# Patient Record
Sex: MALE | Race: WHITE | NOT HISPANIC OR LATINO
[De-identification: names, ages, dates, MRNs, and addresses within clinical notes are randomized per-mention and may not be internally consistent; named-entity substitution may affect disease eponyms.]

---

## 2016-04-03 RX ORDER — WARFARIN SODIUM 2.5 MG/1
3 TABLET ORAL
Qty: 30 | Refills: 0 | COMMUNITY
Start: 2016-04-03 | End: 2016-05-03

## 2016-04-03 RX ORDER — WARFARIN SODIUM 2.5 MG/1
2 TABLET ORAL
Qty: 30 | Refills: 0 | DISCHARGE
Start: 2016-04-03 | End: 2016-05-03

## 2017-04-05 ENCOUNTER — TRANSCRIPTION ENCOUNTER (OUTPATIENT)
Age: 67
End: 2017-04-05

## 2017-04-05 ENCOUNTER — INPATIENT (INPATIENT)
Facility: HOSPITAL | Age: 67
LOS: 0 days | Discharge: HOME | DRG: 315 | End: 2017-04-06
Attending: HOSPITALIST | Admitting: INTERNAL MEDICINE
Payer: COMMERCIAL

## 2017-04-05 VITALS
HEART RATE: 105 BPM | DIASTOLIC BLOOD PRESSURE: 78 MMHG | RESPIRATION RATE: 30 BRPM | HEIGHT: 68 IN | SYSTOLIC BLOOD PRESSURE: 122 MMHG | OXYGEN SATURATION: 92 % | WEIGHT: 315 LBS | TEMPERATURE: 98 F

## 2017-04-05 DIAGNOSIS — R55 SYNCOPE AND COLLAPSE: ICD-10-CM

## 2017-04-05 DIAGNOSIS — Z98.89 OTHER SPECIFIED POSTPROCEDURAL STATES: Chronic | ICD-10-CM

## 2017-04-05 LAB
ALBUMIN SERPL ELPH-MCNC: 3.4 G/DL — SIGNIFICANT CHANGE UP (ref 3.3–5.2)
ALBUMIN SERPL ELPH-MCNC: 3.7 G/DL — SIGNIFICANT CHANGE UP (ref 3.3–5.2)
ALP SERPL-CCNC: 37 U/L — LOW (ref 40–120)
ALP SERPL-CCNC: 42 U/L — SIGNIFICANT CHANGE UP (ref 40–120)
ALT FLD-CCNC: 12 U/L — SIGNIFICANT CHANGE UP
ALT FLD-CCNC: 13 U/L — SIGNIFICANT CHANGE UP
ANION GAP SERPL CALC-SCNC: 12 MMOL/L — SIGNIFICANT CHANGE UP (ref 5–17)
ANION GAP SERPL CALC-SCNC: 15 MMOL/L — SIGNIFICANT CHANGE UP (ref 5–17)
APTT BLD: 43.5 SEC — HIGH (ref 27.5–37.4)
AST SERPL-CCNC: 19 U/L — SIGNIFICANT CHANGE UP
AST SERPL-CCNC: 20 U/L — SIGNIFICANT CHANGE UP
BASOPHILS # BLD AUTO: 0 K/UL — SIGNIFICANT CHANGE UP (ref 0–0.2)
BASOPHILS NFR BLD AUTO: 0.2 % — SIGNIFICANT CHANGE UP (ref 0–2)
BILIRUB SERPL-MCNC: 0.4 MG/DL — SIGNIFICANT CHANGE UP (ref 0.4–2)
BILIRUB SERPL-MCNC: 0.5 MG/DL — SIGNIFICANT CHANGE UP (ref 0.4–2)
BUN SERPL-MCNC: 48 MG/DL — HIGH (ref 8–20)
BUN SERPL-MCNC: 53 MG/DL — HIGH (ref 8–20)
CALCIUM SERPL-MCNC: 9.2 MG/DL — SIGNIFICANT CHANGE UP (ref 8.6–10.2)
CALCIUM SERPL-MCNC: 9.3 MG/DL — SIGNIFICANT CHANGE UP (ref 8.6–10.2)
CHLORIDE SERPL-SCNC: 94 MMOL/L — LOW (ref 98–107)
CHLORIDE SERPL-SCNC: 99 MMOL/L — SIGNIFICANT CHANGE UP (ref 98–107)
CO2 SERPL-SCNC: 23 MMOL/L — SIGNIFICANT CHANGE UP (ref 22–29)
CO2 SERPL-SCNC: 28 MMOL/L — SIGNIFICANT CHANGE UP (ref 22–29)
CREAT SERPL-MCNC: 1.87 MG/DL — HIGH (ref 0.5–1.3)
CREAT SERPL-MCNC: 2.5 MG/DL — HIGH (ref 0.5–1.3)
D DIMER BLD IA.RAPID-MCNC: <150 NG/ML DDU — SIGNIFICANT CHANGE UP
EOSINOPHIL # BLD AUTO: 0.1 K/UL — SIGNIFICANT CHANGE UP (ref 0–0.5)
EOSINOPHIL NFR BLD AUTO: 2.2 % — SIGNIFICANT CHANGE UP (ref 0–5)
GLUCOSE SERPL-MCNC: 234 MG/DL — HIGH (ref 70–115)
GLUCOSE SERPL-MCNC: 262 MG/DL — HIGH (ref 70–115)
HCT VFR BLD CALC: 43.7 % — SIGNIFICANT CHANGE UP (ref 42–52)
HGB BLD-MCNC: 14.2 G/DL — SIGNIFICANT CHANGE UP (ref 14–18)
INR BLD: 3.07 RATIO — HIGH (ref 0.88–1.16)
LYMPHOCYTES # BLD AUTO: 1.2 K/UL — SIGNIFICANT CHANGE UP (ref 1–4.8)
LYMPHOCYTES # BLD AUTO: 19.4 % — LOW (ref 20–55)
MCHC RBC-ENTMCNC: 28.9 PG — SIGNIFICANT CHANGE UP (ref 27–31)
MCHC RBC-ENTMCNC: 32.5 G/DL — SIGNIFICANT CHANGE UP (ref 32–36)
MCV RBC AUTO: 88.8 FL — SIGNIFICANT CHANGE UP (ref 80–94)
MONOCYTES # BLD AUTO: 0.6 K/UL — SIGNIFICANT CHANGE UP (ref 0–0.8)
MONOCYTES NFR BLD AUTO: 9.9 % — SIGNIFICANT CHANGE UP (ref 3–10)
NEUTROPHILS # BLD AUTO: 4 K/UL — SIGNIFICANT CHANGE UP (ref 1.8–8)
NEUTROPHILS NFR BLD AUTO: 67.6 % — SIGNIFICANT CHANGE UP (ref 37–73)
NT-PROBNP SERPL-SCNC: 1519 PG/ML — HIGH (ref 0–300)
PLATELET # BLD AUTO: 151 K/UL — SIGNIFICANT CHANGE UP (ref 150–400)
POTASSIUM SERPL-MCNC: 4.5 MMOL/L — SIGNIFICANT CHANGE UP (ref 3.5–5.3)
POTASSIUM SERPL-MCNC: 4.5 MMOL/L — SIGNIFICANT CHANGE UP (ref 3.5–5.3)
POTASSIUM SERPL-SCNC: 4.5 MMOL/L — SIGNIFICANT CHANGE UP (ref 3.5–5.3)
POTASSIUM SERPL-SCNC: 4.5 MMOL/L — SIGNIFICANT CHANGE UP (ref 3.5–5.3)
PROT SERPL-MCNC: 6.8 G/DL — SIGNIFICANT CHANGE UP (ref 6.6–8.7)
PROT SERPL-MCNC: 7.3 G/DL — SIGNIFICANT CHANGE UP (ref 6.6–8.7)
PROTHROM AB SERPL-ACNC: 34.5 SEC — HIGH (ref 9.8–12.7)
RBC # BLD: 4.92 M/UL — SIGNIFICANT CHANGE UP (ref 4.6–6.2)
RBC # FLD: 17.1 % — HIGH (ref 11–15.6)
SODIUM SERPL-SCNC: 134 MMOL/L — LOW (ref 135–145)
SODIUM SERPL-SCNC: 137 MMOL/L — SIGNIFICANT CHANGE UP (ref 135–145)
TROPONIN T SERPL-MCNC: <0.01 NG/ML — SIGNIFICANT CHANGE UP (ref 0–0.06)
TROPONIN T SERPL-MCNC: <0.01 NG/ML — SIGNIFICANT CHANGE UP (ref 0–0.06)
WBC # BLD: 6 K/UL — SIGNIFICANT CHANGE UP (ref 4.8–10.8)
WBC # FLD AUTO: 6 K/UL — SIGNIFICANT CHANGE UP (ref 4.8–10.8)

## 2017-04-05 PROCEDURE — 93306 TTE W/DOPPLER COMPLETE: CPT | Mod: 26

## 2017-04-05 PROCEDURE — 93010 ELECTROCARDIOGRAM REPORT: CPT

## 2017-04-05 PROCEDURE — 99223 1ST HOSP IP/OBS HIGH 75: CPT

## 2017-04-05 PROCEDURE — 99285 EMERGENCY DEPT VISIT HI MDM: CPT

## 2017-04-05 RX ORDER — ASPIRIN/CALCIUM CARB/MAGNESIUM 324 MG
325 TABLET ORAL ONCE
Qty: 0 | Refills: 0 | Status: COMPLETED | OUTPATIENT
Start: 2017-04-05 | End: 2017-04-05

## 2017-04-05 RX ORDER — ALLOPURINOL 300 MG
300 TABLET ORAL DAILY
Qty: 0 | Refills: 0 | Status: DISCONTINUED | OUTPATIENT
Start: 2017-04-05 | End: 2017-04-06

## 2017-04-05 RX ORDER — INSULIN LISPRO 100/ML
VIAL (ML) SUBCUTANEOUS
Qty: 0 | Refills: 0 | Status: DISCONTINUED | OUTPATIENT
Start: 2017-04-05 | End: 2017-04-06

## 2017-04-05 RX ORDER — DEXTROSE 50 % IN WATER 50 %
25 SYRINGE (ML) INTRAVENOUS ONCE
Qty: 0 | Refills: 0 | Status: DISCONTINUED | OUTPATIENT
Start: 2017-04-05 | End: 2017-04-06

## 2017-04-05 RX ORDER — DEXTROSE 50 % IN WATER 50 %
1 SYRINGE (ML) INTRAVENOUS ONCE
Qty: 0 | Refills: 0 | Status: DISCONTINUED | OUTPATIENT
Start: 2017-04-05 | End: 2017-04-06

## 2017-04-05 RX ORDER — INSULIN GLARGINE 100 [IU]/ML
30 INJECTION, SOLUTION SUBCUTANEOUS AT BEDTIME
Qty: 0 | Refills: 0 | Status: DISCONTINUED | OUTPATIENT
Start: 2017-04-05 | End: 2017-04-06

## 2017-04-05 RX ORDER — ASPIRIN/CALCIUM CARB/MAGNESIUM 324 MG
81 TABLET ORAL DAILY
Qty: 0 | Refills: 0 | Status: DISCONTINUED | OUTPATIENT
Start: 2017-04-05 | End: 2017-04-06

## 2017-04-05 RX ORDER — PANTOPRAZOLE SODIUM 20 MG/1
40 TABLET, DELAYED RELEASE ORAL
Qty: 0 | Refills: 0 | Status: DISCONTINUED | OUTPATIENT
Start: 2017-04-05 | End: 2017-04-06

## 2017-04-05 RX ORDER — DEXTROSE 50 % IN WATER 50 %
12.5 SYRINGE (ML) INTRAVENOUS ONCE
Qty: 0 | Refills: 0 | Status: DISCONTINUED | OUTPATIENT
Start: 2017-04-05 | End: 2017-04-06

## 2017-04-05 RX ORDER — GLUCAGON INJECTION, SOLUTION 0.5 MG/.1ML
1 INJECTION, SOLUTION SUBCUTANEOUS ONCE
Qty: 0 | Refills: 0 | Status: DISCONTINUED | OUTPATIENT
Start: 2017-04-05 | End: 2017-04-06

## 2017-04-05 RX ORDER — NITROGLYCERIN 6.5 MG
1 CAPSULE, EXTENDED RELEASE ORAL ONCE
Qty: 0 | Refills: 0 | Status: COMPLETED | OUTPATIENT
Start: 2017-04-05 | End: 2017-04-05

## 2017-04-05 RX ORDER — LISINOPRIL 2.5 MG/1
20 TABLET ORAL DAILY
Qty: 0 | Refills: 0 | Status: DISCONTINUED | OUTPATIENT
Start: 2017-04-05 | End: 2017-04-06

## 2017-04-05 RX ORDER — SODIUM CHLORIDE 9 MG/ML
3 INJECTION INTRAMUSCULAR; INTRAVENOUS; SUBCUTANEOUS ONCE
Qty: 0 | Refills: 0 | Status: COMPLETED | OUTPATIENT
Start: 2017-04-05 | End: 2017-04-05

## 2017-04-05 RX ORDER — FENOFIBRATE,MICRONIZED 130 MG
145 CAPSULE ORAL DAILY
Qty: 0 | Refills: 0 | Status: DISCONTINUED | OUTPATIENT
Start: 2017-04-05 | End: 2017-04-06

## 2017-04-05 RX ORDER — BUDESONIDE AND FORMOTEROL FUMARATE DIHYDRATE 160; 4.5 UG/1; UG/1
1 AEROSOL RESPIRATORY (INHALATION)
Qty: 0 | Refills: 0 | Status: DISCONTINUED | OUTPATIENT
Start: 2017-04-05 | End: 2017-04-06

## 2017-04-05 RX ORDER — SODIUM CHLORIDE 9 MG/ML
1000 INJECTION, SOLUTION INTRAVENOUS
Qty: 0 | Refills: 0 | Status: DISCONTINUED | OUTPATIENT
Start: 2017-04-05 | End: 2017-04-06

## 2017-04-05 RX ORDER — FUROSEMIDE 40 MG
80 TABLET ORAL
Qty: 0 | Refills: 0 | Status: DISCONTINUED | OUTPATIENT
Start: 2017-04-05 | End: 2017-04-05

## 2017-04-05 RX ORDER — ACETAMINOPHEN 500 MG
1000 TABLET ORAL ONCE
Qty: 0 | Refills: 0 | Status: DISCONTINUED | OUTPATIENT
Start: 2017-04-05 | End: 2017-04-05

## 2017-04-05 RX ORDER — SPIRONOLACTONE 25 MG/1
25 TABLET, FILM COATED ORAL DAILY
Qty: 0 | Refills: 0 | Status: DISCONTINUED | OUTPATIENT
Start: 2017-04-05 | End: 2017-04-06

## 2017-04-05 RX ORDER — FUROSEMIDE 40 MG
80 TABLET ORAL DAILY
Qty: 0 | Refills: 0 | Status: DISCONTINUED | OUTPATIENT
Start: 2017-04-05 | End: 2017-04-06

## 2017-04-05 RX ORDER — CARVEDILOL PHOSPHATE 80 MG/1
12.5 CAPSULE, EXTENDED RELEASE ORAL EVERY 12 HOURS
Qty: 0 | Refills: 0 | Status: DISCONTINUED | OUTPATIENT
Start: 2017-04-05 | End: 2017-04-06

## 2017-04-05 RX ORDER — FUROSEMIDE 40 MG
40 TABLET ORAL DAILY
Qty: 0 | Refills: 0 | Status: DISCONTINUED | OUTPATIENT
Start: 2017-04-05 | End: 2017-04-06

## 2017-04-05 RX ORDER — AMIODARONE HYDROCHLORIDE 400 MG/1
200 TABLET ORAL DAILY
Qty: 0 | Refills: 0 | Status: DISCONTINUED | OUTPATIENT
Start: 2017-04-05 | End: 2017-04-06

## 2017-04-05 RX ORDER — LEVOTHYROXINE SODIUM 125 MCG
50 TABLET ORAL DAILY
Qty: 0 | Refills: 0 | Status: DISCONTINUED | OUTPATIENT
Start: 2017-04-05 | End: 2017-04-06

## 2017-04-05 RX ORDER — IPRATROPIUM/ALBUTEROL SULFATE 18-103MCG
3 AEROSOL WITH ADAPTER (GRAM) INHALATION EVERY 6 HOURS
Qty: 0 | Refills: 0 | Status: DISCONTINUED | OUTPATIENT
Start: 2017-04-05 | End: 2017-04-06

## 2017-04-05 RX ADMIN — BUDESONIDE AND FORMOTEROL FUMARATE DIHYDRATE 1 PUFF(S): 160; 4.5 AEROSOL RESPIRATORY (INHALATION) at 21:53

## 2017-04-05 RX ADMIN — Medication 3 MILLILITER(S): at 21:34

## 2017-04-05 RX ADMIN — CARVEDILOL PHOSPHATE 12.5 MILLIGRAM(S): 80 CAPSULE, EXTENDED RELEASE ORAL at 20:18

## 2017-04-05 RX ADMIN — INSULIN GLARGINE 30 UNIT(S): 100 INJECTION, SOLUTION SUBCUTANEOUS at 22:54

## 2017-04-05 RX ADMIN — Medication 40 MILLIGRAM(S): at 20:18

## 2017-04-05 RX ADMIN — SODIUM CHLORIDE 3 MILLILITER(S): 9 INJECTION INTRAMUSCULAR; INTRAVENOUS; SUBCUTANEOUS at 10:23

## 2017-04-05 NOTE — H&P ADULT - NSHPPHYSICALEXAM_GEN_ALL_CORE
Vital Signs Last 24 Hrs  T(C): 36.7, Max: 36.8 (04-05 @ 09:25)  T(F): 98, Max: 98.2 (04-05 @ 09:25)  HR: 82 (81 - 105)  BP: 129/84 (105/62 - 129/84)  BP(mean): --  RR: 20 (20 - 30)  SpO2: 96% (92% - 100%)  Vital Signs Last 24 Hrs  T(C): 36.7, Max: 36.8 (04-05 @ 09:25)  T(F): 98, Max: 98.2 (04-05 @ 09:25)  HR: 82 (81 - 105)  BP: 129/84 (105/62 - 129/84)  BP(mean): --  RR: 20 (20 - 30)  SpO2: 96% (92% - 100%)  PHYSICAL EXAM-  GENERAL: alert, NAD  HEAD:  Atraumatic, Normocephalic  EYES: EOMI,  conjunctiva and sclera clear  NECK: Supple   CHEST/LUNG: CTA bilaterally   HEART: irregular; No murmurs   ABDOMEN: Soft, obese, NT; Bowel sounds present  EXTREMITIES: wwp, unnaboots in place  NERVOUS SYSTEM:  Alert & Oriented X3, Motor Strength 5/5 B/L upper and lower extremities; CN grossly intact  SKIN: No rashes or lesions  PSYCH: normal affect

## 2017-04-05 NOTE — H&P ADULT - HISTORY OF PRESENT ILLNESS
66 y M hx obesity, CHRISS on CPAP, afib on coumadin, NICM w EF 20-25%, CKD, DM2, presented to ER c/o episode of dizziness this morning associated w diaphoresis and SOB. Denies CP. Chronic lymphedema, has unnaboots in place. 66 y M hx obesity, CHRISS on CPAP, afib on coumadin, NICM w EF 20-25%, s/p AICD, CKD, DM2, presented to ER c/o episode of dizziness this morning associated w diaphoresis and SOB. Denies CP. Chronic lymphedema, has unnaboots in place.  at home.

## 2017-04-05 NOTE — ED PROVIDER NOTE - CARE PLAN
Principal Discharge DX:	Near syncope  Secondary Diagnosis:	DM (diabetes mellitus)  Secondary Diagnosis:	High cholesterol

## 2017-04-05 NOTE — ED ADULT NURSE NOTE - OBJECTIVE STATEMENT
66 a&ox3 male came to ED. c/o of sob that started this morning. as per patient he was getting out of his chair to go to the bathroom and got dizzy, cold sweats, and felt like he was going to pass out, and was holding on to whatever pt. states he felt like his throat was closing and was gasping for hair. as per patient this has never happened like this before. pt. now denies chest pain, states his sob has not improved. sating well on 99% 2L. lung sounds clear throughout. family at bedside. 66 a&ox3 male came to ED. c/o of sob that started this morning. as per patient he was getting out of his chair to go to the bathroom and got dizzy, cold sweats, and felt like he was going to pass out, and was holding on to whatever pt. states he felt like his throat was closing and was gasping for hair. as per patient this has never happened like this before. pt. now denies chest pain, states his sob has not improved. pt. has bilateral ace bandages to lower extremities followed by wound care nurse.  sating well on 99% 2L. lung sounds clear throughout. family at bedside. 66 a&ox3 male came to ED. c/o of sob that started this morning. as per patient he was getting out of his chair to go to the bathroom and got dizzy, cold sweats, and felt like he was going to pass out, and was holding on to whatever pt. states he felt like his throat was closing and was gasping for air. as per patient this has never happened like this before. pt. now denies chest pain, states his sob has not improved. pt. has bilateral ace bandages to lower extremities followed by wound care nurse.  sating well on 99% 2L. lung sounds clear throughout. family at bedside.

## 2017-04-05 NOTE — CONSULT NOTE ADULT - ASSESSMENT
Assessment  Near syncope likely due to symptomatic hypotension from meds  NICM with severe LV dysfunction EF 25% presently well compensated  Transient dyspnea, no evidence of sig volume overload, clear cxr  COPD/ CHRISS  Chronic AF on AC with therapeutic INR  Morbid obesity  ICD    Rec:  Cont current meds  Will interrgate ICD today to exclude arrhythmia  If ICD interrogation unremarkable consider DC home and will follow up in office  Pt instructed to space meds to avoid hypotension Assessment  Near syncope likely due to symptomatic hypotension from meds  NICM with severe LV dysfunction EF 25% presently well compensated  Transient dyspnea, no evidence of sig volume overload, clear cxr  COPD/ CHRISS  Chronic AF on AC with therapeutic INR  Morbid obesity  ICD    Rec:  Cont current meds  Will interrgate ICD today to exclude arrhythmia  If ICD interrogation unremarkable consider DC home and will follow up in office  Pt instructed to space meds to avoid hypotension    ICD interrogation revealed normal function, no VT chronic AF with controlled rate  Therefore near syncope likely due to transient low BP from excess meds  Consider DC home on lower dose lasix 80/40 and we will see in office

## 2017-04-05 NOTE — ED ADULT NURSE REASSESSMENT NOTE - NS ED NURSE REASSESS COMMENT FT1
pt. a&ox3. pt. states his sob has improved. pt. denies chest pain, chest discomfort. awaiting bed. will continue to monitor.

## 2017-04-05 NOTE — H&P ADULT - FAMILY HISTORY
Sibling  Still living? Unknown  Family history of cancer, Age at diagnosis: Age Unknown  Family history of diabetes mellitus, Age at diagnosis: Age Unknown

## 2017-04-05 NOTE — ED ADULT NURSE REASSESSMENT NOTE - NS ED NURSE REASSESS COMMENT FT1
received patient alert skin warm and dry color good placed on cardiac monitor denies chest pains at this time family at bedside will continue monitor

## 2017-04-05 NOTE — ED PROVIDER NOTE - PMH
DM (diabetes mellitus)    High cholesterol    HTN (hypertension)    Prostate CA    Pulmonary hypertension    Renal insufficiency    Sleep apnea

## 2017-04-05 NOTE — ED PROVIDER NOTE - FAMILY HISTORY
Father  Still living? Unknown  Family history of cancer, Age at diagnosis: Age Unknown     Mother  Still living? Unknown  Family history of cancer, Age at diagnosis: Age Unknown     Sibling  Still living? Unknown  Family history of cancer, Age at diagnosis: Age Unknown  Family history of diabetes mellitus, Age at diagnosis: Age Unknown

## 2017-04-05 NOTE — ED ADULT TRIAGE NOTE - CHIEF COMPLAINT QUOTE
pt alert and awake x3, BIBA c/o SOB that started 30 minutes ago, dyspneic upon exertion, has cardiac hx. uto blood pressure.

## 2017-04-05 NOTE — ED PROVIDER NOTE - MEDICAL DECISION MAKING DETAILS
The patient came to ED with near syncopal episode with multiple risk factors for cardiac syncope.  Will admit for further evaluation.

## 2017-04-05 NOTE — ED ADULT NURSE REASSESSMENT NOTE - NS ED NURSE REASSESS COMMENT FT1
pt. a&ox3. pt. resting comfortably, eating lunch. pt. sating well on RA. in no apparent distress. will continue to monitor. awaiting bed.

## 2017-04-05 NOTE — H&P ADULT - ASSESSMENT
66 y M hx CHRISS on CPAP, HTN, DM, afib on coumadin, CKD, adm for NICM s/p AICD adm for near syncope    Near syncope: cardiology eval, ICD interrogation, orthostatics.   DM: monitor FS, lantus, sliding scale.   CKD: stable, renally dose meds  afib; rate controlled, continue home meds, hold INR for INR 3, f/u INR in am  CHRISS: continue nocturnal CPAP    Prophyl pt anticoagulated

## 2017-04-05 NOTE — CONSULT NOTE ADULT - SUBJECTIVE AND OBJECTIVE BOX
[facility]                                   [address] [place] [address]                                                      PHONE: [phone]                                                         FAX: [phone]  [email or web address]  ---------------------------------------------------------------------------------------------------------------------------------    Reason for Consult: near syncope dyspnea    HPI:  MARY ANN CORNELL is an 66y Male with morbid obesity, chronic Af on AC s/p failed DCCV 2016, known NICM with severe LV dysfunction EF 25%, mod MR, s/p single chamber ICD admitted after early am lightheadedness after taking all his meds at once.  Denied true LOC or ICD discharge.  Also describes significant dyspnea after sitteing on toilet.  No assoc cp, currently asx.  In late feb through early march pt was admitted to hospital in [place] for CHF, had sig diuresis and weight loss which has remained stable.  No sig PND orthopnea or edema at present.  Known CHRISS on CPAP    PAST MEDICAL & SURGICAL HISTORY:  Pulmonary hypertension  Prostate CA  Sleep apnea  Renal insufficiency  High cholesterol  HTN (hypertension)  DM (diabetes mellitus)  S/P ankle ligament repair  No significant past surgical history      No Known Allergies      MEDICATIONS  (STANDING):  spironolactone 25milliGRAM(s) Oral daily  aspirin enteric coated 81milliGRAM(s) Oral daily  lisinopril 20milliGRAM(s) Oral daily  allopurinol 300milliGRAM(s) Oral daily  fenofibrate Tablet 145milliGRAM(s) Oral daily  carvedilol 12.5milliGRAM(s) Oral every 12 hours  buDESOnide 160 MICROgram(s)/formoterol 4.5 MICROgram(s) Inhaler 1Puff(s) Inhalation two times a day  amiodarone    Tablet 200milliGRAM(s) Oral daily  pantoprazole    Tablet 40milliGRAM(s) Oral before breakfast  furosemide    Tablet 80milliGRAM(s) Oral two times a day  levothyroxine 50MICROGram(s) Oral daily  insulin glargine Injectable (LANTUS) 30Unit(s) SubCutaneous at bedtime  insulin lispro (HumaLOG) corrective regimen sliding scale  SubCutaneous three times a day before meals  dextrose 5%. 1000milliLiter(s) IV Continuous <Continuous>  dextrose 50% Injectable 12.5Gram(s) IV Push once  dextrose 50% Injectable 25Gram(s) IV Push once  dextrose 50% Injectable 25Gram(s) IV Push once    MEDICATIONS  (PRN):  dextrose Gel 1Dose(s) Oral once PRN Blood Glucose LESS THAN 70 milliGRAM(s)/deciliter  glucagon  Injectable 1milliGRAM(s) IntraMuscular once PRN Glucose LESS THAN 70 milligrams/deciliter      Social History:  Cigarettes:                    Alchohol:                 Illicit Drug Abuse:      ROS: Negative other than as mentioned in HPI.    Vital Signs Last 24 Hrs  T(C): 36.7, Max: 36.8 (04-05 @ 09:25)  T(F): 98, Max: 98.2 (04-05 @ 09:25)  HR: 82 (81 - 105)  BP: 129/84 (105/62 - 129/84)  BP(mean): --  RR: 20 (20 - 30)  SpO2: 96% (92% - 100%)  ICU Vital Signs Last 24 Hrs  MARY ANN CORNELL  I&O's Detail    I&O's Summary    Drug Dosing Weight  MARY ANN CORNELL      PHYSICAL EXAM:  General: Appears well developed, well nourished alert and cooperative.  HEENT: Head; normocephalic, atraumatic.  Eyes: Pupils reactive, cornea wnl.  Neck: Supple, no nodes adenopathy, no NVD or carotid bruit or thyromegaly.  CARDIOVASCULAR: Normal S1 and S2, No murmur, rub, gallop or lift.   LUNGS: No rales, rhonchi or wheeze. Normal breath sounds bilaterally.  ABDOMEN: Soft, nontender without mass or organomegaly. bowel sounds normoactive.  EXTREMITIES: No sig edema, LE wrapped  SKIN: warm and dry with normal turgor.  NEURO: Alert/oriented x 3/normal motor exam. No pathologic reflexes.    PSYCH: normal affect.        LABS:                        14.2   6.0   )-----------( 151      ( 05 Apr 2017 10:59 )             43.7     04-05    137  |  99  |  48.0<H>  ----------------------------<  262<H>  4.5   |  23.0  |  1.87<H>    Ca    9.2      05 Apr 2017 10:59    TPro  6.8  /  Alb  3.4  /  TBili  0.5  /  DBili  x   /  AST  19  /  ALT  12  /  AlkPhos  37<L>  04-05    MARY ANN CORNELL  CARDIAC MARKERS ( 05 Apr 2017 10:59 )  x     / <0.01 ng/mL / x     / x     / x          PT/INR - ( 05 Apr 2017 10:59 )   PT: 34.5 sec;   INR: 3.07 ratio         PTT - ( 05 Apr 2017 10:59 )  PTT:43.5 sec      RADIOLOGY & ADDITIONAL STUDIES: CM clear no chf    INTERPRETATION OF TELEMETRY (personally reviewed):    ECG: AFib with MVR no acute changes, low voltage    ECHO:/2106 MARIELA LVEF 25% mod MR RVE dilated with reduced EF, no thrombus, DCCV attempted w/o conversion    STRESS TEST:    CARDIAC CATHETERIZATION: 3/2016 nonobstructive CAD LVEF 25%

## 2017-04-05 NOTE — ED PROVIDER NOTE - OBJECTIVE STATEMENT
The patient is a 66 year old male with history of CHF and COPD, A-fib, presents to ED complaining of SOB for one month but today got acutely worse. MACHUCA, orthopnea and feeling near syncopal episode.  + Diaphoresis. The patient denies CP, No abd pain, no cough. no back pain

## 2017-04-06 VITALS
OXYGEN SATURATION: 96 % | TEMPERATURE: 98 F | DIASTOLIC BLOOD PRESSURE: 50 MMHG | HEART RATE: 76 BPM | RESPIRATION RATE: 18 BRPM | SYSTOLIC BLOOD PRESSURE: 90 MMHG

## 2017-04-06 LAB
ALBUMIN SERPL ELPH-MCNC: 3.5 G/DL — SIGNIFICANT CHANGE UP (ref 3.3–5.2)
ALP SERPL-CCNC: 36 U/L — LOW (ref 40–120)
ALT FLD-CCNC: 11 U/L — SIGNIFICANT CHANGE UP
ANION GAP SERPL CALC-SCNC: 13 MMOL/L — SIGNIFICANT CHANGE UP (ref 5–17)
ANION GAP SERPL CALC-SCNC: 13 MMOL/L — SIGNIFICANT CHANGE UP (ref 5–17)
AST SERPL-CCNC: 15 U/L — SIGNIFICANT CHANGE UP
BILIRUB SERPL-MCNC: 0.4 MG/DL — SIGNIFICANT CHANGE UP (ref 0.4–2)
BUN SERPL-MCNC: 54 MG/DL — HIGH (ref 8–20)
BUN SERPL-MCNC: 54 MG/DL — HIGH (ref 8–20)
CALCIUM SERPL-MCNC: 9.1 MG/DL — SIGNIFICANT CHANGE UP (ref 8.6–10.2)
CALCIUM SERPL-MCNC: 9.1 MG/DL — SIGNIFICANT CHANGE UP (ref 8.6–10.2)
CHLORIDE SERPL-SCNC: 95 MMOL/L — LOW (ref 98–107)
CHLORIDE SERPL-SCNC: 95 MMOL/L — LOW (ref 98–107)
CO2 SERPL-SCNC: 27 MMOL/L — SIGNIFICANT CHANGE UP (ref 22–29)
CO2 SERPL-SCNC: 27 MMOL/L — SIGNIFICANT CHANGE UP (ref 22–29)
CREAT SERPL-MCNC: 2.42 MG/DL — HIGH (ref 0.5–1.3)
CREAT SERPL-MCNC: 2.42 MG/DL — HIGH (ref 0.5–1.3)
GLUCOSE SERPL-MCNC: 222 MG/DL — HIGH (ref 70–115)
GLUCOSE SERPL-MCNC: 222 MG/DL — HIGH (ref 70–115)
HBA1C BLD-MCNC: 9.2 % — HIGH (ref 4–5.6)
HCT VFR BLD CALC: 42.7 % — SIGNIFICANT CHANGE UP (ref 42–52)
HGB BLD-MCNC: 13.9 G/DL — LOW (ref 14–18)
INR BLD: 3.12 RATIO — HIGH (ref 0.88–1.16)
MCHC RBC-ENTMCNC: 28.9 PG — SIGNIFICANT CHANGE UP (ref 27–31)
MCHC RBC-ENTMCNC: 32.6 G/DL — SIGNIFICANT CHANGE UP (ref 32–36)
MCV RBC AUTO: 88.8 FL — SIGNIFICANT CHANGE UP (ref 80–94)
PLATELET # BLD AUTO: 126 K/UL — LOW (ref 150–400)
POTASSIUM SERPL-MCNC: 4.5 MMOL/L — SIGNIFICANT CHANGE UP (ref 3.5–5.3)
POTASSIUM SERPL-MCNC: 4.5 MMOL/L — SIGNIFICANT CHANGE UP (ref 3.5–5.3)
POTASSIUM SERPL-SCNC: 4.5 MMOL/L — SIGNIFICANT CHANGE UP (ref 3.5–5.3)
POTASSIUM SERPL-SCNC: 4.5 MMOL/L — SIGNIFICANT CHANGE UP (ref 3.5–5.3)
PROT SERPL-MCNC: 6.9 G/DL — SIGNIFICANT CHANGE UP (ref 6.6–8.7)
PROTHROM AB SERPL-ACNC: 35.1 SEC — HIGH (ref 9.8–12.7)
RBC # BLD: 4.81 M/UL — SIGNIFICANT CHANGE UP (ref 4.6–6.2)
RBC # FLD: 17.2 % — HIGH (ref 11–15.6)
SODIUM SERPL-SCNC: 135 MMOL/L — SIGNIFICANT CHANGE UP (ref 135–145)
SODIUM SERPL-SCNC: 135 MMOL/L — SIGNIFICANT CHANGE UP (ref 135–145)
TROPONIN T SERPL-MCNC: <0.01 NG/ML — SIGNIFICANT CHANGE UP (ref 0–0.06)
TROPONIN T SERPL-MCNC: <0.01 NG/ML — SIGNIFICANT CHANGE UP (ref 0–0.06)
WBC # BLD: 6.5 K/UL — SIGNIFICANT CHANGE UP (ref 4.8–10.8)
WBC # FLD AUTO: 6.5 K/UL — SIGNIFICANT CHANGE UP (ref 4.8–10.8)

## 2017-04-06 PROCEDURE — 36415 COLL VENOUS BLD VENIPUNCTURE: CPT

## 2017-04-06 PROCEDURE — 84484 ASSAY OF TROPONIN QUANT: CPT

## 2017-04-06 PROCEDURE — 83880 ASSAY OF NATRIURETIC PEPTIDE: CPT

## 2017-04-06 PROCEDURE — 93306 TTE W/DOPPLER COMPLETE: CPT

## 2017-04-06 PROCEDURE — 99239 HOSP IP/OBS DSCHRG MGMT >30: CPT

## 2017-04-06 PROCEDURE — 80048 BASIC METABOLIC PNL TOTAL CA: CPT

## 2017-04-06 PROCEDURE — 85027 COMPLETE CBC AUTOMATED: CPT

## 2017-04-06 PROCEDURE — 99285 EMERGENCY DEPT VISIT HI MDM: CPT | Mod: 25

## 2017-04-06 PROCEDURE — 93005 ELECTROCARDIOGRAM TRACING: CPT

## 2017-04-06 PROCEDURE — 83036 HEMOGLOBIN GLYCOSYLATED A1C: CPT

## 2017-04-06 PROCEDURE — 94760 N-INVAS EAR/PLS OXIMETRY 1: CPT

## 2017-04-06 PROCEDURE — 85730 THROMBOPLASTIN TIME PARTIAL: CPT

## 2017-04-06 PROCEDURE — 85610 PROTHROMBIN TIME: CPT

## 2017-04-06 PROCEDURE — 71045 X-RAY EXAM CHEST 1 VIEW: CPT

## 2017-04-06 PROCEDURE — 85379 FIBRIN DEGRADATION QUANT: CPT

## 2017-04-06 PROCEDURE — 80053 COMPREHEN METABOLIC PANEL: CPT

## 2017-04-06 PROCEDURE — 94640 AIRWAY INHALATION TREATMENT: CPT

## 2017-04-06 RX ORDER — BUDESONIDE AND FORMOTEROL FUMARATE DIHYDRATE 160; 4.5 UG/1; UG/1
2 AEROSOL RESPIRATORY (INHALATION)
Qty: 0 | Refills: 0 | COMMUNITY

## 2017-04-06 RX ORDER — CARVEDILOL PHOSPHATE 80 MG/1
1 CAPSULE, EXTENDED RELEASE ORAL
Qty: 0 | Refills: 0 | COMMUNITY

## 2017-04-06 RX ORDER — SITAGLIPTIN 50 MG/1
1 TABLET, FILM COATED ORAL
Qty: 30 | Refills: 0 | OUTPATIENT
Start: 2017-04-06 | End: 2017-05-06

## 2017-04-06 RX ORDER — OXAPROZIN 600 MG/1
2 TABLET, FILM COATED ORAL
Qty: 0 | Refills: 0 | COMMUNITY

## 2017-04-06 RX ORDER — FUROSEMIDE 40 MG
0 TABLET ORAL
Qty: 0 | Refills: 0 | COMMUNITY

## 2017-04-06 RX ADMIN — CARVEDILOL PHOSPHATE 12.5 MILLIGRAM(S): 80 CAPSULE, EXTENDED RELEASE ORAL at 05:54

## 2017-04-06 RX ADMIN — Medication 3 MILLILITER(S): at 08:44

## 2017-04-06 RX ADMIN — Medication 80 MILLIGRAM(S): at 05:54

## 2017-04-06 RX ADMIN — Medication 145 MILLIGRAM(S): at 13:37

## 2017-04-06 RX ADMIN — Medication 1: at 09:24

## 2017-04-06 RX ADMIN — Medication 1: at 13:36

## 2017-04-06 RX ADMIN — BUDESONIDE AND FORMOTEROL FUMARATE DIHYDRATE 1 PUFF(S): 160; 4.5 AEROSOL RESPIRATORY (INHALATION) at 08:42

## 2017-04-06 RX ADMIN — AMIODARONE HYDROCHLORIDE 200 MILLIGRAM(S): 400 TABLET ORAL at 05:54

## 2017-04-06 RX ADMIN — LISINOPRIL 20 MILLIGRAM(S): 2.5 TABLET ORAL at 05:54

## 2017-04-06 RX ADMIN — Medication 81 MILLIGRAM(S): at 13:37

## 2017-04-06 RX ADMIN — Medication 50 MICROGRAM(S): at 05:54

## 2017-04-06 RX ADMIN — SPIRONOLACTONE 25 MILLIGRAM(S): 25 TABLET, FILM COATED ORAL at 05:54

## 2017-04-06 RX ADMIN — PANTOPRAZOLE SODIUM 40 MILLIGRAM(S): 20 TABLET, DELAYED RELEASE ORAL at 05:54

## 2017-04-06 RX ADMIN — Medication 300 MILLIGRAM(S): at 13:37

## 2017-04-06 NOTE — DISCHARGE NOTE ADULT - MEDICATION SUMMARY - MEDICATIONS TO CHANGE
I will SWITCH the dose or number of times a day I take the medications listed below when I get home from the hospital:    furosemide 80 mg oral tablet  --  by mouth 2 times a day I will SWITCH the dose or number of times a day I take the medications listed below when I get home from the hospital:  None

## 2017-04-06 NOTE — DISCHARGE NOTE ADULT - PATIENT PORTAL LINK FT
“You can access the [employer] Patient Portal, offered by [facility], by registering with the following website: [email or web address]”

## 2017-04-06 NOTE — DISCHARGE NOTE ADULT - PLAN OF CARE
. Test results were without significant findings. Follow up with Cardiology for further management. Continue with nocturnal CPAP. Follow up with your primary care physician for further management. Follow up with your primary care physician for further management and monitoring. Continue on your cardiac medications. Follow up with Cardiology for further management. Januvia was initiated. Invokana and Metformin were discontinued for now. Follow up with your primary care physician in regards to your kidney function and diabetic medications. Follow up with your primary care physician for further management. Continue on your home insulin regimen. Monitor your glucose levels closely.

## 2017-04-06 NOTE — DISCHARGE NOTE ADULT - HOSPITAL COURSE
65M with a prior admission for atrial fibrillation presented with lightheadedness and dyspnea. On presentation, INR(3.07), BUN/Cr(48/1.87), BNP(1519). The patient was seen by Cardiology in consultation for near-syncope. The dose of furosemide was decreased. Echocardiogram noted mild concentric left ventricular hypertrophy, moderately decreased global left ventricular systolic function, ejection fraction of 35 to 40%. The AICD was interrogated and without significant arrhythmias. The patient was seen by Cardiology and thought to be stable for further management on an outpatient basis. The patient was advised to space his medications throughout the day rather than taking all of the medications at once. The dose of furosemide was decreased and the patient was advised to follow up with his primary care physician for further management of his kidney function.

## 2017-04-06 NOTE — DISCHARGE NOTE ADULT - MEDICATION SUMMARY - MEDICATIONS TO TAKE
I will START or STAY ON the medications listed below when I get home from the hospital:    spironolactone 25 mg oral tablet  -- 1 tab(s) by mouth once a day  -- Indication: For CHF    aspirin 81 mg oral tablet  -- 1 tab(s) by mouth once a day  -- Indication: For CAD    oxaprozin 600 mg oral tablet  -- 2 tab(s) by mouth once a day  -- Indication: For Arthritis    Altace 5 mg oral tablet  -- 1 tab(s) by mouth once a day  -- Indication: For CHF    amiodarone 200 mg oral tablet  -- 1 tab(s) by mouth once a day  -- Indication: For Afib    Coumadin  -- 3 milligram(s) by mouth once a day  -- Indication: For Afib    Januvia 50 mg oral tablet  -- 1 tab(s) by mouth once a day  -- Do not drink alcoholic beverages when taking this medication.    -- Indication: For DM (diabetes mellitus)    allopurinol 300 mg oral tablet  -- 1 tab(s) by mouth once a day  -- Indication: For Gout    fenofibrate 145 mg oral tablet  -- 1 tab(s) by mouth once a day  -- Indication: For Hyperlipidemia    carvedilol 12.5 mg oral tablet  -- 1 tab(s) by mouth 2 times a day  -- Indication: For CHF    albuterol CFC free 90 mcg/inh inhalation aerosol  -- 1 puff(s) inhaled every 6 hours  -- Indication: For Wheezing    Symbicort 160 mcg-4.5 mcg/inh inhalation aerosol  -- 2 puff(s) inhaled 2 times a day  -- Indication: For Wheezing    furosemide 80 mg oral tablet  -- 1 tab in the morning and 0.5 tab in the evening  -- Indication: For CHF    potassium chloride 20 mEq oral tablet, extended release  -- 1 tab(s) by mouth 2 times a day  -- Indication: For Supplement    omeprazole 40 mg oral delayed release capsule  -- 1 cap(s) by mouth once a day  -- Indication: For Gastritis    levothyroxine 50 mcg (0.05 mg) oral capsule  -- 1 cap(s) by mouth once a day  -- Indication: For Hypothyroidism I will START or STAY ON the medications listed below when I get home from the hospital:    oxaprozin 600 mg oral tablet  -- 1 tab(s) by mouth once a day  -- Indication: For Arthritis    Altace 5 mg oral tablet  -- 1 tab(s) by mouth once a day  -- Indication: For CHF    Coumadin  -- 2 milligrams alternating with 2.5 milligrams by mouth once a day  -- Indication: For Afib    Levemir FlexTouch 100 units/mL subcutaneous solution  -- 50 unit(s) subcutaneous once a day  -- Indication: For DM    NovoLOG FlexPen 100 units/mL subcutaneous solution  -- 12 unit(s) subcutaneous 3 times a day  -- Indication: For DM    allopurinol 300 mg oral tablet  -- 1 tab(s) by mouth once a day  -- Indication: For Gout    fenofibric acid 135 mg oral delayed release capsule  -- 1 cap(s) by mouth once a day  -- Indication: For Hyperlipidemia    carvedilol 6.25 mg oral tablet  -- 2 tab(s) by mouth 2 times a day  -- Indication: For CHF    furosemide 80 mg oral tablet  -- 1 tab(s) by mouth once a day  -- Indication: For CHF    levothyroxine 50 mcg (0.05 mg) oral capsule  -- 1 cap(s) by mouth once a day  -- Indication: For Hypothyroidism

## 2017-04-06 NOTE — DISCHARGE NOTE ADULT - NS AS DC STROKE ED MATERIALS
Stroke Warning Signs and Symptoms/Risk Factors for Stroke/Stroke Education Booklet/Call 911 for Stroke/Need for Followup After Discharge/Prescribed Medications Stroke Education Booklet/Prescribed Medications/Risk Factors for Stroke/Call 911 for Stroke/Stroke Warning Signs and Symptoms/Need for Followup After Discharge

## 2017-04-06 NOTE — DISCHARGE NOTE ADULT - CARE PLAN
Principal Discharge DX:	Syncope  Goal:	.  Instructions for follow-up, activity and diet:	Test results were without significant findings. Follow up with Cardiology for further management.  Secondary Diagnosis:	Sleep apnea  Instructions for follow-up, activity and diet:	Continue with nocturnal CPAP.  Secondary Diagnosis:	Renal insufficiency  Instructions for follow-up, activity and diet:	Follow up with your primary care physician for further management. Follow up with your primary care physician for further management and monitoring.  Secondary Diagnosis:	CHF (congestive heart failure)  Instructions for follow-up, activity and diet:	Continue on your cardiac medications. Follow up with Cardiology for further management.  Secondary Diagnosis:	DM (diabetes mellitus)  Instructions for follow-up, activity and diet:	Januvia was initiated. Invokana and Metformin were discontinued for now. Follow up with your primary care physician in regards to your kidney function and diabetic medications. Principal Discharge DX:	Syncope  Goal:	.  Instructions for follow-up, activity and diet:	Test results were without significant findings. Follow up with Cardiology for further management.  Secondary Diagnosis:	Sleep apnea  Instructions for follow-up, activity and diet:	Continue with nocturnal CPAP.  Secondary Diagnosis:	Renal insufficiency  Instructions for follow-up, activity and diet:	Follow up with your primary care physician for further management.  Secondary Diagnosis:	CHF (congestive heart failure)  Instructions for follow-up, activity and diet:	Continue on your cardiac medications. Follow up with Cardiology for further management.  Secondary Diagnosis:	DM (diabetes mellitus)  Instructions for follow-up, activity and diet:	Continue on your home insulin regimen. Monitor your glucose levels closely.

## 2017-04-06 NOTE — DISCHARGE NOTE ADULT - MEDICATION SUMMARY - MEDICATIONS TO STOP TAKING
I will STOP taking the medications listed below when I get home from the hospital:    canagliflozin 100 mg oral tablet  -- 1 tab(s) by mouth once a day    metFORMIN 500 mg oral tablet  --  by mouth once a day I will STOP taking the medications listed below when I get home from the hospital:  None

## 2017-04-06 NOTE — PROGRESS NOTE ADULT - ASSESSMENT
Assessment  Near syncope likely due to symptomatic hypotension from meds  NICM with severe LV dysfunction EF 25% presently well compensated  Transient dyspnea, no evidence of sig volume overload, clear cxr  COPD/ CHRISS  Chronic AF on AC with therapeutic INR  Morbid obesity  ICD    Rec:    OK to DC home  Cont current meds  ICD interrogated no arrhythmia  Pt instructed to space meds to avoid hypotension

## 2017-10-25 ENCOUNTER — INPATIENT (INPATIENT)
Facility: HOSPITAL | Age: 67
LOS: 6 days | Discharge: HOME | DRG: 872 | End: 2017-11-01
Attending: INTERNAL MEDICINE | Admitting: HOSPITALIST
Payer: COMMERCIAL

## 2017-10-25 VITALS
TEMPERATURE: 103 F | DIASTOLIC BLOOD PRESSURE: 81 MMHG | HEIGHT: 69 IN | RESPIRATION RATE: 24 BRPM | HEART RATE: 130 BPM | OXYGEN SATURATION: 96 % | SYSTOLIC BLOOD PRESSURE: 154 MMHG | WEIGHT: 315 LBS

## 2017-10-25 DIAGNOSIS — L03.90 CELLULITIS, UNSPECIFIED: ICD-10-CM

## 2017-10-25 DIAGNOSIS — Z98.89 OTHER SPECIFIED POSTPROCEDURAL STATES: Chronic | ICD-10-CM

## 2017-10-25 LAB
ALBUMIN SERPL ELPH-MCNC: 3.6 G/DL — SIGNIFICANT CHANGE UP (ref 3.3–5.2)
ALP SERPL-CCNC: 58 U/L — SIGNIFICANT CHANGE UP (ref 40–120)
ALT FLD-CCNC: 19 U/L — SIGNIFICANT CHANGE UP
ANION GAP SERPL CALC-SCNC: 11 MMOL/L — SIGNIFICANT CHANGE UP (ref 5–17)
APTT BLD: 32.9 SEC — SIGNIFICANT CHANGE UP (ref 27.5–37.4)
AST SERPL-CCNC: 37 U/L — SIGNIFICANT CHANGE UP
BASOPHILS # BLD AUTO: 0 K/UL — SIGNIFICANT CHANGE UP (ref 0–0.2)
BILIRUB SERPL-MCNC: 0.8 MG/DL — SIGNIFICANT CHANGE UP (ref 0.4–2)
BUN SERPL-MCNC: 40 MG/DL — HIGH (ref 8–20)
CALCIUM SERPL-MCNC: 9.5 MG/DL — SIGNIFICANT CHANGE UP (ref 8.6–10.2)
CHLORIDE SERPL-SCNC: 94 MMOL/L — LOW (ref 98–107)
CO2 SERPL-SCNC: 23 MMOL/L — SIGNIFICANT CHANGE UP (ref 22–29)
CREAT SERPL-MCNC: 1.62 MG/DL — HIGH (ref 0.5–1.3)
EOSINOPHIL # BLD AUTO: 0 K/UL — SIGNIFICANT CHANGE UP (ref 0–0.5)
EOSINOPHIL NFR BLD AUTO: 1 % — SIGNIFICANT CHANGE UP (ref 0–6)
GLUCOSE BLDC GLUCOMTR-MCNC: 273 MG/DL — HIGH (ref 70–99)
GLUCOSE SERPL-MCNC: 194 MG/DL — HIGH (ref 70–115)
HCT VFR BLD CALC: 52.4 % — HIGH (ref 42–52)
HGB BLD-MCNC: 16.6 G/DL — SIGNIFICANT CHANGE UP (ref 14–18)
INR BLD: 1.84 RATIO — HIGH (ref 0.88–1.16)
LACTATE BLDV-MCNC: 2.5 MMOL/L — HIGH (ref 0.5–2)
LACTATE BLDV-MCNC: 2.7 MMOL/L — HIGH (ref 0.5–2)
LYMPHOCYTES # BLD AUTO: 0.7 K/UL — LOW (ref 1–4.8)
LYMPHOCYTES # BLD AUTO: 8 % — LOW (ref 20–55)
MCHC RBC-ENTMCNC: 27.6 PG — SIGNIFICANT CHANGE UP (ref 27–31)
MCHC RBC-ENTMCNC: 31.7 G/DL — LOW (ref 32–36)
MCV RBC AUTO: 87.2 FL — SIGNIFICANT CHANGE UP (ref 80–94)
MONOCYTES # BLD AUTO: 0.5 K/UL — SIGNIFICANT CHANGE UP (ref 0–0.8)
MONOCYTES NFR BLD AUTO: 6 % — SIGNIFICANT CHANGE UP (ref 3–10)
NEUTROPHILS # BLD AUTO: 7.9 K/UL — SIGNIFICANT CHANGE UP (ref 1.8–8)
NEUTROPHILS NFR BLD AUTO: 83 % — HIGH (ref 37–73)
NEUTS BAND # BLD: 2 % — SIGNIFICANT CHANGE UP (ref 0–8)
NT-PROBNP SERPL-SCNC: 609 PG/ML — HIGH (ref 0–300)
PLAT MORPH BLD: ABNORMAL
PLATELET # BLD AUTO: 130 K/UL — LOW (ref 150–400)
POTASSIUM SERPL-MCNC: 5.2 MMOL/L — SIGNIFICANT CHANGE UP (ref 3.5–5.3)
POTASSIUM SERPL-SCNC: 5.2 MMOL/L — SIGNIFICANT CHANGE UP (ref 3.5–5.3)
PROT SERPL-MCNC: 7.4 G/DL — SIGNIFICANT CHANGE UP (ref 6.6–8.7)
PROTHROM AB SERPL-ACNC: 20.5 SEC — HIGH (ref 9.8–12.7)
RBC # BLD: 6.01 M/UL — SIGNIFICANT CHANGE UP (ref 4.6–6.2)
RBC # FLD: 16.6 % — HIGH (ref 11–15.6)
RBC BLD AUTO: NORMAL — SIGNIFICANT CHANGE UP
SODIUM SERPL-SCNC: 128 MMOL/L — LOW (ref 135–145)
WBC # BLD: 9.3 K/UL — SIGNIFICANT CHANGE UP (ref 4.8–10.8)
WBC # FLD AUTO: 9.3 K/UL — SIGNIFICANT CHANGE UP (ref 4.8–10.8)

## 2017-10-25 PROCEDURE — 93971 EXTREMITY STUDY: CPT | Mod: 26,LT

## 2017-10-25 PROCEDURE — 99223 1ST HOSP IP/OBS HIGH 75: CPT

## 2017-10-25 PROCEDURE — 93010 ELECTROCARDIOGRAM REPORT: CPT

## 2017-10-25 PROCEDURE — 99285 EMERGENCY DEPT VISIT HI MDM: CPT

## 2017-10-25 RX ORDER — DEXTROSE 50 % IN WATER 50 %
25 SYRINGE (ML) INTRAVENOUS ONCE
Qty: 0 | Refills: 0 | Status: DISCONTINUED | OUTPATIENT
Start: 2017-10-25 | End: 2017-11-01

## 2017-10-25 RX ORDER — CARVEDILOL PHOSPHATE 80 MG/1
6.25 CAPSULE, EXTENDED RELEASE ORAL EVERY 12 HOURS
Qty: 0 | Refills: 0 | Status: DISCONTINUED | OUTPATIENT
Start: 2017-10-25 | End: 2017-10-25

## 2017-10-25 RX ORDER — WARFARIN SODIUM 2.5 MG/1
5 TABLET ORAL ONCE
Qty: 0 | Refills: 0 | Status: COMPLETED | OUTPATIENT
Start: 2017-10-25 | End: 2017-10-25

## 2017-10-25 RX ORDER — CARVEDILOL PHOSPHATE 80 MG/1
6.25 CAPSULE, EXTENDED RELEASE ORAL EVERY 12 HOURS
Qty: 0 | Refills: 0 | Status: DISCONTINUED | OUTPATIENT
Start: 2017-10-25 | End: 2017-11-01

## 2017-10-25 RX ORDER — METOPROLOL TARTRATE 50 MG
5 TABLET ORAL EVERY 6 HOURS
Qty: 0 | Refills: 0 | Status: DISCONTINUED | OUTPATIENT
Start: 2017-10-25 | End: 2017-11-01

## 2017-10-25 RX ORDER — DEXTROSE 50 % IN WATER 50 %
1 SYRINGE (ML) INTRAVENOUS ONCE
Qty: 0 | Refills: 0 | Status: DISCONTINUED | OUTPATIENT
Start: 2017-10-25 | End: 2017-11-01

## 2017-10-25 RX ORDER — GLUCAGON INJECTION, SOLUTION 0.5 MG/.1ML
1 INJECTION, SOLUTION SUBCUTANEOUS ONCE
Qty: 0 | Refills: 0 | Status: DISCONTINUED | OUTPATIENT
Start: 2017-10-25 | End: 2017-11-01

## 2017-10-25 RX ORDER — SODIUM CHLORIDE 9 MG/ML
1000 INJECTION, SOLUTION INTRAVENOUS
Qty: 0 | Refills: 0 | Status: DISCONTINUED | OUTPATIENT
Start: 2017-10-25 | End: 2017-11-01

## 2017-10-25 RX ORDER — INSULIN LISPRO 100/ML
12 VIAL (ML) SUBCUTANEOUS
Qty: 0 | Refills: 0 | Status: DISCONTINUED | OUTPATIENT
Start: 2017-10-25 | End: 2017-10-26

## 2017-10-25 RX ORDER — ACETAMINOPHEN 500 MG
975 TABLET ORAL ONCE
Qty: 0 | Refills: 0 | Status: COMPLETED | OUTPATIENT
Start: 2017-10-25 | End: 2017-10-25

## 2017-10-25 RX ORDER — OXAPROZIN 600 MG/1
1 TABLET, FILM COATED ORAL
Qty: 0 | Refills: 0 | COMMUNITY

## 2017-10-25 RX ORDER — VANCOMYCIN HCL 1 G
1000 VIAL (EA) INTRAVENOUS EVERY 12 HOURS
Qty: 0 | Refills: 0 | Status: DISCONTINUED | OUTPATIENT
Start: 2017-10-25 | End: 2017-10-26

## 2017-10-25 RX ORDER — MORPHINE SULFATE 50 MG/1
2 CAPSULE, EXTENDED RELEASE ORAL EVERY 4 HOURS
Qty: 0 | Refills: 0 | Status: DISCONTINUED | OUTPATIENT
Start: 2017-10-25 | End: 2017-10-26

## 2017-10-25 RX ORDER — DEXTROSE 50 % IN WATER 50 %
12.5 SYRINGE (ML) INTRAVENOUS ONCE
Qty: 0 | Refills: 0 | Status: DISCONTINUED | OUTPATIENT
Start: 2017-10-25 | End: 2017-11-01

## 2017-10-25 RX ORDER — CEFTRIAXONE 500 MG/1
2 INJECTION, POWDER, FOR SOLUTION INTRAMUSCULAR; INTRAVENOUS ONCE
Qty: 0 | Refills: 0 | Status: COMPLETED | OUTPATIENT
Start: 2017-10-25 | End: 2017-10-25

## 2017-10-25 RX ORDER — SODIUM CHLORIDE 9 MG/ML
2000 INJECTION INTRAMUSCULAR; INTRAVENOUS; SUBCUTANEOUS ONCE
Qty: 0 | Refills: 0 | Status: COMPLETED | OUTPATIENT
Start: 2017-10-25 | End: 2017-10-25

## 2017-10-25 RX ORDER — INSULIN LISPRO 100/ML
VIAL (ML) SUBCUTANEOUS
Qty: 0 | Refills: 0 | Status: DISCONTINUED | OUTPATIENT
Start: 2017-10-25 | End: 2017-11-01

## 2017-10-25 RX ORDER — INSULIN GLARGINE 100 [IU]/ML
50 INJECTION, SOLUTION SUBCUTANEOUS AT BEDTIME
Qty: 0 | Refills: 0 | Status: DISCONTINUED | OUTPATIENT
Start: 2017-10-25 | End: 2017-10-26

## 2017-10-25 RX ORDER — ALLOPURINOL 300 MG
300 TABLET ORAL DAILY
Qty: 0 | Refills: 0 | Status: DISCONTINUED | OUTPATIENT
Start: 2017-10-25 | End: 2017-11-01

## 2017-10-25 RX ORDER — PIPERACILLIN AND TAZOBACTAM 4; .5 G/20ML; G/20ML
3.38 INJECTION, POWDER, LYOPHILIZED, FOR SOLUTION INTRAVENOUS EVERY 8 HOURS
Qty: 0 | Refills: 0 | Status: DISCONTINUED | OUTPATIENT
Start: 2017-10-25 | End: 2017-10-26

## 2017-10-25 RX ORDER — VANCOMYCIN HCL 1 G
2000 VIAL (EA) INTRAVENOUS ONCE
Qty: 0 | Refills: 0 | Status: COMPLETED | OUTPATIENT
Start: 2017-10-25 | End: 2017-10-25

## 2017-10-25 RX ORDER — SODIUM CHLORIDE 9 MG/ML
500 INJECTION INTRAMUSCULAR; INTRAVENOUS; SUBCUTANEOUS
Qty: 0 | Refills: 0 | Status: DISCONTINUED | OUTPATIENT
Start: 2017-10-25 | End: 2017-10-29

## 2017-10-25 RX ADMIN — SODIUM CHLORIDE 50 MILLILITER(S): 9 INJECTION INTRAMUSCULAR; INTRAVENOUS; SUBCUTANEOUS at 19:58

## 2017-10-25 RX ADMIN — WARFARIN SODIUM 5 MILLIGRAM(S): 2.5 TABLET ORAL at 20:02

## 2017-10-25 RX ADMIN — MORPHINE SULFATE 2 MILLIGRAM(S): 50 CAPSULE, EXTENDED RELEASE ORAL at 19:52

## 2017-10-25 RX ADMIN — SODIUM CHLORIDE 4000 MILLILITER(S): 9 INJECTION INTRAMUSCULAR; INTRAVENOUS; SUBCUTANEOUS at 13:15

## 2017-10-25 RX ADMIN — CEFTRIAXONE 100 GRAM(S): 500 INJECTION, POWDER, FOR SOLUTION INTRAMUSCULAR; INTRAVENOUS at 12:37

## 2017-10-25 RX ADMIN — Medication 3: at 20:12

## 2017-10-25 RX ADMIN — Medication 975 MILLIGRAM(S): at 12:38

## 2017-10-25 RX ADMIN — CARVEDILOL PHOSPHATE 6.25 MILLIGRAM(S): 80 CAPSULE, EXTENDED RELEASE ORAL at 19:43

## 2017-10-25 RX ADMIN — PIPERACILLIN AND TAZOBACTAM 25 GRAM(S): 4; .5 INJECTION, POWDER, LYOPHILIZED, FOR SOLUTION INTRAVENOUS at 19:27

## 2017-10-25 RX ADMIN — INSULIN GLARGINE 50 UNIT(S): 100 INJECTION, SOLUTION SUBCUTANEOUS at 23:06

## 2017-10-25 RX ADMIN — Medication 5 MILLIGRAM(S): at 19:44

## 2017-10-25 RX ADMIN — Medication 250 MILLIGRAM(S): at 13:35

## 2017-10-25 NOTE — ED PROVIDER NOTE - SKIN, MLM
+ erythema left leg some swelling with increased skine temp cellulitus no signs nec fasc 2+ distal pulses

## 2017-10-25 NOTE — ED ADULT NURSE NOTE - OBJECTIVE STATEMENT
pt AOX4 c/o left leg pain that started last night, pt with redness in left leg, area marked, code sepsis called, pt placed on cardiac monitor.

## 2017-10-25 NOTE — ED PROVIDER NOTE - CARE PLAN
Principal Discharge DX:	Cellulitis  Secondary Diagnosis:	Hyponatremia  Secondary Diagnosis:	JAYLENE (acute kidney injury)

## 2017-10-25 NOTE — ED PROVIDER NOTE - OBJECTIVE STATEMENT
66 y M hx obesity, CHRISS on CPAP, afib on coumadin, CHF,  NICM w EF 20-25%, s/p AICD, CKD, DM2, Chronic lymphedema, morbid obesity, HTN,  sedentary lifestyle comes is after severe chills, left leg tenderness, erythema, swelling, warmth since yesterday after his right toe nail came off with some bleeding.      No chest pain, palpitations, sob, light headedness/dizziness, difficulty breathing/cough,  abdominal pain, n/v, diarrhea/constipation, dysuria or increased urinary frequency.

## 2017-10-25 NOTE — H&P ADULT - HISTORY OF PRESENT ILLNESS
66 y M hx obesity, CHRISS on CPAP, afib on coumadin, CHF,  NICM w EF 20-25%, s/p AICD, CKD, DM2, Chronic lymphedema, morbid obesity, HTN,  sedentary lifestyle comes is after severe chills, left leg tenderness, erythema, swelling, warmth since yesterday after his right toe nail came off with some bleeding.      No chest pain, palpitations, sob, light headedness/dizziness, difficulty breathing/cough,  abdominal pain, n/v, diarrhea/constipation, dysuria or increased urinary frequency. Pt also c/o facial flushing & itching after IV Vanco was given. Reports no other symptoms. In the ER had a temp of 102.

## 2017-10-25 NOTE — ED ADULT TRIAGE NOTE - CHIEF COMPLAINT QUOTE
Patient BIBA from home, patient states that last night he started to have pain in his left upper leg, patient has redness noted to his upper leg. Patient denies any fever or chills at home. Patient denies any pain in his right leg. Patient has a hx of afib

## 2017-10-25 NOTE — H&P ADULT - EXTREMITIES COMMENTS
large area of cellulitis over LLE, tenderness, erythema, swelling, warmth, broken toe nail with bleeding, chronic b/l LE lymphedema with chronic changes

## 2017-10-25 NOTE — H&P ADULT - ASSESSMENT
66 y M hx obesity, CHRISS on CPAP, afib on coumadin, CHF,  NICM w EF 20-25%, s/p AICD, CKD, DM2, Chronic lymphedema, morbid obesity, HTN,  sedentary lifestyle comes is after severe chills, left leg tenderness, erythema, swelling, warmth since yesterday after his right toe nail came off with some bleeding.      No chest pain, palpitations, sob, light headedness/dizziness, difficulty breathing/cough,  abdominal pain, n/v, diarrhea/constipation, dysuria or increased urinary frequency. Pt also c/o facial flushing & itching after IV Vanco was given. Reports no other symptoms. In the ER had a temp of 102.       >LLE cellulitis likely from toe nail bleed in the setting of chronic lymphedema- IV Vanco (infuse at slower rate) & Zosyn, f/u Blood cx, IVF, A1c, ID consult called, pain meds  >CHRISS- CPAP at night (will get one from home)  >CHF- appears euvolemic  >CKD- baseline unknown, will monitor  >DM- A1c, c/w lantus & humalog, ISS, f/u FS  >HTN-c/w home meds  >morbid obesity- diet & weight loss  DVT ppx 66 y M hx obesity, CHRISS on CPAP, afib on coumadin, CHF,  NICM w EF 20-25%, s/p AICD, CKD, DM2, Chronic lymphedema, morbid obesity, HTN,  sedentary lifestyle comes is after severe chills, left leg tenderness, erythema, swelling, warmth since yesterday after his right toe nail came off with some bleeding.      No chest pain, palpitations, sob, light headedness/dizziness, difficulty breathing/cough,  abdominal pain, n/v, diarrhea/constipation, dysuria or increased urinary frequency. Pt also c/o facial flushing & itching after IV Vanco was given. Reports no other symptoms. In the ER had a temp of 102.       >Sepsis 2/2 LLE cellulitis likely from toe nail bleed in the setting of chronic lymphedema- IV Vanco (infuse at slower rate) & Zosyn, f/u Blood cx, IVF, A1c, ID consult called, pain meds  >Afib- c/w coreg, daily INR, coumadin tonight, lopressor IVP prn  >CHRISS- CPAP at night (will get one from home)  >CHF- appears euvolemic  >CKD- baseline unknown, will monitor  >DM- A1c, c/w lantus & humalog, ISS, f/u FS  >HTN-c/w home meds  >morbid obesity- diet & weight loss  DVT ppx 66 y M hx obesity, CHRISS on CPAP, afib on coumadin, CHF,  NICM w EF 20-25%, s/p AICD, CKD, DM2, Chronic lymphedema, morbid obesity, HTN,  sedentary lifestyle comes is after severe chills, left leg tenderness, erythema, swelling, warmth since yesterday after his right toe nail came off with some bleeding.      No chest pain, palpitations, sob, light headedness/dizziness, difficulty breathing/cough,  abdominal pain, n/v, diarrhea/constipation, dysuria or increased urinary frequency. Pt also c/o facial flushing & itching after IV Vanco was given. Reports no other symptoms. In the ER had a temp of 102.       >Sepsis 2/2 LLE cellulitis likely from toe nail bleed in the setting of chronic lymphedema- IV Vanco (infuse at slower rate) & Zosyn, f/u Blood cx, IVF, A1c, ID consult called, pain meds, lactate trend  >Afib- c/w coreg, daily INR, coumadin tonight, lopressor IVP prn  >CHRISS- CPAP at night (will get one from home)  >Chronic systolic CHF- appears euvolemic  >CKD 3- baseline unknown, will monitor  >DM- A1c, c/w lantus & humalog, ISS, f/u FS  >HTN-c/w home meds  >morbid obesity- diet & weight loss  DVT ppx

## 2017-10-26 DIAGNOSIS — N17.9 ACUTE KIDNEY FAILURE, UNSPECIFIED: ICD-10-CM

## 2017-10-26 DIAGNOSIS — E11.9 TYPE 2 DIABETES MELLITUS WITHOUT COMPLICATIONS: ICD-10-CM

## 2017-10-26 DIAGNOSIS — L03.90 CELLULITIS, UNSPECIFIED: ICD-10-CM

## 2017-10-26 LAB
ANION GAP SERPL CALC-SCNC: 12 MMOL/L — SIGNIFICANT CHANGE UP (ref 5–17)
APTT BLD: 34.5 SEC — SIGNIFICANT CHANGE UP (ref 27.5–37.4)
BASOPHILS # BLD AUTO: 0 K/UL — SIGNIFICANT CHANGE UP (ref 0–0.2)
BASOPHILS NFR BLD AUTO: 0.1 % — SIGNIFICANT CHANGE UP (ref 0–2)
BUN SERPL-MCNC: 31 MG/DL — HIGH (ref 8–20)
CALCIUM SERPL-MCNC: 8.3 MG/DL — LOW (ref 8.6–10.2)
CHLORIDE SERPL-SCNC: 97 MMOL/L — LOW (ref 98–107)
CO2 SERPL-SCNC: 22 MMOL/L — SIGNIFICANT CHANGE UP (ref 22–29)
CREAT SERPL-MCNC: 1.66 MG/DL — HIGH (ref 0.5–1.3)
EOSINOPHIL # BLD AUTO: 0.1 K/UL — SIGNIFICANT CHANGE UP (ref 0–0.5)
EOSINOPHIL NFR BLD AUTO: 1.3 % — SIGNIFICANT CHANGE UP (ref 0–6)
GLUCOSE BLDC GLUCOMTR-MCNC: 220 MG/DL — HIGH (ref 70–99)
GLUCOSE BLDC GLUCOMTR-MCNC: 220 MG/DL — HIGH (ref 70–99)
GLUCOSE BLDC GLUCOMTR-MCNC: 248 MG/DL — HIGH (ref 70–99)
GLUCOSE BLDC GLUCOMTR-MCNC: 265 MG/DL — HIGH (ref 70–99)
GLUCOSE SERPL-MCNC: 255 MG/DL — HIGH (ref 70–115)
HBA1C BLD-MCNC: 10.5 % — HIGH (ref 4–5.6)
HCT VFR BLD CALC: 45.5 % — SIGNIFICANT CHANGE UP (ref 42–52)
HGB BLD-MCNC: 14.3 G/DL — SIGNIFICANT CHANGE UP (ref 14–18)
INR BLD: 2.17 RATIO — HIGH (ref 0.88–1.16)
LACTATE SERPL-SCNC: 1.4 MMOL/L — SIGNIFICANT CHANGE UP (ref 0.5–2)
LACTATE SERPL-SCNC: 1.6 MMOL/L — SIGNIFICANT CHANGE UP (ref 0.5–2)
LACTATE SERPL-SCNC: 2.1 MMOL/L — HIGH (ref 0.5–2)
LACTATE SERPL-SCNC: 3.5 MMOL/L — HIGH (ref 0.5–2)
LYMPHOCYTES # BLD AUTO: 0.7 K/UL — LOW (ref 1–4.8)
LYMPHOCYTES # BLD AUTO: 9.9 % — LOW (ref 20–55)
MAGNESIUM SERPL-MCNC: 2 MG/DL — SIGNIFICANT CHANGE UP (ref 1.6–2.6)
MCHC RBC-ENTMCNC: 27.9 PG — SIGNIFICANT CHANGE UP (ref 27–31)
MCHC RBC-ENTMCNC: 31.4 G/DL — LOW (ref 32–36)
MCV RBC AUTO: 88.9 FL — SIGNIFICANT CHANGE UP (ref 80–94)
MONOCYTES # BLD AUTO: 0.7 K/UL — SIGNIFICANT CHANGE UP (ref 0–0.8)
MONOCYTES NFR BLD AUTO: 9.6 % — SIGNIFICANT CHANGE UP (ref 3–10)
NEUTROPHILS # BLD AUTO: 5.5 K/UL — SIGNIFICANT CHANGE UP (ref 1.8–8)
NEUTROPHILS NFR BLD AUTO: 78.7 % — HIGH (ref 37–73)
NT-PROBNP SERPL-SCNC: 1428 PG/ML — HIGH (ref 0–300)
PHOSPHATE SERPL-MCNC: 2.4 MG/DL — SIGNIFICANT CHANGE UP (ref 2.4–4.7)
PLATELET # BLD AUTO: 107 K/UL — LOW (ref 150–400)
POTASSIUM SERPL-MCNC: 4.7 MMOL/L — SIGNIFICANT CHANGE UP (ref 3.5–5.3)
POTASSIUM SERPL-SCNC: 4.7 MMOL/L — SIGNIFICANT CHANGE UP (ref 3.5–5.3)
PROTHROM AB SERPL-ACNC: 24.2 SEC — HIGH (ref 9.8–12.7)
RBC # BLD: 5.12 M/UL — SIGNIFICANT CHANGE UP (ref 4.6–6.2)
RBC # FLD: 16.6 % — HIGH (ref 11–15.6)
SODIUM SERPL-SCNC: 131 MMOL/L — LOW (ref 135–145)
WBC # BLD: 7 K/UL — SIGNIFICANT CHANGE UP (ref 4.8–10.8)
WBC # FLD AUTO: 7 K/UL — SIGNIFICANT CHANGE UP (ref 4.8–10.8)

## 2017-10-26 PROCEDURE — 99233 SBSQ HOSP IP/OBS HIGH 50: CPT

## 2017-10-26 PROCEDURE — 99221 1ST HOSP IP/OBS SF/LOW 40: CPT

## 2017-10-26 PROCEDURE — 99223 1ST HOSP IP/OBS HIGH 75: CPT

## 2017-10-26 PROCEDURE — 71010: CPT | Mod: 26

## 2017-10-26 RX ORDER — TRAMADOL HYDROCHLORIDE 50 MG/1
50 TABLET ORAL ONCE
Qty: 0 | Refills: 0 | Status: DISCONTINUED | OUTPATIENT
Start: 2017-10-26 | End: 2017-10-26

## 2017-10-26 RX ORDER — INSULIN LISPRO 100/ML
18 VIAL (ML) SUBCUTANEOUS
Qty: 0 | Refills: 0 | Status: DISCONTINUED | OUTPATIENT
Start: 2017-10-26 | End: 2017-11-01

## 2017-10-26 RX ORDER — DIPHENHYDRAMINE HCL 50 MG
25 CAPSULE ORAL EVERY 4 HOURS
Qty: 0 | Refills: 0 | Status: DISCONTINUED | OUTPATIENT
Start: 2017-10-26 | End: 2017-11-01

## 2017-10-26 RX ORDER — INSULIN LISPRO 100/ML
15 VIAL (ML) SUBCUTANEOUS
Qty: 0 | Refills: 0 | Status: DISCONTINUED | OUTPATIENT
Start: 2017-10-26 | End: 2017-10-26

## 2017-10-26 RX ORDER — WARFARIN SODIUM 2.5 MG/1
2 TABLET ORAL ONCE
Qty: 0 | Refills: 0 | Status: COMPLETED | OUTPATIENT
Start: 2017-10-26 | End: 2017-10-26

## 2017-10-26 RX ORDER — CEFTRIAXONE 500 MG/1
1 INJECTION, POWDER, FOR SOLUTION INTRAMUSCULAR; INTRAVENOUS EVERY 24 HOURS
Qty: 0 | Refills: 0 | Status: DISCONTINUED | OUTPATIENT
Start: 2017-10-26 | End: 2017-10-30

## 2017-10-26 RX ORDER — INSULIN GLARGINE 100 [IU]/ML
55 INJECTION, SOLUTION SUBCUTANEOUS AT BEDTIME
Qty: 0 | Refills: 0 | Status: DISCONTINUED | OUTPATIENT
Start: 2017-10-26 | End: 2017-11-01

## 2017-10-26 RX ORDER — OXYCODONE AND ACETAMINOPHEN 5; 325 MG/1; MG/1
2 TABLET ORAL EVERY 4 HOURS
Qty: 0 | Refills: 0 | Status: DISCONTINUED | OUTPATIENT
Start: 2017-10-26 | End: 2017-11-01

## 2017-10-26 RX ADMIN — Medication 18 UNIT(S): at 16:51

## 2017-10-26 RX ADMIN — CARVEDILOL PHOSPHATE 6.25 MILLIGRAM(S): 80 CAPSULE, EXTENDED RELEASE ORAL at 17:53

## 2017-10-26 RX ADMIN — Medication 2: at 16:51

## 2017-10-26 RX ADMIN — CARVEDILOL PHOSPHATE 6.25 MILLIGRAM(S): 80 CAPSULE, EXTENDED RELEASE ORAL at 06:43

## 2017-10-26 RX ADMIN — TRAMADOL HYDROCHLORIDE 50 MILLIGRAM(S): 50 TABLET ORAL at 03:45

## 2017-10-26 RX ADMIN — Medication 3: at 11:28

## 2017-10-26 RX ADMIN — Medication 2: at 08:28

## 2017-10-26 RX ADMIN — SODIUM CHLORIDE 75 MILLILITER(S): 9 INJECTION INTRAMUSCULAR; INTRAVENOUS; SUBCUTANEOUS at 23:01

## 2017-10-26 RX ADMIN — INSULIN GLARGINE 55 UNIT(S): 100 INJECTION, SOLUTION SUBCUTANEOUS at 23:00

## 2017-10-26 RX ADMIN — MORPHINE SULFATE 2 MILLIGRAM(S): 50 CAPSULE, EXTENDED RELEASE ORAL at 11:28

## 2017-10-26 RX ADMIN — Medication 300 MILLIGRAM(S): at 11:29

## 2017-10-26 RX ADMIN — Medication 12 UNIT(S): at 08:28

## 2017-10-26 RX ADMIN — MORPHINE SULFATE 2 MILLIGRAM(S): 50 CAPSULE, EXTENDED RELEASE ORAL at 01:54

## 2017-10-26 RX ADMIN — Medication 15 UNIT(S): at 11:29

## 2017-10-26 RX ADMIN — Medication 250 MILLIGRAM(S): at 16:54

## 2017-10-26 RX ADMIN — WARFARIN SODIUM 2 MILLIGRAM(S): 2.5 TABLET ORAL at 23:01

## 2017-10-26 RX ADMIN — Medication 250 MILLIGRAM(S): at 06:43

## 2017-10-26 RX ADMIN — PIPERACILLIN AND TAZOBACTAM 25 GRAM(S): 4; .5 INJECTION, POWDER, LYOPHILIZED, FOR SOLUTION INTRAVENOUS at 08:16

## 2017-10-26 RX ADMIN — Medication 100 MILLIGRAM(S): at 23:01

## 2017-10-26 RX ADMIN — MORPHINE SULFATE 2 MILLIGRAM(S): 50 CAPSULE, EXTENDED RELEASE ORAL at 11:29

## 2017-10-26 RX ADMIN — SODIUM CHLORIDE 50 MILLILITER(S): 9 INJECTION INTRAMUSCULAR; INTRAVENOUS; SUBCUTANEOUS at 03:45

## 2017-10-26 RX ADMIN — PIPERACILLIN AND TAZOBACTAM 25 GRAM(S): 4; .5 INJECTION, POWDER, LYOPHILIZED, FOR SOLUTION INTRAVENOUS at 17:49

## 2017-10-26 NOTE — CONSULT NOTE ADULT - ATTENDING COMMENTS
Pt with lymphadema and lymphangitis. Significant edema noted.    Plan  Continue IV  Leg elevation and compression  Follow up in office for referral to lymphadema clinic and home pneumatic compression pump
Will follow

## 2017-10-26 NOTE — ED ADULT NURSE REASSESSMENT NOTE - NS ED NURSE REASSESS COMMENT FT1
2hrs s/p Iv abx pt c/o itchiness and redness to face. Dr. calderón made aware, no further orders at this time. no swelling noted, pt tolerating secretions. vss, resp even unlabored. will cont to monitor
as per Dr. brandy azul to send pt to [facility] w/o monitor
call placed to Dr. calderón regarding 2nd lactate of 2.5 and tachycardia as per MD. Calderón, hold fluids and states she will come reassess patient. in no distress. will cont to monitor
mark Sullivan called for pain meds not helping tramadol ordered iv to right forearm intact and flushses well, legs elvated on bed. patient requesting that left great toe be wrapped statse sth he lost the nail on tuesday ngith and states that what is started the infection.  patient have surgical marker noted to left leg and area to left thigh.  will monitor and chart changes.
patient rec'd at this time patient alert and cooperative cellulits note to right lower leg with positive pedal pulse sl to left upper arm intact and rac itnact  and bothe flushes well  lupe montior andchart changes
pt with tachy afib, bmp 118 Dr. Handley was made aware and administered iv Cardizem. pt now with Hr in the 90's VSS will cont to monitor
report given to night shift, christiano SOLER
resp even unlabored, neuro intact, in no distress, pending 2nd lactate result. will cont to monitor

## 2017-10-26 NOTE — PROGRESS NOTE ADULT - SUBJECTIVE AND OBJECTIVE BOX
CHIEF COMPLAINT/INTERVAL HISTORY:    Patient is a 66y old  Male who presents with a chief complaint of Shortness of breath. AFIB (26 Oct 2017 14:13)      HPI:  66 y M hx obesity, CHRISS on CPAP, afib on coumadin, CHF,  NICM w EF 20-25%, s/p AICD, CKD, DM2, Chronic lymphedema, morbid obesity, HTN,  sedentary lifestyle comes is after severe chills, left leg tenderness, erythema, swelling, warmth since yesterday after his right toe nail came off with some bleeding.      No chest pain, palpitations, sob, light headedness/dizziness, difficulty breathing/cough,  abdominal pain, n/v, diarrhea/constipation, dysuria or increased urinary frequency. Pt also c/o facial flushing & itching after IV Vanco was given. Reports no other symptoms. In the ER had a temp of 102. (25 Oct 2017 18:45)      SUBJECTIVE & OBJECTIVE/ ROS: Pt seen and examined at bedside. No fever or chills overnight. Pt c/o of itching after morphine IV. No chest pain, palpitations, sob, light headedness/dizziness, difficulty breathing/cough, fevers/chills, abdominal pain, n/v, diarrhea/constipation, dysuria or increased urinary frequency.     ICU Vital Signs Last 24 Hrs  T(C): 36.8 (26 Oct 2017 08:44), Max: 37.1 (26 Oct 2017 05:14)  T(F): 98.2 (26 Oct 2017 08:44), Max: 98.7 (26 Oct 2017 05:14)  HR: 67 (26 Oct 2017 08:44) (67 - 103)  BP: 112/56 (26 Oct 2017 08:44) (112/56 - 140/79)  BP(mean): --  ABP: --  ABP(mean): --  RR: 20 (26 Oct 2017 08:44) (20 - 24)  SpO2: 96% (26 Oct 2017 08:44) (96% - 100%)        MEDICATIONS  (STANDING):  allopurinol 300 milliGRAM(s) Oral daily  carvedilol 6.25 milliGRAM(s) Oral every 12 hours  dextrose 5%. 1000 milliLiter(s) (50 mL/Hr) IV Continuous <Continuous>  dextrose 50% Injectable 12.5 Gram(s) IV Push once  dextrose 50% Injectable 25 Gram(s) IV Push once  dextrose 50% Injectable 25 Gram(s) IV Push once  insulin glargine Injectable (LANTUS) 55 Unit(s) SubCutaneous at bedtime  insulin lispro (HumaLOG) corrective regimen sliding scale   SubCutaneous three times a day before meals  insulin lispro Injectable (HumaLOG) 18 Unit(s) SubCutaneous three times a day before meals  piperacillin/tazobactam IVPB. 3.375 Gram(s) IV Intermittent every 8 hours  sodium chloride 0.9%. 500 milliLiter(s) (50 mL/Hr) IV Continuous <Continuous>  vancomycin  IVPB 1000 milliGRAM(s) IV Intermittent every 12 hours  warfarin 2 milliGRAM(s) Oral once    MEDICATIONS  (PRN):  dextrose Gel 1 Dose(s) Oral once PRN Blood Glucose LESS THAN 70 milliGRAM(s)/deciliter  glucagon  Injectable 1 milliGRAM(s) IntraMuscular once PRN Glucose LESS THAN 70 milligrams/deciliter  metoprolol    tartrate Injectable 5 milliGRAM(s) IV Push every 6 hours PRN for HR > 110; hold for SBP < 100 or HR < 50  morphine  - Injectable 2 milliGRAM(s) IV Push every 4 hours PRN Moderate Pain (4 - 6)      LABS:                        14.3   7.0   )-----------( 107      ( 26 Oct 2017 06:57 )             45.5     10-26    131<L>  |  97<L>  |  31.0<H>  ----------------------------<  255<H>  4.7   |  22.0  |  1.66<H>    Ca    8.3<L>      26 Oct 2017 06:57  Phos  2.4     10-26  Mg     2.0     10-26    TPro  7.4  /  Alb  3.6  /  TBili  0.8  /  DBili  x   /  AST  37  /  ALT  19  /  AlkPhos  58  10-25    PT/INR - ( 26 Oct 2017 06:57 )   PT: 24.2 sec;   INR: 2.17 ratio         PTT - ( 26 Oct 2017 06:57 )  PTT:34.5 sec      CAPILLARY BLOOD GLUCOSE  273 (25 Oct 2017 20:02)      POCT Blood Glucose.: 265 mg/dL (26 Oct 2017 11:25)  POCT Blood Glucose.: 248 mg/dL (26 Oct 2017 08:17)  POCT Blood Glucose.: 273 mg/dL (25 Oct 2017 20:01)      RECENT CULTURES:      RADIOLOGY & ADDITIONAL TESTS:      PHYSICAL EXAM:    GENERAL: NAD, well-groomed, well-developed  HEAD:  Atraumatic, Normocephalic  EYES: EOMI, PERRLA, conjunctiva and sclera clear  NECK: Supple, No JVD, Normal thyroid  NERVOUS SYSTEM:  Alert & Oriented X3, Motor Strength 5/5 B/L upper and lower extremities; DTRs 2+ intact and symmetric  CHEST/LUNG: Clear to percussion bilaterally; No rales, rhonchi, wheezing, or rubs  HEART: Regular rate and rhythm; No murmurs, rubs, or gallops  ABDOMEN: Soft, Nontender, Nondistended; Bowel sounds present  EXTREMITIES:  large area of cellulitis over LLE, tenderness, erythema, swelling, warmth, broken toe nail with bleeding resolved, chronic b/l LE lymphedema with chronic skin changes

## 2017-10-26 NOTE — CONSULT NOTE ADULT - PROBLEM SELECTOR RECOMMENDATION 9
Will D/C Vancomycin and Zosyn  Start ceftriaxone and Clindamycin  follow up cultures  Afebrile   No leukocytosis  If not improving will need CT LLE

## 2017-10-26 NOTE — PROGRESS NOTE ADULT - ASSESSMENT
66 y M hx obesity, CHRISS on CPAP, afib on coumadin, CHF,  NICM w EF 20-25%, s/p AICD, CKD, DM2, Chronic lymphedema, morbid obesity, HTN,  sedentary lifestyle comes is after severe chills, left leg tenderness, erythema, swelling, warmth since yesterday after his right toe nail came off with some bleeding.      No chest pain, palpitations, sob, light headedness/dizziness, difficulty breathing/cough,  abdominal pain, n/v, diarrhea/constipation, dysuria or increased urinary frequency. Pt also c/o facial flushing & itching after IV Vanco was given. Reports no other symptoms. In the ER had a temp of 102.       >Sepsis 2/2 LLE cellulitis likely from toe nail bleed in the setting of chronic lymphedema- c/w IV Vanco (infuse at slower rate) & Zosyn, f/u Blood cx, IVF, A1c, ID consult called, lactate trend, d/c morphine, pt agreeable to percocet, benadryl for itching  >Afib- c/w coreg, daily INR, coumadin tonight, lopressor IVP prn  >CHRISS- CPAP at night  >Chronic systolic CHF- appears euvolemic, pt not on any diuretics, c/w coreg  >CKD 3- baseline unknown, will monitor  >DM- A1c 10.5, c/w lantus & humalog, ISS, f/u FS  >HTN-c/w home meds  >morbid obesity- diet & weight loss  DVT ppx 66 y M hx obesity, CHRISS on CPAP, afib on coumadin, CHF,  NICM w EF 20-25%, s/p AICD, CKD, DM2, Chronic lymphedema, morbid obesity, HTN,  sedentary lifestyle comes is after severe chills, left leg tenderness, erythema, swelling, warmth since yesterday after his right toe nail came off with some bleeding.      No chest pain, palpitations, sob, light headedness/dizziness, difficulty breathing/cough,  abdominal pain, n/v, diarrhea/constipation, dysuria or increased urinary frequency. Pt also c/o facial flushing & itching after IV Vanco was given. Reports no other symptoms. In the ER had a temp of 102.       >Sepsis 2/2 LLE cellulitis likely from toe nail bleed in the setting of chronic lymphedema-afebrile overnight, c/w IV Vanco (infuse at slower rate) & Zosyn, f/u Blood cx, IVF, A1c, ID consult called, lactate trend, d/c morphine, pt agreeable to percocet, benadryl for itching, vascular sx consult appreciated, ACE wrap if toerated; elevate legs, Eventual unna boots on dc  >Afib- c/w coreg, daily INR, coumadin tonight, lopressor IVP prn  >CHRISS- CPAP at night  >Chronic systolic CHF- appears euvolemic, pt not on any diuretics, c/w coreg  >CKD 3- baseline unknown, will monitor  >DM- A1c 10.5, c/w lantus & humalog, ISS, f/u FS  >HTN-c/w home meds  >morbid obesity- diet & weight loss  DVT ppx

## 2017-10-26 NOTE — CONSULT NOTE ADULT - SUBJECTIVE AND OBJECTIVE BOX
Vascular Attending:  Dr Rouse      HPI:  66 y M hx obesity, CHRISS on CPAP, afib on coumadin, CHF,  NICM w EF 20-25%, s/p AICD, CKD, DM2, Chronic lymphedema, morbid obesity, HTN,  sedentary lifestyle comes is after severe chills, left leg tenderness, erythema, swelling, warmth since yesterday after his right toe nail came off with some bleeding.      No chest pain, palpitations, sob, light headedness/dizziness, difficulty breathing/cough,  abdominal pain, n/v, diarrhea/constipation, dysuria or increased urinary frequency. Pt also c/o facial flushing & itching after IV Vanco was given. Reports no other symptoms. In the ER had a temp of 102. (25 Oct 2017 18:45)      PAST MEDICAL & SURGICAL HISTORY:  Pulmonary hypertension  Prostate CA  Sleep apnea  Renal insufficiency  High cholesterol  HTN (hypertension)  DM (diabetes mellitus)  S/P ankle ligament repair      REVIEW OF SYSTEMS  General: fatigue, chills	  Respiratory and Thorax: +SOB; denies cough	  Cardiovascular:	denies CP or palps; no ICD firings  Gastrointestinal:	denies N/V  Musculoskeletal:	 difficulty with ambulation more due to symptoms of SOB  Neurological: denies any lightheadedness, dizziness, visual or speech difficulties  Hematology/Lymphatics:	 chronic LE edema with occassional ulcerations; pain      MEDICATIONS  (STANDING):  allopurinol 300 milliGRAM(s) Oral daily  carvedilol 6.25 milliGRAM(s) Oral every 12 hours  dextrose 5%. 1000 milliLiter(s) (50 mL/Hr) IV Continuous <Continuous>  dextrose 50% Injectable 12.5 Gram(s) IV Push once  dextrose 50% Injectable 25 Gram(s) IV Push once  dextrose 50% Injectable 25 Gram(s) IV Push once  insulin glargine Injectable (LANTUS) 50 Unit(s) SubCutaneous at bedtime  insulin lispro (HumaLOG) corrective regimen sliding scale   SubCutaneous three times a day before meals  insulin lispro Injectable (HumaLOG) 15 Unit(s) SubCutaneous three times a day before meals  piperacillin/tazobactam IVPB. 3.375 Gram(s) IV Intermittent every 8 hours  sodium chloride 0.9%. 500 milliLiter(s) (50 mL/Hr) IV Continuous <Continuous>  vancomycin  IVPB 1000 milliGRAM(s) IV Intermittent every 12 hours    MEDICATIONS  (PRN):  dextrose Gel 1 Dose(s) Oral once PRN Blood Glucose LESS THAN 70 milliGRAM(s)/deciliter  glucagon  Injectable 1 milliGRAM(s) IntraMuscular once PRN Glucose LESS THAN 70 milligrams/deciliter  metoprolol    tartrate Injectable 5 milliGRAM(s) IV Push every 6 hours PRN for HR > 110; hold for SBP < 100 or HR < 50  morphine  - Injectable 2 milliGRAM(s) IV Push every 4 hours PRN Moderate Pain (4 - 6)      Allergies:  No Known Allergies    SOCIAL HISTORY: , nonsmoker; retired [place]       Vital Signs Last 24 Hrs  T(C): 36.8 (26 Oct 2017 08:44), Max: 39.3 (25 Oct 2017 12:09)  T(F): 98.2 (26 Oct 2017 08:44), Max: 102.7 (25 Oct 2017 12:09)  HR: 67 (26 Oct 2017 08:44) (67 - 134)  BP: 112/56 (26 Oct 2017 08:44) (112/56 - 154/81)  BP(mean): --  RR: 20 (26 Oct 2017 08:44) (20 - 24)  SpO2: 96% (26 Oct 2017 08:44) (96% - 100%)    PHYSICAL EXAM:    Constitutional: Obese M in NAD  Respiratory: essentially CTA, diminished at bases  Cardiovascular: normal S1, S2  Gastrointestinal: obese, soft, NT  Extremities: LLE with 3+ edema, hyperpigmented stasis, hyperkeratotic skin with erythema, no ulcerations. RLE with 2+ edema hyperpigmented stasis, hyperkeratotic skin no erythema or ulcerations. Great toe with exposed nail bed, clean, no drainage or odor  Pulses:   Right:                                                                          Left:  FEM [ ]2+ [X ]1+ [ ]doppler                                             FEM [ ]2+ [X ]1+ [ ]doppler    DP [ ]2+ [ ]1+ [X ]doppler                                                DP [ ]2+ [ ]1+ [X ]doppler  PT[ ]2+ [ ]1+ [ ]doppler   ABSENT                                     PT [ ]2+ [ ]1+ [ ]doppler  ABSENT      LABS:                        14.3   7.0   )-----------( 107      ( 26 Oct 2017 06:57 )             45.5     10-26    131<L>  |  97<L>  |  31.0<H>  ----------------------------<  255<H>  4.7   |  22.0  |  1.66<H>    Ca    8.3<L>      26 Oct 2017 06:57  Phos  2.4     10-26  Mg     2.0     10-26    TPro  7.4  /  Alb  3.6  /  TBili  0.8  /  DBili  x   /  AST  37  /  ALT  19  /  AlkPhos  58  10-25    PT/INR - ( 26 Oct 2017 06:57 )   PT: 24.2 sec;   INR: 2.17 ratio    PTT - ( 26 Oct 2017 06:57 )  PTT:34.5 sec      RADIOLOGY & ADDITIONAL STUDIES  < from: US Duplex Venous Lower Ext Ltd, Left (10.25.17 @ 15:07) >  EXAM:  US DPLX LWR EXT VEINS LTD LT                          PROCEDURE DATE:  10/25/2017          INTERPRETATION:  CLINICAL INFORMATION: Left leg edema    COMPARISON: None available.    TECHNIQUE: Duplex sonography of the LEFT LOWER extremity withcolor and   spectral Doppler, with and without compression.      FINDINGS:    There is normal compressibility of the left common femoral, femoral and   popliteal veins. No calf vein thrombosis is detected.    The contralateral common femoral vein is patent.    Doppler examination shows normal spontaneous and phasic flow.    IMPRESSION:     No evidence of left lower extremity deep venous thrombosis.    < end of copied text >    Impression and Plan: 66 y M hx obesity, CHRISS on CPAP, afib on coumadin, CHF,  NICM w EF 20-25%, s/p AICD, CKD, DM2, Chronic lymphedema, morbid obesity, HTN,  sedentary lifestyle comes is after severe chills, left leg tenderness, erythema, swelling  Abx for cellulitis  Pain management  ACE wrap if toerated; elevate  Eventual unna boots on dc  Will follow  Discussed with Dr Pettit

## 2017-10-27 LAB
ANION GAP SERPL CALC-SCNC: 13 MMOL/L — SIGNIFICANT CHANGE UP (ref 5–17)
APTT BLD: 34.8 SEC — SIGNIFICANT CHANGE UP (ref 27.5–37.4)
BASOPHILS # BLD AUTO: 0 K/UL — SIGNIFICANT CHANGE UP (ref 0–0.2)
BASOPHILS NFR BLD AUTO: 0.2 % — SIGNIFICANT CHANGE UP (ref 0–2)
BUN SERPL-MCNC: 28 MG/DL — HIGH (ref 8–20)
CALCIUM SERPL-MCNC: 8.4 MG/DL — LOW (ref 8.6–10.2)
CHLORIDE SERPL-SCNC: 98 MMOL/L — SIGNIFICANT CHANGE UP (ref 98–107)
CO2 SERPL-SCNC: 19 MMOL/L — LOW (ref 22–29)
CREAT SERPL-MCNC: 1.51 MG/DL — HIGH (ref 0.5–1.3)
EOSINOPHIL # BLD AUTO: 0.1 K/UL — SIGNIFICANT CHANGE UP (ref 0–0.5)
EOSINOPHIL NFR BLD AUTO: 2.1 % — SIGNIFICANT CHANGE UP (ref 0–6)
GLUCOSE BLDC GLUCOMTR-MCNC: 114 MG/DL — HIGH (ref 70–99)
GLUCOSE BLDC GLUCOMTR-MCNC: 114 MG/DL — HIGH (ref 70–99)
GLUCOSE BLDC GLUCOMTR-MCNC: 168 MG/DL — HIGH (ref 70–99)
GLUCOSE BLDC GLUCOMTR-MCNC: 219 MG/DL — HIGH (ref 70–99)
GLUCOSE SERPL-MCNC: 250 MG/DL — HIGH (ref 70–115)
HCT VFR BLD CALC: 44.8 % — SIGNIFICANT CHANGE UP (ref 42–52)
HGB BLD-MCNC: 14.1 G/DL — SIGNIFICANT CHANGE UP (ref 14–18)
INR BLD: 2.17 RATIO — HIGH (ref 0.88–1.16)
LYMPHOCYTES # BLD AUTO: 0.7 K/UL — LOW (ref 1–4.8)
LYMPHOCYTES # BLD AUTO: 12 % — LOW (ref 20–55)
MAGNESIUM SERPL-MCNC: 2.2 MG/DL — SIGNIFICANT CHANGE UP (ref 1.6–2.6)
MCHC RBC-ENTMCNC: 27.8 PG — SIGNIFICANT CHANGE UP (ref 27–31)
MCHC RBC-ENTMCNC: 31.5 G/DL — LOW (ref 32–36)
MCV RBC AUTO: 88.4 FL — SIGNIFICANT CHANGE UP (ref 80–94)
MONOCYTES # BLD AUTO: 0.6 K/UL — SIGNIFICANT CHANGE UP (ref 0–0.8)
MONOCYTES NFR BLD AUTO: 11.1 % — HIGH (ref 3–10)
NEUTROPHILS # BLD AUTO: 4.2 K/UL — SIGNIFICANT CHANGE UP (ref 1.8–8)
NEUTROPHILS NFR BLD AUTO: 73.9 % — HIGH (ref 37–73)
PHOSPHATE SERPL-MCNC: 2 MG/DL — LOW (ref 2.4–4.7)
PLATELET # BLD AUTO: 104 K/UL — LOW (ref 150–400)
POTASSIUM SERPL-MCNC: 4.3 MMOL/L — SIGNIFICANT CHANGE UP (ref 3.5–5.3)
POTASSIUM SERPL-SCNC: 4.3 MMOL/L — SIGNIFICANT CHANGE UP (ref 3.5–5.3)
PROTHROM AB SERPL-ACNC: 24.2 SEC — HIGH (ref 9.8–12.7)
RBC # BLD: 5.07 M/UL — SIGNIFICANT CHANGE UP (ref 4.6–6.2)
RBC # FLD: 16.3 % — HIGH (ref 11–15.6)
SODIUM SERPL-SCNC: 130 MMOL/L — LOW (ref 135–145)
VANCOMYCIN TROUGH SERPL-MCNC: 9 UG/ML — LOW (ref 10–20)
WBC # BLD: 5.7 K/UL — SIGNIFICANT CHANGE UP (ref 4.8–10.8)
WBC # FLD AUTO: 5.7 K/UL — SIGNIFICANT CHANGE UP (ref 4.8–10.8)

## 2017-10-27 PROCEDURE — 99233 SBSQ HOSP IP/OBS HIGH 50: CPT

## 2017-10-27 PROCEDURE — 99232 SBSQ HOSP IP/OBS MODERATE 35: CPT

## 2017-10-27 RX ORDER — WARFARIN SODIUM 2.5 MG/1
2 TABLET ORAL ONCE
Qty: 0 | Refills: 0 | Status: COMPLETED | OUTPATIENT
Start: 2017-10-27 | End: 2017-10-27

## 2017-10-27 RX ORDER — LACTOBACILLUS ACIDOPHILUS 100MM CELL
1 CAPSULE ORAL DAILY
Qty: 0 | Refills: 0 | Status: DISCONTINUED | OUTPATIENT
Start: 2017-10-27 | End: 2017-10-28

## 2017-10-27 RX ORDER — FUROSEMIDE 40 MG
80 TABLET ORAL DAILY
Qty: 0 | Refills: 0 | Status: DISCONTINUED | OUTPATIENT
Start: 2017-10-27 | End: 2017-11-01

## 2017-10-27 RX ORDER — SACCHAROMYCES BOULARDII 250 MG
250 POWDER IN PACKET (EA) ORAL
Qty: 0 | Refills: 0 | Status: DISCONTINUED | OUTPATIENT
Start: 2017-10-27 | End: 2017-11-01

## 2017-10-27 RX ORDER — LISINOPRIL 2.5 MG/1
20 TABLET ORAL DAILY
Qty: 0 | Refills: 0 | Status: DISCONTINUED | OUTPATIENT
Start: 2017-10-27 | End: 2017-11-01

## 2017-10-27 RX ADMIN — INSULIN GLARGINE 55 UNIT(S): 100 INJECTION, SOLUTION SUBCUTANEOUS at 22:20

## 2017-10-27 RX ADMIN — Medication 18 UNIT(S): at 12:46

## 2017-10-27 RX ADMIN — WARFARIN SODIUM 2 MILLIGRAM(S): 2.5 TABLET ORAL at 22:22

## 2017-10-27 RX ADMIN — Medication 100 MILLIGRAM(S): at 22:21

## 2017-10-27 RX ADMIN — CARVEDILOL PHOSPHATE 6.25 MILLIGRAM(S): 80 CAPSULE, EXTENDED RELEASE ORAL at 06:00

## 2017-10-27 RX ADMIN — Medication 18 UNIT(S): at 08:41

## 2017-10-27 RX ADMIN — Medication 1 TABLET(S): at 12:47

## 2017-10-27 RX ADMIN — CARVEDILOL PHOSPHATE 6.25 MILLIGRAM(S): 80 CAPSULE, EXTENDED RELEASE ORAL at 18:42

## 2017-10-27 RX ADMIN — Medication 18 UNIT(S): at 18:44

## 2017-10-27 RX ADMIN — Medication 300 MILLIGRAM(S): at 14:21

## 2017-10-27 RX ADMIN — Medication 100 MILLIGRAM(S): at 18:41

## 2017-10-27 RX ADMIN — Medication 100 MILLIGRAM(S): at 06:47

## 2017-10-27 RX ADMIN — SODIUM CHLORIDE 75 MILLILITER(S): 9 INJECTION INTRAMUSCULAR; INTRAVENOUS; SUBCUTANEOUS at 06:47

## 2017-10-27 RX ADMIN — Medication 2: at 08:41

## 2017-10-27 RX ADMIN — CEFTRIAXONE 100 GRAM(S): 500 INJECTION, POWDER, FOR SOLUTION INTRAMUSCULAR; INTRAVENOUS at 03:07

## 2017-10-27 RX ADMIN — Medication 250 MILLIGRAM(S): at 18:42

## 2017-10-27 NOTE — PROGRESS NOTE ADULT - ASSESSMENT
66 y M hx obesity, CHRISS on CPAP, afib on coumadin, CHF,  NICM w EF 20-25%, s/p AICD, CKD, DM2, Chronic lymphedema, morbid obesity, HTN,  sedentary lifestyle comes is after severe chills, left leg tenderness, erythema, swelling, warmth since yesterday after his right toe nail came off with some bleeding.      No chest pain, palpitations, sob, light headedness/dizziness, difficulty breathing/cough,  abdominal pain, n/v, diarrhea/constipation, dysuria or increased urinary frequency. Pt also c/o facial flushing & itching after IV Vanco was given. Reports no other symptoms. In the ER had a temp of 102.       >Sepsis 2/2 LLE cellulitis likely from toe nail bleed in the setting of chronic lymphedema-afebrile overnight, c/w Ceftriaxone and Clindamycin, f/u Blood cx negative to date, IVF, ID consulted, vascular sx consult appreciated, ACE wrap if tolerated elevate legs, Eventual unna boots on dc  >Afib- c/w coreg, daily INR, coumadin tonight, lopressor IVP prn  >CHRISS- CPAP at night  >Chronic systolic CHF- appears euvolemic, c/w coreg, ACE-I and Lasix; Cardiology consulted.  >CKD 3- baseline unknown, will monitor  >DM- A1c 10.5, c/w lantus & humalog, ISS, f/u FS  >HTN-c/w home meds  >morbid obesity- diet & weight loss  DVT ppx 66 y M hx obesity, CHRISS on CPAP, afib on coumadin, NICM w EF 20-25% s/p AICD, CKD, DM2, HTN, Chronic lymphedema, morbid obesity, sedentary lifestyle, presented with severe chills, left leg tenderness, erythema, swelling, warmth and has been admitted with LLE cellulitis. Started on IV abx, initially on vanco but developed flushing & itching with and later switched to rociphen and clindamycin. ID on board.       >Sepsis 2/2 LLE cellulitis likely from toe nail bleed in the setting of chronic lymphedema- Afebrile, no leukocytosis. C/w Ceftriaxone and Clindamycin, Blood cx negative to date. D/c  IVF today as patient has been started on lasix by cardiology and has very bed CMP. ID on board. Vascular sx consult appreciated, ACE wrap if tolerated elevate legs, Eventual unna boots on dc  >Afib- INR therapeutic, c/w coreg, coumadin, daily INR, lopressor IVP prn  >CHRISS- CPAP at night  >Chronic systolic CHF- appears euvolemic, c/w coreg, restarted on ACE-I and Lasix today; Cardiology note appreciated.  >CKD 3- baseline unknown, will monitor  >DM- A1c 10.5, c/w lantus 55 units, lispro 18 units pre-meal and LSS, f/u FS  >HTN-c/w home meds  >morbid obesity- diet & weight loss  DVT ppx on coumadin.

## 2017-10-27 NOTE — ADVANCED PRACTICE NURSE CONSULT - ASSESSMENT
pt is a+ox3 c/o 0 pain resting comfortably in bed family @ bed side providing comfort. pt barb subramanian for endocrine. he takes levemir and novolog. novolog ss. he skip lunch and lunch dose. pt was educated about the importance of taking insulin as prescribed and the importance of not skipping meals. also discussed portion control

## 2017-10-27 NOTE — PROGRESS NOTE ADULT - SUBJECTIVE AND OBJECTIVE BOX
CC: Shortness of breath. AFIB. LLE cellulitis.    HPI:  66 y M hx obesity, CHRISS on CPAP, afib on coumadin, CHF,  NICM w EF 20-25%, s/p AICD, CKD, DM2, Chronic lymphedema, morbid obesity, HTN,  sedentary lifestyle comes in after severe chills, left leg tenderness, erythema, swelling, warmth since yesterday after his right toe nail came off with some bleeding.  In the ER had a temp of 102.     INTERVAL HPI/OVERNIGHT EVENTS: Patient seen and examined sitting in the chair.  LLE tenderness improved.  Patient complains of intermittent MACHUCA.  Patient denies any headache, dizziness, CP, abdominal pain, nausea, vomiting, dysuria.  Patient reports frequent loose stool.  Other ROS reviewed and are negative.    Vital Signs Last 24 Hrs  T(C): 36.8 (27 Oct 2017 07:45), Max: 37.2 (26 Oct 2017 20:12)  T(F): 98.2 (27 Oct 2017 07:45), Max: 99 (26 Oct 2017 20:12)  HR: 91 (27 Oct 2017 07:45) (87 - 91)  BP: 113/66 (27 Oct 2017 07:45) (113/66 - 143/75)  BP(mean): --  RR: 18 (27 Oct 2017 07:45) (18 - 20)  SpO2: 98% (27 Oct 2017 07:45) (96% - 98%)  I&O's Detail    26 Oct 2017 07:01  -  27 Oct 2017 07:00  --------------------------------------------------------  IN:    sodium chloride 0.9%.: 675 mL  Total IN: 675 mL    OUT:  Total OUT: 0 mL    Total NET: 675 mL      27 Oct 2017 07:01  -  27 Oct 2017 14:20  --------------------------------------------------------  IN:  Total IN: 0 mL    OUT:    Voided: 700 mL  Total OUT: 700 mL    Total NET: -700 mL        PHYSICAL EXAM:  GENERAL: NAD, well-groomed, well-developed  NECK: Supple, No JVD, Normal thyroid  NERVOUS SYSTEM:  Alert & Oriented X3, Good concentration; Motor Strength 5/5 B/L upper and lower extremities  CHEST/LUNG: Clear to auscultation bilaterally; No rales, rhonchi, wheezing, or rubs  HEART: Regular rate and rhythm; No murmurs, rubs, or gallops  ABDOMEN: Soft, Nontender, obese; Bowel sounds present  EXTREMITIES:  2+ Peripheral Pulses, LLE with improved erythema; chronic lymphedema bilateral LE                              14.1   5.7   )-----------( 104      ( 27 Oct 2017 06:23 )             44.8     27 Oct 2017 06:23    130    |  98     |  28.0   ----------------------------<  250    4.3     |  19.0   |  1.51     Ca    8.4        27 Oct 2017 06:23  Phos  2.0       27 Oct 2017 06:23  Mg     2.2       27 Oct 2017 06:23      PT/INR - ( 27 Oct 2017 06:23 )   PT: 24.2 sec;   INR: 2.17 ratio         PTT - ( 27 Oct 2017 06:23 )  PTT:34.8 sec  CAPILLARY BLOOD GLUCOSE      POCT Blood Glucose.: 114 mg/dL (27 Oct 2017 12:43)  POCT Blood Glucose.: 219 mg/dL (27 Oct 2017 08:40)  POCT Blood Glucose.: 220 mg/dL (26 Oct 2017 22:57)  POCT Blood Glucose.: 220 mg/dL (26 Oct 2017 16:49)        Hemoglobin A1C, Whole Blood: 10.5 % (10-26-17 @ 06:57)    MEDICATIONS  (STANDING):  allopurinol 300 milliGRAM(s) Oral daily  carvedilol 6.25 milliGRAM(s) Oral every 12 hours  cefTRIAXone   IVPB 1 Gram(s) IV Intermittent every 24 hours  clindamycin IVPB 900 milliGRAM(s) IV Intermittent every 8 hours  dextrose 5%. 1000 milliLiter(s) (50 mL/Hr) IV Continuous <Continuous>  dextrose 50% Injectable 12.5 Gram(s) IV Push once  dextrose 50% Injectable 25 Gram(s) IV Push once  dextrose 50% Injectable 25 Gram(s) IV Push once  furosemide    Tablet 80 milliGRAM(s) Oral daily  insulin glargine Injectable (LANTUS) 55 Unit(s) SubCutaneous at bedtime  insulin lispro (HumaLOG) corrective regimen sliding scale   SubCutaneous three times a day before meals  insulin lispro Injectable (HumaLOG) 18 Unit(s) SubCutaneous three times a day before meals  lactobacillus acidophilus 1 Tablet(s) Oral daily  lisinopril 20 milliGRAM(s) Oral daily  sodium chloride 0.9%. 500 milliLiter(s) (75 mL/Hr) IV Continuous <Continuous>    MEDICATIONS  (PRN):  dextrose Gel 1 Dose(s) Oral once PRN Blood Glucose LESS THAN 70 milliGRAM(s)/deciliter  diphenhydrAMINE   Capsule 25 milliGRAM(s) Oral every 4 hours PRN Rash and/or Itching  glucagon  Injectable 1 milliGRAM(s) IntraMuscular once PRN Glucose LESS THAN 70 milligrams/deciliter  metoprolol    tartrate Injectable 5 milliGRAM(s) IV Push every 6 hours PRN for HR > 110; hold for SBP < 100 or HR < 50  oxyCODONE    5 mG/acetaminophen 325 mG 2 Tablet(s) Oral every 4 hours PRN Moderate Pain (4 - 6)

## 2017-10-27 NOTE — PROGRESS NOTE ADULT - PROBLEM SELECTOR PLAN 1
Continue ceftriaxone and Clindamycin  Will D/C clindamycin 10/28/17  Complete 7-10 days total of Ceftriaxone and can transition to oral on discharge.   Blood cultures no growth   Afebrile   No leukocytosis

## 2017-10-27 NOTE — CONSULT NOTE ADULT - SUBJECTIVE AND OBJECTIVE BOX
[facility]                                   [address] [place] [address]                                                      PHONE: [phone]                                                         FAX: [phone]  [email or web address]  ---------------------------------------------------------------------------------------------------------------------------------    Reason for Consult: A fib NICM     HPI:  MARY ANN CORNELL is an 66y Male with history of severe NICM biventricular failure functional MR AICD chronic A fib on AC CHRISS OHS on BIPAP CKD anemia who presents with chills fever left leg tenderness erythema after right toe nail come off.  Patient denies chest pain or sob stable chronic MACHUCA.  The patient found was to have A fib with RVR currently HR controlled     PAST MEDICAL & SURGICAL HISTORY:  Pulmonary hypertension  Prostate CA  Sleep apnea  Renal insufficiency  High cholesterol  HTN (hypertension)  DM (diabetes mellitus)  S/P ankle ligament repair      No Known Allergies      MEDICATIONS  (STANDING):  allopurinol 300 milliGRAM(s) Oral daily  carvedilol 6.25 milliGRAM(s) Oral every 12 hours  cefTRIAXone   IVPB 1 Gram(s) IV Intermittent every 24 hours  clindamycin IVPB 900 milliGRAM(s) IV Intermittent every 8 hours  dextrose 5%. 1000 milliLiter(s) (50 mL/Hr) IV Continuous <Continuous>  dextrose 50% Injectable 12.5 Gram(s) IV Push once  dextrose 50% Injectable 25 Gram(s) IV Push once  dextrose 50% Injectable 25 Gram(s) IV Push once  insulin glargine Injectable (LANTUS) 55 Unit(s) SubCutaneous at bedtime  insulin lispro (HumaLOG) corrective regimen sliding scale   SubCutaneous three times a day before meals  insulin lispro Injectable (HumaLOG) 18 Unit(s) SubCutaneous three times a day before meals  lactobacillus acidophilus 1 Tablet(s) Oral daily  sodium chloride 0.9%. 500 milliLiter(s) (75 mL/Hr) IV Continuous <Continuous>    MEDICATIONS  (PRN):  dextrose Gel 1 Dose(s) Oral once PRN Blood Glucose LESS THAN 70 milliGRAM(s)/deciliter  diphenhydrAMINE   Capsule 25 milliGRAM(s) Oral every 4 hours PRN Rash and/or Itching  glucagon  Injectable 1 milliGRAM(s) IntraMuscular once PRN Glucose LESS THAN 70 milligrams/deciliter  metoprolol    tartrate Injectable 5 milliGRAM(s) IV Push every 6 hours PRN for HR > 110; hold for SBP < 100 or HR < 50  oxyCODONE    5 mG/acetaminophen 325 mG 2 Tablet(s) Oral every 4 hours PRN Moderate Pain (4 - 6)      Social History:  Cigarettes:  none                  Alchohol:  none              Illicit Drug Abuse: none     ROS: Negative other than as mentioned in HPI.    Vital Signs Last 24 Hrs  T(C): 36.8 (27 Oct 2017 07:45), Max: 37.2 (26 Oct 2017 20:12)  T(F): 98.2 (27 Oct 2017 07:45), Max: 99 (26 Oct 2017 20:12)  HR: 91 (27 Oct 2017 07:45) (87 - 91)  BP: 113/66 (27 Oct 2017 07:45) (113/66 - 143/75)  BP(mean): --  RR: 18 (27 Oct 2017 07:45) (18 - 20)  SpO2: 98% (27 Oct 2017 07:45) (96% - 98%)  ICU Vital Signs Last 24 Hrs  MARY ANN CORNELL  I&O's Detail    26 Oct 2017 07:01  -  27 Oct 2017 07:00  --------------------------------------------------------  IN:    sodium chloride 0.9%.: 675 mL  Total IN: 675 mL    OUT:  Total OUT: 0 mL    Total NET: 675 mL        I&O's Summary    26 Oct 2017 07:01  -  27 Oct 2017 07:00  --------------------------------------------------------  IN: 675 mL / OUT: 0 mL / NET: 675 mL      Drug Dosing Weight  MARY ANN CORNELL      PHYSICAL EXAM:  General: Appears well developed, well nourished alert and cooperative.  HEENT: Head; normocephalic, atraumatic.  Eyes: Pupils reactive, cornea wnl.  Neck: Supple, no nodes adenopathy, no NVD or carotid bruit or thyromegaly.  CARDIOVASCULAR: Normal S1 and S2, 3/6 murmur, rub, gallop or lift.   LUNGS: Decrease BS B/L   ABDOMEN: Soft, nontender without mass or organomegaly. bowel sounds normoactive.  EXTREMITIES: No clubbing, cyanosis ++ edema with redness . Distal pulses wnl.   SKIN: warm and dry with normal turgor.  NEURO: Alert/oriented x 3/normal motor exam. No pathologic reflexes.    PSYCH: normal affect.        LABS:                        14.1   5.7   )-----------( 104      ( 27 Oct 2017 06:23 )             44.8     10-27    130<L>  |  98  |  28.0<H>  ----------------------------<  250<H>  4.3   |  19.0<L>  |  1.51<H>    Ca    8.4<L>      27 Oct 2017 06:23  Phos  2.0     10-27  Mg     2.2     10-27    TPro  7.4  /  Alb  3.6  /  TBili  0.8  /  DBili  x   /  AST  37  /  ALT  19  /  AlkPhos  58  10-25    MARY ANN CORNELL      PT/INR - ( 27 Oct 2017 06:23 )   PT: 24.2 sec;   INR: 2.17 ratio         PTT - ( 27 Oct 2017 06:23 )  PTT:34.8 sec      RADIOLOGY & ADDITIONAL STUDIES:    INTERPRETATION OF TELEMETRY (personally reviewed):    ECG: A fib     ECHO:  Summary:   1. Global LV systolic function was moderately decreased with regional   variability. Left ventricular ejection fraction, by visual estimation, is   35 to 40%.   2. RV systolic function is mildly reduced.   3. There is mild concentric left ventricular hypertrophy.   4. Biatrial enlargement   5. No significant valvular abnormalities.   6. No pericardial effusion.   7. ** Compared to TTE dated 3/24/16, LVEF systolic function has   improved. Device wire now seen in the right heart. B        CARDIAC CATHETERIZATION:  VENTRICLES: LVEF 25% with MR  CORONARY VESSELS: The coronary circulation is right dominant.  LM:   --  LM: Angiography showed minor luminal irregularities with no flow  limiting lesions.  LAD:   --  LAD: Angiography showed minor luminal irregularities with no  flow limiting lesions.  CX:   --  Circumflex: Angiography showed minor luminal irregularities with  no flow limiting lesions.  RCA:   --  RCA: Angiography showed minor luminal irregularities with no  flow limiting lesions.  COMPLICATIONS: There were no complications. No complications occurred  during the cath lab visit.  DIAGNOSTIC IMPRESSIONS: Mild CAD. Non ischemic cardiomyopathy.  DIAGNOSTIC RECOMMENDATIONS: A/C. MARIELA CV The patient should continue with  the present medications.  INTERVENTIONAL IMPRESSIONS: Mild CAD. Non ischemic cardiomyopathy.  INTERVENTIONAL RECOMMENDATIONS: A/C. MARIELA CV      Assessment and Plan:  In summary, MARY ANN CORNELL is an 66y Male with past medical history significant for severe NICM biventricular failure functional MR AICD chronic A fib on AC failed DCCV in the past CHRISS OHS on BIPAP CKD HFrEF who presents cellulites A fib with RVR; currently A fib controlled; volume state stable     Plan  1.  Agree with ID FU and IV Abx   2.  please restart ACE-I and Lasix therapy   3.  Continue Coreg   4.  INR goal 2-3

## 2017-10-27 NOTE — PROGRESS NOTE ADULT - SUBJECTIVE AND OBJECTIVE BOX
[facility]  INFECTIOUS DISEASES AND INTERNAL MEDICINE OF [place]  =======================================================  Miguelangel Jules MD  Diplomates American Board of Internal Medicine and Infectious Diseases  =======================================================      MARY ANN CORNELL [id number]      Follow up: Cellulitis    No complaints      Allergies:  No Known Allergies      Antibiotics:  cefTRIAXone   IVPB 1 Gram(s) IV Intermittent every 24 hours  clindamycin IVPB 900 milliGRAM(s) IV Intermittent every 8 hours      REVIEW OF SYSTEMS:  CONSTITUTIONAL:  No Fever or chills  HEENT:  No diplopia or blurred vision.  No earache, sore throat or runny nose.  CARDIOVASCULAR:  No pressure, squeezing, strangling, tightness, heaviness or aching about the chest, neck, axilla or epigastrium.  RESPIRATORY:  No cough, shortness of breath  GASTROINTESTINAL:  No nausea, vomiting or diarrhea.  GENITOURINARY:  No dysuria, frequency or urgency  MUSCULOSKELETAL:  no joint aches, no muscle pain  SKIN:  LLE with swelling and erythema  NEUROLOGIC:  No paresthesias, fasciculations,  PSYCHIATRIC:  No disorder of thought or mood.  ENDOCRINE:  No heat or cold intolerance  HEMATOLOGICAL:  No easy bruising or bleeding.        Physical Exam:  Vital Signs Last 24 Hrs  T(C): 36.8 (27 Oct 2017 07:45), Max: 37.2 (26 Oct 2017 20:12)  T(F): 98.2 (27 Oct 2017 07:45), Max: 99 (26 Oct 2017 20:12)  HR: 91 (27 Oct 2017 07:45) (87 - 91)  BP: 113/66 (27 Oct 2017 07:45) (113/66 - 143/75)  RR: 18 (27 Oct 2017 07:45) (18 - 20)  SpO2: 98% (27 Oct 2017 07:45) (96% - 98%)      GEN: NAD, pleasant  HEENT: normocephalic and atraumatic. EOMI. PERRL.    NECK: Supple.   LUNGS: Clear to auscultation.  HEART: Regular rate and rhythm   ABDOMEN: Soft, nontender, and nondistended.  Positive bowel sounds.    : No CVA tenderness  EXTREMITIES: LLE with erythema, warmth and swelling up to the thigh   MSK: No joint swelling  NEUROLOGIC: Cranial nerves II through XII are grossly intact.  PSYCHIATRIC: Appropriate affect .  SKIN: LLE with erythema, warmth and swelling up to the thigh        Labs:  10-27    130<L>  |  98  |  28.0<H>  ----------------------------<  250<H>  4.3   |  19.0<L>  |  1.51<H>    Ca    8.4<L>      27 Oct 2017 06:23  Phos  2.0     10-27  Mg     2.2     10-27                            14.1   5.7   )-----------( 104      ( 27 Oct 2017 06:23 )             44.8       PT/INR - ( 27 Oct 2017 06:23 )   PT: 24.2 sec;   INR: 2.17 ratio         PTT - ( 27 Oct 2017 06:23 )  PTT:34.8 sec      RECENT CULTURES:  10-25 @ 12:38 .Blood Blood-Peripheral     No growth at 48 hours      10-25 @ 12:37 .Blood Blood-Peripheral     No growth at 48 hours        EXAM:  US DPLX LWR EXT VEINS LTD LT                        PROCEDURE DATE:  10/25/2017    INTERPRETATION:  CLINICAL INFORMATION: Left leg edema  COMPARISON: None available.  TECHNIQUE: Duplex sonography of the LEFT LOWER extremity withcolor and   spectral Doppler, with and without compression.    FINDINGS:  There is normal compressibility of the left common femoral, femoral and   popliteal veins. No calf vein thrombosis is detected.  The contralateral common femoral vein is patent.  Doppler examination shows normal spontaneous and phasic flow.  IMPRESSION:   No evidence of left lower extremity deep venous thrombosis.

## 2017-10-28 LAB
ANION GAP SERPL CALC-SCNC: 14 MMOL/L — SIGNIFICANT CHANGE UP (ref 5–17)
APPEARANCE UR: CLEAR — SIGNIFICANT CHANGE UP
BILIRUB UR-MCNC: NEGATIVE — SIGNIFICANT CHANGE UP
BUN SERPL-MCNC: 23 MG/DL — HIGH (ref 8–20)
C DIFF BY PCR RESULT: SIGNIFICANT CHANGE UP
C DIFF TOX GENS STL QL NAA+PROBE: SIGNIFICANT CHANGE UP
CALCIUM SERPL-MCNC: 8.5 MG/DL — LOW (ref 8.6–10.2)
CHLORIDE SERPL-SCNC: 99 MMOL/L — SIGNIFICANT CHANGE UP (ref 98–107)
CO2 SERPL-SCNC: 24 MMOL/L — SIGNIFICANT CHANGE UP (ref 22–29)
COLOR SPEC: YELLOW — SIGNIFICANT CHANGE UP
CREAT SERPL-MCNC: 1.59 MG/DL — HIGH (ref 0.5–1.3)
DIFF PNL FLD: ABNORMAL
EPI CELLS # UR: SIGNIFICANT CHANGE UP
GLUCOSE BLDC GLUCOMTR-MCNC: 110 MG/DL — HIGH (ref 70–99)
GLUCOSE BLDC GLUCOMTR-MCNC: 187 MG/DL — HIGH (ref 70–99)
GLUCOSE BLDC GLUCOMTR-MCNC: 208 MG/DL — HIGH (ref 70–99)
GLUCOSE BLDC GLUCOMTR-MCNC: 239 MG/DL — HIGH (ref 70–99)
GLUCOSE SERPL-MCNC: 142 MG/DL — HIGH (ref 70–115)
GLUCOSE UR QL: 50 MG/DL
HCT VFR BLD CALC: 43.6 % — SIGNIFICANT CHANGE UP (ref 42–52)
HGB BLD-MCNC: 14.3 G/DL — SIGNIFICANT CHANGE UP (ref 14–18)
INR BLD: 2.66 RATIO — HIGH (ref 0.88–1.16)
KETONES UR-MCNC: NEGATIVE — SIGNIFICANT CHANGE UP
LEUKOCYTE ESTERASE UR-ACNC: NEGATIVE — SIGNIFICANT CHANGE UP
MCHC RBC-ENTMCNC: 29.1 PG — SIGNIFICANT CHANGE UP (ref 27–31)
MCHC RBC-ENTMCNC: 32.8 G/DL — SIGNIFICANT CHANGE UP (ref 32–36)
MCV RBC AUTO: 88.8 FL — SIGNIFICANT CHANGE UP (ref 80–94)
NITRITE UR-MCNC: NEGATIVE — SIGNIFICANT CHANGE UP
PH UR: 5 — SIGNIFICANT CHANGE UP (ref 5–8)
PLATELET # BLD AUTO: 105 K/UL — LOW (ref 150–400)
POTASSIUM SERPL-MCNC: 4.8 MMOL/L — SIGNIFICANT CHANGE UP (ref 3.5–5.3)
POTASSIUM SERPL-SCNC: 4.8 MMOL/L — SIGNIFICANT CHANGE UP (ref 3.5–5.3)
PROT UR-MCNC: NEGATIVE MG/DL — SIGNIFICANT CHANGE UP
PROTHROM AB SERPL-ACNC: 29.9 SEC — HIGH (ref 9.8–12.7)
RBC # BLD: 4.91 M/UL — SIGNIFICANT CHANGE UP (ref 4.6–6.2)
RBC # FLD: 16.1 % — HIGH (ref 11–15.6)
RBC CASTS # UR COMP ASSIST: SIGNIFICANT CHANGE UP /HPF (ref 0–4)
SODIUM SERPL-SCNC: 137 MMOL/L — SIGNIFICANT CHANGE UP (ref 135–145)
SP GR SPEC: 1.02 — SIGNIFICANT CHANGE UP (ref 1.01–1.02)
UROBILINOGEN FLD QL: NEGATIVE MG/DL — SIGNIFICANT CHANGE UP
WBC # BLD: 5.1 K/UL — SIGNIFICANT CHANGE UP (ref 4.8–10.8)
WBC # FLD AUTO: 5.1 K/UL — SIGNIFICANT CHANGE UP (ref 4.8–10.8)
WBC UR QL: NEGATIVE — SIGNIFICANT CHANGE UP

## 2017-10-28 PROCEDURE — 99233 SBSQ HOSP IP/OBS HIGH 50: CPT

## 2017-10-28 RX ORDER — WARFARIN SODIUM 2.5 MG/1
2 TABLET ORAL ONCE
Qty: 0 | Refills: 0 | Status: COMPLETED | OUTPATIENT
Start: 2017-10-28 | End: 2017-10-28

## 2017-10-28 RX ADMIN — Medication 2: at 12:30

## 2017-10-28 RX ADMIN — Medication 100 MILLIGRAM(S): at 06:10

## 2017-10-28 RX ADMIN — Medication 250 MILLIGRAM(S): at 06:09

## 2017-10-28 RX ADMIN — Medication 250 MILLIGRAM(S): at 17:37

## 2017-10-28 RX ADMIN — LISINOPRIL 20 MILLIGRAM(S): 2.5 TABLET ORAL at 06:09

## 2017-10-28 RX ADMIN — Medication 18 UNIT(S): at 12:30

## 2017-10-28 RX ADMIN — CARVEDILOL PHOSPHATE 6.25 MILLIGRAM(S): 80 CAPSULE, EXTENDED RELEASE ORAL at 06:10

## 2017-10-28 RX ADMIN — CARVEDILOL PHOSPHATE 6.25 MILLIGRAM(S): 80 CAPSULE, EXTENDED RELEASE ORAL at 17:37

## 2017-10-28 RX ADMIN — CEFTRIAXONE 100 GRAM(S): 500 INJECTION, POWDER, FOR SOLUTION INTRAMUSCULAR; INTRAVENOUS at 02:06

## 2017-10-28 RX ADMIN — INSULIN GLARGINE 55 UNIT(S): 100 INJECTION, SOLUTION SUBCUTANEOUS at 21:19

## 2017-10-28 RX ADMIN — Medication 300 MILLIGRAM(S): at 12:29

## 2017-10-28 RX ADMIN — WARFARIN SODIUM 2 MILLIGRAM(S): 2.5 TABLET ORAL at 21:19

## 2017-10-28 RX ADMIN — Medication 100 MILLIGRAM(S): at 21:19

## 2017-10-28 RX ADMIN — Medication 18 UNIT(S): at 08:42

## 2017-10-28 RX ADMIN — Medication 80 MILLIGRAM(S): at 06:13

## 2017-10-28 RX ADMIN — Medication 1 TABLET(S): at 12:29

## 2017-10-28 RX ADMIN — Medication 100 MILLIGRAM(S): at 12:31

## 2017-10-28 RX ADMIN — Medication 1: at 08:43

## 2017-10-28 NOTE — PROGRESS NOTE ADULT - ASSESSMENT
66 y M hx obesity, CHRISS on CPAP, afib on coumadin, NICM w EF 20-25% s/p AICD, CKD, DM2, HTN, Chronic lymphedema, morbid obesity, sedentary lifestyle, presented with severe chills, left leg tenderness, erythema, swelling, warmth and has been admitted with LLE cellulitis. Started on IV abx, initially on vanco but developed flushing & itching with and later switched to rociphen and clindamycin. ID on board.       >Sepsis 2/2 LLE cellulitis likely from toe nail bleed in the setting of chronic lymphedema- Afebrile, no leukocytosis. C/w Ceftriaxone, last day of clindamycin and Clindamycin, Blood cx negative to date.  Vascular sx consult appreciated, ACE wrap if tolerated elevate legs, Eventual unna boots on dc  >Diarrhea: likely secondary to antibiotics, probiotic ordered, c diff negative.   >Afib- INR therapeutic, c/w coreg, coumadin, daily INR, lopressor IVP prn  >CHRISS- CPAP at night  >Chronic systolic CHF- appears euvolemic, c/w coreg, restarted on ACE-I and Lasix ; Cardiology note appreciated.  >CKD 3- baseline unknown, will monitor  >DM- A1c 10.5, c/w lantus 55 units, lispro 18 units pre-meal and LSS, f/u FS  >HTN-c/w home meds  >morbid obesity- diet & weight loss  DVT ppx on coumadin.

## 2017-10-28 NOTE — PROGRESS NOTE ADULT - SUBJECTIVE AND OBJECTIVE BOX
CC: Left left pain    INTERVAL HPI/OVERNIGHT EVENTS: Patient seen and examined, complaints of burning pain in the back of his left leg. Afebrile. Difficulty ambulating due to leg pain.      Vital Signs Last 24 Hrs  T(C): 37.1 (28 Oct 2017 08:20), Max: 37.1 (28 Oct 2017 08:20)  T(F): 98.8 (28 Oct 2017 08:20), Max: 98.8 (28 Oct 2017 08:20)  HR: 85 (28 Oct 2017 08:20) (82 - 90)  BP: 113/67 (28 Oct 2017 08:20) (112/69 - 130/76)  BP(mean): --  RR: 18 (28 Oct 2017 08:20) (18 - 20)  SpO2: 97% (28 Oct 2017 08:20) (97% - 98%)    PHYSICAL EXAM:    GENERAL: NAD, well-groomed, well-developed  HEAD:  Atraumatic, Normocephalic  CHEST/LUNG: Clear to auscultation bilaterally; No rales, rhonchi, wheezing, or rubs  HEART: Regular rate and rhythm; No murmurs, rubs, or gallops  ABDOMEN: Soft, Nontender, Nondistended; Bowel sounds present  EXTREMITIES:  Bilateral lymphedema lower extremities, erythema of the left leg.         MEDICATIONS  (STANDING):  allopurinol 300 milliGRAM(s) Oral daily  carvedilol 6.25 milliGRAM(s) Oral every 12 hours  cefTRIAXone   IVPB 1 Gram(s) IV Intermittent every 24 hours  clindamycin IVPB 900 milliGRAM(s) IV Intermittent every 8 hours  dextrose 5%. 1000 milliLiter(s) (50 mL/Hr) IV Continuous <Continuous>  dextrose 50% Injectable 12.5 Gram(s) IV Push once  dextrose 50% Injectable 25 Gram(s) IV Push once  dextrose 50% Injectable 25 Gram(s) IV Push once  furosemide    Tablet 80 milliGRAM(s) Oral daily  insulin glargine Injectable (LANTUS) 55 Unit(s) SubCutaneous at bedtime  insulin lispro (HumaLOG) corrective regimen sliding scale   SubCutaneous three times a day before meals  insulin lispro Injectable (HumaLOG) 18 Unit(s) SubCutaneous three times a day before meals  lactobacillus acidophilus 1 Tablet(s) Oral daily  lisinopril 20 milliGRAM(s) Oral daily  saccharomyces boulardii 250 milliGRAM(s) Oral two times a day  sodium chloride 0.9%. 500 milliLiter(s) (75 mL/Hr) IV Continuous <Continuous>  warfarin 2 milliGRAM(s) Oral once    MEDICATIONS  (PRN):  dextrose Gel 1 Dose(s) Oral once PRN Blood Glucose LESS THAN 70 milliGRAM(s)/deciliter  diphenhydrAMINE   Capsule 25 milliGRAM(s) Oral every 4 hours PRN Rash and/or Itching  glucagon  Injectable 1 milliGRAM(s) IntraMuscular once PRN Glucose LESS THAN 70 milligrams/deciliter  metoprolol    tartrate Injectable 5 milliGRAM(s) IV Push every 6 hours PRN for HR > 110; hold for SBP < 100 or HR < 50  oxyCODONE    5 mG/acetaminophen 325 mG 2 Tablet(s) Oral every 4 hours PRN Moderate Pain (4 - 6)      Allergies    No Known Allergies    Intolerances          LABS:                          14.3   5.1   )-----------( 105      ( 28 Oct 2017 07:34 )             43.6     10-    137  |  99  |  23.0<H>  ----------------------------<  142<H>  4.8   |  24.0  |  1.59<H>    Ca    8.5<L>      28 Oct 2017 07:34  Phos  2.0     10-  Mg     2.2     10-      PT/INR - ( 28 Oct 2017 07:34 )   PT: 29.9 sec;   INR: 2.66 ratio         PTT - ( 27 Oct 2017 06:23 )  PTT:34.8 sec  Urinalysis Basic - ( 28 Oct 2017 03:09 )    Color: Yellow / Appearance: Clear / S.020 / pH: x  Gluc: x / Ketone: Negative  / Bili: Negative / Urobili: Negative mg/dL   Blood: x / Protein: Negative mg/dL / Nitrite: Negative   Leuk Esterase: Negative / RBC: 0-2 /HPF / WBC Negative   Sq Epi: x / Non Sq Epi: Occasional / Bacteria: x        RADIOLOGY & ADDITIONAL TESTS:

## 2017-10-29 LAB
ANION GAP SERPL CALC-SCNC: 13 MMOL/L — SIGNIFICANT CHANGE UP (ref 5–17)
BUN SERPL-MCNC: 28 MG/DL — HIGH (ref 8–20)
CALCIUM SERPL-MCNC: 8.7 MG/DL — SIGNIFICANT CHANGE UP (ref 8.6–10.2)
CHLORIDE SERPL-SCNC: 97 MMOL/L — LOW (ref 98–107)
CO2 SERPL-SCNC: 24 MMOL/L — SIGNIFICANT CHANGE UP (ref 22–29)
CREAT SERPL-MCNC: 1.58 MG/DL — HIGH (ref 0.5–1.3)
GLUCOSE BLDC GLUCOMTR-MCNC: 163 MG/DL — HIGH (ref 70–99)
GLUCOSE BLDC GLUCOMTR-MCNC: 179 MG/DL — HIGH (ref 70–99)
GLUCOSE BLDC GLUCOMTR-MCNC: 196 MG/DL — HIGH (ref 70–99)
GLUCOSE BLDC GLUCOMTR-MCNC: 227 MG/DL — HIGH (ref 70–99)
GLUCOSE SERPL-MCNC: 196 MG/DL — HIGH (ref 70–115)
INR BLD: 2.54 RATIO — HIGH (ref 0.88–1.16)
POTASSIUM SERPL-MCNC: 4.6 MMOL/L — SIGNIFICANT CHANGE UP (ref 3.5–5.3)
POTASSIUM SERPL-SCNC: 4.6 MMOL/L — SIGNIFICANT CHANGE UP (ref 3.5–5.3)
PROTHROM AB SERPL-ACNC: 28.5 SEC — HIGH (ref 9.8–12.7)
SODIUM SERPL-SCNC: 134 MMOL/L — LOW (ref 135–145)

## 2017-10-29 PROCEDURE — 99233 SBSQ HOSP IP/OBS HIGH 50: CPT

## 2017-10-29 RX ORDER — WARFARIN SODIUM 2.5 MG/1
2 TABLET ORAL ONCE
Qty: 0 | Refills: 0 | Status: COMPLETED | OUTPATIENT
Start: 2017-10-29 | End: 2017-10-29

## 2017-10-29 RX ORDER — ALBUTEROL 90 UG/1
2.5 AEROSOL, METERED ORAL ONCE
Qty: 0 | Refills: 0 | Status: COMPLETED | OUTPATIENT
Start: 2017-10-29 | End: 2017-10-29

## 2017-10-29 RX ADMIN — Medication 18 UNIT(S): at 12:57

## 2017-10-29 RX ADMIN — Medication 2: at 08:41

## 2017-10-29 RX ADMIN — Medication 80 MILLIGRAM(S): at 05:18

## 2017-10-29 RX ADMIN — CEFTRIAXONE 100 GRAM(S): 500 INJECTION, POWDER, FOR SOLUTION INTRAMUSCULAR; INTRAVENOUS at 04:08

## 2017-10-29 RX ADMIN — ALBUTEROL 2.5 MILLIGRAM(S): 90 AEROSOL, METERED ORAL at 21:42

## 2017-10-29 RX ADMIN — Medication 250 MILLIGRAM(S): at 05:18

## 2017-10-29 RX ADMIN — Medication 1: at 12:56

## 2017-10-29 RX ADMIN — Medication 18 UNIT(S): at 17:44

## 2017-10-29 RX ADMIN — Medication 100 MILLIGRAM(S): at 04:13

## 2017-10-29 RX ADMIN — INSULIN GLARGINE 55 UNIT(S): 100 INJECTION, SOLUTION SUBCUTANEOUS at 21:10

## 2017-10-29 RX ADMIN — Medication 100 MILLIGRAM(S): at 21:10

## 2017-10-29 RX ADMIN — Medication 1: at 17:44

## 2017-10-29 RX ADMIN — CARVEDILOL PHOSPHATE 6.25 MILLIGRAM(S): 80 CAPSULE, EXTENDED RELEASE ORAL at 17:45

## 2017-10-29 RX ADMIN — Medication 250 MILLIGRAM(S): at 17:45

## 2017-10-29 RX ADMIN — Medication 300 MILLIGRAM(S): at 12:56

## 2017-10-29 RX ADMIN — Medication 18 UNIT(S): at 08:41

## 2017-10-29 RX ADMIN — WARFARIN SODIUM 2 MILLIGRAM(S): 2.5 TABLET ORAL at 21:10

## 2017-10-29 NOTE — PROGRESS NOTE ADULT - SUBJECTIVE AND OBJECTIVE BOX
[id number]    HPI:  66 y M hx obesity, CHRISS on CPAP, afib on coumadin, CHF,  NICM w EF 20-25%, s/p AICD, CKD, DM2, Chronic lymphedema, morbid obesity, HTN,  sedentary lifestyle comes is after severe chills, left leg tenderness, erythema, swelling, warmth since yesterday after his right toe nail came off with some bleeding.      No chest pain, palpitations, sob, light headedness/dizziness, difficulty breathing/cough,  abdominal pain, n/v, diarrhea/constipation, dysuria or increased urinary frequency. Pt also c/o facial flushing & itching after IV Vanco was given. Reports no other symptoms. In the ER had a temp of 102. (25 Oct 2017 18:45)      PAST MEDICAL & SURGICAL HISTORY:  Pulmonary hypertension  Prostate CA  Sleep apnea  Renal insufficiency  High cholesterol  HTN (hypertension)  DM (diabetes mellitus)  S/P ankle ligament repair      No Known Allergies      Chief Complaint: Nurse noted expiratory wheeze. pt without complaints. Denied sob or difficulty breathing. Pt states he occasionally wheezes at home and takes symbicort. Pt states wheeze is slight. Respiratory treatment ordered    T(C): 37.2 (29 Oct 2017 21:43), Max: 37.2 (29 Oct 2017 21:43)  T(F): 98.9 (29 Oct 2017 21:43), Max: 98.9 (29 Oct 2017 21:43)  HR: 88 (29 Oct 2017 21:43) (84 - 105)  BP: 154/78 (29 Oct 2017 21:43) (100/63 - 155/72)  BP(mean): --  RR: 24 (29 Oct 2017 21:43) (18 - 24)  SpO2: 96% (29 Oct 2017 21:43) (96% - 98%)    General: A+OX3-Cooperative NAD Breathing easy and unlabored. Pt states albuterol treatment helped and wheeze is gone.  VSS NAD PO 99% R 20  Lungs clear, no wheeze , rhonci or rales      CXR ordered  Continue to monitor  pt to use CPAP tonight as ordered  call Pa if any changes in pts condition

## 2017-10-29 NOTE — PROGRESS NOTE ADULT - ASSESSMENT
66 y M hx obesity, CHRISS on CPAP, afib on coumadin, NICM w EF 20-25% s/p AICD, CKD, DM2, HTN, Chronic lymphedema, morbid obesity, sedentary lifestyle, presented with severe chills, left leg tenderness, erythema, swelling, warmth and has been admitted with LLE cellulitis. Started on IV abx, initially on vanco but developed flushing & itching with and later switched to rociphen and clindamycin. completed clindamycin.     Assessment/Plan:    1. Sepsis 2/2 LLE cellulitis likely from toe nail bleed in the setting of chronic lymphedema- resolved.  Completed clindamycin. day 4 of 10 of iv rocephin. change to PO in Am. Afebrile, no leukocytosis. Blood cx negative to date.  Vascular sx consult appreciated, ACE wrap if tolerated elevate legs, Eventual unna boots on dc    2. Diarrhea: likely secondary to antibiotics, probiotic ordered, c diff negative.     3. Afib- INR therapeutic, c/w coreg, coumadin, daily INR, lopressor IVP prn    4. CHRISS- CPAP at night    5. Chronic systolic CHF- appears euvolemic, c/w coreg, restarted on ACE-I and Lasix ; Cardiology note appreciated.    6. CKD 3- baseline creatinine about 1.5/ D/c iv fluids    7. Hyponatremia: d/c fluids. Monitor bmp     8. DM- A1c 10.5, c/w lantus 55 units, lispro 18 units pre-meal and LSS, f/u FS    9. HTN-c/w home meds    10. morbid obesity- diet & weight loss    PT evaluation ordered    DVT ppx on coumadin. 66 y M hx obesity, CHRISS on CPAP, afib on coumadin, NICM w EF 20-25% s/p AICD, CKD, DM2, HTN, Chronic lymphedema, morbid obesity, sedentary lifestyle, presented with severe chills, left leg tenderness, erythema, swelling, warmth and has been admitted with LLE cellulitis. Started on IV abx, initially on vanco but developed flushing & itching with and later switched to rociphen and clindamycin. completed clindamycin.     Assessment/Plan:    1. Sepsis 2/2 LLE cellulitis likely from toe nail bleed in the setting of chronic lymphedema- resolved.  Completed clindamycin. day 4 of 10 of iv rocephin. change to PO in Am. Afebrile, no leukocytosis. Blood cx negative to date.  Vascular sx consult appreciated, ACE wrap if tolerated elevate legs, Eventual unna boots on dc    2. Diarrhea: likely secondary to antibiotics, probiotic ordered, c diff negative.     3. Afib- INR therapeutic, c/w coreg, coumadin, daily INR, lopressor IVP prn    4. CHRISS- CPAP at night    5. Chronic systolic CHF- appears euvolemic, c/w coreg, restarted on ACE-I and Lasix ; Cardiology note appreciated.    6. CKD 3- baseline creatinine about 1.5/ D/c iv fluids    7. Hyponatremia: d/c fluids. Monitor bmp     8. DM- A1c 10.5, c/w lantus 55 units, lispro 18 units pre-meal and LSS, f/u FS    9. HTN-c/w home meds    10. morbid obesity- diet & weight loss    DVT ppx on coumadin.

## 2017-10-29 NOTE — PROGRESS NOTE ADULT - SUBJECTIVE AND OBJECTIVE BOX
CC: left leg pain    INTERVAL HPI/OVERNIGHT EVENTS: Patient seen and examined,       Vital Signs Last 24 Hrs  T(C): 37.1 (29 Oct 2017 00:49), Max: 37.1 (29 Oct 2017 00:49)  T(F): 98.8 (29 Oct 2017 00:49), Max: 98.8 (29 Oct 2017 00:49)  HR: 84 (29 Oct 2017 00:49) (56 - 84)  BP: 100/63 (29 Oct 2017 00:49) (100/63 - 102/64)  BP(mean): --  RR: 18 (29 Oct 2017 00:49) (18 - 18)  SpO2: 96% (29 Oct 2017 00:49) (96% - 98%)    PHYSICAL EXAM:    GENERAL: NAD, AOx3  HEAD:  Atraumatic, Normocephalic  EYES: EOMI, PERRLA, conjunctiva and sclera clear  ENMT: Moist mucous membranes  CHEST/LUNG: Clear to auscultation bilaterally; No rales, rhonchi, wheezing, or rubs  HEART: Regular rate and rhythm; No murmurs, rubs, or gallops  ABDOMEN: Soft, Nontender, Nondistended; Bowel sounds present  EXTREMITIES:  Bilateral lower extremity chronic lymphedema with improving left lower extremity erythema         MEDICATIONS  (STANDING):  allopurinol 300 milliGRAM(s) Oral daily  carvedilol 6.25 milliGRAM(s) Oral every 12 hours  cefTRIAXone   IVPB 1 Gram(s) IV Intermittent every 24 hours  dextrose 5%. 1000 milliLiter(s) (50 mL/Hr) IV Continuous <Continuous>  dextrose 50% Injectable 12.5 Gram(s) IV Push once  dextrose 50% Injectable 25 Gram(s) IV Push once  dextrose 50% Injectable 25 Gram(s) IV Push once  furosemide    Tablet 80 milliGRAM(s) Oral daily  insulin glargine Injectable (LANTUS) 55 Unit(s) SubCutaneous at bedtime  insulin lispro (HumaLOG) corrective regimen sliding scale   SubCutaneous three times a day before meals  insulin lispro Injectable (HumaLOG) 18 Unit(s) SubCutaneous three times a day before meals  lisinopril 20 milliGRAM(s) Oral daily  saccharomyces boulardii 250 milliGRAM(s) Oral two times a day  warfarin 2 milliGRAM(s) Oral once    MEDICATIONS  (PRN):  dextrose Gel 1 Dose(s) Oral once PRN Blood Glucose LESS THAN 70 milliGRAM(s)/deciliter  diphenhydrAMINE   Capsule 25 milliGRAM(s) Oral every 4 hours PRN Rash and/or Itching  glucagon  Injectable 1 milliGRAM(s) IntraMuscular once PRN Glucose LESS THAN 70 milligrams/deciliter  metoprolol    tartrate Injectable 5 milliGRAM(s) IV Push every 6 hours PRN for HR > 110; hold for SBP < 100 or HR < 50  oxyCODONE    5 mG/acetaminophen 325 mG 2 Tablet(s) Oral every 4 hours PRN Moderate Pain (4 - 6)      Allergies    No Known Allergies    Intolerances          LABS:                          14.3   5.1   )-----------( 105      ( 28 Oct 2017 07:34 )             43.6     10-    134<L>  |  97<L>  |  28.0<H>  ----------------------------<  196<H>  4.6   |  24.0  |  1.58<H>    Ca    8.7      29 Oct 2017 07:32      PT/INR - ( 29 Oct 2017 07:32 )   PT: 28.5 sec;   INR: 2.54 ratio           Urinalysis Basic - ( 28 Oct 2017 03:09 )    Color: Yellow / Appearance: Clear / S.020 / pH: x  Gluc: x / Ketone: Negative  / Bili: Negative / Urobili: Negative mg/dL   Blood: x / Protein: Negative mg/dL / Nitrite: Negative   Leuk Esterase: Negative / RBC: 0-2 /HPF / WBC Negative   Sq Epi: x / Non Sq Epi: Occasional / Bacteria: x        RADIOLOGY & ADDITIONAL TESTS: CC: left leg pain    INTERVAL HPI/OVERNIGHT EVENTS: Patient seen and examined, leg pain improved. Erythema improved. Still feels like his leg is swollen and heavy. Able to ambulate.       Vital Signs Last 24 Hrs  T(C): 37.1 (29 Oct 2017 00:49), Max: 37.1 (29 Oct 2017 00:49)  T(F): 98.8 (29 Oct 2017 00:49), Max: 98.8 (29 Oct 2017 00:49)  HR: 84 (29 Oct 2017 00:49) (56 - 84)  BP: 100/63 (29 Oct 2017 00:49) (100/63 - 102/64)  BP(mean): --  RR: 18 (29 Oct 2017 00:49) (18 - 18)  SpO2: 96% (29 Oct 2017 00:49) (96% - 98%)    PHYSICAL EXAM:    GENERAL: NAD, AOx3  HEAD:  Atraumatic, Normocephalic  EYES: EOMI, PERRLA, conjunctiva and sclera clear  ENMT: Moist mucous membranes  CHEST/LUNG: Clear to auscultation bilaterally; No rales, rhonchi, wheezing, or rubs  HEART: Regular rate and rhythm; No murmurs, rubs, or gallops  ABDOMEN: Soft, Nontender, Nondistended; Bowel sounds present  EXTREMITIES:  Bilateral lower extremity chronic lymphedema with improving left lower extremity erythema         MEDICATIONS  (STANDING):  allopurinol 300 milliGRAM(s) Oral daily  carvedilol 6.25 milliGRAM(s) Oral every 12 hours  cefTRIAXone   IVPB 1 Gram(s) IV Intermittent every 24 hours  dextrose 5%. 1000 milliLiter(s) (50 mL/Hr) IV Continuous <Continuous>  dextrose 50% Injectable 12.5 Gram(s) IV Push once  dextrose 50% Injectable 25 Gram(s) IV Push once  dextrose 50% Injectable 25 Gram(s) IV Push once  furosemide    Tablet 80 milliGRAM(s) Oral daily  insulin glargine Injectable (LANTUS) 55 Unit(s) SubCutaneous at bedtime  insulin lispro (HumaLOG) corrective regimen sliding scale   SubCutaneous three times a day before meals  insulin lispro Injectable (HumaLOG) 18 Unit(s) SubCutaneous three times a day before meals  lisinopril 20 milliGRAM(s) Oral daily  saccharomyces boulardii 250 milliGRAM(s) Oral two times a day  warfarin 2 milliGRAM(s) Oral once    MEDICATIONS  (PRN):  dextrose Gel 1 Dose(s) Oral once PRN Blood Glucose LESS THAN 70 milliGRAM(s)/deciliter  diphenhydrAMINE   Capsule 25 milliGRAM(s) Oral every 4 hours PRN Rash and/or Itching  glucagon  Injectable 1 milliGRAM(s) IntraMuscular once PRN Glucose LESS THAN 70 milligrams/deciliter  metoprolol    tartrate Injectable 5 milliGRAM(s) IV Push every 6 hours PRN for HR > 110; hold for SBP < 100 or HR < 50  oxyCODONE    5 mG/acetaminophen 325 mG 2 Tablet(s) Oral every 4 hours PRN Moderate Pain (4 - 6)      Allergies    No Known Allergies    Intolerances          LABS:                          14.3   5.1   )-----------( 105      ( 28 Oct 2017 07:34 )             43.6     10-    134<L>  |  97<L>  |  28.0<H>  ----------------------------<  196<H>  4.6   |  24.0  |  1.58<H>    Ca    8.7      29 Oct 2017 07:32      PT/INR - ( 29 Oct 2017 07:32 )   PT: 28.5 sec;   INR: 2.54 ratio           Urinalysis Basic - ( 28 Oct 2017 03:09 )    Color: Yellow / Appearance: Clear / S.020 / pH: x  Gluc: x / Ketone: Negative  / Bili: Negative / Urobili: Negative mg/dL   Blood: x / Protein: Negative mg/dL / Nitrite: Negative   Leuk Esterase: Negative / RBC: 0-2 /HPF / WBC Negative   Sq Epi: x / Non Sq Epi: Occasional / Bacteria: x        RADIOLOGY & ADDITIONAL TESTS:

## 2017-10-30 ENCOUNTER — TRANSCRIPTION ENCOUNTER (OUTPATIENT)
Age: 67
End: 2017-10-30

## 2017-10-30 LAB
ANION GAP SERPL CALC-SCNC: 16 MMOL/L — SIGNIFICANT CHANGE UP (ref 5–17)
BUN SERPL-MCNC: 26 MG/DL — HIGH (ref 8–20)
CALCIUM SERPL-MCNC: 8.8 MG/DL — SIGNIFICANT CHANGE UP (ref 8.6–10.2)
CHLORIDE SERPL-SCNC: 98 MMOL/L — SIGNIFICANT CHANGE UP (ref 98–107)
CO2 SERPL-SCNC: 21 MMOL/L — LOW (ref 22–29)
CREAT SERPL-MCNC: 1.49 MG/DL — HIGH (ref 0.5–1.3)
CULTURE RESULTS: SIGNIFICANT CHANGE UP
CULTURE RESULTS: SIGNIFICANT CHANGE UP
GLUCOSE BLDC GLUCOMTR-MCNC: 146 MG/DL — HIGH (ref 70–99)
GLUCOSE BLDC GLUCOMTR-MCNC: 170 MG/DL — HIGH (ref 70–99)
GLUCOSE BLDC GLUCOMTR-MCNC: 178 MG/DL — HIGH (ref 70–99)
GLUCOSE BLDC GLUCOMTR-MCNC: 225 MG/DL — HIGH (ref 70–99)
GLUCOSE SERPL-MCNC: 206 MG/DL — HIGH (ref 70–115)
INR BLD: 1.81 RATIO — HIGH (ref 0.88–1.16)
POTASSIUM SERPL-MCNC: 4.7 MMOL/L — SIGNIFICANT CHANGE UP (ref 3.5–5.3)
POTASSIUM SERPL-SCNC: 4.7 MMOL/L — SIGNIFICANT CHANGE UP (ref 3.5–5.3)
PROTHROM AB SERPL-ACNC: 20.2 SEC — HIGH (ref 9.8–12.7)
SODIUM SERPL-SCNC: 135 MMOL/L — SIGNIFICANT CHANGE UP (ref 135–145)
SPECIMEN SOURCE: SIGNIFICANT CHANGE UP
SPECIMEN SOURCE: SIGNIFICANT CHANGE UP

## 2017-10-30 PROCEDURE — 99233 SBSQ HOSP IP/OBS HIGH 50: CPT

## 2017-10-30 PROCEDURE — 71010: CPT | Mod: 26

## 2017-10-30 RX ORDER — ALBUTEROL 90 UG/1
2.5 AEROSOL, METERED ORAL EVERY 6 HOURS
Qty: 0 | Refills: 0 | Status: DISCONTINUED | OUTPATIENT
Start: 2017-10-30 | End: 2017-11-01

## 2017-10-30 RX ORDER — CEPHALEXIN 500 MG
1 CAPSULE ORAL
Qty: 10 | Refills: 0 | OUTPATIENT
Start: 2017-10-30 | End: 2017-11-04

## 2017-10-30 RX ORDER — CEPHALEXIN 500 MG
500 CAPSULE ORAL EVERY 12 HOURS
Qty: 0 | Refills: 0 | Status: DISCONTINUED | OUTPATIENT
Start: 2017-10-30 | End: 2017-11-01

## 2017-10-30 RX ORDER — ATORVASTATIN CALCIUM 80 MG/1
40 TABLET, FILM COATED ORAL AT BEDTIME
Qty: 0 | Refills: 0 | Status: DISCONTINUED | OUTPATIENT
Start: 2017-10-30 | End: 2017-11-01

## 2017-10-30 RX ORDER — WARFARIN SODIUM 2.5 MG/1
4 TABLET ORAL ONCE
Qty: 0 | Refills: 0 | Status: COMPLETED | OUTPATIENT
Start: 2017-10-30 | End: 2017-10-30

## 2017-10-30 RX ORDER — ALBUTEROL 90 UG/1
1 AEROSOL, METERED ORAL EVERY 4 HOURS
Qty: 0 | Refills: 0 | Status: DISCONTINUED | OUTPATIENT
Start: 2017-10-30 | End: 2017-11-01

## 2017-10-30 RX ORDER — SACCHAROMYCES BOULARDII 250 MG
1 POWDER IN PACKET (EA) ORAL
Qty: 10 | Refills: 0
Start: 2017-10-30 | End: 2017-11-04

## 2017-10-30 RX ADMIN — ALBUTEROL 2.5 MILLIGRAM(S): 90 AEROSOL, METERED ORAL at 15:28

## 2017-10-30 RX ADMIN — Medication 2: at 08:22

## 2017-10-30 RX ADMIN — CARVEDILOL PHOSPHATE 6.25 MILLIGRAM(S): 80 CAPSULE, EXTENDED RELEASE ORAL at 17:31

## 2017-10-30 RX ADMIN — Medication 500 MILLIGRAM(S): at 17:31

## 2017-10-30 RX ADMIN — Medication 18 UNIT(S): at 17:31

## 2017-10-30 RX ADMIN — WARFARIN SODIUM 4 MILLIGRAM(S): 2.5 TABLET ORAL at 21:47

## 2017-10-30 RX ADMIN — CEFTRIAXONE 100 GRAM(S): 500 INJECTION, POWDER, FOR SOLUTION INTRAMUSCULAR; INTRAVENOUS at 03:40

## 2017-10-30 RX ADMIN — Medication 250 MILLIGRAM(S): at 17:31

## 2017-10-30 RX ADMIN — ALBUTEROL 2.5 MILLIGRAM(S): 90 AEROSOL, METERED ORAL at 20:11

## 2017-10-30 RX ADMIN — Medication 1: at 12:00

## 2017-10-30 RX ADMIN — Medication 80 MILLIGRAM(S): at 05:39

## 2017-10-30 RX ADMIN — Medication 250 MILLIGRAM(S): at 05:39

## 2017-10-30 RX ADMIN — Medication 100 MILLIGRAM(S): at 05:39

## 2017-10-30 RX ADMIN — ATORVASTATIN CALCIUM 40 MILLIGRAM(S): 80 TABLET, FILM COATED ORAL at 21:47

## 2017-10-30 RX ADMIN — Medication 100 MILLIGRAM(S): at 17:30

## 2017-10-30 RX ADMIN — Medication 18 UNIT(S): at 08:23

## 2017-10-30 RX ADMIN — INSULIN GLARGINE 55 UNIT(S): 100 INJECTION, SOLUTION SUBCUTANEOUS at 21:47

## 2017-10-30 RX ADMIN — Medication 300 MILLIGRAM(S): at 12:00

## 2017-10-30 RX ADMIN — Medication 1: at 17:31

## 2017-10-30 RX ADMIN — Medication 18 UNIT(S): at 12:01

## 2017-10-30 RX ADMIN — CARVEDILOL PHOSPHATE 6.25 MILLIGRAM(S): 80 CAPSULE, EXTENDED RELEASE ORAL at 05:39

## 2017-10-30 RX ADMIN — LISINOPRIL 20 MILLIGRAM(S): 2.5 TABLET ORAL at 05:40

## 2017-10-30 NOTE — DISCHARGE NOTE ADULT - HOSPITAL COURSE
66 y M hx obesity, CHRISS on CPAP, afib on coumadin, NICM w EF 20-25% s/p AICD, CKD, DM2, HTN, Chronic lymphedema, morbid obesity, sedentary lifestyle, presented with severe chills, left leg tenderness, erythema, swelling, warmth and has been admitted with LLE cellulitis. Started on IV abx, initially on Vanco but developed flushing & itching with and later switched to Rocephin and clindamycin. completed clindamycin. Changed to PO keflex to complete total of 10 day course of antibiotics. Complaints of productive cough; chest xray was normal no leukocytosis. Albuterol nebulizer ordered. 66 y M hx obesity, CHRISS on CPAP, afib on coumadin, NICM w EF 20-25% s/p AICD, CKD, DM2, HTN, Chronic lymphedema, morbid obesity, sedentary lifestyle, presented with severe chills, left leg tenderness, erythema, swelling, warmth and has been admitted with LLE cellulitis. Started on IV abx, initially on Vanco but developed flushing & itching with and later switched to Rocephin and clindamycin. completed clindamycin. Changed to PO keflex to complete total of 10 day course of antibiotics. Complaints of productive cough; chest xray was normal no leukocytosis. Albuterol nebulizer ordered and patient improved.  Patient stable for discharge to home with outpatient follow up with primary doctor. 66 y M hx obesity, CHRISS on CPAP, afib on coumadin, NICM w EF 20-25% s/p AICD, CKD, DM2, HTN, Chronic lymphedema, morbid obesity, sedentary lifestyle, presented with severe chills, left leg tenderness, erythema, swelling, warmth and has been admitted with LLE cellulitis. Started on IV abx, initially on Vanco but developed flushing & itching with and later switched to Rocephin and clindamycin. completed clindamycin. Changed to PO keflex to complete total of 10 day course of antibiotics. Complaints of productive cough; chest xray was normal no leukocytosis. Albuterol nebulizer ordered and patient improved.  Patient stable for discharge to home with outpatient follow up with primary doctor.      40 mins spent coordinating care and discharge

## 2017-10-30 NOTE — DISCHARGE NOTE ADULT - CARE PROVIDER_API CALL
Dariel Walden ([facility]), Internal Medicine  [address] [place] [address]  Phone: [phone]  Fax: [phone]

## 2017-10-30 NOTE — PROGRESS NOTE ADULT - ASSESSMENT
66 y M hx obesity, CHRISS on CPAP, afib on coumadin, NICM w EF 20-25% s/p AICD, CKD, DM2, HTN, Chronic lymphedema, morbid obesity, sedentary lifestyle, presented with severe chills, left leg tenderness, erythema, swelling, warmth and has been admitted with LLE cellulitis. Started on IV abx, initially on vanco but developed flushing & itching with and later switched to rociphen and clindamycin. completed clindamycin. CHnaged to PO keflex to complete total of 10 day course of antibiotics. Complaints of productive cough; chest xray was normal no leukocytosis. ALbuterol nebulizer ordered.     Assessment/Plan:    1. Sepsis 2/2 LLE cellulitis likely from toe nail bleed in the setting of chronic lymphedema- resolved.  Completed clindamycin. Rocephin changed to po kelfex to complete 10 day course  Vascular surgery called for unna boots prior to discharge     2. Diarrhea: likely secondary to antibiotics, probiotic ordered, c diff negative.     3. Afib- INR sub therapeutic, 4mg of po coumadin tonight.  c/w coreg, coumadin, daily INR, lopressor IVP prn    4. CHRISS- CPAP at night    5. Chronic systolic CHF- appears euvolemic, c/w coreg, restarted on ACE-I and Lasix ; Cardiology note appreciated.    6. CKD 3- baseline creatinine about 1.5/ D/c iv fluids    7. Hyponatremia: resolved    8. DM- A1c 10.5, c/w lantus 55 units, lispro 18 units pre-meal and LSS, f/u FS    9. HTN-c/w home meds    10. morbid obesity- diet & weight loss    11. Cough: LIkely secondary to atelactatsis; encourage ambulation, ISS, Chest xray normal. Albuterol as needed    DVT ppx on coumadin.

## 2017-10-30 NOTE — PROGRESS NOTE ADULT - SUBJECTIVE AND OBJECTIVE BOX
[facility]                                   [address] [place] [address]                                                      PHONE: [phone]                                                         FAX: [phone]  [email or web address]  ---------------------------------------------------------------------------------------------------------------------------------    Overnight events/patient complaints:  no events     PAST MEDICAL & SURGICAL HISTORY:  Pulmonary hypertension  Prostate CA  Sleep apnea  Renal insufficiency  High cholesterol  HTN (hypertension)  DM (diabetes mellitus)  S/P ankle ligament repair      No Known Allergies    MEDICATIONS  (STANDING):  allopurinol 300 milliGRAM(s) Oral daily  carvedilol 6.25 milliGRAM(s) Oral every 12 hours  cefTRIAXone   IVPB 1 Gram(s) IV Intermittent every 24 hours  dextrose 5%. 1000 milliLiter(s) (50 mL/Hr) IV Continuous <Continuous>  dextrose 50% Injectable 12.5 Gram(s) IV Push once  dextrose 50% Injectable 25 Gram(s) IV Push once  dextrose 50% Injectable 25 Gram(s) IV Push once  furosemide    Tablet 80 milliGRAM(s) Oral daily  insulin glargine Injectable (LANTUS) 55 Unit(s) SubCutaneous at bedtime  insulin lispro (HumaLOG) corrective regimen sliding scale   SubCutaneous three times a day before meals  insulin lispro Injectable (HumaLOG) 18 Unit(s) SubCutaneous three times a day before meals  lisinopril 20 milliGRAM(s) Oral daily  saccharomyces boulardii 250 milliGRAM(s) Oral two times a day    MEDICATIONS  (PRN):  dextrose Gel 1 Dose(s) Oral once PRN Blood Glucose LESS THAN 70 milliGRAM(s)/deciliter  diphenhydrAMINE   Capsule 25 milliGRAM(s) Oral every 4 hours PRN Rash and/or Itching  glucagon  Injectable 1 milliGRAM(s) IntraMuscular once PRN Glucose LESS THAN 70 milligrams/deciliter  guaiFENesin    Syrup 100 milliGRAM(s) Oral every 6 hours PRN Cough  metoprolol    tartrate Injectable 5 milliGRAM(s) IV Push every 6 hours PRN for HR > 110; hold for SBP < 100 or HR < 50  oxyCODONE    5 mG/acetaminophen 325 mG 2 Tablet(s) Oral every 4 hours PRN Moderate Pain (4 - 6)      Vital Signs Last 24 Hrs  T(C): 36.8 (30 Oct 2017 09:02), Max: 37.2 (29 Oct 2017 21:43)  T(F): 98.2 (30 Oct 2017 09:02), Max: 98.9 (29 Oct 2017 21:43)  HR: 89 (30 Oct 2017 09:02) (88 - 105)  BP: 134/78 (30 Oct 2017 09:02) (123/73 - 154/78)  BP(mean): --  RR: 18 (30 Oct 2017 09:02) (18 - 24)  SpO2: 96% (30 Oct 2017 09:02) (96% - 98%)  ICU Vital Signs Last 24 Hrs  MARY ANN CORNELL  I&O's Detail    29 Oct 2017 07:01  -  30 Oct 2017 07:00  --------------------------------------------------------  IN:  Total IN: 0 mL    OUT:    Voided: 500 mL  Total OUT: 500 mL    Total NET: -500 mL        I&O's Summary    29 Oct 2017 07:01  -  30 Oct 2017 07:00  --------------------------------------------------------  IN: 0 mL / OUT: 500 mL / NET: -500 mL      Drug Dosing Weight  MARY ANN CORNELL      PHYSICAL EXAM:  General: Appears well developed, well nourished alert and cooperative.  HEENT: Head; normocephalic, atraumatic.  Eyes: Pupils reactive, cornea wnl.  Neck: Supple, no nodes adenopathy, no NVD or carotid bruit or thyromegaly.  CARDIOVASCULAR: Normal S1 and S2, No murmur, rub, gallop or lift.   LUNGS: No rales, rhonchi or wheeze. Normal breath sounds bilaterally.  ABDOMEN: Soft, nontender without mass or organomegaly. bowel sounds normoactive.  EXTREMITIES: No clubbing, cyanosis or edema. Distal pulses wnl.   SKIN: warm and dry with normal turgor.  NEURO: Alert/oriented x 3/normal motor exam. No pathologic reflexes.    PSYCH: normal affect.        LABS:    10-30    135  |  98  |  26.0<H>  ----------------------------<  206<H>  4.7   |  21.0<L>  |  1.49<H>    Ca    8.8      30 Oct 2017 05:51      MARY ANN CORNELL      PT/INR - ( 30 Oct 2017 05:51 )   PT: 20.2 sec;   INR: 1.81 ratio               RADIOLOGY & ADDITIONAL STUDIES:    INTERPRETATION OF TELEMETRY (personally reviewed):      ECHO:  Summary:   1. Global LV systolic function was moderately decreased with regional   variability. Left ventricular ejection fraction, by visual estimation, is   35 to 40%.   2. RV systolic function is mildly reduced.   3. There is mild concentric left ventricular hypertrophy.   4. Biatrial enlargement   5. No significant valvular abnormalities.   6. No pericardial effusion.   7. ** Compared to TTE dated 3/24/16, LVEF systolic function has   improved. Device wire now seen in the right heart. B        CARDIAC CATHETERIZATION:  VENTRICLES: LVEF 25% with MR  CORONARY VESSELS: The coronary circulation is right dominant.  LM:   --  LM: Angiography showed minor luminal irregularities with no flow  limiting lesions.  LAD:   --  LAD: Angiography showed minor luminal irregularities with no  flow limiting lesions.  CX:   --  Circumflex: Angiography showed minor luminal irregularities with  no flow limiting lesions.  RCA:   --  RCA: Angiography showed minor luminal irregularities with no  flow limiting lesions.  COMPLICATIONS: There were no complications. No complications occurred  during the cath lab visit.  DIAGNOSTIC IMPRESSIONS: Mild CAD. Non ischemic cardiomyopathy.  DIAGNOSTIC RECOMMENDATIONS: A/C. MARIELA CV The patient should continue with  the present medications.  INTERVENTIONAL IMPRESSIONS: Mild CAD. Non ischemic cardiomyopathy.  INTERVENTIONAL RECOMMENDATIONS: A/C. MARIELA CV      Assessment and Plan:  In summary, MARY ANN CORNELL is an 66y Male with past medical history significant for severe NICM biventricular failure functional MR AICD chronic A fib on AC failed DCCV in the past CHRISS OHS on BIPAP CKD HFrEF who presents cellulites A fib with RVR; currently A fib controlled; volume state stable      -continue current meds/dosages  -started lipitor 40 mg daily       Thank you for allowing [facility] [place] to participate in the care of this patient.  Please feel free to call with any questions or concerns.

## 2017-10-30 NOTE — DISCHARGE NOTE ADULT - PLAN OF CARE
Improved Complete antibiotic course.  Follow up with primary doctor. Continue current medications as prescribed.  Follow up with primary doctor.

## 2017-10-30 NOTE — DISCHARGE NOTE ADULT - MEDICATION SUMMARY - MEDICATIONS TO TAKE
I will START or STAY ON the medications listed below when I get home from the hospital:    oxaprozin 600 mg oral tablet  -- 1 tab(s) by mouth once a day  -- Indication: For Home med    ramipril 5 mg oral capsule  -- 1 cap(s) by mouth once a day  -- Indication: For HTN    Coumadin  -- 2 milligrams alternating with 2.5 milligrams by mouth once a day  -- Indication: For Afib    Levemir FlexTouch 100 units/mL subcutaneous solution  -- 50 unit(s) subcutaneous once a day  -- Indication: For DM (diabetes mellitus)    NovoLOG FlexPen 100 units/mL subcutaneous solution  -- 12 unit(s) subcutaneous 3 times a day  -- Indication: For DM (diabetes mellitus)    allopurinol 300 mg oral tablet  -- 1 tab(s) by mouth once a day  -- Indication: For Gout    fenofibric acid 135 mg oral delayed release capsule  -- 1 cap(s) by mouth once a day  -- Indication: For High cholesterol    atorvastatin 40 mg oral tablet  -- 1 tab(s) by mouth once a day (at bedtime)  -- Indication: For High cholesterol    carvedilol 6.25 mg oral tablet  -- 1 tab(s) by mouth every 12 hours  -- Indication: For Systolic HF    albuterol 90 mcg/inh inhalation aerosol  -- 1 puff(s) inhaled every 4 hours  -- Indication: For Cough    cephalexin 500 mg oral capsule  -- 1 cap(s) by mouth every 12 hours  -- Indication: For Cellulitis    furosemide 80 mg oral tablet  -- 1 tab(s) by mouth once a day  -- Indication: For Systolic HF    guaiFENesin 100 mg/5 mL oral liquid  -- 5 milliliter(s) by mouth every 6 hours, As needed, Cough  -- Indication: For Cough    levothyroxine 50 mcg (0.05 mg) oral capsule  -- 1 cap(s) by mouth once a day  -- Indication: For Hypothyroid I will START or STAY ON the medications listed below when I get home from the hospital:    oxaprozin 600 mg oral tablet  -- 1 tab(s) by mouth once a day  -- Indication: For pain    ramipril 5 mg oral capsule  -- 1 cap(s) by mouth once a day  -- Indication: For Hypertension    Coumadin  -- 2 milligrams alternating with 2.5 milligrams by mouth once a day  -- Indication: For Afib    Levemir FlexTouch 100 units/mL subcutaneous solution  -- 50 unit(s) subcutaneous once a day  -- Indication: For DM (diabetes mellitus)    NovoLOG FlexPen 100 units/mL subcutaneous solution  -- 12 unit(s) subcutaneous 3 times a day  -- Indication: For DM (diabetes mellitus)    allopurinol 300 mg oral tablet  -- 1 tab(s) by mouth once a day  -- Indication: For gout    fenofibric acid 135 mg oral delayed release capsule  -- 1 cap(s) by mouth once a day  -- Indication: For Hyperlipidemia    atorvastatin 40 mg oral tablet  -- 1 tab(s) by mouth once a day (at bedtime)  -- Indication: For Hyperlipidemia    carvedilol 6.25 mg oral tablet  -- 1 tab(s) by mouth every 12 hours  -- Indication: For Cad    albuterol 90 mcg/inh inhalation aerosol  -- 1 puff(s) inhaled every 4 hours  -- Indication: For uri    Symbicort 80 mcg-4.5 mcg/inh inhalation aerosol  -- 2 puff(s) inhaled 2 times a day   -- Check with your doctor before becoming pregnant.  For inhalation only.  Rinse mouth thoroughly after use.    -- Indication: For Asthma    cephalexin 500 mg oral capsule  -- 1 cap(s) by mouth every 12 hours  -- Indication: For Cellulitis    furosemide 80 mg oral tablet  -- 1 tab(s) by mouth once a day  -- Indication: For Chf    guaiFENesin 100 mg/5 mL oral liquid  -- 5 milliliter(s) by mouth every 6 hours, As needed, Cough  -- Indication: For Cough    saccharomyces boulardii lyo 250 mg oral capsule  -- 1 cap(s) by mouth 2 times a day  -- Indication: For pro biotic    levothyroxine 50 mcg (0.05 mg) oral capsule  -- 1 cap(s) by mouth once a day  -- Indication: For Hypothyroidism

## 2017-10-30 NOTE — DISCHARGE NOTE ADULT - CARE PLAN
Principal Discharge DX:	Cellulitis of left lower extremity  Goal:	Improved  Instructions for follow-up, activity and diet:	Complete antibiotic course.  Follow up with primary doctor.  Secondary Diagnosis:	DM (diabetes mellitus)  Instructions for follow-up, activity and diet:	Continue current medications as prescribed.  Follow up with primary doctor.  Secondary Diagnosis:	JAYLENE (acute kidney injury)  Instructions for follow-up, activity and diet:	Continue current medications as prescribed.  Follow up with primary doctor.  Secondary Diagnosis:	Essential hypertension  Instructions for follow-up, activity and diet:	Continue current medications as prescribed.  Follow up with primary doctor.  Secondary Diagnosis:	High cholesterol  Instructions for follow-up, activity and diet:	Continue current medications as prescribed.  Follow up with primary doctor.

## 2017-10-30 NOTE — PROGRESS NOTE ADULT - SUBJECTIVE AND OBJECTIVE BOX
CC: cough    INTERVAL HPI/OVERNIGHT EVENTS:  Patient seen and examined, complaints of a productive cough and wheezing since yesterday with yellow/green sputum. Was unable to tolerate cpap last night due to cough. Also has had multiple episodes of diarrhea since being on antibiotics     Vital Signs Last 24 Hrs  T(C): 36.8 (30 Oct 2017 09:02), Max: 37.2 (29 Oct 2017 21:43)  T(F): 98.2 (30 Oct 2017 09:02), Max: 98.9 (29 Oct 2017 21:43)  HR: 89 (30 Oct 2017 09:02) (88 - 105)  BP: 134/78 (30 Oct 2017 09:02) (123/73 - 154/78)  BP(mean): --  RR: 18 (30 Oct 2017 09:02) (18 - 24)  SpO2: 96% (30 Oct 2017 09:02) (96% - 98%)    PHYSICAL EXAM:    GENERAL: NAD, AOX3, obese  HEAD:  Atraumatic, Normocephalic  EYES: EOMI, PERRLA, conjunctiva and sclera clear  ENMT: Moist mucous membranes  NECK: Supple, No JVD  CHEST/LUNG: Bilateral expiratory wheezing  HEART: Regular rate and rhythm; No murmurs, rubs, or gallops  ABDOMEN: Soft, Nontender, Nondistended; Bowel sounds present  EXTREMITIES:   Bilateral lower extremity chronic lymphedema with improving left lower extremity erythema           MEDICATIONS  (STANDING):  ALBUTerol    0.083% 2.5 milliGRAM(s) Nebulizer every 6 hours  ALBUTerol    90 MICROgram(s) HFA Inhaler 1 Puff(s) Inhalation every 4 hours  allopurinol 300 milliGRAM(s) Oral daily  atorvastatin 40 milliGRAM(s) Oral at bedtime  carvedilol 6.25 milliGRAM(s) Oral every 12 hours  cephalexin 500 milliGRAM(s) Oral every 12 hours  dextrose 5%. 1000 milliLiter(s) (50 mL/Hr) IV Continuous <Continuous>  dextrose 50% Injectable 12.5 Gram(s) IV Push once  dextrose 50% Injectable 25 Gram(s) IV Push once  dextrose 50% Injectable 25 Gram(s) IV Push once  furosemide    Tablet 80 milliGRAM(s) Oral daily  insulin glargine Injectable (LANTUS) 55 Unit(s) SubCutaneous at bedtime  insulin lispro (HumaLOG) corrective regimen sliding scale   SubCutaneous three times a day before meals  insulin lispro Injectable (HumaLOG) 18 Unit(s) SubCutaneous three times a day before meals  lisinopril 20 milliGRAM(s) Oral daily  saccharomyces boulardii 250 milliGRAM(s) Oral two times a day  warfarin 4 milliGRAM(s) Oral once    MEDICATIONS  (PRN):  dextrose Gel 1 Dose(s) Oral once PRN Blood Glucose LESS THAN 70 milliGRAM(s)/deciliter  diphenhydrAMINE   Capsule 25 milliGRAM(s) Oral every 4 hours PRN Rash and/or Itching  glucagon  Injectable 1 milliGRAM(s) IntraMuscular once PRN Glucose LESS THAN 70 milligrams/deciliter  guaiFENesin    Syrup 100 milliGRAM(s) Oral every 6 hours PRN Cough  metoprolol    tartrate Injectable 5 milliGRAM(s) IV Push every 6 hours PRN for HR > 110; hold for SBP < 100 or HR < 50  oxyCODONE    5 mG/acetaminophen 325 mG 2 Tablet(s) Oral every 4 hours PRN Moderate Pain (4 - 6)      Allergies    No Known Allergies    Intolerances          LABS:      10-30    135  |  98  |  26.0<H>  ----------------------------<  206<H>  4.7   |  21.0<L>  |  1.49<H>    Ca    8.8      30 Oct 2017 05:51      PT/INR - ( 30 Oct 2017 05:51 )   PT: 20.2 sec;   INR: 1.81 ratio               RADIOLOGY & ADDITIONAL TESTS:

## 2017-10-30 NOTE — DISCHARGE NOTE ADULT - MEDICATION SUMMARY - MEDICATIONS TO CHANGE
I will SWITCH the dose or number of times a day I take the medications listed below when I get home from the hospital:    carvedilol 6.25 mg oral tablet  -- 2 tab(s) by mouth 2 times a day I will SWITCH the dose or number of times a day I take the medications listed below when I get home from the hospital:  None

## 2017-10-30 NOTE — DISCHARGE NOTE ADULT - PATIENT PORTAL LINK FT
“You can access the [facility] Patient Portal, offered by [facility], by registering with the following website: [email or web address]”

## 2017-10-31 LAB
ANION GAP SERPL CALC-SCNC: 13 MMOL/L — SIGNIFICANT CHANGE UP (ref 5–17)
BUN SERPL-MCNC: 28 MG/DL — HIGH (ref 8–20)
CALCIUM SERPL-MCNC: 8.8 MG/DL — SIGNIFICANT CHANGE UP (ref 8.6–10.2)
CHLORIDE SERPL-SCNC: 99 MMOL/L — SIGNIFICANT CHANGE UP (ref 98–107)
CO2 SERPL-SCNC: 23 MMOL/L — SIGNIFICANT CHANGE UP (ref 22–29)
CREAT SERPL-MCNC: 1.44 MG/DL — HIGH (ref 0.5–1.3)
GLUCOSE BLDC GLUCOMTR-MCNC: 153 MG/DL — HIGH (ref 70–99)
GLUCOSE BLDC GLUCOMTR-MCNC: 154 MG/DL — HIGH (ref 70–99)
GLUCOSE BLDC GLUCOMTR-MCNC: 210 MG/DL — HIGH (ref 70–99)
GLUCOSE BLDC GLUCOMTR-MCNC: 216 MG/DL — HIGH (ref 70–99)
GLUCOSE SERPL-MCNC: 192 MG/DL — HIGH (ref 70–115)
INR BLD: 1.62 RATIO — HIGH (ref 0.88–1.16)
POTASSIUM SERPL-MCNC: 4.4 MMOL/L — SIGNIFICANT CHANGE UP (ref 3.5–5.3)
POTASSIUM SERPL-SCNC: 4.4 MMOL/L — SIGNIFICANT CHANGE UP (ref 3.5–5.3)
PROTHROM AB SERPL-ACNC: 18 SEC — HIGH (ref 9.8–12.7)
SODIUM SERPL-SCNC: 135 MMOL/L — SIGNIFICANT CHANGE UP (ref 135–145)

## 2017-10-31 PROCEDURE — 99232 SBSQ HOSP IP/OBS MODERATE 35: CPT

## 2017-10-31 RX ORDER — FUROSEMIDE 40 MG
20 TABLET ORAL ONCE
Qty: 0 | Refills: 0 | Status: COMPLETED | OUTPATIENT
Start: 2017-10-31 | End: 2017-10-31

## 2017-10-31 RX ORDER — AZITHROMYCIN 500 MG/1
250 TABLET, FILM COATED ORAL DAILY
Qty: 0 | Refills: 0 | Status: DISCONTINUED | OUTPATIENT
Start: 2017-10-31 | End: 2017-10-31

## 2017-10-31 RX ORDER — WARFARIN SODIUM 2.5 MG/1
4 TABLET ORAL ONCE
Qty: 0 | Refills: 0 | Status: COMPLETED | OUTPATIENT
Start: 2017-10-31 | End: 2017-10-31

## 2017-10-31 RX ADMIN — LISINOPRIL 20 MILLIGRAM(S): 2.5 TABLET ORAL at 06:25

## 2017-10-31 RX ADMIN — Medication 18 UNIT(S): at 17:21

## 2017-10-31 RX ADMIN — ALBUTEROL 2.5 MILLIGRAM(S): 90 AEROSOL, METERED ORAL at 21:30

## 2017-10-31 RX ADMIN — ALBUTEROL 2.5 MILLIGRAM(S): 90 AEROSOL, METERED ORAL at 08:23

## 2017-10-31 RX ADMIN — Medication 20 MILLIGRAM(S): at 15:26

## 2017-10-31 RX ADMIN — CARVEDILOL PHOSPHATE 6.25 MILLIGRAM(S): 80 CAPSULE, EXTENDED RELEASE ORAL at 17:21

## 2017-10-31 RX ADMIN — ATORVASTATIN CALCIUM 40 MILLIGRAM(S): 80 TABLET, FILM COATED ORAL at 22:20

## 2017-10-31 RX ADMIN — ALBUTEROL 2.5 MILLIGRAM(S): 90 AEROSOL, METERED ORAL at 02:36

## 2017-10-31 RX ADMIN — Medication 300 MILLIGRAM(S): at 12:36

## 2017-10-31 RX ADMIN — Medication 250 MILLIGRAM(S): at 17:21

## 2017-10-31 RX ADMIN — ALBUTEROL 2.5 MILLIGRAM(S): 90 AEROSOL, METERED ORAL at 15:19

## 2017-10-31 RX ADMIN — INSULIN GLARGINE 55 UNIT(S): 100 INJECTION, SOLUTION SUBCUTANEOUS at 22:21

## 2017-10-31 RX ADMIN — CARVEDILOL PHOSPHATE 6.25 MILLIGRAM(S): 80 CAPSULE, EXTENDED RELEASE ORAL at 06:25

## 2017-10-31 RX ADMIN — Medication 500 MILLIGRAM(S): at 17:21

## 2017-10-31 RX ADMIN — Medication 18 UNIT(S): at 12:35

## 2017-10-31 RX ADMIN — WARFARIN SODIUM 4 MILLIGRAM(S): 2.5 TABLET ORAL at 22:20

## 2017-10-31 RX ADMIN — Medication 80 MILLIGRAM(S): at 06:25

## 2017-10-31 RX ADMIN — Medication 500 MILLIGRAM(S): at 06:24

## 2017-10-31 RX ADMIN — Medication 1: at 08:38

## 2017-10-31 RX ADMIN — Medication 1: at 12:36

## 2017-10-31 RX ADMIN — Medication 250 MILLIGRAM(S): at 06:25

## 2017-10-31 RX ADMIN — Medication 18 UNIT(S): at 08:38

## 2017-10-31 RX ADMIN — Medication 1: at 17:22

## 2017-10-31 NOTE — PROGRESS NOTE ADULT - ASSESSMENT
66 y M hx obesity, CHRISS on CPAP, afib on coumadin, NICM w EF 20-25% s/p AICD, CKD, DM2, HTN, Chronic lymphedema, morbid obesity, sedentary lifestyle, presented with severe chills, left leg tenderness, erythema, swelling, warmth and has been admitted with LLE cellulitis. Started on IV abx, initially on vanco but developed flushing & itching with and later switched to rociphen and clindamycin. completed clindamycin. CHnaged to PO keflex to complete total of 10 day course of antibiotics. Complaints of productive cough; chest xray was normal no leukocytosis. ALbuterol nebulizer ordered.     Assessment/Plan:    1. Sepsis 2/2 LLE cellulitis likely from toe nail bleed in the setting of chronic lymphedema- resolved.  Completed clindamycin. Rocephin changed to po kelfex to complete 10 day course  Vascular surgery called for unna boots prior to discharge     2. Diarrhea: likely secondary to antibiotics, probiotic ordered, c diff negative.     3. Afib- INR sub therapeutic, 4mg of po coumadin tonight.  c/w coreg, coumadin, daily INR, lopressor IVP prn    4. CHRISS- CPAP at night    5. Chronic systolic CHF-, c/w coreg, restarted on ACE-I and Lasix ; Cardiology note appreciated.    6. CKD 3- baseline creatinine about 1.5/ D/c iv fluids    7. Hyponatremia: resolved    8. DM- A1c 10.5, c/w lantus 55 units, lispro 18 units pre-meal and LSS, f/u FS    9. HTN-c/w home meds    10. morbid obesity- diet & weight loss    11. Cough: LIkely secondary to atelactatsis; encourage ambulation, ISS, Chest xray normal. Albuterol as needed; Respiratory viral panel ordered.     DVT ppx on coumadin.

## 2017-10-31 NOTE — PROGRESS NOTE ADULT - SUBJECTIVE AND OBJECTIVE BOX
CC: Cough    INTERVAL HPI/OVERNIGHT EVENTS:  Patient seen and examined, complaints of productive cough and wheezing all night. Afebrile.     Vital Signs Last 24 Hrs  T(C): 36.7 (30 Oct 2017 23:32), Max: 36.7 (30 Oct 2017 23:32)  T(F): 98.1 (30 Oct 2017 23:32), Max: 98.1 (30 Oct 2017 23:32)  HR: 93 (30 Oct 2017 23:32) (90 - 93)  BP: 119/69 (30 Oct 2017 23:32) (119/69 - 136/77)  BP(mean): --  RR: 19 (30 Oct 2017 23:32) (18 - 19)  SpO2: 96% (30 Oct 2017 23:32) (96% - 100%)    PHYSICAL EXAM:    GENERAL: NAD, well-groomed, well-developed  HEAD:  Atraumatic, Normocephalic  EYES: EOMI, PERRLA, conjunctiva and sclera clear  ENMT: Moist mucous membranes  NECK: Supple, No JVD  CHEST/LUNG: Bilateral expiratory wheezing   HEART: Regular rate and rhythm; No murmurs, rubs, or gallops  ABDOMEN: Soft, Nontender, Nondistended; Bowel sounds present  EXTREMITIES:  2+ Peripheral Pulses, No clubbing, cyanosis, or edema        MEDICATIONS  (STANDING):  ALBUTerol    0.083% 2.5 milliGRAM(s) Nebulizer every 6 hours  ALBUTerol    90 MICROgram(s) HFA Inhaler 1 Puff(s) Inhalation every 4 hours  allopurinol 300 milliGRAM(s) Oral daily  atorvastatin 40 milliGRAM(s) Oral at bedtime  carvedilol 6.25 milliGRAM(s) Oral every 12 hours  cephalexin 500 milliGRAM(s) Oral every 12 hours  dextrose 5%. 1000 milliLiter(s) (50 mL/Hr) IV Continuous <Continuous>  dextrose 50% Injectable 12.5 Gram(s) IV Push once  dextrose 50% Injectable 25 Gram(s) IV Push once  dextrose 50% Injectable 25 Gram(s) IV Push once  furosemide    Tablet 80 milliGRAM(s) Oral daily  furosemide   Injectable 20 milliGRAM(s) IV Push once  insulin glargine Injectable (LANTUS) 55 Unit(s) SubCutaneous at bedtime  insulin lispro (HumaLOG) corrective regimen sliding scale   SubCutaneous three times a day before meals  insulin lispro Injectable (HumaLOG) 18 Unit(s) SubCutaneous three times a day before meals  lisinopril 20 milliGRAM(s) Oral daily  saccharomyces boulardii 250 milliGRAM(s) Oral two times a day  warfarin 4 milliGRAM(s) Oral once    MEDICATIONS  (PRN):  dextrose Gel 1 Dose(s) Oral once PRN Blood Glucose LESS THAN 70 milliGRAM(s)/deciliter  diphenhydrAMINE   Capsule 25 milliGRAM(s) Oral every 4 hours PRN Rash and/or Itching  glucagon  Injectable 1 milliGRAM(s) IntraMuscular once PRN Glucose LESS THAN 70 milligrams/deciliter  guaiFENesin    Syrup 100 milliGRAM(s) Oral every 6 hours PRN Cough  metoprolol    tartrate Injectable 5 milliGRAM(s) IV Push every 6 hours PRN for HR > 110; hold for SBP < 100 or HR < 50  oxyCODONE    5 mG/acetaminophen 325 mG 2 Tablet(s) Oral every 4 hours PRN Moderate Pain (4 - 6)      Allergies    No Known Allergies    Intolerances          LABS:      10-31    135  |  99  |  28.0<H>  ----------------------------<  192<H>  4.4   |  23.0  |  1.44<H>    Ca    8.8      31 Oct 2017 06:47      PT/INR - ( 31 Oct 2017 07:22 )   PT: 18.0 sec;   INR: 1.62 ratio               RADIOLOGY & ADDITIONAL TESTS:

## 2017-11-01 VITALS
RESPIRATION RATE: 18 BRPM | DIASTOLIC BLOOD PRESSURE: 71 MMHG | HEART RATE: 96 BPM | WEIGHT: 315 LBS | SYSTOLIC BLOOD PRESSURE: 128 MMHG | TEMPERATURE: 98 F | OXYGEN SATURATION: 96 %

## 2017-11-01 LAB
ANION GAP SERPL CALC-SCNC: 15 MMOL/L — SIGNIFICANT CHANGE UP (ref 5–17)
BUN SERPL-MCNC: 31 MG/DL — HIGH (ref 8–20)
CALCIUM SERPL-MCNC: 9.2 MG/DL — SIGNIFICANT CHANGE UP (ref 8.6–10.2)
CHLORIDE SERPL-SCNC: 97 MMOL/L — LOW (ref 98–107)
CO2 SERPL-SCNC: 24 MMOL/L — SIGNIFICANT CHANGE UP (ref 22–29)
CREAT SERPL-MCNC: 1.46 MG/DL — HIGH (ref 0.5–1.3)
GLUCOSE BLDC GLUCOMTR-MCNC: 171 MG/DL — HIGH (ref 70–99)
GLUCOSE BLDC GLUCOMTR-MCNC: 230 MG/DL — HIGH (ref 70–99)
GLUCOSE SERPL-MCNC: 171 MG/DL — HIGH (ref 70–115)
HCT VFR BLD CALC: 44.9 % — SIGNIFICANT CHANGE UP (ref 42–52)
HGB BLD-MCNC: 14.8 G/DL — SIGNIFICANT CHANGE UP (ref 14–18)
INR BLD: 1.75 RATIO — HIGH (ref 0.88–1.16)
MCHC RBC-ENTMCNC: 29 PG — SIGNIFICANT CHANGE UP (ref 27–31)
MCHC RBC-ENTMCNC: 33 G/DL — SIGNIFICANT CHANGE UP (ref 32–36)
MCV RBC AUTO: 87.9 FL — SIGNIFICANT CHANGE UP (ref 80–94)
PLATELET # BLD AUTO: 140 K/UL — LOW (ref 150–400)
POTASSIUM SERPL-MCNC: 4.1 MMOL/L — SIGNIFICANT CHANGE UP (ref 3.5–5.3)
POTASSIUM SERPL-SCNC: 4.1 MMOL/L — SIGNIFICANT CHANGE UP (ref 3.5–5.3)
PROTHROM AB SERPL-ACNC: 19.5 SEC — HIGH (ref 9.8–12.7)
RBC # BLD: 5.11 M/UL — SIGNIFICANT CHANGE UP (ref 4.6–6.2)
RBC # FLD: 15.7 % — HIGH (ref 11–15.6)
SODIUM SERPL-SCNC: 136 MMOL/L — SIGNIFICANT CHANGE UP (ref 135–145)
WBC # BLD: 5.2 K/UL — SIGNIFICANT CHANGE UP (ref 4.8–10.8)
WBC # FLD AUTO: 5.2 K/UL — SIGNIFICANT CHANGE UP (ref 4.8–10.8)

## 2017-11-01 PROCEDURE — 82962 GLUCOSE BLOOD TEST: CPT

## 2017-11-01 PROCEDURE — 83880 ASSAY OF NATRIURETIC PEPTIDE: CPT

## 2017-11-01 PROCEDURE — 71045 X-RAY EXAM CHEST 1 VIEW: CPT

## 2017-11-01 PROCEDURE — 83036 HEMOGLOBIN GLYCOSYLATED A1C: CPT

## 2017-11-01 PROCEDURE — 93005 ELECTROCARDIOGRAM TRACING: CPT

## 2017-11-01 PROCEDURE — 96374 THER/PROPH/DIAG INJ IV PUSH: CPT

## 2017-11-01 PROCEDURE — 36415 COLL VENOUS BLD VENIPUNCTURE: CPT

## 2017-11-01 PROCEDURE — 94640 AIRWAY INHALATION TREATMENT: CPT

## 2017-11-01 PROCEDURE — 85610 PROTHROMBIN TIME: CPT

## 2017-11-01 PROCEDURE — 85027 COMPLETE CBC AUTOMATED: CPT

## 2017-11-01 PROCEDURE — 83735 ASSAY OF MAGNESIUM: CPT

## 2017-11-01 PROCEDURE — 96375 TX/PRO/DX INJ NEW DRUG ADDON: CPT

## 2017-11-01 PROCEDURE — 80048 BASIC METABOLIC PNL TOTAL CA: CPT

## 2017-11-01 PROCEDURE — 80053 COMPREHEN METABOLIC PANEL: CPT

## 2017-11-01 PROCEDURE — 80202 ASSAY OF VANCOMYCIN: CPT

## 2017-11-01 PROCEDURE — 85730 THROMBOPLASTIN TIME PARTIAL: CPT

## 2017-11-01 PROCEDURE — 87040 BLOOD CULTURE FOR BACTERIA: CPT

## 2017-11-01 PROCEDURE — 81001 URINALYSIS AUTO W/SCOPE: CPT

## 2017-11-01 PROCEDURE — 99285 EMERGENCY DEPT VISIT HI MDM: CPT | Mod: 25

## 2017-11-01 PROCEDURE — 99239 HOSP IP/OBS DSCHRG MGMT >30: CPT

## 2017-11-01 PROCEDURE — 93971 EXTREMITY STUDY: CPT

## 2017-11-01 PROCEDURE — 84100 ASSAY OF PHOSPHORUS: CPT

## 2017-11-01 PROCEDURE — 83605 ASSAY OF LACTIC ACID: CPT

## 2017-11-01 PROCEDURE — 87493 C DIFF AMPLIFIED PROBE: CPT

## 2017-11-01 RX ORDER — ATORVASTATIN CALCIUM 80 MG/1
1 TABLET, FILM COATED ORAL
Qty: 30 | Refills: 0
Start: 2017-11-01 | End: 2017-12-01

## 2017-11-01 RX ORDER — CEPHALEXIN 500 MG
1 CAPSULE ORAL
Qty: 6 | Refills: 0 | OUTPATIENT
Start: 2017-11-01 | End: 2017-11-04

## 2017-11-01 RX ORDER — CARVEDILOL PHOSPHATE 80 MG/1
2 CAPSULE, EXTENDED RELEASE ORAL
Qty: 0 | Refills: 0 | COMMUNITY

## 2017-11-01 RX ORDER — WARFARIN SODIUM 2.5 MG/1
5 TABLET ORAL ONCE
Qty: 0 | Refills: 0 | Status: COMPLETED | OUTPATIENT
Start: 2017-11-01 | End: 2017-11-01

## 2017-11-01 RX ORDER — ALBUTEROL 90 UG/1
1 AEROSOL, METERED ORAL
Qty: 1 | Refills: 0
Start: 2017-11-01

## 2017-11-01 RX ORDER — CARVEDILOL PHOSPHATE 80 MG/1
1 CAPSULE, EXTENDED RELEASE ORAL
Qty: 0 | Refills: 0 | COMMUNITY
Start: 2017-11-01

## 2017-11-01 RX ORDER — BUDESONIDE AND FORMOTEROL FUMARATE DIHYDRATE 160; 4.5 UG/1; UG/1
2 AEROSOL RESPIRATORY (INHALATION)
Qty: 1 | Refills: 0
Start: 2017-11-01 | End: 2017-12-01

## 2017-11-01 RX ADMIN — Medication 250 MILLIGRAM(S): at 06:56

## 2017-11-01 RX ADMIN — ALBUTEROL 2.5 MILLIGRAM(S): 90 AEROSOL, METERED ORAL at 08:30

## 2017-11-01 RX ADMIN — CARVEDILOL PHOSPHATE 6.25 MILLIGRAM(S): 80 CAPSULE, EXTENDED RELEASE ORAL at 06:56

## 2017-11-01 RX ADMIN — Medication 500 MILLIGRAM(S): at 06:56

## 2017-11-01 RX ADMIN — WARFARIN SODIUM 5 MILLIGRAM(S): 2.5 TABLET ORAL at 13:09

## 2017-11-01 RX ADMIN — Medication 300 MILLIGRAM(S): at 13:09

## 2017-11-01 RX ADMIN — LISINOPRIL 20 MILLIGRAM(S): 2.5 TABLET ORAL at 06:56

## 2017-11-01 RX ADMIN — Medication 80 MILLIGRAM(S): at 06:56

## 2017-11-01 RX ADMIN — ALBUTEROL 2.5 MILLIGRAM(S): 90 AEROSOL, METERED ORAL at 03:20

## 2017-11-01 RX ADMIN — Medication 18 UNIT(S): at 13:10

## 2017-11-01 RX ADMIN — Medication 2: at 13:09

## 2017-11-01 RX ADMIN — Medication 1: at 08:57

## 2017-11-01 RX ADMIN — Medication 18 UNIT(S): at 08:57

## 2017-11-01 NOTE — PROGRESS NOTE ADULT - ASSESSMENT
66 y M hx obesity, CHRISS on CPAP, afib on coumadin, NICM w EF 20-25% s/p AICD, CKD, DM2, HTN, Chronic lymphedema, morbid obesity, sedentary lifestyle, presented with severe chills, left leg tenderness, erythema, swelling, warmth and has been admitted with LLE cellulitis. Started on IV abx, initially on vanco but developed flushing & itching with and later switched to rociphen and clindamycin. completed clindamycin. CHnaged to PO keflex to complete total of 10 day course of antibiotics. Complaints of productive cough; chest xray was normal no leukocytosis. ALbuterol nebulizer ordered.     Assessment/Plan:    1. Sepsis 2/2 LLE cellulitis likely from toe nail bleed in the setting of chronic lymphedema- resolved.  Completed clindamycin. Rocephin changed to po kelfex to complete 10 day course  Vascular surgery placed unna boots yesterday     2. Diarrhea: likely secondary to antibiotics, probiotic ordered, c diff negative.     3. Afib- INR sub therapeutic, 5mg of po coumadin tonight.  c/w coreg, coumadin.    4. CHRISS- CPAP at night    5. Chronic systolic CHF-, c/w coreg, ACE-I and Lasix ; Cardiology note appreciated.    6. CKD 3- baseline creatinine about 1.5/ D/c iv fluids    7. Hyponatremia: resolved    8. DM- A1c 10.5, c/w lantus 55 units, lispro 18 units pre-meal and LSS, f/u FS    9. HTN-c/w home meds    10. morbid obesity- diet & weight loss    11. Cough: LIkely secondary to atelactatsis; encourage ambulation, ISS, Chest xray normal. Albuterol as needed.    DVT ppx on coumadin.

## 2017-11-01 NOTE — PROGRESS NOTE ADULT - SUBJECTIVE AND OBJECTIVE BOX
CC: LLE cellulitis, cough    HPI:  66 y M hx obesity, CHRISS on CPAP, afib on coumadin, CHF,  NICM w EF 20-25%, s/p AICD, CKD, DM2, Chronic lymphedema, morbid obesity, HTN,  sedentary lifestyle comes is after severe chills, left leg tenderness, erythema, swelling, warmth.    INTERVAL HPI/OVERNIGHT EVENTS: Patient seen and examined sitting up in the chair.  LLE erythema improved.  Cough is slowly improving.  Patient denies any headache, dizziness, fever, chills, SOB, CP, abdominal pain, nausea, vomiting, dysuria.  Other ROS reviewed and are negative.    Vital Signs Last 24 Hrs  T(C): 36.5 (01 Nov 2017 07:59), Max: 36.7 (01 Nov 2017 01:50)  T(F): 97.7 (01 Nov 2017 07:59), Max: 98.1 (01 Nov 2017 01:50)  HR: 84 (01 Nov 2017 07:59) (83 - 91)  BP: 106/66 (01 Nov 2017 07:59) (104/69 - 144/70)  BP(mean): --  RR: 18 (01 Nov 2017 07:59) (18 - 18)  SpO2: 97% (01 Nov 2017 07:59) (97% - 97%)  I&O's Detail    31 Oct 2017 07:01  -  01 Nov 2017 07:00  --------------------------------------------------------  IN:    Oral Fluid: 360 mL  Total IN: 360 mL    OUT:    Voided: 1400 mL  Total OUT: 1400 mL    Total NET: -1040 mL      PHYSICAL EXAM:  GENERAL: NAD, well-groomed, well-developed  NECK: Supple, No JVD, Normal thyroid  NERVOUS SYSTEM:  Alert & Oriented X3, Good concentration; Motor Strength 5/5 B/L upper and lower extremities  CHEST/LUNG: Expiratory wheezing bilaterally; No rales or rubs  HEART: Regular rate and rhythm; No murmurs, rubs, or gallops  ABDOMEN: Soft, Nontender, Obese; Bowel sounds present  EXTREMITIES:  2+ Peripheral Pulses, LLE erythema improved.                                  14.8   5.2   )-----------( 140      ( 01 Nov 2017 06:23 )             44.9     01 Nov 2017 06:23    136    |  97     |  31.0   ----------------------------<  171    4.1     |  24.0   |  1.46     Ca    9.2        01 Nov 2017 06:23      PT/INR - ( 01 Nov 2017 06:23 )   PT: 19.5 sec;   INR: 1.75 ratio           CAPILLARY BLOOD GLUCOSE      POCT Blood Glucose.: 171 mg/dL (01 Nov 2017 08:55)  POCT Blood Glucose.: 153 mg/dL (31 Oct 2017 22:20)  POCT Blood Glucose.: 154 mg/dL (31 Oct 2017 17:19)  POCT Blood Glucose.: 216 mg/dL (31 Oct 2017 12:34)          MEDICATIONS  (STANDING):  ALBUTerol    0.083% 2.5 milliGRAM(s) Nebulizer every 6 hours  ALBUTerol    90 MICROgram(s) HFA Inhaler 1 Puff(s) Inhalation every 4 hours  allopurinol 300 milliGRAM(s) Oral daily  atorvastatin 40 milliGRAM(s) Oral at bedtime  carvedilol 6.25 milliGRAM(s) Oral every 12 hours  cephalexin 500 milliGRAM(s) Oral every 12 hours  dextrose 5%. 1000 milliLiter(s) (50 mL/Hr) IV Continuous <Continuous>  dextrose 50% Injectable 12.5 Gram(s) IV Push once  dextrose 50% Injectable 25 Gram(s) IV Push once  dextrose 50% Injectable 25 Gram(s) IV Push once  furosemide    Tablet 80 milliGRAM(s) Oral daily  insulin glargine Injectable (LANTUS) 55 Unit(s) SubCutaneous at bedtime  insulin lispro (HumaLOG) corrective regimen sliding scale   SubCutaneous three times a day before meals  insulin lispro Injectable (HumaLOG) 18 Unit(s) SubCutaneous three times a day before meals  lisinopril 20 milliGRAM(s) Oral daily  saccharomyces boulardii 250 milliGRAM(s) Oral two times a day    MEDICATIONS  (PRN):  dextrose Gel 1 Dose(s) Oral once PRN Blood Glucose LESS THAN 70 milliGRAM(s)/deciliter  diphenhydrAMINE   Capsule 25 milliGRAM(s) Oral every 4 hours PRN Rash and/or Itching  glucagon  Injectable 1 milliGRAM(s) IntraMuscular once PRN Glucose LESS THAN 70 milligrams/deciliter  guaiFENesin    Syrup 100 milliGRAM(s) Oral every 6 hours PRN Cough  metoprolol    tartrate Injectable 5 milliGRAM(s) IV Push every 6 hours PRN for HR > 110; hold for SBP < 100 or HR < 50  oxyCODONE    5 mG/acetaminophen 325 mG 2 Tablet(s) Oral every 4 hours PRN Moderate Pain (4 - 6)

## 2017-11-01 NOTE — PROGRESS NOTE ADULT - ATTENDING COMMENTS
LIkely discharge home in AM if symptoms improved
discharge home to [facility] with pmd in 1 week
I have seen and examined the patient with PA and agree with the above assessment and plan.
Will sign off. Please call PRN.

## 2017-11-01 NOTE — PROGRESS NOTE ADULT - PROVIDER SPECIALTY LIST ADULT
Cardiology
Hospitalist
Internal Medicine
Infectious Disease
Hospitalist

## 2017-12-08 ENCOUNTER — INPATIENT (INPATIENT)
Facility: HOSPITAL | Age: 67
LOS: 5 days | Discharge: HOME | DRG: 871 | End: 2017-12-14
Attending: INTERNAL MEDICINE | Admitting: INTERNAL MEDICINE
Payer: COMMERCIAL

## 2017-12-08 VITALS
HEART RATE: 113 BPM | RESPIRATION RATE: 22 BRPM | OXYGEN SATURATION: 96 % | HEIGHT: 67 IN | DIASTOLIC BLOOD PRESSURE: 69 MMHG | WEIGHT: 315 LBS | SYSTOLIC BLOOD PRESSURE: 127 MMHG | TEMPERATURE: 98 F

## 2017-12-08 DIAGNOSIS — Z98.89 OTHER SPECIFIED POSTPROCEDURAL STATES: Chronic | ICD-10-CM

## 2017-12-08 DIAGNOSIS — Z95.810 PRESENCE OF AUTOMATIC (IMPLANTABLE) CARDIAC DEFIBRILLATOR: Chronic | ICD-10-CM

## 2017-12-08 DIAGNOSIS — L03.119 CELLULITIS OF UNSPECIFIED PART OF LIMB: ICD-10-CM

## 2017-12-08 LAB
ALBUMIN SERPL ELPH-MCNC: 3.6 G/DL — SIGNIFICANT CHANGE UP (ref 3.3–5.2)
ALP SERPL-CCNC: 58 U/L — SIGNIFICANT CHANGE UP (ref 40–120)
ALT FLD-CCNC: 11 U/L — SIGNIFICANT CHANGE UP
ANION GAP SERPL CALC-SCNC: 16 MMOL/L — SIGNIFICANT CHANGE UP (ref 5–17)
APTT BLD: 46.7 SEC — HIGH (ref 27.5–37.4)
AST SERPL-CCNC: 24 U/L — SIGNIFICANT CHANGE UP
BASOPHILS # BLD AUTO: 0 K/UL — SIGNIFICANT CHANGE UP (ref 0–0.2)
BASOPHILS NFR BLD AUTO: 0.1 % — SIGNIFICANT CHANGE UP (ref 0–2)
BILIRUB SERPL-MCNC: 1 MG/DL — SIGNIFICANT CHANGE UP (ref 0.4–2)
BLD GP AB SCN SERPL QL: SIGNIFICANT CHANGE UP
BUN SERPL-MCNC: 38 MG/DL — HIGH (ref 8–20)
CALCIUM SERPL-MCNC: 9.3 MG/DL — SIGNIFICANT CHANGE UP (ref 8.6–10.2)
CHLORIDE SERPL-SCNC: 93 MMOL/L — LOW (ref 98–107)
CO2 SERPL-SCNC: 23 MMOL/L — SIGNIFICANT CHANGE UP (ref 22–29)
CREAT SERPL-MCNC: 1.71 MG/DL — HIGH (ref 0.5–1.3)
EOSINOPHIL # BLD AUTO: 0 K/UL — SIGNIFICANT CHANGE UP (ref 0–0.5)
EOSINOPHIL NFR BLD AUTO: 0 % — SIGNIFICANT CHANGE UP (ref 0–5)
GLUCOSE BLDC GLUCOMTR-MCNC: 262 MG/DL — HIGH (ref 70–99)
GLUCOSE BLDC GLUCOMTR-MCNC: 279 MG/DL — HIGH (ref 70–99)
GLUCOSE BLDC GLUCOMTR-MCNC: 293 MG/DL — HIGH (ref 70–99)
GLUCOSE SERPL-MCNC: 265 MG/DL — HIGH (ref 70–115)
HCT VFR BLD CALC: 49.7 % — SIGNIFICANT CHANGE UP (ref 42–52)
HGB BLD-MCNC: 16.5 G/DL — SIGNIFICANT CHANGE UP (ref 14–18)
INR BLD: 2.38 RATIO — HIGH (ref 0.88–1.16)
LACTATE BLDV-MCNC: 2 MMOL/L — SIGNIFICANT CHANGE UP (ref 0.5–2)
LACTATE BLDV-MCNC: 3.3 MMOL/L — HIGH (ref 0.5–2)
LYMPHOCYTES # BLD AUTO: 0.8 K/UL — LOW (ref 1–4.8)
LYMPHOCYTES # BLD AUTO: 5 % — LOW (ref 20–55)
MCHC RBC-ENTMCNC: 29 PG — SIGNIFICANT CHANGE UP (ref 27–31)
MCHC RBC-ENTMCNC: 33.2 G/DL — SIGNIFICANT CHANGE UP (ref 32–36)
MCV RBC AUTO: 87.3 FL — SIGNIFICANT CHANGE UP (ref 80–94)
MONOCYTES # BLD AUTO: 1.2 K/UL — HIGH (ref 0–0.8)
MONOCYTES NFR BLD AUTO: 7.7 % — SIGNIFICANT CHANGE UP (ref 3–10)
NEUTROPHILS # BLD AUTO: 13.9 K/UL — HIGH (ref 1.8–8)
NEUTROPHILS NFR BLD AUTO: 86.8 % — HIGH (ref 37–73)
PLATELET # BLD AUTO: 123 K/UL — LOW (ref 150–400)
POTASSIUM SERPL-MCNC: 5.4 MMOL/L — HIGH (ref 3.5–5.3)
POTASSIUM SERPL-SCNC: 5.4 MMOL/L — HIGH (ref 3.5–5.3)
PROT SERPL-MCNC: 7.7 G/DL — SIGNIFICANT CHANGE UP (ref 6.6–8.7)
PROTHROM AB SERPL-ACNC: 26.7 SEC — HIGH (ref 9.8–12.7)
RBC # BLD: 5.69 M/UL — SIGNIFICANT CHANGE UP (ref 4.6–6.2)
RBC # FLD: 16.5 % — HIGH (ref 11–15.6)
SODIUM SERPL-SCNC: 132 MMOL/L — LOW (ref 135–145)
TYPE + AB SCN PNL BLD: SIGNIFICANT CHANGE UP
WBC # BLD: 16 K/UL — HIGH (ref 4.8–10.8)
WBC # FLD AUTO: 16 K/UL — HIGH (ref 4.8–10.8)

## 2017-12-08 PROCEDURE — 93010 ELECTROCARDIOGRAM REPORT: CPT

## 2017-12-08 PROCEDURE — 73620 X-RAY EXAM OF FOOT: CPT | Mod: 26,RT

## 2017-12-08 PROCEDURE — 99285 EMERGENCY DEPT VISIT HI MDM: CPT

## 2017-12-08 PROCEDURE — 73590 X-RAY EXAM OF LOWER LEG: CPT | Mod: 26,RT

## 2017-12-08 PROCEDURE — 71010: CPT | Mod: 26

## 2017-12-08 PROCEDURE — 99223 1ST HOSP IP/OBS HIGH 75: CPT

## 2017-12-08 RX ORDER — CARVEDILOL PHOSPHATE 80 MG/1
6.25 CAPSULE, EXTENDED RELEASE ORAL EVERY 12 HOURS
Qty: 0 | Refills: 0 | Status: DISCONTINUED | OUTPATIENT
Start: 2017-12-08 | End: 2017-12-14

## 2017-12-08 RX ORDER — SODIUM CHLORIDE 9 MG/ML
1000 INJECTION INTRAMUSCULAR; INTRAVENOUS; SUBCUTANEOUS ONCE
Qty: 0 | Refills: 0 | Status: COMPLETED | OUTPATIENT
Start: 2017-12-08 | End: 2017-12-08

## 2017-12-08 RX ORDER — DEXTROSE 50 % IN WATER 50 %
25 SYRINGE (ML) INTRAVENOUS ONCE
Qty: 0 | Refills: 0 | Status: DISCONTINUED | OUTPATIENT
Start: 2017-12-08 | End: 2017-12-14

## 2017-12-08 RX ORDER — LEVOTHYROXINE SODIUM 125 MCG
50 TABLET ORAL DAILY
Qty: 0 | Refills: 0 | Status: DISCONTINUED | OUTPATIENT
Start: 2017-12-08 | End: 2017-12-14

## 2017-12-08 RX ORDER — WARFARIN SODIUM 2.5 MG/1
2 TABLET ORAL ONCE
Qty: 0 | Refills: 0 | Status: COMPLETED | OUTPATIENT
Start: 2017-12-08 | End: 2017-12-08

## 2017-12-08 RX ORDER — DEXTROSE 50 % IN WATER 50 %
12.5 SYRINGE (ML) INTRAVENOUS ONCE
Qty: 0 | Refills: 0 | Status: DISCONTINUED | OUTPATIENT
Start: 2017-12-08 | End: 2017-12-14

## 2017-12-08 RX ORDER — INSULIN LISPRO 100/ML
3 VIAL (ML) SUBCUTANEOUS
Qty: 0 | Refills: 0 | Status: DISCONTINUED | OUTPATIENT
Start: 2017-12-08 | End: 2017-12-12

## 2017-12-08 RX ORDER — ALLOPURINOL 300 MG
300 TABLET ORAL DAILY
Qty: 0 | Refills: 0 | Status: DISCONTINUED | OUTPATIENT
Start: 2017-12-08 | End: 2017-12-14

## 2017-12-08 RX ORDER — ACETAMINOPHEN 500 MG
650 TABLET ORAL EVERY 6 HOURS
Qty: 0 | Refills: 0 | Status: DISCONTINUED | OUTPATIENT
Start: 2017-12-08 | End: 2017-12-14

## 2017-12-08 RX ORDER — SODIUM CHLORIDE 9 MG/ML
1000 INJECTION INTRAMUSCULAR; INTRAVENOUS; SUBCUTANEOUS
Qty: 0 | Refills: 0 | Status: DISCONTINUED | OUTPATIENT
Start: 2017-12-08 | End: 2017-12-09

## 2017-12-08 RX ORDER — BUDESONIDE AND FORMOTEROL FUMARATE DIHYDRATE 160; 4.5 UG/1; UG/1
2 AEROSOL RESPIRATORY (INHALATION)
Qty: 0 | Refills: 0 | Status: DISCONTINUED | OUTPATIENT
Start: 2017-12-08 | End: 2017-12-14

## 2017-12-08 RX ORDER — INSULIN GLARGINE 100 [IU]/ML
40 INJECTION, SOLUTION SUBCUTANEOUS EVERY MORNING
Qty: 0 | Refills: 0 | Status: DISCONTINUED | OUTPATIENT
Start: 2017-12-09 | End: 2017-12-14

## 2017-12-08 RX ORDER — LISINOPRIL 2.5 MG/1
5 TABLET ORAL DAILY
Qty: 0 | Refills: 0 | Status: DISCONTINUED | OUTPATIENT
Start: 2017-12-08 | End: 2017-12-08

## 2017-12-08 RX ORDER — INSULIN LISPRO 100/ML
VIAL (ML) SUBCUTANEOUS
Qty: 0 | Refills: 0 | Status: DISCONTINUED | OUTPATIENT
Start: 2017-12-08 | End: 2017-12-14

## 2017-12-08 RX ORDER — ENOXAPARIN SODIUM 100 MG/ML
150 INJECTION SUBCUTANEOUS DAILY
Qty: 0 | Refills: 0 | Status: DISCONTINUED | OUTPATIENT
Start: 2017-12-08 | End: 2017-12-08

## 2017-12-08 RX ORDER — SODIUM CHLORIDE 9 MG/ML
1000 INJECTION, SOLUTION INTRAVENOUS
Qty: 0 | Refills: 0 | Status: DISCONTINUED | OUTPATIENT
Start: 2017-12-08 | End: 2017-12-14

## 2017-12-08 RX ORDER — GLUCAGON INJECTION, SOLUTION 0.5 MG/.1ML
1 INJECTION, SOLUTION SUBCUTANEOUS ONCE
Qty: 0 | Refills: 0 | Status: DISCONTINUED | OUTPATIENT
Start: 2017-12-08 | End: 2017-12-14

## 2017-12-08 RX ORDER — VANCOMYCIN HCL 1 G
1000 VIAL (EA) INTRAVENOUS ONCE
Qty: 0 | Refills: 0 | Status: COMPLETED | OUTPATIENT
Start: 2017-12-08 | End: 2017-12-08

## 2017-12-08 RX ORDER — INSULIN LISPRO 100/ML
VIAL (ML) SUBCUTANEOUS AT BEDTIME
Qty: 0 | Refills: 0 | Status: DISCONTINUED | OUTPATIENT
Start: 2017-12-08 | End: 2017-12-14

## 2017-12-08 RX ORDER — OXYCODONE HYDROCHLORIDE 5 MG/1
10 TABLET ORAL EVERY 6 HOURS
Qty: 0 | Refills: 0 | Status: DISCONTINUED | OUTPATIENT
Start: 2017-12-08 | End: 2017-12-14

## 2017-12-08 RX ORDER — VANCOMYCIN HCL 1 G
1000 VIAL (EA) INTRAVENOUS EVERY 12 HOURS
Qty: 0 | Refills: 0 | Status: DISCONTINUED | OUTPATIENT
Start: 2017-12-08 | End: 2017-12-08

## 2017-12-08 RX ORDER — PIPERACILLIN AND TAZOBACTAM 4; .5 G/20ML; G/20ML
3.38 INJECTION, POWDER, LYOPHILIZED, FOR SOLUTION INTRAVENOUS ONCE
Qty: 0 | Refills: 0 | Status: COMPLETED | OUTPATIENT
Start: 2017-12-08 | End: 2017-12-09

## 2017-12-08 RX ORDER — ATORVASTATIN CALCIUM 80 MG/1
40 TABLET, FILM COATED ORAL AT BEDTIME
Qty: 0 | Refills: 0 | Status: DISCONTINUED | OUTPATIENT
Start: 2017-12-08 | End: 2017-12-14

## 2017-12-08 RX ORDER — ACETAMINOPHEN 500 MG
975 TABLET ORAL ONCE
Qty: 0 | Refills: 0 | Status: COMPLETED | OUTPATIENT
Start: 2017-12-08 | End: 2017-12-08

## 2017-12-08 RX ORDER — DEXTROSE 50 % IN WATER 50 %
1 SYRINGE (ML) INTRAVENOUS ONCE
Qty: 0 | Refills: 0 | Status: DISCONTINUED | OUTPATIENT
Start: 2017-12-08 | End: 2017-12-14

## 2017-12-08 RX ORDER — SACCHAROMYCES BOULARDII 250 MG
250 POWDER IN PACKET (EA) ORAL
Qty: 0 | Refills: 0 | Status: DISCONTINUED | OUTPATIENT
Start: 2017-12-08 | End: 2017-12-14

## 2017-12-08 RX ORDER — FUROSEMIDE 40 MG
60 TABLET ORAL ONCE
Qty: 0 | Refills: 0 | Status: DISCONTINUED | OUTPATIENT
Start: 2017-12-08 | End: 2017-12-08

## 2017-12-08 RX ADMIN — SODIUM CHLORIDE 100 MILLILITER(S): 9 INJECTION INTRAMUSCULAR; INTRAVENOUS; SUBCUTANEOUS at 17:08

## 2017-12-08 RX ADMIN — Medication 250 MILLIGRAM(S): at 13:25

## 2017-12-08 RX ADMIN — Medication: at 22:02

## 2017-12-08 RX ADMIN — Medication 975 MILLIGRAM(S): at 13:47

## 2017-12-08 RX ADMIN — BUDESONIDE AND FORMOTEROL FUMARATE DIHYDRATE 2 PUFF(S): 160; 4.5 AEROSOL RESPIRATORY (INHALATION) at 21:14

## 2017-12-08 RX ADMIN — Medication: at 19:43

## 2017-12-08 RX ADMIN — Medication 250 MILLIGRAM(S): at 19:31

## 2017-12-08 RX ADMIN — CARVEDILOL PHOSPHATE 6.25 MILLIGRAM(S): 80 CAPSULE, EXTENDED RELEASE ORAL at 19:30

## 2017-12-08 RX ADMIN — SODIUM CHLORIDE 500 MILLILITER(S): 9 INJECTION INTRAMUSCULAR; INTRAVENOUS; SUBCUTANEOUS at 13:30

## 2017-12-08 RX ADMIN — Medication 650 MILLIGRAM(S): at 17:08

## 2017-12-08 RX ADMIN — WARFARIN SODIUM 2 MILLIGRAM(S): 2.5 TABLET ORAL at 22:02

## 2017-12-08 RX ADMIN — ATORVASTATIN CALCIUM 40 MILLIGRAM(S): 80 TABLET, FILM COATED ORAL at 22:02

## 2017-12-08 NOTE — H&P ADULT - ASSESSMENT
> Sepsis due to RLE Cellulitis - pt with prior reaction to Vancomycin, unclear allergy.  Started on Zosyn.  Consult ID.  IVF.  Monitor Lactate.    > JAYLENE / Dehydration - appears to be overall volume depleted.  IVF, monitor Cr.  Baseline Cr ~ 1.4.  > Afib - rate control.  Awaiting INR to dose Coumadin.   > Chronic Systolic CHF - hold diuretics for now.  Monitor I&Os.    > Diabetes - monitor fingersticks.  Insulin coverage for hyperglycemia.  Continue Lantus at lower dose, add premeal insulin  > Hypothyroidism - continue Synthroid.  > Hyperlipidemia - diet modification.  Continue Statin.  > HCRISS - continue home CPAP.

## 2017-12-08 NOTE — H&P ADULT - NSHPLABSRESULTS_GEN_ALL_CORE
VITALS:  Vital Signs Last 24 Hrs  T(C): 38.3 (08 Dec 2017 13:09), Max: 38.3 (08 Dec 2017 13:09)  T(F): 101 (08 Dec 2017 13:09), Max: 101 (08 Dec 2017 13:09)  HR: 113 (08 Dec 2017 12:53) (113 - 113)  BP: 127/69 (08 Dec 2017 12:53) (127/69 - 127/69)  BP(mean): --  RR: 22 (08 Dec 2017 12:53) (22 - 22)  SpO2: 96% (08 Dec 2017 12:53) (96% - 96%) Daily Height in cm: 170.18 (08 Dec 2017 12:53)    Daily   CAPILLARY BLOOD GLUCOSE        I&O's Summary      LABS:                        16.5   16.0  )-----------( 123      ( 08 Dec 2017 13:44 )             49.7     12-08    132<L>  |  93<L>  |  38.0<H>  ----------------------------<  265<H>  5.4<H>   |  23.0  |  1.71<H>    Ca    9.3      08 Dec 2017 13:44    TPro  7.7  /  Alb  3.6  /  TBili  1.0  /  DBili  x   /  AST  24  /  ALT  11  /  AlkPhos  58  12-08      LIVER FUNCTIONS - ( 08 Dec 2017 13:44 )  Alb: 3.6 g/dL / Pro: 7.7 g/dL / ALK PHOS: 58 U/L / ALT: 11 U/L / AST: 24 U/L / GGT: x

## 2017-12-08 NOTE — H&P ADULT - HISTORY OF PRESENT ILLNESS
67y Male PMH obesity, CHRISS on CPAP, afib on coumadin, NICM w EF 20-25% s/p AICD, CKD, DM2, HTN, Chronic lymphedema, morbid obesity, sedentary lifestyle, prostate cancer in remission, recent hospitalization 1 month ago for LLE cellulitis, presents c/o recurrent shaking chills x 1 day, with RLE erythema.  Reports an almost identical presentation last month.  Denies subjective fevers.  Reports compliance with diet restriction and medication therapy.  States he feels "much better" after IVF in ED.  No additional complaints. Noted to have itching and flushing after vancomycin on prior hospitalization.      FAMILY HISTORY: reviewed and negative.  SOCIAL HISTORY: - alcohol, - IVDA, + smoking quit 1990s.   REVIEW OF SYSTEMS: General: - fatigue, - weight loss, - fevers, + chills.  HEENT: - headache, - hearing disturbances.  EYES: - visual disturbances, - diplopia.  CARDIOVASCULAR: - chest pain, - palpitations.  PULMONARY: - SOB, - cough, - hemoptysis.  GI: - abdominal pain, - nausea, - vomiting, - diarrhea, - constipation.  : - urinary urgency, - frequency, - dysuria.  MUSCULOSKELETAL: - arthralgias, - myalgias.  NEURO:  - weakness, - numbness.  PSYCH: - depression, - anxiety, - suicidal thoughts. SKIN: - rashes, - lesions.  Allergies    No Known Allergies    Intolerances

## 2017-12-08 NOTE — ED ADULT NURSE NOTE - PMH
CHF (congestive heart failure)    DM (diabetes mellitus)    High cholesterol    HTN (hypertension)    Prostate CA    Pulmonary hypertension    Renal insufficiency    Sleep apnea

## 2017-12-08 NOTE — ED PROVIDER NOTE - OBJECTIVE STATEMENT
68 yo male with PMH of CHF, EF less than 20%, s/p [employer] AICD and pacemaker, HTN, HLD, DM II on inulin, CKD, remote history of prastate cancer in remission s/p radioactive seeds, presents to the ED brought in by family for chills, RLE ulcers and ertythema, and malaise. Code sepsis activated. Patient sent in by his PMD's office today to the ED for AMS/confusion, chills, RLE ulcers, concern for sepsis/cellulitis. Patient is A&O x3 but confused compared to his baseline per his family. Short term memory impaired today. Patient and family note the symptoms began last night. Afebrile in PMD's office but febrile here in the ED.     Cardiologist: Dr. Panchito Jain  PMD: Dr. Ruma Walden

## 2017-12-08 NOTE — ED PROVIDER NOTE - ATTENDING CONTRIBUTION TO CARE
68 yo obese male pmh chf, cellulitis comes to ed with fever, swelling and redness of rt lower leg; pt seen by pmd  and sent to ed for evaluation of sepsis pe obese male in mild distress; hennt ncat ,neck supple catarino irreg tachy abd obese ext b/l edema; rt lower  proximal redness, tenderness;  tx for cellulitis  vs sepsis; admit iv antibiotics

## 2017-12-08 NOTE — H&P ADULT - NSHPPHYSICALEXAM_GEN_ALL_CORE
PHYSICAL EXAMINATION:  GENERAL: NAD, obese male, Alert and Oriented x 3   HEENT:  Normocephalic and atraumatic.  Extraocular muscles are intact.  NECK: Supple.  - JVD.  CARDIOVASCULAR: irregular S1, S2.  LUNGS: CTAB, - rales, - wheezing, - rhonchi.  BACK: - CVA tenderness.  ABDOMEN: Soft, - tenderness, - distension, + BS.  EXTREMITIES: - cyanosis, - clubbing, minimal edema b/l LEs, anterior R lower leg ~ 2cm ulcer with erythema spreading to proximal knee.  NEUROLOGIC: strength is symmetric, sensation intact, speech fluent.  PSYCHIATRIC: Calm.  - agitation.    SKIN: - rashes, - lesions.

## 2017-12-09 DIAGNOSIS — N18.3 CHRONIC KIDNEY DISEASE, STAGE 3 (MODERATE): ICD-10-CM

## 2017-12-09 DIAGNOSIS — E11.65 TYPE 2 DIABETES MELLITUS WITH HYPERGLYCEMIA: ICD-10-CM

## 2017-12-09 DIAGNOSIS — E87.1 HYPO-OSMOLALITY AND HYPONATREMIA: ICD-10-CM

## 2017-12-09 DIAGNOSIS — I50.23 ACUTE ON CHRONIC SYSTOLIC (CONGESTIVE) HEART FAILURE: ICD-10-CM

## 2017-12-09 LAB
ANION GAP SERPL CALC-SCNC: 15 MMOL/L — SIGNIFICANT CHANGE UP (ref 5–17)
APTT BLD: 35.5 SEC — SIGNIFICANT CHANGE UP (ref 27.5–37.4)
BUN SERPL-MCNC: 35 MG/DL — HIGH (ref 8–20)
CALCIUM SERPL-MCNC: 8.4 MG/DL — LOW (ref 8.6–10.2)
CHLORIDE SERPL-SCNC: 92 MMOL/L — LOW (ref 98–107)
CO2 SERPL-SCNC: 21 MMOL/L — LOW (ref 22–29)
CREAT ?TM UR-MCNC: 151 MG/DL — SIGNIFICANT CHANGE UP
CREAT SERPL-MCNC: 1.79 MG/DL — HIGH (ref 0.5–1.3)
GLUCOSE BLDC GLUCOMTR-MCNC: 237 MG/DL — HIGH (ref 70–99)
GLUCOSE BLDC GLUCOMTR-MCNC: 244 MG/DL — HIGH (ref 70–99)
GLUCOSE BLDC GLUCOMTR-MCNC: 246 MG/DL — HIGH (ref 70–99)
GLUCOSE BLDC GLUCOMTR-MCNC: 265 MG/DL — HIGH (ref 70–99)
GLUCOSE SERPL-MCNC: 266 MG/DL — HIGH (ref 70–115)
HCT VFR BLD CALC: 43.5 % — SIGNIFICANT CHANGE UP (ref 42–52)
HGB BLD-MCNC: 14.3 G/DL — SIGNIFICANT CHANGE UP (ref 14–18)
INR BLD: 2.15 RATIO — HIGH (ref 0.88–1.16)
MCHC RBC-ENTMCNC: 28.7 PG — SIGNIFICANT CHANGE UP (ref 27–31)
MCHC RBC-ENTMCNC: 32.9 G/DL — SIGNIFICANT CHANGE UP (ref 32–36)
MCV RBC AUTO: 87.3 FL — SIGNIFICANT CHANGE UP (ref 80–94)
NT-PROBNP SERPL-SCNC: 1800 PG/ML — HIGH (ref 0–300)
OSMOLALITY UR: 668 MOSM/KG — SIGNIFICANT CHANGE UP (ref 300–1000)
PLATELET # BLD AUTO: 101 K/UL — LOW (ref 150–400)
POTASSIUM SERPL-MCNC: 4 MMOL/L — SIGNIFICANT CHANGE UP (ref 3.5–5.3)
POTASSIUM SERPL-SCNC: 4 MMOL/L — SIGNIFICANT CHANGE UP (ref 3.5–5.3)
PROT ?TM UR-MCNC: 19 MG/DL — HIGH (ref 0–12)
PROT/CREAT UR-RTO: 0.1 RATIO — SIGNIFICANT CHANGE UP
PROTHROM AB SERPL-ACNC: 24 SEC — HIGH (ref 9.8–12.7)
RBC # BLD: 4.98 M/UL — SIGNIFICANT CHANGE UP (ref 4.6–6.2)
RBC # FLD: 16.9 % — HIGH (ref 11–15.6)
SODIUM SERPL-SCNC: 128 MMOL/L — LOW (ref 135–145)
SODIUM UR-SCNC: <30 MMOL/L — SIGNIFICANT CHANGE UP
WBC # BLD: 10.5 K/UL — SIGNIFICANT CHANGE UP (ref 4.8–10.8)
WBC # FLD AUTO: 10.5 K/UL — SIGNIFICANT CHANGE UP (ref 4.8–10.8)

## 2017-12-09 PROCEDURE — 99233 SBSQ HOSP IP/OBS HIGH 50: CPT

## 2017-12-09 PROCEDURE — 99223 1ST HOSP IP/OBS HIGH 75: CPT

## 2017-12-09 RX ORDER — WARFARIN SODIUM 2.5 MG/1
2.5 TABLET ORAL ONCE
Qty: 0 | Refills: 0 | Status: COMPLETED | OUTPATIENT
Start: 2017-12-09 | End: 2017-12-09

## 2017-12-09 RX ORDER — VANCOMYCIN HCL 1 G
1000 VIAL (EA) INTRAVENOUS EVERY 12 HOURS
Qty: 0 | Refills: 0 | Status: DISCONTINUED | OUTPATIENT
Start: 2017-12-09 | End: 2017-12-10

## 2017-12-09 RX ORDER — ALBUMIN HUMAN 25 %
50 VIAL (ML) INTRAVENOUS EVERY 8 HOURS
Qty: 0 | Refills: 0 | Status: COMPLETED | OUTPATIENT
Start: 2017-12-09 | End: 2017-12-10

## 2017-12-09 RX ORDER — FUROSEMIDE 40 MG
10 TABLET ORAL
Qty: 500 | Refills: 0 | Status: DISCONTINUED | OUTPATIENT
Start: 2017-12-09 | End: 2017-12-11

## 2017-12-09 RX ORDER — FUROSEMIDE 40 MG
20 TABLET ORAL DAILY
Qty: 0 | Refills: 0 | Status: DISCONTINUED | OUTPATIENT
Start: 2017-12-09 | End: 2017-12-09

## 2017-12-09 RX ADMIN — SODIUM CHLORIDE 100 MILLILITER(S): 9 INJECTION INTRAMUSCULAR; INTRAVENOUS; SUBCUTANEOUS at 05:15

## 2017-12-09 RX ADMIN — Medication 50 MILLILITER(S): at 21:39

## 2017-12-09 RX ADMIN — WARFARIN SODIUM 2.5 MILLIGRAM(S): 2.5 TABLET ORAL at 21:39

## 2017-12-09 RX ADMIN — Medication 4: at 17:41

## 2017-12-09 RX ADMIN — Medication 20 MILLIGRAM(S): at 12:37

## 2017-12-09 RX ADMIN — OXYCODONE HYDROCHLORIDE 10 MILLIGRAM(S): 5 TABLET ORAL at 15:37

## 2017-12-09 RX ADMIN — ATORVASTATIN CALCIUM 40 MILLIGRAM(S): 80 TABLET, FILM COATED ORAL at 21:47

## 2017-12-09 RX ADMIN — Medication 250 MILLIGRAM(S): at 17:41

## 2017-12-09 RX ADMIN — BUDESONIDE AND FORMOTEROL FUMARATE DIHYDRATE 2 PUFF(S): 160; 4.5 AEROSOL RESPIRATORY (INHALATION) at 20:22

## 2017-12-09 RX ADMIN — BUDESONIDE AND FORMOTEROL FUMARATE DIHYDRATE 2 PUFF(S): 160; 4.5 AEROSOL RESPIRATORY (INHALATION) at 08:56

## 2017-12-09 RX ADMIN — CARVEDILOL PHOSPHATE 6.25 MILLIGRAM(S): 80 CAPSULE, EXTENDED RELEASE ORAL at 06:06

## 2017-12-09 RX ADMIN — CARVEDILOL PHOSPHATE 6.25 MILLIGRAM(S): 80 CAPSULE, EXTENDED RELEASE ORAL at 17:41

## 2017-12-09 RX ADMIN — OXYCODONE HYDROCHLORIDE 10 MILLIGRAM(S): 5 TABLET ORAL at 16:00

## 2017-12-09 RX ADMIN — Medication 3 UNIT(S): at 17:41

## 2017-12-09 RX ADMIN — Medication 3 UNIT(S): at 11:47

## 2017-12-09 RX ADMIN — Medication 250 MILLIGRAM(S): at 06:06

## 2017-12-09 RX ADMIN — PIPERACILLIN AND TAZOBACTAM 200 GRAM(S): 4; .5 INJECTION, POWDER, LYOPHILIZED, FOR SOLUTION INTRAVENOUS at 08:55

## 2017-12-09 RX ADMIN — INSULIN GLARGINE 40 UNIT(S): 100 INJECTION, SOLUTION SUBCUTANEOUS at 08:55

## 2017-12-09 RX ADMIN — Medication 650 MILLIGRAM(S): at 03:05

## 2017-12-09 RX ADMIN — Medication 3 UNIT(S): at 08:55

## 2017-12-09 RX ADMIN — Medication 5 MG/HR: at 17:48

## 2017-12-09 RX ADMIN — Medication 300 MILLIGRAM(S): at 11:46

## 2017-12-09 RX ADMIN — SODIUM CHLORIDE 100 MILLILITER(S): 9 INJECTION INTRAMUSCULAR; INTRAVENOUS; SUBCUTANEOUS at 04:44

## 2017-12-09 RX ADMIN — Medication 50 MICROGRAM(S): at 06:07

## 2017-12-09 RX ADMIN — Medication 6: at 11:47

## 2017-12-09 RX ADMIN — Medication 4: at 08:56

## 2017-12-09 NOTE — ED ADULT NURSE REASSESSMENT NOTE - NS ED NURSE REASSESS COMMENT FT1
patient alert skin warm and dry color good iv infusing well on monitor awaiting bed placement will continue to monitor report given to days
primary RN stated that pt was downgraded to any bed and monitor bed was discontinue as per MD.
pt was placed on a portable monitor as per primary RN.
Pt and family concerned that patient did not take his medications today including lasix. As per md home we will be holding lasix for x1 day and resume tomorrow 12/9
md walters at bedside admitting patient and updating family on plan of care.
Pt presenting with rigors and states he thinks he has a fever.  Axillary temp 101.4, PRN tylenol given.
Pt resting comfortably with family at bedside. Pt tolerating IV fluids, denies any chest pain or increase in sob. Pt oral temp 98.8, hr 90's afib on tele monitor. Pt pending bed on tele floor. Family and patient updated on plan of care.

## 2017-12-09 NOTE — CONSULT NOTE ADULT - SUBJECTIVE AND OBJECTIVE BOX
[facility] DIVISION OF KIDNEY DISEASES AND HYPERTENSION -- INITIAL CONSULT NOTE ;  --------------------------------------------------------------------------------  HPI:  67y W Male PMH obesity, CHRISS on CPAP, afib on coumadin, NICM w.  EF 20-25% s/p AICD, CKD - 3 , DM2, HTN, Chronic lymphedema, morbid obesity, sedentary lifestyle, prostate cancer in remission, recent hospitalization 1 month ago for LLE cellulitis, presents c/o recurrent shaking chills x 1 day, with RLE erythema.  Reports an almost identical presentation last month.  Denies subjective fevers.  Reports compliance with diet restriction and medication therapy.  States he feels "much better" after IVF in ED.  No additional complaints. Noted to have itching and flushing after vancomycin on prior hospitalization.    Weighs 382 lb.,    PAST HISTORY  --------------------------------------------------------------------------------  PAST MEDICAL & SURGICAL HISTORY:  CHF (congestive heart failure)  Pulmonary hypertension  Prostate CA  Sleep apnea  Renal insufficiency  High cholesterol  HTN (hypertension)  DM (diabetes mellitus)    AICD (automatic cardioverter/defibrillator) present  S/P ankle ligament repair    FAMILY HISTORY:  Family history of diabetes mellitus (Sibling)  Family history of cancer (Sibling)    PAST SOCIAL HISTORY:    ALLERGIES & MEDICATIONS  --------------------------------------------------------------------------------  Allergies    No Known Allergies    Standing Inpatient Medications  albumin human 25% IVPB 50 milliLiter(s) IV Intermittent every 8 hours  allopurinol 300 milliGRAM(s) Oral daily  atorvastatin 40 milliGRAM(s) Oral at bedtime  buDESOnide  80 MICROgram(s)/formoterol 4.5 MICROgram(s) Inhaler 2 Puff(s) Inhalation two times a day  carvedilol 6.25 milliGRAM(s) Oral every 12 hours  dextrose 5%. 1000 milliLiter(s) IV Continuous <Continuous>  dextrose 50% Injectable 12.5 Gram(s) IV Push once  dextrose 50% Injectable 25 Gram(s) IV Push once  dextrose 50% Injectable 25 Gram(s) IV Push once  furosemide Infusion 10 mG/Hr IV Continuous <Continuous>  insulin glargine Injectable (LANTUS) 40 Unit(s) SubCutaneous every morning  insulin lispro (HumaLOG) corrective regimen sliding scale   SubCutaneous three times a day before meals  insulin lispro (HumaLOG) corrective regimen sliding scale   SubCutaneous at bedtime  insulin lispro Injectable (HumaLOG) 3 Unit(s) SubCutaneous before breakfast  insulin lispro Injectable (HumaLOG) 3 Unit(s) SubCutaneous before lunch  insulin lispro Injectable (HumaLOG) 3 Unit(s) SubCutaneous before dinner  levothyroxine 50 MICROGram(s) Oral daily  saccharomyces boulardii 250 milliGRAM(s) Oral two times a day  vancomycin  IVPB 1000 milliGRAM(s) IV Intermittent every 12 hours  warfarin 2.5 milliGRAM(s) Oral once    PRN Inpatient Medications  acetaminophen   Tablet 650 milliGRAM(s) Oral every 6 hours PRN  dextrose Gel 1 Dose(s) Oral once PRN  glucagon  Injectable 1 milliGRAM(s) IntraMuscular once PRN  oxyCODONE    IR 10 milliGRAM(s) Oral every 6 hours PRN    REVIEW OF SYSTEMS  --------------------------------------------------------------------------------  Gen: + weight changes, fatigue, fevers/chills, weakness  Skin: No rashes  Head/Eyes/Ears/Mouth: No headache; Normal hearing; Normal vision w/o blurriness;   Respiratory: + dyspnea,  No cough, wheezing, hemoptysis  CV: No chest pain, PND, orthopnea  GI: No abdominal pain, diarrhea, constipation, nausea, vomiting, melena, hematochezia  : No increased frequency, dysuria, hematuria, nocturia  MSK: No joint pain/swelling; no back pain;  + edema  Neuro: No dizziness/lightheadedness, weakness, seizures, numbness, tingling  Heme: No easy bruising or bleeding  Endo: No heat/cold intolerance  Psych: No significant nervousness, anxiety, stress, depression    All other systems were reviewed and are negative, except as noted.    VITALS/PHYSICAL EXAM  --------------------------------------------------------------------------------  T(C): 36.6 (12-09-17 @ 11:07), Max: 38.5 (12-08-17 @ 17:07)  HR: 54 (12-09-17 @ 11:07) (54 - 103)  BP: 124/78 (12-09-17 @ 11:07) (103/61 - 124/78)  RR: 24 (12-09-17 @ 03:02) (23 - 24)  SpO2: 98% (12-09-17 @ 11:07) (94% - 99%)  Wt(kg): --  Height (cm): 170.18 (12-08-17 @ 12:53)  Weight (kg): 172.4 (12-08-17 @ 12:53)  BMI (kg/m2): 59.5 (12-08-17 @ 12:53)  BSA (m2): 2.66 (12-08-17 @ 12:53)      12-08-17 @ 07:01  -  12-09-17 @ 07:00  --------------------------------------------------------  IN: 400 mL / OUT: 0 mL / NET: 400 mL      Physical Exam:  	Gen: NAD, well-appearing, Morbidly obese,  	HEENT: PERRL, supple neck,   	Pulm: Distant BS,  	CV: RRR, S1S2; no rub  	Back: No spinal or CVA tenderness; no sacral edema  	Abd: +BS, soft, nontender/nondistended  	: No suprapubic tenderness  	UE: Warm, FROM, no clubbing, intact strength; no edema; no asterixis  	LE: Warm, FROM, no clubbing, intact strength; no edema  	Neuro: No focal deficits,   	Psych: Normal affect and mood  	Skin: Warm, without rashes  	Vascular access: NA,    LABS/STUDIES  --------------------------------------------------------------------------------              14.3   10.5  >-----------<  101      [12-09-17 @ 07:19]              43.5     128  |  92  |  35.0  ----------------------------<  266      [12-09-17 @ 07:19]  4.0   |  21.0  |  1.79        Ca     8.4     [12-09-17 @ 07:19]    TPro  7.7  /  Alb  3.6  /  TBili  1.0  /  DBili  x   /  AST  24  /  ALT  11  /  AlkPhos  58  [12-08-17 @ 13:44]    PT/INR: PT 24.0 , INR 2.15       [12-09-17 @ 07:19]  PTT: 35.5       [12-09-17 @ 07:19]    Creatinine Trend:  SCr 1.79 [12-09 @ 07:19]  SCr 1.71 [12-08 @ 13:44]    Urinalysis - [10-28-17 @ 03:09]      Color Yellow / Appearance Clear / SG 1.020 / pH 5.0      Gluc 50 / Ketone Negative  / Bili Negative / Urobili Negative       Blood Small / Protein Negative / Leuk Est Negative / Nitrite Negative      RBC 0-2 / WBC Negative / Hyaline  / Gran  / Sq Epi  / Non Sq Epi Occasional / Bacteria     HbA1c 10.5      [10-26-17 @ 06:57]    Xray Chest 1 View AP/PA. (12.08.17 @ 14:38)     EXAM:  CHEST SINGLE VIEW FRONTAL                          PROCEDURE DATE:  12/08/2017      INTERPRETATION:  TECHNIQUE: Single portable view of the chest.    COMPARISON: 10/30/2017    CLINICAL HISTORY: COPD, code sepsis.     FINDINGS:    Single frontal view of the chest demonstrates the lungs to be clear. The   cardiomediastinal silhouette is enlarged. No acute osseous abnormalities.   Overlying EKG leads and wires are noted. Left-sided single lead pacemaker.    IMPRESSION: No acute cardiopulmonary disease process. Cardiomegaly.        AFRICA LAM M.D., ATTENDING RADIOLOGIST  This document has been electronically signed. Dec  8 2017  3:05PM

## 2017-12-09 NOTE — PROGRESS NOTE ADULT - SUBJECTIVE AND OBJECTIVE BOX
Patient: MARY ANN CORNELL [id number] 67y Male                 Internal Medicine Hospitalist Progress Note - Dr. Willem Lundberg    Chief Complaint: Patient is a 67y old  Male who presents with a chief complaint of Fever / chills (08 Dec 2017 16:03)    HPI:  67y Male PMH obesity, CHRISS on CPAP, afib on coumadin, NICM w EF 20-25% s/p AICD, CKD, DM2, HTN, Chronic lymphedema, morbid obesity, sedentary lifestyle, prostate cancer in remission, recent hospitalization 1 month ago for LLE cellulitis, presents c/o recurrent shaking chills x 1 day, with RLE erythema.  Reports an almost identical presentation last month.  Denies subjective fevers.  Reports compliance with diet restriction and medication therapy.  States he feels "much better" after IVF in ED.  No additional complaints.  Pt notes prior episodes of itching and flushing during past hospitalization was due to morphine, not vancomycin.      Today, reports feeling better.  Still with some leg pain and swelling.  No SOB.  No additional complaints.     ____________________PHYSICAL EXAM:  Vitals reviewed as indicated below  GENERAL:  NAD Alert and Oriented x 3   HEENT: NCAT  CARDIOVASCULAR:  S1, S2  LUNGS: CTAB  ABDOMEN:  soft, (-) tenderness, (-) distension, (+) bowel sounds, (-) guarding, (-) rebound (-) rigidity  EXTREMITIES:  no cyanosis / clubbing.  + edema, continued RLE erythema, unchanged.   ____________________    BACKGROUND:  HEALTH ISSUES - PROBLEM Dx:        Allergies    No Known Allergies    Intolerances      PAST MEDICAL & SURGICAL HISTORY:  CHF (congestive heart failure)  Pulmonary hypertension  Prostate CA  Sleep apnea  Renal insufficiency  High cholesterol  HTN (hypertension)  DM (diabetes mellitus)  AICD (automatic cardioverter/defibrillator) present  S/P ankle ligament repair      VITALS:  Vital Signs Last 24 Hrs  T(C): 36.8 (09 Dec 2017 08:53), Max: 38.5 (08 Dec 2017 17:07)  T(F): 98.3 (09 Dec 2017 08:53), Max: 101.3 (08 Dec 2017 17:07)  HR: 100 (09 Dec 2017 08:58) (71 - 113)  BP: 106/65 (09 Dec 2017 08:53) (103/61 - 127/69)  BP(mean): --  RR: 24 (09 Dec 2017 03:02) (22 - 24)  SpO2: 99% (09 Dec 2017 08:58) (94% - 99%) Daily Height in cm: 170.18 (08 Dec 2017 12:53)    Daily   CAPILLARY BLOOD GLUCOSE      POCT Blood Glucose.: 265 mg/dL (09 Dec 2017 11:45)  POCT Blood Glucose.: 244 mg/dL (09 Dec 2017 08:25)  POCT Blood Glucose.: 262 mg/dL (08 Dec 2017 21:26)  POCT Blood Glucose.: 279 mg/dL (08 Dec 2017 19:42)  POCT Blood Glucose.: 293 mg/dL (08 Dec 2017 16:53)    I&O's Summary    08 Dec 2017 07:01  -  09 Dec 2017 07:00  --------------------------------------------------------  IN: 400 mL / OUT: 0 mL / NET: 400 mL        LABS:                        14.3   10.5  )-----------( 101      ( 09 Dec 2017 07:19 )             43.5     12-09    128<L>  |  92<L>  |  35.0<H>  ----------------------------<  266<H>  4.0   |  21.0<L>  |  1.79<H>    Ca    8.4<L>      09 Dec 2017 07:19    TPro  7.7  /  Alb  3.6  /  TBili  1.0  /  DBili  x   /  AST  24  /  ALT  11  /  AlkPhos  58  12-08    PT/INR - ( 09 Dec 2017 07:19 )   PT: 24.0 sec;   INR: 2.15 ratio         PTT - ( 09 Dec 2017 07:19 )  PTT:35.5 sec  LIVER FUNCTIONS - ( 08 Dec 2017 13:44 )  Alb: 3.6 g/dL / Pro: 7.7 g/dL / ALK PHOS: 58 U/L / ALT: 11 U/L / AST: 24 U/L / GGT: x                   MEDICATIONS:  MEDICATIONS  (STANDING):  allopurinol 300 milliGRAM(s) Oral daily  atorvastatin 40 milliGRAM(s) Oral at bedtime  buDESOnide  80 MICROgram(s)/formoterol 4.5 MICROgram(s) Inhaler 2 Puff(s) Inhalation two times a day  carvedilol 6.25 milliGRAM(s) Oral every 12 hours  dextrose 5%. 1000 milliLiter(s) (50 mL/Hr) IV Continuous <Continuous>  dextrose 50% Injectable 12.5 Gram(s) IV Push once  dextrose 50% Injectable 25 Gram(s) IV Push once  dextrose 50% Injectable 25 Gram(s) IV Push once  insulin glargine Injectable (LANTUS) 40 Unit(s) SubCutaneous every morning  insulin lispro (HumaLOG) corrective regimen sliding scale   SubCutaneous three times a day before meals  insulin lispro (HumaLOG) corrective regimen sliding scale   SubCutaneous at bedtime  insulin lispro Injectable (HumaLOG) 3 Unit(s) SubCutaneous before breakfast  insulin lispro Injectable (HumaLOG) 3 Unit(s) SubCutaneous before lunch  insulin lispro Injectable (HumaLOG) 3 Unit(s) SubCutaneous before dinner  levothyroxine 50 MICROGram(s) Oral daily  saccharomyces boulardii 250 milliGRAM(s) Oral two times a day  sodium chloride 0.9%. 1000 milliLiter(s) (100 mL/Hr) IV Continuous <Continuous>  warfarin 2.5 milliGRAM(s) Oral once    MEDICATIONS  (PRN):  acetaminophen   Tablet 650 milliGRAM(s) Oral every 6 hours PRN Mild pain or temp > 100.4  dextrose Gel 1 Dose(s) Oral once PRN Blood Glucose LESS THAN 70 milliGRAM(s)/deciliter  glucagon  Injectable 1 milliGRAM(s) IntraMuscular once PRN Glucose LESS THAN 70 milligrams/deciliter  oxyCODONE    IR 10 milliGRAM(s) Oral every 6 hours PRN moderate to severe pain

## 2017-12-10 DIAGNOSIS — L03.119 CELLULITIS OF UNSPECIFIED PART OF LIMB: ICD-10-CM

## 2017-12-10 DIAGNOSIS — E11.8 TYPE 2 DIABETES MELLITUS WITH UNSPECIFIED COMPLICATIONS: ICD-10-CM

## 2017-12-10 DIAGNOSIS — E66.01 MORBID (SEVERE) OBESITY DUE TO EXCESS CALORIES: ICD-10-CM

## 2017-12-10 LAB
ANION GAP SERPL CALC-SCNC: 15 MMOL/L — SIGNIFICANT CHANGE UP (ref 5–17)
APTT BLD: 32.3 SEC — SIGNIFICANT CHANGE UP (ref 27.5–37.4)
BUN SERPL-MCNC: 37 MG/DL — HIGH (ref 8–20)
CALCIUM SERPL-MCNC: 7.9 MG/DL — LOW (ref 8.6–10.2)
CHLORIDE SERPL-SCNC: 89 MMOL/L — LOW (ref 98–107)
CO2 SERPL-SCNC: 24 MMOL/L — SIGNIFICANT CHANGE UP (ref 22–29)
CREAT SERPL-MCNC: 1.86 MG/DL — HIGH (ref 0.5–1.3)
CULTURE RESULTS: NO GROWTH — SIGNIFICANT CHANGE UP
GLUCOSE BLDC GLUCOMTR-MCNC: 258 MG/DL — HIGH (ref 70–99)
GLUCOSE BLDC GLUCOMTR-MCNC: 264 MG/DL — HIGH (ref 70–99)
GLUCOSE BLDC GLUCOMTR-MCNC: 303 MG/DL — HIGH (ref 70–99)
GLUCOSE BLDC GLUCOMTR-MCNC: 330 MG/DL — HIGH (ref 70–99)
GLUCOSE SERPL-MCNC: 362 MG/DL — HIGH (ref 70–115)
HCT VFR BLD CALC: 40.7 % — LOW (ref 42–52)
HGB BLD-MCNC: 13.3 G/DL — LOW (ref 14–18)
INR BLD: 1.99 RATIO — HIGH (ref 0.88–1.16)
MCHC RBC-ENTMCNC: 28.6 PG — SIGNIFICANT CHANGE UP (ref 27–31)
MCHC RBC-ENTMCNC: 32.7 G/DL — SIGNIFICANT CHANGE UP (ref 32–36)
MCV RBC AUTO: 87.5 FL — SIGNIFICANT CHANGE UP (ref 80–94)
PLATELET # BLD AUTO: 97 K/UL — LOW (ref 150–400)
POTASSIUM SERPL-MCNC: 3.7 MMOL/L — SIGNIFICANT CHANGE UP (ref 3.5–5.3)
POTASSIUM SERPL-SCNC: 3.7 MMOL/L — SIGNIFICANT CHANGE UP (ref 3.5–5.3)
PROTHROM AB SERPL-ACNC: 22.2 SEC — HIGH (ref 9.8–12.7)
RBC # BLD: 4.65 M/UL — SIGNIFICANT CHANGE UP (ref 4.6–6.2)
RBC # FLD: 16.7 % — HIGH (ref 11–15.6)
SODIUM SERPL-SCNC: 128 MMOL/L — LOW (ref 135–145)
SPECIMEN SOURCE: SIGNIFICANT CHANGE UP
WBC # BLD: 7.9 K/UL — SIGNIFICANT CHANGE UP (ref 4.8–10.8)
WBC # FLD AUTO: 7.9 K/UL — SIGNIFICANT CHANGE UP (ref 4.8–10.8)

## 2017-12-10 PROCEDURE — 99233 SBSQ HOSP IP/OBS HIGH 50: CPT

## 2017-12-10 PROCEDURE — 99223 1ST HOSP IP/OBS HIGH 75: CPT

## 2017-12-10 PROCEDURE — 76770 US EXAM ABDO BACK WALL COMP: CPT | Mod: 26

## 2017-12-10 RX ORDER — CEFAZOLIN SODIUM 1 G
500 VIAL (EA) INJECTION EVERY 8 HOURS
Qty: 0 | Refills: 0 | Status: DISCONTINUED | OUTPATIENT
Start: 2017-12-10 | End: 2017-12-11

## 2017-12-10 RX ORDER — CEFAZOLIN SODIUM 1 G
VIAL (EA) INJECTION
Qty: 0 | Refills: 0 | Status: DISCONTINUED | OUTPATIENT
Start: 2017-12-10 | End: 2017-12-11

## 2017-12-10 RX ORDER — WARFARIN SODIUM 2.5 MG/1
4 TABLET ORAL ONCE
Qty: 0 | Refills: 0 | Status: COMPLETED | OUTPATIENT
Start: 2017-12-10 | End: 2017-12-10

## 2017-12-10 RX ORDER — CEFAZOLIN SODIUM 1 G
500 VIAL (EA) INJECTION ONCE
Qty: 0 | Refills: 0 | Status: COMPLETED | OUTPATIENT
Start: 2017-12-10 | End: 2017-12-10

## 2017-12-10 RX ADMIN — Medication 50 MILLILITER(S): at 05:28

## 2017-12-10 RX ADMIN — Medication 3 UNIT(S): at 17:06

## 2017-12-10 RX ADMIN — Medication 3 UNIT(S): at 12:10

## 2017-12-10 RX ADMIN — OXYCODONE HYDROCHLORIDE 10 MILLIGRAM(S): 5 TABLET ORAL at 23:09

## 2017-12-10 RX ADMIN — OXYCODONE HYDROCHLORIDE 10 MILLIGRAM(S): 5 TABLET ORAL at 16:28

## 2017-12-10 RX ADMIN — Medication 300 MILLIGRAM(S): at 12:13

## 2017-12-10 RX ADMIN — Medication 250 MILLIGRAM(S): at 05:28

## 2017-12-10 RX ADMIN — BUDESONIDE AND FORMOTEROL FUMARATE DIHYDRATE 2 PUFF(S): 160; 4.5 AEROSOL RESPIRATORY (INHALATION) at 20:28

## 2017-12-10 RX ADMIN — WARFARIN SODIUM 4 MILLIGRAM(S): 2.5 TABLET ORAL at 21:47

## 2017-12-10 RX ADMIN — ATORVASTATIN CALCIUM 40 MILLIGRAM(S): 80 TABLET, FILM COATED ORAL at 21:47

## 2017-12-10 RX ADMIN — Medication 100 MILLIGRAM(S): at 16:40

## 2017-12-10 RX ADMIN — INSULIN GLARGINE 40 UNIT(S): 100 INJECTION, SOLUTION SUBCUTANEOUS at 08:40

## 2017-12-10 RX ADMIN — OXYCODONE HYDROCHLORIDE 10 MILLIGRAM(S): 5 TABLET ORAL at 16:58

## 2017-12-10 RX ADMIN — Medication 50 MICROGRAM(S): at 05:28

## 2017-12-10 RX ADMIN — Medication 250 MILLIGRAM(S): at 17:05

## 2017-12-10 RX ADMIN — Medication 6: at 12:10

## 2017-12-10 RX ADMIN — Medication 2: at 21:47

## 2017-12-10 RX ADMIN — Medication 8: at 08:03

## 2017-12-10 RX ADMIN — Medication 3 UNIT(S): at 08:03

## 2017-12-10 RX ADMIN — CARVEDILOL PHOSPHATE 6.25 MILLIGRAM(S): 80 CAPSULE, EXTENDED RELEASE ORAL at 05:28

## 2017-12-10 RX ADMIN — BUDESONIDE AND FORMOTEROL FUMARATE DIHYDRATE 2 PUFF(S): 160; 4.5 AEROSOL RESPIRATORY (INHALATION) at 09:04

## 2017-12-10 RX ADMIN — Medication 8: at 17:06

## 2017-12-10 RX ADMIN — Medication 100 MILLIGRAM(S): at 23:15

## 2017-12-10 RX ADMIN — Medication 50 MILLILITER(S): at 16:59

## 2017-12-10 RX ADMIN — CARVEDILOL PHOSPHATE 6.25 MILLIGRAM(S): 80 CAPSULE, EXTENDED RELEASE ORAL at 17:05

## 2017-12-10 NOTE — CONSULT NOTE ADULT - PROBLEM SELECTOR PROBLEM 1
Hyponatremia with excess extracellular fluid volume
Cellulitis of lower extremity, unspecified laterality

## 2017-12-10 NOTE — PROGRESS NOTE ADULT - SUBJECTIVE AND OBJECTIVE BOX
Patient: MARY ANN CORNELL [id number] 67y Male                 Internal Medicine Hospitalist Progress Note - Dr. Willem Lundberg    Chief Complaint: Patient is a 67y old  Male who presents with a chief complaint of Fever / chills (08 Dec 2017 16:03)    HPI:  67y Male PMH obesity, CHRISS on CPAP, afib on coumadin, NICM w EF 20-25% s/p AICD, CKD, DM2, HTN, Chronic lymphedema, morbid obesity, sedentary lifestyle, prostate cancer in remission, recent hospitalization 1 month ago for LLE cellulitis, presents c/o recurrent shaking chills x 1 day, with RLE erythema.  Reports an almost identical presentation last month.  Denies subjective fevers.  Reports compliance with diet restriction and medication therapy.  States he feels "much better" after IVF in ED.  No additional complaints.  Pt notes prior episodes of itching and flushing during past hospitalization was due to morphine, not vancomycin.      No fever /chills.  Still with some leg pain and swelling.  No SOB.  No additional complaints.     ____________________PHYSICAL EXAM:  Vitals reviewed as indicated below  GENERAL:  NAD Alert and Oriented x 3   HEENT: NCAT  CARDIOVASCULAR:  S1, S2  LUNGS: CTAB  ABDOMEN:  soft, (-) tenderness, (-) distension, (+) bowel sounds, (-) guarding, (-) rebound (-) rigidity  EXTREMITIES:  no cyanosis / clubbing.  + edema, continued RLE erythema, unchanged. erythema slightly improving.    ____________________    VITALS:  Vital Signs Last 24 Hrs  T(C): 36.7 (10 Dec 2017 07:46), Max: 36.7 (09 Dec 2017 17:38)  T(F): 98.1 (10 Dec 2017 07:46), Max: 98.1 (10 Dec 2017 07:46)  HR: 86 (10 Dec 2017 09:04) (53 - 109)  BP: 97/56 (10 Dec 2017 07:46) (97/56 - 122/76)  BP(mean): --  RR: 18 (10 Dec 2017 07:46) (18 - 22)  SpO2: 97% (10 Dec 2017 09:04) (95% - 97%) Daily     Daily   CAPILLARY BLOOD GLUCOSE      POCT Blood Glucose.: 264 mg/dL (10 Dec 2017 12:08)  POCT Blood Glucose.: 303 mg/dL (10 Dec 2017 08:01)  POCT Blood Glucose.: 237 mg/dL (09 Dec 2017 21:33)  POCT Blood Glucose.: 246 mg/dL (09 Dec 2017 17:27)    I&O's Summary    09 Dec 2017 07:01  -  10 Dec 2017 07:00  --------------------------------------------------------  IN: 505 mL / OUT: 600 mL / NET: -95 mL        LABS:                        13.3   7.9   )-----------( 97       ( 10 Dec 2017 07:23 )             40.7     12-10    128<L>  |  89<L>  |  37.0<H>  ----------------------------<  362<H>  3.7   |  24.0  |  1.86<H>    Ca    7.9<L>      10 Dec 2017 07:23    TPro  7.7  /  Alb  3.6  /  TBili  1.0  /  DBili  x   /  AST  24  /  ALT  11  /  AlkPhos  58  12-08    PT/INR - ( 10 Dec 2017 07:23 )   PT: 22.2 sec;   INR: 1.99 ratio         PTT - ( 10 Dec 2017 07:23 )  PTT:32.3 sec  LIVER FUNCTIONS - ( 08 Dec 2017 13:44 )  Alb: 3.6 g/dL / Pro: 7.7 g/dL / ALK PHOS: 58 U/L / ALT: 11 U/L / AST: 24 U/L / GGT: x                   MEDICATIONS:  acetaminophen   Tablet 650 milliGRAM(s) Oral every 6 hours PRN  albumin human 25% IVPB 50 milliLiter(s) IV Intermittent every 8 hours  allopurinol 300 milliGRAM(s) Oral daily  atorvastatin 40 milliGRAM(s) Oral at bedtime  buDESOnide  80 MICROgram(s)/formoterol 4.5 MICROgram(s) Inhaler 2 Puff(s) Inhalation two times a day  carvedilol 6.25 milliGRAM(s) Oral every 12 hours  dextrose 5%. 1000 milliLiter(s) IV Continuous <Continuous>  dextrose 50% Injectable 12.5 Gram(s) IV Push once  dextrose 50% Injectable 25 Gram(s) IV Push once  dextrose 50% Injectable 25 Gram(s) IV Push once  dextrose Gel 1 Dose(s) Oral once PRN  furosemide Infusion 10 mG/Hr IV Continuous <Continuous>  glucagon  Injectable 1 milliGRAM(s) IntraMuscular once PRN  insulin glargine Injectable (LANTUS) 40 Unit(s) SubCutaneous every morning  insulin lispro (HumaLOG) corrective regimen sliding scale   SubCutaneous three times a day before meals  insulin lispro (HumaLOG) corrective regimen sliding scale   SubCutaneous at bedtime  insulin lispro Injectable (HumaLOG) 3 Unit(s) SubCutaneous before breakfast  insulin lispro Injectable (HumaLOG) 3 Unit(s) SubCutaneous before lunch  insulin lispro Injectable (HumaLOG) 3 Unit(s) SubCutaneous before dinner  levothyroxine 50 MICROGram(s) Oral daily  oxyCODONE    IR 10 milliGRAM(s) Oral every 6 hours PRN  saccharomyces boulardii 250 milliGRAM(s) Oral two times a day  vancomycin  IVPB 1000 milliGRAM(s) IV Intermittent every 12 hours  warfarin 4 milliGRAM(s) Oral once

## 2017-12-10 NOTE — CONSULT NOTE ADULT - SUBJECTIVE AND OBJECTIVE BOX
NPP INFECTIOUS DISEASES AND INTERNAL MEDICINE at [facility]  =======================================================  Miguelangel Henderson MD [facility]   Maximiliano Jules MD  Diplomates American Board of Internal Medicine and Infectious Diseases  =======================================================    [id number]  MARY ANN CORNELL is a 67y  Male   This 67y Male with obesity, CHRISS on CPAP, afib on coumadin, NICM w EF 20-25% s/p AICD, CKD, DM2, HTN, Chronic lymphedema, prostate cancer in remission, recent  LLE cellulitis, presents HERE For rigors with RLE erythema, similar to prior presentation of Cellulitis.     patient was started on zosyn and vanco in the ER, then changed to Vanco alone on 12/9/17.   Blood cultures remain negative  =======================================================  Past Medical & Surgical Hx:  =====================  PAST MEDICAL & SURGICAL HISTORY:  CHF (congestive heart failure)  Pulmonary hypertension  Prostate CA  Sleep apnea  Renal insufficiency  High cholesterol  HTN (hypertension)  DM (diabetes mellitus)  AICD (automatic cardioverter/defibrillator) present  S/P ankle ligament repair      Problem List:  ==========  HEALTH ISSUES - PROBLEM Dx:  Type 2 diabetes mellitus with hyperglycemia, unspecified long term insulin use status: Type 2 diabetes mellitus with hyperglycemia, unspecified long term insulin use status  Stage 3 chronic kidney disease: Stage 3 chronic kidney disease  Acute on chronic systolic congestive heart failure: Acute on chronic systolic congestive heart failure  Hyponatremia with excess extracellular fluid volume: Hyponatremia with excess extracellular fluid volume    Social Hx:  =======  no toxic habits currently    FAMILY HISTORY:  Family history of diabetes mellitus (Sibling)  Family history of cancer (Sibling)    Allergies  No Known Allergies  Intolerances    MEDICATIONS  (STANDING):  albumin human 25% IVPB 50 milliLiter(s) IV Intermittent every 8 hours  allopurinol 300 milliGRAM(s) Oral daily  atorvastatin 40 milliGRAM(s) Oral at bedtime  buDESOnide  80 MICROgram(s)/formoterol 4.5 MICROgram(s) Inhaler 2 Puff(s) Inhalation two times a day  carvedilol 6.25 milliGRAM(s) Oral every 12 hours  dextrose 5%. 1000 milliLiter(s) (50 mL/Hr) IV Continuous <Continuous>  dextrose 50% Injectable 12.5 Gram(s) IV Push once  dextrose 50% Injectable 25 Gram(s) IV Push once  dextrose 50% Injectable 25 Gram(s) IV Push once  furosemide Infusion 10 mG/Hr (5 mL/Hr) IV Continuous <Continuous>  insulin glargine Injectable (LANTUS) 40 Unit(s) SubCutaneous every morning  insulin lispro (HumaLOG) corrective regimen sliding scale   SubCutaneous three times a day before meals  insulin lispro (HumaLOG) corrective regimen sliding scale   SubCutaneous at bedtime  insulin lispro Injectable (HumaLOG) 3 Unit(s) SubCutaneous before breakfast  insulin lispro Injectable (HumaLOG) 3 Unit(s) SubCutaneous before lunch  insulin lispro Injectable (HumaLOG) 3 Unit(s) SubCutaneous before dinner  levothyroxine 50 MICROGram(s) Oral daily  saccharomyces boulardii 250 milliGRAM(s) Oral two times a day  vancomycin  IVPB 1000 milliGRAM(s) IV Intermittent every 12 hours  warfarin 4 milliGRAM(s) Oral once    MEDICATIONS  (PRN):  acetaminophen   Tablet 650 milliGRAM(s) Oral every 6 hours PRN Mild pain or temp > 100.4  dextrose Gel 1 Dose(s) Oral once PRN Blood Glucose LESS THAN 70 milliGRAM(s)/deciliter  glucagon  Injectable 1 milliGRAM(s) IntraMuscular once PRN Glucose LESS THAN 70 milligrams/deciliter  oxyCODONE    IR 10 milliGRAM(s) Oral every 6 hours PRN moderate to severe pain        =======================================================  REVIEW OF SYSTEMS:  Constitutional: No fever, No chills, No sweats.  Eye: No icterus, No double vision.  Ear/Nose/Mouth/Throat: No nasal congestion, No sore throat.  Respiratory: No shortness of breath, No cough, No sputum production, No wheezing.  Cardiovascular: No chest pain, No palpitations, No syncope.  Gastrointestinal: No nausea, No vomiting, No diarrhea, No abdominal pain.  Genitourinary: No dysuria, No hematuria, No change in urine stream.  Hematology/Lymphatics: No bleeding tendency.  Endocrine: No excessive thirst, No polyuria.  Immunologic: No malaise.  Musculoskeletal: No back pain, No neck pain, No joint pain, No muscle pain.  Integumentary: No rash, No pruritus, No skin lesion.  Neurologic: No numbness, No tingling, No headache.  Psychiatric: No depression.    =======================================================  physical Exam:  ============  Vital Signs Last 24 Hrs  T(C): 36.7 (10 Dec 2017 07:46), Max: 36.7 (09 Dec 2017 17:38)  T(F): 98.1 (10 Dec 2017 07:46), Max: 98.1 (10 Dec 2017 07:46)  HR: 86 (10 Dec 2017 09:04) (53 - 109)  BP: 97/56 (10 Dec 2017 07:46) (97/56 - 122/76)  RR: 18 (10 Dec 2017 07:46) (18 - 22)  SpO2: 97% (10 Dec 2017 09:04) (95% - 97%)    General: Alert and oriented, No acute distress.  Eye: Pupils are equal, round and reactive to light, Extraocular movements are intact, Normal conjunctiva.  HENT: Normocephalic, Oral mucosa is moist, No pharyngeal erythema, No sinus tenderness.  Neck: Supple, No lymphadenopathy.  Respiratory: Lungs are clear to auscultation, Respirations are non-labored.  Cardiovascular: Normal rate, Regular rhythm, No murmur, Good pulses equal in all extremities, No edema.  Gastrointestinal: Soft, Non-tender, Non-distended, Normal bowel sounds.  Genitourinary: No costovertebral angle tenderness.  Lymphatics: No lymphadenopathy neck, axilla, groin.  Musculoskeletal: Normal range of motion, Normal strength.  Integumentary: No rash.  Neurologic: Alert, Oriented, No focal deficits, Cranial Nerves II-XII are grossly intact.  Psychiatric: Appropriate mood & affect.      =======================================================  Labs:  ====    Labs:  12-10    128<L>  |  89<L>  |  37.0<H>  ----------------------------<  362<H>  3.7   |  24.0  |  1.86<H>    Ca    7.9<L>      10 Dec 2017 07:23                            13.3   7.9   )-----------( 97       ( 10 Dec 2017 07:23 )             40.7       PT/INR - ( 10 Dec 2017 07:23 )   PT: 22.2 sec;   INR: 1.99 ratio         PTT - ( 10 Dec 2017 07:23 )  PTT:32.3 sec          CAPILLARY BLOOD GLUCOSE      POCT Blood Glucose.: 264 mg/dL (10 Dec 2017 12:08)  POCT Blood Glucose.: 303 mg/dL (10 Dec 2017 08:01)  POCT Blood Glucose.: 237 mg/dL (09 Dec 2017 21:33)  POCT Blood Glucose.: 246 mg/dL (09 Dec 2017 17:27)        RECENT CULTURES:  12-08 @ 13:56 .Blood Blood-Peripheral     No growth at 48 hours        12-08 @ 13:55 .Blood Blood-Venous     No growth at 48 hours NPP INFECTIOUS DISEASES AND INTERNAL MEDICINE at [facility]  =======================================================  Miguelangel Henderson MD [facility]   Maximiliano Jules MD  Diplomates American Board of Internal Medicine and Infectious Diseases  =======================================================    [facility]-[id number]  MARY ANN CORNELL is a 67y  Male   This 67y Male with obesity, CHRISS on CPAP, afib on coumadin, NICM w EF 20-25% s/p AICD, CKD, DM2, HTN, Chronic lymphedema, prostate cancer in remission, recent  LLE cellulitis, presents HERE For rigors with RLE erythema, similar to prior presentation of Cellulitis.   His right leg develope a tear for about 1 month, poorly healing.  and then 2 days before coming in, he had redness of the leg, leaving to this admission.     patient was started on zosyn and vanco in the ER, then changed to Vanco alone on 12/9/17.   Blood cultures remain negative    =======================================================  Past Medical & Surgical Hx:  =====================  PAST MEDICAL & SURGICAL HISTORY:  CHF (congestive heart failure)  Pulmonary hypertension  Prostate CA  Sleep apnea  Renal insufficiency  High cholesterol  HTN (hypertension)  DM (diabetes mellitus)  AICD (automatic cardioverter/defibrillator) present  S/P ankle ligament repair      Problem List:  ==========  HEALTH ISSUES - PROBLEM Dx:  Type 2 diabetes mellitus with hyperglycemia, unspecified long term insulin use status: Type 2 diabetes mellitus with hyperglycemia, unspecified long term insulin use status  Stage 3 chronic kidney disease: Stage 3 chronic kidney disease  Acute on chronic systolic congestive heart failure: Acute on chronic systolic congestive heart failure  Hyponatremia with excess extracellular fluid volume: Hyponatremia with excess extracellular fluid volume    Social Hx:  =======  no toxic habits currently    FAMILY HISTORY:  Family history of diabetes mellitus (Sibling)  Family history of cancer (Sibling)    Allergies  No Known Allergies  Intolerances    MEDICATIONS  (STANDING):  albumin human 25% IVPB 50 milliLiter(s) IV Intermittent every 8 hours  allopurinol 300 milliGRAM(s) Oral daily  atorvastatin 40 milliGRAM(s) Oral at bedtime  buDESOnide  80 MICROgram(s)/formoterol 4.5 MICROgram(s) Inhaler 2 Puff(s) Inhalation two times a day  carvedilol 6.25 milliGRAM(s) Oral every 12 hours  dextrose 5%. 1000 milliLiter(s) (50 mL/Hr) IV Continuous <Continuous>  dextrose 50% Injectable 12.5 Gram(s) IV Push once  dextrose 50% Injectable 25 Gram(s) IV Push once  dextrose 50% Injectable 25 Gram(s) IV Push once  furosemide Infusion 10 mG/Hr (5 mL/Hr) IV Continuous <Continuous>  insulin glargine Injectable (LANTUS) 40 Unit(s) SubCutaneous every morning  insulin lispro (HumaLOG) corrective regimen sliding scale   SubCutaneous three times a day before meals  insulin lispro (HumaLOG) corrective regimen sliding scale   SubCutaneous at bedtime  insulin lispro Injectable (HumaLOG) 3 Unit(s) SubCutaneous before breakfast  insulin lispro Injectable (HumaLOG) 3 Unit(s) SubCutaneous before lunch  insulin lispro Injectable (HumaLOG) 3 Unit(s) SubCutaneous before dinner  levothyroxine 50 MICROGram(s) Oral daily  saccharomyces boulardii 250 milliGRAM(s) Oral two times a day  vancomycin  IVPB 1000 milliGRAM(s) IV Intermittent every 12 hours  warfarin 4 milliGRAM(s) Oral once    MEDICATIONS  (PRN):  acetaminophen   Tablet 650 milliGRAM(s) Oral every 6 hours PRN Mild pain or temp > 100.4  dextrose Gel 1 Dose(s) Oral once PRN Blood Glucose LESS THAN 70 milliGRAM(s)/deciliter  glucagon  Injectable 1 milliGRAM(s) IntraMuscular once PRN Glucose LESS THAN 70 milligrams/deciliter  oxyCODONE    IR 10 milliGRAM(s) Oral every 6 hours PRN moderate to severe pain        =======================================================  REVIEW OF SYSTEMS:  AS above, all other ROS negative    =======================================================  physical Exam:  ============  Vital Signs Last 24 Hrs  T(C): 36.7 (10 Dec 2017 07:46), Max: 36.7 (09 Dec 2017 17:38)  T(F): 98.1 (10 Dec 2017 07:46), Max: 98.1 (10 Dec 2017 07:46)  HR: 86 (10 Dec 2017 09:04) (53 - 109)  BP: 97/56 (10 Dec 2017 07:46) (97/56 - 122/76)  RR: 18 (10 Dec 2017 07:46) (18 - 22)  SpO2: 97% (10 Dec 2017 09:04) (95% - 97%)    General: Alert and oriented, No acute distress. OBESE  Eye: Pupils are equal, round and reactive to light, Extraocular movements are intact, Normal conjunctiva.  HENT: Normocephalic, Oral mucosa is moist  Neck: Supple, No lymphadenopathy.  Respiratory: Lungs are clear to auscultation, Respirations are non-labored.  Cardiovascular: Normal rate, Regular rhythm, No murmur, Good pulses equal in all extremities, No edema.  Gastrointestinal: Soft, Non-tender, Non-distended, Normal bowel sounds. OBESE  Genitourinary: No costovertebral angle tenderness.  Musculoskeletal: Normal range of motion, Normal strength. BILATERAL LE STASIS CHANGES  RIGHT LEG WITH INCREASED SWELLING AND REDNESS.  SMALL AMOUNT OF DRAINAGE AT ESCHAR ON RIGHT LEG.   Integumentary: No rash.  Neurologic: Alert, Oriented, No focal deficits, Cranial Nerves II-XII are grossly intact.  Psychiatric: Appropriate mood & affect.      =======================================================  Labs:  ====    Labs:  12-10    128<L>  |  89<L>  |  37.0<H>  ----------------------------<  362<H>  3.7   |  24.0  |  1.86<H>    Ca    7.9<L>      10 Dec 2017 07:23                            13.3   7.9   )-----------( 97       ( 10 Dec 2017 07:23 )             40.7       PT/INR - ( 10 Dec 2017 07:23 )   PT: 22.2 sec;   INR: 1.99 ratio         PTT - ( 10 Dec 2017 07:23 )  PTT:32.3 sec          CAPILLARY BLOOD GLUCOSE      POCT Blood Glucose.: 264 mg/dL (10 Dec 2017 12:08)  POCT Blood Glucose.: 303 mg/dL (10 Dec 2017 08:01)  POCT Blood Glucose.: 237 mg/dL (09 Dec 2017 21:33)  POCT Blood Glucose.: 246 mg/dL (09 Dec 2017 17:27)        RECENT CULTURES:  12-08 @ 13:56 .Blood Blood-Peripheral     No growth at 48 hours        12-08 @ 13:55 .Blood Blood-Venous     No growth at 48 hours

## 2017-12-10 NOTE — CONSULT NOTE ADULT - PROBLEM SELECTOR PROBLEM 2
Acute on chronic systolic congestive heart failure
Type 2 diabetes mellitus with complication, unspecified long term insulin use status

## 2017-12-10 NOTE — PROGRESS NOTE ADULT - SUBJECTIVE AND OBJECTIVE BOX
Renal :    Feels well, Less Orthopnea,    Diuresing well,     BG Remains elevated > 300 mg.,  Corrected SNa+ 132 Meq.,      128<L>  |  89<L>  |  37.0<H>  ----------------------------<  362<H>  Ca:7.9<L> (10 Dec 2017 07:23)  3.7     |  24.0   |  1.86<H>      eGFR if Non : 37 <L>      TPro  7.7 g/dL  /  Alb  3.6 g/dL  /  TBili  1.0 mg/dL  /  DBili  x      /  AST  24 U/L  /  ALT  11 U/L  /  AlkPhos  58 U/L  08 Dec 2017 13:44                        13.3<L>  7.9   )-----------( 97<L>    ( 10 Dec 2017 07:23 )             40.7<L>      UProt:-- UCr:-- P/C Ratio:-- 24 hour Prot:-- UVol:-- CrCl:--  [place]:-- UOsm:668 mosm/kg UVol:-- UCl:-- [place]:-- (12-09 @ 18:03)    Serum Pro-Brain Natriuretic Peptide (12.09.17 @ 18:37)    Serum Pro-Brain Natriuretic Peptide: 1800 pg/mL    MEDICATIONS  (STANDING):    albumin human 25% IVPB 50 milliLiter(s) IV Intermittent every 8 hours  allopurinol 300 milliGRAM(s) Oral daily  atorvastatin 40 milliGRAM(s) Oral at bedtime  buDESOnide  80 MICROgram(s)/formoterol 4.5 MICROgram(s) Inhaler 2 Puff(s) Inhalation two times a day  carvedilol 6.25 milliGRAM(s) Oral every 12 hours  dextrose 5%. 1000 milliLiter(s) (50 mL/Hr) IV Continuous <Continuous>  dextrose 50% Injectable 12.5 Gram(s) IV Push once  dextrose 50% Injectable 25 Gram(s) IV Push once  dextrose 50% Injectable 25 Gram(s) IV Push once  furosemide Infusion 10 mG/Hr (5 mL/Hr) IV Continuous <Continuous>  insulin glargine Injectable (LANTUS) 40 Unit(s) SubCutaneous every morning  insulin lispro (HumaLOG) corrective regimen sliding scale   SubCutaneous three times a day before meals  insulin lispro (HumaLOG) corrective regimen sliding scale   SubCutaneous at bedtime  insulin lispro Injectable (HumaLOG) 3 Unit(s) SubCutaneous before breakfast  insulin lispro Injectable (HumaLOG) 3 Unit(s) SubCutaneous before lunch  insulin lispro Injectable (HumaLOG) 3 Unit(s) SubCutaneous before dinner  levothyroxine 50 MICROGram(s) Oral daily  saccharomyces boulardii 250 milliGRAM(s) Oral two times a day  vancomycin  IVPB 1000 milliGRAM(s) IV Intermittent every 12 hours  warfarin 4 milliGRAM(s) Oral once    MEDICATIONS  (PRN):  acetaminophen   Tablet 650 milliGRAM(s) Oral every 6 hours PRN Mild pain or temp > 100.4  dextrose Gel 1 Dose(s) Oral once PRN Blood Glucose LESS THAN 70 milliGRAM(s)/deciliter  glucagon  Injectable 1 milliGRAM(s) IntraMuscular once PRN Glucose LESS THAN 70 milligrams/deciliter  oxyCODONE    IR 10 milliGRAM(s) Oral every 6 hours PRN moderate to severe pain      Allergies    No Known Allergies    REVIEW OF SYSTEMS:    CONSTITUTIONAL: No fever, weight loss, or fatigue  EYES: No eye pain, visual disturbances, or discharge  NECK: No pain or stiffness  RESPIRATORY: No cough, wheezing, chills or hemoptysis; No shortness of breath  CARDIOVASCULAR: No chest pain, palpitations, dizziness, or leg swelling  GASTROINTESTINAL: No abdominal or epigastric pain. No nausea, vomiting, or hematemesis; No diarrhea or constipation. No melena or hematochezia.  GENITOURINARY: No dysuria, frequency, hematuria, or incontinence  NEUROLOGICAL: No headaches, memory loss, loss of strength, numbness, or tremors  SKIN: No itching, burning, rashes, or lesions   LYMPH NODES: No enlarged glands  ENDOCRINE: No heat or cold intolerance; No hair loss  MUSCULOSKELETAL: No joint pain or swelling; No muscle, back, or extremity pain  PSYCHIATRIC: No depression, anxiety, mood swings, or difficulty sleeping  HEME/LYMPH: No easy bruising, or bleeding gums    Vital Signs Last 24 Hrs  T(C): 36.7 (10 Dec 2017 07:46), Max: 36.7 (09 Dec 2017 17:38)  T(F): 98.1 (10 Dec 2017 07:46), Max: 98.1 (10 Dec 2017 07:46)  HR: 86 (10 Dec 2017 09:04) (53 - 109)  BP: 97/56 (10 Dec 2017 07:46) (97/56 - 124/78)  BP(mean): --  RR: 18 (10 Dec 2017 07:46) (18 - 22)  SpO2: 97% (10 Dec 2017 09:04) (95% - 98%)    PHYSICAL EXAM:    GENERAL: NAD, well-groomed, well-developed  HEAD:  Atraumatic, Normocephalic  EYES: EOMI, PERRLA, conjunctiva and sclera clear  NECK: Supple, No JVD, Normal thyroid  NERVOUS SYSTEM:  Alert & Oriented X3, Good concentration; Motor Strength 5/5 B/L upper and lower extremities; DTRs 2+ intact and symmetric  CHEST/LUNG: Clear to percussion bilaterally; No rales, rhonchi, wheezing, or rubs  HEART: Regular rate and rhythm; No murmurs, rubs, or gallops  ABDOMEN: Soft, Nontender, Nondistended; Bowel sounds present  EXTREMITIES:  2+ Peripheral Pulses, No clubbing, cyanosis, or edema  LYMPH: No lymphadenopathy noted  SKIN: No rashes or lesions    LABS:             PT/INR - ( 10 Dec 2017 07:23 )   PT: 22.2 sec;   INR: 1.99 ratio      PTT - ( 10 Dec 2017 07:23 )  PTT:32.3 sec    RADIOLOGY & ADDITIONAL TESTS:    Xray Chest 1 View AP/PA. (12.08.17 @ 14:38)     EXAM:  CHEST SINGLE VIEW FRONTAL                          PROCEDURE DATE:  12/08/2017      INTERPRETATION:  TECHNIQUE: Single portable view of the chest.    COMPARISON: 10/30/2017    CLINICAL HISTORY: COPD, code sepsis.     FINDINGS:    Singlefrontal view of the chest demonstrates the lungs to be clear. The   cardiomediastinal silhouette is enlarged. No acute osseous abnormalities.   Overlying EKG leads and wires are noted. Left-sided single lead pacemaker.    IMPRESSION: No acute cardiopulmonary disease process. Cardiomegaly.    AFRICA LAM M.D., ATTENDING RADIOLOGIST,    This document has been electronically signed. Dec  8 2017  3:05PM

## 2017-12-11 LAB
ANION GAP SERPL CALC-SCNC: 16 MMOL/L — SIGNIFICANT CHANGE UP (ref 5–17)
APTT BLD: 34 SEC — SIGNIFICANT CHANGE UP (ref 27.5–37.4)
BUN SERPL-MCNC: 36 MG/DL — HIGH (ref 8–20)
CALCIUM SERPL-MCNC: 8.5 MG/DL — LOW (ref 8.6–10.2)
CHLORIDE SERPL-SCNC: 95 MMOL/L — LOW (ref 98–107)
CO2 SERPL-SCNC: 26 MMOL/L — SIGNIFICANT CHANGE UP (ref 22–29)
CREAT SERPL-MCNC: 1.63 MG/DL — HIGH (ref 0.5–1.3)
GLUCOSE BLDC GLUCOMTR-MCNC: 247 MG/DL — HIGH (ref 70–99)
GLUCOSE BLDC GLUCOMTR-MCNC: 277 MG/DL — HIGH (ref 70–99)
GLUCOSE BLDC GLUCOMTR-MCNC: 285 MG/DL — HIGH (ref 70–99)
GLUCOSE BLDC GLUCOMTR-MCNC: 339 MG/DL — HIGH (ref 70–99)
GLUCOSE BLDC GLUCOMTR-MCNC: 348 MG/DL — HIGH (ref 70–99)
GLUCOSE SERPL-MCNC: 298 MG/DL — HIGH (ref 70–115)
HCT VFR BLD CALC: 43.7 % — SIGNIFICANT CHANGE UP (ref 42–52)
HGB BLD-MCNC: 14.6 G/DL — SIGNIFICANT CHANGE UP (ref 14–18)
INR BLD: 1.95 RATIO — HIGH (ref 0.88–1.16)
MCHC RBC-ENTMCNC: 29 PG — SIGNIFICANT CHANGE UP (ref 27–31)
MCHC RBC-ENTMCNC: 33.4 G/DL — SIGNIFICANT CHANGE UP (ref 32–36)
MCV RBC AUTO: 86.7 FL — SIGNIFICANT CHANGE UP (ref 80–94)
PLATELET # BLD AUTO: 105 K/UL — LOW (ref 150–400)
POTASSIUM SERPL-MCNC: 3.7 MMOL/L — SIGNIFICANT CHANGE UP (ref 3.5–5.3)
POTASSIUM SERPL-SCNC: 3.7 MMOL/L — SIGNIFICANT CHANGE UP (ref 3.5–5.3)
PROTHROM AB SERPL-ACNC: 21.8 SEC — HIGH (ref 9.8–12.7)
RBC # BLD: 5.04 M/UL — SIGNIFICANT CHANGE UP (ref 4.6–6.2)
RBC # FLD: 16.5 % — HIGH (ref 11–15.6)
SODIUM SERPL-SCNC: 137 MMOL/L — SIGNIFICANT CHANGE UP (ref 135–145)
VANCOMYCIN TROUGH SERPL-MCNC: 8 UG/ML — LOW (ref 10–20)
WBC # BLD: 5.9 K/UL — SIGNIFICANT CHANGE UP (ref 4.8–10.8)
WBC # FLD AUTO: 5.9 K/UL — SIGNIFICANT CHANGE UP (ref 4.8–10.8)

## 2017-12-11 PROCEDURE — 99233 SBSQ HOSP IP/OBS HIGH 50: CPT

## 2017-12-11 RX ORDER — CEFAZOLIN SODIUM 1 G
1000 VIAL (EA) INJECTION EVERY 8 HOURS
Qty: 0 | Refills: 0 | Status: DISCONTINUED | OUTPATIENT
Start: 2017-12-11 | End: 2017-12-14

## 2017-12-11 RX ORDER — WARFARIN SODIUM 2.5 MG/1
5 TABLET ORAL ONCE
Qty: 0 | Refills: 0 | Status: COMPLETED | OUTPATIENT
Start: 2017-12-11 | End: 2017-12-11

## 2017-12-11 RX ADMIN — Medication 250 MILLIGRAM(S): at 17:05

## 2017-12-11 RX ADMIN — Medication 8: at 11:59

## 2017-12-11 RX ADMIN — BUDESONIDE AND FORMOTEROL FUMARATE DIHYDRATE 2 PUFF(S): 160; 4.5 AEROSOL RESPIRATORY (INHALATION) at 09:10

## 2017-12-11 RX ADMIN — Medication 100 MILLIGRAM(S): at 13:49

## 2017-12-11 RX ADMIN — Medication 100 MILLIGRAM(S): at 05:52

## 2017-12-11 RX ADMIN — Medication 3 UNIT(S): at 11:59

## 2017-12-11 RX ADMIN — Medication 4: at 17:05

## 2017-12-11 RX ADMIN — Medication 300 MILLIGRAM(S): at 13:47

## 2017-12-11 RX ADMIN — CARVEDILOL PHOSPHATE 6.25 MILLIGRAM(S): 80 CAPSULE, EXTENDED RELEASE ORAL at 17:05

## 2017-12-11 RX ADMIN — Medication 250 MILLIGRAM(S): at 05:26

## 2017-12-11 RX ADMIN — WARFARIN SODIUM 5 MILLIGRAM(S): 2.5 TABLET ORAL at 21:49

## 2017-12-11 RX ADMIN — Medication 3 UNIT(S): at 08:20

## 2017-12-11 RX ADMIN — Medication: at 21:49

## 2017-12-11 RX ADMIN — Medication 100 MILLIGRAM(S): at 21:49

## 2017-12-11 RX ADMIN — ATORVASTATIN CALCIUM 40 MILLIGRAM(S): 80 TABLET, FILM COATED ORAL at 21:49

## 2017-12-11 RX ADMIN — Medication 50 MICROGRAM(S): at 05:26

## 2017-12-11 RX ADMIN — INSULIN GLARGINE 40 UNIT(S): 100 INJECTION, SOLUTION SUBCUTANEOUS at 08:20

## 2017-12-11 RX ADMIN — BUDESONIDE AND FORMOTEROL FUMARATE DIHYDRATE 2 PUFF(S): 160; 4.5 AEROSOL RESPIRATORY (INHALATION) at 20:11

## 2017-12-11 RX ADMIN — Medication 3 UNIT(S): at 17:05

## 2017-12-11 RX ADMIN — CARVEDILOL PHOSPHATE 6.25 MILLIGRAM(S): 80 CAPSULE, EXTENDED RELEASE ORAL at 05:26

## 2017-12-11 RX ADMIN — Medication 6: at 08:20

## 2017-12-11 RX ADMIN — OXYCODONE HYDROCHLORIDE 10 MILLIGRAM(S): 5 TABLET ORAL at 00:09

## 2017-12-11 RX ADMIN — Medication 5 MG/HR: at 13:49

## 2017-12-11 NOTE — PROGRESS NOTE ADULT - SUBJECTIVE AND OBJECTIVE BOX
Patient: MARY ANN CORNELL [id number] 67y Male                 Internal Medicine Hospitalist Progress Note - Dr. Willem Lundberg    Chief Complaint: Patient is a 67y old  Male who presents with a chief complaint of Fever / chills (08 Dec 2017 16:03)    HPI:  67y Male PMH obesity, CHRISS on CPAP, afib on coumadin, NICM w EF 20-25% s/p AICD, CKD, DM2, HTN, Chronic lymphedema, morbid obesity, sedentary lifestyle, prostate cancer in remission, recent hospitalization 1 month ago for LLE cellulitis, presents c/o recurrent shaking chills x 1 day, with RLE erythema.  Reports an almost identical presentation last month.  Denies subjective fevers.  Reports compliance with diet restriction and medication therapy.  States he feels "much better" after IVF in ED.  No additional complaints.  Pt notes prior episodes of itching and flushing during past hospitalization was due to morphine, not vancomycin.  Evaluated by nephrology due to hyponatremia and LE edema.  Started on Lasix gtt.      Seen with wife and daughter at bedside.  No fever /chills.  Still with some leg pain and swelling.  No SOB.  No additional complaints.     ____________________PHYSICAL EXAM:  Vitals reviewed as indicated below  GENERAL:  NAD Alert and Oriented x 3   HEENT: NCAT  CARDIOVASCULAR:  S1, S2  LUNGS: CTAB  ABDOMEN:  soft, (-) tenderness, (-) distension, (+) bowel sounds, (-) guarding, (-) rebound (-) rigidity  EXTREMITIES:  no cyanosis / clubbing.  + edema, continued RLE erythema, unchanged. erythema slightly improving.    ____________________    VITALS:  Vital Signs Last 24 Hrs  T(C): 36.4 (11 Dec 2017 09:28), Max: 37.1 (10 Dec 2017 15:50)  T(F): 97.6 (11 Dec 2017 09:28), Max: 98.7 (10 Dec 2017 15:50)  HR: 86 (11 Dec 2017 09:28) (63 - 96)  BP: 133/81 (11 Dec 2017 09:28) (110/63 - 133/81)  BP(mean): --  RR: 18 (11 Dec 2017 09:28) (18 - 18)  SpO2: 97% (11 Dec 2017 09:28) (95% - 100%) Daily     Daily   CAPILLARY BLOOD GLUCOSE      POCT Blood Glucose.: 339 mg/dL (11 Dec 2017 11:55)  POCT Blood Glucose.: 348 mg/dL (11 Dec 2017 11:54)  POCT Blood Glucose.: 285 mg/dL (11 Dec 2017 08:17)  POCT Blood Glucose.: 258 mg/dL (10 Dec 2017 21:46)  POCT Blood Glucose.: 330 mg/dL (10 Dec 2017 17:02)    I&O's Summary    10 Dec 2017 07:01  -  11 Dec 2017 07:00  --------------------------------------------------------  IN: 770 mL / OUT: 1750 mL / NET: -980 mL        LABS:                        14.6   5.9   )-----------( 105      ( 11 Dec 2017 07:47 )             43.7     12-11    137  |  95<L>  |  36.0<H>  ----------------------------<  298<H>  3.7   |  26.0  |  1.63<H>    Ca    8.5<L>      11 Dec 2017 07:47      PT/INR - ( 11 Dec 2017 07:47 )   PT: 21.8 sec;   INR: 1.95 ratio         PTT - ( 11 Dec 2017 07:47 )  PTT:34.0 sec            MEDICATIONS:  acetaminophen   Tablet 650 milliGRAM(s) Oral every 6 hours PRN  allopurinol 300 milliGRAM(s) Oral daily  atorvastatin 40 milliGRAM(s) Oral at bedtime  buDESOnide  80 MICROgram(s)/formoterol 4.5 MICROgram(s) Inhaler 2 Puff(s) Inhalation two times a day  carvedilol 6.25 milliGRAM(s) Oral every 12 hours  ceFAZolin   IVPB 1000 milliGRAM(s) IV Intermittent every 8 hours  dextrose 5%. 1000 milliLiter(s) IV Continuous <Continuous>  dextrose 50% Injectable 12.5 Gram(s) IV Push once  dextrose 50% Injectable 25 Gram(s) IV Push once  dextrose 50% Injectable 25 Gram(s) IV Push once  dextrose Gel 1 Dose(s) Oral once PRN  furosemide Infusion 10 mG/Hr IV Continuous <Continuous>  glucagon  Injectable 1 milliGRAM(s) IntraMuscular once PRN  insulin glargine Injectable (LANTUS) 40 Unit(s) SubCutaneous every morning  insulin lispro (HumaLOG) corrective regimen sliding scale   SubCutaneous three times a day before meals  insulin lispro (HumaLOG) corrective regimen sliding scale   SubCutaneous at bedtime  insulin lispro Injectable (HumaLOG) 3 Unit(s) SubCutaneous before breakfast  insulin lispro Injectable (HumaLOG) 3 Unit(s) SubCutaneous before lunch  insulin lispro Injectable (HumaLOG) 3 Unit(s) SubCutaneous before dinner  levothyroxine 50 MICROGram(s) Oral daily  oxyCODONE    IR 10 milliGRAM(s) Oral every 6 hours PRN  saccharomyces boulardii 250 milliGRAM(s) Oral two times a day  warfarin 5 milliGRAM(s) Oral once

## 2017-12-11 NOTE — CONSULT NOTE ADULT - SUBJECTIVE AND OBJECTIVE BOX
Chief Complaint: fever and chills/leg edema. HPI: 77 yo M admitted with 2 days of fever and chills and found to have cellulitis of right leg. (old records reviewed). He recently had left leg cellulitis. PMH+ for profound NICM with RV and LV failure (cathed 2016 with nl coronaries/MARIELA showed EF of 20+% with RV hypo/dilated atria and moderate MR and TR. PMH also + for chronic afib (on coumadin) AICD/hypothyroid/CRI/copd/iddm/morbid obesityosa/treated prostate ca. Allergy to MS. Non smoker since 1999. No etoh. ROS-sedentary/paris/chronic leg edema. Denies cp syncope/cva/seizre/heme/hepatic hx. OP meds of albuterol/zyloprim/lipitor/coreg 6.25 bid/coumadin/tricor/lasix 80 qd/synthroid/ramapril/symbicort.    PAST MEDICAL & SURGICAL HISTORY:  CHF (congestive heart failure)  Pulmonary hypertension  Prostate CA  Sleep apnea  Renal insufficiency  High cholesterol  HTN (hypertension)  DM (diabetes mellitus)  AICD (automatic cardioverter/defibrillator) present  S/P ankle ligament repair      PREVIOUS DIAGNOSTIC TESTING:      ECHO  FINDINGS: see above    STRESS  FINDINGS:    CATHETERIZATION  FINDINGS: see above    MEDICATIONS  (STANDING):  allopurinol 300 milliGRAM(s) Oral daily  atorvastatin 40 milliGRAM(s) Oral at bedtime  buDESOnide  80 MICROgram(s)/formoterol 4.5 MICROgram(s) Inhaler 2 Puff(s) Inhalation two times a day  carvedilol 6.25 milliGRAM(s) Oral every 12 hours  ceFAZolin   IVPB 1000 milliGRAM(s) IV Intermittent every 8 hours  dextrose 5%. 1000 milliLiter(s) (50 mL/Hr) IV Continuous <Continuous>  dextrose 50% Injectable 12.5 Gram(s) IV Push once  dextrose 50% Injectable 25 Gram(s) IV Push once  dextrose 50% Injectable 25 Gram(s) IV Push once  furosemide Infusion 10 mG/Hr (5 mL/Hr) IV Continuous <Continuous>  insulin glargine Injectable (LANTUS) 40 Unit(s) SubCutaneous every morning  insulin lispro (HumaLOG) corrective regimen sliding scale   SubCutaneous three times a day before meals  insulin lispro (HumaLOG) corrective regimen sliding scale   SubCutaneous at bedtime  insulin lispro Injectable (HumaLOG) 3 Unit(s) SubCutaneous before breakfast  insulin lispro Injectable (HumaLOG) 3 Unit(s) SubCutaneous before lunch  insulin lispro Injectable (HumaLOG) 3 Unit(s) SubCutaneous before dinner  levothyroxine 50 MICROGram(s) Oral daily  saccharomyces boulardii 250 milliGRAM(s) Oral two times a day  warfarin 5 milliGRAM(s) Oral once    MEDICATIONS  (PRN):  acetaminophen   Tablet 650 milliGRAM(s) Oral every 6 hours PRN Mild pain or temp > 100.4  dextrose Gel 1 Dose(s) Oral once PRN Blood Glucose LESS THAN 70 milliGRAM(s)/deciliter  glucagon  Injectable 1 milliGRAM(s) IntraMuscular once PRN Glucose LESS THAN 70 milligrams/deciliter  oxyCODONE    IR 10 milliGRAM(s) Oral every 6 hours PRN moderate to severe pain      FAMILY HISTORY:  Family history of diabetes mellitus (Sibling)  Family history of cancer (Sibling)      SOCIAL HISTORY: retired and     CIGARETTES: 0    ALCOHOL: 0    ROS: Negative other than as mentioned in HPI.    Vital Signs Last 24 Hrs  T(C): 36.4 (11 Dec 2017 09:28), Max: 37.1 (10 Dec 2017 15:50)  T(F): 97.6 (11 Dec 2017 09:28), Max: 98.7 (10 Dec 2017 15:50)  HR: 86 (11 Dec 2017 09:28) (63 - 96)  BP: 105 systolic ra manually (11 Dec 2017 09:28) (110/63 - 133/81)  BP(mean): --  RR: 16 (11 Dec 2017 09:28) (18 - 18) flat and non labored  SpO2: 97% (11 Dec 2017 09:28) (95% - 100%)    PHYSICAL EXAM:  General: Appears well developed, well nourished alert and cooperative. Morbidly obese afebrile male nad lying flat in bed.  HEENT: Head; normocephalic, atraumatic.  Eyes;   Pupils reactive, cornea wnl.  Neck; Supple, no nodes adenopathy, no NVD or carotid bruit or thyromegaly.  CARDIOVASCULAR; No murmur, rub, gallop or lift. Normal S1 and S2. Distant heart tones  LUNGS; No rales, rhonchi or wheeze. Normal breath sounds bilaterally.  ABDOMEN ; Soft, nontender without mass or organomegaly. bowel sounds normoactive. obese  EXTREMITIES; chronic looking edema and venous stasis w varicosities. right leg is erythematous..  SKIN; warm and dry with normal turgor.  NEURO; Alert/oriented x 3/normal motor exam. No pathologic reflexes.    PSYCH; normal affect.            INTERPRETATION OF TELEMETRY:    ECG: rapid afib and low voltage  cxr cardiomeg  I&O's Detail    10 Dec 2017 07:01  -  11 Dec 2017 07:00  --------------------------------------------------------  IN:    furosemide Infusion: 60 mL    IV PiggyBack: 50 mL    Oral Fluid: 660 mL  Total IN: 770 mL    OUT:    Voided: 1750 mL  Total OUT: 1750 mL    Total NET: -980 mL          LABS:                        14.6   5.9   )-----------( 105      ( 11 Dec 2017 07:47 )             43.7     12-11    137  |  95<L>  |  36.0<H>  ----------------------------<  298<H>  3.7   |  26.0  |  1.63<H>    Ca    8.5<L>      11 Dec 2017 07:47          PT/INR - ( 11 Dec 2017 07:47 )   PT: 21.8 sec;   INR: 1.95 ratio         PTT - ( 11 Dec 2017 07:47 )  PTT:34.0 sec    I&O's Summary    10 Dec 2017 07:01  -  11 Dec 2017 07:00  --------------------------------------------------------  IN: 770 mL / OUT: 1750 mL / NET: -980 mL        RADIOLOGY & ADDITIONAL STUDIES:

## 2017-12-11 NOTE — PROGRESS NOTE ADULT - SUBJECTIVE AND OBJECTIVE BOX
MARY ANN CORNELL is a 67y Male with HPI:  67y Male PMH obesity, CHRISS on CPAP, afib on coumadin, NICM w EF 20-25% s/p AICD, CKD, DM2, HTN, Chronic lymphedema, morbid obesity, sedentary lifestyle, prostate cancer in remission, recent hospitalization 1 month ago for LLE cellulitis, presents c/o recurrent shaking chills x 1 day, with RLE erythema.  Reports an almost identical presentation last month.  Denies subjective fevers.  Reports compliance with diet restriction and medication therapy.  States he feels "much better" after IVF in ED.  No additional complaints. Noted to have itching and flushing after vancomycin on prior hospitalization  PT ON IV KEFZOL FOR CELLULITIS WITH STASIS DERMATITIS.      FAMILY HISTORY: reviewed and negative.  SOCIAL HISTORY: - alcohol, - IVDA, + smoking quit 1990s.   REVIEW OF SYSTEMS: General: - fatigue, - weight loss, - fevers, + chills.  HEENT: - headache, - hearing disturbances.  EYES: - visual disturbances, - diplopia.  CARDIOVASCULAR: - chest pain, - palpitations.  PULMONARY: - SOB, - cough, - hemoptysis.  GI: - abdominal pain, - nausea, - vomiting, - diarrhea, - constipation.  : - urinary urgency, - frequency, - dysuria.  MUSCULOSKELETAL: - arthralgias, - myalgias.  NEURO:  - weakness, - numbness.  PSYCH: - depression, - anxiety, - suicidal thoughts. SKIN: - rashes, - lesions.  Allergies    No Known Allergies    Intolerances (08 Dec 2017 16:03)        Allergies:  No Known Allergies      Medications:  acetaminophen   Tablet 650 milliGRAM(s) Oral every 6 hours PRN  allopurinol 300 milliGRAM(s) Oral daily  atorvastatin 40 milliGRAM(s) Oral at bedtime  buDESOnide  80 MICROgram(s)/formoterol 4.5 MICROgram(s) Inhaler 2 Puff(s) Inhalation two times a day  carvedilol 6.25 milliGRAM(s) Oral every 12 hours  ceFAZolin   IVPB 1000 milliGRAM(s) IV Intermittent every 8 hours  dextrose 5%. 1000 milliLiter(s) IV Continuous <Continuous>  dextrose 50% Injectable 12.5 Gram(s) IV Push once  dextrose 50% Injectable 25 Gram(s) IV Push once  dextrose 50% Injectable 25 Gram(s) IV Push once  dextrose Gel 1 Dose(s) Oral once PRN  furosemide Infusion 10 mG/Hr IV Continuous <Continuous>  glucagon  Injectable 1 milliGRAM(s) IntraMuscular once PRN  insulin glargine Injectable (LANTUS) 40 Unit(s) SubCutaneous every morning  insulin lispro (HumaLOG) corrective regimen sliding scale   SubCutaneous three times a day before meals  insulin lispro (HumaLOG) corrective regimen sliding scale   SubCutaneous at bedtime  insulin lispro Injectable (HumaLOG) 3 Unit(s) SubCutaneous before breakfast  insulin lispro Injectable (HumaLOG) 3 Unit(s) SubCutaneous before lunch  insulin lispro Injectable (HumaLOG) 3 Unit(s) SubCutaneous before dinner  levothyroxine 50 MICROGram(s) Oral daily  oxyCODONE    IR 10 milliGRAM(s) Oral every 6 hours PRN  saccharomyces boulardii 250 milliGRAM(s) Oral two times a day  warfarin 5 milliGRAM(s) Oral once      ANTIBIOTICS: KEFZOL        Review of Systems: - Negative except as mentioned above     Physical Exam:  ICU Vital Signs Last 24 Hrs  T(C): 36.4 (11 Dec 2017 09:28), Max: 37.1 (10 Dec 2017 15:50)  T(F): 97.6 (11 Dec 2017 09:28), Max: 98.7 (10 Dec 2017 15:50)  HR: 86 (11 Dec 2017 09:28) (63 - 96)  BP: 133/81 (11 Dec 2017 09:28) (110/63 - 133/81)  BP(mean): --  ABP: --  ABP(mean): --  RR: 18 (11 Dec 2017 09:28) (18 - 18)  SpO2: 97% (11 Dec 2017 09:28) (95% - 100%)    GEN: NAD, pleasant  HEENT: normocephalic and atraumatic. EOMI. DANA...  NECK: Supple. No carotid bruits.  No lymphadenopathy or thyromegaly.  LUNGS: Clear to auscultation.  HEART: Regular rate and rhythm without murmur.  ABDOMEN: Soft, nontender, and nondistended.  Positive bowel sounds.  No hepatosplenomegaly was noted.  NO REBOUND NO GUARDING  EXTREMITIES: Without any cyanosis, clubbing, rash, lesions or edema.  NEUROLOGIC: Cranial nerves II through XII are grossly intact.    SKIN: No ulceration or induration present.      Labs: MARY ANN CORNELL is a 67y Male with HPI:  67y Male PMH obesity, CHRISS on CPAP, afib on coumadin, NICM w EF 20-25% s/p AICD, CKD, DM2, HTN, Chronic lymphedema, morbid obesity, sedentary lifestyle, prostate cancer in remission, recent hospitalization 1 month ago for LLE cellulitis, presents c/o recurrent shaking chills x 1 day, with RLE erythema.  Reports an almost identical presentation last month.  Denies subjective fevers.  Reports compliance with diet restriction and medication therapy.  States he feels "much better" after IVF in ED.  No additional complaints. Noted to have itching and flushing after vancomycin on prior hospitalization  PT ON IV KEFZOL FOR CELLULITIS WITH STASIS DERMATITIS.      FAMILY HISTORY: reviewed and negative.  SOCIAL HISTORY: - alcohol, - IVDA, + smoking quit 1990s.   REVIEW OF SYSTEMS: General: - fatigue, - weight loss, - fevers, + chills.  HEENT: - headache, - hearing disturbances.  EYES: - visual disturbances, - diplopia.  CARDIOVASCULAR: - chest pain, - palpitations.  PULMONARY: - SOB, - cough, - hemoptysis.  GI: - abdominal pain, - nausea, - vomiting, - diarrhea, - constipation.  : - urinary urgency, - frequency, - dysuria.  MUSCULOSKELETAL: - arthralgias, - myalgias.  NEURO:  - weakness, - numbness.  PSYCH: - depression, - anxiety, - suicidal thoughts. SKIN: - rashes, - lesions.  Allergies    No Known Allergies    Intolerances (08 Dec 2017 16:03)        Allergies:  No Known Allergies      Medications:  acetaminophen   Tablet 650 milliGRAM(s) Oral every 6 hours PRN  allopurinol 300 milliGRAM(s) Oral daily  atorvastatin 40 milliGRAM(s) Oral at bedtime  buDESOnide  80 MICROgram(s)/formoterol 4.5 MICROgram(s) Inhaler 2 Puff(s) Inhalation two times a day  carvedilol 6.25 milliGRAM(s) Oral every 12 hours  ceFAZolin   IVPB 1000 milliGRAM(s) IV Intermittent every 8 hours  dextrose 5%. 1000 milliLiter(s) IV Continuous <Continuous>  dextrose 50% Injectable 12.5 Gram(s) IV Push once  dextrose 50% Injectable 25 Gram(s) IV Push once  dextrose 50% Injectable 25 Gram(s) IV Push once  dextrose Gel 1 Dose(s) Oral once PRN  furosemide Infusion 10 mG/Hr IV Continuous <Continuous>  glucagon  Injectable 1 milliGRAM(s) IntraMuscular once PRN  insulin glargine Injectable (LANTUS) 40 Unit(s) SubCutaneous every morning  insulin lispro (HumaLOG) corrective regimen sliding scale   SubCutaneous three times a day before meals  insulin lispro (HumaLOG) corrective regimen sliding scale   SubCutaneous at bedtime  insulin lispro Injectable (HumaLOG) 3 Unit(s) SubCutaneous before breakfast  insulin lispro Injectable (HumaLOG) 3 Unit(s) SubCutaneous before lunch  insulin lispro Injectable (HumaLOG) 3 Unit(s) SubCutaneous before dinner  levothyroxine 50 MICROGram(s) Oral daily  oxyCODONE    IR 10 milliGRAM(s) Oral every 6 hours PRN  saccharomyces boulardii 250 milliGRAM(s) Oral two times a day  warfarin 5 milliGRAM(s) Oral once      ANTIBIOTICS: KEFZOL        Review of Systems: - Negative except as mentioned above     Physical Exam:  ICU Vital Signs Last 24 Hrs  T(C): 36.4 (11 Dec 2017 09:28), Max: 37.1 (10 Dec 2017 15:50)  T(F): 97.6 (11 Dec 2017 09:28), Max: 98.7 (10 Dec 2017 15:50)  HR: 86 (11 Dec 2017 09:28) (63 - 96)  BP: 133/81 (11 Dec 2017 09:28) (110/63 - 133/81)  BP(mean): --  ABP: --  ABP(mean): --  RR: 18 (11 Dec 2017 09:28) (18 - 18)  SpO2: 97% (11 Dec 2017 09:28) (95% - 100%)    GEN: NAD, pleasant  HEENT: normocephalic and atraumatic. EOMI. DANA...  NECK: Supple. No carotid bruits.  No lymphadenopathy or thyromegaly.  LUNGS: Clear to auscultation.  HEART: Regular rate and rhythm without murmur.  ABDOMEN: Soft, nontender, and nondistended.  Positive bowel sounds.  No hepatosplenomegaly was noted.  NO REBOUND NO GUARDING  EXTREMITIES: BILATERAL EDEMA AND ERYTHEMA AND STASIS CHANGES BILATERALLY SMALL AREA OF WEEPING RIGHT LEG  NEUROLOGIC: Cranial nerves II through XII are grossly intact.    SKIN: No ulceration or induration present.      Labs:

## 2017-12-11 NOTE — CONSULT NOTE ADULT - ASSESSMENT
> Sepsis due to RLE Cellulitis - pt with prior reaction to Vancomycin, unclear allergy.  Started on Zosyn.  Monitor Lactate.    > JAYLENE / CKD Baseline Cr ~ 1.4 mg.,   > Afib - rate controlled. On Coumadin.   > Chronic Systolic CHF - Resume diuretics.  Monitor I&Os.    > Diabetes - monitor fingersticks.  Insulin coverage for hyperglycemia.  Continue Lantus at lower dose, add pre meal insulin  > Hypothyroidism - continue Synthroid.  > CHRISS - continue home CPAP.
1. morbidly obese middle aged male admitted with cellulitis of right leg-on kefzol  2. NICM with EF 20+% and biventricular dysfunction. Moderate MR and TR. On lasix drip  3. chronic Afib-on coumadin  4. AICD  5. IDDM  6. CRI-likely secondary to diabetes    7. CHRISS on cpap.  8. Hypothyroidism.
THIS 67 M WITH DM, HERE FOR RIGHT LEG CELLULITIS, WITH BACKGROUND OF CHRONIC VENOUS STATIS.     CELLULITIS NO FURUNCLES

## 2017-12-11 NOTE — PROGRESS NOTE ADULT - SUBJECTIVE AND OBJECTIVE BOX
[facility] DIVISION OF KIDNEY DISEASES AND HYPERTENSION -- FOLLOW UP NOTE  --------------------------------------------------------------------------------  Chief Complaint:   Hypervolemic hyponatremia    24 hour events/subjective:  Pt seen and examined with Dr. Vidal today  on Lasix gtt  diuresing well  Comfortable - no complaints  NAD        PAST HISTORY  --------------------------------------------------------------------------------  No significant changes to PMH, PSH, FHx, SHx, unless otherwise noted    ALLERGIES & MEDICATIONS  --------------------------------------------------------------------------------  Allergies    No Known Allergies    Intolerances      Standing Inpatient Medications  allopurinol 300 milliGRAM(s) Oral daily  atorvastatin 40 milliGRAM(s) Oral at bedtime  buDESOnide  80 MICROgram(s)/formoterol 4.5 MICROgram(s) Inhaler 2 Puff(s) Inhalation two times a day  carvedilol 6.25 milliGRAM(s) Oral every 12 hours  ceFAZolin   IVPB      ceFAZolin   IVPB 500 milliGRAM(s) IV Intermittent every 8 hours  dextrose 5%. 1000 milliLiter(s) IV Continuous <Continuous>  dextrose 50% Injectable 12.5 Gram(s) IV Push once  dextrose 50% Injectable 25 Gram(s) IV Push once  dextrose 50% Injectable 25 Gram(s) IV Push once  furosemide Infusion 10 mG/Hr IV Continuous <Continuous>  insulin glargine Injectable (LANTUS) 40 Unit(s) SubCutaneous every morning  insulin lispro (HumaLOG) corrective regimen sliding scale   SubCutaneous three times a day before meals  insulin lispro (HumaLOG) corrective regimen sliding scale   SubCutaneous at bedtime  insulin lispro Injectable (HumaLOG) 3 Unit(s) SubCutaneous before breakfast  insulin lispro Injectable (HumaLOG) 3 Unit(s) SubCutaneous before lunch  insulin lispro Injectable (HumaLOG) 3 Unit(s) SubCutaneous before dinner  levothyroxine 50 MICROGram(s) Oral daily  saccharomyces boulardii 250 milliGRAM(s) Oral two times a day    PRN Inpatient Medications  acetaminophen   Tablet 650 milliGRAM(s) Oral every 6 hours PRN  dextrose Gel 1 Dose(s) Oral once PRN  glucagon  Injectable 1 milliGRAM(s) IntraMuscular once PRN  oxyCODONE    IR 10 milliGRAM(s) Oral every 6 hours PRN      REVIEW OF SYSTEMS  --------------------------------------------------------------------------------  Gen: No weight changes, fatigue, fevers/chills, weakness  Skin: No rashes  Head/Eyes/Ears/Mouth: No headache; Normal hearing; Normal vision w/o blurriness; No sinus pain/discomfort, sore throat  Respiratory: No dyspnea, cough, wheezing, hemoptysis  CV: No chest pain, PND, orthopnea  GI: No abdominal pain, diarrhea, constipation, nausea, vomiting, melena, hematochezia  : No increased frequency, dysuria, hematuria, nocturia  MSK: No joint pain/swelling; no back pain; no edema  Neuro: No dizziness/lightheadedness, weakness, seizures, numbness, tingling  Heme: No easy bruising or bleeding  Endo: No heat/cold intolerance  Psych: No significant nervousness, anxiety, stress, depression    All other systems were reviewed and are negative, except as noted.    VITALS/PHYSICAL EXAM  --------------------------------------------------------------------------------  T(C): 36.4 (12-11-17 @ 09:28), Max: 37.1 (12-10-17 @ 15:50)  HR: 86 (12-11-17 @ 09:28) (63 - 96)  BP: 133/81 (12-11-17 @ 09:28) (110/63 - 133/81)  RR: 18 (12-11-17 @ 09:28) (18 - 18)  SpO2: 97% (12-11-17 @ 09:28) (95% - 100%)  Wt(kg): --        12-10-17 @ 07:01  -  12-11-17 @ 07:00  --------------------------------------------------------  IN: 770 mL / OUT: 1750 mL / NET: -980 mL      Physical Exam:  GENERAL: NAD, well-groomed, well-developed  HEAD:  Atraumatic, Normocephalic  EYES: EOMI, PERRLA, conjunctiva and sclera clear  NECK: Supple, No JVD, Normal thyroid  NERVOUS SYSTEM:  Alert & Oriented X3, Good concentration; Motor Strength 5/5 B/L upper and lower extremities; DTRs 2+ intact and symmetric  CHEST/LUNG: Clear to percussion bilaterally; No rales, rhonchi, wheezing, or rubs  HEART: Regular rate and rhythm; No murmurs, rubs, or gallops  ABDOMEN: Soft, Nontender, Nondistended; Bowel sounds present  EXTREMITIES:  2+ Peripheral Pulses, No clubbing, cyanosis, or edema  LYMPH: No lymphadenopathy noted  SKIN: No rashes or lesions    LABS/STUDIES  --------------------------------------------------------------------------------              14.6   5.9   >-----------<  105      [12-11-17 @ 07:47]              43.7     137  |  95  |  36.0  ----------------------------<  298      [12-11-17 @ 07:47]  3.7   |  26.0  |  1.63        Ca     8.5     [12-11-17 @ 07:47]      PT/INR: PT 21.8 , INR 1.95       [12-11-17 @ 07:47]  PTT: 34.0       [12-11-17 @ 07:47]      Creatinine Trend:  SCr 1.63 [12-11 @ 07:47]  SCr 1.86 [12-10 @ 07:23]  SCr 1.79 [12-09 @ 07:19]  SCr 1.71 [12-08 @ 13:44]      Urine Creatinine 151      [12-09-17 @ 18:02]  Urine Protein 19.0      [12-09-17 @ 18:02]  Urine Sodium <30      [12-09-17 @ 18:02]  Urine Osmolality 668      [12-09-17 @ 18:03]    HbA1c 10.5      [10-26-17 @ 06:57]    RADIOLOGY:    EXAM:  US KIDNEYS AND BLADDER                          PROCEDURE DATE:  12/10/2017        INTERPRETATION:  CLINICAL INFORMATION: Chronic kidney disease, renal   failure.    TECHNIQUE: Sonographic evaluation of the kidneys and bladder was   performed. Grayscale and color Doppler images were acquired.    COMPARISON: None.    FINDINGS:  Technically difficult study due to patient's body habitus.  Right kidney: 10.9 x 6.5 x 5.4 No hydronephrosis.  Left kidney: 10.6 x 5.8 x 4.8 cm. Nonobstructing echogenic focus in the   renal parenchyma without shadowing in the interpolar region, measuring   0.6 x 0.2 x 0.7 cm. No hydronephrosis.   Urinary bladder: Underdistended.    IMPRESSION:    No hydronephrosis of either kidney.    HADLEY SIMPSON M.D., ATTENDING RADIOLOGIST  This document has been electronically signed. Dec 10 2017 10:47AM

## 2017-12-12 LAB
ANION GAP SERPL CALC-SCNC: 15 MMOL/L — SIGNIFICANT CHANGE UP (ref 5–17)
APTT BLD: 40.3 SEC — HIGH (ref 27.5–37.4)
BUN SERPL-MCNC: 32 MG/DL — HIGH (ref 8–20)
CALCIUM SERPL-MCNC: 9.1 MG/DL — SIGNIFICANT CHANGE UP (ref 8.6–10.2)
CHLORIDE SERPL-SCNC: 93 MMOL/L — LOW (ref 98–107)
CO2 SERPL-SCNC: 28 MMOL/L — SIGNIFICANT CHANGE UP (ref 22–29)
CREAT SERPL-MCNC: 1.56 MG/DL — HIGH (ref 0.5–1.3)
GLUCOSE BLDC GLUCOMTR-MCNC: 198 MG/DL — HIGH (ref 70–99)
GLUCOSE BLDC GLUCOMTR-MCNC: 269 MG/DL — HIGH (ref 70–99)
GLUCOSE BLDC GLUCOMTR-MCNC: 278 MG/DL — HIGH (ref 70–99)
GLUCOSE BLDC GLUCOMTR-MCNC: 311 MG/DL — HIGH (ref 70–99)
GLUCOSE SERPL-MCNC: 289 MG/DL — HIGH (ref 70–115)
HCT VFR BLD CALC: 45.7 % — SIGNIFICANT CHANGE UP (ref 42–52)
HGB BLD-MCNC: 14.9 G/DL — SIGNIFICANT CHANGE UP (ref 14–18)
INR BLD: 3.04 RATIO — HIGH (ref 0.88–1.16)
MCHC RBC-ENTMCNC: 28.4 PG — SIGNIFICANT CHANGE UP (ref 27–31)
MCHC RBC-ENTMCNC: 32.6 G/DL — SIGNIFICANT CHANGE UP (ref 32–36)
MCV RBC AUTO: 87.2 FL — SIGNIFICANT CHANGE UP (ref 80–94)
PLATELET # BLD AUTO: 135 K/UL — LOW (ref 150–400)
POTASSIUM SERPL-MCNC: 3.6 MMOL/L — SIGNIFICANT CHANGE UP (ref 3.5–5.3)
POTASSIUM SERPL-SCNC: 3.6 MMOL/L — SIGNIFICANT CHANGE UP (ref 3.5–5.3)
PROTHROM AB SERPL-ACNC: 34.2 SEC — HIGH (ref 9.8–12.7)
RBC # BLD: 5.24 M/UL — SIGNIFICANT CHANGE UP (ref 4.6–6.2)
RBC # FLD: 16.2 % — HIGH (ref 11–15.6)
SODIUM SERPL-SCNC: 136 MMOL/L — SIGNIFICANT CHANGE UP (ref 135–145)
WBC # BLD: 4.9 K/UL — SIGNIFICANT CHANGE UP (ref 4.8–10.8)
WBC # FLD AUTO: 4.9 K/UL — SIGNIFICANT CHANGE UP (ref 4.8–10.8)

## 2017-12-12 PROCEDURE — 76937 US GUIDE VASCULAR ACCESS: CPT | Mod: 26

## 2017-12-12 PROCEDURE — 99233 SBSQ HOSP IP/OBS HIGH 50: CPT

## 2017-12-12 PROCEDURE — 36000 PLACE NEEDLE IN VEIN: CPT

## 2017-12-12 RX ORDER — INSULIN GLARGINE 100 [IU]/ML
10 INJECTION, SOLUTION SUBCUTANEOUS AT BEDTIME
Qty: 0 | Refills: 0 | Status: DISCONTINUED | OUTPATIENT
Start: 2017-12-12 | End: 2017-12-14

## 2017-12-12 RX ORDER — FUROSEMIDE 40 MG
80 TABLET ORAL EVERY 12 HOURS
Qty: 0 | Refills: 0 | Status: DISCONTINUED | OUTPATIENT
Start: 2017-12-12 | End: 2017-12-14

## 2017-12-12 RX ORDER — NYSTATIN CREAM 100000 [USP'U]/G
1 CREAM TOPICAL
Qty: 0 | Refills: 0 | Status: DISCONTINUED | OUTPATIENT
Start: 2017-12-12 | End: 2017-12-14

## 2017-12-12 RX ORDER — WARFARIN SODIUM 2.5 MG/1
2 TABLET ORAL ONCE
Qty: 0 | Refills: 0 | Status: COMPLETED | OUTPATIENT
Start: 2017-12-12 | End: 2017-12-12

## 2017-12-12 RX ORDER — INSULIN LISPRO 100/ML
10 VIAL (ML) SUBCUTANEOUS
Qty: 0 | Refills: 0 | Status: DISCONTINUED | OUTPATIENT
Start: 2017-12-12 | End: 2017-12-14

## 2017-12-12 RX ADMIN — ATORVASTATIN CALCIUM 40 MILLIGRAM(S): 80 TABLET, FILM COATED ORAL at 22:12

## 2017-12-12 RX ADMIN — CARVEDILOL PHOSPHATE 6.25 MILLIGRAM(S): 80 CAPSULE, EXTENDED RELEASE ORAL at 05:42

## 2017-12-12 RX ADMIN — Medication 300 MILLIGRAM(S): at 11:57

## 2017-12-12 RX ADMIN — Medication 100 MILLIGRAM(S): at 05:41

## 2017-12-12 RX ADMIN — Medication 100 MILLIGRAM(S): at 22:13

## 2017-12-12 RX ADMIN — INSULIN GLARGINE 10 UNIT(S): 100 INJECTION, SOLUTION SUBCUTANEOUS at 22:16

## 2017-12-12 RX ADMIN — Medication 6: at 11:57

## 2017-12-12 RX ADMIN — Medication 10 UNIT(S): at 17:45

## 2017-12-12 RX ADMIN — Medication 80 MILLIGRAM(S): at 18:11

## 2017-12-12 RX ADMIN — Medication 100 MILLIGRAM(S): at 15:12

## 2017-12-12 RX ADMIN — NYSTATIN CREAM 1 APPLICATION(S): 100000 CREAM TOPICAL at 17:45

## 2017-12-12 RX ADMIN — OXYCODONE HYDROCHLORIDE 10 MILLIGRAM(S): 5 TABLET ORAL at 01:16

## 2017-12-12 RX ADMIN — Medication 50 MICROGRAM(S): at 05:42

## 2017-12-12 RX ADMIN — CARVEDILOL PHOSPHATE 6.25 MILLIGRAM(S): 80 CAPSULE, EXTENDED RELEASE ORAL at 17:44

## 2017-12-12 RX ADMIN — Medication 3 UNIT(S): at 09:19

## 2017-12-12 RX ADMIN — OXYCODONE HYDROCHLORIDE 10 MILLIGRAM(S): 5 TABLET ORAL at 01:52

## 2017-12-12 RX ADMIN — Medication 250 MILLIGRAM(S): at 17:45

## 2017-12-12 RX ADMIN — Medication 10 UNIT(S): at 11:57

## 2017-12-12 RX ADMIN — Medication 2: at 17:46

## 2017-12-12 RX ADMIN — INSULIN GLARGINE 40 UNIT(S): 100 INJECTION, SOLUTION SUBCUTANEOUS at 09:19

## 2017-12-12 RX ADMIN — Medication 2: at 22:17

## 2017-12-12 RX ADMIN — WARFARIN SODIUM 2 MILLIGRAM(S): 2.5 TABLET ORAL at 22:12

## 2017-12-12 RX ADMIN — BUDESONIDE AND FORMOTEROL FUMARATE DIHYDRATE 2 PUFF(S): 160; 4.5 AEROSOL RESPIRATORY (INHALATION) at 20:03

## 2017-12-12 RX ADMIN — BUDESONIDE AND FORMOTEROL FUMARATE DIHYDRATE 2 PUFF(S): 160; 4.5 AEROSOL RESPIRATORY (INHALATION) at 08:49

## 2017-12-12 RX ADMIN — Medication 6: at 09:20

## 2017-12-12 RX ADMIN — Medication 250 MILLIGRAM(S): at 05:42

## 2017-12-12 NOTE — PROCEDURE NOTE - PROCEDURE
<<-----Click on this checkbox to enter Procedure Peripheral intravenous catheter assessment  12/12/2017  18G  1.75"  IV  ns flush good heme back left cephalic vein  Active  JSTEELE  Vascular access with ultrasound guidance  12/12/2017  patent left cephalic vein  Active  JSTEELE

## 2017-12-12 NOTE — PROGRESS NOTE ADULT - SUBJECTIVE AND OBJECTIVE BOX
MARY ANN CORNELL is a 67y Male with HPI:  67y Male PMH obesity, CHRISS on CPAP, afib on coumadin, NICM w EF 20-25% s/p AICD, CKD, DM2, HTN, Chronic lymphedema, morbid obesity, sedentary lifestyle, prostate cancer in remission, recent hospitalization 1 month ago for LLE cellulitis, presents c/o recurrent shaking chills x 1 day, with RLE erythema.  Reports an almost identical presentation last month.  Denies subjective fevers.  Reports compliance with diet restriction and medication therapy.  States he feels "much better" after IVF in ED.  No additional complaints. Noted to have itching and flushing after vancomycin on prior hospitalization  PT ON IV KEFZOL FOR CELLULITIS WITH STASIS DERMATITIS.    CLINICALLY IMPROVED    FAMILY HISTORY: reviewed and negative.  SOCIAL HISTORY: - alcohol, - IVDA, + smoking quit 1990s.   REVIEW OF SYSTEMS: General: - fatigue, - weight loss, - fevers, + chills.  HEENT: - headache, - hearing disturbances.  EYES: - visual disturbances, - diplopia.  CARDIOVASCULAR: - chest pain, - palpitations.  PULMONARY: - SOB, - cough, - hemoptysis.  GI: - abdominal pain, - nausea, - vomiting, - diarrhea, - constipation.  : - urinary urgency, - frequency, - dysuria.  MUSCULOSKELETAL: - arthralgias, - myalgias.  NEURO:  - weakness, - numbness.  PSYCH: - depression, - anxiety, - suicidal thoughts. SKIN: - rashes, - lesions.  Allergies    No Known Allergies    Intolerances (08 Dec 2017 16:03)        Allergies:  No Known Allergies      Medications:  acetaminophen   Tablet 650 milliGRAM(s) Oral every 6 hours PRN  allopurinol 300 milliGRAM(s) Oral daily  atorvastatin 40 milliGRAM(s) Oral at bedtime  buDESOnide  80 MICROgram(s)/formoterol 4.5 MICROgram(s) Inhaler 2 Puff(s) Inhalation two times a day  carvedilol 6.25 milliGRAM(s) Oral every 12 hours  ceFAZolin   IVPB 1000 milliGRAM(s) IV Intermittent every 8 hours  dextrose 5%. 1000 milliLiter(s) IV Continuous <Continuous>  dextrose 50% Injectable 12.5 Gram(s) IV Push once  dextrose 50% Injectable 25 Gram(s) IV Push once  dextrose 50% Injectable 25 Gram(s) IV Push once  dextrose Gel 1 Dose(s) Oral once PRN  furosemide Infusion 10 mG/Hr IV Continuous <Continuous>  glucagon  Injectable 1 milliGRAM(s) IntraMuscular once PRN  insulin glargine Injectable (LANTUS) 40 Unit(s) SubCutaneous every morning  insulin lispro (HumaLOG) corrective regimen sliding scale   SubCutaneous three times a day before meals  insulin lispro (HumaLOG) corrective regimen sliding scale   SubCutaneous at bedtime  insulin lispro Injectable (HumaLOG) 3 Unit(s) SubCutaneous before breakfast  insulin lispro Injectable (HumaLOG) 3 Unit(s) SubCutaneous before lunch  insulin lispro Injectable (HumaLOG) 3 Unit(s) SubCutaneous before dinner  levothyroxine 50 MICROGram(s) Oral daily  oxyCODONE    IR 10 milliGRAM(s) Oral every 6 hours PRN  saccharomyces boulardii 250 milliGRAM(s) Oral two times a day  warfarin 5 milliGRAM(s) Oral once      ANTIBIOTICS: KEFZOL        Review of Systems: - Negative except as mentioned above     Physical Exam:   Vital Signs Last 24 Hrs  T(C): 36.4 (12 Dec 2017 09:44), Max: 36.8 (11 Dec 2017 16:23)  T(F): 97.5 (12 Dec 2017 09:44), Max: 98.3 (11 Dec 2017 16:23)  HR: 50 (12 Dec 2017 09:44) (50 - 90)  BP: 137/88 (12 Dec 2017 09:44) (102/67 - 137/88)  BP(mean): --  RR: 18 (12 Dec 2017 09:44) (18 - 20)  SpO2: 97% (12 Dec 2017 09:44) (93% - 99%)    GEN: NAD, pleasant  HEENT: normocephalic and atraumatic. EOMI. DANA...  NECK: Supple. No carotid bruits.  No lymphadenopathy or thyromegaly.  LUNGS: Clear to auscultation.  HEART: Regular rate and rhythm without murmur.  ABDOMEN: Soft, nontender, and nondistended.  Positive bowel sounds.  No hepatosplenomegaly was noted.  NO REBOUND NO GUARDING  EXTREMITIES: DECREASED EDEMA AND ERYTHEMA  NEUROLOGIC: Cranial nerves II through XII are grossly intact.    SKIN: No ulceration or induration present.      Labs:

## 2017-12-12 NOTE — PROGRESS NOTE ADULT - SUBJECTIVE AND OBJECTIVE BOX
Patient: MARY ANN CORNELL [id number] 67y Male                 Internal Medicine Hospitalist Progress Note - Dr. Willem Lundberg    Chief Complaint: Patient is a 67y old  Male who presents with a chief complaint of Fever / chills (08 Dec 2017 16:03)    HPI:  67y Male PMH obesity, CHRISS on CPAP, afib on coumadin, NICM w EF 20-25% s/p AICD, CKD, DM2, HTN, Chronic lymphedema, morbid obesity, sedentary lifestyle, prostate cancer in remission, recent hospitalization 1 month ago for LLE cellulitis, presents c/o recurrent shaking chills x 1 day, with RLE erythema.  Reports an almost identical presentation last month.  Denies subjective fevers.  Reports compliance with diet restriction and medication therapy.  States he feels "much better" after IVF in ED.  No additional complaints.  Pt notes prior episodes of itching and flushing during past hospitalization was due to morphine, not vancomycin.  Evaluated by nephrology due to hyponatremia and LE edema.  Started on Lasix gtt.      Reports feeling better.  No fever /chills.  Still with some leg pain and swelling.  C/o medial thigh "itching".  No SOB.  No additional complaints.     ____________________PHYSICAL EXAM:  Vitals reviewed as indicated below  GENERAL:  NAD Alert and Oriented x 3   HEENT: NCAT  CARDIOVASCULAR:  S1, S2  LUNGS: CTAB  ABDOMEN:  soft, (-) tenderness, (-) distension, (+) bowel sounds, (-) guarding, (-) rebound (-) rigidity  EXTREMITIES:  no cyanosis / clubbing.  + edema, continued RLE erythema, unchanged. erythema slightly improving.  R medial thigh erythema.    ____________________    VITALS:  Vital Signs Last 24 Hrs  T(C): 36.4 (12 Dec 2017 09:44), Max: 36.8 (11 Dec 2017 16:23)  T(F): 97.5 (12 Dec 2017 09:44), Max: 98.3 (11 Dec 2017 16:23)  HR: 50 (12 Dec 2017 09:44) (50 - 90)  BP: 137/88 (12 Dec 2017 09:44) (102/67 - 137/88)  BP(mean): --  RR: 18 (12 Dec 2017 09:44) (18 - 20)  SpO2: 97% (12 Dec 2017 09:44) (93% - 99%) Daily     Daily Weight in k.7 (11 Dec 2017 17:13)  CAPILLARY BLOOD GLUCOSE      POCT Blood Glucose.: 269 mg/dL (12 Dec 2017 08:05)  POCT Blood Glucose.: 277 mg/dL (11 Dec 2017 21:43)  POCT Blood Glucose.: 247 mg/dL (11 Dec 2017 17:03)  POCT Blood Glucose.: 339 mg/dL (11 Dec 2017 11:55)  POCT Blood Glucose.: 348 mg/dL (11 Dec 2017 11:54)    I&O's Summary    11 Dec 2017 07:01  -  12 Dec 2017 07:00  --------------------------------------------------------  IN: 410 mL / OUT: 300 mL / NET: 110 mL    12 Dec 2017 07:01  -  12 Dec 2017 10:16  --------------------------------------------------------  IN: 0 mL / OUT: 750 mL / NET: -750 mL        LABS:                        14.9   4.9   )-----------( 135      ( 12 Dec 2017 07:39 )             45.7     12-    136  |  93<L>  |  32.0<H>  ----------------------------<  289<H>  3.6   |  28.0  |  1.56<H>    Ca    9.1      12 Dec 2017 07:39      PT/INR - ( 12 Dec 2017 07:39 )   PT: 34.2 sec;   INR: 3.04 ratio         PTT - ( 12 Dec 2017 07:39 )  PTT:40.3 sec            MEDICATIONS:  acetaminophen   Tablet 650 milliGRAM(s) Oral every 6 hours PRN  allopurinol 300 milliGRAM(s) Oral daily  atorvastatin 40 milliGRAM(s) Oral at bedtime  buDESOnide  80 MICROgram(s)/formoterol 4.5 MICROgram(s) Inhaler 2 Puff(s) Inhalation two times a day  carvedilol 6.25 milliGRAM(s) Oral every 12 hours  ceFAZolin   IVPB 1000 milliGRAM(s) IV Intermittent every 8 hours  dextrose 5%. 1000 milliLiter(s) IV Continuous <Continuous>  dextrose 50% Injectable 12.5 Gram(s) IV Push once  dextrose 50% Injectable 25 Gram(s) IV Push once  dextrose 50% Injectable 25 Gram(s) IV Push once  dextrose Gel 1 Dose(s) Oral once PRN  glucagon  Injectable 1 milliGRAM(s) IntraMuscular once PRN  insulin glargine Injectable (LANTUS) 40 Unit(s) SubCutaneous every morning  insulin glargine Injectable (LANTUS) 10 Unit(s) SubCutaneous at bedtime  insulin lispro (HumaLOG) corrective regimen sliding scale   SubCutaneous three times a day before meals  insulin lispro (HumaLOG) corrective regimen sliding scale   SubCutaneous at bedtime  insulin lispro Injectable (HumaLOG) 10 Unit(s) SubCutaneous three times a day before meals  levothyroxine 50 MICROGram(s) Oral daily  nystatin Cream 1 Application(s) Topical two times a day  oxyCODONE    IR 10 milliGRAM(s) Oral every 6 hours PRN  saccharomyces boulardii 250 milliGRAM(s) Oral two times a day  warfarin 2 milliGRAM(s) Oral once

## 2017-12-12 NOTE — PROGRESS NOTE ADULT - SUBJECTIVE AND OBJECTIVE BOX
[facility] DIVISION OF KIDNEY DISEASES AND HYPERTENSION -- FOLLOW UP NOTE  --------------------------------------------------------------------------------  Chief Complaint:   Hypervolemic hyponatremia    24 hour events/subjective:  Pt seen and examined today  on Lasix IVP  diuresing well  Comfortable - no complaints  NAD  Ambulates  Reports improvement in activity tolerance      PAST HISTORY  --------------------------------------------------------------------------------  No significant changes to PMH, PSH, FHx, SHx, unless otherwise noted    ALLERGIES & MEDICATIONS  --------------------------------------------------------------------------------  Allergies    No Known Allergies    Intolerances      Standing Inpatient Medications  allopurinol 300 milliGRAM(s) Oral daily  atorvastatin 40 milliGRAM(s) Oral at bedtime  buDESOnide  80 MICROgram(s)/formoterol 4.5 MICROgram(s) Inhaler 2 Puff(s) Inhalation two times a day  carvedilol 6.25 milliGRAM(s) Oral every 12 hours  ceFAZolin   IVPB 1000 milliGRAM(s) IV Intermittent every 8 hours  dextrose 5%. 1000 milliLiter(s) IV Continuous <Continuous>  dextrose 50% Injectable 12.5 Gram(s) IV Push once  dextrose 50% Injectable 25 Gram(s) IV Push once  dextrose 50% Injectable 25 Gram(s) IV Push once  insulin glargine Injectable (LANTUS) 40 Unit(s) SubCutaneous every morning  insulin glargine Injectable (LANTUS) 10 Unit(s) SubCutaneous at bedtime  insulin lispro (HumaLOG) corrective regimen sliding scale   SubCutaneous three times a day before meals  insulin lispro (HumaLOG) corrective regimen sliding scale   SubCutaneous at bedtime  insulin lispro Injectable (HumaLOG) 10 Unit(s) SubCutaneous three times a day before meals  levothyroxine 50 MICROGram(s) Oral daily  nystatin Cream 1 Application(s) Topical two times a day  saccharomyces boulardii 250 milliGRAM(s) Oral two times a day  warfarin 2 milliGRAM(s) Oral once    PRN Inpatient Medications  acetaminophen   Tablet 650 milliGRAM(s) Oral every 6 hours PRN  dextrose Gel 1 Dose(s) Oral once PRN  glucagon  Injectable 1 milliGRAM(s) IntraMuscular once PRN  oxyCODONE    IR 10 milliGRAM(s) Oral every 6 hours PRN      REVIEW OF SYSTEMS  --------------------------------------------------------------------------------  Gen: No weight changes, fatigue, fevers/chills, weakness  Skin: No rashes  Head/Eyes/Ears/Mouth: No headache; Normal hearing; Normal vision w/o blurriness; No sinus pain/discomfort, sore throat  Respiratory: No dyspnea, cough, wheezing, hemoptysis  CV: No chest pain, PND, orthopnea  GI: No abdominal pain, diarrhea, constipation, nausea, vomiting, melena, hematochezia  : No increased frequency, dysuria, hematuria, nocturia  MSK: No joint pain/swelling; no back pain; no edema  Neuro: No dizziness/lightheadedness, weakness, seizures, numbness, tingling  Heme: No easy bruising or bleeding  Endo: No heat/cold intolerance  Psych: No significant nervousness, anxiety, stress, depression    All other systems were reviewed and are negative, except as noted.    VITALS/PHYSICAL EXAM  --------------------------------------------------------------------------------  T(C): 36.4 (12-12-17 @ 09:44), Max: 36.8 (12-11-17 @ 16:23)  HR: 50 (12-12-17 @ 09:44) (50 - 90)  BP: 137/88 (12-12-17 @ 09:44) (102/67 - 137/88)  RR: 18 (12-12-17 @ 09:44) (18 - 20)  SpO2: 97% (12-12-17 @ 09:44) (93% - 99%)  Wt(kg): --        12-11-17 @ 07:01  -  12-12-17 @ 07:00  --------------------------------------------------------  IN: 410 mL / OUT: 300 mL / NET: 110 mL    12-12-17 @ 07:01  -  12-12-17 @ 14:31  --------------------------------------------------------  IN: 0 mL / OUT: 1350 mL / NET: -1350 mL      Physical Exam:  GENERAL: NAD, well-groomed, well-developed  HEAD:  Atraumatic, Normocephalic  EYES: EOMI, PERRLA, conjunctiva and sclera clear  NECK: Supple, No JVD, Normal thyroid  NERVOUS SYSTEM:  Alert & Oriented X3, Good concentration; Motor Strength 5/5 B/L upper and lower extremities; DTRs 2+ intact and symmetric  CHEST/LUNG: Clear to percussion bilaterally; No rales, rhonchi, wheezing, or rubs  HEART: Regular rate and rhythm; No murmurs, rubs, or gallops  ABDOMEN: Soft, Nontender, Nondistended; Bowel sounds present  EXTREMITIES:  2+ Peripheral Pulses, No clubbing, cyanosis, or edema  LYMPH: No lymphadenopathy noted  SKIN: No rashes or lesions    LABS/STUDIES  --------------------------------------------------------------------------------              14.9   4.9   >-----------<  135      [12-12-17 @ 07:39]              45.7     136  |  93  |  32.0  ----------------------------<  289      [12-12-17 @ 07:39]  3.6   |  28.0  |  1.56        Ca     9.1     [12-12-17 @ 07:39]      PT/INR: PT 34.2 , INR 3.04       [12-12-17 @ 07:39]  PTT: 40.3       [12-12-17 @ 07:39]      Creatinine Trend:  SCr 1.56 [12-12 @ 07:39]  SCr 1.63 [12-11 @ 07:47]  SCr 1.86 [12-10 @ 07:23]  SCr 1.79 [12-09 @ 07:19]  SCr 1.71 [12-08 @ 13:44]      Urine Creatinine 151      [12-09-17 @ 18:02]  Urine Protein 19.0      [12-09-17 @ 18:02]  Urine Sodium <30      [12-09-17 @ 18:02]  Urine Osmolality 668      [12-09-17 @ 18:03]    HbA1c 10.5      [10-26-17 @ 06:57]

## 2017-12-12 NOTE — PROCEDURE NOTE - NSPROCDETAILS_GEN_ALL_CORE
blood seen on insertion/dressing applied/secured in place/sterile technique, catheter placed/ultrasound utilization/location identified, draped/prepped, sterile technique used/flushes easily

## 2017-12-12 NOTE — PROCEDURE NOTE - NSPOSTCAREGUIDE_GEN_A_CORE
Verbal/written post procedure instructions were given to patient/caregiver/Instructed patient/caregiver to follow-up with primary care physician/Keep the cast/splint/dressing clean and dry/Care for catheter as per unit/ICU protocols/Instructed patient/caregiver regarding signs and symptoms of infection

## 2017-12-12 NOTE — PROGRESS NOTE ADULT - SUBJECTIVE AND OBJECTIVE BOX
[place] [facility] - [facility], THE HEART CENTER                                   [address] [place] [address]                                                      PHONE: [phone]                                                         FAX: [phone]  [email or web address]  ---------------------------------------------------------------------------------------------------------------------------------    Overnight events/patient complaints: No acute events overnight      PAST MEDICAL & SURGICAL HISTORY:  CHF (congestive heart failure)  Pulmonary hypertension  Prostate CA  Sleep apnea  Renal insufficiency  High cholesterol  HTN (hypertension)  DM (diabetes mellitus)  AICD (automatic cardioverter/defibrillator) present  S/P ankle ligament repair    No Known Allergies    MEDICATIONS  (STANDING):  allopurinol 300 milliGRAM(s) Oral daily  atorvastatin 40 milliGRAM(s) Oral at bedtime  buDESOnide  80 MICROgram(s)/formoterol 4.5 MICROgram(s) Inhaler 2 Puff(s) Inhalation two times a day  carvedilol 6.25 milliGRAM(s) Oral every 12 hours  ceFAZolin   IVPB 1000 milliGRAM(s) IV Intermittent every 8 hours  dextrose 5%. 1000 milliLiter(s) (50 mL/Hr) IV Continuous <Continuous>  dextrose 50% Injectable 12.5 Gram(s) IV Push once  dextrose 50% Injectable 25 Gram(s) IV Push once  dextrose 50% Injectable 25 Gram(s) IV Push once  insulin glargine Injectable (LANTUS) 40 Unit(s) SubCutaneous every morning  insulin glargine Injectable (LANTUS) 10 Unit(s) SubCutaneous at bedtime  insulin lispro (HumaLOG) corrective regimen sliding scale   SubCutaneous three times a day before meals  insulin lispro (HumaLOG) corrective regimen sliding scale   SubCutaneous at bedtime  insulin lispro Injectable (HumaLOG) 10 Unit(s) SubCutaneous three times a day before meals  levothyroxine 50 MICROGram(s) Oral daily  nystatin Cream 1 Application(s) Topical two times a day  saccharomyces boulardii 250 milliGRAM(s) Oral two times a day  warfarin 2 milliGRAM(s) Oral once    MEDICATIONS  (PRN):  acetaminophen   Tablet 650 milliGRAM(s) Oral every 6 hours PRN Mild pain or temp > 100.4  dextrose Gel 1 Dose(s) Oral once PRN Blood Glucose LESS THAN 70 milliGRAM(s)/deciliter  glucagon  Injectable 1 milliGRAM(s) IntraMuscular once PRN Glucose LESS THAN 70 milligrams/deciliter  oxyCODONE    IR 10 milliGRAM(s) Oral every 6 hours PRN moderate to severe pain      Vital Signs Last 24 Hrs  T(C): 36.4 (12 Dec 2017 09:44), Max: 36.8 (11 Dec 2017 16:23)  T(F): 97.5 (12 Dec 2017 09:44), Max: 98.3 (11 Dec 2017 16:23)  HR: 50 (12 Dec 2017 09:44) (50 - 90)  BP: 137/88 (12 Dec 2017 09:44) (102/67 - 137/88)  BP(mean): --  RR: 18 (12 Dec 2017 09:44) (18 - 20)  SpO2: 97% (12 Dec 2017 09:44) (93% - 99%)  ICU Vital Signs Last 24 Hrs  MARY ANN CORNELL  I&O's Detail    11 Dec 2017 07:01  -  12 Dec 2017 07:00  --------------------------------------------------------  IN:    IV PiggyBack: 50 mL    Oral Fluid: 360 mL  Total IN: 410 mL    OUT:    Voided: 300 mL  Total OUT: 300 mL    Total NET: 110 mL      12 Dec 2017 07:01  -  12 Dec 2017 14:38  --------------------------------------------------------  IN:  Total IN: 0 mL    OUT:    Voided: 1350 mL  Total OUT: 1350 mL    Total NET: -1350 mL        I&O's Summary    11 Dec 2017 07:01  -  12 Dec 2017 07:00  --------------------------------------------------------  IN: 410 mL / OUT: 300 mL / NET: 110 mL    12 Dec 2017 07:01  -  12 Dec 2017 14:38  --------------------------------------------------------  IN: 0 mL / OUT: 1350 mL / NET: -1350 mL      Drug Dosing Weight  MARY ANN CORNELL      PHYSICAL EXAM:  General: Appears well developed, well nourished alert and cooperative.  HEENT: Head; normocephalic, atraumatic.  Neck: Supple, no nodes adenopathy, no NVD or carotid bruit or thyromegaly.  CARDIOVASCULAR: Normal S1 and S2, No murmur, rub, gallop or lift.   LUNGS: No rales, rhonchi or wheeze. Normal breath sounds bilaterally.  ABDOMEN: Soft, nontender without mass or organomegaly. bowel sounds normoactive.  EXTREMITIES: No clubbing, cyanosis, +Le edema.   SKIN: warm and dry with normal turgor.  NEURO: Alert/oriented x 3/normal motor exam.        LABS:                        14.9   4.9   )-----------( 135      ( 12 Dec 2017 07:39 )             45.7     12-12    136  |  93<L>  |  32.0<H>  ----------------------------<  289<H>  3.6   |  28.0  |  1.56<H>    Ca    9.1      12 Dec 2017 07:39    PT/INR - ( 12 Dec 2017 07:39 )   PT: 34.2 sec;   INR: 3.04 ratio    PTT - ( 12 Dec 2017 07:39 )  PTT:40.3 sec      RADIOLOGY & ADDITIONAL STUDIES:    INTERPRETATION OF TELEMETRY (personally reviewed):    ECG: < from: 12 Lead ECG (12.08.17 @ 13:25) >  Poor data quality, interpretation may be adversely affected  Atrial fibrillation with rapid ventricular response  Low voltage QRS  Nonspecific T wave abnormality  Abnormal ECG    ECHO:  < from: TTE Echo Complete w/Doppler (04.05.17 @ 18:06) >   1. Global LV systolic function was moderately decreased with regional variability. Left ventricular ejection fraction, by visual estimation, is 35 to 40%.   2. RV systolic function is mildly reduced.   3. There is mild concentric left ventricular hypertrophy.   4. Biatrial enlargement   5. No significant valvular abnormalities.   6. No pericardial effusion.   7. ** Compared to TTE dated 3/24/16, LVEF systolic function has improved. Device wire now seen in the right heart. B    ASSESSMENT AND PLAN:  In summary, MARY ANN CORNELL is an 67y morbidly obese Male with past medical history significant for CHRISS on CPAP, afib on coumadin, NICM w EF 20-25% s/p ICD now with improved EF (LHC with normal coronaries), CKD, DM2, HTN, Chronic lymphedema, with recent hospitalization 1 month ago for LLE cellulitis, who presents c/o recurrent shaking chills x 1 day, with RLE erythema.    HFrEF with acute decompensation  - resume lasix 40mg PO daily   - continue coreg   - start imdur/hydralazine given renal dysfunction to optimize heart failure regimen     Atrial fibrillation  - hold coumadin given INR 3     Thank you for allowing [facility] to participate in the care of this patient.  Please feel free to call with any questions or concerns. [place] [facility] - [facility], THE [facility]ART CENTER                                   [address] [place] [address]                                                      PHONE: [phone]                                                         FAX: [phone]  [email or web address]  ---------------------------------------------------------------------------------------------------------------------------------    Overnight events/patient complaints: No acute events overnight      PAST MEDICAL & SURGICAL HISTORY:  CHF (congestive heart failure)  Pulmonary hypertension  Prostate CA  Sleep apnea  Renal insufficiency  High cholesterol  HTN (hypertension)  DM (diabetes mellitus)  AICD (automatic cardioverter/defibrillator) present  S/P ankle ligament repair    No Known Allergies    MEDICATIONS  (STANDING):  allopurinol 300 milliGRAM(s) Oral daily  atorvastatin 40 milliGRAM(s) Oral at bedtime  buDESOnide  80 MICROgram(s)/formoterol 4.5 MICROgram(s) Inhaler 2 Puff(s) Inhalation two times a day  carvedilol 6.25 milliGRAM(s) Oral every 12 hours  ceFAZolin   IVPB 1000 milliGRAM(s) IV Intermittent every 8 hours  dextrose 5%. 1000 milliLiter(s) (50 mL/Hr) IV Continuous <Continuous>  dextrose 50% Injectable 12.5 Gram(s) IV Push once  dextrose 50% Injectable 25 Gram(s) IV Push once  dextrose 50% Injectable 25 Gram(s) IV Push once  insulin glargine Injectable (LANTUS) 40 Unit(s) SubCutaneous every morning  insulin glargine Injectable (LANTUS) 10 Unit(s) SubCutaneous at bedtime  insulin lispro (HumaLOG) corrective regimen sliding scale   SubCutaneous three times a day before meals  insulin lispro (HumaLOG) corrective regimen sliding scale   SubCutaneous at bedtime  insulin lispro Injectable (HumaLOG) 10 Unit(s) SubCutaneous three times a day before meals  levothyroxine 50 MICROGram(s) Oral daily  nystatin Cream 1 Application(s) Topical two times a day  saccharomyces boulardii 250 milliGRAM(s) Oral two times a day  warfarin 2 milliGRAM(s) Oral once    MEDICATIONS  (PRN):  acetaminophen   Tablet 650 milliGRAM(s) Oral every 6 hours PRN Mild pain or temp > 100.4  dextrose Gel 1 Dose(s) Oral once PRN Blood Glucose LESS THAN 70 milliGRAM(s)/deciliter  glucagon  Injectable 1 milliGRAM(s) IntraMuscular once PRN Glucose LESS THAN 70 milligrams/deciliter  oxyCODONE    IR 10 milliGRAM(s) Oral every 6 hours PRN moderate to severe pain      Vital Signs Last 24 Hrs  T(C): 36.4 (12 Dec 2017 09:44), Max: 36.8 (11 Dec 2017 16:23)  T(F): 97.5 (12 Dec 2017 09:44), Max: 98.3 (11 Dec 2017 16:23)  HR: 50 (12 Dec 2017 09:44) (50 - 90)  BP: 137/88 (12 Dec 2017 09:44) (102/67 - 137/88)  BP(mean): --  RR: 18 (12 Dec 2017 09:44) (18 - 20)  SpO2: 97% (12 Dec 2017 09:44) (93% - 99%)  ICU Vital Signs Last 24 Hrs  MARY ANN CORNELL  I&O's Detail    11 Dec 2017 07:01  -  12 Dec 2017 07:00  --------------------------------------------------------  IN:    IV PiggyBack: 50 mL    Oral Fluid: 360 mL  Total IN: 410 mL    OUT:    Voided: 300 mL  Total OUT: 300 mL    Total NET: 110 mL      12 Dec 2017 07:01  -  12 Dec 2017 14:38  --------------------------------------------------------  IN:  Total IN: 0 mL    OUT:    Voided: 1350 mL  Total OUT: 1350 mL    Total NET: -1350 mL        I&O's Summary    11 Dec 2017 07:01  -  12 Dec 2017 07:00  --------------------------------------------------------  IN: 410 mL / OUT: 300 mL / NET: 110 mL    12 Dec 2017 07:01  -  12 Dec 2017 14:38  --------------------------------------------------------  IN: 0 mL / OUT: 1350 mL / NET: -1350 mL      Drug Dosing Weight  MARY ANN CORNELL      PHYSICAL EXAM:  General: Appears well developed, well nourished alert and cooperative.  HEENT: Head; normocephalic, atraumatic.  Neck: Supple, no nodes adenopathy, no NVD or carotid bruit or thyromegaly.  CARDIOVASCULAR: Normal S1 and S2, No murmur, rub, gallop or lift.   LUNGS: No rales, rhonchi or wheeze. Normal breath sounds bilaterally.  ABDOMEN: Soft, nontender without mass or organomegaly. bowel sounds normoactive.  EXTREMITIES: No clubbing, cyanosis, +Le edema with erythema.   SKIN: warm and dry with normal turgor.  NEURO: Alert/oriented x 3/normal motor exam.        LABS:                        14.9   4.9   )-----------( 135      ( 12 Dec 2017 07:39 )             45.7     12-12    136  |  93<L>  |  32.0<H>  ----------------------------<  289<H>  3.6   |  28.0  |  1.56<H>    Ca    9.1      12 Dec 2017 07:39    PT/INR - ( 12 Dec 2017 07:39 )   PT: 34.2 sec;   INR: 3.04 ratio    PTT - ( 12 Dec 2017 07:39 )  PTT:40.3 sec      RADIOLOGY & ADDITIONAL STUDIES:    INTERPRETATION OF TELEMETRY (personally reviewed):    ECG: < from: 12 Lead ECG (12.08.17 @ 13:25) >  Poor data quality, interpretation may be adversely affected  Atrial fibrillation with rapid ventricular response  Low voltage QRS  Nonspecific T wave abnormality  Abnormal ECG    ECHO:  < from: TTE Echo Complete w/Doppler (04.05.17 @ 18:06) >   1. Global LV systolic function was moderately decreased with regional variability. Left ventricular ejection fraction, by visual estimation, is 35 to 40%.   2. RV systolic function is mildly reduced.   3. There is mild concentric left ventricular hypertrophy.   4. Biatrial enlargement   5. No significant valvular abnormalities.   6. No pericardial effusion.   7. ** Compared to TTE dated 3/24/16, LVEF systolic function has improved. Device wire now seen in the right heart. B    ASSESSMENT AND PLAN:  In summary, MARY ANN CORNELL is an 67y morbidly obese Male with past medical history significant for CHRISS on CPAP, afib on coumadin, NICM w EF 20-25% s/p ICD now with improved EF (LHC with normal coronaries), CKD, DM2, HTN, Chronic lymphedema, with recent hospitalization 1 month ago for LLE cellulitis, who presents c/o recurrent shaking chills x 1 day, with RLE erythema.    HFrEF without acute decompensation  - resume lasix 80mg PO daily   - continue coreg   - start imdur/hydralazine given renal dysfunction to optimize heart failure regimen   - strict I/O and daily weights  - monitor renal function     Atrial fibrillation  - hold coumadin given INR 3   - daiy INR check, goal 2-3     Thank you for allowing [facility] to participate in the care of this patient.  Please feel free to call with any questions or concerns. [place] [facility] - [facility], THE [facility] CENTER                                   [address] [place] [address]                                                      PHONE: [phone]                                                         FAX: [phone]  [email or web address]  ---------------------------------------------------------------------------------------------------------------------------------    Overnight events/patient complaints: No acute events overnight, however, notes discomfort in bilateral LE     PAST MEDICAL & SURGICAL HISTORY:  CHF (congestive heart failure)  Pulmonary hypertension  Prostate CA  Sleep apnea  Renal insufficiency  High cholesterol  HTN (hypertension)  DM (diabetes mellitus)  AICD (automatic cardioverter/defibrillator) present  S/P ankle ligament repair    No Known Allergies    MEDICATIONS  (STANDING):  allopurinol 300 milliGRAM(s) Oral daily  atorvastatin 40 milliGRAM(s) Oral at bedtime  buDESOnide  80 MICROgram(s)/formoterol 4.5 MICROgram(s) Inhaler 2 Puff(s) Inhalation two times a day  carvedilol 6.25 milliGRAM(s) Oral every 12 hours  ceFAZolin   IVPB 1000 milliGRAM(s) IV Intermittent every 8 hours  dextrose 5%. 1000 milliLiter(s) (50 mL/Hr) IV Continuous <Continuous>  dextrose 50% Injectable 12.5 Gram(s) IV Push once  dextrose 50% Injectable 25 Gram(s) IV Push once  dextrose 50% Injectable 25 Gram(s) IV Push once  insulin glargine Injectable (LANTUS) 40 Unit(s) SubCutaneous every morning  insulin glargine Injectable (LANTUS) 10 Unit(s) SubCutaneous at bedtime  insulin lispro (HumaLOG) corrective regimen sliding scale   SubCutaneous three times a day before meals  insulin lispro (HumaLOG) corrective regimen sliding scale   SubCutaneous at bedtime  insulin lispro Injectable (HumaLOG) 10 Unit(s) SubCutaneous three times a day before meals  levothyroxine 50 MICROGram(s) Oral daily  nystatin Cream 1 Application(s) Topical two times a day  saccharomyces boulardii 250 milliGRAM(s) Oral two times a day  warfarin 2 milliGRAM(s) Oral once    MEDICATIONS  (PRN):  acetaminophen   Tablet 650 milliGRAM(s) Oral every 6 hours PRN Mild pain or temp > 100.4  dextrose Gel 1 Dose(s) Oral once PRN Blood Glucose LESS THAN 70 milliGRAM(s)/deciliter  glucagon  Injectable 1 milliGRAM(s) IntraMuscular once PRN Glucose LESS THAN 70 milligrams/deciliter  oxyCODONE    IR 10 milliGRAM(s) Oral every 6 hours PRN moderate to severe pain      Vital Signs Last 24 Hrs  T(C): 36.4 (12 Dec 2017 09:44), Max: 36.8 (11 Dec 2017 16:23)  T(F): 97.5 (12 Dec 2017 09:44), Max: 98.3 (11 Dec 2017 16:23)  HR: 50 (12 Dec 2017 09:44) (50 - 90)  BP: 137/88 (12 Dec 2017 09:44) (102/67 - 137/88)  BP(mean): --  RR: 18 (12 Dec 2017 09:44) (18 - 20)  SpO2: 97% (12 Dec 2017 09:44) (93% - 99%)  ICU Vital Signs Last 24 Hrs  MARY ANN CORNELL  I&O's Detail    11 Dec 2017 07:01  -  12 Dec 2017 07:00  --------------------------------------------------------  IN:    IV PiggyBack: 50 mL    Oral Fluid: 360 mL  Total IN: 410 mL    OUT:    Voided: 300 mL  Total OUT: 300 mL    Total NET: 110 mL      12 Dec 2017 07:01  -  12 Dec 2017 14:38  --------------------------------------------------------  IN:  Total IN: 0 mL    OUT:    Voided: 1350 mL  Total OUT: 1350 mL    Total NET: -1350 mL        I&O's Summary    11 Dec 2017 07:01  -  12 Dec 2017 07:00  --------------------------------------------------------  IN: 410 mL / OUT: 300 mL / NET: 110 mL    12 Dec 2017 07:01  -  12 Dec 2017 14:38  --------------------------------------------------------  IN: 0 mL / OUT: 1350 mL / NET: -1350 mL      Drug Dosing Weight  MARY ANN CORNELL      PHYSICAL EXAM:  General: Appears well developed, well nourished alert and cooperative.  HEENT: Head; normocephalic, atraumatic.  Neck: Supple, no nodes adenopathy, no NVD or carotid bruit or thyromegaly.  CARDIOVASCULAR: Normal S1 and S2, No murmur, rub, gallop or lift.   LUNGS: No rales, rhonchi or wheeze. Normal breath sounds bilaterally.  ABDOMEN: Soft, nontender without mass or organomegaly. bowel sounds normoactive.  EXTREMITIES: No clubbing, cyanosis, +Le edema with erythema and serosanguineous drainage.   SKIN: warm and dry with normal turgor.  NEURO: Alert/oriented x 3/normal motor exam.        LABS:                        14.9   4.9   )-----------( 135      ( 12 Dec 2017 07:39 )             45.7     12-12    136  |  93<L>  |  32.0<H>  ----------------------------<  289<H>  3.6   |  28.0  |  1.56<H>    Ca    9.1      12 Dec 2017 07:39    PT/INR - ( 12 Dec 2017 07:39 )   PT: 34.2 sec;   INR: 3.04 ratio    PTT - ( 12 Dec 2017 07:39 )  PTT:40.3 sec      RADIOLOGY & ADDITIONAL STUDIES:    INTERPRETATION OF TELEMETRY (personally reviewed):    ECG: < from: 12 Lead ECG (12.08.17 @ 13:25) >  Poor data quality, interpretation may be adversely affected  Atrial fibrillation with rapid ventricular response  Low voltage QRS  Nonspecific T wave abnormality  Abnormal ECG    ECHO:  < from: TTE Echo Complete w/Doppler (04.05.17 @ 18:06) >   1. Global LV systolic function was moderately decreased with regional variability. Left ventricular ejection fraction, by visual estimation, is 35 to 40%.   2. RV systolic function is mildly reduced.   3. There is mild concentric left ventricular hypertrophy.   4. Biatrial enlargement   5. No significant valvular abnormalities.   6. No pericardial effusion.   7. ** Compared to TTE dated 3/24/16, LVEF systolic function has improved. Device wire now seen in the right heart. B    ASSESSMENT AND PLAN:  In summary, MARY ANN CORNELL is an 67y morbidly obese Male with past medical history significant for CHRISS on CPAP, afib on coumadin, NICM w EF 20-25% s/p ICD now with improved EF (LHC with normal coronaries), CKD, DM2, HTN, Chronic lymphedema, with recent hospitalization 1 month ago for LLE cellulitis, who presents c/o recurrent shaking chills x 1 day, with RLE erythema.    HFrEF with acute decompensation  - lasix 80mg IV Q12  - continue coreg   - start imdur/hydralazine given renal dysfunction to optimize heart failure regimen   - strict I/O and daily weights  - monitor renal function     Atrial fibrillation  - hold coumadin given INR 3   - daiy INR check, goal 2-3     Thank you for allowing [facility] to participate in the care of this patient.  Please feel free to call with any questions or concerns.

## 2017-12-13 LAB
ANION GAP SERPL CALC-SCNC: 16 MMOL/L — SIGNIFICANT CHANGE UP (ref 5–17)
APTT BLD: 42.1 SEC — HIGH (ref 27.5–37.4)
BUN SERPL-MCNC: 36 MG/DL — HIGH (ref 8–20)
CALCIUM SERPL-MCNC: 8.8 MG/DL — SIGNIFICANT CHANGE UP (ref 8.6–10.2)
CHLORIDE SERPL-SCNC: 92 MMOL/L — LOW (ref 98–107)
CO2 SERPL-SCNC: 27 MMOL/L — SIGNIFICANT CHANGE UP (ref 22–29)
CREAT SERPL-MCNC: 1.68 MG/DL — HIGH (ref 0.5–1.3)
CULTURE RESULTS: SIGNIFICANT CHANGE UP
CULTURE RESULTS: SIGNIFICANT CHANGE UP
GLUCOSE BLDC GLUCOMTR-MCNC: 235 MG/DL — HIGH (ref 70–99)
GLUCOSE BLDC GLUCOMTR-MCNC: 266 MG/DL — HIGH (ref 70–99)
GLUCOSE BLDC GLUCOMTR-MCNC: 274 MG/DL — HIGH (ref 70–99)
GLUCOSE BLDC GLUCOMTR-MCNC: 314 MG/DL — HIGH (ref 70–99)
GLUCOSE SERPL-MCNC: 363 MG/DL — HIGH (ref 70–115)
HCT VFR BLD CALC: 46.6 % — SIGNIFICANT CHANGE UP (ref 42–52)
HGB BLD-MCNC: 15.6 G/DL — SIGNIFICANT CHANGE UP (ref 14–18)
INR BLD: 3.39 RATIO — HIGH (ref 0.88–1.16)
MCHC RBC-ENTMCNC: 29.1 PG — SIGNIFICANT CHANGE UP (ref 27–31)
MCHC RBC-ENTMCNC: 33.5 G/DL — SIGNIFICANT CHANGE UP (ref 32–36)
MCV RBC AUTO: 86.9 FL — SIGNIFICANT CHANGE UP (ref 80–94)
PLATELET # BLD AUTO: 155 K/UL — SIGNIFICANT CHANGE UP (ref 150–400)
POTASSIUM SERPL-MCNC: 3.5 MMOL/L — SIGNIFICANT CHANGE UP (ref 3.5–5.3)
POTASSIUM SERPL-SCNC: 3.5 MMOL/L — SIGNIFICANT CHANGE UP (ref 3.5–5.3)
PROTHROM AB SERPL-ACNC: 38.2 SEC — HIGH (ref 9.8–12.7)
RBC # BLD: 5.36 M/UL — SIGNIFICANT CHANGE UP (ref 4.6–6.2)
RBC # FLD: 16 % — HIGH (ref 11–15.6)
SODIUM SERPL-SCNC: 135 MMOL/L — SIGNIFICANT CHANGE UP (ref 135–145)
SPECIMEN SOURCE: SIGNIFICANT CHANGE UP
SPECIMEN SOURCE: SIGNIFICANT CHANGE UP
WBC # BLD: 6.2 K/UL — SIGNIFICANT CHANGE UP (ref 4.8–10.8)
WBC # FLD AUTO: 6.2 K/UL — SIGNIFICANT CHANGE UP (ref 4.8–10.8)

## 2017-12-13 PROCEDURE — 99233 SBSQ HOSP IP/OBS HIGH 50: CPT

## 2017-12-13 PROCEDURE — 99232 SBSQ HOSP IP/OBS MODERATE 35: CPT

## 2017-12-13 RX ORDER — MORPHINE SULFATE 50 MG/1
1 CAPSULE, EXTENDED RELEASE ORAL ONCE
Qty: 0 | Refills: 0 | Status: DISCONTINUED | OUTPATIENT
Start: 2017-12-13 | End: 2017-12-13

## 2017-12-13 RX ADMIN — Medication 100 MILLIGRAM(S): at 21:59

## 2017-12-13 RX ADMIN — Medication 80 MILLIGRAM(S): at 05:39

## 2017-12-13 RX ADMIN — NYSTATIN CREAM 1 APPLICATION(S): 100000 CREAM TOPICAL at 18:14

## 2017-12-13 RX ADMIN — Medication 6: at 07:36

## 2017-12-13 RX ADMIN — Medication 300 MILLIGRAM(S): at 12:43

## 2017-12-13 RX ADMIN — Medication 2: at 22:00

## 2017-12-13 RX ADMIN — BUDESONIDE AND FORMOTEROL FUMARATE DIHYDRATE 2 PUFF(S): 160; 4.5 AEROSOL RESPIRATORY (INHALATION) at 09:09

## 2017-12-13 RX ADMIN — Medication 50 MICROGRAM(S): at 05:38

## 2017-12-13 RX ADMIN — Medication 80 MILLIGRAM(S): at 18:12

## 2017-12-13 RX ADMIN — CARVEDILOL PHOSPHATE 6.25 MILLIGRAM(S): 80 CAPSULE, EXTENDED RELEASE ORAL at 21:59

## 2017-12-13 RX ADMIN — Medication 4: at 16:38

## 2017-12-13 RX ADMIN — Medication 10 UNIT(S): at 07:36

## 2017-12-13 RX ADMIN — INSULIN GLARGINE 10 UNIT(S): 100 INJECTION, SOLUTION SUBCUTANEOUS at 22:05

## 2017-12-13 RX ADMIN — MORPHINE SULFATE 1 MILLIGRAM(S): 50 CAPSULE, EXTENDED RELEASE ORAL at 22:25

## 2017-12-13 RX ADMIN — BUDESONIDE AND FORMOTEROL FUMARATE DIHYDRATE 2 PUFF(S): 160; 4.5 AEROSOL RESPIRATORY (INHALATION) at 21:23

## 2017-12-13 RX ADMIN — CARVEDILOL PHOSPHATE 6.25 MILLIGRAM(S): 80 CAPSULE, EXTENDED RELEASE ORAL at 05:38

## 2017-12-13 RX ADMIN — MORPHINE SULFATE 1 MILLIGRAM(S): 50 CAPSULE, EXTENDED RELEASE ORAL at 22:01

## 2017-12-13 RX ADMIN — ATORVASTATIN CALCIUM 40 MILLIGRAM(S): 80 TABLET, FILM COATED ORAL at 21:59

## 2017-12-13 RX ADMIN — Medication 10 UNIT(S): at 12:43

## 2017-12-13 RX ADMIN — Medication 8: at 12:43

## 2017-12-13 RX ADMIN — Medication 10 UNIT(S): at 16:38

## 2017-12-13 RX ADMIN — OXYCODONE HYDROCHLORIDE 10 MILLIGRAM(S): 5 TABLET ORAL at 18:45

## 2017-12-13 RX ADMIN — Medication 250 MILLIGRAM(S): at 18:14

## 2017-12-13 RX ADMIN — INSULIN GLARGINE 40 UNIT(S): 100 INJECTION, SOLUTION SUBCUTANEOUS at 10:43

## 2017-12-13 RX ADMIN — Medication 100 MILLIGRAM(S): at 05:38

## 2017-12-13 RX ADMIN — Medication 100 MILLIGRAM(S): at 14:22

## 2017-12-13 RX ADMIN — Medication 250 MILLIGRAM(S): at 05:38

## 2017-12-13 RX ADMIN — NYSTATIN CREAM 1 APPLICATION(S): 100000 CREAM TOPICAL at 05:38

## 2017-12-13 NOTE — PROGRESS NOTE ADULT - SUBJECTIVE AND OBJECTIVE BOX
Patient: MARY ANN CORNELL [id number] 67y Male                 Internal Medicine Hospitalist Progress Note - Dr. Willem Lundberg    Chief Complaint: Patient is a 67y old  Male who presents with a chief complaint of Fever / chills (08 Dec 2017 16:03)    HPI:  67y Male PMH obesity, CHRISS on CPAP, afib on coumadin, NICM w EF 20-25% s/p AICD, CKD, DM2, HTN, Chronic lymphedema, morbid obesity, sedentary lifestyle, prostate cancer in remission, recent hospitalization 1 month ago for LLE cellulitis, presents c/o recurrent shaking chills x 1 day, with RLE erythema.  Reports an almost identical presentation last month.  Denies subjective fevers.  Reports compliance with diet restriction and medication therapy.  States he feels "much better" after IVF in ED.  No additional complaints.  Pt notes prior episodes of itching and flushing during past hospitalization was due to morphine, not vancomycin.  Evaluated by nephrology due to hyponatremia and LE edema.  Started on Lasix gtt.      Seen today, continues to feel better.  No fever /chills.  Still with some leg pain and swelling.  C/o medial thigh "itching", improving.  No SOB.  No additional complaints.     ____________________PHYSICAL EXAM:  Vitals reviewed as indicated below  GENERAL:  NAD Alert and Oriented x 3   HEENT: NCAT  CARDIOVASCULAR:  S1, S2  LUNGS: CTAB  ABDOMEN:  soft, (-) tenderness, (-) distension, (+) bowel sounds, (-) guarding, (-) rebound (-) rigidity  EXTREMITIES:  no cyanosis / clubbing.  + edema, continued RLE erythema, unchanged. erythema slightly improving.  R medial thigh erythema.    ____________________    VITALS:  Vital Signs Last 24 Hrs  T(C): 37.1 (13 Dec 2017 07:55), Max: 37.1 (13 Dec 2017 07:55)  T(F): 98.8 (13 Dec 2017 07:55), Max: 98.8 (13 Dec 2017 07:55)  HR: 80 (13 Dec 2017 07:55) (80 - 90)  BP: 123/78 (13 Dec 2017 07:55) (123/78 - 135/80)  BP(mean): --  RR: 18 (13 Dec 2017 07:55) (18 - 18)  SpO2: 97% (13 Dec 2017 07:55) (97% - 97%) Daily     Daily   CAPILLARY BLOOD GLUCOSE      POCT Blood Glucose.: 314 mg/dL (13 Dec 2017 12:36)  POCT Blood Glucose.: 274 mg/dL (13 Dec 2017 07:33)  POCT Blood Glucose.: 278 mg/dL (12 Dec 2017 22:16)  POCT Blood Glucose.: 198 mg/dL (12 Dec 2017 16:46)    I&O's Summary    12 Dec 2017 07:01  -  13 Dec 2017 07:00  --------------------------------------------------------  IN: 0 mL / OUT: 3200 mL / NET: -3200 mL        LABS:                        15.6   6.2   )-----------( 155      ( 13 Dec 2017 08:22 )             46.6     12-13    135  |  92<L>  |  36.0<H>  ----------------------------<  363<H>  3.5   |  27.0  |  1.68<H>    Ca    8.8      13 Dec 2017 10:45      PT/INR - ( 13 Dec 2017 08:22 )   PT: 38.2 sec;   INR: 3.39 ratio         PTT - ( 13 Dec 2017 08:22 )  PTT:42.1 sec            MEDICATIONS:  acetaminophen   Tablet 650 milliGRAM(s) Oral every 6 hours PRN  allopurinol 300 milliGRAM(s) Oral daily  atorvastatin 40 milliGRAM(s) Oral at bedtime  buDESOnide  80 MICROgram(s)/formoterol 4.5 MICROgram(s) Inhaler 2 Puff(s) Inhalation two times a day  carvedilol 6.25 milliGRAM(s) Oral every 12 hours  ceFAZolin   IVPB 1000 milliGRAM(s) IV Intermittent every 8 hours  dextrose 5%. 1000 milliLiter(s) IV Continuous <Continuous>  dextrose 50% Injectable 12.5 Gram(s) IV Push once  dextrose 50% Injectable 25 Gram(s) IV Push once  dextrose 50% Injectable 25 Gram(s) IV Push once  dextrose Gel 1 Dose(s) Oral once PRN  furosemide   Injectable 80 milliGRAM(s) IV Push every 12 hours  glucagon  Injectable 1 milliGRAM(s) IntraMuscular once PRN  insulin glargine Injectable (LANTUS) 40 Unit(s) SubCutaneous every morning  insulin glargine Injectable (LANTUS) 10 Unit(s) SubCutaneous at bedtime  insulin lispro (HumaLOG) corrective regimen sliding scale   SubCutaneous three times a day before meals  insulin lispro (HumaLOG) corrective regimen sliding scale   SubCutaneous at bedtime  insulin lispro Injectable (HumaLOG) 10 Unit(s) SubCutaneous three times a day before meals  levothyroxine 50 MICROGram(s) Oral daily  nystatin Cream 1 Application(s) Topical two times a day  oxyCODONE    IR 10 milliGRAM(s) Oral every 6 hours PRN  saccharomyces boulardii 250 milliGRAM(s) Oral two times a day

## 2017-12-13 NOTE — PROGRESS NOTE ADULT - SUBJECTIVE AND OBJECTIVE BOX
[facility], THE [facility]ART [facility]                                   [address] [place] [address]                                                      PHONE: [phone]                                                         FAX: [phone]  [email or web address]  ---------------------------------------------------------------------------------------------------------------------------------    Overnight events/patient complaints:  NAD feeling well     No Known Allergies    MEDICATIONS  (STANDING):  allopurinol 300 milliGRAM(s) Oral daily  atorvastatin 40 milliGRAM(s) Oral at bedtime  buDESOnide  80 MICROgram(s)/formoterol 4.5 MICROgram(s) Inhaler 2 Puff(s) Inhalation two times a day  carvedilol 6.25 milliGRAM(s) Oral every 12 hours  ceFAZolin   IVPB 1000 milliGRAM(s) IV Intermittent every 8 hours  dextrose 5%. 1000 milliLiter(s) (50 mL/Hr) IV Continuous <Continuous>  dextrose 50% Injectable 12.5 Gram(s) IV Push once  dextrose 50% Injectable 25 Gram(s) IV Push once  dextrose 50% Injectable 25 Gram(s) IV Push once  furosemide   Injectable 80 milliGRAM(s) IV Push every 12 hours  insulin glargine Injectable (LANTUS) 40 Unit(s) SubCutaneous every morning  insulin glargine Injectable (LANTUS) 10 Unit(s) SubCutaneous at bedtime  insulin lispro (HumaLOG) corrective regimen sliding scale   SubCutaneous three times a day before meals  insulin lispro (HumaLOG) corrective regimen sliding scale   SubCutaneous at bedtime  insulin lispro Injectable (HumaLOG) 10 Unit(s) SubCutaneous three times a day before meals  levothyroxine 50 MICROGram(s) Oral daily  nystatin Cream 1 Application(s) Topical two times a day  saccharomyces boulardii 250 milliGRAM(s) Oral two times a day    MEDICATIONS  (PRN):  acetaminophen   Tablet 650 milliGRAM(s) Oral every 6 hours PRN Mild pain or temp > 100.4  dextrose Gel 1 Dose(s) Oral once PRN Blood Glucose LESS THAN 70 milliGRAM(s)/deciliter  glucagon  Injectable 1 milliGRAM(s) IntraMuscular once PRN Glucose LESS THAN 70 milligrams/deciliter  oxyCODONE    IR 10 milliGRAM(s) Oral every 6 hours PRN moderate to severe pain      Vital Signs Last 24 Hrs  T(C): 37.1 (13 Dec 2017 07:55), Max: 37.1 (13 Dec 2017 07:55)  T(F): 98.8 (13 Dec 2017 07:55), Max: 98.8 (13 Dec 2017 07:55)  HR: 80 (13 Dec 2017 07:55) (50 - 90)  BP: 123/78 (13 Dec 2017 07:55) (123/78 - 137/88)  BP(mean): --  RR: 18 (13 Dec 2017 07:55) (18 - 18)  SpO2: 97% (13 Dec 2017 07:55) (97% - 97%)  ICU Vital Signs Last 24 Hrs  MARY ANN CORNELL  I&O's Detail    12 Dec 2017 07:01  -  13 Dec 2017 07:00  --------------------------------------------------------  IN:  Total IN: 0 mL    OUT:    Voided: 3200 mL  Total OUT: 3200 mL    Total NET: -3200 mL        I&O's Summary    12 Dec 2017 07:01  -  13 Dec 2017 07:00  --------------------------------------------------------  IN: 0 mL / OUT: 3200 mL / NET: -3200 mL      Drug Dosing Weight  MARY ANN CORNELL      PHYSICAL EXAM:  General: Appears well developed, well nourished alert and cooperative.  HEENT: Head; normocephalic, atraumatic.  Eyes: Pupils reactive, cornea wnl.  Neck: Supple, no nodes adenopathy, no NVD or carotid bruit or thyromegaly.  CARDIOVASCULAR: Normal S1 and S2, 2/6 murmur, rub, gallop or lift.   LUNGS: No rales, rhonchi or wheeze. Normal breath sounds bilaterally.  ABDOMEN: Soft, nontender without mass or organomegaly. bowel sounds normoactive.  EXTREMITIES: No clubbing, cyanosis or edema. Distal pulses wnl.   SKIN: warm and dry with normal turgor.  NEURO: Alert/oriented x 3/normal motor exam. No pathologic reflexes.    PSYCH: normal affect.        LABS:                        14.9   4.9   )-----------( 135      ( 12 Dec 2017 07:39 )             45.7     12-12    136  |  93<L>  |  32.0<H>  ----------------------------<  289<H>  3.6   |  28.0  |  1.56<H>    Ca    9.1      12 Dec 2017 07:39      MARY ANN CORNELL      PT/INR - ( 12 Dec 2017 07:39 )   PT: 34.2 sec;   INR: 3.04 ratio         PTT - ( 12 Dec 2017 07:39 )  PTT:40.3 sec      RADIOLOGY & ADDITIONAL STUDIES:    INTERPRETATION OF TELEMETRY (personally reviewed):    ECG:    ECHO:     Summary:   1. Global LV systolic function was moderately decreased with regional   variability. Left ventricular ejection fraction, by visual estimation, is   35 to 40%.   2. RV systolic function is mildly reduced.   3. There is mild concentric left ventricular hypertrophy.   4. Biatrial enlargement   5. No significant valvular abnormalities.   6. No pericardial effusion.   7. ** Compared to TTE dated 3/24/16, LVEF systolic function has   improved. Device wire now seen in the right heart. B        CARDIAC CATHETERIZATION:  DIAGNOSTIC RECOMMENDATIONS: A/C. MARIELA CV The patient should continue with  the present medications.  INTERVENTIONAL IMPRESSIONS: Mild CAD. Non ischemic cardiomyopathy.  INTERVENTIONAL RECOMMENDATIONS: A/C. MARIELA CV      ASSESSMENT AND PLAN:    In summary, MARY ANN CORNELL is an 66y Male with past medical history significant for severe NICM biventricular failure functional MR AICD chronic A fib on AC failed DCCV in the past CHRISS OHS on BIPAP CKD HFrEF who presents cellulites A fib rate controlled and volume state stable     Plan  1.  Agree with ID FU and IV Abx   2.  please restart home dose ACE-I   3.  Continue Coreg BID   4.  INR goal 2-3

## 2017-12-13 NOTE — PROGRESS NOTE ADULT - SUBJECTIVE AND OBJECTIVE BOX
MARY ANN CORNELL is a 67y Male with HPI:  67y Male PMH obesity, CHRISS on CPAP, afib on coumadin, NICM w EF 20-25% s/p AICD, CKD, DM2, HTN, Chronic lymphedema, morbid obesity, sedentary lifestyle, prostate cancer in remission, recent hospitalization 1 month ago for LLE cellulitis, presents c/o recurrent shaking chills x 1 day, with RLE erythema.  Reports an almost identical presentation last month.  Denies subjective fevers.  Reports compliance with diet restriction and medication therapy.  States he feels "much better" after IVF in ED.  No additional complaints. Noted to have itching and flushing after vancomycin on prior hospitalization  PT ON IV KEFZOL FOR CELLULITIS WITH STASIS DERMATITIS.    CLINICALLY IMPROVED SLOWLY    FAMILY HISTORY: reviewed and negative.  SOCIAL HISTORY: - alcohol, - IVDA, + smoking quit 1990s.   REVIEW OF SYSTEMS: General: - fatigue, - weight loss, - fevers, + chills.  HEENT: - headache, - hearing disturbances.  EYES: - visual disturbances, - diplopia.  CARDIOVASCULAR: - chest pain, - palpitations.  PULMONARY: - SOB, - cough, - hemoptysis.  GI: - abdominal pain, - nausea, - vomiting, - diarrhea, - constipation.  : - urinary urgency, - frequency, - dysuria.  MUSCULOSKELETAL: - arthralgias, - myalgias.  NEURO:  - weakness, - numbness.  PSYCH: - depression, - anxiety, - suicidal thoughts. SKIN: - rashes, - lesions.  Allergies    No Known Allergies    Intolerances (08 Dec 2017 16:03)        Allergies:  No Known Allergies      Medications:  acetaminophen   Tablet 650 milliGRAM(s) Oral every 6 hours PRN  allopurinol 300 milliGRAM(s) Oral daily  atorvastatin 40 milliGRAM(s) Oral at bedtime  buDESOnide  80 MICROgram(s)/formoterol 4.5 MICROgram(s) Inhaler 2 Puff(s) Inhalation two times a day  carvedilol 6.25 milliGRAM(s) Oral every 12 hours  ceFAZolin   IVPB 1000 milliGRAM(s) IV Intermittent every 8 hours  dextrose 5%. 1000 milliLiter(s) IV Continuous <Continuous>  dextrose 50% Injectable 12.5 Gram(s) IV Push once  dextrose 50% Injectable 25 Gram(s) IV Push once  dextrose 50% Injectable 25 Gram(s) IV Push once  dextrose Gel 1 Dose(s) Oral once PRN  furosemide Infusion 10 mG/Hr IV Continuous <Continuous>  glucagon  Injectable 1 milliGRAM(s) IntraMuscular once PRN  insulin glargine Injectable (LANTUS) 40 Unit(s) SubCutaneous every morning  insulin lispro (HumaLOG) corrective regimen sliding scale   SubCutaneous three times a day before meals  insulin lispro (HumaLOG) corrective regimen sliding scale   SubCutaneous at bedtime  insulin lispro Injectable (HumaLOG) 3 Unit(s) SubCutaneous before breakfast  insulin lispro Injectable (HumaLOG) 3 Unit(s) SubCutaneous before lunch  insulin lispro Injectable (HumaLOG) 3 Unit(s) SubCutaneous before dinner  levothyroxine 50 MICROGram(s) Oral daily  oxyCODONE    IR 10 milliGRAM(s) Oral every 6 hours PRN  saccharomyces boulardii 250 milliGRAM(s) Oral two times a day  warfarin 5 milliGRAM(s) Oral once      ANTIBIOTICS: KEFZOL        Review of Systems: - Negative except as mentioned above     Physical Exam:   Vital Signs Last 24 Hrs  T(C): 36.4 (12 Dec 2017 09:44), Max: 36.8 (11 Dec 2017 16:23)  T(F): 97.5 (12 Dec 2017 09:44), Max: 98.3 (11 Dec 2017 16:23)  HR: 50 (12 Dec 2017 09:44) (50 - 90)  BP: 137/88 (12 Dec 2017 09:44) (102/67 - 137/88)  BP(mean): --  RR: 18 (12 Dec 2017 09:44) (18 - 20)  SpO2: 97% (12 Dec 2017 09:44) (93% - 99%)    GEN: NAD, pleasant  HEENT: normocephalic and atraumatic. EOMI. DANA...  NECK: Supple. No carotid bruits.  No lymphadenopathy or thyromegaly.  LUNGS: Clear to auscultation.  HEART: Regular rate and rhythm without murmur.  ABDOMEN: Soft, nontender, and nondistended.  Positive bowel sounds.  No hepatosplenomegaly was noted.  NO REBOUND NO GUARDING  EXTREMITIES: DECREASED EDEMA AND ERYTHEMA  NEUROLOGIC: Cranial nerves II through XII are grossly intact.    SKIN: No ulceration or induration present.      Labs:

## 2017-12-14 ENCOUNTER — TRANSCRIPTION ENCOUNTER (OUTPATIENT)
Age: 67
End: 2017-12-14

## 2017-12-14 VITALS
DIASTOLIC BLOOD PRESSURE: 76 MMHG | OXYGEN SATURATION: 97 % | SYSTOLIC BLOOD PRESSURE: 134 MMHG | RESPIRATION RATE: 19 BRPM | HEART RATE: 88 BPM | TEMPERATURE: 98 F

## 2017-12-14 LAB
ANION GAP SERPL CALC-SCNC: 22 MMOL/L — HIGH (ref 5–17)
APTT BLD: 44.3 SEC — HIGH (ref 27.5–37.4)
BUN SERPL-MCNC: 36 MG/DL — HIGH (ref 8–20)
CALCIUM SERPL-MCNC: 9.4 MG/DL — SIGNIFICANT CHANGE UP (ref 8.6–10.2)
CHLORIDE SERPL-SCNC: 93 MMOL/L — LOW (ref 98–107)
CO2 SERPL-SCNC: 23 MMOL/L — SIGNIFICANT CHANGE UP (ref 22–29)
CREAT SERPL-MCNC: 1.68 MG/DL — HIGH (ref 0.5–1.3)
GLUCOSE BLDC GLUCOMTR-MCNC: 272 MG/DL — HIGH (ref 70–99)
GLUCOSE BLDC GLUCOMTR-MCNC: 369 MG/DL — HIGH (ref 70–99)
GLUCOSE SERPL-MCNC: 268 MG/DL — HIGH (ref 70–115)
HCT VFR BLD CALC: 49.1 % — SIGNIFICANT CHANGE UP (ref 42–52)
HGB BLD-MCNC: 16.5 G/DL — SIGNIFICANT CHANGE UP (ref 14–18)
INR BLD: 2.95 RATIO — HIGH (ref 0.88–1.16)
MCHC RBC-ENTMCNC: 29.2 PG — SIGNIFICANT CHANGE UP (ref 27–31)
MCHC RBC-ENTMCNC: 33.6 G/DL — SIGNIFICANT CHANGE UP (ref 32–36)
MCV RBC AUTO: 86.7 FL — SIGNIFICANT CHANGE UP (ref 80–94)
PLATELET # BLD AUTO: 183 K/UL — SIGNIFICANT CHANGE UP (ref 150–400)
POTASSIUM SERPL-MCNC: 3.7 MMOL/L — SIGNIFICANT CHANGE UP (ref 3.5–5.3)
POTASSIUM SERPL-SCNC: 3.7 MMOL/L — SIGNIFICANT CHANGE UP (ref 3.5–5.3)
PROTHROM AB SERPL-ACNC: 33.2 SEC — HIGH (ref 9.8–12.7)
RBC # BLD: 5.66 M/UL — SIGNIFICANT CHANGE UP (ref 4.6–6.2)
RBC # FLD: 16.2 % — HIGH (ref 11–15.6)
SODIUM SERPL-SCNC: 138 MMOL/L — SIGNIFICANT CHANGE UP (ref 135–145)
WBC # BLD: 6.5 K/UL — SIGNIFICANT CHANGE UP (ref 4.8–10.8)
WBC # FLD AUTO: 6.5 K/UL — SIGNIFICANT CHANGE UP (ref 4.8–10.8)

## 2017-12-14 PROCEDURE — 99232 SBSQ HOSP IP/OBS MODERATE 35: CPT

## 2017-12-14 PROCEDURE — 99239 HOSP IP/OBS DSCHRG MGMT >30: CPT

## 2017-12-14 RX ORDER — OXAPROZIN 600 MG/1
1 TABLET, FILM COATED ORAL
Qty: 0 | Refills: 0 | COMMUNITY

## 2017-12-14 RX ORDER — CEPHALEXIN 500 MG
1 CAPSULE ORAL
Qty: 28 | Refills: 0 | OUTPATIENT
Start: 2017-12-14 | End: 2017-12-20

## 2017-12-14 RX ADMIN — Medication 100 MILLIGRAM(S): at 05:50

## 2017-12-14 RX ADMIN — Medication 80 MILLIGRAM(S): at 05:50

## 2017-12-14 RX ADMIN — Medication 10 UNIT(S): at 12:05

## 2017-12-14 RX ADMIN — INSULIN GLARGINE 40 UNIT(S): 100 INJECTION, SOLUTION SUBCUTANEOUS at 08:28

## 2017-12-14 RX ADMIN — Medication 300 MILLIGRAM(S): at 12:04

## 2017-12-14 RX ADMIN — Medication 10: at 12:05

## 2017-12-14 RX ADMIN — BUDESONIDE AND FORMOTEROL FUMARATE DIHYDRATE 2 PUFF(S): 160; 4.5 AEROSOL RESPIRATORY (INHALATION) at 08:34

## 2017-12-14 RX ADMIN — Medication 10 UNIT(S): at 08:26

## 2017-12-14 RX ADMIN — Medication 6: at 08:26

## 2017-12-14 RX ADMIN — Medication 50 MICROGRAM(S): at 05:51

## 2017-12-14 RX ADMIN — NYSTATIN CREAM 1 APPLICATION(S): 100000 CREAM TOPICAL at 05:51

## 2017-12-14 RX ADMIN — Medication 250 MILLIGRAM(S): at 05:51

## 2017-12-14 RX ADMIN — CARVEDILOL PHOSPHATE 6.25 MILLIGRAM(S): 80 CAPSULE, EXTENDED RELEASE ORAL at 05:51

## 2017-12-14 NOTE — DISCHARGE NOTE ADULT - CARE PLAN
Principal Discharge DX:	Cellulitis of lower extremity, unspecified laterality  Goal:	stable for discharge  Instructions for follow-up, activity and diet:	It is important to see your primary physician as well as the physicians noted below within the next week to perform a comprehensive medical review.  Call their offices for an appointment as soon as you leave the hospital.  If you do not have a primary physician, contact the [facility] [phone] located on [address] [place] [address].  Your medical issues appear to be stable at this time, but if your symptoms recur or worsen, contact your physicians and/or return to the hospital if necessary.  If you encounter any issues or questions with your medication, call your physicians before stopping the medication.  Do not drive.  Limit your diet to 2 grams of sodium daily.  Secondary Diagnosis:	Hyponatremia with excess extracellular fluid volume  Secondary Diagnosis:	Morbid obesity  Secondary Diagnosis:	Chronic systolic congestive heart failure  Secondary Diagnosis:	Stage 3 chronic kidney disease  Secondary Diagnosis:	Sleep apnea, unspecified type  Secondary Diagnosis:	Type 2 diabetes mellitus with complication, unspecified long term insulin use status

## 2017-12-14 NOTE — DISCHARGE NOTE ADULT - HOSPITAL COURSE
Patient: MARY ANN CORNELL [id number]                 Internal Medicine Hospitalist Discharge Note - Dr. Willem Lundberg    Chief Complaint: Patient is a 67y old  Male who presents with a chief complaint of Fever / chills (08 Dec 2017 16:03)    HPI:  67y Male PMH obesity, CHRISS on CPAP, afib on coumadin, NICM w EF 20-25% s/p AICD, CKD, DM2, HTN, Chronic lymphedema, morbid obesity, sedentary lifestyle, prostate cancer in remission, recent hospitalization 1 month ago for LLE cellulitis, presents c/o recurrent shaking chills x 1 day, with RLE erythema.  Reports an almost identical presentation last month.  Denies subjective fevers.  Reports compliance with diet restriction and medication therapy.  States he feels "much better" after IVF in ED.  No additional complaints.  Pt notes prior episodes of itching and flushing during past hospitalization was due to morphine, not vancomycin.  Evaluated by nephrology due to hyponatremia and LE edema.  Responded to diuretics.  Vascular was consulted, recommended ACE wrap with outpatient vascular followup with his physician Dr. Wu.      ____________________PHYSICAL EXAM:  Vitals reviewed as indicated below  GENERAL:  NAD Alert and Oriented x 3   HEENT: NCAT  CARDIOVASCULAR:  S1, S2  LUNGS: CTAB  ABDOMEN:  soft, (-) tenderness, (-) distension, (+) bowel sounds, (-) guarding, (-) rebound (-) rigidity  EXTREMITIES:  no cyanosis / clubbing.  + edema, continued RLE erythema, unchanged. erythema slightly improving.  R medial thigh erythema.    ____________________        VITALS:  Vital Signs Last 24 Hrs  T(C): 36.8 (14 Dec 2017 08:55), Max: 36.8 (14 Dec 2017 08:55)  T(F): 98.2 (14 Dec 2017 08:55), Max: 98.2 (14 Dec 2017 08:55)  HR: 80 (14 Dec 2017 08:55) (80 - 86)  BP: 150/62 (14 Dec 2017 08:55) (130/79 - 150/84)  BP(mean): --  RR: 20 (14 Dec 2017 08:55) (18 - 20)  SpO2: 93% (14 Dec 2017 00:40) (93% - 97%) Daily     Daily   CAPILLARY BLOOD GLUCOSE      POCT Blood Glucose.: 369 mg/dL (14 Dec 2017 12:04)  POCT Blood Glucose.: 272 mg/dL (14 Dec 2017 08:03)  POCT Blood Glucose.: 266 mg/dL (13 Dec 2017 21:44)  POCT Blood Glucose.: 235 mg/dL (13 Dec 2017 16:37)  POCT Blood Glucose.: 314 mg/dL (13 Dec 2017 12:36)    I&O's Summary    13 Dec 2017 07:01  -  14 Dec 2017 07:00  --------------------------------------------------------  IN: 400 mL / OUT: 700 mL / NET: -300 mL        LABS:                        16.5   6.5   )-----------( 183      ( 14 Dec 2017 09:19 )             49.1     12-14    138  |  93<L>  |  36.0<H>  ----------------------------<  268<H>  3.7   |  23.0  |  1.68<H>    Ca    9.4      14 Dec 2017 09:19      PT/INR - ( 14 Dec 2017 09:19 )   PT: 33.2 sec;   INR: 2.95 ratio         PTT - ( 14 Dec 2017 09:19 )  PTT:44.3 sec      > Sepsis due to RLE Cellulitis - Appears to be responding to IV Abx.  Added Nystatin cream to medial thigh.  ID followup d/w Dr. Nagel.  > Chronic lymphedema  ACE wrap  > JAYLENE / Hyponatremia - Na stable.  On diuretics, discussed with renal.  Cr appears to be near baseline.    > Afib - rate controlled.  Dose Coumadin.  INR therapeutic.    > Chronic Systolic CHF - Cardio input noted.  Resume diuretics.  Monitor I&Os.    > Diabetes - monitor fingersticks.  Insulin coverage for hyperglycemia.  Adjusted Lantus dosing and premeal insulin due to hyperglycemia.    > Hypothyroidism - continue Synthroid.  > Hyperlipidemia - diet modification.  Continue Statin.  > CHRISS - continue home CPAP.          Disposition: stable for discharge.  Outpatient followup as above.  Patient was advised to return if they experience any recurrence or worsening of symptoms.  Total time spent on discharge was 35 minutes.  -Willem Lundberg D.O., Hospitalist, [facility]

## 2017-12-14 NOTE — PROGRESS NOTE ADULT - SUBJECTIVE AND OBJECTIVE BOX
[facility], THE HEART CENTER                                   [address] [address]                                                      PHONE: [phone]                                                         FAX: [phone]  ----------------------------------------------------------------------------------------------------    Pt seen and examined. FU for CMP    Overnight events/Complaints: Pt without complains.    Vital Signs Last 24 Hrs  T(C): 36.8 (14 Dec 2017 08:55), Max: 36.8 (14 Dec 2017 08:55)  T(F): 98.2 (14 Dec 2017 08:55), Max: 98.2 (14 Dec 2017 08:55)  HR: 80 (14 Dec 2017 08:55) (80 - 86)  BP: 150/62 (14 Dec 2017 08:55) (130/79 - 150/84)  BP(mean): --  RR: 20 (14 Dec 2017 08:55) (18 - 20)  SpO2: 93% (14 Dec 2017 00:40) (93% - 97%)  I&O's Summary    13 Dec 2017 07:01  -  14 Dec 2017 07:00  --------------------------------------------------------  IN: 400 mL / OUT: 700 mL / NET: -300 mL    PHYSICAL EXAM:  Constitutional: Appears well developed, well nourished; alert and co-operative  HEENT:     Head: Normocephalic and atraumatic  Eyes: Conjunctiva normal, No scleral icterus  Mouth/Throat: Mucous membranes are normal. Mucosa are not pale or dry.  Cardiovascular: Normal S1 S2, No murmurs  Respiratory: Lungs clear to auscultation; no crepitations, no wheeze  Gastrointestinal:  Soft, Non-tender, + BS	  Musculoskeletal: Normal range of motion. No edema  Skin: Bilateral chronic venous stasis. with edema  Neurologic: Alert oriented to time place and person. Normal strength and no tremor.  Psychiatric: Normal mood and affect, Speech is normal and behavior is normal.        LABS:                        16.5   6.5   )-----------( 183      ( 14 Dec 2017 09:19 )             49.1     12-14    138  |  93<L>  |  36.0<H>  ----------------------------<  268<H>  3.7   |  23.0  |  1.68<H>    Ca    9.4      14 Dec 2017 09:19          PT/INR - ( 14 Dec 2017 09:19 )   PT: 33.2 sec;   INR: 2.95 ratio         PTT - ( 14 Dec 2017 09:19 )  PTT:44.3 sec    RADIOLOGY & ADDITIONAL STUDIES:    CARDIOLOGY TESTING:     Echocardiogram:  Summary:   1. Global LV systolic function was moderately decreased with regional   variability. Left ventricular ejection fraction, by visual estimation, is   35 to 40%.   2. RV systolic function is mildly reduced.   3. There is mild concentric left ventricular hypertrophy.   4. Biatrial enlargement   5. No significant valvular abnormalities.   6. No pericardial effusion.   7. ** Compared to TTE dated 3/24/16, LVEF systolic function has   improved. Device wire now seen in the right heart. B    CARDIAC CATHETERIZATION:  DIAGNOSTIC RECOMMENDATIONS: A/C. MARIELA CV The patient should continue with  the present medications.  INTERVENTIONAL IMPRESSIONS: Mild CAD. Non ischemic cardiomyopathy.  INTERVENTIONAL RECOMMENDATIONS: A/C. MARIELA CV      MEDICATIONS:  MEDICATIONS  (STANDING):  allopurinol 300 milliGRAM(s) Oral daily  atorvastatin 40 milliGRAM(s) Oral at bedtime  buDESOnide  80 MICROgram(s)/formoterol 4.5 MICROgram(s) Inhaler 2 Puff(s) Inhalation two times a day  carvedilol 6.25 milliGRAM(s) Oral every 12 hours  ceFAZolin   IVPB 1000 milliGRAM(s) IV Intermittent every 8 hours  dextrose 5%. 1000 milliLiter(s) (50 mL/Hr) IV Continuous <Continuous>  dextrose 50% Injectable 12.5 Gram(s) IV Push once  dextrose 50% Injectable 25 Gram(s) IV Push once  dextrose 50% Injectable 25 Gram(s) IV Push once  furosemide   Injectable 80 milliGRAM(s) IV Push every 12 hours  insulin glargine Injectable (LANTUS) 40 Unit(s) SubCutaneous every morning  insulin glargine Injectable (LANTUS) 10 Unit(s) SubCutaneous at bedtime  insulin lispro (HumaLOG) corrective regimen sliding scale   SubCutaneous three times a day before meals  insulin lispro (HumaLOG) corrective regimen sliding scale   SubCutaneous at bedtime  insulin lispro Injectable (HumaLOG) 10 Unit(s) SubCutaneous three times a day before meals  levothyroxine 50 MICROGram(s) Oral daily  nystatin Cream 1 Application(s) Topical two times a day  saccharomyces boulardii 250 milliGRAM(s) Oral two times a day    MEDICATIONS  (PRN):  acetaminophen   Tablet 650 milliGRAM(s) Oral every 6 hours PRN Mild pain or temp > 100.4  dextrose Gel 1 Dose(s) Oral once PRN Blood Glucose LESS THAN 70 milliGRAM(s)/deciliter  glucagon  Injectable 1 milliGRAM(s) IntraMuscular once PRN Glucose LESS THAN 70 milligrams/deciliter  oxyCODONE    IR 10 milliGRAM(s) Oral every 6 hours PRN moderate to severe pain      ASSESSMENT AND PLAN:    67y Male with past medical history significant for severe NICM biventricular failure functional MR AICD chronic A fib on AC failed DCCV in the past CHRISS OHS on BIPAP CKD HFrEF who presents cellulites A fib rate controlled and volume state stable     - Continue current meds  - Restart ACE-I  - Overall stable from cardiac standpoint. Pls recall if any qns/concerns. Will arrange outpt FU post discharge.

## 2017-12-14 NOTE — DISCHARGE NOTE ADULT - MEDICATION SUMMARY - MEDICATIONS TO TAKE
I will START or STAY ON the medications listed below when I get home from the hospital:    Tylenol 325 mg oral tablet  -- 2 tab(s) by mouth every 6 hours, As Needed pain  -- Indication: For Pain    ramipril 5 mg oral capsule  -- 1 cap(s) by mouth once a day  -- Indication: For Hypertension    Coumadin  -- 2 milligrams alternating with 2.5 milligrams by mouth once a day  -- Indication: For Afib    Levemir FlexTouch 100 units/mL subcutaneous solution  -- 50 unit(s) subcutaneous once a day  -- Indication: For Diabetes    NovoLOG FlexPen 100 units/mL subcutaneous solution  -- 12 unit(s) subcutaneous 3 times a day  -- Indication: For Diabetes    allopurinol 300 mg oral tablet  -- 1 tab(s) by mouth once a day  -- Indication: For Gout    fenofibric acid 135 mg oral delayed release capsule  -- 1 cap(s) by mouth once a day  -- Indication: For Hyperlipidemia    atorvastatin 40 mg oral tablet  -- 1 tab(s) by mouth once a day (at bedtime)  -- Indication: For Hyperlipidemia    carvedilol 6.25 mg oral tablet  -- 1 tab(s) by mouth every 12 hours  -- Indication: For Hypertension    albuterol 90 mcg/inh inhalation aerosol  -- 1 puff(s) inhaled every 4 hours  -- Indication: For Asthma    Symbicort 80 mcg-4.5 mcg/inh inhalation aerosol  -- 2 puff(s) inhaled 2 times a day   -- Check with your doctor before becoming pregnant.  For inhalation only.  Rinse mouth thoroughly after use.    -- Indication: For Asthma    Keflex 500 mg oral capsule  -- 1 cap(s) by mouth every 6 hours   -- Finish all this medication unless otherwise directed by prescriber.    -- Indication: For Cellulitis of extremity    furosemide 80 mg oral tablet  -- 1 tab(s) by mouth once a day  -- Indication: For CHF    saccharomyces boulardii lyo 250 mg oral capsule  -- 1 cap(s) by mouth 2 times a day  -- Indication: For Supplement    levothyroxine 50 mcg (0.05 mg) oral capsule  -- 1 cap(s) by mouth once a day  -- Indication: For Hypothyroidism

## 2017-12-14 NOTE — DISCHARGE NOTE ADULT - PLAN OF CARE
stable for discharge It is important to see your primary physician as well as the physicians noted below within the next week to perform a comprehensive medical review.  Call their offices for an appointment as soon as you leave the hospital.  If you do not have a primary physician, contact the [facility] [phone] located on [address] [place] [address].  Your medical issues appear to be stable at this time, but if your symptoms recur or worsen, contact your physicians and/or return to the hospital if necessary.  If you encounter any issues or questions with your medication, call your physicians before stopping the medication.  Do not drive.  Limit your diet to 2 grams of sodium daily.

## 2017-12-14 NOTE — DISCHARGE NOTE ADULT - PATIENT PORTAL LINK FT
“You can access the [employer] Patient Portal, offered by [facility], by registering with the following website: [email or web address]”

## 2017-12-14 NOTE — DISCHARGE NOTE ADULT - CARE PROVIDER_API CALL
Maximiliano Nagel), Infectious Disease; Internal Medicine  [address] [place] [address]  Phone: [phone]  Fax: [phone]

## 2017-12-14 NOTE — PROGRESS NOTE ADULT - PROVIDER SPECIALTY LIST ADULT
Cardiology
Hospitalist
Infectious Disease
Nephrology
Hospitalist
Infectious Disease

## 2017-12-14 NOTE — PROGRESS NOTE ADULT - ASSESSMENT
67y Male PMH obesity, CHRISS on CPAP, afib on coumadin, NICM w EF 20-25% s/p AICD, CKD, DM2, HTN, Chronic lymphedema, morbid obesity, sedentary lifestyle, prostate cancer in remission, recent hospitalization 1 month ago for LLE cellulitis, presents c/o recurrent shaking chills x 1 day, with RLE erythema.  Reports an almost identical presentation last month.  Denies subjective fevers.  Reports compliance with diet restriction and medication therapy.  States he feels "much better" after IVF in ED.  No additional complaints. Noted to have itching and flushing after vancomycin on prior hospitalization  PT ON IV KEFZOL FOR CELLULITIS WITH STASIS DERMATITIS   SLOW IMPROVEMENT   OK  TO   CHANGE TO ORAL  ABX   KEFLEX  500 QID FOR ADDITIONAL   WILL FOLLOW UP AS NEEDED PLEASE CALL IF QUESTIONS   R
> Sepsis due to RLE Cellulitis - Appears to be responding to IV Abx.  Added Nystatin cream to medial thigh.  ID followup d/w Dr. Nagel.  > Chronic lymphedema - Vascular evaluation requested.    > JAYLENE / Hyponatremia - Na stable.  On diuretics, discussed with renal.  Cr appears to be near baseline.    > Afib - rate controlled.  Dose Coumadin.  INR therapeutic.    > Chronic Systolic CHF - Cardio input noted.  Resume diuretics.  Monitor I&Os.    > Diabetes - monitor fingersticks.  Insulin coverage for hyperglycemia.  Adjusted Lantus dosing and premeal insulin due to hyperglycemia.    > Hypothyroidism - continue Synthroid.  > Hyperlipidemia - diet modification.  Continue Statin.  > CHRISS - continue home CPAP.
1.CKD - 3 , CRS - 3    2.Morbid obesity , DM -2      3.Hypervolemic Hyponatremia ( CHF ) Cardiomegaly , + Single chamber PPM,    4.Thrombocytopenia      PLAN:    Hypervolemia resolved    Hyponatremia resolved    Pt stable from a renal standpoint - signing off. Please recall if the need arises.
1.CKD - 3 , CRS - 3    2.Morbid obesity , DM -2      3.Hypervolemic Hyponatremia ( CHF ) Cardiomegaly , + Single chamber PPM,    4.Thrombocytopenia      PLAN:    Oral fluid restriction    IV Diuresis - continue Lasix gtt     No Proteinuria    US Kidneys & Bladder ( Exclude Neurogenic Bladder ) reviewed
1.CKD - 3 , CRS - 3    2.Morbid obesity , DM -2 ,     3.Hypervolemic Hyponatremia ( CHF ) Cardiomegaly , + Single chamber PPM,    4.Thrombocytopenia      S : Oral FR, IV Diuresis,     No Proteinuria, US Kidneys & Bladder ( Exclude Neurogenic Bladder )
67y Male PMH obesity, CHRISS on CPAP, afib on coumadin, NICM w EF 20-25% s/p AICD, CKD, DM2, HTN, Chronic lymphedema, morbid obesity, sedentary lifestyle, prostate cancer in remission, recent hospitalization 1 month ago for LLE cellulitis, presents c/o recurrent shaking chills x 1 day, with RLE erythema.  Reports an almost identical presentation last month.  Denies subjective fevers.  Reports compliance with diet restriction and medication therapy.  States he feels "much better" after IVF in ED.  No additional complaints. Noted to have itching and flushing after vancomycin on prior hospitalization  PT ON IV KEFZOL FOR CELLULITIS WITH STASIS DERMATITIS  WILL CONTINUE  KEFZOL TO 1 Q 8  SLOW IMPROVEMENT  POSSIBLE CHANGE TO ORAL ABX IN AM  WILL FOLLOW UP   RECOMMEND EVENTUAL UNNA BOOT
67y Male PMH obesity, CHRISS on CPAP, afib on coumadin, NICM w EF 20-25% s/p AICD, CKD, DM2, HTN, Chronic lymphedema, morbid obesity, sedentary lifestyle, prostate cancer in remission, recent hospitalization 1 month ago for LLE cellulitis, presents c/o recurrent shaking chills x 1 day, with RLE erythema.  Reports an almost identical presentation last month.  Denies subjective fevers.  Reports compliance with diet restriction and medication therapy.  States he feels "much better" after IVF in ED.  No additional complaints. Noted to have itching and flushing after vancomycin on prior hospitalization  PT ON IV KEFZOL FOR CELLULITIS WITH STASIS DERMATITIS  WILL CONTINUE  KEFZOL TO 1 Q 8  SLOW IMPROVEMENT  WILL FOLLOW UP   RECOMMEND EVENTUAL UNNA BOOT
67y Male PMH obesity, CHRISS on CPAP, afib on coumadin, NICM w EF 20-25% s/p AICD, CKD, DM2, HTN, Chronic lymphedema, morbid obesity, sedentary lifestyle, prostate cancer in remission, recent hospitalization 1 month ago for LLE cellulitis, presents c/o recurrent shaking chills x 1 day, with RLE erythema.  Reports an almost identical presentation last month.  Denies subjective fevers.  Reports compliance with diet restriction and medication therapy.  States he feels "much better" after IVF in ED.  No additional complaints. Noted to have itching and flushing after vancomycin on prior hospitalization  PT ON IV KEFZOL FOR CELLULITIS WITH STASIS DERMATITIS  WILL INCREASE KEFZOL TO 1 Q 8  WILL FOLLOW UP   RECOMMEND EVENTUAL UNNA BOOT
> Sepsis due to RLE Cellulitis - Appears to be responding to IV Abx.  Added Nystatin cream to medial thigh.    > JAYLENE / Hyponatremia - Na stable.  On Lasix gtt, discussed with renal.  Cr appears to be near baseline.    > Afib - rate controlled.  Dose Coumadin.  INR therapeutic.    > Chronic Systolic CHF - Cardio input noted.  Resume diuretics.  Monitor I&Os.    > Diabetes - monitor fingersticks.  Insulin coverage for hyperglycemia.  Adjusted Lantus dosing and premeal insulin due to hyperglycemia.    > Hypothyroidism - continue Synthroid.  > Hyperlipidemia - diet modification.  Continue Statin.  > CHRISS - continue home CPAP.
> Sepsis due to RLE Cellulitis - On Zosyn, Vancomycin.  Consult ID.  D/c.    > JAYLENE / Dehydration / Hyponatremia - Encourage po.  Monitor Cr.  Baseline Cr ~ 1.4.  Renal consult appreciated.   > Afib - rate controlled.  Dose Coumadin.   > Chronic Systolic CHF - Resume diuretics.  Monitor I&Os.    > Diabetes - monitor fingersticks.  Insulin coverage for hyperglycemia.  Continue Lantus at lower dose, add premeal insulin  > Hypothyroidism - continue Synthroid.  > Hyperlipidemia - diet modification.  Continue Statin.  > CHRISS - continue home CPAP.
> Sepsis due to RLE Cellulitis - Pt seen with ID.  On IV Abx.   > JAYLENE / Hyponatremia - Pt taking po.  On Lasix gtt, discussed with renal.    > Afib - rate controlled.  Dose Coumadin.  INR approaching therapeutic range.   > Chronic Systolic CHF - Resume diuretics.  Monitor I&Os.    > Diabetes - monitor fingersticks.  Insulin coverage for hyperglycemia.  Continue Lantus at lower dose, add premeal insulin  > Hypothyroidism - continue Synthroid.  > Hyperlipidemia - diet modification.  Continue Statin.  > CHRISS - continue home CPAP.
> Sepsis due to RLE Cellulitis - pt with prior reaction to Vancomycin, unclear allergy.  Started on Zosyn.  Consult ID.  D/c.  Monitor Lactate.    > JAYLENE / Dehydration - Encourage po.  Monitor Cr.  Baseline Cr ~ 1.4.  > Afib - rate controlled.  Dose Coumadin.   > Chronic Systolic CHF - Resume diuretics.  Monitor I&Os.    > Diabetes - monitor fingersticks.  Insulin coverage for hyperglycemia.  Continue Lantus at lower dose, add premeal insulin  > Hypothyroidism - continue Synthroid.  > Hyperlipidemia - diet modification.  Continue Statin.  > CHRISS - continue home CPAP.

## 2017-12-14 NOTE — ADVANCED PRACTICE NURSE CONSULT - RECOMMEDATIONS
continue diabetes self management education  please increase humalog pre meal to 10 units+ humalog moderate ss  please start evening lantus 15 units qhs in addition to 40 units am
continue diabetes self management education  increase bed time lantus to 15 units. increase humalog pre meal to 15 units  cc- please task to out pt [facility]

## 2017-12-14 NOTE — DISCHARGE NOTE ADULT - MEDICATION SUMMARY - MEDICATIONS TO CHANGE
I will SWITCH the dose or number of times a day I take the medications listed below when I get home from the hospital:    cephalexin 500 mg oral capsule  -- 1 cap(s) by mouth every 12 hours

## 2017-12-14 NOTE — DISCHARGE NOTE ADULT - SECONDARY DIAGNOSIS.
Hyponatremia with excess extracellular fluid volume Morbid obesity Chronic systolic congestive heart failure Stage 3 chronic kidney disease Sleep apnea, unspecified type Type 2 diabetes mellitus with complication, unspecified long term insulin use status

## 2017-12-19 PROCEDURE — 99285 EMERGENCY DEPT VISIT HI MDM: CPT | Mod: 25

## 2017-12-19 PROCEDURE — P9047: CPT

## 2017-12-19 PROCEDURE — 94664 DEMO&/EVAL PT USE INHALER: CPT

## 2017-12-19 PROCEDURE — 82962 GLUCOSE BLOOD TEST: CPT

## 2017-12-19 PROCEDURE — 86900 BLOOD TYPING SEROLOGIC ABO: CPT

## 2017-12-19 PROCEDURE — 36415 COLL VENOUS BLD VENIPUNCTURE: CPT

## 2017-12-19 PROCEDURE — 84156 ASSAY OF PROTEIN URINE: CPT

## 2017-12-19 PROCEDURE — 94640 AIRWAY INHALATION TREATMENT: CPT

## 2017-12-19 PROCEDURE — 86850 RBC ANTIBODY SCREEN: CPT

## 2017-12-19 PROCEDURE — 71045 X-RAY EXAM CHEST 1 VIEW: CPT

## 2017-12-19 PROCEDURE — 83935 ASSAY OF URINE OSMOLALITY: CPT

## 2017-12-19 PROCEDURE — 80048 BASIC METABOLIC PNL TOTAL CA: CPT

## 2017-12-19 PROCEDURE — 84300 ASSAY OF URINE SODIUM: CPT

## 2017-12-19 PROCEDURE — 83605 ASSAY OF LACTIC ACID: CPT

## 2017-12-19 PROCEDURE — 96374 THER/PROPH/DIAG INJ IV PUSH: CPT

## 2017-12-19 PROCEDURE — 93005 ELECTROCARDIOGRAM TRACING: CPT

## 2017-12-19 PROCEDURE — 80053 COMPREHEN METABOLIC PANEL: CPT

## 2017-12-19 PROCEDURE — 87040 BLOOD CULTURE FOR BACTERIA: CPT

## 2017-12-19 PROCEDURE — 76770 US EXAM ABDO BACK WALL COMP: CPT

## 2017-12-19 PROCEDURE — 86901 BLOOD TYPING SEROLOGIC RH(D): CPT

## 2017-12-19 PROCEDURE — 85027 COMPLETE CBC AUTOMATED: CPT

## 2017-12-19 PROCEDURE — 87086 URINE CULTURE/COLONY COUNT: CPT

## 2017-12-19 PROCEDURE — 85730 THROMBOPLASTIN TIME PARTIAL: CPT

## 2017-12-19 PROCEDURE — 73590 X-RAY EXAM OF LOWER LEG: CPT

## 2017-12-19 PROCEDURE — 73620 X-RAY EXAM OF FOOT: CPT

## 2017-12-19 PROCEDURE — 80202 ASSAY OF VANCOMYCIN: CPT

## 2017-12-19 PROCEDURE — 83880 ASSAY OF NATRIURETIC PEPTIDE: CPT

## 2017-12-19 PROCEDURE — 85610 PROTHROMBIN TIME: CPT

## 2018-09-12 ENCOUNTER — INPATIENT (INPATIENT)
Facility: HOSPITAL | Age: 68
LOS: 1 days | Discharge: HOME | DRG: 638 | End: 2018-09-14
Attending: HOSPITALIST | Admitting: STUDENT IN AN ORGANIZED HEALTH CARE EDUCATION/TRAINING PROGRAM
Payer: COMMERCIAL

## 2018-09-12 VITALS — WEIGHT: 315 LBS | HEIGHT: 69 IN

## 2018-09-12 DIAGNOSIS — Z95.810 PRESENCE OF AUTOMATIC (IMPLANTABLE) CARDIAC DEFIBRILLATOR: Chronic | ICD-10-CM

## 2018-09-12 DIAGNOSIS — Z98.89 OTHER SPECIFIED POSTPROCEDURAL STATES: Chronic | ICD-10-CM

## 2018-09-12 DIAGNOSIS — E11.621 TYPE 2 DIABETES MELLITUS WITH FOOT ULCER: ICD-10-CM

## 2018-09-12 LAB
ALBUMIN SERPL ELPH-MCNC: 4.2 G/DL — SIGNIFICANT CHANGE UP (ref 3.3–5.2)
ALP SERPL-CCNC: 57 U/L — SIGNIFICANT CHANGE UP (ref 40–120)
ALT FLD-CCNC: 13 U/L — SIGNIFICANT CHANGE UP
ANION GAP SERPL CALC-SCNC: 14 MMOL/L — SIGNIFICANT CHANGE UP (ref 5–17)
APTT BLD: 31 SEC — SIGNIFICANT CHANGE UP (ref 27.5–37.4)
AST SERPL-CCNC: 19 U/L — SIGNIFICANT CHANGE UP
BASOPHILS # BLD AUTO: 0 K/UL — SIGNIFICANT CHANGE UP (ref 0–0.2)
BASOPHILS NFR BLD AUTO: 0.2 % — SIGNIFICANT CHANGE UP (ref 0–2)
BILIRUB SERPL-MCNC: 0.6 MG/DL — SIGNIFICANT CHANGE UP (ref 0.4–2)
BLD GP AB SCN SERPL QL: SIGNIFICANT CHANGE UP
BUN SERPL-MCNC: 30 MG/DL — HIGH (ref 8–20)
CALCIUM SERPL-MCNC: 9.5 MG/DL — SIGNIFICANT CHANGE UP (ref 8.6–10.2)
CHLORIDE SERPL-SCNC: 99 MMOL/L — SIGNIFICANT CHANGE UP (ref 98–107)
CO2 SERPL-SCNC: 27 MMOL/L — SIGNIFICANT CHANGE UP (ref 22–29)
CREAT SERPL-MCNC: 1.39 MG/DL — HIGH (ref 0.5–1.3)
EOSINOPHIL # BLD AUTO: 0.1 K/UL — SIGNIFICANT CHANGE UP (ref 0–0.5)
EOSINOPHIL NFR BLD AUTO: 1.7 % — SIGNIFICANT CHANGE UP (ref 0–5)
GLUCOSE SERPL-MCNC: 134 MG/DL — HIGH (ref 70–115)
HCT VFR BLD CALC: 49.9 % — SIGNIFICANT CHANGE UP (ref 42–52)
HGB BLD-MCNC: 15.6 G/DL — SIGNIFICANT CHANGE UP (ref 14–18)
INR BLD: 1.29 RATIO — HIGH (ref 0.88–1.16)
LYMPHOCYTES # BLD AUTO: 1.1 K/UL — SIGNIFICANT CHANGE UP (ref 1–4.8)
LYMPHOCYTES # BLD AUTO: 17.6 % — LOW (ref 20–55)
MAGNESIUM SERPL-MCNC: 2.1 MG/DL — SIGNIFICANT CHANGE UP (ref 1.6–2.6)
MCHC RBC-ENTMCNC: 28.3 PG — SIGNIFICANT CHANGE UP (ref 27–31)
MCHC RBC-ENTMCNC: 31.3 G/DL — LOW (ref 32–36)
MCV RBC AUTO: 90.4 FL — SIGNIFICANT CHANGE UP (ref 80–94)
MONOCYTES # BLD AUTO: 0.5 K/UL — SIGNIFICANT CHANGE UP (ref 0–0.8)
MONOCYTES NFR BLD AUTO: 8.2 % — SIGNIFICANT CHANGE UP (ref 3–10)
NEUTROPHILS # BLD AUTO: 4.7 K/UL — SIGNIFICANT CHANGE UP (ref 1.8–8)
NEUTROPHILS NFR BLD AUTO: 72.1 % — SIGNIFICANT CHANGE UP (ref 37–73)
PHOSPHATE SERPL-MCNC: 3 MG/DL — SIGNIFICANT CHANGE UP (ref 2.4–4.7)
PLATELET # BLD AUTO: 135 K/UL — LOW (ref 150–400)
POTASSIUM SERPL-MCNC: 4.5 MMOL/L — SIGNIFICANT CHANGE UP (ref 3.5–5.3)
POTASSIUM SERPL-SCNC: 4.5 MMOL/L — SIGNIFICANT CHANGE UP (ref 3.5–5.3)
PROT SERPL-MCNC: 7.7 G/DL — SIGNIFICANT CHANGE UP (ref 6.6–8.7)
PROTHROM AB SERPL-ACNC: 14.3 SEC — HIGH (ref 9.8–12.7)
RBC # BLD: 5.52 M/UL — SIGNIFICANT CHANGE UP (ref 4.6–6.2)
RBC # FLD: 17.9 % — HIGH (ref 11–15.6)
SODIUM SERPL-SCNC: 140 MMOL/L — SIGNIFICANT CHANGE UP (ref 135–145)
TYPE + AB SCN PNL BLD: SIGNIFICANT CHANGE UP
WBC # BLD: 6.5 K/UL — SIGNIFICANT CHANGE UP (ref 4.8–10.8)
WBC # FLD AUTO: 6.5 K/UL — SIGNIFICANT CHANGE UP (ref 4.8–10.8)

## 2018-09-12 PROCEDURE — 99284 EMERGENCY DEPT VISIT MOD MDM: CPT

## 2018-09-12 PROCEDURE — 73630 X-RAY EXAM OF FOOT: CPT | Mod: 26,RT

## 2018-09-12 RX ORDER — PIPERACILLIN AND TAZOBACTAM 4; .5 G/20ML; G/20ML
3.38 INJECTION, POWDER, LYOPHILIZED, FOR SOLUTION INTRAVENOUS ONCE
Qty: 0 | Refills: 0 | Status: COMPLETED | OUTPATIENT
Start: 2018-09-12 | End: 2018-09-12

## 2018-09-12 RX ORDER — VANCOMYCIN HCL 1 G
1000 VIAL (EA) INTRAVENOUS ONCE
Qty: 0 | Refills: 0 | Status: COMPLETED | OUTPATIENT
Start: 2018-09-12 | End: 2018-09-12

## 2018-09-12 RX ORDER — SODIUM CHLORIDE 9 MG/ML
3 INJECTION INTRAMUSCULAR; INTRAVENOUS; SUBCUTANEOUS ONCE
Qty: 0 | Refills: 0 | Status: COMPLETED | OUTPATIENT
Start: 2018-09-12 | End: 2018-09-12

## 2018-09-12 RX ADMIN — SODIUM CHLORIDE 3 MILLILITER(S): 9 INJECTION INTRAMUSCULAR; INTRAVENOUS; SUBCUTANEOUS at 23:59

## 2018-09-12 RX ADMIN — PIPERACILLIN AND TAZOBACTAM 200 GRAM(S): 4; .5 INJECTION, POWDER, LYOPHILIZED, FOR SOLUTION INTRAVENOUS at 22:23

## 2018-09-12 RX ADMIN — Medication 250 MILLIGRAM(S): at 23:59

## 2018-09-12 RX ADMIN — PIPERACILLIN AND TAZOBACTAM 3.38 GRAM(S): 4; .5 INJECTION, POWDER, LYOPHILIZED, FOR SOLUTION INTRAVENOUS at 23:59

## 2018-09-12 NOTE — H&P ADULT - NSHPSOCIALHISTORY_GEN_ALL_CORE
Former Smoker quit in 1999. has a 20 pack year smoke history  Denies Illicit drug or alcohol use.     Uses a perdomo for safe ambulation

## 2018-09-12 NOTE — H&P ADULT - NSHPPHYSICALEXAM_GEN_ALL_CORE
Vital Signs Last 24 Hrs  T(C): 36.4 (13 Sep 2018 07:27), Max: 36.6 (12 Sep 2018 21:15)  T(F): 97.6 (13 Sep 2018 07:27), Max: 97.9 (12 Sep 2018 21:15)  HR: 77 (13 Sep 2018 07:27) (73 - 80)  BP: 110/71 (13 Sep 2018 07:27) (110/71 - 140/77)  BP(mean): --  RR: 21 (13 Sep 2018 07:27) (18 - 22)  SpO2: 99% (13 Sep 2018 04:06) (95% - 99%) Vital Signs Last 24 Hrs  T(C): 36.4 (13 Sep 2018 07:27), Max: 36.6 (12 Sep 2018 21:15)  T(F): 97.6 (13 Sep 2018 07:27), Max: 97.9 (12 Sep 2018 21:15)  HR: 77 (13 Sep 2018 07:27) (73 - 80)  BP: 110/71 (13 Sep 2018 07:27) (110/71 - 140/77)  BP(mean): --  RR: 21 (13 Sep 2018 07:27) (18 - 22)  SpO2: 99% (13 Sep 2018 04:06) (95% - 99%)    Constitutional/General: Well developed, well nourished, vitals reviewed, physical examination is limited as patient is sitting in a wheelchair fully dressed, refusing to lay in the stretcher due to it being uncomfortable due to his size.   EYE: PERRL, conjunctiva clear  ENT: Good dentition, oropharynx clear  NECK: No masses, thyroid normal  RESP: Diminished bilateral breath sounds, no wheezes, no rhonchi, normal effort  CV: RRR, normal S1S2, no murmur, 2+ DP pulses, no JVD, 4+ edema with bilateral chronic venous stasis   ABDOMEN: Soft, nontender, no HSM  LYMPH: unable to complete  SKIN: unable to complete, Right Foot 1.5 cm plantar ulcer with yellow foul smelling drainage.   PSYCHIATRIC: Oriented x3, normal mood and affect

## 2018-09-12 NOTE — ED ADULT NURSE NOTE - NSIMPLEMENTINTERV_GEN_ALL_ED
Implemented All Fall with Harm Risk Interventions:  [place] to call system. Call bell, personal items and telephone within reach. Instruct patient to call for assistance. Room bathroom lighting operational. Non-slip footwear when patient is off stretcher. Physically safe environment: no spills, clutter or unnecessary equipment. Stretcher in lowest position, wheels locked, appropriate side rails in place. Provide visual cue, wrist band, yellow gown, etc. Monitor gait and stability. Monitor for mental status changes and reorient to person, place, and time. Review medications for side effects contributing to fall risk. Reinforce activity limits and safety measures with patient and family. Provide visual clues: red socks.

## 2018-09-12 NOTE — ED ADULT NURSE REASSESSMENT NOTE - NS ED NURSE REASSESS COMMENT FT1
Assumed care from off going rn.  patient awaiting bed in CDU, report already given to CDU RN from previous rn

## 2018-09-12 NOTE — ED ADULT TRIAGE NOTE - CHIEF COMPLAINT QUOTE
"I have a hole in my right foot that's been there several days ago" "It started as a blister and then it popped and I've got some pain" c/o right foot pain and wound xdays. Pt reports yellowish drainage to site, denies injury, denies fevers denies n/v. Report tetanus vacc is UTD. Reports hx of DM and pacemaker, denies cp. Ambulates with cane in no apparent distress @ this time

## 2018-09-12 NOTE — ED PROVIDER NOTE - PROGRESS NOTE DETAILS
PT discussed with hospitalist  team that wants to have pt admitted for further evaluation and possible surgical intervention, pt made aware of need for admission and possible further treatments, pt states that they agree with need for admission and they understand all results and possible treatments. PT will be admitted to Hospitalist team and care will be transferred to admitting team.

## 2018-09-12 NOTE — H&P ADULT - HISTORY OF PRESENT ILLNESS
ED HPI 67 year old male with a past medical history of DM, diabetic neuropathy, HTN, CHF, COPD, sleep apnea, pacemaker and defibrillator presents to ED complaining of R foot plantar surface diabetic ulcer x5 days. Pt states the wound started as a blister but is now open and ulcerated. Pt reports blood sugar was 182 earlier today.    · Wound Type: diabetic ulcer  · Negative Findings: no bleeding, no red streaks  · Location: foot  · Laterality: right  · Area: plantar  · Timing: gradual onset  · Duration: day(s)  5  · Context: diabetic    Above ED HPI reviewed and noted: patient confirmed above and adds that he has yellow foul smelling fluid draining from the ulcer. He presented today as the pain has intensified causing him inability to ambulate. Pt suffers from peripheral neuropathy with resulting unsteady gait and is a high risk of fall. He denies fever, chills, nausea, vomiting.

## 2018-09-12 NOTE — H&P ADULT - ASSESSMENT
c/o pain due to Diabetic foot ulcer, draining, warm, with foul smell in this 66 y/o male with poorly controlled insulin dependent diabetes with peripheral diabetic neuropathy and moderate to severe chronic venous stasis. Non septic   - Admit to medical unit  - Cont Vanco/ Zosyn  - Podiatry consult  -  consult - patient is unable to care for wound himself as outpt  - monitor vitals  - fall precautions, Amb with assistance    Insulin Dependent Diabetes, with hyperglycemia, last known A1C 10/2017 was 10.5  - ADA diet  - RISS  - Hypoglycemia protocol  - Levemir 50 units - home medication resumed  - Humalog c/o pain due to Diabetic foot ulcer, draining, warm, with foul smell in this 68 y/o male with poorly controlled insulin dependent diabetes with peripheral diabetic neuropathy and moderate to severe chronic venous stasis. Non septic   - Admit to medical unit  - Cont Vanco/ Zosyn  - Consider ID consult   - Podiatry consult  -  consult - patient is unable to care for wound himself as outpt  - monitor vitals  - fall precautions, Amb with assistance    Insulin Dependent Diabetes, with hyperglycemia, last known A1C 10/2017 was 10.5  - ADA diet  - RISS  - Hypoglycemia protocol  - Levemir 50 units - home medication resumed  - Humalog 10 units with meals  - f/u A1C    H/o A.fib, rate controlled, on AC - Warfarin, subtherapeutic  - cont warfarin 3mg  - monitor INR  - monitor for acute signs or symptoms of bleeding while on AC    CHRISS - on Cpap 10 nocturnal use, no supplemental oxygen  H/o HTN/HFrEF (stable), last known EF 35-40% completed on 4/2017) - resumed home medication, Ramapril, Lasix 80mg, Carvedilol, Spironolactone  H/O hypothyroidism - cont synthroid  H/O Asthma, stable - cont Symbicort  H/O Gout - cont allopurinol

## 2018-09-12 NOTE — ED PROVIDER NOTE - OBJECTIVE STATEMENT
67 year old male with a past medical history of DM, diabetic neuropathy, HTN, CHF, COPD, sleep apnea, pacemaker and defibrillator presents to ED complaining of R foot plantar surface diabetic ulcer x5 days. Pt states the wound started as a blister but is now open and ulcerated. Pt reports blood sugar was 182 earlier today. 67 year old male with a past medical history of DM, diabetic neuropathy, HTN, CHF, COPD, sleep apnea, pacemaker and defibrillator presents to ED complaining of R foot plantar surface diabetic ulcer x5 days. Pt states the wound started as a blister but is now open and ulcerated. Pt reports blood sugar was 182 earlier today.    PMD: Dr Ruano

## 2018-09-12 NOTE — ED PROVIDER NOTE - ATTENDING CONTRIBUTION TO CARE
The patient seen and examined  The patient has foot blister that became ulcerated with pain    Diabetic foot ulcer    I, Mohsen Muñiz, performed the initial face to face bedside interview with this patient regarding history of present illness, review of symptoms and relevant past medical, social and family history.  I completed an independent physical examination.  I was the initial provider who evaluated this patient. I have signed out the follow up of any pending tests (i.e. labs, radiological studies) to the ACP.  I have communicated the patient’s plan of care and disposition with the ACP.

## 2018-09-12 NOTE — ED PROVIDER NOTE - CARE PLAN
Principal Discharge DX:	Diabetic foot ulcer  Secondary Diagnosis:	DM (diabetes mellitus)  Secondary Diagnosis:	HTN (hypertension)

## 2018-09-12 NOTE — ED ADULT NURSE NOTE - OBJECTIVE STATEMENT
pt presents with c/o r foot diabetic foot ulcer 1.5 x 1,5  worsening over past 5 days. + yellow serosanguinous drainage reported  after blister popped, + pain and tenderness to area. + morbidly obese, denies any injury to area. denies fever and chills

## 2018-09-13 ENCOUNTER — TRANSCRIPTION ENCOUNTER (OUTPATIENT)
Age: 68
End: 2018-09-13

## 2018-09-13 DIAGNOSIS — I10 ESSENTIAL (PRIMARY) HYPERTENSION: ICD-10-CM

## 2018-09-13 DIAGNOSIS — L97.511 NON-PRESSURE CHRONIC ULCER OF OTHER PART OF RIGHT FOOT LIMITED TO BREAKDOWN OF SKIN: ICD-10-CM

## 2018-09-13 DIAGNOSIS — E66.01 MORBID (SEVERE) OBESITY DUE TO EXCESS CALORIES: ICD-10-CM

## 2018-09-13 DIAGNOSIS — E11.42 TYPE 2 DIABETES MELLITUS WITH DIABETIC POLYNEUROPATHY: ICD-10-CM

## 2018-09-13 LAB
ALBUMIN SERPL ELPH-MCNC: 3.3 G/DL — SIGNIFICANT CHANGE UP (ref 3.3–5.2)
ALP SERPL-CCNC: 55 U/L — SIGNIFICANT CHANGE UP (ref 40–120)
ALT FLD-CCNC: 10 U/L — SIGNIFICANT CHANGE UP
ANION GAP SERPL CALC-SCNC: 16 MMOL/L — SIGNIFICANT CHANGE UP (ref 5–17)
AST SERPL-CCNC: 17 U/L — SIGNIFICANT CHANGE UP
BILIRUB SERPL-MCNC: 0.9 MG/DL — SIGNIFICANT CHANGE UP (ref 0.4–2)
BUN SERPL-MCNC: 30 MG/DL — HIGH (ref 8–20)
CALCIUM SERPL-MCNC: 9.1 MG/DL — SIGNIFICANT CHANGE UP (ref 8.6–10.2)
CHLORIDE SERPL-SCNC: 99 MMOL/L — SIGNIFICANT CHANGE UP (ref 98–107)
CO2 SERPL-SCNC: 23 MMOL/L — SIGNIFICANT CHANGE UP (ref 22–29)
CREAT SERPL-MCNC: 1.23 MG/DL — SIGNIFICANT CHANGE UP (ref 0.5–1.3)
CRP SERPL-MCNC: 2 MG/DL — HIGH (ref 0–0.4)
GLUCOSE BLDC GLUCOMTR-MCNC: 103 MG/DL — HIGH (ref 70–99)
GLUCOSE BLDC GLUCOMTR-MCNC: 115 MG/DL — HIGH (ref 70–99)
GLUCOSE BLDC GLUCOMTR-MCNC: 158 MG/DL — HIGH (ref 70–99)
GLUCOSE BLDC GLUCOMTR-MCNC: 210 MG/DL — HIGH (ref 70–99)
GLUCOSE BLDC GLUCOMTR-MCNC: 245 MG/DL — HIGH (ref 70–99)
GLUCOSE SERPL-MCNC: 152 MG/DL — HIGH (ref 70–115)
INR BLD: 1.36 RATIO — HIGH (ref 0.88–1.16)
POTASSIUM SERPL-MCNC: 4.7 MMOL/L — SIGNIFICANT CHANGE UP (ref 3.5–5.3)
POTASSIUM SERPL-SCNC: 4.7 MMOL/L — SIGNIFICANT CHANGE UP (ref 3.5–5.3)
PROT SERPL-MCNC: 6.9 G/DL — SIGNIFICANT CHANGE UP (ref 6.6–8.7)
PROTHROM AB SERPL-ACNC: 15 SEC — HIGH (ref 9.8–12.7)
SODIUM SERPL-SCNC: 138 MMOL/L — SIGNIFICANT CHANGE UP (ref 135–145)

## 2018-09-13 PROCEDURE — 99239 HOSP IP/OBS DSCHRG MGMT >30: CPT

## 2018-09-13 PROCEDURE — 99222 1ST HOSP IP/OBS MODERATE 55: CPT

## 2018-09-13 PROCEDURE — 93923 UPR/LXTR ART STDY 3+ LVLS: CPT | Mod: 26

## 2018-09-13 RX ORDER — INSULIN LISPRO 100/ML
10 VIAL (ML) SUBCUTANEOUS
Qty: 0 | Refills: 0 | Status: DISCONTINUED | OUTPATIENT
Start: 2018-09-13 | End: 2018-09-14

## 2018-09-13 RX ORDER — DEXTROSE 50 % IN WATER 50 %
25 SYRINGE (ML) INTRAVENOUS ONCE
Qty: 0 | Refills: 0 | Status: DISCONTINUED | OUTPATIENT
Start: 2018-09-13 | End: 2018-09-14

## 2018-09-13 RX ORDER — LISINOPRIL 2.5 MG/1
20 TABLET ORAL DAILY
Qty: 0 | Refills: 0 | Status: DISCONTINUED | OUTPATIENT
Start: 2018-09-13 | End: 2018-09-14

## 2018-09-13 RX ORDER — DEXTROSE 50 % IN WATER 50 %
12.5 SYRINGE (ML) INTRAVENOUS ONCE
Qty: 0 | Refills: 0 | Status: DISCONTINUED | OUTPATIENT
Start: 2018-09-13 | End: 2018-09-14

## 2018-09-13 RX ORDER — FENOFIBRIC ACID 105 MG/1
1 TABLET ORAL
Qty: 0 | Refills: 0 | COMMUNITY

## 2018-09-13 RX ORDER — PIPERACILLIN AND TAZOBACTAM 4; .5 G/20ML; G/20ML
3.38 INJECTION, POWDER, LYOPHILIZED, FOR SOLUTION INTRAVENOUS EVERY 8 HOURS
Qty: 0 | Refills: 0 | Status: DISCONTINUED | OUTPATIENT
Start: 2018-09-13 | End: 2018-09-13

## 2018-09-13 RX ORDER — SODIUM CHLORIDE 9 MG/ML
1000 INJECTION, SOLUTION INTRAVENOUS
Qty: 0 | Refills: 0 | Status: DISCONTINUED | OUTPATIENT
Start: 2018-09-13 | End: 2018-09-14

## 2018-09-13 RX ORDER — ALLOPURINOL 300 MG
300 TABLET ORAL DAILY
Qty: 0 | Refills: 0 | Status: DISCONTINUED | OUTPATIENT
Start: 2018-09-13 | End: 2018-09-14

## 2018-09-13 RX ORDER — CARVEDILOL PHOSPHATE 80 MG/1
12.5 CAPSULE, EXTENDED RELEASE ORAL EVERY 12 HOURS
Qty: 0 | Refills: 0 | Status: DISCONTINUED | OUTPATIENT
Start: 2018-09-13 | End: 2018-09-14

## 2018-09-13 RX ORDER — SPIRONOLACTONE 25 MG/1
25 TABLET, FILM COATED ORAL DAILY
Qty: 0 | Refills: 0 | Status: DISCONTINUED | OUTPATIENT
Start: 2018-09-13 | End: 2018-09-14

## 2018-09-13 RX ORDER — INSULIN LISPRO 100/ML
VIAL (ML) SUBCUTANEOUS
Qty: 0 | Refills: 0 | Status: DISCONTINUED | OUTPATIENT
Start: 2018-09-13 | End: 2018-09-14

## 2018-09-13 RX ORDER — BUDESONIDE AND FORMOTEROL FUMARATE DIHYDRATE 160; 4.5 UG/1; UG/1
2 AEROSOL RESPIRATORY (INHALATION)
Qty: 0 | Refills: 0 | Status: DISCONTINUED | OUTPATIENT
Start: 2018-09-13 | End: 2018-09-14

## 2018-09-13 RX ORDER — FUROSEMIDE 40 MG
80 TABLET ORAL DAILY
Qty: 0 | Refills: 0 | Status: DISCONTINUED | OUTPATIENT
Start: 2018-09-13 | End: 2018-09-14

## 2018-09-13 RX ORDER — BACITRACIN ZINC 500 UNIT/G
1 OINTMENT IN PACKET (EA) TOPICAL
Qty: 0 | Refills: 0 | Status: DISCONTINUED | OUTPATIENT
Start: 2018-09-13 | End: 2018-09-14

## 2018-09-13 RX ORDER — SACCHAROMYCES BOULARDII 250 MG
250 POWDER IN PACKET (EA) ORAL
Qty: 0 | Refills: 0 | Status: DISCONTINUED | OUTPATIENT
Start: 2018-09-13 | End: 2018-09-14

## 2018-09-13 RX ORDER — BACITRACIN ZINC 500 UNIT/G
1 OINTMENT IN PACKET (EA) TOPICAL
Qty: 14 | Refills: 0
Start: 2018-09-13 | End: 2018-09-19

## 2018-09-13 RX ORDER — LEVOTHYROXINE SODIUM 125 MCG
50 TABLET ORAL DAILY
Qty: 0 | Refills: 0 | Status: DISCONTINUED | OUTPATIENT
Start: 2018-09-13 | End: 2018-09-14

## 2018-09-13 RX ORDER — ALBUTEROL 90 UG/1
1 AEROSOL, METERED ORAL EVERY 4 HOURS
Qty: 0 | Refills: 0 | Status: DISCONTINUED | OUTPATIENT
Start: 2018-09-13 | End: 2018-09-14

## 2018-09-13 RX ORDER — ACETAMINOPHEN 500 MG
650 TABLET ORAL EVERY 6 HOURS
Qty: 0 | Refills: 0 | Status: DISCONTINUED | OUTPATIENT
Start: 2018-09-13 | End: 2018-09-14

## 2018-09-13 RX ORDER — ATORVASTATIN CALCIUM 80 MG/1
40 TABLET, FILM COATED ORAL AT BEDTIME
Qty: 0 | Refills: 0 | Status: DISCONTINUED | OUTPATIENT
Start: 2018-09-13 | End: 2018-09-14

## 2018-09-13 RX ORDER — VANCOMYCIN HCL 1 G
1000 VIAL (EA) INTRAVENOUS EVERY 12 HOURS
Qty: 0 | Refills: 0 | Status: DISCONTINUED | OUTPATIENT
Start: 2018-09-13 | End: 2018-09-13

## 2018-09-13 RX ORDER — GLUCAGON INJECTION, SOLUTION 0.5 MG/.1ML
1 INJECTION, SOLUTION SUBCUTANEOUS ONCE
Qty: 0 | Refills: 0 | Status: DISCONTINUED | OUTPATIENT
Start: 2018-09-13 | End: 2018-09-14

## 2018-09-13 RX ORDER — INSULIN DETEMIR 100/ML (3)
50 INSULIN PEN (ML) SUBCUTANEOUS
Qty: 0 | Refills: 0 | Status: DISCONTINUED | OUTPATIENT
Start: 2018-09-13 | End: 2018-09-14

## 2018-09-13 RX ORDER — WARFARIN SODIUM 2.5 MG/1
3 TABLET ORAL ONCE
Qty: 0 | Refills: 0 | Status: COMPLETED | OUTPATIENT
Start: 2018-09-13 | End: 2018-09-13

## 2018-09-13 RX ORDER — DEXTROSE 50 % IN WATER 50 %
15 SYRINGE (ML) INTRAVENOUS ONCE
Qty: 0 | Refills: 0 | Status: DISCONTINUED | OUTPATIENT
Start: 2018-09-13 | End: 2018-09-14

## 2018-09-13 RX ADMIN — Medication 250 MILLIGRAM(S): at 17:33

## 2018-09-13 RX ADMIN — LISINOPRIL 20 MILLIGRAM(S): 2.5 TABLET ORAL at 05:49

## 2018-09-13 RX ADMIN — SPIRONOLACTONE 25 MILLIGRAM(S): 25 TABLET, FILM COATED ORAL at 05:49

## 2018-09-13 RX ADMIN — Medication 10 UNIT(S): at 12:42

## 2018-09-13 RX ADMIN — WARFARIN SODIUM 3 MILLIGRAM(S): 2.5 TABLET ORAL at 03:04

## 2018-09-13 RX ADMIN — Medication 50 MICROGRAM(S): at 05:12

## 2018-09-13 RX ADMIN — CARVEDILOL PHOSPHATE 12.5 MILLIGRAM(S): 80 CAPSULE, EXTENDED RELEASE ORAL at 05:12

## 2018-09-13 RX ADMIN — Medication 50 UNIT(S): at 12:39

## 2018-09-13 RX ADMIN — BUDESONIDE AND FORMOTEROL FUMARATE DIHYDRATE 2 PUFF(S): 160; 4.5 AEROSOL RESPIRATORY (INHALATION) at 09:28

## 2018-09-13 RX ADMIN — Medication 1 APPLICATION(S): at 17:33

## 2018-09-13 RX ADMIN — CARVEDILOL PHOSPHATE 12.5 MILLIGRAM(S): 80 CAPSULE, EXTENDED RELEASE ORAL at 17:34

## 2018-09-13 RX ADMIN — Medication 300 MILLIGRAM(S): at 12:39

## 2018-09-13 RX ADMIN — BUDESONIDE AND FORMOTEROL FUMARATE DIHYDRATE 2 PUFF(S): 160; 4.5 AEROSOL RESPIRATORY (INHALATION) at 21:20

## 2018-09-13 RX ADMIN — Medication 10 UNIT(S): at 17:47

## 2018-09-13 RX ADMIN — Medication 4: at 12:41

## 2018-09-13 RX ADMIN — Medication 250 MILLIGRAM(S): at 05:13

## 2018-09-13 RX ADMIN — ATORVASTATIN CALCIUM 40 MILLIGRAM(S): 80 TABLET, FILM COATED ORAL at 21:26

## 2018-09-13 RX ADMIN — Medication 4: at 09:23

## 2018-09-13 RX ADMIN — Medication 80 MILLIGRAM(S): at 05:12

## 2018-09-13 RX ADMIN — PIPERACILLIN AND TAZOBACTAM 25 GRAM(S): 4; .5 INJECTION, POWDER, LYOPHILIZED, FOR SOLUTION INTRAVENOUS at 05:12

## 2018-09-13 NOTE — CONSULT NOTE ADULT - ASSESSMENT
This 67 y.o. with poorly controlled DM, obesity, sleep apnea, developed blister of right lateral foot, ruptured blister, now with clean based ulcer.     NO WBC elevation   No fevers    No clear evidence of infection     stop all antibiotics    topical antibiotics with wound care - bacitracin daily.     outpatient podiatric follow up.    will sign off.

## 2018-09-13 NOTE — DISCHARGE NOTE ADULT - CARE PROVIDER_API CALL
CHRISTOPHER oden  Phone: (   )    -  Fax: (   )    -    Grace Samano  Podiatry  Phone: (   )    -  Fax: (   )    -

## 2018-09-13 NOTE — DISCHARGE NOTE ADULT - CARE PLAN
Principal Discharge DX:	Skin ulcer of right foot, limited to breakdown of skin  Goal:	ADLs  Assessment and plan of treatment:	Follow up with pmd in 5-7 days   Follow up with podiatry in 3-5 day  Secondary Diagnosis:	Type 2 diabetes mellitus with diabetic polyneuropathy, with long-term current use of insulin  Secondary Diagnosis:	Pulmonary hypertension  Secondary Diagnosis:	Essential hypertension  Secondary Diagnosis:	Obstructive sleep apnea syndrome  Secondary Diagnosis:	High cholesterol  Secondary Diagnosis:	Chronic diastolic congestive heart failure

## 2018-09-13 NOTE — DISCHARGE NOTE ADULT - PATIENT PORTAL LINK FT
You can access the [email or web address] Portal, offered by [facility], by registering with the following website: [email or web address]

## 2018-09-13 NOTE — DISCHARGE NOTE ADULT - MEDICATION SUMMARY - MEDICATIONS TO TAKE
I will START or STAY ON the medications listed below when I get home from the hospital:    spironolactone 25 mg oral tablet  -- 1 tab(s) by mouth once a day (at bedtime)  -- Indication: For CHF (congestive heart failure)    Tylenol 325 mg oral tablet  -- 2 tab(s) by mouth every 6 hours, As Needed pain  -- Indication: For pain     ramipril 5 mg oral capsule  -- 1 cap(s) by mouth once a day  -- Indication: For Essential hypertension    Coumadin  -- 2 milligrams alternating with 2.5 milligrams by mouth once a day  -- Indication: For AICD (automatic cardioverter/defibrillator) present    Levemir FlexTouch 100 units/mL subcutaneous solution  -- 50 unit(s) subcutaneous once a day  -- Indication: For DM (diabetes mellitus)    NovoLOG FlexPen 100 units/mL subcutaneous solution  -- 12 unit(s) subcutaneous 3 times a day  -- Indication: For DM (diabetes mellitus)    Januvia 50 mg oral tablet  -- 1 tab(s) by mouth once a day  -- Indication: For DM (diabetes mellitus)    allopurinol 300 mg oral tablet  -- 1 tab(s) by mouth once a day  -- Indication: For Hyperuricemia    atorvastatin 40 mg oral tablet  -- 1 tab(s) by mouth once a day (at bedtime)  -- Indication: For High cholesterol    carvedilol 25 mg oral tablet  -- 1 tab(s) by mouth 2 times a day  -- Indication: For Essential hypertension    albuterol 90 mcg/inh inhalation aerosol  -- 1 puff(s) inhaled every 4 hours  -- Indication: For Bronchitis, bronchospasm    Symbicort 80 mcg-4.5 mcg/inh inhalation aerosol  -- 2 puff(s) inhaled 2 times a day   -- Check with your doctor before becoming pregnant.  For inhalation only.  Rinse mouth thoroughly after use.    -- Indication: For Bronchitis     furosemide 80 mg oral tablet  -- 1 tab(s) by mouth once a day  -- Indication: For CHF (congestive heart failure)    saccharomyces boulardii lyo 250 mg oral capsule  -- 1 cap(s) by mouth 2 times a day  -- Indication: For GI protection     levothyroxine 50 mcg (0.05 mg) oral capsule  -- 1 cap(s) by mouth once a day  -- Indication: For Hypothyroidism

## 2018-09-13 NOTE — DISCHARGE NOTE ADULT - HOSPITAL COURSE
ED HPI 67 year old male with a past medical history of DM, diabetic neuropathy, HTN, CHF, COPD, sleep apnea, pacemaker and defibrillator presents to ED complaining of R foot plantar surface diabetic ulcer x5 days. Pt states the wound started as a blister but is now open and ulcerated. Pt reports blood sugar was 182 earlier today.    ID evaluated and determined -  Skin ulcer of right foot, limited to breakdown of skin.  NO WBC elevation .No fevers. No clear evidence of infection   stop all antibiotics  topical antibiotics with wound care - bacitracin daily.   outpatient podiatric follow up.

## 2018-09-13 NOTE — DISCHARGE NOTE ADULT - SECONDARY DIAGNOSIS.
Type 2 diabetes mellitus with diabetic polyneuropathy, with long-term current use of insulin Pulmonary hypertension Essential hypertension Obstructive sleep apnea syndrome High cholesterol Chronic diastolic congestive heart failure

## 2018-09-13 NOTE — CONSULT NOTE ADULT - SUBJECTIVE AND OBJECTIVE BOX
66 yo male with PMHx of CHF, Pulmonary HTN, Prostate CA, Renal Insufficiency, HLD, HTN, DM presents to ED for right foot ulcer. Pt states 6 days ago he had a blister on the right foot which popped and became an ulcer. Pt states he wasn't aware he had a blister in the right foot due to his neuropathy. Pt states he started to feel burning pain. Pt states he is diabetic for probably 7 years. Pt states he has neuropathy in both his hands and feet. Pt states he has a Podiatrist who he has not visited for a while. Pt states he has been applying neosporin with DSD to the right foot. Pt denies N/V/C/F/SOB. Pt states his A1C is 9                          15.6   6.5   )-----------( 135      ( 12 Sep 2018 21:20 )             49.9     Allergies: NKDA    PE: Right Foot   Vascular: DP/PT: non-palpable; CFT< 3 sec x 5; TG: WNL; severe pitting edema noted on dorsal foot and anterior leg  Derm: fibrogranular ulceration base noted on the right submet 5; no drainage noted; pain upon palpation; no clinical sign of infection; no probing to bone; no IDM;    Neuro: Protective sensation has decreased    Xray:  FINDINGS:    There is diffuse soft tissue swelling. No radiopaque foreign body or   subcutaneous air is seen. Osseous and joint structures are intact. No   fracture or gross evidence of osteomyelitis.   IMPRESSION:  Diffuse soft tissue swelling.  No acute radiographic osseous pathology.    If osteomyelitis is considered, despite conservative therapy, and soft   tissue / bone infection requires further assessment, follow-up MRI   recommended.    A: Right Foot Submet 5th Ulcer    P:  Patient evaluated and chart reviewed   Educated pt on proper diabetic foot care   xray ordered; results noted above   Cx obtained; results pending  ZOYA ordered; results pending   Betadine/DSD applied to the right foot.   Instructed pt to keep dressing clean dry and intact to the right foot   May need MRI to r/o OM, however pt states he has pacemaker and metal in his body.   Recommend ID consult   Podiatry will follow patient while inhouse

## 2018-09-13 NOTE — CONSULT NOTE ADULT - SUBJECTIVE AND OBJECTIVE BOX
[facility]  INFECTIOUS DISEASES AND INTERNAL MEDICINE at [place]  =======================================================  Miguelangel Orellana MD  Diplomates American Board of Internal Medicine and Infectious Diseases  =======================================================    [facility]-[id number]  MARY ANN CORNELL   This is a 67y  Male  with DM, diabetic neuropathy, last A1c of 9, HTN, CHF, COPD, sleep apnea CPAP, pacemaker and defibrillator presents to ED complaining of R foot plantar surface diabetic ulcer x 6 days.   patient was visiting [place], did not know he had a foot blister, then blister ruptured in [place]. Pain of right foot on return to [place]. Daughter looked at his foot. He called [facility], referred to ER.    No fevers at home.  NO chills. not on antibiotics currently.     =======================================================  Past Medical & Surgical Hx:  =====================  PAST MEDICAL & SURGICAL HISTORY:  CHF (congestive heart failure)  Pulmonary hypertension  Prostate CA  Sleep apnea  Renal insufficiency  High cholesterol  HTN (hypertension)  DM (diabetes mellitus)  AICD (automatic cardioverter/defibrillator) present  S/P ankle ligament repair    Problem List:  ==========  HEALTH ISSUES - PROBLEM Dx:    Social Hx:  =======  no toxic habits currently    FAMILY HISTORY:  Family history of diabetes mellitus (Sibling)  Family history of cancer (Sibling)  no significant family history of immunosuppressive disorders.    Allergies  No Known Allergies  Intolerances    MEDICATIONS  (STANDING):  allopurinol 300 milliGRAM(s) Oral daily  atorvastatin 40 milliGRAM(s) Oral at bedtime  buDESOnide  80 MICROgram(s)/formoterol 4.5 MICROgram(s) Inhaler 2 Puff(s) Inhalation two times a day  carvedilol 12.5 milliGRAM(s) Oral every 12 hours  dextrose 5%. 1000 milliLiter(s) (50 mL/Hr) IV Continuous <Continuous>  dextrose 50% Injectable 12.5 Gram(s) IV Push once  dextrose 50% Injectable 25 Gram(s) IV Push once  dextrose 50% Injectable 25 Gram(s) IV Push once  furosemide    Tablet 80 milliGRAM(s) Oral daily  insulin detemir injectable (LEVEMIR) 50 Unit(s) SubCutaneous before breakfast  insulin lispro (HumaLOG) corrective regimen sliding scale   SubCutaneous Before meals and at bedtime  insulin lispro Injectable (HumaLOG) 10 Unit(s) SubCutaneous three times a day before meals  levothyroxine 50 MICROGram(s) Oral daily  lisinopril 20 milliGRAM(s) Oral daily  saccharomyces boulardii 250 milliGRAM(s) Oral two times a day  spironolactone 25 milliGRAM(s) Oral daily    =======================================================  REVIEW OF SYSTEMS:  as above  all other ROS negative    ======================================================  Physical Exam:  ============  Vital Signs Last 24 Hrs  T(C): 36.5 (13 Sep 2018 11:07), Max: 36.6 (12 Sep 2018 21:15)  T(F): 97.7 (13 Sep 2018 11:07), Max: 97.9 (12 Sep 2018 21:15)  HR: 81 (13 Sep 2018 11:07) (73 - 81)  BP: 108/62 (13 Sep 2018 11:07) (108/62 - 140/77)  RR: 20 (13 Sep 2018 11:07) (18 - 22)  SpO2: 99% (13 Sep 2018 04:06) (95% - 99%)    General:  No acute distress. MORBIDLY OBESE  Eye: Pupils are equal, round and reactive to light, Extraocular movements are intact, Normal conjunctiva.  HENT: Normocephalic, Oral mucosa is moist, No pharyngeal erythema, No sinus tenderness.  Neck: Supple, No lymphadenopathy.  Respiratory: Lungs are clear to auscultation ANTERIORLY EXAM.  Cardiovascular: Normal rate, Regular rhythm, No murmur, Good pulses equal in all extremities,   BILATERAL edema.   Gastrointestinal: Soft, Non-tender, Non-distended, Normal bowel sounds. OBESE  Genitourinary: No costovertebral angle tenderness.  Lymphatics: No lymphadenopathy neck,   Musculoskeletal: Normal range of motion, Normal strength.  RIGHT FOOT - CLEAN BASED ULCER ABOUT DIME-SIZED, NO DRAINAGE. NO FLUCTUANCE. NO PROBE TO BONE.  integumentary: No rash.  CHRONIC STASIS CHANGES IN LEGS  Neurologic: Alert, Oriented, No focal deficits, Cranial Nerves II-XII are grossly intact.  Psychiatric: Appropriate mood & affect.      =======================================================  Labs:  ====  09-12    140  |  99  |  30.0<H>  ----------------------------<  134<H>  4.5   |  27.0  |  1.39<H>    Ca    9.5      12 Sep 2018 21:20  Phos  3.0     09-12  Mg     2.1     09-12    TPro  7.7  /  Alb  4.2  /  TBili  0.6  /  DBili  x   /  AST  19  /  ALT  13  /  AlkPhos  57  09-12                          15.6   6.5   )-----------( 135      ( 12 Sep 2018 21:20 )             49.9       PT/INR - ( 13 Sep 2018 07:13 )   PT: 15.0 sec;   INR: 1.36 ratio         PTT - ( 12 Sep 2018 21:20 )  PTT:31.0 sec    LIVER FUNCTIONS - ( 12 Sep 2018 21:20 )  Alb: 4.2 g/dL / Pro: 7.7 g/dL / ALK PHOS: 57 U/L / ALT: 13 U/L / AST: 19 U/L / GGT: x               CAPILLARY BLOOD GLUCOSE      POCT Blood Glucose.: 210 mg/dL (13 Sep 2018 09:21)  POCT Blood Glucose.: 158 mg/dL (13 Sep 2018 03:06)

## 2018-09-13 NOTE — DISCHARGE NOTE ADULT - PROVIDER TOKENS
FREE:[LAST:[mejid],FIRST:[PMD],PHONE:[(   )    -],FAX:[(   )    -]],FREE:[LAST:[Selwyn],FIRST:[Grace],PHONE:[(   )    -],FAX:[(   )    -],ADDRESS:[Podiatry]]

## 2018-09-14 VITALS
DIASTOLIC BLOOD PRESSURE: 70 MMHG | HEART RATE: 75 BPM | OXYGEN SATURATION: 94 % | RESPIRATION RATE: 20 BRPM | SYSTOLIC BLOOD PRESSURE: 102 MMHG | TEMPERATURE: 98 F

## 2018-09-14 LAB
-  AMPICILLIN/SULBACTAM: SIGNIFICANT CHANGE UP
-  CEFAZOLIN: SIGNIFICANT CHANGE UP
-  CLINDAMYCIN: SIGNIFICANT CHANGE UP
-  DAPTOMYCIN: SIGNIFICANT CHANGE UP
-  ERYTHROMYCIN: SIGNIFICANT CHANGE UP
-  GENTAMICIN: SIGNIFICANT CHANGE UP
-  LINEZOLID: SIGNIFICANT CHANGE UP
-  OXACILLIN: SIGNIFICANT CHANGE UP
-  PENICILLIN: SIGNIFICANT CHANGE UP
-  RIFAMPIN: SIGNIFICANT CHANGE UP
-  TETRACYCLINE: SIGNIFICANT CHANGE UP
-  TRIMETHOPRIM/SULFAMETHOXAZOLE: SIGNIFICANT CHANGE UP
-  VANCOMYCIN: SIGNIFICANT CHANGE UP
ANION GAP SERPL CALC-SCNC: 14 MMOL/L — SIGNIFICANT CHANGE UP (ref 5–17)
BUN SERPL-MCNC: 28 MG/DL — HIGH (ref 8–20)
CALCIUM SERPL-MCNC: 9.1 MG/DL — SIGNIFICANT CHANGE UP (ref 8.6–10.2)
CHLORIDE SERPL-SCNC: 98 MMOL/L — SIGNIFICANT CHANGE UP (ref 98–107)
CO2 SERPL-SCNC: 26 MMOL/L — SIGNIFICANT CHANGE UP (ref 22–29)
CREAT SERPL-MCNC: 1.2 MG/DL — SIGNIFICANT CHANGE UP (ref 0.5–1.3)
CULTURE RESULTS: SIGNIFICANT CHANGE UP
GLUCOSE BLDC GLUCOMTR-MCNC: 139 MG/DL — HIGH (ref 70–99)
GLUCOSE BLDC GLUCOMTR-MCNC: 214 MG/DL — HIGH (ref 70–99)
GLUCOSE SERPL-MCNC: 121 MG/DL — HIGH (ref 70–115)
HBA1C BLD-MCNC: 6.9 % — HIGH (ref 4–5.6)
HCT VFR BLD CALC: 49.9 % — SIGNIFICANT CHANGE UP (ref 42–52)
HGB BLD-MCNC: 15.6 G/DL — SIGNIFICANT CHANGE UP (ref 14–18)
MCHC RBC-ENTMCNC: 28.2 PG — SIGNIFICANT CHANGE UP (ref 27–31)
MCHC RBC-ENTMCNC: 31.3 G/DL — LOW (ref 32–36)
MCV RBC AUTO: 90.2 FL — SIGNIFICANT CHANGE UP (ref 80–94)
METHOD TYPE: SIGNIFICANT CHANGE UP
ORGANISM # SPEC MICROSCOPIC CNT: SIGNIFICANT CHANGE UP
ORGANISM # SPEC MICROSCOPIC CNT: SIGNIFICANT CHANGE UP
PLATELET # BLD AUTO: 97 K/UL — LOW (ref 150–400)
POTASSIUM SERPL-MCNC: 4.4 MMOL/L — SIGNIFICANT CHANGE UP (ref 3.5–5.3)
POTASSIUM SERPL-SCNC: 4.4 MMOL/L — SIGNIFICANT CHANGE UP (ref 3.5–5.3)
RBC # BLD: 5.53 M/UL — SIGNIFICANT CHANGE UP (ref 4.6–6.2)
RBC # FLD: 17.8 % — HIGH (ref 11–15.6)
SODIUM SERPL-SCNC: 138 MMOL/L — SIGNIFICANT CHANGE UP (ref 135–145)
SPECIMEN SOURCE: SIGNIFICANT CHANGE UP
VANCOMYCIN TROUGH SERPL-MCNC: <4 UG/ML — LOW (ref 10–20)
WBC # BLD: 5.3 K/UL — SIGNIFICANT CHANGE UP (ref 4.8–10.8)
WBC # FLD AUTO: 5.3 K/UL — SIGNIFICANT CHANGE UP (ref 4.8–10.8)

## 2018-09-14 PROCEDURE — 86850 RBC ANTIBODY SCREEN: CPT

## 2018-09-14 PROCEDURE — 85027 COMPLETE CBC AUTOMATED: CPT

## 2018-09-14 PROCEDURE — 85610 PROTHROMBIN TIME: CPT

## 2018-09-14 PROCEDURE — 87040 BLOOD CULTURE FOR BACTERIA: CPT

## 2018-09-14 PROCEDURE — 82962 GLUCOSE BLOOD TEST: CPT

## 2018-09-14 PROCEDURE — 80048 BASIC METABOLIC PNL TOTAL CA: CPT

## 2018-09-14 PROCEDURE — 84100 ASSAY OF PHOSPHORUS: CPT

## 2018-09-14 PROCEDURE — 86900 BLOOD TYPING SEROLOGIC ABO: CPT

## 2018-09-14 PROCEDURE — 83735 ASSAY OF MAGNESIUM: CPT

## 2018-09-14 PROCEDURE — 80202 ASSAY OF VANCOMYCIN: CPT

## 2018-09-14 PROCEDURE — 94760 N-INVAS EAR/PLS OXIMETRY 1: CPT

## 2018-09-14 PROCEDURE — 86901 BLOOD TYPING SEROLOGIC RH(D): CPT

## 2018-09-14 PROCEDURE — 99232 SBSQ HOSP IP/OBS MODERATE 35: CPT

## 2018-09-14 PROCEDURE — 99285 EMERGENCY DEPT VISIT HI MDM: CPT | Mod: 25

## 2018-09-14 PROCEDURE — 94640 AIRWAY INHALATION TREATMENT: CPT

## 2018-09-14 PROCEDURE — 96374 THER/PROPH/DIAG INJ IV PUSH: CPT

## 2018-09-14 PROCEDURE — 80053 COMPREHEN METABOLIC PANEL: CPT

## 2018-09-14 PROCEDURE — 83036 HEMOGLOBIN GLYCOSYLATED A1C: CPT

## 2018-09-14 PROCEDURE — 87070 CULTURE OTHR SPECIMN AEROBIC: CPT

## 2018-09-14 PROCEDURE — 85730 THROMBOPLASTIN TIME PARTIAL: CPT

## 2018-09-14 PROCEDURE — 94660 CPAP INITIATION&MGMT: CPT

## 2018-09-14 PROCEDURE — 73630 X-RAY EXAM OF FOOT: CPT

## 2018-09-14 PROCEDURE — 36415 COLL VENOUS BLD VENIPUNCTURE: CPT

## 2018-09-14 PROCEDURE — 87186 SC STD MICRODIL/AGAR DIL: CPT

## 2018-09-14 PROCEDURE — 86140 C-REACTIVE PROTEIN: CPT

## 2018-09-14 PROCEDURE — 93923 UPR/LXTR ART STDY 3+ LVLS: CPT

## 2018-09-14 RX ADMIN — Medication 4: at 11:56

## 2018-09-14 RX ADMIN — SPIRONOLACTONE 25 MILLIGRAM(S): 25 TABLET, FILM COATED ORAL at 05:24

## 2018-09-14 RX ADMIN — Medication 10 UNIT(S): at 08:38

## 2018-09-14 RX ADMIN — Medication 250 MILLIGRAM(S): at 05:23

## 2018-09-14 RX ADMIN — CARVEDILOL PHOSPHATE 12.5 MILLIGRAM(S): 80 CAPSULE, EXTENDED RELEASE ORAL at 05:23

## 2018-09-14 RX ADMIN — Medication 80 MILLIGRAM(S): at 05:24

## 2018-09-14 RX ADMIN — Medication 1 APPLICATION(S): at 05:24

## 2018-09-14 RX ADMIN — Medication 50 MICROGRAM(S): at 05:24

## 2018-09-14 RX ADMIN — Medication 50 UNIT(S): at 09:00

## 2018-09-14 RX ADMIN — BUDESONIDE AND FORMOTEROL FUMARATE DIHYDRATE 2 PUFF(S): 160; 4.5 AEROSOL RESPIRATORY (INHALATION) at 09:15

## 2018-09-14 RX ADMIN — LISINOPRIL 20 MILLIGRAM(S): 2.5 TABLET ORAL at 05:24

## 2018-09-14 RX ADMIN — Medication 10 UNIT(S): at 11:57

## 2018-09-14 NOTE — PROGRESS NOTE ADULT - SUBJECTIVE AND OBJECTIVE BOX
CC: New right foot plantar ulcer .    HPI:  ED HPI 67 year old male with a past medical history of DM, diabetic neuropathy, HTN, CHF, COPD, sleep apnea, pacemaker and defibrillator presents to ED complaining of R foot plantar surface diabetic ulcer x5 days. Pt states the wound started as a blister but is now open and ulcerated. Pt reports blood sugar was 182 earlier today.    · Wound Type: diabetic ulcer  · Negative Findings: no bleeding, no red streaks  · Location: foot  · Laterality: right  · Area: plantar  · Timing: gradual onset  · Duration: day(s)  5  · Context: diabetic    Above ED HPI reviewed and noted: patient confirmed above and adds that he has yellow foul smelling fluid draining from the ulcer. He presented today as the pain has intensified causing him inability to ambulate. Pt suffers from peripheral neuropathy with resulting unsteady gait and is a high risk of fall. He denies fever, chills, nausea, vomiting. (12 Sep 2018 22:50)    REVIEW OF SYSTEMS:    Patient denied fever, chills, abdominal pain, nausea, vomiting, cough, shortness of breath, chest pain or palpitations    Vital Signs Last 24 Hrs  T(C): 36.5 (14 Sep 2018 11:26), Max: 36.9 (13 Sep 2018 23:25)  T(F): 97.7 (14 Sep 2018 11:26), Max: 98.5 (13 Sep 2018 23:25)  HR: 75 (14 Sep 2018 11:26) (61 - 83)  BP: 102/70 (14 Sep 2018 11:26) (100/60 - 110/53)  BP(mean): --  RR: 20 (14 Sep 2018 11:26) (20 - 20)  SpO2: 94% (14 Sep 2018 11:26) (94% - 99%)I&O's Summary    PHYSICAL EXAM:  GENERAL: Extreme obese  HEENT: PERRL, +EOMI, anicteric, no [place]  NECK: Supple, No JVD   CHEST/LUNG: CTA bilaterally; Normal effort  HEART: S1S2 Normal intensity, no murmurs, gallops or rubs noted  ABDOMEN: Soft, BS Normoactive, NT, ND, no HSM noted  EXTREMITIES:  2+ radial and DP pulses noted, no clubbing, cyanosis, or edema noted, Limited mobility   SKIN: Emery stasis dermatitis. Right foot plantar ulcer   NEURO: A&Ox3, no focal deficits noted, CN II-XII intact  PSYCH: Depressed mood and affect; insight/judgement appropriate  LABS:                        15.6   5.3   )-----------( 97       ( 14 Sep 2018 05:31 )             49.9     09-14    138  |  98  |  28.0<H>  ----------------------------<  121<H>  4.4   |  26.0  |  1.20    Ca    9.1      14 Sep 2018 05:31  Phos  3.0     09-12  Mg     2.1     09-12    TPro  6.9  /  Alb  3.3  /  TBili  0.9  /  DBili  x   /  AST  17  /  ALT  10  /  AlkPhos  55  09-13    PT/INR - ( 13 Sep 2018 07:13 )   PT: 15.0 sec;   INR: 1.36 ratio         PTT - ( 12 Sep 2018 21:20 )  PTT:31.0 sec    RADIOLOGY & ADDITIONAL TESTS:    MEDICATIONS:  MEDICATIONS  (STANDING):  allopurinol 300 milliGRAM(s) Oral daily  atorvastatin 40 milliGRAM(s) Oral at bedtime  BACItracin   Ointment 1 Application(s) Topical two times a day  buDESOnide  80 MICROgram(s)/formoterol 4.5 MICROgram(s) Inhaler 2 Puff(s) Inhalation two times a day  carvedilol 12.5 milliGRAM(s) Oral every 12 hours  dextrose 5%. 1000 milliLiter(s) (50 mL/Hr) IV Continuous <Continuous>  dextrose 50% Injectable 12.5 Gram(s) IV Push once  dextrose 50% Injectable 25 Gram(s) IV Push once  dextrose 50% Injectable 25 Gram(s) IV Push once  furosemide    Tablet 80 milliGRAM(s) Oral daily  insulin detemir injectable (LEVEMIR) 50 Unit(s) SubCutaneous before breakfast  insulin lispro (HumaLOG) corrective regimen sliding scale   SubCutaneous Before meals and at bedtime  insulin lispro Injectable (HumaLOG) 10 Unit(s) SubCutaneous three times a day before meals  levothyroxine 50 MICROGram(s) Oral daily  lisinopril 20 milliGRAM(s) Oral daily  saccharomyces boulardii 250 milliGRAM(s) Oral two times a day  spironolactone 25 milliGRAM(s) Oral daily    MEDICATIONS  (PRN):  acetaminophen   Tablet .. 650 milliGRAM(s) Oral every 6 hours PRN Temp greater or equal to 38C (100.4F), Moderate Pain (4 - 6)  ALBUTerol    90 MICROgram(s) HFA Inhaler 1 Puff(s) Inhalation every 4 hours PRN Shortness of Breath and/or Wheezing  dextrose 40% Gel 15 Gram(s) Oral once PRN Blood Glucose LESS THAN 70 milliGRAM(s)/deciliter  glucagon  Injectable 1 milliGRAM(s) IntraMuscular once PRN Glucose LESS THAN 70 milligrams/deciliter

## 2018-09-14 NOTE — PROGRESS NOTE ADULT - ASSESSMENT
c/o pain due to Diabetic foot ulcer, draining, warm, with foul smell in this 68 y/o male with poorly controlled insulin dependent diabetes with peripheral diabetic neuropathy and moderate to severe chronic venous stasis. Non septic   - ID consult discontinue IV abx.  No evidence of infection.   Will follow up with podiatry in office and wound care.     Insulin Dependent Diabetes, with hyperglycemia, last known A1C 10/2017 was 10.5  - ADA diet  - RISS  - Levemir 50 units - home medication resumed    H/o A.fib, rate controlled, on AC - Warfarin, subtherapeutic  - cont warfarin    CHRISS - on Cpap 10 nocturnal use, no supplemental oxygen  H/o HTN/HFrEF (stable), last known EF 35-40% completed on 4/2017) - resumed home medication, Ramapril, Lasix 80mg, Carvedilol, Spironolactone  H/O hypothyroidism - cont synthroid  H/O Asthma, stable - cont Symbicort  H/O Gout - cont allopurinol

## 2018-09-15 LAB
-  AMPICILLIN/SULBACTAM: SIGNIFICANT CHANGE UP
-  CEFAZOLIN: SIGNIFICANT CHANGE UP
-  CLINDAMYCIN: SIGNIFICANT CHANGE UP
-  DAPTOMYCIN: SIGNIFICANT CHANGE UP
-  ERYTHROMYCIN: SIGNIFICANT CHANGE UP
-  GENTAMICIN: SIGNIFICANT CHANGE UP
-  LINEZOLID: SIGNIFICANT CHANGE UP
-  OXACILLIN: SIGNIFICANT CHANGE UP
-  PENICILLIN: SIGNIFICANT CHANGE UP
-  RIFAMPIN: SIGNIFICANT CHANGE UP
-  TETRACYCLINE: SIGNIFICANT CHANGE UP
-  TRIMETHOPRIM/SULFAMETHOXAZOLE: SIGNIFICANT CHANGE UP
-  VANCOMYCIN: SIGNIFICANT CHANGE UP
CULTURE RESULTS: SIGNIFICANT CHANGE UP
METHOD TYPE: SIGNIFICANT CHANGE UP
ORGANISM # SPEC MICROSCOPIC CNT: SIGNIFICANT CHANGE UP
ORGANISM # SPEC MICROSCOPIC CNT: SIGNIFICANT CHANGE UP
SPECIMEN SOURCE: SIGNIFICANT CHANGE UP

## 2018-09-17 LAB
CULTURE RESULTS: SIGNIFICANT CHANGE UP
CULTURE RESULTS: SIGNIFICANT CHANGE UP
SPECIMEN SOURCE: SIGNIFICANT CHANGE UP
SPECIMEN SOURCE: SIGNIFICANT CHANGE UP

## 2018-10-22 ENCOUNTER — APPOINTMENT (OUTPATIENT)
Dept: VASCULAR SURGERY | Facility: CLINIC | Age: 68
End: 2018-10-22
Payer: MEDICARE

## 2018-10-22 VITALS
HEIGHT: 69.5 IN | OXYGEN SATURATION: 96 % | TEMPERATURE: 97.7 F | BODY MASS INDEX: 41.84 KG/M2 | SYSTOLIC BLOOD PRESSURE: 98 MMHG | WEIGHT: 289 LBS | DIASTOLIC BLOOD PRESSURE: 65 MMHG | HEART RATE: 77 BPM

## 2018-10-22 DIAGNOSIS — Z80.1 FAMILY HISTORY OF MALIGNANT NEOPLASM OF TRACHEA, BRONCHUS AND LUNG: ICD-10-CM

## 2018-10-22 DIAGNOSIS — Z86.79 PERSONAL HISTORY OF OTHER DISEASES OF THE CIRCULATORY SYSTEM: ICD-10-CM

## 2018-10-22 DIAGNOSIS — Z80.42 FAMILY HISTORY OF MALIGNANT NEOPLASM OF PROSTATE: ICD-10-CM

## 2018-10-22 DIAGNOSIS — Z86.69 PERSONAL HISTORY OF OTHER DISEASES OF THE NERVOUS SYSTEM AND SENSE ORGANS: ICD-10-CM

## 2018-10-22 DIAGNOSIS — Z95.0 PRESENCE OF CARDIAC PACEMAKER: ICD-10-CM

## 2018-10-22 DIAGNOSIS — I87.8 OTHER SPECIFIED DISORDERS OF VEINS: ICD-10-CM

## 2018-10-22 DIAGNOSIS — Z86.39 PERSONAL HISTORY OF OTHER ENDOCRINE, NUTRITIONAL AND METABOLIC DISEASE: ICD-10-CM

## 2018-10-22 DIAGNOSIS — R60.0 LOCALIZED EDEMA: ICD-10-CM

## 2018-10-22 DIAGNOSIS — Z85.46 PERSONAL HISTORY OF MALIGNANT NEOPLASM OF PROSTATE: ICD-10-CM

## 2018-10-22 DIAGNOSIS — Z82.49 FAMILY HISTORY OF ISCHEMIC HEART DISEASE AND OTHER DISEASES OF THE CIRCULATORY SYSTEM: ICD-10-CM

## 2018-10-22 DIAGNOSIS — Z78.9 OTHER SPECIFIED HEALTH STATUS: ICD-10-CM

## 2018-10-22 DIAGNOSIS — Z87.39 PERSONAL HISTORY OF OTHER DISEASES OF THE MUSCULOSKELETAL SYSTEM AND CONNECTIVE TISSUE: ICD-10-CM

## 2018-10-22 PROCEDURE — 93970 EXTREMITY STUDY: CPT

## 2018-10-22 PROCEDURE — 29580 STRAPPING UNNA BOOT: CPT | Mod: 50

## 2018-10-22 PROCEDURE — 99213 OFFICE O/P EST LOW 20 MIN: CPT | Mod: 25

## 2018-10-24 RX ORDER — FUROSEMIDE 80 MG/1
80 TABLET ORAL
Refills: 0 | Status: ACTIVE | COMMUNITY

## 2018-10-24 RX ORDER — WARFARIN 3 MG/1
3 TABLET ORAL
Refills: 0 | Status: ACTIVE | COMMUNITY

## 2018-10-24 RX ORDER — RAMIPRIL 5 MG/1
5 CAPSULE ORAL
Refills: 0 | Status: ACTIVE | COMMUNITY

## 2018-10-24 RX ORDER — CARVEDILOL 25 MG/1
25 TABLET, FILM COATED ORAL
Refills: 0 | Status: ACTIVE | COMMUNITY

## 2018-10-24 RX ORDER — SITAGLIPTIN 50 MG/1
50 TABLET, FILM COATED ORAL
Refills: 0 | Status: ACTIVE | COMMUNITY

## 2018-10-24 RX ORDER — SPIRONOLACTONE 25 MG/1
25 TABLET ORAL
Refills: 0 | Status: ACTIVE | COMMUNITY

## 2018-10-24 RX ORDER — INSULIN ASPART 100 [IU]/ML
INJECTION, SOLUTION INTRAVENOUS; SUBCUTANEOUS
Refills: 0 | Status: ACTIVE | COMMUNITY

## 2018-10-24 RX ORDER — LEVOTHYROXINE SODIUM 0.05 MG/1
50 TABLET ORAL
Refills: 0 | Status: ACTIVE | COMMUNITY

## 2018-10-24 RX ORDER — ALLOPURINOL 300 MG/1
300 TABLET ORAL
Refills: 0 | Status: ACTIVE | COMMUNITY

## 2018-10-24 RX ORDER — INSULIN DETEMIR 100 [IU]/ML
INJECTION, SOLUTION SUBCUTANEOUS
Refills: 0 | Status: ACTIVE | COMMUNITY

## 2018-11-27 ENCOUNTER — APPOINTMENT (OUTPATIENT)
Dept: VASCULAR SURGERY | Facility: CLINIC | Age: 68
End: 2018-11-27

## 2018-12-01 ENCOUNTER — TRANSCRIPTION ENCOUNTER (OUTPATIENT)
Age: 68
End: 2018-12-01

## 2019-04-04 ENCOUNTER — EMERGENCY (EMERGENCY)
Facility: HOSPITAL | Age: 69
LOS: 1 days | Discharge: HOME | End: 2019-04-04
Attending: EMERGENCY MEDICINE
Payer: COMMERCIAL

## 2019-04-04 VITALS
RESPIRATION RATE: 18 BRPM | SYSTOLIC BLOOD PRESSURE: 149 MMHG | OXYGEN SATURATION: 96 % | DIASTOLIC BLOOD PRESSURE: 90 MMHG | WEIGHT: 315 LBS | HEIGHT: 69 IN | TEMPERATURE: 98 F | HEART RATE: 89 BPM

## 2019-04-04 DIAGNOSIS — Z98.89 OTHER SPECIFIED POSTPROCEDURAL STATES: Chronic | ICD-10-CM

## 2019-04-04 DIAGNOSIS — Z95.810 PRESENCE OF AUTOMATIC (IMPLANTABLE) CARDIAC DEFIBRILLATOR: Chronic | ICD-10-CM

## 2019-04-04 LAB
HCT VFR BLD CALC: 52.2 % — HIGH (ref 42–52)
HGB BLD-MCNC: 17.4 G/DL — SIGNIFICANT CHANGE UP (ref 14–18)
MCHC RBC-ENTMCNC: 29.7 PG — SIGNIFICANT CHANGE UP (ref 27–31)
MCHC RBC-ENTMCNC: 33.3 G/DL — SIGNIFICANT CHANGE UP (ref 32–36)
MCV RBC AUTO: 89.1 FL — SIGNIFICANT CHANGE UP (ref 80–94)
NT-PROBNP SERPL-SCNC: 659 PG/ML — HIGH (ref 0–300)
PLATELET # BLD AUTO: 107 K/UL — LOW (ref 150–400)
RBC # BLD: 5.86 M/UL — SIGNIFICANT CHANGE UP (ref 4.6–6.2)
RBC # FLD: 16.1 % — HIGH (ref 11–15.6)
WBC # BLD: 6.4 K/UL — SIGNIFICANT CHANGE UP (ref 4.8–10.8)
WBC # FLD AUTO: 6.4 K/UL — SIGNIFICANT CHANGE UP (ref 4.8–10.8)

## 2019-04-04 PROCEDURE — 71045 X-RAY EXAM CHEST 1 VIEW: CPT | Mod: 26

## 2019-04-04 PROCEDURE — 99284 EMERGENCY DEPT VISIT MOD MDM: CPT

## 2019-04-04 NOTE — ED PROVIDER NOTE - OBJECTIVE STATEMENT
69 yo wm pmh diabetes, cardiac, htn comes to ed feeling depressed ; pt noted stopped taking his medications 3 days ago; pt under stress with his wife who has dementia; presently denies suicidal or homicidal ideations

## 2019-04-04 NOTE — ED PROVIDER NOTE - PROGRESS NOTE DETAILS
recd sign out, plan to monitor patient in ed, follow up with clinic advised, advise detox, rtn to ed for any changes

## 2019-04-04 NOTE — ED ADULT NURSE NOTE - OBJECTIVE STATEMENT
assumed care of pt in room. pt sitting up on bed with cane by side. pt a&ox3. pt reports feeling depressed. pt states he was at his pmd today and "said something stupid" so pmd sent police to house. pt denies suicidal ideations or homicidal ideations at this time. pt states that he hasn't taken his medications since tuesday and has not checked his sugar in days. pt reports being the caretaker to his wife who has alzheimers and is depressed watching her "deteriorate". pt states he has support of daughter who comes to cook dinner for himself and his wife a few times a week.

## 2019-04-04 NOTE — ED ADULT NURSE NOTE - INTERVENTIONS DEFINITIONS
[place] to call system/Instruct patient to call for assistance/Non-slip footwear when patient is off stretcher/Physically safe environment: no spills, clutter or unnecessary equipment/Stretcher in lowest position, wheels locked, appropriate side rails in place/Call bell, personal items and telephone within reach

## 2019-04-04 NOTE — ED ADULT TRIAGE NOTE - CHIEF COMPLAINT QUOTE
Pt arrives from home with c/o feelings of depression and "I said something stupid to the police and they made me come here". When pt asked if he wants to harm himself and has thoughts of harming self he responded, "Sort-of" but failed to elaborate. Pt denies any medical ailments. MD called to bedside for psych eval.

## 2019-04-05 VITALS
HEART RATE: 71 BPM | SYSTOLIC BLOOD PRESSURE: 121 MMHG | TEMPERATURE: 99 F | DIASTOLIC BLOOD PRESSURE: 86 MMHG | OXYGEN SATURATION: 98 % | RESPIRATION RATE: 18 BRPM

## 2019-04-05 DIAGNOSIS — F32.1 MAJOR DEPRESSIVE DISORDER, SINGLE EPISODE, MODERATE: ICD-10-CM

## 2019-04-05 LAB
ALBUMIN SERPL ELPH-MCNC: 3.5 G/DL — SIGNIFICANT CHANGE UP (ref 3.3–5.2)
ALP SERPL-CCNC: 60 U/L — SIGNIFICANT CHANGE UP (ref 40–120)
ALT FLD-CCNC: 13 U/L — SIGNIFICANT CHANGE UP
ANION GAP SERPL CALC-SCNC: 13 MMOL/L — SIGNIFICANT CHANGE UP (ref 5–17)
APAP SERPL-MCNC: <7.5 UG/ML — LOW (ref 10–26)
AST SERPL-CCNC: 21 U/L — SIGNIFICANT CHANGE UP
BILIRUB SERPL-MCNC: 0.8 MG/DL — SIGNIFICANT CHANGE UP (ref 0.4–2)
BUN SERPL-MCNC: 25 MG/DL — HIGH (ref 8–20)
CALCIUM SERPL-MCNC: 8.9 MG/DL — SIGNIFICANT CHANGE UP (ref 8.6–10.2)
CHLORIDE SERPL-SCNC: 97 MMOL/L — LOW (ref 98–107)
CO2 SERPL-SCNC: 24 MMOL/L — SIGNIFICANT CHANGE UP (ref 22–29)
CREAT SERPL-MCNC: 1.2 MG/DL — SIGNIFICANT CHANGE UP (ref 0.5–1.3)
ETHANOL SERPL-MCNC: <10 MG/DL — SIGNIFICANT CHANGE UP
GLUCOSE SERPL-MCNC: 229 MG/DL — HIGH (ref 70–115)
POTASSIUM SERPL-MCNC: 4.4 MMOL/L — SIGNIFICANT CHANGE UP (ref 3.5–5.3)
POTASSIUM SERPL-SCNC: 4.4 MMOL/L — SIGNIFICANT CHANGE UP (ref 3.5–5.3)
PROT SERPL-MCNC: 6.8 G/DL — SIGNIFICANT CHANGE UP (ref 6.6–8.7)
SALICYLATES SERPL-MCNC: <0.6 MG/DL — LOW (ref 10–20)
SODIUM SERPL-SCNC: 134 MMOL/L — LOW (ref 135–145)
TROPONIN T SERPL-MCNC: <0.01 NG/ML — SIGNIFICANT CHANGE UP (ref 0–0.06)
TSH SERPL-MCNC: 4.61 UIU/ML — HIGH (ref 0.27–4.2)

## 2019-04-05 PROCEDURE — 80307 DRUG TEST PRSMV CHEM ANLYZR: CPT

## 2019-04-05 PROCEDURE — 99285 EMERGENCY DEPT VISIT HI MDM: CPT | Mod: 25

## 2019-04-05 PROCEDURE — 90792 PSYCH DIAG EVAL W/MED SRVCS: CPT | Mod: GT

## 2019-04-05 PROCEDURE — 71045 X-RAY EXAM CHEST 1 VIEW: CPT

## 2019-04-05 PROCEDURE — 83880 ASSAY OF NATRIURETIC PEPTIDE: CPT

## 2019-04-05 PROCEDURE — 82962 GLUCOSE BLOOD TEST: CPT

## 2019-04-05 PROCEDURE — 36415 COLL VENOUS BLD VENIPUNCTURE: CPT

## 2019-04-05 PROCEDURE — 93005 ELECTROCARDIOGRAM TRACING: CPT

## 2019-04-05 PROCEDURE — 84443 ASSAY THYROID STIM HORMONE: CPT

## 2019-04-05 PROCEDURE — 80053 COMPREHEN METABOLIC PANEL: CPT

## 2019-04-05 PROCEDURE — 85027 COMPLETE CBC AUTOMATED: CPT

## 2019-04-05 PROCEDURE — 84484 ASSAY OF TROPONIN QUANT: CPT

## 2019-04-05 PROCEDURE — 93010 ELECTROCARDIOGRAM REPORT: CPT

## 2019-04-05 RX ORDER — FUROSEMIDE 40 MG
80 TABLET ORAL ONCE
Qty: 0 | Refills: 0 | Status: COMPLETED | OUTPATIENT
Start: 2019-04-05 | End: 2019-04-05

## 2019-04-05 RX ORDER — CARVEDILOL PHOSPHATE 80 MG/1
25 CAPSULE, EXTENDED RELEASE ORAL ONCE
Qty: 0 | Refills: 0 | Status: COMPLETED | OUTPATIENT
Start: 2019-04-05 | End: 2019-04-05

## 2019-04-05 RX ADMIN — Medication 80 MILLIGRAM(S): at 02:06

## 2019-04-05 RX ADMIN — CARVEDILOL PHOSPHATE 25 MILLIGRAM(S): 80 CAPSULE, EXTENDED RELEASE ORAL at 03:24

## 2019-04-05 NOTE — ED ADULT NURSE REASSESSMENT NOTE - NS ED NURSE REASSESS COMMENT FT1
pt given papers with resources for depression/suicide prevention that was faxed over to ED by telepsych. pt appreciated.

## 2019-04-05 NOTE — ED ADULT NURSE REASSESSMENT NOTE - NS ED NURSE REASSESS COMMENT FT1
as per MD Diaz, pt allowed to eat. MD Diaz notified of blood glucose level. multiple attempts at receiving blood from pt by multiple RNs. unable to obtain mint top redraw. lab notified, awaiting lab to come redraw for mint tube. pt eating, tolerating well. will continue to monitor. pt awaiting psych eval.

## 2019-04-05 NOTE — ED BEHAVIORAL HEALTH ASSESSMENT NOTE - RISK ASSESSMENT
Acute Suicide Risk  (  ) High   (  ) Moderate   (x  ) Low   (  ) Unable to determine   Rationale motivated to care for wife, somewhat motivated for treatment, very supportive family, denies SI, regretful about his statements today, accepts that guns were removed from his home  Elevated Chronic Risk   (x  ) Yes ___________  Details: male, health problems, mood episode, expressed desire to own more guns   (  ) No   ___________    Safety Plan   Details: reported if he feels this way again he will 'talk to someone', he is aware he  can call 911 or return to ED   [x  ] Safety plan discussed with patient  [  x] Education provided regarding environmental safety / lethal means restriction  [ x ] Provision of National Suicide Prevention Lifeline 1-190-003-TALK (4272)

## 2019-04-05 NOTE — ED ADULT NURSE REASSESSMENT NOTE - NS ED NURSE REASSESS COMMENT FT1
Spoke to lab regarding mint top redraw, computer is frozen and they are waiting on the help desk to print orders. awaiting lab to redraw mint top.

## 2019-04-05 NOTE — ED BEHAVIORAL HEALTH ASSESSMENT NOTE - DESCRIPTION
Pt was in ED ~4 hours prior to telepsychiatry consult request at 0:38. Patient presents to ED after making suicidal statement at his primary care doctor’s office. RN reports patient has been cooperative with triage process and provided routine bloodwork willingly. RN reports no urine was ordered for patient and patient did not change into hospital gown. RN reports that during interview, patient is tearful and states that he takes care of his wife at home who has Alzheimer’s. He states that today at PCP, he said something “stupid” and said he has a gun at home “but doesn’t have the balls to use it.” Patient reported that when he got home, police arrived to his home. Patient endorses feelings of depression to RN but denies SI. Patient reports to RN that he has not taken his daily medications in 2 days. Patient denies HI, AH/VH. RN reports patient ate some food in the ED, and was calmly resting. Patient became mildly agitated after waiting for some time. RN reports that patient was given Lasix 80mg. Patient did not require security intervention or restraints at any time. Patient’s hygiene is good, he is well-kempt. Patient’s speech is clear, thought process is logical and linear, eye contact is good. RN reports that patient is unaccompanied by social or family supports. Patient was not on 1:1 observation during ED visit, but was placed on 1:1 at 2:30 for telepsychiatry interview. Patient moved into private room for telepsychiatry interview as well, logistically ready at 2:30. hypertension, CHF, COPD, sleep apnea retired magician/jeannette, lives with wife, supportive adult son and daughter

## 2019-04-05 NOTE — ED BEHAVIORAL HEALTH ASSESSMENT NOTE - DESCRIPTION (FIRST USE, LAST USE, QUANTITY, FREQUENCY, DURATION)
reports drinking a '' occaisonally, or a pint of scotch to fall asleep, denies daily drinking reports 1 puff daily, reports it helps with pain

## 2019-04-05 NOTE — ED ADULT NURSE REASSESSMENT NOTE - NS ED NURSE REASSESS COMMENT FT1
pt brought to private room via recliner chair. pt tolerated well. tele psych computer brought to patient in private room. tele psych called to notify of patient being ready, spoke to HARI Yi at bedside for 1:1 during tele psych. pt cooperating.

## 2019-04-05 NOTE — ED BEHAVIORAL HEALTH ASSESSMENT NOTE - SUICIDE PROTECTIVE FACTORS
Future oriented/Responsibility to family and others/Identifies reasons for living/Supportive social network or family/High frustration tolerance

## 2019-04-05 NOTE — ED BEHAVIORAL HEALTH ASSESSMENT NOTE - HPI (INCLUDE ILLNESS QUALITY, SEVERITY, DURATION, TIMING, CONTEXT, MODIFYING FACTORS, ASSOCIATED SIGNS AND SYMPTOMS)
Patient is a 68year old male domiciled, retired, with adult children, with no known psychiatric history, no prior psych hospitalizations, no hx of self harm behaviors or suicide attempts, no known hx manic or psychotic s/s, no known hx of violence or arrests, no known hx trauma, substance use history of 1 puff of cannabis /abuse, with a past medical history of obesity, CHF, hypertension, diabetes type II, neuropathy, brought in by EMS called by a nurse at his primary care provider on report that he threatened to shoot himself. He was seen via telepsychiatry in a private room with a 1:1 present.  Patient reports he is 'now understands' what people mean when they 'say they are depressed' and attribute this to watching his wife's decline due to Alzheimer's. He reports she is 'worse every day' and describes the various ways that caring for her is difficult--she wakes him up every time she wakes up from sleep, doesn't recognize their home, doesn't enjoy spending time at other people's homes anymore which they used to spend time doing together. He finds it difficult to sleep, since his wife needs constant supervision, and when his wife is asleep he wants to do things since he has privacy. He reports he cries every day, but he knows he has to keep going. 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 He reports his family is supportive and he thinks he can keep going if his wife stays the same. He is willing to accept referrals.    Per patient’s daughter, Nina Garcia ([phone]) the [facility] starts tonight. At baseline, patient lives at home with his wife, is her primary caretaker as she has Alzheimer’s, is retired, has difficulty sleeping, eats normally, and attends to ADLs independently. His baseline symptoms include some depressed mood in response to his wife’s diagnosis and deterioration. He is not currently linked to any outpatient psychiatric treatment. Daughter reports that tonight, patient endorsed to his PCP that he was no longer taking medications because he wants to die, and stated he had a gun at home. Daughter reports that patient’s PCP called the police who removed all of patient’s guns from the home, and called her to watch her mother while they brought patient to the ED for evaluation. Daughter reports that she noticed that patient has been depressed for the last year, which is when patient’s wife started to deteriorate more quickly. Daughter reports that patient has his own medical issues and cannot provide much care to his wife (home health aide comes to the home for her), but he spends his whole day with her and sees her deteriorate before his eyes. She denies that patient has ever endorsed SI to her, he has only stated that he is “tired of seeing her [wife] like this.” She denies changes in patient’s eating, sleeping, ADLs, or functioning in the home. She reports that she did not know patient stopped taking his medications 2 days ago. She denies HI, AH/VH, aggression, anxiety, sx of kathleen or psychosis. She reports social ETOH use, denies other drug use. Daughter reports that she does not have safety concerns, does not feel patient is danger to himself or others at this time. She feels that patient can be safely discharged home, and would like some recommendations for support groups or therapy so that patient can talk to someone about the stress of his wife’s condition.

## 2019-04-05 NOTE — ED BEHAVIORAL HEALTH ASSESSMENT NOTE - SUMMARY
Patient is a 68year old male domiciled, retired, with adult children, with no known psychiatric history, no prior psych hospitalizations, no hx of self harm behaviors or suicide attempts, no known hx manic or psychotic s/s, no known hx of violence or arrests, no known hx trauma, substance use history of 1 puff of cannabis /abuse, with a past medical history of obesity, CHF, hypertension, diabetes type II, neuropathy, brought in by EMS called by a nurse at his primary care provider on report that he threatened to shoot himself. He presents as depressed and tearful, but denies SI and reports that he regrets the statement he made today. He is willing to accept referral to outpatient talk therapy. He does not meet criteria for admission. He is able to care for himself, and is invested in participating in the care of his wife.

## 2019-09-04 ENCOUNTER — INPATIENT (INPATIENT)
Facility: HOSPITAL | Age: 69
LOS: 9 days | Discharge: NURSING, REHAB OR LONG-TERM CARE | DRG: 871 | End: 2019-09-14
Attending: HOSPITALIST | Admitting: INTERNAL MEDICINE
Payer: COMMERCIAL

## 2019-09-04 VITALS
TEMPERATURE: 98 F | RESPIRATION RATE: 20 BRPM | HEIGHT: 69 IN | WEIGHT: 315 LBS | SYSTOLIC BLOOD PRESSURE: 120 MMHG | HEART RATE: 109 BPM | OXYGEN SATURATION: 93 % | DIASTOLIC BLOOD PRESSURE: 50 MMHG

## 2019-09-04 DIAGNOSIS — L03.90 CELLULITIS, UNSPECIFIED: ICD-10-CM

## 2019-09-04 DIAGNOSIS — Z98.89 OTHER SPECIFIED POSTPROCEDURAL STATES: Chronic | ICD-10-CM

## 2019-09-04 DIAGNOSIS — Z95.810 PRESENCE OF AUTOMATIC (IMPLANTABLE) CARDIAC DEFIBRILLATOR: Chronic | ICD-10-CM

## 2019-09-04 LAB
ACETONE SERPL-MCNC: NEGATIVE — SIGNIFICANT CHANGE UP
ALBUMIN SERPL ELPH-MCNC: 3.7 G/DL — SIGNIFICANT CHANGE UP (ref 3.3–5.2)
ALP SERPL-CCNC: 79 U/L — SIGNIFICANT CHANGE UP (ref 40–120)
ALT FLD-CCNC: 47 U/L — HIGH
ANION GAP SERPL CALC-SCNC: 19 MMOL/L — HIGH (ref 5–17)
ANION GAP SERPL CALC-SCNC: 22 MMOL/L — HIGH (ref 5–17)
APPEARANCE UR: ABNORMAL
APTT BLD: 31.4 SEC — SIGNIFICANT CHANGE UP (ref 27.5–36.3)
AST SERPL-CCNC: 333 U/L — HIGH
BASOPHILS # BLD AUTO: 0 K/UL — SIGNIFICANT CHANGE UP (ref 0–0.2)
BASOPHILS NFR BLD AUTO: 0 % — SIGNIFICANT CHANGE UP (ref 0–2)
BILIRUB SERPL-MCNC: 1.2 MG/DL — SIGNIFICANT CHANGE UP (ref 0.4–2)
BILIRUB UR-MCNC: NEGATIVE — SIGNIFICANT CHANGE UP
BUN SERPL-MCNC: 39 MG/DL — HIGH (ref 8–20)
BUN SERPL-MCNC: 41 MG/DL — HIGH (ref 8–20)
CALCIUM SERPL-MCNC: 8.6 MG/DL — SIGNIFICANT CHANGE UP (ref 8.6–10.2)
CALCIUM SERPL-MCNC: 9.5 MG/DL — SIGNIFICANT CHANGE UP (ref 8.6–10.2)
CHLORIDE SERPL-SCNC: 93 MMOL/L — LOW (ref 98–107)
CHLORIDE SERPL-SCNC: 97 MMOL/L — LOW (ref 98–107)
CK MB CFR SERPL CALC: 120.5 NG/ML — HIGH (ref 0–6.7)
CK SERPL-CCNC: CRITICAL HIGH U/L (ref 30–200)
CO2 SERPL-SCNC: 14 MMOL/L — LOW (ref 22–29)
CO2 SERPL-SCNC: 16 MMOL/L — LOW (ref 22–29)
COLOR SPEC: YELLOW — SIGNIFICANT CHANGE UP
COMMENT - URINE: SIGNIFICANT CHANGE UP
CREAT SERPL-MCNC: 2.85 MG/DL — HIGH (ref 0.5–1.3)
CREAT SERPL-MCNC: 3.12 MG/DL — HIGH (ref 0.5–1.3)
DIFF PNL FLD: ABNORMAL
EOSINOPHIL # BLD AUTO: 0 K/UL — SIGNIFICANT CHANGE UP (ref 0–0.5)
EOSINOPHIL NFR BLD AUTO: 0 % — SIGNIFICANT CHANGE UP (ref 0–6)
EPI CELLS # UR: SIGNIFICANT CHANGE UP
GAS PNL BLDA: SIGNIFICANT CHANGE UP
GIANT PLATELETS BLD QL SMEAR: PRESENT — SIGNIFICANT CHANGE UP
GLUCOSE BLDC GLUCOMTR-MCNC: 350 MG/DL — HIGH (ref 70–99)
GLUCOSE BLDC GLUCOMTR-MCNC: 378 MG/DL — HIGH (ref 70–99)
GLUCOSE BLDC GLUCOMTR-MCNC: 386 MG/DL — HIGH (ref 70–99)
GLUCOSE BLDC GLUCOMTR-MCNC: 443 MG/DL — HIGH (ref 70–99)
GLUCOSE SERPL-MCNC: 449 MG/DL — HIGH (ref 70–115)
GLUCOSE SERPL-MCNC: 555 MG/DL — CRITICAL HIGH (ref 70–115)
GLUCOSE UR QL: 100 MG/DL
HCT VFR BLD CALC: 55.6 % — HIGH (ref 39–50)
HGB BLD-MCNC: 18.3 G/DL — HIGH (ref 13–17)
INR BLD: 1.31 RATIO — HIGH (ref 0.88–1.16)
KETONES UR-MCNC: NEGATIVE — SIGNIFICANT CHANGE UP
LACTATE BLDV-MCNC: 6.3 MMOL/L — CRITICAL HIGH (ref 0.5–2)
LACTATE BLDV-MCNC: 8 MMOL/L — CRITICAL HIGH (ref 0.5–2)
LEUKOCYTE ESTERASE UR-ACNC: ABNORMAL
LYMPHOCYTES # BLD AUTO: 0.43 K/UL — LOW (ref 1–3.3)
LYMPHOCYTES # BLD AUTO: 1.8 % — LOW (ref 13–44)
MAGNESIUM SERPL-MCNC: 1.9 MG/DL — SIGNIFICANT CHANGE UP (ref 1.8–2.6)
MANUAL SMEAR VERIFICATION: SIGNIFICANT CHANGE UP
MCHC RBC-ENTMCNC: 29.5 PG — SIGNIFICANT CHANGE UP (ref 27–34)
MCHC RBC-ENTMCNC: 32.9 GM/DL — SIGNIFICANT CHANGE UP (ref 32–36)
MCV RBC AUTO: 89.7 FL — SIGNIFICANT CHANGE UP (ref 80–100)
METAMYELOCYTES # FLD: 0.9 % — HIGH (ref 0–0)
MONOCYTES # BLD AUTO: 0.41 K/UL — SIGNIFICANT CHANGE UP (ref 0–0.9)
MONOCYTES NFR BLD AUTO: 1.7 % — LOW (ref 2–14)
MYELOCYTES NFR BLD: 1.7 % — HIGH (ref 0–0)
NEUTROPHILS # BLD AUTO: 22.45 K/UL — HIGH (ref 1.8–7.4)
NEUTROPHILS NFR BLD AUTO: 83.5 % — HIGH (ref 43–77)
NEUTS BAND # BLD: 10.4 % — HIGH (ref 0–8)
NITRITE UR-MCNC: NEGATIVE — SIGNIFICANT CHANGE UP
PH UR: 5 — SIGNIFICANT CHANGE UP (ref 5–8)
PHOSPHATE SERPL-MCNC: 4.2 MG/DL — SIGNIFICANT CHANGE UP (ref 2.4–4.7)
PLAT MORPH BLD: NORMAL — SIGNIFICANT CHANGE UP
PLATELET # BLD AUTO: 154 K/UL — SIGNIFICANT CHANGE UP (ref 150–400)
POTASSIUM SERPL-MCNC: 5.2 MMOL/L — SIGNIFICANT CHANGE UP (ref 3.5–5.3)
POTASSIUM SERPL-MCNC: 5.9 MMOL/L — HIGH (ref 3.5–5.3)
POTASSIUM SERPL-SCNC: 5.2 MMOL/L — SIGNIFICANT CHANGE UP (ref 3.5–5.3)
POTASSIUM SERPL-SCNC: 5.9 MMOL/L — HIGH (ref 3.5–5.3)
PROT SERPL-MCNC: 7.5 G/DL — SIGNIFICANT CHANGE UP (ref 6.6–8.7)
PROT UR-MCNC: 100 MG/DL
PROTHROM AB SERPL-ACNC: 15.2 SEC — HIGH (ref 10–12.9)
RBC # BLD: 6.2 M/UL — HIGH (ref 4.2–5.8)
RBC # FLD: 16.5 % — HIGH (ref 10.3–14.5)
RBC BLD AUTO: NORMAL — SIGNIFICANT CHANGE UP
RBC CASTS # UR COMP ASSIST: ABNORMAL /HPF (ref 0–4)
SMUDGE CELLS # BLD: PRESENT — SIGNIFICANT CHANGE UP
SODIUM SERPL-SCNC: 129 MMOL/L — LOW (ref 135–145)
SODIUM SERPL-SCNC: 132 MMOL/L — LOW (ref 135–145)
SP GR SPEC: 1.02 — SIGNIFICANT CHANGE UP (ref 1.01–1.02)
TROPONIN T SERPL-MCNC: 0.04 NG/ML — SIGNIFICANT CHANGE UP (ref 0–0.06)
UROBILINOGEN FLD QL: NEGATIVE MG/DL — SIGNIFICANT CHANGE UP
WBC # BLD: 23.91 K/UL — HIGH (ref 3.8–10.5)
WBC # FLD AUTO: 23.91 K/UL — HIGH (ref 3.8–10.5)
WBC UR QL: SIGNIFICANT CHANGE UP

## 2019-09-04 PROCEDURE — 71045 X-RAY EXAM CHEST 1 VIEW: CPT | Mod: 26,77

## 2019-09-04 PROCEDURE — 73700 CT LOWER EXTREMITY W/O DYE: CPT | Mod: 26,RT

## 2019-09-04 PROCEDURE — 93010 ELECTROCARDIOGRAM REPORT: CPT

## 2019-09-04 PROCEDURE — 72131 CT LUMBAR SPINE W/O DYE: CPT | Mod: 26

## 2019-09-04 PROCEDURE — 76377 3D RENDER W/INTRP POSTPROCES: CPT | Mod: 26

## 2019-09-04 PROCEDURE — 71045 X-RAY EXAM CHEST 1 VIEW: CPT | Mod: 26

## 2019-09-04 PROCEDURE — 74176 CT ABD & PELVIS W/O CONTRAST: CPT | Mod: 26

## 2019-09-04 PROCEDURE — 73590 X-RAY EXAM OF LOWER LEG: CPT | Mod: 26,RT

## 2019-09-04 PROCEDURE — 99221 1ST HOSP IP/OBS SF/LOW 40: CPT

## 2019-09-04 PROCEDURE — 99285 EMERGENCY DEPT VISIT HI MDM: CPT

## 2019-09-04 RX ORDER — NOREPINEPHRINE BITARTRATE/D5W 8 MG/250ML
0.05 PLASTIC BAG, INJECTION (ML) INTRAVENOUS
Qty: 8 | Refills: 0 | Status: DISCONTINUED | OUTPATIENT
Start: 2019-09-04 | End: 2019-09-05

## 2019-09-04 RX ORDER — SODIUM CHLORIDE 9 MG/ML
1000 INJECTION, SOLUTION INTRAVENOUS ONCE
Refills: 0 | Status: COMPLETED | OUTPATIENT
Start: 2019-09-04 | End: 2019-09-04

## 2019-09-04 RX ORDER — DEXTROSE 50 % IN WATER 50 %
25 SYRINGE (ML) INTRAVENOUS ONCE
Refills: 0 | Status: DISCONTINUED | OUTPATIENT
Start: 2019-09-04 | End: 2019-09-05

## 2019-09-04 RX ORDER — PIPERACILLIN AND TAZOBACTAM 4; .5 G/20ML; G/20ML
3.38 INJECTION, POWDER, LYOPHILIZED, FOR SOLUTION INTRAVENOUS ONCE
Refills: 0 | Status: COMPLETED | OUTPATIENT
Start: 2019-09-04 | End: 2019-09-04

## 2019-09-04 RX ORDER — SODIUM CHLORIDE 9 MG/ML
1000 INJECTION, SOLUTION INTRAVENOUS
Refills: 0 | Status: DISCONTINUED | OUTPATIENT
Start: 2019-09-04 | End: 2019-09-05

## 2019-09-04 RX ORDER — ACETAMINOPHEN 500 MG
975 TABLET ORAL ONCE
Refills: 0 | Status: COMPLETED | OUTPATIENT
Start: 2019-09-04 | End: 2019-09-04

## 2019-09-04 RX ORDER — CHLORHEXIDINE GLUCONATE 213 G/1000ML
1 SOLUTION TOPICAL
Refills: 0 | Status: DISCONTINUED | OUTPATIENT
Start: 2019-09-04 | End: 2019-09-04

## 2019-09-04 RX ORDER — SODIUM CHLORIDE 9 MG/ML
5600 INJECTION, SOLUTION INTRAVENOUS ONCE
Refills: 0 | Status: DISCONTINUED | OUTPATIENT
Start: 2019-09-04 | End: 2019-09-04

## 2019-09-04 RX ORDER — DEXTROSE 50 % IN WATER 50 %
15 SYRINGE (ML) INTRAVENOUS ONCE
Refills: 0 | Status: DISCONTINUED | OUTPATIENT
Start: 2019-09-04 | End: 2019-09-05

## 2019-09-04 RX ORDER — MORPHINE SULFATE 50 MG/1
4 CAPSULE, EXTENDED RELEASE ORAL ONCE
Refills: 0 | Status: DISCONTINUED | OUTPATIENT
Start: 2019-09-04 | End: 2019-09-04

## 2019-09-04 RX ORDER — SODIUM CHLORIDE 9 MG/ML
2200 INJECTION, SOLUTION INTRAVENOUS ONCE
Refills: 0 | Status: COMPLETED | OUTPATIENT
Start: 2019-09-04 | End: 2019-09-04

## 2019-09-04 RX ORDER — HYDROMORPHONE HYDROCHLORIDE 2 MG/ML
1 INJECTION INTRAMUSCULAR; INTRAVENOUS; SUBCUTANEOUS ONCE
Refills: 0 | Status: DISCONTINUED | OUTPATIENT
Start: 2019-09-04 | End: 2019-09-04

## 2019-09-04 RX ORDER — HEPARIN SODIUM 5000 [USP'U]/ML
5000 INJECTION INTRAVENOUS; SUBCUTANEOUS
Refills: 0 | Status: DISCONTINUED | OUTPATIENT
Start: 2019-09-04 | End: 2019-09-05

## 2019-09-04 RX ORDER — PIPERACILLIN AND TAZOBACTAM 4; .5 G/20ML; G/20ML
3.38 INJECTION, POWDER, LYOPHILIZED, FOR SOLUTION INTRAVENOUS EVERY 12 HOURS
Refills: 0 | Status: DISCONTINUED | OUTPATIENT
Start: 2019-09-04 | End: 2019-09-11

## 2019-09-04 RX ORDER — GLUCAGON INJECTION, SOLUTION 0.5 MG/.1ML
1 INJECTION, SOLUTION SUBCUTANEOUS ONCE
Refills: 0 | Status: DISCONTINUED | OUTPATIENT
Start: 2019-09-04 | End: 2019-09-05

## 2019-09-04 RX ORDER — VANCOMYCIN HCL 1 G
1000 VIAL (EA) INTRAVENOUS ONCE
Refills: 0 | Status: COMPLETED | OUTPATIENT
Start: 2019-09-04 | End: 2019-09-04

## 2019-09-04 RX ORDER — VANCOMYCIN HCL 1 G
1000 VIAL (EA) INTRAVENOUS EVERY 24 HOURS
Refills: 0 | Status: DISCONTINUED | OUTPATIENT
Start: 2019-09-05 | End: 2019-09-06

## 2019-09-04 RX ORDER — INSULIN HUMAN 100 [IU]/ML
10 INJECTION, SOLUTION SUBCUTANEOUS
Qty: 100 | Refills: 0 | Status: DISCONTINUED | OUTPATIENT
Start: 2019-09-04 | End: 2019-09-06

## 2019-09-04 RX ORDER — DEXTROSE 50 % IN WATER 50 %
12.5 SYRINGE (ML) INTRAVENOUS ONCE
Refills: 0 | Status: DISCONTINUED | OUTPATIENT
Start: 2019-09-04 | End: 2019-09-05

## 2019-09-04 RX ORDER — INSULIN HUMAN 100 [IU]/ML
10 INJECTION, SOLUTION SUBCUTANEOUS ONCE
Refills: 0 | Status: COMPLETED | OUTPATIENT
Start: 2019-09-04 | End: 2019-09-04

## 2019-09-04 RX ORDER — SODIUM CHLORIDE 9 MG/ML
1000 INJECTION INTRAMUSCULAR; INTRAVENOUS; SUBCUTANEOUS ONCE
Refills: 0 | Status: COMPLETED | OUTPATIENT
Start: 2019-09-04 | End: 2019-09-04

## 2019-09-04 RX ORDER — FUROSEMIDE 40 MG
40 TABLET ORAL ONCE
Refills: 0 | Status: COMPLETED | OUTPATIENT
Start: 2019-09-04 | End: 2019-09-04

## 2019-09-04 RX ORDER — HYDROMORPHONE HYDROCHLORIDE 2 MG/ML
0.5 INJECTION INTRAMUSCULAR; INTRAVENOUS; SUBCUTANEOUS ONCE
Refills: 0 | Status: DISCONTINUED | OUTPATIENT
Start: 2019-09-04 | End: 2019-09-04

## 2019-09-04 RX ADMIN — PIPERACILLIN AND TAZOBACTAM 200 GRAM(S): 4; .5 INJECTION, POWDER, LYOPHILIZED, FOR SOLUTION INTRAVENOUS at 23:15

## 2019-09-04 RX ADMIN — Medication 975 MILLIGRAM(S): at 12:56

## 2019-09-04 RX ADMIN — SODIUM CHLORIDE 2200 MILLILITER(S): 9 INJECTION, SOLUTION INTRAVENOUS at 13:40

## 2019-09-04 RX ADMIN — Medication 40 MILLIGRAM(S): at 14:11

## 2019-09-04 RX ADMIN — HEPARIN SODIUM 5000 UNIT(S): 5000 INJECTION INTRAVENOUS; SUBCUTANEOUS at 22:16

## 2019-09-04 RX ADMIN — HYDROMORPHONE HYDROCHLORIDE 0.5 MILLIGRAM(S): 2 INJECTION INTRAMUSCULAR; INTRAVENOUS; SUBCUTANEOUS at 22:15

## 2019-09-04 RX ADMIN — Medication 250 MILLIGRAM(S): at 17:09

## 2019-09-04 RX ADMIN — SODIUM CHLORIDE 100 MILLILITER(S): 9 INJECTION, SOLUTION INTRAVENOUS at 23:15

## 2019-09-04 RX ADMIN — MORPHINE SULFATE 4 MILLIGRAM(S): 50 CAPSULE, EXTENDED RELEASE ORAL at 14:30

## 2019-09-04 RX ADMIN — HYDROMORPHONE HYDROCHLORIDE 1 MILLIGRAM(S): 2 INJECTION INTRAMUSCULAR; INTRAVENOUS; SUBCUTANEOUS at 15:10

## 2019-09-04 RX ADMIN — INSULIN HUMAN 10 UNIT(S): 100 INJECTION, SOLUTION SUBCUTANEOUS at 15:10

## 2019-09-04 RX ADMIN — PIPERACILLIN AND TAZOBACTAM 3.38 GRAM(S): 4; .5 INJECTION, POWDER, LYOPHILIZED, FOR SOLUTION INTRAVENOUS at 13:25

## 2019-09-04 RX ADMIN — HYDROMORPHONE HYDROCHLORIDE 0.5 MILLIGRAM(S): 2 INJECTION INTRAMUSCULAR; INTRAVENOUS; SUBCUTANEOUS at 22:45

## 2019-09-04 RX ADMIN — INSULIN HUMAN 10 UNIT(S)/HR: 100 INJECTION, SOLUTION SUBCUTANEOUS at 19:33

## 2019-09-04 RX ADMIN — SODIUM CHLORIDE 1000 MILLILITER(S): 9 INJECTION INTRAMUSCULAR; INTRAVENOUS; SUBCUTANEOUS at 19:23

## 2019-09-04 RX ADMIN — SODIUM CHLORIDE 2200 MILLILITER(S): 9 INJECTION, SOLUTION INTRAVENOUS at 15:57

## 2019-09-04 RX ADMIN — SODIUM CHLORIDE 1000 MILLILITER(S): 9 INJECTION INTRAMUSCULAR; INTRAVENOUS; SUBCUTANEOUS at 17:09

## 2019-09-04 RX ADMIN — MORPHINE SULFATE 4 MILLIGRAM(S): 50 CAPSULE, EXTENDED RELEASE ORAL at 14:11

## 2019-09-04 RX ADMIN — INSULIN HUMAN 10 UNIT(S)/HR: 100 INJECTION, SOLUTION SUBCUTANEOUS at 22:16

## 2019-09-04 RX ADMIN — PIPERACILLIN AND TAZOBACTAM 200 GRAM(S): 4; .5 INJECTION, POWDER, LYOPHILIZED, FOR SOLUTION INTRAVENOUS at 12:55

## 2019-09-04 RX ADMIN — Medication 1000 MILLIGRAM(S): at 18:09

## 2019-09-04 NOTE — H&P ADULT - HISTORY OF PRESENT ILLNESS
67 yo male pmhx Pt is a 68 y/o male with PMHx DM (w/ neuropathy), HN, CHF (Last Echo ~2 years ago with EF 35%), COPD, CHRISS (on home CPAP machine), PPM/Defibrillator ("heart function was 3%"), prior diabetic foot ulcers/infections, depression presents with worsening weakness and SOB for past few days. As per daughter at bedside, wife found pt passed out sitting on toilet. Estimated time on toilet is around 10-12 hours. Pt states he "blacked out", but didnt fall off toilet. Pt also c/o inability to ambulate now secondary to worsening of weakness. In ED, pt remains short of breath, found to be hyperglycemic, AG 22, Serum CO2 14, lactate 8.0, CK 24k, transaminitis, hyperkalemic, Cr 2.85, with leukocytosis and febrile to 101.7. Surgery consulted for concern of RLE cellulitis and r/o nec fasc. MICU consult called. Denies n/v/d. CP, h/a, dizziness, or any other acute complaints at this time.

## 2019-09-04 NOTE — PROVIDER CONTACT NOTE (EICU) - ACTION/TREATMENT ORDERED:
-have entered AM labs for this patient including CBC, BMP, magnesium and phosphorous levels  -will CTM for results and replete as per [place] standard
Chest xray ordered to confirm central line placement as requested.

## 2019-09-04 NOTE — ED PROVIDER NOTE - CLINICAL SUMMARY MEDICAL DECISION MAKING FREE TEXT BOX
patient arrived with tachycardia, fever, found to have cellulitis. evaluated by surgery for possible nec fasc, which was ruled out.  found to have severe sepsis with elevated wbc, elevated lactate. found to have hyperglycemia with gap, given ivf and insulin. found to be in rapid afib with rvr, rate controlled now. admitted to MICU for further evaluation.

## 2019-09-04 NOTE — ED ADULT NURSE NOTE - OBJECTIVE STATEMENT
Pt c/o left hip pain and difficulty breathing since last night, BL LE edema noted w/ wounds to bilateral calves, rectal temp 101.7 and tachycardic on monitor, AOx4, lethargic upon initial assessment with RN, pt appears to be unkept and dirty, foul smelling scent noted

## 2019-09-04 NOTE — ED ADULT NURSE REASSESSMENT NOTE - NS ED NURSE REASSESS COMMENT FT1
ICU @ bedside for evaluation, pt found to be hypotensive @ this time, AOx4, showing sinus tachycardia on monitor.

## 2019-09-04 NOTE — H&P ADULT - NSICDXFAMILYHX_GEN_ALL_CORE_FT
FAMILY HISTORY:  Sibling  Still living? Unknown  Family history of cancer, Age at diagnosis: Age Unknown  Family history of diabetes mellitus, Age at diagnosis: Age Unknown

## 2019-09-04 NOTE — ED ADULT TRIAGE NOTE - CHIEF COMPLAINT QUOTE
Patient BIBA to ED today with c/o lower leg edema, unable to walk, chest pain, chest discomfort and left hip pain.

## 2019-09-04 NOTE — PROCEDURE NOTE - ADDITIONAL PROCEDURE DETAILS
Left arm; Patient with JAYLENE, rhabdomyolysis, metabolic acidosis, sepsis, cellulitis, with progressive hypertension requiring multiple infusions and poor peripheral venous access. Procedure performed independently of critical care time. Patient with JAYLENE, rhabdomyolysis, metabolic acidosis, sepsis, cellulitis, with progressive hypotension requiring multiple infusions and poor peripheral venous access. Procedure performed independently of critical care time.

## 2019-09-04 NOTE — ED PROVIDER NOTE - OBJECTIVE STATEMENT
68yoM: with pmh signif for Afib (on Carvedilol, Coumadin), CHF, DM, COPD; now p/w generalized malaise.  patient states he has been having worsening malaise, chills over past few days. unable to walk now 2/2 generalized weakness. today, c/o palpitations and sob.  denies chest pain. denies abd pain. denies n/v/d. denies dysuria, frequency.  c/o left lower back pain, radiating to legs.    SOCIAL: ex-smoker/socialEtOH

## 2019-09-04 NOTE — CONSULT NOTE ADULT - ATTENDING COMMENTS
I have seen and examined  the patient.  prolonged sitting, presenting with hyperglycemia and dehydration and CPK elevation.  On exam chronic venous ulcers and some blistering ulcers, no crepitus or fluctuance to right lower extremity, cellulitis changes.  CT found no collections or air to suggest necrotizing soft tissue infection.    A/P cellulitis, no acute surgical intervention  elevated LRINEC score is secondary to pseudohyponatremia secondary to hyperglycemia.  suggest abx for cellulitis and medical treatment of hyperglycemia

## 2019-09-04 NOTE — ED ADULT NURSE NOTE - NSIMPLEMENTINTERV_GEN_ALL_ED
Implemented All Fall with Harm Risk Interventions:  [place] to call system. Call bell, personal items and telephone within reach. Instruct patient to call for assistance. Room bathroom lighting operational. Non-slip footwear when patient is off stretcher. Physically safe environment: no spills, clutter or unnecessary equipment. Stretcher in lowest position, wheels locked, appropriate side rails in place. Provide visual cue, wrist band, yellow gown, etc. Monitor gait and stability. Monitor for mental status changes and reorient to person, place, and time. Review medications for side effects contributing to fall risk. Reinforce activity limits and safety measures with patient and family. Provide visual clues: red socks.

## 2019-09-04 NOTE — H&P ADULT - NSHPSOCIALHISTORY_GEN_ALL_CORE
prior smoker, quit "some time ago", smoked 1/2 pack a day for 35 years, marijuana use, social drinker, no other illicit drug use

## 2019-09-04 NOTE — ED PROVIDER NOTE - NS ED ROS FT
Constitutional: (-) fever  (+)chills  (+)sweats  Eyes/ENT: (-) blurry vision, (-) epistaxis  (-)rhinorrhea   (-) sore throat    Cardiovascular: (-) chest pain, (-) palpitations (+) edema   Respiratory: (-) cough, (-) shortness of breath   Gastrointestinal: (-)nausea  (-)vomiting, (-) diarrhea  (-) abdominal pain   :  (-)dysuria, (-)frequency, (-)urgency, (-)hematuria  Musculoskeletal: (-) neck pain, (-) back pain, (-) joint pain  Integumentary: (-) rash, (-) edema  Neurological: (-) headache, (-) altered mental status  (-)LOC

## 2019-09-04 NOTE — CONSULT NOTE ADULT - SUBJECTIVE AND OBJECTIVE BOX
ACUTE CARE SURGERY CONSULT    Consulting surgical team: [facility] - Acute Care Surgery  Consulting attending: Dr. Lopes  Patient seen and examined: 09-04-19 @ 17:29    HPI:  68yoM with morbid obesity, DM2, Afib, COPD, CHF presenting with 1 day history of SOB and found on the toilet seat for unknown number of hours 2/2 not being able to get up. Patient states that his wife's caretaker noticed that his wounds 2/2 chronic venous insufficiency were worrisome for a deeper infection and therefore recommended he go to the hospital. She has been placing unna boots once a week. At baseline, he is unable to have sensation in his feet, however noticed that his R medial leg has been getting warmer up to the mid thigh. Pt states that he takes insulin at home, but is for the most part, an uncontrolled DM2. Denies f/c/n/v/cp.    PAST MEDICAL HISTORY:  CHF (congestive heart failure)  Pulmonary hypertension  Prostate CA  Sleep apnea  Renal insufficiency  High cholesterol  HTN (hypertension)  DM (diabetes mellitus)      PAST SURGICAL HISTORY:  AICD (automatic cardioverter/defibrillator) present  S/P ankle ligament repair  No significant past surgical history      ALLERGIES:  No Known Allergies      MEDICATIONS  (STANDING):  chlorhexidine 4% Liquid 1 Application(s) Topical <User Schedule>  insulin regular Infusion 10 Unit(s)/Hr (10 mL/Hr) IV Continuous <Continuous>  lactated ringers Bolus 1000 milliLiter(s) IV Bolus once    MEDICATIONS  (PRN):      VITALS & I/Os:  Vital Signs Last 24 Hrs  T(C): 38.7 (04 Sep 2019 12:28), Max: 38.7 (04 Sep 2019 12:28)  T(F): 101.7 (04 Sep 2019 12:28), Max: 101.7 (04 Sep 2019 12:28)  HR: 106 (04 Sep 2019 17:16) (106 - 109)  BP: 84/52 (04 Sep 2019 17:16) (84/52 - 120/50)  BP(mean): --  RR: 20 (04 Sep 2019 17:16) (20 - 20)  SpO2: 95% (04 Sep 2019 17:16) (93% - 95%)  CAPILLARY BLOOD GLUCOSE      POCT Blood Glucose.: 387 mg/dL (04 Sep 2019 16:04)  POCT Blood Glucose.: 503 mg/dL (04 Sep 2019 14:12)      I&O's Summary        GEN: NAD, alert and oriented x 3  HEENT: WNL  CHEST: tachypneic audible wheezing  HEART: RRR, non-muffled heart sounds  ABD: Soft, non-tender, non-distended  EXT/VASC: Chronic venous stasis changes b/l, bullae and opened wounds L lateral wounds worse than R. LLE cool to touch, RLE warm with erythema that tracks to the medial mid-thigh, no signs of crepitus. Compartments are soft, non-tender. dopplerable monophasic DP b/l      LABS:                        18.3   23.91 )-----------( 154      ( 04 Sep 2019 13:10 )             55.6     09-04    129<L>  |  93<L>  |  39.0<H>  ----------------------------<  555<HH>  5.9<H>   |  14.0<L>  |  2.85<H>    Ca    9.5      04 Sep 2019 13:10    TPro  7.5  /  Alb  3.7  /  TBili  1.2  /  DBili  x   /  AST  333<H>  /  ALT  47<H>  /  AlkPhos  79  09-04      PT/INR - ( 04 Sep 2019 13:10 )   PT: 15.2 sec;   INR: 1.31 ratio         PTT - ( 04 Sep 2019 13:10 )  PTT:31.4 sec    CARDIAC MARKERS ( 04 Sep 2019 13:10 )  x     / 0.04 ng/mL / 19834 U/L / x     / 120.5 ng/mL      09-04 @ 13:07  8.0        IMAGING:  < from: CT Lower Extremity No Cont, Right (09.04.19 @ 16:46) >  EXAM:  CT LWR EXT RT                         EXAM:  CT 3D RECONSTRUCT W JULIAN                          PROCEDURE DATE:  09/04/2019          INTERPRETATION:    CT OF THE RIGHT LEG WITHOUT CONTRAST.    CLINICAL INFORMATION: Right leg swelling  TECHNIQUE: Axial CT images were obtained of the right leg with coronal   and sagittal reconstructions. No contrast was administered. Three   dimensional reconstructions were obtained.    FINDINGS:    At the level of the femur there is mild lateral subcutaneous soft tissue   stranding and edema. Small benign-appearing lymph nodes are seen at the   inner aspect of the upper left leg and groin with minimal surrounding   soft tissue stranding.    Below the level of the knee, there is moderate circumferential   subcutaneous soft tissue edema with skin thickening and the appearance of   skin blistering. This is most prominent at the level of the ankle and   extends to the dorsum of the midfoot.    There is no obvious encapsulated fluid collection to suggest the presence   of an abscess.    The above-described findings are likely compatible with cellulitis.    Prostate seeds are present.    The hip, knee and ankle joints are preserved.    There is no fracture. There is no CT evidence of osteomyelitis.    There is no soft tissue gas identified.    IMPRESSION:    Mild lateral subcutaneous soft tissue edema at the level of the proximal   femur with small lymph nodes within the groin and inner aspect of the   upper right leg with mild adjacent soft tissue edema/stranding.    Moderate circumferential soft tissue edema below the level of the knee   and most prominent at the ankle extending into the midfoot. These   findings are likely compatible with cellulitis.    No definitive soft tissue abscess. No CT evidence of mass to myelitis.                ANA ECHAVARRIA M.D., ATTENDING RADIOLOGIST  This document has been electronically signed. Sep  4 2019  5:03PM    < end of copied text >

## 2019-09-04 NOTE — H&P ADULT - NSICDXPASTSURGICALHX_GEN_ALL_CORE_FT
PAST SURGICAL HISTORY:  AICD (automatic cardioverter/defibrillator) present     S/P ankle ligament repair

## 2019-09-04 NOTE — PATIENT PROFILE ADULT - VISION (WITH CORRECTIVE LENSES IF THE PATIENT USUALLY WEARS THEM):
wears glasses at home/Partially impaired: cannot see medication labels or newsprint, but can see obstacles in path, and the surrounding layout; can count fingers at arm's length

## 2019-09-04 NOTE — ED ADULT NURSE REASSESSMENT NOTE - NS ED NURSE REASSESS COMMENT FT1
Report given to receiving RN Leonard,, pt placed on portable monitor, awaiting transport to floor, pt aware of plan of care.

## 2019-09-04 NOTE — ED PROVIDER NOTE - PHYSICAL EXAMINATION
Gen: fatigued  Head: NC, AT, PERRL, EOMI, normal lids/conjunctiva  Neck: +supple, no tenderness/meningismus/JVD, +Trachea midline  Pulm: Bilateral BS, normal resp effort, no wheeze/stridor/retractions  CV: irregularly irregular, no m/g/r +dist pulses  Abd: soft, NT/ND, +BS  Ext: 2+ edema bilaterally  Skin: bilateral LE chronic ulcers with surrounding erythema and edema, to knee  Neuro: grossly intact Gen: fatigued  Head: NC, AT, PERRL, EOMI, normal lids/conjunctiva  Neck: +supple, no tenderness/meningismus/JVD, +Trachea midline  Pulm: Bilateral BS, normal resp effort, no wheeze/stridor/retractions  CV: irregularly irregular, no m/g/r +dist pulses  Abd: soft, NT/ND, +BS  Ext: 2+ edema bilaterally  Skin: bilateral LE chronic ulcers with surrounding erythema and edema, to knee.  erythema over R LE worse than left  Neuro: grossly intact

## 2019-09-04 NOTE — H&P ADULT - NSICDXPASTMEDICALHX_GEN_ALL_CORE_FT
PAST MEDICAL HISTORY:  CHF (congestive heart failure)     DM (diabetes mellitus)     High cholesterol     HTN (hypertension)     Prostate CA     Pulmonary hypertension     Renal insufficiency     Sleep apnea

## 2019-09-04 NOTE — PATIENT PROFILE ADULT - NSPROSPIRITUALVALUESFT_GEN_A_NUR
SBAR IN Report: Mother Verbal report received from [address] [place] [address],[address] on this patient, who is now being transferred from L&D (unit) for routine progression of care. The patient is wearing a green \"Anesthesia-Duramorph\" band. Report consisted of patient's Situation, Background, Assessment and Recommendations (SBAR).  ID bands were compared with the identification form, and verified with the patient and transferring nurse. Information from the SBAR and the [place] Report was reviewed with the transferring nurse; opportunity for questions and clarification provided. none

## 2019-09-04 NOTE — ED PROVIDER NOTE - CARE PLAN
Principal Discharge DX:	Sepsis due to cellulitis  Secondary Diagnosis:	DKA (diabetic ketoacidosis)  Secondary Diagnosis:	Atrial fibrillation with RVR

## 2019-09-04 NOTE — CONSULT NOTE ADULT - ASSESSMENT
68yoM with DM2, A.fib on coumadin, COPD presenting with SOB and concern for infection from RLE. Patient has a h/o of chronic venous insufficiency with open wounds that were being treated with UNNA boots. Concern for nec fasc due to tracking on R medial thigh. WBC of 23.9, lactate 8, CPK 23,826, LRINEC 6. Elevated CPK could be 2/2 to unknown duration on toilet. CT scan shows no evidence of gas or abscess - likely cellulitis.  - Admit to medicine for medical management  - Broad spectrum abx  - No surgical intervention warranted at this time  - Monitor FS and insulin coverage  - Gentle hydration due to CHF  - Please reconsult surgery as needed

## 2019-09-04 NOTE — H&P ADULT - ATTENDING COMMENTS
67M w/ PMHx HTN, DM (w/ neuropathy), HFrEF (EF 35%), COPD/CHRSIS (on CPAP), s/p PPM/Defibrillator, diabetic foot ulcers, depression presented to the hospital w/ fatigue and SOB. He was found passed out, sitting on toilet overnight ~about 12hrs. As per pt he was too weak to get up from the toilet and is wife who has dementia could not help him.  In ED he was found to have AG 22, Serum CO2 14, lactate 8.0, CK 24K, SCr 2.85 w/ hyperglycemia, fever and leukocytosis. Surgery consulted for concern of RLE cellulitis.    JAYLENE sec to rhabdomyolysis  RLE cellulitis, r/o DVT  HAGMA sec to renal failure  Back pain  Uncontrolled DM ( no ketones) w/ foot ulcers  COPD/ CHRISS on CPAP  Morbid obesity  s/p PPM  HFrEF    Neuro: No active issues  Cardiovascular: Aim for MAP target 65. Check BNP and lactate  Resp/Chest: Continue CPAP at night. Maintain SpO2 > 92%. Duoneb prn  GI/Nutrition: Diabetic diet  ID: Started on Zosyn and Vanco, f/u Blood cultures. Wound cultures and wound care consult. Skin ronna the cellulitis  Renal: Continue IV hydration. Watch for fluid overload. No hydro seen on CT heat. Red in place. Monitor I/Os  Avoid nephrotoxic agents. Strict I&O  Endocrinology: Check A1c and lipid panel. Maintain Blood sugar < 180. ISS as per protocol  Haem/Oncology:  DVT ppx w/ HSQ. Check venous dopplers  Musculoskeletal: CT L spine w/ no gross abnormality    Critical Care time: 30 minutes assessing presenting problems of acute illness that poses high probability of life threatening deterioration or end organ damage/dysfunction.  Medical decision making including Initiating plan of care, reviewing data, reviewing radiology, discussing with multidisciplinary team, non inclusive of procedures, discussing goals of care with patient/family

## 2019-09-05 LAB
ALBUMIN SERPL ELPH-MCNC: 2.7 G/DL — LOW (ref 3.3–5.2)
ALBUMIN SERPL ELPH-MCNC: 2.8 G/DL — LOW (ref 3.3–5.2)
ALP SERPL-CCNC: 59 U/L — SIGNIFICANT CHANGE UP (ref 40–120)
ALP SERPL-CCNC: 61 U/L — SIGNIFICANT CHANGE UP (ref 40–120)
ALT FLD-CCNC: 54 U/L — HIGH
ALT FLD-CCNC: 54 U/L — HIGH
ANION GAP SERPL CALC-SCNC: 13 MMOL/L — SIGNIFICANT CHANGE UP (ref 5–17)
ANION GAP SERPL CALC-SCNC: 13 MMOL/L — SIGNIFICANT CHANGE UP (ref 5–17)
ANION GAP SERPL CALC-SCNC: 16 MMOL/L — SIGNIFICANT CHANGE UP (ref 5–17)
APTT BLD: 57.3 SEC — HIGH (ref 27.5–36.3)
APTT BLD: 70.7 SEC — HIGH (ref 27.5–36.3)
APTT BLD: 84.2 SEC — HIGH (ref 27.5–36.3)
AST SERPL-CCNC: 422 U/L — HIGH
AST SERPL-CCNC: 433 U/L — HIGH
BASE EXCESS BLDV CALC-SCNC: -3.7 MMOL/L — LOW (ref -2–2)
BASOPHILS # BLD AUTO: 0.04 K/UL — SIGNIFICANT CHANGE UP (ref 0–0.2)
BASOPHILS NFR BLD AUTO: 0.3 % — SIGNIFICANT CHANGE UP (ref 0–2)
BILIRUB SERPL-MCNC: 0.9 MG/DL — SIGNIFICANT CHANGE UP (ref 0.4–2)
BILIRUB SERPL-MCNC: 0.9 MG/DL — SIGNIFICANT CHANGE UP (ref 0.4–2)
BUN SERPL-MCNC: 43 MG/DL — HIGH (ref 8–20)
BUN SERPL-MCNC: 44 MG/DL — HIGH (ref 8–20)
BUN SERPL-MCNC: 45 MG/DL — HIGH (ref 8–20)
CALCIUM SERPL-MCNC: 8.5 MG/DL — LOW (ref 8.6–10.2)
CALCIUM SERPL-MCNC: 8.6 MG/DL — SIGNIFICANT CHANGE UP (ref 8.6–10.2)
CALCIUM SERPL-MCNC: 8.7 MG/DL — SIGNIFICANT CHANGE UP (ref 8.6–10.2)
CHLORIDE SERPL-SCNC: 100 MMOL/L — SIGNIFICANT CHANGE UP (ref 98–107)
CHLORIDE SERPL-SCNC: 96 MMOL/L — LOW (ref 98–107)
CHLORIDE SERPL-SCNC: 99 MMOL/L — SIGNIFICANT CHANGE UP (ref 98–107)
CHOLEST SERPL-MCNC: 92 MG/DL — LOW (ref 110–199)
CK MB CFR SERPL CALC: 47.4 NG/ML — HIGH (ref 0–6.7)
CK MB CFR SERPL CALC: 98.8 NG/ML — HIGH (ref 0–6.7)
CK SERPL-CCNC: CRITICAL HIGH U/L (ref 30–200)
CO2 SERPL-SCNC: 20 MMOL/L — LOW (ref 22–29)
CO2 SERPL-SCNC: 21 MMOL/L — LOW (ref 22–29)
CO2 SERPL-SCNC: 22 MMOL/L — SIGNIFICANT CHANGE UP (ref 22–29)
CREAT SERPL-MCNC: 2.68 MG/DL — HIGH (ref 0.5–1.3)
CREAT SERPL-MCNC: 2.94 MG/DL — HIGH (ref 0.5–1.3)
CREAT SERPL-MCNC: 3.13 MG/DL — HIGH (ref 0.5–1.3)
CULTURE RESULTS: NO GROWTH — SIGNIFICANT CHANGE UP
EOSINOPHIL # BLD AUTO: 0.01 K/UL — SIGNIFICANT CHANGE UP (ref 0–0.5)
EOSINOPHIL NFR BLD AUTO: 0.1 % — SIGNIFICANT CHANGE UP (ref 0–6)
GAS PNL BLDV: SIGNIFICANT CHANGE UP
GLUCOSE BLDC GLUCOMTR-MCNC: 100 MG/DL — HIGH (ref 70–99)
GLUCOSE BLDC GLUCOMTR-MCNC: 104 MG/DL — HIGH (ref 70–99)
GLUCOSE BLDC GLUCOMTR-MCNC: 127 MG/DL — HIGH (ref 70–99)
GLUCOSE BLDC GLUCOMTR-MCNC: 188 MG/DL — HIGH (ref 70–99)
GLUCOSE BLDC GLUCOMTR-MCNC: 192 MG/DL — HIGH (ref 70–99)
GLUCOSE BLDC GLUCOMTR-MCNC: 194 MG/DL — HIGH (ref 70–99)
GLUCOSE BLDC GLUCOMTR-MCNC: 201 MG/DL — HIGH (ref 70–99)
GLUCOSE BLDC GLUCOMTR-MCNC: 203 MG/DL — HIGH (ref 70–99)
GLUCOSE BLDC GLUCOMTR-MCNC: 208 MG/DL — HIGH (ref 70–99)
GLUCOSE BLDC GLUCOMTR-MCNC: 215 MG/DL — HIGH (ref 70–99)
GLUCOSE BLDC GLUCOMTR-MCNC: 217 MG/DL — HIGH (ref 70–99)
GLUCOSE BLDC GLUCOMTR-MCNC: 222 MG/DL — HIGH (ref 70–99)
GLUCOSE BLDC GLUCOMTR-MCNC: 223 MG/DL — HIGH (ref 70–99)
GLUCOSE BLDC GLUCOMTR-MCNC: 224 MG/DL — HIGH (ref 70–99)
GLUCOSE BLDC GLUCOMTR-MCNC: 224 MG/DL — HIGH (ref 70–99)
GLUCOSE BLDC GLUCOMTR-MCNC: 229 MG/DL — HIGH (ref 70–99)
GLUCOSE BLDC GLUCOMTR-MCNC: 230 MG/DL — HIGH (ref 70–99)
GLUCOSE BLDC GLUCOMTR-MCNC: 231 MG/DL — HIGH (ref 70–99)
GLUCOSE BLDC GLUCOMTR-MCNC: 235 MG/DL — HIGH (ref 70–99)
GLUCOSE BLDC GLUCOMTR-MCNC: 243 MG/DL — HIGH (ref 70–99)
GLUCOSE BLDC GLUCOMTR-MCNC: 247 MG/DL — HIGH (ref 70–99)
GLUCOSE BLDC GLUCOMTR-MCNC: 254 MG/DL — HIGH (ref 70–99)
GLUCOSE BLDC GLUCOMTR-MCNC: 267 MG/DL — HIGH (ref 70–99)
GLUCOSE SERPL-MCNC: 165 MG/DL — HIGH (ref 70–115)
GLUCOSE SERPL-MCNC: 227 MG/DL — HIGH (ref 70–115)
GLUCOSE SERPL-MCNC: 254 MG/DL — HIGH (ref 70–115)
HBA1C BLD-MCNC: 11.6 % — HIGH (ref 4–5.6)
HCO3 BLDV-SCNC: 21 MMOL/L — SIGNIFICANT CHANGE UP (ref 20–26)
HCT VFR BLD CALC: 49.2 % — SIGNIFICANT CHANGE UP (ref 39–50)
HCT VFR BLD CALC: 49.7 % — SIGNIFICANT CHANGE UP (ref 39–50)
HCV AB S/CO SERPL IA: 0.12 S/CO — SIGNIFICANT CHANGE UP (ref 0–0.99)
HCV AB SERPL-IMP: SIGNIFICANT CHANGE UP
HDLC SERPL-MCNC: 33 MG/DL — LOW
HGB BLD-MCNC: 16.3 G/DL — SIGNIFICANT CHANGE UP (ref 13–17)
HGB BLD-MCNC: 16.4 G/DL — SIGNIFICANT CHANGE UP (ref 13–17)
IMM GRANULOCYTES NFR BLD AUTO: 0.9 % — SIGNIFICANT CHANGE UP (ref 0–1.5)
LACTATE SERPL-SCNC: 2.2 MMOL/L — HIGH (ref 0.5–2)
LACTATE SERPL-SCNC: 2.9 MMOL/L — HIGH (ref 0.5–2)
LACTATE SERPL-SCNC: 3.8 MMOL/L — HIGH (ref 0.5–2)
LIPID PNL WITH DIRECT LDL SERPL: 26 MG/DL — SIGNIFICANT CHANGE UP
LYMPHOCYTES # BLD AUTO: 0.91 K/UL — LOW (ref 1–3.3)
LYMPHOCYTES # BLD AUTO: 6.1 % — LOW (ref 13–44)
MAGNESIUM SERPL-MCNC: 1.9 MG/DL — SIGNIFICANT CHANGE UP (ref 1.6–2.6)
MAGNESIUM SERPL-MCNC: 2 MG/DL — SIGNIFICANT CHANGE UP (ref 1.6–2.6)
MCHC RBC-ENTMCNC: 29.1 PG — SIGNIFICANT CHANGE UP (ref 27–34)
MCHC RBC-ENTMCNC: 29.4 PG — SIGNIFICANT CHANGE UP (ref 27–34)
MCHC RBC-ENTMCNC: 32.8 GM/DL — SIGNIFICANT CHANGE UP (ref 32–36)
MCHC RBC-ENTMCNC: 33.3 GM/DL — SIGNIFICANT CHANGE UP (ref 32–36)
MCV RBC AUTO: 87.4 FL — SIGNIFICANT CHANGE UP (ref 80–100)
MCV RBC AUTO: 89.5 FL — SIGNIFICANT CHANGE UP (ref 80–100)
MONOCYTES # BLD AUTO: 0.46 K/UL — SIGNIFICANT CHANGE UP (ref 0–0.9)
MONOCYTES NFR BLD AUTO: 3.1 % — SIGNIFICANT CHANGE UP (ref 2–14)
MRSA PCR RESULT.: DETECTED
NEUTROPHILS # BLD AUTO: 13.4 K/UL — HIGH (ref 1.8–7.4)
NEUTROPHILS NFR BLD AUTO: 89.5 % — HIGH (ref 43–77)
NT-PROBNP SERPL-SCNC: 5174 PG/ML — HIGH (ref 0–300)
PCO2 BLDV: 38 MMHG — SIGNIFICANT CHANGE UP (ref 35–50)
PH BLDV: 7.36 — SIGNIFICANT CHANGE UP (ref 7.32–7.43)
PHOSPHATE SERPL-MCNC: 4 MG/DL — SIGNIFICANT CHANGE UP (ref 2.4–4.7)
PHOSPHATE SERPL-MCNC: 4.7 MG/DL — SIGNIFICANT CHANGE UP (ref 2.4–4.7)
PLATELET # BLD AUTO: 105 K/UL — LOW (ref 150–400)
PLATELET # BLD AUTO: 145 K/UL — LOW (ref 150–400)
PO2 BLDV: 48 MMHG — HIGH (ref 25–45)
POTASSIUM SERPL-MCNC: 4.1 MMOL/L — SIGNIFICANT CHANGE UP (ref 3.5–5.3)
POTASSIUM SERPL-MCNC: 4.6 MMOL/L — SIGNIFICANT CHANGE UP (ref 3.5–5.3)
POTASSIUM SERPL-MCNC: 5 MMOL/L — SIGNIFICANT CHANGE UP (ref 3.5–5.3)
POTASSIUM SERPL-SCNC: 4.1 MMOL/L — SIGNIFICANT CHANGE UP (ref 3.5–5.3)
POTASSIUM SERPL-SCNC: 4.6 MMOL/L — SIGNIFICANT CHANGE UP (ref 3.5–5.3)
POTASSIUM SERPL-SCNC: 5 MMOL/L — SIGNIFICANT CHANGE UP (ref 3.5–5.3)
PROCALCITONIN SERPL-MCNC: 49.71 NG/ML — HIGH (ref 0.02–0.1)
PROT SERPL-MCNC: 6.2 G/DL — LOW (ref 6.6–8.7)
PROT SERPL-MCNC: 6.5 G/DL — LOW (ref 6.6–8.7)
RBC # BLD: 5.55 M/UL — SIGNIFICANT CHANGE UP (ref 4.2–5.8)
RBC # BLD: 5.63 M/UL — SIGNIFICANT CHANGE UP (ref 4.2–5.8)
RBC # FLD: 16 % — HIGH (ref 10.3–14.5)
RBC # FLD: 16 % — HIGH (ref 10.3–14.5)
S AUREUS DNA NOSE QL NAA+PROBE: DETECTED
SAO2 % BLDV: 85 % — SIGNIFICANT CHANGE UP
SODIUM SERPL-SCNC: 132 MMOL/L — LOW (ref 135–145)
SODIUM SERPL-SCNC: 133 MMOL/L — LOW (ref 135–145)
SODIUM SERPL-SCNC: 135 MMOL/L — SIGNIFICANT CHANGE UP (ref 135–145)
SPECIMEN SOURCE: SIGNIFICANT CHANGE UP
TOTAL CHOLESTEROL/HDL RATIO MEASUREMENT: 3 RATIO — LOW (ref 3.4–9.6)
TRIGL SERPL-MCNC: 164 MG/DL — SIGNIFICANT CHANGE UP (ref 10–200)
TROPONIN T SERPL-MCNC: 0.03 NG/ML — SIGNIFICANT CHANGE UP (ref 0–0.06)
TSH SERPL-MCNC: 3.65 UIU/ML — SIGNIFICANT CHANGE UP (ref 0.27–4.2)
VANCOMYCIN TROUGH SERPL-MCNC: 5.8 UG/ML — LOW (ref 10–20)
WBC # BLD: 14.96 K/UL — HIGH (ref 3.8–10.5)
WBC # BLD: 19.87 K/UL — HIGH (ref 3.8–10.5)
WBC # FLD AUTO: 14.96 K/UL — HIGH (ref 3.8–10.5)
WBC # FLD AUTO: 19.87 K/UL — HIGH (ref 3.8–10.5)

## 2019-09-05 PROCEDURE — 99291 CRITICAL CARE FIRST HOUR: CPT

## 2019-09-05 PROCEDURE — 93970 EXTREMITY STUDY: CPT | Mod: 26

## 2019-09-05 PROCEDURE — 99223 1ST HOSP IP/OBS HIGH 75: CPT

## 2019-09-05 RX ORDER — HEPARIN SODIUM 5000 [USP'U]/ML
10000 INJECTION INTRAVENOUS; SUBCUTANEOUS EVERY 6 HOURS
Refills: 0 | Status: DISCONTINUED | OUTPATIENT
Start: 2019-09-05 | End: 2019-09-06

## 2019-09-05 RX ORDER — NOREPINEPHRINE BITARTRATE/D5W 8 MG/250ML
0.05 PLASTIC BAG, INJECTION (ML) INTRAVENOUS
Qty: 8 | Refills: 0 | Status: DISCONTINUED | OUTPATIENT
Start: 2019-09-05 | End: 2019-09-06

## 2019-09-05 RX ORDER — SODIUM CHLORIDE 9 MG/ML
1000 INJECTION, SOLUTION INTRAVENOUS
Refills: 0 | Status: DISCONTINUED | OUTPATIENT
Start: 2019-09-05 | End: 2019-09-05

## 2019-09-05 RX ORDER — NYSTATIN CREAM 100000 [USP'U]/G
1 CREAM TOPICAL
Refills: 0 | Status: DISCONTINUED | OUTPATIENT
Start: 2019-09-05 | End: 2019-09-05

## 2019-09-05 RX ORDER — CARVEDILOL PHOSPHATE 80 MG/1
3.12 CAPSULE, EXTENDED RELEASE ORAL EVERY 12 HOURS
Refills: 0 | Status: DISCONTINUED | OUTPATIENT
Start: 2019-09-05 | End: 2019-09-14

## 2019-09-05 RX ORDER — NYSTATIN CREAM 100000 [USP'U]/G
1 CREAM TOPICAL EVERY 12 HOURS
Refills: 0 | Status: DISCONTINUED | OUTPATIENT
Start: 2019-09-05 | End: 2019-09-14

## 2019-09-05 RX ORDER — HYDROMORPHONE HYDROCHLORIDE 2 MG/ML
1 INJECTION INTRAMUSCULAR; INTRAVENOUS; SUBCUTANEOUS EVERY 4 HOURS
Refills: 0 | Status: DISCONTINUED | OUTPATIENT
Start: 2019-09-05 | End: 2019-09-10

## 2019-09-05 RX ORDER — MIDODRINE HYDROCHLORIDE 2.5 MG/1
10 TABLET ORAL EVERY 8 HOURS
Refills: 0 | Status: DISCONTINUED | OUTPATIENT
Start: 2019-09-05 | End: 2019-09-06

## 2019-09-05 RX ORDER — KETOCONAZOLE 20 MG/G
1 AEROSOL, FOAM TOPICAL DAILY
Refills: 0 | Status: DISCONTINUED | OUTPATIENT
Start: 2019-09-05 | End: 2019-09-06

## 2019-09-05 RX ORDER — CARVEDILOL PHOSPHATE 80 MG/1
25 CAPSULE, EXTENDED RELEASE ORAL EVERY 12 HOURS
Refills: 0 | Status: DISCONTINUED | OUTPATIENT
Start: 2019-09-05 | End: 2019-09-05

## 2019-09-05 RX ORDER — LEVOTHYROXINE SODIUM 125 MCG
50 TABLET ORAL DAILY
Refills: 0 | Status: DISCONTINUED | OUTPATIENT
Start: 2019-09-05 | End: 2019-09-14

## 2019-09-05 RX ORDER — HEPARIN SODIUM 5000 [USP'U]/ML
5000 INJECTION INTRAVENOUS; SUBCUTANEOUS EVERY 6 HOURS
Refills: 0 | Status: DISCONTINUED | OUTPATIENT
Start: 2019-09-05 | End: 2019-09-06

## 2019-09-05 RX ORDER — ACETAMINOPHEN 500 MG
650 TABLET ORAL EVERY 6 HOURS
Refills: 0 | Status: DISCONTINUED | OUTPATIENT
Start: 2019-09-05 | End: 2019-09-14

## 2019-09-05 RX ORDER — MUPIROCIN 20 MG/G
1 OINTMENT TOPICAL
Refills: 0 | Status: COMPLETED | OUTPATIENT
Start: 2019-09-05 | End: 2019-09-10

## 2019-09-05 RX ORDER — HEPARIN SODIUM 5000 [USP'U]/ML
INJECTION INTRAVENOUS; SUBCUTANEOUS
Qty: 25000 | Refills: 0 | Status: DISCONTINUED | OUTPATIENT
Start: 2019-09-05 | End: 2019-09-06

## 2019-09-05 RX ADMIN — NYSTATIN CREAM 1 APPLICATION(S): 100000 CREAM TOPICAL at 05:07

## 2019-09-05 RX ADMIN — HEPARIN SODIUM 2400 UNIT(S)/HR: 5000 INJECTION INTRAVENOUS; SUBCUTANEOUS at 02:52

## 2019-09-05 RX ADMIN — KETOCONAZOLE 1 APPLICATION(S): 20 AEROSOL, FOAM TOPICAL at 13:45

## 2019-09-05 RX ADMIN — SODIUM CHLORIDE 100 MILLILITER(S): 9 INJECTION, SOLUTION INTRAVENOUS at 05:07

## 2019-09-05 RX ADMIN — MUPIROCIN 1 APPLICATION(S): 20 OINTMENT TOPICAL at 13:27

## 2019-09-05 RX ADMIN — HYDROMORPHONE HYDROCHLORIDE 1 MILLIGRAM(S): 2 INJECTION INTRAMUSCULAR; INTRAVENOUS; SUBCUTANEOUS at 18:00

## 2019-09-05 RX ADMIN — Medication 250 MILLIGRAM(S): at 17:56

## 2019-09-05 RX ADMIN — Medication 650 MILLIGRAM(S): at 14:30

## 2019-09-05 RX ADMIN — PIPERACILLIN AND TAZOBACTAM 25 GRAM(S): 4; .5 INJECTION, POWDER, LYOPHILIZED, FOR SOLUTION INTRAVENOUS at 05:06

## 2019-09-05 RX ADMIN — PIPERACILLIN AND TAZOBACTAM 25 GRAM(S): 4; .5 INJECTION, POWDER, LYOPHILIZED, FOR SOLUTION INTRAVENOUS at 17:57

## 2019-09-05 RX ADMIN — NYSTATIN CREAM 1 APPLICATION(S): 100000 CREAM TOPICAL at 17:57

## 2019-09-05 RX ADMIN — Medication 17.01 MICROGRAM(S)/KG/MIN: at 13:27

## 2019-09-05 RX ADMIN — MIDODRINE HYDROCHLORIDE 10 MILLIGRAM(S): 2.5 TABLET ORAL at 22:44

## 2019-09-05 RX ADMIN — Medication 650 MILLIGRAM(S): at 13:27

## 2019-09-05 RX ADMIN — Medication 50 MICROGRAM(S): at 05:07

## 2019-09-05 RX ADMIN — HEPARIN SODIUM 2700 UNIT(S)/HR: 5000 INJECTION INTRAVENOUS; SUBCUTANEOUS at 16:37

## 2019-09-05 RX ADMIN — HEPARIN SODIUM 2700 UNIT(S)/HR: 5000 INJECTION INTRAVENOUS; SUBCUTANEOUS at 09:29

## 2019-09-05 RX ADMIN — MUPIROCIN 1 APPLICATION(S): 20 OINTMENT TOPICAL at 17:57

## 2019-09-05 RX ADMIN — HYDROMORPHONE HYDROCHLORIDE 1 MILLIGRAM(S): 2 INJECTION INTRAMUSCULAR; INTRAVENOUS; SUBCUTANEOUS at 18:30

## 2019-09-05 RX ADMIN — CARVEDILOL PHOSPHATE 25 MILLIGRAM(S): 80 CAPSULE, EXTENDED RELEASE ORAL at 05:07

## 2019-09-05 NOTE — CONSULT NOTE ADULT - SUBJECTIVE AND OBJECTIVE BOX
[facility]  INFECTIOUS DISEASES AND INTERNAL MEDICINE at [place]  =======================================================  Miguelangel Seo MD  Diplomates American Board of Internal Medicine and Infectious Diseases  Telephone [phone]  Fax            [phone]  =======================================================    [facility]-[id number]  MARY ANN CORNELL   This 68 y/o male with uncontrolled DM (w/ neuropathy), HN, CHF (Last Echo ~2 years ago with EF 35%), COPD, CHRISS (on home CPAP machine), PPM/Defibrillator ("heart function was 3%"), prior diabetic foot ulcers/infections, depression presents with worsening weakness and SOB for past few days. As per daughter at bedside, wife found pt passed out sitting on toilet. Estimated time on toilet is around 10-12 hours. Pt states he "blacked out", but didnt fall off toilet. Pt also c/o inability to ambulate now secondary to worsening of weakness. In ED, pt remains short of breath, found to be hyperglycemic, AG 22, Serum CO2 14, lactate 8.0, CK 24k, transaminitis, hyperkalemic, Cr 2.85, with leukocytosis and febrile to 101.7. Surgery consulted for concern of RLE cellulitis and r/o nec fasc. MICU consult called. Denies n/v/d. CP, h/a, dizziness, or any other acute complaints at this time. (04 Sep 2019 17:27)    patient seen in MICU, not able to contribute any information.  Labs reviewed.   Pt seen previously for similar infections.   Renal function much worse than prior.     =======================================================  Past Medical & Surgical Hx:  =====================  PAST MEDICAL & SURGICAL HISTORY:  CHF (congestive heart failure)  Pulmonary hypertension  Prostate CA  Sleep apnea  Renal insufficiency  High cholesterol  HTN (hypertension)  DM (diabetes mellitus)  AICD (automatic cardioverter/defibrillator) present  S/P ankle ligament repair    Problem List:  ==========  HEALTH ISSUES - PROBLEM Dx:    Social Hx:  =======  no toxic habits currently    FAMILY HISTORY:  Family history of diabetes mellitus (Sibling)  Family history of cancer (Sibling)  no significant family history of immunosuppressive disorders in mother or father   =======================================================  REVIEW OF SYSTEMS:  as above  all other ROS negative  =======================================================  Allergies  No Known Allergies    Antibiotics:  piperacillin/tazobactam IVPB.. 3.375 Gram(s) IV Intermittent every 12 hours  vancomycin  IVPB 1000 milliGRAM(s) IV Intermittent every 24 hours    Other medications:  carvedilol 25 milliGRAM(s) Oral every 12 hours  dextrose 5% + lactated ringers. 1000 milliLiter(s) IV Continuous <Continuous>  dextrose 5%. 1000 milliLiter(s) IV Continuous <Continuous>  dextrose 50% Injectable 12.5 Gram(s) IV Push once  dextrose 50% Injectable 25 Gram(s) IV Push once  dextrose 50% Injectable 25 Gram(s) IV Push once  heparin  Infusion.  Unit(s)/Hr IV Continuous <Continuous>  insulin regular Infusion 10 Unit(s)/Hr IV Continuous <Continuous>  levothyroxine 50 MICROGram(s) Oral daily  norepinephrine Infusion 0.05 MICROgram(s)/kG/Min IV Continuous <Continuous>  nystatin Powder 1 Application(s) Topical every 12 hours     piperacillin/tazobactam IVPB.   200 mL/Hr IV Intermittent (09-04-19 @ 12:55)    piperacillin/tazobactam IVPB.   200 mL/Hr IV Intermittent (09-04-19 @ 23:15)    piperacillin/tazobactam IVPB..   25 mL/Hr IV Intermittent (09-05-19 @ 05:06)    vancomycin  IVPB   250 mL/Hr IV Intermittent (09-04-19 @ 17:09)      ======================================================  Physical Exam:  ============  T(F): 101.1 (05 Sep 2019 08:41), Max: 101.7 (04 Sep 2019 12:28)  HR: 97 (05 Sep 2019 09:30)  BP: 160/96 (05 Sep 2019 06:45)  RR: 27 (05 Sep 2019 09:30)  SpO2: 94% (05 Sep 2019 09:30) (88% - 99%)  temp max in last 48H T(F): , Max: 101.7 (09-04-19 @ 12:28)Height (cm): 175.26 (09-04-19 @ 12:10)  Weight (kg): 181.4 (09-04-19 @ 12:10)  BMI (kg/m2): 59.1 (09-04-19 @ 12:10)  BSA (m2): 2.77 (09-04-19 @ 12:10)    General:  OBESE, DISHEVELED.   Eye: Pupils are equal, round and reactive to light, Extraocular movements are intact, Normal conjunctiva.  HENT: Normocephalic, Oral mucosa is moist, No pharyngeal erythema, No sinus tenderness.  Neck: Supple, No lymphadenopathy.  Respiratory: Lungs WITH FAIR AIR ENTRY  Cardiovascular: Normal rate, Regular rhythm,   Gastrointestinal: Soft, OLBESE Non-tender, Non-distended, Normal bowel sounds.  Genitourinary: No costovertebral angle tenderness.  Lymphatics: No lymphadenopathy neck,   Musculoskeletal: Normal range of motion, Normal strength.  Integumentary: CHRONIC STASIS DERMATITIS OF THE LOWER EXT; LEFT LATERAL MID LEG HEALING SKIN TEAR.  RIGHT LEG WITH ERYTHEMA AND EDEMA,. BULLAE FORMATION AT MID ANTEROLATERAL LEG; CLEAR SEROUS FLUID UNDERNEATH  FRIABLE SKIN BETWEEN TOES.  Neurologic: WAKE INTERACTIVE; Cranial Nerves II-XII are grossly intact.    =======================================================  Labs:                        16.4   19.87 )-----------( 145      ( 05 Sep 2019 08:58 )             49.2       WBC Count: 19.87 K/uL (09-05-19 @ 08:58)  WBC Count: 14.96 K/uL (09-05-19 @ 04:32)  WBC Count: 23.91 K/uL (09-04-19 @ 13:10)      09-05    133<L>  |  99  |  45.0<H>  ----------------------------<  227<H>  5.0   |  21.0<L>  |  2.94<H>    Ca    8.7      05 Sep 2019 04:32  Phos  4.7     09-05  Mg     2.0     09-05    TPro  6.5<L>  /  Alb  2.7<L>  /  TBili  0.9  /  DBili  x   /  AST  422<H>  /  ALT  54<H>  /  AlkPhos  61  09-05      Creatinine, Serum: 2.94 mg/dL (09-05-19 @ 04:32)  Creatinine, Serum: 3.13 mg/dL (09-05-19 @ 00:58)  Creatinine, Serum: 3.12 mg/dL (09-04-19 @ 18:49)  Creatinine, Serum: 2.85 mg/dL (09-04-19 @ 13:10)      Hemoglobin A1C, Whole Blood: 11.6 % (09-05-19 @ 04:32)      Creatine Kinase, Serum in AM (09.05.19 @ 04:32)    Creatine Kinase, Serum: [id number]: TYPE:(C=Critical, N=Notification, A=Abnormal) C  TESTS: CPK  DATE/TIME CALLED: 09/05/19 06:36  CALLED TO: SANTOSH KENNEDY  READ BACK (2 Patient Identifiers)(Y/N): Y  READ BACK VALUES (Y/N): Y  CALLED BY: [place]  SPECIMEN DILUTED TO OBTAIN RESULT U/L

## 2019-09-05 NOTE — PROCEDURE NOTE - ADDITIONAL PROCEDURE DETAILS
Patient with recurrent severe shock state requiring rapidly escalating vasopressor dose requirement. Procedure performed independently of critical care time. JAYLENE, DKA, metabolic acidosis.

## 2019-09-05 NOTE — CONSULT NOTE ADULT - ASSESSMENT
This 66 y/o male with uncontrolled DM (w/ neuropathy), HN, CHF (Last Echo ~2 years ago with EF 35%), COPD, CHRISS (on home CPAP machine), PPM/Defibrillator ("heart function was 3%"), prior diabetic foot ulcers/infections, depression presents with worsening weakness and SOB for past few days. As per daughter at bedside, wife found pt passed out sitting on toilet. Estimated time on toilet is around 10-12 hours. Pt states he "blacked out", but didnt fall off toilet. Pt also c/o inability to ambulate now secondary to worsening of weakness. In ED, pt remains short of breath, found to be hyperglycemic, AG 22, Serum CO2 14, lactate 8.0, CK 24k, transaminitis, hyperkalemic, Cr 2.85, with leukocytosis and febrile to 101.7. Surgery consulted for concern of RLE cellulitis and r/o nec fasc. MICU consult called. Denies n/v/d. CP, h/a, dizziness, or any other acute complaints at this time. (04 Sep 2019 17:27)    patient seen in MICU, not able to contribute any information.  Labs reviewed.   Pt seen previously for similar infections.   Renal function much worse than prior.     FOUND WITH:  RIGHT LOWER EXT cellulitis  Acute renal failure on CKD  Rhabdomyolysis  uncontrolled DM  likely tinea pedis of bilateral feet.     - regarding infection  	continue Zosyn; continue empiric Vancomycin for now.   	follow up on cultures.   	start antifungal cream for toes - Ketoconazole Miconazole, etc.     - watch renal fx closely; given rhabdo and ARF on CKD    - needs good sugar control    - if cultures without MRSA in next 24-48H, will consider stopping Vancomycin.

## 2019-09-05 NOTE — PROGRESS NOTE ADULT - SUBJECTIVE AND OBJECTIVE BOX
67 y/o M with a h/o DM (w/ neuropathy), HTN, sCHF (Last Echo ~2 years ago with EF 35%), COPD, CHRISS (on home CPAP machine), PPM/ICD, prior diabetic foot ulcers/infections, depression with prior suicidality, admitted on 9/4 with worsening weakness and SOB for past few days. Lost consciousness while sitting on toilet for around 1 hour yesterday and then was unable to stand up due to b/l LE weakness, so total time stuck on toilet was ~10-12 hours. In ED patient found to be hyperglycemic, AG 22, Serum CO2 14, lactate 8.0, CK 23k, transaminitis, JAYLENE, hyperkalemic (K: 5.9), with leukocytosis and febrile to 101.7. Has open wounds/skin tears of b/l LE and RLE cellulitis that tracks up onto the thigh.    Patient initially appeared to be progressing into a septic shock picture secondary to cellulitis, although BP ultimately stabilized after 4L IVF. IV vasopressor therapy was ordered but never started. Maintain a MAP > 65. Empiric antibiotics started with Zosyn and vancomycin. Lactate is downtrending. Blood and urine cultures are pending. Surgical service consulted and does not believe this to be necrotizing fasciitis. No collections of subcutaneous air seen on CT-imaging.    Severe JAYLENE and rhabdomyolysis. CK: 00056. Continue IVF hydration. Trend CK. Monitor lytes and UOP. Hyperkalemia has improved. No indication for urgent hemodialysis at this time, although remains at high risk for this. Avoid nephrotoxic agents and renally dose medications.    Metabolic acidosis is secondary to uremia, lactemia, and DKA. Continue insulin infusion and treating underlying issues as above. q 1 hour accu-checks. Serial blood gases.    Formal TTE performed- EF appeared significantly reduced on preliminary viewing, f/u official read.    IV hydromorphone given for severe lower back pain related to prolonged sitting on toilet.        CRITICAL CARE TIME SPENT: 44 mins  Time spent evaluating/treating patient with medical issues that pose a high risk for life threatening deterioration and/or end-organ damage, reviewing data/labs/imaging, discussing case with multidisciplinary team, discussing plan/goals of care with patient/family. Non-inclusive of procedure time. 67 y/o M with a h/o DM (w/ neuropathy), HTN, sCHF (Last Echo ~2 years ago with EF 35%), COPD, CHRISS (on home CPAP machine), PPM/ICD, prior diabetic foot ulcers/infections, depression with prior suicidality, admitted on 9/4 with worsening weakness and SOB for past few days. Lost consciousness while sitting on toilet for around 1 hour yesterday and then was unable to stand up due to b/l LE weakness, so total time stuck on toilet was ~10-12 hours. In ED patient found to be hyperglycemic, AG 22, Serum CO2 14, lactate 8.0, CK 23k, transaminitis, JAYLENE, hyperkalemic (K: 5.9), with leukocytosis and febrile to 101.7. Has open wounds/skin tears of b/l LE and RLE cellulitis that tracks up onto the thigh.    Patient initially appeared to be progressing into a septic shock picture secondary to cellulitis, although BP ultimately stabilized after 4L IVF. IV vasopressor therapy was ordered but never started. Maintain a MAP > 65. Empiric antibiotics started with Zosyn and vancomycin. Lactate is downtrending. Blood and urine cultures are pending. Surgical service consulted and does not believe this to be necrotizing fasciitis. No collections of subcutaneous air seen on CT-imaging.    Severe JAYLENE and rhabdomyolysis. CK: 98829. Continue IVF hydration. Trend CK. Monitor lytes and UOP. Hyperkalemia has improved. No indication for urgent hemodialysis at this time, although remains at high risk for this. Avoid nephrotoxic agents and renally dose medications.    Metabolic acidosis is secondary to uremia, lactemia, and DKA. Continue insulin infusion and treating underlying issues as above. q 1 hour accu-checks. Serial blood gases.    Formal TTE performed- EF appeared significantly reduced on preliminary viewing, f/u official read.    IV hydromorphone given for severe lower back pain related to prolonged sitting on toilet.    Placed on CPAP overnight.        CRITICAL CARE TIME SPENT: 44 mins  Time spent evaluating/treating patient with medical issues that pose a high risk for life threatening deterioration and/or end-organ damage, reviewing data/labs/imaging, discussing case with multidisciplinary team, discussing plan/goals of care with patient/family. Non-inclusive of procedure time. 69 y/o M with a h/o DM (w/ neuropathy), HTN, sCHF (Last Echo ~2 years ago with EF 35%), COPD, CHRISS (on home CPAP machine), a-fib (on Coumadin), CKD, PPM/ICD, prior diabetic foot ulcers/infections, depression with prior suicidality, admitted on 9/4 with worsening weakness and SOB for past few days. Lost consciousness while sitting on toilet for around 1 hour yesterday and then was unable to stand up due to b/l LE weakness, so total time stuck on toilet was ~10-12 hours. In ED patient found to be hyperglycemic, AG 22, Serum CO2 14, lactate 8.0, CK 23k, transaminitis, JAYLENE, hyperkalemic (K: 5.9), with leukocytosis and febrile to 101.7. Has open wounds/skin tears of b/l LE and RLE cellulitis that tracks up onto the thigh.    Patient initially appeared to be progressing into a septic shock picture secondary to cellulitis, although BP ultimately stabilized after 4L IVF. IV vasopressor therapy was ordered but never started. Maintain a MAP > 65. Empiric antibiotics started with Zosyn and vancomycin. Lactate is downtrending. Blood and urine cultures are pending. Surgical service consulted and does not believe this to be necrotizing fasciitis. No collections of subcutaneous air seen on CT-imaging.    Now developing a-fib with RVR secondary to sepsis and acidosis. INR subtherapeutic. Hold Coumadin secondary to severe renal failure and critical illness. Start heparin infusion. Favor treating underlying causes rather than rate control agents at this time.    Severe JAYLENE on CKD and rhabdomyolysis. CK: 51333. Continue IVF hydration. Trend CK. Monitor lytes and UOP. Hyperkalemia has improved. No indication for urgent hemodialysis at this time, although remains at high risk for this. Avoid nephrotoxic agents and renally dose medications.    Metabolic acidosis is secondary to uremia, lactemia, and DKA. Continue insulin infusion and treating underlying issues as above. q 1 hour accu-checks. Serial blood gases.    Formal TTE performed- EF appeared significantly reduced on preliminary viewing, f/u official read.    IV hydromorphone given for severe lower back pain related to prolonged sitting on toilet.    Placed on CPAP overnight.        CRITICAL CARE TIME SPENT: 44 mins  Time spent evaluating/treating patient with medical issues that pose a high risk for life threatening deterioration and/or end-organ damage, reviewing data/labs/imaging, discussing case with multidisciplinary team, discussing plan/goals of care with patient/family. Non-inclusive of procedure time. 69 y/o M with a h/o DM (w/ neuropathy), HTN, sCHF (Last Echo ~2 years ago with EF 35%), COPD, CHRISS (on home CPAP machine), a-fib (on Coumadin), CKD, PPM/ICD, hypothryoid, prior diabetic foot ulcers/infections, depression with prior suicidality, admitted on 9/4 with worsening weakness and SOB for past few days. Lost consciousness while sitting on toilet for around 1 hour yesterday and then was unable to stand up due to b/l LE weakness, so total time stuck on toilet was ~10-12 hours. In ED patient found to be hyperglycemic, AG 22, Serum CO2 14, lactate 8.0, CK 23k, transaminitis, JAYLENE, hyperkalemic (K: 5.9), with leukocytosis and febrile to 101.7. Has open wounds/skin tears of b/l LE and RLE cellulitis that tracks up onto the thigh.    Patient initially appeared to be progressing into a septic shock picture secondary to cellulitis, although BP ultimately stabilized after 4L IVF. IV vasopressor therapy was ordered but never started. Maintain a MAP > 65. Empiric antibiotics started with Zosyn and vancomycin. Lactate is downtrending. Blood and urine cultures are pending. Surgical service consulted and does not believe this to be necrotizing fasciitis. No collections of subcutaneous air seen on CT-imaging.    Now developing a-fib with RVR secondary to sepsis and acidosis. INR subtherapeutic. Hold Coumadin secondary to severe renal failure and critical illness. Start heparin infusion. Start BB.    Severe JAYLENE on CKD and rhabdomyolysis. CK: 12915. Continue IVF hydration. Trend CK. Monitor lytes and UOP. Hyperkalemia has improved. No indication for urgent hemodialysis at this time, although remains at high risk for this. Avoid nephrotoxic agents and renally dose medications.    Metabolic acidosis is secondary to uremia, lactemia, and DKA. Continue insulin infusion and treating underlying issues as above. q 1 hour accu-checks. Serial blood gases.    Formal TTE performed- EF appeared significantly reduced on preliminary viewing, f/u official read.    IV hydromorphone given for severe lower back pain related to prolonged sitting on toilet.    Start outpatient Synthroid.     Placed on CPAP overnight.        CRITICAL CARE TIME SPENT: 44 mins  Time spent evaluating/treating patient with medical issues that pose a high risk for life threatening deterioration and/or end-organ damage, reviewing data/labs/imaging, discussing case with multidisciplinary team, discussing plan/goals of care with patient/family. Non-inclusive of procedure time. 67 y/o M with a h/o DM (w/ neuropathy), HTN, sCHF (Last Echo ~2 years ago with EF 35%), COPD, CHRISS (on home CPAP machine), a-fib (on Coumadin), CKD, PPM/ICD, hypothryoid, prior diabetic foot ulcers/infections, depression with prior suicidality, admitted on  with worsening weakness and SOB for past few days. Lost consciousness while sitting on toilet for around 1 hour yesterday and then was unable to stand up due to b/l LE weakness, so total time stuck on toilet was ~10-12 hours. In ED patient found to be hyperglycemic (B), AG 22, Serum CO2 14, lactate 8.0, CK 23k, transaminitis, JAYLNEE, hyperkalemic (K: 5.9), with leukocytosis and febrile to 101.7. Has open wounds/skin tears of b/l LE and RLE cellulitis that tracks up onto the thigh.    Patient initially appeared to be progressing into a septic shock picture secondary to cellulitis, although BP ultimately stabilized after 4L IVF. IV vasopressor therapy was ordered but never started. Maintain a MAP > 65. Empiric antibiotics started with Zosyn and vancomycin. Lactate is downtrending. Blood and urine cultures are pending. Surgical service consulted and does not believe this to be necrotizing fasciitis. No collections of subcutaneous air seen on CT-imaging.    Now developing a-fib with RVR secondary to sepsis and acidosis. INR subtherapeutic. Hold Coumadin secondary to severe renal failure and critical illness. Start heparin infusion. Start BB as BP tolerates.    Severe JAYLENE on CKD and rhabdomyolysis. CK: 94158. Continue IVF hydration. Trend CK. Monitor lytes and UOP. Hyperkalemia has improved. No indication for urgent hemodialysis at this time, although remains at high risk for this. Avoid nephrotoxic agents and renally dose medications.    Metabolic acidosis is secondary to uremia, lactemia, and DKA. Continue insulin infusion and treating underlying issues as above. q 1 hour accu-checks and q 6 hour BMP to follow AG. Serial blood gases.    Formal TTE performed- EF appeared significantly reduced on preliminary viewing, f/u official read.    IV hydromorphone given for severe lower back pain related to prolonged sitting on toilet.    Start outpatient Synthroid.     Placed on CPAP overnight.        CRITICAL CARE TIME SPENT: 44 mins  Time spent evaluating/treating patient with medical issues that pose a high risk for life threatening deterioration and/or end-organ damage, reviewing data/labs/imaging, discussing case with multidisciplinary team, discussing plan/goals of care with patient/family. Non-inclusive of procedure time. 69 y/o M with a h/o DM (w/ neuropathy), HTN, sCHF (Last Echo ~2 years ago with EF 35%), COPD, CHRISS (on home CPAP machine), a-fib (on Coumadin), CKD, PPM/ICD, hypothryoid, prior diabetic foot ulcers/infections, depression with prior suicidality, admitted on  with worsening weakness and SOB for past few days. Lost consciousness while sitting on toilet for around 1 hour yesterday and then was unable to stand up due to b/l LE weakness, so total time stuck on toilet was ~10-12 hours. In ED patient found to be hyperglycemic (B), AG 22, Serum CO2 14, lactate 8.0, CK 23k, transaminitis, JAYLENE, hyperkalemic (K: 5.9), with leukocytosis and febrile to 101.7. Has open wounds/skin tears of b/l LE and RLE cellulitis that tracks up onto the thigh.    Patient initially appeared to be progressing into a septic shock picture secondary to cellulitis, although BP ultimately stabilized after 4L IVF. IV vasopressor therapy was ordered but never started. Maintain a MAP > 65. Empiric antibiotics started with Zosyn and vancomycin. Lactate is downtrending. Blood and urine cultures are pending. Surgical service consulted and does not believe this to be necrotizing fasciitis. No collections of subcutaneous air seen on CT-imaging.    Now developing a-fib with RVR secondary to sepsis and acidosis. INR subtherapeutic. Hold Coumadin secondary to severe renal failure and critical illness. Start heparin infusion. Start BB as BP tolerates.    Severe JAYLENE on CKD and rhabdomyolysis. CK: 18709. Continue IVF hydration. Trend CK. Monitor lytes and UOP. Hyperkalemia has improved. No indication for urgent hemodialysis at this time, although remains at high risk for this. Avoid nephrotoxic agents and renally dose medications.    Metabolic acidosis is secondary to uremia, lactemia, and DKA. Continue insulin infusion and treating underlying issues as above. q 1 hour accu-checks and q 6 hour BMP to follow AG. Serial blood gases.    Formal TTE performed- EF appeared significantly reduced on preliminary viewing, f/u official read.    IV hydromorphone given for severe lower back pain related to prolonged sitting on toilet.    Start outpatient Synthroid.     Placed on CPAP overnight.      ***UPDATE 0620- patient developed recurrent hypotension (SBP: 50s) and was emergently started on IV vasopressor therapy with Levophed. Titrating to maintain a MAP > 65.        CRITICAL CARE TIME SPENT: 44 mins  Time spent evaluating/treating patient with medical issues that pose a high risk for life threatening deterioration and/or end-organ damage, reviewing data/labs/imaging, discussing case with multidisciplinary team, discussing plan/goals of care with patient/family. Non-inclusive of procedure time.

## 2019-09-05 NOTE — PROCEDURE NOTE - NSPROCDETAILS_GEN_ALL_CORE
Seldinger technique/positive blood return obtained via catheter/sutured in place/all materials/supplies accounted for at end of procedure/location identified, draped/prepped, sterile technique used, needle inserted/introduced/connected to a pressurized flush line/ultrasound guidance/hemostasis with direct pressure, dressing applied
sterile technique, catheter placed/lumen(s) aspirated and flushed/guidewire recovered/sterile dressing applied/ultrasound guidance

## 2019-09-05 NOTE — PROGRESS NOTE ADULT - SUBJECTIVE AND OBJECTIVE BOX
INTERVAL HPI/OVERNIGHT EVENTS: patient was sitting on edge of bed (his daughter dropped the side rail), and he slowly slid down the side of the bed to the ground landing slowly on his buttocks.  due to his weight we were unable to prevent his slow descent to the ground. he was padilla lifted back to bed, denied any new complaints    PHYSICAL EXAM:  GENERAL: NAD morbidly obese, pungent smell from legs  HEAD:  Atraumatic, normocephalic  EYES: EOMI, PERRL, sclera and conjunctiva clear  ENMT:  MMM, No oropharyngeal erythema or exudates  NECK:  Supple, normal appearance, trachea midline,   NERVOUS SYSTEM:  Alert & Oriented X3  CHEST/LUNG: Bilateral air entry, no chest deformity,  HEART: Regular rate, regular rhythm;   ABDOMEN:  No rebound or guarding, bowel sounds present  EXTREMITIES:  bilat edema, RLE cellulitis   LYMPH:  No lymphadenopathy noted  PSYCH: appropriate affect and behavior    RADIOLOGY personally reviewed: cxr - no infiltrate   GOALS OF CARE DISCUSSION WITH PATIENT/FAMILY/PROXY: daughter updated at bedside  CRITICAL CARE TIME SPENT: 40 min spent titrating pressors, adjusting other therapies in this critically ill patient with septic shock and renal failure   --------------------------------------------------------------------------------------------------------------------------------------------------------------  On Admission  19 (1d)  HPI:  Pt is a 68 y/o male with PMHx DM (w/ neuropathy), HN, CHF (Last Echo ~2 years ago with EF 35%), COPD, CHRISS (on home CPAP machine), PPM/Defibrillator ("heart function was 3%"), prior diabetic foot ulcers/infections, depression presents with worsening weakness and SOB for past few days. As per daughter at bedside, wife found pt passed out sitting on toilet. Estimated time on toilet is around 10-12 hours. Pt states he "blacked out", but didnt fall off toilet. Pt also c/o inability to ambulate now secondary to worsening of weakness. In ED, pt remains short of breath, found to be hyperglycemic, AG 22, Serum CO2 14, lactate 8.0, CK 24k, transaminitis, hyperkalemic, Cr 2.85, with leukocytosis and febrile to 101.7. Surgery consulted for concern of RLE cellulitis and r/o nec fasc. MICU consult called. Denies n/v/d. CP, h/a, dizziness, or any other acute complaints at this time. (04 Sep 2019 17:27)    PAST MEDICAL & SURGICAL HISTORY:  CHF (congestive heart failure)  Pulmonary hypertension  Prostate CA  Sleep apnea  Renal insufficiency  High cholesterol  HTN (hypertension)  DM (diabetes mellitus)  AICD (automatic cardioverter/defibrillator) present  S/P ankle ligament repair      Antimicrobial:  piperacillin/tazobactam IVPB.. 3.375 Gram(s) IV Intermittent every 12 hours  vancomycin  IVPB 1000 milliGRAM(s) IV Intermittent every 24 hours    Cardiovascular:  carvedilol 3.125 milliGRAM(s) Oral every 12 hours  norepinephrine Infusion 0.05 MICROgram(s)/kG/Min IV Continuous <Continuous>    Pulmonary:    Hematalogic:  heparin  Infusion.  Unit(s)/Hr IV Continuous <Continuous>  heparin  Injectable 50825 Unit(s) IV Push every 6 hours PRN  heparin  Injectable 5000 Unit(s) IV Push every 6 hours PRN    Other:  acetaminophen   Tablet .. 650 milliGRAM(s) Oral every 6 hours PRN  dextrose 40% Gel 15 Gram(s) Oral once PRN  dextrose 5% + lactated ringers. 1000 milliLiter(s) IV Continuous <Continuous>  dextrose 5%. 1000 milliLiter(s) IV Continuous <Continuous>  dextrose 50% Injectable 12.5 Gram(s) IV Push once  dextrose 50% Injectable 25 Gram(s) IV Push once  dextrose 50% Injectable 25 Gram(s) IV Push once  glucagon  Injectable 1 milliGRAM(s) IntraMuscular once PRN  insulin regular Infusion 10 Unit(s)/Hr IV Continuous <Continuous>  ketoconazole 2% Shampoo 1 Application(s) Topical daily  levothyroxine 50 MICROGram(s) Oral daily  mupirocin 2% Ointment 1 Application(s) Topical two times a day  nystatin Powder 1 Application(s) Topical every 12 hours      Drug Dosing Weight  Height (cm): 175.26 (04 Sep 2019 12:10)  Weight (kg): 181.4 (04 Sep 2019 12:10)  BMI (kg/m2): 59.1 (04 Sep 2019 12:10)  BSA (m2): 2.77 (04 Sep 2019 12:10)    T(C): 37.9 (19 @ 16:10), Max: 38.4 (19 @ 08:41)  HR: 86 (19 @ 15:15)  BP: 160/96 (19 @ 06:45)  BP(mean): 120 (19 @ 06:45)  ABP: 83/51 (19 @ 15:15)  ABP(mean): 61 (19 @ 15:15)  RR: 28 (19 @ 15:15)  SpO2: 96% (19 @ 15:15)    ABG - ( 04 Sep 2019 19:31 )  pH, Arterial: 7.30  pH, Blood: x     /  pCO2: 32    /  pO2: 79    / HCO3: 17    / Base Excess: -9.2  /  SaO2: 95                     @ 07:01  -   @ 07:00  --------------------------------------------------------  IN: 1293 mL / OUT: 565 mL / NET: 728 mL              LABS:  CBC Full  -  ( 05 Sep 2019 08:58 )  WBC Count : 19.87 K/uL  RBC Count : 5.63 M/uL  Hemoglobin : 16.4 g/dL  Hematocrit : 49.2 %  Platelet Count - Automated : 145 K/uL  Mean Cell Volume : 87.4 fl  Mean Cell Hemoglobin : 29.1 pg  Mean Cell Hemoglobin Concentration : 33.3 gm/dL  Auto Neutrophil # : x  Auto Lymphocyte # : x  Auto Monocyte # : x  Auto Eosinophil # : x  Auto Basophil # : x  Auto Neutrophil % : x  Auto Lymphocyte % : x  Auto Monocyte % : x  Auto Eosinophil % : x  Auto Basophil % : x        135  |  100  |  43.0<H>  ----------------------------<  165<H>  4.1   |  22.0  |  2.68<H>    Ca    8.5<L>      05 Sep 2019 14:01  Phos  4.7       Mg     2.0         TPro  6.5<L>  /  Alb  2.7<L>  /  TBili  0.9  /  DBili  x   /  AST  422<H>  /  ALT  54<H>  /  AlkPhos  61      PT/INR - ( 04 Sep 2019 13:10 )   PT: 15.2 sec;   INR: 1.31 ratio         PTT - ( 05 Sep 2019 15:40 )  PTT:70.7 sec  Urinalysis Basic - ( 04 Sep 2019 22:13 )    Color: Yellow / Appearance: very cloudy / S.020 / pH: x  Gluc: x / Ketone: Negative  / Bili: Negative / Urobili: Negative mg/dL   Blood: x / Protein: 100 mg/dL / Nitrite: Negative   Leuk Esterase: Trace / RBC: 3-5 /HPF / WBC 0-2   Sq Epi: x / Non Sq Epi: Few / Bacteria: x      Culture Results:   No growth ( @ 22:14)

## 2019-09-06 LAB
ALBUMIN SERPL ELPH-MCNC: 2.5 G/DL — LOW (ref 3.3–5.2)
ALP SERPL-CCNC: 84 U/L — SIGNIFICANT CHANGE UP (ref 40–120)
ALT FLD-CCNC: 58 U/L — HIGH
ANION GAP SERPL CALC-SCNC: 13 MMOL/L — SIGNIFICANT CHANGE UP (ref 5–17)
APTT BLD: 67.9 SEC — HIGH (ref 27.5–36.3)
AST SERPL-CCNC: 264 U/L — HIGH
BILIRUB SERPL-MCNC: 1 MG/DL — SIGNIFICANT CHANGE UP (ref 0.4–2)
BUN SERPL-MCNC: 38 MG/DL — HIGH (ref 8–20)
CALCIUM SERPL-MCNC: 8.2 MG/DL — LOW (ref 8.6–10.2)
CHLORIDE SERPL-SCNC: 98 MMOL/L — SIGNIFICANT CHANGE UP (ref 98–107)
CO2 SERPL-SCNC: 24 MMOL/L — SIGNIFICANT CHANGE UP (ref 22–29)
CREAT SERPL-MCNC: 2.14 MG/DL — HIGH (ref 0.5–1.3)
GLUCOSE BLDC GLUCOMTR-MCNC: 151 MG/DL — HIGH (ref 70–99)
GLUCOSE BLDC GLUCOMTR-MCNC: 165 MG/DL — HIGH (ref 70–99)
GLUCOSE BLDC GLUCOMTR-MCNC: 166 MG/DL — HIGH (ref 70–99)
GLUCOSE BLDC GLUCOMTR-MCNC: 172 MG/DL — HIGH (ref 70–99)
GLUCOSE BLDC GLUCOMTR-MCNC: 173 MG/DL — HIGH (ref 70–99)
GLUCOSE BLDC GLUCOMTR-MCNC: 178 MG/DL — HIGH (ref 70–99)
GLUCOSE BLDC GLUCOMTR-MCNC: 180 MG/DL — HIGH (ref 70–99)
GLUCOSE BLDC GLUCOMTR-MCNC: 191 MG/DL — HIGH (ref 70–99)
GLUCOSE BLDC GLUCOMTR-MCNC: 196 MG/DL — HIGH (ref 70–99)
GLUCOSE BLDC GLUCOMTR-MCNC: 198 MG/DL — HIGH (ref 70–99)
GLUCOSE BLDC GLUCOMTR-MCNC: 200 MG/DL — HIGH (ref 70–99)
GLUCOSE BLDC GLUCOMTR-MCNC: 201 MG/DL — HIGH (ref 70–99)
GLUCOSE BLDC GLUCOMTR-MCNC: 206 MG/DL — HIGH (ref 70–99)
GLUCOSE BLDC GLUCOMTR-MCNC: 206 MG/DL — HIGH (ref 70–99)
GLUCOSE BLDC GLUCOMTR-MCNC: 210 MG/DL — HIGH (ref 70–99)
GLUCOSE BLDC GLUCOMTR-MCNC: 214 MG/DL — HIGH (ref 70–99)
GLUCOSE SERPL-MCNC: 227 MG/DL — HIGH (ref 70–115)
HCT VFR BLD CALC: 46.8 % — SIGNIFICANT CHANGE UP (ref 39–50)
HGB BLD-MCNC: 15.2 G/DL — SIGNIFICANT CHANGE UP (ref 13–17)
INR BLD: 1.41 RATIO — HIGH (ref 0.88–1.16)
LACTATE SERPL-SCNC: 2 MMOL/L — SIGNIFICANT CHANGE UP (ref 0.5–2)
MAGNESIUM SERPL-MCNC: 2.2 MG/DL — SIGNIFICANT CHANGE UP (ref 1.6–2.6)
MCHC RBC-ENTMCNC: 29.2 PG — SIGNIFICANT CHANGE UP (ref 27–34)
MCHC RBC-ENTMCNC: 32.5 GM/DL — SIGNIFICANT CHANGE UP (ref 32–36)
MCV RBC AUTO: 89.8 FL — SIGNIFICANT CHANGE UP (ref 80–100)
PHOSPHATE SERPL-MCNC: 4 MG/DL — SIGNIFICANT CHANGE UP (ref 2.4–4.7)
PLATELET # BLD AUTO: 103 K/UL — LOW (ref 150–400)
POTASSIUM SERPL-MCNC: 4.4 MMOL/L — SIGNIFICANT CHANGE UP (ref 3.5–5.3)
POTASSIUM SERPL-SCNC: 4.4 MMOL/L — SIGNIFICANT CHANGE UP (ref 3.5–5.3)
PROT SERPL-MCNC: 6.1 G/DL — LOW (ref 6.6–8.7)
PROTHROM AB SERPL-ACNC: 16.4 SEC — HIGH (ref 10–12.9)
RBC # BLD: 5.21 M/UL — SIGNIFICANT CHANGE UP (ref 4.2–5.8)
RBC # FLD: 16.1 % — HIGH (ref 10.3–14.5)
SODIUM SERPL-SCNC: 135 MMOL/L — SIGNIFICANT CHANGE UP (ref 135–145)
WBC # BLD: 12.15 K/UL — HIGH (ref 3.8–10.5)
WBC # FLD AUTO: 12.15 K/UL — HIGH (ref 3.8–10.5)

## 2019-09-06 PROCEDURE — 99232 SBSQ HOSP IP/OBS MODERATE 35: CPT

## 2019-09-06 RX ORDER — INSULIN GLARGINE 100 [IU]/ML
30 INJECTION, SOLUTION SUBCUTANEOUS EVERY MORNING
Refills: 0 | Status: DISCONTINUED | OUTPATIENT
Start: 2019-09-07 | End: 2019-09-14

## 2019-09-06 RX ORDER — HEPARIN SODIUM 5000 [USP'U]/ML
5000 INJECTION INTRAVENOUS; SUBCUTANEOUS EVERY 6 HOURS
Refills: 0 | Status: DISCONTINUED | OUTPATIENT
Start: 2019-09-06 | End: 2019-09-10

## 2019-09-06 RX ORDER — INSULIN LISPRO 100/ML
VIAL (ML) SUBCUTANEOUS
Refills: 0 | Status: DISCONTINUED | OUTPATIENT
Start: 2019-09-06 | End: 2019-09-08

## 2019-09-06 RX ORDER — VANCOMYCIN HCL 1 G
1000 VIAL (EA) INTRAVENOUS
Refills: 0 | Status: DISCONTINUED | OUTPATIENT
Start: 2019-09-06 | End: 2019-09-12

## 2019-09-06 RX ORDER — HEPARIN SODIUM 5000 [USP'U]/ML
10000 INJECTION INTRAVENOUS; SUBCUTANEOUS EVERY 6 HOURS
Refills: 0 | Status: DISCONTINUED | OUTPATIENT
Start: 2019-09-06 | End: 2019-09-10

## 2019-09-06 RX ORDER — MIDODRINE HYDROCHLORIDE 2.5 MG/1
10 TABLET ORAL EVERY 8 HOURS
Refills: 0 | Status: DISCONTINUED | OUTPATIENT
Start: 2019-09-06 | End: 2019-09-09

## 2019-09-06 RX ORDER — INSULIN GLARGINE 100 [IU]/ML
30 INJECTION, SOLUTION SUBCUTANEOUS ONCE
Refills: 0 | Status: COMPLETED | OUTPATIENT
Start: 2019-09-06 | End: 2019-09-06

## 2019-09-06 RX ORDER — SODIUM CHLORIDE 9 MG/ML
1000 INJECTION INTRAMUSCULAR; INTRAVENOUS; SUBCUTANEOUS
Refills: 0 | Status: DISCONTINUED | OUTPATIENT
Start: 2019-09-06 | End: 2019-09-07

## 2019-09-06 RX ORDER — CHLORHEXIDINE GLUCONATE 213 G/1000ML
1 SOLUTION TOPICAL DAILY
Refills: 0 | Status: DISCONTINUED | OUTPATIENT
Start: 2019-09-06 | End: 2019-09-06

## 2019-09-06 RX ORDER — HEPARIN SODIUM 5000 [USP'U]/ML
INJECTION INTRAVENOUS; SUBCUTANEOUS
Qty: 25000 | Refills: 0 | Status: DISCONTINUED | OUTPATIENT
Start: 2019-09-06 | End: 2019-09-10

## 2019-09-06 RX ORDER — WARFARIN SODIUM 2.5 MG/1
3 TABLET ORAL ONCE
Refills: 0 | Status: COMPLETED | OUTPATIENT
Start: 2019-09-06 | End: 2019-09-06

## 2019-09-06 RX ORDER — INSULIN LISPRO 100/ML
10 VIAL (ML) SUBCUTANEOUS
Refills: 0 | Status: DISCONTINUED | OUTPATIENT
Start: 2019-09-06 | End: 2019-09-08

## 2019-09-06 RX ADMIN — Medication 10 UNIT(S): at 12:13

## 2019-09-06 RX ADMIN — MIDODRINE HYDROCHLORIDE 10 MILLIGRAM(S): 2.5 TABLET ORAL at 13:21

## 2019-09-06 RX ADMIN — HYDROMORPHONE HYDROCHLORIDE 1 MILLIGRAM(S): 2 INJECTION INTRAMUSCULAR; INTRAVENOUS; SUBCUTANEOUS at 23:28

## 2019-09-06 RX ADMIN — HEPARIN SODIUM 2700 UNIT(S)/HR: 5000 INJECTION INTRAVENOUS; SUBCUTANEOUS at 08:30

## 2019-09-06 RX ADMIN — MUPIROCIN 1 APPLICATION(S): 20 OINTMENT TOPICAL at 17:23

## 2019-09-06 RX ADMIN — HYDROMORPHONE HYDROCHLORIDE 1 MILLIGRAM(S): 2 INJECTION INTRAMUSCULAR; INTRAVENOUS; SUBCUTANEOUS at 09:11

## 2019-09-06 RX ADMIN — HYDROMORPHONE HYDROCHLORIDE 1 MILLIGRAM(S): 2 INJECTION INTRAMUSCULAR; INTRAVENOUS; SUBCUTANEOUS at 16:42

## 2019-09-06 RX ADMIN — Medication 1 APPLICATION(S): at 17:23

## 2019-09-06 RX ADMIN — Medication 2: at 23:28

## 2019-09-06 RX ADMIN — WARFARIN SODIUM 3 MILLIGRAM(S): 2.5 TABLET ORAL at 12:13

## 2019-09-06 RX ADMIN — MIDODRINE HYDROCHLORIDE 10 MILLIGRAM(S): 2.5 TABLET ORAL at 04:40

## 2019-09-06 RX ADMIN — HYDROMORPHONE HYDROCHLORIDE 1 MILLIGRAM(S): 2 INJECTION INTRAMUSCULAR; INTRAVENOUS; SUBCUTANEOUS at 08:31

## 2019-09-06 RX ADMIN — Medication 50 MICROGRAM(S): at 04:40

## 2019-09-06 RX ADMIN — HEPARIN SODIUM 2700 UNIT(S)/HR: 5000 INJECTION INTRAVENOUS; SUBCUTANEOUS at 00:01

## 2019-09-06 RX ADMIN — NYSTATIN CREAM 1 APPLICATION(S): 100000 CREAM TOPICAL at 17:22

## 2019-09-06 RX ADMIN — NYSTATIN CREAM 1 APPLICATION(S): 100000 CREAM TOPICAL at 04:41

## 2019-09-06 RX ADMIN — Medication 250 MILLIGRAM(S): at 09:14

## 2019-09-06 RX ADMIN — MUPIROCIN 1 APPLICATION(S): 20 OINTMENT TOPICAL at 04:40

## 2019-09-06 RX ADMIN — Medication 4: at 17:20

## 2019-09-06 RX ADMIN — PIPERACILLIN AND TAZOBACTAM 25 GRAM(S): 4; .5 INJECTION, POWDER, LYOPHILIZED, FOR SOLUTION INTRAVENOUS at 17:23

## 2019-09-06 RX ADMIN — Medication 10 UNIT(S): at 17:21

## 2019-09-06 RX ADMIN — MIDODRINE HYDROCHLORIDE 10 MILLIGRAM(S): 2.5 TABLET ORAL at 22:59

## 2019-09-06 RX ADMIN — INSULIN GLARGINE 30 UNIT(S): 100 INJECTION, SOLUTION SUBCUTANEOUS at 12:15

## 2019-09-06 RX ADMIN — Medication 2: at 12:13

## 2019-09-06 RX ADMIN — HYDROMORPHONE HYDROCHLORIDE 1 MILLIGRAM(S): 2 INJECTION INTRAMUSCULAR; INTRAVENOUS; SUBCUTANEOUS at 16:17

## 2019-09-06 RX ADMIN — INSULIN HUMAN 10 UNIT(S)/HR: 100 INJECTION, SOLUTION SUBCUTANEOUS at 00:00

## 2019-09-06 RX ADMIN — HEPARIN SODIUM 2400 UNIT(S)/HR: 5000 INJECTION INTRAVENOUS; SUBCUTANEOUS at 18:24

## 2019-09-06 RX ADMIN — HYDROMORPHONE HYDROCHLORIDE 1 MILLIGRAM(S): 2 INJECTION INTRAMUSCULAR; INTRAVENOUS; SUBCUTANEOUS at 22:55

## 2019-09-06 RX ADMIN — PIPERACILLIN AND TAZOBACTAM 25 GRAM(S): 4; .5 INJECTION, POWDER, LYOPHILIZED, FOR SOLUTION INTRAVENOUS at 04:40

## 2019-09-06 NOTE — PROGRESS NOTE ADULT - SUBJECTIVE AND OBJECTIVE BOX
69 y/o M with a h/o DM (w/ neuropathy), HTN, sCHF (Last Echo ~2 years ago with EF 35%), COPD, CHRISS (on home CPAP machine), a-fib (on Coumadin), CKD, PPM/ICD, hypothryoid, prior diabetic foot ulcers/infections, depression with prior suicidality, admitted on  with worsening weakness and SOB for past few days. Lost consciousness while sitting on toilet for around 1 hour and then was unable to stand up due to b/l LE weakness, so total time stuck on toilet was ~10-12 hours. In ED patient found to be hyperglycemic (B), AG 22, Serum CO2 14, lactate 8.0, CK 23k, transaminitis, JAYLENE, hyperkalemic (K: 5.9), with leukocytosis and febrile to 101.7. Has open wounds/skin tears of b/l LE and RLE cellulitis that tracks up onto the thigh. Subsequently developed septic shock.    Remains requiring IV vasopressor therapy with Levophed, actively titrating to maintain a MAP > 65. Continue empiric antibiotic therapy with Zosyn and vancomycin. Lactate continues to downtrend. Urine culture is neg for growth and blood cultures are pending. Surgical service consulted and does not believe this to be necrotizing fasciitis. No collections of subcutaneous air seen on CT-imaging.    HR now controlled. Continue heparin infusion, adjusting in accordance with serial PTTs. Bridge back to Coumadin therapy when stabilized.    Severe JAYLENE on CKD and rhabdomyolysis. CK still severely elevated but has begun to downtrend. Monitor lytes and UOP- adequate. No indication for urgent hemodialysis at this time. Avoid nephrotoxic agents and renally dose medications.    Metabolic acidosis secondary to uremia, lactemia, and DKA. AG has closed but glucose remains uncontrolled. Continue insulin infusion. q 1 hour accu-checks.    TTE appreciated- EF down to 25-30%. Elevated LVEDP. Consider adding inotropic support for possible septic/stress cardiomyopathy component.        CRITICAL CARE TIME SPENT: 34 mins  Time spent evaluating/treating patient with medical issues that pose a high risk for life threatening deterioration and/or end-organ damage, reviewing data/labs/imaging, discussing case with multidisciplinary team, discussing plan/goals of care with patient/family. Non-inclusive of procedure time. 67 y/o M with a h/o DM (w/ neuropathy), HTN, sCHF (Last Echo ~2 years ago with EF 35%), COPD, CHRISS (on home CPAP machine), a-fib (on Coumadin), CKD, PPM/ICD, hypothryoid, prior diabetic foot ulcers/infections, depression with prior suicidality, admitted on  with worsening weakness and SOB for past few days. Lost consciousness while sitting on toilet for around 1 hour and then was unable to stand up due to b/l LE weakness, so total time stuck on toilet was ~10-12 hours. In ED patient found to be hyperglycemic (B), AG 22, Serum CO2 14, lactate 8.0, CK 23k, transaminitis, JAYLENE, hyperkalemic (K: 5.9), with leukocytosis and febrile to 101.7. Has open wounds/skin tears of b/l LE and RLE cellulitis that tracks up onto the thigh. Subsequently developed septic shock.    Remains requiring IV vasopressor therapy with Levophed, actively titrating to maintain a MAP > 65. Add midodrine. Continue empiric antibiotic therapy with Zosyn and vancomycin. Lactate continues to downtrend. Urine culture is neg for growth and blood cultures are pending. Surgical service consulted and does not believe this to be necrotizing fasciitis. No collections of subcutaneous air seen on CT-imaging.    HR now controlled. Continue heparin infusion, adjusting in accordance with serial PTTs. Bridge back to Coumadin therapy when stabilized.    Severe JAYLENE on CKD and rhabdomyolysis. CK still severely elevated but has begun to downtrend. Monitor lytes and UOP- adequate. No indication for urgent hemodialysis at this time. Avoid nephrotoxic agents and renally dose medications.    Metabolic acidosis secondary to uremia, lactemia, and DKA. AG has closed but glucose remains uncontrolled. Continue insulin infusion. q 1 hour accu-checks.    TTE appreciated- EF down to 25-30%. Elevated LVEDP. Consider adding inotropic support for possible septic/stress cardiomyopathy component.        CRITICAL CARE TIME SPENT: 34 mins  Time spent evaluating/treating patient with medical issues that pose a high risk for life threatening deterioration and/or end-organ damage, reviewing data/labs/imaging, discussing case with multidisciplinary team, discussing plan/goals of care with patient/family. Non-inclusive of procedure time. 67 y/o M with a h/o DM (w/ neuropathy), HTN, sCHF (Last Echo ~2 years ago with EF 35%), COPD, CHRISS (on home CPAP machine), a-fib (on Coumadin), CKD, PPM/ICD, hypothryoid, prior diabetic foot ulcers/infections, depression with prior suicidality, admitted on  with worsening weakness and SOB for past few days. Lost consciousness while sitting on toilet for around 1 hour and then was unable to stand up due to b/l LE weakness, so total time stuck on toilet was ~10-12 hours. In ED patient found to be hyperglycemic (B), AG 22, Serum CO2 14, lactate 8.0, CK 23k, transaminitis, JAYLENE, hyperkalemic (K: 5.9), with leukocytosis and febrile to 101.7. Has open wounds/skin tears of b/l LE and RLE cellulitis that tracks up onto the thigh. Subsequently developed septic shock.    Remains requiring IV vasopressor therapy with Levophed, actively titrating to maintain a MAP > 65. Add midodrine. Continue empiric antibiotic therapy with Zosyn and vancomycin. Lactate continues to downtrend. Urine culture is neg for growth and blood cultures are pending. Surgical service consulted and does not believe this to be necrotizing fasciitis. No collections of subcutaneous gas seen on CT-imaging.    HR now controlled. Continue heparin infusion, adjusting in accordance with serial PTTs. Bridge back to Coumadin therapy when stabilized.    Severe JAYLENE on CKD and rhabdomyolysis. CK still severely elevated but has begun to downtrend. Monitor lytes and UOP- adequate. No indication for urgent hemodialysis at this time. Avoid nephrotoxic agents and renally dose medications.    Metabolic acidosis secondary to uremia, lactemia, and DKA. AG has closed but glucose remains uncontrolled. Continue insulin infusion. q 1 hour accu-checks.    TTE appreciated- EF down to 25-30%. Elevated LVEDP. Consider adding inotropic support for possible septic/stress cardiomyopathy component.        CRITICAL CARE TIME SPENT: 34 mins  Time spent evaluating/treating patient with medical issues that pose a high risk for life threatening deterioration and/or end-organ damage, reviewing data/labs/imaging, discussing case with multidisciplinary team, discussing plan/goals of care with patient/family. Non-inclusive of procedure time.

## 2019-09-06 NOTE — PROGRESS NOTE ADULT - SUBJECTIVE AND OBJECTIVE BOX
69 y/o morbidly obese male with multiple medical problems was admitted to ICU on 9/4/19 for severe sepsis / cellulitis of b/L LE. pt. was brought in as while sitting on his toilet pt. passed out but as per family ( daughters at bedside ) pt. did not have any fall, did not hit his head to ground and was found sitting on the toilet. Pt. was on pressors initially and admitted to icu. Pt. was also noted to have rhabdo. pt. got some fluids but his EF is 25 to 30 % and as per icu pt. appeared to be volume over loaded and fluids were cut down. pt. is off the pressors now. pt. feels better. no cp. no sob . no HA reported. no abd. pain. no n/v/d. Pt. has which appears to be chronic leg edema and as per pt. his lower ext. wound come and go as due to fluid retention he gets blisters and they burst. 69 y/o morbidly obese male with multiple medical problems was admitted to ICU on 19 for severe sepsis / cellulitis of b/L LE. pt. was brought in as while sitting on his toilet pt. passed out but as per family ( daughters at bedside ) pt. did not have any fall, did not hit his head to ground and was found sitting on the toilet. Pt. was on pressors initially and admitted to icu. Pt. was also noted to have rhabdo. pt. got some fluids but his EF is 25 to 30 % and as per icu pt. appeared to be volume over loaded and fluids were cut down. pt. is off the pressors now. pt. feels better. no cp. no sob . no HA reported. no abd. pain. no n/v/d. Pt. has which appears to be chronic leg edema and as per pt. his lower ext. wound come and go as due to fluid retention he gets blisters and they burst.     P/E  Vital Signs Last 24 Hrs  T(C): 36.7 (06 Sep 2019 16:04), Max: 37.8 (06 Sep 2019 03:33)  T(F): 98.1 (06 Sep 2019 16:04), Max: 100 (06 Sep 2019 03:33)  HR: 99 (06 Sep 2019 18:00) (84 - 99)  BP: 120/93 (06 Sep 2019 18:00) (92/48 - 120/93)  BP(mean): 100 (06 Sep 2019 18:00) (61 - 100)  RR: 23 (06 Sep 2019 18:00) (17 - 35)  SpO2: 93% (06 Sep 2019 18:00) (82% - 100%)    General: morbidly obese male lying in bed not in distress.   HEENT: AT, NC. PERRL. intact EOM. no throat erythema or exudate.   Neck: supple. no JVD. no palpable lymph nodes  Chest: CTA bilaterally  Heart: normal S1,S2. RRR. no heart murmur.  Abdomen: soft. non-tender. non-distended. + BS. no hernia or palpable masses.  Genital: not examined  Ext: no C/C/E. no calf tenderness.  Neuro: AAO x3. no focal weakness. no speech disorder  Skin: no rash    MEDICATIONS  (STANDING):  carvedilol 3.125 milliGRAM(s) Oral every 12 hours  clotrimazole 1% Cream 1 Application(s) Topical two times a day  heparin  Infusion.  Unit(s)/Hr (24 mL/Hr) IV Continuous <Continuous>  insulin lispro (HumaLOG) corrective regimen sliding scale.   SubCutaneous four times a day before meals  insulin lispro Injectable (HumaLOG) 10 Unit(s) SubCutaneous three times a day before meals  levothyroxine 50 MICROGram(s) Oral daily  midodrine 10 milliGRAM(s) Oral every 8 hours  mupirocin 2% Ointment 1 Application(s) Topical two times a day  nystatin Powder 1 Application(s) Topical every 12 hours  piperacillin/tazobactam IVPB.. 3.375 Gram(s) IV Intermittent every 12 hours  vancomycin  IVPB 1000 milliGRAM(s) IV Intermittent <User Schedule>    MEDICATIONS  (PRN):  acetaminophen   Tablet .. 650 milliGRAM(s) Oral every 6 hours PRN Moderate Pain (4 - 6)  heparin  Injectable 84166 Unit(s) IV Push every 6 hours PRN For aPTT less than 40  heparin  Injectable 5000 Unit(s) IV Push every 6 hours PRN For aPTT between 40 - 57  HYDROmorphone  Injectable 1 milliGRAM(s) IV Push every 4 hours PRN Severe Pain (7 - 10)                            15.2   12.15 )-----------( 103      ( 06 Sep 2019 05:39 )             46.8         135  |  98  |  38.0<H>  ----------------------------<  227<H>  4.4   |  24.0  |  2.14<H>    Ca    8.2<L>      06 Sep 2019 05:39  Phos  4.0       Mg     2.2         TPro  6.1<L>  /  Alb  2.5<L>  /  TBili  1.0  /  DBili  x   /  AST  264<H>  /  ALT  58<H>  /  AlkPhos  84      CARDIAC MARKERS ( 05 Sep 2019 14:01 )  x     / x     / 92918 U/L / x     / 47.4 ng/mL  CARDIAC MARKERS ( 05 Sep 2019 04:32 )  x     / 0.03 ng/mL / 18876 U/L / x     / 98.8 ng/mL  CARDIAC MARKERS ( 05 Sep 2019 00:58 )  x     / x     / 62574 U/L / x     / x            Urinalysis Basic - ( 04 Sep 2019 22:13 )    Color: Yellow / Appearance: very cloudy / S.020 / pH: x  Gluc: x / Ketone: Negative  / Bili: Negative / Urobili: Negative mg/dL   Blood: x / Protein: 100 mg/dL / Nitrite: Negative   Leuk Esterase: Trace / RBC: 3-5 /HPF / WBC 0-2   Sq Epi: x / Non Sq Epi: Few / Bacteria: x      PT/INR - ( 06 Sep 2019 05:39 )   PT: 16.4 sec;   INR: 1.41 ratio         PTT - ( 06 Sep 2019 05:39 )  PTT:67.9 sec    A/P     Cellulitis of B/L LE. followed by ID , vanco , zosyn .  was admitted for septic shock, now off the pressors.     Chronic afib, subtherapeutic INR , will continue with heparin drip, got coumadin today, follow am coags and further AC per coags, close follow up of platelets 103 today.    Morbid obesity, will benefit from loosing wt.     DM type 2 long term insulin use, with hyperglycemia. lantus and humalog scale.     Rhabdomyolysis, close follow up on CK, numbers going down, nephrology consult, dr. Pereira, limited option for iv fluids as pt. is volume over load and has poor EF.    Renal failure likely JAYLENE, avoid nephrotoxic meds, nephrology consult.     AST / alt elevated likely passive congestion and sepsis related, follow the trend. 67 y/o morbidly obese male with multiple medical problems was admitted to ICU on 19 for severe sepsis / cellulitis of b/L LE. pt. was brought in as while sitting on his toilet pt. passed out but as per family ( daughters at bedside ) pt. did not have any fall, did not hit his head to ground and was found sitting on the toilet. Pt. was on pressors initially and admitted to icu. Pt. was also noted to have rhabdo. pt. got some fluids but his EF is 25 to 30 % and as per icu pt. appeared to be volume over loaded and fluids were cut down. pt. is off the pressors now. pt. feels better. no cp. no sob . no HA reported. no abd. pain. no n/v/d. Pt. has which appears to be chronic leg edema and as per pt. his lower ext. wound come and go as due to fluid retention he gets blisters and they burst.     P/E  Vital Signs Last 24 Hrs  T(C): 36.7 (06 Sep 2019 16:04), Max: 37.8 (06 Sep 2019 03:33)  T(F): 98.1 (06 Sep 2019 16:04), Max: 100 (06 Sep 2019 03:33)  HR: 99 (06 Sep 2019 18:00) (84 - 99)  BP: 120/93 (06 Sep 2019 18:00) (92/48 - 120/93)  BP(mean): 100 (06 Sep 2019 18:00) (61 - 100)  RR: 23 (06 Sep 2019 18:00) (17 - 35)  SpO2: 93% (06 Sep 2019 18:00) (82% - 100%)    General: morbidly obese male lying in bed not in distress.   HEENT: AT, NC. PERRL. intact EOM. no throat erythema or exudate.   Neck: supple. no JVD.   Chest: air entry fair bilaterally. no inter costal retractions.   Heart: S1,S2. IRRR. no heart murmur. 2 + edema of B/L LE.   Abdomen: soft. non-tender. morbidly obese, + BS.   Ext: no calf tenderness. B/L LE with scattered cellulitic areas , 2 on the LLE  below knee about the size of a quarter, few over RLE ranging from 1 inch x 1.5 ich, shallow, surrounding erythema, no foul smell.   Neuro: AAO x3. no focal weakness. no speech disorder. CNS intact.  Skin: no diaphoresis, o pallor.   Psych : normal affect.     MEDICATIONS  (STANDING):  carvedilol 3.125 milliGRAM(s) Oral every 12 hours  clotrimazole 1% Cream 1 Application(s) Topical two times a day  heparin  Infusion.  Unit(s)/Hr (24 mL/Hr) IV Continuous <Continuous>  insulin lispro (HumaLOG) corrective regimen sliding scale.   SubCutaneous four times a day before meals  insulin lispro Injectable (HumaLOG) 10 Unit(s) SubCutaneous three times a day before meals  levothyroxine 50 MICROGram(s) Oral daily  midodrine 10 milliGRAM(s) Oral every 8 hours  mupirocin 2% Ointment 1 Application(s) Topical two times a day  nystatin Powder 1 Application(s) Topical every 12 hours  piperacillin/tazobactam IVPB.. 3.375 Gram(s) IV Intermittent every 12 hours  vancomycin  IVPB 1000 milliGRAM(s) IV Intermittent <User Schedule>    MEDICATIONS  (PRN):  acetaminophen   Tablet .. 650 milliGRAM(s) Oral every 6 hours PRN Moderate Pain (4 - 6)  heparin  Injectable 23839 Unit(s) IV Push every 6 hours PRN For aPTT less than 40  heparin  Injectable 5000 Unit(s) IV Push every 6 hours PRN For aPTT between 40 - 57  HYDROmorphone  Injectable 1 milliGRAM(s) IV Push every 4 hours PRN Severe Pain (7 - 10)                            15.2   12.15 )-----------( 103      ( 06 Sep 2019 05:39 )             46.8         135  |  98  |  38.0<H>  ----------------------------<  227<H>  4.4   |  24.0  |  2.14<H>    Ca    8.2<L>      06 Sep 2019 05:39  Phos  4.0       Mg     2.2         TPro  6.1<L>  /  Alb  2.5<L>  /  TBili  1.0  /  DBili  x   /  AST  264<H>  /  ALT  58<H>  /  AlkPhos  84  09-06    CARDIAC MARKERS ( 05 Sep 2019 14:01 )  x     / x     / 57567 U/L / x     / 47.4 ng/mL  CARDIAC MARKERS ( 05 Sep 2019 04:32 )  x     / 0.03 ng/mL / 29455 U/L / x     / 98.8 ng/mL  CARDIAC MARKERS ( 05 Sep 2019 00:58 )  x     / x     / 65003 U/L / x     / x            Urinalysis Basic - ( 04 Sep 2019 22:13 )    Color: Yellow / Appearance: very cloudy / S.020 / pH: x  Gluc: x / Ketone: Negative  / Bili: Negative / Urobili: Negative mg/dL   Blood: x / Protein: 100 mg/dL / Nitrite: Negative   Leuk Esterase: Trace / RBC: 3-5 /HPF / WBC 0-2   Sq Epi: x / Non Sq Epi: Few / Bacteria: x      PT/INR - ( 06 Sep 2019 05:39 )   PT: 16.4 sec;   INR: 1.41 ratio         PTT - ( 06 Sep 2019 05:39 )  PTT:67.9 sec    A/P     Cellulitis of B/L LE. followed by ID , vanco , zosyn .  was admitted for septic shock, now off the pressors.     Chronic afib, subtherapeutic INR , will continue with heparin drip, got coumadin today, follow am coags and further AC per coags, close follow up of platelets 103 today.    Morbid obesity, will benefit from loosing wt.     DM type 2 long term insulin use, with hyperglycemia. lantus and humalog scale.     Rhabdomyolysis, close follow up on CK, numbers going down, nephrology consult, dr. Pereira, limited option for iv fluids as pt. is volume over load and has poor EF.    Renal failure likely JAYLENE, avoid nephrotoxic meds, nephrology consult.     AST / alt elevated likely passive congestion and sepsis related, follow the trend. 67 y/o morbidly obese male with multiple medical problems was admitted to ICU on 19 for severe sepsis / cellulitis of b/L LE. pt. was brought in as while sitting on his toilet pt. passed out but as per family ( daughters at bedside ) pt. did not have any fall, did not hit his head to ground and was found sitting on the toilet. Pt. was on pressors initially and admitted to icu. Pt. was also noted to have rhabdo. pt. got some fluids but his EF is 25 to 30 % and as per icu pt. appeared to be volume over loaded and fluids were cut down. pt. is off the pressors now. pt. feels better. no cp. no sob . no HA reported. no abd. pain. no n/v/d. Pt. has which appears to be chronic leg edema and as per pt. his lower ext. wound come and go as due to fluid retention he gets blisters and they burst.     P/E  Vital Signs Last 24 Hrs  T(C): 36.7 (06 Sep 2019 16:04), Max: 37.8 (06 Sep 2019 03:33)  T(F): 98.1 (06 Sep 2019 16:04), Max: 100 (06 Sep 2019 03:33)  HR: 99 (06 Sep 2019 18:00) (84 - 99)  BP: 120/93 (06 Sep 2019 18:00) (92/48 - 120/93)  BP(mean): 100 (06 Sep 2019 18:00) (61 - 100)  RR: 23 (06 Sep 2019 18:00) (17 - 35)  SpO2: 93% (06 Sep 2019 18:00) (82% - 100%)    General: morbidly obese male lying in bed not in distress.   HEENT: AT, NC. PERRL. intact EOM. no throat erythema or exudate.   Neck: supple. no JVD.   Chest: air entry fair bilaterally. no inter costal retractions.   Heart: S1,S2. IRRR. no heart murmur. 2 + edema of B/L LE.   Abdomen: soft. non-tender. morbidly obese, + BS.   Ext: no calf tenderness. B/L LE with scattered cellulitic areas , 2 on the LLE  below knee about the size of a quarter, few over RLE ranging from 1 inch x 1.5 ich, shallow, surrounding erythema, no foul smell.   Neuro: AAO x3. no focal weakness. no speech disorder. CNS intact.  Skin: no diaphoresis, o pallor.   Psych : normal affect.     MEDICATIONS  (STANDING):  carvedilol 3.125 milliGRAM(s) Oral every 12 hours  clotrimazole 1% Cream 1 Application(s) Topical two times a day  heparin  Infusion.  Unit(s)/Hr (24 mL/Hr) IV Continuous <Continuous>  insulin lispro (HumaLOG) corrective regimen sliding scale.   SubCutaneous four times a day before meals  insulin lispro Injectable (HumaLOG) 10 Unit(s) SubCutaneous three times a day before meals  levothyroxine 50 MICROGram(s) Oral daily  midodrine 10 milliGRAM(s) Oral every 8 hours  mupirocin 2% Ointment 1 Application(s) Topical two times a day  nystatin Powder 1 Application(s) Topical every 12 hours  piperacillin/tazobactam IVPB.. 3.375 Gram(s) IV Intermittent every 12 hours  vancomycin  IVPB 1000 milliGRAM(s) IV Intermittent <User Schedule>    MEDICATIONS  (PRN):  acetaminophen   Tablet .. 650 milliGRAM(s) Oral every 6 hours PRN Moderate Pain (4 - 6)  heparin  Injectable 26534 Unit(s) IV Push every 6 hours PRN For aPTT less than 40  heparin  Injectable 5000 Unit(s) IV Push every 6 hours PRN For aPTT between 40 - 57  HYDROmorphone  Injectable 1 milliGRAM(s) IV Push every 4 hours PRN Severe Pain (7 - 10)                            15.2   12.15 )-----------( 103      ( 06 Sep 2019 05:39 )             46.8         135  |  98  |  38.0<H>  ----------------------------<  227<H>  4.4   |  24.0  |  2.14<H>    Ca    8.2<L>      06 Sep 2019 05:39  Phos  4.0       Mg     2.2         TPro  6.1<L>  /  Alb  2.5<L>  /  TBili  1.0  /  DBili  x   /  AST  264<H>  /  ALT  58<H>  /  AlkPhos  84  09-06    CARDIAC MARKERS ( 05 Sep 2019 14:01 )  x     / x     / 14363 U/L / x     / 47.4 ng/mL  CARDIAC MARKERS ( 05 Sep 2019 04:32 )  x     / 0.03 ng/mL / 54006 U/L / x     / 98.8 ng/mL  CARDIAC MARKERS ( 05 Sep 2019 00:58 )  x     / x     / 06599 U/L / x     / x            Urinalysis Basic - ( 04 Sep 2019 22:13 )    Color: Yellow / Appearance: very cloudy / S.020 / pH: x  Gluc: x / Ketone: Negative  / Bili: Negative / Urobili: Negative mg/dL   Blood: x / Protein: 100 mg/dL / Nitrite: Negative   Leuk Esterase: Trace / RBC: 3-5 /HPF / WBC 0-2   Sq Epi: x / Non Sq Epi: Few / Bacteria: x      PT/INR - ( 06 Sep 2019 05:39 )   PT: 16.4 sec;   INR: 1.41 ratio         PTT - ( 06 Sep 2019 05:39 )  PTT:67.9 sec    A/P     Cellulitis of B/L LE. followed by ID , vanco , zosyn .  wound care consult has been requested by ICU team , pending. was admitted for septic shock, now off the pressors.     Chronic afib, subtherapeutic INR , will continue with heparin drip, got coumadin today, follow am coags and further AC per coags, close follow up of platelets 103 today.    Morbid obesity, will benefit from loosing wt.     DM type 2 long term insulin use, with hyperglycemia. lantus and humalog scale.     Syncopy, will get carotid doppler, cardio consult.     Rhabdomyolysis, close follow up on CK, numbers going down, nephrology consult, dr. Pereira, limited option for iv fluids as pt. is volume over load and has poor EF.    Renal failure likely JAYLENE, avoid nephrotoxic meds, nephrology consult.     Sleep apnea, CPAP.     AST / alt elevated likely passive congestion and sepsis related, follow the trend.     hospitalist chaz will follow. 67 y/o morbidly obese male with multiple medical problems was admitted to ICU on 19 for severe sepsis / cellulitis of b/L LE. pt. was brought in as while sitting on his toilet pt. passed out but as per family ( daughters at bedside ) pt. did not have any fall, did not hit his head to ground and was found sitting on the toilet. Pt. was on pressors initially and admitted to icu. Pt. was also noted to have rhabdo. pt. got some fluids but his EF is 25 to 30 % and as per icu pt. appeared to be volume over loaded and fluids were cut down. pt. is off the pressors now. pt. feels better. no cp. no sob . no HA reported. no abd. pain. no n/v/d. Pt. has which appears to be chronic leg edema and as per pt. his lower ext. wound come and go as due to fluid retention he gets blisters and they burst.     P/E  Vital Signs Last 24 Hrs  T(C): 36.7 (06 Sep 2019 16:04), Max: 37.8 (06 Sep 2019 03:33)  T(F): 98.1 (06 Sep 2019 16:04), Max: 100 (06 Sep 2019 03:33)  HR: 99 (06 Sep 2019 18:00) (84 - 99)  BP: 120/93 (06 Sep 2019 18:00) (92/48 - 120/93)  BP(mean): 100 (06 Sep 2019 18:00) (61 - 100)  RR: 23 (06 Sep 2019 18:00) (17 - 35)  SpO2: 93% (06 Sep 2019 18:00) (82% - 100%)    General: morbidly obese male lying in bed not in distress.   HEENT: AT, NC. PERRL. intact EOM. no throat erythema or exudate.   Neck: supple. no JVD.   Chest: air entry fair bilaterally. no inter costal retractions.   Heart: S1,S2. IRRR. no heart murmur. 3 + edema of B/L LE.   Abdomen: soft. non-tender. morbidly obese, + BS.   Ext: no calf tenderness. B/L LE with scattered cellulitic areas , 2 on the LLE  below knee about the size of a quarter, few over RLE ranging from 1 inch x 1.5 ich, shallow, surrounding erythema, no foul smell.   Neuro: AAO x3. follows commands, reports difficulty moving lower ext since admission, lower ext. pain / lymphedema related ? no speech disorder. CNS intact.  Skin: no diaphoresis, no pallor.   Psych : normal affect.     MEDICATIONS  (STANDING):  carvedilol 3.125 milliGRAM(s) Oral every 12 hours  clotrimazole 1% Cream 1 Application(s) Topical two times a day  heparin  Infusion.  Unit(s)/Hr (24 mL/Hr) IV Continuous <Continuous>  insulin lispro (HumaLOG) corrective regimen sliding scale.   SubCutaneous four times a day before meals  insulin lispro Injectable (HumaLOG) 10 Unit(s) SubCutaneous three times a day before meals  levothyroxine 50 MICROGram(s) Oral daily  midodrine 10 milliGRAM(s) Oral every 8 hours  mupirocin 2% Ointment 1 Application(s) Topical two times a day  nystatin Powder 1 Application(s) Topical every 12 hours  piperacillin/tazobactam IVPB.. 3.375 Gram(s) IV Intermittent every 12 hours  vancomycin  IVPB 1000 milliGRAM(s) IV Intermittent <User Schedule>    MEDICATIONS  (PRN):  acetaminophen   Tablet .. 650 milliGRAM(s) Oral every 6 hours PRN Moderate Pain (4 - 6)  heparin  Injectable 83570 Unit(s) IV Push every 6 hours PRN For aPTT less than 40  heparin  Injectable 5000 Unit(s) IV Push every 6 hours PRN For aPTT between 40 - 57  HYDROmorphone  Injectable 1 milliGRAM(s) IV Push every 4 hours PRN Severe Pain (7 - 10)                            15.2   12.15 )-----------( 103      ( 06 Sep 2019 05:39 )             46.8     09-06    135  |  98  |  38.0<H>  ----------------------------<  227<H>  4.4   |  24.0  |  2.14<H>    Ca    8.2<L>      06 Sep 2019 05:39  Phos  4.0     09-06  Mg     2.2     -    TPro  6.1<L>  /  Alb  2.5<L>  /  TBili  1.0  /  DBili  x   /  AST  264<H>  /  ALT  58<H>  /  AlkPhos  84  09-06    CARDIAC MARKERS ( 05 Sep 2019 14:01 )  x     / x     / 67258 U/L / x     / 47.4 ng/mL  CARDIAC MARKERS ( 05 Sep 2019 04:32 )  x     / 0.03 ng/mL / 89662 U/L / x     / 98.8 ng/mL  CARDIAC MARKERS ( 05 Sep 2019 00:58 )  x     / x     / 89953 U/L / x     / x            Urinalysis Basic - ( 04 Sep 2019 22:13 )    Color: Yellow / Appearance: very cloudy / S.020 / pH: x  Gluc: x / Ketone: Negative  / Bili: Negative / Urobili: Negative mg/dL   Blood: x / Protein: 100 mg/dL / Nitrite: Negative   Leuk Esterase: Trace / RBC: 3-5 /HPF / WBC 0-2   Sq Epi: x / Non Sq Epi: Few / Bacteria: x      PT/INR - ( 06 Sep 2019 05:39 )   PT: 16.4 sec;   INR: 1.41 ratio         PTT - ( 06 Sep 2019 05:39 )  PTT:67.9 sec    A/P     Cellulitis of B/L LE. followed by ID , vanco , zosyn .  wound care consult has been requested by ICU team , pending. was admitted for septic shock, now off the pressors.     Chronic afib, subtherapeutic INR , will continue with heparin drip, got coumadin today, follow am coags and further AC per coags, close follow up of platelets 103 today.    Morbid obesity, will benefit from loosing wt.     DM type 2 long term insulin use, with hyperglycemia. lantus and humalog scale.     Syncopy, will get carotid doppler,  ct head, cardio consult.     Rhabdomyolysis, close follow up on CK, numbers going down, nephrology consult, dr. Pereira, limited option for iv fluids as pt. is volume over load and has poor EF.    Renal failure likely JAYLENE, avoid nephrotoxic meds, nephrology consult.     Sleep apnea, CPAP.     AST / alt elevated likely passive congestion and sepsis related, follow the trend.     hospitalist chaz will follow.     addendum : pt. has same findings in his legs so will get CT cervical, thoracic and lumbar spine with ct head. dr. colunga. 9:30 pm. 69 y/o morbidly obese male with multiple medical problems was admitted to ICU on 19 for severe sepsis / cellulitis of b/L LE. pt. was brought in as while sitting on his toilet pt. passed out but as per family ( daughters at bedside ) pt. did not have any fall, did not hit his head to ground and was found sitting on the toilet. Pt. was on pressors initially and admitted to icu. Pt. was also noted to have rhabdo. pt. got some fluids but his EF is 25 to 30 % and as per icu pt. appeared to be volume over loaded and fluids were cut down. pt. is off the pressors now. pt. feels better. no cp. no sob . no HA reported. no abd. pain. no n/v/d. Pt. has which appears to be chronic leg edema and as per pt. his lower ext. wound come and go as due to fluid retention he gets blisters and they burst.     P/E  Vital Signs Last 24 Hrs  T(C): 36.7 (06 Sep 2019 16:04), Max: 37.8 (06 Sep 2019 03:33)  T(F): 98.1 (06 Sep 2019 16:04), Max: 100 (06 Sep 2019 03:33)  HR: 99 (06 Sep 2019 18:00) (84 - 99)  BP: 120/93 (06 Sep 2019 18:00) (92/48 - 120/93)  BP(mean): 100 (06 Sep 2019 18:00) (61 - 100)  RR: 23 (06 Sep 2019 18:00) (17 - 35)  SpO2: 93% (06 Sep 2019 18:00) (82% - 100%)    General: morbidly obese male lying in bed not in distress.   HEENT: AT, NC. PERRL. intact EOM. no throat erythema or exudate.   Neck: supple. no JVD.   Chest: air entry fair bilaterally. no inter costal retractions.   Heart: S1,S2. IRRR. no heart murmur. 3 + edema of B/L LE.   Abdomen: soft. non-tender. morbidly obese, + BS.   Ext: no calf tenderness. B/L LE with scattered cellulitic areas , 2 on the LLE  below knee about the size of a quarter, few over RLE ranging from 1 inch x 1.5 ich, shallow, surrounding erythema, no foul smell.   Neuro: AAO x3. follows commands, reports difficulty moving lower ext since admission, lower ext. pain / lymphedema related ? no speech disorder. CNS intact.  Skin: no diaphoresis, no pallor.   Psych : normal affect.     MEDICATIONS  (STANDING):  carvedilol 3.125 milliGRAM(s) Oral every 12 hours  clotrimazole 1% Cream 1 Application(s) Topical two times a day  heparin  Infusion.  Unit(s)/Hr (24 mL/Hr) IV Continuous <Continuous>  insulin lispro (HumaLOG) corrective regimen sliding scale.   SubCutaneous four times a day before meals  insulin lispro Injectable (HumaLOG) 10 Unit(s) SubCutaneous three times a day before meals  levothyroxine 50 MICROGram(s) Oral daily  midodrine 10 milliGRAM(s) Oral every 8 hours  mupirocin 2% Ointment 1 Application(s) Topical two times a day  nystatin Powder 1 Application(s) Topical every 12 hours  piperacillin/tazobactam IVPB.. 3.375 Gram(s) IV Intermittent every 12 hours  vancomycin  IVPB 1000 milliGRAM(s) IV Intermittent <User Schedule>    MEDICATIONS  (PRN):  acetaminophen   Tablet .. 650 milliGRAM(s) Oral every 6 hours PRN Moderate Pain (4 - 6)  heparin  Injectable 39237 Unit(s) IV Push every 6 hours PRN For aPTT less than 40  heparin  Injectable 5000 Unit(s) IV Push every 6 hours PRN For aPTT between 40 - 57  HYDROmorphone  Injectable 1 milliGRAM(s) IV Push every 4 hours PRN Severe Pain (7 - 10)                            15.2   12.15 )-----------( 103      ( 06 Sep 2019 05:39 )             46.8     09-06    135  |  98  |  38.0<H>  ----------------------------<  227<H>  4.4   |  24.0  |  2.14<H>    Ca    8.2<L>      06 Sep 2019 05:39  Phos  4.0     09-06  Mg     2.2     -    TPro  6.1<L>  /  Alb  2.5<L>  /  TBili  1.0  /  DBili  x   /  AST  264<H>  /  ALT  58<H>  /  AlkPhos  84  09-06    CARDIAC MARKERS ( 05 Sep 2019 14:01 )  x     / x     / 84953 U/L / x     / 47.4 ng/mL  CARDIAC MARKERS ( 05 Sep 2019 04:32 )  x     / 0.03 ng/mL / 14778 U/L / x     / 98.8 ng/mL  CARDIAC MARKERS ( 05 Sep 2019 00:58 )  x     / x     / 63940 U/L / x     / x            Urinalysis Basic - ( 04 Sep 2019 22:13 )    Color: Yellow / Appearance: very cloudy / S.020 / pH: x  Gluc: x / Ketone: Negative  / Bili: Negative / Urobili: Negative mg/dL   Blood: x / Protein: 100 mg/dL / Nitrite: Negative   Leuk Esterase: Trace / RBC: 3-5 /HPF / WBC 0-2   Sq Epi: x / Non Sq Epi: Few / Bacteria: x      PT/INR - ( 06 Sep 2019 05:39 )   PT: 16.4 sec;   INR: 1.41 ratio         PTT - ( 06 Sep 2019 05:39 )  PTT:67.9 sec    A/P     Cellulitis of B/L LE. followed by ID , vanco , zosyn .  wound care consult has been requested by ICU team , pending. was admitted for septic shock, now off the pressors.     Chronic afib, subtherapeutic INR , will continue with heparin drip, got coumadin today, follow am coags and further AC per coags, close follow up of platelets 103 today.    Morbid obesity, will benefit from loosing wt.     DM type 2 long term insulin use, with hyperglycemia. lantus and humalog scale.     Syncopy, will get carotid doppler,  ct head, cardio consult.     Rhabdomyolysis, close follow up on CK, numbers going down, nephrology consult, dr. Pereira, limited option for iv fluids as pt. is volume over load and has poor EF.    Renal failure likely JAYLENE, avoid nephrotoxic meds, nephrology consult.     Sleep apnea, CPAP.     AST / alt elevated likely passive congestion and sepsis related, follow the trend.     hospitalist chaz will follow.     addendum : pt. has same findings in his legs so will get head ct scan, CT cervical, thoracic,  ct lumbar spine was done on 19 . dr. colunga. 9:30 pm.   CT LS report.   IMPRESSION: No fracture demonstrated. Mild degenerative endplate spurring of   lower thoracic and L1 vertebra. No fracture or gross disc herniation or   foraminal stenosis.

## 2019-09-06 NOTE — PROGRESS NOTE ADULT - ASSESSMENT
This 68 y/o male with uncontrolled DM (w/ neuropathy), HN, CHF (Last Echo ~2 years ago with EF 35%), COPD, CHRISS (on home CPAP machine), PPM/Defibrillator ("heart function was 3%"), prior diabetic foot ulcers/infections, depression presents with worsening weakness and SOB for past few days. As per daughter at bedside, wife found pt passed out sitting on toilet. Estimated time on toilet is around 10-12 hours. Pt states he "blacked out", but didnt fall off toilet. Pt also c/o inability to ambulate now secondary to worsening of weakness. In ED, pt remains short of breath, found to be hyperglycemic, AG 22, Serum CO2 14, lactate 8.0, CK 24k, transaminitis, hyperkalemic, Cr 2.85, with leukocytosis and febrile to 101.7. Surgery consulted for concern of RLE cellulitis and r/o nec fasc. MICU consult called. Denies n/v/d. CP, h/a, dizziness, or any other acute complaints at this time. (04 Sep 2019 17:27)    patient seen in MICU, not able to contribute any information.  Labs reviewed.   Pt seen previously for similar infections.   Renal function much worse than prior.     FOUND WITH:  RIGHT LOWER EXT cellulitis  Acute renal failure on CKD  Rhabdomyolysis  uncontrolled DM  likely tinea pedis of bilateral feet    - regarding infection  	continue Zosyn; continue empiric Vancomycin for now.   	follow up on cultures - blood culture sent  	re-order cream for toes - Miconazole     - watch renal fx closely; given rhabdo and ARF on CKD  - needs good sugar control    - if cultures without MRSA in next 24-48H, will consider stopping Vancomycin.     - follow up all outstanding cultures  - trend temperature and WBC curve  - repeat cultures from blood and all sources if febrile.

## 2019-09-06 NOTE — DIETITIAN INITIAL EVALUATION ADULT. - PHYSICAL APPEARANCE
other (specify)/obese/BMI 59.0 Noted with 1+ b/l foot edema, 3+ b/l leg edema, and 4+ b/l foot edema  Left lower leg venous ulcer and suspected DTI to coccyx and right thigh per documentation

## 2019-09-06 NOTE — CONSULT NOTE ADULT - ASSESSMENT
Improved JAYLENE on CKD   Morbid obesity , DM , HTN , CM, CHRISS , chronic edema   + Rhabdo from immobilization on toilet x 12 hours ( CPK 30 K ----> 16 K ) , Net balance only + 500 ml   + Cellulitis with improved septic shock ( blood and urine cultures 9-4 are negative ).  lactate and transaminases better   ECHO noted   Vanco and Zosyn as per ID   Having difficulty moving legs ( team has ordered head , C spine and thoracic spine CT scans )   Ramipril , Lasix and aldactone held   Gentle IVF   CPK in am   Lipitor also held   Check renal sonogram and bladder scan   U/P C and TFT's   Will follow

## 2019-09-06 NOTE — DIETITIAN INITIAL EVALUATION ADULT. - PERTINENT LABORATORY DATA
09-06 Na135 mmol/L Glu 227 mg/dL<H> K+ 4.4 mmol/L Cr  2.14 mg/dL<H> BUN 38.0 mg/dL<H> Phos 4.0 mg/dL Alb 2.5 g/dL<L> PAB n/a HgA1c 11.6%

## 2019-09-06 NOTE — DIETITIAN INITIAL EVALUATION ADULT. - OTHER INFO
67 year old male with morbid obesity, DM, CHFrEF, COPD, CHRISS, AICD, CKD, diabetic foot ulcers, depressions, hypothyroidism, admitted for syncope and inability to ambulate, found to have JAYLENE on CKD, rhabdomyolysis, and septic shock due to cellulitis/diabetic foot infection. Spoke with pt and pts daughter at bedside. Pt reports good appetite/po intake PTA. States he typically consumes 2 meals per day (late breakfast and lunch) and will consume snacks throughout the day. Checks blood sugars once daily. Denies any recent weight changes. Pt aware of elevated HgA1c, states he has been consuming more sugar/processed foods due to stress. Daughter at bedside and confirms pt has not been eating the best. Verbal diet education provided. Encouraged high quality protein sources to aid in wound healing- pt verbalized understanding. Pt declined written education at this time- states he has information at home. Diet advanced today, pt with good intake. Diet now advanced, pt states he ate well today with no issues. 67 year old male with morbid obesity, DM, CHFrEF, COPD, CHRISS, AICD, CKD, diabetic foot ulcers, depressions, hypothyroidism, admitted for syncope and inability to ambulate, found to have JAYLENE on CKD, rhabdomyolysis, and septic shock due to cellulitis/diabetic foot infection. Spoke with pt and pts daughter at bedside. Pt reports good appetite/po intake PTA. States he typically consumes 2 meals per day (late breakfast and dinner) and will consume snacks throughout the day. Checks blood sugars once daily. Denies any recent weight changes. Pt aware of elevated HgA1c, states he has been consuming more sugar/processed foods due to stress. Daughter at bedside and confirms pt has not been eating the best. Verbal diet education provided. Encouraged high quality protein sources to aid in wound healing- pt verbalized understanding. Pt declined written education at this time- states he has information at home. Diet advanced today, pt with good intake. Diet now advanced, pt states he ate well today with no issues. 67 year old male with morbid obesity, DM, CHFrEF, COPD, CHRISS, AICD, CKD, diabetic foot ulcers, depressions, hypothyroidism, admitted for syncope and inability to ambulate, found to have JAYLENE on CKD, rhabdomyolysis, and septic shock due to cellulitis/diabetic foot infection. Spoke with pt and pts daughter at bedside. Pt reports good appetite/po intake PTA. States he typically consumes 2 meals per day (late breakfast and dinner) and will consume snacks throughout the day. Checks blood sugars once daily. Denies any recent weight changes. Pt aware of elevated HgA1c, states he has been consuming more sugar/processed foods due to stress. Daughter at bedside and confirms pt has not been eating the best. Verbal diet education provided. Encouraged high quality protein sources to aid in wound healing- pt verbalized understanding. Pt declined written education at this time- states he has information at home. Diet advanced today, pt with good intake.

## 2019-09-06 NOTE — PROGRESS NOTE ADULT - SUBJECTIVE AND OBJECTIVE BOX
[facility]  INFECTIOUS DISEASES AND INTERNAL MEDICINE at [place]  =======================================================  Miguelangel Seo MD  Diplomates American Board of Internal Medicine and Infectious Diseases  Telephone [phone]  Fax            [phone]  =======================================================    [id number]  MARY ANN CORNELL   follow up for: fevers, cellulitis  patient seen and examined.     no fevers.   more rupturing of bullae on the RIGHT leg    ===================================================  REVIEW OF SYSTEMS:  as above  all other ROS negative  =======================================================  Allergies  No Known Allergies    Antibiotics:  piperacillin/tazobactam IVPB.. 3.375 Gram(s) IV Intermittent every 12 hours  vancomycin  IVPB 1000 milliGRAM(s) IV Intermittent <User Schedule>    Other medications:  carvedilol 3.125 milliGRAM(s) Oral every 12 hours  clotrimazole 1% Cream 1 Application(s) Topical two times a day  insulin regular Infusion 10 Unit(s)/Hr IV Continuous <Continuous>  ketoconazole 2% Shampoo 1 Application(s) Topical daily  levothyroxine 50 MICROGram(s) Oral daily  midodrine 10 milliGRAM(s) Oral every 8 hours  mupirocin 2% Ointment 1 Application(s) Topical two times a day  norepinephrine Infusion 0.05 MICROgram(s)/kG/Min IV Continuous <Continuous>  nystatin Powder 1 Application(s) Topical every 12 hours  warfarin 3 milliGRAM(s) Oral once    ======================================================  Physical Exam:  ============  T(F): 99.3 (06 Sep 2019 07:36), Max: 100.6 (05 Sep 2019 15:00)  HR: 93 (06 Sep 2019 10:00)  BP: --  RR: 18 (06 Sep 2019 10:00)  SpO2: 95% (06 Sep 2019 10:00) (82% - 100%)  temp max in last 48H T(F): , Max: 101.7 (09-04-19 @ 12:28)    General:  OBESE, DISHEVELED.   Eye: Pupils are equal, round and reactive to light, Extraocular movements are intact, Normal conjunctiva.  HENT: Normocephalic, Oral mucosa is moist, No pharyngeal erythema, No sinus tenderness.  Neck: Supple, No lymphadenopathy.  Respiratory: Lungs WITH FAIR AIR ENTRY  Cardiovascular: Normal rate, Regular rhythm,   Gastrointestinal: Soft, OLBESE Non-tender, Non-distended, Normal bowel sounds.  Genitourinary: No costovertebral angle tenderness.  Lymphatics: No lymphadenopathy neck,   Musculoskeletal: Normal range of motion, Normal strength.  Integumentary: CHRONIC STASIS DERMATITIS OF THE LOWER EXT; LEFT LATERAL MID LEG HEALING SKIN TEAR.  RIGHT LEG WITH ERYTHEMA AND EDEMA,. RUPTURED BULLAE on RIGHT and left legs  FRIABLE SKIN BETWEEN TOES.  Neurologic: WAKE INTERACTIVE; Cranial Nerves II-XII are grossly intact.    =======================================================  Labs:                        15.2   12.15 )-----------( 103      ( 06 Sep 2019 05:39 )             46.8       WBC Count: 12.15 K/uL (09-06-19 @ 05:39)  WBC Count: 19.87 K/uL (09-05-19 @ 08:58)  WBC Count: 14.96 K/uL (09-05-19 @ 04:32)  WBC Count: 23.91 K/uL (09-04-19 @ 13:10)      09-06    135  |  98  |  38.0<H>  ----------------------------<  227<H>  4.4   |  24.0  |  2.14<H>    Ca    8.2<L>      06 Sep 2019 05:39  Phos  4.0     09-06  Mg     2.2     09-06    TPro  6.1<L>  /  Alb  2.5<L>  /  TBili  1.0  /  DBili  x   /  AST  264<H>  /  ALT  58<H>  /  AlkPhos  84  09-06      Culture - Urine (collected 09-04-19 @ 22:14)  Source: .Urine  Final Report (09-05-19 @ 16:30):    No growth      Creatinine, Serum: 2.14 mg/dL (09-06-19 @ 05:39)  Creatinine, Serum: 2.68 mg/dL (09-05-19 @ 14:01)  Creatinine, Serum: 2.94 mg/dL (09-05-19 @ 04:32)  Creatinine, Serum: 3.13 mg/dL (09-05-19 @ 00:58)  Creatinine, Serum: 3.12 mg/dL (09-04-19 @ 18:49)  Creatinine, Serum: 2.85 mg/dL (09-04-19 @ 13:10)    Hemoglobin A1C, Whole Blood: 11.6 % (09-05-19 @ 04:32)

## 2019-09-06 NOTE — CONSULT NOTE ADULT - SUBJECTIVE AND OBJECTIVE BOX
Patient is a 68y old  Male who presents with a chief complaint of Cellulitis/Severe Sepsis (06 Sep 2019 20:53)      HPI:  Pt is a 68 y/o male with PMHx DM (w/ neuropathy), HN, CHF (Last Echo ~2 years ago with EF 35%), COPD, CHRISS (on home CPAP machine), PPM/Defibrillator ("heart function was 3%"), prior diabetic foot ulcers/infections, depression presents with worsening weakness and SOB for past few days. As per daughter at bedside, wife found pt passed out sitting on toilet. Estimated time on toilet is around 10-12 hours. Pt states he "blacked out", but didnt fall off toilet. Pt also c/o inability to ambulate now secondary to worsening of weakness. In ED, pt remains short of breath, found to be hyperglycemic, AG 22, Serum CO2 14, lactate 8.0, CK 24k, transaminitis, hyperkalemic, Cr 2.85, with leukocytosis and febrile to 101.7. Surgery consulted for concern of RLE cellulitis and r/o nec fasc. MICU consult called. Denies n/v/d. CP, h/a, dizziness, or any other acute complaints at this time. (04 Sep 2019 17:27)    Above noted . Course in ICU noted . Abx for cellulitis = Vanco and Zosyn . Red now out . CKD 1.6 .  +Cardiomyopathy , h/o AICD , + DM , HTN . CHRISS .  Net balance since admit = + 500 ml . Off IVF . Was on Levophed , now off   UA shows large blood , but few RBC   Peak CPK 30 K ----> Down 16 K       PAST MEDICAL & SURGICAL HISTORY:  CHF (congestive heart failure)  Pulmonary hypertension  Prostate CA  Sleep apnea  Renal insufficiency  High cholesterol  HTN (hypertension)  DM (diabetes mellitus)  AICD (automatic cardioverter/defibrillator) present  S/P ankle ligament repair      FAMILY HISTORY:  Family history of diabetes mellitus (Sibling)  Family history of cancer (Sibling)      Social History:    MEDICATIONS  (STANDING):  carvedilol 3.125 milliGRAM(s) Oral every 12 hours  clotrimazole 1% Cream 1 Application(s) Topical two times a day  heparin  Infusion.  Unit(s)/Hr (24 mL/Hr) IV Continuous <Continuous>  insulin lispro (HumaLOG) corrective regimen sliding scale.   SubCutaneous four times a day before meals  insulin lispro Injectable (HumaLOG) 10 Unit(s) SubCutaneous three times a day before meals  levothyroxine 50 MICROGram(s) Oral daily  midodrine 10 milliGRAM(s) Oral every 8 hours  mupirocin 2% Ointment 1 Application(s) Topical two times a day  nystatin Powder 1 Application(s) Topical every 12 hours  piperacillin/tazobactam IVPB.. 3.375 Gram(s) IV Intermittent every 12 hours  vancomycin  IVPB 1000 milliGRAM(s) IV Intermittent <User Schedule>    MEDICATIONS  (PRN):  acetaminophen   Tablet .. 650 milliGRAM(s) Oral every 6 hours PRN Moderate Pain (4 - 6)  heparin  Injectable 18973 Unit(s) IV Push every 6 hours PRN For aPTT less than 40  heparin  Injectable 5000 Unit(s) IV Push every 6 hours PRN For aPTT between 40 - 57  HYDROmorphone  Injectable 1 milliGRAM(s) IV Push every 4 hours PRN Severe Pain (7 - 10)      Allergies    No Known Allergies    Intolerances    allow double protein at meals (Unknown)    Vital Signs Last 24 Hrs  T(C): 36.3 (06 Sep 2019 20:05), Max: 37.8 (06 Sep 2019 03:33)  T(F): 97.3 (06 Sep 2019 20:05), Max: 100 (06 Sep 2019 03:33)  HR: 99 (06 Sep 2019 18:00) (84 - 99)  BP: 120/93 (06 Sep 2019 18:00) (92/48 - 120/93)  BP(mean): 100 (06 Sep 2019 18:00) (61 - 100)  RR: 23 (06 Sep 2019 18:00) (18 - 32)  SpO2: 93% (06 Sep 2019 18:00) (82% - 100%)  Daily     Daily Weight in k.7 (06 Sep 2019 13:34)  I&O's Detail    05 Sep 2019 07:01  -  06 Sep 2019 07:00  --------------------------------------------------------  IN:    dextrose 5% + lactated ringers.: 750 mL    heparin  Infusion.: 642 mL    insulin regular Infusion: 193 mL    norepinephrine Infusion: 715 mL    Solution: 175 mL    Solution: 250 mL  Total IN: 2725 mL    OUT:    Indwelling Catheter - Urethral: 2970 mL  Total OUT: 2970 mL    Total NET: -245 mL      06 Sep 2019 07:  -  06 Sep 2019 21:51  --------------------------------------------------------  IN:    heparin  Infusion.: 81 mL    insulin regular Infusion: 28 mL    Oral Fluid: 420 mL    Solution: 75 mL    Solution: 250 mL  Total IN: 854 mL    OUT:    Indwelling Catheter - Urethral: 820 mL  Total OUT: 820 mL    Total NET: 34 mL        I&O's Summary    05 Sep 2019 07:  -  06 Sep 2019 07:00  --------------------------------------------------------  IN: 2725 mL / OUT: 2970 mL / NET: -245 mL    06 Sep 2019 07:  -  06 Sep 2019 21:51  --------------------------------------------------------  IN: 854 mL / OUT: 820 mL / NET: 34 mL        PHYSICAL EXAM:    GENERAL: NAD,   HEAD:  Atraumatic, No edema   NECK: Supple, No JVD,   CHEST/LUNG: EAE , No wheeze   HEART: Regular rate and rhythm; No gallop or rub   ABDOMEN: Morbid obesity . BNo rebound   EXTREMITIES:  pitting edema , + cellulitis     LABS:                        15.2   12.15 )-----------( 103      ( 06 Sep 2019 05:39 )             46.8         135  |  98  |  38.0<H>  ----------------------------<  227<H>  4.4   |  24.0  |  2.14<H>    Ca    8.2<L>      06 Sep 2019 05:39  Phos  4.0       Mg     2.2         TPro  6.1<L>  /  Alb  2.5<L>  /  TBili  1.0  /  DBili  x   /  AST  264<H>  /  ALT  58<H>  /  AlkPhos  84  -    PT/INR - ( 06 Sep 2019 05:39 )   PT: 16.4 sec;   INR: 1.41 ratio         PTT - ( 06 Sep 2019 05:39 )  PTT:67.9 sec  Urinalysis Basic - ( 04 Sep 2019 22:13 )    Color: Yellow / Appearance: very cloudy / S.020 / pH: x  Gluc: x / Ketone: Negative  / Bili: Negative / Urobili: Negative mg/dL   Blood: x / Protein: 100 mg/dL / Nitrite: Negative   Leuk Esterase: Trace / RBC: 3-5 /HPF / WBC 0-2   Sq Epi: x / Non Sq Epi: Few / Bacteria: x      Magnesium, Serum: 2.2 mg/dL ( @ 05:39)  Phosphorus Level, Serum: 4.0 mg/dL ( @ 05:39)             EXAM:  CT REFORM SPINE L                          PROCEDURE DATE:  2019          INTERPRETATION:  TECHNIQUE: Reformatted CT examination of the lumbar   spine was performed in continuous axial increments from L1 through the   superior portionof the sacrum. The examination was performed without   intravenous contrast material. The exam was filmed at bone windows and   soft tissue windows. Post processing sagittal and coronal reformatted   imaging was obtained. 3-D imaging was created and interpreted.    HISTORY: Lower back pain    FINDINGS: The vertebral bodies are normal in appearance. The marrow   substance appears normal. There is no evidence of a fracture. The   cortical margins are intact. The neural arches and pedicles appear normal.    There is no evidence of bulging disk or disk herniation at any level. The   visualized adjacent musculature and soft tissue structures are   unremarkable.  Marginal osteophytes are noted at multiple disc spaces in the lower   thoracic spine and between the T12-L1 vertebra..  IMPRESSION: No fracture demonstrated. Mild degenerative endplate spurring   of lower thoracic and L1 vertebra. No fracture or gross disc herniation   or foraminal stenosis.                BARB MOLINA M.D., ATTENDING RADIOLOGIST  This document has been electronically signed. Sep  4 2019  6:17PM             EXAM:  US DPLX LWR EXT VEINS COMPL BI                          PROCEDURE DATE:  2019          INTERPRETATION:  Ultrasound of the lower extremity deep venous system         CLINICAL INFORMATION:  Lower extremity cellulitis. Swelling., evaluate   for deep venous thrombosis.    TECHNIQUE:   Duplex ultrasonography with color and spectral doppler of   the bilateral lower extremity deep venous system was performed.    FINDINGS:    No previous examinations are  available for review.  Note, the LEFT common femoral vein and greater saphenous vein and not   visualized due to indwelling intravenous catheter.  The bilateral lower extremity deep venous system demonstrated no   abnormality.  The veins were patent with intact Doppler flow.  The flow   varied with respiration and augmented with distal calf compression.  The   veins were directly compressible by the ultrasound transducer.       The veins evaluated included the common femoral vein, the inflow of the   greater saphenous vein, the inflow of the deep femoral vein, the   superficial femoral vein, the popliteal vein and posterior tibial veins.        IMPRESSION:   Unremarkable ultrasound of the bilateral lower extremity   deep venous system.      Patency of LEFT common femoral veinand proximal greater saphenous vein   not visualized due to indwelling venous catheter.           EXAM:  XR CHEST PORTABLE URGENT 1V                          PROCEDURE DATE:  2019          INTERPRETATION:  Portable chest radiograph        CLINICAL INFORMATION:   Central line placement    TECHNIQUE:  Portable  AP view of the chest was obtained.    COMPARISON: No previous examinations are available for review.    FINDINGS: RIGHT IJ catheter tip in SVC.  The lungs  are clear.  No pleural abnormality is seen.  Cardiac device wire lead is within right ventricle.     The heart and mediastinum are within normal limits.  Visualized osseous structures are intact.        IMPRESSION:   No evidence of active chest disease.    Catheters in place as described                  BARB MOLINA M.D., ATTENDING RADIOLOGIST  This document has beenelectronically signed. Sep  5 2019  8:46AM         EXAM:  CT LWR EXT RT                         EXAM:  CT 3D RECONSTRUCT W REBELCORNELIO                          PROCEDURE DATE:  2019          INTERPRETATION:    CT OF THE RIGHT LEG WITHOUT CONTRAST.    CLINICAL INFORMATION: Right leg swelling  TECHNIQUE: Axial CT images were obtained of the right leg with coronal   and sagittal reconstructions. No contrast was administered. Three   dimensional reconstructions were obtained.    FINDINGS:    At the level of the femur there is mild lateral subcutaneous soft tissue   stranding and edema. Small benign-appearing lymph nodes are seen at the   inner aspect of the upper left leg and groin with minimal surrounding   soft tissue stranding.    Below the level of the knee, there is moderate circumferential   subcutaneous soft tissue edema with skin thickening and the appearance of   skin blistering. This is most prominent at the level of the ankle and   extends to the dorsum of the midfoot.    There is no obvious encapsulated fluid collection to suggest the presence   of an abscess.    The above-described findings are likely compatible with cellulitis.    Prostate seeds are present.    The hip, knee and ankle joints are preserved.    There is no fracture. There is no CT evidence of osteomyelitis.    There is no soft tissue gas identified.    IMPRESSION:    Mild lateral subcutaneous soft tissue edema at the level of the proximal   femur with small lymph nodes within the groin and inner aspect of the   upper right leg with mild adjacent soft tissue edema/stranding.    Moderate circumferential soft tissue edema below the level of the knee   and most prominent at the ankle extending into the midfoot. These   findings are likely compatible with cellulitis.    No definitive soft tissue abscess. No CT evidence of mass to myelitis.                ANA ECHAVARRIA M.D., ATTENDING RADIOLOGIST  This document has been electronically signed. Sep  4 2019  5:03PM                  EXAM:  ECHO TRANSTHORACIC COMP W DOPP      PROCEDURE DATE:  Sep  4 2019   .      INTERPRETATION:  REPORT:    TRANSTHORACIC ECHOCARDIOGRAM REPORT         Patient Name:   IMCH CORNELL Patient Location: Inpatient  Medical Rec #:  [id number]   Accession #:      [id number]  Account #:                   Height:           68.9 in 175.0 cm  YOB: 1950    Weight:           399.0 lb 181.00 kg  Patient Age:    68 years     BSA:              2.77 m²  Patient Gender: M            BP:   84/52 mmHg       Date of Exam:        2019 9:10:05 PM  Sonographer:         Mich Lou  Referring Physician: Maite King    Procedure:     2D Echo/Doppler/Color Doppler Complete.  Indications:   Chronic ischemic heart disease, unspecified- I25.9  Diagnosis:     Ischemic cardiomyopathy - I25.5  Study Details: Technically difficult study. Study quality was adversely   affected                 due to patient obesity, body habitus, lung interference and                 patient not able to turn on to left lateral decubitus.         2D AND M-MODE MEASUREMENTS (normal ranges within parentheses):  Left                 Normal   Aorta/Left            Normal  Ventricle:                    Atrium:  IVSd (2D):    1.29  (0.7-1.1) Aortic Root  3.69 cm (2.4-3.7)                 cm             (2D):  LVPWd (2D):   0.76  (0.7-1.1) Left Atrium  4.27 cm (1.9-4.0)                 cm             (2D):  LVIDd (2D):   5.39  (3.4-5.7) LA Volume     30.0                 cm             Index         ml/m²  LVIDs (2D):   4.36                 cm  LV FS (2D):   19.1   (>25%)                  %  Relative Wall 0.28   (<0.42)  Thickness    LV DIASTOLIC FUNCTION:  MV Peak E: 0.76 m/s Decel Time: 165 msec    SPECTRAL DOPPLER ANALYSIS (where applicable):  Mitral Valve:  MV P1/2 Time: 47.85 msec  MV Area, PHT: 4.60 cm²    Aortic Valve: AoV Max Tony: 1.58 m/s AoV Peak PG: 10.0 mmHg AoV Mean PG:    LVOT Vmax: 0.82 m/s LVOT VTI:  LVOT Diameter: 2.31 cm    AoV Area, Vmax: 2.17 cm² AoV Area, VTI:  AoV Area, Vmn:  Ao VTI:  Tricuspid Valve and PA/RV Systolic Pressure: TR Max Velocity: 1.75 m/s RA   Pressure: 5 mmHg RVSP/PASP: 17.3 mmHg       PHYSICIAN INTERPRETATION:  Left Ventricle: Endocardial visualization was enhanced with intravenous   echo contrast. The left ventricular internal cavity size is normal.  Global LV systolic function was severely decreased. Left ventricular   ejection fraction, by visual estimation, is 25 to 30%. The mitral in-flow   pattern reveals no discernable A-wave, therefore no comment on diastolic   function can be made.       LV Wall Scoring:  The basal and mid inferolateral wall and basal inferior segment are   akinetic.    Right Ventricle: The right ventricle was not well visualized.  Right Atrium: The right atrium was not well visualized.  Pericardium: There is no evidence of pericardial effusion.  Mitral Valve: The mitral valve leaflets are tethered which is due to   reduced systolic function and elevated LVDP. There is mild mitral annular   calcification. Trace mitral valve regurgitation is seen.  Tricuspid Valve: The tricuspid valve is not well seen. Mild tricuspid   regurgitation is visualized.  Aortic Valve: The aortic valve was not well visualized. Sclerotic aortic   valve with normal opening. No evidence of aortic valve regurgitation is   seen.  Pulmonic Valve: The pulmonic valve was not well visualized.  Aorta: There is dilatation of the aortic root. Dilated Ao root at 3.7cm.  Pulmonary Artery: The pulmonary artery is not well seen.  Venous: The pulmonary veins were not well visualized. The inferior vena   cava is not well visualized.       Summary:   1. Left ventricular ejection fraction, by visual estimation, is 25 to   30%.   2. Severely decreased global left ventricular systolic function.   3. Basal and mid inferolateral wall and basal inferior segment are   abnormal as described above.   4. The mitral in-flow pattern reveals no discernable A-wave, therefore   no comment on diastolic function can be made.   5. There is mild septal left ventricular hypertrophy.   6. Mild mitral annular calcification.   7. The mitral valve leaflets are tethered which is due to reduced   systolic function and elevated LVDP.   8. Sclerotic aortic valve with normal opening.   9. Dilatation of the aortic root.  10. Dilated Ao root at 3.7cm.  11. Endocardial visualization was enhanced with intravenous echo contrast.    MD Santos Electronically signed on 2019 at 10:26:28 AM              *** Final ***                  ORESTES FLOWERS M.D.  This document has been electronically signed. Sep  4 2019  9:10PM

## 2019-09-06 NOTE — CONSULT NOTE ADULT - SUBJECTIVE AND OBJECTIVE BOX
[facility], [facility]                                   [address] [place] [address]                                                      PHONE: [phone]                                                         FAX: [phone]  [email or web address]  ---------------------------------------------------------------------------------------------------------------------------------    Reason for Consult: afib    HPI:  MARY ANN CORNELL is an 68y Male with NICM (EF 25-30%) s/p ICD, chronic afib on Coumadin, morbid obesity, DM, HTN, chronic leg cellulitis, HLD admitted on 19 with septic shock and rhabdo after being found down on the toilet at home. He is now off pressors and on the medical floor. He has no cardiovascular complaints at this time.    PAST MEDICAL & SURGICAL HISTORY:  CHF (congestive heart failure)  Pulmonary hypertension  Prostate CA  Sleep apnea  Renal insufficiency  High cholesterol  HTN (hypertension)  DM (diabetes mellitus)  AICD (automatic cardioverter/defibrillator) present  S/P ankle ligament repair      allow double protein at meals (Unknown)  No Known Allergies      MEDICATIONS  (STANDING):  carvedilol 3.125 milliGRAM(s) Oral every 12 hours  clotrimazole 1% Cream 1 Application(s) Topical two times a day  heparin  Infusion.  Unit(s)/Hr (24 mL/Hr) IV Continuous <Continuous>  insulin lispro (HumaLOG) corrective regimen sliding scale.   SubCutaneous four times a day before meals  insulin lispro Injectable (HumaLOG) 10 Unit(s) SubCutaneous three times a day before meals  levothyroxine 50 MICROGram(s) Oral daily  midodrine 10 milliGRAM(s) Oral every 8 hours  mupirocin 2% Ointment 1 Application(s) Topical two times a day  nystatin Powder 1 Application(s) Topical every 12 hours  piperacillin/tazobactam IVPB.. 3.375 Gram(s) IV Intermittent every 12 hours  vancomycin  IVPB 1000 milliGRAM(s) IV Intermittent <User Schedule>    MEDICATIONS  (PRN):  acetaminophen   Tablet .. 650 milliGRAM(s) Oral every 6 hours PRN Moderate Pain (4 - 6)  heparin  Injectable 29414 Unit(s) IV Push every 6 hours PRN For aPTT less than 40  heparin  Injectable 5000 Unit(s) IV Push every 6 hours PRN For aPTT between 40 - 57  HYDROmorphone  Injectable 1 milliGRAM(s) IV Push every 4 hours PRN Severe Pain (7 - 10)      Social History:  Cigarettes:                    Alchohol:                 Illicit Drug Abuse:      ROS: Negative other than as mentioned in HPI.    Vital Signs Last 24 Hrs  T(C): 36.3 (06 Sep 2019 20:05), Max: 37.8 (06 Sep 2019 03:33)  T(F): 97.3 (06 Sep 2019 20:05), Max: 100 (06 Sep 2019 03:33)  HR: 99 (06 Sep 2019 18:00) (84 - 99)  BP: 120/93 (06 Sep 2019 18:00) (92/48 - 120/93)  BP(mean): 100 (06 Sep 2019 18:00) (61 - 100)  RR: 23 (06 Sep 2019 18:00) (18 - 32)  SpO2: 93% (06 Sep 2019 18:00) (82% - 100%)  ICU Vital Signs Last 24 Hrs  MARY ANN CORNELL  I&O's Detail    05 Sep 2019 07:01  -  06 Sep 2019 07:00  --------------------------------------------------------  IN:    dextrose 5% + lactated ringers.: 750 mL    heparin  Infusion.: 642 mL    insulin regular Infusion: 193 mL    norepinephrine Infusion: 715 mL    Solution: 175 mL    Solution: 250 mL  Total IN: 2725 mL    OUT:    Indwelling Catheter - Urethral: 2970 mL  Total OUT: 2970 mL    Total NET: -245 mL      06 Sep 2019 07:01  -  06 Sep 2019 20:53  --------------------------------------------------------  IN:    heparin  Infusion.: 81 mL    insulin regular Infusion: 28 mL    Oral Fluid: 420 mL    Solution: 75 mL    Solution: 250 mL  Total IN: 854 mL    OUT:    Indwelling Catheter - Urethral: 820 mL  Total OUT: 820 mL    Total NET: 34 mL        I&O's Summary    05 Sep 2019 07:01  -  06 Sep 2019 07:00  --------------------------------------------------------  IN: 2725 mL / OUT: 2970 mL / NET: -245 mL    06 Sep 2019 07:01  -  06 Sep 2019 20:53  --------------------------------------------------------  IN: 854 mL / OUT: 820 mL / NET: 34 mL      Drug Dosing Weight  MARY ANN CORNELL      PHYSICAL EXAM:  General: Appears well developed, well nourished alert and cooperative, morbidly obese  HEENT: Head; normocephalic, atraumatic.  Eyes: Pupils reactive, cornea wnl.  Neck: Supple, no nodes adenopathy, no NVD or carotid bruit or thyromegaly.  CARDIOVASCULAR: Normal S1 and S2, No murmur, rub, gallop or lift.   LUNGS: No rales, rhonchi or wheeze. Normal breath sounds bilaterally.  ABDOMEN: Soft, nontender without mass or organomegaly. bowel sounds normoactive.  EXTREMITIES: No clubbing, cyanosis or edema. Distal pulses wnl.   SKIN: warm and dry with normal turgor.  NEURO: Alert/oriented x 3/normal motor exam. No pathologic reflexes.    PSYCH: normal affect.        LABS:                        15.2   12.15 )-----------( 103      ( 06 Sep 2019 05:39 )             46.8         135  |  98  |  38.0<H>  ----------------------------<  227<H>  4.4   |  24.0  |  2.14<H>    Ca    8.2<L>      06 Sep 2019 05:39  Phos  4.0       Mg     2.2         TPro  6.1<L>  /  Alb  2.5<L>  /  TBili  1.0  /  DBili  x   /  AST  264<H>  /  ALT  58<H>  /  AlkPhos  84      MARY ANN RIBEIROGAN  CARDIAC MARKERS ( 05 Sep 2019 14:01 )  x     / x     / 28194 U/L / x     / 47.4 ng/mL  CARDIAC MARKERS ( 05 Sep 2019 04:32 )  x     / 0.03 ng/mL / 73307 U/L / x     / 98.8 ng/mL  CARDIAC MARKERS ( 05 Sep 2019 00:58 )  x     / x     / 73331 U/L / x     / x          PT/INR - ( 06 Sep 2019 05:39 )   PT: 16.4 sec;   INR: 1.41 ratio         PTT - ( 06 Sep 2019 05:39 )  PTT:67.9 sec  Urinalysis Basic - ( 04 Sep 2019 22:13 )    Color: Yellow / Appearance: very cloudy / S.020 / pH: x  Gluc: x / Ketone: Negative  / Bili: Negative / Urobili: Negative mg/dL   Blood: x / Protein: 100 mg/dL / Nitrite: Negative   Leuk Esterase: Trace / RBC: 3-5 /HPF / WBC 0-2   Sq Epi: x / Non Sq Epi: Few / Bacteria: x        RADIOLOGY & ADDITIONAL STUDIES:    INTERPRETATION OF TELEMETRY (personally reviewed): afib  bpm    ECG 19: atrial fibrillation 139 bpm, nonspecific ST abnormality     ECHO 19: 1. Left ventricular ejection fraction, by visual estimation, is 25 to   30%.   2. Severely decreased global left ventricular systolic function.   3. Basal and mid inferolateral wall and basal inferior segment are   abnormal as described above.   4. The mitral in-flow pattern reveals no discernable A-wave, therefore   no comment on diastolic function can be made.   5. There is mild septal left ventricular hypertrophy.   6. Mild mitral annular calcification.   7. The mitral valve leaflets are tethered which is due to reduced   systolic function and elevated LVDP.   8. Sclerotic aortic valve with normal opening.   9. Dilatation of the aortic root.  10. Dilated Ao root at 3.7cm.  11. Endocardial visualization was enhanced with intravenous echo contrast.      Assessment and Plan:  In summary, MARY ANN CORNELL is an 68y Male with past medical history significant for NICM (EF 25-30%) s/p ICD, chronic afib on Coumadin, morbid obesity, DM, HTN, chronic leg cellulitis, HLD admitted on 19 with septic shock and rhabdo after being found down on the toilet at home. He is now off pressors and on the medical floor. He has no cardiovascular complaints at this time.    NICM, Chronic Afib, Morbid Obesity, DM  - continue Heparin bridge to therapeutic INR 2-3  - continue low dose beta blocker  - can slowly re-introduce his home CHF meds as sepsis resolves and BP improves  - cautious IVF hydration as needed    Will follow with you.    Thank you for allowing [facility] to participate in the care of this patient. Please do not hesitate to call with any questions or concerns.

## 2019-09-07 LAB
ANION GAP SERPL CALC-SCNC: 11 MMOL/L — SIGNIFICANT CHANGE UP (ref 5–17)
APTT BLD: 27.2 SEC — LOW (ref 27.5–36.3)
APTT BLD: 29.2 SEC — SIGNIFICANT CHANGE UP (ref 27.5–36.3)
APTT BLD: 29.9 SEC — SIGNIFICANT CHANGE UP (ref 27.5–36.3)
APTT BLD: 36 SEC — SIGNIFICANT CHANGE UP (ref 27.5–36.3)
APTT BLD: 58.9 SEC — HIGH (ref 27.5–36.3)
BUN SERPL-MCNC: 37 MG/DL — HIGH (ref 8–20)
CALCIUM SERPL-MCNC: 8.4 MG/DL — LOW (ref 8.6–10.2)
CHLORIDE SERPL-SCNC: 98 MMOL/L — SIGNIFICANT CHANGE UP (ref 98–107)
CK MB CFR SERPL CALC: 9 NG/ML — HIGH (ref 0–6.7)
CK SERPL-CCNC: 6168 U/L — HIGH (ref 30–200)
CO2 SERPL-SCNC: 25 MMOL/L — SIGNIFICANT CHANGE UP (ref 22–29)
CREAT SERPL-MCNC: 1.65 MG/DL — HIGH (ref 0.5–1.3)
GLUCOSE BLDC GLUCOMTR-MCNC: 234 MG/DL — HIGH (ref 70–99)
GLUCOSE BLDC GLUCOMTR-MCNC: 251 MG/DL — HIGH (ref 70–99)
GLUCOSE BLDC GLUCOMTR-MCNC: 260 MG/DL — HIGH (ref 70–99)
GLUCOSE BLDC GLUCOMTR-MCNC: 294 MG/DL — HIGH (ref 70–99)
GLUCOSE SERPL-MCNC: 234 MG/DL — HIGH (ref 70–115)
HCT VFR BLD CALC: 47.9 % — SIGNIFICANT CHANGE UP (ref 39–50)
HCT VFR BLD CALC: 48 % — SIGNIFICANT CHANGE UP (ref 39–50)
HGB BLD-MCNC: 15.2 G/DL — SIGNIFICANT CHANGE UP (ref 13–17)
HGB BLD-MCNC: 15.5 G/DL — SIGNIFICANT CHANGE UP (ref 13–17)
INR BLD: 1.21 RATIO — HIGH (ref 0.88–1.16)
MAGNESIUM SERPL-MCNC: 2.4 MG/DL — SIGNIFICANT CHANGE UP (ref 1.6–2.6)
MCHC RBC-ENTMCNC: 29.1 PG — SIGNIFICANT CHANGE UP (ref 27–34)
MCHC RBC-ENTMCNC: 29.4 PG — SIGNIFICANT CHANGE UP (ref 27–34)
MCHC RBC-ENTMCNC: 31.7 GM/DL — LOW (ref 32–36)
MCHC RBC-ENTMCNC: 32.3 GM/DL — SIGNIFICANT CHANGE UP (ref 32–36)
MCV RBC AUTO: 90.9 FL — SIGNIFICANT CHANGE UP (ref 80–100)
MCV RBC AUTO: 91.6 FL — SIGNIFICANT CHANGE UP (ref 80–100)
PHOSPHATE SERPL-MCNC: 2.8 MG/DL — SIGNIFICANT CHANGE UP (ref 2.4–4.7)
PLATELET # BLD AUTO: 95 K/UL — LOW (ref 150–400)
PLATELET # BLD AUTO: 95 K/UL — LOW (ref 150–400)
POTASSIUM SERPL-MCNC: 4.2 MMOL/L — SIGNIFICANT CHANGE UP (ref 3.5–5.3)
POTASSIUM SERPL-SCNC: 4.2 MMOL/L — SIGNIFICANT CHANGE UP (ref 3.5–5.3)
PROTHROM AB SERPL-ACNC: 14 SEC — HIGH (ref 10–12.9)
RBC # BLD: 5.23 M/UL — SIGNIFICANT CHANGE UP (ref 4.2–5.8)
RBC # BLD: 5.28 M/UL — SIGNIFICANT CHANGE UP (ref 4.2–5.8)
RBC # FLD: 15.9 % — HIGH (ref 10.3–14.5)
RBC # FLD: 16 % — HIGH (ref 10.3–14.5)
SODIUM SERPL-SCNC: 134 MMOL/L — LOW (ref 135–145)
T3 SERPL-MCNC: 106 NG/DL — SIGNIFICANT CHANGE UP (ref 80–200)
T4 AB SER-ACNC: 8.3 UG/DL — SIGNIFICANT CHANGE UP (ref 4.5–12)
TSH SERPL-MCNC: 6.28 UIU/ML — HIGH (ref 0.27–4.2)
WBC # BLD: 5.58 K/UL — SIGNIFICANT CHANGE UP (ref 3.8–10.5)
WBC # BLD: 8.47 K/UL — SIGNIFICANT CHANGE UP (ref 3.8–10.5)
WBC # FLD AUTO: 5.58 K/UL — SIGNIFICANT CHANGE UP (ref 3.8–10.5)
WBC # FLD AUTO: 8.47 K/UL — SIGNIFICANT CHANGE UP (ref 3.8–10.5)

## 2019-09-07 PROCEDURE — 72125 CT NECK SPINE W/O DYE: CPT | Mod: 26

## 2019-09-07 PROCEDURE — 72128 CT CHEST SPINE W/O DYE: CPT | Mod: 26

## 2019-09-07 PROCEDURE — 99223 1ST HOSP IP/OBS HIGH 75: CPT

## 2019-09-07 PROCEDURE — 99233 SBSQ HOSP IP/OBS HIGH 50: CPT

## 2019-09-07 PROCEDURE — 70450 CT HEAD/BRAIN W/O DYE: CPT | Mod: 26

## 2019-09-07 RX ORDER — SACCHAROMYCES BOULARDII 250 MG
250 POWDER IN PACKET (EA) ORAL
Refills: 0 | Status: DISCONTINUED | OUTPATIENT
Start: 2019-09-07 | End: 2019-09-14

## 2019-09-07 RX ORDER — SENNA PLUS 8.6 MG/1
2 TABLET ORAL AT BEDTIME
Refills: 0 | Status: DISCONTINUED | OUTPATIENT
Start: 2019-09-07 | End: 2019-09-14

## 2019-09-07 RX ORDER — BUMETANIDE 0.25 MG/ML
1 INJECTION INTRAMUSCULAR; INTRAVENOUS EVERY 12 HOURS
Refills: 0 | Status: COMPLETED | OUTPATIENT
Start: 2019-09-07 | End: 2019-09-09

## 2019-09-07 RX ORDER — SODIUM CHLORIDE 9 MG/ML
1000 INJECTION INTRAMUSCULAR; INTRAVENOUS; SUBCUTANEOUS
Refills: 0 | Status: DISCONTINUED | OUTPATIENT
Start: 2019-09-07 | End: 2019-09-09

## 2019-09-07 RX ORDER — INSULIN GLARGINE 100 [IU]/ML
10 INJECTION, SOLUTION SUBCUTANEOUS AT BEDTIME
Refills: 0 | Status: DISCONTINUED | OUTPATIENT
Start: 2019-09-07 | End: 2019-09-08

## 2019-09-07 RX ORDER — DOCUSATE SODIUM 100 MG
100 CAPSULE ORAL THREE TIMES A DAY
Refills: 0 | Status: DISCONTINUED | OUTPATIENT
Start: 2019-09-07 | End: 2019-09-14

## 2019-09-07 RX ORDER — POLYETHYLENE GLYCOL 3350 17 G/17G
17 POWDER, FOR SOLUTION ORAL DAILY
Refills: 0 | Status: DISCONTINUED | OUTPATIENT
Start: 2019-09-07 | End: 2019-09-14

## 2019-09-07 RX ORDER — WARFARIN SODIUM 2.5 MG/1
5 TABLET ORAL ONCE
Refills: 0 | Status: COMPLETED | OUTPATIENT
Start: 2019-09-07 | End: 2019-09-07

## 2019-09-07 RX ADMIN — CARVEDILOL PHOSPHATE 3.12 MILLIGRAM(S): 80 CAPSULE, EXTENDED RELEASE ORAL at 18:05

## 2019-09-07 RX ADMIN — MUPIROCIN 1 APPLICATION(S): 20 OINTMENT TOPICAL at 18:21

## 2019-09-07 RX ADMIN — CARVEDILOL PHOSPHATE 3.12 MILLIGRAM(S): 80 CAPSULE, EXTENDED RELEASE ORAL at 05:37

## 2019-09-07 RX ADMIN — Medication 6: at 12:33

## 2019-09-07 RX ADMIN — HEPARIN SODIUM 10000 UNIT(S): 5000 INJECTION INTRAVENOUS; SUBCUTANEOUS at 02:23

## 2019-09-07 RX ADMIN — HYDROMORPHONE HYDROCHLORIDE 1 MILLIGRAM(S): 2 INJECTION INTRAMUSCULAR; INTRAVENOUS; SUBCUTANEOUS at 13:40

## 2019-09-07 RX ADMIN — Medication 1 APPLICATION(S): at 05:39

## 2019-09-07 RX ADMIN — HYDROMORPHONE HYDROCHLORIDE 1 MILLIGRAM(S): 2 INJECTION INTRAMUSCULAR; INTRAVENOUS; SUBCUTANEOUS at 12:41

## 2019-09-07 RX ADMIN — MIDODRINE HYDROCHLORIDE 10 MILLIGRAM(S): 2.5 TABLET ORAL at 13:40

## 2019-09-07 RX ADMIN — HEPARIN SODIUM 10000 UNIT(S): 5000 INJECTION INTRAVENOUS; SUBCUTANEOUS at 09:51

## 2019-09-07 RX ADMIN — Medication 250 MILLIGRAM(S): at 22:50

## 2019-09-07 RX ADMIN — Medication 50 MICROGRAM(S): at 05:37

## 2019-09-07 RX ADMIN — NYSTATIN CREAM 1 APPLICATION(S): 100000 CREAM TOPICAL at 18:21

## 2019-09-07 RX ADMIN — HEPARIN SODIUM 3900 UNIT(S)/HR: 5000 INJECTION INTRAVENOUS; SUBCUTANEOUS at 17:32

## 2019-09-07 RX ADMIN — NYSTATIN CREAM 1 APPLICATION(S): 100000 CREAM TOPICAL at 05:40

## 2019-09-07 RX ADMIN — BUMETANIDE 1 MILLIGRAM(S): 0.25 INJECTION INTRAMUSCULAR; INTRAVENOUS at 18:06

## 2019-09-07 RX ADMIN — SENNA PLUS 2 TABLET(S): 8.6 TABLET ORAL at 22:50

## 2019-09-07 RX ADMIN — INSULIN GLARGINE 10 UNIT(S): 100 INJECTION, SOLUTION SUBCUTANEOUS at 22:51

## 2019-09-07 RX ADMIN — HYDROMORPHONE HYDROCHLORIDE 1 MILLIGRAM(S): 2 INJECTION INTRAMUSCULAR; INTRAVENOUS; SUBCUTANEOUS at 06:23

## 2019-09-07 RX ADMIN — MIDODRINE HYDROCHLORIDE 10 MILLIGRAM(S): 2.5 TABLET ORAL at 22:50

## 2019-09-07 RX ADMIN — HEPARIN SODIUM 2900 UNIT(S)/HR: 5000 INJECTION INTRAVENOUS; SUBCUTANEOUS at 02:21

## 2019-09-07 RX ADMIN — Medication 4: at 17:22

## 2019-09-07 RX ADMIN — HEPARIN SODIUM 10000 UNIT(S): 5000 INJECTION INTRAVENOUS; SUBCUTANEOUS at 17:35

## 2019-09-07 RX ADMIN — MIDODRINE HYDROCHLORIDE 10 MILLIGRAM(S): 2.5 TABLET ORAL at 05:37

## 2019-09-07 RX ADMIN — PIPERACILLIN AND TAZOBACTAM 25 GRAM(S): 4; .5 INJECTION, POWDER, LYOPHILIZED, FOR SOLUTION INTRAVENOUS at 18:06

## 2019-09-07 RX ADMIN — Medication 6: at 08:42

## 2019-09-07 RX ADMIN — INSULIN GLARGINE 30 UNIT(S): 100 INJECTION, SOLUTION SUBCUTANEOUS at 08:42

## 2019-09-07 RX ADMIN — Medication 100 MILLIGRAM(S): at 22:50

## 2019-09-07 RX ADMIN — Medication 250 MILLIGRAM(S): at 23:00

## 2019-09-07 RX ADMIN — Medication 10 UNIT(S): at 12:34

## 2019-09-07 RX ADMIN — WARFARIN SODIUM 5 MILLIGRAM(S): 2.5 TABLET ORAL at 22:50

## 2019-09-07 RX ADMIN — Medication 10 UNIT(S): at 08:42

## 2019-09-07 RX ADMIN — HYDROMORPHONE HYDROCHLORIDE 1 MILLIGRAM(S): 2 INJECTION INTRAMUSCULAR; INTRAVENOUS; SUBCUTANEOUS at 05:53

## 2019-09-07 RX ADMIN — MUPIROCIN 1 APPLICATION(S): 20 OINTMENT TOPICAL at 05:40

## 2019-09-07 RX ADMIN — Medication 1 APPLICATION(S): at 18:21

## 2019-09-07 RX ADMIN — HEPARIN SODIUM 3400 UNIT(S)/HR: 5000 INJECTION INTRAVENOUS; SUBCUTANEOUS at 09:45

## 2019-09-07 RX ADMIN — SODIUM CHLORIDE 50 MILLILITER(S): 9 INJECTION INTRAMUSCULAR; INTRAVENOUS; SUBCUTANEOUS at 18:06

## 2019-09-07 RX ADMIN — HYDROMORPHONE HYDROCHLORIDE 1 MILLIGRAM(S): 2 INJECTION INTRAMUSCULAR; INTRAVENOUS; SUBCUTANEOUS at 23:01

## 2019-09-07 RX ADMIN — Medication 10 UNIT(S): at 17:22

## 2019-09-07 RX ADMIN — Medication 6: at 22:55

## 2019-09-07 NOTE — PROGRESS NOTE ADULT - SUBJECTIVE AND OBJECTIVE BOX
Cellulitis    HPI:  Pt is a 66 y/o male with PMHx DM (w/ neuropathy), HN, CHF (Last Echo ~2 years ago with EF 35%), COPD, CHRISS (on home CPAP machine), PPM/Defibrillator ("heart function was 3%"), prior diabetic foot ulcers/infections, depression presents with worsening weakness and SOB for past few days. As per daughter at bedside, wife found pt passed out sitting on toilet. Estimated time on toilet is around 10-12 hours. Pt states he "blacked out", but didnt fall off toilet. Pt also c/o inability to ambulate now secondary to worsening of weakness. In ED, pt remains short of breath, found to be hyperglycemic, AG 22, Serum CO2 14, lactate 8.0, CK 24k, transaminitis, hyperkalemic, Cr 2.85, with leukocytosis and febrile to 101.7. Surgery consulted for concern of RLE cellulitis and r/o nec fasc. MICU consult called. Denies n/v/d. CP, h/a, dizziness, or any other acute complaints at this time. (04 Sep 2019 17:27)    Interval History:  Patient was seen and examined at bedside around 10 am. Complaining of chronic lower back and leg pain.   Denies chest pain, palpitations, shortness of breath, headache, dizziness, visual symptoms, nausea, vomiting or abdominal pain.    ROS:  As per interval history otherwise unremarkable.    PHYSICAL EXAM:  Vital Signs   T(C): 36.9 (07 Sep 2019 06:00), Max: 37.6 (06 Sep 2019 16:00)  T(F): 98.4 (07 Sep 2019 06:00), Max: 99.7 (06 Sep 2019 16:00)  HR: 95 (07 Sep 2019 13:38) (71 - 99)  BP: 110/60 (07 Sep 2019 13:38) (92/48 - 120/93)  BP(mean): 100 (06 Sep 2019 18:00) (61 - 100)  RR: 18 (07 Sep 2019 06:00) (18 - 23)  SpO2: 95% (07 Sep 2019 06:00) (92% - 100%)  General: Morbidly obese male sitting in bed comfortably. No acute distress  HEENT: PERRLA. EOMI. Clear conjunctivae. Moist mucus membrane  Neck: Supple. No JVD. No Thyromegaly   Chest: Good air entry bilaterally - no wheezing, rales or rhonchi.   Heart: Normal S1 & S2. RRR.   Abdomen: Soft. Non-tender. Non-distended. + BS  Ext: 4+ pedal edema with chronic skin changes and blisters. Fungal rash in between toes. No calf tenderness.  Neuro: AAO x 3. No speech disorder  Skin: Warm and Dry  Psychiatry: Normal mood and affect    I&O's Summary    06 Sep 2019 07:01  -  07 Sep 2019 07:00  --------------------------------------------------------  IN: 1750 mL / OUT: 1570 mL / NET: 180 mL    MEDICATIONS  (STANDING):  buMETAnide Injectable 1 milliGRAM(s) IV Push every 12 hours  carvedilol 3.125 milliGRAM(s) Oral every 12 hours  clotrimazole 1% Cream 1 Application(s) Topical two times a day  heparin  Infusion.  Unit(s)/Hr (24 mL/Hr) IV Continuous <Continuous>  insulin glargine Injectable (LANTUS) 30 Unit(s) SubCutaneous every morning  insulin lispro (HumaLOG) corrective regimen sliding scale.   SubCutaneous four times a day before meals  insulin lispro Injectable (HumaLOG) 10 Unit(s) SubCutaneous three times a day before meals  levothyroxine 50 MICROGram(s) Oral daily  midodrine 10 milliGRAM(s) Oral every 8 hours  mupirocin 2% Ointment 1 Application(s) Topical two times a day  nystatin Powder 1 Application(s) Topical every 12 hours  piperacillin/tazobactam IVPB.. 3.375 Gram(s) IV Intermittent every 12 hours  sodium chloride 0.225% 1000 milliLiter(s) (50 mL/Hr) IV Continuous <Continuous>  vancomycin  IVPB 1000 milliGRAM(s) IV Intermittent <User Schedule>  warfarin 5 milliGRAM(s) Oral once    MEDICATIONS  (PRN):  acetaminophen   Tablet .. 650 milliGRAM(s) Oral every 6 hours PRN Moderate Pain (4 - 6)  heparin  Injectable 35613 Unit(s) IV Push every 6 hours PRN For aPTT less than 40  heparin  Injectable 5000 Unit(s) IV Push every 6 hours PRN For aPTT between 40 - 57  HYDROmorphone  Injectable 1 milliGRAM(s) IV Push every 4 hours PRN Severe Pain (7 - 10)    LABS:  CAPILLARY BLOOD GLUCOSE  POCT Blood Glucose.: 294 mg/dL (07 Sep 2019 12:24)  POCT Blood Glucose.: 260 mg/dL (07 Sep 2019 08:22)  POCT Blood Glucose.: 166 mg/dL (06 Sep 2019 23:22)  POCT Blood Glucose.: 180 mg/dL (06 Sep 2019 21:55)  POCT Blood Glucose.: 206 mg/dL (06 Sep 2019 16:02)                      15.5   5.58  )-----------( 95       ( 07 Sep 2019 06:10 )             48.0     09-07    134<L>  |  98  |  37.0<H>  ----------------------------<  234<H>  4.2   |  25.0  |  1.65<H>    Ca    8.4<L>      07 Sep 2019 06:10  Phos  2.8     09-07  Mg     2.4     09-07    TPro  6.1<L>  /  Alb  2.5<L>  /  TBili  1.0  /  DBili  x   /  AST  264<H>  /  ALT  58<H>  /  AlkPhos  84  09-06    PT/INR - ( 07 Sep 2019 06:10 )   PT: 14.0 sec;   INR: 1.21 ratio         PTT - ( 07 Sep 2019 08:53 )  PTT:29.2 sec      RADIOLOGY & ADDITIONAL STUDIES:  Reviewed

## 2019-09-07 NOTE — PROGRESS NOTE ADULT - ASSESSMENT
67 y/o morbidly obese male with multiple medical problems was admitted to ICU on 9/4/19 for severe sepsis / cellulitis of b/L LE. pt. was brought in as while sitting on his toilet pt. passed out but as per family ( daughters at bedside ) pt. did not have any fall, did not hit his head to ground and was found sitting on the toilet. Pt. was on pressors initially and admitted to icu. Pt. was also noted to have rhabdo. pt. got some fluids but his EF is 25 to 30 % and as per icu pt. appeared to be volume over loaded and fluids were cut down. pt. is off the pressors now. pt. feels better. no cp. no sob . no HA reported. no abd. pain. no n/v/d. Pt. has which appears to be chronic leg edema and as per pt. his lower ext. wound come and go as due to fluid retention he gets blisters and they burst.     1) Septic Shock (Resolved)  - Likely secondary to LE Cellulitis  - Cultures negative  - Continue Vanco and Zosyn  - ID follow up noted   - Continue Midodrine   - Wound care consult  2) A. Fib with RVR   - Rate controlled  - Continue Coreg  - Continue Heparin Infusion for bridging to Coumadin  - Coumadin 5 mg tonight  3) Severe Rhabdomyolysis  - Improving  - Continue gentle hydration  - Renal consult appreciated  4) Acute on chronic kidney injury  - Likely secondary to rhabdomyolysis  - Improving  - Gentle hydration  - Avoid nephrotoxic medications  5) Chronic Systolic Heart Failure   - Continue Coreg  - No ACEIs or ARBs due to worsening renal function  - Cardio Consult appreciated  6) DM 2  - HbA1c 11.6  - Accu checks and ISS   - Humalog 10 units premeals  - Lantus 30 units in am. Add 10 units at bedtime.   - Diabetes Education  - Endocrine Consult   7) LE Weakness  - Multifactorial  - PT Eval  - Neurology Consult appreciated   8) Morbidly Obese  - Lifestyle modification  9) CHRISS  - Continue Nocturnal and PRN CPAP  10) Hypothyroidism  - Continue Synthroid  - Repeat TSH in 2-3 weeks. If still elevated then increase the dose.   11) Elevated LFTs  - Likely secondary to septic shock and rhabdomyolysis  - Improving  - Monitor liver function  DVT Prophylaxis -- Patient is on Heparin and Coumadin    Dispo: Likely SCOTTY pending clinical improvement. PT Eval.

## 2019-09-07 NOTE — CONSULT NOTE ADULT - ASSESSMENT
The patient is a 68y Male who is followed by neurology because of leg weakness    LE weakness  multifactorial  -  morbidly obese, significant leg swelling  -  BLE cellulitis  -  diabetic neuropathy  -  rhabdo    no evidence for CVA  will need PT as tolerated for ambulation    Cellulitis  Abx per ID    d/w Dr. García    Thank you for allowing me to participate in the care of your patient    Jey Schulz MD, PhD   [id number]

## 2019-09-07 NOTE — PROGRESS NOTE ADULT - SUBJECTIVE AND OBJECTIVE BOX
NEPHROLOGY INTERVAL HPI/OVERNIGHT EVENTS: No new events.    MEDICATIONS  (STANDING):  carvedilol 3.125 milliGRAM(s) Oral every 12 hours  clotrimazole 1% Cream 1 Application(s) Topical two times a day  heparin  Infusion.  Unit(s)/Hr (24 mL/Hr) IV Continuous <Continuous>  insulin glargine Injectable (LANTUS) 30 Unit(s) SubCutaneous every morning  insulin lispro (HumaLOG) corrective regimen sliding scale.   SubCutaneous four times a day before meals  insulin lispro Injectable (HumaLOG) 10 Unit(s) SubCutaneous three times a day before meals  levothyroxine 50 MICROGram(s) Oral daily  midodrine 10 milliGRAM(s) Oral every 8 hours  mupirocin 2% Ointment 1 Application(s) Topical two times a day  nystatin Powder 1 Application(s) Topical every 12 hours  piperacillin/tazobactam IVPB.. 3.375 Gram(s) IV Intermittent every 12 hours  sodium chloride 0.9%. 1000 milliLiter(s) (50 mL/Hr) IV Continuous <Continuous>  vancomycin  IVPB 1000 milliGRAM(s) IV Intermittent <User Schedule>    MEDICATIONS  (PRN):  acetaminophen   Tablet .. 650 milliGRAM(s) Oral every 6 hours PRN Moderate Pain (4 - 6)  heparin  Injectable 63360 Unit(s) IV Push every 6 hours PRN For aPTT less than 40  heparin  Injectable 5000 Unit(s) IV Push every 6 hours PRN For aPTT between 40 - 57  HYDROmorphone  Injectable 1 milliGRAM(s) IV Push every 4 hours PRN Severe Pain (7 - 10)      Allergies    No Known Allergies    Intolerances    allow double protein at meals (Unknown)      Vital Signs Last 24 Hrs  T(C): 36.9 (07 Sep 2019 06:00), Max: 37.6 (06 Sep 2019 16:00)  T(F): 98.4 (07 Sep 2019 06:00), Max: 99.7 (06 Sep 2019 16:00)  HR: 71 (07 Sep 2019 06:00) (71 - 99)  BP: 112/72 (07 Sep 2019 06:00) (92/48 - 120/93)  BP(mean): 100 (06 Sep 2019 18:00) (61 - 100)  RR: 18 (07 Sep 2019 06:00) (18 - 26)  SpO2: 95% (07 Sep 2019 06:00) (92% - 100%)  Daily     Daily Weight in k.7 (07 Sep 2019 05:23)    PHYSICAL EXAM:    GENERAL: Comfortable  in bed; obese  HEAD:  wnl  EYES:   ENMT:   NECK: obese  NERVOUS SYSTEM:  No tremors  CHEST/LUNG: decreased bs bases  HEART: no gallop  ABDOMEN: obese, NT  EXTREMITIES:  lower leg wraps with erythema, pitting edema.  LYMPH:   SKIN: no rash   neg.    LABS:                        15.5   5.58  )-----------( 95       ( 07 Sep 2019 06:10 )             48.0         134<L>  |  98  |  37.0<H>  ----------------------------<  234<H>  4.2   |  25.0  |  1.65<H>    Ca    8.4<L>      07 Sep 2019 06:10  Phos  2.8       Mg     2.4         TPro  6.1<L>  /  Alb  2.5<L>  /  TBili  1.0  /  DBili  x   /  AST  264<H>  /  ALT  58<H>  /  AlkPhos  84  -    PT/INR - ( 07 Sep 2019 06:10 )   PT: 14.0 sec;   INR: 1.21 ratio         PTT - ( 07 Sep 2019 06:10 )  PTT:27.2 sec    Magnesium, Serum: 2.4 mg/dL ( @ 06:10)  Phosphorus Level, Serum: 2.8 mg/dL ( @ 06:10)          RADIOLOGY & ADDITIONAL TESTS:

## 2019-09-07 NOTE — PROGRESS NOTE ADULT - SUBJECTIVE AND OBJECTIVE BOX
[facility] - [facility], THE HEART CENTER                                   [address] [place] [address]                                                      PHONE: [phone]                                                         FAX: [phone]  [email or web address]  ---------------------------------------------------------------------------------------------------------------------------------    Overnight events/patient complaints: No CP.      allow double protein at meals (Unknown)  No Known Allergies    MEDICATIONS  (STANDING):  buMETAnide Injectable 1 milliGRAM(s) IV Push every 12 hours  carvedilol 3.125 milliGRAM(s) Oral every 12 hours  clotrimazole 1% Cream 1 Application(s) Topical two times a day  heparin  Infusion.  Unit(s)/Hr (24 mL/Hr) IV Continuous <Continuous>  insulin glargine Injectable (LANTUS) 30 Unit(s) SubCutaneous every morning  insulin lispro (HumaLOG) corrective regimen sliding scale.   SubCutaneous four times a day before meals  insulin lispro Injectable (HumaLOG) 10 Unit(s) SubCutaneous three times a day before meals  levothyroxine 50 MICROGram(s) Oral daily  midodrine 10 milliGRAM(s) Oral every 8 hours  mupirocin 2% Ointment 1 Application(s) Topical two times a day  nystatin Powder 1 Application(s) Topical every 12 hours  piperacillin/tazobactam IVPB.. 3.375 Gram(s) IV Intermittent every 12 hours  sodium chloride 0.225% 1000 milliLiter(s) (50 mL/Hr) IV Continuous <Continuous>  vancomycin  IVPB 1000 milliGRAM(s) IV Intermittent <User Schedule>  warfarin 5 milliGRAM(s) Oral once    MEDICATIONS  (PRN):  acetaminophen   Tablet .. 650 milliGRAM(s) Oral every 6 hours PRN Moderate Pain (4 - 6)  heparin  Injectable 26024 Unit(s) IV Push every 6 hours PRN For aPTT less than 40  heparin  Injectable 5000 Unit(s) IV Push every 6 hours PRN For aPTT between 40 - 57  HYDROmorphone  Injectable 1 milliGRAM(s) IV Push every 4 hours PRN Severe Pain (7 - 10)      Vital Signs Last 24 Hrs  T(C): 36.9 (07 Sep 2019 06:00), Max: 37.6 (06 Sep 2019 16:00)  T(F): 98.4 (07 Sep 2019 06:00), Max: 99.7 (06 Sep 2019 16:00)  HR: 71 (07 Sep 2019 06:00) (71 - 99)  BP: 112/72 (07 Sep 2019 06:00) (92/48 - 120/93)  BP(mean): 100 (06 Sep 2019 18:00) (61 - 100)  RR: 18 (07 Sep 2019 06:00) (18 - 23)  SpO2: 95% (07 Sep 2019 06:00) (92% - 100%)  ICU Vital Signs Last 24 Hrs  MARY ANN CORNELL  I&O's Detail    06 Sep 2019 07:01  -  07 Sep 2019 07:00  --------------------------------------------------------  IN:    heparin  Infusion.: 81 mL    heparin  Infusion.: 236 mL    insulin regular Infusion: 28 mL    Oral Fluid: 1080 mL    Solution: 250 mL    Solution: 75 mL  Total IN: 1750 mL    OUT:    Indwelling Catheter - Urethral: 820 mL    Voided: 750 mL  Total OUT: 1570 mL    Total NET: 180 mL        I&O's Summary    06 Sep 2019 07:01  -  07 Sep 2019 07:00  --------------------------------------------------------  IN: 1750 mL / OUT: 1570 mL / NET: 180 mL      Drug Dosing Weight  MARY ANN CORNELL      PHYSICAL EXAM:  General: NAD.  HEENT: Head; normocephalic.  Eyes: Pupils reactive.  Neck: Supple.  CARDIOVASCULAR: Irregular.   LUNGS: Decreased breath sounds.  ABDOMEN: Morbid obesity.  EXTREMITIES: +edema.   SKIN: warm. ICD site healed.  NEURO: Alert/oriented x 3.    PSYCH: normal affect.        LABS:                        15.5   5.58  )-----------( 95       ( 07 Sep 2019 06:10 )             48.0     09-07    134<L>  |  98  |  37.0<H>  ----------------------------<  234<H>  4.2   |  25.0  |  1.65<H>    Ca    8.4<L>      07 Sep 2019 06:10  Phos  2.8     09-07  Mg     2.4     09-07    TPro  6.1<L>  /  Alb  2.5<L>  /  TBili  1.0  /  DBili  x   /  AST  264<H>  /  ALT  58<H>  /  AlkPhos  84  09-06    MARY ANN CYRAKAN  CARDIAC MARKERS ( 07 Sep 2019 06:10 )  x     / x     / 6168 U/L / x     / 9.0 ng/mL  CARDIAC MARKERS ( 05 Sep 2019 14:01 )  x     / x     / 06303 U/L / x     / 47.4 ng/mL      PT/INR - ( 07 Sep 2019 06:10 )   PT: 14.0 sec;   INR: 1.21 ratio         PTT - ( 07 Sep 2019 08:53 )  PTT:29.2 sec      RADIOLOGY & ADDITIONAL STUDIES:    INTERPRETATION OF TELEMETRY (personally reviewed): AF / variable conduction    ECG: AF     ECHO 9/4/19: 1. Left ventricular ejection fraction, by visual estimation, is 25 to   30%.   2. Severely decreased global left ventricular systolic function.   3. Basal and mid inferolateral wall and basal inferior segment are   abnormal as described above.   4. The mitral in-flow pattern reveals no discernable A-wave, therefore   no comment on diastolic function can be made.   5. There is mild septal left ventricular hypertrophy.   6. Mild mitral annular calcification.   7. The mitral valve leaflets are tethered which is due to reduced   systolic function and elevated LVDP.   8. Sclerotic aortic valve with normal opening.   9. Dilatation of the aortic root.  10. Dilated Ao root at 3.7cm.  11. Endocardial visualization was enhanced with intravenous echo contrast.      ASSESSMENT AND PLAN:  In summary, MARY ANN CORNELL is an 68y Male with past medical history significant for NICM (EF 25-30%) s/p ICD, chronic afib on Coumadin, morbid obesity, DM, HTN, chronic leg cellulitis, HLD admitted on 9/4/19 with septic shock and rhabdo after being found down on the toilet at home. He is now off pressors and on the medical floor. He has no cardiovascular complaints at this time.    NICM, Chronic Afib, Morbid Obesity, DM  - continue Heparin bridge to therapeutic INR 2-3  - continue low dose beta blocker  - can slowly re-introduce his home CHF meds as sepsis resolves and BP improves  - cautious IVF hydration as needed  - please call with any changes or questions [place] [facility] - [facility], THE [facility]                                   [address] [place] [address]                                                      PHONE: [phone]                                                         FAX: [phone]  [email or web address]  ---------------------------------------------------------------------------------------------------------------------------------    Overnight events/patient complaints: No CP.      allow double protein at meals (Unknown)  No Known Allergies    MEDICATIONS  (STANDING):  buMETAnide Injectable 1 milliGRAM(s) IV Push every 12 hours  carvedilol 3.125 milliGRAM(s) Oral every 12 hours  clotrimazole 1% Cream 1 Application(s) Topical two times a day  heparin  Infusion.  Unit(s)/Hr (24 mL/Hr) IV Continuous <Continuous>  insulin glargine Injectable (LANTUS) 30 Unit(s) SubCutaneous every morning  insulin lispro (HumaLOG) corrective regimen sliding scale.   SubCutaneous four times a day before meals  insulin lispro Injectable (HumaLOG) 10 Unit(s) SubCutaneous three times a day before meals  levothyroxine 50 MICROGram(s) Oral daily  midodrine 10 milliGRAM(s) Oral every 8 hours  mupirocin 2% Ointment 1 Application(s) Topical two times a day  nystatin Powder 1 Application(s) Topical every 12 hours  piperacillin/tazobactam IVPB.. 3.375 Gram(s) IV Intermittent every 12 hours  sodium chloride 0.225% 1000 milliLiter(s) (50 mL/Hr) IV Continuous <Continuous>  vancomycin  IVPB 1000 milliGRAM(s) IV Intermittent <User Schedule>  warfarin 5 milliGRAM(s) Oral once    MEDICATIONS  (PRN):  acetaminophen   Tablet .. 650 milliGRAM(s) Oral every 6 hours PRN Moderate Pain (4 - 6)  heparin  Injectable 73320 Unit(s) IV Push every 6 hours PRN For aPTT less than 40  heparin  Injectable 5000 Unit(s) IV Push every 6 hours PRN For aPTT between 40 - 57  HYDROmorphone  Injectable 1 milliGRAM(s) IV Push every 4 hours PRN Severe Pain (7 - 10)      Vital Signs Last 24 Hrs  T(C): 36.9 (07 Sep 2019 06:00), Max: 37.6 (06 Sep 2019 16:00)  T(F): 98.4 (07 Sep 2019 06:00), Max: 99.7 (06 Sep 2019 16:00)  HR: 71 (07 Sep 2019 06:00) (71 - 99)  BP: 112/72 (07 Sep 2019 06:00) (92/48 - 120/93)  BP(mean): 100 (06 Sep 2019 18:00) (61 - 100)  RR: 18 (07 Sep 2019 06:00) (18 - 23)  SpO2: 95% (07 Sep 2019 06:00) (92% - 100%)  ICU Vital Signs Last 24 Hrs  MARY ANN CORNELL  I&O's Detail    06 Sep 2019 07:01  -  07 Sep 2019 07:00  --------------------------------------------------------  IN:    heparin  Infusion.: 81 mL    heparin  Infusion.: 236 mL    insulin regular Infusion: 28 mL    Oral Fluid: 1080 mL    Solution: 250 mL    Solution: 75 mL  Total IN: 1750 mL    OUT:    Indwelling Catheter - Urethral: 820 mL    Voided: 750 mL  Total OUT: 1570 mL    Total NET: 180 mL        I&O's Summary    06 Sep 2019 07:01  -  07 Sep 2019 07:00  --------------------------------------------------------  IN: 1750 mL / OUT: 1570 mL / NET: 180 mL      Drug Dosing Weight  MARY ANN CORNELL      PHYSICAL EXAM:  General: NAD.  HEENT: Head; normocephalic.  Eyes: Pupils reactive.  Neck: Supple.  CARDIOVASCULAR: Irregular.   LUNGS: Decreased breath sounds.  ABDOMEN: Morbid obesity.  EXTREMITIES: +edema.   SKIN: warm. ICD site healed.  NEURO: Alert/oriented x 3.    PSYCH: normal affect.        LABS:                        15.5   5.58  )-----------( 95       ( 07 Sep 2019 06:10 )             48.0     09-07    134<L>  |  98  |  37.0<H>  ----------------------------<  234<H>  4.2   |  25.0  |  1.65<H>    Ca    8.4<L>      07 Sep 2019 06:10  Phos  2.8     09-07  Mg     2.4     09-07    TPro  6.1<L>  /  Alb  2.5<L>  /  TBili  1.0  /  DBili  x   /  AST  264<H>  /  ALT  58<H>  /  AlkPhos  84  09-06    MARY ANN CYRAKAN  CARDIAC MARKERS ( 07 Sep 2019 06:10 )  x     / x     / 6168 U/L / x     / 9.0 ng/mL  CARDIAC MARKERS ( 05 Sep 2019 14:01 )  x     / x     / 83576 U/L / x     / 47.4 ng/mL      PT/INR - ( 07 Sep 2019 06:10 )   PT: 14.0 sec;   INR: 1.21 ratio         PTT - ( 07 Sep 2019 08:53 )  PTT:29.2 sec      RADIOLOGY & ADDITIONAL STUDIES:    INTERPRETATION OF TELEMETRY (personally reviewed): AF / variable conduction    ECG: AF     ECHO 9/4/19: 1. Left ventricular ejection fraction, by visual estimation, is 25 to   30%.   2. Severely decreased global left ventricular systolic function.   3. Basal and mid inferolateral wall and basal inferior segment are   abnormal as described above.   4. The mitral in-flow pattern reveals no discernable A-wave, therefore   no comment on diastolic function can be made.   5. There is mild septal left ventricular hypertrophy.   6. Mild mitral annular calcification.   7. The mitral valve leaflets are tethered which is due to reduced   systolic function and elevated LVDP.   8. Sclerotic aortic valve with normal opening.   9. Dilatation of the aortic root.  10. Dilated Ao root at 3.7cm.  11. Endocardial visualization was enhanced with intravenous echo contrast.      ASSESSMENT AND PLAN:  In summary, MARY ANN CORNELL is an 68y Male with past medical history significant for NICM (EF 25-30%) s/p single chamber SJM ICD, chronic afib on Coumadin, morbid obesity, DM, HTN, chronic leg cellulitis, HLD admitted on 9/4/19 with septic shock and rhabdo after being found down on the toilet at home. He is now off pressors and on the medical floor. He has no cardiovascular complaints at this time.    NICM, Chronic Afib, Morbid Obesity, DM  - continue Heparin bridge to therapeutic INR 2-3  - continue low dose beta blocker  - can slowly re-introduce his home CHF meds as sepsis resolves and BP improves  - cautious IVF hydration as needed  - will interrogate SJM ICD to assess for arrhythmia [place] - [facility], [facility]                                   [address] [place] [address]                                                      PHONE: [phone]                                                         FAX: [phone]  [email or web address]  ---------------------------------------------------------------------------------------------------------------------------------    Overnight events/patient complaints: No CP.      allow double protein at meals (Unknown)  No Known Allergies    MEDICATIONS  (STANDING):  buMETAnide Injectable 1 milliGRAM(s) IV Push every 12 hours  carvedilol 3.125 milliGRAM(s) Oral every 12 hours  clotrimazole 1% Cream 1 Application(s) Topical two times a day  heparin  Infusion.  Unit(s)/Hr (24 mL/Hr) IV Continuous <Continuous>  insulin glargine Injectable (LANTUS) 30 Unit(s) SubCutaneous every morning  insulin lispro (HumaLOG) corrective regimen sliding scale.   SubCutaneous four times a day before meals  insulin lispro Injectable (HumaLOG) 10 Unit(s) SubCutaneous three times a day before meals  levothyroxine 50 MICROGram(s) Oral daily  midodrine 10 milliGRAM(s) Oral every 8 hours  mupirocin 2% Ointment 1 Application(s) Topical two times a day  nystatin Powder 1 Application(s) Topical every 12 hours  piperacillin/tazobactam IVPB.. 3.375 Gram(s) IV Intermittent every 12 hours  sodium chloride 0.225% 1000 milliLiter(s) (50 mL/Hr) IV Continuous <Continuous>  vancomycin  IVPB 1000 milliGRAM(s) IV Intermittent <User Schedule>  warfarin 5 milliGRAM(s) Oral once    MEDICATIONS  (PRN):  acetaminophen   Tablet .. 650 milliGRAM(s) Oral every 6 hours PRN Moderate Pain (4 - 6)  heparin  Injectable 04330 Unit(s) IV Push every 6 hours PRN For aPTT less than 40  heparin  Injectable 5000 Unit(s) IV Push every 6 hours PRN For aPTT between 40 - 57  HYDROmorphone  Injectable 1 milliGRAM(s) IV Push every 4 hours PRN Severe Pain (7 - 10)      Vital Signs Last 24 Hrs  T(C): 36.9 (07 Sep 2019 06:00), Max: 37.6 (06 Sep 2019 16:00)  T(F): 98.4 (07 Sep 2019 06:00), Max: 99.7 (06 Sep 2019 16:00)  HR: 71 (07 Sep 2019 06:00) (71 - 99)  BP: 112/72 (07 Sep 2019 06:00) (92/48 - 120/93)  BP(mean): 100 (06 Sep 2019 18:00) (61 - 100)  RR: 18 (07 Sep 2019 06:00) (18 - 23)  SpO2: 95% (07 Sep 2019 06:00) (92% - 100%)  ICU Vital Signs Last 24 Hrs  MARY ANN CORNELL  I&O's Detail    06 Sep 2019 07:01  -  07 Sep 2019 07:00  --------------------------------------------------------  IN:    heparin  Infusion.: 81 mL    heparin  Infusion.: 236 mL    insulin regular Infusion: 28 mL    Oral Fluid: 1080 mL    Solution: 250 mL    Solution: 75 mL  Total IN: 1750 mL    OUT:    Indwelling Catheter - Urethral: 820 mL    Voided: 750 mL  Total OUT: 1570 mL    Total NET: 180 mL        I&O's Summary    06 Sep 2019 07:01  -  07 Sep 2019 07:00  --------------------------------------------------------  IN: 1750 mL / OUT: 1570 mL / NET: 180 mL      Drug Dosing Weight  MARY ANN CORNELL      PHYSICAL EXAM:  General: NAD.  HEENT: Head; normocephalic.  Eyes: Pupils reactive.  Neck: Supple.  CARDIOVASCULAR: Irregular.   LUNGS: Decreased breath sounds.  ABDOMEN: Morbid obesity.  EXTREMITIES: +edema.   SKIN: warm. ICD site healed.  NEURO: Alert/oriented x 3.    PSYCH: normal affect.        LABS:                        15.5   5.58  )-----------( 95       ( 07 Sep 2019 06:10 )             48.0     09-07    134<L>  |  98  |  37.0<H>  ----------------------------<  234<H>  4.2   |  25.0  |  1.65<H>    Ca    8.4<L>      07 Sep 2019 06:10  Phos  2.8     09-07  Mg     2.4     09-07    TPro  6.1<L>  /  Alb  2.5<L>  /  TBili  1.0  /  DBili  x   /  AST  264<H>  /  ALT  58<H>  /  AlkPhos  84  09-06    MARY ANN CYRAKAN  CARDIAC MARKERS ( 07 Sep 2019 06:10 )  x     / x     / 6168 U/L / x     / 9.0 ng/mL  CARDIAC MARKERS ( 05 Sep 2019 14:01 )  x     / x     / 26554 U/L / x     / 47.4 ng/mL      PT/INR - ( 07 Sep 2019 06:10 )   PT: 14.0 sec;   INR: 1.21 ratio         PTT - ( 07 Sep 2019 08:53 )  PTT:29.2 sec      RADIOLOGY & ADDITIONAL STUDIES:    INTERPRETATION OF TELEMETRY (personally reviewed): AF / variable conduction    ECG: AF     ECHO 9/4/19: 1. Left ventricular ejection fraction, by visual estimation, is 25 to   30%.   2. Severely decreased global left ventricular systolic function.   3. Basal and mid inferolateral wall and basal inferior segment are   abnormal as described above.   4. The mitral in-flow pattern reveals no discernable A-wave, therefore   no comment on diastolic function can be made.   5. There is mild septal left ventricular hypertrophy.   6. Mild mitral annular calcification.   7. The mitral valve leaflets are tethered which is due to reduced   systolic function and elevated LVDP.   8. Sclerotic aortic valve with normal opening.   9. Dilatation of the aortic root.  10. Dilated Ao root at 3.7cm.  11. Endocardial visualization was enhanced with intravenous echo contrast.      ASSESSMENT AND PLAN:  In summary, MARY ANN CORNELL is an 68y Male with past medical history significant for NICM (EF 25-30%) s/p single chamber SJM ICD, chronic afib on Coumadin, morbid obesity, DM, HTN, chronic leg cellulitis, HLD admitted on 9/4/19 with septic shock and rhabdo after being found down on the toilet at home. He is now off pressors and on the medical floor. He has no cardiovascular complaints at this time.    NICM, Chronic Afib, Morbid Obesity, DM  - continue Heparin bridge to therapeutic INR 2-3  - continue low dose beta blocker  - can slowly re-introduce his home CHF meds as sepsis resolves and BP improves  - cautious IVF hydration as needed  - will interrogate SJM ICD to assess for arrhythmia    ADDENDUM: Single chamber SJM ICD interrogated. Normal functioning device. Implant date 8/18/16. VVI 40.  <1%. Arrythmia log shows occasional brief irregular tachy episodes likley c/w AF with RVR. No ICD therapy.

## 2019-09-07 NOTE — CONSULT NOTE ADULT - SUBJECTIVE AND OBJECTIVE BOX
[facility]                                     [facility] [place]                                 Zeus Casillas, & Johnny                                  [address]                                        [place], [place], [address]                                             [phone]    CC:  HPI: The patient is a 68y Male who presented after being unable to arise from his toilet for about 12 hours on 9/4.  There is a question of syncope vs falling asleep.He is having trouble standing.  He has had difficulty walking in past, but now his legs are unable to support his weight.  He has what sounds like a fairly advanced diabetic neuropathy involving legs and hands.  He is morbidly obese with a BMI=59.  He has BLE cellulitis and reports significant leg swelling above his baseline.  Neurology is asked to evaluate leg weakness.    PAST MEDICAL & SURGICAL HISTORY:  CHF (congestive heart failure)  Pulmonary hypertension  Prostate CA  Sleep apnea  Renal insufficiency  High cholesterol  HTN (hypertension)  DM (diabetes mellitus)  AICD (automatic cardioverter/defibrillator) present  S/P ankle ligament repair      MEDICATIONS  (STANDING):  buMETAnide Injectable 1 milliGRAM(s) IV Push every 12 hours  carvedilol 3.125 milliGRAM(s) Oral every 12 hours  clotrimazole 1% Cream 1 Application(s) Topical two times a day  heparin  Infusion.  Unit(s)/Hr (24 mL/Hr) IV Continuous <Continuous>  insulin glargine Injectable (LANTUS) 30 Unit(s) SubCutaneous every morning  insulin lispro (HumaLOG) corrective regimen sliding scale.   SubCutaneous four times a day before meals  insulin lispro Injectable (HumaLOG) 10 Unit(s) SubCutaneous three times a day before meals  levothyroxine 50 MICROGram(s) Oral daily  midodrine 10 milliGRAM(s) Oral every 8 hours  mupirocin 2% Ointment 1 Application(s) Topical two times a day  nystatin Powder 1 Application(s) Topical every 12 hours  piperacillin/tazobactam IVPB.. 3.375 Gram(s) IV Intermittent every 12 hours  sodium chloride 0.225% 1000 milliLiter(s) (50 mL/Hr) IV Continuous <Continuous>  vancomycin  IVPB 1000 milliGRAM(s) IV Intermittent <User Schedule>  warfarin 5 milliGRAM(s) Oral once    MEDICATIONS  (PRN):  acetaminophen   Tablet .. 650 milliGRAM(s) Oral every 6 hours PRN Moderate Pain (4 - 6)  heparin  Injectable 81995 Unit(s) IV Push every 6 hours PRN For aPTT less than 40  heparin  Injectable 5000 Unit(s) IV Push every 6 hours PRN For aPTT between 40 - 57  HYDROmorphone  Injectable 1 milliGRAM(s) IV Push every 4 hours PRN Severe Pain (7 - 10)      Allergies    No Known Allergies    Intolerances    allow double protein at meals (Unknown)      SOCIAL HISTORY:  no tob,   no alcohol   no drugs    FAMILY HISTORY:  Family history of diabetes mellitus (Sibling)  Family history of cancer (Sibling)        ROS: 14 point ROS negative other than what is present in HPI or below  leg weakness and numbness    Vital Signs Last 24 Hrs  T(C): 36.9 (07 Sep 2019 06:00), Max: 37.6 (06 Sep 2019 16:00)  T(F): 98.4 (07 Sep 2019 06:00), Max: 99.7 (06 Sep 2019 16:00)  HR: 95 (07 Sep 2019 13:38) (71 - 99)  BP: 110/60 (07 Sep 2019 13:38) (92/48 - 120/93)  BP(mean): 100 (06 Sep 2019 18:00) (61 - 100)  RR: 18 (07 Sep 2019 06:00) (18 - 23)  SpO2: 95% (07 Sep 2019 06:00) (92% - 100%)      General: NAD    Detailed Neurologic Exam:    Mental status: The patient is awake and alert and has normal attention span.  The patient is fully oriented in 3 spheres. The patient is oriented to current events. The patient is able to name objects, follow commands, repeat sentences.    Cranial nerves: Pupils equal and react symmetrically to light. There is no visual field deficit to confrontation. Extraocular motion is full with no nystagmus. There is no ptosis. Facial sensation is intact. Facial musculature is symmetric. Palate elevates symmetrically. Shoulder shrug is normal. Tongue is midline.    Motor: There is normal bulk and tone.  There is no tremor.  Strength is 5/5 in the right arm and 3/5 IPS, quad and ham, 4-/5 DF/PF in leg.   Strength is 5/5 in the left arm and 3/5 IPS, Quad and ham,  4-/5 DF/PF in leg.    Sensation: Intact to light touch and pin in 4 extremities    Reflexes: muted throughout (difficult to obtain due to body habitus and plantar responses are equivocal.    Cerebellar: There is no dysmetria on finger to nose testing.    Gait : deferred    LABS:                         15.5   5.58  )-----------( 95       ( 07 Sep 2019 06:10 )             48.0       09-07    134<L>  |  98  |  37.0<H>  ----------------------------<  234<H>  4.2   |  25.0  |  1.65<H>    Ca    8.4<L>      07 Sep 2019 06:10  Phos  2.8     09-07  Mg     2.4     09-07    TPro  6.1<L>  /  Alb  2.5<L>  /  TBili  1.0  /  DBili  x   /  AST  264<H>  /  ALT  58<H>  /  AlkPhos  84  09-06      PT/INR - ( 07 Sep 2019 06:10 )   PT: 14.0 sec;   INR: 1.21 ratio         PTT - ( 07 Sep 2019 08:53 )  PTT:29.2 sec    RADIOLOGY & ADDITIONAL STUDIES (independently reviewed unless otherwise noted):  CT head no acute CVA, mass or blood

## 2019-09-08 LAB
ALBUMIN SERPL ELPH-MCNC: 2.6 G/DL — LOW (ref 3.3–5.2)
ALP SERPL-CCNC: 281 U/L — HIGH (ref 40–120)
ALT FLD-CCNC: 51 U/L — HIGH
ANION GAP SERPL CALC-SCNC: 13 MMOL/L — SIGNIFICANT CHANGE UP (ref 5–17)
APTT BLD: 63.6 SEC — HIGH (ref 27.5–36.3)
AST SERPL-CCNC: 165 U/L — HIGH
BILIRUB SERPL-MCNC: 0.8 MG/DL — SIGNIFICANT CHANGE UP (ref 0.4–2)
BUN SERPL-MCNC: 33 MG/DL — HIGH (ref 8–20)
CALCIUM SERPL-MCNC: 8.7 MG/DL — SIGNIFICANT CHANGE UP (ref 8.6–10.2)
CHLORIDE SERPL-SCNC: 98 MMOL/L — SIGNIFICANT CHANGE UP (ref 98–107)
CK MB CFR SERPL CALC: 6.1 NG/ML — SIGNIFICANT CHANGE UP (ref 0–6.7)
CK SERPL-CCNC: 4043 U/L — HIGH (ref 30–200)
CO2 SERPL-SCNC: 24 MMOL/L — SIGNIFICANT CHANGE UP (ref 22–29)
CREAT ?TM UR-MCNC: 33 MG/DL — SIGNIFICANT CHANGE UP
CREAT SERPL-MCNC: 1.36 MG/DL — HIGH (ref 0.5–1.3)
GLUCOSE BLDC GLUCOMTR-MCNC: 267 MG/DL — HIGH (ref 70–99)
GLUCOSE BLDC GLUCOMTR-MCNC: 305 MG/DL — HIGH (ref 70–99)
GLUCOSE BLDC GLUCOMTR-MCNC: 319 MG/DL — HIGH (ref 70–99)
GLUCOSE BLDC GLUCOMTR-MCNC: 331 MG/DL — HIGH (ref 70–99)
GLUCOSE SERPL-MCNC: 325 MG/DL — HIGH (ref 70–115)
HCT VFR BLD CALC: 47 % — SIGNIFICANT CHANGE UP (ref 39–50)
HGB BLD-MCNC: 15.2 G/DL — SIGNIFICANT CHANGE UP (ref 13–17)
INR BLD: 1.19 RATIO — HIGH (ref 0.88–1.16)
MAGNESIUM SERPL-MCNC: 2.4 MG/DL — SIGNIFICANT CHANGE UP (ref 1.6–2.6)
MCHC RBC-ENTMCNC: 29.2 PG — SIGNIFICANT CHANGE UP (ref 27–34)
MCHC RBC-ENTMCNC: 32.3 GM/DL — SIGNIFICANT CHANGE UP (ref 32–36)
MCV RBC AUTO: 90.4 FL — SIGNIFICANT CHANGE UP (ref 80–100)
PLATELET # BLD AUTO: 102 K/UL — LOW (ref 150–400)
POTASSIUM SERPL-MCNC: 4.6 MMOL/L — SIGNIFICANT CHANGE UP (ref 3.5–5.3)
POTASSIUM SERPL-SCNC: 4.6 MMOL/L — SIGNIFICANT CHANGE UP (ref 3.5–5.3)
PROT ?TM UR-MCNC: 5 MG/DL — SIGNIFICANT CHANGE UP (ref 0–12)
PROT SERPL-MCNC: 6.4 G/DL — LOW (ref 6.6–8.7)
PROT/CREAT UR-RTO: 0.2 RATIO — SIGNIFICANT CHANGE UP
PROTHROM AB SERPL-ACNC: 13.7 SEC — HIGH (ref 10–12.9)
RBC # BLD: 5.2 M/UL — SIGNIFICANT CHANGE UP (ref 4.2–5.8)
RBC # FLD: 15.6 % — HIGH (ref 10.3–14.5)
SODIUM SERPL-SCNC: 135 MMOL/L — SIGNIFICANT CHANGE UP (ref 135–145)
WBC # BLD: 5.6 K/UL — SIGNIFICANT CHANGE UP (ref 3.8–10.5)
WBC # FLD AUTO: 5.6 K/UL — SIGNIFICANT CHANGE UP (ref 3.8–10.5)

## 2019-09-08 PROCEDURE — 99232 SBSQ HOSP IP/OBS MODERATE 35: CPT

## 2019-09-08 PROCEDURE — 99233 SBSQ HOSP IP/OBS HIGH 50: CPT

## 2019-09-08 PROCEDURE — 76775 US EXAM ABDO BACK WALL LIM: CPT | Mod: 26

## 2019-09-08 PROCEDURE — 93880 EXTRACRANIAL BILAT STUDY: CPT | Mod: 26

## 2019-09-08 RX ORDER — INSULIN LISPRO 100/ML
13 VIAL (ML) SUBCUTANEOUS
Refills: 0 | Status: DISCONTINUED | OUTPATIENT
Start: 2019-09-08 | End: 2019-09-12

## 2019-09-08 RX ORDER — INSULIN GLARGINE 100 [IU]/ML
12 INJECTION, SOLUTION SUBCUTANEOUS AT BEDTIME
Refills: 0 | Status: DISCONTINUED | OUTPATIENT
Start: 2019-09-08 | End: 2019-09-12

## 2019-09-08 RX ORDER — LACTULOSE 10 G/15ML
20 SOLUTION ORAL ONCE
Refills: 0 | Status: COMPLETED | OUTPATIENT
Start: 2019-09-08 | End: 2019-09-08

## 2019-09-08 RX ORDER — INSULIN LISPRO 100/ML
VIAL (ML) SUBCUTANEOUS
Refills: 0 | Status: DISCONTINUED | OUTPATIENT
Start: 2019-09-08 | End: 2019-09-14

## 2019-09-08 RX ORDER — WARFARIN SODIUM 2.5 MG/1
5 TABLET ORAL ONCE
Refills: 0 | Status: COMPLETED | OUTPATIENT
Start: 2019-09-08 | End: 2019-09-08

## 2019-09-08 RX ADMIN — SENNA PLUS 2 TABLET(S): 8.6 TABLET ORAL at 21:12

## 2019-09-08 RX ADMIN — Medication 250 MILLIGRAM(S): at 21:07

## 2019-09-08 RX ADMIN — INSULIN GLARGINE 30 UNIT(S): 100 INJECTION, SOLUTION SUBCUTANEOUS at 09:18

## 2019-09-08 RX ADMIN — Medication 8: at 21:16

## 2019-09-08 RX ADMIN — INSULIN GLARGINE 12 UNIT(S): 100 INJECTION, SOLUTION SUBCUTANEOUS at 21:15

## 2019-09-08 RX ADMIN — LACTULOSE 20 GRAM(S): 10 SOLUTION ORAL at 18:05

## 2019-09-08 RX ADMIN — Medication 100 MILLIGRAM(S): at 06:16

## 2019-09-08 RX ADMIN — MIDODRINE HYDROCHLORIDE 10 MILLIGRAM(S): 2.5 TABLET ORAL at 06:16

## 2019-09-08 RX ADMIN — Medication 250 MILLIGRAM(S): at 18:07

## 2019-09-08 RX ADMIN — WARFARIN SODIUM 5 MILLIGRAM(S): 2.5 TABLET ORAL at 21:12

## 2019-09-08 RX ADMIN — Medication 250 MILLIGRAM(S): at 11:15

## 2019-09-08 RX ADMIN — Medication 8: at 13:29

## 2019-09-08 RX ADMIN — HYDROMORPHONE HYDROCHLORIDE 1 MILLIGRAM(S): 2 INJECTION INTRAMUSCULAR; INTRAVENOUS; SUBCUTANEOUS at 12:18

## 2019-09-08 RX ADMIN — SODIUM CHLORIDE 50 MILLILITER(S): 9 INJECTION INTRAMUSCULAR; INTRAVENOUS; SUBCUTANEOUS at 20:58

## 2019-09-08 RX ADMIN — PIPERACILLIN AND TAZOBACTAM 25 GRAM(S): 4; .5 INJECTION, POWDER, LYOPHILIZED, FOR SOLUTION INTRAVENOUS at 18:04

## 2019-09-08 RX ADMIN — CARVEDILOL PHOSPHATE 3.12 MILLIGRAM(S): 80 CAPSULE, EXTENDED RELEASE ORAL at 06:17

## 2019-09-08 RX ADMIN — Medication 10 UNIT(S): at 13:28

## 2019-09-08 RX ADMIN — Medication 1 APPLICATION(S): at 11:29

## 2019-09-08 RX ADMIN — HYDROMORPHONE HYDROCHLORIDE 1 MILLIGRAM(S): 2 INJECTION INTRAMUSCULAR; INTRAVENOUS; SUBCUTANEOUS at 13:33

## 2019-09-08 RX ADMIN — Medication 50 MICROGRAM(S): at 06:17

## 2019-09-08 RX ADMIN — Medication 10 UNIT(S): at 09:19

## 2019-09-08 RX ADMIN — BUMETANIDE 1 MILLIGRAM(S): 0.25 INJECTION INTRAMUSCULAR; INTRAVENOUS at 18:12

## 2019-09-08 RX ADMIN — HEPARIN SODIUM 3900 UNIT(S)/HR: 5000 INJECTION INTRAVENOUS; SUBCUTANEOUS at 00:55

## 2019-09-08 RX ADMIN — MUPIROCIN 1 APPLICATION(S): 20 OINTMENT TOPICAL at 09:32

## 2019-09-08 RX ADMIN — Medication 8: at 09:19

## 2019-09-08 RX ADMIN — HYDROMORPHONE HYDROCHLORIDE 1 MILLIGRAM(S): 2 INJECTION INTRAMUSCULAR; INTRAVENOUS; SUBCUTANEOUS at 06:38

## 2019-09-08 RX ADMIN — PIPERACILLIN AND TAZOBACTAM 25 GRAM(S): 4; .5 INJECTION, POWDER, LYOPHILIZED, FOR SOLUTION INTRAVENOUS at 07:24

## 2019-09-08 RX ADMIN — MUPIROCIN 1 APPLICATION(S): 20 OINTMENT TOPICAL at 18:07

## 2019-09-08 RX ADMIN — NYSTATIN CREAM 1 APPLICATION(S): 100000 CREAM TOPICAL at 18:08

## 2019-09-08 RX ADMIN — Medication 1 APPLICATION(S): at 18:07

## 2019-09-08 RX ADMIN — Medication 13 UNIT(S): at 17:43

## 2019-09-08 RX ADMIN — Medication 250 MILLIGRAM(S): at 06:17

## 2019-09-08 RX ADMIN — Medication 6: at 17:43

## 2019-09-08 RX ADMIN — CARVEDILOL PHOSPHATE 3.12 MILLIGRAM(S): 80 CAPSULE, EXTENDED RELEASE ORAL at 18:06

## 2019-09-08 RX ADMIN — BUMETANIDE 1 MILLIGRAM(S): 0.25 INJECTION INTRAMUSCULAR; INTRAVENOUS at 06:16

## 2019-09-08 RX ADMIN — HEPARIN SODIUM 3900 UNIT(S)/HR: 5000 INJECTION INTRAVENOUS; SUBCUTANEOUS at 07:56

## 2019-09-08 RX ADMIN — HYDROMORPHONE HYDROCHLORIDE 1 MILLIGRAM(S): 2 INJECTION INTRAMUSCULAR; INTRAVENOUS; SUBCUTANEOUS at 18:11

## 2019-09-08 RX ADMIN — NYSTATIN CREAM 1 APPLICATION(S): 100000 CREAM TOPICAL at 09:31

## 2019-09-08 NOTE — PROGRESS NOTE ADULT - ASSESSMENT
69 y/o morbidly obese male with multiple medical problems was admitted to ICU on 9/4/19 for severe sepsis / cellulitis of b/L LE. pt. was brought in as while sitting on his toilet pt. passed out but as per family ( daughters at bedside ) pt. did not have any fall, did not hit his head to ground and was found sitting on the toilet. Pt. was on pressors initially and admitted to icu. Pt. was also noted to have rhabdo. pt. got some fluids but his EF is 25 to 30 % and as per icu pt. appeared to be volume over loaded and fluids were cut down. pt. is off the pressors now. pt. feels better. no cp. no sob . no HA reported. no abd. pain. no n/v/d. Pt. has which appears to be chronic leg edema and as per pt. his lower ext. wound come and go as due to fluid retention he gets blisters and they burst.     1) Septic Shock (Resolved)  - Likely secondary to LE Cellulitis  - Cultures negative  - Continue Vanco and Zosyn  - ID follow up noted   - Continue Midodrine   - Wound care consult  2) A. Fib with RVR   - Rate controlled  - Continue Coreg  - Continue Heparin Infusion for bridging to Coumadin  - Coumadin 5 mg tonight  - Cardiology follow up noted: Single chamber SJM ICD interrogated. Normal functioning device. Implant date 8/18/16. VVI 40.  <1%. Arrythmia log shows occasional brief irregular tachy episodes likley c/w AF with RVR. No ICD therapy.  3) Severe Rhabdomyolysis  - Improving  - Continue gentle hydration  - Renal consult appreciated  4) Acute on chronic kidney injury  - Likely secondary to rhabdomyolysis  - Improving  - Gentle hydration  - Avoid nephrotoxic medications  5) Chronic Systolic Heart Failure   - Continue Coreg  - No ACEIs or ARBs due to worsening renal function  - Cardio input appreciated  6) DM 2  - HbA1c 11.6  - Accu checks and ISS   - Increase Humalog to 13 units premeals  - Lantus 30 units in am. Increase bedtime to 12 units.   - Diabetes Education  - Endocrine Consult   7) LE Weakness  - Multifactorial  - PT Eval  - Neurology Consult appreciated   8) Morbidly Obese  - Lifestyle modification (explained to him at length)  9) CHRISS  - Continue Nocturnal and PRN CPAP  10) Hypothyroidism  - Continue Synthroid  - Repeat TSH in 2-3 weeks. If still elevated then increase the dose.   11) Elevated LFTs  - Likely secondary to septic shock and rhabdomyolysis  - Improving  - Monitor liver function  DVT Prophylaxis -- Patient is on Heparin and Coumadin    Dispo: Likely SCOTTY pending clinical improvement. PT Eval.

## 2019-09-08 NOTE — PROGRESS NOTE ADULT - SUBJECTIVE AND OBJECTIVE BOX
[facility]  INFECTIOUS DISEASES AND INTERNAL MEDICINE at [place]  =======================================================  Miguelangel Seo MD  Diplomates American Board of Internal Medicine and Infectious Diseases  Tel: [phone]      Fax: [phone]  =======================================================    MARY ANN CORNELL [id number]    Follow up: Cellulitis    No fever or chills  No diarrhea       Allergies:  allow double protein at meals (Unknown)  No Known Allergies      Antibiotics:  piperacillin/tazobactam IVPB.. 3.375 Gram(s) IV Intermittent every 12 hours  vancomycin  IVPB 1000 milliGRAM(s) IV Intermittent <User Schedule>        REVIEW OF SYSTEMS:  CONSTITUTIONAL:  No Fever or chills  HEENT:   No diplopia or blurred vision.  No earache, sore throat or runny nose.  CARDIOVASCULAR:  No pressure, squeezing, strangling, tightness, heaviness or aching about the chest, neck, axilla or epigastrium.  RESPIRATORY:  No cough, shortness of breath  GASTROINTESTINAL:  No nausea, vomiting or diarrhea.  GENITOURINARY:  No dysuria, frequency or urgency.   MUSCULOSKELETAL:  no joint aches, no muscle pain  SKIN:  B/L LE swelling   NEUROLOGIC:  No Headaches, seizures  PSYCHIATRIC:  No disorder of thought or mood.  ENDOCRINE:  No heat or cold intolerance  HEMATOLOGICAL:  No easy bruising or bleeding.         Physical Exam:  Vital Signs Last 24 Hrs  T(C): 36.6 (08 Sep 2019 06:06), Max: 36.7 (07 Sep 2019 22:47)  T(F): 97.9 (08 Sep 2019 06:06), Max: 98.1 (07 Sep 2019 22:47)  HR: 85 (08 Sep 2019 06:06) (66 - 101)  BP: 110/66 (08 Sep 2019 06:06) (107/66 - 126/75)  RR: 16 (08 Sep 2019 06:06) (16 - 18)  SpO2: 98% (08 Sep 2019 06:06) (95% - 98%)      GEN: NAD, pleasant  HEENT: normocephalic and atraumatic. EOMI. PERRL.  Anicteric   NECK: Supple.   LUNGS: Clear to auscultation.  HEART: Regular rate and rhythm   ABDOMEN: Soft, nontender, and nondistended.  Positive bowel sounds.    : No CVA tenderness  EXTREMITIES: + edema.  MSK: no joint swelling  NEUROLOGIC:  No focal deficits  PSYCHIATRIC: Appropriate affect .  SKIN: chronic stasis dermatitis with overlying erythema, and bullae       Labs:  09-08    135  |  98  |  33.0<H>  ----------------------------<  325<H>  4.6   |  24.0  |  1.36<H>    Ca    8.7      08 Sep 2019 06:18  Phos  2.8     09-07  Mg     2.4     09-08    TPro  6.4<L>  /  Alb  2.6<L>  /  TBili  0.8  /  DBili  x   /  AST  165<H>  /  ALT  51<H>  /  AlkPhos  281<H>  09-08                          15.2   5.60  )-----------( 102      ( 08 Sep 2019 06:18 )             47.0       PT/INR - ( 08 Sep 2019 06:18 )   PT: 13.7 sec;   INR: 1.19 ratio         PTT - ( 08 Sep 2019 06:18 )  PTT:63.6 sec    LIVER FUNCTIONS - ( 08 Sep 2019 06:18 )  Alb: 2.6 g/dL / Pro: 6.4 g/dL / ALK PHOS: 281 U/L / ALT: 51 U/L / AST: 165 U/L / GGT: x           CARDIAC MARKERS ( 08 Sep 2019 06:18 )  x     / x     / 4043 U/L / x     / 6.1 ng/mL  CARDIAC MARKERS ( 07 Sep 2019 06:10 )  x     / x     / 6168 U/L / x     / 9.0 ng/mL      RECENT CULTURES:  09-04 @ 22:14 .Urine     No growth      09-04 @ 13:12 .Blood     No growth at 48 hours      09-04 @ 13:11 .Blood     No growth at 48 hours

## 2019-09-08 NOTE — PROGRESS NOTE ADULT - SUBJECTIVE AND OBJECTIVE BOX
[facility]                                   [address] [place] [address]                                                      PHONE: [phone]                                                         FAX: [phone]  [email or web address]  ---------------------------------------------------------------------------------------------------------------------------------    Overnight events/patient complaints: No CP.      allow double protein at meals (Unknown)  No Known Allergies    MEDICATIONS  (STANDING):  buMETAnide Injectable 1 milliGRAM(s) IV Push every 12 hours  carvedilol 3.125 milliGRAM(s) Oral every 12 hours  clotrimazole 1% Cream 1 Application(s) Topical two times a day  docusate sodium 100 milliGRAM(s) Oral three times a day  heparin  Infusion.  Unit(s)/Hr (24 mL/Hr) IV Continuous <Continuous>  insulin glargine Injectable (LANTUS) 30 Unit(s) SubCutaneous every morning  insulin glargine Injectable (LANTUS) 10 Unit(s) SubCutaneous at bedtime  insulin lispro (HumaLOG) corrective regimen sliding scale.   SubCutaneous four times a day before meals  insulin lispro Injectable (HumaLOG) 10 Unit(s) SubCutaneous three times a day before meals  levothyroxine 50 MICROGram(s) Oral daily  midodrine 10 milliGRAM(s) Oral every 8 hours  mupirocin 2% Ointment 1 Application(s) Topical two times a day  nystatin Powder 1 Application(s) Topical every 12 hours  piperacillin/tazobactam IVPB.. 3.375 Gram(s) IV Intermittent every 12 hours  saccharomyces boulardii 250 milliGRAM(s) Oral two times a day  senna 2 Tablet(s) Oral at bedtime  sodium chloride 0.225% 1000 milliLiter(s) (50 mL/Hr) IV Continuous <Continuous>  vancomycin  IVPB 1000 milliGRAM(s) IV Intermittent <User Schedule>    MEDICATIONS  (PRN):  acetaminophen   Tablet .. 650 milliGRAM(s) Oral every 6 hours PRN Moderate Pain (4 - 6)  heparin  Injectable 03866 Unit(s) IV Push every 6 hours PRN For aPTT less than 40  heparin  Injectable 5000 Unit(s) IV Push every 6 hours PRN For aPTT between 40 - 57  HYDROmorphone  Injectable 1 milliGRAM(s) IV Push every 4 hours PRN Severe Pain (7 - 10)  polyethylene glycol 3350 17 Gram(s) Oral daily PRN Constipation      Vital Signs Last 24 Hrs  T(C): 36.6 (08 Sep 2019 06:06), Max: 36.7 (07 Sep 2019 22:47)  T(F): 97.9 (08 Sep 2019 06:06), Max: 98.1 (07 Sep 2019 22:47)  HR: 85 (08 Sep 2019 06:06) (66 - 101)  BP: 110/66 (08 Sep 2019 06:06) (107/66 - 126/75)  BP(mean): --  RR: 16 (08 Sep 2019 06:06) (16 - 18)  SpO2: 98% (08 Sep 2019 06:06) (95% - 98%)  ICU Vital Signs Last 24 Hrs  MARY ANN CORNELL  I&O's Detail    07 Sep 2019 07:01  -  08 Sep 2019 07:00  --------------------------------------------------------  IN:  Total IN: 0 mL    OUT:    Voided: 900 mL  Total OUT: 900 mL    Total NET: -900 mL        I&O's Summary    07 Sep 2019 07:01  -  08 Sep 2019 07:00  --------------------------------------------------------  IN: 0 mL / OUT: 900 mL / NET: -900 mL      Drug Dosing Weight  MARY ANN CORNELL      PHYSICAL EXAM:  General: NAD.  HEENT: Head; normocephalic.  Eyes: Pupils reactive.  Neck: Supple.  CARDIOVASCULAR: Irregular.   LUNGS: Decreased breath sounds.  ABDOMEN: Morbidly obese.  EXTREMITIES: +edema.   SKIN: warm. ICD site well healed.  NEURO: Alert/oriented.    PSYCH: normal affect.        LABS:                        15.2   5.60  )-----------( 102      ( 08 Sep 2019 06:18 )             47.0     09-08    135  |  98  |  33.0<H>  ----------------------------<  325<H>  4.6   |  24.0  |  1.36<H>    Ca    8.7      08 Sep 2019 06:18  Phos  2.8     09-07  Mg     2.4     09-08    TPro  6.4<L>  /  Alb  2.6<L>  /  TBili  0.8  /  DBili  x   /  AST  165<H>  /  ALT  51<H>  /  AlkPhos  281<H>  09-08    MARY ANN CORNELL  CARDIAC MARKERS ( 08 Sep 2019 06:18 )  x     / x     / 4043 U/L / x     / 6.1 ng/mL  CARDIAC MARKERS ( 07 Sep 2019 06:10 )  x     / x     / 6168 U/L / x     / 9.0 ng/mL      PT/INR - ( 08 Sep 2019 06:18 )   PT: 13.7 sec;   INR: 1.19 ratio         PTT - ( 08 Sep 2019 06:18 )  PTT:63.6 sec      RADIOLOGY & ADDITIONAL STUDIES:    INTERPRETATION OF TELEMETRY (personally reviewed): AF 80's    ECG: AF    ECHO 9/4/19: 1. Left ventricular ejection fraction, by visual estimation, is 25 to   30%.   2. Severely decreased global left ventricular systolic function.   3. Basal and mid inferolateral wall and basal inferior segment are   abnormal as described above.   4. The mitral in-flow pattern reveals no discernable A-wave, therefore   no comment on diastolic function can be made.   5. There is mild septal left ventricular hypertrophy.   6. Mild mitral annular calcification.   7. The mitral valve leaflets are tethered which is due to reduced   systolic function and elevated LVDP.   8. Sclerotic aortic valve with normal opening.   9. Dilatation of the aortic root.  10. Dilated Ao root at 3.7cm.  11. Endocardial visualization was enhanced with intravenous echo contrast.      ASSESSMENT AND PLAN:  In summary, MARY ANN CORNELL is an 68y Male with past medical history significant for NICM (EF 25-30%) s/p single chamber SJM ICD, chronic afib on Coumadin, morbid obesity, DM, HTN, chronic leg cellulitis, HLD admitted on 9/4/19 with septic shock and rhabdo after being found down on the toilet at home. He is now off pressors and on the medical floor. He has no cardiovascular complaints at this time.    - continue Heparin bridge to therapeutic INR 2-3.  - continue beta blocker; AF rate controlled.  - can slowly re-introduce his home CHF meds as sepsis resolves and BP improves  - cautious IVF hydration as needed  - Single chamber SJM ICD interrogated. Normal functioning device. Implant date 8/18/16. VVI 40.  <1%. Arrythmia log shows occasional brief irregular tachy episodes likley c/w AF with RVR. No ICD therapy.

## 2019-09-08 NOTE — PROGRESS NOTE ADULT - ASSESSMENT
68 y/o male with uncontrolled DM (w/ neuropathy), HN, CHF (Last Echo ~2 years ago with EF 35%), COPD, CHRISS (on home CPAP machine), PPM/Defibrillator ("heart function was 3%"), prior diabetic foot ulcers/infections, depression presents with worsening weakness and SOB  In ED, pt remains short of breath, found to be hyperglycemic, AG 22, Serum CO2 14, lactate 8.0, CK 24k, transaminitis, hyperkalemic, Cr 2.85, with leukocytosis and febrile to 101.7.       FOUND WITH:    RIGHT LOWER EXT cellulitis  Acute renal failure on CKD  Rhabdomyolysis  uncontrolled DM  likely tinea pedis of bilateral feet      - continue Zosyn; continue empiric Vancomycin for now.   - Blood cultures so far negative  - Miconazole topical  - watch renal fx closely; given rhabdo and ARF on CKD  - needs good sugar control  - follow up all outstanding cultures  - trend temperature and WBC curve  - repeat cultures from blood and all sources if febrile.     Will follow

## 2019-09-08 NOTE — PROGRESS NOTE ADULT - SUBJECTIVE AND OBJECTIVE BOX
Cellulitis    HPI:  Pt is a 68 y/o male with PMHx DM (w/ neuropathy), HN, CHF (Last Echo ~2 years ago with EF 35%), COPD, CHRISS (on home CPAP machine), PPM/Defibrillator ("heart function was 3%"), prior diabetic foot ulcers/infections, depression presents with worsening weakness and SOB for past few days. As per daughter at bedside, wife found pt passed out sitting on toilet. Estimated time on toilet is around 10-12 hours. Pt states he "blacked out", but didnt fall off toilet. Pt also c/o inability to ambulate now secondary to worsening of weakness. In ED, pt remains short of breath, found to be hyperglycemic, AG 22, Serum CO2 14, lactate 8.0, CK 24k, transaminitis, hyperkalemic, Cr 2.85, with leukocytosis and febrile to 101.7. Surgery consulted for concern of RLE cellulitis and r/o nec fasc. MICU consult called. Denies n/v/d. CP, h/a, dizziness, or any other acute complaints at this time. (04 Sep 2019 17:27)    Interval History:  Patient was seen and examined at bedside around 9:30 am. Feeling slightly better.   Complaining of intermittent chronic lower back and leg pain.   Denies chest pain, palpitations, shortness of breath, headache, dizziness, visual symptoms, nausea, vomiting or abdominal pain.  No BM in few days. + Flatus.     ROS:  As per interval history otherwise unremarkable.    PHYSICAL EXAM:  Vital Signs   T(C): 36.6 (08 Sep 2019 06:06), Max: 36.7 (07 Sep 2019 22:47)  T(F): 97.9 (08 Sep 2019 06:06), Max: 98.1 (07 Sep 2019 22:47)  HR: 75 (08 Sep 2019 13:21) (66 - 85)  BP: 124/62 (08 Sep 2019 13:21) (110/66 - 126/75)  RR: 18 (08 Sep 2019 13:21) (16 - 18)  SpO2: 97% (08 Sep 2019 13:21) (97% - 98%)  General: Morbidly obese male sitting in bed comfortably. No acute distress  HEENT: PERRLA. EOMI. Clear conjunctivae. Moist mucus membrane  Neck: Supple. No JVD. No Thyromegaly   Chest: Good air entry bilaterally - no wheezing, rales or rhonchi.   Heart: Normal S1 & S2. RRR.   Abdomen: Soft. Non-tender. Non-distended. + BS  Ext: 4+ pedal edema with chronic skin changes and blisters. Fungal rash in between toes. No calf tenderness.  Neuro: AAO x 3. No speech disorder  Skin: Warm and Dry  Psychiatry: Normal mood and affect    I&O's Summary    07 Sep 2019 07:01  -  08 Sep 2019 07:00  --------------------------------------------------------  IN: 0 mL / OUT: 900 mL / NET: -900 mL    MEDICATIONS  (STANDING):  buMETAnide Injectable 1 milliGRAM(s) IV Push every 12 hours  carvedilol 3.125 milliGRAM(s) Oral every 12 hours  clotrimazole 1% Cream 1 Application(s) Topical two times a day  docusate sodium 100 milliGRAM(s) Oral three times a day  heparin  Infusion.  Unit(s)/Hr (24 mL/Hr) IV Continuous <Continuous>  insulin glargine Injectable (LANTUS) 30 Unit(s) SubCutaneous every morning  insulin glargine Injectable (LANTUS) 10 Unit(s) SubCutaneous at bedtime  insulin lispro (HumaLOG) corrective regimen sliding scale   SubCutaneous Before meals and at bedtime  insulin lispro Injectable (HumaLOG) 13 Unit(s) SubCutaneous three times a day before meals  lactulose Syrup 20 Gram(s) Oral once  levothyroxine 50 MICROGram(s) Oral daily  midodrine 10 milliGRAM(s) Oral every 8 hours  mupirocin 2% Ointment 1 Application(s) Topical two times a day  nystatin Powder 1 Application(s) Topical every 12 hours  piperacillin/tazobactam IVPB.. 3.375 Gram(s) IV Intermittent every 12 hours  saccharomyces boulardii 250 milliGRAM(s) Oral two times a day  senna 2 Tablet(s) Oral at bedtime  sodium chloride 0.225% 1000 milliLiter(s) (50 mL/Hr) IV Continuous <Continuous>  vancomycin  IVPB 1000 milliGRAM(s) IV Intermittent <User Schedule>  warfarin 5 milliGRAM(s) Oral once    MEDICATIONS  (PRN):  acetaminophen   Tablet .. 650 milliGRAM(s) Oral every 6 hours PRN Moderate Pain (4 - 6)  heparin  Injectable 55566 Unit(s) IV Push every 6 hours PRN For aPTT less than 40  heparin  Injectable 5000 Unit(s) IV Push every 6 hours PRN For aPTT between 40 - 57  HYDROmorphone  Injectable 1 milliGRAM(s) IV Push every 4 hours PRN Severe Pain (7 - 10)  polyethylene glycol 3350 17 Gram(s) Oral daily PRN Constipation    LABS:  CAPILLARY BLOOD GLUCOSE  POCT Blood Glucose.: 331 mg/dL (08 Sep 2019 13:26)  POCT Blood Glucose.: 305 mg/dL (08 Sep 2019 08:26)  POCT Blood Glucose.: 251 mg/dL (07 Sep 2019 22:13)  POCT Blood Glucose.: 234 mg/dL (07 Sep 2019 16:56)                        15.2   5.60  )-----------( 102      ( 08 Sep 2019 06:18 )             47.0     09-08    135  |  98  |  33.0<H>  ----------------------------<  325<H>  4.6   |  24.0  |  1.36<H>    Ca    8.7      08 Sep 2019 06:18  Phos  2.8     09-07  Mg     2.4     09-08    TPro  6.4<L>  /  Alb  2.6<L>  /  TBili  0.8  /  DBili  x   /  AST  165<H>  /  ALT  51<H>  /  AlkPhos  281<H>  09-08    PT/INR - ( 08 Sep 2019 06:18 )   PT: 13.7 sec;   INR: 1.19 ratio         PTT - ( 08 Sep 2019 06:18 )  PTT:63.6 sec  CARDIAC MARKERS ( 08 Sep 2019 06:18 )  x     / x     / 4043 U/L / x     / 6.1 ng/mL  CARDIAC MARKERS ( 07 Sep 2019 06:10 )  x     / x     / 6168 U/L / x     / 9.0 ng/mL    Blood Culture: 09-04 @ 22:14  Organism --  Gram Stain Blood -- Gram Stain --  Specimen Source .Urine  Culture-Blood --    09-04 @ 13:12  Organism --  Gram Stain Blood -- Gram Stain --  Specimen Source .Blood  Culture-Blood --    09-04 @ 13:11  Organism --  Gram Stain Blood -- Gram Stain --  Specimen Source .Blood  Culture-Blood --    RADIOLOGY & ADDITIONAL STUDIES:  Reviewed

## 2019-09-09 LAB
ALBUMIN SERPL ELPH-MCNC: 2.4 G/DL — LOW (ref 3.3–5.2)
ALP SERPL-CCNC: 270 U/L — HIGH (ref 40–120)
ALT FLD-CCNC: 47 U/L — HIGH
ANION GAP SERPL CALC-SCNC: 12 MMOL/L — SIGNIFICANT CHANGE UP (ref 5–17)
APTT BLD: >200 SEC — CRITICAL HIGH (ref 27.5–36.3)
AST SERPL-CCNC: 134 U/L — HIGH
BILIRUB SERPL-MCNC: 0.9 MG/DL — SIGNIFICANT CHANGE UP (ref 0.4–2)
BUN SERPL-MCNC: 36 MG/DL — HIGH (ref 8–20)
CALCIUM SERPL-MCNC: 9.1 MG/DL — SIGNIFICANT CHANGE UP (ref 8.6–10.2)
CHLORIDE SERPL-SCNC: 94 MMOL/L — LOW (ref 98–107)
CK MB CFR SERPL CALC: 5 NG/ML — SIGNIFICANT CHANGE UP (ref 0–6.7)
CK SERPL-CCNC: 2611 U/L — HIGH (ref 30–200)
CO2 SERPL-SCNC: 29 MMOL/L — SIGNIFICANT CHANGE UP (ref 22–29)
CREAT SERPL-MCNC: 1.33 MG/DL — HIGH (ref 0.5–1.3)
CULTURE RESULTS: SIGNIFICANT CHANGE UP
CULTURE RESULTS: SIGNIFICANT CHANGE UP
GLUCOSE BLDC GLUCOMTR-MCNC: 152 MG/DL — HIGH (ref 70–99)
GLUCOSE BLDC GLUCOMTR-MCNC: 162 MG/DL — HIGH (ref 70–99)
GLUCOSE BLDC GLUCOMTR-MCNC: 282 MG/DL — HIGH (ref 70–99)
GLUCOSE BLDC GLUCOMTR-MCNC: 290 MG/DL — HIGH (ref 70–99)
GLUCOSE BLDC GLUCOMTR-MCNC: 290 MG/DL — HIGH (ref 70–99)
GLUCOSE SERPL-MCNC: 323 MG/DL — HIGH (ref 70–115)
HCT VFR BLD CALC: 46.9 % — SIGNIFICANT CHANGE UP (ref 39–50)
HGB BLD-MCNC: 15 G/DL — SIGNIFICANT CHANGE UP (ref 13–17)
INR BLD: 3.45 RATIO — HIGH (ref 0.88–1.16)
MAGNESIUM SERPL-MCNC: 1.9 MG/DL — SIGNIFICANT CHANGE UP (ref 1.6–2.6)
MCHC RBC-ENTMCNC: 28.8 PG — SIGNIFICANT CHANGE UP (ref 27–34)
MCHC RBC-ENTMCNC: 32 GM/DL — SIGNIFICANT CHANGE UP (ref 32–36)
MCV RBC AUTO: 90 FL — SIGNIFICANT CHANGE UP (ref 80–100)
PLATELET # BLD AUTO: 117 K/UL — LOW (ref 150–400)
POTASSIUM SERPL-MCNC: 4.4 MMOL/L — SIGNIFICANT CHANGE UP (ref 3.5–5.3)
POTASSIUM SERPL-SCNC: 4.4 MMOL/L — SIGNIFICANT CHANGE UP (ref 3.5–5.3)
PROT SERPL-MCNC: 6.2 G/DL — LOW (ref 6.6–8.7)
PROTHROM AB SERPL-ACNC: 41.2 SEC — HIGH (ref 10–12.9)
RBC # BLD: 5.21 M/UL — SIGNIFICANT CHANGE UP (ref 4.2–5.8)
RBC # FLD: 15.4 % — HIGH (ref 10.3–14.5)
SODIUM SERPL-SCNC: 135 MMOL/L — SIGNIFICANT CHANGE UP (ref 135–145)
SPECIMEN SOURCE: SIGNIFICANT CHANGE UP
SPECIMEN SOURCE: SIGNIFICANT CHANGE UP
VANCOMYCIN TROUGH SERPL-MCNC: 17.8 UG/ML — SIGNIFICANT CHANGE UP (ref 10–20)
WBC # BLD: 6.63 K/UL — SIGNIFICANT CHANGE UP (ref 3.8–10.5)
WBC # FLD AUTO: 6.63 K/UL — SIGNIFICANT CHANGE UP (ref 3.8–10.5)

## 2019-09-09 PROCEDURE — 99223 1ST HOSP IP/OBS HIGH 75: CPT

## 2019-09-09 PROCEDURE — 99232 SBSQ HOSP IP/OBS MODERATE 35: CPT

## 2019-09-09 PROCEDURE — 99233 SBSQ HOSP IP/OBS HIGH 50: CPT

## 2019-09-09 RX ORDER — WARFARIN SODIUM 2.5 MG/1
7.5 TABLET ORAL ONCE
Refills: 0 | Status: COMPLETED | OUTPATIENT
Start: 2019-09-09 | End: 2019-09-09

## 2019-09-09 RX ADMIN — Medication 250 MILLIGRAM(S): at 12:12

## 2019-09-09 RX ADMIN — Medication 6: at 12:12

## 2019-09-09 RX ADMIN — Medication 250 MILLIGRAM(S): at 18:02

## 2019-09-09 RX ADMIN — HYDROMORPHONE HYDROCHLORIDE 1 MILLIGRAM(S): 2 INJECTION INTRAMUSCULAR; INTRAVENOUS; SUBCUTANEOUS at 00:49

## 2019-09-09 RX ADMIN — Medication 1 APPLICATION(S): at 05:52

## 2019-09-09 RX ADMIN — Medication 6: at 09:30

## 2019-09-09 RX ADMIN — Medication 1 APPLICATION(S): at 18:03

## 2019-09-09 RX ADMIN — HYDROMORPHONE HYDROCHLORIDE 1 MILLIGRAM(S): 2 INJECTION INTRAMUSCULAR; INTRAVENOUS; SUBCUTANEOUS at 22:15

## 2019-09-09 RX ADMIN — Medication 100 MILLIGRAM(S): at 12:13

## 2019-09-09 RX ADMIN — NYSTATIN CREAM 1 APPLICATION(S): 100000 CREAM TOPICAL at 05:53

## 2019-09-09 RX ADMIN — MIDODRINE HYDROCHLORIDE 10 MILLIGRAM(S): 2.5 TABLET ORAL at 05:51

## 2019-09-09 RX ADMIN — HYDROMORPHONE HYDROCHLORIDE 1 MILLIGRAM(S): 2 INJECTION INTRAMUSCULAR; INTRAVENOUS; SUBCUTANEOUS at 01:35

## 2019-09-09 RX ADMIN — BUMETANIDE 1 MILLIGRAM(S): 0.25 INJECTION INTRAMUSCULAR; INTRAVENOUS at 05:51

## 2019-09-09 RX ADMIN — Medication 100 MILLIGRAM(S): at 22:59

## 2019-09-09 RX ADMIN — HYDROMORPHONE HYDROCHLORIDE 1 MILLIGRAM(S): 2 INJECTION INTRAMUSCULAR; INTRAVENOUS; SUBCUTANEOUS at 18:00

## 2019-09-09 RX ADMIN — Medication 13 UNIT(S): at 09:28

## 2019-09-09 RX ADMIN — Medication 13 UNIT(S): at 18:01

## 2019-09-09 RX ADMIN — SENNA PLUS 2 TABLET(S): 8.6 TABLET ORAL at 22:59

## 2019-09-09 RX ADMIN — Medication 250 MILLIGRAM(S): at 22:58

## 2019-09-09 RX ADMIN — PIPERACILLIN AND TAZOBACTAM 25 GRAM(S): 4; .5 INJECTION, POWDER, LYOPHILIZED, FOR SOLUTION INTRAVENOUS at 05:48

## 2019-09-09 RX ADMIN — HYDROMORPHONE HYDROCHLORIDE 1 MILLIGRAM(S): 2 INJECTION INTRAMUSCULAR; INTRAVENOUS; SUBCUTANEOUS at 17:38

## 2019-09-09 RX ADMIN — INSULIN GLARGINE 30 UNIT(S): 100 INJECTION, SOLUTION SUBCUTANEOUS at 09:31

## 2019-09-09 RX ADMIN — CARVEDILOL PHOSPHATE 3.12 MILLIGRAM(S): 80 CAPSULE, EXTENDED RELEASE ORAL at 18:02

## 2019-09-09 RX ADMIN — CARVEDILOL PHOSPHATE 3.12 MILLIGRAM(S): 80 CAPSULE, EXTENDED RELEASE ORAL at 06:02

## 2019-09-09 RX ADMIN — WARFARIN SODIUM 7.5 MILLIGRAM(S): 2.5 TABLET ORAL at 22:59

## 2019-09-09 RX ADMIN — HYDROMORPHONE HYDROCHLORIDE 1 MILLIGRAM(S): 2 INJECTION INTRAMUSCULAR; INTRAVENOUS; SUBCUTANEOUS at 06:20

## 2019-09-09 RX ADMIN — MUPIROCIN 1 APPLICATION(S): 20 OINTMENT TOPICAL at 18:03

## 2019-09-09 RX ADMIN — HYDROMORPHONE HYDROCHLORIDE 1 MILLIGRAM(S): 2 INJECTION INTRAMUSCULAR; INTRAVENOUS; SUBCUTANEOUS at 14:00

## 2019-09-09 RX ADMIN — Medication 2: at 23:12

## 2019-09-09 RX ADMIN — HYDROMORPHONE HYDROCHLORIDE 1 MILLIGRAM(S): 2 INJECTION INTRAMUSCULAR; INTRAVENOUS; SUBCUTANEOUS at 05:58

## 2019-09-09 RX ADMIN — Medication 50 MICROGRAM(S): at 05:51

## 2019-09-09 RX ADMIN — HYDROMORPHONE HYDROCHLORIDE 1 MILLIGRAM(S): 2 INJECTION INTRAMUSCULAR; INTRAVENOUS; SUBCUTANEOUS at 12:20

## 2019-09-09 RX ADMIN — Medication 13 UNIT(S): at 12:11

## 2019-09-09 RX ADMIN — Medication 250 MILLIGRAM(S): at 05:50

## 2019-09-09 RX ADMIN — HEPARIN SODIUM 0 UNIT(S)/HR: 5000 INJECTION INTRAVENOUS; SUBCUTANEOUS at 12:10

## 2019-09-09 RX ADMIN — INSULIN GLARGINE 12 UNIT(S): 100 INJECTION, SOLUTION SUBCUTANEOUS at 23:00

## 2019-09-09 RX ADMIN — NYSTATIN CREAM 1 APPLICATION(S): 100000 CREAM TOPICAL at 18:02

## 2019-09-09 RX ADMIN — MUPIROCIN 1 APPLICATION(S): 20 OINTMENT TOPICAL at 05:53

## 2019-09-09 RX ADMIN — PIPERACILLIN AND TAZOBACTAM 25 GRAM(S): 4; .5 INJECTION, POWDER, LYOPHILIZED, FOR SOLUTION INTRAVENOUS at 18:00

## 2019-09-09 RX ADMIN — Medication 2: at 18:01

## 2019-09-09 NOTE — PROGRESS NOTE ADULT - SUBJECTIVE AND OBJECTIVE BOX
MARY ANN CORNELL    [id number]    68y      Male    INTERVAL HPI/OVERNIGHT EVENTS:    Pt is a 66 y/o male with PMHx DM (w/ neuropathy), HN, CHF (Last Echo ~2 years ago with EF 35%), COPD, CHRISS (on home CPAP machine), PPM/Defibrillator ("heart function was 3%"), prior diabetic foot ulcers/infections, depression presents with worsening weakness and SOB for past few days. As per daughter at bedside, wife found pt passed out sitting on toilet. Estimated time on toilet is around 10-12 hours. Pt states he "blacked out", but didnt fall off toilet. Pt also c/o inability to ambulate now secondary to worsening of weakness. In ED, pt remained short of breath, found to be hyperglycemic, AG 22, Serum CO2 14, lactate 8.0, CK 24k, transaminitis, hyperkalemic, Cr 2.85, with leukocytosis and febrile to 101.7. Surgery consulted for concern of RLE cellulitis and r/o nec fasc. MICU saw the patient.     patient seen at bedside and states feeling weak.     REVIEW OF SYSTEMS:    CONSTITUTIONAL: fatigue  RESPIRATORY: No cough, wheezing, hemoptysis; No shortness of breath  CARDIOVASCULAR: No chest pain, palpitations  GASTROINTESTINAL: No abdominal or epigastric pain. No nausea, vomiting  NEUROLOGICAL: No headaches, memory loss, loss of strength.  MISCELLANEOUS:      Vital Signs Last 24 Hrs  T(C): 36.5 (09 Sep 2019 05:41), Max: 36.6 (08 Sep 2019 21:10)  T(F): 97.7 (09 Sep 2019 05:41), Max: 97.8 (08 Sep 2019 21:10)  HR: 94 (09 Sep 2019 08:00) (86 - 99)  BP: 116/68 (09 Sep 2019 05:41) (114/80 - 124/72)  BP(mean): --  RR: 18 (09 Sep 2019 08:00) (18 - 18)  SpO2: 96% (09 Sep 2019 08:00) (92% - 99%)    PHYSICAL EXAM:    General: Morbidly obese male sitting in bed comfortably. No acute distress  HEENT: PERRLA. EOMI. Clear conjunctivae. Moist mucus membrane  Neck: Supple. No JVD. No Thyromegaly   Chest: Good air entry bilaterally - no wheezing, rales or rhonchi.   Heart: Normal S1 & S2. RRR.   Abdomen: Soft. Non-tender. Non-distended. + BS  Ext: 4+ pedal edema with chronic skin changes and blisters. Fungal rash in between toes. No calf tenderness.  Neuro: AAO x 3. No speech disorder  Skin: Warm and Dry  Psychiatry: Normal mood and affect        LABS:                        15.0   6.63  )-----------( 117      ( 09 Sep 2019 06:03 )             46.9     09-09    135  |  94<L>  |  36.0<H>  ----------------------------<  323<H>  4.4   |  29.0  |  1.33<H>    Ca    9.1      09 Sep 2019 06:03  Mg     1.9     09-09    TPro  6.2<L>  /  Alb  2.4<L>  /  TBili  0.9  /  DBili  x   /  AST  134<H>  /  ALT  47<H>  /  AlkPhos  270<H>  09-09    PT/INR - ( 09 Sep 2019 09:35 )   PT: 41.2 sec;   INR: 3.45 ratio         PTT - ( 09 Sep 2019 09:35 )  PTT:>200.0 sec        MEDICATIONS  (STANDING):  carvedilol 3.125 milliGRAM(s) Oral every 12 hours  clotrimazole 1% Cream 1 Application(s) Topical two times a day  docusate sodium 100 milliGRAM(s) Oral three times a day  heparin  Infusion.  Unit(s)/Hr (24 mL/Hr) IV Continuous <Continuous>  insulin glargine Injectable (LANTUS) 30 Unit(s) SubCutaneous every morning  insulin glargine Injectable (LANTUS) 12 Unit(s) SubCutaneous at bedtime  insulin lispro (HumaLOG) corrective regimen sliding scale   SubCutaneous Before meals and at bedtime  insulin lispro Injectable (HumaLOG) 13 Unit(s) SubCutaneous three times a day before meals  levothyroxine 50 MICROGram(s) Oral daily  mupirocin 2% Ointment 1 Application(s) Topical two times a day  nystatin Powder 1 Application(s) Topical every 12 hours  piperacillin/tazobactam IVPB.. 3.375 Gram(s) IV Intermittent every 12 hours  saccharomyces boulardii 250 milliGRAM(s) Oral two times a day  senna 2 Tablet(s) Oral at bedtime  vancomycin  IVPB 1000 milliGRAM(s) IV Intermittent <User Schedule>  warfarin 7.5 milliGRAM(s) Oral once    MEDICATIONS  (PRN):  acetaminophen   Tablet .. 650 milliGRAM(s) Oral every 6 hours PRN Moderate Pain (4 - 6)  heparin  Injectable 53145 Unit(s) IV Push every 6 hours PRN For aPTT less than 40  heparin  Injectable 5000 Unit(s) IV Push every 6 hours PRN For aPTT between 40 - 57  HYDROmorphone  Injectable 1 milliGRAM(s) IV Push every 4 hours PRN Severe Pain (7 - 10)  polyethylene glycol 3350 17 Gram(s) Oral daily PRN Constipation      RADIOLOGY & ADDITIONAL TESTS:

## 2019-09-09 NOTE — PROGRESS NOTE ADULT - ASSESSMENT
66 y/o male with uncontrolled DM (w/ neuropathy), HN, CHF (Last Echo ~2 years ago with EF 35%), COPD, CHRISS (on home CPAP machine), PPM/Defibrillator ("heart function was 3%"), prior diabetic foot ulcers/infections, depression presents with worsening weakness and SOB  In ED, pt remains short of breath, found to be hyperglycemic, AG 22, Serum CO2 14, lactate 8.0, CK 24k, transaminitis, hyperkalemic, Cr 2.85, with leukocytosis and febrile to 101.7.       FOUND WITH:    RIGHT LOWER EXT cellulitis  Acute renal failure on CKD  Rhabdomyolysis  uncontrolled DM  likely tinea pedis of bilateral feet      - continue Zosyn;  continue empiric Vancomycin for now.  MRSA on PCR;  RENAL function improving    - Blood cultures so far negative  - Miconazole topical    - watch renal fx closely; given rhabdo and ARF on CKD  - needs good sugar control  - follow up all outstanding cultures  - trend temperature and WBC curve  - repeat cultures from blood and all sources if febrile.     Will follow

## 2019-09-09 NOTE — PROGRESS NOTE ADULT - SUBJECTIVE AND OBJECTIVE BOX
[facility]  INFECTIOUS DISEASES AND INTERNAL MEDICINE at [place]  =======================================================  Miguelangel Seo MD  Diplomates American Board of Internal Medicine and Infectious Diseases  Tel: [phone]      Fax: [phone]  =======================================================  MARY ANN CORNELL [id number]  Follow up: Cellulitis    No fever or chills  No diarrhea     Allergies:  allow double protein at meals (Unknown)  No Known Allergies    Antibiotics:  piperacillin/tazobactam IVPB.. 3.375 Gram(s) IV Intermittent every 12 hours  vancomycin  IVPB 1000 milliGRAM(s) IV Intermittent <User Schedule>       REVIEW OF SYSTEMS:  CONSTITUTIONAL:  No Fever or chills  HEENT:   No diplopia or blurred vision.  No earache, sore throat or runny nose.  CARDIOVASCULAR:  No pressure, squeezing, strangling, tightness, heaviness or aching about the chest, neck, axilla or epigastrium.  RESPIRATORY:  No cough, shortness of breath  GASTROINTESTINAL:  No nausea, vomiting or diarrhea.  GENITOURINARY:  No dysuria, frequency or urgency.   MUSCULOSKELETAL:  no joint aches, no muscle pain  SKIN:  B/L LE swelling   NEUROLOGIC:  No Headaches, seizures  PSYCHIATRIC:  No disorder of thought or mood.  ENDOCRINE:  No heat or cold intolerance  HEMATOLOGICAL:  No easy bruising or bleeding.       Physical Exam:  T(F): 97.7 (09 Sep 2019 05:41), Max: 97.8 (08 Sep 2019 21:10)  HR: 94 (09 Sep 2019 08:00)  BP: 116/68 (09 Sep 2019 05:41)  RR: 18 (09 Sep 2019 08:00)  SpO2: 96% (09 Sep 2019 08:00) (92% - 99%)  temp max in last 48H T(F): , Max: 98.1 (09-07-19 @ 22:47)      GEN: NAD, pleasant  HEENT: normocephalic and atraumatic. EOMI. PERRL.  Anicteric   NECK: Supple.   LUNGS: Clear to auscultation.  HEART: Regular rate and rhythm   ABDOMEN: Soft, nontender, and nondistended.  Positive bowel sounds.    : No CVA tenderness  EXTREMITIES: + edema.  MSK: no joint swelling  NEUROLOGIC:  No focal deficits  PSYCHIATRIC: Appropriate affect .  SKIN: chronic stasis dermatitis with overlying erythema, and bullae       Labs:                        15.0   6.63  )-----------( 117      ( 09 Sep 2019 06:03 )             46.9       WBC Count: 6.63 K/uL (09-09-19 @ 06:03)  WBC Count: 5.60 K/uL (09-08-19 @ 06:18)  WBC Count: 5.58 K/uL (09-07-19 @ 06:10)  WBC Count: 8.47 K/uL (09-07-19 @ 01:58)  WBC Count: 12.15 K/uL (09-06-19 @ 05:39)  WBC Count: 19.87 K/uL (09-05-19 @ 08:58)  WBC Count: 14.96 K/uL (09-05-19 @ 04:32)      09-09    135  |  94<L>  |  36.0<H>  ----------------------------<  323<H>  4.4   |  29.0  |  1.33<H>    Ca    9.1      09 Sep 2019 06:03  Mg     1.9     09-09    TPro  6.2<L>  /  Alb  2.4<L>  /  TBili  0.9  /  DBili  x   /  AST  134<H>  /  ALT  47<H>  /  AlkPhos  270<H>  09-09      Culture - Urine (collected 09-04-19 @ 22:14)  Source: .Urine  Final Report (09-05-19 @ 16:30):    No growth    Culture - Blood (collected 09-04-19 @ 13:12)  Source: .Blood    Culture - Blood (collected 09-04-19 @ 13:11)  Source: .Blood        Creatinine, Serum: 1.33 mg/dL (09-09-19 @ 06:03)  Creatinine, Serum: 1.36 mg/dL (09-08-19 @ 06:18)  Creatinine, Serum: 1.65 mg/dL (09-07-19 @ 06:10)  Creatinine, Serum: 2.14 mg/dL (09-06-19 @ 05:39)  Creatinine, Serum: 2.68 mg/dL (09-05-19 @ 14:01)  Creatinine, Serum: 2.94 mg/dL (09-05-19 @ 04:32)  Creatinine, Serum: 3.13 mg/dL (09-05-19 @ 00:58)  Creatinine, Serum: 3.12 mg/dL (09-04-19 @ 18:49)

## 2019-09-09 NOTE — PHYSICAL THERAPY INITIAL EVALUATION ADULT - LEVEL OF INDEPENDENCE: GAIT, REHAB EVAL
unable to perform/pt. with dizziness in standing and reporting increased pain in buttocks and back - unable to tolerate/maintain standing

## 2019-09-09 NOTE — PHYSICAL THERAPY INITIAL EVALUATION ADULT - ADDITIONAL COMMENTS
Pt. reports he lives in a house with his wife with 2 GOLDY and no rail and 1 step inside. 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 Pt. was independent vs modified independent PTA with a SAC.

## 2019-09-09 NOTE — PROGRESS NOTE ADULT - SUBJECTIVE AND OBJECTIVE BOX
NEPHROLOGY INTERVAL HPI/OVERNIGHT EVENTS:    Examined has ok UOP  Renal function better  Morbid obesity  Clinically looks volume overoaded  denies HA CP N/V/D    MEDICATIONS  (STANDING):  carvedilol 3.125 milliGRAM(s) Oral every 12 hours  clotrimazole 1% Cream 1 Application(s) Topical two times a day  docusate sodium 100 milliGRAM(s) Oral three times a day  heparin  Infusion.  Unit(s)/Hr (24 mL/Hr) IV Continuous <Continuous>  insulin glargine Injectable (LANTUS) 30 Unit(s) SubCutaneous every morning  insulin glargine Injectable (LANTUS) 12 Unit(s) SubCutaneous at bedtime  insulin lispro (HumaLOG) corrective regimen sliding scale   SubCutaneous Before meals and at bedtime  insulin lispro Injectable (HumaLOG) 13 Unit(s) SubCutaneous three times a day before meals  levothyroxine 50 MICROGram(s) Oral daily  mupirocin 2% Ointment 1 Application(s) Topical two times a day  nystatin Powder 1 Application(s) Topical every 12 hours  piperacillin/tazobactam IVPB.. 3.375 Gram(s) IV Intermittent every 12 hours  saccharomyces boulardii 250 milliGRAM(s) Oral two times a day  senna 2 Tablet(s) Oral at bedtime  vancomycin  IVPB 1000 milliGRAM(s) IV Intermittent <User Schedule>  warfarin 7.5 milliGRAM(s) Oral once    MEDICATIONS  (PRN):  acetaminophen   Tablet .. 650 milliGRAM(s) Oral every 6 hours PRN Moderate Pain (4 - 6)  heparin  Injectable 05384 Unit(s) IV Push every 6 hours PRN For aPTT less than 40  heparin  Injectable 5000 Unit(s) IV Push every 6 hours PRN For aPTT between 40 - 57  HYDROmorphone  Injectable 1 milliGRAM(s) IV Push every 4 hours PRN Severe Pain (7 - 10)  polyethylene glycol 3350 17 Gram(s) Oral daily PRN Constipation      Allergies    No Known Allergies    Intolerances    allow double protein at meals (Unknown)      Vital Signs Last 24 Hrs  T(C): 36.5 (09 Sep 2019 05:41), Max: 36.6 (08 Sep 2019 21:10)  T(F): 97.7 (09 Sep 2019 05:41), Max: 97.8 (08 Sep 2019 21:10)  HR: 94 (09 Sep 2019 08:00) (86 - 99)  BP: 116/68 (09 Sep 2019 05:41) (114/80 - 124/72)  BP(mean): --  RR: 18 (09 Sep 2019 08:00) (18 - 18)  SpO2: 96% (09 Sep 2019 08:00) (92% - 99%)  Daily     Daily Weight in k.6 (09 Sep 2019 04:40)    PHYSICAL EXAM:  GENERAL: NAD,   HEAD:  Atraumatic, No edema   NECK: Supple, No JVD,   CHEST/LUNG: EAE , No wheeze   HEART: Regular rate and rhythm; No gallop or rub   ABDOMEN: Morbid obesity . BNo rebound   EXTREMITIES:  pitting edema , + cellulitis     LABS:                        15.0   6.63  )-----------( 117      ( 09 Sep 2019 06:03 )             46.9         135  |  94<L>  |  36.0<H>  ----------------------------<  323<H>  4.4   |  29.0  |  1.33<H>    Ca    9.1      09 Sep 2019 06:03  Mg     1.9         TPro  6.2<L>  /  Alb  2.4<L>  /  TBili  0.9  /  DBili  x   /  AST  134<H>  /  ALT  47<H>  /  AlkPhos  270<H>      PT/INR - ( 09 Sep 2019 09:35 )   PT: 41.2 sec;   INR: 3.45 ratio         PTT - ( 09 Sep 2019 09:35 )  PTT:>200.0 sec    Magnesium, Serum: 1.9 mg/dL ( @ 06:03)          RADIOLOGY & ADDITIONAL TESTS:

## 2019-09-09 NOTE — PHYSICAL THERAPY INITIAL EVALUATION ADULT - ACTIVE RANGE OF MOTION EXAMINATION, REHAB EVAL
with exception to b/l ankle DF and PF - at neutral. B/l hip flexion limited by body habitus/no Active ROM deficits were identified

## 2019-09-09 NOTE — PROGRESS NOTE ADULT - ASSESSMENT
69 y/o morbidly obese male with multiple medical problems was admitted to ICU on 9/4/19 for severe sepsis / cellulitis of b/L LE. pt. was brought in as while sitting on his toilet pt. passed out but as per family ( daughters at bedside ) pt. did not have any fall, did not hit his head to ground and was found sitting on the toilet. Pt. was on pressors initially and admitted to icu. Pt. was also noted to have rhabdo. pt. got some fluids but his EF is 25 to 30 % and as per icu pt. appeared to be volume over loaded and fluids were cut down. pt. is off the pressors now. pt. feels better. no cp. no sob . no HA reported. no abd. pain. no n/v/d. Pt. has which appears to be chronic leg edema and as per pt. his lower ext. wound come and go as due to fluid retention he gets blisters and they burst.     1) Septic Shock (Resolved)  - Likely secondary to LE Cellulitis  - Cultures negative  - Continue Vanco and Zosyn  - ID follow up noted   - Continue Midodrine   - Wound care consult    2) A. Fib with RVR   - Continue Coreg  - Continue Heparin Infusion for bridging to Coumadin  - Coumadin tongt   - Cardiology follow up noted: Single chamber SJM ICD interrogated. Normal functioning device. Implant date 8/18/16. VVI 40.  <1%. Arrythmia log shows occasional brief irregular tachy episodes likley c/w AF with RVR. No ICD therapy needed    3) Severe Rhabdomyolysis  - Improving  - Renal consult appreciated    4) Acute on chronic kidney injury  - Likely secondary to rhabdomyolysis  - Improving  - Avoid nephrotoxic medications    5) Chronic Systolic Heart Failure   - Continue Coreg  - No ACEIs or ARBs due to worsening renal function  - Cardio input appreciated    6) DM 2  - HbA1c 11.6  - Accu checks and ISS   - Humalog to 13 units premeals  - Lantus 30 units in am. Increase bedtime to 12 units.   - Diabetes Education  - Endocrine Consult     7) LE Weakness  - Multifactorial  - PT Eval  - Neurology Consult appreciated     8) Morbidly Obese  - Lifestyle modification (explained to him at length)    9) CHRISS  - Continue Nocturnal and PRN CPAP    10) Hypothyroidism  - Continue Synthroid  - Repeat TSH in 2-3 weeks. If still elevated then increase the dose.     11) Elevated LFTs  - Likely secondary to septic shock and rhabdomyolysis  - Improving  - Monitor liver function    DVT Prophylaxis -- Patient is on Heparin and Coumadin

## 2019-09-09 NOTE — PROGRESS NOTE ADULT - ASSESSMENT
Improved JAYLENE on CKD   Morbid obesity , DM , HTN , CM, CHRISS , chronic edema   + Rhabdo from immobilization on toilet x 12 hours - improved ( CPK 30 K ----> 2 K )  + Cellulitis with improved septic shock     TTE ECHO noted   Vanco and Zosyn as per ID   Renal sono noted no hydronephrosis  Clinically volume overloaded cr better will restart torsemide 40 mg Q day will likely need to escalate  Will need to tolerate some degree of azotemia     Will follow

## 2019-09-09 NOTE — CONSULT NOTE ADULT - REASON FOR ADMISSION
Cellulitis/Severe Sepsis

## 2019-09-09 NOTE — CONSULT NOTE ADULT - ASSESSMENT
68 y/o male with PMHx DM (w/ neuropathy), HN, CHF (Last Echo ~2 years ago with EF 35%), COPD, CHRISS (on home CPAP machine), PPM/Defibrillator ("heart function was 3%"), prior diabetic foot ulcers/infections, depression presents with worsening weakness and SOB for past few days. As per daughter at bedside, wife found pt passed out sitting on toilet. Estimated time on toilet is around 10-12 hours. Admit with DKA, and then patient found to have rhabdomyolysis with JAYLENE on CKD and cellulitis.  1. uncontrolled T2DM comp by neuropathy -s/p DKA.  poor outpt control due to nonadherence with diet, glucoses remain elevated  - cont Lantus   - increase Humalog to 18 units premeals  - cont Humalog scale    2. Hypothyroid - labs reviewed, cont Lt4 50 mcg daily, repeat TFTs 4-6 weeks    3. LE cellulitis - cont Abx per ID

## 2019-09-09 NOTE — CONSULT NOTE ADULT - SUBJECTIVE AND OBJECTIVE BOX
HPI:  Pt is a 68 y/o male with PMHx DM (w/ neuropathy), HN, CHF (Last Echo ~2 years ago with EF 35%), COPD, CHRISS (on home CPAP machine), PPM/Defibrillator ("heart function was 3%"), prior diabetic foot ulcers/infections, depression presents with worsening weakness and SOB for past few days. As per daughter at bedside, wife found pt passed out sitting on toilet. Estimated time on toilet is around 10-12 hours. Pt states he "blacked out", but didnt fall off toilet. Pt also c/o inability to ambulate now secondary to worsening of weakness. In ED, pt remains short of breath, found to be hyperglycemic, AG 22, Serum CO2 14, lactate 8.0, CK 24k, transaminitis, hyperkalemic, Cr 2.85, with leukocytosis and febrile to 101.7. Surgery consulted for concern of RLE cellulitis and r/o nec fasc. MICU consult called. Denies n/v/d. CP, h/a, dizziness, or any other acute complaints at this time. (04 Sep 2019 17:27) Patient found to have rhabdomyolysis with JAYLENE on CKD and cellulitis    T2DM x5-10 years that was initially diagnosed on routine blood test.  Outpt meds: Levemir 50 units daily, Novolog scale and Januvia. He reports FS lately have been 300-"hi".  He reports worsening glycemic control after his wife was diagnosed with Alzheimers dimentia and he stopped watching his diet.      Hypothyroidism for many years and has been adherent with Levothyroxine 50 mcg daily      PAST MEDICAL & SURGICAL HISTORY:  CHF (congestive heart failure)  Hypothyroid  Pulmonary hypertension  Prostate CA  Sleep apnea  Renal insufficiency  High cholesterol  HTN (hypertension)  DM (diabetes mellitus)  AICD (automatic cardioverter/defibrillator) present  S/P ankle ligament repair      FAMILY HISTORY:  Family history of diabetes mellitus (Sibling)  Family history of cancer (Sibling)      SOCIAL HISTORY: denies EtOH, illicit drugs, tobacco    MEDICATIONS  (STANDING):  carvedilol 3.125 milliGRAM(s) Oral every 12 hours  clotrimazole 1% Cream 1 Application(s) Topical two times a day  docusate sodium 100 milliGRAM(s) Oral three times a day  heparin  Infusion.  Unit(s)/Hr (24 mL/Hr) IV Continuous <Continuous>  insulin glargine Injectable (LANTUS) 30 Unit(s) SubCutaneous every morning  insulin glargine Injectable (LANTUS) 12 Unit(s) SubCutaneous at bedtime  insulin lispro (HumaLOG) corrective regimen sliding scale   SubCutaneous Before meals and at bedtime  insulin lispro Injectable (HumaLOG) 13 Unit(s) SubCutaneous three times a day before meals  levothyroxine 50 MICROGram(s) Oral daily  mupirocin 2% Ointment 1 Application(s) Topical two times a day  nystatin Powder 1 Application(s) Topical every 12 hours  piperacillin/tazobactam IVPB.. 3.375 Gram(s) IV Intermittent every 12 hours  saccharomyces boulardii 250 milliGRAM(s) Oral two times a day  senna 2 Tablet(s) Oral at bedtime  vancomycin  IVPB 1000 milliGRAM(s) IV Intermittent <User Schedule>  warfarin 7.5 milliGRAM(s) Oral once    MEDICATIONS  (PRN):  acetaminophen   Tablet .. 650 milliGRAM(s) Oral every 6 hours PRN Moderate Pain (4 - 6)  heparin  Injectable 03934 Unit(s) IV Push every 6 hours PRN For aPTT less than 40  heparin  Injectable 5000 Unit(s) IV Push every 6 hours PRN For aPTT between 40 - 57  HYDROmorphone  Injectable 1 milliGRAM(s) IV Push every 4 hours PRN Severe Pain (7 - 10)  polyethylene glycol 3350 17 Gram(s) Oral daily PRN Constipation      ALLERGIES: No Known Allergies      REVIEW OF SYSTEMS:  Constitutional: No fever, no chills, no change in weight.  Eyes: No blurry vision,  no loss of vision.  Lungs: No shortness of breath, no wheezing, no cough  CV: No chest pain, no palpitations  GI: No nausea, no vomiting, no constipation, no diarrhea, no abdominal pain  : No urinary frequency, no nocturia  Musculoskeletal: bilateral leg pain  Skin: No rash, no infections.  Neurologic: No headaches, no burning or pain in feet, no tremor.  Endocrine: No heat intolerance, no cold intolerance, no increased sweating  Psych: No depression, no anxiety      PHYSICAL EXAM:  Vital Signs Last 24 Hrs  T(C): 36.7 (09 Sep 2019 14:36), Max: 36.7 (09 Sep 2019 14:36)  T(F): 98 (09 Sep 2019 14:36), Max: 98 (09 Sep 2019 14:36)  HR: 82 (09 Sep 2019 14:36) (82 - 99)  BP: 114/71 (09 Sep 2019 14:36) (112/70 - 124/72)  BP(mean): --  RR: 18 (09 Sep 2019 14:36) (18 - 18)  SpO2: 97% (09 Sep 2019 14:36) (92% - 99%)    General appearance: Well developed, well nourished. Obese male  Eyes: Pupils equal. EOMI  Lungs: Normal respiratory excursion. Lungs clear no w/r/r  CV: Normal S1S2, regular. No m/r/g.  B/l edema  Abdomen: Soft, nontender, nondistended, obese  Skin: PVD skin changes bilateral LE  Neuro: Cranial nerves intact.   Psych: Normal affect, good judgement.      LABS:                        15.0   6.63  )-----------( 117      ( 09 Sep 2019 06:03 )             46.9     09-09    135  |  94<L>  |  36.0<H>  ----------------------------<  323<H>  4.4   |  29.0  |  1.33<H>    Ca    9.1      09 Sep 2019 06:03  Mg     1.9     09-09    TPro  6.2<L>  /  Alb  2.4<L>  /  TBili  0.9  /  DBili  x   /  AST  134<H>  /  ALT  47<H>  /  AlkPhos  270<H>  09-09    LIVER FUNCTIONS - ( 09 Sep 2019 06:03 )  Alb: 2.4 g/dL / Pro: 6.2 g/dL / ALK PHOS: 270 U/L / ALT: 47 U/L / AST: 134 U/L / GGT: x             Hemoglobin A1C, Whole Blood: 11.6 % <H> [4.0 - 5.6] (09-05-19 @ 04:32)      CAPILLARY BLOOD GLUCOSE  POCT Blood Glucose.: 282 mg/dL (09 Sep 2019 11:35)  POCT Blood Glucose.: 290 mg/dL (09 Sep 2019 09:28)  POCT Blood Glucose.: 290 mg/dL (09 Sep 2019 08:22)  POCT Blood Glucose.: 319 mg/dL (08 Sep 2019 21:14)  POCT Blood Glucose.: 267 mg/dL (08 Sep 2019 16:45)  POCT Blood Glucose.: 331 mg/dL (08 Sep 2019 13:26)  POCT Blood Glucose.: 305 mg/dL (08 Sep 2019 08:26)  POCT Blood Glucose.: 251 mg/dL (07 Sep 2019 22:13)  POCT Blood Glucose.: 234 mg/dL (07 Sep 2019 16:56)      Triiodothyronine, Total (T3 Total): 106 ng/dL [80 - 200] (09-07-19)  T4, Serum: 8.3 ug/dL [4.5 - 12.0] (09-07-19)  Thyroid Stimulating Hormone, Serum: 6.28 uIU/mL [0.27 - 4.20] (09-07-19)  Thyroid Stimulating Hormone, Serum: 3.65 uIU/mL [0.27 - 4.20] (09-05-19)

## 2019-09-09 NOTE — PROGRESS NOTE ADULT - SUBJECTIVE AND OBJECTIVE BOX
[facility]                                   [address] [place] [address]                                                      PHONE: [phone]                                                         FAX: [phone]  [email or web address]  ---------------------------------------------------------------------------------------------------------------------------------    Overnight events/patient complaints:  NAD feeling well today     allow double protein at meals (Unknown)  No Known Allergies    MEDICATIONS  (STANDING):  carvedilol 3.125 milliGRAM(s) Oral every 12 hours  clotrimazole 1% Cream 1 Application(s) Topical two times a day  docusate sodium 100 milliGRAM(s) Oral three times a day  heparin  Infusion.  Unit(s)/Hr (24 mL/Hr) IV Continuous <Continuous>  insulin glargine Injectable (LANTUS) 30 Unit(s) SubCutaneous every morning  insulin glargine Injectable (LANTUS) 12 Unit(s) SubCutaneous at bedtime  insulin lispro (HumaLOG) corrective regimen sliding scale   SubCutaneous Before meals and at bedtime  insulin lispro Injectable (HumaLOG) 13 Unit(s) SubCutaneous three times a day before meals  levothyroxine 50 MICROGram(s) Oral daily  mupirocin 2% Ointment 1 Application(s) Topical two times a day  nystatin Powder 1 Application(s) Topical every 12 hours  piperacillin/tazobactam IVPB.. 3.375 Gram(s) IV Intermittent every 12 hours  saccharomyces boulardii 250 milliGRAM(s) Oral two times a day  senna 2 Tablet(s) Oral at bedtime  vancomycin  IVPB 1000 milliGRAM(s) IV Intermittent <User Schedule>    MEDICATIONS  (PRN):  acetaminophen   Tablet .. 650 milliGRAM(s) Oral every 6 hours PRN Moderate Pain (4 - 6)  heparin  Injectable 67966 Unit(s) IV Push every 6 hours PRN For aPTT less than 40  heparin  Injectable 5000 Unit(s) IV Push every 6 hours PRN For aPTT between 40 - 57  HYDROmorphone  Injectable 1 milliGRAM(s) IV Push every 4 hours PRN Severe Pain (7 - 10)  polyethylene glycol 3350 17 Gram(s) Oral daily PRN Constipation      Vital Signs Last 24 Hrs  T(C): 36.5 (09 Sep 2019 05:41), Max: 36.6 (08 Sep 2019 21:10)  T(F): 97.7 (09 Sep 2019 05:41), Max: 97.8 (08 Sep 2019 21:10)  HR: 94 (09 Sep 2019 08:00) (75 - 99)  BP: 116/68 (09 Sep 2019 05:41) (114/80 - 124/72)  BP(mean): --  RR: 18 (09 Sep 2019 08:00) (18 - 18)  SpO2: 96% (09 Sep 2019 08:00) (92% - 99%)  ICU Vital Signs Last 24 Hrs  MARY ANN CORNELL  I&O's Detail    08 Sep 2019 07:01  -  09 Sep 2019 07:00  --------------------------------------------------------  IN:  Total IN: 0 mL    OUT:    Voided: 3800 mL  Total OUT: 3800 mL    Total NET: -3800 mL        I&O's Summary    08 Sep 2019 07:01  -  09 Sep 2019 07:00  --------------------------------------------------------  IN: 0 mL / OUT: 3800 mL / NET: -3800 mL      Drug Dosing Weight  MARY ANN CORNELL      PHYSICAL EXAM:  General: Appears well developed, well nourished alert and cooperative.  HEENT: Head; normocephalic, atraumatic.  Eyes: Pupils reactive, cornea wnl.  Neck: Supple, no nodes adenopathy, no NVD or carotid bruit or thyromegaly.  CARDIOVASCULAR: Normal S1 and S2, 1/6 murmur, rub, gallop or lift.   LUNGS: Decrease BS B/L   ABDOMEN: Soft, nontender without mass or organomegaly. bowel sounds normoactive.  EXTREMITIES: No clubbing, cyanosis ++ edema. Distal pulses wnl.   SKIN: warm and dry with normal turgor.  NEURO: Alert/oriented x 3/normal motor exam. No pathologic reflexes.    PSYCH: normal affect.        LABS:                        15.0   6.63  )-----------( 117      ( 09 Sep 2019 06:03 )             46.9     09-09    135  |  94<L>  |  36.0<H>  ----------------------------<  323<H>  4.4   |  29.0  |  1.33<H>    Ca    9.1      09 Sep 2019 06:03  Mg     1.9     09-09    TPro  6.2<L>  /  Alb  2.4<L>  /  TBili  0.9  /  DBili  x   /  AST  134<H>  /  ALT  47<H>  /  AlkPhos  270<H>  09-09    MARY ANN CORNELL  CARDIAC MARKERS ( 09 Sep 2019 06:03 )  x     / x     / 2611 U/L / x     / 5.0 ng/mL  CARDIAC MARKERS ( 08 Sep 2019 06:18 )  x     / x     / 4043 U/L / x     / 6.1 ng/mL      PT/INR - ( 08 Sep 2019 06:18 )   PT: 13.7 sec;   INR: 1.19 ratio         PTT - ( 08 Sep 2019 06:18 )  PTT:63.6 sec      RADIOLOGY & ADDITIONAL STUDIES:    INTERPRETATION OF TELEMETRY (personally reviewed):        ECHO:    ummary:   1. Left ventricular ejection fraction, by visual estimation, is 25 to   30%.   2. Severely decreased global left ventricular systolic function.   3. Basal and mid inferolateral wall and basal inferior segment are   abnormal as described above.   4. The mitral in-flow pattern reveals no discernable A-wave, therefore   no comment on diastolic function can be made.   5. There is mild septal left ventricular hypertrophy.   6. Mild mitral annular calcification.   7. The mitral valve leaflets are tethered which is due to reduced   systolic function and elevated LVDP.   8. Sclerotic aortic valve with normal opening.   9. Dilatation of the aortic root.  10. Dilated Ao root at 3.7cm.  11. Endocardial visualization was enhanced with intravenous echo contrast.    ASSESSMENT AND PLAN:  In summary, MARY ANN CORNELL is an 68y Male with past medical history significant for NICM (EF 25-30%) s/p single chamber SJM ICD, chronic afib on Coumadin, morbid obesity, DM, HTN, chronic leg cellulitis, HLD CKD admitted on 9/4/19 with septic shock and rhabdo after being found down on the toilet at home. He is now off pressors and on the medical floor. He has no cardiovascular complaints at this time.  Single chamber SJM ICD interrogated. Normal functioning device. Implant date 8/18/16. VVI 40.  <1%. Arrythmia log shows occasional brief irregular tachy episodes likley c/w AF with RVR. No ICD therapy.    Plan  1.  Heparin bridge to therapeutic INR 2-3.  2.  Continue beta blocker low dose coreg; AF rate controlled.  3.  DC IVF and monitor renal panel   4.  Torsemide 20 mg po BID when ok with renal   5.  IV Abx therapy as per primary team

## 2019-09-09 NOTE — PHYSICAL THERAPY INITIAL EVALUATION ADULT - PERTINENT HX OF CURRENT PROBLEM, REHAB EVAL
Pt is a 66 y/o male with PMHx DM (w/ neuropathy), HN, CHF (Last Echo ~2 years ago with EF 35%), COPD, CHRISS (on home CPAP machine), PPM/Defibrillator ("heart function was 3%"), prior diabetic foot ulcers/infections, depression presents with worsening weakness and SOB for past few days. As per daughter at bedside, wife found pt passed out sitting on toilet. Estimated time on toilet is around 10-12 hours.

## 2019-09-10 LAB
ALBUMIN SERPL ELPH-MCNC: 2.4 G/DL — LOW (ref 3.3–5.2)
ALP SERPL-CCNC: 231 U/L — HIGH (ref 40–120)
ALT FLD-CCNC: 41 U/L — HIGH
ANION GAP SERPL CALC-SCNC: 14 MMOL/L — SIGNIFICANT CHANGE UP (ref 5–17)
APTT BLD: >200 SEC — CRITICAL HIGH (ref 27.5–36.3)
AST SERPL-CCNC: 118 U/L — HIGH
BILIRUB SERPL-MCNC: 1 MG/DL — SIGNIFICANT CHANGE UP (ref 0.4–2)
BUN SERPL-MCNC: 32 MG/DL — HIGH (ref 8–20)
CALCIUM SERPL-MCNC: 8.7 MG/DL — SIGNIFICANT CHANGE UP (ref 8.6–10.2)
CHLORIDE SERPL-SCNC: 94 MMOL/L — LOW (ref 98–107)
CO2 SERPL-SCNC: 27 MMOL/L — SIGNIFICANT CHANGE UP (ref 22–29)
CREAT SERPL-MCNC: 1.23 MG/DL — SIGNIFICANT CHANGE UP (ref 0.5–1.3)
GLUCOSE BLDC GLUCOMTR-MCNC: 121 MG/DL — HIGH (ref 70–99)
GLUCOSE BLDC GLUCOMTR-MCNC: 145 MG/DL — HIGH (ref 70–99)
GLUCOSE BLDC GLUCOMTR-MCNC: 154 MG/DL — HIGH (ref 70–99)
GLUCOSE BLDC GLUCOMTR-MCNC: 203 MG/DL — HIGH (ref 70–99)
GLUCOSE SERPL-MCNC: 215 MG/DL — HIGH (ref 70–115)
HCT VFR BLD CALC: 47.1 % — SIGNIFICANT CHANGE UP (ref 39–50)
HGB BLD-MCNC: 14.9 G/DL — SIGNIFICANT CHANGE UP (ref 13–17)
INR BLD: 2.5 RATIO — HIGH (ref 0.88–1.16)
MAGNESIUM SERPL-MCNC: 1.8 MG/DL — SIGNIFICANT CHANGE UP (ref 1.6–2.6)
MCHC RBC-ENTMCNC: 28.9 PG — SIGNIFICANT CHANGE UP (ref 27–34)
MCHC RBC-ENTMCNC: 31.6 GM/DL — LOW (ref 32–36)
MCV RBC AUTO: 91.5 FL — SIGNIFICANT CHANGE UP (ref 80–100)
PLATELET # BLD AUTO: 117 K/UL — LOW (ref 150–400)
POTASSIUM SERPL-MCNC: 4.3 MMOL/L — SIGNIFICANT CHANGE UP (ref 3.5–5.3)
POTASSIUM SERPL-SCNC: 4.3 MMOL/L — SIGNIFICANT CHANGE UP (ref 3.5–5.3)
PROT SERPL-MCNC: 5.9 G/DL — LOW (ref 6.6–8.7)
PROTHROM AB SERPL-ACNC: 29.6 SEC — HIGH (ref 10–12.9)
RBC # BLD: 5.15 M/UL — SIGNIFICANT CHANGE UP (ref 4.2–5.8)
RBC # FLD: 15.8 % — HIGH (ref 10.3–14.5)
SODIUM SERPL-SCNC: 135 MMOL/L — SIGNIFICANT CHANGE UP (ref 135–145)
WBC # BLD: 6.34 K/UL — SIGNIFICANT CHANGE UP (ref 3.8–10.5)
WBC # FLD AUTO: 6.34 K/UL — SIGNIFICANT CHANGE UP (ref 3.8–10.5)

## 2019-09-10 PROCEDURE — 99232 SBSQ HOSP IP/OBS MODERATE 35: CPT

## 2019-09-10 PROCEDURE — 99233 SBSQ HOSP IP/OBS HIGH 50: CPT

## 2019-09-10 RX ORDER — CYCLOBENZAPRINE HYDROCHLORIDE 10 MG/1
5 TABLET, FILM COATED ORAL EVERY 8 HOURS
Refills: 0 | Status: DISCONTINUED | OUTPATIENT
Start: 2019-09-10 | End: 2019-09-14

## 2019-09-10 RX ORDER — HEPARIN SODIUM 5000 [USP'U]/ML
2800 INJECTION INTRAVENOUS; SUBCUTANEOUS
Qty: 25000 | Refills: 0 | Status: DISCONTINUED | OUTPATIENT
Start: 2019-09-10 | End: 2019-09-10

## 2019-09-10 RX ORDER — HYDROMORPHONE HYDROCHLORIDE 2 MG/ML
1 INJECTION INTRAMUSCULAR; INTRAVENOUS; SUBCUTANEOUS
Refills: 0 | Status: DISCONTINUED | OUTPATIENT
Start: 2019-09-10 | End: 2019-09-10

## 2019-09-10 RX ORDER — HYDROMORPHONE HYDROCHLORIDE 2 MG/ML
0.5 INJECTION INTRAMUSCULAR; INTRAVENOUS; SUBCUTANEOUS
Refills: 0 | Status: DISCONTINUED | OUTPATIENT
Start: 2019-09-10 | End: 2019-09-14

## 2019-09-10 RX ORDER — WARFARIN SODIUM 2.5 MG/1
5 TABLET ORAL ONCE
Refills: 0 | Status: COMPLETED | OUTPATIENT
Start: 2019-09-10 | End: 2019-09-10

## 2019-09-10 RX ORDER — HYDROMORPHONE HYDROCHLORIDE 2 MG/ML
2 INJECTION INTRAMUSCULAR; INTRAVENOUS; SUBCUTANEOUS EVERY 4 HOURS
Refills: 0 | Status: DISCONTINUED | OUTPATIENT
Start: 2019-09-10 | End: 2019-09-14

## 2019-09-10 RX ORDER — HYDROMORPHONE HYDROCHLORIDE 2 MG/ML
1 INJECTION INTRAMUSCULAR; INTRAVENOUS; SUBCUTANEOUS ONCE
Refills: 0 | Status: DISCONTINUED | OUTPATIENT
Start: 2019-09-10 | End: 2019-09-10

## 2019-09-10 RX ADMIN — HYDROMORPHONE HYDROCHLORIDE 1 MILLIGRAM(S): 2 INJECTION INTRAMUSCULAR; INTRAVENOUS; SUBCUTANEOUS at 06:00

## 2019-09-10 RX ADMIN — MUPIROCIN 1 APPLICATION(S): 20 OINTMENT TOPICAL at 05:05

## 2019-09-10 RX ADMIN — INSULIN GLARGINE 12 UNIT(S): 100 INJECTION, SOLUTION SUBCUTANEOUS at 21:37

## 2019-09-10 RX ADMIN — Medication 250 MILLIGRAM(S): at 17:22

## 2019-09-10 RX ADMIN — Medication 2: at 21:37

## 2019-09-10 RX ADMIN — HYDROMORPHONE HYDROCHLORIDE 1 MILLIGRAM(S): 2 INJECTION INTRAMUSCULAR; INTRAVENOUS; SUBCUTANEOUS at 02:27

## 2019-09-10 RX ADMIN — CARVEDILOL PHOSPHATE 3.12 MILLIGRAM(S): 80 CAPSULE, EXTENDED RELEASE ORAL at 04:59

## 2019-09-10 RX ADMIN — INSULIN GLARGINE 30 UNIT(S): 100 INJECTION, SOLUTION SUBCUTANEOUS at 08:55

## 2019-09-10 RX ADMIN — Medication 100 MILLIGRAM(S): at 04:59

## 2019-09-10 RX ADMIN — HYDROMORPHONE HYDROCHLORIDE 1 MILLIGRAM(S): 2 INJECTION INTRAMUSCULAR; INTRAVENOUS; SUBCUTANEOUS at 09:25

## 2019-09-10 RX ADMIN — Medication 13 UNIT(S): at 17:35

## 2019-09-10 RX ADMIN — HYDROMORPHONE HYDROCHLORIDE 1 MILLIGRAM(S): 2 INJECTION INTRAMUSCULAR; INTRAVENOUS; SUBCUTANEOUS at 01:00

## 2019-09-10 RX ADMIN — NYSTATIN CREAM 1 APPLICATION(S): 100000 CREAM TOPICAL at 04:59

## 2019-09-10 RX ADMIN — HEPARIN SODIUM 3500 UNIT(S)/HR: 5000 INJECTION INTRAVENOUS; SUBCUTANEOUS at 04:25

## 2019-09-10 RX ADMIN — HYDROMORPHONE HYDROCHLORIDE 1 MILLIGRAM(S): 2 INJECTION INTRAMUSCULAR; INTRAVENOUS; SUBCUTANEOUS at 17:25

## 2019-09-10 RX ADMIN — HYDROMORPHONE HYDROCHLORIDE 1 MILLIGRAM(S): 2 INJECTION INTRAMUSCULAR; INTRAVENOUS; SUBCUTANEOUS at 01:54

## 2019-09-10 RX ADMIN — Medication 100 MILLIGRAM(S): at 21:37

## 2019-09-10 RX ADMIN — WARFARIN SODIUM 5 MILLIGRAM(S): 2.5 TABLET ORAL at 21:37

## 2019-09-10 RX ADMIN — Medication 40 MILLIGRAM(S): at 12:28

## 2019-09-10 RX ADMIN — SENNA PLUS 2 TABLET(S): 8.6 TABLET ORAL at 21:38

## 2019-09-10 RX ADMIN — HYDROMORPHONE HYDROCHLORIDE 1 MILLIGRAM(S): 2 INJECTION INTRAMUSCULAR; INTRAVENOUS; SUBCUTANEOUS at 04:58

## 2019-09-10 RX ADMIN — Medication 250 MILLIGRAM(S): at 09:06

## 2019-09-10 RX ADMIN — Medication 1 APPLICATION(S): at 05:05

## 2019-09-10 RX ADMIN — Medication 250 MILLIGRAM(S): at 21:36

## 2019-09-10 RX ADMIN — Medication 50 MICROGRAM(S): at 04:59

## 2019-09-10 RX ADMIN — Medication 4: at 08:55

## 2019-09-10 RX ADMIN — HYDROMORPHONE HYDROCHLORIDE 1 MILLIGRAM(S): 2 INJECTION INTRAMUSCULAR; INTRAVENOUS; SUBCUTANEOUS at 14:25

## 2019-09-10 RX ADMIN — Medication 13 UNIT(S): at 12:27

## 2019-09-10 RX ADMIN — CARVEDILOL PHOSPHATE 3.12 MILLIGRAM(S): 80 CAPSULE, EXTENDED RELEASE ORAL at 17:30

## 2019-09-10 RX ADMIN — HYDROMORPHONE HYDROCHLORIDE 1 MILLIGRAM(S): 2 INJECTION INTRAMUSCULAR; INTRAVENOUS; SUBCUTANEOUS at 09:06

## 2019-09-10 RX ADMIN — Medication 250 MILLIGRAM(S): at 04:59

## 2019-09-10 RX ADMIN — Medication 1 APPLICATION(S): at 17:24

## 2019-09-10 RX ADMIN — Medication 13 UNIT(S): at 08:55

## 2019-09-10 RX ADMIN — HYDROMORPHONE HYDROCHLORIDE 2 MILLIGRAM(S): 2 INJECTION INTRAMUSCULAR; INTRAVENOUS; SUBCUTANEOUS at 21:36

## 2019-09-10 RX ADMIN — NYSTATIN CREAM 1 APPLICATION(S): 100000 CREAM TOPICAL at 17:25

## 2019-09-10 RX ADMIN — HYDROMORPHONE HYDROCHLORIDE 1 MILLIGRAM(S): 2 INJECTION INTRAMUSCULAR; INTRAVENOUS; SUBCUTANEOUS at 17:40

## 2019-09-10 RX ADMIN — CYCLOBENZAPRINE HYDROCHLORIDE 5 MILLIGRAM(S): 10 TABLET, FILM COATED ORAL at 18:25

## 2019-09-10 RX ADMIN — HYDROMORPHONE HYDROCHLORIDE 1 MILLIGRAM(S): 2 INJECTION INTRAMUSCULAR; INTRAVENOUS; SUBCUTANEOUS at 14:40

## 2019-09-10 RX ADMIN — Medication 100 MILLIGRAM(S): at 14:25

## 2019-09-10 NOTE — PROGRESS NOTE ADULT - SUBJECTIVE AND OBJECTIVE BOX
[facility] - [facility], [facility]                                   [address] [place] [address]                                                      PHONE: [phone]                                                         FAX: [phone]  [email or web address]  ---------------------------------------------------------------------------------------------------------------------------------    Overnight events/patient complaints:  NAD denies any CP or orthopnea     allow double protein at meals (Unknown)  No Known Allergies    MEDICATIONS  (STANDING):  carvedilol 3.125 milliGRAM(s) Oral every 12 hours  clotrimazole 1% Cream 1 Application(s) Topical two times a day  docusate sodium 100 milliGRAM(s) Oral three times a day  insulin glargine Injectable (LANTUS) 12 Unit(s) SubCutaneous at bedtime  insulin glargine Injectable (LANTUS) 30 Unit(s) SubCutaneous every morning  insulin lispro (HumaLOG) corrective regimen sliding scale   SubCutaneous Before meals and at bedtime  insulin lispro Injectable (HumaLOG) 13 Unit(s) SubCutaneous three times a day before meals  levothyroxine 50 MICROGram(s) Oral daily  nystatin Powder 1 Application(s) Topical every 12 hours  piperacillin/tazobactam IVPB.. 3.375 Gram(s) IV Intermittent every 12 hours  saccharomyces boulardii 250 milliGRAM(s) Oral two times a day  senna 2 Tablet(s) Oral at bedtime  torsemide 40 milliGRAM(s) Oral daily  vancomycin  IVPB 1000 milliGRAM(s) IV Intermittent <User Schedule>    MEDICATIONS  (PRN):  acetaminophen   Tablet .. 650 milliGRAM(s) Oral every 6 hours PRN Moderate Pain (4 - 6)  HYDROmorphone  Injectable 1 milliGRAM(s) IV Push every 4 hours PRN Severe Pain (7 - 10)  polyethylene glycol 3350 17 Gram(s) Oral daily PRN Constipation      Vital Signs Last 24 Hrs  T(C): 36.5 (10 Sep 2019 04:53), Max: 36.7 (09 Sep 2019 14:36)  T(F): 97.7 (10 Sep 2019 04:53), Max: 98 (09 Sep 2019 14:36)  HR: 89 (10 Sep 2019 04:53) (71 - 95)  BP: 115/71 (10 Sep 2019 04:53) (100/60 - 132/58)  BP(mean): --  RR: 18 (10 Sep 2019 04:53) (18 - 19)  SpO2: 98% (10 Sep 2019 04:53) (96% - 100%)  ICU Vital Signs Last 24 Hrs  MARY ANN RIBEIRORAKAN  I&O's Detail    09 Sep 2019 07:01  -  10 Sep 2019 07:00  --------------------------------------------------------  IN:  Total IN: 0 mL    OUT:    Voided: 500 mL  Total OUT: 500 mL    Total NET: -500 mL        I&O's Summary    09 Sep 2019 07:01  -  10 Sep 2019 07:00  --------------------------------------------------------  IN: 0 mL / OUT: 500 mL / NET: -500 mL      Drug Dosing Weight  MARY ANN CORNELL      PHYSICAL EXAM:  General: Appears well developed, well nourished alert and cooperative.  HEENT: Head; normocephalic, atraumatic.  Eyes: Pupils reactive, cornea wnl.  Neck: Supple, no nodes adenopathy, no NVD or carotid bruit or thyromegaly.  CARDIOVASCULAR: Normal S1 and S2, 1/6 murmur, rub, gallop or lift.   LUNGS: Decrease BS B/L   ABDOMEN: Soft, nontender without mass or organomegaly. bowel sounds normoactive.  EXTREMITIES: No clubbing, cyanosis ++  edema. Distal pulses wnl.   SKIN: warm and dry with normal turgor.  NEURO: Alert/oriented x 3/normal motor exam. No pathologic reflexes.    PSYCH: normal affect.        LABS:                        14.9   6.34  )-----------( 117      ( 10 Sep 2019 05:48 )             47.1     09-10    135  |  94<L>  |  32.0<H>  ----------------------------<  215<H>  4.3   |  27.0  |  1.23    Ca    8.7      10 Sep 2019 05:48  Mg     1.8     09-10    TPro  5.9<L>  /  Alb  2.4<L>  /  TBili  1.0  /  DBili  x   /  AST  118<H>  /  ALT  41<H>  /  AlkPhos  231<H>  09-10    MARY ANN CORNELL  CARDIAC MARKERS ( 09 Sep 2019 06:03 )  x     / x     / 2611 U/L / x     / 5.0 ng/mL      PT/INR - ( 10 Sep 2019 03:37 )   PT: 29.6 sec;   INR: 2.50 ratio         PTT - ( 10 Sep 2019 03:37 )  PTT:>200.0 sec      RADIOLOGY & ADDITIONAL STUDIES:    INTERPRETATION OF TELEMETRY (personally reviewed):      ECHO:    jayne:   1. Left ventricular ejection fraction, by visual estimation, is 25 to   30%.   2. Severely decreased global left ventricular systolic function.   3. Basal and mid inferolateral wall and basal inferior segment are   abnormal as described above.   4. The mitral in-flow pattern reveals no discernable A-wave, therefore   no comment on diastolic function can be made.   5. There is mild septal left ventricular hypertrophy.   6. Mild mitral annular calcification.   7. The mitral valve leaflets are tethered which is due to reduced   systolic function and elevated LVDP.   8. Sclerotic aortic valve with normal opening.   9. Dilatation of the aortic root.  10. Dilated Ao root at 3.7cm.  11. Endocardial visualization was enhanced with intravenous echo contrast.    ASSESSMENT AND PLAN:  In summary, MARY ANN CORNELL is an 68y Male with past medical history significant for NICM (EF 25-30%) s/p single chamber SJM ICD, chronic afib on Coumadin, morbid obesity, DM, HTN, chronic leg cellulitis, HLD CKD admitted on 9/4/19 with septic shock and rhabdo after being found down on the toilet at home. He is now off pressors and on the medical floor. He has no cardiovascular complaints at this time.  Single chamber SJM ICD interrogated. Normal functioning device. Implant date 8/18/16. VVI 40.  <1%. Arrythmia log shows occasional brief irregular tachy episodes likley c/w AF with RVR. No ICD therapy.    Plan  1.  DC Heparin gtt with INR goal 2-3.  2.  Continue beta blocker low dose coreg; AF rate controlled.  3.  Start Torsemide 40 mg po daily   4.  IV Abx therapy as per primary team

## 2019-09-10 NOTE — ADVANCED PRACTICE NURSE CONSULT - ASSESSMENT
went to see  pt in am pt is a+ox3 c/o 0 pain. pt states he fu w dr dela cruz. he was on levemir 50 units qhs and novolog ss. he test bg 1-2 daily and not consistent w novolog. pt states he is very depressed about his wife dx of alzheimer disease which stopped him from taking care of his diabetes. pt was educated about the importance of 3-4xdaily bg monitoring and agreed for psych consult

## 2019-09-10 NOTE — PROGRESS NOTE ADULT - ASSESSMENT
68 y/o male with uncontrolled DM (w/ neuropathy), HN, CHF (Last Echo ~2 years ago with EF 35%), COPD, CHRISS (on home CPAP machine), PPM/Defibrillator ("heart function was 3%"), prior diabetic foot ulcers/infections, depression presents with worsening weakness and SOB  In ED, pt remains short of breath, found to be hyperglycemic, AG 22, Serum CO2 14, lactate 8.0, CK 24k, transaminitis, hyperkalemic, Cr 2.85, with leukocytosis and febrile to 101.7.       FOUND WITH:    RIGHT LOWER EXT cellulitis  Acute renal failure on CKD  Rhabdomyolysis  uncontrolled DM  likely tinea pedis of bilateral feet      - continue Zosyn;  continue empiric Vancomycin for now.  MRSA on PCR of nares  RENAL function improving    - Blood cultures so far negative  - Miconazole topical    - watch renal fx closely; given rhabdo and ARF on CKD  - needs good sugar control    pain control per medical team.       - trend temperature and WBC curve  - repeat cultures from blood and all sources if febrile.     Will follow

## 2019-09-10 NOTE — PROGRESS NOTE ADULT - SUBJECTIVE AND OBJECTIVE BOX
[facility]  INFECTIOUS DISEASES AND INTERNAL MEDICINE at [place]  =======================================================  Miguelangel Seo MD  Diplomates American Board of Internal Medicine and Infectious Diseases  Tel: [phone]      Fax: [phone]  =======================================================  MARY ANN CORNELL [id number]  Follow up: Cellulitis    No fever or chills  No diarrhea     Allergies:     No Known Allergies      Antibiotics:  piperacillin/tazobactam IVPB.. 3.375 Gram(s) IV Intermittent every 12 hours  vancomycin  IVPB 1000 milliGRAM(s) IV Intermittent <User Schedule>         REVIEW OF SYSTEMS:  CONSTITUTIONAL:  No Fever or chills  HEENT:   No diplopia or blurred vision.  No earache, sore throat or runny nose.  CARDIOVASCULAR:  No pressure, squeezing, strangling, tightness, heaviness or aching about the chest, neck, axilla or epigastrium.  RESPIRATORY:  No cough, shortness of breath  GASTROINTESTINAL:  No nausea, vomiting or diarrhea.  GENITOURINARY:  No dysuria, frequency or urgency.   MUSCULOSKELETAL:  no joint aches, no muscle pain  SKIN:  B/L LE swelling   NEUROLOGIC:  No Headaches, seizures  PSYCHIATRIC:  No disorder of thought or mood.  ENDOCRINE:  No heat or cold intolerance  HEMATOLOGICAL:  No easy bruising or bleeding.       T(F): 97.7 (10 Sep 2019 04:53), Max: 98 (09 Sep 2019 22:50)  HR: 89 (10 Sep 2019 04:53)  BP: 115/71 (10 Sep 2019 04:53)  RR: 18 (10 Sep 2019 04:53)  SpO2: 98% (10 Sep 2019 04:53) (96% - 100%)  temp max in last 48H T(F): , Max: 98 (09-09-19 @ 14:36)    GEN: NAD, pleasant  HEENT: normocephalic and atraumatic. EOMI. PERRL.  Anicteric   NECK: Supple.   LUNGS: Clear to auscultation.  HEART: Regular rate and rhythm   ABDOMEN: Soft, nontender, and nondistended.  Positive bowel sounds.    : No CVA tenderness  EXTREMITIES: + edema.  MSK: no joint swelling  NEUROLOGIC:  No focal deficits  PSYCHIATRIC: Appropriate affect .  SKIN: chronic stasis dermatitis with overlying erythema, and bullae         Labs:                        14.9   6.34  )-----------( 117      ( 10 Sep 2019 05:48 )             47.1       WBC Count: 6.34 K/uL (09-10-19 @ 05:48)  WBC Count: 6.63 K/uL (09-09-19 @ 06:03)  WBC Count: 5.60 K/uL (09-08-19 @ 06:18)  WBC Count: 5.58 K/uL (09-07-19 @ 06:10)  WBC Count: 8.47 K/uL (09-07-19 @ 01:58)  WBC Count: 12.15 K/uL (09-06-19 @ 05:39)      09-10    135  |  94<L>  |  32.0<H>  ----------------------------<  215<H>  4.3   |  27.0  |  1.23    Ca    8.7      10 Sep 2019 05:48  Mg     1.8     09-10    TPro  5.9<L>  /  Alb  2.4<L>  /  TBili  1.0  /  DBili  x   /  AST  118<H>  /  ALT  41<H>  /  AlkPhos  231<H>  09-10      Culture - Urine (collected 09-04-19 @ 22:14)  Source: .Urine  Final Report (09-05-19 @ 16:30):    No growth    Culture - Blood (collected 09-04-19 @ 13:12)  Source: .Blood  Final Report (09-09-19 @ 15:00):    No growth at 5 days.    Culture - Blood (collected 09-04-19 @ 13:11)  Source: .Blood  Final Report (09-09-19 @ 15:00):    No growth at 5 days.

## 2019-09-10 NOTE — PROGRESS NOTE ADULT - SUBJECTIVE AND OBJECTIVE BOX
MARY ANN CORNELL    [id number]    68y      Male    INTERVAL HPI/OVERNIGHT EVENTS:    patient being seen for afib and cellulitis. patient seen at bedside complains of pain     REVIEW OF SYSTEMS:    CONSTITUTIONAL: pain   RESPIRATORY: No cough, wheezing, hemoptysis; No shortness of breath  CARDIOVASCULAR: No chest pain, palpitations  GASTROINTESTINAL: No abdominal or epigastric pain. No nausea, vomiting  NEUROLOGICAL: No headaches, memory loss, loss of strength.  MISCELLANEOUS:      Vital Signs Last 24 Hrs  T(C): 36.5 (10 Sep 2019 04:53), Max: 36.7 (09 Sep 2019 14:36)  T(F): 97.7 (10 Sep 2019 04:53), Max: 98 (09 Sep 2019 14:36)  HR: 89 (10 Sep 2019 04:53) (71 - 95)  BP: 115/71 (10 Sep 2019 04:53) (100/60 - 132/58)  BP(mean): --  RR: 18 (10 Sep 2019 04:53) (18 - 19)  SpO2: 98% (10 Sep 2019 04:53) (96% - 100%)    PHYSICAL EXAM:    General: Morbidly obese male sitting in bed comfortably. No acute distress  HEENT: PERRLA. EOMI. Clear conjunctivae. Moist mucus membrane  Neck: Supple. No JVD. No Thyromegaly   Chest: Good air entry bilaterally - no wheezing,  Heart: Normal S1 & S2. RRR.   Abdomen: Soft. Non-tender. Non-distended. + BS  Ext: 4+ pedal edema with chronic skin changes and blisters. Fungal rash in between toes.   Neuro: AAO x 3. No speech disorder  Skin: Warm and Dry  Psychiatry: Normal mood and affect      LABS:                        14.9   6.34  )-----------( 117      ( 10 Sep 2019 05:48 )             47.1     09-10    135  |  94<L>  |  32.0<H>  ----------------------------<  215<H>  4.3   |  27.0  |  1.23    Ca    8.7      10 Sep 2019 05:48  Mg     1.8     09-10    TPro  5.9<L>  /  Alb  2.4<L>  /  TBili  1.0  /  DBili  x   /  AST  118<H>  /  ALT  41<H>  /  AlkPhos  231<H>  09-10    PT/INR - ( 10 Sep 2019 03:37 )   PT: 29.6 sec;   INR: 2.50 ratio         PTT - ( 10 Sep 2019 03:37 )  PTT:>200.0 sec        MEDICATIONS  (STANDING):  carvedilol 3.125 milliGRAM(s) Oral every 12 hours  clotrimazole 1% Cream 1 Application(s) Topical two times a day  docusate sodium 100 milliGRAM(s) Oral three times a day  insulin glargine Injectable (LANTUS) 12 Unit(s) SubCutaneous at bedtime  insulin glargine Injectable (LANTUS) 30 Unit(s) SubCutaneous every morning  insulin lispro (HumaLOG) corrective regimen sliding scale   SubCutaneous Before meals and at bedtime  insulin lispro Injectable (HumaLOG) 13 Unit(s) SubCutaneous three times a day before meals  levothyroxine 50 MICROGram(s) Oral daily  nystatin Powder 1 Application(s) Topical every 12 hours  piperacillin/tazobactam IVPB.. 3.375 Gram(s) IV Intermittent every 12 hours  saccharomyces boulardii 250 milliGRAM(s) Oral two times a day  senna 2 Tablet(s) Oral at bedtime  torsemide 40 milliGRAM(s) Oral daily  vancomycin  IVPB 1000 milliGRAM(s) IV Intermittent <User Schedule>  warfarin 5 milliGRAM(s) Oral once    MEDICATIONS  (PRN):  acetaminophen   Tablet .. 650 milliGRAM(s) Oral every 6 hours PRN Moderate Pain (4 - 6)  HYDROmorphone  Injectable 1 milliGRAM(s) IV Push every 4 hours PRN Severe Pain (7 - 10)  polyethylene glycol 3350 17 Gram(s) Oral daily PRN Constipation      RADIOLOGY & ADDITIONAL TESTS:

## 2019-09-10 NOTE — PROGRESS NOTE ADULT - ASSESSMENT
Improved JAYLENE on CKD   Morbid obesity , DM , HTN , CM, CHRISS , chronic edema   + Rhabdo from immobilization on toilet x 12 hours - improved ( CPK 30 K ----> 2 K )  + Cellulitis with resolved septic shock     TTE ECHO noted   Vanco and Zosyn as per ID   Renal sono noted no hydronephrosis  Clinically volume overloaded cr better agree w torsemide 40mg Q day will likely need to escalate  Will need to tolerate some degree of azotemia   Watch K+ on diuretics    Will follow

## 2019-09-10 NOTE — PROGRESS NOTE ADULT - SUBJECTIVE AND OBJECTIVE BOX
NEPHROLOGY INTERVAL HPI/OVERNIGHT EVENTS:    Examined has ok UOP  Renal function better- diuretics restarted  Morbid obesity  Clinically looks volume overoaded  denies HA CP N/V/D        MEDICATIONS  (STANDING):  carvedilol 3.125 milliGRAM(s) Oral every 12 hours  clotrimazole 1% Cream 1 Application(s) Topical two times a day  docusate sodium 100 milliGRAM(s) Oral three times a day  insulin glargine Injectable (LANTUS) 12 Unit(s) SubCutaneous at bedtime  insulin glargine Injectable (LANTUS) 30 Unit(s) SubCutaneous every morning  insulin lispro (HumaLOG) corrective regimen sliding scale   SubCutaneous Before meals and at bedtime  insulin lispro Injectable (HumaLOG) 13 Unit(s) SubCutaneous three times a day before meals  levothyroxine 50 MICROGram(s) Oral daily  nystatin Powder 1 Application(s) Topical every 12 hours  piperacillin/tazobactam IVPB.. 3.375 Gram(s) IV Intermittent every 12 hours  saccharomyces boulardii 250 milliGRAM(s) Oral two times a day  senna 2 Tablet(s) Oral at bedtime  torsemide 40 milliGRAM(s) Oral daily  vancomycin  IVPB 1000 milliGRAM(s) IV Intermittent <User Schedule>  warfarin 5 milliGRAM(s) Oral once    MEDICATIONS  (PRN):  acetaminophen   Tablet .. 650 milliGRAM(s) Oral every 6 hours PRN Moderate Pain (4 - 6)  HYDROmorphone  Injectable 1 milliGRAM(s) IV Push every 3 hours PRN Severe Pain (7 - 10)  HYDROmorphone  Injectable 0.5 milliGRAM(s) IV Push every 3 hours PRN Moderate Pain (4 - 6)  polyethylene glycol 3350 17 Gram(s) Oral daily PRN Constipation      Allergies    No Known Allergies    Intolerances    allow double protein at meals (Unknown)      Vital Signs Last 24 Hrs  T(C): 36.5 (10 Sep 2019 04:53), Max: 36.7 (09 Sep 2019 14:36)  T(F): 97.7 (10 Sep 2019 04:53), Max: 98 (09 Sep 2019 14:36)  HR: 89 (10 Sep 2019 04:53) (71 - 95)  BP: 115/71 (10 Sep 2019 04:53) (100/60 - 132/58)  BP(mean): --  RR: 18 (10 Sep 2019 04:53) (18 - 19)  SpO2: 98% (10 Sep 2019 04:53) (96% - 100%)  Daily     Daily     PHYSICAL EXAM:  GENERAL: NAD,   HEAD:  Atraumatic, No edema   NECK: Supple, No JVD,   CHEST/LUNG: EAE , No wheeze   HEART: Regular rate and rhythm; No gallop or rub   ABDOMEN: Morbid obesity . BNo rebound   EXTREMITIES:  pitting edema , + cellulitis       LABS:                        14.9   6.34  )-----------( 117      ( 10 Sep 2019 05:48 )             47.1     09-10    135  |  94<L>  |  32.0<H>  ----------------------------<  215<H>  4.3   |  27.0  |  1.23    Ca    8.7      10 Sep 2019 05:48  Mg     1.8     09-10    TPro  5.9<L>  /  Alb  2.4<L>  /  TBili  1.0  /  DBili  x   /  AST  118<H>  /  ALT  41<H>  /  AlkPhos  231<H>  09-10    PT/INR - ( 10 Sep 2019 03:37 )   PT: 29.6 sec;   INR: 2.50 ratio         PTT - ( 10 Sep 2019 03:37 )  PTT:>200.0 sec    Magnesium, Serum: 1.8 mg/dL (09-10 @ 05:48)          RADIOLOGY & ADDITIONAL TESTS:

## 2019-09-10 NOTE — ADVANCED PRACTICE NURSE CONSULT - RECOMMEDATIONS
continue diabetes self management education  pt to inject all insulin doses while in the hospital using pre filled insulin syringe and rn supervision  cc- [facility]

## 2019-09-10 NOTE — PROGRESS NOTE ADULT - ASSESSMENT
67 y/o morbidly obese male with multiple medical problems was admitted to ICU on 9/4/19 for severe sepsis / cellulitis of b/L LE. pt. was brought in as while sitting on his toilet pt. passed out but as per family ( daughters at bedside ) pt. did not have any fall, did not hit his head to ground and was found sitting on the toilet. Pt. was on pressors initially and admitted to icu. Pt. was also noted to have rhabdo. pt. got some fluids but his EF is 25 to 30 % and as per icu pt. appeared to be volume over loaded and fluids were cut down. pt. is off the pressors now. pt. feels better. no cp. no sob . no HA reported. no abd. pain. no n/v/d. Pt. has which appears to be chronic leg edema and as per pt. his lower ext. wound come and go as due to fluid retention he gets blisters and they burst.     1) Septic Shock (Resolved)  - Likely secondary to LE Cellulitis  - Cultures negative  - Continue Vanco and Zosyn  - ID follow up noted   - Continue Midodrine   - Wound care consult    2) A. Fib with RVR   - Continue Coreg  - Coumadin tongiht   - Cardiology follow up noted: Single chamber SJM ICD interrogated. Normal functioning device. Implant date 8/18/16. VVI 40.  <1%. Arrythmia log shows occasional brief irregular tachy episodes likley c/w AF with RVR. No ICD therapy needed    3) Severe Rhabdomyolysis  - Improving  - Renal consult appreciated    4) Acute on chronic kidney injury  - Likely secondary to rhabdomyolysis  - Improving  - Avoid nephrotoxic medications    5) Chronic Systolic Heart Failure   - Continue Coreg  - No ACEIs or ARBs due to renal function  - Cardio input appreciated    6) DM 2  - HbA1c 11.6  - Accu checks and ISS   - Humalog to 13 units premeals  - Lantus 30 units in am. Increase bedtime to 12 units.   - Diabetes Education  - Endocrine Consult     7) LE Weakness  - Multifactorial  - PT Eval  - Neurology Consult appreciated     8) Morbidly Obese  - Lifestyle modification (explained to him at length)    9) CHRISS  - Continue Nocturnal and PRN CPAP    10) Hypothyroidism  - Continue Synthroid  - Repeat TSH in 2-3 weeks. If still elevated then increase the dose.     11) Elevated LFTs  - Likely secondary to septic shock and rhabdomyolysis  - Improving  - Monitor liver function    12_ pain - will increase some pain meds    dispo - eventual kalie

## 2019-09-11 LAB
ANION GAP SERPL CALC-SCNC: 14 MMOL/L — SIGNIFICANT CHANGE UP (ref 5–17)
BUN SERPL-MCNC: 33 MG/DL — HIGH (ref 8–20)
CALCIUM SERPL-MCNC: 9.1 MG/DL — SIGNIFICANT CHANGE UP (ref 8.6–10.2)
CHLORIDE SERPL-SCNC: 93 MMOL/L — LOW (ref 98–107)
CO2 SERPL-SCNC: 30 MMOL/L — HIGH (ref 22–29)
CREAT SERPL-MCNC: 1.21 MG/DL — SIGNIFICANT CHANGE UP (ref 0.5–1.3)
GLUCOSE BLDC GLUCOMTR-MCNC: 194 MG/DL — HIGH (ref 70–99)
GLUCOSE BLDC GLUCOMTR-MCNC: 218 MG/DL — HIGH (ref 70–99)
GLUCOSE BLDC GLUCOMTR-MCNC: 238 MG/DL — HIGH (ref 70–99)
GLUCOSE BLDC GLUCOMTR-MCNC: 257 MG/DL — HIGH (ref 70–99)
GLUCOSE SERPL-MCNC: 209 MG/DL — HIGH (ref 70–115)
HCT VFR BLD CALC: 45.6 % — SIGNIFICANT CHANGE UP (ref 39–50)
HGB BLD-MCNC: 14.6 G/DL — SIGNIFICANT CHANGE UP (ref 13–17)
INR BLD: 3.53 RATIO — HIGH (ref 0.88–1.16)
MAGNESIUM SERPL-MCNC: 1.9 MG/DL — SIGNIFICANT CHANGE UP (ref 1.6–2.6)
MCHC RBC-ENTMCNC: 28.9 PG — SIGNIFICANT CHANGE UP (ref 27–34)
MCHC RBC-ENTMCNC: 32 GM/DL — SIGNIFICANT CHANGE UP (ref 32–36)
MCV RBC AUTO: 90.1 FL — SIGNIFICANT CHANGE UP (ref 80–100)
PLATELET # BLD AUTO: 147 K/UL — LOW (ref 150–400)
POTASSIUM SERPL-MCNC: 3.9 MMOL/L — SIGNIFICANT CHANGE UP (ref 3.5–5.3)
POTASSIUM SERPL-SCNC: 3.9 MMOL/L — SIGNIFICANT CHANGE UP (ref 3.5–5.3)
PROTHROM AB SERPL-ACNC: 42.2 SEC — HIGH (ref 10–12.9)
RBC # BLD: 5.06 M/UL — SIGNIFICANT CHANGE UP (ref 4.2–5.8)
RBC # FLD: 15.8 % — HIGH (ref 10.3–14.5)
SODIUM SERPL-SCNC: 137 MMOL/L — SIGNIFICANT CHANGE UP (ref 135–145)
VANCOMYCIN TROUGH SERPL-MCNC: 22 UG/ML — HIGH (ref 10–20)
WBC # BLD: 5.18 K/UL — SIGNIFICANT CHANGE UP (ref 3.8–10.5)
WBC # FLD AUTO: 5.18 K/UL — SIGNIFICANT CHANGE UP (ref 3.8–10.5)

## 2019-09-11 PROCEDURE — 99232 SBSQ HOSP IP/OBS MODERATE 35: CPT

## 2019-09-11 PROCEDURE — 99233 SBSQ HOSP IP/OBS HIGH 50: CPT

## 2019-09-11 RX ORDER — PIPERACILLIN AND TAZOBACTAM 4; .5 G/20ML; G/20ML
3.38 INJECTION, POWDER, LYOPHILIZED, FOR SOLUTION INTRAVENOUS EVERY 8 HOURS
Refills: 0 | Status: DISCONTINUED | OUTPATIENT
Start: 2019-09-11 | End: 2019-09-13

## 2019-09-11 RX ORDER — PIPERACILLIN AND TAZOBACTAM 4; .5 G/20ML; G/20ML
3.38 INJECTION, POWDER, LYOPHILIZED, FOR SOLUTION INTRAVENOUS ONCE
Refills: 0 | Status: COMPLETED | OUTPATIENT
Start: 2019-09-11 | End: 2019-09-11

## 2019-09-11 RX ADMIN — Medication 100 MILLIGRAM(S): at 13:58

## 2019-09-11 RX ADMIN — HYDROMORPHONE HYDROCHLORIDE 2 MILLIGRAM(S): 2 INJECTION INTRAMUSCULAR; INTRAVENOUS; SUBCUTANEOUS at 13:00

## 2019-09-11 RX ADMIN — HYDROMORPHONE HYDROCHLORIDE 0.5 MILLIGRAM(S): 2 INJECTION INTRAMUSCULAR; INTRAVENOUS; SUBCUTANEOUS at 10:13

## 2019-09-11 RX ADMIN — CYCLOBENZAPRINE HYDROCHLORIDE 5 MILLIGRAM(S): 10 TABLET, FILM COATED ORAL at 08:06

## 2019-09-11 RX ADMIN — INSULIN GLARGINE 12 UNIT(S): 100 INJECTION, SOLUTION SUBCUTANEOUS at 21:12

## 2019-09-11 RX ADMIN — Medication 250 MILLIGRAM(S): at 05:40

## 2019-09-11 RX ADMIN — HYDROMORPHONE HYDROCHLORIDE 2 MILLIGRAM(S): 2 INJECTION INTRAMUSCULAR; INTRAVENOUS; SUBCUTANEOUS at 18:42

## 2019-09-11 RX ADMIN — Medication 2: at 08:04

## 2019-09-11 RX ADMIN — Medication 250 MILLIGRAM(S): at 17:37

## 2019-09-11 RX ADMIN — Medication 250 MILLIGRAM(S): at 09:14

## 2019-09-11 RX ADMIN — CYCLOBENZAPRINE HYDROCHLORIDE 5 MILLIGRAM(S): 10 TABLET, FILM COATED ORAL at 18:39

## 2019-09-11 RX ADMIN — HYDROMORPHONE HYDROCHLORIDE 2 MILLIGRAM(S): 2 INJECTION INTRAMUSCULAR; INTRAVENOUS; SUBCUTANEOUS at 12:20

## 2019-09-11 RX ADMIN — HYDROMORPHONE HYDROCHLORIDE 0.5 MILLIGRAM(S): 2 INJECTION INTRAMUSCULAR; INTRAVENOUS; SUBCUTANEOUS at 06:00

## 2019-09-11 RX ADMIN — Medication 100 MILLIGRAM(S): at 21:02

## 2019-09-11 RX ADMIN — SENNA PLUS 2 TABLET(S): 8.6 TABLET ORAL at 21:02

## 2019-09-11 RX ADMIN — INSULIN GLARGINE 30 UNIT(S): 100 INJECTION, SOLUTION SUBCUTANEOUS at 08:05

## 2019-09-11 RX ADMIN — Medication 6: at 17:31

## 2019-09-11 RX ADMIN — Medication 100 MILLIGRAM(S): at 05:40

## 2019-09-11 RX ADMIN — Medication 13 UNIT(S): at 08:03

## 2019-09-11 RX ADMIN — Medication 1 APPLICATION(S): at 05:41

## 2019-09-11 RX ADMIN — HYDROMORPHONE HYDROCHLORIDE 0.5 MILLIGRAM(S): 2 INJECTION INTRAMUSCULAR; INTRAVENOUS; SUBCUTANEOUS at 09:58

## 2019-09-11 RX ADMIN — NYSTATIN CREAM 1 APPLICATION(S): 100000 CREAM TOPICAL at 05:41

## 2019-09-11 RX ADMIN — Medication 13 UNIT(S): at 12:10

## 2019-09-11 RX ADMIN — HYDROMORPHONE HYDROCHLORIDE 0.5 MILLIGRAM(S): 2 INJECTION INTRAMUSCULAR; INTRAVENOUS; SUBCUTANEOUS at 03:04

## 2019-09-11 RX ADMIN — Medication 4: at 21:12

## 2019-09-11 RX ADMIN — CARVEDILOL PHOSPHATE 3.12 MILLIGRAM(S): 80 CAPSULE, EXTENDED RELEASE ORAL at 17:36

## 2019-09-11 RX ADMIN — HYDROMORPHONE HYDROCHLORIDE 0.5 MILLIGRAM(S): 2 INJECTION INTRAMUSCULAR; INTRAVENOUS; SUBCUTANEOUS at 21:00

## 2019-09-11 RX ADMIN — Medication 50 MICROGRAM(S): at 05:41

## 2019-09-11 RX ADMIN — CARVEDILOL PHOSPHATE 3.12 MILLIGRAM(S): 80 CAPSULE, EXTENDED RELEASE ORAL at 05:40

## 2019-09-11 RX ADMIN — Medication 40 MILLIGRAM(S): at 05:41

## 2019-09-11 RX ADMIN — PIPERACILLIN AND TAZOBACTAM 200 GRAM(S): 4; .5 INJECTION, POWDER, LYOPHILIZED, FOR SOLUTION INTRAVENOUS at 12:12

## 2019-09-11 RX ADMIN — NYSTATIN CREAM 1 APPLICATION(S): 100000 CREAM TOPICAL at 17:36

## 2019-09-11 RX ADMIN — PIPERACILLIN AND TAZOBACTAM 25 GRAM(S): 4; .5 INJECTION, POWDER, LYOPHILIZED, FOR SOLUTION INTRAVENOUS at 17:35

## 2019-09-11 RX ADMIN — HYDROMORPHONE HYDROCHLORIDE 2 MILLIGRAM(S): 2 INJECTION INTRAMUSCULAR; INTRAVENOUS; SUBCUTANEOUS at 17:37

## 2019-09-11 RX ADMIN — Medication 13 UNIT(S): at 17:31

## 2019-09-11 RX ADMIN — HYDROMORPHONE HYDROCHLORIDE 0.5 MILLIGRAM(S): 2 INJECTION INTRAMUSCULAR; INTRAVENOUS; SUBCUTANEOUS at 21:40

## 2019-09-11 RX ADMIN — Medication 4: at 12:16

## 2019-09-11 RX ADMIN — Medication 1 APPLICATION(S): at 17:36

## 2019-09-11 NOTE — PROGRESS NOTE ADULT - SUBJECTIVE AND OBJECTIVE BOX
[facility] - [facility], [facility]                                   [address] [place] [address]                                                      PHONE: [phone]                                                         FAX: [phone]  [email or web address]  ---------------------------------------------------------------------------------------------------------------------------------    Overnight events/patient complaints:  NAD Denies any chest pain or orthopnea     allow double protein at meals (Unknown)  No Known Allergies    MEDICATIONS  (STANDING):  carvedilol 3.125 milliGRAM(s) Oral every 12 hours  clotrimazole 1% Cream 1 Application(s) Topical two times a day  docusate sodium 100 milliGRAM(s) Oral three times a day  insulin glargine Injectable (LANTUS) 12 Unit(s) SubCutaneous at bedtime  insulin glargine Injectable (LANTUS) 30 Unit(s) SubCutaneous every morning  insulin lispro (HumaLOG) corrective regimen sliding scale   SubCutaneous Before meals and at bedtime  insulin lispro Injectable (HumaLOG) 13 Unit(s) SubCutaneous three times a day before meals  levothyroxine 50 MICROGram(s) Oral daily  nystatin Powder 1 Application(s) Topical every 12 hours  piperacillin/tazobactam IVPB.. 3.375 Gram(s) IV Intermittent every 12 hours  saccharomyces boulardii 250 milliGRAM(s) Oral two times a day  senna 2 Tablet(s) Oral at bedtime  torsemide 40 milliGRAM(s) Oral daily  vancomycin  IVPB 1000 milliGRAM(s) IV Intermittent <User Schedule>    MEDICATIONS  (PRN):  acetaminophen   Tablet .. 650 milliGRAM(s) Oral every 6 hours PRN Moderate Pain (4 - 6)  cyclobenzaprine 5 milliGRAM(s) Oral every 8 hours PRN Muscle Spasm  HYDROmorphone  Injectable 0.5 milliGRAM(s) IV Push every 3 hours PRN Moderate Pain (4 - 6)  HYDROmorphone  Injectable 2 milliGRAM(s) IV Push every 4 hours PRN Severe Pain (7 - 10)  polyethylene glycol 3350 17 Gram(s) Oral daily PRN Constipation      Vital Signs Last 24 Hrs  T(C): 36.7 (11 Sep 2019 07:45), Max: 36.8 (10 Sep 2019 21:00)  T(F): 98 (11 Sep 2019 07:45), Max: 98.3 (10 Sep 2019 21:00)  HR: 95 (11 Sep 2019 07:45) (65 - 95)  BP: 118/78 (11 Sep 2019 07:45) (117/74 - 124/68)  BP(mean): --  RR: 18 (11 Sep 2019 07:45) (18 - 18)  SpO2: 98% (11 Sep 2019 07:45) (93% - 98%)  ICU Vital Signs Last 24 Hrs  MARY ANN CORNELL  I&O's Detail    10 Sep 2019 07:01  -  11 Sep 2019 07:00  --------------------------------------------------------  IN:  Total IN: 0 mL    OUT:    Voided: 2000 mL  Total OUT: 2000 mL    Total NET: -2000 mL        I&O's Summary    10 Sep 2019 07:01  -  11 Sep 2019 07:00  --------------------------------------------------------  IN: 0 mL / OUT: 2000 mL / NET: -2000 mL      Drug Dosing Weight  MARY ANN KRAIG      PHYSICAL EXAM:  General: Appears well developed, well nourished alert and cooperative.  HEENT: Head; normocephalic, atraumatic.  Eyes: Pupils reactive, cornea wnl.  Neck: Supple, no nodes adenopathy, no NVD or carotid bruit or thyromegaly.  CARDIOVASCULAR: Normal S1 and S2, 2/6 murmur, rub, gallop or lift.   LUNGS: No rales, rhonchi or wheeze. Normal breath sounds bilaterally.  ABDOMEN: Soft, nontender without mass or organomegaly. bowel sounds normoactive.  EXTREMITIES: No clubbing, cyanosis ++ edema. Distal pulses wnl.   SKIN: warm and dry with normal turgor.  NEURO: Alert/oriented x 3/normal motor exam. No pathologic reflexes.    PSYCH: normal affect.        LABS:                        14.6   5.18  )-----------( 147      ( 11 Sep 2019 07:49 )             45.6     09-11    137  |  93<L>  |  33.0<H>  ----------------------------<  209<H>  3.9   |  30.0<H>  |  1.21    Ca    9.1      11 Sep 2019 07:49  Mg     1.9     09-11    TPro  5.9<L>  /  Alb  2.4<L>  /  TBili  1.0  /  DBili  x   /  AST  118<H>  /  ALT  41<H>  /  AlkPhos  231<H>  09-10    MARY ANN CORNELL      PT/INR - ( 11 Sep 2019 07:49 )   PT: 42.2 sec;   INR: 3.53 ratio         PTT - ( 10 Sep 2019 03:37 )  PTT:>200.0 sec      RADIOLOGY & ADDITIONAL STUDIES:    INTERPRETATION OF TELEMETRY (personally reviewed):      ECHO:    ummary:   1. Left ventricular ejection fraction, by visual estimation, is 25 to   30%.   2. Severely decreased global left ventricular systolic function.   3. Basal and mid inferolateral wall and basal inferior segment are   abnormal as described above.   4. The mitral in-flow pattern reveals no discernable A-wave, therefore   no comment on diastolic function can be made.   5. There is mild septal left ventricular hypertrophy.   6. Mild mitral annular calcification.   7. The mitral valve leaflets are tethered which is due to reduced   systolic function and elevated LVDP.   8. Sclerotic aortic valve with normal opening.   9. Dilatation of the aortic root.  10. Dilated Ao root at 3.7cm.  11. Endocardial visualization was enhanced with intravenous echo contrast.    ASSESSMENT AND PLAN:  In summary, MARY ANN CORNELL is an 68y Male with past medical history significant for NICM (EF 25-30%) s/p single chamber SJM ICD, chronic afib on Coumadin, morbid obesity, DM, HTN, chronic leg cellulitis, HLD CKD admitted on 9/4/19 with septic shock and rhabdo after being found down on the toilet at home. He is now off pressors and on the medical floor. He has no cardiovascular complaints at this time.  Single chamber SJM ICD interrogated. Normal functioning device. Implant date 8/18/16. VVI 40.  <1%. Arrythmia log shows occasional brief irregular tachy episodes likley c/w AF with RVR. No ICD therapy. Doing with po Torsemide     Plan  1.  INR goal 2-3  2.  Continue beta blocker low dose coreg; AF rate controlled.  3.  Continue Torsemide 40 mg po daily   4.  IV Abx therapy as per primary team

## 2019-09-11 NOTE — PROGRESS NOTE ADULT - ASSESSMENT
66 y/o male with PMHx DM (w/ neuropathy), HN, CHF (Last Echo ~2 years ago with EF 35%), COPD, CHRISS (on home CPAP machine), PPM/Defibrillator ("heart function was 3%"), prior diabetic foot ulcers/infections, depression presents with worsening weakness and SOB for past few days. As per daughter at bedside, wife found pt passed out sitting on toilet. Estimated time on toilet is around 10-12 hours. Admit with DKA, and then patient found to have rhabdomyolysis with JAYLENE on CKD and cellulitis.  1. uncontrolled T2DM comp by neuropathy -s/p DKA.  poor outpt control due to nonadherence with diet, glucoses improving  - cont current Lantus and Humalog insulin doses    2. Hypothyroid - labs reviewed, cont Lt4 50 mcg daily, repeat TFTs 4-6 weeks    3. LE cellulitis - on Abx per ID

## 2019-09-11 NOTE — PROGRESS NOTE ADULT - ASSESSMENT
Improved JAYLENE on CKD  - creat 1.21  Morbid obesity , DM , HTN , CM, CHRISS , chronic edema   + Rhabdo from immobilization on toilet x 12 hours - improved ( CPK 30 K ----> 2 K )  + Cellulitis with resolved septic shock     TTE ECHO noted   Vanco and Zosyn as per ID   Renal sono noted no hydronephrosis  Clinically volume overloaded cr better agree w torsemide 40mg Q day will likely need to escalate  Will need to tolerate some degree of azotemia   Watch K+ on diuretics    Will follow

## 2019-09-11 NOTE — PROGRESS NOTE ADULT - SUBJECTIVE AND OBJECTIVE BOX
[facility]  INFECTIOUS DISEASES AND INTERNAL MEDICINE at [place]  =======================================================  Miguelangel Seo MD  Diplomates American Board of Internal Medicine and Infectious Diseases  Tel: [phone]      Fax: [phone]  =======================================================  MARY ANN CORNELL [id number]  Follow up: Cellulitis  No fever or chills  legs feel better    Allergies:   No Known Allergies    Antibiotics:  piperacillin/tazobactam IVPB. 3.375 Gram(s) IV Intermittent once  piperacillin/tazobactam IVPB.. 3.375 Gram(s) IV Intermittent every 8 hours  vancomycin  IVPB 1000 milliGRAM(s) IV Intermittent <User Schedule>       REVIEW OF SYSTEMS:  CONSTITUTIONAL:  No Fever or chills  HEENT:   No diplopia or blurred vision.  No earache, sore throat or runny nose.  CARDIOVASCULAR:  No pressure, squeezing, strangling, tightness, heaviness or aching about the chest, neck, axilla or epigastrium.  RESPIRATORY:  No cough, shortness of breath  GASTROINTESTINAL:  No nausea, vomiting or diarrhea.  GENITOURINARY:  No dysuria, frequency or urgency.   MUSCULOSKELETAL:  no joint aches, no muscle pain  SKIN:  B/L LE swelling   NEUROLOGIC:  No Headaches, seizures  PSYCHIATRIC:  No disorder of thought or mood.  ENDOCRINE:  No heat or cold intolerance  HEMATOLOGICAL:  No easy bruising or bleeding.       T(F): 98 (11 Sep 2019 07:45), Max: 98.3 (10 Sep 2019 21:00)  HR: 95 (11 Sep 2019 07:45)  BP: 118/78 (11 Sep 2019 07:45)  RR: 18 (11 Sep 2019 07:45)  SpO2: 98% (11 Sep 2019 07:45) (93% - 98%)  temp max in last 48H T(F): , Max: 98.3 (09-10-19 @ 21:00)    GEN: NAD, pleasant  HEENT: normocephalic and atraumatic. EOMI. PERRL.  Anicteric   NECK: Supple.   LUNGS: Clear to auscultation.  HEART: Regular rate and rhythm   ABDOMEN: Soft, nontender, and nondistended.  Positive bowel sounds.    : No CVA tenderness  EXTREMITIES: + edema.  MSK: no joint swelling  NEUROLOGIC:  No focal deficits  PSYCHIATRIC: Appropriate affect .  SKIN: chronic stasis dermatitis with overlying erythema, and bullae DYING UP      Labs:                        14.6   5.18  )-----------( 147      ( 11 Sep 2019 07:49 )             45.6       WBC Count: 5.18 K/uL (09-11-19 @ 07:49)  WBC Count: 6.34 K/uL (09-10-19 @ 05:48)  WBC Count: 6.63 K/uL (09-09-19 @ 06:03)  WBC Count: 5.60 K/uL (09-08-19 @ 06:18)  WBC Count: 5.58 K/uL (09-07-19 @ 06:10)  WBC Count: 8.47 K/uL (09-07-19 @ 01:58)      09-11    137  |  93<L>  |  33.0<H>  ----------------------------<  209<H>  3.9   |  30.0<H>  |  1.21    Ca    9.1      11 Sep 2019 07:49  Mg     1.9     09-11    TPro  5.9<L>  /  Alb  2.4<L>  /  TBili  1.0  /  DBili  x   /  AST  118<H>  /  ALT  41<H>  /  AlkPhos  231<H>  09-10      Culture - Urine (collected 09-04-19 @ 22:14)  Source: .Urine  Final Report (09-05-19 @ 16:30):    No growth    Culture - Blood (collected 09-04-19 @ 13:12)  Source: .Blood  Final Report (09-09-19 @ 15:00):    No growth at 5 days.    Culture - Blood (collected 09-04-19 @ 13:11)  Source: .Blood  Final Report (09-09-19 @ 15:00):    No growth at 5 days.

## 2019-09-11 NOTE — PROGRESS NOTE ADULT - SUBJECTIVE AND OBJECTIVE BOX
MARY ANN CORNELL    [id number]    68y      Male    INTERVAL HPI/OVERNIGHT EVENTS:    patient being seen for afib and cellulitis. patient seen at bedside and states pain is improved.     patient also complains of minor left hip discomfort     REVIEW OF SYSTEMS:    CONSTITUTIONAL: No fever, weight loss, or fatigue  RESPIRATORY: No cough, wheezing, hemoptysis; No shortness of breath  CARDIOVASCULAR: No chest pain, palpitations  GASTROINTESTINAL: No abdominal or epigastric pain. No nausea, vomiting  NEUROLOGICAL: No headaches, memory loss, loss of strength.  MISCELLANEOUS:      Vital Signs Last 24 Hrs  T(C): 36.7 (11 Sep 2019 07:45), Max: 36.8 (10 Sep 2019 21:00)  T(F): 98 (11 Sep 2019 07:45), Max: 98.3 (10 Sep 2019 21:00)  HR: 95 (11 Sep 2019 07:45) (65 - 95)  BP: 118/78 (11 Sep 2019 07:45) (117/74 - 124/68)  BP(mean): --  RR: 18 (11 Sep 2019 07:45) (18 - 18)  SpO2: 98% (11 Sep 2019 07:45) (93% - 98%)    PHYSICAL EXAM:    General: Morbidly obese male sitting in bed comfortably. No acute distress  HEENT: PERRLA. EOMI. Clear conjunctivae. Moist mucus membrane  Neck: Supple. No JVD. No Thyromegaly   Chest: Good air entry bilaterally - no wheezing,  Heart: Normal S1 & S2. RRR.   Abdomen: Soft. Non-tender. Non-distended. + BS  Ext: 4+ pedal edema with chronic skin changes and blisters. Fungal rash in between toes.   Neuro: AAO x 3. No speech disorder  Skin: Warm and Dry  Psychiatry: Normal mood and affect      LABS:                        14.6   5.18  )-----------( 147      ( 11 Sep 2019 07:49 )             45.6     09-11    137  |  93<L>  |  33.0<H>  ----------------------------<  209<H>  3.9   |  30.0<H>  |  1.21    Ca    9.1      11 Sep 2019 07:49  Mg     1.9     09-11    TPro  5.9<L>  /  Alb  2.4<L>  /  TBili  1.0  /  DBili  x   /  AST  118<H>  /  ALT  41<H>  /  AlkPhos  231<H>  09-10    PT/INR - ( 11 Sep 2019 07:49 )   PT: 42.2 sec;   INR: 3.53 ratio         PTT - ( 10 Sep 2019 03:37 )  PTT:>200.0 sec        MEDICATIONS  (STANDING):  carvedilol 3.125 milliGRAM(s) Oral every 12 hours  clotrimazole 1% Cream 1 Application(s) Topical two times a day  docusate sodium 100 milliGRAM(s) Oral three times a day  insulin glargine Injectable (LANTUS) 12 Unit(s) SubCutaneous at bedtime  insulin glargine Injectable (LANTUS) 30 Unit(s) SubCutaneous every morning  insulin lispro (HumaLOG) corrective regimen sliding scale   SubCutaneous Before meals and at bedtime  insulin lispro Injectable (HumaLOG) 13 Unit(s) SubCutaneous three times a day before meals  levothyroxine 50 MICROGram(s) Oral daily  nystatin Powder 1 Application(s) Topical every 12 hours  piperacillin/tazobactam IVPB. 3.375 Gram(s) IV Intermittent once  piperacillin/tazobactam IVPB.. 3.375 Gram(s) IV Intermittent every 8 hours  saccharomyces boulardii 250 milliGRAM(s) Oral two times a day  senna 2 Tablet(s) Oral at bedtime  torsemide 40 milliGRAM(s) Oral daily  vancomycin  IVPB 1000 milliGRAM(s) IV Intermittent <User Schedule>    MEDICATIONS  (PRN):  acetaminophen   Tablet .. 650 milliGRAM(s) Oral every 6 hours PRN Moderate Pain (4 - 6)  cyclobenzaprine 5 milliGRAM(s) Oral every 8 hours PRN Muscle Spasm  HYDROmorphone  Injectable 0.5 milliGRAM(s) IV Push every 3 hours PRN Moderate Pain (4 - 6)  HYDROmorphone  Injectable 2 milliGRAM(s) IV Push every 4 hours PRN Severe Pain (7 - 10)  polyethylene glycol 3350 17 Gram(s) Oral daily PRN Constipation      RADIOLOGY & ADDITIONAL TESTS:

## 2019-09-11 NOTE — PROGRESS NOTE ADULT - SUBJECTIVE AND OBJECTIVE BOX
Patient seen and examined    I&O's Summary    10 Sep 2019 07:01  -  11 Sep 2019 07:00  --------------------------------------------------------  IN: 0 mL / OUT: 2000 mL / NET: -2000 mL    11 Sep 2019 07:01  -  11 Sep 2019 20:09  --------------------------------------------------------  IN: 480 mL / OUT: 800 mL / NET: -320 mL    Lying quietly    REVIEW OF SYSTEMS:    CONSTITUTIONAL: No F/C  RESPIRATORY: No cough,  No SOB  CARDIOVASCULAR: No CP/palpitations,    GASTROINTESTINAL: No abdominal pain  or NVD   GENITOURINARY: No UTI sx  NEUROLOGICAL: No headaches, NO wk/numbness  MUSCULOSKELETAL: hip and buttock pain sl less  EXTREMITIES : no swelling,    Vital Signs Last 24 Hrs  T(C): 36.7 (11 Sep 2019 18:11), Max: 36.8 (10 Sep 2019 21:00)  T(F): 98.1 (11 Sep 2019 18:11), Max: 98.3 (10 Sep 2019 21:00)  HR: 93 (11 Sep 2019 18:11) (75 - 95)  BP: 108/59 (11 Sep 2019 18:11) (108/59 - 124/68)  BP(mean): --  RR: 18 (11 Sep 2019 18:11) (18 - 18)  SpO2: 91% (11 Sep 2019 18:11) (91% - 98%)    PHYSICAL EXAM:    GENERAL: NAD, Obese  EYES:  conjunctiva and sclera clear  NECK: Supple, No JVD/Bruit  NERVOUS SYSTEM:  A/O x3,   CHEST:  No rales or rhonchi  HEART:  RRR, No murmur  ABDOMEN: Soft, NT/ND BS+  EXTREMITIES:  + Edema;  SKIN: No rashes; stasis discoloration    LABS:                          14.6   5.18  )-----------( 147      ( 11 Sep 2019 07:49 )             45.6     09-11    137  |  93<L>  |  33.0<H>  ----------------------------<  209<H>  3.9   |  30.0<H>  |  1.21    Ca    9.1      11 Sep 2019 07:49  Mg     1.9     09-11    TPro  5.9<L>  /  Alb  2.4<L>  /  TBili  1.0  /  DBili  x   /  AST  118<H>  /  ALT  41<H>  /  AlkPhos  231<H>  09-10      MEDICATIONS  (STANDING):  acetaminophen   Tablet .. PRN  carvedilol  clotrimazole 1% Cream  cyclobenzaprine PRN  docusate sodium  HYDROmorphone  Injectable PRN  HYDROmorphone  Injectable PRN  insulin glargine Injectable (LANTUS)  insulin glargine Injectable (LANTUS)  insulin lispro (HumaLOG) corrective regimen sliding scale  insulin lispro Injectable (HumaLOG)  levothyroxine  nystatin Powder  piperacillin/tazobactam IVPB..  polyethylene glycol 3350 PRN  saccharomyces boulardii  senna  torsemide  vancomycin  IVPB

## 2019-09-11 NOTE — PROGRESS NOTE ADULT - ASSESSMENT
66 y/o male with uncontrolled DM (w/ neuropathy), HN, CHF (Last Echo ~2 years ago with EF 35%), COPD, CHRISS (on home CPAP machine), PPM/Defibrillator ("heart function was 3%"), prior diabetic foot ulcers/infections, depression presents with worsening weakness and SOB  In ED, pt remains short of breath, found to be hyperglycemic, AG 22, Serum CO2 14, lactate 8.0, CK 24k, transaminitis, hyperkalemic, Cr 2.85, with leukocytosis and febrile to 101.7.       FOUND WITH:    RIGHT LOWER EXT cellulitis  Acute renal failure on CKD  Rhabdomyolysis  uncontrolled DM  likely tinea pedis of bilateral feet      - continue Zosyn;  continue empiric Vancomycin for now.  MRSA on PCR of nares  RENAL function improving    - Blood cultures so far negative  - Miconazole topical    - watch renal fx closely; given rhabdo and ARF on CKD  - needs good sugar control    pain control per medical team.       - trend temperature and WBC curve  - repeat cultures from blood and all sources if febrile.     Will follow

## 2019-09-11 NOTE — PROGRESS NOTE ADULT - SUBJECTIVE AND OBJECTIVE BOX
f/u diabetes  INTERVAL HPI/OVERNIGHT EVENTS: c/o bilateral leg pains    MEDICATIONS  (STANDING):  carvedilol 3.125 milliGRAM(s) Oral every 12 hours  clotrimazole 1% Cream 1 Application(s) Topical two times a day  docusate sodium 100 milliGRAM(s) Oral three times a day  insulin glargine Injectable (LANTUS) 12 Unit(s) SubCutaneous at bedtime  insulin glargine Injectable (LANTUS) 30 Unit(s) SubCutaneous every morning  insulin lispro (HumaLOG) corrective regimen sliding scale   SubCutaneous Before meals and at bedtime  insulin lispro Injectable (HumaLOG) 13 Unit(s) SubCutaneous three times a day before meals  levothyroxine 50 MICROGram(s) Oral daily  nystatin Powder 1 Application(s) Topical every 12 hours  piperacillin/tazobactam IVPB.. 3.375 Gram(s) IV Intermittent every 8 hours  saccharomyces boulardii 250 milliGRAM(s) Oral two times a day  senna 2 Tablet(s) Oral at bedtime  torsemide 40 milliGRAM(s) Oral daily  vancomycin  IVPB 1000 milliGRAM(s) IV Intermittent <User Schedule>    MEDICATIONS  (PRN):  acetaminophen   Tablet .. 650 milliGRAM(s) Oral every 6 hours PRN Moderate Pain (4 - 6)  cyclobenzaprine 5 milliGRAM(s) Oral every 8 hours PRN Muscle Spasm  HYDROmorphone  Injectable 0.5 milliGRAM(s) IV Push every 3 hours PRN Moderate Pain (4 - 6)  HYDROmorphone  Injectable 2 milliGRAM(s) IV Push every 4 hours PRN Severe Pain (7 - 10)  polyethylene glycol 3350 17 Gram(s) Oral daily PRN Constipation      Allergies  No Known Allergies    Intolerances  allow double protein at meals (Unknown)    Review of systems: denies fever/chills. denies CP/palp/SOB. denies abd pain/N/V/D/C    Vital Signs Last 24 Hrs  T(C): 36.7 (11 Sep 2019 07:45), Max: 36.8 (10 Sep 2019 21:00)  T(F): 98 (11 Sep 2019 07:45), Max: 98.3 (10 Sep 2019 21:00)  HR: 95 (11 Sep 2019 07:45) (65 - 95)  BP: 118/78 (11 Sep 2019 07:45) (117/74 - 124/68)  BP(mean): --  RR: 18 (11 Sep 2019 07:45) (18 - 18)  SpO2: 98% (11 Sep 2019 07:45) (93% - 98%)    PHYSICAL EXAM:  General appearance: Well developed, well nourished. Obese male  Eyes: Pupils equal. EOMI  Lungs: Normal respiratory excursion. Lungs clear no w/r/r  CV: Normal S1S2, regular. No m/r/g.  B/l edema  Abdomen: Soft, nontender, nondistended, obese  Skin: PVD skin changes bilateral LE -stasis dermatitis  Neuro: Cranial nerves intact.   Psych: Normal affect, good judgement.        LABS:                        14.6   5.18  )-----------( 147      ( 11 Sep 2019 07:49 )             45.6     09-11    137  |  93<L>  |  33.0<H>  ----------------------------<  209<H>  3.9   |  30.0<H>  |  1.21    Ca    9.1      11 Sep 2019 07:49  Mg     1.9     09-11    TPro  5.9<L>  /  Alb  2.4<L>  /  TBili  1.0  /  DBili  x   /  AST  118<H>  /  ALT  41<H>  /  AlkPhos  231<H>  09-10    CAPILLARY BLOOD GLUCOSE  POCT Blood Glucose.: 218 mg/dL (11 Sep 2019 12:09)  POCT Blood Glucose.: 194 mg/dL (11 Sep 2019 08:02)  POCT Blood Glucose.: 154 mg/dL (10 Sep 2019 21:33)  POCT Blood Glucose.: 121 mg/dL (10 Sep 2019 17:23)  POCT Blood Glucose.: 145 mg/dL (10 Sep 2019 12:14)  POCT Blood Glucose.: 203 mg/dL (10 Sep 2019 08:30)  POCT Blood Glucose.: 162 mg/dL (09 Sep 2019 22:58)  POCT Blood Glucose.: 152 mg/dL (09 Sep 2019 17:58)

## 2019-09-12 LAB
GLUCOSE BLDC GLUCOMTR-MCNC: 195 MG/DL — HIGH (ref 70–99)
GLUCOSE BLDC GLUCOMTR-MCNC: 259 MG/DL — HIGH (ref 70–99)
GLUCOSE BLDC GLUCOMTR-MCNC: 264 MG/DL — HIGH (ref 70–99)
GLUCOSE BLDC GLUCOMTR-MCNC: 283 MG/DL — HIGH (ref 70–99)
HCT VFR BLD CALC: 43.9 % — SIGNIFICANT CHANGE UP (ref 39–50)
HGB BLD-MCNC: 14.2 G/DL — SIGNIFICANT CHANGE UP (ref 13–17)
INR BLD: 2.66 RATIO — HIGH (ref 0.88–1.16)
MCHC RBC-ENTMCNC: 29.2 PG — SIGNIFICANT CHANGE UP (ref 27–34)
MCHC RBC-ENTMCNC: 32.3 GM/DL — SIGNIFICANT CHANGE UP (ref 32–36)
MCV RBC AUTO: 90.3 FL — SIGNIFICANT CHANGE UP (ref 80–100)
PLATELET # BLD AUTO: 161 K/UL — SIGNIFICANT CHANGE UP (ref 150–400)
PROTHROM AB SERPL-ACNC: 31.5 SEC — HIGH (ref 10–12.9)
RBC # BLD: 4.86 M/UL — SIGNIFICANT CHANGE UP (ref 4.2–5.8)
RBC # FLD: 15.7 % — HIGH (ref 10.3–14.5)
VANCOMYCIN TROUGH SERPL-MCNC: 14.4 UG/ML — SIGNIFICANT CHANGE UP (ref 10–20)
WBC # BLD: 3.48 K/UL — LOW (ref 3.8–10.5)
WBC # FLD AUTO: 3.48 K/UL — LOW (ref 3.8–10.5)

## 2019-09-12 PROCEDURE — 99232 SBSQ HOSP IP/OBS MODERATE 35: CPT

## 2019-09-12 PROCEDURE — 99233 SBSQ HOSP IP/OBS HIGH 50: CPT

## 2019-09-12 RX ORDER — VANCOMYCIN HCL 1 G
750 VIAL (EA) INTRAVENOUS EVERY 12 HOURS
Refills: 0 | Status: DISCONTINUED | OUTPATIENT
Start: 2019-09-12 | End: 2019-09-13

## 2019-09-12 RX ORDER — INSULIN GLARGINE 100 [IU]/ML
20 INJECTION, SOLUTION SUBCUTANEOUS AT BEDTIME
Refills: 0 | Status: DISCONTINUED | OUTPATIENT
Start: 2019-09-12 | End: 2019-09-14

## 2019-09-12 RX ORDER — INSULIN LISPRO 100/ML
18 VIAL (ML) SUBCUTANEOUS
Refills: 0 | Status: DISCONTINUED | OUTPATIENT
Start: 2019-09-12 | End: 2019-09-14

## 2019-09-12 RX ORDER — WARFARIN SODIUM 2.5 MG/1
5 TABLET ORAL ONCE
Refills: 0 | Status: COMPLETED | OUTPATIENT
Start: 2019-09-12 | End: 2019-09-12

## 2019-09-12 RX ADMIN — HYDROMORPHONE HYDROCHLORIDE 2 MILLIGRAM(S): 2 INJECTION INTRAMUSCULAR; INTRAVENOUS; SUBCUTANEOUS at 14:08

## 2019-09-12 RX ADMIN — Medication 1 APPLICATION(S): at 17:55

## 2019-09-12 RX ADMIN — CYCLOBENZAPRINE HYDROCHLORIDE 5 MILLIGRAM(S): 10 TABLET, FILM COATED ORAL at 06:47

## 2019-09-12 RX ADMIN — Medication 250 MILLIGRAM(S): at 20:20

## 2019-09-12 RX ADMIN — INSULIN GLARGINE 20 UNIT(S): 100 INJECTION, SOLUTION SUBCUTANEOUS at 21:23

## 2019-09-12 RX ADMIN — Medication 40 MILLIGRAM(S): at 06:31

## 2019-09-12 RX ADMIN — PIPERACILLIN AND TAZOBACTAM 25 GRAM(S): 4; .5 INJECTION, POWDER, LYOPHILIZED, FOR SOLUTION INTRAVENOUS at 01:57

## 2019-09-12 RX ADMIN — Medication 250 MILLIGRAM(S): at 08:59

## 2019-09-12 RX ADMIN — HYDROMORPHONE HYDROCHLORIDE 2 MILLIGRAM(S): 2 INJECTION INTRAMUSCULAR; INTRAVENOUS; SUBCUTANEOUS at 14:23

## 2019-09-12 RX ADMIN — NYSTATIN CREAM 1 APPLICATION(S): 100000 CREAM TOPICAL at 06:32

## 2019-09-12 RX ADMIN — Medication 2: at 17:45

## 2019-09-12 RX ADMIN — HYDROMORPHONE HYDROCHLORIDE 2 MILLIGRAM(S): 2 INJECTION INTRAMUSCULAR; INTRAVENOUS; SUBCUTANEOUS at 18:12

## 2019-09-12 RX ADMIN — HYDROMORPHONE HYDROCHLORIDE 2 MILLIGRAM(S): 2 INJECTION INTRAMUSCULAR; INTRAVENOUS; SUBCUTANEOUS at 01:58

## 2019-09-12 RX ADMIN — HYDROMORPHONE HYDROCHLORIDE 2 MILLIGRAM(S): 2 INJECTION INTRAMUSCULAR; INTRAVENOUS; SUBCUTANEOUS at 09:15

## 2019-09-12 RX ADMIN — NYSTATIN CREAM 1 APPLICATION(S): 100000 CREAM TOPICAL at 17:55

## 2019-09-12 RX ADMIN — Medication 50 MICROGRAM(S): at 06:32

## 2019-09-12 RX ADMIN — Medication 100 MILLIGRAM(S): at 06:32

## 2019-09-12 RX ADMIN — HYDROMORPHONE HYDROCHLORIDE 2 MILLIGRAM(S): 2 INJECTION INTRAMUSCULAR; INTRAVENOUS; SUBCUTANEOUS at 23:08

## 2019-09-12 RX ADMIN — Medication 6: at 08:58

## 2019-09-12 RX ADMIN — WARFARIN SODIUM 5 MILLIGRAM(S): 2.5 TABLET ORAL at 21:22

## 2019-09-12 RX ADMIN — HYDROMORPHONE HYDROCHLORIDE 2 MILLIGRAM(S): 2 INJECTION INTRAMUSCULAR; INTRAVENOUS; SUBCUTANEOUS at 09:00

## 2019-09-12 RX ADMIN — CARVEDILOL PHOSPHATE 3.12 MILLIGRAM(S): 80 CAPSULE, EXTENDED RELEASE ORAL at 17:55

## 2019-09-12 RX ADMIN — Medication 18 UNIT(S): at 17:45

## 2019-09-12 RX ADMIN — Medication 100 MILLIGRAM(S): at 21:17

## 2019-09-12 RX ADMIN — Medication 6: at 13:09

## 2019-09-12 RX ADMIN — HYDROMORPHONE HYDROCHLORIDE 2 MILLIGRAM(S): 2 INJECTION INTRAMUSCULAR; INTRAVENOUS; SUBCUTANEOUS at 18:27

## 2019-09-12 RX ADMIN — Medication 13 UNIT(S): at 08:58

## 2019-09-12 RX ADMIN — Medication 6: at 21:24

## 2019-09-12 RX ADMIN — PIPERACILLIN AND TAZOBACTAM 25 GRAM(S): 4; .5 INJECTION, POWDER, LYOPHILIZED, FOR SOLUTION INTRAVENOUS at 09:58

## 2019-09-12 RX ADMIN — HYDROMORPHONE HYDROCHLORIDE 2 MILLIGRAM(S): 2 INJECTION INTRAMUSCULAR; INTRAVENOUS; SUBCUTANEOUS at 02:20

## 2019-09-12 RX ADMIN — PIPERACILLIN AND TAZOBACTAM 25 GRAM(S): 4; .5 INJECTION, POWDER, LYOPHILIZED, FOR SOLUTION INTRAVENOUS at 17:55

## 2019-09-12 RX ADMIN — Medication 250 MILLIGRAM(S): at 06:31

## 2019-09-12 RX ADMIN — CARVEDILOL PHOSPHATE 3.12 MILLIGRAM(S): 80 CAPSULE, EXTENDED RELEASE ORAL at 06:32

## 2019-09-12 RX ADMIN — INSULIN GLARGINE 30 UNIT(S): 100 INJECTION, SOLUTION SUBCUTANEOUS at 08:58

## 2019-09-12 RX ADMIN — SENNA PLUS 2 TABLET(S): 8.6 TABLET ORAL at 21:17

## 2019-09-12 RX ADMIN — HYDROMORPHONE HYDROCHLORIDE 2 MILLIGRAM(S): 2 INJECTION INTRAMUSCULAR; INTRAVENOUS; SUBCUTANEOUS at 23:30

## 2019-09-12 RX ADMIN — Medication 250 MILLIGRAM(S): at 17:55

## 2019-09-12 RX ADMIN — Medication 1 APPLICATION(S): at 06:32

## 2019-09-12 RX ADMIN — CYCLOBENZAPRINE HYDROCHLORIDE 5 MILLIGRAM(S): 10 TABLET, FILM COATED ORAL at 21:22

## 2019-09-12 RX ADMIN — Medication 18 UNIT(S): at 13:08

## 2019-09-12 NOTE — PROGRESS NOTE ADULT - SUBJECTIVE AND OBJECTIVE BOX
[facility]  INFECTIOUS DISEASES AND INTERNAL MEDICINE at [place]  =======================================================  Miguelangel Seo MD  Diplomates American Board of Internal Medicine and Infectious Diseases  Tel: [phone]      Fax: [phone]  =======================================================  MARY ANN CORNELL [id number]  Follow up: Cellulitis  No fever or chills  legs feel better    Allergies:   No Known Allergies    Antibiotics:  piperacillin/tazobactam IVPB.. 3.375 Gram(s) IV Intermittent every 8 hours  vancomycin  IVPB 1000 milliGRAM(s) IV Intermittent <User Schedule>       REVIEW OF SYSTEMS:  CONSTITUTIONAL:  No Fever or chills  HEENT:   No diplopia or blurred vision.  No earache, sore throat or runny nose.  CARDIOVASCULAR:  No pressure, squeezing, strangling, tightness, heaviness or aching about the chest, neck, axilla or epigastrium.  RESPIRATORY:  No cough, shortness of breath  GASTROINTESTINAL:  No nausea, vomiting or diarrhea.  GENITOURINARY:  No dysuria, frequency or urgency.   MUSCULOSKELETAL:  no joint aches, no muscle pain  SKIN:  B/L LE swelling   NEUROLOGIC:  No Headaches, seizures  PSYCHIATRIC:  No disorder of thought or mood.  ENDOCRINE:  No heat or cold intolerance  HEMATOLOGICAL:  No easy bruising or bleeding.       Physical Exam:  T(F): 98.3 (12 Sep 2019 09:09), Max: 98.3 (12 Sep 2019 09:09)  HR: 94 (12 Sep 2019 09:09)  BP: 97/49 (12 Sep 2019 09:09)  RR: 19 (12 Sep 2019 09:09)  SpO2: 95% (12 Sep 2019 09:09) (91% - 100%)  temp max in last 48H T(F): , Max: 98.3 (09-10-19 @ 21:00)    GEN: NAD, pleasant  HEENT: normocephalic and atraumatic. EOMI. PERRL.  Anicteric   NECK: Supple.   LUNGS: Clear to auscultation.  HEART: Regular rate and rhythm   ABDOMEN: Soft, nontender, and nondistended.  Positive bowel sounds.    : No CVA tenderness  EXTREMITIES: + edema.  MSK: no joint swelling  NEUROLOGIC:  No focal deficits  PSYCHIATRIC: Appropriate affect .  SKIN: chronic stasis dermatitis with overlying erythema, and bullae DYING UP      Labs:                        14.2   3.48  )-----------( 161      ( 12 Sep 2019 07:41 )             43.9       WBC Count: 3.48 K/uL (09-12-19 @ 07:41)  WBC Count: 5.18 K/uL (09-11-19 @ 07:49)  WBC Count: 6.34 K/uL (09-10-19 @ 05:48)  WBC Count: 6.63 K/uL (09-09-19 @ 06:03)  WBC Count: 5.60 K/uL (09-08-19 @ 06:18)      09-11    137  |  93<L>  |  33.0<H>  ----------------------------<  209<H>  3.9   |  30.0<H>  |  1.21    Ca    9.1      11 Sep 2019 07:49  Mg     1.9     09-11        Culture - Urine (collected 09-04-19 @ 22:14)  Source: .Urine  Final Report (09-05-19 @ 16:30):    No growth    Culture - Blood (collected 09-04-19 @ 13:12)  Source: .Blood  Final Report (09-09-19 @ 15:00):    No growth at 5 days.    Culture - Blood (collected 09-04-19 @ 13:11)  Source: .Blood  Final Report (09-09-19 @ 15:00):    No growth at 5 days.

## 2019-09-12 NOTE — PROGRESS NOTE ADULT - SUBJECTIVE AND OBJECTIVE BOX
f/u diabetes and hypothyroid  INTERVAL HPI/OVERNIGHT EVENTS: c/o leg pains    MEDICATIONS  (STANDING):  carvedilol 3.125 milliGRAM(s) Oral every 12 hours  clotrimazole 1% Cream 1 Application(s) Topical two times a day  docusate sodium 100 milliGRAM(s) Oral three times a day  insulin glargine Injectable (LANTUS) 12 Unit(s) SubCutaneous at bedtime  insulin glargine Injectable (LANTUS) 30 Unit(s) SubCutaneous every morning  insulin lispro (HumaLOG) corrective regimen sliding scale   SubCutaneous Before meals and at bedtime  insulin lispro Injectable (HumaLOG) 13 Unit(s) SubCutaneous three times a day before meals  levothyroxine 50 MICROGram(s) Oral daily  nystatin Powder 1 Application(s) Topical every 12 hours  piperacillin/tazobactam IVPB.. 3.375 Gram(s) IV Intermittent every 8 hours  saccharomyces boulardii 250 milliGRAM(s) Oral two times a day  senna 2 Tablet(s) Oral at bedtime  torsemide 40 milliGRAM(s) Oral daily  vancomycin  IVPB 1000 milliGRAM(s) IV Intermittent <User Schedule>  warfarin 5 milliGRAM(s) Oral once    MEDICATIONS  (PRN):  acetaminophen   Tablet .. 650 milliGRAM(s) Oral every 6 hours PRN Moderate Pain (4 - 6)  cyclobenzaprine 5 milliGRAM(s) Oral every 8 hours PRN Muscle Spasm  HYDROmorphone  Injectable 0.5 milliGRAM(s) IV Push every 3 hours PRN Moderate Pain (4 - 6)  HYDROmorphone  Injectable 2 milliGRAM(s) IV Push every 4 hours PRN Severe Pain (7 - 10)  polyethylene glycol 3350 17 Gram(s) Oral daily PRN Constipation      Allergies  No Known Allergies    allow double protein at meals (Unknown)      Review of systems:denies fever/chills. denies CP/palp/SOB. denies abd pain/N/V/D/C      Vital Signs Last 24 Hrs  T(C): 36.8 (12 Sep 2019 09:09), Max: 36.8 (12 Sep 2019 09:09)  T(F): 98.3 (12 Sep 2019 09:09), Max: 98.3 (12 Sep 2019 09:09)  HR: 94 (12 Sep 2019 09:09) (91 - 94)  BP: 97/49 (12 Sep 2019 09:09) (97/49 - 127/82)  BP(mean): --  RR: 19 (12 Sep 2019 09:09) (18 - 19)  SpO2: 95% (12 Sep 2019 09:09) (91% - 100%)    PHYSICAL EXAM:  General appearance: Well developed, well nourished. Obese male  Eyes: Pupils equal. EOMI  Lungs: Normal respiratory excursion. Lungs clear no w/r/r  CV: Normal S1S2, regular. No m/r/g.  B/l edema  Abdomen: Soft, nontender, nondistended, obese  Skin: PVD skin changes bilateral LE -stasis dermatitis  Neuro: Cranial nerves intact.         LABS:                        14.2   3.48  )-----------( 161      ( 12 Sep 2019 07:41 )             43.9     09-11    137  |  93<L>  |  33.0<H>  ----------------------------<  209<H>  3.9   |  30.0<H>  |  1.21    Ca    9.1      11 Sep 2019 07:49  Mg     1.9     09-11    Hemoglobin A1C, Whole Blood: 11.6 % <H> [4.0 - 5.6] (09-05-19 @ 04:32)    Triiodothyronine, Total (T3 Total): 106 ng/dL [80 - 200] (09-07-19)  T4, Serum: 8.3 ug/dL [4.5 - 12.0] (09-07-19)  Thyroid Stimulating Hormone, Serum: 6.28 uIU/mL [0.27 - 4.20] (09-07-19)    Thyroid Stimulating Hormone, Serum: 3.65 uIU/mL [0.27 - 4.20] (09-05-19)    CAPILLARY BLOOD GLUCOSE  POCT Blood Glucose.: 259 mg/dL (09-12-19 @ 08:55)  POCT Blood Glucose.: 238 mg/dL (09-11-19 @ 21:10)  POCT Blood Glucose.: 257 mg/dL (09-11-19 @ 16:54)  POCT Blood Glucose.: 218 mg/dL (09-11-19 @ 12:09)

## 2019-09-12 NOTE — PROGRESS NOTE ADULT - ASSESSMENT
67 y/o morbidly obese male with multiple medical problems was admitted to ICU on 9/4/19 for severe sepsis / cellulitis of b/L LE. pt. was brought in as while sitting on his toilet pt. passed out but as per family ( daughters at bedside ) pt. did not have any fall, did not hit his head to ground and was found sitting on the toilet. Pt. was on pressors initially and admitted to icu. Pt. was also noted to have rhabdo.    1) Septic Shock (Resolved)  - Likely secondary to LE Cellulitis  - Cultures negative  - Continue Vanco and Zosyn  - ID follow up noted   --> no longer needs midodrine     2) A. Fib with RVR   - Continue Coreg  coumaidn tonight   - Cardiology follow up noted: Single chamber SJM ICD interrogated. Normal functioning device. Implant date 8/18/16. VVI 40.  <1%. Arrythmia log shows occasional brief irregular tachy episodes likley c/w AF with RVR. No ICD therapy needed    3) Severe Rhabdomyolysis  - Improving  - Renal consult appreciated    4) Acute on chronic kidney injury  - Likely secondary to rhabdomyolysis  - Improving  - Avoid nephrotoxic medications    5) Chronic Systolic Heart Failure   - Continue Coreg  - No ACEIs or ARBs due to renal function  - Cardio input appreciated  --> torsemide    6) DM 2  - HbA1c 11.6  -> c,w insulin     7) LE Weakness  - Multifactorial  - Neurology Consult appreciated     8) Morbidly Obese  - advise ddiet and exercise    9) CHRISS  - Continue Nocturnal and PRN CPAP    10) Hypothyroidism  - Continue Synthroid  outpatient follow up     11) Elevated LFTs  - Likely secondary to septic shock and rhabdomyolysis  - Improving  - Monitor liver function    12_ pain - improved    dispo - eventual kalie  pt consult placed

## 2019-09-12 NOTE — PROGRESS NOTE ADULT - SUBJECTIVE AND OBJECTIVE BOX
[facility]                              [address] [address]                                                 PHONE: [phone]                                                 FAX: [phone]  -----------------------------------------------------------------------------------------------  Pt seen and examined. FU for edema    Overnight events/Complaints: Pt reports back pain. Edema persists.    Vital Signs Last 24 Hrs  T(C): 36.8 (12 Sep 2019 09:09), Max: 36.8 (12 Sep 2019 09:09)  T(F): 98.3 (12 Sep 2019 09:09), Max: 98.3 (12 Sep 2019 09:09)  HR: 94 (12 Sep 2019 09:09) (91 - 94)  BP: 97/49 (12 Sep 2019 09:09) (97/49 - 127/82)  BP(mean): --  RR: 19 (12 Sep 2019 09:09) (18 - 19)  SpO2: 95% (12 Sep 2019 09:09) (91% - 100%)  I&O's Summary    11 Sep 2019 07:01  -  12 Sep 2019 07:00  --------------------------------------------------------  IN: 980 mL / OUT: 2575 mL / NET: -1595 mL    PHYSICAL EXAM:  Constitutional: Appears well developed, well nourished; alert and co-operative  HEENT:     Head: Normocephalic and atraumatic  Neck: supple. No JVD  CARDIOVASCULAR: Normal S1 and S2, 2/6 murmur, rub, gallop or lift.   LUNGS: No rales, rhonchi or wheeze. Normal breath sounds bilaterally.  ABDOMEN: Soft, nontender without mass or organomegaly. bowel sounds normoactive.  EXTREMITIES: No clubbing, cyanosis ++ edema. Distal pulses wnl.   SKIN: warm and dry with normal turgor.  NEURO: Alert/oriented x 3/normal motor exam. No pathologic reflexes.    PSYCH: normal affect.        LABS:                        14.2   3.48  )-----------( 161      ( 12 Sep 2019 07:41 )             43.9     09-11    137  |  93<L>  |  33.0<H>  ----------------------------<  209<H>  3.9   |  30.0<H>  |  1.21    Ca    9.1      11 Sep 2019 07:49  Mg     1.9     09-11          PT/INR - ( 12 Sep 2019 07:41 )   PT: 31.5 sec;   INR: 2.66 ratio             RADIOLOGY & ADDITIONAL STUDIES: (reviewed)    CARDIOLOGY TESTING:(reviewed)     TELEMETRY (Independent visualization) : No arrhythmias    ECHOCARDIOGRAM :   Summary:   1. Left ventricular ejection fraction, by visual estimation, is 25 to   30%.   2. Severely decreased global left ventricular systolic function.   3. Basal and mid inferolateral wall and basal inferior segment are   abnormal as described above.   4. The mitral in-flow pattern reveals no discernable A-wave, therefore   no comment on diastolic function can be made.   5. There is mild septal left ventricular hypertrophy.   6. Mild mitral annular calcification.   7. The mitral valve leaflets are tethered which is due to reduced   systolic function and elevated LVDP.   8. Sclerotic aortic valve with normal opening.   9. Dilatation of the aortic root.  10. Dilated Ao root at 3.7cm.  11. Endocardial visualization was enhanced with intravenous echo contrast.    MEDICATIONS:(reviewed)  MEDICATIONS  (STANDING):  carvedilol 3.125 milliGRAM(s) Oral every 12 hours  clotrimazole 1% Cream 1 Application(s) Topical two times a day  docusate sodium 100 milliGRAM(s) Oral three times a day  insulin glargine Injectable (LANTUS) 12 Unit(s) SubCutaneous at bedtime  insulin glargine Injectable (LANTUS) 30 Unit(s) SubCutaneous every morning  insulin lispro (HumaLOG) corrective regimen sliding scale   SubCutaneous Before meals and at bedtime  insulin lispro Injectable (HumaLOG) 13 Unit(s) SubCutaneous three times a day before meals  levothyroxine 50 MICROGram(s) Oral daily  nystatin Powder 1 Application(s) Topical every 12 hours  piperacillin/tazobactam IVPB.. 3.375 Gram(s) IV Intermittent every 8 hours  saccharomyces boulardii 250 milliGRAM(s) Oral two times a day  senna 2 Tablet(s) Oral at bedtime  torsemide 40 milliGRAM(s) Oral daily  vancomycin  IVPB 1000 milliGRAM(s) IV Intermittent <User Schedule>  warfarin 5 milliGRAM(s) Oral once      ASSESSMENT AND PLAN:     68y Male with past medical history significant for NICM (EF 25-30%) s/p single chamber SJM ICD, chronic afib on Coumadin, morbid obesity, DM, HTN, chronic leg cellulitis, HLD CKD admitted on 9/4/19 with septic shock and rhabdo after being found down on the toilet at home.  Single chamber SJM ICD interrogated. Normal functioning device. Implant date 8/18/16. VVI 40.  <1%. Arrythmia log shows occasional brief irregular tachy episodes likley c/w AF with RVR. No ICD therapy.     1.  INR goal 2-3  2.  Continue beta blocker low dose coreg; AF rate controlled.  3.  Continue Torsemide 40 mg po daily

## 2019-09-12 NOTE — PROGRESS NOTE ADULT - ASSESSMENT
66 y/o male with uncontrolled DM (w/ neuropathy), HN, CHF (Last Echo ~2 years ago with EF 35%), COPD, CHRISS (on home CPAP machine), PPM/Defibrillator ("heart function was 3%"), prior diabetic foot ulcers/infections, depression presents with worsening weakness and SOB  In ED, pt remains short of breath, found to be hyperglycemic, AG 22, Serum CO2 14, lactate 8.0, CK 24k, transaminitis, hyperkalemic, Cr 2.85, with leukocytosis and febrile to 101.7.       FOUND WITH:    RIGHT LOWER EXT cellulitis  Acute renal failure on CKD  Rhabdomyolysis  uncontrolled DM  likely tinea pedis of bilateral feet      - continue Zosyn;  continue empiric Vancomycin for now.  MRSA on PCR of nares  RENAL function improving    - Blood cultures so far negative    Clinically improving    - watch renal fx closely; given rhabdo and ARF on CKD  - needs good sugar control    pain control per medical team.       - trend temperature and WBC curve  - repeat cultures from blood and all sources if febrile.     Will follow

## 2019-09-12 NOTE — PROGRESS NOTE ADULT - SUBJECTIVE AND OBJECTIVE BOX
Patient seen and examined    I&O's Summary    11 Sep 2019 07:01  -  12 Sep 2019 07:00  --------------------------------------------------------  IN: 980 mL / OUT: 2575 mL / NET: -1595 mL    Standing up with PT, with lot of efforts    REVIEW OF SYSTEMS:    CONSTITUTIONAL: No F/C  RESPIRATORY: No cough,  No SOB  CARDIOVASCULAR: No CP/palpitations,    GASTROINTESTINAL: No abdominal pain  or NVD   GENITOURINARY: No UTI sx  NEUROLOGICAL: No headaches, NO wk/numbness  MUSCULOSKELETAL: hip and buttock pain sl less  EXTREMITIES : no swelling,    Vital Signs Last 24 Hrs  T(C): 36.7 (09-12-19 @ 17:50), Max: 36.8 (09-12-19 @ 09:09)  T(F): 98 (09-12-19 @ 17:50), Max: 98.3 (09-12-19 @ 09:09)  HR: 93 (09-12-19 @ 17:50) (91 - 94)  BP: 114/83 (09-12-19 @ 17:50) (97/49 - 127/82)  BP(mean): --  RR: 18 (09-12-19 @ 17:50) (18 - 19)  SpO2: 92% (09-12-19 @ 17:50) (91% - 100%)    PHYSICAL EXAM:    GENERAL: NAD, Morbidly Obese  EYES:  conjunctiva and sclera clear  NECK: Supple, No JVD/Bruit  NERVOUS SYSTEM:  A/O x3,   CHEST:  No rales or rhonchi  HEART:  RRR, No murmur  ABDOMEN: Soft, NT/ND BS+  EXTREMITIES:  + Edema; genralized  SKIN: No rashes; stasis discoloration    LABS:                          14.6   5.18  )-----------( 147      ( 11 Sep 2019 07:49 )             45.6                           14.2   3.48  )-----------( 161      ( 12 Sep 2019 07:41 )             43.9   09-11    137  |  93<L>  |  33.0<H>  ----------------------------<  209<H>  3.9   |  30.0<H>  |  1.21    Ca    9.1      11 Sep 2019 07:49  Mg     1.9     09-11          MEDICATIONS  (STANDING):  acetaminophen   Tablet .. PRN  carvedilol  clotrimazole 1% Cream  cyclobenzaprine PRN  docusate sodium  HYDROmorphone  Injectable PRN  HYDROmorphone  Injectable PRN  insulin glargine Injectable (LANTUS)  insulin glargine Injectable (LANTUS)  insulin lispro (HumaLOG) corrective regimen sliding scale  insulin lispro Injectable (HumaLOG)  levothyroxine  nystatin Powder  piperacillin/tazobactam IVPB..  polyethylene glycol 3350 PRN  saccharomyces boulardii  senna  torsemide  vancomycin  IVPB

## 2019-09-12 NOTE — PROGRESS NOTE ADULT - ASSESSMENT
Improved JAYLENE on CKD  - creat 1.21; no new chem today  Morbid obesity , DM , HTN , CM, CHRISS , chronic edema   + Rhabdo from immobilization on toilet x 12 hours - improved ( CPK 30 K ----> 2 K )  + Cellulitis with resolved septic shock     TTE ECHO noted   Vanco and Zosyn as per ID   Renal sono noted no hydronephrosis  Clinically volume overloaded cr better agree w torsemide 40mg Q day will likely need to escalate  Will need to tolerate some degree of azotemia   Watch K+ on diuretics    Will follow

## 2019-09-12 NOTE — PROGRESS NOTE ADULT - ASSESSMENT
68 y/o male with PMHx DM (w/ neuropathy), HN, CHF (Last Echo ~2 years ago with EF 35%), COPD, CHRISS (on home CPAP machine), PPM/Defibrillator ("heart function was 3%"), prior diabetic foot ulcers/infections, depression presents with worsening weakness, wife found pt passed out sitting on toilet.  Admit with DKA, and then patient found to have rhabdomyolysis with JAYLENE on CKD and cellulitis.  1. uncontrolled T2DM comp by neuropathy -s/p DKA.  poor outpt control due to nonadherence with diet, glucoses still suboptimal  - cont current Lantus 30 units in am, increase Lantus to 20 units at bedtime  -  increase Humalog with meals to 18 units  - cont Humalog scale    2. Hypothyroid - labs reviewed, cont Lt4 50 mcg daily, repeat TFTs 4-6 weeks    3. LE cellulitis - on Abx per ID 68 y/o male with PMHx DM (w/ neuropathy), HN, CHF (Last Echo ~2 years ago with EF 35%), COPD, CHRISS (on home CPAP machine), PPM/Defibrillator ("heart function was 3%"), prior diabetic foot ulcers/infections, depression presents with worsening weakness, wife found pt passed out sitting on toilet.  Admit with DKA, and then patient found to have rhabdomyolysis with JAYLENE on CKD and cellulitis.  1. uncontrolled T2DM comp by neuropathy -s/p DKA.  poor outpt control due to nonadherence with diet, glucoses still suboptimal and needs better control to help clear LE cellulitis  - cont current Lantus 30 units in am, increase Lantus to 20 units at bedtime  -  increase Humalog with meals to 18 units  - cont Humalog scale    2. Hypothyroid - labs reviewed, cont Lt4 50 mcg daily, repeat TFTs 4-6 weeks    3. LE cellulitis - on Abx per ID

## 2019-09-12 NOTE — PHARMACOTHERAPY INTERVENTION NOTE - COMMENTS
Patient with PMH CHF, BMI of 59. Recommended dosing 30 mL/kG fluids for sepsis on IBW (2,200 mL) to avoid fluid overload.
Sepsis
Vancomycin trough 22, Vancomycin dose adjusted to 750 mg IV q12h.
Vancomycin trough ordered
Zosyn therapy continued.  Discussed with ID.

## 2019-09-12 NOTE — PROGRESS NOTE ADULT - SUBJECTIVE AND OBJECTIVE BOX
MARY ANN CORNELL    [id number]    68y      Male    INTERVAL HPI/OVERNIGHT EVENTS:    patient being seen for afib and cellulitis. patient seen at bedside and states pain is much better controlled. patient eager to get pt    REVIEW OF SYSTEMS:    CONSTITUTIONAL: No fever, weight loss, or fatigue  RESPIRATORY: No cough, wheezing, hemoptysis; No shortness of breath  CARDIOVASCULAR: No chest pain, palpitations  GASTROINTESTINAL: No abdominal or epigastric pain. No nausea, vomiting  NEUROLOGICAL: No headaches, memory loss, loss of strength.  MISCELLANEOUS:      Vital Signs Last 24 Hrs  T(C): 36.8 (12 Sep 2019 09:09), Max: 36.8 (12 Sep 2019 09:09)  T(F): 98.3 (12 Sep 2019 09:09), Max: 98.3 (12 Sep 2019 09:09)  HR: 94 (12 Sep 2019 09:09) (91 - 94)  BP: 97/49 (12 Sep 2019 09:09) (97/49 - 127/82)  BP(mean): --  RR: 19 (12 Sep 2019 09:09) (18 - 19)  SpO2: 95% (12 Sep 2019 09:09) (91% - 100%)    PHYSICAL EXAM:  General: Morbidly obese male sitting in bed comfortably. No acute distress  HEENT: PERRLA. EOMI. Clear conjunctivae. Moist mucus membrane  Neck: Supple. No JVD. No Thyromegaly   Chest: Good air entry bilaterally - no wheezing,  Heart: Normal S1 & S2. RRR.   Abdomen: Soft. Non-tender. Non-distended. + BS  Ext: edema improved  Neuro: AAO x 3. No speech disorder  Skin: Warm and Dry  Psychiatry: Normal mood and affect      LABS:                        14.2   3.48  )-----------( 161      ( 12 Sep 2019 07:41 )             43.9     09-11    137  |  93<L>  |  33.0<H>  ----------------------------<  209<H>  3.9   |  30.0<H>  |  1.21    Ca    9.1      11 Sep 2019 07:49  Mg     1.9     09-11      PT/INR - ( 12 Sep 2019 07:41 )   PT: 31.5 sec;   INR: 2.66 ratio           MEDICATIONS  (STANDING):  carvedilol 3.125 milliGRAM(s) Oral every 12 hours  clotrimazole 1% Cream 1 Application(s) Topical two times a day  docusate sodium 100 milliGRAM(s) Oral three times a day  insulin glargine Injectable (LANTUS) 20 Unit(s) SubCutaneous at bedtime  insulin glargine Injectable (LANTUS) 30 Unit(s) SubCutaneous every morning  insulin lispro (HumaLOG) corrective regimen sliding scale   SubCutaneous Before meals and at bedtime  insulin lispro Injectable (HumaLOG) 18 Unit(s) SubCutaneous three times a day before meals  levothyroxine 50 MICROGram(s) Oral daily  nystatin Powder 1 Application(s) Topical every 12 hours  piperacillin/tazobactam IVPB.. 3.375 Gram(s) IV Intermittent every 8 hours  saccharomyces boulardii 250 milliGRAM(s) Oral two times a day  senna 2 Tablet(s) Oral at bedtime  torsemide 40 milliGRAM(s) Oral daily  vancomycin  IVPB 1000 milliGRAM(s) IV Intermittent <User Schedule>  warfarin 5 milliGRAM(s) Oral once    MEDICATIONS  (PRN):  acetaminophen   Tablet .. 650 milliGRAM(s) Oral every 6 hours PRN Moderate Pain (4 - 6)  cyclobenzaprine 5 milliGRAM(s) Oral every 8 hours PRN Muscle Spasm  HYDROmorphone  Injectable 0.5 milliGRAM(s) IV Push every 3 hours PRN Moderate Pain (4 - 6)  HYDROmorphone  Injectable 2 milliGRAM(s) IV Push every 4 hours PRN Severe Pain (7 - 10)  polyethylene glycol 3350 17 Gram(s) Oral daily PRN Constipation      RADIOLOGY & ADDITIONAL TESTS:

## 2019-09-13 LAB
ANION GAP SERPL CALC-SCNC: 15 MMOL/L — SIGNIFICANT CHANGE UP (ref 5–17)
BUN SERPL-MCNC: 41 MG/DL — HIGH (ref 8–20)
CALCIUM SERPL-MCNC: 9.2 MG/DL — SIGNIFICANT CHANGE UP (ref 8.6–10.2)
CHLORIDE SERPL-SCNC: 93 MMOL/L — LOW (ref 98–107)
CO2 SERPL-SCNC: 28 MMOL/L — SIGNIFICANT CHANGE UP (ref 22–29)
CREAT SERPL-MCNC: 1.35 MG/DL — HIGH (ref 0.5–1.3)
GLUCOSE BLDC GLUCOMTR-MCNC: 155 MG/DL — HIGH (ref 70–99)
GLUCOSE BLDC GLUCOMTR-MCNC: 157 MG/DL — HIGH (ref 70–99)
GLUCOSE BLDC GLUCOMTR-MCNC: 294 MG/DL — HIGH (ref 70–99)
GLUCOSE BLDC GLUCOMTR-MCNC: 298 MG/DL — HIGH (ref 70–99)
GLUCOSE SERPL-MCNC: 319 MG/DL — HIGH (ref 70–115)
INR BLD: 2.1 RATIO — HIGH (ref 0.88–1.16)
POTASSIUM SERPL-MCNC: 4 MMOL/L — SIGNIFICANT CHANGE UP (ref 3.5–5.3)
POTASSIUM SERPL-SCNC: 4 MMOL/L — SIGNIFICANT CHANGE UP (ref 3.5–5.3)
PROTHROM AB SERPL-ACNC: 24.7 SEC — HIGH (ref 10–12.9)
SODIUM SERPL-SCNC: 136 MMOL/L — SIGNIFICANT CHANGE UP (ref 135–145)

## 2019-09-13 PROCEDURE — 99233 SBSQ HOSP IP/OBS HIGH 50: CPT

## 2019-09-13 PROCEDURE — 99232 SBSQ HOSP IP/OBS MODERATE 35: CPT

## 2019-09-13 RX ORDER — WARFARIN SODIUM 2.5 MG/1
7.5 TABLET ORAL ONCE
Refills: 0 | Status: COMPLETED | OUTPATIENT
Start: 2019-09-13 | End: 2019-09-13

## 2019-09-13 RX ADMIN — HYDROMORPHONE HYDROCHLORIDE 2 MILLIGRAM(S): 2 INJECTION INTRAMUSCULAR; INTRAVENOUS; SUBCUTANEOUS at 09:04

## 2019-09-13 RX ADMIN — Medication 6: at 21:51

## 2019-09-13 RX ADMIN — PIPERACILLIN AND TAZOBACTAM 25 GRAM(S): 4; .5 INJECTION, POWDER, LYOPHILIZED, FOR SOLUTION INTRAVENOUS at 02:11

## 2019-09-13 RX ADMIN — HYDROMORPHONE HYDROCHLORIDE 2 MILLIGRAM(S): 2 INJECTION INTRAMUSCULAR; INTRAVENOUS; SUBCUTANEOUS at 13:47

## 2019-09-13 RX ADMIN — Medication 18 UNIT(S): at 13:04

## 2019-09-13 RX ADMIN — INSULIN GLARGINE 30 UNIT(S): 100 INJECTION, SOLUTION SUBCUTANEOUS at 08:25

## 2019-09-13 RX ADMIN — HYDROMORPHONE HYDROCHLORIDE 2 MILLIGRAM(S): 2 INJECTION INTRAMUSCULAR; INTRAVENOUS; SUBCUTANEOUS at 19:30

## 2019-09-13 RX ADMIN — NYSTATIN CREAM 1 APPLICATION(S): 100000 CREAM TOPICAL at 05:23

## 2019-09-13 RX ADMIN — Medication 1 APPLICATION(S): at 16:51

## 2019-09-13 RX ADMIN — CARVEDILOL PHOSPHATE 3.12 MILLIGRAM(S): 80 CAPSULE, EXTENDED RELEASE ORAL at 05:22

## 2019-09-13 RX ADMIN — WARFARIN SODIUM 7.5 MILLIGRAM(S): 2.5 TABLET ORAL at 21:50

## 2019-09-13 RX ADMIN — HYDROMORPHONE HYDROCHLORIDE 2 MILLIGRAM(S): 2 INJECTION INTRAMUSCULAR; INTRAVENOUS; SUBCUTANEOUS at 04:11

## 2019-09-13 RX ADMIN — HYDROMORPHONE HYDROCHLORIDE 2 MILLIGRAM(S): 2 INJECTION INTRAMUSCULAR; INTRAVENOUS; SUBCUTANEOUS at 13:08

## 2019-09-13 RX ADMIN — Medication 2: at 13:05

## 2019-09-13 RX ADMIN — Medication 100 MILLIGRAM(S): at 05:22

## 2019-09-13 RX ADMIN — HYDROMORPHONE HYDROCHLORIDE 2 MILLIGRAM(S): 2 INJECTION INTRAMUSCULAR; INTRAVENOUS; SUBCUTANEOUS at 23:43

## 2019-09-13 RX ADMIN — HYDROMORPHONE HYDROCHLORIDE 2 MILLIGRAM(S): 2 INJECTION INTRAMUSCULAR; INTRAVENOUS; SUBCUTANEOUS at 19:13

## 2019-09-13 RX ADMIN — CARVEDILOL PHOSPHATE 3.12 MILLIGRAM(S): 80 CAPSULE, EXTENDED RELEASE ORAL at 16:51

## 2019-09-13 RX ADMIN — Medication 100 MILLIGRAM(S): at 13:06

## 2019-09-13 RX ADMIN — HYDROMORPHONE HYDROCHLORIDE 2 MILLIGRAM(S): 2 INJECTION INTRAMUSCULAR; INTRAVENOUS; SUBCUTANEOUS at 04:35

## 2019-09-13 RX ADMIN — HYDROMORPHONE HYDROCHLORIDE 2 MILLIGRAM(S): 2 INJECTION INTRAMUSCULAR; INTRAVENOUS; SUBCUTANEOUS at 08:22

## 2019-09-13 RX ADMIN — Medication 50 MICROGRAM(S): at 05:21

## 2019-09-13 RX ADMIN — Medication 40 MILLIGRAM(S): at 05:23

## 2019-09-13 RX ADMIN — Medication 6: at 08:26

## 2019-09-13 RX ADMIN — Medication 250 MILLIGRAM(S): at 05:21

## 2019-09-13 RX ADMIN — Medication 1 APPLICATION(S): at 05:21

## 2019-09-13 RX ADMIN — INSULIN GLARGINE 20 UNIT(S): 100 INJECTION, SOLUTION SUBCUTANEOUS at 21:52

## 2019-09-13 RX ADMIN — Medication 250 MILLIGRAM(S): at 16:50

## 2019-09-13 RX ADMIN — Medication 1 TABLET(S): at 16:51

## 2019-09-13 RX ADMIN — Medication 18 UNIT(S): at 08:25

## 2019-09-13 RX ADMIN — Medication 18 UNIT(S): at 16:37

## 2019-09-13 RX ADMIN — NYSTATIN CREAM 1 APPLICATION(S): 100000 CREAM TOPICAL at 16:51

## 2019-09-13 RX ADMIN — Medication 2: at 16:36

## 2019-09-13 NOTE — PROGRESS NOTE ADULT - SUBJECTIVE AND OBJECTIVE BOX
[facility]  INFECTIOUS DISEASES AND INTERNAL MEDICINE at [place]  =======================================================  Miguelangel Seo MD  Diplomates American Board of Internal Medicine and Infectious Diseases  Tel: [phone]      Fax: [phone]  =======================================================  MARY ANN CORNELL [id number]  Follow up: Cellulitis  No fever or chills  legs feel better      Allergies:   No Known Allergies    Antibiotics:  amoxicillin  875 milliGRAM(s)/clavulanate 1 Tablet(s) Oral two times a day      REVIEW OF SYSTEMS:  CONSTITUTIONAL:  No Fever or chills  HEENT:   No diplopia or blurred vision.  No earache, sore throat or runny nose.  CARDIOVASCULAR:  No pressure, squeezing, strangling, tightness, heaviness or aching about the chest, neck, axilla or epigastrium.  RESPIRATORY:  No cough, shortness of breath  GASTROINTESTINAL:  No nausea, vomiting or diarrhea.  GENITOURINARY:  No dysuria, frequency or urgency.   MUSCULOSKELETAL:  no joint aches, no muscle pain  SKIN:  B/L LE swelling   NEUROLOGIC:  No Headaches, seizures  PSYCHIATRIC:  No disorder of thought or mood.  ENDOCRINE:  No heat or cold intolerance  HEMATOLOGICAL:  No easy bruising or bleeding.     Physical Exam:  T(F): 97.5 (13 Sep 2019 07:30), Max: 98.7 (13 Sep 2019 00:55)  HR: 85 (13 Sep 2019 07:30)  BP: 114/66 (13 Sep 2019 07:30)  RR: 20 (13 Sep 2019 07:30)  SpO2: 96% (13 Sep 2019 05:20) (92% - 98%)  temp max in last 48H T(F): , Max: 98.7 (09-13-19 @ 00:55)    GEN: NAD, pleasant  HEENT: normocephalic and atraumatic. EOMI. PERRL.  Anicteric   NECK: Supple.   LUNGS: Clear to auscultation.  HEART: Regular rate and rhythm   ABDOMEN: Soft, nontender, and nondistended.  Positive bowel sounds.    : No CVA tenderness  EXTREMITIES: + edema.  MSK: no joint swelling  NEUROLOGIC:  No focal deficits  PSYCHIATRIC: Appropriate affect .  SKIN: chronic stasis dermatitis with overlying erythema, and bullae DYING UP    Labs:                        14.2   3.48  )-----------( 161      ( 12 Sep 2019 07:41 )             43.9       WBC Count: 3.48 K/uL (09-12-19 @ 07:41)  WBC Count: 5.18 K/uL (09-11-19 @ 07:49)  WBC Count: 6.34 K/uL (09-10-19 @ 05:48)  WBC Count: 6.63 K/uL (09-09-19 @ 06:03)      09-13    136  |  93<L>  |  41.0<H>  ----------------------------<  319<H>  4.0   |  28.0  |  1.35<H>    Ca    9.2      13 Sep 2019 07:41        Culture - Urine (collected 09-04-19 @ 22:14)  Source: .Urine  Final Report (09-05-19 @ 16:30):    No growth    Culture - Blood (collected 09-04-19 @ 13:12)  Source: .Blood  Final Report (09-09-19 @ 15:00):    No growth at 5 days.    Culture - Blood (collected 09-04-19 @ 13:11)  Source: .Blood  Final Report (09-09-19 @ 15:00):    No growth at 5 days.

## 2019-09-13 NOTE — PROGRESS NOTE ADULT - SUBJECTIVE AND OBJECTIVE BOX
Chief Complaint: chart and hx reviewed.    HPI: 67 yo male found unresponsive in BR at home. Found to have sepsis/acidosis and cellulitis with renal insufficiency. Hx of afib and NICM/AICD-on coumadin. Hx of copd/jennie/MO/IDDM. Prior prostate Ca. Nonsmoker for 20yrs. No allergy.    PAST MEDICAL & SURGICAL HISTORY:  CHF (congestive heart failure)  Pulmonary hypertension  Prostate CA  Sleep apnea  Renal insufficiency  High cholesterol  HTN (hypertension)  DM (diabetes mellitus)  AICD (automatic cardioverter/defibrillator) present  S/P ankle ligament repair      PREVIOUS DIAGNOSTIC TESTING:      ECHO  FINDINGS: LVEF 25%    STRESS  FINDINGS:    CATHETERIZATION  FINDINGS:    MEDICATIONS  (STANDING):  amoxicillin  875 milliGRAM(s)/clavulanate 1 Tablet(s) Oral two times a day  carvedilol 3.125 milliGRAM(s) Oral every 12 hours  clotrimazole 1% Cream 1 Application(s) Topical two times a day  docusate sodium 100 milliGRAM(s) Oral three times a day  insulin glargine Injectable (LANTUS) 20 Unit(s) SubCutaneous at bedtime  insulin glargine Injectable (LANTUS) 30 Unit(s) SubCutaneous every morning  insulin lispro (HumaLOG) corrective regimen sliding scale   SubCutaneous Before meals and at bedtime  insulin lispro Injectable (HumaLOG) 18 Unit(s) SubCutaneous three times a day before meals  levothyroxine 50 MICROGram(s) Oral daily  nystatin Powder 1 Application(s) Topical every 12 hours  saccharomyces boulardii 250 milliGRAM(s) Oral two times a day  senna 2 Tablet(s) Oral at bedtime  torsemide 40 milliGRAM(s) Oral daily  warfarin 7.5 milliGRAM(s) Oral once    MEDICATIONS  (PRN):  acetaminophen   Tablet .. 650 milliGRAM(s) Oral every 6 hours PRN Moderate Pain (4 - 6)  cyclobenzaprine 5 milliGRAM(s) Oral every 8 hours PRN Muscle Spasm  HYDROmorphone  Injectable 0.5 milliGRAM(s) IV Push every 3 hours PRN Moderate Pain (4 - 6)  HYDROmorphone  Injectable 2 milliGRAM(s) IV Push every 4 hours PRN Severe Pain (7 - 10)  polyethylene glycol 3350 17 Gram(s) Oral daily PRN Constipation      FAMILY HISTORY:  Family history of diabetes mellitus (Sibling)  Family history of cancer (Sibling)      ROS: Negative other than as mentioned in HPI.    Vital Signs Last 24 Hrs  T(C): 36.4 (13 Sep 2019 07:30), Max: 37.1 (13 Sep 2019 00:55)  T(F): 97.5 (13 Sep 2019 07:30), Max: 98.7 (13 Sep 2019 00:55)  HR: 850 irreg(13 Sep 2019 07:30) (72 - 93)  BP: 114/66 auto cuff13 Sep 2019 07:30) (100/70 - 115/59)  BP(mean): --  RR: 16 ora. (13 Sep 2019 07:30) (18 - 20)  SpO2: 96% (13 Sep 2019 05:20) (92% - 98%)    PHYSICAL EXAM:  General: Appears well developed, well nourished alert and cooperative. Morbidly obese MA male afebrile nad.  HEENT: Head; normocephalic, atraumatic.  Eyes;   Pupils reactive, cornea wnl.  Neck; Supple, no nodes adenopathy, no NVD or carotid bruit or thyromegaly.  CARDIOVASCULAR; No murmur, rub, gallop or lift. Normal S1 and S2. Distant heart tones and irreg rhtythm  LUNGS; difficult to examine but good BS bilat-w/o wheeze  ABDOMEN ; Soft, nontender without mass or organomegaly. bowel sounds normoactive. Obese  EXTREMITIES; No clubbing, cyanosis or edema. Distal pulses wnl. ROM normal.  SKIN; warm and dry with normal turgor.  EXT; legs wrapped. chronic venous stasis changes.   PSYCH; normal affect.            INTERPRETATION OF TELEMETRY:    ECG: monitor shows afib    I&O's Detail    12 Sep 2019 07:01  -  13 Sep 2019 07:00  --------------------------------------------------------  IN:    Oral Fluid: 300 mL    Solution: 100 mL    Solution: 250 mL  Total IN: 650 mL    OUT:    Voided: 2550 mL  Total OUT: 2550 mL    Total NET: -1900 mL          LABS:                        14.2   3.48  )-----------( 161      ( 12 Sep 2019 07:41 )             43.9     09-13    136  |  93<L>  |  41.0<H>  ----------------------------<  319<H>  4.0   |  28.0  |  1.35<H>    Ca    9.2      13 Sep 2019 07:41          PT/INR - ( 13 Sep 2019 07:41 )   PT: 24.7 sec;   INR: 2.10 ratio             I&O's Summary    12 Sep 2019 07:01  -  13 Sep 2019 07:00  --------------------------------------------------------  IN: 650 mL / OUT: 2550 mL / NET: -1900 mL        RADIOLOGY & ADDITIONAL STUDIES:

## 2019-09-13 NOTE — PROGRESS NOTE ADULT - ASSESSMENT
1. 69 yo male presented with sepsis syndrome/uncontrolled dm/acidosis.reponded to iv antibiotics  2. NICM with EF 25%  3. AICD  4. chronic afib on coumadin.  5. jennie  6. copd.

## 2019-09-13 NOTE — PROGRESS NOTE ADULT - ASSESSMENT
Improved JAYLENE on CKD   Morbid obesity , DM , HTN , CM, CHRISS , chronic edema   + Rhabdo from immobilization on toilet x 12 hours - improved ( CPK 30 K ----> 2 K )  + Cellulitis with resolved septic shock     TTE ECHO noted   Abx  as per ID   Renal sono noted no hydronephrosis  Clinically volume overloaded cr better agree w torsemide   Will need to tolerate some degree of azotemia     Renal function and UO stable on the current diuretics   Cleared from a renal perspective for discharge

## 2019-09-13 NOTE — PROGRESS NOTE ADULT - SUBJECTIVE AND OBJECTIVE BOX
MARY ANN CORNELL    [id number]    68y      Male    INTERVAL HPI/OVERNIGHT EVENTS:    patient being seen for afib and cellulitis. patient seen at bedside and states feeling better    REVIEW OF SYSTEMS:    CONSTITUTIONAL: No fever, weight loss, or fatigue  RESPIRATORY: No cough, wheezing, hemoptysis; No shortness of breath  CARDIOVASCULAR: No chest pain, palpitations  GASTROINTESTINAL: No abdominal or epigastric pain. No nausea, vomiting  NEUROLOGICAL: No headaches, memory loss, loss of strength.  MISCELLANEOUS:      Vital Signs Last 24 Hrs  T(C): 36.4 (13 Sep 2019 07:30), Max: 37.1 (13 Sep 2019 00:55)  T(F): 97.5 (13 Sep 2019 07:30), Max: 98.7 (13 Sep 2019 00:55)  HR: 85 (13 Sep 2019 07:30) (72 - 93)  BP: 114/66 (13 Sep 2019 07:30) (100/70 - 115/59)  BP(mean): --  RR: 20 (13 Sep 2019 07:30) (18 - 20)  SpO2: 96% (13 Sep 2019 05:20) (92% - 98%)    PHYSICAL EXAM:    General: Morbidly obese male sitting in bed comfortably. No acute distress  HEENT: PERRLA. EOMI. Clear conjunctivae. Moist mucus membrane  Neck: Supple. No JVD. No Thyromegaly   Chest: Good air entry bilaterally - no wheezing,  Heart: Normal S1 & S2. RRR.   Abdomen: Soft. Non-tender. Non-distended. + BS  Ext: edema improved  Neuro: AAO x 3. No speech disorder  Skin: Warm and Dry  Psychiatry: Normal mood and affect        LABS:                        14.2   3.48  )-----------( 161      ( 12 Sep 2019 07:41 )             43.9     09-13    136  |  93<L>  |  41.0<H>  ----------------------------<  319<H>  4.0   |  28.0  |  1.35<H>    Ca    9.2      13 Sep 2019 07:41      PT/INR - ( 13 Sep 2019 07:41 )   PT: 24.7 sec;   INR: 2.10 ratio          MEDICATIONS  (STANDING):  amoxicillin  875 milliGRAM(s)/clavulanate 1 Tablet(s) Oral two times a day  carvedilol 3.125 milliGRAM(s) Oral every 12 hours  clotrimazole 1% Cream 1 Application(s) Topical two times a day  docusate sodium 100 milliGRAM(s) Oral three times a day  insulin glargine Injectable (LANTUS) 20 Unit(s) SubCutaneous at bedtime  insulin glargine Injectable (LANTUS) 30 Unit(s) SubCutaneous every morning  insulin lispro (HumaLOG) corrective regimen sliding scale   SubCutaneous Before meals and at bedtime  insulin lispro Injectable (HumaLOG) 18 Unit(s) SubCutaneous three times a day before meals  levothyroxine 50 MICROGram(s) Oral daily  nystatin Powder 1 Application(s) Topical every 12 hours  saccharomyces boulardii 250 milliGRAM(s) Oral two times a day  senna 2 Tablet(s) Oral at bedtime  torsemide 40 milliGRAM(s) Oral daily  warfarin 7.5 milliGRAM(s) Oral once    MEDICATIONS  (PRN):  acetaminophen   Tablet .. 650 milliGRAM(s) Oral every 6 hours PRN Moderate Pain (4 - 6)  cyclobenzaprine 5 milliGRAM(s) Oral every 8 hours PRN Muscle Spasm  HYDROmorphone  Injectable 0.5 milliGRAM(s) IV Push every 3 hours PRN Moderate Pain (4 - 6)  HYDROmorphone  Injectable 2 milliGRAM(s) IV Push every 4 hours PRN Severe Pain (7 - 10)  polyethylene glycol 3350 17 Gram(s) Oral daily PRN Constipation      RADIOLOGY & ADDITIONAL TESTS:

## 2019-09-13 NOTE — PROGRESS NOTE ADULT - ASSESSMENT
66 y/o male with uncontrolled DM (w/ neuropathy), HN, CHF (Last Echo ~2 years ago with EF 35%), COPD, CHRISS (on home CPAP machine), PPM/Defibrillator ("heart function was 3%"), prior diabetic foot ulcers/infections, depression presents with worsening weakness and SOB  In ED, pt remains short of breath, found to be hyperglycemic, AG 22, Serum CO2 14, lactate 8.0, CK 24k, transaminitis, hyperkalemic, Cr 2.85, with leukocytosis and febrile to 101.7.       FOUND WITH:    RIGHT LOWER EXT cellulitis  Acute renal failure on CKD  Rhabdomyolysis  uncontrolled DM  likely tinea pedis of bilateral feet      on Zosyn and empiric Vancomyicn; CULTURES negative  - change to Augmentin 875mg PO BID x 7 days  Clinically improving  CONTINUE wound care    watch renal function closely    - needs good sugar control    pain control per medical team.     no contraindications to discharge to community from ID standpoint

## 2019-09-13 NOTE — PROGRESS NOTE ADULT - SUBJECTIVE AND OBJECTIVE BOX
NEPHROLOGY INTERVAL HPI/OVERNIGHT EVENTS:    Feels better   He says he may be released to Rehab   No new complaints     MEDICATIONS  (STANDING):  amoxicillin  875 milliGRAM(s)/clavulanate 1 Tablet(s) Oral two times a day  carvedilol 3.125 milliGRAM(s) Oral every 12 hours  clotrimazole 1% Cream 1 Application(s) Topical two times a day  docusate sodium 100 milliGRAM(s) Oral three times a day  insulin glargine Injectable (LANTUS) 20 Unit(s) SubCutaneous at bedtime  insulin glargine Injectable (LANTUS) 30 Unit(s) SubCutaneous every morning  insulin lispro (HumaLOG) corrective regimen sliding scale   SubCutaneous Before meals and at bedtime  insulin lispro Injectable (HumaLOG) 18 Unit(s) SubCutaneous three times a day before meals  levothyroxine 50 MICROGram(s) Oral daily  nystatin Powder 1 Application(s) Topical every 12 hours  saccharomyces boulardii 250 milliGRAM(s) Oral two times a day  senna 2 Tablet(s) Oral at bedtime  torsemide 40 milliGRAM(s) Oral daily  warfarin 7.5 milliGRAM(s) Oral once    MEDICATIONS  (PRN):  acetaminophen   Tablet .. 650 milliGRAM(s) Oral every 6 hours PRN Moderate Pain (4 - 6)  cyclobenzaprine 5 milliGRAM(s) Oral every 8 hours PRN Muscle Spasm  HYDROmorphone  Injectable 0.5 milliGRAM(s) IV Push every 3 hours PRN Moderate Pain (4 - 6)  HYDROmorphone  Injectable 2 milliGRAM(s) IV Push every 4 hours PRN Severe Pain (7 - 10)  polyethylene glycol 3350 17 Gram(s) Oral daily PRN Constipation      Allergies    No Known Allergies    Intolerances    allow double protein at meals (Unknown)      Vital Signs Last 24 Hrs  T(C): 36.4 (13 Sep 2019 07:30), Max: 37.1 (13 Sep 2019 00:55)  T(F): 97.5 (13 Sep 2019 07:30), Max: 98.7 (13 Sep 2019 00:55)  HR: 85 (13 Sep 2019 07:30) (72 - 93)  BP: 114/66 (13 Sep 2019 07:30) (100/70 - 115/59)  BP(mean): --  RR: 20 (13 Sep 2019 07:30) (18 - 20)  SpO2: 96% (13 Sep 2019 05:20) (92% - 98%)  Daily     Daily   I&O's Detail    12 Sep 2019 07:01  -  13 Sep 2019 07:00  --------------------------------------------------------  IN:    Oral Fluid: 300 mL    Solution: 100 mL    Solution: 250 mL  Total IN: 650 mL    OUT:    Voided: 2550 mL  Total OUT: 2550 mL    Total NET: -1900 mL        I&O's Summary    12 Sep 2019 07:01  -  13 Sep 2019 07:00  --------------------------------------------------------  IN: 650 mL / OUT: 2550 mL / NET: -1900 mL      PHYSICAL EXAM:  GENERAL: NAD,   HEAD:  Atraumatic, No edema   NECK: Supple, No JVD,   CHEST/LUNG: EAE , No wheeze   HEART: Regular rate and rhythm; No gallop or rub   ABDOMEN: Morbid obesity . BNo rebound   EXTREMITIES:  pitting edema , + cellulitis     LABS:                        14.2   3.48  )-----------( 161      ( 12 Sep 2019 07:41 )             43.9     09-13    136  |  93<L>  |  41.0<H>  ----------------------------<  319<H>  4.0   |  28.0  |  1.35<H>    Ca    9.2      13 Sep 2019 07:41      PT/INR - ( 13 Sep 2019 07:41 )   PT: 24.7 sec;   INR: 2.10 ratio                     RADIOLOGY & ADDITIONAL TESTS:

## 2019-09-13 NOTE — PROGRESS NOTE ADULT - ASSESSMENT
69 y/o morbidly obese male with multiple medical problems was admitted to ICU on 9/4/19 for severe sepsis / cellulitis of b/L LE. pt. was brought in as while sitting on his toilet pt. passed out but as per family ( daughters at bedside ) pt. did not have any fall, did not hit his head to ground and was found sitting on the toilet. Pt. was on pressors initially and admitted to icu. Pt. was also noted to have rhabdo.    1) Septic Shock (Resolved)  - Likely secondary to LE Cellulitis  - Cultures negative  - s/p Vanco and Zosyn  - ID follow up noted   -->now on po augmentin     2) A. Fib with RVR   - Continue Coreg  coumaidn tonight   - Cardiology follow up noted: Single chamber SJM ICD interrogated. Normal functioning device. Implant date 8/18/16. VVI 40.  <1%. Arrythmia log shows occasional brief irregular tachy episodes likley c/w AF with RVR. No ICD therapy needed    3) Severe Rhabdomyolysis - resolved    4) Acute on chronic kidney injury  - Likely secondary to rhabdomyolysis  - cr stable     5) Chronic Systolic Heart Failure   - Continue Coreg  - No ACEIs or ARBs due to renal function  - Cardio input appreciated  --> torsemide    6) DM 2  - HbA1c 11.6  -> c,w insulin     7) LE Weakness  - Multifactorial  - Neurology Consult appreciated     8) Morbidly Obese  - advise ddiet and exercise    9) CHRISS  - Continue Nocturnal and PRN CPAP    10) Hypothyroidism  - Continue Synthroid  outpatient follow up     11) Elevated LFTs  - Likely secondary to septic shock and rhabdomyolysis  - Improving  - Monitor liver function    12_ pain - improved    dispo - eventual kalie  social work aware

## 2019-09-13 NOTE — CHART NOTE - NSCHARTNOTEFT_GEN_A_CORE
Source: Patient [x]  Family [ ]   other [x] EMR    Current Diet: Diet, Consistent Carbohydrate/No Snacks:   DASH/TLC {Sodium & Cholesteral Restricted} (09-07-19 @ 09:05)      Patient reports [ ] nausea  [ ] vomiting [ ] diarrhea [ ] constipation  [ ]chewing problems [ ] swallowing issues  [ ] other:     PO intake:  < 50% [ ]   50-75%  [ ]   %  [x]  other :    Source for PO intake [x] Patient [ ] family [x] chart [ ] staff [ ] other    Current Weight:   (9/9) 409.1 lbs  (9/7) 427 lbs  (9/6) 412.4 lbs  (9/5) 408 lbs  ? accuracy of weights, noted with 1+ generalized edema and 2+ b/l foot edema, continue to trend and maintain strict Is&Os     Pertinent Medications: MEDICATIONS  (STANDING):  amoxicillin  875 milliGRAM(s)/clavulanate 1 Tablet(s) Oral two times a day  carvedilol 3.125 milliGRAM(s) Oral every 12 hours  clotrimazole 1% Cream 1 Application(s) Topical two times a day  docusate sodium 100 milliGRAM(s) Oral three times a day  insulin glargine Injectable (LANTUS) 20 Unit(s) SubCutaneous at bedtime  insulin glargine Injectable (LANTUS) 30 Unit(s) SubCutaneous every morning  insulin lispro (HumaLOG) corrective regimen sliding scale   SubCutaneous Before meals and at bedtime  insulin lispro Injectable (HumaLOG) 18 Unit(s) SubCutaneous three times a day before meals  levothyroxine 50 MICROGram(s) Oral daily  nystatin Powder 1 Application(s) Topical every 12 hours  saccharomyces boulardii 250 milliGRAM(s) Oral two times a day  senna 2 Tablet(s) Oral at bedtime  torsemide 40 milliGRAM(s) Oral daily  warfarin 7.5 milliGRAM(s) Oral once    MEDICATIONS  (PRN):  acetaminophen   Tablet .. 650 milliGRAM(s) Oral every 6 hours PRN Moderate Pain (4 - 6)  cyclobenzaprine 5 milliGRAM(s) Oral every 8 hours PRN Muscle Spasm  HYDROmorphone  Injectable 0.5 milliGRAM(s) IV Push every 3 hours PRN Moderate Pain (4 - 6)  HYDROmorphone  Injectable 2 milliGRAM(s) IV Push every 4 hours PRN Severe Pain (7 - 10)  polyethylene glycol 3350 17 Gram(s) Oral daily PRN Constipation    Pertinent Labs: CBC Full  -  ( 12 Sep 2019 07:41 )  WBC Count : 3.48 K/uL  RBC Count : 4.86 M/uL  Hemoglobin : 14.2 g/dL  Hematocrit : 43.9 %  Platelet Count - Automated : 161 K/uL  Mean Cell Volume : 90.3 fl  Mean Cell Hemoglobin : 29.2 pg  Mean Cell Hemoglobin Concentration : 32.3 gm/dL  Auto Neutrophil # : x  Auto Lymphocyte # : x  Auto Monocyte # : x  Auto Eosinophil # : x  Auto Basophil # : x  Auto Neutrophil % : x  Auto Lymphocyte % : x  Auto Monocyte % : x  Auto Eosinophil % : x  Auto Basophil % : x    09-13 Na136 mmol/L Glu 319 mg/dL<H> K+ 4.0 mmol/L Cr  1.35 mg/dL<H> BUN 41.0 mg/dL<H> Phos n/a   Alb n/a   PAB n/a       Skin: Moisture associated dermatitis, left leg venous ulcers, DTI to right thigh and coccyx per documentation     Nutrition focused physical exam conducted - found signs of malnutrition [x]absent [ ]present    Subcutaneous fat loss: [ ] Orbital fat pads region, [ ]Buccal fat region, [ ]Triceps region,  [ ]Ribs region    Muscle wasting: [ ]Temples region, [ ]Clavicle region, [ ]Shoulder region, [ ]Scapula region, [ ]Interosseous region,  [ ]thigh region, [ ]Calf region    Estimated Needs:   [x] no change since previous assessment  [ ] recalculated:     Current Nutrition Diagnosis: Pt remains with overweight/obesity related to energy intake exceeds expenditure as evidenced by BMI 59.0. Pt with good appetite/po intake, consuming >75% of meals. Continuing to provide double protein at meals to aid in healing.     Recommendations:   1) Continue diet as tolerated.  2) Rx: MVI and vitamin C 500mg daily.  3) Encourage continued good po intake at meals.    Monitoring and Evaluation:   [x] PO intake [x] Tolerance to diet prescription [X] Weights  [X] Follow up per protocol [X] Labs

## 2019-09-14 ENCOUNTER — TRANSCRIPTION ENCOUNTER (OUTPATIENT)
Age: 69
End: 2019-09-14

## 2019-09-14 VITALS
RESPIRATION RATE: 18 BRPM | HEART RATE: 71 BPM | SYSTOLIC BLOOD PRESSURE: 118 MMHG | OXYGEN SATURATION: 97 % | DIASTOLIC BLOOD PRESSURE: 83 MMHG | TEMPERATURE: 98 F

## 2019-09-14 LAB
GLUCOSE BLDC GLUCOMTR-MCNC: 280 MG/DL — HIGH (ref 70–99)
GLUCOSE BLDC GLUCOMTR-MCNC: 326 MG/DL — HIGH (ref 70–99)

## 2019-09-14 PROCEDURE — 72128 CT CHEST SPINE W/O DYE: CPT

## 2019-09-14 PROCEDURE — 76775 US EXAM ABDO BACK WALL LIM: CPT

## 2019-09-14 PROCEDURE — 73590 X-RAY EXAM OF LOWER LEG: CPT

## 2019-09-14 PROCEDURE — 93880 EXTRACRANIAL BILAT STUDY: CPT

## 2019-09-14 PROCEDURE — 82803 BLOOD GASES ANY COMBINATION: CPT

## 2019-09-14 PROCEDURE — 82330 ASSAY OF CALCIUM: CPT

## 2019-09-14 PROCEDURE — 82435 ASSAY OF BLOOD CHLORIDE: CPT

## 2019-09-14 PROCEDURE — 83880 ASSAY OF NATRIURETIC PEPTIDE: CPT

## 2019-09-14 PROCEDURE — 83735 ASSAY OF MAGNESIUM: CPT

## 2019-09-14 PROCEDURE — 80053 COMPREHEN METABOLIC PANEL: CPT

## 2019-09-14 PROCEDURE — 82009 KETONE BODYS QUAL: CPT

## 2019-09-14 PROCEDURE — 84100 ASSAY OF PHOSPHORUS: CPT

## 2019-09-14 PROCEDURE — 82947 ASSAY GLUCOSE BLOOD QUANT: CPT

## 2019-09-14 PROCEDURE — 87086 URINE CULTURE/COLONY COUNT: CPT

## 2019-09-14 PROCEDURE — 96365 THER/PROPH/DIAG IV INF INIT: CPT

## 2019-09-14 PROCEDURE — 36415 COLL VENOUS BLD VENIPUNCTURE: CPT

## 2019-09-14 PROCEDURE — 85610 PROTHROMBIN TIME: CPT

## 2019-09-14 PROCEDURE — 83036 HEMOGLOBIN GLYCOSYLATED A1C: CPT

## 2019-09-14 PROCEDURE — 85730 THROMBOPLASTIN TIME PARTIAL: CPT

## 2019-09-14 PROCEDURE — 96375 TX/PRO/DX INJ NEW DRUG ADDON: CPT

## 2019-09-14 PROCEDURE — 94760 N-INVAS EAR/PLS OXIMETRY 1: CPT

## 2019-09-14 PROCEDURE — 99285 EMERGENCY DEPT VISIT HI MDM: CPT | Mod: 25

## 2019-09-14 PROCEDURE — 84145 PROCALCITONIN (PCT): CPT

## 2019-09-14 PROCEDURE — 84484 ASSAY OF TROPONIN QUANT: CPT

## 2019-09-14 PROCEDURE — 71045 X-RAY EXAM CHEST 1 VIEW: CPT

## 2019-09-14 PROCEDURE — 72125 CT NECK SPINE W/O DYE: CPT

## 2019-09-14 PROCEDURE — 87040 BLOOD CULTURE FOR BACTERIA: CPT

## 2019-09-14 PROCEDURE — 82962 GLUCOSE BLOOD TEST: CPT

## 2019-09-14 PROCEDURE — 74176 CT ABD & PELVIS W/O CONTRAST: CPT

## 2019-09-14 PROCEDURE — 93306 TTE W/DOPPLER COMPLETE: CPT

## 2019-09-14 PROCEDURE — 87641 MR-STAPH DNA AMP PROBE: CPT

## 2019-09-14 PROCEDURE — 82550 ASSAY OF CK (CPK): CPT

## 2019-09-14 PROCEDURE — 80061 LIPID PANEL: CPT

## 2019-09-14 PROCEDURE — 85014 HEMATOCRIT: CPT

## 2019-09-14 PROCEDURE — 96361 HYDRATE IV INFUSION ADD-ON: CPT

## 2019-09-14 PROCEDURE — 86803 HEPATITIS C AB TEST: CPT

## 2019-09-14 PROCEDURE — 97110 THERAPEUTIC EXERCISES: CPT

## 2019-09-14 PROCEDURE — 81001 URINALYSIS AUTO W/SCOPE: CPT

## 2019-09-14 PROCEDURE — 84480 ASSAY TRIIODOTHYRONINE (T3): CPT

## 2019-09-14 PROCEDURE — 83605 ASSAY OF LACTIC ACID: CPT

## 2019-09-14 PROCEDURE — 82553 CREATINE MB FRACTION: CPT

## 2019-09-14 PROCEDURE — 84295 ASSAY OF SERUM SODIUM: CPT

## 2019-09-14 PROCEDURE — 94660 CPAP INITIATION&MGMT: CPT

## 2019-09-14 PROCEDURE — 93970 EXTREMITY STUDY: CPT

## 2019-09-14 PROCEDURE — 85027 COMPLETE CBC AUTOMATED: CPT

## 2019-09-14 PROCEDURE — 97163 PT EVAL HIGH COMPLEX 45 MIN: CPT

## 2019-09-14 PROCEDURE — 87640 STAPH A DNA AMP PROBE: CPT

## 2019-09-14 PROCEDURE — 84443 ASSAY THYROID STIM HORMONE: CPT

## 2019-09-14 PROCEDURE — 84156 ASSAY OF PROTEIN URINE: CPT

## 2019-09-14 PROCEDURE — 84436 ASSAY OF TOTAL THYROXINE: CPT

## 2019-09-14 PROCEDURE — 99239 HOSP IP/OBS DSCHRG MGMT >30: CPT

## 2019-09-14 PROCEDURE — 97116 GAIT TRAINING THERAPY: CPT

## 2019-09-14 PROCEDURE — 76377 3D RENDER W/INTRP POSTPROCES: CPT

## 2019-09-14 PROCEDURE — 80202 ASSAY OF VANCOMYCIN: CPT

## 2019-09-14 PROCEDURE — 93005 ELECTROCARDIOGRAM TRACING: CPT

## 2019-09-14 PROCEDURE — 82570 ASSAY OF URINE CREATININE: CPT

## 2019-09-14 PROCEDURE — 73700 CT LOWER EXTREMITY W/O DYE: CPT

## 2019-09-14 PROCEDURE — 84132 ASSAY OF SERUM POTASSIUM: CPT

## 2019-09-14 PROCEDURE — 80048 BASIC METABOLIC PNL TOTAL CA: CPT

## 2019-09-14 PROCEDURE — 70450 CT HEAD/BRAIN W/O DYE: CPT

## 2019-09-14 RX ORDER — CARVEDILOL PHOSPHATE 80 MG/1
1 CAPSULE, EXTENDED RELEASE ORAL
Qty: 0 | Refills: 0 | DISCHARGE

## 2019-09-14 RX ORDER — CARVEDILOL PHOSPHATE 80 MG/1
1 CAPSULE, EXTENDED RELEASE ORAL
Qty: 0 | Refills: 0 | DISCHARGE
Start: 2019-09-14

## 2019-09-14 RX ORDER — CYCLOBENZAPRINE HYDROCHLORIDE 10 MG/1
1 TABLET, FILM COATED ORAL
Qty: 0 | Refills: 0 | DISCHARGE
Start: 2019-09-14

## 2019-09-14 RX ORDER — RAMIPRIL 5 MG
1 CAPSULE ORAL
Qty: 0 | Refills: 0 | DISCHARGE

## 2019-09-14 RX ORDER — SPIRONOLACTONE 25 MG/1
1 TABLET, FILM COATED ORAL
Qty: 0 | Refills: 0 | DISCHARGE

## 2019-09-14 RX ORDER — INSULIN GLARGINE 100 [IU]/ML
30 INJECTION, SOLUTION SUBCUTANEOUS
Qty: 30 | Refills: 0
Start: 2019-09-14 | End: 2019-10-13

## 2019-09-14 RX ORDER — INSULIN DETEMIR 100/ML (3)
50 INSULIN PEN (ML) SUBCUTANEOUS
Qty: 0 | Refills: 0 | DISCHARGE

## 2019-09-14 RX ORDER — NYSTATIN CREAM 100000 [USP'U]/G
1 CREAM TOPICAL
Qty: 0 | Refills: 0 | DISCHARGE
Start: 2019-09-14

## 2019-09-14 RX ORDER — INSULIN ASPART 100 [IU]/ML
15 INJECTION, SOLUTION SUBCUTANEOUS
Qty: 30 | Refills: 0
Start: 2019-09-14 | End: 2019-10-13

## 2019-09-14 RX ORDER — FUROSEMIDE 40 MG
1 TABLET ORAL
Qty: 0 | Refills: 0 | DISCHARGE

## 2019-09-14 RX ORDER — INSULIN GLARGINE 100 [IU]/ML
20 INJECTION, SOLUTION SUBCUTANEOUS
Qty: 30 | Refills: 0
Start: 2019-09-14 | End: 2019-10-13

## 2019-09-14 RX ORDER — INSULIN ASPART 100 [IU]/ML
12 INJECTION, SOLUTION SUBCUTANEOUS
Qty: 0 | Refills: 0 | DISCHARGE

## 2019-09-14 RX ORDER — LEVOTHYROXINE SODIUM 125 MCG
1 TABLET ORAL
Qty: 0 | Refills: 0 | DISCHARGE
Start: 2019-09-14

## 2019-09-14 RX ADMIN — HYDROMORPHONE HYDROCHLORIDE 2 MILLIGRAM(S): 2 INJECTION INTRAMUSCULAR; INTRAVENOUS; SUBCUTANEOUS at 08:46

## 2019-09-14 RX ADMIN — HYDROMORPHONE HYDROCHLORIDE 2 MILLIGRAM(S): 2 INJECTION INTRAMUSCULAR; INTRAVENOUS; SUBCUTANEOUS at 05:01

## 2019-09-14 RX ADMIN — CARVEDILOL PHOSPHATE 3.12 MILLIGRAM(S): 80 CAPSULE, EXTENDED RELEASE ORAL at 05:10

## 2019-09-14 RX ADMIN — Medication 1 APPLICATION(S): at 05:10

## 2019-09-14 RX ADMIN — HYDROMORPHONE HYDROCHLORIDE 2 MILLIGRAM(S): 2 INJECTION INTRAMUSCULAR; INTRAVENOUS; SUBCUTANEOUS at 00:03

## 2019-09-14 RX ADMIN — Medication 250 MILLIGRAM(S): at 05:10

## 2019-09-14 RX ADMIN — HYDROMORPHONE HYDROCHLORIDE 2 MILLIGRAM(S): 2 INJECTION INTRAMUSCULAR; INTRAVENOUS; SUBCUTANEOUS at 04:41

## 2019-09-14 RX ADMIN — Medication 8: at 08:44

## 2019-09-14 RX ADMIN — Medication 18 UNIT(S): at 08:44

## 2019-09-14 RX ADMIN — Medication 50 MICROGRAM(S): at 05:10

## 2019-09-14 RX ADMIN — CYCLOBENZAPRINE HYDROCHLORIDE 5 MILLIGRAM(S): 10 TABLET, FILM COATED ORAL at 08:06

## 2019-09-14 RX ADMIN — Medication 1 TABLET(S): at 05:10

## 2019-09-14 RX ADMIN — INSULIN GLARGINE 30 UNIT(S): 100 INJECTION, SOLUTION SUBCUTANEOUS at 08:06

## 2019-09-14 RX ADMIN — Medication 40 MILLIGRAM(S): at 05:10

## 2019-09-14 RX ADMIN — HYDROMORPHONE HYDROCHLORIDE 2 MILLIGRAM(S): 2 INJECTION INTRAMUSCULAR; INTRAVENOUS; SUBCUTANEOUS at 09:05

## 2019-09-14 RX ADMIN — NYSTATIN CREAM 1 APPLICATION(S): 100000 CREAM TOPICAL at 05:10

## 2019-09-14 NOTE — PROGRESS NOTE ADULT - SUBJECTIVE AND OBJECTIVE BOX
NEPHROLOGY INTERVAL HPI/OVERNIGHT EVENTS:    Feels well   No new complaints   Meds unchanged     MEDICATIONS  (STANDING):  amoxicillin  875 milliGRAM(s)/clavulanate 1 Tablet(s) Oral two times a day  carvedilol 3.125 milliGRAM(s) Oral every 12 hours  clotrimazole 1% Cream 1 Application(s) Topical two times a day  docusate sodium 100 milliGRAM(s) Oral three times a day  insulin glargine Injectable (LANTUS) 20 Unit(s) SubCutaneous at bedtime  insulin glargine Injectable (LANTUS) 30 Unit(s) SubCutaneous every morning  insulin lispro (HumaLOG) corrective regimen sliding scale   SubCutaneous Before meals and at bedtime  insulin lispro Injectable (HumaLOG) 18 Unit(s) SubCutaneous three times a day before meals  levothyroxine 50 MICROGram(s) Oral daily  nystatin Powder 1 Application(s) Topical every 12 hours  saccharomyces boulardii 250 milliGRAM(s) Oral two times a day  senna 2 Tablet(s) Oral at bedtime  torsemide 40 milliGRAM(s) Oral daily    MEDICATIONS  (PRN):  acetaminophen   Tablet .. 650 milliGRAM(s) Oral every 6 hours PRN Moderate Pain (4 - 6)  cyclobenzaprine 5 milliGRAM(s) Oral every 8 hours PRN Muscle Spasm  HYDROmorphone  Injectable 0.5 milliGRAM(s) IV Push every 3 hours PRN Moderate Pain (4 - 6)  HYDROmorphone  Injectable 2 milliGRAM(s) IV Push every 4 hours PRN Severe Pain (7 - 10)  polyethylene glycol 3350 17 Gram(s) Oral daily PRN Constipation      Allergies    No Known Allergies    Intolerances    allow double protein at meals (Unknown)    Vital Signs Last 24 Hrs  T(C): 36.5 (14 Sep 2019 00:00), Max: 36.5 (14 Sep 2019 00:00)  T(F): 97.7 (14 Sep 2019 00:00), Max: 97.7 (14 Sep 2019 00:00)  HR: 76 (14 Sep 2019 04:43) (76 - 93)  BP: 119/73 (14 Sep 2019 04:43) (117/73 - 121/84)  BP(mean): --  RR: 18 (14 Sep 2019 04:43) (18 - 20)  SpO2: 92% (14 Sep 2019 04:43) (92% - 98%)  Daily     Daily   I&O's Detail    I&O's Summary      PHYSICAL EXAM:    GENERAL: NAD,   HEAD:  Atraumatic, No edema   NECK: Supple, No JVD,   CHEST/LUNG: EAE , No wheeze   HEART: Regular rate and rhythm; No gallop or rub   ABDOMEN: Morbid obesity . BNo rebound   EXTREMITIES:  edema and cellulitis better   ons    LABS:    09-13    136  |  93<L>  |  41.0<H>  ----------------------------<  319<H>  4.0   |  28.0  |  1.35<H>    Ca    9.2      13 Sep 2019 07:41      PT/INR - ( 13 Sep 2019 07:41 )   PT: 24.7 sec;   INR: 2.10 ratio                     RADIOLOGY & ADDITIONAL TESTS:

## 2019-09-14 NOTE — PROGRESS NOTE ADULT - PROVIDER SPECIALTY LIST ADULT
Cardiology
Critical Care
Critical Care
Electrophysiology
Endocrinology
Hospitalist
Infectious Disease
Internal Medicine
Nephrology
Electrophysiology
Infectious Disease
Internal Medicine
Endocrinology
Hospitalist
Critical Care
Nephrology
Hospitalist

## 2019-09-14 NOTE — DISCHARGE NOTE PROVIDER - NSDCCPCAREPLAN_GEN_ALL_CORE_FT
PRINCIPAL DISCHARGE DIAGNOSIS  Diagnosis: Sepsis due to cellulitis  Assessment and Plan of Treatment:       SECONDARY DISCHARGE DIAGNOSES  Diagnosis: Atrial fibrillation with RVR  Assessment and Plan of Treatment:     Diagnosis: DKA (diabetic ketoacidosis)  Assessment and Plan of Treatment:

## 2019-09-14 NOTE — DISCHARGE NOTE NURSING/CASE MANAGEMENT/SOCIAL WORK - PATIENT PORTAL LINK FT
You can access the FollowMyHealth Patient Portal offered by [facility] by registering at the following website: [email or web address]. By joining [employer]’s FollowMyHealth portal, you will also be able to view your health information using other applications (apps) compatible with our system.

## 2019-09-14 NOTE — PROGRESS NOTE ADULT - SUBJECTIVE AND OBJECTIVE BOX
[place] [facility] - [facility], THE HEART CENTER                                   [address] [place] [address]                                                      PHONE: [phone]                                                         FAX: [phone]  [email or web address]  ---------------------------------------------------------------------------------------------------------------------------------    Overnight events/patient complaints: Feels ok.  No F/C.  No CP, SOB, palps or dizziness.  For discharge to [facility] today      allow double protein at meals (Unknown)  No Known Allergies    MEDICATIONS  (STANDING):  amoxicillin  875 milliGRAM(s)/clavulanate 1 Tablet(s) Oral two times a day  carvedilol 3.125 milliGRAM(s) Oral every 12 hours  clotrimazole 1% Cream 1 Application(s) Topical two times a day  docusate sodium 100 milliGRAM(s) Oral three times a day  insulin glargine Injectable (LANTUS) 20 Unit(s) SubCutaneous at bedtime  insulin glargine Injectable (LANTUS) 30 Unit(s) SubCutaneous every morning  insulin lispro (HumaLOG) corrective regimen sliding scale   SubCutaneous Before meals and at bedtime  insulin lispro Injectable (HumaLOG) 18 Unit(s) SubCutaneous three times a day before meals  levothyroxine 50 MICROGram(s) Oral daily  nystatin Powder 1 Application(s) Topical every 12 hours  saccharomyces boulardii 250 milliGRAM(s) Oral two times a day  senna 2 Tablet(s) Oral at bedtime  torsemide 40 milliGRAM(s) Oral daily    MEDICATIONS  (PRN):  acetaminophen   Tablet .. 650 milliGRAM(s) Oral every 6 hours PRN Moderate Pain (4 - 6)  cyclobenzaprine 5 milliGRAM(s) Oral every 8 hours PRN Muscle Spasm  HYDROmorphone  Injectable 0.5 milliGRAM(s) IV Push every 3 hours PRN Moderate Pain (4 - 6)  HYDROmorphone  Injectable 2 milliGRAM(s) IV Push every 4 hours PRN Severe Pain (7 - 10)  polyethylene glycol 3350 17 Gram(s) Oral daily PRN Constipation      Vital Signs Last 24 Hrs  T(C): 36.5 (14 Sep 2019 00:00), Max: 36.5 (14 Sep 2019 00:00)  T(F): 97.7 (14 Sep 2019 00:00), Max: 97.7 (14 Sep 2019 00:00)  HR: 76 (14 Sep 2019 04:43) (76 - 93)  BP: 119/73 (14 Sep 2019 04:43) (117/73 - 121/84)  BP(mean): --  RR: 18 (14 Sep 2019 04:43) (18 - 20)  SpO2: 92% (14 Sep 2019 04:43) (92% - 98%)  ICU Vital Signs Last 24 Hrs  MARY ANN CORNELL  I&O's Detail    I&O's Summary    Drug Dosing Weight  MARY ANN CORNELL      PHYSICAL EXAM:  General: Appears well developed, well nourished alert and cooperative.  HEENT: Head; normocephalic, atraumatic.  Eyes: Pupils reactive, cornea wnl.  Neck: Supple, no nodes adenopathy, no NVD or carotid bruit or thyromegaly.  CARDIOVASCULAR: Normal S1 and S2, No murmur, rub, gallop or lift.   LUNGS: No rales, rhonchi or wheeze. Normal breath sounds bilaterally.  ABDOMEN: Soft, nontender without mass or organomegaly. bowel sounds normoactive.  EXTREMITIES: lower LEs wrapped  SKIN: warm and dry with normal turgor.  NEURO: Alert/oriented x 3/normal motor exam. No pathologic reflexes.    PSYCH: normal affect.        LABS:    09-13    136  |  93<L>  |  41.0<H>  ----------------------------<  319<H>  4.0   |  28.0  |  1.35<H>    Ca    9.2      13 Sep 2019 07:41      MARY ANN CORNELL      PT/INR - ( 13 Sep 2019 07:41 )   PT: 24.7 sec;   INR: 2.10 ratio               RADIOLOGY & ADDITIONAL STUDIES:    INTERPRETATION OF TELEMETRY (personally reviewed):AF, rate controlled        ASSESSMENT AND PLAN:  cellulitis- responded to IV abx, no F/C  chronic systolic CHF/NICM- stable, continue current cardiac meds  s/p ICD- normal function, no VT/VF  chronic AF, rate controlled, on Coumadin  COPD  CKD  For discharge today.  Will arrange outpt cardiac f/u  Please call if further questions/issues  Thanks

## 2019-09-14 NOTE — DISCHARGE NOTE PROVIDER - HOSPITAL COURSE
Pt is a 69 y/o male with PMHx DM (w/ neuropathy), HN, CHF (Last Echo ~2 years ago with EF 35%), COPD, CHRISS (on home CPAP machine), PPM/Defibrillator ("heart function was 3%"), prior diabetic foot ulcers/infections, depression presents with worsening weakness and SOB for past few days. As per daughter at bedside, wife found pt passed out sitting on toilet. Estimated time on toilet is around 10-12 hours. Pt states he "blacked out", but didnt fall off toilet.         In ED, pt remains short of breath, found to be hyperglycemic, AG 22, Serum CO2 14, lactate 8.0, CK 24k, transaminitis, hyperkalemic, Cr 2.85, with leukocytosis and febrile to 101.7. Surgery consulted for concern of RLE cellulitis and r/o nec fasc. MICU consult called.  Patient went to micu and found to have septic shock secondary to le cellulitis. patient seen by id and cardio in consult. patient continued on iv abx and improved licnially. patient is now stable for dc home to [facility]        time spent on d c32 minutes

## 2019-09-14 NOTE — DISCHARGE NOTE NURSING/CASE MANAGEMENT/SOCIAL WORK - NSDCPEPTCOWAR_GEN_ALL_CORE
Coumadin/Warfarin - Dietary Advice/Coumadin/Warfarin - Potential for adverse drug reactions and interactions/Coumadin/Warfarin - Follow up monitoring/Coumadin/Warfarin - Compliance
Yes

## 2019-09-14 NOTE — PROGRESS NOTE ADULT - REASON FOR ADMISSION
Cellulitis/Severe Sepsis

## 2019-10-23 NOTE — DISCHARGE NOTE ADULT - CARE PROVIDER_API CALL
Panchito Jain), Cardiology; Internal Medicine  [address] [place] [address]  Phone: [phone]  Fax: [phone] Statement Selected

## 2019-11-03 ENCOUNTER — EMERGENCY (EMERGENCY)
Facility: HOSPITAL | Age: 69
LOS: 1 days | Discharge: HOME | End: 2019-11-03
Attending: EMERGENCY MEDICINE
Payer: COMMERCIAL

## 2019-11-03 VITALS
DIASTOLIC BLOOD PRESSURE: 80 MMHG | WEIGHT: 315 LBS | HEART RATE: 92 BPM | RESPIRATION RATE: 20 BRPM | HEIGHT: 68 IN | TEMPERATURE: 98 F | SYSTOLIC BLOOD PRESSURE: 133 MMHG | OXYGEN SATURATION: 97 %

## 2019-11-03 DIAGNOSIS — Z98.89 OTHER SPECIFIED POSTPROCEDURAL STATES: Chronic | ICD-10-CM

## 2019-11-03 DIAGNOSIS — Z95.810 PRESENCE OF AUTOMATIC (IMPLANTABLE) CARDIAC DEFIBRILLATOR: Chronic | ICD-10-CM

## 2019-11-03 LAB
ALBUMIN SERPL ELPH-MCNC: 3.6 G/DL — SIGNIFICANT CHANGE UP (ref 3.3–5.2)
ALP SERPL-CCNC: 69 U/L — SIGNIFICANT CHANGE UP (ref 40–120)
ALT FLD-CCNC: 12 U/L — SIGNIFICANT CHANGE UP
ANION GAP SERPL CALC-SCNC: 13 MMOL/L — SIGNIFICANT CHANGE UP (ref 5–17)
APTT BLD: 38.7 SEC — HIGH (ref 27.5–36.3)
AST SERPL-CCNC: 28 U/L — SIGNIFICANT CHANGE UP
BASOPHILS # BLD AUTO: 0.02 K/UL — SIGNIFICANT CHANGE UP (ref 0–0.2)
BASOPHILS NFR BLD AUTO: 0.3 % — SIGNIFICANT CHANGE UP (ref 0–2)
BILIRUB SERPL-MCNC: 0.7 MG/DL — SIGNIFICANT CHANGE UP (ref 0.4–2)
BUN SERPL-MCNC: 28 MG/DL — HIGH (ref 8–20)
CALCIUM SERPL-MCNC: 9.3 MG/DL — SIGNIFICANT CHANGE UP (ref 8.6–10.2)
CHLORIDE SERPL-SCNC: 94 MMOL/L — LOW (ref 98–107)
CO2 SERPL-SCNC: 31 MMOL/L — HIGH (ref 22–29)
CREAT SERPL-MCNC: 1.24 MG/DL — SIGNIFICANT CHANGE UP (ref 0.5–1.3)
EOSINOPHIL # BLD AUTO: 0.15 K/UL — SIGNIFICANT CHANGE UP (ref 0–0.5)
EOSINOPHIL NFR BLD AUTO: 2.3 % — SIGNIFICANT CHANGE UP (ref 0–6)
GLUCOSE SERPL-MCNC: 174 MG/DL — HIGH (ref 70–115)
HCT VFR BLD CALC: 45.7 % — SIGNIFICANT CHANGE UP (ref 39–50)
HGB BLD-MCNC: 14.7 G/DL — SIGNIFICANT CHANGE UP (ref 13–17)
IMM GRANULOCYTES NFR BLD AUTO: 0.8 % — SIGNIFICANT CHANGE UP (ref 0–1.5)
INR BLD: 3.5 RATIO — HIGH (ref 0.88–1.16)
LYMPHOCYTES # BLD AUTO: 1.07 K/UL — SIGNIFICANT CHANGE UP (ref 1–3.3)
LYMPHOCYTES # BLD AUTO: 16.1 % — SIGNIFICANT CHANGE UP (ref 13–44)
MCHC RBC-ENTMCNC: 29.3 PG — SIGNIFICANT CHANGE UP (ref 27–34)
MCHC RBC-ENTMCNC: 32.2 GM/DL — SIGNIFICANT CHANGE UP (ref 32–36)
MCV RBC AUTO: 91 FL — SIGNIFICANT CHANGE UP (ref 80–100)
MONOCYTES # BLD AUTO: 0.66 K/UL — SIGNIFICANT CHANGE UP (ref 0–0.9)
MONOCYTES NFR BLD AUTO: 9.9 % — SIGNIFICANT CHANGE UP (ref 2–14)
NEUTROPHILS # BLD AUTO: 4.7 K/UL — SIGNIFICANT CHANGE UP (ref 1.8–7.4)
NEUTROPHILS NFR BLD AUTO: 70.6 % — SIGNIFICANT CHANGE UP (ref 43–77)
PLATELET # BLD AUTO: 153 K/UL — SIGNIFICANT CHANGE UP (ref 150–400)
POTASSIUM SERPL-MCNC: 3.9 MMOL/L — SIGNIFICANT CHANGE UP (ref 3.5–5.3)
POTASSIUM SERPL-SCNC: 3.9 MMOL/L — SIGNIFICANT CHANGE UP (ref 3.5–5.3)
PROT SERPL-MCNC: 7.2 G/DL — SIGNIFICANT CHANGE UP (ref 6.6–8.7)
PROTHROM AB SERPL-ACNC: 41.9 SEC — HIGH (ref 10–12.9)
RBC # BLD: 5.02 M/UL — SIGNIFICANT CHANGE UP (ref 4.2–5.8)
RBC # FLD: 15.9 % — HIGH (ref 10.3–14.5)
SODIUM SERPL-SCNC: 138 MMOL/L — SIGNIFICANT CHANGE UP (ref 135–145)
WBC # BLD: 6.65 K/UL — SIGNIFICANT CHANGE UP (ref 3.8–10.5)
WBC # FLD AUTO: 6.65 K/UL — SIGNIFICANT CHANGE UP (ref 3.8–10.5)

## 2019-11-03 PROCEDURE — 93925 LOWER EXTREMITY STUDY: CPT | Mod: 26

## 2019-11-03 PROCEDURE — 70450 CT HEAD/BRAIN W/O DYE: CPT | Mod: 26

## 2019-11-03 PROCEDURE — 99284 EMERGENCY DEPT VISIT MOD MDM: CPT

## 2019-11-03 PROCEDURE — 93970 EXTREMITY STUDY: CPT | Mod: 26

## 2019-11-03 RX ORDER — OXYCODONE AND ACETAMINOPHEN 5; 325 MG/1; MG/1
2 TABLET ORAL ONCE
Refills: 0 | Status: DISCONTINUED | OUTPATIENT
Start: 2019-11-03 | End: 2019-11-03

## 2019-11-03 RX ORDER — SODIUM CHLORIDE 9 MG/ML
3 INJECTION INTRAMUSCULAR; INTRAVENOUS; SUBCUTANEOUS ONCE
Refills: 0 | Status: COMPLETED | OUTPATIENT
Start: 2019-11-03 | End: 2019-11-03

## 2019-11-03 RX ADMIN — OXYCODONE AND ACETAMINOPHEN 2 TABLET(S): 5; 325 TABLET ORAL at 19:17

## 2019-11-03 RX ADMIN — SODIUM CHLORIDE 3 MILLILITER(S): 9 INJECTION INTRAMUSCULAR; INTRAVENOUS; SUBCUTANEOUS at 17:24

## 2019-11-03 RX ADMIN — Medication 600 MILLIGRAM(S): at 17:49

## 2019-11-03 RX ADMIN — Medication 100 MILLIGRAM(S): at 17:15

## 2019-11-03 RX ADMIN — OXYCODONE AND ACETAMINOPHEN 2 TABLET(S): 5; 325 TABLET ORAL at 19:50

## 2019-11-03 NOTE — ED ADULT TRIAGE NOTE - CHIEF COMPLAINT QUOTE
"I have cellulitis in both my legs and the right leg hurts worse now" c/o RLE pain and tenderness, +3 pitting edema +PMS to clarice lower extremity with decreased ROM on right leg d/t pain. BLE redness noted. RR even and unlabored. Skin warm and dry.

## 2019-11-03 NOTE — ED PROVIDER NOTE - OBJECTIVE STATEMENT
67 yo M presents to ED c/o recurrent cellulitis of both LE and worsening pain in RLE and being unable to move his leg.  Pt states he was hospitalized at  [facility] last month after being unable to get off of toilet for 12 hrs secondary to weakness and developed weakness of his LLE and was subsequently sent to rehab but never regained strength in his LLE and walks with walker.  Pt denies any fever or chills.  Pt was unable to walk today and called EMS.

## 2019-11-03 NOTE — ED PROVIDER NOTE - CONSTITUTIONAL, MLM
normal... Morbidly obese M, awake, alert, oriented to person, place, time/situation and appears uncomfortable

## 2019-11-03 NOTE — ED PROVIDER NOTE - CARE PLAN
Principal Discharge DX:	Venous stasis dermatitis of both lower extremities  Secondary Diagnosis:	Chronic pain disorder

## 2019-11-03 NOTE — CONSULT NOTE ADULT - ASSESSMENT
68 year old male with evidence of chronic venosus stasis on physical exam currently pending final report of b/l venous and arterial duplex imaging studies. Doppler signals present to LE b/l with no signs of ischemic limb therefore no acute surgical intervention from vascular surgery at this time. Vascular attending to see in AM and team will f/u final imaging report prior to final disposition.

## 2019-11-03 NOTE — ED PROVIDER NOTE - CLINICAL SUMMARY MEDICAL DECISION MAKING FREE TEXT BOX
Will check labs, head CT to R/O CVA with c/o decreased ROM RLE, start empiric Abx, check venous and arterial dopplers and have pt eval by Podiatry for wound care and possible application of Ca boots

## 2019-11-03 NOTE — CONSULT NOTE ADULT - SUBJECTIVE AND OBJECTIVE BOX
68 year old male presenting with chief complaint of RLE pain. Reports that he woke up this morning with sharp, constant RLE pain that prevented him from walking prompting him to come in. Denies any other acute changes to health status at this time. ROS negative for fever, chills, nausea, vomiting, chest pain, SOB, dyspnea, abdominal pain, dysuria or changes in bowel habits.     PAST MEDICAL HISTORY:  CHF (congestive heart failure)  Pulmonary hypertension  Prostate CA  Sleep apnea  Renal insufficiency  High cholesterol  HTN (hypertension)  DM (diabetes mellitus)    PAST SURGICAL HISTORY:  AICD (automatic cardioverter/defibrillator) present  S/P ankle ligament repair  No significant past surgical history    ALLERGIES:  allow double protein at meals (Unknown)  No Known Allergies    MEDICATIONS  (STANDING):    MEDICATIONS  (PRN):    VITALS & I/Os:  Vital Signs Last 24 Hrs  T(C): 36.6 (03 Nov 2019 15:01), Max: 36.6 (03 Nov 2019 15:01)  T(F): 97.9 (03 Nov 2019 15:01), Max: 97.9 (03 Nov 2019 15:01)  HR: 92 (03 Nov 2019 15:01) (92 - 92)  BP: 133/80 (03 Nov 2019 15:01) (133/80 - 133/80)  BP(mean): --  RR: 20 (03 Nov 2019 15:01) (20 - 20)  SpO2: 97% (03 Nov 2019 15:01) (97% - 97%)  CAPILLARY BLOOD GLUCOSE    I&O's Summary    Constitutional: Obese patient resting comfortably in bed in no acute distress   HEENT: EOMI/PERRL b/l  Neck: No JVD, full ROM w/o pain  Respiratory: CTAB with unlabored respirations, no accessory muscle use or conversational dyspnea   Cardiovascular: Regular rate and rhythm with no arrythmias or murmurs  Gastrointestinal: Abdomen soft, non-tender, non-distended with no rebound tenderness or guarding   Rectal: Not indicated   Neurological: GCS 15 (E4V5M6) with no gross sensory, motor or coordination deficits   Psychiatric: Normal mood and affect   Musculoskeletal: Skin changes associated with chronic venous stasis present, small mariya size skin lesions present b/l (medial RLE lateral LLE) with no exudate  Pulse: DP and PT pulses non palpable secondary to b/l foot edema however DP and PT Doppler signals present     LABS:                        14.7   6.65  )-----------( 153      ( 03 Nov 2019 16:37 )             45.7     11-03    138  |  94<L>  |  28.0<H>  ----------------------------<  174<H>  3.9   |  31.0<H>  |  1.24    Ca    9.3      03 Nov 2019 16:37    TPro  7.2  /  Alb  3.6  /  TBili  0.7  /  DBili  x   /  AST  28  /  ALT  12  /  AlkPhos  69  11-03    Lactate:    PT/INR - ( 03 Nov 2019 16:37 )   PT: 41.9 sec;   INR: 3.50 ratio         PTT - ( 03 Nov 2019 16:37 )  PTT:38.7 sec                Discussed with

## 2019-11-03 NOTE — ED ADULT NURSE NOTE - OBJECTIVE STATEMENT
Pt arrives to ED c/o cellulitis in both legs and states  the right leg hurts worse.  PT has  RLE pain and tenderness with  +3 pitting edema +PMS to clarice lower extremity with decreased ROM on right leg d/t pain, unable to ambulate without walker.  Bilateral LE redness noted.  Pt denies SOB or chest pain. Pt A & Ox4. Breathing even & unlabored.

## 2019-11-03 NOTE — ED ADULT NURSE NOTE - PMH
Patient is doing well following YAG capsulotomy. Signs and symptoms of retinal detachment including flashing and floaters were discussed. Patient was asked to schedule yearly preventative follow-up eye exams with us. CHF (congestive heart failure)    DM (diabetes mellitus)    High cholesterol    HTN (hypertension)    Prostate CA    Pulmonary hypertension    Renal insufficiency    Sleep apnea

## 2019-11-03 NOTE — ED PROVIDER NOTE - PROGRESS NOTE DETAILS
received s/o from dr delong, after personally seeing pt I feel obs apporpiate plan to f/u vasc attending am reassessment no signs cellulitus for admission according to their evaluation treat pain and PT consult to determine if pt safe dispo home or requiring inpt SCOTTY pt agrees to plan of care

## 2019-11-04 VITALS
HEART RATE: 88 BPM | RESPIRATION RATE: 18 BRPM | DIASTOLIC BLOOD PRESSURE: 64 MMHG | TEMPERATURE: 98 F | SYSTOLIC BLOOD PRESSURE: 122 MMHG | OXYGEN SATURATION: 96 %

## 2019-11-04 LAB
GLUCOSE BLDC GLUCOMTR-MCNC: 207 MG/DL — HIGH (ref 70–99)
GLUCOSE BLDC GLUCOMTR-MCNC: 218 MG/DL — HIGH (ref 70–99)
GLUCOSE BLDC GLUCOMTR-MCNC: 231 MG/DL — HIGH (ref 70–99)

## 2019-11-04 PROCEDURE — 93923 UPR/LXTR ART STDY 3+ LVLS: CPT | Mod: 26

## 2019-11-04 PROCEDURE — 93923 UPR/LXTR ART STDY 3+ LVLS: CPT

## 2019-11-04 PROCEDURE — 87040 BLOOD CULTURE FOR BACTERIA: CPT

## 2019-11-04 PROCEDURE — 97163 PT EVAL HIGH COMPLEX 45 MIN: CPT

## 2019-11-04 PROCEDURE — 80053 COMPREHEN METABOLIC PANEL: CPT

## 2019-11-04 PROCEDURE — 82962 GLUCOSE BLOOD TEST: CPT

## 2019-11-04 PROCEDURE — G0378: CPT

## 2019-11-04 PROCEDURE — 85610 PROTHROMBIN TIME: CPT

## 2019-11-04 PROCEDURE — 96365 THER/PROPH/DIAG IV INF INIT: CPT

## 2019-11-04 PROCEDURE — 99284 EMERGENCY DEPT VISIT MOD MDM: CPT | Mod: 25

## 2019-11-04 PROCEDURE — 85027 COMPLETE CBC AUTOMATED: CPT

## 2019-11-04 PROCEDURE — 85730 THROMBOPLASTIN TIME PARTIAL: CPT

## 2019-11-04 PROCEDURE — 70450 CT HEAD/BRAIN W/O DYE: CPT

## 2019-11-04 PROCEDURE — 36415 COLL VENOUS BLD VENIPUNCTURE: CPT

## 2019-11-04 PROCEDURE — 99234 HOSP IP/OBS SM DT SF/LOW 45: CPT

## 2019-11-04 PROCEDURE — 93925 LOWER EXTREMITY STUDY: CPT

## 2019-11-04 PROCEDURE — 93970 EXTREMITY STUDY: CPT

## 2019-11-04 RX ORDER — CYCLOBENZAPRINE HYDROCHLORIDE 10 MG/1
5 TABLET, FILM COATED ORAL THREE TIMES A DAY
Refills: 0 | Status: DISCONTINUED | OUTPATIENT
Start: 2019-11-04 | End: 2019-11-09

## 2019-11-04 RX ORDER — DEXTROSE 50 % IN WATER 50 %
25 SYRINGE (ML) INTRAVENOUS ONCE
Refills: 0 | Status: DISCONTINUED | OUTPATIENT
Start: 2019-11-04 | End: 2019-11-09

## 2019-11-04 RX ORDER — INSULIN LISPRO 100/ML
VIAL (ML) SUBCUTANEOUS
Refills: 0 | Status: DISCONTINUED | OUTPATIENT
Start: 2019-11-04 | End: 2019-11-09

## 2019-11-04 RX ORDER — LEVOTHYROXINE SODIUM 125 MCG
50 TABLET ORAL DAILY
Refills: 0 | Status: DISCONTINUED | OUTPATIENT
Start: 2019-11-04 | End: 2019-11-09

## 2019-11-04 RX ORDER — ALLOPURINOL 300 MG
300 TABLET ORAL DAILY
Refills: 0 | Status: DISCONTINUED | OUTPATIENT
Start: 2019-11-04 | End: 2019-11-09

## 2019-11-04 RX ORDER — SODIUM CHLORIDE 9 MG/ML
1000 INJECTION, SOLUTION INTRAVENOUS
Refills: 0 | Status: DISCONTINUED | OUTPATIENT
Start: 2019-11-04 | End: 2019-11-09

## 2019-11-04 RX ORDER — OXYCODONE AND ACETAMINOPHEN 5; 325 MG/1; MG/1
1 TABLET ORAL ONCE
Refills: 0 | Status: DISCONTINUED | OUTPATIENT
Start: 2019-11-04 | End: 2019-11-04

## 2019-11-04 RX ORDER — DEXTROSE 50 % IN WATER 50 %
12.5 SYRINGE (ML) INTRAVENOUS ONCE
Refills: 0 | Status: DISCONTINUED | OUTPATIENT
Start: 2019-11-04 | End: 2019-11-09

## 2019-11-04 RX ORDER — WARFARIN SODIUM 2.5 MG/1
3 TABLET ORAL ONCE
Refills: 0 | Status: DISCONTINUED | OUTPATIENT
Start: 2019-11-04 | End: 2019-11-09

## 2019-11-04 RX ORDER — WARFARIN SODIUM 2.5 MG/1
3 TABLET ORAL DAILY
Refills: 0 | Status: DISCONTINUED | OUTPATIENT
Start: 2019-11-04 | End: 2019-11-04

## 2019-11-04 RX ORDER — DEXTROSE 50 % IN WATER 50 %
15 SYRINGE (ML) INTRAVENOUS ONCE
Refills: 0 | Status: DISCONTINUED | OUTPATIENT
Start: 2019-11-04 | End: 2019-11-09

## 2019-11-04 RX ORDER — GLUCAGON INJECTION, SOLUTION 0.5 MG/.1ML
1 INJECTION, SOLUTION SUBCUTANEOUS ONCE
Refills: 0 | Status: DISCONTINUED | OUTPATIENT
Start: 2019-11-04 | End: 2019-11-09

## 2019-11-04 RX ORDER — CARVEDILOL PHOSPHATE 80 MG/1
3.12 CAPSULE, EXTENDED RELEASE ORAL EVERY 12 HOURS
Refills: 0 | Status: DISCONTINUED | OUTPATIENT
Start: 2019-11-04 | End: 2019-11-09

## 2019-11-04 RX ORDER — OXYCODONE AND ACETAMINOPHEN 5; 325 MG/1; MG/1
1 TABLET ORAL EVERY 6 HOURS
Refills: 0 | Status: DISCONTINUED | OUTPATIENT
Start: 2019-11-04 | End: 2019-11-04

## 2019-11-04 RX ADMIN — CYCLOBENZAPRINE HYDROCHLORIDE 5 MILLIGRAM(S): 10 TABLET, FILM COATED ORAL at 07:45

## 2019-11-04 RX ADMIN — OXYCODONE AND ACETAMINOPHEN 1 TABLET(S): 5; 325 TABLET ORAL at 07:44

## 2019-11-04 RX ADMIN — OXYCODONE AND ACETAMINOPHEN 1 TABLET(S): 5; 325 TABLET ORAL at 01:07

## 2019-11-04 RX ADMIN — Medication 50 MICROGRAM(S): at 05:55

## 2019-11-04 RX ADMIN — Medication 4: at 08:27

## 2019-11-04 RX ADMIN — CARVEDILOL PHOSPHATE 3.12 MILLIGRAM(S): 80 CAPSULE, EXTENDED RELEASE ORAL at 05:55

## 2019-11-04 RX ADMIN — Medication 4: at 13:03

## 2019-11-04 RX ADMIN — Medication 40 MILLIGRAM(S): at 05:56

## 2019-11-04 RX ADMIN — OXYCODONE AND ACETAMINOPHEN 1 TABLET(S): 5; 325 TABLET ORAL at 00:48

## 2019-11-04 RX ADMIN — OXYCODONE AND ACETAMINOPHEN 1 TABLET(S): 5; 325 TABLET ORAL at 08:52

## 2019-11-04 RX ADMIN — CYCLOBENZAPRINE HYDROCHLORIDE 5 MILLIGRAM(S): 10 TABLET, FILM COATED ORAL at 17:44

## 2019-11-04 RX ADMIN — CARVEDILOL PHOSPHATE 3.12 MILLIGRAM(S): 80 CAPSULE, EXTENDED RELEASE ORAL at 17:43

## 2019-11-04 RX ADMIN — Medication 4: at 17:16

## 2019-11-04 RX ADMIN — OXYCODONE AND ACETAMINOPHEN 1 TABLET(S): 5; 325 TABLET ORAL at 17:43

## 2019-11-04 RX ADMIN — Medication 300 MILLIGRAM(S): at 07:44

## 2019-11-04 NOTE — ED CDU PROVIDER INITIAL DAY NOTE - PROGRESS NOTE DETAILS
Spoke with vascular. Made aware of new exam findings. Unable to detect left DP pulses, additionally foot colder to touch than right. Vascular to see.

## 2019-11-04 NOTE — PROGRESS NOTE ADULT - SUBJECTIVE AND OBJECTIVE BOX
MARY ANN CORNELL    [id number]    History:    Vascular surgery follow up.  Consult follow up for bilateral lower extremity edema and chronic stasis.  Patient reporting lower back pain and right lower extremity numbness.    Reports to have chronic left foot drop.  no rest pain reported in the extremities.  ambulates with walker short distances, mainly uses wheelchair.  currently, Denies nausea, vomiting, chest pain, shortness of breath, abdominal pain or fever. No reported incontinence.      Vital Signs Last 24 Hrs  T(C): 36.6 (04 Nov 2019 05:35), Max: 36.6 (03 Nov 2019 15:01)  T(F): 97.8 (04 Nov 2019 05:35), Max: 97.9 (03 Nov 2019 15:01)  HR: 102 (04 Nov 2019 05:35) (92 - 102)  BP: 134/62 (04 Nov 2019 05:35) (133/80 - 134/62)  BP(mean): --  RR: 21 (04 Nov 2019 05:35) (20 - 21)  SpO2: 95% (04 Nov 2019 05:35) (95% - 97%)  I&O's Summary                            14.7   6.65  )-----------( 153      ( 03 Nov 2019 16:37 )             45.7     11-03    138  |  94<L>  |  28.0<H>  ----------------------------<  174<H>  3.9   |  31.0<H>  |  1.24    Ca    9.3      03 Nov 2019 16:37    TPro  7.2  /  Alb  3.6  /  TBili  0.7  /  DBili  x   /  AST  28  /  ALT  12  /  AlkPhos  69  11-03      MEDICATIONS  (STANDING):  allopurinol 300 milliGRAM(s) Oral daily  carvedilol 3.125 milliGRAM(s) Oral every 12 hours  dextrose 5%. 1000 milliLiter(s) (50 mL/Hr) IV Continuous <Continuous>  dextrose 50% Injectable 12.5 Gram(s) IV Push once  dextrose 50% Injectable 25 Gram(s) IV Push once  dextrose 50% Injectable 25 Gram(s) IV Push once  insulin lispro (HumaLOG) corrective regimen sliding scale   SubCutaneous three times a day before meals  levothyroxine 50 MICROGram(s) Oral daily  torsemide 40 milliGRAM(s) Oral daily  warfarin 3 milliGRAM(s) Oral once    MEDICATIONS  (PRN):  cyclobenzaprine 5 milliGRAM(s) Oral three times a day PRN Muscle Spasm  dextrose 40% Gel 15 Gram(s) Oral once PRN Blood Glucose LESS THAN 70 milliGRAM(s)/deciliter  glucagon  Injectable 1 milliGRAM(s) IntraMuscular once PRN Glucose LESS THAN 70 milligrams/deciliter  oxycodone    5 mG/acetaminophen 325 mG 1 Tablet(s) Oral every 6 hours PRN Moderate Pain (4 - 6)      Physical exam:     grossly morbid obese  appears uncomfortable, c/o back pain  lower extremity with ace wraps.  wraps removed  chronic stasis changes to the lower extremities with 3+ pitting edema.  Unable to palpate pedals or femorals due to edema and large body habitus.  Left foot unable to actively dorsi or plantar flex.  right le with no noted gross deficits    . Capillary refill is less than 2 seconds. No cyanosis.     Assessment:  • history of chronic stasis disease  - presenting with back pain and reported rle numbness  - patient with probable underlying PAD       Plan:   • Suggest investigation for possible lower lumbar pathology  - we recommend base line ree/pvr to r/p PAD. IF appropriate, compression therapy will be restarted   - elevate the lower extremities for now

## 2019-11-04 NOTE — ED CDU PROVIDER DISPOSITION NOTE - ATTENDING CONTRIBUTION TO CARE
I, Mohsen Muñiz, performed the initial face to face bedside interview with this patient regarding history of present illness, review of symptoms and relevant past medical, social and family history.  I completed an independent physical examination.  I was the initial provider who evaluated this patient. I have signed out the follow up of any pending tests (i.e. labs, radiological studies) to the ACP.  I have communicated the patient’s plan of care and disposition with the ACP. I, Mohsen Muñiz, performed the initial face to face bedside interview with this patient regarding history of present illness, review of symptoms and relevant past medical, social and family history.  I completed an independent physical examination.  I was the initial provider who evaluated this patient. I have signed out the follow up of any pending tests (i.e. labs, radiological studies) to the ACP.  I have communicated the patient’s plan of care and disposition with the ACP..

## 2019-11-04 NOTE — ED ADULT NURSE REASSESSMENT NOTE - COMFORT CARE
po fluids offered/wait time explained/plan of care explained/meal provided
plan of care explained/assisted to bedpan/po fluids offered/meal provided/wait time explained
plan of care explained/meal provided/po fluids offered/wait time explained

## 2019-11-04 NOTE — PROVIDER CONTACT NOTE (OTHER) - RECOMMENDATIONS
PT program for strengthening with progression to functional ambulation with RW.  goal; 1 week; 25-50 feet with RW.

## 2019-11-04 NOTE — ED CDU PROVIDER DISPOSITION NOTE - PATIENT PORTAL LINK FT
You can access the FollowMyHealth Patient Portal offered by [facility] by registering at the following website: [email or web address]. By joining [employer]’s FollowMyHealth portal, you will also be able to view your health information using other applications (apps) compatible with our system.

## 2019-11-04 NOTE — PHYSICAL THERAPY INITIAL EVALUATION ADULT - ADDITIONAL COMMENTS
Pt. states he lives at [facility]; assisted living.  pt. was discharged from rehab. to assisted living. Upon discharge from rehab. pt. was able to ambulate 100 feet with rolling walker but declined over the past few days to just ambulation in his room and wheelchair to and from dining room.  he states he does not fit well in his wheelchair now.

## 2019-11-04 NOTE — ED ADULT NURSE REASSESSMENT NOTE - NS ED NURSE REASSESS COMMENT FT1
Pt alert and oriented, no apparent distress noted at this time. Pt handed off to Len SOLER in stable condition.

## 2019-11-04 NOTE — CHART NOTE - NSCHARTNOTEFT_GEN_A_CORE
SOCIAL WORK NOTE:  THIS WORKER DISCUSSED CASE WITH OBSERVATION TEAM.  DISCUSSED CASE WITH PHYSICAL THERAPY AS WELL.  THIS WORKER PLACED CALL TO THE \[facility]\"" # [phone] AND SPOKE WITH WELLNESS. THEY ARE ABLE ABLE WILLING TO ACCEPT THE PATIENT BACK TODAY. THEY ARE REQUESTING CONSULTS AND DC PAPERS AND ANY MEDS TO BE SENT TO [place] .  THEY ARE ALSO REQUESTING PT RX SO THAT THEY MAY GET PT FOR THE PATIENT IN HOUSE FOR FURTHER PHYSICAL THERAPY EXECERCISES.  MADE OBSERVATION PA AWARE OF RX BUT SHE REPORTED THAT PATIENT NEEDS ONE MORE TEST PRIOR TO BEING DISCHARGED. NATASHA COMPLETED TO ORDER AMBULANCE FOR PATIENT' RETURN LATER.

## 2019-11-04 NOTE — PHYSICAL THERAPY INITIAL EVALUATION ADULT - ASSISTIVE DEVICE:SUPINE/SIT, REHAB EVAL
fracture or malalignment. Soft tissues appear normal.     No acute findings. Us Dup Lower Extremity Left Ana    Result Date: 8/7/2019  Location:200 Exam: US DUP LOWER EXTREMITY LEFT ANA Comparison: None. History: Pain Findings: Grayscale, Duplex doppler, color-flow, and spectral analysis sonography of the lower extremity deep venous system obtained from the level of the common femoral vein to the calf. Unremarkable compressibility and flow noted throughout. No evidence of echogenic intraluminal filling defect. No sonographic evidence of left lower extremity deep venous thrombosis. Past Medical History:  Past Medical History:   Diagnosis Date    Colon cancer ([address] 2009    Hyperlipidemia     Personal history of colon cancer 2009    Thyroid disease     Vitamin D deficiency      Past Surgical History:   Procedure Laterality Date    CATARACT REMOVAL WITH IMPLANT Right 9/26/13    pe cataract extraction with pc iol right with l r1    CATARACT REMOVAL WITH IMPLANT Left 10/17/13    with limbal relaxing incision    COLECTOMY  2009    COLONOSCOPY      COLONOSCOPY  03/15/2017       Medications:   Current Outpatient Medications   Medication Sig Dispense Refill    naproxen (NAPROSYN) 375 MG tablet Take 1 tablet by mouth 2 times daily (with meals) for 10 days 20 tablet 0    tiZANidine (ZANAFLEX) 4 MG tablet Take 1 tablet by mouth every 8 hours as needed (muscle pain or spasm) 20 tablet 0    Multiple Vitamins-Minerals (THERAPEUTIC MULTIVITAMIN-MINERALS) tablet Take 1 tablet by mouth daily      Probiotic Product (PROBIOTIC ADVANCED PO) Take 1 capsule by mouth      pyridoxine (B-6) 100 MG tablet Take 100 mg by mouth daily      TURMERIC PO Take by mouth      Fexofenadine HCl (ALLEGRA PO) Take by mouth      aspirin 81 MG EC tablet Take 81 mg by mouth every 48 hours Patient checking if to hold      simvastatin (ZOCOR) 10 MG tablet Take 10 mg by mouth nightly.         fish oil-omega-3 fatty acids 1000 MG work with your patient. If you have questions or comments, please contact me at [phone]; fax: [phone]. Electronically signed by: Krista Gallardo PT         Please sign Physician's Certification and return to: [facility]  [address] [facility] [address]  Dept: [phone]  Dept Fax: [phone] Certification / Comments     Frequency/Duration 2-3 days per week for 4-6 weeks. Certification period from 8/16/2019  to 11/10/2019. I have reviewed the Plan of Care established for skilled therapy services and certify that the services are required and that they will be provided while the patient is under my care.     Physician's Comments/Revisions:               Physician's Printed Name:                                           [de-identified] Signature:                                                               Date: bed rails

## 2019-11-04 NOTE — ED CDU PROVIDER INITIAL DAY NOTE - ATTENDING CONTRIBUTION TO CARE
I personally saw the patient with the PA, and completed the key components of the history and physical exam. I then discussed the management plan with the PA.   gen obese resp clear cardiac no murmur abd soft neuro intact

## 2019-11-04 NOTE — ED ADULT NURSE REASSESSMENT NOTE - NSIMPLEMENTINTERV_GEN_ALL_ED
Implemented All Fall Risk Interventions:  [place] to call system. Call bell, personal items and telephone within reach. Instruct patient to call for assistance. Room bathroom lighting operational. Non-slip footwear when patient is off stretcher. Physically safe environment: no spills, clutter or unnecessary equipment. Stretcher in lowest position, wheels locked, appropriate side rails in place. Provide visual cue, wrist band, yellow gown, etc. Monitor gait and stability. Monitor for mental status changes and reorient to person, place, and time. Review medications for side effects contributing to fall risk. Reinforce activity limits and safety measures with patient and family.

## 2019-11-04 NOTE — ED CDU PROVIDER INITIAL DAY NOTE - MEDICAL DECISION MAKING DETAILS
69 yo M PMHx DM, A-fib, chronic venous insufficiency, presents to ED c/o recurrent cellulitis of both LE and worsening pain in RLE and being unable to move his leg. Patient had US of legs and evaluated by vascular. Patient states still unable to ambulate 2/2 pain. Patient placed in CDU for PRN pain control, PT eval, and will be seen by vascular in AM.

## 2019-11-04 NOTE — PROVIDER CONTACT NOTE (OTHER) - ASSESSMENT
Pt. with initial pain level of 10/10 prior to pain medication this AM.   Mod. assist for bed mobility, min. assist to stand with rolling walker and to take a few steps.  Right lower extremity weakness 4-/5.

## 2019-11-04 NOTE — ED ADULT NURSE REASSESSMENT NOTE - GENERAL PATIENT STATE
smiling/interactive/comfortable appearance/resting/sleeping/cooperative
comfortable appearance/resting/sleeping/cooperative/no change observed/smiling/interactive
resting/sleeping/smiling/interactive/comfortable appearance/cooperative

## 2019-11-04 NOTE — ED ADULT NURSE REASSESSMENT NOTE - NS ED NURSE REASSESS COMMENT FT1
assumed care of pt @ 0530, report received from Panchito SOLER, charting as noted. pt AOx4 denies pain in legs/feet at this time, but feels uncomfortable in stretcher. Pt reposition for comfort. Vital Signs Stable.     HR ~102, AM coreg given. pt denies chest discomfort. Lung sounds are clear b/l, abd is soft, rounded, and nontender with positive bowel sounds in all four quadrants. +3 pitting edema noted to bilateral legs. Bilateral lower extremities wrapped by vascular team. dressing clean dry and intact. Per KIRA Dumont, maintain vascular team's ace bandages. bilateral feet slightly cool to touch. swelling equal in bilateral feet. left foot slightly more red/purple in color. pedal pulses assessed at bedside with doppler. right pedal pulse audible with doppler. two RNs at bedside unable to find left pedal pulse with doppler. Pt demonstrating to this RN and MINERVA Dupont RN that he is unable to wiggle toes. KIRA Dumont aware of assessment findings/pt complaint, PA at bedside to assess pt. Awaiting vascular reeval. assumed care of pt @ 0530, report received from Panchito SOLER, charting as noted. pt AOx4 denies pain in legs/feet at this time, but feels uncomfortable in stretcher. Pt reposition for comfort. Vital Signs Stable.     HR ~102, AM coreg given. pt denies chest discomfort. Lung sounds are clear b/l, abd is soft, rounded, and nontender with positive bowel sounds in all four quadrants. +3 pitting edema noted to bilateral legs. Bilateral lower extremities wrapped by vascular team. dressing clean dry and intact. Per KIRA Dumont, maintain vascular team's ace bandages. bilateral feet slightly cool to touch. swelling equal in bilateral feet. left foot slightly more red/purple in color. pedal pulses assessed at bedside with doppler. right pedal pulse audible with doppler. two RNs at bedside unable to find left pedal pulse with doppler. Pt demonstrating to this RN and MINERVA Dupont RN that he is unable to wiggle toes on left foot. KIRA Dumont aware of assessment findings/pt complaint, PA at bedside to assess pt. Awaiting vascular reeval.

## 2019-11-04 NOTE — ED CDU PROVIDER INITIAL DAY NOTE - OBJECTIVE STATEMENT
69 yo M PMHx DM, A-fib, chronic venous insufficiency, presents to ED c/o recurrent cellulitis of both LE and worsening pain in RLE and being unable to move his leg.  Pt states he was hospitalized at  [facility] last month after being unable to get off of toilet for 12 hrs secondary to weakness and developed weakness of his LLE and was subsequently sent to rehab but never regained strength in his LLE and walks with walker.  Pt denies any fever or chills.  Pt was unable to walk today and called EMS.

## 2019-11-04 NOTE — ED CDU PROVIDER DISPOSITION NOTE - CLINICAL COURSE
pt c/o leg pain/weakness - found to have vascular disease - cleared by vascular surgery pt c/o leg pain/weakness - found to have moderate arterial occlusion in lower extremities, + pedal pulses intact - cleared by vascular surgery - will d/c to assisted living facility with instructions to f/u with vascular as outpatient

## 2019-11-04 NOTE — PROGRESS NOTE ADULT - SUBJECTIVE AND OBJECTIVE BOX
Vascular Surgery follow up    Patient seen, feeling better    No active lower extremity pain     PVR/ZOYA reviewed, moderate PAD    No hinderance for compression therapy    - Bilateral unna boots reapplied  - Patient to follow up with outpatient wound care center or can follow up in office with Dr. Kothari

## 2019-11-04 NOTE — CHART NOTE - NSCHARTNOTEFT_GEN_A_CORE
DEBRANote: pt ready for d/c to return to the [facility]. Discharge and clinical docs faxed to facility via [facility] ([phone]). [facility] called ([facility]), [facility] ETA 19:30. Worker informed pt and pt's nurse(Len). No other concerns reported at this moment.

## 2019-12-25 NOTE — ED CDU PROVIDER INITIAL DAY NOTE - NS ED MD PROGRESS NOTE ADD
Mammogram was read as normal but showed very dense breasts. New guidelines this year have recommended increased screening for women with dense breasts because it could hide a lesion. I suggest a screening  whole breast u/s or can go to breast health for a consult or can also just do mammogram in 1 year    Add Progress Note...

## 2020-01-20 ENCOUNTER — EMERGENCY (EMERGENCY)
Facility: HOSPITAL | Age: 70
LOS: 1 days | Discharge: HOME | End: 2020-01-20
Attending: EMERGENCY MEDICINE
Payer: COMMERCIAL

## 2020-01-20 VITALS
OXYGEN SATURATION: 99 % | DIASTOLIC BLOOD PRESSURE: 67 MMHG | TEMPERATURE: 98 F | RESPIRATION RATE: 16 BRPM | SYSTOLIC BLOOD PRESSURE: 127 MMHG | HEART RATE: 98 BPM

## 2020-01-20 DIAGNOSIS — Z95.810 PRESENCE OF AUTOMATIC (IMPLANTABLE) CARDIAC DEFIBRILLATOR: Chronic | ICD-10-CM

## 2020-01-20 DIAGNOSIS — Z98.89 OTHER SPECIFIED POSTPROCEDURAL STATES: Chronic | ICD-10-CM

## 2020-01-20 LAB
ALBUMIN SERPL ELPH-MCNC: 3.4 G/DL — SIGNIFICANT CHANGE UP (ref 3.3–5.2)
ALP SERPL-CCNC: 85 U/L — SIGNIFICANT CHANGE UP (ref 40–120)
ALT FLD-CCNC: 12 U/L — SIGNIFICANT CHANGE UP
ANION GAP SERPL CALC-SCNC: 17 MMOL/L — SIGNIFICANT CHANGE UP (ref 5–17)
APTT BLD: 31.7 SEC — SIGNIFICANT CHANGE UP (ref 27.5–36.3)
AST SERPL-CCNC: 25 U/L — SIGNIFICANT CHANGE UP
BASOPHILS # BLD AUTO: 0.03 K/UL — SIGNIFICANT CHANGE UP (ref 0–0.2)
BASOPHILS NFR BLD AUTO: 0.5 % — SIGNIFICANT CHANGE UP (ref 0–2)
BILIRUB SERPL-MCNC: 0.7 MG/DL — SIGNIFICANT CHANGE UP (ref 0.4–2)
BUN SERPL-MCNC: 18 MG/DL — SIGNIFICANT CHANGE UP (ref 8–20)
CALCIUM SERPL-MCNC: 9.5 MG/DL — SIGNIFICANT CHANGE UP (ref 8.6–10.2)
CHLORIDE SERPL-SCNC: 94 MMOL/L — LOW (ref 98–107)
CO2 SERPL-SCNC: 26 MMOL/L — SIGNIFICANT CHANGE UP (ref 22–29)
CREAT SERPL-MCNC: 1.01 MG/DL — SIGNIFICANT CHANGE UP (ref 0.5–1.3)
EOSINOPHIL # BLD AUTO: 0.15 K/UL — SIGNIFICANT CHANGE UP (ref 0–0.5)
EOSINOPHIL NFR BLD AUTO: 2.3 % — SIGNIFICANT CHANGE UP (ref 0–6)
GLUCOSE SERPL-MCNC: 235 MG/DL — HIGH (ref 70–115)
HCT VFR BLD CALC: 49.3 % — SIGNIFICANT CHANGE UP (ref 39–50)
HGB BLD-MCNC: 15.7 G/DL — SIGNIFICANT CHANGE UP (ref 13–17)
IMM GRANULOCYTES NFR BLD AUTO: 0.9 % — SIGNIFICANT CHANGE UP (ref 0–1.5)
INR BLD: 3.78 RATIO — HIGH (ref 0.88–1.16)
LYMPHOCYTES # BLD AUTO: 1.23 K/UL — SIGNIFICANT CHANGE UP (ref 1–3.3)
LYMPHOCYTES # BLD AUTO: 19.1 % — SIGNIFICANT CHANGE UP (ref 13–44)
MCHC RBC-ENTMCNC: 27.8 PG — SIGNIFICANT CHANGE UP (ref 27–34)
MCHC RBC-ENTMCNC: 31.8 GM/DL — LOW (ref 32–36)
MCV RBC AUTO: 87.3 FL — SIGNIFICANT CHANGE UP (ref 80–100)
MONOCYTES # BLD AUTO: 0.5 K/UL — SIGNIFICANT CHANGE UP (ref 0–0.9)
MONOCYTES NFR BLD AUTO: 7.8 % — SIGNIFICANT CHANGE UP (ref 2–14)
NEUTROPHILS # BLD AUTO: 4.47 K/UL — SIGNIFICANT CHANGE UP (ref 1.8–7.4)
NEUTROPHILS NFR BLD AUTO: 69.4 % — SIGNIFICANT CHANGE UP (ref 43–77)
PLATELET # BLD AUTO: 157 K/UL — SIGNIFICANT CHANGE UP (ref 150–400)
POTASSIUM SERPL-MCNC: 3.8 MMOL/L — SIGNIFICANT CHANGE UP (ref 3.5–5.3)
POTASSIUM SERPL-SCNC: 3.8 MMOL/L — SIGNIFICANT CHANGE UP (ref 3.5–5.3)
PROT SERPL-MCNC: 6.6 G/DL — SIGNIFICANT CHANGE UP (ref 6.6–8.7)
PROTHROM AB SERPL-ACNC: 45.3 SEC — HIGH (ref 10–12.9)
RBC # BLD: 5.65 M/UL — SIGNIFICANT CHANGE UP (ref 4.2–5.8)
RBC # FLD: 17.2 % — HIGH (ref 10.3–14.5)
SODIUM SERPL-SCNC: 137 MMOL/L — SIGNIFICANT CHANGE UP (ref 135–145)
WBC # BLD: 6.44 K/UL — SIGNIFICANT CHANGE UP (ref 3.8–10.5)
WBC # FLD AUTO: 6.44 K/UL — SIGNIFICANT CHANGE UP (ref 3.8–10.5)

## 2020-01-20 PROCEDURE — 73060 X-RAY EXAM OF HUMERUS: CPT

## 2020-01-20 PROCEDURE — 36415 COLL VENOUS BLD VENIPUNCTURE: CPT

## 2020-01-20 PROCEDURE — 73060 X-RAY EXAM OF HUMERUS: CPT | Mod: 26,LT

## 2020-01-20 PROCEDURE — 85027 COMPLETE CBC AUTOMATED: CPT

## 2020-01-20 PROCEDURE — 73030 X-RAY EXAM OF SHOULDER: CPT | Mod: 26,LT

## 2020-01-20 PROCEDURE — 93010 ELECTROCARDIOGRAM REPORT: CPT

## 2020-01-20 PROCEDURE — 85730 THROMBOPLASTIN TIME PARTIAL: CPT

## 2020-01-20 PROCEDURE — 85610 PROTHROMBIN TIME: CPT

## 2020-01-20 PROCEDURE — 72125 CT NECK SPINE W/O DYE: CPT | Mod: 26

## 2020-01-20 PROCEDURE — 99284 EMERGENCY DEPT VISIT MOD MDM: CPT | Mod: 25

## 2020-01-20 PROCEDURE — 71045 X-RAY EXAM CHEST 1 VIEW: CPT | Mod: 26

## 2020-01-20 PROCEDURE — 73030 X-RAY EXAM OF SHOULDER: CPT

## 2020-01-20 PROCEDURE — 71045 X-RAY EXAM CHEST 1 VIEW: CPT

## 2020-01-20 PROCEDURE — 99284 EMERGENCY DEPT VISIT MOD MDM: CPT

## 2020-01-20 PROCEDURE — 93005 ELECTROCARDIOGRAM TRACING: CPT

## 2020-01-20 PROCEDURE — 73080 X-RAY EXAM OF ELBOW: CPT

## 2020-01-20 PROCEDURE — 70450 CT HEAD/BRAIN W/O DYE: CPT

## 2020-01-20 PROCEDURE — 73080 X-RAY EXAM OF ELBOW: CPT | Mod: 26,LT

## 2020-01-20 PROCEDURE — 80053 COMPREHEN METABOLIC PANEL: CPT

## 2020-01-20 PROCEDURE — 70450 CT HEAD/BRAIN W/O DYE: CPT | Mod: 26

## 2020-01-20 PROCEDURE — 72125 CT NECK SPINE W/O DYE: CPT

## 2020-01-20 NOTE — ED PROVIDER NOTE - PATIENT PORTAL LINK FT
You can access the FollowMyHealth Patient Portal offered by [facility] by registering at the following website: [email or web address]. By joining [employer]’s FollowMyHealth portal, you will also be able to view your health information using other applications (apps) compatible with our system.

## 2020-01-20 NOTE — ED PROVIDER NOTE - NSFOLLOWUPINSTRUCTIONS_ED_ALL_ED_FT
PLEASE HOLD YOUR COUMADIN FOR 1 DAY, THEN RESTART AND PLEASE GO TO YOUR PMD WITHIN 24HRS FOR AN INR RECHECK AND RECOMMENDATIONS ABOUT POSSIBLE DOSAGE CHANGES.     You are advised to please follow up with your primary care doctor within the next 24 hours and return to the Emergency Department for worsening symptoms or any other concerns.  Your doctor may call [phone] to follow up on the specific results of the tests performed today in the emergency department.

## 2020-01-20 NOTE — ED PROVIDER NOTE - CARE PLAN
Principal Discharge DX:	Closed head injury, initial encounter  Secondary Diagnosis:	Supratherapeutic INR

## 2020-01-20 NOTE — ED PROVIDER NOTE - NS ED ROS FT
Constitutional: (-) fever  (-)chills  (-)sweats  Eyes/ENT: (-) blurry vision, (-) epistaxis  (-)rhinorrhea   (-) sore throat    Cardiovascular: (-) chest pain, (-) palpitations (-) edema   Respiratory: (-) cough, (-) shortness of breath   Gastrointestinal: (-)nausea  (-)vomiting, (-) diarrhea  (-) abdominal pain   :  (-)dysuria, (-)frequency, (-)urgency, (-)hematuria  Musculoskeletal: (-) neck pain, (-) back pain, (+) joint pain  Integumentary: (-) rash, (-) edema  Neurological: (-) headache, (-) altered mental status  (-)LOC  +head trauma

## 2020-01-20 NOTE — ED ADULT TRIAGE NOTE - CHIEF COMPLAINT QUOTE
"I fell down and hit myself on the dresser" "I did hit my head" c/o unwitnessed fall today, +hit head +bt takes coumadin for hx CHF. bump noted to back of head, Abrasion noted to posterior back, bleeding controlled @ this time. code trauma B activated

## 2020-01-20 NOTE — ED PROVIDER NOTE - OBJECTIVE STATEMENT
69yoM; with pmh signif for Afib(on Coumadin), HTN, HLD, DM, walks with walker; now p/w head trauma s/p trip and fall this morning, fell backward onto head with scalp hematoma. denies loc. denies headache. denies n/v. denies any new numbness/tingling. denies focal weakness. denies cp/sob/palp.  PMH:  Afib(on Coumadin), HTN, HLD, DM  SOCIAL: ex-smoker

## 2020-01-20 NOTE — ED PROVIDER NOTE - PHYSICAL EXAMINATION
Gen: Alert, NAD  Head: NC, AT, PERRL, EOMI, normal lids/conjunctiva  Neck: +supple, no tenderness/meningismus/JVD, +Trachea midline  Pulm: Bilateral BS, normal resp effort, no wheeze/stridor/retractions  CV: RRR, no M/R/G, +dist pulses  Abd: soft, NT/ND, +BS, no hepatosplenomegaly  Mskel:    L UE: from/nt @wrist/hand. ttp @ anterior shoulder, from @ shoulder.  ttp @ lateral epicondyle, from.    R UE: from/nt @shoulder/elbow/wrist/hand   L LE: from/nt @ hip/knee/ankle   R LE: from/nt @hip/knee/ankle   distal pulses intact  BACK: nt midline c/t/l/s spine.   Skin: no rash  Neuro: AAOx3, baseline left LE weaknes

## 2020-01-20 NOTE — ED PROVIDER NOTE - CLINICAL SUMMARY MEDICAL DECISION MAKING FREE TEXT BOX
trauma b called, ct with no acute injury, supratherapeutic inr. recommended patient hold coumadin for 1 day, then restart.

## 2020-01-20 NOTE — ED ADULT NURSE REASSESSMENT NOTE - NS ED NURSE REASSESS COMMENT FT1
Pt admits to feeling better, AOx4, able to ambulate safely and steadily w/out assistance, denies dizziness/weakness upon standing, no IV access in place, pt d/c home, d/c paperwork provided w/ referral information, brought to ED exit via wheelchair.

## 2020-05-07 ENCOUNTER — EMERGENCY (EMERGENCY)
Facility: HOSPITAL | Age: 70
LOS: 1 days | Discharge: HOME | End: 2020-05-07
Attending: EMERGENCY MEDICINE
Payer: COMMERCIAL

## 2020-05-07 VITALS
WEIGHT: 315 LBS | RESPIRATION RATE: 16 BRPM | HEART RATE: 90 BPM | SYSTOLIC BLOOD PRESSURE: 116 MMHG | DIASTOLIC BLOOD PRESSURE: 84 MMHG | TEMPERATURE: 98 F | OXYGEN SATURATION: 100 %

## 2020-05-07 VITALS
SYSTOLIC BLOOD PRESSURE: 116 MMHG | DIASTOLIC BLOOD PRESSURE: 59 MMHG | OXYGEN SATURATION: 96 % | RESPIRATION RATE: 18 BRPM | HEART RATE: 96 BPM

## 2020-05-07 DIAGNOSIS — Z95.810 PRESENCE OF AUTOMATIC (IMPLANTABLE) CARDIAC DEFIBRILLATOR: Chronic | ICD-10-CM

## 2020-05-07 DIAGNOSIS — Z98.89 OTHER SPECIFIED POSTPROCEDURAL STATES: Chronic | ICD-10-CM

## 2020-05-07 LAB
ALBUMIN SERPL ELPH-MCNC: 3.5 G/DL — SIGNIFICANT CHANGE UP (ref 3.3–5.2)
ALP SERPL-CCNC: 100 U/L — SIGNIFICANT CHANGE UP (ref 40–120)
ALT FLD-CCNC: 20 U/L — SIGNIFICANT CHANGE UP
ANION GAP SERPL CALC-SCNC: 11 MMOL/L — SIGNIFICANT CHANGE UP (ref 5–17)
AST SERPL-CCNC: 36 U/L — SIGNIFICANT CHANGE UP
BASOPHILS # BLD AUTO: 0.03 K/UL — SIGNIFICANT CHANGE UP (ref 0–0.2)
BASOPHILS NFR BLD AUTO: 0.3 % — SIGNIFICANT CHANGE UP (ref 0–2)
BILIRUB SERPL-MCNC: 0.6 MG/DL — SIGNIFICANT CHANGE UP (ref 0.4–2)
BUN SERPL-MCNC: 22 MG/DL — HIGH (ref 8–20)
CALCIUM SERPL-MCNC: 10 MG/DL — SIGNIFICANT CHANGE UP (ref 8.6–10.2)
CHLORIDE SERPL-SCNC: 97 MMOL/L — LOW (ref 98–107)
CK SERPL-CCNC: 90 U/L — SIGNIFICANT CHANGE UP (ref 30–200)
CO2 SERPL-SCNC: 31 MMOL/L — HIGH (ref 22–29)
CREAT SERPL-MCNC: 1.03 MG/DL — SIGNIFICANT CHANGE UP (ref 0.5–1.3)
EOSINOPHIL # BLD AUTO: 0.1 K/UL — SIGNIFICANT CHANGE UP (ref 0–0.5)
EOSINOPHIL NFR BLD AUTO: 1.1 % — SIGNIFICANT CHANGE UP (ref 0–6)
GAS PNL BLDA: SIGNIFICANT CHANGE UP
GLUCOSE SERPL-MCNC: 191 MG/DL — HIGH (ref 70–99)
HCT VFR BLD CALC: 50.8 % — HIGH (ref 39–50)
HGB BLD-MCNC: 16.1 G/DL — SIGNIFICANT CHANGE UP (ref 13–17)
IMM GRANULOCYTES NFR BLD AUTO: 1 % — SIGNIFICANT CHANGE UP (ref 0–1.5)
LYMPHOCYTES # BLD AUTO: 1.13 K/UL — SIGNIFICANT CHANGE UP (ref 1–3.3)
LYMPHOCYTES # BLD AUTO: 13 % — SIGNIFICANT CHANGE UP (ref 13–44)
MCHC RBC-ENTMCNC: 29.4 PG — SIGNIFICANT CHANGE UP (ref 27–34)
MCHC RBC-ENTMCNC: 31.7 GM/DL — LOW (ref 32–36)
MCV RBC AUTO: 92.7 FL — SIGNIFICANT CHANGE UP (ref 80–100)
MONOCYTES # BLD AUTO: 0.66 K/UL — SIGNIFICANT CHANGE UP (ref 0–0.9)
MONOCYTES NFR BLD AUTO: 7.6 % — SIGNIFICANT CHANGE UP (ref 2–14)
NEUTROPHILS # BLD AUTO: 6.7 K/UL — SIGNIFICANT CHANGE UP (ref 1.8–7.4)
NEUTROPHILS NFR BLD AUTO: 77 % — SIGNIFICANT CHANGE UP (ref 43–77)
NT-PROBNP SERPL-SCNC: 648 PG/ML — HIGH (ref 0–300)
PLATELET # BLD AUTO: 149 K/UL — LOW (ref 150–400)
POTASSIUM SERPL-MCNC: 5.7 MMOL/L — HIGH (ref 3.5–5.3)
POTASSIUM SERPL-SCNC: 5.7 MMOL/L — HIGH (ref 3.5–5.3)
PROT SERPL-MCNC: 7.1 G/DL — SIGNIFICANT CHANGE UP (ref 6.6–8.7)
RBC # BLD: 5.48 M/UL — SIGNIFICANT CHANGE UP (ref 4.2–5.8)
RBC # FLD: 16.1 % — HIGH (ref 10.3–14.5)
SODIUM SERPL-SCNC: 139 MMOL/L — SIGNIFICANT CHANGE UP (ref 135–145)
TROPONIN T SERPL-MCNC: 0.03 NG/ML — SIGNIFICANT CHANGE UP (ref 0–0.06)
WBC # BLD: 8.71 K/UL — SIGNIFICANT CHANGE UP (ref 3.8–10.5)
WBC # FLD AUTO: 8.71 K/UL — SIGNIFICANT CHANGE UP (ref 3.8–10.5)

## 2020-05-07 PROCEDURE — 82947 ASSAY GLUCOSE BLOOD QUANT: CPT

## 2020-05-07 PROCEDURE — 72170 X-RAY EXAM OF PELVIS: CPT | Mod: 26

## 2020-05-07 PROCEDURE — 83880 ASSAY OF NATRIURETIC PEPTIDE: CPT

## 2020-05-07 PROCEDURE — 71260 CT THORAX DX C+: CPT | Mod: 26

## 2020-05-07 PROCEDURE — 96376 TX/PRO/DX INJ SAME DRUG ADON: CPT | Mod: XU

## 2020-05-07 PROCEDURE — 80053 COMPREHEN METABOLIC PANEL: CPT

## 2020-05-07 PROCEDURE — 74177 CT ABD & PELVIS W/CONTRAST: CPT

## 2020-05-07 PROCEDURE — 71045 X-RAY EXAM CHEST 1 VIEW: CPT | Mod: 26

## 2020-05-07 PROCEDURE — 96374 THER/PROPH/DIAG INJ IV PUSH: CPT | Mod: XU

## 2020-05-07 PROCEDURE — 70450 CT HEAD/BRAIN W/O DYE: CPT

## 2020-05-07 PROCEDURE — 82550 ASSAY OF CK (CPK): CPT

## 2020-05-07 PROCEDURE — 72128 CT CHEST SPINE W/O DYE: CPT | Mod: 26

## 2020-05-07 PROCEDURE — 74177 CT ABD & PELVIS W/CONTRAST: CPT | Mod: 26

## 2020-05-07 PROCEDURE — 36415 COLL VENOUS BLD VENIPUNCTURE: CPT

## 2020-05-07 PROCEDURE — 93005 ELECTROCARDIOGRAM TRACING: CPT

## 2020-05-07 PROCEDURE — 85027 COMPLETE CBC AUTOMATED: CPT

## 2020-05-07 PROCEDURE — 70450 CT HEAD/BRAIN W/O DYE: CPT | Mod: 26

## 2020-05-07 PROCEDURE — 71045 X-RAY EXAM CHEST 1 VIEW: CPT

## 2020-05-07 PROCEDURE — 83605 ASSAY OF LACTIC ACID: CPT

## 2020-05-07 PROCEDURE — 72125 CT NECK SPINE W/O DYE: CPT

## 2020-05-07 PROCEDURE — 93010 ELECTROCARDIOGRAM REPORT: CPT

## 2020-05-07 PROCEDURE — 72131 CT LUMBAR SPINE W/O DYE: CPT | Mod: 26

## 2020-05-07 PROCEDURE — 72125 CT NECK SPINE W/O DYE: CPT | Mod: 26

## 2020-05-07 PROCEDURE — 72170 X-RAY EXAM OF PELVIS: CPT

## 2020-05-07 PROCEDURE — 84295 ASSAY OF SERUM SODIUM: CPT

## 2020-05-07 PROCEDURE — 85014 HEMATOCRIT: CPT

## 2020-05-07 PROCEDURE — 84132 ASSAY OF SERUM POTASSIUM: CPT

## 2020-05-07 PROCEDURE — 82803 BLOOD GASES ANY COMBINATION: CPT

## 2020-05-07 PROCEDURE — 87635 SARS-COV-2 COVID-19 AMP PRB: CPT

## 2020-05-07 PROCEDURE — 99285 EMERGENCY DEPT VISIT HI MDM: CPT

## 2020-05-07 PROCEDURE — 84484 ASSAY OF TROPONIN QUANT: CPT

## 2020-05-07 PROCEDURE — 82435 ASSAY OF BLOOD CHLORIDE: CPT

## 2020-05-07 PROCEDURE — 36600 WITHDRAWAL OF ARTERIAL BLOOD: CPT

## 2020-05-07 PROCEDURE — 99284 EMERGENCY DEPT VISIT MOD MDM: CPT | Mod: 25

## 2020-05-07 PROCEDURE — 71260 CT THORAX DX C+: CPT

## 2020-05-07 PROCEDURE — 82330 ASSAY OF CALCIUM: CPT

## 2020-05-07 PROCEDURE — 96375 TX/PRO/DX INJ NEW DRUG ADDON: CPT | Mod: XU

## 2020-05-07 RX ORDER — OXYCODONE HYDROCHLORIDE 5 MG/1
10 TABLET ORAL ONCE
Refills: 0 | Status: DISCONTINUED | OUTPATIENT
Start: 2020-05-07 | End: 2020-05-07

## 2020-05-07 RX ORDER — MORPHINE SULFATE 50 MG/1
2 CAPSULE, EXTENDED RELEASE ORAL ONCE
Refills: 0 | Status: DISCONTINUED | OUTPATIENT
Start: 2020-05-07 | End: 2020-05-07

## 2020-05-07 RX ORDER — ONDANSETRON 8 MG/1
4 TABLET, FILM COATED ORAL ONCE
Refills: 0 | Status: COMPLETED | OUTPATIENT
Start: 2020-05-07 | End: 2020-05-07

## 2020-05-07 RX ADMIN — MORPHINE SULFATE 2 MILLIGRAM(S): 50 CAPSULE, EXTENDED RELEASE ORAL at 22:06

## 2020-05-07 RX ADMIN — OXYCODONE HYDROCHLORIDE 10 MILLIGRAM(S): 5 TABLET ORAL at 23:42

## 2020-05-07 RX ADMIN — ONDANSETRON 4 MILLIGRAM(S): 8 TABLET, FILM COATED ORAL at 19:49

## 2020-05-07 RX ADMIN — MORPHINE SULFATE 2 MILLIGRAM(S): 50 CAPSULE, EXTENDED RELEASE ORAL at 19:49

## 2020-05-07 NOTE — ED ADULT NURSE NOTE - OBJECTIVE STATEMENT
pt c/o back pain pt states that he fell when his leg gave out and he fell backwards pt on blood thinners, Pt denies loc, head trauma, resp even and unlabored no distress noted,

## 2020-05-07 NOTE — ED ADULT NURSE REASSESSMENT NOTE - NS ED NURSE REASSESS COMMENT FT1
Assumed pt care @ 2130. Pt laying supine in stretcher in no apparent signs of distress. PIV site WNL. Pt status unchanged, c/o pain to mid-back, MD Bowers to be notified. refer to flowsheet and chart, pt ID band in place, pt safety maintained, pt hemodynamically stable, updated on plan of care. Awaiting CT results. Call light provided, fall safety reinforced. Will continue to monitor

## 2020-05-07 NOTE — ED PROVIDER NOTE - OBJECTIVE STATEMENT
68 y/o M with h/o dm, htn, obesity, chf, sleep pnea, pulm htn but not on home o2, chronic lymphedema of legs p/w fall when his leg gave out and he fell backwards and has pain in whole spine, neck. Pt states that he had no loc bt they found that his oxygen was low and ems placed him on o2. No fever, chills,  nausea, vomiting, no weakness, numbness

## 2020-05-07 NOTE — ED PROVIDER NOTE - CLINICAL SUMMARY MEDICAL DECISION MAKING FREE TEXT BOX
pt has unexplained fall, initially attributed to mechanical cause but has hypoxia which is new and unclear mechanism, will galicia scan for fall and diffuse back pain r/o covid pna as pt is at risk for severe disease

## 2020-05-07 NOTE — ED PROVIDER NOTE - NSFOLLOWUPINSTRUCTIONS_ED_ALL_ED_FT
Follow up with home physical therapy as scheduled  Follow up with your doctor tomorrow  Return sooner for any problems      Back Pain    Back pain is very common in adults. The cause of back pain is rarely dangerous and the pain often gets better over time. The cause of your back pain may not be known and may include strain of muscles or ligaments, degeneration of the spinal disks, or arthritis. Occasionally the pain may radiate down your leg(s). Over-the-counter medicines to reduce pain and inflammation are often the most helpful. Stretching and remaining active frequently helps the healing process.     SEEK IMMEDIATE MEDICAL CARE IF YOU HAVE ANY OF THE FOLLOWING SYMPTOMS: bowel or bladder control problems, unusual weakness or numbness in your arms or legs, nausea or vomiting, abdominal pain, fever, dizziness/lightheadedness.

## 2020-05-07 NOTE — ED PROVIDER NOTE - PATIENT PORTAL LINK FT
You can access the FollowMyHealth Patient Portal offered by [facility] by registering at the following website: [email or web address]. By joining [employer]’s FollowMyHealth portal, you will also be able to view your health information using other applications (apps) compatible with our system.

## 2020-05-07 NOTE — ED PROVIDER NOTE - PROGRESS NOTE DETAILS
CT results as noted.  Will attempt trial of ambulation Pt able to ambulate in ED and has home PT scheduled.  Pt requesting d/c.

## 2020-05-07 NOTE — ED ADULT NURSE NOTE - CHIEF COMPLAINT QUOTE
Mechanical trip and fall, denies hitting head, denies LOC.  C/o back pain.  Chronic neuropathy to b/l legs.

## 2020-05-08 LAB — SARS-COV-2 RNA SPEC QL NAA+PROBE: SIGNIFICANT CHANGE UP

## 2020-06-01 NOTE — ED BEHAVIORAL HEALTH ASSESSMENT NOTE - NS ED BHA DEMOGRAPHICS MEDICAL RECORD REVIEWED YES RECORDS
Immediate Post OP Note    PreOp Diagnosis: Cholecystitis    PostOp Diagnosis: Same    Procedure(s):  CHOLECYSTECTOMY, LAPAROSCOPIC - Wound Class: Clean Contaminated    Surgeon(s):  John H Ganser, M.D.    Anesthesiologist/Type of Anesthesia:  Anesthesiologist: Holly Anderson M.D./General    Surgical Staff:  Circulator: Isabell Abdi; Chema Louis R.N.  Scrub Person: Noris Espinoza Assist: ИРИНА Paniagua    Specimens removed if any:  ID Type Source Tests Collected by Time Destination   A : Gallbladder  Tissue Gallbladder PATHOLOGY SPECIMEN John H Ganser, M.D. 6/1/2020  2:18 PM        Estimated Blood Loss: 0    Findings: Small stones    Complications: 0        6/1/2020 2:25 PM John H Ganser, M.D.    
Hospital chart

## 2020-06-02 ENCOUNTER — EMERGENCY (EMERGENCY)
Facility: HOSPITAL | Age: 70
LOS: 1 days | Discharge: HOME | End: 2020-06-02
Attending: EMERGENCY MEDICINE
Payer: COMMERCIAL

## 2020-06-02 VITALS
OXYGEN SATURATION: 95 % | HEIGHT: 69 IN | DIASTOLIC BLOOD PRESSURE: 50 MMHG | TEMPERATURE: 97 F | RESPIRATION RATE: 18 BRPM | WEIGHT: 315 LBS | HEART RATE: 95 BPM | SYSTOLIC BLOOD PRESSURE: 99 MMHG

## 2020-06-02 VITALS
OXYGEN SATURATION: 95 % | RESPIRATION RATE: 18 BRPM | DIASTOLIC BLOOD PRESSURE: 63 MMHG | TEMPERATURE: 97 F | SYSTOLIC BLOOD PRESSURE: 98 MMHG | HEART RATE: 92 BPM

## 2020-06-02 DIAGNOSIS — Z98.89 OTHER SPECIFIED POSTPROCEDURAL STATES: Chronic | ICD-10-CM

## 2020-06-02 DIAGNOSIS — Z95.810 PRESENCE OF AUTOMATIC (IMPLANTABLE) CARDIAC DEFIBRILLATOR: Chronic | ICD-10-CM

## 2020-06-02 PROCEDURE — 99283 EMERGENCY DEPT VISIT LOW MDM: CPT

## 2020-06-02 NOTE — ED ADULT NURSE NOTE - OBJECTIVE STATEMENT
Patient received @ 1930, pt is a&ox3 w/ periods of confusion., pt snt in from [facility] for a mechanical fall. MD Handley assessed patient, VSS, no complaints @ this time per patient, respirations even and unlabored. pt has BLE' wrappedpatint awaiting ambulance back to [facility]. will continue to monitor unitl pt is trasported

## 2020-06-02 NOTE — ED PROVIDER NOTE - CLINICAL SUMMARY MEDICAL DECISION MAKING FREE TEXT BOX
pt with mechanical fall at [facility] has no complaints neuro itnact no sings trauma dced back to [facility]

## 2020-06-02 NOTE — ED PROVIDER NOTE - OBJECTIVE STATEMENT
70y/o M with PMHx of CHF, HTN, DM presents to the ED s/p 3 falls within this past week, referred by assisted living for evaul. Pt reports that 2 falls were from wearing uni boots and today he fell when he bent over.  Pt was referred to ED by assisted living. Pt has chronic c/o LE pain. No LOC or head trauma. denies fever. denies HA or neck pain. no chest pain or sob. no abd pain. no n/v/d. no urinary f/u/d. no back pain. no motor or sensory deficits. denies illicit drug use. no recent travel. no rash. no other acute issues symptoms or concerns

## 2020-06-02 NOTE — ED PROVIDER NOTE - NEUROLOGICAL, MLM
Alert and oriented, no focal deficits, no motor or sensory deficits. Normal gait. Complete normal baseline.

## 2020-06-02 NOTE — ED ADULT TRIAGE NOTE - CHIEF COMPLAINT QUOTE
3 falls this week, pt states he fell twice because he was wearing uni boots and today he fell when he bent over.  Pt states he was holding his walker as he fell, and he did NOT hit his head.  Pt is on coumadin.  Denies any new injuries.  No signs of head trauma noted.

## 2020-06-02 NOTE — ED PEDIATRIC NURSE NOTE - CAS ELECT INFOMATION PROVIDED
pt @ baseline mental status, no intervention needed in ER, pt ready for discharge, able to tolerate PO. VSS

## 2020-06-02 NOTE — ED PROVIDER NOTE - PATIENT PORTAL LINK FT
You can access the FollowMyHealth Patient Portal offered by [facility] by registering at the following website: [email or web address]. By joining [employer]’s FollowMyHealth portal, you will also be able to view your health information using other applications (apps) compatible with our system.

## 2020-06-03 ENCOUNTER — EMERGENCY (EMERGENCY)
Facility: HOSPITAL | Age: 70
LOS: 1 days | Discharge: HOME | End: 2020-06-03
Attending: EMERGENCY MEDICINE
Payer: COMMERCIAL

## 2020-06-03 VITALS
WEIGHT: 315 LBS | DIASTOLIC BLOOD PRESSURE: 73 MMHG | SYSTOLIC BLOOD PRESSURE: 108 MMHG | HEIGHT: 69 IN | RESPIRATION RATE: 18 BRPM | OXYGEN SATURATION: 94 % | TEMPERATURE: 98 F | HEART RATE: 101 BPM

## 2020-06-03 DIAGNOSIS — Z95.810 PRESENCE OF AUTOMATIC (IMPLANTABLE) CARDIAC DEFIBRILLATOR: Chronic | ICD-10-CM

## 2020-06-03 DIAGNOSIS — Z98.89 OTHER SPECIFIED POSTPROCEDURAL STATES: Chronic | ICD-10-CM

## 2020-06-03 LAB
ALBUMIN SERPL ELPH-MCNC: 3.4 G/DL — SIGNIFICANT CHANGE UP (ref 3.3–5.2)
ALP SERPL-CCNC: 145 U/L — HIGH (ref 40–120)
ALT FLD-CCNC: 34 U/L — SIGNIFICANT CHANGE UP
ANION GAP SERPL CALC-SCNC: 11 MMOL/L — SIGNIFICANT CHANGE UP (ref 5–17)
APTT BLD: 67.9 SEC — HIGH (ref 27.5–36.3)
AST SERPL-CCNC: 83 U/L — HIGH
BASOPHILS # BLD AUTO: 0.03 K/UL — SIGNIFICANT CHANGE UP (ref 0–0.2)
BASOPHILS NFR BLD AUTO: 0.5 % — SIGNIFICANT CHANGE UP (ref 0–2)
BILIRUB SERPL-MCNC: 1.2 MG/DL — SIGNIFICANT CHANGE UP (ref 0.4–2)
BUN SERPL-MCNC: 16 MG/DL — SIGNIFICANT CHANGE UP (ref 8–20)
CALCIUM SERPL-MCNC: 8.6 MG/DL — SIGNIFICANT CHANGE UP (ref 8.6–10.2)
CHLORIDE SERPL-SCNC: 99 MMOL/L — SIGNIFICANT CHANGE UP (ref 98–107)
CO2 SERPL-SCNC: 26 MMOL/L — SIGNIFICANT CHANGE UP (ref 22–29)
CREAT SERPL-MCNC: 1.02 MG/DL — SIGNIFICANT CHANGE UP (ref 0.5–1.3)
EOSINOPHIL # BLD AUTO: 0.16 K/UL — SIGNIFICANT CHANGE UP (ref 0–0.5)
EOSINOPHIL NFR BLD AUTO: 2.4 % — SIGNIFICANT CHANGE UP (ref 0–6)
GLUCOSE BLDC GLUCOMTR-MCNC: 217 MG/DL — HIGH (ref 70–99)
GLUCOSE SERPL-MCNC: 178 MG/DL — HIGH (ref 70–99)
HCT VFR BLD CALC: 44.4 % — SIGNIFICANT CHANGE UP (ref 39–50)
HGB BLD-MCNC: 13.9 G/DL — SIGNIFICANT CHANGE UP (ref 13–17)
IMM GRANULOCYTES NFR BLD AUTO: 0.9 % — SIGNIFICANT CHANGE UP (ref 0–1.5)
INR BLD: 5.44 RATIO — CRITICAL HIGH (ref 0.88–1.16)
LYMPHOCYTES # BLD AUTO: 1.23 K/UL — SIGNIFICANT CHANGE UP (ref 1–3.3)
LYMPHOCYTES # BLD AUTO: 18.6 % — SIGNIFICANT CHANGE UP (ref 13–44)
MAGNESIUM SERPL-MCNC: 1.9 MG/DL — SIGNIFICANT CHANGE UP (ref 1.6–2.6)
MCHC RBC-ENTMCNC: 29 PG — SIGNIFICANT CHANGE UP (ref 27–34)
MCHC RBC-ENTMCNC: 31.3 GM/DL — LOW (ref 32–36)
MCV RBC AUTO: 92.7 FL — SIGNIFICANT CHANGE UP (ref 80–100)
MONOCYTES # BLD AUTO: 0.65 K/UL — SIGNIFICANT CHANGE UP (ref 0–0.9)
MONOCYTES NFR BLD AUTO: 9.8 % — SIGNIFICANT CHANGE UP (ref 2–14)
NEUTROPHILS # BLD AUTO: 4.49 K/UL — SIGNIFICANT CHANGE UP (ref 1.8–7.4)
NEUTROPHILS NFR BLD AUTO: 67.8 % — SIGNIFICANT CHANGE UP (ref 43–77)
NT-PROBNP SERPL-SCNC: 621 PG/ML — HIGH (ref 0–300)
PHOSPHATE SERPL-MCNC: 3.3 MG/DL — SIGNIFICANT CHANGE UP (ref 2.4–4.7)
PLATELET # BLD AUTO: 140 K/UL — LOW (ref 150–400)
POTASSIUM SERPL-MCNC: 4.4 MMOL/L — SIGNIFICANT CHANGE UP (ref 3.5–5.3)
POTASSIUM SERPL-SCNC: 4.4 MMOL/L — SIGNIFICANT CHANGE UP (ref 3.5–5.3)
PROT SERPL-MCNC: 6.4 G/DL — LOW (ref 6.6–8.7)
PROTHROM AB SERPL-ACNC: 64.8 SEC — HIGH (ref 10–12.9)
RBC # BLD: 4.79 M/UL — SIGNIFICANT CHANGE UP (ref 4.2–5.8)
RBC # FLD: 16.7 % — HIGH (ref 10.3–14.5)
SODIUM SERPL-SCNC: 136 MMOL/L — SIGNIFICANT CHANGE UP (ref 135–145)
TROPONIN T SERPL-MCNC: 0.03 NG/ML — SIGNIFICANT CHANGE UP (ref 0–0.06)
WBC # BLD: 6.62 K/UL — SIGNIFICANT CHANGE UP (ref 3.8–10.5)
WBC # FLD AUTO: 6.62 K/UL — SIGNIFICANT CHANGE UP (ref 3.8–10.5)

## 2020-06-03 PROCEDURE — 71045 X-RAY EXAM CHEST 1 VIEW: CPT | Mod: 26

## 2020-06-03 PROCEDURE — 72125 CT NECK SPINE W/O DYE: CPT | Mod: 26

## 2020-06-03 PROCEDURE — 72192 CT PELVIS W/O DYE: CPT | Mod: 26

## 2020-06-03 PROCEDURE — 70450 CT HEAD/BRAIN W/O DYE: CPT | Mod: 26

## 2020-06-03 PROCEDURE — 93970 EXTREMITY STUDY: CPT | Mod: 26

## 2020-06-03 PROCEDURE — 93010 ELECTROCARDIOGRAM REPORT: CPT

## 2020-06-03 PROCEDURE — 72131 CT LUMBAR SPINE W/O DYE: CPT | Mod: 26

## 2020-06-03 PROCEDURE — 76377 3D RENDER W/INTRP POSTPROCES: CPT | Mod: 26

## 2020-06-03 PROCEDURE — 99218: CPT

## 2020-06-03 PROCEDURE — 72110 X-RAY EXAM L-2 SPINE 4/>VWS: CPT | Mod: 26

## 2020-06-03 RX ORDER — ALBUTEROL 90 UG/1
2 AEROSOL, METERED ORAL EVERY 6 HOURS
Refills: 0 | Status: DISCONTINUED | OUTPATIENT
Start: 2020-06-03 | End: 2020-06-08

## 2020-06-03 RX ORDER — CARVEDILOL PHOSPHATE 80 MG/1
6.25 CAPSULE, EXTENDED RELEASE ORAL EVERY 12 HOURS
Refills: 0 | Status: DISCONTINUED | OUTPATIENT
Start: 2020-06-03 | End: 2020-06-08

## 2020-06-03 RX ORDER — DEXTROSE 50 % IN WATER 50 %
25 SYRINGE (ML) INTRAVENOUS ONCE
Refills: 0 | Status: DISCONTINUED | OUTPATIENT
Start: 2020-06-03 | End: 2020-06-08

## 2020-06-03 RX ORDER — LORATADINE 10 MG/1
10 TABLET ORAL DAILY
Refills: 0 | Status: DISCONTINUED | OUTPATIENT
Start: 2020-06-03 | End: 2020-06-08

## 2020-06-03 RX ORDER — INSULIN LISPRO 100/ML
VIAL (ML) SUBCUTANEOUS
Refills: 0 | Status: DISCONTINUED | OUTPATIENT
Start: 2020-06-03 | End: 2020-06-08

## 2020-06-03 RX ORDER — LEVOTHYROXINE SODIUM 125 MCG
50 TABLET ORAL DAILY
Refills: 0 | Status: DISCONTINUED | OUTPATIENT
Start: 2020-06-03 | End: 2020-06-08

## 2020-06-03 RX ORDER — DEXTROSE 50 % IN WATER 50 %
12.5 SYRINGE (ML) INTRAVENOUS ONCE
Refills: 0 | Status: DISCONTINUED | OUTPATIENT
Start: 2020-06-03 | End: 2020-06-08

## 2020-06-03 RX ORDER — GLUCAGON INJECTION, SOLUTION 0.5 MG/.1ML
1 INJECTION, SOLUTION SUBCUTANEOUS ONCE
Refills: 0 | Status: DISCONTINUED | OUTPATIENT
Start: 2020-06-03 | End: 2020-06-08

## 2020-06-03 RX ORDER — SODIUM CHLORIDE 9 MG/ML
1000 INJECTION, SOLUTION INTRAVENOUS
Refills: 0 | Status: DISCONTINUED | OUTPATIENT
Start: 2020-06-03 | End: 2020-06-08

## 2020-06-03 RX ORDER — IPRATROPIUM/ALBUTEROL SULFATE 18-103MCG
3 AEROSOL WITH ADAPTER (GRAM) INHALATION ONCE
Refills: 0 | Status: COMPLETED | OUTPATIENT
Start: 2020-06-03 | End: 2020-06-03

## 2020-06-03 RX ORDER — CYCLOBENZAPRINE HYDROCHLORIDE 10 MG/1
10 TABLET, FILM COATED ORAL THREE TIMES A DAY
Refills: 0 | Status: DISCONTINUED | OUTPATIENT
Start: 2020-06-03 | End: 2020-06-08

## 2020-06-03 RX ORDER — OXYCODONE HYDROCHLORIDE 5 MG/1
10 TABLET ORAL
Refills: 0 | Status: DISCONTINUED | OUTPATIENT
Start: 2020-06-03 | End: 2020-06-05

## 2020-06-03 RX ORDER — DEXTROSE 50 % IN WATER 50 %
15 SYRINGE (ML) INTRAVENOUS ONCE
Refills: 0 | Status: DISCONTINUED | OUTPATIENT
Start: 2020-06-03 | End: 2020-06-08

## 2020-06-03 RX ORDER — ACETAMINOPHEN 500 MG
975 TABLET ORAL ONCE
Refills: 0 | Status: COMPLETED | OUTPATIENT
Start: 2020-06-03 | End: 2020-06-03

## 2020-06-03 RX ORDER — DULOXETINE HYDROCHLORIDE 30 MG/1
30 CAPSULE, DELAYED RELEASE ORAL DAILY
Refills: 0 | Status: DISCONTINUED | OUTPATIENT
Start: 2020-06-03 | End: 2020-06-08

## 2020-06-03 RX ORDER — LOSARTAN POTASSIUM 100 MG/1
50 TABLET, FILM COATED ORAL DAILY
Refills: 0 | Status: DISCONTINUED | OUTPATIENT
Start: 2020-06-03 | End: 2020-06-08

## 2020-06-03 RX ADMIN — Medication 3 MILLILITER(S): at 10:41

## 2020-06-03 RX ADMIN — Medication 975 MILLIGRAM(S): at 10:41

## 2020-06-03 NOTE — ED ADULT TRIAGE NOTE - CHIEF COMPLAINT QUOTE
Pt recently seen for falls in ED and went back to facility yesterday and this morning "I went to sit in my shower chair and I fell on my butt", denies pain, offers no complaints, denies hitting head/LOC, as per EMS "[employer] made us take him here because he's on coumadin, he has no complaints"

## 2020-06-03 NOTE — ED ADULT NURSE REASSESSMENT NOTE - INTERVENTIONS DEFINITIONS
Monitor for mental status changes and reorient to person, place, and time/Monitor gait and stability/Call bell, personal items and telephone within reach/Instruct patient to call for assistance/[place] to call system

## 2020-06-03 NOTE — ED CDU PROVIDER INITIAL DAY NOTE - MUSCULOSKELETAL [+], MLM
He needs to  Get in to  See Dr. Ej Lee his GI physician .  If he is having any lightheadedness or fatigue sob or chest pain he needs to  Go to the ED this could be from blood in his stool BACK PAIN/thoracic from previous fall

## 2020-06-03 NOTE — ED ADULT NURSE NOTE - INTERVENTIONS DEFINITIONS
Instruct patient to call for assistance/Reinforce activity limits and safety measures with patient and family

## 2020-06-03 NOTE — ED PROVIDER NOTE - CPE EDP EYES NORM
"Requested Prescriptions   Pending Prescriptions Disp Refills     fenofibrate 160 MG tablet [Pharmacy Med Name: FENOFIBRATE 160MG TABLETS] 90 tablet 0     Sig: TAKE 1 TABLET BY MOUTH EVERY DAY    Fibrates Passed    5/11/2018  1:00 PM       Passed - Lipid panel on file in past 12 months    Recent Labs   Lab Test  06/28/17   1244   CHOL  159   TRIG  301*   HDL  36*   LDL  63   NHDL  123              Passed - No abnormal creatine kinase in past 12 months    No lab results found.            Passed - Recent (12 mo) or future (30 days) visit within the authorizing provider's specialty    Patient had office visit in the last 12 months or has a visit in the next 30 days with authorizing provider or within the authorizing provider's specialty.  See \"Patient Info\" tab in inbasket, or \"Choose Columns\" in Meds & Orders section of the refill encounter.           Passed - Patient is age 18 or older       Passed - No active pregnancy on record       Passed - No positive pregnancy test in past 12 months        Last OV 10/23/17  Prescription approved per [facility] Refill Protocol.    Maylin HOWARD RN    " normal...

## 2020-06-03 NOTE — PHYSICAL THERAPY INITIAL EVALUATION ADULT - DIAGNOSIS, PT EVAL
Impaired functional mobility secondary to weakness, decreased balance, impaired sensation, decreased aerobic capacity

## 2020-06-03 NOTE — ED CDU PROVIDER INITIAL DAY NOTE - OBJECTIVE STATEMENT
Pt is a 68y/o M with PMH of CHF, HTN, DM (neuropathy) COPD  prostate CA  has a pacemaker on coumadin presents to the ED s/p fall this morning in shower around 8am. Pt states he is at an assisted living facility and this morning he accidently urinated on himself and was embarrassed so he when to take a shower by himself even though he was supposed to have assistance. He states he was trying to clean between his legs so he spread his legs and slipped and fell on his buttock. He was referred by assisted living for evaluation. He does state he has wheezing and does not have his inhaler with him. He states he has been falling a lot lately and was worked up and that's why he is at assisted living now because he has a hard time caring for himself. Pt reports some pain and soreness from previous falls. Pt has chronic c/o LE pain and lymphedema and LE are wrapped. No LOC or head trauma. denies fever. denies HA or neck pain. no chest pain. no abd pain. no n/v/d. no motor or sensory deficits. denies illicit drug use. no recent travel. Pt is a 68y/o M with PMH of CHF, HTN, DM (neuropathy) COPD  prostate CA  has a pacemaker on coumadin presents to the ED s/p fall this morning in shower around 8am. Pt states he is at an assisted living facility and this morning he accidently urinated on himself and was embarrassed so he when to take a shower by himself even though he was supposed to have assistance. He states he was trying to clean between his legs so he spread his legs and slipped and fell on his buttock. He was referred by assisted living for evaluation. He does state he has wheezing and does not have his inhaler with him. He states he has been falling a lot lately and was worked up and that's why he is at assisted living now because he has a hard time caring for himself. Pt reports some pain and soreness from previous falls. Pt has chronic c/o LE pain and lymphedema and LE are wrapped. No LOC or head trauma. denies fever. denies HA or neck pain. no chest pain. no abd pain. no n/v/d. no motor or sensory deficits. denies illicit drug use. no recent travel.    Pts INR 5, repeat INR ordered for tomorrow. Todays dose held.

## 2020-06-03 NOTE — ED PROVIDER NOTE - PHYSICAL EXAMINATION
No midline tenderness of the cervical spine.  TTp over thoracic spine (from previous fall pt states)  No TTP or bruising over lumbar spine   bandages placed on b/l LE No midline tenderness of the cervical spine.  TTp over thoracic spine (from previous fall pt states)  No TTP or bruising over lumbar spine   bandages placed on b/l LE with swelling

## 2020-06-03 NOTE — ED CDU PROVIDER INITIAL DAY NOTE - ATTENDING CONTRIBUTION TO CARE
I, Mohsen Muñiz, performed the initial face to face bedside interview with this patient regarding history of present illness, review of symptoms and relevant past medical, social and family history.  I completed an independent physical examination.  I was the initial provider who evaluated this patient. I have signed out the follow up of any pending tests (i.e. labs, radiological studies) to the ACP.  I have communicated the patient’s plan of care and disposition with the ACP.

## 2020-06-03 NOTE — CHART NOTE - NSCHARTNOTEFT_GEN_A_CORE
DEBRA Note: Pt presents from [facility]. DEBRA placed call to Wellness at [facility] to discuss pt, spoke to Nallely. Per Nallely pt walks 250 ft at baseline, however has fallen 5 times this week. Nallely reports pt is also getting wound care for wounds on his feet. Pt walked 20 ft with PT. Nallely reports pt will need to be more ambulatory and will need SCOTTY prior to return. Pt has HIP VIP, SW will circulate MARY and follow for auth

## 2020-06-03 NOTE — ED ADULT NURSE NOTE - OBJECTIVE STATEMENT
Pt c/o slip and fall while getting into shower, self assisted to floor, denies LOC, denies head injury, no acute s/s of respiratory distress noted or reported at this time, will continue to monitor

## 2020-06-03 NOTE — ED PROVIDER NOTE - PATIENT PORTAL LINK FT
You can access the FollowMyHealth Patient Portal offered by [facility] by registering at the following website: [email or web address]. By joining [employer]’s FollowMyHealth portal, you will also be able to view your health information using other applications (apps) compatible with our system.

## 2020-06-03 NOTE — PHYSICAL THERAPY INITIAL EVALUATION ADULT - GENERAL OBSERVATIONS, REHAB EVAL
Patient received lying in stretcher in ED, NAD, breathing RA. Pt agreeable to Physical Therapy evaluation.

## 2020-06-03 NOTE — PHYSICAL THERAPY INITIAL EVALUATION ADULT - ADDITIONAL COMMENTS
Patient resides at the [facility]. He has no stairs to navigate, uses a RW for ambulation and states that he only gets assistance for bathing.

## 2020-06-03 NOTE — ED ADULT NURSE REASSESSMENT NOTE - NS ED NURSE REASSESS COMMENT FT1
Report from Francisca SOLER. Assumed care of patient at this time. Pt recently seen for falls in ED and went back to facility yesterday and this morning "I went to sit in my shower chair and I fell on my butt", denies pain, offers no complaints, denies hitting head/LOC, as per EMS "[employer] made us take him here because he's on coumadin, he has no complaints". Awaiting urine sample. Will continue to monitor.

## 2020-06-03 NOTE — ED ADULT NURSE REASSESSMENT NOTE - NS ED NURSE REASSESS COMMENT FT1
Pt received from ED RN Julianna. A&OX4. Perrla intact. No acute distress noted. Pt transferred over from stretcher to hospital bed . Pt denies chest pain,sob,dizziness and headache. Lung sounds clear throughout at this time. POC reviewed with patient. Pt noted to be weak on T&P. No pressure ulcers noted at this time. LE wrapped as per patient due to lymphedema. B/L devin and blotchy dark brown legs. As per PA ; coumadin on hold. Clarification made on INR level and reversal. No further orders at this time. Will continue to monitor. Bed alarm on. Within nursing station view. Call bell use education provided. Call bell within reach. Pt received from ED RN Julianna. A&OX4. Perrla intact. No acute distress noted. Pt transferred over from stretcher to hospital bed . Pt denies chest pain,sob,dizziness and headache. Lung sounds clear throughout at this time. POC reviewed with patient. Pt noted to be weak on T&P. No pressure ulcers noted at this time. LE wrapped as per patient due to lymphedema. B/L devin and blotchy dark brown legs with anterior wounds. As per PA ; coumadin on hold. Clarification made on INR level and reversal. No further orders at this time. Will continue to monitor. Bed alarm on. Within nursing station view. Call bell use education provided. Call bell within reach.

## 2020-06-03 NOTE — ED PROVIDER NOTE - CLINICAL SUMMARY MEDICAL DECISION MAKING FREE TEXT BOX
Pt is a 69y M with CHF/HTN/DM/COPD/pacemaker on coumadin presenting from assisted living s/p slip and fall on buttock in shower. Pt denies any new pain but states he has chronic lower leg pain and back pain from previous fall. Will check xray give tylenol and give neb due to mild wheezing on exam.

## 2020-06-03 NOTE — ED CDU PROVIDER INITIAL DAY NOTE - PHYSICAL EXAMINATION
No midline tenderness of the cervical spine.  TTp over thoracic spine (from previous fall pt states)  No TTP or bruising over lumbar spine   bandages placed on b/l LE with swelling

## 2020-06-03 NOTE — ED ADULT NURSE NOTE - CHIEF COMPLAINT QUOTE
Pt recently seen for falls in ED and went back to facility yesterday and this morning "I went to sit in my shower chair and I fell on my butt", denies pain, offers no complaints, denies hitting head/LOC, as per EMS "[facility] made us take him here because he's on coumadin, he has no complaints"

## 2020-06-03 NOTE — ED CDU PROVIDER INITIAL DAY NOTE - MEDICAL DECISION MAKING DETAILS
69 year old male presents from assisted living with multiple falls. Pt placed in obs for CM/ SW for SCOTTY placement.

## 2020-06-03 NOTE — ED PROVIDER NOTE - PROGRESS NOTE DETAILS
Spoke with CM at [facility] and she was concerned about pt falling. She states he is refusing PT there. I spoke with patient regarding why he is refusing and pt states he is agreeable to PT while there. He was told of benefits of strengthening his legs to prevent falls in the future. Maru also requesting covid test because she thinks pt had an exposure. Will send off test. Pt wheezing has improved. Tried to call back at [facility] to let them know results but no answer. Will dc. Pt wheezing has improved. called aime Mahoney and Will obtain Pt eval to see if pt can get subacute rehab Pt reassessed and complaining of increased weakness and will add labs/urine/CT and reassess. PT saw pt and agrees to subacute rehab. CM contacted. Pt wheezing has improved. called [facility] and  Pt mecca ordered see if pt can get subacute rehab. Spoke with Ondina KAPLAN and even though PT recommends home PT she will work on getting pt SCOTTY placement. Pt reassessed and complaining of increased weakness and will add labs/urine/CT and reassess. PT saw pt and agrees to subacute rehab. Pt noted to have a fib as on previous ekg. Pt INR elevated so will hold blood thinner. Pt lumbar CT reviewed and pt needs spine f/u. Will keep in obs for SCOTTY placement. pt xray negative and pt was ready for dc but Spoke with CM at [facility] and she was concerned about pt falling and the care they can provide. She states he is refusing PT there. I spoke with patient regarding why he is refusing and pt states he is agreeable to PT while there. He was told of benefits of strengthening his legs to prevent falls in the future. Maru also requesting covid test for facility. Will send off test. Pt wheezing has improved. called [facility] and  PT mecca ordered to see if pt can get subacute rehab. Spoke with Ondina KAPLAN and even though PT recommends home PT she will work on getting pt SCOTTY placement.

## 2020-06-03 NOTE — ED PROVIDER NOTE - OBJECTIVE STATEMENT
Pt is a 70y/o M with PMH of CHF, HTN, DM COPD has a pacemaker on coumadin presents to the ED s/p fall this morning in shower around 8am. Pt states he is at an assisted living facility and this morning he accidently urinated on himself and was embarrassed so he when to take a shower by himself even though he was supposed to have assistance. He states he was trying to clean between his legs so he spread his legs and slipped and fell on his buttock. He was referred by assisted living for evaluation. He does state he has wheezing and does not have his inhaler with him. He states he has been falling a lot lately and was worked up and that's why he is at assisted living now. Pt reports some pain and soreness from previous falls. Pt has chronic c/o LE pain and LE are wrapped. No LOC or head trauma. denies fever. denies HA or neck pain. no chest pain. no abd pain. no n/v/d. no motor or sensory deficits. denies illicit drug use. no recent travel. no rash. Pt is a 70y/o M with PMH of CHF, HTN, DM COPD has a pacemaker on coumadin presents to the ED s/p fall this morning in shower around 8am. Pt states he is at an assisted living facility and this morning he accidently urinated on himself and was embarrassed so he when to take a shower by himself even though he was supposed to have assistance. He states he was trying to clean between his legs so he spread his legs and slipped and fell on his buttock. He was referred by assisted living for evaluation. He does state he has wheezing and does not have his inhaler with him. He states he has been falling a lot lately and was worked up and that's why he is at assisted living now because he has a hard time carling for himself. Pt reports some pain and soreness from previous falls. Pt has chronic c/o LE pain and LE are wrapped. No LOC or head trauma. denies fever. denies HA or neck pain. no chest pain. no abd pain. no n/v/d. no motor or sensory deficits/ paresthesias. denies illicit drug use. no recent travel. Pt is a 68y/o M with PMH of CHF, HTN, DM (neuropathy) COPD has a pacemaker on coumadin presents to the ED s/p fall this morning in shower around 8am. Pt states he is at an assisted living facility and this morning he accidently urinated on himself and was embarrassed so he when to take a shower by himself even though he was supposed to have assistance. He states he was trying to clean between his legs so he spread his legs and slipped and fell on his buttock. He was referred by assisted living for evaluation. He does state he has wheezing and does not have his inhaler with him. He states he has been falling a lot lately and was worked up and that's why he is at assisted living now because he has a hard time carling for himself. Pt reports some pain and soreness from previous falls. Pt has chronic c/o LE pain and LE are wrapped. No LOC or head trauma. denies fever. denies HA or neck pain. no chest pain. no abd pain. no n/v/d. no motor or sensory deficits. denies illicit drug use. no recent travel. Pt is a 68y/o M with PMH of CHF, HTN, DM (neuropathy) COPD  prostate CA  has a pacemaker on coumadin presents to the ED s/p fall this morning in shower around 8am. Pt states he is at an assisted living facility and this morning he accidently urinated on himself and was embarrassed so he when to take a shower by himself even though he was supposed to have assistance. He states he was trying to clean between his legs so he spread his legs and slipped and fell on his buttock. He was referred by assisted living for evaluation. He does state he has wheezing and does not have his inhaler with him. He states he has been falling a lot lately and was worked up and that's why he is at assisted living now because he has a hard time caring for himself. Pt reports some pain and soreness from previous falls. Pt has chronic c/o LE pain and lymphedema and LE are wrapped. No LOC or head trauma. denies fever. denies HA or neck pain. no chest pain. no abd pain. no n/v/d. no motor or sensory deficits. denies illicit drug use. no recent travel.

## 2020-06-03 NOTE — PROVIDER CONTACT NOTE (OTHER) - ACTION/TREATMENT ORDERED:
PT goals, to be obtained in 1 week:  bed mobility: independent  transfers: independent with RW  gait: 50 feet with RW and supervision assist

## 2020-06-04 LAB
APPEARANCE UR: CLEAR — SIGNIFICANT CHANGE UP
APTT BLD: 61.8 SEC — HIGH (ref 27.5–36.3)
BILIRUB UR-MCNC: NEGATIVE — SIGNIFICANT CHANGE UP
COLOR SPEC: YELLOW — SIGNIFICANT CHANGE UP
DIFF PNL FLD: ABNORMAL
EPI CELLS # UR: SIGNIFICANT CHANGE UP
GLUCOSE BLDC GLUCOMTR-MCNC: 176 MG/DL — HIGH (ref 70–99)
GLUCOSE BLDC GLUCOMTR-MCNC: 221 MG/DL — HIGH (ref 70–99)
GLUCOSE BLDC GLUCOMTR-MCNC: 234 MG/DL — HIGH (ref 70–99)
GLUCOSE UR QL: NEGATIVE MG/DL — SIGNIFICANT CHANGE UP
INR BLD: 4.44 RATIO — HIGH (ref 0.88–1.16)
KETONES UR-MCNC: ABNORMAL
LEUKOCYTE ESTERASE UR-ACNC: ABNORMAL
NITRITE UR-MCNC: NEGATIVE — SIGNIFICANT CHANGE UP
PH UR: 5 — SIGNIFICANT CHANGE UP (ref 5–8)
PROT UR-MCNC: 15 MG/DL
PROTHROM AB SERPL-ACNC: 52.6 SEC — HIGH (ref 10–12.9)
RBC CASTS # UR COMP ASSIST: SIGNIFICANT CHANGE UP /HPF (ref 0–4)
SARS-COV-2 RNA SPEC QL NAA+PROBE: SIGNIFICANT CHANGE UP
SP GR SPEC: 1.02 — SIGNIFICANT CHANGE UP (ref 1.01–1.02)
URATE CRY FLD QL MICRO: ABNORMAL
UROBILINOGEN FLD QL: NEGATIVE MG/DL — SIGNIFICANT CHANGE UP
WBC UR QL: SIGNIFICANT CHANGE UP

## 2020-06-04 PROCEDURE — 99226: CPT

## 2020-06-04 RX ORDER — BACITRACIN ZINC 500 UNIT/G
1 OINTMENT IN PACKET (EA) TOPICAL DAILY
Refills: 0 | Status: DISCONTINUED | OUTPATIENT
Start: 2020-06-04 | End: 2020-06-08

## 2020-06-04 RX ADMIN — DULOXETINE HYDROCHLORIDE 30 MILLIGRAM(S): 30 CAPSULE, DELAYED RELEASE ORAL at 12:24

## 2020-06-04 RX ADMIN — Medication 50 MICROGRAM(S): at 05:21

## 2020-06-04 RX ADMIN — LOSARTAN POTASSIUM 50 MILLIGRAM(S): 100 TABLET, FILM COATED ORAL at 05:22

## 2020-06-04 RX ADMIN — Medication 1 APPLICATION(S): at 12:24

## 2020-06-04 RX ADMIN — LORATADINE 10 MILLIGRAM(S): 10 TABLET ORAL at 12:24

## 2020-06-04 RX ADMIN — OXYCODONE HYDROCHLORIDE 10 MILLIGRAM(S): 5 TABLET ORAL at 19:34

## 2020-06-04 RX ADMIN — Medication 4: at 11:56

## 2020-06-04 RX ADMIN — CARVEDILOL PHOSPHATE 6.25 MILLIGRAM(S): 80 CAPSULE, EXTENDED RELEASE ORAL at 05:22

## 2020-06-04 RX ADMIN — Medication 4: at 16:41

## 2020-06-04 RX ADMIN — OXYCODONE HYDROCHLORIDE 10 MILLIGRAM(S): 5 TABLET ORAL at 01:58

## 2020-06-04 RX ADMIN — Medication 2: at 07:42

## 2020-06-04 RX ADMIN — CARVEDILOL PHOSPHATE 6.25 MILLIGRAM(S): 80 CAPSULE, EXTENDED RELEASE ORAL at 18:25

## 2020-06-04 NOTE — ED ADULT NURSE REASSESSMENT NOTE - NS ED NURSE REASSESS COMMENT FT1
Care endorsed to Andrea SOLER. Patient in NAD. Resting in comfort. VSS. RN with no further questions.

## 2020-06-04 NOTE — ED ADULT NURSE REASSESSMENT NOTE - NS ED NURSE REASSESS COMMENT FT1
Dressings to b/l le applied. Bacitracin ointment applied to all ulcer areas, [id number] with gauze and both extremities wrapped with ace wrap. Peripheral pulses strong and +. Toes mobile. Patient with no further questions for the RN.

## 2020-06-04 NOTE — ED ADULT NURSE REASSESSMENT NOTE - PAIN: RESPONSE TO INTERVENTIONS
relief
Pt reports feeling very comfortable at this time. POC reviewed and educated patient on pain medication as well and muscle spasm medication available if needed.

## 2020-06-04 NOTE — ED ADULT NURSE REASSESSMENT NOTE - NS ED NURSE REASSESS COMMENT FT1
Assumed care of the patient at 0730. Patient A&Ox4. No s/s of distress or pain. Patient pending Pt eval and SW for placement. Noted with b/l le discoloration and oozing ulcers. Left leg wrapped in bandage, per patient changed by wound care center yesterday, changed on weekly basis. Peripheral pulses+, extremities warm to touch, b/l edema to le. Patient seen by Pt, pending SW for placement. Patient incontinent of urine. Frequent T&P. Hortencia care provided. Small ecchymotic area noted to sacral area, patient states due to recent fall. Protective barrier cream applied to buttocks area. Patient in understanding of plan of care. Patient with no further questions for the RN. Resting in comfort. Call bell within reach and encouraged to use when assistance needed. Will continue to monitor.

## 2020-06-04 NOTE — CHART NOTE - NSCHARTNOTEFT_GEN_A_CORE
DEBRA Note: Per [facility] admissions, pt primary insurance is HIP and secondary is medicare. DEBRA advised by Herbie to place call to # [phone] to inquire who manages pt's case as he is not in their system. DEBRA placed call, spoke to Tam. Per Tam, pt is serviced by [facility], pt's coordinator is Vilma Paiz (phone: [phone], fax [phone]). DEBRA spoke to Vilma to alert that pt is in need of SCOTTY ,is in ED and not admitted, has accepting bed at [facility] pending auth. Vlima advised this worker to fax clinicals directly to her, including ED Provider note, labs, vitals, and PT notes. DEBRA faxed above, awaiting call back. DEBRA continues to follow

## 2020-06-04 NOTE — ED ADULT NURSE REASSESSMENT NOTE - NS ED NURSE REASSESS COMMENT FT1
Patient resting comfortably in bed. No acute distress noted. Pt denies pain at this time. No further complaints at this time. PO hydration provided.  Awaiting placement. Will continue to monitor. Full report to be given to AM RN.

## 2020-06-04 NOTE — CHART NOTE - NSCHARTNOTEFT_GEN_A_CORE
SOCIAL WORK NOTE:  MET WITH PATIENT TO DISCUSS HIS 3 BED OFFERS OF- [facility], [facility] AND [facility].  PATIENT PLACED CALL TO HIS DAUGHTER CHRISTOPHER ON THE PHONE.  DESPITE PATIENT LIKING [facility] WHERE [place] WAS [place], HE WAS UNABLE TO USE THEIR BATRHOOMS AND WOULD LIKE TO TRY ANOTHER PLACE AT THIS TIME. HIS DAUGHTER AND HIM AGREED TO ACCEPT THE SCOTTY BED OFFER AT [facility].  [facility] NOTIFIED AND REPORTED HE WOULD GET A PRIVATE ROOM. [facility] CANNOT FIND THE PATIENT IN THEIR SYSTEM.  [facility] PRESENTLY RUNNING THE INSURANCE TO INQUIRE WHAT PATIENT HAS.

## 2020-06-04 NOTE — PROVIDER CONTACT NOTE (OTHER) - ASSESSMENT
Chart reviewed, MD orders received. Pt received lying in stretcher breathing RA, NAD, Pt A&O x4 and agreeable to PT evaluation. Please see full evaluation note for detail. Pt c/o 0/10 pain during session. Pt left lying in stretcher, NAD, all lines/devices intact and CBWR. Spoke to MD Martinez post session. PT will follow.
Pt reports fatigue following treatment.  Pt returned and left supine in bed in no apparent distress and call bell within reach.  Will continue to follow. Nurse aware.

## 2020-06-04 NOTE — CHART NOTE - NSCHARTNOTEFT_GEN_A_CORE
SOCIAL WORK NOTE:  THIS WORKER RECEIVED PATIENT FROM EVENING HANDOFF. REVIEWED CHART. PLACED CALL TO WELLNESS AT THE [facility] AND SPOKE WITH MICHAEL RIGGS # [phone].  SHE MADE THIS WORKER AWARE THAT THEY PREFER A PLAN FOR SCOTTY AS THIS PATIENT HAS FALLEN 5 TIMES THIS WEEK. PATIENT IS HIGHLY ALERT AND ORIENTED AND HAS REFUSED TO GO TO THE HOSPITAL EACH TIME THE AMBULANCE ARRIVED. THIS PAST FALL HE AGREED.  WELLNESS MADE THIS WORKER AWARE THAT PATIENT WITH BAD NEUROPATHY AND DIABETIC WOUNDS AND OBESITY. PATIENT HAVING DIFFICULTY WITH SENSATION ON BOTTOM OF FEET AND WORSENING DIABETIC WOUNDS.  PATIENT STARTED TREATMENTS AT [facility] [place] LAST WEDNESDAY WITH DR AGUILAR AND HAD FIRST VISIT WITH [facility] IN [place] ON JUNE 1ST AND THEY WRAPPED PATIENT'S LEGS IN BILATERAL UNNA BOOTS.  THE MD, TRI, FAMILY AND ED MD FEEL PATIENT WOULD BENEFIT FROM SCOTTY.  MADE [facility] AWARE THAT DUAL PLANNING WOULD BE IN PLACE FOR THIS PATIENT DUE TO POSSBILITY THAT INSURANCE MAY DECLINE TO AUTHORIZE SCOTTY LEVEL OF CARE.   EXPRESSED UNDERSTANDING.  DEBRA WILL CIRCUALTE MARY UPON COMPLETION AND FAX TO [facility] FOR AUTH.

## 2020-06-04 NOTE — ED ADULT NURSE REASSESSMENT NOTE - NS ED NURSE REASSESS COMMENT FT1
Perianal care done. UA & CNS Sent. MASD noted to buttock and groin. T&P performed. Medicated for pain. Will continue to monitor. Call bell within reach.

## 2020-06-04 NOTE — CHART NOTE - NSCHARTNOTEFT_GEN_A_CORE
SOCIAL WORK NOTE:  MARY CIRCULATING THROUGHOUT VARIOUS LOCAL FACILITIES THAT ARE WITHIN THE INSURANCE NETWORK.  CLINICALS FAXED TO [facility] FAX # [phone] IN ORDER TO PURSUE AUTHORIZATION FOR  TRANSFER TO SUBACUTE REHAB LEVEL OF CARE.

## 2020-06-04 NOTE — ED ADULT NURSE REASSESSMENT NOTE - NS ED NURSE REASSESS COMMENT FT1
Assumed pt care @ 1930. Pt sitting upright in bed in no apparent signs of distress. PIV site WNL. Pt c/o LE pain, PRN medication administered. refer to flowsheet and chart, pt ID band in place, pt safety maintained, pt hemodynamically stable, updated on plan of care. Awaiting SCOTTY placement. Call light provided, fall safety reinforced. Will continue to monitor

## 2020-06-04 NOTE — PROVIDER CONTACT NOTE (OTHER) - BACKGROUND
Supine<>Sit with min. assist.  Sit<>Stand with min. assist. Amb with RW and c.g. assist for 75 feet.

## 2020-06-05 VITALS
SYSTOLIC BLOOD PRESSURE: 121 MMHG | OXYGEN SATURATION: 93 % | HEART RATE: 92 BPM | DIASTOLIC BLOOD PRESSURE: 63 MMHG | TEMPERATURE: 98 F | RESPIRATION RATE: 16 BRPM

## 2020-06-05 LAB
APTT BLD: 58.6 SEC — HIGH (ref 27.5–36.3)
CULTURE RESULTS: SIGNIFICANT CHANGE UP
GLUCOSE BLDC GLUCOMTR-MCNC: 224 MG/DL — HIGH (ref 70–99)
GLUCOSE BLDC GLUCOMTR-MCNC: 237 MG/DL — HIGH (ref 70–99)
GLUCOSE BLDC GLUCOMTR-MCNC: 243 MG/DL — HIGH (ref 70–99)
INR BLD: 2.6 RATIO — HIGH (ref 0.88–1.16)
PROTHROM AB SERPL-ACNC: 30.3 SEC — HIGH (ref 10–12.9)
SPECIMEN SOURCE: SIGNIFICANT CHANGE UP

## 2020-06-05 PROCEDURE — U0003: CPT

## 2020-06-05 PROCEDURE — 36415 COLL VENOUS BLD VENIPUNCTURE: CPT

## 2020-06-05 PROCEDURE — 72125 CT NECK SPINE W/O DYE: CPT

## 2020-06-05 PROCEDURE — G0378: CPT

## 2020-06-05 PROCEDURE — 99217: CPT

## 2020-06-05 PROCEDURE — 94640 AIRWAY INHALATION TREATMENT: CPT

## 2020-06-05 PROCEDURE — 85730 THROMBOPLASTIN TIME PARTIAL: CPT

## 2020-06-05 PROCEDURE — 93970 EXTREMITY STUDY: CPT

## 2020-06-05 PROCEDURE — 72110 X-RAY EXAM L-2 SPINE 4/>VWS: CPT

## 2020-06-05 PROCEDURE — 85610 PROTHROMBIN TIME: CPT

## 2020-06-05 PROCEDURE — 80053 COMPREHEN METABOLIC PANEL: CPT

## 2020-06-05 PROCEDURE — 93005 ELECTROCARDIOGRAM TRACING: CPT

## 2020-06-05 PROCEDURE — 87086 URINE CULTURE/COLONY COUNT: CPT

## 2020-06-05 PROCEDURE — 81001 URINALYSIS AUTO W/SCOPE: CPT

## 2020-06-05 PROCEDURE — 83880 ASSAY OF NATRIURETIC PEPTIDE: CPT

## 2020-06-05 PROCEDURE — 93971 EXTREMITY STUDY: CPT

## 2020-06-05 PROCEDURE — 70450 CT HEAD/BRAIN W/O DYE: CPT

## 2020-06-05 PROCEDURE — 84484 ASSAY OF TROPONIN QUANT: CPT

## 2020-06-05 PROCEDURE — 71045 X-RAY EXAM CHEST 1 VIEW: CPT

## 2020-06-05 PROCEDURE — 93971 EXTREMITY STUDY: CPT | Mod: 26,RT

## 2020-06-05 PROCEDURE — 99284 EMERGENCY DEPT VISIT MOD MDM: CPT | Mod: 25

## 2020-06-05 PROCEDURE — 85027 COMPLETE CBC AUTOMATED: CPT

## 2020-06-05 PROCEDURE — 82962 GLUCOSE BLOOD TEST: CPT

## 2020-06-05 PROCEDURE — 72131 CT LUMBAR SPINE W/O DYE: CPT

## 2020-06-05 PROCEDURE — 76377 3D RENDER W/INTRP POSTPROCES: CPT

## 2020-06-05 PROCEDURE — 83735 ASSAY OF MAGNESIUM: CPT

## 2020-06-05 PROCEDURE — 72192 CT PELVIS W/O DYE: CPT

## 2020-06-05 PROCEDURE — 84100 ASSAY OF PHOSPHORUS: CPT

## 2020-06-05 RX ADMIN — CARVEDILOL PHOSPHATE 6.25 MILLIGRAM(S): 80 CAPSULE, EXTENDED RELEASE ORAL at 17:23

## 2020-06-05 RX ADMIN — Medication 1 APPLICATION(S): at 11:44

## 2020-06-05 RX ADMIN — OXYCODONE HYDROCHLORIDE 10 MILLIGRAM(S): 5 TABLET ORAL at 08:02

## 2020-06-05 RX ADMIN — Medication 50 MICROGRAM(S): at 06:14

## 2020-06-05 RX ADMIN — LOSARTAN POTASSIUM 50 MILLIGRAM(S): 100 TABLET, FILM COATED ORAL at 06:14

## 2020-06-05 RX ADMIN — Medication 4: at 17:22

## 2020-06-05 RX ADMIN — LORATADINE 10 MILLIGRAM(S): 10 TABLET ORAL at 11:44

## 2020-06-05 RX ADMIN — Medication 4: at 11:43

## 2020-06-05 RX ADMIN — CARVEDILOL PHOSPHATE 6.25 MILLIGRAM(S): 80 CAPSULE, EXTENDED RELEASE ORAL at 06:14

## 2020-06-05 RX ADMIN — Medication 4: at 07:59

## 2020-06-05 RX ADMIN — DULOXETINE HYDROCHLORIDE 30 MILLIGRAM(S): 30 CAPSULE, DELAYED RELEASE ORAL at 11:44

## 2020-06-05 NOTE — ED CDU PROVIDER DISPOSITION NOTE - ATTENDING CONTRIBUTION TO CARE
Elissa HAIR- 68 y/o M with morbid obesity, htn, chf,cad, sent form [place] for recurrent falls, undergoing wound care placed in cdu for PT .PT recommended subacute rehab. Pt has no complaints    pt is alert, obese tired appearing male, no acute distress, s1s2 normal reg, b/l clear breath sounds, abd soft, nt, neuro exam aox3, cn 2-12 intact, no focal deficits, full rom at all ext

## 2020-06-05 NOTE — CHART NOTE - NSCHARTNOTEFT_GEN_A_CORE
SOCIAL WORK NOTE:  AS PER MELINDA AT [facility] # [phone], WE SHOULD RECEIVE AUTH ON THIS MEMBER BY 11:00 AM TODAY.  THEY CONFIRMED BEING IN RECEIPT OF CLINICALS AND CONFIRMED THE FACILTIY TO BE MOMENTUM.

## 2020-06-05 NOTE — ED ADULT NURSE REASSESSMENT NOTE - SKIN INTEGRITY
abrasion noted to b/l elbows and knees. Lower leg wrapped as per patient for cystic wounds and swelling.
wound(s)
bruising/b/l le ulcers

## 2020-06-05 NOTE — ED ADULT NURSE REASSESSMENT NOTE - GENERAL PATIENT STATE
comfortable appearance/cooperative
cooperative/comfortable appearance
cooperative/comfortable appearance

## 2020-06-05 NOTE — ED ADULT NURSE REASSESSMENT NOTE - COMFORT CARE
continent care provided B/M/assisted to bedside commode
meal provided/wait time explained/po fluids offered/side rails up/plan of care explained/repositioned/darkened lights
T&P and patient placed in hospital bed from stretcher. Safety maintained./plan of care explained/po fluids offered
assisted to bathroom

## 2020-06-05 NOTE — ED ADULT NURSE REASSESSMENT NOTE - NS ED NURSE REASSESS COMMENT FT1
Pt sitting upright in bed in no apparent signs of distress. PIV site WNL. Pt with no complaints of pain or discomfort at this time. Refer to flowsheet and chart, pt ID band in place, pt safety maintained, pt hemodynamically stable, updated on plan of care. Awaiting SCOTTY placement. Call light provided, fall safety reinforced. Will continue to monitor

## 2020-06-05 NOTE — ED CDU PROVIDER DISPOSITION NOTE - CLINICAL COURSE
Pt is a 68y/o M with PMH of CHF, morbid obesity, HTN, DM (neuropathy) COPD  prostate CA  has a pacemaker on coumadin presents to the ED s/p fall this morning in shower around 8am. Pt states he is at an assisted living facility and this morning he accidently urinated on himself and was embarrassed so he when to take a shower by himself even though he was supposed to have assistance. He states he was trying to clean between his legs so he spread his legs and slipped and fell on his buttock. He was referred by assisted living for evaluation. He does state he has wheezing and does not have his inhaler with him. He states he has been falling a lot lately and was worked up and that's why he is at assisted living now because he has a hard time caring for himself. Pt reports some pain and soreness from previous falls. Pt has chronic c/o LE pain and lymphedema and LE are wrapped. No LOC or head trauma. denies fever. denies HA or neck pain. no chest pain. no abd pain. no n/v/d. no motor or sensory deficits. denies illicit drug use. no recent travel.  Pts INR elevated x 2 days, coumadin held, now therapeutic.  US studies of upper and lower extremities negative for DVT.  He has swelling of RUE, keep elevated above level of the heart.

## 2020-06-05 NOTE — ED CDU PROVIDER DISPOSITION NOTE - NSFOLLOWUPINSTRUCTIONS_ED_ALL_ED_FT
1) CONTINUE ALL PREVIOUS MEDICATIONS INCLUDING COUMADIN  2) SWELLING OF RIGHT UPPER EXTREMITY, US WAS NEGATIVE FOR DVT, KEEP ARM ELEVATED ABOVE THE LEVEL OF THE HEART  3) BILATERAL LE DRESSINGS, CLEAN, PAT DRY, APPLY THIN LAYER OF BACITRACIN AND WRAP WITH ACE BANDAGES.

## 2020-06-05 NOTE — ED CDU PROVIDER SUBSEQUENT DAY NOTE - ATTENDING CONTRIBUTION TO CARE
Elissa HAIR- 68 y/o M with morbid obesity,, htn, chfm cad, sent form [place] for recurrent falls, undergoing wound care placed in cdu for PT .PT recommended subacute rehab. Pt has no complaints    pt is alert, obese tired appearing male, no acute distress, s1s2 normal reg, b/l clear breath sounds, abd soft, nt, neuro exam aox3, cn 2-12 intact, no focal deficits, full rom at all ext    pt agree to go to subacute rehab
Patient seen with PA.  will contnue to monitor.  VSS.  Non toxic.  Well appearing. Uneventful ED observation period.

## 2020-06-05 NOTE — ED ADULT NURSE REASSESSMENT NOTE - NS ED NURSE REASSESS COMMENT FT1
Assumed care of the patient @)730. Pt A&Ox4. Patient in understanding of plan of care. Right arm noted to be more swollen than left arm. Pt c/o pain of b/l lower extremities and right arm. KIRA Marcelino called and notified. Patient with no further questions for the RN. Resting in comfort. Call bell within reach and encouraged to use when assistance needed. Will continue to monitor.

## 2020-06-05 NOTE — ED CDU PROVIDER SUBSEQUENT DAY NOTE - PROGRESS NOTE DETAILS
Pt seen resting comfortable in no acute distress in bed, no acute complaints will follow CDU initial intake orders observe for change in pt condition, results and or consults.
Charting and results reviewed.  Patient with swelling to RUE, as per RN worsening since yesterday.  Arm swollen, compartments soft, 2+ distal pulses, US UE negative for DVT.  Elevate, warm compresses, await SCOTTY placement.

## 2020-06-05 NOTE — ED PEDIATRIC NURSE REASSESSMENT NOTE - NS ED NURSE REASSESS COMMENT FT2
PA came to assess pt, right arm elevated and heat pack placed. Awaiting sonogram of arm. Will continue to monitor.

## 2020-06-05 NOTE — CHART NOTE - NSCHARTNOTEFT_GEN_A_CORE
SOCIAL WORK NOTE:   THIS WORKER RECEIVED AUTHORIZATION FOR TRANSFER  SCOTTY LEVEL OF CARE FORM MELINDAJEREMY BRITTON AT [facility] # [phone].  PATIENT HAS BEEN AUTHORIZED FOR 7 DAYS AT A LEVEL 2 WITH AUTH # [id number].  MELINDA ALSO AUTHORIZED AN AMBULANCE TO TRANSPORT THE PATIENT WITH AUTH # [id number].  AMBULANCE ARRANGED FOR 2-2:30 PM PICKUP. PATIENT WAS PLEASED WITH TRANSFER TODAY. PATIENT WITH HIS OWN CELL PHONE AND WILL CALL HIS DAUGHTER TO LET HER KNOW. ED STAFF AWARE OF TIME OF TRANSFER.

## 2020-06-05 NOTE — ED CDU PROVIDER DISPOSITION NOTE - PATIENT PORTAL LINK FT
You can access the FollowMyHealth Patient Portal offered by [facility] by registering at the following website: [email or web address]. By joining [facility]’s FollowMyHealth portal, you will also be able to view your health information using other applications (apps) compatible with our system.

## 2020-07-19 NOTE — ED ADULT NURSE NOTE - AS SC BRADEN [employer]
[facility] Hospital Medicine  Progress Note    Patient Name: Violeta Boudreaux  MRN: [id number]  Patient Class: IP- Inpatient   Admission Date: 7/11/2020  Length of Stay: 2 days  Attending Physician: Kahlil Banegas MD  Primary Care Provider: Otf Brownlee MD    [facility] Team: [facility] [place] [facility] [place] Kahlil Banegas MD    HPI:    Ms. Violeta Boudreaux is a 53 y.o. female with Bipolar Disorder, delusional disorder, GIST on Sunitinib, and recent LLE DVT s/p IVC filter (June 2020) who presents to the ER by [facility] for delusions.  Her estranged  reports that she showed up at his house, claiming that she was  to Geovany Ring from [employer] and he was being hid inside the estranged 's house. He reports that she is homeless, and hasn't been taking her psych medications.  She denies any complaints at this time.  Labs on arrival showed a Hemoglobin 6.2 down from 9.7 in June 2020.  She endorses midepigastric abdominal pain.  She denies any blood in her stools, dark stools, or vaginal bleeding.  She mentions that a few weeks ago, she was having bad headaches and took a lot of Advil, then causing her to have hematemesis.  She denies further bleeding or use of Aspirin or NSAIDS.  Of note, at the end of May, she was hospitalized at [facility] for delusional disorder.  At that time, she was found to have a Hemoglobin 6.4.  She was given blood and her hemolgobin improved to 9.7.  It was thought that her bleeding with 2/2 her GIST tumor. She was not evaluated by GI.  Also during that admission, she was found to have a LLE DVT.  HemeOnc suggested IVC filter, given her lack of consistency with medications.  She was discharged to the [facility].  She was discharged on Abilify 10mg and Mirtazapine 15mg qHS.       In the ER, she was PECed for grave disability.  SUMIT was positive.  She was transfused 1 unit PRBCs.  She mentions that [employer] will pay for every drop of blood we take from her, and that she wants to  return to her house in [place] when discharged.  She was admitted to Hospital Medicine for GI and Psych evaluations.    Subjective:     Principal Problem:Psychological factors affecting medical condition    Interval History: Patient lying in bed, in moderate distress due to agitation when informed that she cannot have her phone since she is under [facility]. Nursing staff reported that she was hostile and throwing food in response to her not receiving her phone. She also stated that she would not adhere to the plan of care until she received her phone. No other complaints.     Review of Systems   Constitutional: Negative for chills and fever.   Eyes: Negative for visual disturbance.   Respiratory: Negative for cough and shortness of breath.    Gastrointestinal: Negative for abdominal distention, abdominal pain, nausea and vomiting.   Genitourinary: Negative for dysuria.   Neurological: Negative for weakness.   Psychiatric/Behavioral: Negative for suicidal ideas.        Objective:     Vital Signs (Most Recent):  Temp: 98 °F (36.7 °C) (07/19/20 0314)  Pulse: 75 (07/19/20 0754)  Resp: 16 (07/19/20 0855)  BP: (!) 104/50 (07/19/20 0754)  SpO2: 98 % (07/19/20 0754) Vital Signs (24h Range):  Temp:  [97.4 °F (36.3 °C)-99.3 °F (37.4 °C)] 98 °F (36.7 °C)  Pulse:  [75-86] 75  Resp:  [16-18] 16  SpO2:  [95 %-98 %] 98 %  BP: (104-114)/(50-59) 104/50     Weight: 68 kg (150 lb)  Body mass index is 23.49 kg/m².    Intake/Output Summary (Last 24 hours) at 7/19/2020 0909  Last data filed at 7/19/2020 0500  Gross per 24 hour   Intake 600 ml   Output --   Net 600 ml        Physical Exam  HENT:      Head: Normocephalic.   Eyes:      Pupils: Pupils are equal, round, and reactive to light.   Neck:      Musculoskeletal: Normal range of motion.   Cardiovascular:      Rate and Rhythm: Normal rate and regular rhythm.      Pulses: Normal pulses.      Heart sounds: Normal heart sounds.   Pulmonary:      Effort: Pulmonary effort is normal.       Breath sounds: Normal breath sounds.   Abdominal:      General: Bowel sounds are normal. There is no distension.      Palpations: Abdomen is soft.      Tenderness: There is no abdominal tenderness.   Musculoskeletal: Normal range of motion.   Skin:     General: Skin is warm.   Neurological:      General: No focal deficit present.      Mental Status: She is alert.   Psychiatric:         Mood and Affect: Affect is labile.         Thought Content: Thought content is delusional.         Judgment: Judgment is inappropriate.           Overview/Hospital Course:     7/12   presenting to the ED via [facility] with [facility] stating patient was at her estranged 's house  threatening she would kill the estranged .  PEC placed for delusional behavior. Work-up significant for H/H 6.2/20 (baseline 9/30 through care everywhere). Rectal exam performed without evidence of blood or melena. FOBT positive.   alert, delusional. Refusing to give  personal belongings, and agitation with staff.  4 point restraints were applied. repeat CBC at 6.6 . transfusion with 1 unit of PRBC. GI recommends EGD and colonoscopy for further evaluation of iron deficiency anemia patient adamantly refuses exam and EGD/ colonoscopy  7/13 agitated overnight requesting cell phone.  Patient was placed 4 point  restraints hemoglobin at 8 3 status post 2 units of pack RBC transfusion . agrees for EGD/ colonoscopy.Patient off restraints.Continue home Abilify 10 mg and Remeron 15 mg nightly. Increased Zyprexa from 5 mg to 10 mg q8h IM PRN for agitation.daily EKG to monitor QTc- 447ms   7/14 Hb 8.1 --> 7.6. Requesting  her purse and  belongings she needs to get to her (Geovany Ring).  Clear liquids today and NPO from midnight EGD/colonoscopy in a.m. complains of abdominal, takes oxycodone 30 mg Q 6 hourly as per chart review (reviewed  last filled on 06/23).  Norco discontinued and started oxycodone 20 mg Q 6 hourly p.r.n.  7/15 refused to drink GoLYTELY  overnight.  Hemoglobin stable at 7.8 leukocytosis of 15 but afebrile.  Transaminitis AST//112 with normal bilirubin and mildly elevated alk-phos at 337.  Significant left upper quadrant abdominal pain prior to endoscopy today.  Endoscopy canceled.  CT abdomen with IV and oral contrast ordered.  General surgery consulted.  Started on Zosyn and vancomycin empirically.  Blood cultures x2 pending    Significant Labs:   A1C:   Recent Labs   Lab 07/11/20  1932   HGBA1C 6.3*     CBC:   Recent Labs   Lab 07/18/20  0402 07/19/20  0553   WBC 17.64* 14.53*   HGB 7.4* 6.7*   HCT 24.2* 21.8*   * 396*     CMP:   Recent Labs   Lab 07/18/20  0402 07/19/20  0553    132*   K 3.8 3.8    100   CO2 26 27   * 201*   BUN 10 10   CREATININE 0.7 0.6   CALCIUM 8.4* 8.3*   PROT 6.2 5.6*   ALBUMIN 2.0* 1.8*   BILITOT 0.6 0.4   ALKPHOS 185* 163*   AST 16 16   ALT 28 18   ANIONGAP 8 5*   EGFRNONAA >60.0 >60.0     Magnesium:   Recent Labs   Lab 07/18/20  0402 07/19/20  0553   MG 1.9 1.7     POCT Glucose:   Recent Labs   Lab 07/18/20  1606 07/18/20  2048 07/19/20  0729   POCTGLUCOSE 195* 246* 209*     TSH:   Recent Labs   Lab 07/11/20 1932   TSH 3.160       Significant Imaging: I have reviewed and interpreted all pertinent imaging results/findings within the past 24 hours.    Assessment/Plan:      Active Diagnoses:    Diagnosis Date Noted POA    PRINCIPAL PROBLEM:  Psychological factors affecting medical condition [F54]  Yes    Anemia [D64.9] 07/11/2020 Yes    Hypokalemia [E87.6] 07/11/2020 Yes    Acute deep vein thrombosis (DVT) of popliteal vein of left lower extremity [I82.432] 05/29/2020 Yes    Delusional disorder [F22] 05/28/2020 Yes    Bipolar affective disorder, current episode hypomanic [F31.0] 09/09/2019 Yes    Malignant gastrointestinal stromal tumor (GIST) of stomach [C49.A2] 09/09/2019 Yes    Type 2 diabetes mellitus without complication, without long-term current use of insulin [E11.9]  09/09/2019 Yes      Problems Resolved During this Admission:    Diagnosis Date Noted Date Resolved POA    Bipolar 1 disorder, mixed, moderate [F31.62]  07/16/2020 Yes     Scheduled Meds:   ciprofloxacin HCl  500 mg Oral Q12H    cyanocobalamin  1,000 mcg Subcutaneous Q7 Days    Followed by    [START ON 8/16/2020] cyanocobalamin  1,000 mcg Subcutaneous Q30 Days    HYDROmorphone  0.5 mg Intravenous Once    lidocaine  1 patch Transdermal Q24H    metroNIDAZOLE  500 mg Oral Q8H    mirtazapine  15 mg Oral QHS    [START ON 7/20/2020] NON FORMULARY MEDICATION 441 mg  441 mg Intramuscular Q30 Days    pantoprazole  40 mg Oral BID    potassium chloride  30 mEq Oral Once    potassium chloride  40 mEq Oral Once    potassium chloride  40 mEq Oral Once     Continuous Infusions:  PRN Meds:.sodium chloride, sodium chloride, acetaminophen, dextrose 50%, dextrose 50%, glucagon (human recombinant), glucose, glucose, haloperidol lactate, HYDROmorphone, HYDROmorphone, insulin aspart U-100, iohexol, melatonin, naloxone, OLANZapine, ondansetron, promethazine, senna-docusate 8.6-50 mg, sodium chloride 0.9%, sodium chloride 0.9%    PLAN:    Delusional disorder   Bipolar disorder  - presenting to the ED via [facility] with [facility] stating patient was at her estranged 's house  threatening she would kill the estranged .  PEC placed for delusional behavior. Work-up significant for H/H 6.2/20 (baseline 9/30 through care everywhere). Rectal exam performed without evidence of blood or melena. FOBT positive.   alert, delusional.  - restraints in place due to severe agitation   - psychiatry consulted. apprec recs  -- Plan to up titrate Abilify   -- Saturday Abilify PO 15mg po qam   -- Sunday Abilify PO 30 mg po qam   -- Monday plan to give Aristada Long acting ; Aristada(R) Initial dose, 441 mg IM (deltoid or gluteal)  -- Continue home Remeron 15 mg nightly  -- First try: Haldol 5 mg PO, Benadryl 50 mg PO, Ativan 2 mg PO PRN for  non-redirectable agitation  -- If refuses: Haldol 2 mg IV, Benadryl 50 mg IV, Ativan 2 mg IV PRN for non-redirectable agitation  -- Please obtain daily EKG to monitor QTc  -- Continue CEC (expires 7/26 @ 8:32 pm) as patient is in imminent danger of hurting self or others and is gravely disabled due to a psychiatric illness.    Symptomatic anemia   GIB   - GIB Pathway initiated  Likely bleeding from GIST  Hgb 6.2 on admit, down from 9.7 beginning of June  - H/H stable on 7/16  Check iron studies and hemolysis labs  GI consulted. apprec recs  -- Discussed with primary team that EGD in this patient would be high risk given worsening/progression of her cancer and that given the likely lower GI pathology that is improving the risks outweigh the benefits  -- continue PO PPI  -- trend Hgb, transfuse Hgb <7, Plt<50  -- would recommend heme/onc consult for evaluation/treatment of progressing tumor  -- if it is determined that tissue is needed, AES consult would be appropriate at that time.  - s/p 1 unit pRBC on 7/17  - Hb 6.7. ordered one unit pRBC.   - 7/12: haptoglobin normal and reticulocyte count elevated. Gastroenterology consulted  repeat CBC at 6.6 . transfusion with 1 unit of PRBC. GI recommends EGD and colonoscopy for further evaluation of iron deficiency anemia patient adamantly refuses exam and EGD/ colonoscopy  7/13  hemoglobin at 8 3 status post 2 units of pack RBC transfusion.agrees for EGD/ colonoscopy.  7/14 Hb 8.1 --> 7.6.Clear liquids today and NPO from midnight EGD/colonoscopy in a.m.  7/15 refused to drink GoLYTELY overnight.     Malignant gastrointestinal stromal tumor (GIST) of stomach   - Follows with HemeOnc at [facility]  - On Sunitinib outpatient  - CT A/P showed increased size of mass  - Heme/onc consulted. apprec recs   -- poor prognosis d/t mental health issue  -- will need to f/u with primary oncologist at discharge to obtain sunitinib   -- /social work consults for possible placement  --  hospice reasonable if patient has to be d/c to previous living situation    Leukocytosis  - WBC: 11 --> 15 --> 18 --> 17  - afebrile since 7/15   - denies cough or SOB  - Initially on empiric IV abx but switched to po cipro/flagyl due to patient refusing IV access   - plan to discontinue abx is remains afebrile for 24 hours  - bcx no growth to date  - U/A negative for UTI  - CBC daily     Transaminitis   - 7/15:  AST//112 with normal bilirubin and mildly elevated alk-phos at 337. Consider right upper quadrant sonogram if not trending down        Acute deep vein thrombosis (DVT) of popliteal vein of left lower extremity   - S/p IVC filter on 6/2020    Hypokalemia- resolved   - K 2.9-->3  - replete prn     B12 deficiency  - levels of 192 started on vitamin B12 subcutaneous dissection     Type 2 diabetes mellitus without complication, without long-term current use of insulin  - Patient's FSGs are controlled on current hypoglycemics.  - Home DM regimen:  Metformin  - SSI with POCT accuchecks and Diabetic diet      VTE Risk Mitigation (From admission, onward)         Ordered     IP VTE HIGH RISK PATIENT  Once      07/11/20 2130     Place sequential compression device  Until discontinued      07/11/20 2130              Time spent in care of patient: > 35 minutes     Kahlil Banegas MD  Department of Hospital Medicine   [facility]   (3) slightly limited

## 2020-08-21 NOTE — PROGRESS NOTE ADULT - ASSESSMENT
67 y/o morbidly obese male with multiple medical problems was admitted to ICU on 9/4/19 for severe sepsis / cellulitis of b/L LE. pt. was brought in as while sitting on his toilet pt. passed out but as per family ( daughters at bedside ) pt. did not have any fall, did not hit his head to ground and was found sitting on the toilet. Pt. was on pressors initially and admitted to icu. Pt. was also noted to have rhabdo. pt. got some fluids but his EF is 25 to 30 % and as per icu pt. appeared to be volume over loaded and fluids were cut down. pt. is off the pressors now. pt. feels better. no cp. no sob . no HA reported. no abd. pain. no n/v/d. Pt. has which appears to be chronic leg edema and as per pt. his lower ext. wound come and go as due to fluid retention he gets blisters and they burst.     1) Septic Shock (Resolved)  - Likely secondary to LE Cellulitis  - Cultures negative  - Continue Vanco and Zosyn  - ID follow up noted   --> no longer needs midodrine     2) A. Fib with RVR   - Continue Coreg  HOLD COUMADIN TONIGHT   - Cardiology follow up noted: Single chamber SJM ICD interrogated. Normal functioning device. Implant date 8/18/16. VVI 40.  <1%. Arrythmia log shows occasional brief irregular tachy episodes likley c/w AF with RVR. No ICD therapy needed    3) Severe Rhabdomyolysis  - Improving  - Renal consult appreciated    4) Acute on chronic kidney injury  - Likely secondary to rhabdomyolysis  - Improving  - Avoid nephrotoxic medications    5) Chronic Systolic Heart Failure   - Continue Coreg  - No ACEIs or ARBs due to renal function  - Cardio input appreciated  --> torsemide    6) DM 2  - HbA1c 11.6  -> c,w insulin     7) LE Weakness  - Multifactorial  - Neurology Consult appreciated     8) Morbidly Obese  - advise ddiet and exercise    9) CHRISS  - Continue Nocturnal and PRN CPAP    10) Hypothyroidism  - Continue Synthroid  - Repeat TSH in 2-3 weeks. If still elevated then increase the dose.     11) Elevated LFTs  - Likely secondary to septic shock and rhabdomyolysis  - Improving  - Monitor liver function    12_ pain - improved    dispo - eventual kalie Plan: History of melanoma in situ, diagnosed 12/14 and excised 3/15\\nRecheck again annually\\nDue back 8/2021 Detail Level: Zone Plan: Overall doing well on nifedipine\\nHas triamcinolone and compression stockings available if needed

## 2020-09-05 ENCOUNTER — INPATIENT (INPATIENT)
Facility: HOSPITAL | Age: 70
LOS: 6 days | Discharge: NURSING, REHAB OR LONG-TERM CARE | DRG: 638 | End: 2020-09-12
Attending: FAMILY MEDICINE | Admitting: INTERNAL MEDICINE
Payer: MEDICARE

## 2020-09-05 VITALS
OXYGEN SATURATION: 94 % | HEART RATE: 100 BPM | SYSTOLIC BLOOD PRESSURE: 116 MMHG | RESPIRATION RATE: 20 BRPM | HEIGHT: 69.49 IN | DIASTOLIC BLOOD PRESSURE: 64 MMHG | TEMPERATURE: 98 F | WEIGHT: 315 LBS

## 2020-09-05 DIAGNOSIS — Z98.89 OTHER SPECIFIED POSTPROCEDURAL STATES: Chronic | ICD-10-CM

## 2020-09-05 DIAGNOSIS — Z95.810 PRESENCE OF AUTOMATIC (IMPLANTABLE) CARDIAC DEFIBRILLATOR: Chronic | ICD-10-CM

## 2020-09-05 LAB
ALBUMIN SERPL ELPH-MCNC: 3.5 G/DL — SIGNIFICANT CHANGE UP (ref 3.3–5.2)
ALBUMIN SERPL ELPH-MCNC: 3.9 G/DL — SIGNIFICANT CHANGE UP (ref 3.3–5.2)
ALP SERPL-CCNC: 98 U/L — SIGNIFICANT CHANGE UP (ref 40–120)
ALP SERPL-CCNC: 98 U/L — SIGNIFICANT CHANGE UP (ref 40–120)
ALT FLD-CCNC: 21 U/L — SIGNIFICANT CHANGE UP
ALT FLD-CCNC: 22 U/L — SIGNIFICANT CHANGE UP
ANION GAP SERPL CALC-SCNC: 15 MMOL/L — SIGNIFICANT CHANGE UP (ref 5–17)
ANION GAP SERPL CALC-SCNC: 16 MMOL/L — SIGNIFICANT CHANGE UP (ref 5–17)
APPEARANCE UR: CLEAR — SIGNIFICANT CHANGE UP
APTT BLD: 43.2 SEC — HIGH (ref 27.5–35.5)
AST SERPL-CCNC: 35 U/L — SIGNIFICANT CHANGE UP
AST SERPL-CCNC: 37 U/L — SIGNIFICANT CHANGE UP
BILIRUB SERPL-MCNC: 0.6 MG/DL — SIGNIFICANT CHANGE UP (ref 0.4–2)
BILIRUB SERPL-MCNC: 0.6 MG/DL — SIGNIFICANT CHANGE UP (ref 0.4–2)
BILIRUB UR-MCNC: NEGATIVE — SIGNIFICANT CHANGE UP
BUN SERPL-MCNC: 41 MG/DL — HIGH (ref 8–20)
BUN SERPL-MCNC: 41 MG/DL — HIGH (ref 8–20)
CALCIUM SERPL-MCNC: 10.1 MG/DL — SIGNIFICANT CHANGE UP (ref 8.6–10.2)
CALCIUM SERPL-MCNC: 10.1 MG/DL — SIGNIFICANT CHANGE UP (ref 8.6–10.2)
CHLORIDE SERPL-SCNC: 86 MMOL/L — LOW (ref 98–107)
CHLORIDE SERPL-SCNC: 86 MMOL/L — LOW (ref 98–107)
CK SERPL-CCNC: 87 U/L — SIGNIFICANT CHANGE UP (ref 30–200)
CO2 SERPL-SCNC: 31 MMOL/L — HIGH (ref 22–29)
CO2 SERPL-SCNC: 32 MMOL/L — HIGH (ref 22–29)
COLOR SPEC: YELLOW — SIGNIFICANT CHANGE UP
CREAT SERPL-MCNC: 1.29 MG/DL — SIGNIFICANT CHANGE UP (ref 0.5–1.3)
CREAT SERPL-MCNC: 1.43 MG/DL — HIGH (ref 0.5–1.3)
DIFF PNL FLD: NEGATIVE — SIGNIFICANT CHANGE UP
GLUCOSE SERPL-MCNC: 464 MG/DL — HIGH (ref 70–99)
GLUCOSE SERPL-MCNC: 551 MG/DL — CRITICAL HIGH (ref 70–99)
GLUCOSE UR QL: 1000 MG/DL
HCT VFR BLD CALC: 49.7 % — SIGNIFICANT CHANGE UP (ref 39–50)
HGB BLD-MCNC: 15.9 G/DL — SIGNIFICANT CHANGE UP (ref 13–17)
INR BLD: 2.05 RATIO — HIGH (ref 0.88–1.16)
KETONES UR-MCNC: NEGATIVE — SIGNIFICANT CHANGE UP
LEUKOCYTE ESTERASE UR-ACNC: NEGATIVE — SIGNIFICANT CHANGE UP
LIDOCAIN IGE QN: 36 U/L — SIGNIFICANT CHANGE UP (ref 22–51)
MCHC RBC-ENTMCNC: 28.2 PG — SIGNIFICANT CHANGE UP (ref 27–34)
MCHC RBC-ENTMCNC: 32 GM/DL — SIGNIFICANT CHANGE UP (ref 32–36)
MCV RBC AUTO: 88.1 FL — SIGNIFICANT CHANGE UP (ref 80–100)
NITRITE UR-MCNC: NEGATIVE — SIGNIFICANT CHANGE UP
OSMOLALITY SERPL: 332 MOSMOL/KG — HIGH (ref 280–301)
PH UR: 6.5 — SIGNIFICANT CHANGE UP (ref 5–8)
PLATELET # BLD AUTO: 155 K/UL — SIGNIFICANT CHANGE UP (ref 150–400)
POTASSIUM SERPL-MCNC: 4 MMOL/L — SIGNIFICANT CHANGE UP (ref 3.5–5.3)
POTASSIUM SERPL-MCNC: 4.6 MMOL/L — SIGNIFICANT CHANGE UP (ref 3.5–5.3)
POTASSIUM SERPL-SCNC: 4 MMOL/L — SIGNIFICANT CHANGE UP (ref 3.5–5.3)
POTASSIUM SERPL-SCNC: 4.6 MMOL/L — SIGNIFICANT CHANGE UP (ref 3.5–5.3)
PROT SERPL-MCNC: 7.1 G/DL — SIGNIFICANT CHANGE UP (ref 6.6–8.7)
PROT SERPL-MCNC: 7.5 G/DL — SIGNIFICANT CHANGE UP (ref 6.6–8.7)
PROT UR-MCNC: NEGATIVE MG/DL — SIGNIFICANT CHANGE UP
PROTHROM AB SERPL-ACNC: 23 SEC — HIGH (ref 10.6–13.6)
RBC # BLD: 5.64 M/UL — SIGNIFICANT CHANGE UP (ref 4.2–5.8)
RBC # FLD: 16.2 % — HIGH (ref 10.3–14.5)
SODIUM SERPL-SCNC: 133 MMOL/L — LOW (ref 135–145)
SODIUM SERPL-SCNC: 133 MMOL/L — LOW (ref 135–145)
SP GR SPEC: 1.01 — SIGNIFICANT CHANGE UP (ref 1.01–1.02)
TROPONIN T SERPL-MCNC: 0.03 NG/ML — SIGNIFICANT CHANGE UP (ref 0–0.06)
UROBILINOGEN FLD QL: NEGATIVE MG/DL — SIGNIFICANT CHANGE UP
WBC # BLD: 7.84 K/UL — SIGNIFICANT CHANGE UP (ref 3.8–10.5)
WBC # FLD AUTO: 7.84 K/UL — SIGNIFICANT CHANGE UP (ref 3.8–10.5)

## 2020-09-05 PROCEDURE — 73030 X-RAY EXAM OF SHOULDER: CPT | Mod: 26,RT

## 2020-09-05 PROCEDURE — 71250 CT THORAX DX C-: CPT | Mod: 26

## 2020-09-05 PROCEDURE — 99285 EMERGENCY DEPT VISIT HI MDM: CPT

## 2020-09-05 PROCEDURE — 72128 CT CHEST SPINE W/O DYE: CPT | Mod: 26

## 2020-09-05 PROCEDURE — 72125 CT NECK SPINE W/O DYE: CPT | Mod: 26

## 2020-09-05 PROCEDURE — 72131 CT LUMBAR SPINE W/O DYE: CPT | Mod: 26

## 2020-09-05 RX ORDER — TETANUS AND DIPHTHERIA TOXOIDS ADSORBED 2; 2 [LF]/.5ML; [LF]/.5ML
0.5 INJECTION INTRAMUSCULAR ONCE
Refills: 0 | Status: COMPLETED | OUTPATIENT
Start: 2020-09-05 | End: 2020-09-05

## 2020-09-05 RX ORDER — MORPHINE SULFATE 50 MG/1
4 CAPSULE, EXTENDED RELEASE ORAL ONCE
Refills: 0 | Status: DISCONTINUED | OUTPATIENT
Start: 2020-09-05 | End: 2020-09-05

## 2020-09-05 RX ORDER — INSULIN HUMAN 100 [IU]/ML
10 INJECTION, SOLUTION SUBCUTANEOUS ONCE
Refills: 0 | Status: COMPLETED | OUTPATIENT
Start: 2020-09-05 | End: 2020-09-05

## 2020-09-05 RX ORDER — ONDANSETRON 8 MG/1
4 TABLET, FILM COATED ORAL ONCE
Refills: 0 | Status: COMPLETED | OUTPATIENT
Start: 2020-09-05 | End: 2020-09-05

## 2020-09-05 RX ORDER — DIAZEPAM 5 MG
5 TABLET ORAL ONCE
Refills: 0 | Status: DISCONTINUED | OUTPATIENT
Start: 2020-09-05 | End: 2020-09-05

## 2020-09-05 RX ORDER — ONDANSETRON 8 MG/1
4 TABLET, FILM COATED ORAL ONCE
Refills: 0 | Status: DISCONTINUED | OUTPATIENT
Start: 2020-09-05 | End: 2020-09-05

## 2020-09-05 RX ORDER — SODIUM CHLORIDE 9 MG/ML
200 INJECTION INTRAMUSCULAR; INTRAVENOUS; SUBCUTANEOUS ONCE
Refills: 0 | Status: COMPLETED | OUTPATIENT
Start: 2020-09-05 | End: 2020-09-05

## 2020-09-05 RX ORDER — BACITRACIN ZINC 500 UNIT/G
1 OINTMENT IN PACKET (EA) TOPICAL ONCE
Refills: 0 | Status: COMPLETED | OUTPATIENT
Start: 2020-09-05 | End: 2020-09-05

## 2020-09-05 RX ORDER — ACETAMINOPHEN 500 MG
650 TABLET ORAL ONCE
Refills: 0 | Status: DISCONTINUED | OUTPATIENT
Start: 2020-09-05 | End: 2020-09-05

## 2020-09-05 RX ORDER — INSULIN LISPRO 100/ML
10 VIAL (ML) SUBCUTANEOUS ONCE
Refills: 0 | Status: COMPLETED | OUTPATIENT
Start: 2020-09-05 | End: 2020-09-05

## 2020-09-05 RX ADMIN — TETANUS AND DIPHTHERIA TOXOIDS ADSORBED 0.5 MILLILITER(S): 2; 2 INJECTION INTRAMUSCULAR at 17:47

## 2020-09-05 RX ADMIN — MORPHINE SULFATE 4 MILLIGRAM(S): 50 CAPSULE, EXTENDED RELEASE ORAL at 17:47

## 2020-09-05 RX ADMIN — INSULIN HUMAN 10 UNIT(S): 100 INJECTION, SOLUTION SUBCUTANEOUS at 18:29

## 2020-09-05 RX ADMIN — Medication 10 UNIT(S): at 23:02

## 2020-09-05 RX ADMIN — INSULIN HUMAN 10 UNIT(S): 100 INJECTION, SOLUTION SUBCUTANEOUS at 20:53

## 2020-09-05 RX ADMIN — ONDANSETRON 4 MILLIGRAM(S): 8 TABLET, FILM COATED ORAL at 17:47

## 2020-09-05 RX ADMIN — MORPHINE SULFATE 4 MILLIGRAM(S): 50 CAPSULE, EXTENDED RELEASE ORAL at 18:25

## 2020-09-05 RX ADMIN — SODIUM CHLORIDE 400 MILLILITER(S): 9 INJECTION INTRAMUSCULAR; INTRAVENOUS; SUBCUTANEOUS at 18:30

## 2020-09-05 RX ADMIN — Medication 1 APPLICATION(S): at 17:49

## 2020-09-05 RX ADMIN — MORPHINE SULFATE 4 MILLIGRAM(S): 50 CAPSULE, EXTENDED RELEASE ORAL at 22:58

## 2020-09-05 NOTE — ED PROVIDER NOTE - OBJECTIVE STATEMENT
69 yr old male, pmhx of CHF, afib on coumadin, DM, chronic pain, presents s/p fall just prior to arrival. Patient getting out of pool on ladder, slid down last two steps. Did not hit head, no loc. Complaining of spine pain and right shoulder pain. Patient states chronically has decrease sensation and motor function to left foot. Unknown last tetanus. Did not take insulin today. Denies any alcohol use.

## 2020-09-05 NOTE — ED ADULT NURSE NOTE - OBJECTIVE STATEMENT
received pt alert and orientedx3, came in for fall. clarice leg redness and open skin scraped by ladder as per pt, meds apply open to air, pain is at comfort at the moment, fall risk taken. education given. blood sent, urine sent, pt is stable, continue to monitor

## 2020-09-05 NOTE — ED PROVIDER NOTE - CARE PLAN
Principal Discharge DX:	Fall from standing, initial encounter  Secondary Diagnosis:	Musculoskeletal pain

## 2020-09-05 NOTE — ED PROVIDER NOTE - ATTENDING CONTRIBUTION TO CARE
Elissa HAIR- 68 Y/O M with h/o htn, hld chf on coumadin, cad p/w fall when he slipped while coming out of swimming pool and his left foot slipped and he fell forward and no loc or hit ot head but has upper back pain and lower back pain and was brought on backboard by ems and is uncomfortable and crying in pain.Pt has also back pain with deep breath    pt is alert,  obese, well appearing male, s1s2 normal reg, b/l clear breath sounds , abd soft, nt, nd, focal tenderness at t6-8 and L1-2.  Pt has full rom at both shoulders and legs, pelvis stable, pt has chronic lymphedema changes ot legs and some abrasion to left leg from fall, neuro exam aox3, cn 2-12 intact    will control pain, liekly msk strain, will do ct t spien, l spine, ct chest to r/o fx and reassess

## 2020-09-05 NOTE — ED ADULT TRIAGE NOTE - CHIEF COMPLAINT QUOTE
Patient A&Ox4 complaining of lower back pain secondary to slip & fall down 2 pool ladder steps. Denies hitting head or LOC. Presents with skin tears/abrasions to BLLE, bleeding controlled. BLLE also discolored, normal as per EMS.

## 2020-09-05 NOTE — ED PROVIDER NOTE - PROGRESS NOTE DETAILS
signout to Dr. Estrada Bhavesh Lozano, Resident: Pt requiring better glycemic control, multiple FS checked with insulin given. Will place in observation for physical therapy consult in the am and continue to monitor FS.

## 2020-09-05 NOTE — ED ADULT NURSE NOTE - ED STAT RN HANDOFF DETAILS
Pt given to Demarcus Bush. Pt A&Ox4, no respiratory distress, VSS afebrile. Pt resting comfortably at this time.

## 2020-09-06 LAB
A1C WITH ESTIMATED AVERAGE GLUCOSE RESULT: 10.4 % — HIGH (ref 4–5.6)
ESTIMATED AVERAGE GLUCOSE: 252 MG/DL — HIGH (ref 68–114)
GLUCOSE BLDC GLUCOMTR-MCNC: 315 MG/DL — HIGH (ref 70–99)
GLUCOSE BLDC GLUCOMTR-MCNC: 346 MG/DL — HIGH (ref 70–99)
GLUCOSE BLDC GLUCOMTR-MCNC: 364 MG/DL — HIGH (ref 70–99)
GLUCOSE BLDC GLUCOMTR-MCNC: 425 MG/DL — HIGH (ref 70–99)
GLUCOSE BLDC GLUCOMTR-MCNC: 460 MG/DL — CRITICAL HIGH (ref 70–99)
GLUCOSE BLDC GLUCOMTR-MCNC: 470 MG/DL — CRITICAL HIGH (ref 70–99)
GLUCOSE BLDC GLUCOMTR-MCNC: 479 MG/DL — CRITICAL HIGH (ref 70–99)

## 2020-09-06 PROCEDURE — 99220: CPT

## 2020-09-06 RX ORDER — DEXTROSE 50 % IN WATER 50 %
25 SYRINGE (ML) INTRAVENOUS ONCE
Refills: 0 | Status: DISCONTINUED | OUTPATIENT
Start: 2020-09-06 | End: 2020-09-12

## 2020-09-06 RX ORDER — LORATADINE 10 MG/1
10 TABLET ORAL DAILY
Refills: 0 | Status: DISCONTINUED | OUTPATIENT
Start: 2020-09-06 | End: 2020-09-12

## 2020-09-06 RX ORDER — LOSARTAN POTASSIUM 100 MG/1
50 TABLET, FILM COATED ORAL DAILY
Refills: 0 | Status: DISCONTINUED | OUTPATIENT
Start: 2020-09-06 | End: 2020-09-08

## 2020-09-06 RX ORDER — OXYCODONE HYDROCHLORIDE 5 MG/1
10 TABLET ORAL EVERY 6 HOURS
Refills: 0 | Status: DISCONTINUED | OUTPATIENT
Start: 2020-09-06 | End: 2020-09-12

## 2020-09-06 RX ORDER — ALBUTEROL 90 UG/1
2 AEROSOL, METERED ORAL EVERY 6 HOURS
Refills: 0 | Status: DISCONTINUED | OUTPATIENT
Start: 2020-09-06 | End: 2020-09-12

## 2020-09-06 RX ORDER — POTASSIUM CHLORIDE 20 MEQ
20 PACKET (EA) ORAL THREE TIMES A DAY
Refills: 0 | Status: DISCONTINUED | OUTPATIENT
Start: 2020-09-06 | End: 2020-09-08

## 2020-09-06 RX ORDER — LEVOTHYROXINE SODIUM 125 MCG
50 TABLET ORAL DAILY
Refills: 0 | Status: DISCONTINUED | OUTPATIENT
Start: 2020-09-06 | End: 2020-09-12

## 2020-09-06 RX ORDER — INSULIN LISPRO 100/ML
10 VIAL (ML) SUBCUTANEOUS ONCE
Refills: 0 | Status: COMPLETED | OUTPATIENT
Start: 2020-09-06 | End: 2020-09-06

## 2020-09-06 RX ORDER — MORPHINE SULFATE 50 MG/1
4 CAPSULE, EXTENDED RELEASE ORAL ONCE
Refills: 0 | Status: DISCONTINUED | OUTPATIENT
Start: 2020-09-06 | End: 2020-09-06

## 2020-09-06 RX ORDER — LIDOCAINE 4 G/100G
1 CREAM TOPICAL ONCE
Refills: 0 | Status: COMPLETED | OUTPATIENT
Start: 2020-09-06 | End: 2020-09-06

## 2020-09-06 RX ORDER — DEXTROSE 50 % IN WATER 50 %
12.5 SYRINGE (ML) INTRAVENOUS ONCE
Refills: 0 | Status: DISCONTINUED | OUTPATIENT
Start: 2020-09-06 | End: 2020-09-12

## 2020-09-06 RX ORDER — FOLIC ACID 0.8 MG
1 TABLET ORAL DAILY
Refills: 0 | Status: DISCONTINUED | OUTPATIENT
Start: 2020-09-06 | End: 2020-09-12

## 2020-09-06 RX ORDER — GABAPENTIN 400 MG/1
300 CAPSULE ORAL
Refills: 0 | Status: DISCONTINUED | OUTPATIENT
Start: 2020-09-06 | End: 2020-09-12

## 2020-09-06 RX ORDER — DULOXETINE HYDROCHLORIDE 30 MG/1
60 CAPSULE, DELAYED RELEASE ORAL DAILY
Refills: 0 | Status: DISCONTINUED | OUTPATIENT
Start: 2020-09-06 | End: 2020-09-12

## 2020-09-06 RX ORDER — CEPHALEXIN 500 MG
500 CAPSULE ORAL
Refills: 0 | Status: DISCONTINUED | OUTPATIENT
Start: 2020-09-06 | End: 2020-09-08

## 2020-09-06 RX ORDER — SODIUM CHLORIDE 9 MG/ML
1000 INJECTION, SOLUTION INTRAVENOUS
Refills: 0 | Status: DISCONTINUED | OUTPATIENT
Start: 2020-09-06 | End: 2020-09-12

## 2020-09-06 RX ORDER — DEXTROSE 50 % IN WATER 50 %
15 SYRINGE (ML) INTRAVENOUS ONCE
Refills: 0 | Status: DISCONTINUED | OUTPATIENT
Start: 2020-09-06 | End: 2020-09-12

## 2020-09-06 RX ORDER — INSULIN LISPRO 100/ML
VIAL (ML) SUBCUTANEOUS
Refills: 0 | Status: DISCONTINUED | OUTPATIENT
Start: 2020-09-06 | End: 2020-09-12

## 2020-09-06 RX ORDER — FUROSEMIDE 40 MG
40 TABLET ORAL DAILY
Refills: 0 | Status: DISCONTINUED | OUTPATIENT
Start: 2020-09-06 | End: 2020-09-08

## 2020-09-06 RX ORDER — CARVEDILOL PHOSPHATE 80 MG/1
6.25 CAPSULE, EXTENDED RELEASE ORAL EVERY 12 HOURS
Refills: 0 | Status: DISCONTINUED | OUTPATIENT
Start: 2020-09-06 | End: 2020-09-12

## 2020-09-06 RX ORDER — INSULIN LISPRO 100/ML
5 VIAL (ML) SUBCUTANEOUS
Refills: 0 | Status: DISCONTINUED | OUTPATIENT
Start: 2020-09-06 | End: 2020-09-08

## 2020-09-06 RX ORDER — INSULIN GLARGINE 100 [IU]/ML
50 INJECTION, SOLUTION SUBCUTANEOUS AT BEDTIME
Refills: 0 | Status: DISCONTINUED | OUTPATIENT
Start: 2020-09-06 | End: 2020-09-09

## 2020-09-06 RX ORDER — GLUCAGON INJECTION, SOLUTION 0.5 MG/.1ML
1 INJECTION, SOLUTION SUBCUTANEOUS ONCE
Refills: 0 | Status: DISCONTINUED | OUTPATIENT
Start: 2020-09-06 | End: 2020-09-12

## 2020-09-06 RX ORDER — PANTOPRAZOLE SODIUM 20 MG/1
40 TABLET, DELAYED RELEASE ORAL
Refills: 0 | Status: DISCONTINUED | OUTPATIENT
Start: 2020-09-06 | End: 2020-09-08

## 2020-09-06 RX ORDER — WARFARIN SODIUM 2.5 MG/1
3 TABLET ORAL ONCE
Refills: 0 | Status: COMPLETED | OUTPATIENT
Start: 2020-09-06 | End: 2020-09-06

## 2020-09-06 RX ORDER — CARVEDILOL PHOSPHATE 80 MG/1
6.25 CAPSULE, EXTENDED RELEASE ORAL DAILY
Refills: 0 | Status: DISCONTINUED | OUTPATIENT
Start: 2020-09-06 | End: 2020-09-06

## 2020-09-06 RX ADMIN — Medication 10: at 11:40

## 2020-09-06 RX ADMIN — Medication 40 MILLIGRAM(S): at 05:49

## 2020-09-06 RX ADMIN — OXYCODONE HYDROCHLORIDE 10 MILLIGRAM(S): 5 TABLET ORAL at 16:44

## 2020-09-06 RX ADMIN — LIDOCAINE 1 PATCH: 4 CREAM TOPICAL at 03:10

## 2020-09-06 RX ADMIN — LOSARTAN POTASSIUM 50 MILLIGRAM(S): 100 TABLET, FILM COATED ORAL at 05:51

## 2020-09-06 RX ADMIN — Medication 10 UNIT(S): at 19:16

## 2020-09-06 RX ADMIN — OXYCODONE HYDROCHLORIDE 10 MILLIGRAM(S): 5 TABLET ORAL at 09:44

## 2020-09-06 RX ADMIN — Medication 20 MILLIEQUIVALENT(S): at 14:43

## 2020-09-06 RX ADMIN — MORPHINE SULFATE 4 MILLIGRAM(S): 50 CAPSULE, EXTENDED RELEASE ORAL at 03:12

## 2020-09-06 RX ADMIN — MORPHINE SULFATE 4 MILLIGRAM(S): 50 CAPSULE, EXTENDED RELEASE ORAL at 04:00

## 2020-09-06 RX ADMIN — PANTOPRAZOLE SODIUM 40 MILLIGRAM(S): 20 TABLET, DELAYED RELEASE ORAL at 08:10

## 2020-09-06 RX ADMIN — Medication 8: at 07:09

## 2020-09-06 RX ADMIN — Medication 50 MICROGRAM(S): at 05:48

## 2020-09-06 RX ADMIN — Medication 1 MILLIGRAM(S): at 11:41

## 2020-09-06 RX ADMIN — Medication 5 UNIT(S): at 19:08

## 2020-09-06 RX ADMIN — GABAPENTIN 300 MILLIGRAM(S): 400 CAPSULE ORAL at 05:49

## 2020-09-06 RX ADMIN — Medication 500 MILLIGRAM(S): at 11:41

## 2020-09-06 RX ADMIN — Medication 20 MILLIEQUIVALENT(S): at 05:48

## 2020-09-06 RX ADMIN — Medication 500 MILLIGRAM(S): at 16:44

## 2020-09-06 RX ADMIN — LIDOCAINE 1 PATCH: 4 CREAM TOPICAL at 08:04

## 2020-09-06 RX ADMIN — INSULIN GLARGINE 50 UNIT(S): 100 INJECTION, SOLUTION SUBCUTANEOUS at 21:21

## 2020-09-06 RX ADMIN — LORATADINE 10 MILLIGRAM(S): 10 TABLET ORAL at 11:41

## 2020-09-06 RX ADMIN — GABAPENTIN 300 MILLIGRAM(S): 400 CAPSULE ORAL at 16:44

## 2020-09-06 RX ADMIN — Medication 20 MILLIEQUIVALENT(S): at 21:24

## 2020-09-06 RX ADMIN — OXYCODONE HYDROCHLORIDE 10 MILLIGRAM(S): 5 TABLET ORAL at 17:55

## 2020-09-06 RX ADMIN — WARFARIN SODIUM 3 MILLIGRAM(S): 2.5 TABLET ORAL at 21:23

## 2020-09-06 RX ADMIN — CARVEDILOL PHOSPHATE 6.25 MILLIGRAM(S): 80 CAPSULE, EXTENDED RELEASE ORAL at 05:51

## 2020-09-06 RX ADMIN — OXYCODONE HYDROCHLORIDE 10 MILLIGRAM(S): 5 TABLET ORAL at 08:54

## 2020-09-06 RX ADMIN — DULOXETINE HYDROCHLORIDE 60 MILLIGRAM(S): 30 CAPSULE, DELAYED RELEASE ORAL at 11:41

## 2020-09-06 RX ADMIN — CARVEDILOL PHOSPHATE 6.25 MILLIGRAM(S): 80 CAPSULE, EXTENDED RELEASE ORAL at 16:44

## 2020-09-06 RX ADMIN — Medication 12: at 16:43

## 2020-09-06 NOTE — PHYSICAL THERAPY INITIAL EVALUATION ADULT - ADDITIONAL COMMENTS
Pt. lives in a house with his daughter and son in law. They work during the day. 3 GOLDY with b/l rails in reach and no stairs inside. Pt. was modified independent with a RW for ambulation and was independent on the stairs PTA. Pt. also owns a w/c and rollator.

## 2020-09-06 NOTE — ED CDU PROVIDER INITIAL DAY NOTE - PROGRESS NOTE DETAILS
Charting and results reviewed.  Multiple open wounds on bilateral lower extremities, as patient is obese, uncontrolled diabetes, will begin keflex QID to prevent infection.  HbA1c 10.4, patient is on insulin will need to have PCP adjust dose.  Has been throwing increased ectopy including 3-4 beats of VT.  He is a patient of Dr Jain, cardiology consult called.  Await PT eval. Patient sleeping comfortably without complaints. Patient with uncontrolled DM today, did not receive Lantus 50U last night. Hospitalist consulted for uncontrolled DM. Patient with 5 beats of V-tach. Hospitalist consulting at bedside made aware. Hospitalist recs noted. Hospitalist recs noted .

## 2020-09-06 NOTE — ED CDU PROVIDER INITIAL DAY NOTE - PHYSICAL EXAMINATION
pitting edema 3+ b/l lower extremities  B/L LE skin discoloration to lower legs with multiple abrasions  FROM of R shoulder  TTP thoracic and lumbar spine and musculature

## 2020-09-06 NOTE — PHYSICAL THERAPY INITIAL EVALUATION ADULT - ACTIVE RANGE OF MOTION EXAMINATION, REHAB EVAL
no Active ROM deficits were identified/with exception to right shoulder flexion 0-90 degrees limited by pain and left ankle D/F limited by weakness - pt. reports this is chronic

## 2020-09-06 NOTE — ED CDU PROVIDER INITIAL DAY NOTE - OBJECTIVE STATEMENT
Pt is a 69y M with PMH of CHF, afib on coumadin, DM, chronic back pain, COPD, sleep apnea, defibrillator presents s/p fall in pool. Patient getting out of pool on ladder, slid down last two steps scrapping his b/l lower extremities. He did not hit head, no loc. He is complaining of spine pain and right shoulder pain. Patient states chronically has decrease sensation and motor function to left foot due to nerve damage and neuropathy. He admits he currently lives with his daughter and he uses a walker at baseline. He had CTs of his entire spine/chest as well as xray of his shoulder with no acute findings. Pt also found to have a BG in the 500s. He was placed in observation for glucose control and PT for possible placement. Pt has abrasions to B/L LEs. He reports continued pain to back and R shoulder. No other complaints at this time. Pt is a 69y M with PMH of CHF, afib on coumadin, DM, chronic back pain, COPD, sleep apnea, defibrillator presents s/p fall in pool. Patient getting out of pool on ladder, slid down last two steps scrapping his b/l lower extremities. He did not hit head, no loc. He is complaining of spine pain and right shoulder pain. Patient states chronically has decrease sensation and motor function to left foot due to nerve damage/ neuropathy. He admits he currently lives with his daughter and he uses a walker at baseline. He had CTs of his entire spine/chest as well as xray of his shoulder with no acute findings. Pt also found to have a BG in the 500s. He was placed in observation for glucose control and PT consult for possible placement. Pt has abrasions to B/L LEs in which bacitracin was applied. He reports continued pain to back and R shoulder. Pt has not been able top ambulate due to pain. Awaiting PT consult.

## 2020-09-06 NOTE — PHYSICAL THERAPY INITIAL EVALUATION ADULT - MANUAL MUSCLE TESTING RESULTS, REHAB EVAL
no strength deficits were identified/with exception to right shoulder flexion 2/5 due to pain and left ankle Df and PF 2-/5 pt reports this left LE weakness is chronic x 1 year

## 2020-09-06 NOTE — ED CDU PROVIDER INITIAL DAY NOTE - PMH
CHF (congestive heart failure)    DM (diabetes mellitus)    High cholesterol    HTN (hypertension)    Prostate CA    Pulmonary hypertension    Renal insufficiency    Sleep apnea Atrial fibrillation    CHF (congestive heart failure)    Chronic back pain    DM (diabetes mellitus)    High cholesterol    HTN (hypertension)    Lung nodules    Prostate CA    Pulmonary hypertension    Renal insufficiency    Sleep apnea

## 2020-09-06 NOTE — ED CDU PROVIDER INITIAL DAY NOTE - MEDICAL DECISION MAKING DETAILS
Pt is a 69y M presenting s/p fall on pool ladder. Pt with back and R shoulder paijn. CTs negative for any acute spinal abnormality. Will give pain control and get PT eval. Pt also found to be hyperglycemic. Will control BG and have f/u with PMD.

## 2020-09-06 NOTE — ED CDU PROVIDER INITIAL DAY NOTE - ATTENDING CONTRIBUTION TO CARE
Frequent falls, uncontrolled hyperglycemia. Imaging with no traumatic injuries. Placed in observation for glycemic control, pain control, PT eval.

## 2020-09-06 NOTE — CONSULT NOTE ADULT - SUBJECTIVE AND OBJECTIVE BOX
HOSPITALIST CONSULT    Chief complaint: mechanical fall    HPI: 69M hx Afib on coumadin, IDDM, VT s/p ICD, HFrEF (25%), CHRISS, morbidly obese presents s/p mechanical fall out of pool. Patient states he slipped of pool ladder while getting out and landed on back/rib. He was complaining of back pain, but now improved. Presented to ED and CT negative for acute fracture. Patient placed into observation for SCOTTY placement. EKG showed afib with frequent PVC's. Patient was evaluated by cardiology who increased Coreg dose. While in observation patient persistently hyperglycemic. Patient usually takes Levemir 25 units BID and sliding scale novolog usually between 5-15 units premeals. He missed his last nights dose of long acting insulin. Today he's received humalog 8 units am, 10 units noon and 12 units evening. Patient has been persistently hyperglycemic throughout the day which is why medicine was consulted.     Pt received Lantus 50 units as well as humalog 15 units. He currently has no active complaints.     PAST MEDICAL/SURGICAL HISTORY  PAST MEDICAL & SURGICAL HISTORY:  Lung nodules  Chronic back pain  Atrial fibrillation  CHF (congestive heart failure)  Pulmonary hypertension  Prostate CA  Sleep apnea  Renal insufficiency  High cholesterol  HTN (hypertension)  DM (diabetes mellitus)  AICD (automatic cardioverter/defibrillator) present  S/P ankle ligament repair      REVIEW OF SYSTEMS:  CONSTITUTIONAL: No fever, weight loss, or fatigue  EYES: No eye pain, visual disturbances, or discharge  ENMT:  No difficulty hearing, tinnitus, vertigo; No sinus or throat pain  NECK: No pain or stiffness  RESPIRATORY: No cough, wheezing, chills or hemoptysis; No shortness of breath  CARDIOVASCULAR: No chest pain, palpitations, dizziness, or leg swelling  GASTROINTESTINAL: No abdominal or epigastric pain. No nausea, vomiting, or hematemesis; No diarrhea or constipation. No melena or hematochezia.  GENITOURINARY: No dysuria, frequency, hematuria, or incontinence  NEUROLOGICAL: No headaches, memory loss, loss of strength, numbness, or tremors  SKIN: No itching, burning, rashes, or lesions   MUSCULOSKELETAL: No joint pain or swelling; No muscle, back, or extremity pain      T(C): 36.5 (20 @ 22:07), Max: 37.2 (20 @ 08:51)  HR: 98 (20 @ 22:07) (98 - 103)  BP: 113/75 (20 @ 22:07) (103/71 - 129/72)  RR: 18 (20 @ 22:07) (18 - 19)  SpO2: 96% (20 @ 22:07) (94% - 96%)  Wt(kg): --Vital Signs Last 24 Hrs  T(C): 36.5 (06 Sep 2020 22:07), Max: 37.2 (06 Sep 2020 08:51)  T(F): 97.7 (06 Sep 2020 22:07), Max: 98.9 (06 Sep 2020 08:51)  HR: 98 (06 Sep 2020 22:07) (98 - 103)  BP: 113/75 (06 Sep 2020 22:07) (103/71 - 129/72)  BP(mean): --  RR: 18 (06 Sep 2020 22:07) (18 - 19)  SpO2: 96% (06 Sep 2020 22:07) (94% - 96%)    PHYSICAL EXAM:  GENERAL: NAD,morbidly obese  HEAD:  Atraumatic, Normocephalic  EYES: EOMI, PERRLA, conjunctiva and sclera clear  ENMT: No tonsillar erythema, exudates, or enlargement; Moist mucous membranes, Good dentition, No lesions  NERVOUS SYSTEM:  Alert & Oriented X3, Good concentration; Motor Strength 5/5 B/L upper and lower extremities; DTRs 2+ intact and symmetric  CHEST/LUNG: Clear to percussion bilaterally; No rales, rhonchi, wheezing, or rubs  HEART: Regular rate and rhythm; No murmurs, rubs, or gallops  ABDOMEN: Soft, Nontender, Nondistended; Bowel sounds present  EXTREMITIES:  2+ Peripheral Pulses, No clubbing, cyanosis, or edema  LYMPH: No lymphadenopathy noted  SKIN: Abrasions on bilateral shins, wrapped with ace    Consultant(s) Notes Reviewed:  [x ] YES  [ ] NO  Care Discussed with Consultants/Other Providers [ x] YES  [ ] NO                            15.9   7.84  )-----------( 155      ( 05 Sep 2020 17:38 )             49.7       09-    133<L>  |  86<L>  |  41.0<H>  ----------------------------<  464<H>  4.0   |  31.0<H>  |  1.29    Ca    10.1      05 Sep 2020 21:24    TPro  7.5  /  Alb  3.9  /  TBili  0.6  /  DBili  x   /  AST  37  /  ALT  22  /  AlkPhos  98  09-              Urinalysis Basic - ( 05 Sep 2020 19:50 )    Color: Yellow / Appearance: Clear / S.015 / pH: x  Gluc: x / Ketone: Negative  / Bili: Negative / Urobili: Negative mg/dL   Blood: x / Protein: Negative mg/dL / Nitrite: Negative   Leuk Esterase: Negative / RBC: x / WBC x   Sq Epi: x / Non Sq Epi: x / Bacteria: x        PT/INR - ( 05 Sep 2020 17:38 )   PT: 23.0 sec;   INR: 2.05 ratio         PTT - ( 05 Sep 2020 17:38 )  PTT:43.2 sec    Lactate Trend      CARDIAC MARKERS ( 05 Sep 2020 19:51 )  x     / 0.03 ng/mL / 87 U/L / x     / x        CAPILLARY BLOOD GLUCOSE    POCT Blood Glucose.: 425 mg/dL (06 Sep 2020 21:18)    RADIOLOGY, EKG & ADDITIONAL TESTS: Reviewed.     < from: CT Cervical Spine No Cont (20 @ 21:30) >    IMPRESSION:    Atraumatic CT cervical, thoracic, and lumbar spine.  Multilevel spondylosis.    < end of copied text >  < from: CT Chest No Cont (20 @ 21:26) >  IMPRESSION:    1. No acute CT abnormality in the chest.  2. Enlarged main pulmonary artery, correlate for pulmonary artery hypertension.  3. Extensive coronary artery calcifications.  4. Scattered pulmonary micronodules measuringless than 4 mm. If the patient has clinical risk factors for malignancy, and occipital one-year CT chest follow-up can be considered per the revised Fleischer Society guidelines published in 2017.    < end of copied text >

## 2020-09-06 NOTE — PHYSICAL THERAPY INITIAL EVALUATION ADULT - PERTINENT HX OF CURRENT PROBLEM, REHAB EVAL
complaining of lower back pain secondary to slip & fall down 2 pool ladder steps. Denies hitting head or LOC. Presents with skin tears/abrasions to BLLE, bleeding controlled.

## 2020-09-06 NOTE — PROVIDER CONTACT NOTE (OTHER) - ASSESSMENT
PT short term goals (2-3 visits): bed mobility: mod assist x1-2, trnasfers: sit to stand with kelsey RW with min assist x 1, gait: amb 25 ft with RW with GC assist

## 2020-09-06 NOTE — CONSULT NOTE ADULT - SUBJECTIVE AND OBJECTIVE BOX
[facility], [facility]                                   [address] [place] [address]                                                      PHONE: [phone]                                                         FAX: [phone]  [email or web address]  ---------------------------------------------------------------------------------------------------------------------------------    Reason for Consult: PVCs    HPI:  MARY ANN CORNELL is an 69y Male with NICM (with improvement in LVEF), chronic afib on Coumadin, VT s/p ICD, HTN, morbid obesity, CHRISS, DM presented yesterday after falling while on the ladder in the pool. He suffered abrasions to both of his shins and fell on his back and says he has a lot of back pain. He denies chest pain, SOB, or any other cardiovascular symptoms. He denies palpitations, diaphoresis, or syncope.     PAST MEDICAL & SURGICAL HISTORY:  Lung nodules  Chronic back pain  Atrial fibrillation  CHF (congestive heart failure)  Pulmonary hypertension  Prostate CA  Sleep apnea  Renal insufficiency  High cholesterol  HTN (hypertension)  DM (diabetes mellitus)  AICD (automatic cardioverter/defibrillator) present  S/P ankle ligament repair      allow double protein at meals (Unknown)  No Known Allergies      MEDICATIONS  (STANDING):  carvedilol 6.25 milliGRAM(s) Oral daily  cephalexin 500 milliGRAM(s) Oral four times a day  dextrose 5%. 1000 milliLiter(s) (50 mL/Hr) IV Continuous <Continuous>  dextrose 50% Injectable 12.5 Gram(s) IV Push once  dextrose 50% Injectable 25 Gram(s) IV Push once  dextrose 50% Injectable 25 Gram(s) IV Push once  DULoxetine 60 milliGRAM(s) Oral daily  folic acid 1 milliGRAM(s) Oral daily  furosemide    Tablet 40 milliGRAM(s) Oral daily  gabapentin 300 milliGRAM(s) Oral two times a day  insulin glargine Injectable (LANTUS) 50 Unit(s) SubCutaneous at bedtime  insulin lispro (HumaLOG) corrective regimen sliding scale   SubCutaneous three times a day before meals  levothyroxine 50 MICROGram(s) Oral daily  loratadine 10 milliGRAM(s) Oral daily  losartan 50 milliGRAM(s) Oral daily  metolazone 2.5 milliGRAM(s) Oral daily  pantoprazole    Tablet 40 milliGRAM(s) Oral before breakfast  potassium chloride   Powder 20 milliEquivalent(s) Oral three times a day  warfarin 3 milliGRAM(s) Oral once    MEDICATIONS  (PRN):  ALBUTerol    90 MICROgram(s) HFA Inhaler 2 Puff(s) Inhalation every 6 hours PRN Shortness of Breath and/or Wheezing  dextrose 40% Gel 15 Gram(s) Oral once PRN Blood Glucose LESS THAN 70 milliGRAM(s)/deciliter  glucagon  Injectable 1 milliGRAM(s) IntraMuscular once PRN Glucose LESS THAN 70 milligrams/deciliter  oxyCODONE    IR 10 milliGRAM(s) Oral every 6 hours PRN Severe Pain (7 - 10)      Social History:  Cigarettes: former      Alcohol:    social             Illicit Drug Abuse:  none    Family History:  denies any significant family history of CAD, MI, CVA, or sudden death.     ROS: Negative other than as mentioned in HPI.    Vital Signs Last 24 Hrs  T(C): 37.2 (06 Sep 2020 08:51), Max: 37.2 (06 Sep 2020 08:51)  T(F): 98.9 (06 Sep 2020 08:51), Max: 98.9 (06 Sep 2020 08:51)  HR: 101 (06 Sep 2020 05:58) (100 - 101)  BP: 129/72 (06 Sep 2020 05:58) (103/71 - 129/72)  BP(mean): --  RR: 18 (06 Sep 2020 05:58) (18 - 20)  SpO2: 94% (06 Sep 2020 05:58) (94% - 95%)  ICU Vital Signs Last 24 Hrs  MARY ANN CORNELL  I&O's Detail    I&O's Summary    Drug Dosing Weight  MARY ANN KRAIG      PHYSICAL EXAM:  General: Appears well developed, well nourished alert and cooperative.  HEENT: Head; normocephalic, atraumatic.  Eyes: Pupils reactive, cornea wnl.  Neck: Supple, no nodes adenopathy, no NVD or carotid bruit or thyromegaly.  CARDIOVASCULAR: irregularly irregular rhythm, Normal S1 and S2, No murmur, rub, gallop or lift.   LUNGS: reduced breath sounds b/l  ABDOMEN: Soft, nontender without mass or organomegaly. bowel sounds normoactive.  EXTREMITIES: No clubbing, cyanosis or edema. Distal pulses wnl.   SKIN: warm and dry with normal turgor.  NEURO: Alert/oriented x 3/normal motor exam. No pathologic reflexes.    PSYCH: normal affect.        LABS:                        15.9   7.84  )-----------( 155      ( 05 Sep 2020 17:38 )             49.7         133<L>  |  86<L>  |  41.0<H>  ----------------------------<  464<H>  4.0   |  31.0<H>  |  1.29    Ca    10.1      05 Sep 2020 21:24    TPro  7.5  /  Alb  3.9  /  TBili  0.6  /  DBili  x   /  AST  37  /  ALT  22  /  AlkPhos  98      MARY ANN CORNELL  CARDIAC MARKERS ( 05 Sep 2020 19:51 )  x     / 0.03 ng/mL / 87 U/L / x     / x          PT/INR - ( 05 Sep 2020 17:38 )   PT: 23.0 sec;   INR: 2.05 ratio         PTT - ( 05 Sep 2020 17:38 )  PTT:43.2 sec  Urinalysis Basic - ( 05 Sep 2020 19:50 )    Color: Yellow / Appearance: Clear / S.015 / pH: x  Gluc: x / Ketone: Negative  / Bili: Negative / Urobili: Negative mg/dL   Blood: x / Protein: Negative mg/dL / Nitrite: Negative   Leuk Esterase: Negative / RBC: x / WBC x   Sq Epi: x / Non Sq Epi: x / Bacteria: x        RADIOLOGY & ADDITIONAL STUDIES:    INTERPRETATION OF TELEMETRY (personally reviewed): atrial fibrillation 100 bpm with PVCs and infrequent ventricular couplets and triplets     ECG: atrial fibrillation 100 bpm, multifocal PVCS, nonspecific ST abnormality     CARDIAC CATHETERIZATION 2016  Normal coronary arteries.    Assessment and Plan:  In summary, MARY ANN CORNELL is an 69y Male with past medical history significant for NICM (with improvement in LVEF), chronic afib on Coumadin, VT s/p ICD, HTN, morbid obesity, CHRISS, DM presented yesterday after falling while on the ladder in the pool. He suffered abrasions to both of his shins and fell on his back and says he has a lot of back pain. He denies chest pain, SOB, or any other cardiovascular symptoms.    PVCs, Chronic Atrial Fibrillation, NICM, ICD -- telemetry reviews shows afib 100-110 bpm with PVCs and rare ventricular couplets and triplets. The patient has no cardiovascular symptoms.  - can increase Coreg to 6.25 mg BID  - continue Coumadin for afib (goal INR 2-3)    Stable for discharge home from a cardiac perspective. I will arrange for close outpatient follow up.

## 2020-09-06 NOTE — PROVIDER CONTACT NOTE (OTHER) - SITUATION
Chart reviewed, contents noted. MD orders received. PT eval completed. Patient left on stretcher in NAD, CB within reach.

## 2020-09-06 NOTE — CHART NOTE - NSCHARTNOTEFT_GEN_A_CORE
SW NOTE: SWER PLACED CALL PAL NURSING SUPERVISOR [phone] AT [facility] WHO RELATED INTEREST IN ACCEPTING PT PENDING AUTH APPROVAL. PER PT'S CHART REVIEW, PT IS SERVICED BY [facility], AND PT'S COORDINATOR IS MELINDA BRITTON  AND  [phone]. SWER UNABLE TO FOLLOW UP WITH AUTH DUE TO THE WEEKEND AND UPCOMING HOLIDAY. PER NP [place], PT WILL BE PLACED ON OBS PENDING AUTH APPROVAL.  MARY AND CLINICALS UPLOADED IN ACM. NP, RN, PT AND DTR AWARE SW NOTE: SWER PLACED CALL PAL NURSING SUPERVISOR [phone] AT [facility] WHO RELATED INTEREST IN ACCEPTING PT PENDING AUTH APPROVAL. PER PT'S PRIOR CHART REVIEW, PT IS SERVICED BY [facility], AND PT'S COORDINATOR IS MELINDA BRITTON  AND  [phone]. SWER SENT CLINICALS AND MARY  REFERRAL TO INS [employer] THROUGH [facility] FOR AUTH APPROVAL. MARY AND CLINICALS SENT TO [facility] FOR REVIEW. EVERYTHING ON STAND STILL DUE TO LONG WEEKEND. PER JHOAN WILLIS, PT WILL BE PLACED ON OBS PENDING AUTH APPROVAL.  MARY AND CLINICALS UPLOADED IN ACM. NP, RN, PT AND DTR AWARE

## 2020-09-06 NOTE — ED CDU PROVIDER INITIAL DAY NOTE - MUSCULOSKELETAL MINIMAL EXAM
neck supple/RANGE OF MOTION LIMITED/TENDERNESS/right shoulder pain to palpation, diffuse pain to palpation over c spine, upper and lower back

## 2020-09-06 NOTE — CONSULT NOTE ADULT - ASSESSMENT
69M hx Afib on coumadin, IDDM, VT s/p ICD, HFrEF (25%), CHRISS, morbidly obese presents s/p mechanical fall, found to be hyperglycemic.        #Hyperglycemia in Type 2 diabetes on insulin    -persistent hyperglyecmia likely 2/2 missed long acting insulin dose  -now s/p 50 lantus  -continue to trend finger sticks  -check bmp in am   -restart patient on home regimen of Lantus 25 bid starting tomorrow night  -c/w lantus 5 premeal with ISS  -if pt persistently hyperglycemic in am may admit to medicine otherwise can discharge to SCOTTY    #Afib on coumadin  -coumadin dose today  -monitor INR  -c/w coreg    #VT s/p ICD  -pt with known frequent pvc, occasional 4 beat NSVT  -coreg increased by cardiology to 6.25 BID, continue to monitor    #HFrEF  -pt does not appear overloaded  -c/w lasix    #CHRISS  -nocturnal cpap    #mechanical fall  -plan for SCOTTY in am

## 2020-09-06 NOTE — CHART NOTE - NSCHARTNOTEFT_GEN_A_CORE
SW NOTE: THIS WRITER RECEIVED CALL FROM KIRA WILLIS STATING PT WILL BENEFIT FROM [facility] DUE TO MULTIPLE FALLS.  NANIR MET PT AT BEDSIDE WHO PRESENTED TO BE ALERT ORIENTED X4.  SWER EDUCATED PT ON SW ROLE, SNF AND D/C PLANNING ROLE.  PT STATED HE FELL AT [facility]'S POOL LADDER WHILE TRYING TO GET OUT OF THE POOL.  PER PT, HE WAS PREVIOUSLY IN [facility] 3 MONTHS AGO FOR REHAB AND WAS RECENTLY D/C'D 10 DAYS AGO TO [facility] BEFORE GOING TO [facility]'S HOUSE WHERE THE FALL HAPPENED. PER PT, HIS SPOUSE IS PRESENTLY AT [facility] NURSING AND REHAB. PT EXPRESSED INTEREST IN RETURNING BACK TO [facility]. SWER CALLED R CHRISTOPHER  AND INFORMED OF D/C PLAN. SWER TO FOLLOW ON PT RETURNING TO [facility].

## 2020-09-06 NOTE — PROVIDER CONTACT NOTE (OTHER) - BACKGROUND
Pt. educated on use of CB for all needs and transfers with assist from staff; pt. verbalized understanding. PT will follow. Pre and post session pain: 0/10 but 7/10 back pain with movement.

## 2020-09-07 LAB
ANION GAP SERPL CALC-SCNC: 11 MMOL/L — SIGNIFICANT CHANGE UP (ref 5–17)
APTT BLD: 44.2 SEC — HIGH (ref 27.5–35.5)
BUN SERPL-MCNC: 47 MG/DL — HIGH (ref 8–20)
CALCIUM SERPL-MCNC: 9.1 MG/DL — SIGNIFICANT CHANGE UP (ref 8.6–10.2)
CHLORIDE SERPL-SCNC: 89 MMOL/L — LOW (ref 98–107)
CO2 SERPL-SCNC: 35 MMOL/L — HIGH (ref 22–29)
CREAT SERPL-MCNC: 1.53 MG/DL — HIGH (ref 0.5–1.3)
GLUCOSE BLDC GLUCOMTR-MCNC: 267 MG/DL — HIGH (ref 70–99)
GLUCOSE BLDC GLUCOMTR-MCNC: 272 MG/DL — HIGH (ref 70–99)
GLUCOSE BLDC GLUCOMTR-MCNC: 292 MG/DL — HIGH (ref 70–99)
GLUCOSE BLDC GLUCOMTR-MCNC: 294 MG/DL — HIGH (ref 70–99)
GLUCOSE BLDC GLUCOMTR-MCNC: 308 MG/DL — HIGH (ref 70–99)
GLUCOSE BLDC GLUCOMTR-MCNC: 358 MG/DL — HIGH (ref 70–99)
GLUCOSE SERPL-MCNC: 316 MG/DL — HIGH (ref 70–99)
INR BLD: 1.86 RATIO — HIGH (ref 0.88–1.16)
MAGNESIUM SERPL-MCNC: 1.7 MG/DL — SIGNIFICANT CHANGE UP (ref 1.6–2.6)
PHOSPHATE SERPL-MCNC: 4.1 MG/DL — SIGNIFICANT CHANGE UP (ref 2.4–4.7)
POTASSIUM SERPL-MCNC: 4.1 MMOL/L — SIGNIFICANT CHANGE UP (ref 3.5–5.3)
POTASSIUM SERPL-SCNC: 4.1 MMOL/L — SIGNIFICANT CHANGE UP (ref 3.5–5.3)
PROTHROM AB SERPL-ACNC: 21 SEC — HIGH (ref 10.6–13.6)
SARS-COV-2 RNA SPEC QL NAA+PROBE: SIGNIFICANT CHANGE UP
SODIUM SERPL-SCNC: 135 MMOL/L — SIGNIFICANT CHANGE UP (ref 135–145)
T4 AB SER-ACNC: 9.2 UG/DL — SIGNIFICANT CHANGE UP (ref 4.5–12)
TSH SERPL-MCNC: 2.71 UIU/ML — SIGNIFICANT CHANGE UP (ref 0.27–4.2)

## 2020-09-07 PROCEDURE — 99226: CPT

## 2020-09-07 RX ORDER — SODIUM CHLORIDE 9 MG/ML
1000 INJECTION INTRAMUSCULAR; INTRAVENOUS; SUBCUTANEOUS ONCE
Refills: 0 | Status: COMPLETED | OUTPATIENT
Start: 2020-09-07 | End: 2020-09-07

## 2020-09-07 RX ORDER — WARFARIN SODIUM 2.5 MG/1
3 TABLET ORAL ONCE
Refills: 0 | Status: COMPLETED | OUTPATIENT
Start: 2020-09-07 | End: 2020-09-07

## 2020-09-07 RX ADMIN — CARVEDILOL PHOSPHATE 6.25 MILLIGRAM(S): 80 CAPSULE, EXTENDED RELEASE ORAL at 05:54

## 2020-09-07 RX ADMIN — GABAPENTIN 300 MILLIGRAM(S): 400 CAPSULE ORAL at 05:55

## 2020-09-07 RX ADMIN — OXYCODONE HYDROCHLORIDE 10 MILLIGRAM(S): 5 TABLET ORAL at 07:43

## 2020-09-07 RX ADMIN — LORATADINE 10 MILLIGRAM(S): 10 TABLET ORAL at 11:40

## 2020-09-07 RX ADMIN — Medication 5 UNIT(S): at 16:26

## 2020-09-07 RX ADMIN — Medication 6: at 11:39

## 2020-09-07 RX ADMIN — Medication 40 MILLIGRAM(S): at 05:54

## 2020-09-07 RX ADMIN — OXYCODONE HYDROCHLORIDE 10 MILLIGRAM(S): 5 TABLET ORAL at 23:45

## 2020-09-07 RX ADMIN — Medication 20 MILLIEQUIVALENT(S): at 16:26

## 2020-09-07 RX ADMIN — OXYCODONE HYDROCHLORIDE 10 MILLIGRAM(S): 5 TABLET ORAL at 17:20

## 2020-09-07 RX ADMIN — Medication 6: at 16:26

## 2020-09-07 RX ADMIN — Medication 5 UNIT(S): at 11:40

## 2020-09-07 RX ADMIN — WARFARIN SODIUM 3 MILLIGRAM(S): 2.5 TABLET ORAL at 22:46

## 2020-09-07 RX ADMIN — DULOXETINE HYDROCHLORIDE 60 MILLIGRAM(S): 30 CAPSULE, DELAYED RELEASE ORAL at 11:40

## 2020-09-07 RX ADMIN — SODIUM CHLORIDE 1000 MILLILITER(S): 9 INJECTION INTRAMUSCULAR; INTRAVENOUS; SUBCUTANEOUS at 07:40

## 2020-09-07 RX ADMIN — Medication 50 MICROGRAM(S): at 05:55

## 2020-09-07 RX ADMIN — Medication 500 MILLIGRAM(S): at 00:31

## 2020-09-07 RX ADMIN — Medication 500 MILLIGRAM(S): at 16:28

## 2020-09-07 RX ADMIN — Medication 20 MILLIEQUIVALENT(S): at 22:46

## 2020-09-07 RX ADMIN — PANTOPRAZOLE SODIUM 40 MILLIGRAM(S): 20 TABLET, DELAYED RELEASE ORAL at 07:40

## 2020-09-07 RX ADMIN — OXYCODONE HYDROCHLORIDE 10 MILLIGRAM(S): 5 TABLET ORAL at 16:27

## 2020-09-07 RX ADMIN — SODIUM CHLORIDE 1000 MILLILITER(S): 9 INJECTION INTRAMUSCULAR; INTRAVENOUS; SUBCUTANEOUS at 08:40

## 2020-09-07 RX ADMIN — OXYCODONE HYDROCHLORIDE 10 MILLIGRAM(S): 5 TABLET ORAL at 22:45

## 2020-09-07 RX ADMIN — Medication 5 UNIT(S): at 07:42

## 2020-09-07 RX ADMIN — CARVEDILOL PHOSPHATE 6.25 MILLIGRAM(S): 80 CAPSULE, EXTENDED RELEASE ORAL at 22:47

## 2020-09-07 RX ADMIN — Medication 500 MILLIGRAM(S): at 05:54

## 2020-09-07 RX ADMIN — LOSARTAN POTASSIUM 50 MILLIGRAM(S): 100 TABLET, FILM COATED ORAL at 05:55

## 2020-09-07 RX ADMIN — OXYCODONE HYDROCHLORIDE 10 MILLIGRAM(S): 5 TABLET ORAL at 08:30

## 2020-09-07 RX ADMIN — Medication 1 MILLIGRAM(S): at 11:40

## 2020-09-07 RX ADMIN — Medication 6: at 07:42

## 2020-09-07 RX ADMIN — GABAPENTIN 300 MILLIGRAM(S): 400 CAPSULE ORAL at 16:28

## 2020-09-07 RX ADMIN — Medication 500 MILLIGRAM(S): at 23:52

## 2020-09-07 RX ADMIN — Medication 500 MILLIGRAM(S): at 11:40

## 2020-09-07 RX ADMIN — INSULIN GLARGINE 50 UNIT(S): 100 INJECTION, SOLUTION SUBCUTANEOUS at 22:47

## 2020-09-07 RX ADMIN — Medication 20 MILLIEQUIVALENT(S): at 05:55

## 2020-09-07 NOTE — ED ADULT NURSE REASSESSMENT NOTE - NURSING MUSC JOINTS
no pain, swelling or deformity of joints
bilateral joint limitation...
no pain, swelling or deformity of joints
no pain, swelling or deformity of joints

## 2020-09-07 NOTE — ED ADULT NURSE REASSESSMENT NOTE - COMFORT CARE
meal provided
meal provided
po fluids offered/plan of care explained/side rails up
meal provided/plan of care explained
meal provided/po fluids offered/wait time explained/plan of care explained/repositioned
plan of care explained/po fluids offered/meal provided/wait time explained
ambulated to bathroom

## 2020-09-07 NOTE — ED ADULT NURSE REASSESSMENT NOTE - NURSING MUSC STRENGTH
hand grasp, leg strength strong and equal bilaterally
limitations in strength
hand grasp, leg strength strong and equal bilaterally
hand grasp, leg strength strong and equal bilaterally

## 2020-09-07 NOTE — PROGRESS NOTE ADULT - SUBJECTIVE AND OBJECTIVE BOX
Patient seen and examined . Comfortable , state not comfortable being in the bed all the time , chronic sob + , no n/v ,   had BM yesterday .     CC : S/P fall at Home , tripped , no LOC , found to be hyperglycemic       MEDICATIONS  (STANDING):  carvedilol 6.25 milliGRAM(s) Oral every 12 hours  cephalexin 500 milliGRAM(s) Oral four times a day  dextrose 5%. 1000 milliLiter(s) (50 mL/Hr) IV Continuous <Continuous>  dextrose 50% Injectable 12.5 Gram(s) IV Push once  dextrose 50% Injectable 25 Gram(s) IV Push once  dextrose 50% Injectable 25 Gram(s) IV Push once  DULoxetine 60 milliGRAM(s) Oral daily  folic acid 1 milliGRAM(s) Oral daily  furosemide    Tablet 40 milliGRAM(s) Oral daily  gabapentin 300 milliGRAM(s) Oral two times a day  insulin glargine Injectable (LANTUS) 50 Unit(s) SubCutaneous at bedtime  insulin lispro (HumaLOG) corrective regimen sliding scale   SubCutaneous three times a day before meals  insulin lispro Injectable (HumaLOG) 5 Unit(s) SubCutaneous three times a day before meals  levothyroxine 50 MICROGram(s) Oral daily  loratadine 10 milliGRAM(s) Oral daily  losartan 50 milliGRAM(s) Oral daily  metolazone 2.5 milliGRAM(s) Oral daily  pantoprazole    Tablet 40 milliGRAM(s) Oral before breakfast  potassium chloride   Powder 20 milliEquivalent(s) Oral three times a day    MEDICATIONS  (PRN):  ALBUTerol    90 MICROgram(s) HFA Inhaler 2 Puff(s) Inhalation every 6 hours PRN Shortness of Breath and/or Wheezing  dextrose 40% Gel 15 Gram(s) Oral once PRN Blood Glucose LESS THAN 70 milliGRAM(s)/deciliter  glucagon  Injectable 1 milliGRAM(s) IntraMuscular once PRN Glucose LESS THAN 70 milligrams/deciliter  oxyCODONE    IR 10 milliGRAM(s) Oral every 6 hours PRN Severe Pain (7 - 10)      LABS:                          15.9   7.84  )-----------( 155      ( 05 Sep 2020 17:38 )             49.7     09-07    135  |  89<L>  |  47.0<H>  ----------------------------<  316<H>  4.1   |  35.0<H>  |  1.53<H>    Ca    9.1      07 Sep 2020 06:21  Phos  4.1       Mg     1.7         TPro  7.5  /  Alb  3.9  /  TBili  0.6  /  DBili  x   /  AST  37  /  ALT  22  /  AlkPhos  98      PT/INR - ( 05 Sep 2020 17:38 )   PT: 23.0 sec;   INR: 2.05 ratio         PTT - ( 05 Sep 2020 17:38 )  PTT:43.2 sec  Urinalysis Basic - ( 05 Sep 2020 19:50 )    Color: Yellow / Appearance: Clear / S.015 / pH: x  Gluc: x / Ketone: Negative  / Bili: Negative / Urobili: Negative mg/dL   Blood: x / Protein: Negative mg/dL / Nitrite: Negative   Leuk Esterase: Negative / RBC: x / WBC x   Sq Epi: x / Non Sq Epi: x / Bacteria: x        RADIOLOGY & ADDITIONAL TESTS:  < from: CT Cervical Spine No Cont (20 @ 21:30) >     EXAM:  CT LUMBAR SPINE                         EXAM:  CT REFORM SPINE T                         EXAM:  CT CERVICAL SPINE                          PROCEDURE DATE:  2020          INTERPRETATION:  HISTORY: Trauma. Fall.    COMPARISON: None    TECHNIQUE: Axial noncontrast CT images of the cervical, thoracic, and lumbar spine were obtained and submitted for interpretation. Sagittal and coronal reformatted images of the cervical spine were provided. Bone and soft tissue windows were evaluated.    FINDINGS:    CT CERVICAL SPINE    Straightening of the cervical lordosis due to muscle spasm and/or positioning. There is no prevertebral soft tissue swelling. Vertebral body heights and alignment are maintained without compression deformity or subluxation.    Upper cervical disc degeneration with anterior osteophyte formation noted. The atlantodental and atlanto-occipital joints are maintained. Articular facets and posterior elements alignment is maintained.    Left sided pacer leads are partially imaged.    CT THORACIC AND LUMBAR SPINE    Thoracic kyphosis and lumbar lordosis are maintained. No prevertebral soft tissue swelling. Vertebral body heights and alignment are maintained without compression fracture or subluxation.    Multileveldisc degeneration with prominent anterior osteophyte formation throughout the thoracic and lumbar spine. Lower lumbar disc bulging and hypertrophic facet degeneration noted.    Nonobstructing left intrarenal calculus noted.    Scattered atheromatous calcification along the aorta.    IMPRESSION:    Atraumatic CT cervical, thoracic, and lumbar spine.  Multilevel spondylosis.    ROYCE HERNDON M.D., ATTENDING RADIOLOGIST  This document has been electronically signed. Sep  5 2020 10:36PM        < end of copied text >    < from: CT Chest No Cont (20 @ 21:26) >   EXAM:  CT CHEST                          PROCEDURE DATE:  2020          INTERPRETATION:  Exam: CT Chest without contrast    History: Chest pain    Technique: CT examination of the chest was performed without contrast. Multiplanar reformattedimages were provided.  CT dose lowering techniques were used, to include: automated exposure control, adjustment for patient size, and or use of iterative reconstruction.    Comparison: None available      FINDINGS:    Pulmonary Parenchyma and Airways: No infiltrate or suspicious nodule. A few tiny micronodules measuring 3 mm or less.    Pleural and Pericardial spaces: No pleural or pericardial effusion. No pneumothorax.. A few tiny pleural calcifications.    Axilla, Thyroid: No axillary or supraclavicular adenopathy. Visualized thyroid is normal.    Mediastinum and Jennyfer: No enlarged mediastinal or hilar lymph node measuring greater than 10 mm in short axis seen, within the limitations of noncontrast CT.    Cardiovascular: Coronary artery calcifications are present.  Thoracic aorta is normal in caliber. The heart size is normal. Main pulmonary artery is enlarged.. Left-sided permanent pacemaker, single lead extends into the right ventricle.    Upper Abdomen: Hepatic steatosis.    Bones: No acute finding.      IMPRESSION:    1. No acute CT abnormality in the chest.  2. Enlarged main pulmonary artery, correlate for pulmonary artery hypertension.  3. Extensive coronary artery calcifications.  4. Scattered pulmonary micronodules measuringless than 4 mm. If the patient has clinical risk factors for malignancy, and occipital one-year CT chest follow-up can be considered per the revised Fleischer Society guidelines published in 2017.    JAMES SEXTON M.D., ATTENDING RADIOLOGIST  This document has been electronically signed. Sep  5 2020 10:20PM        < end of copied text >    < from: Xray Shoulder 2 Views, Right (20 @ 17:59) >     EXAM:  SHOULDER COMP  MIN 2 VIEWS-RT                          PROCEDURE DATE:  2020          INTERPRETATION:  CLINICAL INFORMATION: fall. . .    EXAM:  3 views of the right shoulder.    COMPARISON: Left shoulder radiographs 2020    FINDINGS:  No acute fracture or dislocation.    No periarticular soft tissue calcifications.    IMPRESSION:  No acute fracture or dislocation.    RADHA WILSON M.D., ATTENDING RADIOLOGIST  This document has been electronically signed. Sep  6 2020  9:06AM        < end of copied text >              REVIEW OF SYSTEMS:    AAOX3 , s/p fall , chronic sob , all other systems are reviewed and are negative     Vital Signs Last 24 Hrs  T(C): 36.1 (07 Sep 2020 11:30), Max: 36.6 (07 Sep 2020 07:36)  T(F): 97 (07 Sep 2020 11:30), Max: 97.9 (07 Sep 2020 07:36)  HR: 77 (07 Sep 2020 11:30) (77 - 103)  BP: 125/84 (07 Sep 2020 11:30) (86/56 - 125/84)  BP(mean): --  RR: 18 (07 Sep 2020 09:02) (18 - 20)  SpO2: 93% (07 Sep 2020 11:30) (93% - 97%)    PHYSICAL EXAM:    GENERAL: NAD, well-groomed, morbidly obese  HEAD:  Atraumatic, Normocephalic  EYES: EOMI, PERRLA, conjunctiva and sclera clear  NECK: Supple, No JVD, Normal thyroid  NERVOUS SYSTEM:  Alert & Oriented X3, no focal deficit  CHEST/LUNG: CTA b/l ,  no  rales, rhonchi, wheezing, or rubs  HEART: Irregularly irregular No murmurs, rubs, or gallops  ABDOMEN: Soft, Nontender, Nondistended; Bowel sounds present  EXTREMITIES:  2+ Peripheral Pulses, No clubbing, cyanosis, or edema , B/L LE wrapped with ACE wrap   LYMPH: No lymphadenopathy noted  SKIN: No rashes or lesions

## 2020-09-07 NOTE — ED ADULT NURSE REASSESSMENT NOTE - GENERAL PATIENT STATE
smiling/interactive/no change observed/comfortable appearance/cooperative
comfortable appearance/cooperative
cooperative
cooperative/comfortable appearance
comfortable appearance/cooperative

## 2020-09-07 NOTE — ED ADULT NURSE REASSESSMENT NOTE - STATUS
awaiting consult
awaiting consult/PT eval
awaiting discharge, no change
awaiting discharge, no change

## 2020-09-07 NOTE — ED CDU PROVIDER SUBSEQUENT DAY NOTE - PROGRESS NOTE DETAILS
PA NOTE: Patient evaluated with Dr. De La Cruz on AM rounds. Wound dressings dry and intact. No signs of acute infection. Wound dressing replaced. Pt pending SCOTTY placement at this time.

## 2020-09-07 NOTE — ED ADULT NURSE REASSESSMENT NOTE - NURSING MUSC ROM
full range of motion in all extremities
- - -
full range of motion in all extremities
full range of motion in all extremities

## 2020-09-07 NOTE — PROGRESS NOTE ADULT - ASSESSMENT
69M hx Afib on coumadin, IDDM, VT s/p ICD, HFrEF (25%), CHRISS, morbidly obese presents s/p mechanical fall, found to be hyperglycemic.        #Hyperglycemia in Type 2 diabetes on insulin    -persistent hyperglyecmia likely 2/2 missed long acting insulin dose- now better   -now s/p 50 lantus  -continue to trend finger sticks  -restart patient on home regimen of Lantus 25 bid starting tomorrow night  -c/w Humalog 5 premeal with ISS      #Afib on coumadin  -coumadin dose today  -monitor INR, keep between 2-3   -c/w coreg     #VT s/p ICD, NICM   -pt with known frequent pvc, occasional 4 beat NSVT  -coreg increased by cardiology to 6.25 BID, continue to monitor    #HFrEF  -pt does not appear overloaded  -hold lasix x 24 hrs due to BP being on lower side and pre renal azotemia     #CHRISS  -nocturnal cpap    #mechanical fall  -plan for SCOTTY in am    # Morbid obesity - BMI - 52 - will benefit from life style changes and Nutritionist mecca .

## 2020-09-07 NOTE — CHART NOTE - NSCHARTNOTEFT_GEN_A_CORE
SOCIAL WORK NOTE:  DISCUSSED CASE WITH OBSERVATION TEAM.  PATIENT'S PLAN REMAINS SCOTTY.  PATIENT [facility], WIFE THERE AS WELL.  RESIDES AT [facility] AND WILL NEED AUTH WHICH WAS SUBMITTED FOR. UNLIKELY TO GET AUTH SECONDARY TO HOLIDAY.  AS PER TEAM PATIENT LIKELY TO BE ADMITTED FOR ELEVATED SUGARS, LOW BP AND OOZING AND BLEEDING WOUNDS.  SW WILL FOLLOW FOR SCOTTY VS RETURN TO ASSISTED LIVING.

## 2020-09-08 DIAGNOSIS — R73.9 HYPERGLYCEMIA, UNSPECIFIED: ICD-10-CM

## 2020-09-08 LAB
ANION GAP SERPL CALC-SCNC: 12 MMOL/L — SIGNIFICANT CHANGE UP (ref 5–17)
APTT BLD: 38.9 SEC — HIGH (ref 27.5–35.5)
BUN SERPL-MCNC: 49 MG/DL — HIGH (ref 8–20)
CALCIUM SERPL-MCNC: 8.5 MG/DL — LOW (ref 8.6–10.2)
CHLORIDE SERPL-SCNC: 90 MMOL/L — LOW (ref 98–107)
CO2 SERPL-SCNC: 27 MMOL/L — SIGNIFICANT CHANGE UP (ref 22–29)
CREAT SERPL-MCNC: 1.48 MG/DL — HIGH (ref 0.5–1.3)
GLUCOSE BLDC GLUCOMTR-MCNC: 230 MG/DL — HIGH (ref 70–99)
GLUCOSE BLDC GLUCOMTR-MCNC: 244 MG/DL — HIGH (ref 70–99)
GLUCOSE BLDC GLUCOMTR-MCNC: 287 MG/DL — HIGH (ref 70–99)
GLUCOSE BLDC GLUCOMTR-MCNC: 333 MG/DL — HIGH (ref 70–99)
GLUCOSE SERPL-MCNC: 291 MG/DL — HIGH (ref 70–99)
INR BLD: 1.95 RATIO — HIGH (ref 0.88–1.16)
POTASSIUM SERPL-MCNC: 4.6 MMOL/L — SIGNIFICANT CHANGE UP (ref 3.5–5.3)
POTASSIUM SERPL-SCNC: 4.6 MMOL/L — SIGNIFICANT CHANGE UP (ref 3.5–5.3)
PROTHROM AB SERPL-ACNC: 21.9 SEC — HIGH (ref 10.6–13.6)
SODIUM SERPL-SCNC: 129 MMOL/L — LOW (ref 135–145)

## 2020-09-08 PROCEDURE — 99223 1ST HOSP IP/OBS HIGH 75: CPT | Mod: AI

## 2020-09-08 PROCEDURE — 76775 US EXAM ABDO BACK WALL LIM: CPT | Mod: 26

## 2020-09-08 PROCEDURE — 99217: CPT

## 2020-09-08 PROCEDURE — 99222 1ST HOSP IP/OBS MODERATE 55: CPT

## 2020-09-08 RX ORDER — WARFARIN SODIUM 2.5 MG/1
4 TABLET ORAL ONCE
Refills: 0 | Status: COMPLETED | OUTPATIENT
Start: 2020-09-08 | End: 2020-09-08

## 2020-09-08 RX ORDER — CEFAZOLIN SODIUM 1 G
VIAL (EA) INJECTION
Refills: 0 | Status: DISCONTINUED | OUTPATIENT
Start: 2020-09-08 | End: 2020-09-08

## 2020-09-08 RX ORDER — LIDOCAINE 4 G/100G
1 CREAM TOPICAL DAILY
Refills: 0 | Status: DISCONTINUED | OUTPATIENT
Start: 2020-09-08 | End: 2020-09-12

## 2020-09-08 RX ORDER — INSULIN LISPRO 100/ML
7 VIAL (ML) SUBCUTANEOUS
Refills: 0 | Status: DISCONTINUED | OUTPATIENT
Start: 2020-09-08 | End: 2020-09-09

## 2020-09-08 RX ORDER — ENOXAPARIN SODIUM 100 MG/ML
60 INJECTION SUBCUTANEOUS EVERY 12 HOURS
Refills: 0 | Status: DISCONTINUED | OUTPATIENT
Start: 2020-09-08 | End: 2020-09-08

## 2020-09-08 RX ORDER — PIPERACILLIN AND TAZOBACTAM 4; .5 G/20ML; G/20ML
3.38 INJECTION, POWDER, LYOPHILIZED, FOR SOLUTION INTRAVENOUS EVERY 8 HOURS
Refills: 0 | Status: DISCONTINUED | OUTPATIENT
Start: 2020-09-08 | End: 2020-09-12

## 2020-09-08 RX ORDER — ENOXAPARIN SODIUM 100 MG/ML
30 INJECTION SUBCUTANEOUS DAILY
Refills: 0 | Status: DISCONTINUED | OUTPATIENT
Start: 2020-09-08 | End: 2020-09-08

## 2020-09-08 RX ORDER — ALLOPURINOL 300 MG
300 TABLET ORAL DAILY
Refills: 0 | Status: DISCONTINUED | OUTPATIENT
Start: 2020-09-08 | End: 2020-09-12

## 2020-09-08 RX ORDER — METHOCARBAMOL 500 MG/1
750 TABLET, FILM COATED ORAL EVERY 6 HOURS
Refills: 0 | Status: DISCONTINUED | OUTPATIENT
Start: 2020-09-08 | End: 2020-09-12

## 2020-09-08 RX ORDER — CEFAZOLIN SODIUM 1 G
1000 VIAL (EA) INJECTION EVERY 8 HOURS
Refills: 0 | Status: DISCONTINUED | OUTPATIENT
Start: 2020-09-08 | End: 2020-09-08

## 2020-09-08 RX ORDER — PIPERACILLIN AND TAZOBACTAM 4; .5 G/20ML; G/20ML
3.38 INJECTION, POWDER, LYOPHILIZED, FOR SOLUTION INTRAVENOUS ONCE
Refills: 0 | Status: COMPLETED | OUTPATIENT
Start: 2020-09-08 | End: 2020-09-08

## 2020-09-08 RX ORDER — CEFAZOLIN SODIUM 1 G
1000 VIAL (EA) INJECTION ONCE
Refills: 0 | Status: COMPLETED | OUTPATIENT
Start: 2020-09-08 | End: 2020-09-08

## 2020-09-08 RX ADMIN — DULOXETINE HYDROCHLORIDE 60 MILLIGRAM(S): 30 CAPSULE, DELAYED RELEASE ORAL at 11:48

## 2020-09-08 RX ADMIN — METHOCARBAMOL 750 MILLIGRAM(S): 500 TABLET, FILM COATED ORAL at 23:13

## 2020-09-08 RX ADMIN — Medication 5 UNIT(S): at 11:52

## 2020-09-08 RX ADMIN — Medication 20 MILLIEQUIVALENT(S): at 05:34

## 2020-09-08 RX ADMIN — INSULIN GLARGINE 50 UNIT(S): 100 INJECTION, SOLUTION SUBCUTANEOUS at 23:12

## 2020-09-08 RX ADMIN — Medication 6: at 17:46

## 2020-09-08 RX ADMIN — PIPERACILLIN AND TAZOBACTAM 25 GRAM(S): 4; .5 INJECTION, POWDER, LYOPHILIZED, FOR SOLUTION INTRAVENOUS at 23:12

## 2020-09-08 RX ADMIN — OXYCODONE HYDROCHLORIDE 10 MILLIGRAM(S): 5 TABLET ORAL at 05:33

## 2020-09-08 RX ADMIN — GABAPENTIN 300 MILLIGRAM(S): 400 CAPSULE ORAL at 05:33

## 2020-09-08 RX ADMIN — METHOCARBAMOL 750 MILLIGRAM(S): 500 TABLET, FILM COATED ORAL at 17:40

## 2020-09-08 RX ADMIN — LOSARTAN POTASSIUM 50 MILLIGRAM(S): 100 TABLET, FILM COATED ORAL at 05:33

## 2020-09-08 RX ADMIN — PIPERACILLIN AND TAZOBACTAM 200 GRAM(S): 4; .5 INJECTION, POWDER, LYOPHILIZED, FOR SOLUTION INTRAVENOUS at 17:40

## 2020-09-08 RX ADMIN — CARVEDILOL PHOSPHATE 6.25 MILLIGRAM(S): 80 CAPSULE, EXTENDED RELEASE ORAL at 05:33

## 2020-09-08 RX ADMIN — Medication 500 MILLIGRAM(S): at 05:34

## 2020-09-08 RX ADMIN — Medication 4: at 11:52

## 2020-09-08 RX ADMIN — LIDOCAINE 1 PATCH: 4 CREAM TOPICAL at 18:21

## 2020-09-08 RX ADMIN — WARFARIN SODIUM 4 MILLIGRAM(S): 2.5 TABLET ORAL at 23:13

## 2020-09-08 RX ADMIN — METHOCARBAMOL 750 MILLIGRAM(S): 500 TABLET, FILM COATED ORAL at 11:43

## 2020-09-08 RX ADMIN — Medication 100 MILLIGRAM(S): at 11:42

## 2020-09-08 RX ADMIN — Medication 300 MILLIGRAM(S): at 11:48

## 2020-09-08 RX ADMIN — CARVEDILOL PHOSPHATE 6.25 MILLIGRAM(S): 80 CAPSULE, EXTENDED RELEASE ORAL at 17:40

## 2020-09-08 RX ADMIN — PANTOPRAZOLE SODIUM 40 MILLIGRAM(S): 20 TABLET, DELAYED RELEASE ORAL at 05:35

## 2020-09-08 RX ADMIN — METHOCARBAMOL 750 MILLIGRAM(S): 500 TABLET, FILM COATED ORAL at 05:34

## 2020-09-08 RX ADMIN — Medication 5 UNIT(S): at 07:34

## 2020-09-08 RX ADMIN — LORATADINE 10 MILLIGRAM(S): 10 TABLET ORAL at 11:48

## 2020-09-08 RX ADMIN — OXYCODONE HYDROCHLORIDE 10 MILLIGRAM(S): 5 TABLET ORAL at 18:24

## 2020-09-08 RX ADMIN — Medication 8: at 07:33

## 2020-09-08 RX ADMIN — Medication 1 MILLIGRAM(S): at 11:43

## 2020-09-08 RX ADMIN — Medication 7 UNIT(S): at 17:46

## 2020-09-08 RX ADMIN — Medication 50 MICROGRAM(S): at 05:33

## 2020-09-08 RX ADMIN — Medication 40 MILLIGRAM(S): at 05:34

## 2020-09-08 RX ADMIN — LIDOCAINE 1 PATCH: 4 CREAM TOPICAL at 11:49

## 2020-09-08 RX ADMIN — GABAPENTIN 300 MILLIGRAM(S): 400 CAPSULE ORAL at 17:40

## 2020-09-08 NOTE — ED CDU PROVIDER SUBSEQUENT DAY NOTE - ATTENDING CONTRIBUTION TO CARE
69 YOM PMHx morbid obesity, uncontrolled DM, A-fib, CHRISS, chronic back pain here for fall off pool ladder. in ED, CT without acute injury. Patient with multiple scrapes on bilateral lower legs, still oozing. hyperglycemia without anion gap, appreciate medicine recs. noted transient hypotension, without symptoms. appreciate  assistance for SCOTTY.
I agree with the PA's note and was available for any issues/concerns. I was directly involved in patient care. My brief overall assessment is as follows:    No events overnight; patient awaiting SCOTTY placement

## 2020-09-08 NOTE — ED CDU PROVIDER SUBSEQUENT DAY NOTE - PHYSICAL EXAMINATION
pitting edema 3+ b/l lower extremities  B/L LE skin discoloration to lower legs with multiple abrasions  FROM of R shoulder  TTP thoracic and lumbar spine and musculature
pitting edema 3+ b/l lower extremities  B/L LE skin discoloration to lower legs with multiple abrasions  FROM of R shoulder  TTP thoracic and lumbar spine and musculature

## 2020-09-08 NOTE — ED CDU PROVIDER SUBSEQUENT DAY NOTE - FAMILY HISTORY
Sibling  Still living? Unknown  Family history of cancer, Age at diagnosis: Age Unknown  Family history of diabetes mellitus, Age at diagnosis: Age Unknown
Sibling  Still living? Unknown  Family history of cancer, Age at diagnosis: Age Unknown  Family history of diabetes mellitus, Age at diagnosis: Age Unknown

## 2020-09-08 NOTE — ED CDU PROVIDER SUBSEQUENT DAY NOTE - HISTORY
No pertinent interval history. Vital signs remained stable overnight. Received no calls by RN overnight.
Pt resting comfortably. pending CM/ SW for SCOTTY placement.

## 2020-09-08 NOTE — ED CDU PROVIDER DISPOSITION NOTE - CLINICAL COURSE
69M hx Afib on coumadin, IDDM, VT s/p ICD, HFrEF (25%), CHRISS, morbidly obese presents s/p mechanical fall out of pool. Patient states he slipped of pool ladder while getting out and landed on back/rib. He was complaining of back pain, but now improved. Presented to ED and CT negative for acute fracture. Patient placed into observation for SCOTTY placement. EKG showed afib with frequent PVC's. Patient was evaluated by cardiology who increased Coreg dose. While in observation patient persistently hyperglycemic. Patient evaluated by PT recommended SCOTTY.  Unable to place patient into facility, has exhausted three days in observation, with uncontrolled diabetes.  Will discuss case with tele hospitalist.  To be admitted to [facility].

## 2020-09-08 NOTE — H&P ADULT - NSICDXPASTMEDICALHX_GEN_ALL_CORE_FT
PAST MEDICAL HISTORY:  Atrial fibrillation     CHF (congestive heart failure)     Chronic back pain     DM (diabetes mellitus)     High cholesterol     HTN (hypertension)     Lung nodules     Prostate CA     Pulmonary hypertension     Renal insufficiency     Sleep apnea

## 2020-09-08 NOTE — CONSULT NOTE ADULT - ASSESSMENT
This 69 yr old male with known hx of Afib on coumadin, IDDM, hypothyroidism, Gout , VT s/p ICD, HFrEF (25%), CHRISS, morbidly obese presents s/p mechanical fall  the pool. Patient states he slipped off pool ladder while getting out and landed on back/rib. He was complaining of back pain, but now improved. Presented to ED and CT negative for acute fracture. Patient placed into observation for SCOTTY placement. EKG showed Afib with frequent PVC's. Patient was evaluated by cardiology who increased Coreg dose. While in observation patient persistently hyperglycemic. Patient usually takes Levemir 25 units BID and sliding scale Novolog usually between 5-15 units pre meals He missed his last nights dose of long acting insulin. Today he's received Humalog 8 units am, 10 units noon and 12 units evening. Patient has been persistently hyperglycemic throughout the day which is why medicine was consulted.   Pt received Lantus 50 units late last night , now admission or ARF and hyponatremia and uncontrolled blood sugars and leg wound   As per patient his legs have always been swollen and the skin came off the shins when he slipped coming down against the swimming pool ladder on Saturday   his legs are chronically dark and  the open wounds are not with any smelling discharge but are painful As per patient he had "dye test" for his leg circulation done couple weeks ago at the rehab but has not followed up with any physician for results   States he does use the CPAP at home for CHRISS (at 10) (08 Sep 2020 09:54)    patient denies fevers.  reports that he was using a salt water pool, chlorinated.   leg dressing have been changed several times, and pt defers dressing changes at this time.     impression:  leg ulcers  leg pain  diabetes      Plan:  - injury occurred in pool  - given that patient is diabetic, with poor control, most recent A1C with Estimated Average Glucose Result: 10.4 % (09-06-20 @ 07:47),  should broaden antibiotics    - stop Cefazolin  - start Zosyn empirically to cover for pseudomonas    - will examine legs tomorrow at dressing change.     - - follow up all outstanding cultures  - trend temperature and WBC curve  - repeat cultures from blood and all sources if febrile.

## 2020-09-08 NOTE — ED CDU PROVIDER SUBSEQUENT DAY NOTE - PSH
AICD (automatic cardioverter/defibrillator) present    S/P ankle ligament repair
AICD (automatic cardioverter/defibrillator) present    S/P ankle ligament repair

## 2020-09-08 NOTE — ED CDU PROVIDER SUBSEQUENT DAY NOTE - MUSCULOSKELETAL MINIMAL EXAM
TENDERNESS/neck supple/RANGE OF MOTION LIMITED/right shoulder pain to palpation, diffuse pain to palpation over c spine, upper and lower back
right shoulder pain to palpation, diffuse pain to palpation over c spine, upper and lower back/neck supple/TENDERNESS/RANGE OF MOTION LIMITED

## 2020-09-08 NOTE — H&P ADULT - ASSESSMENT
9M hx Afib on coumadin, IDDM, VT s/p ICD, HFrEF (25%), CHRISS, morbidly obese presents s/p mechanical fall out of pool. Patient states he slipped of pool ladder while getting out and landed on back/rib. He was complaining of back pain, - cont Robaxin and Lidoderm patch     Leg wounds- Get vascular consult- follow up out patient work up done so far - possible infection- started Ancef  plan for SCOTTY placement.    ARF- HOld off the ARB and Lasix for a day and monitor after the blood glucose is better controlled wit higher insulin     Afib with frequent PVC's. Patient was evaluated by cardiology who increased Coreg dose to 6.25 BID    full AC  Known history of CHF- with ARF- NO fluids- cautious holding of Diuretics - check ECHO     IDDM- persistently hyperglycemic- LAntus 50 units - FS with Coverage AC and HS 9M hx Afib on coumadin, IDDM, VT s/p ICD, HFrEF (25%), CHRISS, morbidly obese presents s/p mechanical fall out of pool. Patient states he slipped of pool ladder while getting out and landed on back/rib. He was complaining of back pain, - cont Robaxin and Lidoderm patch     Leg wounds- Get vascular consult- follow up out patient work up done so far - possible infection- started Ancef  plan for SCOTTY placement.    ARF- HOld off the ARB and Lasix for one day and monitor after the blood glucose is better controlled wit higher insulin     Afib with frequent PVC's. Patient was evaluated by cardiology who increased Coreg dose to 6.25 BID    full AC  Known history of CHF- with ARF- NO fluids- cautious holding of Diuretics - check ECHO     IDDM- persistently hyperglycemic- LAntus 50 units - FS with Coverage AC and HS 69M hx Afib on coumadin, IDDM, VT s/p ICD, HFrEF (25%), CHRISS, morbidly obese presents s/p mechanical fall out of pool. Patient states he slipped of pool ladder while getting out and landed on back/rib. He was complaining of back pain, - cont Robaxin and Lidoderm patch     Leg wounds- Get vascular consult- follow up out patient work up done so far - possible infection- started Ancef  plan for SCOTTY placement.    ARF- HOld off the ARB and Lasix for one day and monitor after the blood glucose is better controlled wit higher insulin     Afib with frequent PVC's. Patient was evaluated by cardiology who increased Coreg dose to 6.25 BID    full AC  Known history of CHF- with ARF- NO fluids- cautious holding of Diuretics - check ECHO     IDDM- persistently hyperglycemic- LAntus 50 units - FS with Coverage AC and HS

## 2020-09-08 NOTE — ED ADULT NURSE REASSESSMENT NOTE - NS ED NURSE REASSESS COMMENT FT1
KIRA Rowland made aware of high blood glucose. will continue to monitor.
Medications administered as ordered. Well tolerated.
Patient in bed resting comfortably. Oxyi IR 10mg given for back pain with positive effect. Vitals signs WNL, NS, Afib with PVC noted on monitor. Call bell within reach, safety maintained. Will continue to monitor.
Patient on CPAP machine tolerating well. Vital signs this AM WNL. Patient has no s/s of distress. Oxy IR 10 mg and Lidocaine patch administered to mid back for pain. Call bell within reach, safety maintained will continue to monitor.
Pt resting in bed. VSS. meds given. Pt with no further questions for RN will continue to monitor.
pt Is stable,  5 beat of VT on monitor, no distress, PA Tim Templeton made aware, continue to monitor
received pt alert and orientedx3 in stretcher in no apparent signs of distress. Pt remains stable on monitor , sating well on room air, clarice leg wounds , barrier cream applied and wrapped, PIV site WNL. Pt status unchanged, refer to flowsheet and chart, pt ID band in place, pt safety maintained,  FALL PRECAUTIONS TAKEN updated on plan of care. Awaiting on SCOTTY/SW . Call light provided, fall safety reinforced. Will continue to monitor
Patient received AX4 @7PM. Patient is stable at this time. Afib on monitor with PVC HR 92 noted. Resp even and unlabored and afebrile. B/L legs wrapped in Ace bandages. Patient in bed resting comfortably, safety maintained. Will continue to monitor further.
Assumed care of the patient @2796. Pt A&Ox4. Patient in understanding of plan of care. Patient with no further questions for the RN. Resting in comfort. Call bell within reach and encouraged to use when assistance needed. Will continue to monitor.
Patient presents awake in bed, sitting up eating dinner.   not in any acute distress at this time.   medicated as per orders.   awaiting further eval
Pt awake and alert, no respiratory distress, resting comfortably at this time. Safety maintained.             -
Assumed care of the patient @3043. Pt A&Ox4. Patient in understanding of plan of care. Patient with no further questions for the RN. Resting in comfort. Call bell within reach and encouraged to use when assistance needed. Will continue to monitor.

## 2020-09-08 NOTE — CONSULT NOTE ADULT - SUBJECTIVE AND OBJECTIVE BOX
Vascular Attending:  Dr Kothari      HPI:  69 yr old male with known hx of Afib on coumadin, IDDM, hypothyroidism, Gout , VT s/p ICD, HFrEF (25%), CHRISS, morbidly obese presents s/p mechanical fall  the pool. Patient states he slipped off pool ladder while getting out and landed on back/rib. He was complaining of back pain, but now improved. Presented to ED and CT negative for acute fracture. Patient placed into observation for SCOTTY placement. EKG showed Afib with frequent PVC's. Patient was evaluated by cardiology who increased Coreg dose. While in observation patient persistently hyperglycemic. Patient usually takes Levemir 25 units BID and sliding scale Novolog usually between 5-15 units pre meals He missed his last nights dose of long acting insulin. Today he's received Humalog 8 units am, 10 units noon and 12 units evening. Patient has been persistently hyperglycemic throughout the day which is why medicine was consulted.   Pt received Lantus 50 units late last night , now admission or ARF and hyponatremia and uncontrolled blood sugars and leg wound   As per patient his legs have always been swollen and the skin came off the shins when he slipped coming down against the swimming pool ladder on Saturday   his legs are chronically dark and  the open wounds are not with any smelling discharge but are painful As per patient he had "dye test" for his leg circulation done couple weeks ago at the rehab but has not followed up with any physician for results   States he does use the CPAP at home for CHRISS (at 10) (08 Sep 2020 09:54)    Vascular Hx: Pt with h/o chronic venous stasis. Had been followed by vascular surgery with unna boots and more recently followed at wound care clinic in [facility]. Pt with recent fall and reposrts "scraping" his legs on edge of pool. Also reports increased swelling past few weeks.  PAST MEDICAL & SURGICAL HISTORY:  Lung nodules  Chronic back pain  Atrial fibrillation  CHF (congestive heart failure)  Pulmonary hypertension  Prostate CA  Sleep apnea  Renal insufficiency  High cholesterol  HTN (hypertension)  DM (diabetes mellitus)  AICD (automatic cardioverter/defibrillator) present  S/P ankle ligament repair      REVIEW OF SYSTEMS-as above. All other ROS neg    MEDICATIONS  (STANDING):  allopurinol 300 milliGRAM(s) Oral daily  carvedilol 6.25 milliGRAM(s) Oral every 12 hours  ceFAZolin   IVPB 1000 milliGRAM(s) IV Intermittent every 8 hours  ceFAZolin   IVPB      dextrose 5%. 1000 milliLiter(s) (50 mL/Hr) IV Continuous <Continuous>  dextrose 50% Injectable 12.5 Gram(s) IV Push once  dextrose 50% Injectable 25 Gram(s) IV Push once  dextrose 50% Injectable 25 Gram(s) IV Push once  DULoxetine 60 milliGRAM(s) Oral daily  enoxaparin Injectable 60 milliGRAM(s) SubCutaneous every 12 hours  folic acid 1 milliGRAM(s) Oral daily  gabapentin 300 milliGRAM(s) Oral two times a day  insulin glargine Injectable (LANTUS) 50 Unit(s) SubCutaneous at bedtime  insulin lispro (HumaLOG) corrective regimen sliding scale   SubCutaneous three times a day before meals  insulin lispro Injectable (HumaLOG) 5 Unit(s) SubCutaneous three times a day before meals  levothyroxine 50 MICROGram(s) Oral daily  lidocaine   Patch 1 Patch Transdermal daily  loratadine 10 milliGRAM(s) Oral daily  methocarbamol 750 milliGRAM(s) Oral every 6 hours  warfarin 4 milliGRAM(s) Oral once    MEDICATIONS  (PRN):  ALBUTerol    90 MICROgram(s) HFA Inhaler 2 Puff(s) Inhalation every 6 hours PRN Shortness of Breath and/or Wheezing  dextrose 40% Gel 15 Gram(s) Oral once PRN Blood Glucose LESS THAN 70 milliGRAM(s)/deciliter  glucagon  Injectable 1 milliGRAM(s) IntraMuscular once PRN Glucose LESS THAN 70 milligrams/deciliter  oxyCODONE    IR 10 milliGRAM(s) Oral every 6 hours PRN Severe Pain (7 - 10)      Allergies  No Known Allergies      Vital Signs Last 24 Hrs  T(C): 36.6 (08 Sep 2020 04:14), Max: 36.6 (08 Sep 2020 04:14)  T(F): 97.8 (08 Sep 2020 04:14), Max: 97.8 (08 Sep 2020 04:14)  HR: 92 (08 Sep 2020 05:37) (76 - 93)  BP: 120/73 (08 Sep 2020 05:37) (96/59 - 120/73)  BP(mean): --  RR: 18 (08 Sep 2020 04:14) (18 - 20)  SpO2: 97% (08 Sep 2020 04:14) (92% - 97%)    PHYSICAL EXAM:  Constitutional: Obese W M in NAD  Respiratory: CTA  Cardiovascular: Irreg S1, S2  Extremities: BLE with chronic venosis stasis changes. Bilat ant shin with superficial abrasions and noted with erupted bullae draining serous fluid  Neurological: A&O x 3  Pulses:   Right:                                                                          Left:  FEM [x ]2+ [ ]1+ [ ]doppler                                             FEM [x ]2+ [ ]1+ [ ]doppler    POP [ ]2+ [ ]1+ [ ]doppler                                               POP [ ]2+ [ ]1+ [ ]doppler    DP [ ]2+ [x ]1+ [ ]doppler                                                DP [ ]2+ [x ]1+ [ ]doppler  PT[ ]2+ [ ]1+ [ ]doppler                                                   PT [ ]2+ [ ]1+ [ ]doppler      LABS:    09-08    129<L>  |  90<L>  |  49.0<H>  ----------------------------<  291<H>  4.6   |  27.0  |  1.48<H>    Ca    8.5<L>      08 Sep 2020 07:04  Phos  4.1     09-07  Mg     1.7     09-07      PT/INR - ( 07 Sep 2020 17:32 )   PT: 21.0 sec;   INR: 1.86 ratio         PTT - ( 07 Sep 2020 17:32 )  PTT:44.2 sec      RADIOLOGY & ADDITIONAL STUDIES    Impression and Plan: 69 yr old male with known hx of Afib on coumadin, IDDM, hypothyroidism, Gout , VT s/p ICD, HFrEF (25%), CHRISS, morbidly obese presents s/p mechanical fall  the pool. Pt with chronic venous stasis and follows at wound care clinic as outpt  Adaptic to abrasions covered with DSD abd ACE wraps for gentle compression. Change daily  Elevation when able  No vascular surgical testing or procedures indicated  Follow up at outpt wound care after discharge  Discussed with Dr Kothari

## 2020-09-08 NOTE — H&P ADULT - ATTENDING COMMENTS
Pt seen and examined     PHYSICAL EXAM:  Vital Signs Last 24 Hrs  T(C): 36.6 (08 Sep 2020 04:14), Max: 36.6 (08 Sep 2020 04:14)  T(F): 97.8 (08 Sep 2020 04:14), Max: 97.8 (08 Sep 2020 04:14)  HR: 92 (08 Sep 2020 05:37) (76 - 93)  BP: 120/73 (08 Sep 2020 05:37) (96/59 - 120/73)  BP(mean): --  RR: 18 (08 Sep 2020 04:14) (18 - 20)  SpO2: 97% (08 Sep 2020 04:14) (92% - 97%)    GENERAL: NAD, well-groomed, well-developed  HEAD:  Atraumatic, Normocephalic  EYES: EOMI, PERRLA, conjunctiva and sclera clear  ENMT: No tonsillar erythema, exudates, or enlargement; Moist mucous membranes  NERVOUS SYSTEM:  Alert & Oriented X3, Good concentration; Motor Strength 5/5 B/L upper and lower extremities  CHEST/LUNG: Clear to auscultation bilaterally; No rales, rhonchi, wheezing  HEART: Regular rate and rhythm; No murmurs  ABDOMEN: Soft, Nontender, Nondistended; Bowel sounds present  EXTREMITIES:  + LE edema, B/L LE wounds    1) B/L LE wound due to trauma   - continue Ancef  - ID consulted  - vascular consulted  - wound care RN     2) JAYLENE  - agree to hold lasix and ARB today and can likely restart 9/9/20  - repeat levels in am    3) Afib with PVC  - cardio increased coreg dose  - stat INR now  - coumadin 4mg since subtherapeutic yesterday (home dose 3mg)  - Lovenox SQ until INR>2    4) Hypothyroidism  - on synthroid    5) Chronic Systolic CHF  - clinically euvolemic  - hold diuretics until 9/9/20 for JAYLENE    6) DM type 2  - on Lantus QHS, insulin lispro with meals, CSI and fingerstick monitoring  - Diabetic Diet    7) Prophylactic measure  - DVT ppx: on coumadin already

## 2020-09-08 NOTE — ED CDU PROVIDER SUBSEQUENT DAY NOTE - PMH
Atrial fibrillation    CHF (congestive heart failure)    Chronic back pain    DM (diabetes mellitus)    High cholesterol    HTN (hypertension)    Lung nodules    Prostate CA    Pulmonary hypertension    Renal insufficiency    Sleep apnea
Atrial fibrillation    CHF (congestive heart failure)    Chronic back pain    DM (diabetes mellitus)    High cholesterol    HTN (hypertension)    Lung nodules    Prostate CA    Pulmonary hypertension    Renal insufficiency    Sleep apnea

## 2020-09-08 NOTE — CONSULT NOTE ADULT - SUBJECTIVE AND OBJECTIVE BOX
[facility]  INFECTIOUS DISEASES AND INTERNAL MEDICINE at [place]  =======================================================  Miguelangel Seo MD  Diplomates American Board of Internal Medicine and Infectious Diseases  Tel  [phone]  Fax [phone]  =======================================================    [id number]  MARY ANN CORNELL   HPI: This 69 yr old male with known hx of Afib on coumadin, IDDM, hypothyroidism, Gout , VT s/p ICD, HFrEF (25%), CHRISS, morbidly obese presents s/p mechanical fall  the pool. Patient states he slipped off pool ladder while getting out and landed on back/rib. He was complaining of back pain, but now improved. Presented to ED and CT negative for acute fracture. Patient placed into observation for SCOTTY placement. EKG showed Afib with frequent PVC's. Patient was evaluated by cardiology who increased Coreg dose. While in observation patient persistently hyperglycemic. Patient usually takes Levemir 25 units BID and sliding scale Novolog usually between 5-15 units pre meals He missed his last nights dose of long acting insulin. Today he's received Humalog 8 units am, 10 units noon and 12 units evening. Patient has been persistently hyperglycemic throughout the day which is why medicine was consulted.   Pt received Lantus 50 units late last night , now admission or ARF and hyponatremia and uncontrolled blood sugars and leg wound   As per patient his legs have always been swollen and the skin came off the shins when he slipped coming down against the swimming pool ladder on Saturday   his legs are chronically dark and  the open wounds are not with any smelling discharge but are painful As per patient he had "dye test" for his leg circulation done couple weeks ago at the rehab but has not followed up with any physician for results   States he does use the CPAP at home for CHRISS (at 10) (08 Sep 2020 09:54)    patient denies fevers.  reports that he was using a salt water pool, chlorinated.   leg dressing have been changed several times, and pt defers dressing changes at this time.     He is currently being worked up for back pain as well.     I have personally reviewed the labs and data; pertinent labs and data are listed in this note; please see below.   =======================================================  Past Medical & Surgical Hx:  =====================  PAST MEDICAL & SURGICAL HISTORY:  Lung nodules  Chronic back pain  Atrial fibrillation  CHF (congestive heart failure)  Pulmonary hypertension  Prostate CA  Sleep apnea  Renal insufficiency  High cholesterol  HTN (hypertension)  DM (diabetes mellitus)  AICD (automatic cardioverter/defibrillator) present  S/P ankle ligament repair    Problem List:  ==========  HEALTH ISSUES - PROBLEM Dx:        Social Hx:  =======  no toxic habits currently    FAMILY HISTORY:  Family history of diabetes mellitus (Sibling)  Family history of cancer (Sibling)  no significant family history of immunosuppressive disorders in mother or father   =======================================================  REVIEW OF SYSTEMS:  CONSTITUTIONAL:  No Fever or chills  HEENT:  No diplopia or blurred vision.  No earache, sore throat or runny nose.  CARDIOVASCULAR:  No pressure, squeezing, strangling, tightness, heaviness or aching about the chest, neck, axilla or epigastrium.  RESPIRATORY:  No cough, shortness of breath  GASTROINTESTINAL:  No nausea, vomiting or diarrhea.  GENITOURINARY:  No dysuria, frequency or urgency. No Blood in urine  MUSCULOSKELETAL:  no joint aches, no muscle pain  SKIN:  as per HPI  NEUROLOGIC:  No Headaches, seizures or weakness.  PSYCHIATRIC:  No disorder of thought or mood.  ENDOCRINE:  No heat or cold intolerance  HEMATOLOGICAL:  No easy bruising or bleeding.   =======================================================  Allergies  No Known Allergies    allow double protein at meals (Unknown)  Antibiotics:  ceFAZolin   IVPB 1000 milliGRAM(s) IV Intermittent every 8 hours  ceFAZolin   IVPB        Other medications:  allopurinol 300 milliGRAM(s) Oral daily  carvedilol 6.25 milliGRAM(s) Oral every 12 hours  dextrose 5%. 1000 milliLiter(s) IV Continuous <Continuous>  dextrose 50% Injectable 12.5 Gram(s) IV Push once  dextrose 50% Injectable 25 Gram(s) IV Push once  dextrose 50% Injectable 25 Gram(s) IV Push once  DULoxetine 60 milliGRAM(s) Oral daily  folic acid 1 milliGRAM(s) Oral daily  gabapentin 300 milliGRAM(s) Oral two times a day  insulin glargine Injectable (LANTUS) 50 Unit(s) SubCutaneous at bedtime  insulin lispro (HumaLOG) corrective regimen sliding scale   SubCutaneous three times a day before meals  insulin lispro Injectable (HumaLOG) 5 Unit(s) SubCutaneous three times a day before meals  levothyroxine 50 MICROGram(s) Oral daily  lidocaine   Patch 1 Patch Transdermal daily  loratadine 10 milliGRAM(s) Oral daily  methocarbamol 750 milliGRAM(s) Oral every 6 hours  warfarin 4 milliGRAM(s) Oral once     ceFAZolin   IVPB   100 mL/Hr IV Intermittent (09-08-20 @ 11:42)    cephalexin   500 milliGRAM(s) Oral (09-06-20 @ 11:41)   500 milliGRAM(s) Oral (09-06-20 @ 16:44)   500 milliGRAM(s) Oral (09-07-20 @ 00:31)   500 milliGRAM(s) Oral (09-07-20 @ 05:54)   500 milliGRAM(s) Oral (09-07-20 @ 11:40)   500 milliGRAM(s) Oral (09-07-20 @ 16:28)   500 milliGRAM(s) Oral (09-07-20 @ 23:52)   500 milliGRAM(s) Oral (09-08-20 @ 05:34)      ======================================================  Physical Exam:  ============  T(F): 97.8 (08 Sep 2020 04:14), Max: 97.8 (08 Sep 2020 04:14)  HR: 92 (08 Sep 2020 05:37)  BP: 120/73 (08 Sep 2020 05:37)  RR: 18 (08 Sep 2020 04:14)  SpO2: 97% (08 Sep 2020 04:14) (92% - 97%)  temp max in last 48H T(F): , Max: 97.9 (09-07-20 @ 07:36)    General:  No acute distress.  OBESE  Eye: Pupils are equal, round and reactive to light, Extraocular movements are intact, Normal conjunctiva.  HENT: Normocephalic, Oral mucosa is moist, No pharyngeal erythema, No sinus tenderness.  Neck: Supple, No lymphadenopathy.  Respiratory: Lungs are clear to auscultation, Respirations are non-labored.  Cardiovascular: Normal rate, Regular rhythm,   Gastrointestinal: Soft, Non-tender, Non-distended, Normal bowel sounds.  Genitourinary: No costovertebral angle tenderness.  Lymphatics: No lymphadenopathy neck,   Musculoskeletal: Normal range of motion, Normal strength.  Integumentary:  LEGS WITH BILATERAL DRESSING IN PLACE  Neurologic: Alert, Oriented, No focal deficits, Cranial Nerves II-XII are grossly intact.  Psychiatric: Appropriate mood & affect.    =======================================================  Labs:     09-08    129<L>  |  90<L>  |  49.0<H>  ----------------------------<  291<H>  4.6   |  27.0  |  1.48<H>    Ca    8.5<L>      08 Sep 2020 07:04  Phos  4.1     09-07  Mg     1.7     09-07    WBC Count: 7.84 K/uL (09-05-20 @ 17:38)      Creatinine, Serum: 1.48 mg/dL (09-08-20 @ 07:04)  Creatinine, Serum: 1.53 mg/dL (09-07-20 @ 06:21)  Creatinine, Serum: 1.29 mg/dL (09-05-20 @ 21:24)  Creatinine, Serum: 1.43 mg/dL (09-05-20 @ 17:38)    COVID-19 PCR: NotDetec (09-06-20 @ 06:22)

## 2020-09-08 NOTE — ED CDU PROVIDER SUBSEQUENT DAY NOTE - MEDICAL DECISION MAKING DETAILS
Pending CM/ SW for SCOTTY placement.
70 yo male PMHx morbid obesity, uncontrolled DM, A-fib, CHRISS, chronic back pain. Patient pending SCOTTY placement. Patient on cephalexin for scrapes to leg. Poorly controlled DM, medicine consulted; pending AM labs and FS.

## 2020-09-08 NOTE — ED CDU PROVIDER DISPOSITION NOTE - ATTENDING CONTRIBUTION TO CARE
I agree with the PA's note and was available for any issues/concerns. I was directly involved in patient care. My brief overall assessment is as follows:     Glucose levels not well controlled through out observation stay, PT consulted pending, will admit for control

## 2020-09-08 NOTE — H&P ADULT - HISTORY OF PRESENT ILLNESS
69 yr old male with known hx of Afib on coumadin, IDDM, hypothyroidism, Gout , VT s/p ICD, HFrEF (25%), CHRISS, morbidly obese presents s/p mechanical fall  the pool. Patient states he slipped off pool ladder while getting out and landed on back/rib. He was complaining of back pain, but now improved. Presented to ED and CT negative for acute fracture. Patient placed into observation for SCOTTY placement. EKG showed Afib with frequent PVC's. Patient was evaluated by cardiology who increased Coreg dose. While in observation patient persistently hyperglycemic. Patient usually takes Levemir 25 units BID and sliding scale Novolog usually between 5-15 units pre meals He missed his last nights dose of long acting insulin. Today he's received Humalog 8 units am, 10 units noon and 12 units evening. Patient has been persistently hyperglycemic throughout the day which is why medicine was consulted.   Pt received Lantus 50 units late last night , now admission or ARF and hyponatremia and uncontrolled blood sugars and leg wound   As per patient his legs have always been swollen and the skin came off the shins when he slipped coming down against the swimming pool ladder on Saturday   his legs are chronically dark and  the open wounds are not with any smelling discharge but are painful As per patient he had "dye test" for his leg circulation done couple weeks ago at the rehab but has not followed up with any physician for results   States he does use the CPAP at home for CHRISS (at 10)

## 2020-09-08 NOTE — PATIENT PROFILE ADULT - VISION (WITH CORRECTIVE LENSES IF THE PATIENT USUALLY WEARS THEM):
Partially impaired: cannot see medication labels or newsprint, but can see obstacles in path, and the surrounding layout; can count fingers at arm's length/wears glasses at home

## 2020-09-09 LAB
ANION GAP SERPL CALC-SCNC: 12 MMOL/L — SIGNIFICANT CHANGE UP (ref 5–17)
BUN SERPL-MCNC: 49 MG/DL — HIGH (ref 8–20)
CALCIUM SERPL-MCNC: 8.4 MG/DL — LOW (ref 8.6–10.2)
CHLORIDE SERPL-SCNC: 91 MMOL/L — LOW (ref 98–107)
CO2 SERPL-SCNC: 30 MMOL/L — HIGH (ref 22–29)
CREAT SERPL-MCNC: 1.46 MG/DL — HIGH (ref 0.5–1.3)
GLUCOSE BLDC GLUCOMTR-MCNC: 204 MG/DL — HIGH (ref 70–99)
GLUCOSE BLDC GLUCOMTR-MCNC: 218 MG/DL — HIGH (ref 70–99)
GLUCOSE BLDC GLUCOMTR-MCNC: 244 MG/DL — HIGH (ref 70–99)
GLUCOSE BLDC GLUCOMTR-MCNC: 334 MG/DL — HIGH (ref 70–99)
GLUCOSE SERPL-MCNC: 291 MG/DL — HIGH (ref 70–99)
HCT VFR BLD CALC: 45.1 % — SIGNIFICANT CHANGE UP (ref 39–50)
HGB BLD-MCNC: 14.5 G/DL — SIGNIFICANT CHANGE UP (ref 13–17)
INR BLD: 2.32 RATIO — HIGH (ref 0.88–1.16)
MAGNESIUM SERPL-MCNC: 1.9 MG/DL — SIGNIFICANT CHANGE UP (ref 1.6–2.6)
MCHC RBC-ENTMCNC: 28.3 PG — SIGNIFICANT CHANGE UP (ref 27–34)
MCHC RBC-ENTMCNC: 32.2 GM/DL — SIGNIFICANT CHANGE UP (ref 32–36)
MCV RBC AUTO: 87.9 FL — SIGNIFICANT CHANGE UP (ref 80–100)
PLATELET # BLD AUTO: 144 K/UL — LOW (ref 150–400)
POTASSIUM SERPL-MCNC: 3.6 MMOL/L — SIGNIFICANT CHANGE UP (ref 3.5–5.3)
POTASSIUM SERPL-SCNC: 3.6 MMOL/L — SIGNIFICANT CHANGE UP (ref 3.5–5.3)
PROTHROM AB SERPL-ACNC: 25.9 SEC — HIGH (ref 10.6–13.6)
RBC # BLD: 5.13 M/UL — SIGNIFICANT CHANGE UP (ref 4.2–5.8)
RBC # FLD: 16.2 % — HIGH (ref 10.3–14.5)
SODIUM SERPL-SCNC: 133 MMOL/L — LOW (ref 135–145)
WBC # BLD: 6.57 K/UL — SIGNIFICANT CHANGE UP (ref 3.8–10.5)
WBC # FLD AUTO: 6.57 K/UL — SIGNIFICANT CHANGE UP (ref 3.8–10.5)

## 2020-09-09 PROCEDURE — 99232 SBSQ HOSP IP/OBS MODERATE 35: CPT

## 2020-09-09 PROCEDURE — 99233 SBSQ HOSP IP/OBS HIGH 50: CPT

## 2020-09-09 RX ORDER — INSULIN LISPRO 100/ML
10 VIAL (ML) SUBCUTANEOUS
Refills: 0 | Status: DISCONTINUED | OUTPATIENT
Start: 2020-09-09 | End: 2020-09-12

## 2020-09-09 RX ORDER — FUROSEMIDE 40 MG
40 TABLET ORAL DAILY
Refills: 0 | Status: DISCONTINUED | OUTPATIENT
Start: 2020-09-09 | End: 2020-09-12

## 2020-09-09 RX ORDER — INSULIN GLARGINE 100 [IU]/ML
60 INJECTION, SOLUTION SUBCUTANEOUS AT BEDTIME
Refills: 0 | Status: DISCONTINUED | OUTPATIENT
Start: 2020-09-09 | End: 2020-09-12

## 2020-09-09 RX ORDER — SACCHAROMYCES BOULARDII 250 MG
250 POWDER IN PACKET (EA) ORAL
Refills: 0 | Status: DISCONTINUED | OUTPATIENT
Start: 2020-09-09 | End: 2020-09-12

## 2020-09-09 RX ORDER — WARFARIN SODIUM 2.5 MG/1
4 TABLET ORAL ONCE
Refills: 0 | Status: COMPLETED | OUTPATIENT
Start: 2020-09-09 | End: 2020-09-09

## 2020-09-09 RX ADMIN — Medication 8: at 08:03

## 2020-09-09 RX ADMIN — Medication 250 MILLIGRAM(S): at 17:01

## 2020-09-09 RX ADMIN — LORATADINE 10 MILLIGRAM(S): 10 TABLET ORAL at 12:04

## 2020-09-09 RX ADMIN — Medication 7 UNIT(S): at 08:04

## 2020-09-09 RX ADMIN — Medication 4: at 16:48

## 2020-09-09 RX ADMIN — CARVEDILOL PHOSPHATE 6.25 MILLIGRAM(S): 80 CAPSULE, EXTENDED RELEASE ORAL at 17:02

## 2020-09-09 RX ADMIN — OXYCODONE HYDROCHLORIDE 10 MILLIGRAM(S): 5 TABLET ORAL at 14:40

## 2020-09-09 RX ADMIN — GABAPENTIN 300 MILLIGRAM(S): 400 CAPSULE ORAL at 06:50

## 2020-09-09 RX ADMIN — PIPERACILLIN AND TAZOBACTAM 25 GRAM(S): 4; .5 INJECTION, POWDER, LYOPHILIZED, FOR SOLUTION INTRAVENOUS at 06:51

## 2020-09-09 RX ADMIN — METHOCARBAMOL 750 MILLIGRAM(S): 500 TABLET, FILM COATED ORAL at 17:01

## 2020-09-09 RX ADMIN — PIPERACILLIN AND TAZOBACTAM 25 GRAM(S): 4; .5 INJECTION, POWDER, LYOPHILIZED, FOR SOLUTION INTRAVENOUS at 22:47

## 2020-09-09 RX ADMIN — INSULIN GLARGINE 60 UNIT(S): 100 INJECTION, SOLUTION SUBCUTANEOUS at 22:47

## 2020-09-09 RX ADMIN — METHOCARBAMOL 750 MILLIGRAM(S): 500 TABLET, FILM COATED ORAL at 12:04

## 2020-09-09 RX ADMIN — Medication 1 MILLIGRAM(S): at 12:05

## 2020-09-09 RX ADMIN — PIPERACILLIN AND TAZOBACTAM 25 GRAM(S): 4; .5 INJECTION, POWDER, LYOPHILIZED, FOR SOLUTION INTRAVENOUS at 13:10

## 2020-09-09 RX ADMIN — WARFARIN SODIUM 4 MILLIGRAM(S): 2.5 TABLET ORAL at 22:47

## 2020-09-09 RX ADMIN — Medication 10 UNIT(S): at 16:48

## 2020-09-09 RX ADMIN — Medication 4: at 12:03

## 2020-09-09 RX ADMIN — METHOCARBAMOL 750 MILLIGRAM(S): 500 TABLET, FILM COATED ORAL at 06:51

## 2020-09-09 RX ADMIN — OXYCODONE HYDROCHLORIDE 10 MILLIGRAM(S): 5 TABLET ORAL at 16:08

## 2020-09-09 RX ADMIN — Medication 300 MILLIGRAM(S): at 12:05

## 2020-09-09 RX ADMIN — GABAPENTIN 300 MILLIGRAM(S): 400 CAPSULE ORAL at 17:01

## 2020-09-09 RX ADMIN — METHOCARBAMOL 750 MILLIGRAM(S): 500 TABLET, FILM COATED ORAL at 22:48

## 2020-09-09 RX ADMIN — DULOXETINE HYDROCHLORIDE 60 MILLIGRAM(S): 30 CAPSULE, DELAYED RELEASE ORAL at 12:04

## 2020-09-09 RX ADMIN — Medication 50 MICROGRAM(S): at 06:50

## 2020-09-09 RX ADMIN — CARVEDILOL PHOSPHATE 6.25 MILLIGRAM(S): 80 CAPSULE, EXTENDED RELEASE ORAL at 06:51

## 2020-09-09 NOTE — PROGRESS NOTE ADULT - SUBJECTIVE AND OBJECTIVE BOX
[facility]  INFECTIOUS DISEASES AND INTERNAL MEDICINE at [place]  =======================================================  Miguelangel Seo MD  Diplomates American Board of Internal Medicine and Infectious Diseases  Tel  [phone]  Fax [phone]  =======================================================    [facility]-[id number]  MARY ANN CORNELL  follow up:  leg cellulitis    still with some leg pain  no fevers  =======================================================  REVIEW OF SYSTEMS:  CONSTITUTIONAL:  No Fever or chills  HEENT:  No diplopia or blurred vision.  No earache, sore throat or runny nose.  CARDIOVASCULAR:  No pressure, squeezing, strangling, tightness, heaviness or aching about the chest, neck, axilla or epigastrium.  RESPIRATORY:  No cough, shortness of breath  GASTROINTESTINAL:  No nausea, vomiting or diarrhea.  GENITOURINARY:  No dysuria, frequency or urgency. No Blood in urine  MUSCULOSKELETAL:  no joint aches, no muscle pain  SKIN:  as per HPI  NEUROLOGIC:  No Headaches, seizures or weakness.  PSYCHIATRIC:  No disorder of thought or mood.  ENDOCRINE:  No heat or cold intolerance  HEMATOLOGICAL:  No easy bruising or bleeding.   =======================================================  Allergies  No Known Allergies     ======================================================  Physical Exam:  ============     General:  No acute distress.  OBESE  Eye: Pupils are equal, round and reactive to light, Extraocular movements are intact, Normal conjunctiva.  HENT: Normocephalic, Oral mucosa is moist, No pharyngeal erythema, No sinus tenderness.  Neck: Supple, No lymphadenopathy.  Respiratory: Lungs are clear to auscultation, Respirations are non-labored.  Cardiovascular: Normal rate, Regular rhythm,   Gastrointestinal: Soft, Non-tender, Non-distended, Normal bowel sounds.  Genitourinary: No costovertebral angle tenderness.  Lymphatics: No lymphadenopathy neck,   Musculoskeletal: Normal range of motion, Normal strength.  Integumentary:  LEGS WITH BILATERAL DRESSING IN PLACE  Neurologic: Alert, Oriented, No focal deficits, Cranial Nerves II-XII are grossly intact.  Psychiatric: Appropriate mood & affect.    =======================================================  Vitals:  ============  T(F): 97.8 (09 Sep 2020 08:44), Max: 98.4 (08 Sep 2020 18:13)  HR: 60 (09 Sep 2020 08:44)  BP: 97/53 (09 Sep 2020 08:44)  RR: 20 (09 Sep 2020 08:44)  SpO2: 90% (09 Sep 2020 08:44) (90% - 98%)  temp max in last 48H T(F): , Max: 98.4 (09-08-20 @ 18:13)    =======================================================  Current Antibiotics:  piperacillin/tazobactam IVPB.. 3.375 Gram(s) IV Intermittent every 8 hours    Other medications:  allopurinol 300 milliGRAM(s) Oral daily  carvedilol 6.25 milliGRAM(s) Oral every 12 hours  dextrose 5%. 1000 milliLiter(s) IV Continuous <Continuous>  dextrose 50% Injectable 12.5 Gram(s) IV Push once  dextrose 50% Injectable 25 Gram(s) IV Push once  dextrose 50% Injectable 25 Gram(s) IV Push once  DULoxetine 60 milliGRAM(s) Oral daily  folic acid 1 milliGRAM(s) Oral daily  gabapentin 300 milliGRAM(s) Oral two times a day  insulin glargine Injectable (LANTUS) 60 Unit(s) SubCutaneous at bedtime  insulin lispro (HumaLOG) corrective regimen sliding scale   SubCutaneous three times a day before meals  insulin lispro Injectable (HumaLOG) 10 Unit(s) SubCutaneous three times a day before meals  levothyroxine 50 MICROGram(s) Oral daily  lidocaine   Patch 1 Patch Transdermal daily  loratadine 10 milliGRAM(s) Oral daily  methocarbamol 750 milliGRAM(s) Oral every 6 hours  saccharomyces boulardii 250 milliGRAM(s) Oral two times a day  warfarin 4 milliGRAM(s) Oral once      =======================================================  Labs:                        14.5   6.57  )-----------( 144      ( 09 Sep 2020 07:43 )             45.1      09-09    133<L>  |  91<L>  |  49.0<H>  ----------------------------<  291<H>  3.6   |  30.0<H>  |  1.46<H>    Ca    8.4<L>      09 Sep 2020 07:43  Mg     1.9     09-09        Creatinine, Serum: 1.46 mg/dL (09-09-20 @ 07:43)  Creatinine, Serum: 1.48 mg/dL (09-08-20 @ 07:04)  Creatinine, Serum: 1.53 mg/dL (09-07-20 @ 06:21)  Creatinine, Serum: 1.29 mg/dL (09-05-20 @ 21:24)  Creatinine, Serum: 1.43 mg/dL (09-05-20 @ 17:38)    WBC Count: 6.57 K/uL (09-09-20 @ 07:43)  WBC Count: 7.84 K/uL (09-05-20 @ 17:38)      COVID-19 PCR: NotDetec (09-06-20 @ 06:22)      Alkaline Phosphatase, Serum: 98 U/L (09-05-20 @ 21:24)  Alkaline Phosphatase, Serum: 98 U/L (09-05-20 @ 17:38)  Alanine Aminotransferase (ALT/SGPT): 22 U/L (09-05-20 @ 21:24)  Alanine Aminotransferase (ALT/SGPT): 21 U/L (09-05-20 @ 17:38)  Aspartate Aminotransferase (AST/SGOT): 37 U/L (09-05-20 @ 21:24)  Aspartate Aminotransferase (AST/SGOT): 35 U/L (09-05-20 @ 17:38)  Bilirubin Total, Serum: 0.6 mg/dL (09-05-20 @ 21:24)  Bilirubin Total, Serum: 0.6 mg/dL (09-05-20 @ 17:38)

## 2020-09-09 NOTE — PROGRESS NOTE ADULT - ASSESSMENT
69y/oM PMH afib on coumadin, IDDM, hypothyroidism, gout, VT s/p ICD, HFrEF (25%), CHRISS, morbidly obese presents s/p mechanical fall getting out of the pool. In ER, CT spine neg, pt was placed in to observation for SCOTTY placement. EKG with afib and frequent PVCs. Seen by cardiology, who increased Coreg dose. While in obs, pt persistently hyperglycemic, admitted to medicine.    mechanical fall  BLE leg abrasions  -vascular consult appreciated: daily dressing changes with adaptic to abrasions covered with DSD and ACE wraps for gentle compression  -no vascular surgical testing or procedures indicated at this time, f/u outpt wound care  -cont robaxin, lidoderm patch for back pain  -ID recs appreciated  -cont zosyn  -f/u cultures   -PT rec SCOTTY    Type 2 diabetes with hyperglycemia   -HgbA1c 10.4  -increased lantus to 60u  -increased premeal to 10u  -ISS    chronic Afib on coumadin  -f/u daily INR  -cont coumadin  -cont coreg    VT s/p ICD, NICM  -known frequent PVCs  -coreg increased to 6.25mg BID by cardio this admission    HFrEF chronic systolic heart failure  -lasix held 69y/oM PMH afib on coumadin, IDDM, hypothyroidism, gout, VT s/p ICD, HFrEF (25%), CHRISS, morbidly obese presents s/p mechanical fall getting out of the pool. In ER, CT spine neg, pt was placed in to observation for SCOTTY placement. EKG with afib and frequent PVCs. Seen by cardiology, who increased Coreg dose. While in obs, pt persistently hyperglycemic, admitted to medicine.    mechanical fall  BLE leg abrasions  -vascular consult appreciated: daily dressing changes with adaptic to abrasions covered with DSD and ACE wraps for gentle compression  -no vascular surgical testing or procedures indicated at this time, f/u outpt wound care  -cont robaxin, lidoderm patch for back pain  -ID recs appreciated  -cont zosyn  -f/u cultures   -PT rec SCOTTY    Type 2 diabetes with hyperglycemia   -HgbA1c 10.4  -increased lantus to 60u  -increased premeal to 10u  -ISS    chronic Afib on coumadin  -f/u daily INR  -cont coumadin  -cont coreg    VT s/p ICD, NICM  -known frequent PVCs  -coreg increased to 6.25mg BID by cardio this admission    HFrEF chronic systolic heart failure  -restart lasix 40mg po    CHRISS  -cont nocturnal cpap    Morbid obesity, BMI 52  -nutrition eval ordered

## 2020-09-09 NOTE — ADVANCED PRACTICE NURSE CONSULT - RECOMMEDATIONS
continue diabetes self management education  after chart review pls consider increase lantus to 60 units qhs

## 2020-09-09 NOTE — PROGRESS NOTE ADULT - SUBJECTIVE AND OBJECTIVE BOX
MARY ANN CORNELL    [id number]    69y      Male    CC: fall    INTERVAL HPI/OVERNIGHT EVENTS: pt seen and examined. no acute events o/n. +low back pain    REVIEW OF SYSTEMS:    CONSTITUTIONAL: No fever, weight loss  RESPIRATORY: No cough, wheezing, hemoptysis; No shortness of breath  CARDIOVASCULAR: No chest pain, palpitations  GASTROINTESTINAL: No abdominal or epigastric pain. No nausea, vomiting  NEUROLOGICAL: No headaches, memory loss    Vital Signs Last 24 Hrs  T(C): 36.5 (09 Sep 2020 15:39), Max: 36.9 (08 Sep 2020 18:13)  T(F): 97.7 (09 Sep 2020 15:39), Max: 98.4 (08 Sep 2020 18:13)  HR: 83 (09 Sep 2020 15:39) (60 - 96)  BP: 136/73 (09 Sep 2020 15:39) (97/53 - 136/73)  BP(mean): --  RR: 19 (09 Sep 2020 15:39) (18 - 20)  SpO2: 90% (09 Sep 2020 08:44) (90% - 98%)    PHYSICAL EXAM:    GENERAL: NAD  HEENT: PERRL, +EOMI  NECK: soft, supple  CHEST/LUNG: Clear to auscultation bilaterally; No wheezing  HEART: S1S2+, Regular rate and rhythm; No murmurs, rubs, or gallops  ABDOMEN: Soft, Nontender, Nondistended; Bowel sounds present  SKIN: warm, dry; BLE dressings in place  NEURO: AAOX3, no focal deficits  PSYCH: normal affect    LABS:                        14.5   6.57  )-----------( 144      ( 09 Sep 2020 07:43 )             45.1     09-09    133<L>  |  91<L>  |  49.0<H>  ----------------------------<  291<H>  3.6   |  30.0<H>  |  1.46<H>    Ca    8.4<L>      09 Sep 2020 07:43  Mg     1.9     09-09      PT/INR - ( 09 Sep 2020 07:43 )   PT: 25.9 sec;   INR: 2.32 ratio         PTT - ( 08 Sep 2020 12:33 )  PTT:38.9 sec        MEDICATIONS  (STANDING):  allopurinol 300 milliGRAM(s) Oral daily  carvedilol 6.25 milliGRAM(s) Oral every 12 hours  dextrose 5%. 1000 milliLiter(s) (50 mL/Hr) IV Continuous <Continuous>  dextrose 50% Injectable 12.5 Gram(s) IV Push once  dextrose 50% Injectable 25 Gram(s) IV Push once  dextrose 50% Injectable 25 Gram(s) IV Push once  DULoxetine 60 milliGRAM(s) Oral daily  folic acid 1 milliGRAM(s) Oral daily  gabapentin 300 milliGRAM(s) Oral two times a day  insulin glargine Injectable (LANTUS) 60 Unit(s) SubCutaneous at bedtime  insulin lispro (HumaLOG) corrective regimen sliding scale   SubCutaneous three times a day before meals  insulin lispro Injectable (HumaLOG) 10 Unit(s) SubCutaneous three times a day before meals  levothyroxine 50 MICROGram(s) Oral daily  lidocaine   Patch 1 Patch Transdermal daily  loratadine 10 milliGRAM(s) Oral daily  methocarbamol 750 milliGRAM(s) Oral every 6 hours  piperacillin/tazobactam IVPB.. 3.375 Gram(s) IV Intermittent every 8 hours  saccharomyces boulardii 250 milliGRAM(s) Oral two times a day  warfarin 4 milliGRAM(s) Oral once    MEDICATIONS  (PRN):  ALBUTerol    90 MICROgram(s) HFA Inhaler 2 Puff(s) Inhalation every 6 hours PRN Shortness of Breath and/or Wheezing  dextrose 40% Gel 15 Gram(s) Oral once PRN Blood Glucose LESS THAN 70 milliGRAM(s)/deciliter  glucagon  Injectable 1 milliGRAM(s) IntraMuscular once PRN Glucose LESS THAN 70 milligrams/deciliter  oxyCODONE    IR 10 milliGRAM(s) Oral every 6 hours PRN Severe Pain (7 - 10)      RADIOLOGY & ADDITIONAL TESTS:

## 2020-09-09 NOTE — PROGRESS NOTE ADULT - ASSESSMENT
This 69 yr old male with known hx of Afib on coumadin, IDDM, hypothyroidism, Gout , VT s/p ICD, HFrEF (25%), CHRISS, morbidly obese presents s/p mechanical fall  the pool. Patient states he slipped off pool ladder while getting out and landed on back/rib. He was complaining of back pain, but now improved. Presented to ED and CT negative for acute fracture. Patient placed into observation for SCOTTY placement. EKG showed Afib with frequent PVC's. Patient was evaluated by cardiology who increased Coreg dose. While in observation patient persistently hyperglycemic. Patient usually takes Levemir 25 units BID and sliding scale Novolog usually between 5-15 units pre meals He missed his last nights dose of long acting insulin. Today he's received Humalog 8 units am, 10 units noon and 12 units evening. Patient has been persistently hyperglycemic throughout the day which is why medicine was consulted.   Pt received Lantus 50 units late last night , now admission or ARF and hyponatremia and uncontrolled blood sugars and leg wound   As per patient his legs have always been swollen and the skin came off the shins when he slipped coming down against the swimming pool ladder on Saturday   his legs are chronically dark and  the open wounds are not with any smelling discharge but are painful As per patient he had "dye test" for his leg circulation done couple weeks ago at the rehab but has not followed up with any physician for results   States he does use the CPAP at home for CHRISS (at 10) (08 Sep 2020 09:54)    patient denies fevers.  reports that he was using a salt water pool, chlorinated.   leg dressing have been changed several times, and pt defers dressing changes at this time.     impression:  leg ulcers  leg pain  diabetes      Plan:  - injury occurred in pool  - given that patient is diabetic, with poor control, most recent A1C with Estimated Average Glucose Result: 10.4 % (09-06-20 @ 07:47),  - continue mgmt of DM2      - continue Zosyn empirically to cover for pseudomonas    - - follow up all outstanding cultures  - trend temperature and WBC curve  - repeat cultures from blood and all sources if febrile.

## 2020-09-10 LAB
ANION GAP SERPL CALC-SCNC: 15 MMOL/L — SIGNIFICANT CHANGE UP (ref 5–17)
BUN SERPL-MCNC: 36 MG/DL — HIGH (ref 8–20)
CALCIUM SERPL-MCNC: 9.1 MG/DL — SIGNIFICANT CHANGE UP (ref 8.6–10.2)
CHLORIDE SERPL-SCNC: 90 MMOL/L — LOW (ref 98–107)
CO2 SERPL-SCNC: 26 MMOL/L — SIGNIFICANT CHANGE UP (ref 22–29)
CREAT SERPL-MCNC: 1.21 MG/DL — SIGNIFICANT CHANGE UP (ref 0.5–1.3)
GLUCOSE BLDC GLUCOMTR-MCNC: 154 MG/DL — HIGH (ref 70–99)
GLUCOSE BLDC GLUCOMTR-MCNC: 159 MG/DL — HIGH (ref 70–99)
GLUCOSE BLDC GLUCOMTR-MCNC: 228 MG/DL — HIGH (ref 70–99)
GLUCOSE BLDC GLUCOMTR-MCNC: 261 MG/DL — HIGH (ref 70–99)
GLUCOSE SERPL-MCNC: 241 MG/DL — HIGH (ref 70–99)
HCT VFR BLD CALC: 48.1 % — SIGNIFICANT CHANGE UP (ref 39–50)
HGB BLD-MCNC: 15.6 G/DL — SIGNIFICANT CHANGE UP (ref 13–17)
MAGNESIUM SERPL-MCNC: 2.2 MG/DL — SIGNIFICANT CHANGE UP (ref 1.6–2.6)
MCHC RBC-ENTMCNC: 28.4 PG — SIGNIFICANT CHANGE UP (ref 27–34)
MCHC RBC-ENTMCNC: 32.4 GM/DL — SIGNIFICANT CHANGE UP (ref 32–36)
MCV RBC AUTO: 87.5 FL — SIGNIFICANT CHANGE UP (ref 80–100)
PLATELET # BLD AUTO: 153 K/UL — SIGNIFICANT CHANGE UP (ref 150–400)
POTASSIUM SERPL-MCNC: 3.9 MMOL/L — SIGNIFICANT CHANGE UP (ref 3.5–5.3)
POTASSIUM SERPL-SCNC: 3.9 MMOL/L — SIGNIFICANT CHANGE UP (ref 3.5–5.3)
RBC # BLD: 5.5 M/UL — SIGNIFICANT CHANGE UP (ref 4.2–5.8)
RBC # FLD: 16.3 % — HIGH (ref 10.3–14.5)
SARS-COV-2 IGG SERPL QL IA: NEGATIVE — SIGNIFICANT CHANGE UP
SARS-COV-2 IGM SERPL IA-ACNC: <0.1 INDEX — SIGNIFICANT CHANGE UP
SODIUM SERPL-SCNC: 130 MMOL/L — LOW (ref 135–145)
WBC # BLD: 5.68 K/UL — SIGNIFICANT CHANGE UP (ref 3.8–10.5)
WBC # FLD AUTO: 5.68 K/UL — SIGNIFICANT CHANGE UP (ref 3.8–10.5)

## 2020-09-10 PROCEDURE — 93306 TTE W/DOPPLER COMPLETE: CPT | Mod: 26

## 2020-09-10 PROCEDURE — 99232 SBSQ HOSP IP/OBS MODERATE 35: CPT

## 2020-09-10 RX ORDER — WARFARIN SODIUM 2.5 MG/1
3 TABLET ORAL ONCE
Refills: 0 | Status: COMPLETED | OUTPATIENT
Start: 2020-09-10 | End: 2020-09-10

## 2020-09-10 RX ADMIN — CARVEDILOL PHOSPHATE 6.25 MILLIGRAM(S): 80 CAPSULE, EXTENDED RELEASE ORAL at 17:07

## 2020-09-10 RX ADMIN — Medication 2: at 12:12

## 2020-09-10 RX ADMIN — Medication 10 UNIT(S): at 12:11

## 2020-09-10 RX ADMIN — METHOCARBAMOL 750 MILLIGRAM(S): 500 TABLET, FILM COATED ORAL at 23:07

## 2020-09-10 RX ADMIN — LIDOCAINE 1 PATCH: 4 CREAM TOPICAL at 12:13

## 2020-09-10 RX ADMIN — Medication 6: at 07:45

## 2020-09-10 RX ADMIN — WARFARIN SODIUM 3 MILLIGRAM(S): 2.5 TABLET ORAL at 23:06

## 2020-09-10 RX ADMIN — Medication 10 UNIT(S): at 17:07

## 2020-09-10 RX ADMIN — Medication 300 MILLIGRAM(S): at 12:00

## 2020-09-10 RX ADMIN — INSULIN GLARGINE 60 UNIT(S): 100 INJECTION, SOLUTION SUBCUTANEOUS at 23:07

## 2020-09-10 RX ADMIN — METHOCARBAMOL 750 MILLIGRAM(S): 500 TABLET, FILM COATED ORAL at 12:00

## 2020-09-10 RX ADMIN — Medication 10 UNIT(S): at 07:46

## 2020-09-10 RX ADMIN — Medication 50 MICROGRAM(S): at 06:34

## 2020-09-10 RX ADMIN — CARVEDILOL PHOSPHATE 6.25 MILLIGRAM(S): 80 CAPSULE, EXTENDED RELEASE ORAL at 06:34

## 2020-09-10 RX ADMIN — OXYCODONE HYDROCHLORIDE 10 MILLIGRAM(S): 5 TABLET ORAL at 23:04

## 2020-09-10 RX ADMIN — GABAPENTIN 300 MILLIGRAM(S): 400 CAPSULE ORAL at 06:34

## 2020-09-10 RX ADMIN — GABAPENTIN 300 MILLIGRAM(S): 400 CAPSULE ORAL at 17:09

## 2020-09-10 RX ADMIN — Medication 40 MILLIGRAM(S): at 06:34

## 2020-09-10 RX ADMIN — Medication 1 MILLIGRAM(S): at 12:00

## 2020-09-10 RX ADMIN — PIPERACILLIN AND TAZOBACTAM 25 GRAM(S): 4; .5 INJECTION, POWDER, LYOPHILIZED, FOR SOLUTION INTRAVENOUS at 16:02

## 2020-09-10 RX ADMIN — PIPERACILLIN AND TAZOBACTAM 25 GRAM(S): 4; .5 INJECTION, POWDER, LYOPHILIZED, FOR SOLUTION INTRAVENOUS at 23:06

## 2020-09-10 RX ADMIN — METHOCARBAMOL 750 MILLIGRAM(S): 500 TABLET, FILM COATED ORAL at 17:06

## 2020-09-10 RX ADMIN — Medication 2: at 17:07

## 2020-09-10 RX ADMIN — PIPERACILLIN AND TAZOBACTAM 25 GRAM(S): 4; .5 INJECTION, POWDER, LYOPHILIZED, FOR SOLUTION INTRAVENOUS at 06:33

## 2020-09-10 RX ADMIN — METHOCARBAMOL 750 MILLIGRAM(S): 500 TABLET, FILM COATED ORAL at 06:34

## 2020-09-10 RX ADMIN — Medication 250 MILLIGRAM(S): at 06:34

## 2020-09-10 RX ADMIN — LORATADINE 10 MILLIGRAM(S): 10 TABLET ORAL at 12:00

## 2020-09-10 RX ADMIN — DULOXETINE HYDROCHLORIDE 60 MILLIGRAM(S): 30 CAPSULE, DELAYED RELEASE ORAL at 12:00

## 2020-09-10 RX ADMIN — OXYCODONE HYDROCHLORIDE 10 MILLIGRAM(S): 5 TABLET ORAL at 12:28

## 2020-09-10 RX ADMIN — Medication 250 MILLIGRAM(S): at 17:06

## 2020-09-10 RX ADMIN — LIDOCAINE 1 PATCH: 4 CREAM TOPICAL at 21:49

## 2020-09-10 NOTE — PROCEDURE NOTE - NSPOSTCAREGUIDE_GEN_A_CORE
Instructed patient/caregiver regarding signs and symptoms of infection/Instructed patient/caregiver to follow-up with primary care physician/Keep the cast/splint/dressing clean and dry
Keep the cast/splint/dressing clean and dry/Instructed patient/caregiver to follow-up with primary care physician/Instructed patient/caregiver regarding signs and symptoms of infection/Care for catheter as per unit/ICU protocols/Verbal/written post procedure instructions were given to patient/caregiver

## 2020-09-10 NOTE — PROGRESS NOTE ADULT - SUBJECTIVE AND OBJECTIVE BOX
[facility]  INFECTIOUS DISEASES AND INTERNAL MEDICINE at [place]  =======================================================  Miguelangel Seo MD  Diplomates American Board of Internal Medicine and Infectious Diseases  Tel  [phone]  Fax [phone]  =======================================================    [facility]-[id number]  MARY ANN CORNELL  follow up:  leg cellulitis    still with some leg pain  no fevers    =======================================================  REVIEW OF SYSTEMS:  CONSTITUTIONAL:  No Fever or chills  HEENT:  No diplopia or blurred vision.  No earache, sore throat or runny nose.  CARDIOVASCULAR:  No pressure, squeezing, strangling, tightness, heaviness or aching about the chest, neck, axilla or epigastrium.  RESPIRATORY:  No cough, shortness of breath  GASTROINTESTINAL:  No nausea, vomiting or diarrhea.  GENITOURINARY:  No dysuria, frequency or urgency. No Blood in urine  MUSCULOSKELETAL:  no joint aches, no muscle pain  SKIN:  as per HPI  NEUROLOGIC:  No Headaches, seizures or weakness.  PSYCHIATRIC:  No disorder of thought or mood.  ENDOCRINE:  No heat or cold intolerance  HEMATOLOGICAL:  No easy bruising or bleeding.      =======================================================  Allergies  No Known Allergies     ======================================================  Physical Exam:  ============     General:  No acute distress.  OBESE  Eye: Pupils are equal, round and reactive to light, Extraocular movements are intact, Normal conjunctiva.  HENT: Normocephalic, Oral mucosa is moist, No pharyngeal erythema, No sinus tenderness.  Neck: Supple, No lymphadenopathy.  Respiratory: Lungs are clear to auscultation, Respirations are non-labored.  Cardiovascular: Normal rate, Regular rhythm,   Gastrointestinal: Soft, Non-tender, Non-distended, Normal bowel sounds.  Genitourinary: No costovertebral angle tenderness.  Lymphatics: No lymphadenopathy neck,   Musculoskeletal: Normal range of motion, Normal strength.  Integumentary:  LEGS WITH BILATERAL DRESSING IN PLACE  Neurologic: Alert, Oriented, No focal deficits, Cranial Nerves II-XII are grossly intact.  Psychiatric: Appropriate mood & affect.    =======================================================  Vitals:  ============  T(F): 98.2 (10 Sep 2020 08:04), Max: 98.2 (10 Sep 2020 08:04)  HR: 85 (10 Sep 2020 08:04)  BP: 120/74 (10 Sep 2020 08:04)  RR: 19 (10 Sep 2020 08:04)  SpO2: 99% (10 Sep 2020 04:30) (94% - 99%)  temp max in last 48H T(F): , Max: 98.4 (09-08-20 @ 18:13)    =======================================================  Current Antibiotics:  piperacillin/tazobactam IVPB.. 3.375 Gram(s) IV Intermittent every 8 hours    Other medications:  allopurinol 300 milliGRAM(s) Oral daily  carvedilol 6.25 milliGRAM(s) Oral every 12 hours  dextrose 5%. 1000 milliLiter(s) IV Continuous <Continuous>  dextrose 50% Injectable 12.5 Gram(s) IV Push once  dextrose 50% Injectable 25 Gram(s) IV Push once  dextrose 50% Injectable 25 Gram(s) IV Push once  DULoxetine 60 milliGRAM(s) Oral daily  folic acid 1 milliGRAM(s) Oral daily  furosemide    Tablet 40 milliGRAM(s) Oral daily  gabapentin 300 milliGRAM(s) Oral two times a day  insulin glargine Injectable (LANTUS) 60 Unit(s) SubCutaneous at bedtime  insulin lispro (HumaLOG) corrective regimen sliding scale   SubCutaneous three times a day before meals  insulin lispro Injectable (HumaLOG) 10 Unit(s) SubCutaneous three times a day before meals  levothyroxine 50 MICROGram(s) Oral daily  lidocaine   Patch 1 Patch Transdermal daily  loratadine 10 milliGRAM(s) Oral daily  methocarbamol 750 milliGRAM(s) Oral every 6 hours  saccharomyces boulardii 250 milliGRAM(s) Oral two times a day  warfarin 3 milliGRAM(s) Oral once      =======================================================  Labs:                        15.6   5.68  )-----------( 153      ( 10 Sep 2020 07:52 )             48.1      09-10    130<L>  |  90<L>  |  36.0<H>  ----------------------------<  241<H>  3.9   |  26.0  |  1.21    Ca    9.1      10 Sep 2020 07:48  Mg     2.2     09-10    Creatinine, Serum: 1.21 mg/dL (09-10-20 @ 07:48)  Creatinine, Serum: 1.46 mg/dL (09-09-20 @ 07:43)  Creatinine, Serum: 1.48 mg/dL (09-08-20 @ 07:04)  Creatinine, Serum: 1.53 mg/dL (09-07-20 @ 06:21)  Creatinine, Serum: 1.29 mg/dL (09-05-20 @ 21:24)  Creatinine, Serum: 1.43 mg/dL (09-05-20 @ 17:38)      WBC Count: 5.68 K/uL (09-10-20 @ 07:52)  WBC Count: 6.57 K/uL (09-09-20 @ 07:43)  WBC Count: 7.84 K/uL (09-05-20 @ 17:38)    COVID-19 PCR: NotDetec (09-06-20 @ 06:22)

## 2020-09-10 NOTE — PROCEDURE NOTE - ADDITIONAL PROCEDURE DETAILS
Patient resting comfortably in NAD. Tourniquet removed and all sharps disposed of at completion of procedure. Patient left with bed rails up and bed lowered to original position.
18 gauge, 10cm, 39cm circumference, Bard power glide midline. Good flash, normal saline flush, right cephalic vein.

## 2020-09-10 NOTE — PROCEDURE NOTE - NSPROCDETAILS_GEN_ALL_CORE
location identified, draped/prepped, sterile technique used/supine position/sterile dressing applied/sterile technique, catheter placed/ultrasound assessment/ultrasound guidance
ultrasound utilization/dressing applied/flushes easily/secured in place/sterile technique, catheter placed/blood seen on insertion

## 2020-09-10 NOTE — PROGRESS NOTE ADULT - SUBJECTIVE AND OBJECTIVE BOX
MARY ANN CORNELL    [id number]    69y      Male    CC: Fall    INTERVAL HPI/OVERNIGHT EVENTS: Pt seen and examined. No acute events o/n. reports being tired. denies cp, sob, abd pain, n/v    REVIEW OF SYSTEMS:    CONSTITUTIONAL: No fever, weight loss  RESPIRATORY: No cough, wheezing, hemoptysis; No shortness of breath  CARDIOVASCULAR: No chest pain, palpitations  GASTROINTESTINAL: No abdominal or epigastric pain. No nausea, vomiting  NEUROLOGICAL: No headaches, memory loss    Vital Signs Last 24 Hrs  T(C): 36.7 (10 Sep 2020 15:49), Max: 36.8 (10 Sep 2020 08:04)  T(F): 98.1 (10 Sep 2020 15:49), Max: 98.2 (10 Sep 2020 08:04)  HR: 93 (10 Sep 2020 15:49) (53 - 93)  BP: 100/69 (10 Sep 2020 15:49) (100/69 - 133/86)  BP(mean): --  RR: 19 (10 Sep 2020 15:49) (18 - 19)  SpO2: 99% (10 Sep 2020 04:30) (94% - 99%)    PHYSICAL EXAM:    GENERAL: NAD  HEENT: PERRL, +EOMI  CHEST/LUNG: Clear to auscultation bilaterally; No wheezing  HEART: S1S2+, Regular rate and rhythm; No murmurs, rubs, or gallops  ABDOMEN: Soft, Nontender, Nondistended; Bowel sounds present  SKIN: warm, dry; BLE dressings in place  NEURO: awake, alert    LABS:                        15.6   5.68  )-----------( 153      ( 10 Sep 2020 07:52 )             48.1     09-10    130<L>  |  90<L>  |  36.0<H>  ----------------------------<  241<H>  3.9   |  26.0  |  1.21    Ca    9.1      10 Sep 2020 07:48  Mg     2.2     09-10      PT/INR - ( 09 Sep 2020 07:43 )   PT: 25.9 sec;   INR: 2.32 ratio           MEDICATIONS  (STANDING):  allopurinol 300 milliGRAM(s) Oral daily  carvedilol 6.25 milliGRAM(s) Oral every 12 hours  dextrose 5%. 1000 milliLiter(s) (50 mL/Hr) IV Continuous <Continuous>  dextrose 50% Injectable 12.5 Gram(s) IV Push once  dextrose 50% Injectable 25 Gram(s) IV Push once  dextrose 50% Injectable 25 Gram(s) IV Push once  DULoxetine 60 milliGRAM(s) Oral daily  folic acid 1 milliGRAM(s) Oral daily  furosemide    Tablet 40 milliGRAM(s) Oral daily  gabapentin 300 milliGRAM(s) Oral two times a day  insulin glargine Injectable (LANTUS) 60 Unit(s) SubCutaneous at bedtime  insulin lispro (HumaLOG) corrective regimen sliding scale   SubCutaneous three times a day before meals  insulin lispro Injectable (HumaLOG) 10 Unit(s) SubCutaneous three times a day before meals  levothyroxine 50 MICROGram(s) Oral daily  lidocaine   Patch 1 Patch Transdermal daily  loratadine 10 milliGRAM(s) Oral daily  methocarbamol 750 milliGRAM(s) Oral every 6 hours  piperacillin/tazobactam IVPB.. 3.375 Gram(s) IV Intermittent every 8 hours  saccharomyces boulardii 250 milliGRAM(s) Oral two times a day  warfarin 3 milliGRAM(s) Oral once    MEDICATIONS  (PRN):  ALBUTerol    90 MICROgram(s) HFA Inhaler 2 Puff(s) Inhalation every 6 hours PRN Shortness of Breath and/or Wheezing  dextrose 40% Gel 15 Gram(s) Oral once PRN Blood Glucose LESS THAN 70 milliGRAM(s)/deciliter  glucagon  Injectable 1 milliGRAM(s) IntraMuscular once PRN Glucose LESS THAN 70 milligrams/deciliter  oxyCODONE    IR 10 milliGRAM(s) Oral every 6 hours PRN Severe Pain (7 - 10)      RADIOLOGY & ADDITIONAL TESTS:

## 2020-09-10 NOTE — PROGRESS NOTE ADULT - ASSESSMENT
69y/oM PMH afib on coumadin, IDDM, hypothyroidism, gout, VT s/p ICD, HFrEF (25%), CHRISS, morbidly obese presents s/p mechanical fall getting out of the pool. In ER, CT spine neg, pt was placed in to observation for SCOTTY placement. EKG with afib and frequent PVCs. Seen by cardiology, who increased Coreg dose. While in obs, pt persistently hyperglycemic, admitted to medicine.    mechanical fall  BLE leg abrasions  -vascular consult appreciated: daily dressing changes with adaptic to abrasions covered with DSD and ACE wraps for gentle compression  -no vascular surgical testing or procedures indicated at this time, f/u outpt wound care  -cont robaxin, lidoderm patch for back pain  -ID recs appreciated  -cont zosyn for total 7-10 days as per ID  -blood cx neg  -PT rec SCOTTY  -midline placed     Type 2 diabetes with hyperglycemia   -HgbA1c 10.4  -increased lantus to 60u  -increased premeal to 10u  -ISS    chronic Afib on coumadin  -f/u daily INR  -cont coumadin  -cont coreg    VT s/p ICD, NICM  -known frequent PVCs  -coreg increased to 6.25mg BID by cardio this admission    HFrEF chronic systolic heart failure  -restart lasix 40mg po    CHRISS  -cont nocturnal cpap    Morbid obesity, BMI 52

## 2020-09-11 ENCOUNTER — TRANSCRIPTION ENCOUNTER (OUTPATIENT)
Age: 70
End: 2020-09-11

## 2020-09-11 LAB
ANION GAP SERPL CALC-SCNC: 12 MMOL/L — SIGNIFICANT CHANGE UP (ref 5–17)
BUN SERPL-MCNC: 30 MG/DL — HIGH (ref 8–20)
CALCIUM SERPL-MCNC: 8.8 MG/DL — SIGNIFICANT CHANGE UP (ref 8.6–10.2)
CHLORIDE SERPL-SCNC: 92 MMOL/L — LOW (ref 98–107)
CO2 SERPL-SCNC: 31 MMOL/L — HIGH (ref 22–29)
CREAT SERPL-MCNC: 1.34 MG/DL — HIGH (ref 0.5–1.3)
GLUCOSE BLDC GLUCOMTR-MCNC: 141 MG/DL — HIGH (ref 70–99)
GLUCOSE BLDC GLUCOMTR-MCNC: 196 MG/DL — HIGH (ref 70–99)
GLUCOSE BLDC GLUCOMTR-MCNC: 197 MG/DL — HIGH (ref 70–99)
GLUCOSE BLDC GLUCOMTR-MCNC: 228 MG/DL — HIGH (ref 70–99)
GLUCOSE SERPL-MCNC: 222 MG/DL — HIGH (ref 70–99)
HCT VFR BLD CALC: 46.4 % — SIGNIFICANT CHANGE UP (ref 39–50)
HGB BLD-MCNC: 15.1 G/DL — SIGNIFICANT CHANGE UP (ref 13–17)
INR BLD: 3.32 RATIO — HIGH (ref 0.88–1.16)
MAGNESIUM SERPL-MCNC: 1.9 MG/DL — SIGNIFICANT CHANGE UP (ref 1.6–2.6)
MCHC RBC-ENTMCNC: 28.5 PG — SIGNIFICANT CHANGE UP (ref 27–34)
MCHC RBC-ENTMCNC: 32.5 GM/DL — SIGNIFICANT CHANGE UP (ref 32–36)
MCV RBC AUTO: 87.5 FL — SIGNIFICANT CHANGE UP (ref 80–100)
PLATELET # BLD AUTO: 135 K/UL — LOW (ref 150–400)
POTASSIUM SERPL-MCNC: 2.9 MMOL/L — CRITICAL LOW (ref 3.5–5.3)
POTASSIUM SERPL-SCNC: 2.9 MMOL/L — CRITICAL LOW (ref 3.5–5.3)
PROTHROM AB SERPL-ACNC: 36.4 SEC — HIGH (ref 10.6–13.6)
RBC # BLD: 5.3 M/UL — SIGNIFICANT CHANGE UP (ref 4.2–5.8)
RBC # FLD: 16 % — HIGH (ref 10.3–14.5)
SODIUM SERPL-SCNC: 135 MMOL/L — SIGNIFICANT CHANGE UP (ref 135–145)
WBC # BLD: 4.87 K/UL — SIGNIFICANT CHANGE UP (ref 3.8–10.5)
WBC # FLD AUTO: 4.87 K/UL — SIGNIFICANT CHANGE UP (ref 3.8–10.5)

## 2020-09-11 PROCEDURE — 99232 SBSQ HOSP IP/OBS MODERATE 35: CPT

## 2020-09-11 PROCEDURE — 71101 X-RAY EXAM UNILAT RIBS/CHEST: CPT | Mod: 26

## 2020-09-11 PROCEDURE — 73502 X-RAY EXAM HIP UNI 2-3 VIEWS: CPT | Mod: 26,LT

## 2020-09-11 RX ORDER — METHOCARBAMOL 500 MG/1
1 TABLET, FILM COATED ORAL
Qty: 0 | Refills: 0 | DISCHARGE
Start: 2020-09-11

## 2020-09-11 RX ORDER — LORATADINE 10 MG/1
1 TABLET ORAL
Qty: 0 | Refills: 0 | DISCHARGE
Start: 2020-09-11

## 2020-09-11 RX ORDER — DULOXETINE HYDROCHLORIDE 30 MG/1
1 CAPSULE, DELAYED RELEASE ORAL
Qty: 0 | Refills: 0 | DISCHARGE
Start: 2020-09-11

## 2020-09-11 RX ORDER — ALBUTEROL 90 UG/1
2 AEROSOL, METERED ORAL
Qty: 0 | Refills: 0 | DISCHARGE
Start: 2020-09-11

## 2020-09-11 RX ORDER — LEVOTHYROXINE SODIUM 125 MCG
1 TABLET ORAL
Qty: 0 | Refills: 0 | DISCHARGE
Start: 2020-09-11

## 2020-09-11 RX ORDER — LIDOCAINE 4 G/100G
1 CREAM TOPICAL
Qty: 0 | Refills: 0 | DISCHARGE
Start: 2020-09-11

## 2020-09-11 RX ORDER — FUROSEMIDE 40 MG
1 TABLET ORAL
Qty: 0 | Refills: 0 | DISCHARGE
Start: 2020-09-11

## 2020-09-11 RX ORDER — POTASSIUM CHLORIDE 20 MEQ
10 PACKET (EA) ORAL ONCE
Refills: 0 | Status: COMPLETED | OUTPATIENT
Start: 2020-09-11 | End: 2020-09-11

## 2020-09-11 RX ORDER — FOLIC ACID 0.8 MG
1 TABLET ORAL
Qty: 0 | Refills: 0 | DISCHARGE
Start: 2020-09-11

## 2020-09-11 RX ORDER — WARFARIN SODIUM 2.5 MG/1
1 TABLET ORAL ONCE
Refills: 0 | Status: COMPLETED | OUTPATIENT
Start: 2020-09-11 | End: 2020-09-11

## 2020-09-11 RX ORDER — SACCHAROMYCES BOULARDII 250 MG
1 POWDER IN PACKET (EA) ORAL
Qty: 0 | Refills: 0 | DISCHARGE
Start: 2020-09-11

## 2020-09-11 RX ORDER — ALLOPURINOL 300 MG
1 TABLET ORAL
Qty: 0 | Refills: 0 | DISCHARGE
Start: 2020-09-11

## 2020-09-11 RX ORDER — POTASSIUM CHLORIDE 20 MEQ
40 PACKET (EA) ORAL EVERY 4 HOURS
Refills: 0 | Status: COMPLETED | OUTPATIENT
Start: 2020-09-11 | End: 2020-09-11

## 2020-09-11 RX ORDER — GABAPENTIN 400 MG/1
1 CAPSULE ORAL
Qty: 0 | Refills: 0 | DISCHARGE
Start: 2020-09-11

## 2020-09-11 RX ADMIN — CARVEDILOL PHOSPHATE 6.25 MILLIGRAM(S): 80 CAPSULE, EXTENDED RELEASE ORAL at 16:59

## 2020-09-11 RX ADMIN — METHOCARBAMOL 750 MILLIGRAM(S): 500 TABLET, FILM COATED ORAL at 16:59

## 2020-09-11 RX ADMIN — LIDOCAINE 1 PATCH: 4 CREAM TOPICAL at 19:00

## 2020-09-11 RX ADMIN — METHOCARBAMOL 750 MILLIGRAM(S): 500 TABLET, FILM COATED ORAL at 21:09

## 2020-09-11 RX ADMIN — Medication 250 MILLIGRAM(S): at 06:36

## 2020-09-11 RX ADMIN — DULOXETINE HYDROCHLORIDE 60 MILLIGRAM(S): 30 CAPSULE, DELAYED RELEASE ORAL at 11:03

## 2020-09-11 RX ADMIN — METHOCARBAMOL 750 MILLIGRAM(S): 500 TABLET, FILM COATED ORAL at 06:36

## 2020-09-11 RX ADMIN — OXYCODONE HYDROCHLORIDE 10 MILLIGRAM(S): 5 TABLET ORAL at 07:33

## 2020-09-11 RX ADMIN — PIPERACILLIN AND TAZOBACTAM 25 GRAM(S): 4; .5 INJECTION, POWDER, LYOPHILIZED, FOR SOLUTION INTRAVENOUS at 21:01

## 2020-09-11 RX ADMIN — Medication 250 MILLIGRAM(S): at 16:59

## 2020-09-11 RX ADMIN — Medication 10 UNIT(S): at 11:03

## 2020-09-11 RX ADMIN — Medication 1 MILLIGRAM(S): at 11:03

## 2020-09-11 RX ADMIN — OXYCODONE HYDROCHLORIDE 10 MILLIGRAM(S): 5 TABLET ORAL at 14:10

## 2020-09-11 RX ADMIN — Medication 2: at 07:56

## 2020-09-11 RX ADMIN — INSULIN GLARGINE 60 UNIT(S): 100 INJECTION, SOLUTION SUBCUTANEOUS at 21:06

## 2020-09-11 RX ADMIN — Medication 300 MILLIGRAM(S): at 11:03

## 2020-09-11 RX ADMIN — Medication 10 UNIT(S): at 07:56

## 2020-09-11 RX ADMIN — WARFARIN SODIUM 1 MILLIGRAM(S): 2.5 TABLET ORAL at 21:01

## 2020-09-11 RX ADMIN — Medication 100 MILLIEQUIVALENT(S): at 10:04

## 2020-09-11 RX ADMIN — OXYCODONE HYDROCHLORIDE 10 MILLIGRAM(S): 5 TABLET ORAL at 00:00

## 2020-09-11 RX ADMIN — METHOCARBAMOL 750 MILLIGRAM(S): 500 TABLET, FILM COATED ORAL at 11:03

## 2020-09-11 RX ADMIN — OXYCODONE HYDROCHLORIDE 10 MILLIGRAM(S): 5 TABLET ORAL at 06:42

## 2020-09-11 RX ADMIN — LIDOCAINE 1 PATCH: 4 CREAM TOPICAL at 00:00

## 2020-09-11 RX ADMIN — Medication 40 MILLIGRAM(S): at 06:36

## 2020-09-11 RX ADMIN — GABAPENTIN 300 MILLIGRAM(S): 400 CAPSULE ORAL at 06:36

## 2020-09-11 RX ADMIN — PIPERACILLIN AND TAZOBACTAM 25 GRAM(S): 4; .5 INJECTION, POWDER, LYOPHILIZED, FOR SOLUTION INTRAVENOUS at 14:10

## 2020-09-11 RX ADMIN — CARVEDILOL PHOSPHATE 6.25 MILLIGRAM(S): 80 CAPSULE, EXTENDED RELEASE ORAL at 06:36

## 2020-09-11 RX ADMIN — LIDOCAINE 1 PATCH: 4 CREAM TOPICAL at 11:03

## 2020-09-11 RX ADMIN — LORATADINE 10 MILLIGRAM(S): 10 TABLET ORAL at 11:03

## 2020-09-11 RX ADMIN — GABAPENTIN 300 MILLIGRAM(S): 400 CAPSULE ORAL at 16:59

## 2020-09-11 RX ADMIN — Medication 10 UNIT(S): at 16:58

## 2020-09-11 RX ADMIN — Medication 50 MICROGRAM(S): at 06:36

## 2020-09-11 RX ADMIN — Medication 40 MILLIEQUIVALENT(S): at 14:10

## 2020-09-11 RX ADMIN — Medication 40 MILLIEQUIVALENT(S): at 10:05

## 2020-09-11 RX ADMIN — Medication 4: at 11:03

## 2020-09-11 RX ADMIN — LIDOCAINE 1 PATCH: 4 CREAM TOPICAL at 22:06

## 2020-09-11 RX ADMIN — PIPERACILLIN AND TAZOBACTAM 25 GRAM(S): 4; .5 INJECTION, POWDER, LYOPHILIZED, FOR SOLUTION INTRAVENOUS at 06:36

## 2020-09-11 RX ADMIN — OXYCODONE HYDROCHLORIDE 10 MILLIGRAM(S): 5 TABLET ORAL at 14:41

## 2020-09-11 NOTE — DISCHARGE NOTE PROVIDER - NSDCMRMEDTOKEN_GEN_ALL_CORE_FT
albuterol 90 mcg/inh inhalation aerosol: 2 puff(s) inhaled every 6 hours, As needed, Shortness of Breath and/or Wheezing  allopurinol 300 mg oral tablet: 1 tab(s) orally once a day  carvedilol 3.125 mg oral tablet: 1 tab(s) orally every 12 hours  clotrimazole 1% topical cream: 1 application topically 2 times a day  cyclobenzaprine 5 mg oral tablet: 1 tab(s) orally every 8 hours, As needed, Muscle Spasm  DULoxetine 60 mg oral delayed release capsule: 1 cap(s) orally once a day  folic acid 1 mg oral tablet: 1 tab(s) orally once a day  furosemide 40 mg oral tablet: 1 tab(s) orally once a day  gabapentin 300 mg oral capsule: 1 cap(s) orally 2 times a day  insulin aspart 100 units/mL injectable solution: 15 unit(s) injectable 3 times a day (before meals)   Januvia 50 mg oral tablet: 1 tab(s) orally once a day  Lantus 100 units/mL subcutaneous solution: 30 unit(s) subcutaneous once a day   Lantus 100 units/mL subcutaneous solution: 20 unit(s) subcutaneous once a day (at bedtime)   levothyroxine 50 mcg (0.05 mg) oral tablet: 1 tab(s) orally once a day  lidocaine 5% topical film: Apply topically to affected area once a day  loratadine 10 mg oral tablet: 1 tab(s) orally once a day  methocarbamol 750 mg oral tablet: 1 tab(s) orally every 6 hours  nystatin 100,000 units/g topical powder: 1 application topically every 12 hours  saccharomyces boulardii lyo 250 mg oral capsule: 1 cap(s) orally 2 times a day  torsemide 20 mg oral tablet: 2 tab(s) orally once a day  Tylenol 325 mg oral tablet: 2 tab(s) orally every 6 hours, As Needed pain  warfarin 3 mg oral tablet: 1 tab(s) orally once a day albuterol 90 mcg/inh inhalation aerosol: 2 puff(s) inhaled every 6 hours, As needed, Shortness of Breath and/or Wheezing  allopurinol 300 mg oral tablet: 1 tab(s) orally once a day  carvedilol 3.125 mg oral tablet: 1 tab(s) orally every 12 hours  clotrimazole 1% topical cream: 1 application topically 2 times a day  cyclobenzaprine 5 mg oral tablet: 1 tab(s) orally every 8 hours, As needed, Muscle Spasm  DULoxetine 60 mg oral delayed release capsule: 1 cap(s) orally once a day  folic acid 1 mg oral tablet: 1 tab(s) orally once a day  furosemide 40 mg oral tablet: 1 tab(s) orally once a day  gabapentin 300 mg oral capsule: 1 cap(s) orally 2 times a day  insulin aspart 100 units/mL injectable solution: 15 unit(s) injectable 3 times a day (before meals)   Januvia 50 mg oral tablet: 1 tab(s) orally once a day  Lantus 100 units/mL subcutaneous solution: 30 unit(s) subcutaneous once a day   Lantus 100 units/mL subcutaneous solution: 20 unit(s) subcutaneous once a day (at bedtime)   levothyroxine 50 mcg (0.05 mg) oral tablet: 1 tab(s) orally once a day  lidocaine 5% topical film: Apply topically to affected area once a day  loratadine 10 mg oral tablet: 1 tab(s) orally once a day  methocarbamol 750 mg oral tablet: 1 tab(s) orally every 6 hours  nystatin 100,000 units/g topical powder: 1 application topically every 12 hours  saccharomyces boulardii lyo 250 mg oral capsule: 1 cap(s) orally 2 times a day  torsemide 20 mg oral tablet: 2 tab(s) orally once a day  Tylenol 325 mg oral tablet: 2 tab(s) orally every 6 hours, As Needed pain  warfarin 3 mg oral tablet: 1 tab(s) orally once a day   albuterol 90 mcg/inh inhalation aerosol: 2 puff(s) inhaled every 6 hours, As needed, Shortness of Breath and/or Wheezing  allopurinol 300 mg oral tablet: 1 tab(s) orally once a day  carvedilol 6.25 mg oral tablet: 1 tab(s) orally every 12 hours  DULoxetine 60 mg oral delayed release capsule: 1 cap(s) orally once a day  folic acid 1 mg oral tablet: 1 tab(s) orally once a day  gabapentin 300 mg oral capsule: 1 cap(s) orally 2 times a day  insulin aspart 100 units/mL injectable solution: 10 unit(s) injectable 3 times a day (before meals)   Januvia 50 mg oral tablet: 1 tab(s) orally once a day  Klor-Con M20 oral tablet, extended release: 1 tab(s) orally once a day. Starting today.   Lantus 100 units/mL subcutaneous solution: 60 unit(s) subcutaneous once a day (at bedtime)   levothyroxine 50 mcg (0.05 mg) oral tablet: 1 tab(s) orally once a day  lidocaine 5% topical film: Apply topically to affected area once a day  loratadine 10 mg oral tablet: 1 tab(s) orally once a day  methocarbamol 750 mg oral tablet: 1 tab(s) orally every 6 hours  nystatin 100,000 units/g topical powder: 1 application topically every 12 hours  piperacillin-tazobactam 3 g-0.375 g/50 mL intravenous solution: 3.375 gram(s) intravenous every 8 hours till 9/18  saccharomyces boulardii lyo 250 mg oral capsule: 1 cap(s) orally 2 times a day  torsemide 20 mg oral tablet: 2 tab(s) orally once a day  Tylenol 325 mg oral tablet: 2 tab(s) orally every 6 hours, As Needed pain  warfarin 1 mg oral tablet: 1 tab(s) orally once a day   albuterol 90 mcg/inh inhalation aerosol: 2 puff(s) inhaled every 6 hours, As needed, Shortness of Breath and/or Wheezing  allopurinol 300 mg oral tablet: 1 tab(s) orally once a day  carvedilol 6.25 mg oral tablet: 1 tab(s) orally every 12 hours  DULoxetine 60 mg oral delayed release capsule: 1 cap(s) orally once a day  folic acid 1 mg oral tablet: 1 tab(s) orally once a day  gabapentin 300 mg oral capsule: 1 cap(s) orally 2 times a day  insulin aspart 100 units/mL injectable solution: 10 unit(s) injectable 3 times a day (before meals)   Januvia 50 mg oral tablet: 1 tab(s) orally once a day  Klor-Con M20 oral tablet, extended release: 1 tab(s) orally once a day. Starting today.   Lantus 100 units/mL subcutaneous solution: 60 unit(s) subcutaneous once a day (at bedtime)   levothyroxine 50 mcg (0.05 mg) oral tablet: 1 tab(s) orally once a day  lidocaine 5% topical film: Apply topically to affected area once a day  loratadine 10 mg oral tablet: 1 tab(s) orally once a day  magnesium oxide 400 mg (241.3 mg elemental magnesium) oral tablet: 1 tab(s) orally once a day   methocarbamol 750 mg oral tablet: 1 tab(s) orally every 6 hours  nystatin 100,000 units/g topical powder: 1 application topically every 12 hours  piperacillin-tazobactam 3 g-0.375 g/50 mL intravenous solution: 3.375 gram(s) intravenous every 8 hours till 9/18  saccharomyces boulardii lyo 250 mg oral capsule: 1 cap(s) orally 2 times a day  torsemide 20 mg oral tablet: 2 tab(s) orally once a day  Tylenol 325 mg oral tablet: 2 tab(s) orally every 6 hours, As Needed pain  warfarin 1 mg oral tablet: 1 tab(s) orally once a day

## 2020-09-11 NOTE — DISCHARGE NOTE PROVIDER - NSDCCPCAREPLAN_GEN_ALL_CORE_FT
PRINCIPAL DISCHARGE DIAGNOSIS  Diagnosis: Ulcers of both lower legs  Assessment and Plan of Treatment: Continue antibiotics till 9/18.  Wound care.  Follow up with ID and Vascular Surgery in 10 days.      SECONDARY DISCHARGE DIAGNOSES  Diagnosis: Fall  Assessment and Plan of Treatment: No obvious injury.  Continue PT at [facility].

## 2020-09-11 NOTE — DISCHARGE NOTE PROVIDER - NSDCFUADDAPPT_GEN_ALL_CORE_FT
Follow up with PMD in 1 week.  Follow up with ID and Vascular Surgery in 10 days. Follow up with PMD in 1 week.  Follow up with ID and Vascular Surgery in 10 days.

## 2020-09-11 NOTE — PROGRESS NOTE ADULT - ASSESSMENT
This 69 yr old male with known hx of Afib on coumadin, IDDM, hypothyroidism, Gout , VT s/p ICD, HFrEF (25%), CHRISS, morbidly obese presents s/p mechanical fall  the pool. Patient states he slipped off pool ladder while getting out and landed on back/rib. He was complaining of back pain, but now improved. Presented to ED and CT negative for acute fracture. Patient placed into observation for SCOTTY placement. EKG showed Afib with frequent PVC's. Patient was evaluated by cardiology who increased Coreg dose. While in observation patient persistently hyperglycemic. Patient usually takes Levemir 25 units BID and sliding scale Novolog usually between 5-15 units pre meals He missed his last nights dose of long acting insulin. Today he's received Humalog 8 units am, 10 units noon and 12 units evening. Patient has been persistently hyperglycemic throughout the day which is why medicine was consulted.   Pt received Lantus 50 units late last night , now admission or ARF and hyponatremia and uncontrolled blood sugars and leg wound   As per patient his legs have always been swollen and the skin came off the shins when he slipped coming down against the swimming pool ladder on Saturday   his legs are chronically dark and  the open wounds are not with any smelling discharge but are painful As per patient he had "dye test" for his leg circulation done couple weeks ago at the rehab but has not followed up with any physician for results   States he does use the CPAP at home for CHRISS (at 10) (08 Sep 2020 09:54)    patient denies fevers.  reports that he was using a salt water pool, chlorinated.   leg dressing have been changed several times, and pt defers dressing changes at this time.     impression:  leg ulcers  leg pain  diabetes      Plan:  - injury occurred in pool  - given that patient is diabetic, with poor control, most recent A1C with Estimated Average Glucose Result: 10.4 % (09-06-20 @ 07:47),  - continue mgmt of DM2    - continue Zosyn empirically to cover for pseudomonas  clinically better  - expect to complete course of antibiotics on 9/18/2020    - no contraindications to discharge to community from ID standpoint

## 2020-09-11 NOTE — PROGRESS NOTE ADULT - SUBJECTIVE AND OBJECTIVE BOX
MARY ANN CORNELL    [id number]    69y      Male    CC:     INTERVAL HPI/OVERNIGHT EVENTS:    REVIEW OF SYSTEMS:    CONSTITUTIONAL: No fever, weight loss, or fatigue  RESPIRATORY: No cough, wheezing, hemoptysis; No shortness of breath  CARDIOVASCULAR: No chest pain, palpitations  GASTROINTESTINAL: No abdominal or epigastric pain. No nausea, vomiting  NEUROLOGICAL: No headaches, memory loss, loss of strength.    Vital Signs Last 24 Hrs  T(C): 36.6 (11 Sep 2020 15:14), Max: 36.9 (11 Sep 2020 14:44)  T(F): 97.8 (11 Sep 2020 15:14), Max: 98.5 (11 Sep 2020 14:44)  HR: 67 (11 Sep 2020 15:14) (67 - 99)  BP: 133/73 (11 Sep 2020 15:14) (95/59 - 133/73)  BP(mean): --  RR: 20 (11 Sep 2020 15:14) (19 - 20)  SpO2: 99% (11 Sep 2020 14:44) (94% - 99%)    PHYSICAL EXAM:    GENERAL: NAD  HEENT: PERRL, +EOMI  NECK: soft, supple  CHEST/LUNG: Clear to auscultation bilaterally; No wheezing  HEART: S1S2+, Regular rate and rhythm; No murmurs, rubs, or gallops  ABDOMEN: Soft, Nontender, Nondistended; Bowel sounds present  SKIN: No rashes or lesions  NEURO: AAOX3, no focal deficits, no motor or sensory loss  PSYCH: normal mood    LABS:                        15.1   4.87  )-----------( 135      ( 11 Sep 2020 08:45 )             46.4     09-11    135  |  92<L>  |  30.0<H>  ----------------------------<  222<H>  2.9<LL>   |  31.0<H>  |  1.34<H>    Ca    8.8      11 Sep 2020 08:44  Mg     1.9     09-11      PT/INR - ( 11 Sep 2020 08:43 )   PT: 36.4 sec;   INR: 3.32 ratio                 MEDICATIONS  (STANDING):  allopurinol 300 milliGRAM(s) Oral daily  carvedilol 6.25 milliGRAM(s) Oral every 12 hours  dextrose 5%. 1000 milliLiter(s) (50 mL/Hr) IV Continuous <Continuous>  dextrose 50% Injectable 12.5 Gram(s) IV Push once  dextrose 50% Injectable 25 Gram(s) IV Push once  dextrose 50% Injectable 25 Gram(s) IV Push once  DULoxetine 60 milliGRAM(s) Oral daily  folic acid 1 milliGRAM(s) Oral daily  furosemide    Tablet 40 milliGRAM(s) Oral daily  gabapentin 300 milliGRAM(s) Oral two times a day  insulin glargine Injectable (LANTUS) 60 Unit(s) SubCutaneous at bedtime  insulin lispro (HumaLOG) corrective regimen sliding scale   SubCutaneous three times a day before meals  insulin lispro Injectable (HumaLOG) 10 Unit(s) SubCutaneous three times a day before meals  levothyroxine 50 MICROGram(s) Oral daily  lidocaine   Patch 1 Patch Transdermal daily  loratadine 10 milliGRAM(s) Oral daily  methocarbamol 750 milliGRAM(s) Oral every 6 hours  piperacillin/tazobactam IVPB.. 3.375 Gram(s) IV Intermittent every 8 hours  saccharomyces boulardii 250 milliGRAM(s) Oral two times a day  warfarin 1 milliGRAM(s) Oral once    MEDICATIONS  (PRN):  ALBUTerol    90 MICROgram(s) HFA Inhaler 2 Puff(s) Inhalation every 6 hours PRN Shortness of Breath and/or Wheezing  dextrose 40% Gel 15 Gram(s) Oral once PRN Blood Glucose LESS THAN 70 milliGRAM(s)/deciliter  glucagon  Injectable 1 milliGRAM(s) IntraMuscular once PRN Glucose LESS THAN 70 milligrams/deciliter  oxyCODONE    IR 10 milliGRAM(s) Oral every 6 hours PRN Severe Pain (7 - 10)      RADIOLOGY & ADDITIONAL TESTS: MARY ANN CORNELL    [id number]    69y      Male    CC: fall    INTERVAL HPI/OVERNIGHT EVENTS: Pt seen and examined. still with back pain, denies cp, sob, abd pain, n/v     REVIEW OF SYSTEMS:    CONSTITUTIONAL: No fever, weight loss, or fatigue  RESPIRATORY: No cough, wheezing, hemoptysis; No shortness of breath  CARDIOVASCULAR: No chest pain, palpitations  GASTROINTESTINAL: No abdominal or epigastric pain. No nausea, vomiting  NEUROLOGICAL: No headaches, memory loss    Vital Signs Last 24 Hrs  T(C): 36.6 (11 Sep 2020 15:14), Max: 36.9 (11 Sep 2020 14:44)  T(F): 97.8 (11 Sep 2020 15:14), Max: 98.5 (11 Sep 2020 14:44)  HR: 67 (11 Sep 2020 15:14) (67 - 99)  BP: 133/73 (11 Sep 2020 15:14) (95/59 - 133/73)  BP(mean): --  RR: 20 (11 Sep 2020 15:14) (19 - 20)  SpO2: 99% (11 Sep 2020 14:44) (94% - 99%)    PHYSICAL EXAM:    GENERAL: NAD  HEENT: PERRL, +EOMI  CHEST/LUNG: Clear to auscultation bilaterally; No wheezing  HEART: S1S2+, Regular rate and rhythm; No murmurs, rubs, or gallops  ABDOMEN: Soft, Nontender, Nondistended; Bowel sounds present  SKIN: warm, dry; BLE dressings in place  NEURO: awake, alert    LABS:                        15.1   4.87  )-----------( 135      ( 11 Sep 2020 08:45 )             46.4     09-11    135  |  92<L>  |  30.0<H>  ----------------------------<  222<H>  2.9<LL>   |  31.0<H>  |  1.34<H>    Ca    8.8      11 Sep 2020 08:44  Mg     1.9     09-11      PT/INR - ( 11 Sep 2020 08:43 )   PT: 36.4 sec;   INR: 3.32 ratio                 MEDICATIONS  (STANDING):  allopurinol 300 milliGRAM(s) Oral daily  carvedilol 6.25 milliGRAM(s) Oral every 12 hours  dextrose 5%. 1000 milliLiter(s) (50 mL/Hr) IV Continuous <Continuous>  dextrose 50% Injectable 12.5 Gram(s) IV Push once  dextrose 50% Injectable 25 Gram(s) IV Push once  dextrose 50% Injectable 25 Gram(s) IV Push once  DULoxetine 60 milliGRAM(s) Oral daily  folic acid 1 milliGRAM(s) Oral daily  furosemide    Tablet 40 milliGRAM(s) Oral daily  gabapentin 300 milliGRAM(s) Oral two times a day  insulin glargine Injectable (LANTUS) 60 Unit(s) SubCutaneous at bedtime  insulin lispro (HumaLOG) corrective regimen sliding scale   SubCutaneous three times a day before meals  insulin lispro Injectable (HumaLOG) 10 Unit(s) SubCutaneous three times a day before meals  levothyroxine 50 MICROGram(s) Oral daily  lidocaine   Patch 1 Patch Transdermal daily  loratadine 10 milliGRAM(s) Oral daily  methocarbamol 750 milliGRAM(s) Oral every 6 hours  piperacillin/tazobactam IVPB.. 3.375 Gram(s) IV Intermittent every 8 hours  saccharomyces boulardii 250 milliGRAM(s) Oral two times a day  warfarin 1 milliGRAM(s) Oral once    MEDICATIONS  (PRN):  ALBUTerol    90 MICROgram(s) HFA Inhaler 2 Puff(s) Inhalation every 6 hours PRN Shortness of Breath and/or Wheezing  dextrose 40% Gel 15 Gram(s) Oral once PRN Blood Glucose LESS THAN 70 milliGRAM(s)/deciliter  glucagon  Injectable 1 milliGRAM(s) IntraMuscular once PRN Glucose LESS THAN 70 milligrams/deciliter  oxyCODONE    IR 10 milliGRAM(s) Oral every 6 hours PRN Severe Pain (7 - 10)      RADIOLOGY & ADDITIONAL TESTS:

## 2020-09-11 NOTE — DIETITIAN INITIAL EVALUATION ADULT. - OTHER INFO
69y/oM PMH afib on coumadin, IDDM, hypothyroidism, gout, VT s/p ICD, HFrEF (25%), CHRISS, morbidly obese presents s/p mechanical fall getting out of the pool. In ER, CT spine neg, pt was placed in to observation for SCOTTY placement. EKG with afib and frequent PVCs. Seen by cardiology, who increased Coreg dose. While in obs, pt persistently hyperglycemic, admitted to medicine. HgbA1c 10.4% noted. Pt reported good po intake, consuming 100% of meals per documentation. Pt encouraged to consume HBV protein and provided with written diabetes nutrition education. Pt disinterested in nutrition education- will continue to follow up. 1+ b/l foot edema noted. Last documented BM 9/10.

## 2020-09-11 NOTE — DISCHARGE NOTE PROVIDER - CARE PROVIDERS DIRECT ADDRESSES
[email or web address]

## 2020-09-11 NOTE — DISCHARGE NOTE PROVIDER - CARE PROVIDER_API CALL
Edgardo Kothari  SURGERY  [address], [address] [place] [address]  Phone: [phone]  Fax: [phone]  Follow Up Time:     Dariel Walden  INTERNAL MEDICINE  [address] [place] [address]  Phone: [phone]  Fax: [phone]  Follow Up Time:     Vincent Chong  INFECTIOUS DISEASE  [address], [address]  [place] [address]  Phone: [phone]  Fax: [phone]  Follow Up Time:

## 2020-09-11 NOTE — CHART NOTE - NSCHARTNOTEFT_GEN_A_CORE
CC: Fall    INTERVAL HPI: Called by RN, patient found on floor. states he was looking for phone in bed and fell over on his left side. Denies hitting head. Fell on left hip. C/O left hip pain and rib pain. denies dizziness, LOC. states he lost balance while reaching for phone. Midline remains in place but cap ripped off line    Vital Signs: 97, 20, 118/80, pulse ox 99% on room air    PHYSICAL EXAM:    General: NAD  Extremities: Normal range of motion B/L upper and lower extremities. No loss of sensation  Neurological: Alert and oriented x4, no focal deficits  Musculoskeletal:  No deformities    A/P  > Fall  Mechanical fall with no LOC  Xray left hip and ribs  Medicate for pain as needed  Call IR for Midline change over wire, use peripheral IV until able to arrange  Fall risk precautions  discussed with MD

## 2020-09-11 NOTE — PROGRESS NOTE ADULT - SUBJECTIVE AND OBJECTIVE BOX
[facility]  INFECTIOUS DISEASES AND INTERNAL MEDICINE at [place]  =======================================================  Miguelangel Seo MD  Diplomates American Board of Internal Medicine and Infectious Diseases  Tel  [phone]  Fax [phone]  =======================================================    [id number]  MARY ANN CORNELL  follow up:  leg cellulitis    less pain in leg  per medical team, discharge planning in process.     =======================================================  REVIEW OF SYSTEMS:  CONSTITUTIONAL:  No Fever or chills  HEENT:  No diplopia or blurred vision.  No earache, sore throat or runny nose.  CARDIOVASCULAR:  No pressure, squeezing, strangling, tightness, heaviness or aching about the chest, neck, axilla or epigastrium.  RESPIRATORY:  No cough, shortness of breath  GASTROINTESTINAL:  No nausea, vomiting or diarrhea.  GENITOURINARY:  No dysuria, frequency or urgency. No Blood in urine  MUSCULOSKELETAL:  no joint aches, no muscle pain  SKIN:  as per HPI  NEUROLOGIC:  No Headaches, seizures or weakness.  PSYCHIATRIC:  No disorder of thought or mood.  ENDOCRINE:  No heat or cold intolerance  HEMATOLOGICAL:  No easy bruising or bleeding.    =======================================================  Allergies  No Known Allergies     ======================================================  Physical Exam:  ============     General:  No acute distress.  OBESE  Eye: Pupils are equal, round and reactive to light, Extraocular movements are intact, Normal conjunctiva.  HENT: Normocephalic, Oral mucosa is moist, No pharyngeal erythema, No sinus tenderness.  Neck: Supple, No lymphadenopathy.  Respiratory: Lungs are clear to auscultation, Respirations are non-labored.  Cardiovascular: Normal rate, Regular rhythm,   Gastrointestinal: Soft, Non-tender, Non-distended, Normal bowel sounds.  Genitourinary: No costovertebral angle tenderness.  Lymphatics: No lymphadenopathy neck,   Musculoskeletal: Normal range of motion, Normal strength.  Integumentary:  LEGS WITH BILATERAL DRESSING IN PLACE  Neurologic: Alert, Oriented, No focal deficits, Cranial Nerves II-XII are grossly intact.  Psychiatric: Appropriate mood & affect.    =======================================================  Vitals:  ============  T(F): 98.1 (10 Sep 2020 23:45), Max: 98.1 (10 Sep 2020 15:49)  HR: 99 (11 Sep 2020 06:34)  BP: 116/73 (11 Sep 2020 06:34)  RR: 19 (10 Sep 2020 23:45)  SpO2: 99% (11 Sep 2020 05:01) (94% - 99%)  temp max in last 48H T(F): , Max: 98.2 (09-10-20 @ 08:04)    =======================================================  Current Antibiotics:  piperacillin/tazobactam IVPB.. 3.375 Gram(s) IV Intermittent every 8 hours    Other medications:  allopurinol 300 milliGRAM(s) Oral daily  carvedilol 6.25 milliGRAM(s) Oral every 12 hours  dextrose 5%. 1000 milliLiter(s) IV Continuous <Continuous>  dextrose 50% Injectable 12.5 Gram(s) IV Push once  dextrose 50% Injectable 25 Gram(s) IV Push once  dextrose 50% Injectable 25 Gram(s) IV Push once  DULoxetine 60 milliGRAM(s) Oral daily  folic acid 1 milliGRAM(s) Oral daily  furosemide    Tablet 40 milliGRAM(s) Oral daily  gabapentin 300 milliGRAM(s) Oral two times a day  insulin glargine Injectable (LANTUS) 60 Unit(s) SubCutaneous at bedtime  insulin lispro (HumaLOG) corrective regimen sliding scale   SubCutaneous three times a day before meals  insulin lispro Injectable (HumaLOG) 10 Unit(s) SubCutaneous three times a day before meals  levothyroxine 50 MICROGram(s) Oral daily  lidocaine   Patch 1 Patch Transdermal daily  loratadine 10 milliGRAM(s) Oral daily  methocarbamol 750 milliGRAM(s) Oral every 6 hours  potassium chloride    Tablet ER 40 milliEquivalent(s) Oral every 4 hours  saccharomyces boulardii 250 milliGRAM(s) Oral two times a day  warfarin 1 milliGRAM(s) Oral once      =======================================================  Labs:                        15.1   4.87  )-----------( 135      ( 11 Sep 2020 08:45 )             46.4      09-11    135  |  92<L>  |  30.0<H>  ----------------------------<  222<H>  2.9<LL>   |  31.0<H>  |  1.34<H>    Ca    8.8      11 Sep 2020 08:44  Mg     1.9     09-11    Culture - Blood (collected 09-08-20 @ 12:34)  Source: .Blood Blood    Creatinine, Serum: 1.34 mg/dL (09-11-20 @ 08:44)  Creatinine, Serum: 1.21 mg/dL (09-10-20 @ 07:48)  Creatinine, Serum: 1.46 mg/dL (09-09-20 @ 07:43)  Creatinine, Serum: 1.48 mg/dL (09-08-20 @ 07:04)  Creatinine, Serum: 1.53 mg/dL (09-07-20 @ 06:21)    WBC Count: 4.87 K/uL (09-11-20 @ 08:45)  WBC Count: 5.68 K/uL (09-10-20 @ 07:52)  WBC Count: 6.57 K/uL (09-09-20 @ 07:43)      COVID-19 PCR: NotDetec (09-06-20 @ 06:22)

## 2020-09-11 NOTE — PROGRESS NOTE ADULT - NSHPATTENDINGPLANDISCUSS_GEN_ALL_CORE
Flonase nasal spray 2 squirts each nostril at night  Sudafed 12 h - decongestant  Antibiotic  
medical team
patient and medical team
patient and medical team

## 2020-09-11 NOTE — PROGRESS NOTE ADULT - ASSESSMENT
69y/oM PMH afib on coumadin, IDDM, hypothyroidism, gout, VT s/p ICD, HFrEF (25%), CHRISS, morbidly obese presents s/p mechanical fall getting out of the pool. In ER, CT spine neg, pt was placed in to observation for SCOTTY placement. EKG with afib and frequent PVCs. Seen by cardiology, who increased Coreg dose. While in obs, pt persistently hyperglycemic, admitted to medicine.    mechanical fall  BLE leg abrasions  -vascular consult appreciated: daily dressing changes with adaptic to abrasions covered with DSD and ACE wraps for gentle compression  -no vascular surgical testing or procedures indicated at this time, f/u outpt wound care  -cont robaxin, lidoderm patch for back pain  -ID recs appreciated  -cont zosyn until 9/18  -blood cx neg  -PT rec SCOTTY  -midline placed   -s/p mechanical fall to left side 9/11; no LOC  -f/u xrays L hip, ribs  -midline hub damaged, will need to replace prior to d/c. until then, use peripheral IV    Type 2 diabetes with hyperglycemia   -HgbA1c 10.4  -increased lantus to 60u  -increased premeal to 10u  -ISS    chronic Afib on coumadin  -f/u daily INR  -cont coumadin  -cont coreg    VT s/p ICD, NICM  -known frequent PVCs  -coreg increased to 6.25mg BID by cardio this admission    HFrEF chronic systolic heart failure  -restart lasix 40mg po    CHRISS  -cont nocturnal cpap    Morbid obesity, BMI 52    dispo: SCOTTY pending auth

## 2020-09-11 NOTE — PROGRESS NOTE ADULT - REASON FOR ADMISSION
uncontrolled DM, leg ulcers , ARF

## 2020-09-11 NOTE — DISCHARGE NOTE PROVIDER - PROVIDER TOKENS
PROVIDER:[TOKEN:[[id number]]],PROVIDER:[TOKEN:[[id number]]],PROVIDER:[TOKEN:[[id number]]]

## 2020-09-11 NOTE — DIETITIAN INITIAL EVALUATION ADULT. - PERTINENT LABORATORY DATA
09-11 Na135 mmol/L Glu 222 mg/dL<H> K+ 2.9 mmol/L<LL> Cr  1.34 mg/dL<H> BUN 30.0 mg/dL<H> Phos n/a   Alb n/a   PAB n/a

## 2020-09-11 NOTE — DISCHARGE NOTE PROVIDER - HOSPITAL COURSE
69y/oM PMH afib on coumadin, IDDM, hypothyroidism, gout, VT s/p ICD, HFrEF (25%), CHRISS, morbidly obese presents s/p mechanical fall getting out of the pool. In ER, CT spine neg, pt was placed in to observation for SCOTTY placement. EKG with afib and frequent PVCs. Seen by cardiology, who increased Coreg dose. While in obs, pt persistently hyperglycemic, admitted to medicine.        mechanical fall    BLE leg abrasions    -vascular consult appreciated: daily dressing changes with adaptic to abrasions covered with DSD and ACE wraps for gentle compression    -no vascular surgical testing or procedures indicated at this time, f/u outpt wound care    -cont robaxin, lidoderm patch for back pain    -ID recs appreciated    -cont zosyn for total 7-10 days as per ID    -blood cx neg    -PT rec SCOTTY    -midline placed         Type 2 diabetes with hyperglycemia     -HgbA1c 10.4    -increased lantus to 60u    -increased premeal to 10u    -ISS        chronic Afib on coumadin    -f/u daily INR    -cont coumadin    -cont coreg        VT s/p ICD, NICM    -known frequent PVCs    -coreg increased to 6.25mg BID by cardio this admission        HFrEF chronic systolic heart failure    -restart lasix 40mg po        CHRISS    -cont nocturnal cpap        Morbid obesity, BMI 52 70 y/o M PMH afib on coumadin, IDDM, hypothyroidism, gout, VT s/p ICD, HFrEF (25%), CHRISS, morbidly obese presents s/p mechanical fall getting out of the pool. In ER, CT spine neg, pt was placed in to observation for SCOTTY placement. EKG with afib and frequent PVCs. Seen by cardiology, who increased Coreg dose. While in obs, pt persistently hyperglycemic, admitted to medicine.    mechanical fall  BLE leg abrasions  -vascular consult appreciated: daily dressing changes with adaptic to abrasions covered with DSD and ACE wraps for gentle compression  -no vascular surgical testing or procedures indicated at this time, f/u outpt wound care  -cont robaxin, lidoderm patch for back pain  -ID recs appreciated  -cont zosyn till 9/18 as per ID  -blood cx neg  -PT rec SCOTTY  -midline placed     Type 2 diabetes with hyperglycemia   -HgbA1c 10.4  -increased lantus to 60u  -increased premeal to 10u  -ISS    chronic Afib on coumadin  -f/u daily INR  -cont coumadin  -cont coreg    VT s/p ICD, NICM  -known frequent PVCs  -coreg increased to 6.25mg BID by cardio this admission    HFrEF chronic systolic heart failure  -restart lasix 40mg po    CHRISS  -cont nocturnal cpap    Morbid obesity, BMI 52  -lifestyle modification    PHYSICAL EXAM:  Vital Signs   T(C): 36.6 (11 Sep 2020 15:14), Max: 36.9 (11 Sep 2020 14:44)  T(F): 97.8 (11 Sep 2020 15:14), Max: 98.5 (11 Sep 2020 14:44)  HR: 67 (11 Sep 2020 15:14) (67 - 97)  BP: 133/73 (11 Sep 2020 15:14) (118/80 - 133/73)  RR: 20 (11 Sep 2020 15:14) (20 - 20)  SpO2: 99% (11 Sep 2020 14:44) (99% - 99%)  General: Elderly male lying in bed comfortably. No acute distress  HEENT: EOMI. Clear conjunctivae. Moist mucus membrane  Neck: Supple.   Chest: CTA bilaterally - no wheezing, rales or rhonchi. No chest wall tenderness.  Heart: S1 & S2 with irregular rhythm  Abdomen: Obese. Soft. Non-tender. + BS  Ext: ACE Bandage on lower extremities. No calf tenderness   Neuro: Active and alert. No focal deficit. No speech disorder.  Skin: Warm and Dry  Psychiatry: Normal mood and affect    Time spent: 40 minutes

## 2020-09-12 ENCOUNTER — TRANSCRIPTION ENCOUNTER (OUTPATIENT)
Age: 70
End: 2020-09-12

## 2020-09-12 VITALS
DIASTOLIC BLOOD PRESSURE: 76 MMHG | SYSTOLIC BLOOD PRESSURE: 112 MMHG | HEART RATE: 96 BPM | OXYGEN SATURATION: 98 % | RESPIRATION RATE: 18 BRPM | TEMPERATURE: 99 F

## 2020-09-12 LAB
ANION GAP SERPL CALC-SCNC: 10 MMOL/L — SIGNIFICANT CHANGE UP (ref 5–17)
BUN SERPL-MCNC: 23 MG/DL — HIGH (ref 8–20)
CALCIUM SERPL-MCNC: 8.8 MG/DL — SIGNIFICANT CHANGE UP (ref 8.6–10.2)
CHLORIDE SERPL-SCNC: 91 MMOL/L — LOW (ref 98–107)
CO2 SERPL-SCNC: 32 MMOL/L — HIGH (ref 22–29)
CREAT SERPL-MCNC: 1.19 MG/DL — SIGNIFICANT CHANGE UP (ref 0.5–1.3)
GLUCOSE BLDC GLUCOMTR-MCNC: 155 MG/DL — HIGH (ref 70–99)
GLUCOSE BLDC GLUCOMTR-MCNC: 175 MG/DL — HIGH (ref 70–99)
GLUCOSE SERPL-MCNC: 211 MG/DL — HIGH (ref 70–99)
HCT VFR BLD CALC: 45.8 % — SIGNIFICANT CHANGE UP (ref 39–50)
HGB BLD-MCNC: 14.7 G/DL — SIGNIFICANT CHANGE UP (ref 13–17)
INR BLD: 3.21 RATIO — HIGH (ref 0.88–1.16)
MAGNESIUM SERPL-MCNC: 1.7 MG/DL — SIGNIFICANT CHANGE UP (ref 1.6–2.6)
MCHC RBC-ENTMCNC: 28.3 PG — SIGNIFICANT CHANGE UP (ref 27–34)
MCHC RBC-ENTMCNC: 32.1 GM/DL — SIGNIFICANT CHANGE UP (ref 32–36)
MCV RBC AUTO: 88.1 FL — SIGNIFICANT CHANGE UP (ref 80–100)
PLATELET # BLD AUTO: 130 K/UL — LOW (ref 150–400)
POTASSIUM SERPL-MCNC: 3 MMOL/L — LOW (ref 3.5–5.3)
POTASSIUM SERPL-SCNC: 3 MMOL/L — LOW (ref 3.5–5.3)
PROTHROM AB SERPL-ACNC: 35.2 SEC — HIGH (ref 10.6–13.6)
RBC # BLD: 5.2 M/UL — SIGNIFICANT CHANGE UP (ref 4.2–5.8)
RBC # FLD: 16 % — HIGH (ref 10.3–14.5)
SODIUM SERPL-SCNC: 133 MMOL/L — LOW (ref 135–145)
WBC # BLD: 5.52 K/UL — SIGNIFICANT CHANGE UP (ref 3.8–10.5)
WBC # FLD AUTO: 5.52 K/UL — SIGNIFICANT CHANGE UP (ref 3.8–10.5)

## 2020-09-12 PROCEDURE — 96376 TX/PRO/DX INJ SAME DRUG ADON: CPT | Mod: XU

## 2020-09-12 PROCEDURE — 96361 HYDRATE IV INFUSION ADD-ON: CPT

## 2020-09-12 PROCEDURE — 96375 TX/PRO/DX INJ NEW DRUG ADDON: CPT

## 2020-09-12 PROCEDURE — 84443 ASSAY THYROID STIM HORMONE: CPT

## 2020-09-12 PROCEDURE — 85610 PROTHROMBIN TIME: CPT

## 2020-09-12 PROCEDURE — 72131 CT LUMBAR SPINE W/O DYE: CPT

## 2020-09-12 PROCEDURE — 83930 ASSAY OF BLOOD OSMOLALITY: CPT

## 2020-09-12 PROCEDURE — 83036 HEMOGLOBIN GLYCOSYLATED A1C: CPT

## 2020-09-12 PROCEDURE — 93306 TTE W/DOPPLER COMPLETE: CPT

## 2020-09-12 PROCEDURE — 84484 ASSAY OF TROPONIN QUANT: CPT

## 2020-09-12 PROCEDURE — 72125 CT NECK SPINE W/O DYE: CPT

## 2020-09-12 PROCEDURE — 71101 X-RAY EXAM UNILAT RIBS/CHEST: CPT

## 2020-09-12 PROCEDURE — 96374 THER/PROPH/DIAG INJ IV PUSH: CPT

## 2020-09-12 PROCEDURE — 81003 URINALYSIS AUTO W/O SCOPE: CPT

## 2020-09-12 PROCEDURE — 85730 THROMBOPLASTIN TIME PARTIAL: CPT

## 2020-09-12 PROCEDURE — 90471 IMMUNIZATION ADMIN: CPT

## 2020-09-12 PROCEDURE — 83690 ASSAY OF LIPASE: CPT

## 2020-09-12 PROCEDURE — U0003: CPT

## 2020-09-12 PROCEDURE — 82550 ASSAY OF CK (CPK): CPT

## 2020-09-12 PROCEDURE — 73030 X-RAY EXAM OF SHOULDER: CPT

## 2020-09-12 PROCEDURE — 99285 EMERGENCY DEPT VISIT HI MDM: CPT | Mod: 25

## 2020-09-12 PROCEDURE — 73502 X-RAY EXAM HIP UNI 2-3 VIEWS: CPT

## 2020-09-12 PROCEDURE — 90714 TD VACC NO PRESV 7 YRS+ IM: CPT

## 2020-09-12 PROCEDURE — 99239 HOSP IP/OBS DSCHRG MGMT >30: CPT

## 2020-09-12 PROCEDURE — 82962 GLUCOSE BLOOD TEST: CPT

## 2020-09-12 PROCEDURE — 83735 ASSAY OF MAGNESIUM: CPT

## 2020-09-12 PROCEDURE — 84100 ASSAY OF PHOSPHORUS: CPT

## 2020-09-12 PROCEDURE — 84436 ASSAY OF TOTAL THYROXINE: CPT

## 2020-09-12 PROCEDURE — 86769 SARS-COV-2 COVID-19 ANTIBODY: CPT

## 2020-09-12 PROCEDURE — 76775 US EXAM ABDO BACK WALL LIM: CPT

## 2020-09-12 PROCEDURE — G0378: CPT

## 2020-09-12 PROCEDURE — 85027 COMPLETE CBC AUTOMATED: CPT

## 2020-09-12 PROCEDURE — 80053 COMPREHEN METABOLIC PANEL: CPT

## 2020-09-12 PROCEDURE — 94760 N-INVAS EAR/PLS OXIMETRY 1: CPT

## 2020-09-12 PROCEDURE — 36415 COLL VENOUS BLD VENIPUNCTURE: CPT

## 2020-09-12 PROCEDURE — 94660 CPAP INITIATION&MGMT: CPT

## 2020-09-12 PROCEDURE — 97116 GAIT TRAINING THERAPY: CPT

## 2020-09-12 PROCEDURE — 71250 CT THORAX DX C-: CPT

## 2020-09-12 PROCEDURE — 87040 BLOOD CULTURE FOR BACTERIA: CPT

## 2020-09-12 PROCEDURE — 97110 THERAPEUTIC EXERCISES: CPT

## 2020-09-12 PROCEDURE — 93005 ELECTROCARDIOGRAM TRACING: CPT

## 2020-09-12 PROCEDURE — 80048 BASIC METABOLIC PNL TOTAL CA: CPT

## 2020-09-12 RX ORDER — POTASSIUM CHLORIDE 20 MEQ
40 PACKET (EA) ORAL ONCE
Refills: 0 | Status: COMPLETED | OUTPATIENT
Start: 2020-09-12 | End: 2020-09-12

## 2020-09-12 RX ORDER — INSULIN ASPART 100 [IU]/ML
10 INJECTION, SOLUTION SUBCUTANEOUS
Qty: 10 | Refills: 0
Start: 2020-09-12 | End: 2020-10-11

## 2020-09-12 RX ORDER — POTASSIUM CHLORIDE 20 MEQ
1 PACKET (EA) ORAL
Qty: 0 | Refills: 0 | DISCHARGE
Start: 2020-09-12

## 2020-09-12 RX ORDER — MAGNESIUM OXIDE 400 MG ORAL TABLET 241.3 MG
1 TABLET ORAL
Qty: 5 | Refills: 0
Start: 2020-09-12 | End: 2020-09-16

## 2020-09-12 RX ORDER — INSULIN GLARGINE 100 [IU]/ML
60 INJECTION, SOLUTION SUBCUTANEOUS
Qty: 10 | Refills: 0
Start: 2020-09-12 | End: 2020-10-11

## 2020-09-12 RX ORDER — WARFARIN SODIUM 2.5 MG/1
1 TABLET ORAL
Qty: 0 | Refills: 0 | DISCHARGE

## 2020-09-12 RX ORDER — CARVEDILOL PHOSPHATE 80 MG/1
1 CAPSULE, EXTENDED RELEASE ORAL
Qty: 0 | Refills: 0 | DISCHARGE
Start: 2020-09-12

## 2020-09-12 RX ADMIN — Medication 300 MILLIGRAM(S): at 12:01

## 2020-09-12 RX ADMIN — Medication 2: at 07:45

## 2020-09-12 RX ADMIN — Medication 10 UNIT(S): at 00:05

## 2020-09-12 RX ADMIN — LORATADINE 10 MILLIGRAM(S): 10 TABLET ORAL at 12:02

## 2020-09-12 RX ADMIN — Medication 1 MILLIGRAM(S): at 12:01

## 2020-09-12 RX ADMIN — DULOXETINE HYDROCHLORIDE 60 MILLIGRAM(S): 30 CAPSULE, DELAYED RELEASE ORAL at 12:01

## 2020-09-12 RX ADMIN — Medication 2: at 12:01

## 2020-09-12 RX ADMIN — METHOCARBAMOL 750 MILLIGRAM(S): 500 TABLET, FILM COATED ORAL at 12:01

## 2020-09-12 RX ADMIN — Medication 40 MILLIEQUIVALENT(S): at 12:54

## 2020-09-12 RX ADMIN — GABAPENTIN 300 MILLIGRAM(S): 400 CAPSULE ORAL at 06:00

## 2020-09-12 RX ADMIN — PIPERACILLIN AND TAZOBACTAM 25 GRAM(S): 4; .5 INJECTION, POWDER, LYOPHILIZED, FOR SOLUTION INTRAVENOUS at 13:02

## 2020-09-12 RX ADMIN — Medication 50 MICROGRAM(S): at 06:02

## 2020-09-12 RX ADMIN — CARVEDILOL PHOSPHATE 6.25 MILLIGRAM(S): 80 CAPSULE, EXTENDED RELEASE ORAL at 06:00

## 2020-09-12 RX ADMIN — Medication 40 MILLIGRAM(S): at 06:00

## 2020-09-12 RX ADMIN — METHOCARBAMOL 750 MILLIGRAM(S): 500 TABLET, FILM COATED ORAL at 13:02

## 2020-09-12 RX ADMIN — Medication 10 UNIT(S): at 12:02

## 2020-09-12 NOTE — DISCHARGE NOTE NURSING/CASE MANAGEMENT/SOCIAL WORK - PATIENT PORTAL LINK FT
You can access the FollowMyHealth Patient Portal offered by [facility] by registering at the following website: [email or web address]. By joining [employer]’s FollowMyHealth portal, you will also be able to view your health information using other applications (apps) compatible with our system.

## 2020-09-13 LAB
CULTURE RESULTS: SIGNIFICANT CHANGE UP
SPECIMEN SOURCE: SIGNIFICANT CHANGE UP

## 2020-09-24 NOTE — PATIENT PROFILE ADULT - FAMILY NEEDS
Update History & Physical 
History and physical has been reviewed. The patient has been examined. There have been no significant clinical changes since the completion of the originally dated History and Physical. 
 
Risk and benefit of cardiac catheterization/PCI was described in detail to patient.  (risk of vascular access complication with potential surgical intervention for management, stroke, myocardial infarction, emergent open heart surgery and death). Patient wishes to proceed with coronary angiography with possible intervention. Labs Lab Results Component Value Date/Time Sodium 142 08/24/2020 10:21 PM  
 Potassium 4.3 08/24/2020 10:21 PM  
 Chloride 109 (H) 08/24/2020 10:21 PM  
 CO2 27 08/24/2020 10:21 PM  
 Anion gap 6 08/24/2020 10:21 PM  
 Glucose 139 (H) 08/24/2020 10:21 PM  
 BUN 18 08/24/2020 10:21 PM  
 Creatinine 1.19 08/24/2020 10:21 PM  
 BUN/Creatinine ratio 15 08/24/2020 10:21 PM  
 GFR est AA >60 08/24/2020 10:21 PM  
 GFR est non-AA 57 (L) 08/24/2020 10:21 PM  
 Calcium 9.6 08/24/2020 10:21 PM  
 
Lab Results Component Value Date/Time WBC 6.4 08/24/2020 10:21 PM  
 HGB 12.4 08/24/2020 10:21 PM  
 HCT 38.1 08/24/2020 10:21 PM  
 PLATELET 467 22/27/2734 10:21 PM  
 MCV 96.0 08/24/2020 10:21 PM  
 
 
 
ASA 3 Wisam Sides Electronically signed by Vega Acosta DO on 9/24/2020 at 2:49 PM 

no

## 2020-10-28 NOTE — ED ADULT NURSE NOTE - NS ED PATIENT SAFETY CONCERN
Please schedule telephone for pt today , we can discuss, pt is already on multiple pain medications. No

## 2021-03-06 ENCOUNTER — INPATIENT (INPATIENT)
Facility: HOSPITAL | Age: 71
LOS: 2 days | Discharge: HOME HEALTH | DRG: 683 | End: 2021-03-09
Attending: HOSPITALIST | Admitting: INTERNAL MEDICINE
Payer: COMMERCIAL

## 2021-03-06 VITALS
OXYGEN SATURATION: 97 % | HEIGHT: 69 IN | WEIGHT: 289.91 LBS | RESPIRATION RATE: 18 BRPM | HEART RATE: 63 BPM | TEMPERATURE: 99 F | SYSTOLIC BLOOD PRESSURE: 128 MMHG | DIASTOLIC BLOOD PRESSURE: 83 MMHG

## 2021-03-06 DIAGNOSIS — I50.9 HEART FAILURE, UNSPECIFIED: ICD-10-CM

## 2021-03-06 DIAGNOSIS — Z98.89 OTHER SPECIFIED POSTPROCEDURAL STATES: Chronic | ICD-10-CM

## 2021-03-06 DIAGNOSIS — Z95.810 PRESENCE OF AUTOMATIC (IMPLANTABLE) CARDIAC DEFIBRILLATOR: Chronic | ICD-10-CM

## 2021-03-06 PROBLEM — R91.8 OTHER NONSPECIFIC ABNORMAL FINDING OF LUNG FIELD: Chronic | Status: ACTIVE | Noted: 2020-09-06

## 2021-03-06 PROBLEM — I48.91 UNSPECIFIED ATRIAL FIBRILLATION: Chronic | Status: ACTIVE | Noted: 2020-09-06

## 2021-03-06 PROBLEM — M54.9 DORSALGIA, UNSPECIFIED: Chronic | Status: ACTIVE | Noted: 2020-09-06

## 2021-03-06 LAB
ALBUMIN SERPL ELPH-MCNC: 4 G/DL — SIGNIFICANT CHANGE UP (ref 3.3–5.2)
ALP SERPL-CCNC: 132 U/L — HIGH (ref 40–120)
ALT FLD-CCNC: 26 U/L — SIGNIFICANT CHANGE UP
ANION GAP SERPL CALC-SCNC: 22 MMOL/L — HIGH (ref 5–17)
APPEARANCE UR: CLEAR — SIGNIFICANT CHANGE UP
AST SERPL-CCNC: 56 U/L — HIGH
BASOPHILS # BLD AUTO: 0 K/UL — SIGNIFICANT CHANGE UP (ref 0–0.2)
BASOPHILS NFR BLD AUTO: 0 % — SIGNIFICANT CHANGE UP (ref 0–2)
BILIRUB SERPL-MCNC: 0.9 MG/DL — SIGNIFICANT CHANGE UP (ref 0.4–2)
BILIRUB UR-MCNC: NEGATIVE — SIGNIFICANT CHANGE UP
BUN SERPL-MCNC: 46 MG/DL — HIGH (ref 8–20)
CALCIUM SERPL-MCNC: 9.8 MG/DL — SIGNIFICANT CHANGE UP (ref 8.6–10.2)
CHLORIDE SERPL-SCNC: 78 MMOL/L — LOW (ref 98–107)
CO2 SERPL-SCNC: 27 MMOL/L — SIGNIFICANT CHANGE UP (ref 22–29)
COLOR SPEC: YELLOW — SIGNIFICANT CHANGE UP
CREAT SERPL-MCNC: 1.58 MG/DL — HIGH (ref 0.5–1.3)
DIFF PNL FLD: NEGATIVE — SIGNIFICANT CHANGE UP
EOSINOPHIL # BLD AUTO: 0 K/UL — SIGNIFICANT CHANGE UP (ref 0–0.5)
EOSINOPHIL NFR BLD AUTO: 0 % — SIGNIFICANT CHANGE UP (ref 0–6)
GLUCOSE BLDC GLUCOMTR-MCNC: 194 MG/DL — HIGH (ref 70–99)
GLUCOSE SERPL-MCNC: 344 MG/DL — HIGH (ref 70–99)
GLUCOSE UR QL: 1000 MG/DL
HCT VFR BLD CALC: 60.4 % — CRITICAL HIGH (ref 39–50)
HGB BLD-MCNC: 20.5 G/DL — CRITICAL HIGH (ref 13–17)
KETONES UR-MCNC: NEGATIVE — SIGNIFICANT CHANGE UP
LEUKOCYTE ESTERASE UR-ACNC: NEGATIVE — SIGNIFICANT CHANGE UP
LYMPHOCYTES # BLD AUTO: 0.52 K/UL — LOW (ref 1–3.3)
LYMPHOCYTES # BLD AUTO: 7.1 % — LOW (ref 13–44)
MAGNESIUM SERPL-MCNC: 2.5 MG/DL — SIGNIFICANT CHANGE UP (ref 1.6–2.6)
MANUAL SMEAR VERIFICATION: SIGNIFICANT CHANGE UP
MCHC RBC-ENTMCNC: 28.1 PG — SIGNIFICANT CHANGE UP (ref 27–34)
MCHC RBC-ENTMCNC: 33.9 GM/DL — SIGNIFICANT CHANGE UP (ref 32–36)
MCV RBC AUTO: 82.9 FL — SIGNIFICANT CHANGE UP (ref 80–100)
MONOCYTES # BLD AUTO: 0.85 K/UL — SIGNIFICANT CHANGE UP (ref 0–0.9)
MONOCYTES NFR BLD AUTO: 11.5 % — SIGNIFICANT CHANGE UP (ref 2–14)
NEUTROPHILS # BLD AUTO: 5.8 K/UL — SIGNIFICANT CHANGE UP (ref 1.8–7.4)
NEUTROPHILS NFR BLD AUTO: 78.8 % — HIGH (ref 43–77)
NITRITE UR-MCNC: NEGATIVE — SIGNIFICANT CHANGE UP
NT-PROBNP SERPL-SCNC: 337 PG/ML — HIGH (ref 0–300)
PH UR: 6.5 — SIGNIFICANT CHANGE UP (ref 5–8)
PLAT MORPH BLD: NORMAL — SIGNIFICANT CHANGE UP
PLATELET # BLD AUTO: 167 K/UL — SIGNIFICANT CHANGE UP (ref 150–400)
POTASSIUM SERPL-MCNC: 3.7 MMOL/L — SIGNIFICANT CHANGE UP (ref 3.5–5.3)
POTASSIUM SERPL-SCNC: 3.7 MMOL/L — SIGNIFICANT CHANGE UP (ref 3.5–5.3)
PROT SERPL-MCNC: 7.9 G/DL — SIGNIFICANT CHANGE UP (ref 6.6–8.7)
PROT UR-MCNC: NEGATIVE MG/DL — SIGNIFICANT CHANGE UP
RBC # BLD: 7.29 M/UL — HIGH (ref 4.2–5.8)
RBC # FLD: 17.7 % — HIGH (ref 10.3–14.5)
RBC BLD AUTO: NORMAL — SIGNIFICANT CHANGE UP
SARS-COV-2 RNA SPEC QL NAA+PROBE: SIGNIFICANT CHANGE UP
SMUDGE CELLS # BLD: PRESENT — SIGNIFICANT CHANGE UP
SODIUM SERPL-SCNC: 127 MMOL/L — LOW (ref 135–145)
SP GR SPEC: 1 — LOW (ref 1.01–1.02)
TROPONIN T SERPL-MCNC: 0.03 NG/ML — SIGNIFICANT CHANGE UP (ref 0–0.06)
UROBILINOGEN FLD QL: NEGATIVE MG/DL — SIGNIFICANT CHANGE UP
VARIANT LYMPHS # BLD: 2.6 % — SIGNIFICANT CHANGE UP (ref 0–6)
WBC # BLD: 7.36 K/UL — SIGNIFICANT CHANGE UP (ref 3.8–10.5)
WBC # FLD AUTO: 7.36 K/UL — SIGNIFICANT CHANGE UP (ref 3.8–10.5)

## 2021-03-06 PROCEDURE — 93010 ELECTROCARDIOGRAM REPORT: CPT

## 2021-03-06 PROCEDURE — 99285 EMERGENCY DEPT VISIT HI MDM: CPT

## 2021-03-06 PROCEDURE — 71045 X-RAY EXAM CHEST 1 VIEW: CPT | Mod: 26

## 2021-03-06 PROCEDURE — 99223 1ST HOSP IP/OBS HIGH 75: CPT

## 2021-03-06 RX ORDER — INSULIN LISPRO 100/ML
5 VIAL (ML) SUBCUTANEOUS
Refills: 0 | Status: DISCONTINUED | OUTPATIENT
Start: 2021-03-06 | End: 2021-03-08

## 2021-03-06 RX ORDER — ASPIRIN/CALCIUM CARB/MAGNESIUM 324 MG
325 TABLET ORAL ONCE
Refills: 0 | Status: COMPLETED | OUTPATIENT
Start: 2021-03-06 | End: 2021-03-06

## 2021-03-06 RX ORDER — PIPERACILLIN AND TAZOBACTAM 4; .5 G/20ML; G/20ML
3.38 INJECTION, POWDER, LYOPHILIZED, FOR SOLUTION INTRAVENOUS
Qty: 0 | Refills: 0 | DISCHARGE

## 2021-03-06 RX ORDER — WARFARIN SODIUM 2.5 MG/1
1 TABLET ORAL
Qty: 0 | Refills: 0 | DISCHARGE

## 2021-03-06 RX ORDER — ONDANSETRON 8 MG/1
4 TABLET, FILM COATED ORAL EVERY 6 HOURS
Refills: 0 | Status: DISCONTINUED | OUTPATIENT
Start: 2021-03-06 | End: 2021-03-09

## 2021-03-06 RX ORDER — INSULIN GLARGINE 100 [IU]/ML
50 INJECTION, SOLUTION SUBCUTANEOUS ONCE
Refills: 0 | Status: COMPLETED | OUTPATIENT
Start: 2021-03-06 | End: 2021-03-06

## 2021-03-06 RX ORDER — DULOXETINE HYDROCHLORIDE 30 MG/1
60 CAPSULE, DELAYED RELEASE ORAL DAILY
Refills: 0 | Status: DISCONTINUED | OUTPATIENT
Start: 2021-03-06 | End: 2021-03-09

## 2021-03-06 RX ORDER — SODIUM CHLORIDE 9 MG/ML
1000 INJECTION, SOLUTION INTRAVENOUS
Refills: 0 | Status: DISCONTINUED | OUTPATIENT
Start: 2021-03-06 | End: 2021-03-09

## 2021-03-06 RX ORDER — INSULIN LISPRO 100/ML
10 VIAL (ML) SUBCUTANEOUS ONCE
Refills: 0 | Status: COMPLETED | OUTPATIENT
Start: 2021-03-06 | End: 2021-03-06

## 2021-03-06 RX ORDER — CARVEDILOL PHOSPHATE 80 MG/1
25 CAPSULE, EXTENDED RELEASE ORAL EVERY 12 HOURS
Refills: 0 | Status: DISCONTINUED | OUTPATIENT
Start: 2021-03-06 | End: 2021-03-09

## 2021-03-06 RX ORDER — LEVOTHYROXINE SODIUM 125 MCG
50 TABLET ORAL DAILY
Refills: 0 | Status: DISCONTINUED | OUTPATIENT
Start: 2021-03-06 | End: 2021-03-09

## 2021-03-06 RX ORDER — GLUCAGON INJECTION, SOLUTION 0.5 MG/.1ML
1 INJECTION, SOLUTION SUBCUTANEOUS ONCE
Refills: 0 | Status: DISCONTINUED | OUTPATIENT
Start: 2021-03-06 | End: 2021-03-09

## 2021-03-06 RX ORDER — SODIUM CHLORIDE 9 MG/ML
1000 INJECTION INTRAMUSCULAR; INTRAVENOUS; SUBCUTANEOUS ONCE
Refills: 0 | Status: COMPLETED | OUTPATIENT
Start: 2021-03-06 | End: 2021-03-06

## 2021-03-06 RX ORDER — DEXTROSE 50 % IN WATER 50 %
15 SYRINGE (ML) INTRAVENOUS ONCE
Refills: 0 | Status: DISCONTINUED | OUTPATIENT
Start: 2021-03-06 | End: 2021-03-09

## 2021-03-06 RX ORDER — SODIUM CHLORIDE 9 MG/ML
3 INJECTION INTRAMUSCULAR; INTRAVENOUS; SUBCUTANEOUS EVERY 8 HOURS
Refills: 0 | Status: DISCONTINUED | OUTPATIENT
Start: 2021-03-06 | End: 2021-03-09

## 2021-03-06 RX ORDER — DEXTROSE 50 % IN WATER 50 %
25 SYRINGE (ML) INTRAVENOUS ONCE
Refills: 0 | Status: DISCONTINUED | OUTPATIENT
Start: 2021-03-06 | End: 2021-03-09

## 2021-03-06 RX ORDER — PANTOPRAZOLE SODIUM 20 MG/1
40 TABLET, DELAYED RELEASE ORAL
Refills: 0 | Status: DISCONTINUED | OUTPATIENT
Start: 2021-03-06 | End: 2021-03-09

## 2021-03-06 RX ORDER — INSULIN LISPRO 100/ML
VIAL (ML) SUBCUTANEOUS
Refills: 0 | Status: DISCONTINUED | OUTPATIENT
Start: 2021-03-06 | End: 2021-03-09

## 2021-03-06 RX ORDER — NITROGLYCERIN 6.5 MG
1 CAPSULE, EXTENDED RELEASE ORAL ONCE
Refills: 0 | Status: COMPLETED | OUTPATIENT
Start: 2021-03-06 | End: 2021-03-06

## 2021-03-06 RX ORDER — INSULIN GLARGINE 100 [IU]/ML
50 INJECTION, SOLUTION SUBCUTANEOUS AT BEDTIME
Refills: 0 | Status: DISCONTINUED | OUTPATIENT
Start: 2021-03-06 | End: 2021-03-08

## 2021-03-06 RX ADMIN — Medication 1 INCH(S): at 18:35

## 2021-03-06 RX ADMIN — SODIUM CHLORIDE 1000 MILLILITER(S): 9 INJECTION INTRAMUSCULAR; INTRAVENOUS; SUBCUTANEOUS at 22:11

## 2021-03-06 RX ADMIN — INSULIN GLARGINE 50 UNIT(S): 100 INJECTION, SOLUTION SUBCUTANEOUS at 22:11

## 2021-03-06 RX ADMIN — Medication 325 MILLIGRAM(S): at 18:35

## 2021-03-06 NOTE — ED PROVIDER NOTE - ATTENDING CONTRIBUTION TO CARE
I, Mohsen Muñiz, performed the initial face to face bedside interview with this patient regarding history of present illness, review of symptoms and relevant past medical, social and family history.  I completed an independent physical examination.  I was the initial provider who evaluated this patient. I have signed out the follow up of any pending tests (i.e. labs, radiological studies) to the resident.  I have communicated the patient’s plan of care and disposition with the resident  \

## 2021-03-06 NOTE — H&P ADULT - HISTORY OF PRESENT ILLNESS
71 y/o male from home with c/o 3 weeks of progressive generalized weakness, MACHUCA, and poor appetite. His family has been trying to get him to come to the hospital for the past 2 weeks but he h as declined to see his PMD or come to the hospital. No one else is sick at home, no travel. Patient declines fever, chills, CP, HA, focal weakness. He has 24/7 aides at home and his Daughter help to take care of him. He uses a walker at baseline, denies any falls. He finally came to the ED after his son threatened him to go he states. Patient denies any changes in medications but admits hes not the most compliant with them. He admits to being exhausted from urinating all the time day and night. He states his blood sugars are anywhere from 250-400 despite being on oral meds and insulin. In the ED patient with stable vitals, not hypoxic. CBC noted with hbg of 20, also noted to be in JAYLENE with hyponatremia, and hyperglycemia. No signs of infection or ACS.

## 2021-03-06 NOTE — H&P ADULT - NSICDXPASTMEDICALHX_GEN_ALL_CORE_FT
PAST MEDICAL HISTORY:  Atrial fibrillation     CHF (congestive heart failure) EF 30%    Chronic back pain     DM (diabetes mellitus)     High cholesterol     HTN (hypertension)     Lung nodules     Prostate CA     Pulmonary hypertension     Renal insufficiency     Sleep apnea

## 2021-03-06 NOTE — ED ADULT NURSE NOTE - NSIMPLEMENTINTERV_GEN_ALL_ED
Returned call to patient.   at [phone] for patient to return call to clinic.     Implemented All Fall Risk Interventions:  Fork to call system. Call bell, personal items and telephone within reach. Instruct patient to call for assistance. Room bathroom lighting operational. Non-slip footwear when patient is off stretcher. Physically safe environment: no spills, clutter or unnecessary equipment. Stretcher in lowest position, wheels locked, appropriate side rails in place. Provide visual cue, wrist band, yellow gown, etc. Monitor gait and stability. Monitor for mental status changes and reorient to person, place, and time. Review medications for side effects contributing to fall risk. Reinforce activity limits and safety measures with patient and family.

## 2021-03-06 NOTE — ED PROVIDER NOTE - CROS ED ENDOCRINE ALL NEG
Subjective   Gabo Boo is a 77 y.o. male. Presents with a chief complaint of HTN, PAF, Hyperlipidemia, Vitamin D, seizure disorder, here for follow up and evaluation. We have been working with adjusting his BP control. Will try adding procardia 30 mg prn.  The patient is very good about checking his blood pressure twice a day and we discussed the acceptable parameters.  I believe that adding 10 mg of Procardia 3 times daily as needed will give him an excellent tool to control his overall blood pressure.    History of Present Illness highly variable HTN    The following portions of the patient's history were reviewed and updated as appropriate: allergies, current medications, past family history, past medical history, past social history, past surgical history and problem list.    Review of Systems   Constitutional: Positive for fatigue.   HENT: Negative.    Eyes: Negative.    Respiratory: Negative.    Cardiovascular: Negative.    Gastrointestinal: Negative.    Endocrine: Negative.    Genitourinary: Negative.    Musculoskeletal: Positive for arthralgias.   Skin: Negative.    Allergic/Immunologic: Negative.    Neurological: Negative.    Hematological: Negative.    Psychiatric/Behavioral: Negative.        Objective   Physical Exam   Constitutional: He is oriented to person, place, and time. He appears well-developed and well-nourished.   HENT:   Head: Normocephalic and atraumatic.   Eyes: EOM are normal. Pupils are equal, round, and reactive to light.   Neck: Normal range of motion. Neck supple.   Cardiovascular: Normal rate, regular rhythm and normal heart sounds.   Pulmonary/Chest: Effort normal and breath sounds normal.   Abdominal: Soft. Bowel sounds are normal.   Musculoskeletal: Normal range of motion.   Neurological: He is alert and oriented to person, place, and time.   Skin: Skin is warm and dry.   Psychiatric: He has a normal mood and affect. His behavior is normal. Judgment and thought content  normal.   Nursing note and vitals reviewed.        Assessment/Plan   Gabo was seen today for hypertension.    Diagnoses and all orders for this visit:    Paroxysmal atrial fibrillation (CMS/HCC)  Comments:  well controlled    Essential hypertension  Comments:  moderately well controlled    Familial hypercholesterolemia  Comments:  well controlled    Vitamin D deficiency  Comments:  continue vitamin D    Seizure (CMS/HCC)  Comments:  no recent seizures                negative...

## 2021-03-06 NOTE — ED ADULT NURSE NOTE - OBJECTIVE STATEMENT
Pt c/o worsening sob, dyspnea on exertion x a few weeks.  Pt states that he has a hard time walking short distances at this time.  Denies chest pain.  Reports diabetic neuropathy in bilat feet and hands.  No acute distress noted.  Will continue to monitor. Pt has pacemaker/AICD.

## 2021-03-06 NOTE — ED PROVIDER NOTE - CLINICAL SUMMARY MEDICAL DECISION MAKING FREE TEXT BOX
The patient presents with SOB, MACHUCA and has acute CHF with ARF and will admit for further evaluation

## 2021-03-06 NOTE — H&P ADULT - PROBLEM SELECTOR PLAN 3
pseudohyponatremia from hyperglycemia combined with hypovolemic hyponatremia. Hold diuretics, control hyperglycemia, and repeat BMP after NS IVF challenge

## 2021-03-06 NOTE — ED ADULT NURSE NOTE - BREATHING, MLM
Spontaneous, unlabored and symmetrical Paramedian Forehead Flap Text: A decision was made to reconstruct the defect utilizing an interpolation axial flap and a staged reconstruction.  A telfa template was made of the defect.  This telfa template was then used to outline the paramedian forehead pedicle flap.  The donor area for the pedicle flap was then injected with anesthesia.  The flap was excised through the skin and subcutaneous tissue down to the layer of the underlying musculature.  The pedicle flap was carefully excised within this deep plane to maintain its blood supply.  The edges of the donor site were undermined.   The donor site was closed in a primary fashion.  The pedicle was then rotated into position and sutured.  Once the tube was sutured into place, adequate blood supply was confirmed with blanching and refill.  The pedicle was then wrapped with xeroform gauze and dressed appropriately with a telfa and gauze bandage to ensure continued blood supply and protect the attached pedicle.

## 2021-03-06 NOTE — H&P ADULT - PROBLEM SELECTOR PLAN 1
Patient with possible polycythemia vera vs hemoconcentration from hyperglycemia induced diuresis and lasix use with poor PO intake vs secondary polycythemia from CHRISS and nocturnal hypoxemia? Patient with evidence of erythromelalgia on exam and MACHUCA likely from hyperviscosity. Will hold diuretics, give IVF challenge and repeat CBC. May need Hematology eval for phlebotomy and further PV w/u. Cont. aspirin. OOB, PT, Fall risk. No evidence of CHF as patient with clear CXR, normal RA sat, normal BNP, no JVD. Check Echo to asses hx of pulm htn as likely cause for LE edema.

## 2021-03-06 NOTE — ED PROVIDER NOTE - PSYCHIATRIC, MLM
Subjective:      Patient ID: Ora Henderson is a 59 y.o. female.    Chief Complaint: Fall (Patient states she fell on last Friday. She hurt both of her knees, hands, and chest. )    Fall   The accident occurred 3 to 5 days ago. The fall occurred while walking. She landed on concrete. The point of impact was the right knee, left knee, left wrist and right wrist. The pain is present in the right knee and left knee. The pain is at a severity of 6/10. The pain is moderate. The symptoms are aggravated by pressure on injury and use of injured limb. Pertinent negatives include no abdominal pain, bowel incontinence, fever, headaches, hearing loss, hematuria, loss of consciousness, nausea, numbness, tingling, visual change or vomiting. She has tried ice, elevation, NSAID, rest and acetaminophen for the symptoms. The treatment provided moderate relief.     Patient Active Problem List   Diagnosis    DJD (degenerative joint disease), cervical-mild    Hyperlipidemia    Hepatomegaly    Family history of cardiovascular disease    GERD (gastroesophageal reflux disease)    Suspected sleep apnea    History of benign pituitary tumor    Arthritis    Hx of colonic polyp    Essential hypertension    Osteoarthritis of spine with radiculopathy, lumbar region    Severe obesity (BMI 35.0-35.9 with comorbidity)    Abnormal ECG    Arthritis of right acromioclavicular joint         Review of Systems   Constitutional: Positive for activity change. Negative for appetite change, chills, diaphoresis, fatigue, fever and unexpected weight change.   HENT: Negative.  Negative for congestion, hearing loss, postnasal drip, rhinorrhea, sore throat, trouble swallowing and voice change.    Eyes: Negative.  Negative for discharge and visual disturbance.   Respiratory: Negative.  Negative for cough, choking, chest tightness, shortness of breath and wheezing.    Cardiovascular: Negative for chest pain, palpitations and leg  "swelling.   Gastrointestinal: Negative for abdominal distention, abdominal pain, blood in stool, bowel incontinence, constipation, diarrhea, nausea and vomiting.   Endocrine: Negative for cold intolerance, heat intolerance, polydipsia and polyuria.   Genitourinary: Negative.  Negative for difficulty urinating, dysuria, frequency, hematuria and menstrual problem.   Musculoskeletal: Positive for arthralgias and joint swelling. Negative for back pain, gait problem, myalgias and neck pain.   Skin: Negative for color change, pallor, rash and wound.   Neurological: Negative for dizziness, tingling, tremors, loss of consciousness, weakness, light-headedness, numbness and headaches.   Hematological: Negative for adenopathy.   Psychiatric/Behavioral: Negative for behavioral problems, confusion, dysphoric mood, self-injury, sleep disturbance and suicidal ideas. The patient is not nervous/anxious.      Objective:   BP (!) 140/90 (BP Location: Right arm, Patient Position: Sitting, BP Method: Medium (Manual))   Pulse 95   Temp 98.1 °F (36.7 °C) (Tympanic)   Ht 5' 3" (1.6 m)   Wt 91.2 kg (201 lb 1 oz)   SpO2 99%   BMI 35.62 kg/m²     Physical Exam   Constitutional: She is oriented to person, place, and time. She appears well-developed and well-nourished. No distress.   HENT:   Head: Normocephalic and atraumatic.   Right Ear: External ear normal.   Left Ear: External ear normal.   Nose: Nose normal.   Mouth/Throat: Oropharynx is clear and moist.   Eyes: Pupils are equal, round, and reactive to light. Conjunctivae and EOM are normal.   Neck: Normal range of motion. Neck supple.   Cardiovascular: Normal rate, regular rhythm and normal heart sounds. Exam reveals no gallop and no friction rub.   No murmur heard.  Pulmonary/Chest: Effort normal and breath sounds normal. No respiratory distress. She has no wheezes. She has no rales. She exhibits no tenderness.   Abdominal: Soft. She exhibits no distension. There is no tenderness. "   Musculoskeletal:        Right shoulder: She exhibits decreased range of motion. She exhibits no tenderness, no bony tenderness, no swelling, no effusion, no crepitus, no deformity, no laceration, no pain, no spasm, normal pulse and normal strength.        Right knee: She exhibits effusion and bony tenderness. She exhibits normal range of motion, no swelling, no ecchymosis, no deformity, no laceration, no erythema, normal alignment, no LCL laxity, normal patellar mobility, normal meniscus and no MCL laxity. No tenderness found. No medial joint line, no lateral joint line, no MCL, no LCL and no patellar tendon tenderness noted.        Left knee: She exhibits swelling, effusion and bony tenderness. She exhibits normal range of motion, no ecchymosis, no deformity, no laceration, no erythema, normal alignment, no LCL laxity, normal patellar mobility, normal meniscus and no MCL laxity. Tenderness found. Patellar tendon tenderness noted. No medial joint line, no lateral joint line, no MCL and no LCL tenderness noted.   Lymphadenopathy:     She has no cervical adenopathy.   Neurological: She is alert and oriented to person, place, and time.   Skin: Skin is warm and dry. She is not diaphoretic.   Psychiatric: She has a normal mood and affect. Her behavior is normal. Judgment and thought content normal.   Vitals reviewed.      Assessment:     1. Fall, initial encounter    2. Acute pain of both knees      Plan:   Fall, initial encounter  -     X-ray Knee Ortho Bilateral; Future; Expected date: 03/17/2020    Acute pain of both knees  -     X-ray Knee Ortho Bilateral; Future; Expected date: 03/17/2020    -RICE  Educational handout on over-the-counter medications and at-home conservative care, pertinent to the patients diagnosis today, was handed to the patient and discussed in detail.    Follow up if symptoms worsen or fail to improve.       Alert and oriented to person, place, time/situation. normal mood and affect. no apparent risk to self or others.

## 2021-03-06 NOTE — ED PROVIDER NOTE - PMH
Atrial fibrillation    CHF (congestive heart failure)    Chronic back pain    DM (diabetes mellitus)    High cholesterol    HTN (hypertension)    Lung nodules    Prostate CA    Pulmonary hypertension    Renal insufficiency    Sleep apnea

## 2021-03-07 DIAGNOSIS — E87.1 HYPO-OSMOLALITY AND HYPONATREMIA: ICD-10-CM

## 2021-03-07 DIAGNOSIS — I48.0 PAROXYSMAL ATRIAL FIBRILLATION: ICD-10-CM

## 2021-03-07 DIAGNOSIS — E78.00 PURE HYPERCHOLESTEROLEMIA, UNSPECIFIED: ICD-10-CM

## 2021-03-07 DIAGNOSIS — N17.9 ACUTE KIDNEY FAILURE, UNSPECIFIED: ICD-10-CM

## 2021-03-07 DIAGNOSIS — I27.20 PULMONARY HYPERTENSION, UNSPECIFIED: ICD-10-CM

## 2021-03-07 DIAGNOSIS — D75.1 SECONDARY POLYCYTHEMIA: ICD-10-CM

## 2021-03-07 DIAGNOSIS — E11.22 TYPE 2 DIABETES MELLITUS WITH DIABETIC CHRONIC KIDNEY DISEASE: ICD-10-CM

## 2021-03-07 DIAGNOSIS — G47.30 SLEEP APNEA, UNSPECIFIED: ICD-10-CM

## 2021-03-07 DIAGNOSIS — E86.0 DEHYDRATION: ICD-10-CM

## 2021-03-07 LAB
A1C WITH ESTIMATED AVERAGE GLUCOSE RESULT: 11.5 % — HIGH (ref 4–5.6)
ANION GAP SERPL CALC-SCNC: 19 MMOL/L — HIGH (ref 5–17)
ANION GAP SERPL CALC-SCNC: 19 MMOL/L — HIGH (ref 5–17)
ANION GAP SERPL CALC-SCNC: 28 MMOL/L — HIGH (ref 5–17)
APTT BLD: 34.5 SEC — SIGNIFICANT CHANGE UP (ref 27.5–35.5)
BASOPHILS # BLD AUTO: 0.03 K/UL — SIGNIFICANT CHANGE UP (ref 0–0.2)
BASOPHILS NFR BLD AUTO: 0.3 % — SIGNIFICANT CHANGE UP (ref 0–2)
BUN SERPL-MCNC: 45 MG/DL — HIGH (ref 8–20)
BUN SERPL-MCNC: 46 MG/DL — HIGH (ref 8–20)
BUN SERPL-MCNC: 47 MG/DL — HIGH (ref 8–20)
CALCIUM SERPL-MCNC: 9.2 MG/DL — SIGNIFICANT CHANGE UP (ref 8.6–10.2)
CALCIUM SERPL-MCNC: 9.2 MG/DL — SIGNIFICANT CHANGE UP (ref 8.6–10.2)
CALCIUM SERPL-MCNC: 9.6 MG/DL — SIGNIFICANT CHANGE UP (ref 8.6–10.2)
CHLORIDE SERPL-SCNC: 83 MMOL/L — LOW (ref 98–107)
CHLORIDE SERPL-SCNC: 84 MMOL/L — LOW (ref 98–107)
CHLORIDE SERPL-SCNC: 85 MMOL/L — LOW (ref 98–107)
CO2 SERPL-SCNC: 20 MMOL/L — LOW (ref 22–29)
CO2 SERPL-SCNC: 27 MMOL/L — SIGNIFICANT CHANGE UP (ref 22–29)
CO2 SERPL-SCNC: 27 MMOL/L — SIGNIFICANT CHANGE UP (ref 22–29)
CREAT SERPL-MCNC: 1.3 MG/DL — SIGNIFICANT CHANGE UP (ref 0.5–1.3)
CREAT SERPL-MCNC: 1.43 MG/DL — HIGH (ref 0.5–1.3)
CREAT SERPL-MCNC: 1.51 MG/DL — HIGH (ref 0.5–1.3)
EOSINOPHIL # BLD AUTO: 0.14 K/UL — SIGNIFICANT CHANGE UP (ref 0–0.5)
EOSINOPHIL NFR BLD AUTO: 1.6 % — SIGNIFICANT CHANGE UP (ref 0–6)
ESTIMATED AVERAGE GLUCOSE: 283 MG/DL — HIGH (ref 68–114)
GLUCOSE BLDC GLUCOMTR-MCNC: 218 MG/DL — HIGH (ref 70–99)
GLUCOSE BLDC GLUCOMTR-MCNC: 237 MG/DL — HIGH (ref 70–99)
GLUCOSE BLDC GLUCOMTR-MCNC: 305 MG/DL — HIGH (ref 70–99)
GLUCOSE BLDC GLUCOMTR-MCNC: 311 MG/DL — HIGH (ref 70–99)
GLUCOSE SERPL-MCNC: 151 MG/DL — HIGH (ref 70–99)
GLUCOSE SERPL-MCNC: 159 MG/DL — HIGH (ref 70–99)
GLUCOSE SERPL-MCNC: 162 MG/DL — HIGH (ref 70–99)
HCT VFR BLD CALC: 58.7 % — CRITICAL HIGH (ref 39–50)
HGB BLD-MCNC: 19.7 G/DL — CRITICAL HIGH (ref 13–17)
IMM GRANULOCYTES NFR BLD AUTO: 0.7 % — SIGNIFICANT CHANGE UP (ref 0–1.5)
INR BLD: 1.18 RATIO — HIGH (ref 0.88–1.16)
LYMPHOCYTES # BLD AUTO: 1.8 K/UL — SIGNIFICANT CHANGE UP (ref 1–3.3)
LYMPHOCYTES # BLD AUTO: 20.1 % — SIGNIFICANT CHANGE UP (ref 13–44)
MAGNESIUM SERPL-MCNC: 2.3 MG/DL — SIGNIFICANT CHANGE UP (ref 1.6–2.6)
MAGNESIUM SERPL-MCNC: 2.4 MG/DL — SIGNIFICANT CHANGE UP (ref 1.8–2.6)
MCHC RBC-ENTMCNC: 27.7 PG — SIGNIFICANT CHANGE UP (ref 27–34)
MCHC RBC-ENTMCNC: 33.6 GM/DL — SIGNIFICANT CHANGE UP (ref 32–36)
MCV RBC AUTO: 82.4 FL — SIGNIFICANT CHANGE UP (ref 80–100)
MONOCYTES # BLD AUTO: 0.9 K/UL — SIGNIFICANT CHANGE UP (ref 0–0.9)
MONOCYTES NFR BLD AUTO: 10 % — SIGNIFICANT CHANGE UP (ref 2–14)
NEUTROPHILS # BLD AUTO: 6.04 K/UL — SIGNIFICANT CHANGE UP (ref 1.8–7.4)
NEUTROPHILS NFR BLD AUTO: 67.3 % — SIGNIFICANT CHANGE UP (ref 43–77)
PHOSPHATE SERPL-MCNC: 3.8 MG/DL — SIGNIFICANT CHANGE UP (ref 2.4–4.7)
PLATELET # BLD AUTO: 130 K/UL — LOW (ref 150–400)
POTASSIUM SERPL-MCNC: 3 MMOL/L — LOW (ref 3.5–5.3)
POTASSIUM SERPL-MCNC: 3.6 MMOL/L — SIGNIFICANT CHANGE UP (ref 3.5–5.3)
POTASSIUM SERPL-MCNC: 3.7 MMOL/L — SIGNIFICANT CHANGE UP (ref 3.5–5.3)
POTASSIUM SERPL-SCNC: 3 MMOL/L — LOW (ref 3.5–5.3)
POTASSIUM SERPL-SCNC: 3.6 MMOL/L — SIGNIFICANT CHANGE UP (ref 3.5–5.3)
POTASSIUM SERPL-SCNC: 3.7 MMOL/L — SIGNIFICANT CHANGE UP (ref 3.5–5.3)
PROTHROM AB SERPL-ACNC: 13.6 SEC — SIGNIFICANT CHANGE UP (ref 10.6–13.6)
RBC # BLD: 7.12 M/UL — HIGH (ref 4.2–5.8)
RBC # FLD: 17.2 % — HIGH (ref 10.3–14.5)
SARS-COV-2 IGG SERPL QL IA: NEGATIVE — SIGNIFICANT CHANGE UP
SARS-COV-2 IGM SERPL IA-ACNC: 0.09 INDEX — SIGNIFICANT CHANGE UP
SODIUM SERPL-SCNC: 129 MMOL/L — LOW (ref 135–145)
SODIUM SERPL-SCNC: 129 MMOL/L — LOW (ref 135–145)
SODIUM SERPL-SCNC: 132 MMOL/L — LOW (ref 135–145)
TSH SERPL-MCNC: 3.62 UIU/ML — SIGNIFICANT CHANGE UP (ref 0.27–4.2)
WBC # BLD: 8.97 K/UL — SIGNIFICANT CHANGE UP (ref 3.8–10.5)
WBC # FLD AUTO: 8.97 K/UL — SIGNIFICANT CHANGE UP (ref 3.8–10.5)

## 2021-03-07 PROCEDURE — 99233 SBSQ HOSP IP/OBS HIGH 50: CPT

## 2021-03-07 PROCEDURE — 74176 CT ABD & PELVIS W/O CONTRAST: CPT | Mod: 26

## 2021-03-07 PROCEDURE — 93923 UPR/LXTR ART STDY 3+ LVLS: CPT | Mod: 26

## 2021-03-07 RX ORDER — OXYCODONE AND ACETAMINOPHEN 5; 325 MG/1; MG/1
2 TABLET ORAL EVERY 6 HOURS
Refills: 0 | Status: DISCONTINUED | OUTPATIENT
Start: 2021-03-07 | End: 2021-03-09

## 2021-03-07 RX ORDER — ENOXAPARIN SODIUM 100 MG/ML
125 INJECTION SUBCUTANEOUS
Refills: 0 | Status: DISCONTINUED | OUTPATIENT
Start: 2021-03-07 | End: 2021-03-07

## 2021-03-07 RX ORDER — HYDRALAZINE HCL 50 MG
5 TABLET ORAL EVERY 6 HOURS
Refills: 0 | Status: DISCONTINUED | OUTPATIENT
Start: 2021-03-07 | End: 2021-03-09

## 2021-03-07 RX ORDER — ASPIRIN/CALCIUM CARB/MAGNESIUM 324 MG
81 TABLET ORAL DAILY
Refills: 0 | Status: DISCONTINUED | OUTPATIENT
Start: 2021-03-07 | End: 2021-03-09

## 2021-03-07 RX ORDER — WARFARIN SODIUM 2.5 MG/1
4 TABLET ORAL ONCE
Refills: 0 | Status: COMPLETED | OUTPATIENT
Start: 2021-03-07 | End: 2021-03-07

## 2021-03-07 RX ORDER — ASPIRIN/CALCIUM CARB/MAGNESIUM 324 MG
325 TABLET ORAL DAILY
Refills: 0 | Status: DISCONTINUED | OUTPATIENT
Start: 2021-03-07 | End: 2021-03-07

## 2021-03-07 RX ORDER — ENOXAPARIN SODIUM 100 MG/ML
130 INJECTION SUBCUTANEOUS EVERY 12 HOURS
Refills: 0 | Status: DISCONTINUED | OUTPATIENT
Start: 2021-03-07 | End: 2021-03-09

## 2021-03-07 RX ORDER — SODIUM CHLORIDE 9 MG/ML
1000 INJECTION INTRAMUSCULAR; INTRAVENOUS; SUBCUTANEOUS
Refills: 0 | Status: DISCONTINUED | OUTPATIENT
Start: 2021-03-07 | End: 2021-03-08

## 2021-03-07 RX ORDER — ATORVASTATIN CALCIUM 80 MG/1
40 TABLET, FILM COATED ORAL AT BEDTIME
Refills: 0 | Status: DISCONTINUED | OUTPATIENT
Start: 2021-03-07 | End: 2021-03-09

## 2021-03-07 RX ORDER — WARFARIN SODIUM 2.5 MG/1
7.5 TABLET ORAL ONCE
Refills: 0 | Status: DISCONTINUED | OUTPATIENT
Start: 2021-03-07 | End: 2021-03-07

## 2021-03-07 RX ORDER — WARFARIN SODIUM 2.5 MG/1
3 TABLET ORAL AT BEDTIME
Refills: 0 | Status: COMPLETED | OUTPATIENT
Start: 2021-03-07 | End: 2021-03-07

## 2021-03-07 RX ORDER — ACETAMINOPHEN 500 MG
650 TABLET ORAL EVERY 6 HOURS
Refills: 0 | Status: DISCONTINUED | OUTPATIENT
Start: 2021-03-07 | End: 2021-03-09

## 2021-03-07 RX ORDER — WARFARIN SODIUM 2.5 MG/1
3 TABLET ORAL AT BEDTIME
Refills: 0 | Status: DISCONTINUED | OUTPATIENT
Start: 2021-03-07 | End: 2021-03-07

## 2021-03-07 RX ORDER — POTASSIUM CHLORIDE 20 MEQ
40 PACKET (EA) ORAL EVERY 4 HOURS
Refills: 0 | Status: COMPLETED | OUTPATIENT
Start: 2021-03-07 | End: 2021-03-07

## 2021-03-07 RX ORDER — TAMSULOSIN HYDROCHLORIDE 0.4 MG/1
0.4 CAPSULE ORAL AT BEDTIME
Refills: 0 | Status: DISCONTINUED | OUTPATIENT
Start: 2021-03-07 | End: 2021-03-09

## 2021-03-07 RX ADMIN — Medication 4: at 17:13

## 2021-03-07 RX ADMIN — ENOXAPARIN SODIUM 130 MILLIGRAM(S): 100 INJECTION SUBCUTANEOUS at 12:05

## 2021-03-07 RX ADMIN — SODIUM CHLORIDE 125 MILLILITER(S): 9 INJECTION INTRAMUSCULAR; INTRAVENOUS; SUBCUTANEOUS at 04:27

## 2021-03-07 RX ADMIN — CARVEDILOL PHOSPHATE 25 MILLIGRAM(S): 80 CAPSULE, EXTENDED RELEASE ORAL at 17:13

## 2021-03-07 RX ADMIN — Medication 5 UNIT(S): at 10:19

## 2021-03-07 RX ADMIN — Medication 1 INCH(S): at 05:06

## 2021-03-07 RX ADMIN — SODIUM CHLORIDE 3 MILLILITER(S): 9 INJECTION INTRAMUSCULAR; INTRAVENOUS; SUBCUTANEOUS at 04:38

## 2021-03-07 RX ADMIN — ENOXAPARIN SODIUM 130 MILLIGRAM(S): 100 INJECTION SUBCUTANEOUS at 23:04

## 2021-03-07 RX ADMIN — OXYCODONE AND ACETAMINOPHEN 2 TABLET(S): 5; 325 TABLET ORAL at 21:49

## 2021-03-07 RX ADMIN — SODIUM CHLORIDE 3 MILLILITER(S): 9 INJECTION INTRAMUSCULAR; INTRAVENOUS; SUBCUTANEOUS at 21:53

## 2021-03-07 RX ADMIN — Medication 5 UNIT(S): at 17:12

## 2021-03-07 RX ADMIN — Medication 4: at 07:57

## 2021-03-07 RX ADMIN — OXYCODONE AND ACETAMINOPHEN 2 TABLET(S): 5; 325 TABLET ORAL at 22:45

## 2021-03-07 RX ADMIN — Medication 50 MICROGRAM(S): at 05:10

## 2021-03-07 RX ADMIN — INSULIN GLARGINE 50 UNIT(S): 100 INJECTION, SOLUTION SUBCUTANEOUS at 21:44

## 2021-03-07 RX ADMIN — Medication 40 MILLIEQUIVALENT(S): at 04:27

## 2021-03-07 RX ADMIN — ATORVASTATIN CALCIUM 40 MILLIGRAM(S): 80 TABLET, FILM COATED ORAL at 21:49

## 2021-03-07 RX ADMIN — WARFARIN SODIUM 3 MILLIGRAM(S): 2.5 TABLET ORAL at 21:49

## 2021-03-07 RX ADMIN — Medication 40 MILLIEQUIVALENT(S): at 07:58

## 2021-03-07 RX ADMIN — DULOXETINE HYDROCHLORIDE 60 MILLIGRAM(S): 30 CAPSULE, DELAYED RELEASE ORAL at 10:18

## 2021-03-07 RX ADMIN — Medication 5 UNIT(S): at 07:56

## 2021-03-07 RX ADMIN — Medication 325 MILLIGRAM(S): at 10:18

## 2021-03-07 RX ADMIN — OXYCODONE AND ACETAMINOPHEN 2 TABLET(S): 5; 325 TABLET ORAL at 05:22

## 2021-03-07 RX ADMIN — Medication 8: at 21:43

## 2021-03-07 RX ADMIN — OXYCODONE AND ACETAMINOPHEN 2 TABLET(S): 5; 325 TABLET ORAL at 04:38

## 2021-03-07 RX ADMIN — WARFARIN SODIUM 4 MILLIGRAM(S): 2.5 TABLET ORAL at 03:17

## 2021-03-07 RX ADMIN — PANTOPRAZOLE SODIUM 40 MILLIGRAM(S): 20 TABLET, DELAYED RELEASE ORAL at 05:10

## 2021-03-07 RX ADMIN — CARVEDILOL PHOSPHATE 25 MILLIGRAM(S): 80 CAPSULE, EXTENDED RELEASE ORAL at 05:10

## 2021-03-07 RX ADMIN — TAMSULOSIN HYDROCHLORIDE 0.4 MILLIGRAM(S): 0.4 CAPSULE ORAL at 21:49

## 2021-03-07 RX ADMIN — Medication 8: at 10:19

## 2021-03-07 RX ADMIN — SODIUM CHLORIDE 3 MILLILITER(S): 9 INJECTION INTRAMUSCULAR; INTRAVENOUS; SUBCUTANEOUS at 13:08

## 2021-03-07 NOTE — CONSULT NOTE ADULT - SUBJECTIVE AND OBJECTIVE BOX
[facility], THE HEART CENTER                                   [address] [place] [address]                                                      PHONE: [phone]                                                         FAX: [phone]  [email or web address]  ---------------------------------------------------------------------------------------------------------------------------------    HPI:  MARY ANN CORNELL is an 70y Male w DM AF presents w/ fatigue/dizziness/sob x few weeks.    PAST MEDICAL & SURGICAL HISTORY:  Lung nodules    Chronic back pain    Atrial fibrillation    CHF (congestive heart failure)    Pulmonary hypertension    Prostate CA    Sleep apnea    Renal insufficiency    High cholesterol    HTN (hypertension)    DM (diabetes mellitus)    AICD (automatic cardioverter/defibrillator) present    S/P ankle ligament repair        allow double protein at meals (Unknown)  No Known Allergies      MEDICATIONS  (STANDING):    MEDICATIONS  (PRN):      Family History: Pt denies hx of early cad, SCD, or congenital heart disease.      Social History:  Cigarettes:                0    Alchohol:    0             Illicit Drug Abuse:  0    ROS:  Constitutional: No fever, weight loss or fatigue  Eyes: No eye pain, visual disturbances, or discharge  ENMT:  No difficulty hearing, tinnitus, vertigo; No sinus or throat pain  Neck: No pain or stiffness  Respiratory: No cough, wheezing, chills or hemoptysis  Cardiovascular: No chest pain, occ palpitations, shortness of breath,   Chr leg swelling and dizziness  Gastrointestinal: No abdominal or epigastric pain. No nausea, vomiting or hematemesis; No diarrhea or constipation. No melena or hematochezia.  Genitourinary: No dysuria, frequency, hematuria or incontinence  Rectal: No pain, hemorrhoids or incontinence  Neurological: No headaches, memory loss, loss of strength, numbness or tremors  Skin: No itching, burning, rashes or lesions Signif venous stasis disease.  Lymph Nodes: No enlarged glands  Endocrine: No heat or cold intolerance; No hair loss  Musculoskeletal: No joint pain or swelling; No muscle, back or extremity pain  Psychiatric: No depression, anxiety, mood swings or difficulty sleeping  Heme/Lymph: No easy bruising or bleeding gums  Allergy and Immunologic: No hives or eczema    Vital Signs Last 24 Hrs  T(C): 36.5 (06 Mar 2021 19:46), Max: 37.1 (06 Mar 2021 15:40)  T(F): 97.7 (06 Mar 2021 19:46), Max: 98.7 (06 Mar 2021 15:40)  HR: 78 (06 Mar 2021 19:46) (63 - 78)  BP: 158/70 (06 Mar 2021 19:46) (128/83 - 158/70)  BP(mean): --  RR: 20 (06 Mar 2021 19:46) (18 - 20)  SpO2: 96% (06 Mar 2021 19:46) (96% - 97%)  ICU Vital Signs Last 24 Hrs  MARY ANN KRAIG  I&O's Detail    I&O's Summary    Drug Dosing Weight  MARY ANN CORNELL      PHYSICAL EXAM:  General: Appears well developed, well nourished alert and cooperative.  HEENT: Head; normocephalic, atraumatic.  Eyes: Pupils reactive, cornea wnl.  Neck: Supple, no nodes adenopathy, no NVD or carotid bruit or thyromegaly.  CARDIOVASCULAR: Normal S1 and S2, No murmur, rub, gallop or lift.   LUNGS: No rales, rhonchi or wheeze. Normal breath sounds bilaterally.  ABDOMEN: Soft, nontender without mass or organomegaly. bowel sounds normoactive.  EXTREMITIES: Mod edema w nenous stasis changes  SKIN: warm and dry with normal turgor.  NEURO: Alert/oriented x 3/normal motor exam. No pathologic reflexes.    PSYCH: normal affect.        LABS:                        20.5   7.36  )-----------( 167      ( 06 Mar 2021 18:38 )             60.4     03-06    127<L>  |  78<L>  |  46.0<H>  ----------------------------<  344<H>  3.7   |  27.0  |  1.58<H>    Ca    9.8      06 Mar 2021 18:38  Mg     2.5     03-    TPro  7.9  /  Alb  4.0  /  TBili  0.9  /  DBili  x   /  AST  56<H>  /  ALT  26  /  AlkPhos  132<H>  03-    MARY ANN CORNELL  CARDIAC MARKERS ( 06 Mar 2021 18:38 )  x     / 0.03 ng/mL / x     / x     / x            Urinalysis Basic - ( 06 Mar 2021 19:12 )    Color: Yellow / Appearance: Clear / S.005 / pH: x  Gluc: x / Ketone: Negative  / Bili: Negative / Urobili: Negative mg/dL   Blood: x / Protein: Negative mg/dL / Nitrite: Negative   Leuk Esterase: Negative / RBC: x / WBC x   Sq Epi: x / Non Sq Epi: x / Bacteria: x        RADIOLOGY & ADDITIONAL STUDIES:    INTERPRETATION OF TELEMETRY (personally reviewed):    ECG:    ECHO:    STRESS TEST:    CARDIAC CATHETERIZATION:    Assessment and Plan:  In summary, MARY ANN CORNELL is an 70y Male with past medical history significant for AF/CHF/DM presents w/ fluid overload w/ edema/hyponatremia.  ICD.  Admit/diurese/K repletion.      Thank you for allowing [facility] to participate in the care of this patient.  Please feel free to call with any questions or concerns. 
Hematology Oncology Consult Note    The patient was seen and examined    MARY ANN CORNELL is a 70y Male admitted with SOB, noted to have a very high H/H. Consult for Dr. Muñiz.      Patient with DM on oral med and insulin, non compliant, glucose not controlled, admitted with SOB, weakness, dehydration    Interval History: iv fluids, H/H has decreased slightly    ROS weak, SOB, "urinating all the time for weeks", no fevers or night sweats, no pruritis with showering. no other c/o        PAST MEDICAL & SURGICAL HISTORY:  Lung nodules    Chronic back pain    Atrial fibrillation    CHF (congestive heart failure)  EF 30%    Pulmonary hypertension    Prostate CA    Sleep apnea    Renal insufficiency    High cholesterol    HTN (hypertension)    DM (diabetes mellitus)    AICD (automatic cardioverter/defibrillator) present    S/P ankle ligament repair        Allergies:  allow double protein at meals (Unknown)  No Known Allergies      Medications:  acetaminophen   Tablet .. 650 milliGRAM(s) Oral every 6 hours PRN  aspirin enteric coated 81 milliGRAM(s) Oral daily  atorvastatin 40 milliGRAM(s) Oral at bedtime  carvedilol 25 milliGRAM(s) Oral every 12 hours  dextrose 40% Gel 15 Gram(s) Oral once  dextrose 5%. 1000 milliLiter(s) IV Continuous <Continuous>  dextrose 50% Injectable 25 Gram(s) IV Push once  DULoxetine 60 milliGRAM(s) Oral daily  enoxaparin Injectable 125 milliGRAM(s) SubCutaneous two times a day  glucagon  Injectable 1 milliGRAM(s) IntraMuscular once  hydrALAZINE Injectable 5 milliGRAM(s) IV Push every 6 hours PRN  insulin glargine Injectable (LANTUS) 50 Unit(s) SubCutaneous at bedtime  insulin lispro (ADMELOG) corrective regimen sliding scale   SubCutaneous Before meals and at bedtime  insulin lispro Injectable (ADMELOG) 5 Unit(s) SubCutaneous before breakfast  insulin lispro Injectable (ADMELOG) 5 Unit(s) SubCutaneous before lunch  insulin lispro Injectable (ADMELOG) 5 Unit(s) SubCutaneous before dinner  levothyroxine 50 MICROGram(s) Oral daily  ondansetron Injectable 4 milliGRAM(s) IV Push every 6 hours PRN  oxycodone    5 mG/acetaminophen 325 mG 2 Tablet(s) Oral every 6 hours PRN  pantoprazole    Tablet 40 milliGRAM(s) Oral before breakfast  sodium chloride 0.9% lock flush 3 milliLiter(s) IV Push every 8 hours  sodium chloride 0.9%. 1000 milliLiter(s) IV Continuous <Continuous>  tamsulosin 0.4 milliGRAM(s) Oral at bedtime  warfarin 7.5 milliGRAM(s) Oral once      Social History:    FAMILY HISTORY:  Family history of diabetes mellitus (Sibling)    Family history of cancer (Sibling)        PHYSICAL EXAM:    T(F): 97.4 (21 @ 08:08), Max: 98.7 (21 @ 15:40)  HR: 51 (21 @ 08:08) (51 - 93)  BP: 106/64 (21 @ 08:08) (106/64 - 158/70)  RR: 18 (21 @ 08:08) (18 - 20)  SpO2: 95% (21 @ 08:08) (95% - 99%)  Wt(kg): --    Daily Height in cm: 175.26 (06 Mar 2021 15:40)    Daily     Gen: well developed, obese, comfortable  HEENT: normocephalic/atraumatic, no conjunctival pallor, no scleral icterus,   Neck: supple, no masses, no JVD  Cardiovascular: RR, nl S1S2, no murmurs/rubs/gallops, but distant HS  Respiratory: clear air entry b/l  Gastrointestinal: BS+, soft, NT/ND, no masses, no splenomegaly, no hepatomegaly, no evidence for ascites  Extremities:  no edema, no calf tenderness  Neurological: no focal deficits  Skin: venous stasis changes  Lymph Nodes:  no significant peripheral adenopathy   Psychiatric:  mood stable            Labs:                          19.7   8.97  )-----------( 130      ( 07 Mar 2021 07:55 )             58.7     CBC Full  -  ( 07 Mar 2021 07:55 )  WBC Count : 8.97 K/uL  RBC Count : 7.12 M/uL  Hemoglobin : 19.7 g/dL  Hematocrit : 58.7 %  Platelet Count - Automated : 130 K/uL  Mean Cell Volume : 82.4 fl  Mean Cell Hemoglobin : 27.7 pg  Mean Cell Hemoglobin Concentration : 33.6 gm/dL  Auto Neutrophil # : 6.04 K/uL  Auto Lymphocyte # : 1.80 K/uL  Auto Monocyte # : 0.90 K/uL  Auto Eosinophil # : 0.14 K/uL  Auto Basophil # : 0.03 K/uL  Auto Neutrophil % : 67.3 %  Auto Lymphocyte % : 20.1 %  Auto Monocyte % : 10.0 %  Auto Eosinophil % : 1.6 %  Auto Basophil % : 0.3 %    PT/INR - ( 06 Mar 2021 22:59 )   PT: 13.6 sec;   INR: 1.18 ratio         PTT - ( 06 Mar 2021 22:59 )  PTT:34.5 sec        129<L>  |  83<L>  |  45.0<H>  ----------------------------<  151<H>  3.6   |  27.0  |  1.30    Ca    9.2      07 Mar 2021 07:55  Phos  3.8     -  Mg     2.3     -    TPro  7.9  /  Alb  4.0  /  TBili  0.9  /  DBili  x   /  AST  56<H>  /  ALT  26  /  AlkPhos  132<H>        Urinalysis Basic - ( 06 Mar 2021 19:12 )    Color: Yellow / Appearance: Clear / S.005 / pH: x  Gluc: x / Ketone: Negative  / Bili: Negative / Urobili: Negative mg/dL   Blood: x / Protein: Negative mg/dL / Nitrite: Negative   Leuk Esterase: Negative / RBC: x / WBC x   Sq Epi: x / Non Sq Epi: x / Bacteria: x        Other Labs:    Cultures:    Pathology:    Imaging Studies:      Images:

## 2021-03-07 NOTE — PROGRESS NOTE ADULT - SUBJECTIVE AND OBJECTIVE BOX
[place] [facility] - [facility], THE HEART CENTER                                   [address] [place] [address]                                                      PHONE: [phone]                                                         FAX: [phone]  [email or web address]  ---------------------------------------------------------------------------------------------------------------------------------    Overnight events/patient complaints:  NAD     allow double protein at meals (Unknown)  No Known Allergies    MEDICATIONS  (STANDING):  aspirin enteric coated 81 milliGRAM(s) Oral daily  atorvastatin 40 milliGRAM(s) Oral at bedtime  carvedilol 25 milliGRAM(s) Oral every 12 hours  dextrose 40% Gel 15 Gram(s) Oral once  dextrose 5%. 1000 milliLiter(s) (50 mL/Hr) IV Continuous <Continuous>  dextrose 50% Injectable 25 Gram(s) IV Push once  DULoxetine 60 milliGRAM(s) Oral daily  enoxaparin Injectable 130 milliGRAM(s) SubCutaneous every 12 hours  glucagon  Injectable 1 milliGRAM(s) IntraMuscular once  insulin glargine Injectable (LANTUS) 50 Unit(s) SubCutaneous at bedtime  insulin lispro (ADMELOG) corrective regimen sliding scale   SubCutaneous Before meals and at bedtime  insulin lispro Injectable (ADMELOG) 5 Unit(s) SubCutaneous before breakfast  insulin lispro Injectable (ADMELOG) 5 Unit(s) SubCutaneous before lunch  insulin lispro Injectable (ADMELOG) 5 Unit(s) SubCutaneous before dinner  levothyroxine 50 MICROGram(s) Oral daily  pantoprazole    Tablet 40 milliGRAM(s) Oral before breakfast  sodium chloride 0.9% lock flush 3 milliLiter(s) IV Push every 8 hours  sodium chloride 0.9%. 1000 milliLiter(s) (125 mL/Hr) IV Continuous <Continuous>  tamsulosin 0.4 milliGRAM(s) Oral at bedtime  warfarin 3 milliGRAM(s) Oral at bedtime    MEDICATIONS  (PRN):  acetaminophen   Tablet .. 650 milliGRAM(s) Oral every 6 hours PRN Temp greater or equal to 38C (100.4F), Mild Pain (1 - 3)  hydrALAZINE Injectable 5 milliGRAM(s) IV Push every 6 hours PRN for sbp above 160 mmhg  ondansetron Injectable 4 milliGRAM(s) IV Push every 6 hours PRN Nausea  oxycodone    5 mG/acetaminophen 325 mG 2 Tablet(s) Oral every 6 hours PRN Moderate Pain (4 - 6)      Vital Signs Last 24 Hrs  T(C): 36.3 (07 Mar 2021 08:08), Max: 37.1 (06 Mar 2021 15:40)  T(F): 97.4 (07 Mar 2021 08:08), Max: 98.7 (06 Mar 2021 15:40)  HR: 51 (07 Mar 2021 08:08) (51 - 93)  BP: 106/64 (07 Mar 2021 08:08) (106/64 - 158/70)  BP(mean): 94 (06 Mar 2021 22:13) (94 - 94)  RR: 18 (07 Mar 2021 08:08) (18 - 20)  SpO2: 95% (07 Mar 2021 08:08) (95% - 99%)  ICU Vital Signs Last 24 Hrs  MARY ANN CORNELL  I&O's Detail    I&O's Summary    Drug Dosing Weight  MARY ANN CORNELL      PHYSICAL EXAM:  General: Appears well developed, well nourished alert and cooperative.  HEENT: Head; normocephalic, atraumatic.  Eyes: Pupils reactive, cornea wnl.  Neck: Supple, no nodes adenopathy, no NVD or carotid bruit or thyromegaly.  CARDIOVASCULAR: Normal S1 and S2, 2/6 murmur, rub, gallop or lift.   LUNGS: No rales, rhonchi or wheeze. Normal breath sounds bilaterally.  ABDOMEN: Soft, nontender without mass or organomegaly. bowel sounds normoactive.  EXTREMITIES: No clubbing, cyanosis or edema. Distal pulses wnl.   SKIN: warm and dry with normal turgor.  NEURO: Alert/oriented x 3/normal motor exam. No pathologic reflexes.    PSYCH: normal affect.        LABS:                        19.7   8.97  )-----------( 130      ( 07 Mar 2021 07:55 )             58.7     03-07    129<L>  |  83<L>  |  45.0<H>  ----------------------------<  151<H>  3.6   |  27.0  |  1.30    Ca    9.2      07 Mar 2021 07:55  Phos  3.8     03-07  Mg     2.3     03-07    TPro  7.9  /  Alb  4.0  /  TBili  0.9  /  DBili  x   /  AST  56<H>  /  ALT  26  /  AlkPhos  132<H>  03-06    MARY ANN KRAIG  CARDIAC MARKERS ( 06 Mar 2021 18:38 )  x     / 0.03 ng/mL / x     / x     / x          PT/INR - ( 06 Mar 2021 22:59 )   PT: 13.6 sec;   INR: 1.18 ratio         PTT - ( 06 Mar 2021 22:59 )  PTT:34.5 sec  Urinalysis Basic - ( 06 Mar 2021 19:12 )    Color: Yellow / Appearance: Clear / S.005 / pH: x  Gluc: x / Ketone: Negative  / Bili: Negative / Urobili: Negative mg/dL   Blood: x / Protein: Negative mg/dL / Nitrite: Negative   Leuk Esterase: Negative / RBC: x / WBC x   Sq Epi: x / Non Sq Epi: x / Bacteria: x        RADIOLOGY & ADDITIONAL STUDIES:    INTERPRETATION OF TELEMETRY (personally reviewed):    Summary:   1. Technically difficult study.   2. The left atrium is normal in size.   3. Left ventricular ejection fraction, by visual estimation, is 30 to 35%.   4. Mildly enlarged right atrium.   5. Moderately enlarged right ventricle. Moderately reduced RV systolic function.   6. No significant valvular abnormality.   7. There is no evidence of pericardial effusion.    MD Salo, RPVI Electronically signed on 9/10/2020 at 2:02:05 PM      69y Male with past medical history significant for NICM (with improvement in LVEF), chronic afib on Coumadin, VT s/p ICD, HTN, morbid obesity, CHRISS, DM presented with JAYLENE hyponatremia hyperglycemia     Plan  1.  Gently IVF 50 cc per hour for one liter   2.  Hold Torsemide therapy for now   3.  Awaiting TTE to re-evaluate LV

## 2021-03-07 NOTE — PROGRESS NOTE ADULT - SUBJECTIVE AND OBJECTIVE BOX
MARY ANN CORNELL    [id number]    70y      Male    INTERVAL HPI/OVERNIGHT EVENTS: patient being seen for multiple medical issues. patient seen at bedside and states feeling ok.     REVIEW OF SYSTEMS:    CONSTITUTIONAL: No fever, weight loss, or fatigue  RESPIRATORY: No cough, wheezing, hemoptysis; No shortness of breath  CARDIOVASCULAR: No chest pain, palpitations  GASTROINTESTINAL: No abdominal or epigastric pain. No nausea, vomiting  NEUROLOGICAL: No headaches, memory loss, loss of strength.  MISCELLANEOUS:      Vital Signs Last 24 Hrs  T(C): 36.3 (07 Mar 2021 08:08), Max: 37.1 (06 Mar 2021 15:40)  T(F): 97.4 (07 Mar 2021 08:08), Max: 98.7 (06 Mar 2021 15:40)  HR: 51 (07 Mar 2021 08:08) (51 - 93)  BP: 106/64 (07 Mar 2021 08:08) (106/64 - 158/70)  BP(mean): 94 (06 Mar 2021 22:13) (94 - 94)  RR: 18 (07 Mar 2021 08:08) (18 - 20)  SpO2: 95% (07 Mar 2021 08:08) (95% - 99%)    PHYSICAL EXAM:    GENERAL: NAD, obese.   HEENT: PERRL, +EOMI  NECK: soft, Supple, No JVD,   CHEST/LUNG: Clear to auscultation bilaterally; No wheezing  HEART: S1S2+, Regular rate and rhythm; No murmurs, rubs, or gallops  ABDOMEN: Soft, Nontender, Nondistended; Bowel sounds present  EXTREMITIES:  2+ Peripheral Pulses, No clubbing, cyanosis, or edema  SKIN: No rashes or lesions  NEURO: AAOX3, no focal deficits, no motor r sensory loss  PSYCH: normal mood      LABS:                        19.7   8.97  )-----------( 130      ( 07 Mar 2021 07:55 )             58.7     03-07    129<L>  |  83<L>  |  45.0<H>  ----------------------------<  151<H>  3.6   |  27.0  |  1.30    Ca    9.2      07 Mar 2021 07:55  Phos  3.8     03-07  Mg     2.3     03-07    TPro  7.9  /  Alb  4.0  /  TBili  0.9  /  DBili  x   /  AST  56<H>  /  ALT  26  /  AlkPhos  132<H>  -    PT/INR - ( 06 Mar 2021 22:59 )   PT: 13.6 sec;   INR: 1.18 ratio         PTT - ( 06 Mar 2021 22:59 )  PTT:34.5 sec  Urinalysis Basic - ( 06 Mar 2021 19:12 )    Color: Yellow / Appearance: Clear / S.005 / pH: x  Gluc: x / Ketone: Negative  / Bili: Negative / Urobili: Negative mg/dL   Blood: x / Protein: Negative mg/dL / Nitrite: Negative   Leuk Esterase: Negative / RBC: x / WBC x   Sq Epi: x / Non Sq Epi: x / Bacteria: x        MEDICATIONS  (STANDING):  aspirin enteric coated 325 milliGRAM(s) Oral daily  atorvastatin 40 milliGRAM(s) Oral at bedtime  carvedilol 25 milliGRAM(s) Oral every 12 hours  dextrose 40% Gel 15 Gram(s) Oral once  dextrose 5%. 1000 milliLiter(s) (50 mL/Hr) IV Continuous <Continuous>  dextrose 50% Injectable 25 Gram(s) IV Push once  DULoxetine 60 milliGRAM(s) Oral daily  glucagon  Injectable 1 milliGRAM(s) IntraMuscular once  insulin glargine Injectable (LANTUS) 50 Unit(s) SubCutaneous at bedtime  insulin lispro (ADMELOG) corrective regimen sliding scale   SubCutaneous Before meals and at bedtime  insulin lispro Injectable (ADMELOG) 5 Unit(s) SubCutaneous before breakfast  insulin lispro Injectable (ADMELOG) 5 Unit(s) SubCutaneous before lunch  insulin lispro Injectable (ADMELOG) 5 Unit(s) SubCutaneous before dinner  levothyroxine 50 MICROGram(s) Oral daily  pantoprazole    Tablet 40 milliGRAM(s) Oral before breakfast  sodium chloride 0.9% lock flush 3 milliLiter(s) IV Push every 8 hours  sodium chloride 0.9%. 1000 milliLiter(s) (125 mL/Hr) IV Continuous <Continuous>  tamsulosin 0.4 milliGRAM(s) Oral at bedtime    MEDICATIONS  (PRN):  acetaminophen   Tablet .. 650 milliGRAM(s) Oral every 6 hours PRN Temp greater or equal to 38C (100.4F), Mild Pain (1 - 3)  hydrALAZINE Injectable 5 milliGRAM(s) IV Push every 6 hours PRN for sbp above 160 mmhg  ondansetron Injectable 4 milliGRAM(s) IV Push every 6 hours PRN Nausea  oxycodone    5 mG/acetaminophen 325 mG 2 Tablet(s) Oral every 6 hours PRN Moderate Pain (4 - 6)      RADIOLOGY & ADDITIONAL TESTS:   MARY ANN CORNELL    [id number]    70y      Male    INTERVAL HPI/OVERNIGHT EVENTS: patient being seen for multiple medical issues. patient seen at bedside and states feeling ok.     REVIEW OF SYSTEMS:    CONSTITUTIONAL: No fever, weight loss, or fatigue  RESPIRATORY: No cough, wheezing, hemoptysis; No shortness of breath  CARDIOVASCULAR: No chest pain, palpitations  GASTROINTESTINAL: No abdominal or epigastric pain. No nausea, vomiting  NEUROLOGICAL: No headaches, memory loss, loss of strength.  MISCELLANEOUS:      Vital Signs Last 24 Hrs  T(C): 36.3 (07 Mar 2021 08:08), Max: 37.1 (06 Mar 2021 15:40)  T(F): 97.4 (07 Mar 2021 08:08), Max: 98.7 (06 Mar 2021 15:40)  HR: 51 (07 Mar 2021 08:08) (51 - 93)  BP: 106/64 (07 Mar 2021 08:08) (106/64 - 158/70)  BP(mean): 94 (06 Mar 2021 22:13) (94 - 94)  RR: 18 (07 Mar 2021 08:08) (18 - 20)  SpO2: 95% (07 Mar 2021 08:08) (95% - 99%)    PHYSICAL EXAM:    GENERAL: NAD, obese.   HEENT: PERRL, +EOMI  NECK: soft, Supple, No JVD,   CHEST/LUNG: Clear to auscultation bilaterally; No wheezing  HEART: S1S2+, Regular rate and rhythm;   ABDOMEN: Soft, obese  EXTREMITIES: edema   SKIN: chronic skin changes  NEURO: AAOX3, no focal deficits,     LABS:                        19.7   8.97  )-----------( 130      ( 07 Mar 2021 07:55 )             58.7     03-07    129<L>  |  83<L>  |  45.0<H>  ----------------------------<  151<H>  3.6   |  27.0  |  1.30    Ca    9.2      07 Mar 2021 07:55  Phos  3.8     03-07  Mg     2.3     03-07    TPro  7.9  /  Alb  4.0  /  TBili  0.9  /  DBili  x   /  AST  56<H>  /  ALT  26  /  AlkPhos  132<H>  03-06    PT/INR - ( 06 Mar 2021 22:59 )   PT: 13.6 sec;   INR: 1.18 ratio         PTT - ( 06 Mar 2021 22:59 )  PTT:34.5 sec  Urinalysis Basic - ( 06 Mar 2021 19:12 )    Color: Yellow / Appearance: Clear / S.005 / pH: x  Gluc: x / Ketone: Negative  / Bili: Negative / Urobili: Negative mg/dL   Blood: x / Protein: Negative mg/dL / Nitrite: Negative   Leuk Esterase: Negative / RBC: x / WBC x   Sq Epi: x / Non Sq Epi: x / Bacteria: x        MEDICATIONS  (STANDING):  aspirin enteric coated 325 milliGRAM(s) Oral daily  atorvastatin 40 milliGRAM(s) Oral at bedtime  carvedilol 25 milliGRAM(s) Oral every 12 hours  dextrose 40% Gel 15 Gram(s) Oral once  dextrose 5%. 1000 milliLiter(s) (50 mL/Hr) IV Continuous <Continuous>  dextrose 50% Injectable 25 Gram(s) IV Push once  DULoxetine 60 milliGRAM(s) Oral daily  glucagon  Injectable 1 milliGRAM(s) IntraMuscular once  insulin glargine Injectable (LANTUS) 50 Unit(s) SubCutaneous at bedtime  insulin lispro (ADMELOG) corrective regimen sliding scale   SubCutaneous Before meals and at bedtime  insulin lispro Injectable (ADMELOG) 5 Unit(s) SubCutaneous before breakfast  insulin lispro Injectable (ADMELOG) 5 Unit(s) SubCutaneous before lunch  insulin lispro Injectable (ADMELOG) 5 Unit(s) SubCutaneous before dinner  levothyroxine 50 MICROGram(s) Oral daily  pantoprazole    Tablet 40 milliGRAM(s) Oral before breakfast  sodium chloride 0.9% lock flush 3 milliLiter(s) IV Push every 8 hours  sodium chloride 0.9%. 1000 milliLiter(s) (125 mL/Hr) IV Continuous <Continuous>  tamsulosin 0.4 milliGRAM(s) Oral at bedtime    MEDICATIONS  (PRN):  acetaminophen   Tablet .. 650 milliGRAM(s) Oral every 6 hours PRN Temp greater or equal to 38C (100.4F), Mild Pain (1 - 3)  hydrALAZINE Injectable 5 milliGRAM(s) IV Push every 6 hours PRN for sbp above 160 mmhg  ondansetron Injectable 4 milliGRAM(s) IV Push every 6 hours PRN Nausea  oxycodone    5 mG/acetaminophen 325 mG 2 Tablet(s) Oral every 6 hours PRN Moderate Pain (4 - 6)      RADIOLOGY & ADDITIONAL TESTS:    ree - ordered    ct abd - performed

## 2021-03-07 NOTE — CONSULT NOTE ADULT - ASSESSMENT
70 y.o. M with significant erythrocytosis. Normal WBC, platelets and lack of splenomegaly on exam all speak against his having Polycythemia Vera. He has not controlled his DM and comes in with severe dehydration d/t hyperglycemia. He is obese and I agree with suspicion that CHRISS may also be playing a role. Hct is trending down with hydration. Of note, he is on warfarin for A-fib which will greatly reduce his risk of a thrombotic event from the erythrocytosis so will hold on phlebotomy for now.       Plan:  1. Continue hydration and monitoring of H/H.  2. Follow up as an out patient for Arron-2 testing etc.

## 2021-03-07 NOTE — CHART NOTE - NSCHARTNOTEFT_GEN_A_CORE
Attempted to place pt on nocturnal CPAP at 1:00AM and pt refused because he stated "he isnt comfortable and wants a regular bed".  KARLENE bartlett made aware and encouraged pt to let me know when he is ready to go on nocturnal CPAP +12.  At this time pt is awake and alert and oriented.  No distress noted.  Will continue to monitor patient.

## 2021-03-07 NOTE — PATIENT PROFILE ADULT - NSPROIMPLANTSMEDDEV_GEN_A_NUR
patient doesn't know if he has radioactive seed implants in prostate/Automatic Implantable Cardioverter Defibrillator

## 2021-03-08 LAB
ALBUMIN SERPL ELPH-MCNC: 3.3 G/DL — SIGNIFICANT CHANGE UP (ref 3.3–5.2)
ALP SERPL-CCNC: 117 U/L — SIGNIFICANT CHANGE UP (ref 40–120)
ALT FLD-CCNC: 17 U/L — SIGNIFICANT CHANGE UP
ANION GAP SERPL CALC-SCNC: 19 MMOL/L — HIGH (ref 5–17)
AST SERPL-CCNC: 36 U/L — SIGNIFICANT CHANGE UP
BILIRUB SERPL-MCNC: 0.8 MG/DL — SIGNIFICANT CHANGE UP (ref 0.4–2)
BUN SERPL-MCNC: 43 MG/DL — HIGH (ref 8–20)
CALCIUM SERPL-MCNC: 8.8 MG/DL — SIGNIFICANT CHANGE UP (ref 8.6–10.2)
CHLORIDE SERPL-SCNC: 86 MMOL/L — LOW (ref 98–107)
CHOLEST SERPL-MCNC: 195 MG/DL — SIGNIFICANT CHANGE UP
CO2 SERPL-SCNC: 24 MMOL/L — SIGNIFICANT CHANGE UP (ref 22–29)
CREAT SERPL-MCNC: 1.31 MG/DL — HIGH (ref 0.5–1.3)
GLUCOSE BLDC GLUCOMTR-MCNC: 275 MG/DL — HIGH (ref 70–99)
GLUCOSE BLDC GLUCOMTR-MCNC: 293 MG/DL — HIGH (ref 70–99)
GLUCOSE BLDC GLUCOMTR-MCNC: 332 MG/DL — HIGH (ref 70–99)
GLUCOSE BLDC GLUCOMTR-MCNC: 362 MG/DL — HIGH (ref 70–99)
GLUCOSE SERPL-MCNC: 274 MG/DL — HIGH (ref 70–99)
HDLC SERPL-MCNC: 24 MG/DL — LOW
INR BLD: 1.43 RATIO — HIGH (ref 0.88–1.16)
LIPID PNL WITH DIRECT LDL SERPL: SIGNIFICANT CHANGE UP MG/DL
MAGNESIUM SERPL-MCNC: 2.7 MG/DL — HIGH (ref 1.6–2.6)
NON HDL CHOLESTEROL: 171 MG/DL — HIGH
POTASSIUM SERPL-MCNC: 3.3 MMOL/L — LOW (ref 3.5–5.3)
POTASSIUM SERPL-SCNC: 3.3 MMOL/L — LOW (ref 3.5–5.3)
PROT SERPL-MCNC: 6.9 G/DL — SIGNIFICANT CHANGE UP (ref 6.6–8.7)
PROTHROM AB SERPL-ACNC: 16.3 SEC — HIGH (ref 10.6–13.6)
SODIUM SERPL-SCNC: 129 MMOL/L — LOW (ref 135–145)
TRIGL SERPL-MCNC: 419 MG/DL — HIGH

## 2021-03-08 PROCEDURE — 99233 SBSQ HOSP IP/OBS HIGH 50: CPT

## 2021-03-08 RX ORDER — POTASSIUM CHLORIDE 20 MEQ
40 PACKET (EA) ORAL ONCE
Refills: 0 | Status: COMPLETED | OUTPATIENT
Start: 2021-03-08 | End: 2021-03-08

## 2021-03-08 RX ORDER — INSULIN LISPRO 100/ML
10 VIAL (ML) SUBCUTANEOUS
Refills: 0 | Status: DISCONTINUED | OUTPATIENT
Start: 2021-03-08 | End: 2021-03-09

## 2021-03-08 RX ORDER — WARFARIN SODIUM 2.5 MG/1
5 TABLET ORAL ONCE
Refills: 0 | Status: COMPLETED | OUTPATIENT
Start: 2021-03-08 | End: 2021-03-08

## 2021-03-08 RX ORDER — SODIUM CHLORIDE 9 MG/ML
1000 INJECTION INTRAMUSCULAR; INTRAVENOUS; SUBCUTANEOUS
Refills: 0 | Status: DISCONTINUED | OUTPATIENT
Start: 2021-03-08 | End: 2021-03-09

## 2021-03-08 RX ORDER — INSULIN GLARGINE 100 [IU]/ML
60 INJECTION, SOLUTION SUBCUTANEOUS AT BEDTIME
Refills: 0 | Status: DISCONTINUED | OUTPATIENT
Start: 2021-03-08 | End: 2021-03-09

## 2021-03-08 RX ADMIN — ATORVASTATIN CALCIUM 40 MILLIGRAM(S): 80 TABLET, FILM COATED ORAL at 22:09

## 2021-03-08 RX ADMIN — TAMSULOSIN HYDROCHLORIDE 0.4 MILLIGRAM(S): 0.4 CAPSULE ORAL at 22:10

## 2021-03-08 RX ADMIN — CARVEDILOL PHOSPHATE 25 MILLIGRAM(S): 80 CAPSULE, EXTENDED RELEASE ORAL at 17:05

## 2021-03-08 RX ADMIN — ENOXAPARIN SODIUM 130 MILLIGRAM(S): 100 INJECTION SUBCUTANEOUS at 17:05

## 2021-03-08 RX ADMIN — Medication 10: at 08:02

## 2021-03-08 RX ADMIN — SODIUM CHLORIDE 50 MILLILITER(S): 9 INJECTION INTRAMUSCULAR; INTRAVENOUS; SUBCUTANEOUS at 08:01

## 2021-03-08 RX ADMIN — Medication 6: at 22:10

## 2021-03-08 RX ADMIN — INSULIN GLARGINE 60 UNIT(S): 100 INJECTION, SOLUTION SUBCUTANEOUS at 22:10

## 2021-03-08 RX ADMIN — SODIUM CHLORIDE 3 MILLILITER(S): 9 INJECTION INTRAMUSCULAR; INTRAVENOUS; SUBCUTANEOUS at 22:11

## 2021-03-08 RX ADMIN — OXYCODONE AND ACETAMINOPHEN 2 TABLET(S): 5; 325 TABLET ORAL at 08:07

## 2021-03-08 RX ADMIN — Medication 40 MILLIEQUIVALENT(S): at 12:42

## 2021-03-08 RX ADMIN — ENOXAPARIN SODIUM 130 MILLIGRAM(S): 100 INJECTION SUBCUTANEOUS at 05:46

## 2021-03-08 RX ADMIN — Medication 8: at 17:04

## 2021-03-08 RX ADMIN — Medication 81 MILLIGRAM(S): at 12:41

## 2021-03-08 RX ADMIN — Medication 6: at 12:30

## 2021-03-08 RX ADMIN — WARFARIN SODIUM 5 MILLIGRAM(S): 2.5 TABLET ORAL at 22:09

## 2021-03-08 RX ADMIN — Medication 10 UNIT(S): at 17:04

## 2021-03-08 RX ADMIN — Medication 5 UNIT(S): at 08:02

## 2021-03-08 RX ADMIN — Medication 5 UNIT(S): at 12:30

## 2021-03-08 RX ADMIN — PANTOPRAZOLE SODIUM 40 MILLIGRAM(S): 20 TABLET, DELAYED RELEASE ORAL at 05:47

## 2021-03-08 RX ADMIN — CARVEDILOL PHOSPHATE 25 MILLIGRAM(S): 80 CAPSULE, EXTENDED RELEASE ORAL at 05:46

## 2021-03-08 RX ADMIN — DULOXETINE HYDROCHLORIDE 60 MILLIGRAM(S): 30 CAPSULE, DELAYED RELEASE ORAL at 12:42

## 2021-03-08 RX ADMIN — SODIUM CHLORIDE 3 MILLILITER(S): 9 INJECTION INTRAMUSCULAR; INTRAVENOUS; SUBCUTANEOUS at 05:25

## 2021-03-08 RX ADMIN — Medication 50 MICROGRAM(S): at 05:45

## 2021-03-08 NOTE — PROGRESS NOTE ADULT - SUBJECTIVE AND OBJECTIVE BOX
MARY ANN CORNELL    [id number]    70y      Male    INTERVAL HPI/OVERNIGHT EVENTS: patient being seen for polycythemia and morbid obesity.     REVIEW OF SYSTEMS:    CONSTITUTIONAL: No fever, weight loss, or fatigue  RESPIRATORY: No cough, wheezing, hemoptysis; No shortness of breath  CARDIOVASCULAR: No chest pain, palpitations  GASTROINTESTINAL: No abdominal or epigastric pain. No nausea, vomiting  NEUROLOGICAL: No headaches, memory loss, loss of strength.  MISCELLANEOUS:      Vital Signs Last 24 Hrs  T(C): 36.4 (08 Mar 2021 08:31), Max: 36.4 (08 Mar 2021 04:50)  T(F): 97.5 (08 Mar 2021 08:31), Max: 97.6 (08 Mar 2021 04:50)  HR: 96 (08 Mar 2021 08:31) (72 - 97)  BP: 105/56 (08 Mar 2021 08:31) (105/56 - 131/71)  BP(mean): --  RR: 16 (08 Mar 2021 08:31) (12 - 16)  SpO2: 92% (08 Mar 2021 08:31) (92% - 96%)    PHYSICAL EXAM:    GENERAL: NAD, well-groomed  HEENT: PERRL, +EOMI  NECK: soft, Supple, No JVD,   CHEST/LUNG: Clear to auscultation bilaterally; No wheezing  HEART: S1S2+, Regular rate and rhythm; No murmurs, rubs, or gallops  ABDOMEN: Soft, Nontender, Nondistended; Bowel sounds present  EXTREMITIES:  2+ Peripheral Pulses, No clubbing, cyanosis, or edema  SKIN: No rashes or lesions  NEURO: AAOX3, no focal deficits, no motor r sensory loss  PSYCH: normal mood      LABS:                        19.7   8.97  )-----------( 130      ( 07 Mar 2021 07:55 )             58.7     03-08    129<L>  |  86<L>  |  43.0<H>  ----------------------------<  274<H>  3.3<L>   |  24.0  |  1.31<H>    Ca    8.8      08 Mar 2021 07:35  Phos  3.8     03-07  Mg     2.7     03-08    TPro  6.9  /  Alb  3.3  /  TBili  0.8  /  DBili  x   /  AST  36  /  ALT  17  /  AlkPhos  117  03-08    PT/INR - ( 08 Mar 2021 07:35 )   PT: 16.3 sec;   INR: 1.43 ratio         PTT - ( 06 Mar 2021 22:59 )  PTT:34.5 sec  Urinalysis Basic - ( 06 Mar 2021 19:12 )    Color: Yellow / Appearance: Clear / S.005 / pH: x  Gluc: x / Ketone: Negative  / Bili: Negative / Urobili: Negative mg/dL   Blood: x / Protein: Negative mg/dL / Nitrite: Negative   Leuk Esterase: Negative / RBC: x / WBC x   Sq Epi: x / Non Sq Epi: x / Bacteria: x          MEDICATIONS  (STANDING):  aspirin enteric coated 81 milliGRAM(s) Oral daily  atorvastatin 40 milliGRAM(s) Oral at bedtime  carvedilol 25 milliGRAM(s) Oral every 12 hours  dextrose 40% Gel 15 Gram(s) Oral once  dextrose 5%. 1000 milliLiter(s) (50 mL/Hr) IV Continuous <Continuous>  dextrose 50% Injectable 25 Gram(s) IV Push once  DULoxetine 60 milliGRAM(s) Oral daily  enoxaparin Injectable 130 milliGRAM(s) SubCutaneous every 12 hours  glucagon  Injectable 1 milliGRAM(s) IntraMuscular once  insulin glargine Injectable (LANTUS) 50 Unit(s) SubCutaneous at bedtime  insulin lispro (ADMELOG) corrective regimen sliding scale   SubCutaneous Before meals and at bedtime  insulin lispro Injectable (ADMELOG) 5 Unit(s) SubCutaneous before breakfast  insulin lispro Injectable (ADMELOG) 5 Unit(s) SubCutaneous before lunch  insulin lispro Injectable (ADMELOG) 5 Unit(s) SubCutaneous before dinner  levothyroxine 50 MICROGram(s) Oral daily  pantoprazole    Tablet 40 milliGRAM(s) Oral before breakfast  potassium chloride    Tablet ER 40 milliEquivalent(s) Oral once  sodium chloride 0.9% lock flush 3 milliLiter(s) IV Push every 8 hours  sodium chloride 0.9%. 1000 milliLiter(s) (50 mL/Hr) IV Continuous <Continuous>  tamsulosin 0.4 milliGRAM(s) Oral at bedtime    MEDICATIONS  (PRN):  acetaminophen   Tablet .. 650 milliGRAM(s) Oral every 6 hours PRN Temp greater or equal to 38C (100.4F), Mild Pain (1 - 3)  hydrALAZINE Injectable 5 milliGRAM(s) IV Push every 6 hours PRN for sbp above 160 mmhg  ondansetron Injectable 4 milliGRAM(s) IV Push every 6 hours PRN Nausea  oxycodone    5 mG/acetaminophen 325 mG 2 Tablet(s) Oral every 6 hours PRN Moderate Pain (4 - 6)      RADIOLOGY & ADDITIONAL TESTS:   MARY ANN CORNELL    [id number]    70y      Male    INTERVAL HPI/OVERNIGHT EVENTS: patient being seen for polycythemia and morbid obesity.     patient states feeling ok    REVIEW OF SYSTEMS:    CONSTITUTIONAL: No fever, weight loss, or fatigue  RESPIRATORY: No cough, wheezing, hemoptysis; No shortness of breath  CARDIOVASCULAR: No chest pain, palpitations  GASTROINTESTINAL: No abdominal or epigastric pain. No nausea, vomiting  NEUROLOGICAL: No headaches, memory loss, loss of strength.  MISCELLANEOUS:      Vital Signs Last 24 Hrs  T(C): 36.4 (08 Mar 2021 08:31), Max: 36.4 (08 Mar 2021 04:50)  T(F): 97.5 (08 Mar 2021 08:31), Max: 97.6 (08 Mar 2021 04:50)  HR: 96 (08 Mar 2021 08:31) (72 - 97)  BP: 105/56 (08 Mar 2021 08:31) (105/56 - 131/71)  BP(mean): --  RR: 16 (08 Mar 2021 08:31) (12 - 16)  SpO2: 92% (08 Mar 2021 08:31) (92% - 96%)    PHYSICAL EXAM:    GENERAL: NAD, obese.   HEENT: PERRL, +EOMI  NECK: soft, Supple, No JVD,   CHEST/LUNG: Clear to auscultation bilaterally; No wheezing  HEART: S1S2+, Regular rate and rhythm;   ABDOMEN: Soft, obese  EXTREMITIES: edema   SKIN: chronic skin changes  NEURO: AAOX3, no focal deficits      LABS:                        19.7   8.97  )-----------( 130      ( 07 Mar 2021 07:55 )             58.7     03-08    129<L>  |  86<L>  |  43.0<H>  ----------------------------<  274<H>  3.3<L>   |  24.0  |  1.31<H>    Ca    8.8      08 Mar 2021 07:35  Phos  3.8     03-07  Mg     2.7     03-08    TPro  6.9  /  Alb  3.3  /  TBili  0.8  /  DBili  x   /  AST  36  /  ALT  17  /  AlkPhos  117  03-08    PT/INR - ( 08 Mar 2021 07:35 )   PT: 16.3 sec;   INR: 1.43 ratio         PTT - ( 06 Mar 2021 22:59 )  PTT:34.5 sec  Urinalysis Basic - ( 06 Mar 2021 19:12 )    Color: Yellow / Appearance: Clear / S.005 / pH: x  Gluc: x / Ketone: Negative  / Bili: Negative / Urobili: Negative mg/dL   Blood: x / Protein: Negative mg/dL / Nitrite: Negative   Leuk Esterase: Negative / RBC: x / WBC x   Sq Epi: x / Non Sq Epi: x / Bacteria: x          MEDICATIONS  (STANDING):  aspirin enteric coated 81 milliGRAM(s) Oral daily  atorvastatin 40 milliGRAM(s) Oral at bedtime  carvedilol 25 milliGRAM(s) Oral every 12 hours  dextrose 40% Gel 15 Gram(s) Oral once  dextrose 5%. 1000 milliLiter(s) (50 mL/Hr) IV Continuous <Continuous>  dextrose 50% Injectable 25 Gram(s) IV Push once  DULoxetine 60 milliGRAM(s) Oral daily  enoxaparin Injectable 130 milliGRAM(s) SubCutaneous every 12 hours  glucagon  Injectable 1 milliGRAM(s) IntraMuscular once  insulin glargine Injectable (LANTUS) 50 Unit(s) SubCutaneous at bedtime  insulin lispro (ADMELOG) corrective regimen sliding scale   SubCutaneous Before meals and at bedtime  insulin lispro Injectable (ADMELOG) 5 Unit(s) SubCutaneous before breakfast  insulin lispro Injectable (ADMELOG) 5 Unit(s) SubCutaneous before lunch  insulin lispro Injectable (ADMELOG) 5 Unit(s) SubCutaneous before dinner  levothyroxine 50 MICROGram(s) Oral daily  pantoprazole    Tablet 40 milliGRAM(s) Oral before breakfast  potassium chloride    Tablet ER 40 milliEquivalent(s) Oral once  sodium chloride 0.9% lock flush 3 milliLiter(s) IV Push every 8 hours  sodium chloride 0.9%. 1000 milliLiter(s) (50 mL/Hr) IV Continuous <Continuous>  tamsulosin 0.4 milliGRAM(s) Oral at bedtime    MEDICATIONS  (PRN):  acetaminophen   Tablet .. 650 milliGRAM(s) Oral every 6 hours PRN Temp greater or equal to 38C (100.4F), Mild Pain (1 - 3)  hydrALAZINE Injectable 5 milliGRAM(s) IV Push every 6 hours PRN for sbp above 160 mmhg  ondansetron Injectable 4 milliGRAM(s) IV Push every 6 hours PRN Nausea  oxycodone    5 mG/acetaminophen 325 mG 2 Tablet(s) Oral every 6 hours PRN Moderate Pain (4 - 6)      RADIOLOGY & ADDITIONAL TESTS:

## 2021-03-08 NOTE — PROGRESS NOTE ADULT - ASSESSMENT
69y Male with past medical history significant for NICM (with improvement in LVEF), chronic afib on Coumadin, VT s/p ICD, HTN, morbid obesity, CHRISS, DM presented with JAYLENE hyponatremia hyperglycemia     1.  Renal function improving  2.  Hold Torsemide therapy for now   3.  Awaiting TTE to re-evaluate LV   4.  Continue Coreg 25 mg BID  5.  Continue statin
#Polycythemia. consulted Dr Michael   --> slightly improved  --> trend     # Acute kidney injury.  Plan: Likely on the basis of pre-renal state  - improved    #dm2 - poorly controlled  --> lantus, ssi     # Hyponatremia.  Plan: pseudohyponatremia from hyperglycemia combined with hypovolemic hyponatremia. Hold diuretics,    #Paroxysmal atrial fibrillation.  Plan: INR subtherapeutic,   lovenox full dose to bridge  --> coumadin tonight     #High cholesterol start statin   --> check lipid in am       #CHRISS/ Pulmonary hypertension.  Plan: Check echo.   - bipap qhs enoucraged    #neuropathy/ possible PAD?  - check ree     #debility - pt consult    sopoke to daughter over phone and discussed plan and is in agreement  -likely needs rehab on dc
#Polycythemia. heme consult appreciated  --> outpatent follow up     # Acute kidney injury.  Plan: Likely on the basis of pre-renal state  - improved    #dm2 - poorly controlled  --> lantus, ssi   advised better diet and exercise    #Paroxysmal atrial fibrillation.  Plan: INR subtherapeutic,   lovenox full dose to bridge  --> coumadin tonight     #High cholesterol start statin   -->c,w statin     #CHRISS/ Pulmonary hypertension.  Plan: Check echo.   - bipap qhs enoucraged    / possible PAD?  ree neg for pad    #debility - pt consult    hypokalemia - replete    morbid obesity - advsied to lose weight     sopoke to daughter over phone and discussed plan and is in agreement  -likely needs rehab on dc  but likely will chose home

## 2021-03-08 NOTE — PROGRESS NOTE ADULT - SUBJECTIVE AND OBJECTIVE BOX
[facility], THE [facility]                              [address] [address]                                                 PHONE: [phone]                                                 FAX: [phone]  -----------------------------------------------------------------------------------------------  Pt seen and examined. FU for  shortness of breath     Overnight events/Complaints: Pt in bed. Reports breathing better. Very limited functional status at baseline.    Vital Signs Last 24 Hrs  T(C): 36.4 (08 Mar 2021 08:31), Max: 36.4 (08 Mar 2021 04:50)  T(F): 97.5 (08 Mar 2021 08:31), Max: 97.6 (08 Mar 2021 04:50)  HR: 96 (08 Mar 2021 08:31) (72 - 97)  BP: 105/56 (08 Mar 2021 08:31) (105/56 - 131/71)  BP(mean): --  RR: 16 (08 Mar 2021 08:31) (12 - 16)  SpO2: 92% (08 Mar 2021 08:31) (92% - 96%)  I&O's Summary    07 Mar 2021 07:01  -  08 Mar 2021 07:00  --------------------------------------------------------  IN: 360 mL / OUT: 650 mL / NET: -290 mL        PHYSICAL EXAM:  Constitutional: Obese male in bed; alert and co-operative  HEENT:     Head: Normocephalic and atraumatic  Neck: supple. No JVD  Cardiovascular: regular S1 S2  Respiratory: Lungs clear to auscultation; no crepitations, no wheeze  Gastrointestinal:  Soft, Non-tender, + BS	  Musculoskeletal: Normal range of motion. + chronic venous stasis and edema  Skin: Warm and dry. No cyanosis . No diaphoresis.  Neurologic: Alert oriented to time place and person. Normal strength and no tremor.        LABS:                        19.7   8.97  )-----------( 130      ( 07 Mar 2021 07:55 )             58.7     03-08    129<L>  |  86<L>  |  43.0<H>  ----------------------------<  274<H>  3.3<L>   |  24.0  |  1.31<H>    Ca    8.8      08 Mar 2021 07:35  Phos  3.8     03-07  Mg     2.7     03-08    TPro  6.9  /  Alb  3.3  /  TBili  0.8  /  DBili  x   /  AST  36  /  ALT  17  /  AlkPhos  117  03-08    CARDIAC MARKERS ( 06 Mar 2021 18:38 )  x     / 0.03 ng/mL / x     / x     / x          PT/INR - ( 08 Mar 2021 07:35 )   PT: 16.3 sec;   INR: 1.43 ratio         PTT - ( 06 Mar 2021 22:59 )  PTT:34.5 sec    RADIOLOGY & ADDITIONAL STUDIES: (reviewed)  CXR was independently visualized/reviewed  and demonstrated: clear lungs    CARDIOLOGY TESTING:(reviewed)     12 lead EKG independently visualized/reviewed  and demonstrated AF PVC    ECHOCARDIOGRAM independently visualized/reviewed and demonstrated :    1. Technically difficult study.   2. The left atrium is normal in size.   3. Left ventricular ejection fraction, by visual estimation, is 30 to 35%.   4. Mildly enlarged right atrium.   5. Moderately enlarged right ventricle. Moderately reduced RV systolic function.   6. No significant valvular abnormality.   7. There is no evidence of pericardial effusion.    MD Salo, RPVI Electronically signed on 9/10/2020 at 2:02:05 PM    TELEMETRY independently visualized/reviewed and demonstrated : AF [  ]     MEDICATIONS:(reviewed)  MEDICATIONS  (STANDING):  aspirin enteric coated 81 milliGRAM(s) Oral daily  atorvastatin 40 milliGRAM(s) Oral at bedtime  carvedilol 25 milliGRAM(s) Oral every 12 hours  dextrose 40% Gel 15 Gram(s) Oral once  dextrose 5%. 1000 milliLiter(s) (50 mL/Hr) IV Continuous <Continuous>  dextrose 50% Injectable 25 Gram(s) IV Push once  DULoxetine 60 milliGRAM(s) Oral daily  enoxaparin Injectable 130 milliGRAM(s) SubCutaneous every 12 hours  glucagon  Injectable 1 milliGRAM(s) IntraMuscular once  insulin glargine Injectable (LANTUS) 50 Unit(s) SubCutaneous at bedtime  insulin lispro (ADMELOG) corrective regimen sliding scale   SubCutaneous Before meals and at bedtime  insulin lispro Injectable (ADMELOG) 5 Unit(s) SubCutaneous before breakfast  insulin lispro Injectable (ADMELOG) 5 Unit(s) SubCutaneous before lunch  insulin lispro Injectable (ADMELOG) 5 Unit(s) SubCutaneous before dinner  levothyroxine 50 MICROGram(s) Oral daily  pantoprazole    Tablet 40 milliGRAM(s) Oral before breakfast  sodium chloride 0.9% lock flush 3 milliLiter(s) IV Push every 8 hours  sodium chloride 0.9%. 1000 milliLiter(s) (50 mL/Hr) IV Continuous <Continuous>  tamsulosin 0.4 milliGRAM(s) Oral at bedtime

## 2021-03-09 ENCOUNTER — TRANSCRIPTION ENCOUNTER (OUTPATIENT)
Age: 71
End: 2021-03-09

## 2021-03-09 VITALS — SYSTOLIC BLOOD PRESSURE: 116 MMHG | DIASTOLIC BLOOD PRESSURE: 71 MMHG | HEART RATE: 80 BPM

## 2021-03-09 LAB
ALBUMIN SERPL ELPH-MCNC: 3.4 G/DL — SIGNIFICANT CHANGE UP (ref 3.3–5.2)
ALP SERPL-CCNC: 105 U/L — SIGNIFICANT CHANGE UP (ref 40–120)
ALT FLD-CCNC: 16 U/L — SIGNIFICANT CHANGE UP
ANION GAP SERPL CALC-SCNC: 15 MMOL/L — SIGNIFICANT CHANGE UP (ref 5–17)
AST SERPL-CCNC: 25 U/L — SIGNIFICANT CHANGE UP
BASOPHILS # BLD AUTO: 0.03 K/UL — SIGNIFICANT CHANGE UP (ref 0–0.2)
BASOPHILS NFR BLD AUTO: 0.7 % — SIGNIFICANT CHANGE UP (ref 0–2)
BILIRUB SERPL-MCNC: 0.7 MG/DL — SIGNIFICANT CHANGE UP (ref 0.4–2)
BUN SERPL-MCNC: 39 MG/DL — HIGH (ref 8–20)
CALCIUM SERPL-MCNC: 8.9 MG/DL — SIGNIFICANT CHANGE UP (ref 8.6–10.2)
CHLORIDE SERPL-SCNC: 92 MMOL/L — LOW (ref 98–107)
CO2 SERPL-SCNC: 28 MMOL/L — SIGNIFICANT CHANGE UP (ref 22–29)
CREAT SERPL-MCNC: 1.43 MG/DL — HIGH (ref 0.5–1.3)
EOSINOPHIL # BLD AUTO: 0.12 K/UL — SIGNIFICANT CHANGE UP (ref 0–0.5)
EOSINOPHIL NFR BLD AUTO: 2.7 % — SIGNIFICANT CHANGE UP (ref 0–6)
GLUCOSE BLDC GLUCOMTR-MCNC: 333 MG/DL — HIGH (ref 70–99)
GLUCOSE BLDC GLUCOMTR-MCNC: 399 MG/DL — HIGH (ref 70–99)
GLUCOSE SERPL-MCNC: 263 MG/DL — HIGH (ref 70–99)
HCT VFR BLD CALC: 54.8 % — HIGH (ref 39–50)
HGB BLD-MCNC: 17.9 G/DL — HIGH (ref 13–17)
IMM GRANULOCYTES NFR BLD AUTO: 0.9 % — SIGNIFICANT CHANGE UP (ref 0–1.5)
INR BLD: 1.62 RATIO — HIGH (ref 0.88–1.16)
LYMPHOCYTES # BLD AUTO: 1.23 K/UL — SIGNIFICANT CHANGE UP (ref 1–3.3)
LYMPHOCYTES # BLD AUTO: 28 % — SIGNIFICANT CHANGE UP (ref 13–44)
MAGNESIUM SERPL-MCNC: 2.4 MG/DL — SIGNIFICANT CHANGE UP (ref 1.8–2.6)
MCHC RBC-ENTMCNC: 27.8 PG — SIGNIFICANT CHANGE UP (ref 27–34)
MCHC RBC-ENTMCNC: 32.7 GM/DL — SIGNIFICANT CHANGE UP (ref 32–36)
MCV RBC AUTO: 85 FL — SIGNIFICANT CHANGE UP (ref 80–100)
MONOCYTES # BLD AUTO: 0.46 K/UL — SIGNIFICANT CHANGE UP (ref 0–0.9)
MONOCYTES NFR BLD AUTO: 10.5 % — SIGNIFICANT CHANGE UP (ref 2–14)
NEUTROPHILS # BLD AUTO: 2.52 K/UL — SIGNIFICANT CHANGE UP (ref 1.8–7.4)
NEUTROPHILS NFR BLD AUTO: 57.2 % — SIGNIFICANT CHANGE UP (ref 43–77)
PLATELET # BLD AUTO: 114 K/UL — LOW (ref 150–400)
POTASSIUM SERPL-MCNC: 3.2 MMOL/L — LOW (ref 3.5–5.3)
POTASSIUM SERPL-SCNC: 3.2 MMOL/L — LOW (ref 3.5–5.3)
PROT SERPL-MCNC: 6.6 G/DL — SIGNIFICANT CHANGE UP (ref 6.6–8.7)
PROTHROM AB SERPL-ACNC: 18.4 SEC — HIGH (ref 10.6–13.6)
RBC # BLD: 6.45 M/UL — HIGH (ref 4.2–5.8)
RBC # FLD: 16.9 % — HIGH (ref 10.3–14.5)
SODIUM SERPL-SCNC: 135 MMOL/L — SIGNIFICANT CHANGE UP (ref 135–145)
WBC # BLD: 4.4 K/UL — SIGNIFICANT CHANGE UP (ref 3.8–10.5)
WBC # FLD AUTO: 4.4 K/UL — SIGNIFICANT CHANGE UP (ref 3.8–10.5)

## 2021-03-09 PROCEDURE — 99239 HOSP IP/OBS DSCHRG MGMT >30: CPT

## 2021-03-09 PROCEDURE — 93306 TTE W/DOPPLER COMPLETE: CPT | Mod: 26

## 2021-03-09 RX ORDER — ASPIRIN/CALCIUM CARB/MAGNESIUM 324 MG
1 TABLET ORAL
Qty: 30 | Refills: 0
Start: 2021-03-09 | End: 2021-04-07

## 2021-03-09 RX ORDER — JNJ-78436735 50000000000 [PFU]/.5ML
0.5 SUSPENSION INTRAMUSCULAR ONCE
Refills: 0 | Status: COMPLETED | OUTPATIENT
Start: 2021-03-10 | End: 2021-03-09

## 2021-03-09 RX ORDER — INSULIN DETEMIR 100/ML (3)
65 INSULIN PEN (ML) SUBCUTANEOUS
Qty: 0 | Refills: 0 | DISCHARGE

## 2021-03-09 RX ORDER — ATORVASTATIN CALCIUM 80 MG/1
1 TABLET, FILM COATED ORAL
Qty: 30 | Refills: 0
Start: 2021-03-09 | End: 2021-04-07

## 2021-03-09 RX ORDER — TAMSULOSIN HYDROCHLORIDE 0.4 MG/1
1 CAPSULE ORAL
Qty: 30 | Refills: 0
Start: 2021-03-09 | End: 2021-04-07

## 2021-03-09 RX ORDER — POTASSIUM CHLORIDE 20 MEQ
40 PACKET (EA) ORAL ONCE
Refills: 0 | Status: COMPLETED | OUTPATIENT
Start: 2021-03-09 | End: 2021-03-09

## 2021-03-09 RX ORDER — INSULIN DETEMIR 100/ML (3)
50 INSULIN PEN (ML) SUBCUTANEOUS
Qty: 0 | Refills: 0 | DISCHARGE

## 2021-03-09 RX ORDER — INSULIN DETEMIR 100/ML (3)
65 INSULIN PEN (ML) SUBCUTANEOUS
Qty: 30 | Refills: 0
Start: 2021-03-09 | End: 2021-04-07

## 2021-03-09 RX ORDER — FUROSEMIDE 40 MG
1 TABLET ORAL
Qty: 0 | Refills: 0 | DISCHARGE

## 2021-03-09 RX ADMIN — OXYCODONE AND ACETAMINOPHEN 2 TABLET(S): 5; 325 TABLET ORAL at 11:26

## 2021-03-09 RX ADMIN — DULOXETINE HYDROCHLORIDE 60 MILLIGRAM(S): 30 CAPSULE, DELAYED RELEASE ORAL at 11:23

## 2021-03-09 RX ADMIN — JNJ-78436735 0.5 MILLILITER(S): 50000000000 SUSPENSION INTRAMUSCULAR at 14:40

## 2021-03-09 RX ADMIN — Medication 50 MICROGRAM(S): at 05:41

## 2021-03-09 RX ADMIN — Medication 10 UNIT(S): at 11:23

## 2021-03-09 RX ADMIN — Medication 10: at 11:23

## 2021-03-09 RX ADMIN — ENOXAPARIN SODIUM 130 MILLIGRAM(S): 100 INJECTION SUBCUTANEOUS at 05:41

## 2021-03-09 RX ADMIN — Medication 10 UNIT(S): at 09:18

## 2021-03-09 RX ADMIN — SODIUM CHLORIDE 3 MILLILITER(S): 9 INJECTION INTRAMUSCULAR; INTRAVENOUS; SUBCUTANEOUS at 05:40

## 2021-03-09 RX ADMIN — Medication 8: at 09:18

## 2021-03-09 RX ADMIN — Medication 40 MILLIEQUIVALENT(S): at 11:23

## 2021-03-09 RX ADMIN — PANTOPRAZOLE SODIUM 40 MILLIGRAM(S): 20 TABLET, DELAYED RELEASE ORAL at 05:41

## 2021-03-09 RX ADMIN — Medication 81 MILLIGRAM(S): at 11:22

## 2021-03-09 RX ADMIN — CARVEDILOL PHOSPHATE 25 MILLIGRAM(S): 80 CAPSULE, EXTENDED RELEASE ORAL at 05:41

## 2021-03-09 NOTE — DISCHARGE NOTE PROVIDER - HOSPITAL COURSE
69 y/o male from home with c/o 3 weeks of progressive generalized weakness, MACHUCA, and poor appetite. His family has been trying to get him to come to the hospital for the past 2 weeks but he h as declined to see his PMD or come to the hospital. No one else is sick at home, no travel. Patient declines fever, chills, CP, HA, focal weakness. He has 24/7 aides at home and his Daughter help to take care of him. He uses a walker at baseline, denies any falls. He finally came to the ED after his son threatened him to go he states. Patient denies any changes in medications but admits hes not the most compliant with them. He admits to being exhausted from urinating all the time day and night. He states his blood sugars are anywhere from 250-400 despite being on oral meds and insulin. In the ED patient with stable vitals, not hypoxic. CBC noted with hbg of 20, also noted to be in JAYLENE with hyponatremia, and hyperglycemia.     patient seen by cardio, hematology in consult. torsemide held and given gentle hydration.   patients a1c greater than 11 and lipid panel was elevated, started on lipitor    ree done to rule out PAD and was negative with no significant stenosis  . patient to get tte on 3/9    time spent on dc 34 minutes    pe  GENERAL: NAD, obese.   HEENT: PERRL, +EOMI  NECK: soft, Supple, No JVD,   CHEST/LUNG: Clear to auscultation bilaterally; No wheezing  HEART: S1S2+, Regular rate and rhythm;   ABDOMEN: Soft, obese  EXTREMITIES: edema   SKIN: chronic skin changes  NEURO: AAOX3, no focal deficits    patient adbvised to follow up with Dr granados for bariatric eval. Patient advsied to watch his diet and gia ontinue home insulin

## 2021-03-09 NOTE — PROGRESS NOTE ADULT - SUBJECTIVE AND OBJECTIVE BOX
[place] [facility] - [facility], THE [facility]                                   [address] [place] [address]                                                      PHONE: [phone]                                                         FAX: [phone]  [email or web address]  ---------------------------------------------------------------------------------------------------------------------------------    Overnight events/patient complaints:  NAD feeling feeling well today     allow double protein at meals (Unknown)  No Known Allergies    MEDICATIONS  (STANDING):  aspirin enteric coated 81 milliGRAM(s) Oral daily  atorvastatin 40 milliGRAM(s) Oral at bedtime  carvedilol 25 milliGRAM(s) Oral every 12 hours  dextrose 40% Gel 15 Gram(s) Oral once  dextrose 5%. 1000 milliLiter(s) (50 mL/Hr) IV Continuous <Continuous>  dextrose 50% Injectable 25 Gram(s) IV Push once  DULoxetine 60 milliGRAM(s) Oral daily  enoxaparin Injectable 130 milliGRAM(s) SubCutaneous every 12 hours  glucagon  Injectable 1 milliGRAM(s) IntraMuscular once  insulin glargine Injectable (LANTUS) 60 Unit(s) SubCutaneous at bedtime  insulin lispro (ADMELOG) corrective regimen sliding scale   SubCutaneous Before meals and at bedtime  insulin lispro Injectable (ADMELOG) 10 Unit(s) SubCutaneous three times a day before meals  levothyroxine 50 MICROGram(s) Oral daily  pantoprazole    Tablet 40 milliGRAM(s) Oral before breakfast  potassium chloride    Tablet ER 40 milliEquivalent(s) Oral once  sodium chloride 0.9% lock flush 3 milliLiter(s) IV Push every 8 hours  sodium chloride 0.9%. 1000 milliLiter(s) (50 mL/Hr) IV Continuous <Continuous>  tamsulosin 0.4 milliGRAM(s) Oral at bedtime    MEDICATIONS  (PRN):  acetaminophen   Tablet .. 650 milliGRAM(s) Oral every 6 hours PRN Temp greater or equal to 38C (100.4F), Mild Pain (1 - 3)  hydrALAZINE Injectable 5 milliGRAM(s) IV Push every 6 hours PRN for sbp above 160 mmhg  ondansetron Injectable 4 milliGRAM(s) IV Push every 6 hours PRN Nausea  oxycodone    5 mG/acetaminophen 325 mG 2 Tablet(s) Oral every 6 hours PRN Moderate Pain (4 - 6)      Vital Signs Last 24 Hrs  T(C): 36.6 (09 Mar 2021 08:42), Max: 36.7 (09 Mar 2021 04:27)  T(F): 97.8 (09 Mar 2021 08:42), Max: 98 (09 Mar 2021 04:27)  HR: 89 (09 Mar 2021 08:42) (71 - 100)  BP: 103/65 (09 Mar 2021 08:42) (103/65 - 125/83)  BP(mean): --  RR: 16 (09 Mar 2021 08:42) (16 - 18)  SpO2: 95% (09 Mar 2021 08:42) (94% - 100%)  ICU Vital Signs Last 24 Hrs  MARY ANN CORNELL  I&O's Detail    I&O's Summary    Drug Dosing Weight  MARY ANN CORNELL      PHYSICAL EXAM:  General: Appears well developed, well nourished alert and cooperative.  HEENT: Head; normocephalic, atraumatic.  Eyes: Pupils reactive, cornea wnl.  Neck: Supple, no nodes adenopathy, no NVD or carotid bruit or thyromegaly.  CARDIOVASCULAR: Normal S1 and S2, 1/6 murmur, rub, gallop or lift.   LUNGS: No rales, rhonchi or wheeze. Normal breath sounds bilaterally.  ABDOMEN: Soft, nontender without mass or organomegaly. bowel sounds normoactive.  EXTREMITIES: No clubbing, cyanosis + edema. Distal pulses wnl.   SKIN: warm and dry with normal turgor.  NEURO: Alert/oriented x 3/normal motor exam. No pathologic reflexes.    PSYCH: normal affect.        LABS:                        17.9   4.40  )-----------( 114      ( 09 Mar 2021 04:16 )             54.8     03-09    135  |  92<L>  |  39.0<H>  ----------------------------<  263<H>  3.2<L>   |  28.0  |  1.43<H>    Ca    8.9      09 Mar 2021 04:16  Mg     2.4     03-09    TPro  6.6  /  Alb  3.4  /  TBili  0.7  /  DBili  x   /  AST  25  /  ALT  16  /  AlkPhos  105  03-09    MAYR ANN OCRNELL      PT/INR - ( 09 Mar 2021 04:16 )   PT: 18.4 sec;   INR: 1.62 ratio        RADIOLOGY & ADDITIONAL STUDIES:    INTERPRETATION OF TELEMETRY (personally reviewed):    Summary:   1. Technically difficult study.   2. The left atrium is normal in size.   3. Left ventricular ejection fraction, by visual estimation, is 30 to 35%.   4. Mildly enlarged right atrium.   5. Moderately enlarged right ventricle. Moderately reduced RV systolic function.   6. No significant valvular abnormality.   7. There is no evidence of pericardial effusion.    MD Salo, RPVI Electronically signed on 9/10/2020 at 2:02:05 PM      69y Male with past medical history significant for NICM (with improvement in LVEF), chronic afib on Coumadin, VT s/p ICD, HTN, morbid obesity, CHRISS, DM presented with JAYLENE hyponatremia hyperglycemia; improving and feeling well overall today     Plan  1.  Continue current CV medications   2.  Restart Torsemide 20 mg po daily   3.  Awaiting TTE to re-evaluate LV     DC planning

## 2021-03-09 NOTE — DISCHARGE NOTE PROVIDER - CARE PROVIDER_API CALL
Panchito Jain)  Cardiology; Internal Medicine  [address] [place] [address]  Phone: [phone]  Fax: [phone]  Follow Up Time:     primary care,   pcp  Phone: (   )    -  Fax: (   )    -  Follow Up Time:     Jey Silverman)  Surgery  [address] [place] [address], [address] [place] [address]  Phone: [phone]  Fax: [phone]  Follow Up Time:    Panchito Jain)  Cardiology; Internal Medicine  [address] [place] [address]  Phone: [phone]  Fax: [phone]  Follow Up Time:     Jey Silverman)  Surgery  [address] [facility] [address] [place] [address]  Phone: [phone]  Fax: [phone]  Follow Up Time:     primary care,   pcp  Phone: (   )    -  Fax: (   )    -  Follow Up Time:     Panchito Micheal)  Hematology; Medical Oncology  [address] [place] [address]  Phone: [phone]  Fax: [phone]  Follow Up Time:

## 2021-03-09 NOTE — PHYSICAL THERAPY INITIAL EVALUATION ADULT - GENERAL OBSERVATIONS, REHAB EVAL
Pt received lying in bed in ED holding room, (+) cardiac monitor, (+) IV lock, NAD. Agreeable to PT evaluation.

## 2021-03-09 NOTE — DISCHARGE NOTE NURSING/CASE MANAGEMENT/SOCIAL WORK - PATIENT PORTAL LINK FT
You can access the FollowMyHealth Patient Portal offered by [facility] by registering at the following website: [email or web address]. By joining [facility]’s FollowMyHealth portal, you will also be able to view your health information using other applications (apps) compatible with our system.

## 2021-03-09 NOTE — PHYSICAL THERAPY INITIAL EVALUATION ADULT - SITTING BALANCE: STATIC
-- Message is from the [facility]--    Reason for Call: Patient is requesting a call from doctor to discuss results for dopamine. Please contact to assist.     Caller Information       Type Contact Phone    11/08/2019 12:41 PM Phone (Incoming) JimiAki aldanaise (Self) [phone] (M)          Alternative phone number: [phone]    Turnaround time given to caller:   \"This message will be sent to [state Provider's name]. The clinical team will fulfill your request as soon as they review your message.\"    
Results reported by dr. Mendoza.   
good balance

## 2021-03-09 NOTE — DISCHARGE NOTE PROVIDER - NSDCCPCAREPLAN_GEN_ALL_CORE_FT
PRINCIPAL DISCHARGE DIAGNOSIS  Diagnosis: CHF (congestive heart failure)  Assessment and Plan of Treatment:       SECONDARY DISCHARGE DIAGNOSES  Diagnosis: Acute renal failure  Assessment and Plan of Treatment:

## 2021-03-09 NOTE — DISCHARGE NOTE PROVIDER - NSDCMRMEDTOKEN_GEN_ALL_CORE_FT
albuterol 90 mcg/inh inhalation aerosol: 2 puff(s) inhaled every 6 hours, As needed, Shortness of Breath and/or Wheezing  aspirin 81 mg oral delayed release tablet: 1 tab(s) orally once a day  atorvastatin 40 mg oral tablet: 1 tab(s) orally once a day (at bedtime)  Coreg 25 mg oral tablet: 1 tab(s) orally 2 times a day  DULoxetine 60 mg oral delayed release capsule: 1 cap(s) orally once a day  folic acid 1 mg oral tablet: 1 tab(s) orally once a day  Januvia 50 mg oral tablet: 1 tab(s) orally once a day  Jardiance 10 mg oral tablet: 1 tab(s) orally once a day (in the morning)  Klor-Con M20 oral tablet, extended release: 1 tab(s) orally once a day. Starting today.   Lasix 40 mg oral tablet: 1 tab(s) orally once a day  Levemir 100 units/mL subcutaneous solution: 50 unit(s) subcutaneous once a day (at bedtime)  levothyroxine 50 mcg (0.05 mg) oral tablet: 1 tab(s) orally once a day  NexIUM 20 mg oral delayed release capsule: 1 cap(s) orally once a day  tamsulosin 0.4 mg oral capsule: 1 cap(s) orally once a day (at bedtime)  Tylenol 325 mg oral tablet: 2 tab(s) orally every 6 hours, As Needed pain  warfarin 1 mg oral tablet: 3 tab(s) orally once a day   albuterol 90 mcg/inh inhalation aerosol: 2 puff(s) inhaled every 6 hours, As needed, Shortness of Breath and/or Wheezing  aspirin 81 mg oral delayed release tablet: 1 tab(s) orally once a day  atorvastatin 40 mg oral tablet: 1 tab(s) orally once a day (at bedtime)  Coreg 25 mg oral tablet: 1 tab(s) orally 2 times a day  DULoxetine 60 mg oral delayed release capsule: 1 cap(s) orally once a day  folic acid 1 mg oral tablet: 1 tab(s) orally once a day  Januvia 50 mg oral tablet: 1 tab(s) orally once a day  Jardiance 10 mg oral tablet: 1 tab(s) orally once a day (in the morning)  Klor-Con M20 oral tablet, extended release: 1 tab(s) orally once a day. Starting today.   Levemir 100 units/mL subcutaneous solution: 50 unit(s) subcutaneous once a day (at bedtime)  levothyroxine 50 mcg (0.05 mg) oral tablet: 1 tab(s) orally once a day  NexIUM 20 mg oral delayed release capsule: 1 cap(s) orally once a day  tamsulosin 0.4 mg oral capsule: 1 cap(s) orally once a day (at bedtime)  torsemide 20 mg oral tablet: 1 tab(s) orally once a day   Tylenol 325 mg oral tablet: 2 tab(s) orally every 6 hours, As Needed pain  warfarin 1 mg oral tablet: 3 tab(s) orally once a day   albuterol 90 mcg/inh inhalation aerosol: 2 puff(s) inhaled every 6 hours, As needed, Shortness of Breath and/or Wheezing  aspirin 81 mg oral delayed release tablet: 1 tab(s) orally once a day  atorvastatin 40 mg oral tablet: 1 tab(s) orally once a day (at bedtime)  Coreg 25 mg oral tablet: 1 tab(s) orally 2 times a day  DULoxetine 60 mg oral delayed release capsule: 1 cap(s) orally once a day  folic acid 1 mg oral tablet: 1 tab(s) orally once a day  Januvia 50 mg oral tablet: 1 tab(s) orally once a day  Jardiance 10 mg oral tablet: 1 tab(s) orally once a day (in the morning)  Klor-Con M20 oral tablet, extended release: 1 tab(s) orally once a day. Starting today.   Levemir 100 units/mL subcutaneous solution: 65 unit(s) subcutaneous once a day (at bedtime)  levothyroxine 50 mcg (0.05 mg) oral tablet: 1 tab(s) orally once a day  NexIUM 20 mg oral delayed release capsule: 1 cap(s) orally once a day  tamsulosin 0.4 mg oral capsule: 1 cap(s) orally once a day (at bedtime)  Tylenol 325 mg oral tablet: 2 tab(s) orally every 6 hours, As Needed pain  warfarin 1 mg oral tablet: 3 tab(s) orally once a day

## 2021-03-09 NOTE — DISCHARGE NOTE PROVIDER - CARE PROVIDERS DIRECT ADDRESSES
[email or web address],DirectAddress_Unknown [email or web address],DirectAddress_Unknown,[email or web address]

## 2021-03-09 NOTE — DISCHARGE NOTE PROVIDER - PROVIDER TOKENS
PROVIDER:[TOKEN:[[id number]]],FREE:[LAST:[primary care],PHONE:[(   )    -],FAX:[(   )    -],ADDRESS:[pcp]],PROVIDER:[TOKEN:[[id number]]] PROVIDER:[TOKEN:[[id number]]],PROVIDER:[TOKEN:[[id number]]],FREE:[LAST:[primary care],PHONE:[(   )    -],FAX:[(   )    -],ADDRESS:[pcp]],PROVIDER:[TOKEN:[[id number]]]

## 2021-03-09 NOTE — PHYSICAL THERAPY INITIAL EVALUATION ADULT - ADDITIONAL COMMENTS
Pt reports living in a private house with daughter and 24 hour aide, 5 steps to enter with rail, none indoors. Independent at baseline for household distances with use of rolling walker, will use rollator outdoors.  Has been sleeping in a recliner chair, that is able to recline completely flat, for the past few months.

## 2021-03-09 NOTE — PHYSICAL THERAPY INITIAL EVALUATION ADULT - LEVEL OF INDEPENDENCE: STAIR NEGOTIATION, REHAB EVAL
not assessed, pt reports feeling as if his legs were going to give out, while walking out of bathroom.  Held stairs assessment to preserve pt safety.

## 2021-03-16 PROCEDURE — 36000 PLACE NEEDLE IN VEIN: CPT

## 2021-03-16 PROCEDURE — 74176 CT ABD & PELVIS W/O CONTRAST: CPT

## 2021-03-16 PROCEDURE — 85025 COMPLETE CBC W/AUTO DIFF WBC: CPT

## 2021-03-16 PROCEDURE — 84443 ASSAY THYROID STIM HORMONE: CPT

## 2021-03-16 PROCEDURE — 36415 COLL VENOUS BLD VENIPUNCTURE: CPT

## 2021-03-16 PROCEDURE — 80048 BASIC METABOLIC PNL TOTAL CA: CPT

## 2021-03-16 PROCEDURE — 81270 JAK2 GENE: CPT

## 2021-03-16 PROCEDURE — U0005: CPT

## 2021-03-16 PROCEDURE — 80061 LIPID PANEL: CPT

## 2021-03-16 PROCEDURE — 81003 URINALYSIS AUTO W/O SCOPE: CPT

## 2021-03-16 PROCEDURE — 83735 ASSAY OF MAGNESIUM: CPT

## 2021-03-16 PROCEDURE — 80053 COMPREHEN METABOLIC PANEL: CPT

## 2021-03-16 PROCEDURE — 93005 ELECTROCARDIOGRAM TRACING: CPT

## 2021-03-16 PROCEDURE — 86769 SARS-COV-2 COVID-19 ANTIBODY: CPT

## 2021-03-16 PROCEDURE — 71045 X-RAY EXAM CHEST 1 VIEW: CPT

## 2021-03-16 PROCEDURE — 93306 TTE W/DOPPLER COMPLETE: CPT

## 2021-03-16 PROCEDURE — 93923 UPR/LXTR ART STDY 3+ LVLS: CPT

## 2021-03-16 PROCEDURE — U0003: CPT

## 2021-03-16 PROCEDURE — 83036 HEMOGLOBIN GLYCOSYLATED A1C: CPT

## 2021-03-16 PROCEDURE — 84100 ASSAY OF PHOSPHORUS: CPT

## 2021-03-16 PROCEDURE — 82962 GLUCOSE BLOOD TEST: CPT

## 2021-03-16 PROCEDURE — 83880 ASSAY OF NATRIURETIC PEPTIDE: CPT

## 2021-03-16 PROCEDURE — 85730 THROMBOPLASTIN TIME PARTIAL: CPT

## 2021-03-16 PROCEDURE — 84484 ASSAY OF TROPONIN QUANT: CPT

## 2021-03-16 PROCEDURE — 85610 PROTHROMBIN TIME: CPT

## 2021-03-16 PROCEDURE — 94660 CPAP INITIATION&MGMT: CPT

## 2021-03-16 PROCEDURE — 97163 PT EVAL HIGH COMPLEX 45 MIN: CPT

## 2021-03-16 PROCEDURE — 99285 EMERGENCY DEPT VISIT HI MDM: CPT | Mod: 25

## 2021-03-16 PROCEDURE — 0031A: CPT

## 2021-03-17 LAB — JAK2 P.V617F BLD/T QL: SIGNIFICANT CHANGE UP

## 2021-05-11 ENCOUNTER — INPATIENT (INPATIENT)
Facility: HOSPITAL | Age: 71
LOS: 2 days | Discharge: HOME | DRG: 638 | End: 2021-05-14
Attending: STUDENT IN AN ORGANIZED HEALTH CARE EDUCATION/TRAINING PROGRAM | Admitting: HOSPITALIST
Payer: COMMERCIAL

## 2021-05-11 ENCOUNTER — TRANSCRIPTION ENCOUNTER (OUTPATIENT)
Age: 71
End: 2021-05-11

## 2021-05-11 VITALS
WEIGHT: 315 LBS | SYSTOLIC BLOOD PRESSURE: 122 MMHG | HEART RATE: 71 BPM | HEIGHT: 69 IN | DIASTOLIC BLOOD PRESSURE: 72 MMHG | OXYGEN SATURATION: 92 % | TEMPERATURE: 98 F | RESPIRATION RATE: 20 BRPM

## 2021-05-11 DIAGNOSIS — Z98.89 OTHER SPECIFIED POSTPROCEDURAL STATES: Chronic | ICD-10-CM

## 2021-05-11 DIAGNOSIS — Z95.810 PRESENCE OF AUTOMATIC (IMPLANTABLE) CARDIAC DEFIBRILLATOR: Chronic | ICD-10-CM

## 2021-05-11 LAB
ALBUMIN SERPL ELPH-MCNC: 3.7 G/DL — SIGNIFICANT CHANGE UP (ref 3.3–5.2)
ALP SERPL-CCNC: 92 U/L — SIGNIFICANT CHANGE UP (ref 40–120)
ALT FLD-CCNC: 13 U/L — SIGNIFICANT CHANGE UP
ANION GAP SERPL CALC-SCNC: 16 MMOL/L — SIGNIFICANT CHANGE UP (ref 5–17)
ANION GAP SERPL CALC-SCNC: 20 MMOL/L — HIGH (ref 5–17)
AST SERPL-CCNC: 25 U/L — SIGNIFICANT CHANGE UP
BASE EXCESS BLDV CALC-SCNC: 6.5 MMOL/L — HIGH (ref -2–2)
BASOPHILS # BLD AUTO: 0.02 K/UL — SIGNIFICANT CHANGE UP (ref 0–0.2)
BASOPHILS NFR BLD AUTO: 0.3 % — SIGNIFICANT CHANGE UP (ref 0–2)
BILIRUB SERPL-MCNC: 1.3 MG/DL — SIGNIFICANT CHANGE UP (ref 0.4–2)
BUN SERPL-MCNC: 51 MG/DL — HIGH (ref 8–20)
BUN SERPL-MCNC: 51 MG/DL — HIGH (ref 8–20)
CA-I SERPL-SCNC: 1.05 MMOL/L — LOW (ref 1.15–1.33)
CALCIUM SERPL-MCNC: 10.1 MG/DL — SIGNIFICANT CHANGE UP (ref 8.6–10.2)
CALCIUM SERPL-MCNC: 9.9 MG/DL — SIGNIFICANT CHANGE UP (ref 8.6–10.2)
CHLORIDE BLDV-SCNC: 80 MMOL/L — LOW (ref 98–107)
CHLORIDE SERPL-SCNC: 81 MMOL/L — LOW (ref 98–107)
CHLORIDE SERPL-SCNC: 90 MMOL/L — LOW (ref 98–107)
CO2 SERPL-SCNC: 21 MMOL/L — LOW (ref 22–29)
CO2 SERPL-SCNC: 26 MMOL/L — SIGNIFICANT CHANGE UP (ref 22–29)
CREAT SERPL-MCNC: 1.4 MG/DL — HIGH (ref 0.5–1.3)
CREAT SERPL-MCNC: 1.9 MG/DL — HIGH (ref 0.5–1.3)
EOSINOPHIL # BLD AUTO: 0.09 K/UL — SIGNIFICANT CHANGE UP (ref 0–0.5)
EOSINOPHIL NFR BLD AUTO: 1.2 % — SIGNIFICANT CHANGE UP (ref 0–6)
GAS PNL BLDV: 128 MMOL/L — LOW (ref 135–145)
GAS PNL BLDV: SIGNIFICANT CHANGE UP
GAS PNL BLDV: SIGNIFICANT CHANGE UP
GLUCOSE BLDV-MCNC: 602 MG/DL — CRITICAL HIGH (ref 70–99)
GLUCOSE SERPL-MCNC: 162 MG/DL — HIGH (ref 70–99)
GLUCOSE SERPL-MCNC: 601 MG/DL — CRITICAL HIGH (ref 70–99)
HCO3 BLDV-SCNC: 30 MMOL/L — HIGH (ref 20–26)
HCT VFR BLD CALC: 52.3 % — HIGH (ref 39–50)
HCT VFR BLDA CALC: 55 — HIGH (ref 39–50)
HGB BLD CALC-MCNC: 18 G/DL — HIGH (ref 13–17)
HGB BLD-MCNC: 17.4 G/DL — HIGH (ref 13–17)
IMM GRANULOCYTES NFR BLD AUTO: 0.5 % — SIGNIFICANT CHANGE UP (ref 0–1.5)
LACTATE BLDV-MCNC: 2.9 MMOL/L — HIGH (ref 0.5–2)
LYMPHOCYTES # BLD AUTO: 1.08 K/UL — SIGNIFICANT CHANGE UP (ref 1–3.3)
LYMPHOCYTES # BLD AUTO: 14 % — SIGNIFICANT CHANGE UP (ref 13–44)
MCHC RBC-ENTMCNC: 27.7 PG — SIGNIFICANT CHANGE UP (ref 27–34)
MCHC RBC-ENTMCNC: 33.3 GM/DL — SIGNIFICANT CHANGE UP (ref 32–36)
MCV RBC AUTO: 83.1 FL — SIGNIFICANT CHANGE UP (ref 80–100)
MONOCYTES # BLD AUTO: 0.71 K/UL — SIGNIFICANT CHANGE UP (ref 0–0.9)
MONOCYTES NFR BLD AUTO: 9.2 % — SIGNIFICANT CHANGE UP (ref 2–14)
NEUTROPHILS # BLD AUTO: 5.78 K/UL — SIGNIFICANT CHANGE UP (ref 1.8–7.4)
NEUTROPHILS NFR BLD AUTO: 74.8 % — SIGNIFICANT CHANGE UP (ref 43–77)
NT-PROBNP SERPL-SCNC: 800 PG/ML — HIGH (ref 0–300)
OTHER CELLS CSF MANUAL: 25 ML/DL — HIGH (ref 18–22)
PCO2 BLDV: 44 MMHG — SIGNIFICANT CHANGE UP (ref 35–50)
PH BLDV: 7.46 — HIGH (ref 7.32–7.43)
PLATELET # BLD AUTO: 176 K/UL — SIGNIFICANT CHANGE UP (ref 150–400)
PO2 BLDV: 186 MMHG — HIGH (ref 25–45)
POTASSIUM BLDV-SCNC: 4 MMOL/L — SIGNIFICANT CHANGE UP (ref 3.4–4.5)
POTASSIUM SERPL-MCNC: 3.5 MMOL/L — SIGNIFICANT CHANGE UP (ref 3.5–5.3)
POTASSIUM SERPL-MCNC: 3.7 MMOL/L — SIGNIFICANT CHANGE UP (ref 3.5–5.3)
POTASSIUM SERPL-SCNC: 3.5 MMOL/L — SIGNIFICANT CHANGE UP (ref 3.5–5.3)
POTASSIUM SERPL-SCNC: 3.7 MMOL/L — SIGNIFICANT CHANGE UP (ref 3.5–5.3)
PROT SERPL-MCNC: 7.2 G/DL — SIGNIFICANT CHANGE UP (ref 6.6–8.7)
RAPID RVP RESULT: SIGNIFICANT CHANGE UP
RBC # BLD: 6.29 M/UL — HIGH (ref 4.2–5.8)
RBC # FLD: 16.6 % — HIGH (ref 10.3–14.5)
SAO2 % BLDV: 100 % — SIGNIFICANT CHANGE UP
SARS-COV-2 RNA SPEC QL NAA+PROBE: SIGNIFICANT CHANGE UP
SODIUM SERPL-SCNC: 123 MMOL/L — LOW (ref 135–145)
SODIUM SERPL-SCNC: 131 MMOL/L — LOW (ref 135–145)
TROPONIN T SERPL-MCNC: 0.05 NG/ML — SIGNIFICANT CHANGE UP (ref 0–0.06)
WBC # BLD: 7.72 K/UL — SIGNIFICANT CHANGE UP (ref 3.8–10.5)
WBC # FLD AUTO: 7.72 K/UL — SIGNIFICANT CHANGE UP (ref 3.8–10.5)

## 2021-05-11 PROCEDURE — 99285 EMERGENCY DEPT VISIT HI MDM: CPT

## 2021-05-11 PROCEDURE — 71045 X-RAY EXAM CHEST 1 VIEW: CPT | Mod: 26

## 2021-05-11 RX ORDER — AZITHROMYCIN 500 MG/1
500 TABLET, FILM COATED ORAL ONCE
Refills: 0 | Status: COMPLETED | OUTPATIENT
Start: 2021-05-11 | End: 2021-05-11

## 2021-05-11 RX ORDER — CEFTRIAXONE 500 MG/1
1000 INJECTION, POWDER, FOR SOLUTION INTRAMUSCULAR; INTRAVENOUS ONCE
Refills: 0 | Status: COMPLETED | OUTPATIENT
Start: 2021-05-11 | End: 2021-05-11

## 2021-05-11 RX ORDER — SODIUM CHLORIDE 9 MG/ML
2000 INJECTION INTRAMUSCULAR; INTRAVENOUS; SUBCUTANEOUS ONCE
Refills: 0 | Status: COMPLETED | OUTPATIENT
Start: 2021-05-11 | End: 2021-05-11

## 2021-05-11 RX ORDER — INSULIN HUMAN 100 [IU]/ML
8 INJECTION, SOLUTION SUBCUTANEOUS ONCE
Refills: 0 | Status: COMPLETED | OUTPATIENT
Start: 2021-05-11 | End: 2021-05-11

## 2021-05-11 RX ADMIN — AZITHROMYCIN 255 MILLIGRAM(S): 500 TABLET, FILM COATED ORAL at 20:00

## 2021-05-11 RX ADMIN — CEFTRIAXONE 100 MILLIGRAM(S): 500 INJECTION, POWDER, FOR SOLUTION INTRAMUSCULAR; INTRAVENOUS at 20:00

## 2021-05-11 RX ADMIN — INSULIN HUMAN 8 UNIT(S): 100 INJECTION, SOLUTION SUBCUTANEOUS at 21:00

## 2021-05-11 RX ADMIN — SODIUM CHLORIDE 2000 MILLILITER(S): 9 INJECTION INTRAMUSCULAR; INTRAVENOUS; SUBCUTANEOUS at 21:00

## 2021-05-12 DIAGNOSIS — Z86.79 PERSONAL HISTORY OF OTHER DISEASES OF THE CIRCULATORY SYSTEM: ICD-10-CM

## 2021-05-12 DIAGNOSIS — E11.65 TYPE 2 DIABETES MELLITUS WITH HYPERGLYCEMIA: ICD-10-CM

## 2021-05-12 DIAGNOSIS — R73.9 HYPERGLYCEMIA, UNSPECIFIED: ICD-10-CM

## 2021-05-12 DIAGNOSIS — E66.01 MORBID (SEVERE) OBESITY DUE TO EXCESS CALORIES: ICD-10-CM

## 2021-05-12 DIAGNOSIS — N17.9 ACUTE KIDNEY FAILURE, UNSPECIFIED: ICD-10-CM

## 2021-05-12 DIAGNOSIS — I48.20 CHRONIC ATRIAL FIBRILLATION, UNSPECIFIED: ICD-10-CM

## 2021-05-12 DIAGNOSIS — G47.30 SLEEP APNEA, UNSPECIFIED: ICD-10-CM

## 2021-05-12 PROBLEM — I50.9 HEART FAILURE, UNSPECIFIED: Chronic | Status: ACTIVE | Noted: 2017-12-08

## 2021-05-12 LAB
ALBUMIN SERPL ELPH-MCNC: 3.4 G/DL — SIGNIFICANT CHANGE UP (ref 3.3–5.2)
ALP SERPL-CCNC: 81 U/L — SIGNIFICANT CHANGE UP (ref 40–120)
ALT FLD-CCNC: 11 U/L — SIGNIFICANT CHANGE UP
ANION GAP SERPL CALC-SCNC: 17 MMOL/L — SIGNIFICANT CHANGE UP (ref 5–17)
ANION GAP SERPL CALC-SCNC: 19 MMOL/L — HIGH (ref 5–17)
APPEARANCE UR: CLEAR — SIGNIFICANT CHANGE UP
APTT BLD: 37.1 SEC — HIGH (ref 27.5–35.5)
AST SERPL-CCNC: 24 U/L — SIGNIFICANT CHANGE UP
BILIRUB SERPL-MCNC: 1.4 MG/DL — SIGNIFICANT CHANGE UP (ref 0.4–2)
BILIRUB UR-MCNC: NEGATIVE — SIGNIFICANT CHANGE UP
BUN SERPL-MCNC: 52 MG/DL — HIGH (ref 8–20)
BUN SERPL-MCNC: 55 MG/DL — HIGH (ref 8–20)
CALCIUM SERPL-MCNC: 9.1 MG/DL — SIGNIFICANT CHANGE UP (ref 8.6–10.2)
CALCIUM SERPL-MCNC: 9.4 MG/DL — SIGNIFICANT CHANGE UP (ref 8.6–10.2)
CHLORIDE SERPL-SCNC: 87 MMOL/L — LOW (ref 98–107)
CHLORIDE SERPL-SCNC: 89 MMOL/L — LOW (ref 98–107)
CO2 SERPL-SCNC: 22 MMOL/L — SIGNIFICANT CHANGE UP (ref 22–29)
CO2 SERPL-SCNC: 25 MMOL/L — SIGNIFICANT CHANGE UP (ref 22–29)
COLOR SPEC: YELLOW — SIGNIFICANT CHANGE UP
CREAT SERPL-MCNC: 1.64 MG/DL — HIGH (ref 0.5–1.3)
CREAT SERPL-MCNC: 1.91 MG/DL — HIGH (ref 0.5–1.3)
DIFF PNL FLD: NEGATIVE — SIGNIFICANT CHANGE UP
GAS PNL BLDA: SIGNIFICANT CHANGE UP
GLUCOSE BLDC GLUCOMTR-MCNC: 245 MG/DL — HIGH (ref 70–99)
GLUCOSE BLDC GLUCOMTR-MCNC: 274 MG/DL — HIGH (ref 70–99)
GLUCOSE BLDC GLUCOMTR-MCNC: 290 MG/DL — HIGH (ref 70–99)
GLUCOSE BLDC GLUCOMTR-MCNC: 295 MG/DL — HIGH (ref 70–99)
GLUCOSE SERPL-MCNC: 260 MG/DL — HIGH (ref 70–99)
GLUCOSE SERPL-MCNC: 304 MG/DL — HIGH (ref 70–99)
GLUCOSE UR QL: 1000 MG/DL
INR BLD: 1.93 RATIO — HIGH (ref 0.88–1.16)
KETONES UR-MCNC: NEGATIVE — SIGNIFICANT CHANGE UP
LACTATE BLDV-MCNC: 2.4 MMOL/L — HIGH (ref 0.5–2)
LACTATE BLDV-MCNC: 4.4 MMOL/L — CRITICAL HIGH (ref 0.5–2)
LEUKOCYTE ESTERASE UR-ACNC: NEGATIVE — SIGNIFICANT CHANGE UP
NITRITE UR-MCNC: NEGATIVE — SIGNIFICANT CHANGE UP
NT-PROBNP SERPL-SCNC: 556 PG/ML — HIGH (ref 0–300)
PH UR: 6 — SIGNIFICANT CHANGE UP (ref 5–8)
POTASSIUM SERPL-MCNC: 3 MMOL/L — LOW (ref 3.5–5.3)
POTASSIUM SERPL-MCNC: 3.8 MMOL/L — SIGNIFICANT CHANGE UP (ref 3.5–5.3)
POTASSIUM SERPL-SCNC: 3 MMOL/L — LOW (ref 3.5–5.3)
POTASSIUM SERPL-SCNC: 3.8 MMOL/L — SIGNIFICANT CHANGE UP (ref 3.5–5.3)
PROT SERPL-MCNC: 7 G/DL — SIGNIFICANT CHANGE UP (ref 6.6–8.7)
PROT UR-MCNC: NEGATIVE MG/DL — SIGNIFICANT CHANGE UP
PROTHROM AB SERPL-ACNC: 21.7 SEC — HIGH (ref 10.6–13.6)
SODIUM SERPL-SCNC: 128 MMOL/L — LOW (ref 135–145)
SODIUM SERPL-SCNC: 131 MMOL/L — LOW (ref 135–145)
SP GR SPEC: 1.01 — SIGNIFICANT CHANGE UP (ref 1.01–1.02)
UROBILINOGEN FLD QL: NEGATIVE MG/DL — SIGNIFICANT CHANGE UP

## 2021-05-12 PROCEDURE — 72125 CT NECK SPINE W/O DYE: CPT | Mod: 26

## 2021-05-12 PROCEDURE — 70450 CT HEAD/BRAIN W/O DYE: CPT | Mod: 26

## 2021-05-12 PROCEDURE — 93010 ELECTROCARDIOGRAM REPORT: CPT

## 2021-05-12 PROCEDURE — 99222 1ST HOSP IP/OBS MODERATE 55: CPT

## 2021-05-12 PROCEDURE — 99233 SBSQ HOSP IP/OBS HIGH 50: CPT

## 2021-05-12 PROCEDURE — 99223 1ST HOSP IP/OBS HIGH 75: CPT

## 2021-05-12 PROCEDURE — 71250 CT THORAX DX C-: CPT | Mod: 26

## 2021-05-12 RX ORDER — SODIUM CHLORIDE 9 MG/ML
1000 INJECTION, SOLUTION INTRAVENOUS
Refills: 0 | Status: DISCONTINUED | OUTPATIENT
Start: 2021-05-12 | End: 2021-05-14

## 2021-05-12 RX ORDER — LEVOTHYROXINE SODIUM 125 MCG
50 TABLET ORAL DAILY
Refills: 0 | Status: DISCONTINUED | OUTPATIENT
Start: 2021-05-12 | End: 2021-05-14

## 2021-05-12 RX ORDER — INSULIN DETEMIR 100/ML (3)
60 INSULIN PEN (ML) SUBCUTANEOUS AT BEDTIME
Refills: 0 | Status: DISCONTINUED | OUTPATIENT
Start: 2021-05-12 | End: 2021-05-12

## 2021-05-12 RX ORDER — AZITHROMYCIN 500 MG/1
500 TABLET, FILM COATED ORAL EVERY 24 HOURS
Refills: 0 | Status: DISCONTINUED | OUTPATIENT
Start: 2021-05-12 | End: 2021-05-13

## 2021-05-12 RX ORDER — TAMSULOSIN HYDROCHLORIDE 0.4 MG/1
0.4 CAPSULE ORAL AT BEDTIME
Refills: 0 | Status: DISCONTINUED | OUTPATIENT
Start: 2021-05-12 | End: 2021-05-14

## 2021-05-12 RX ORDER — ATORVASTATIN CALCIUM 80 MG/1
40 TABLET, FILM COATED ORAL AT BEDTIME
Refills: 0 | Status: DISCONTINUED | OUTPATIENT
Start: 2021-05-12 | End: 2021-05-14

## 2021-05-12 RX ORDER — POTASSIUM CHLORIDE 20 MEQ
40 PACKET (EA) ORAL EVERY 4 HOURS
Refills: 0 | Status: DISCONTINUED | OUTPATIENT
Start: 2021-05-12 | End: 2021-05-12

## 2021-05-12 RX ORDER — DEXTROSE 50 % IN WATER 50 %
15 SYRINGE (ML) INTRAVENOUS ONCE
Refills: 0 | Status: DISCONTINUED | OUTPATIENT
Start: 2021-05-12 | End: 2021-05-14

## 2021-05-12 RX ORDER — POTASSIUM CHLORIDE 20 MEQ
40 PACKET (EA) ORAL ONCE
Refills: 0 | Status: COMPLETED | OUTPATIENT
Start: 2021-05-12 | End: 2021-05-12

## 2021-05-12 RX ORDER — SACCHAROMYCES BOULARDII 250 MG
250 POWDER IN PACKET (EA) ORAL
Refills: 0 | Status: DISCONTINUED | OUTPATIENT
Start: 2021-05-12 | End: 2021-05-13

## 2021-05-12 RX ORDER — INSULIN LISPRO 100/ML
30 VIAL (ML) SUBCUTANEOUS
Refills: 0 | Status: DISCONTINUED | OUTPATIENT
Start: 2021-05-12 | End: 2021-05-12

## 2021-05-12 RX ORDER — SODIUM CHLORIDE 9 MG/ML
1000 INJECTION INTRAMUSCULAR; INTRAVENOUS; SUBCUTANEOUS ONCE
Refills: 0 | Status: COMPLETED | OUTPATIENT
Start: 2021-05-12 | End: 2021-05-12

## 2021-05-12 RX ORDER — INSULIN LISPRO 100/ML
40 VIAL (ML) SUBCUTANEOUS
Refills: 0 | Status: DISCONTINUED | OUTPATIENT
Start: 2021-05-12 | End: 2021-05-14

## 2021-05-12 RX ORDER — DEXTROSE 50 % IN WATER 50 %
25 SYRINGE (ML) INTRAVENOUS ONCE
Refills: 0 | Status: DISCONTINUED | OUTPATIENT
Start: 2021-05-12 | End: 2021-05-14

## 2021-05-12 RX ORDER — CARVEDILOL PHOSPHATE 80 MG/1
12.5 CAPSULE, EXTENDED RELEASE ORAL EVERY 12 HOURS
Refills: 0 | Status: DISCONTINUED | OUTPATIENT
Start: 2021-05-12 | End: 2021-05-14

## 2021-05-12 RX ORDER — PANTOPRAZOLE SODIUM 20 MG/1
40 TABLET, DELAYED RELEASE ORAL
Refills: 0 | Status: DISCONTINUED | OUTPATIENT
Start: 2021-05-12 | End: 2021-05-14

## 2021-05-12 RX ORDER — IPRATROPIUM/ALBUTEROL SULFATE 18-103MCG
3 AEROSOL WITH ADAPTER (GRAM) INHALATION EVERY 6 HOURS
Refills: 0 | Status: DISCONTINUED | OUTPATIENT
Start: 2021-05-12 | End: 2021-05-14

## 2021-05-12 RX ORDER — INSULIN LISPRO 100/ML
14 VIAL (ML) SUBCUTANEOUS ONCE
Refills: 0 | Status: COMPLETED | OUTPATIENT
Start: 2021-05-12 | End: 2021-05-12

## 2021-05-12 RX ORDER — GLUCAGON INJECTION, SOLUTION 0.5 MG/.1ML
1 INJECTION, SOLUTION SUBCUTANEOUS ONCE
Refills: 0 | Status: DISCONTINUED | OUTPATIENT
Start: 2021-05-12 | End: 2021-05-14

## 2021-05-12 RX ORDER — INSULIN GLARGINE 100 [IU]/ML
60 INJECTION, SOLUTION SUBCUTANEOUS AT BEDTIME
Refills: 0 | Status: DISCONTINUED | OUTPATIENT
Start: 2021-05-12 | End: 2021-05-14

## 2021-05-12 RX ORDER — DEXTROSE 50 % IN WATER 50 %
12.5 SYRINGE (ML) INTRAVENOUS ONCE
Refills: 0 | Status: DISCONTINUED | OUTPATIENT
Start: 2021-05-12 | End: 2021-05-14

## 2021-05-12 RX ORDER — WARFARIN SODIUM 2.5 MG/1
3 TABLET ORAL ONCE
Refills: 0 | Status: COMPLETED | OUTPATIENT
Start: 2021-05-12 | End: 2021-05-12

## 2021-05-12 RX ORDER — CEFTRIAXONE 500 MG/1
1000 INJECTION, POWDER, FOR SOLUTION INTRAMUSCULAR; INTRAVENOUS EVERY 24 HOURS
Refills: 0 | Status: DISCONTINUED | OUTPATIENT
Start: 2021-05-12 | End: 2021-05-13

## 2021-05-12 RX ORDER — ASPIRIN/CALCIUM CARB/MAGNESIUM 324 MG
81 TABLET ORAL DAILY
Refills: 0 | Status: DISCONTINUED | OUTPATIENT
Start: 2021-05-12 | End: 2021-05-14

## 2021-05-12 RX ORDER — SODIUM CHLORIDE 9 MG/ML
1000 INJECTION INTRAMUSCULAR; INTRAVENOUS; SUBCUTANEOUS
Refills: 0 | Status: COMPLETED | OUTPATIENT
Start: 2021-05-12 | End: 2021-05-12

## 2021-05-12 RX ORDER — LACTOBACILLUS ACIDOPHILUS 100MM CELL
1 CAPSULE ORAL
Refills: 0 | Status: DISCONTINUED | OUTPATIENT
Start: 2021-05-12 | End: 2021-05-13

## 2021-05-12 RX ORDER — INSULIN LISPRO 100/ML
VIAL (ML) SUBCUTANEOUS
Refills: 0 | Status: DISCONTINUED | OUTPATIENT
Start: 2021-05-12 | End: 2021-05-14

## 2021-05-12 RX ORDER — INSULIN LISPRO 100/ML
8 VIAL (ML) SUBCUTANEOUS
Refills: 0 | Status: DISCONTINUED | OUTPATIENT
Start: 2021-05-12 | End: 2021-05-12

## 2021-05-12 RX ORDER — INSULIN LISPRO 100/ML
VIAL (ML) SUBCUTANEOUS
Refills: 0 | Status: DISCONTINUED | OUTPATIENT
Start: 2021-05-12 | End: 2021-05-12

## 2021-05-12 RX ORDER — DULOXETINE HYDROCHLORIDE 30 MG/1
60 CAPSULE, DELAYED RELEASE ORAL DAILY
Refills: 0 | Status: DISCONTINUED | OUTPATIENT
Start: 2021-05-12 | End: 2021-05-14

## 2021-05-12 RX ORDER — FOLIC ACID 0.8 MG
1 TABLET ORAL DAILY
Refills: 0 | Status: DISCONTINUED | OUTPATIENT
Start: 2021-05-12 | End: 2021-05-14

## 2021-05-12 RX ORDER — INSULIN LISPRO 100/ML
VIAL (ML) SUBCUTANEOUS AT BEDTIME
Refills: 0 | Status: DISCONTINUED | OUTPATIENT
Start: 2021-05-12 | End: 2021-05-14

## 2021-05-12 RX ADMIN — Medication 40 MILLIEQUIVALENT(S): at 08:05

## 2021-05-12 RX ADMIN — Medication 30 UNIT(S): at 12:12

## 2021-05-12 RX ADMIN — TAMSULOSIN HYDROCHLORIDE 0.4 MILLIGRAM(S): 0.4 CAPSULE ORAL at 21:23

## 2021-05-12 RX ADMIN — Medication 6: at 17:44

## 2021-05-12 RX ADMIN — Medication 1 MILLIGRAM(S): at 08:05

## 2021-05-12 RX ADMIN — CEFTRIAXONE 100 MILLIGRAM(S): 500 INJECTION, POWDER, FOR SOLUTION INTRAMUSCULAR; INTRAVENOUS at 12:12

## 2021-05-12 RX ADMIN — WARFARIN SODIUM 3 MILLIGRAM(S): 2.5 TABLET ORAL at 21:23

## 2021-05-12 RX ADMIN — PANTOPRAZOLE SODIUM 40 MILLIGRAM(S): 20 TABLET, DELAYED RELEASE ORAL at 06:20

## 2021-05-12 RX ADMIN — Medication 40 MILLIEQUIVALENT(S): at 06:20

## 2021-05-12 RX ADMIN — SODIUM CHLORIDE 1000 MILLILITER(S): 9 INJECTION INTRAMUSCULAR; INTRAVENOUS; SUBCUTANEOUS at 01:39

## 2021-05-12 RX ADMIN — Medication 40 UNIT(S): at 17:44

## 2021-05-12 RX ADMIN — Medication 1 TABLET(S): at 08:05

## 2021-05-12 RX ADMIN — AZITHROMYCIN 255 MILLIGRAM(S): 500 TABLET, FILM COATED ORAL at 12:12

## 2021-05-12 RX ADMIN — Medication 6: at 12:12

## 2021-05-12 RX ADMIN — Medication 250 MILLIGRAM(S): at 17:41

## 2021-05-12 RX ADMIN — Medication 1 TABLET(S): at 17:41

## 2021-05-12 RX ADMIN — Medication 14 UNIT(S): at 08:36

## 2021-05-12 RX ADMIN — INSULIN GLARGINE 60 UNIT(S): 100 INJECTION, SOLUTION SUBCUTANEOUS at 21:23

## 2021-05-12 RX ADMIN — DULOXETINE HYDROCHLORIDE 60 MILLIGRAM(S): 30 CAPSULE, DELAYED RELEASE ORAL at 08:05

## 2021-05-12 RX ADMIN — Medication 81 MILLIGRAM(S): at 08:05

## 2021-05-12 RX ADMIN — SODIUM CHLORIDE 100 MILLILITER(S): 9 INJECTION INTRAMUSCULAR; INTRAVENOUS; SUBCUTANEOUS at 02:40

## 2021-05-12 RX ADMIN — ATORVASTATIN CALCIUM 40 MILLIGRAM(S): 80 TABLET, FILM COATED ORAL at 21:23

## 2021-05-12 RX ADMIN — Medication 50 MICROGRAM(S): at 06:20

## 2021-05-12 NOTE — H&P ADULT - PROBLEM SELECTOR PLAN 3
acute on Chronic renal insufficieny, Cr 1.90 appears up from his baseline, bun 51. will hold torsemide for now, will give fluid but with care as his EF is low. will request nephrology follow up. avoid nephrotoxic meds.

## 2021-05-12 NOTE — H&P ADULT - PROBLEM/PLAN-3
58 y/o male hx of etoh abuse in past, clean for 25 years and restarted 6 weeks ago biba after witnessed seizure like activity. Pt usual state of health, became clenched throughout body, fell back and hit head, brief loc, bit tongue, disoriented briefly after and became more with it. No hx of seizures. on penicillin for dental infection right maxillary DISPLAY PLAN FREE TEXT

## 2021-05-12 NOTE — ED PROVIDER NOTE - OBJECTIVE STATEMENT
69 yo male pmh diabetes, chf, hypothyroid comes to ed  3 days of cough with yellowish phlegm 71 yo male pmh diabetes, chf, hypothyroid comes to ed  3 days of cough with yellowish phlegm and feeling weak;  pt denies any sick contacts, nausea or vomiting

## 2021-05-12 NOTE — H&P ADULT - PROBLEM SELECTOR PLAN 5
pt. with EF 30 to 35 %, was on torsemide but now with acute on Chronic renal insufficieny, Cr 1.90, bun 51. will hold torsemide for now, will give fluid but with care as his EF is low. will request nephrology follow up.

## 2021-05-12 NOTE — ED PROVIDER NOTE - PROGRESS NOTE DETAILS
called to bedside, pt  passed out while attempting to give a urine sample; pt fell backwards and hit head on rail/ wall;  pt requiring painful to stimuli. to  respond;    vasovagal vs hypercarbia vs hypoglycemia;  ct of head, acccucheck; abg  pt for admission to medicine

## 2021-05-12 NOTE — CONSULT NOTE ADULT - ASSESSMENT
70M morbidly obese, w/ T2DM cb CKD3/CHF EF 30% s/p AICD, sleep apnea, HLD, HTN, came to the ER from urgent care center where he went for cough and phlegm for past 3 days, no fever, no sick contact, no recent travel. pt. reports loose BM x 2 one yesterday and one on the day of ER visit. in the ER pt's BG was 601, gap 16, co2 26. pt's ct head and neck with no acute findings.   Admitted for JAYLENE on CKD,and hyperglycemia  Consult for diabetes mgmt    Uncontrolled T2DM- hyperglycemic  -A1c  -check sugars AC and bedtime  -ensure diabetic diet  -recommend the following     start lantus -- units QHS     start lispro -- units TID     continue with insulin sliding scale    HTN- BP well controlled    HLD- continue statin   70M morbidly obese, w/ T2DM cb CKD3/CHF EF 30% s/p AICD, sleep apnea, HLD, HTN, came to the ER from urgent care center where he went for cough and phlegm for past 3 days, no fever, no sick contact, no recent travel. pt. reports loose BM x 2 one yesterday and one on the day of ER visit. in the ER pt's BG was 601, gap 16, co2 26. pt's ct head and neck with no acute findings.   Admitted for JAYLENE on CKD,and hyperglycemia  Consult for diabetes mgmt    Uncontrolled T2DM- hyperglycemic  -A1c pending  -check sugars AC and bedtime  -ensure diabetic diet  -continue lantus 60 units QHS  -increase to lispro 40 units TID  -continue with insulin sliding scale  -patient with complaints of polyuria, probably should not continue jardiance as outpatient and rediscuss with his outpatient endo    JAYLENE on CKD- being followed by renal. sCr improving    HLD- continue statin

## 2021-05-12 NOTE — H&P ADULT - HISTORY OF PRESENT ILLNESS
69 y/o morbidly obese male came to the ER from urgent care center where he went for cough and phlegm for past 3 days, no fever, no sick contact, no recent travel. pt. reports loose BM x 2 one yesterday and one on the day of ER visit. one episode of vomiting one day before the R visit. no abd. pain. no cp. no difficulty in breathing, no recent antibiotic use. No vomiting and diarrhea in the ER. pt. reports not sleeping well for past 3 days and while he was sitting on the edge of the bed he went to sleep and fell back on his bed and hit the wall on the side of his bed, did not fell on the ground or hit the head on the ground. pt. remembers hitting the wall and falling into sleep. no HA or neck pain after the incident. upon arrival in the ER pt's BG was 601, gap 16, co2 26. pt's ct head and neck with no acute findings.  71 y/o morbidly obese male came to the ER from urgent care center where he went for cough and phlegm for past 3 days, no fever, no sick contact, no recent travel. pt. reports loose BM x 2 one yesterday and one on the day of ER visit. one episode of vomiting one day before the R visit. no abd. pain. no cp. no difficulty in breathing, no recent antibiotic use. No vomiting and diarrhea in the ER. pt. reports not sleeping well for past 3 days and while he was sitting on the edge of the bed he went to sleep and fell back on his bed and hit the wall on the side of his bed, did not fell on the ground or hit the head on the ground. pt. remembers hitting the wall and falling into sleep. no HA or neck pain after the incident. upon arrival in the ER pt's BG was 601, gap 16, co2 26. pt's ct head and neck with no acute findings.   As per pt. he uses 3 mg of coumadin every night but ER did not check his INR so did not use any while in the ER. pt. also sated that he uses cpap of 10 some nights and does not want to use now and wants me to order it and if needs it on 5/12/21 night time then will use it 71 y/o morbidly obese male came to the ER from urgent care center where he went for cough and phlegm for past 3 days, no fever, no sick contact, no recent travel. pt. reports loose BM x 2 one yesterday and one on the day of ER visit. one episode of vomiting one day before the R visit. no abd. pain. no cp. no difficulty in breathing, no recent antibiotic use. No vomiting and diarrhea in the ER. pt. reports not sleeping well for past 3 days and while he was sitting on the edge of the bed he went to sleep and fell back on his bed and hit the wall on the side of his bed, did not fell on the ground or hit the head on the ground. pt. remembers hitting the wall and falling into sleep. no HA or neck pain after the incident. upon arrival in the ER pt's BG was 601, gap 16, co2 26. pt's ct head and neck with no acute findings.   As per pt. he uses 3 mg of coumadin every night but ER did not check his INR so did not use any while in the ER. pt. also sated that he uses cpap of 10 some nights and does not want to use now and wants me to order it and if needs it on 5/12/21 night time then will use it.

## 2021-05-12 NOTE — PROGRESS NOTE ADULT - ATTENDING COMMENTS
Agree with above   patient seen and examined by myself in ED. Assesment and plan as above.   I discussed the care with Sayra Aragon and López, and Zee De Leon, JHOAN.   Also, discussed the plan with pain in detail

## 2021-05-12 NOTE — H&P ADULT - PROBLEM SELECTOR PLAN 1
pt's BG uncontrolled and got 2 L Ns in the ER around 8:30 in the ER, pt's BG was repeated at the time of admission PH 7.39 and bicarb 27, accuchk 161, on bmp has some acidosis component likely dehydration and needs more fluid, 1L NS bolus ordered , will keep maintenance fluid gently for next 6 hrs as his EF is 30 to 35 % and has h/o chf, and follow labs and adjust fluids accordingly. pt's BG uncontrolled and got 2 L Ns in the ER around 8:30 in the ER, pt's BG was repeated at the time of admission PH 7.39 and bicarb 27, accuchk 161, on bmp has some acidosis component likely dehydration and needs more fluid, 1L NS bolus ordered , will keep maintenance fluid gently for next 6 hrs as his EF is 30 to 35 % and has h/o chf, and follow labs and adjust fluids accordingly. will call endocrinology consult.

## 2021-05-12 NOTE — PHYSICAL THERAPY INITIAL EVALUATION ADULT - ADDITIONAL COMMENTS
owns and uses a RW, 5 steps 1 rail to enter home, no stairs within home, pt needs assist at times for transfers and adls

## 2021-05-12 NOTE — CONSULT NOTE ADULT - SUBJECTIVE AND OBJECTIVE BOX
Patient is a 70y old  Male who presents with a chief complaint of Uncontrolled DM (12 May 2021 10:19)    HPI:  70M morbidly obese, w/ T2DM cb CKD3/CHF EF 30% s/p AICD, sleep apnea, HLD, HTN, came to the ER from urgent care center where he went for cough and phlegm for past 3 days, no fever, no sick contact, no recent travel. pt. reports loose BM x 2 one yesterday and one on the day of ER visit. in the ER pt's BG was 601, gap 16, co2 26. pt's ct head and neck with no acute findings.   Admitted for JAYLENE on CKD,and hyperglycemia  Consult for diabetes mgmt      PAST MEDICAL & SURGICAL HISTORY:  DM (diabetes mellitus)    HTN (hypertension)    High cholesterol    Renal insufficiency    Sleep apnea    Prostate CA    Pulmonary hypertension    CHF (congestive heart failure)  EF 30%    Atrial fibrillation    Chronic back pain    Lung nodules    S/P ankle ligament repair    AICD (automatic cardioverter/defibrillator) present        Social History:  no smoking, no etoh. (12 May 2021 01:47)      FAMILY HISTORY:  Family history of cancer (Sibling)    Family history of diabetes mellitus (Sibling)          Allergies    No Known Allergies    Intolerances    allow double protein at meals (Unknown)      REVIEW OF SYSTEMS:    CONSTITUTIONAL: No fever, weight loss, or fatigue  EYES: No eye pain, visual disturbances, or discharge  ENMT:  No difficulty hearing, tinnitus, vertigo; No sinus or throat pain  NECK: No pain or stiffness  RESPIRATORY: No cough, wheezing, chills or hemoptysis; No shortness of breath  CARDIOVASCULAR: No chest pain, palpitations, dizziness, or leg swelling  GASTROINTESTINAL: No abdominal or epigastric pain. No nausea, vomiting, or hematemesis; No diarrhea or constipation. No melena or hematochezia.  NEUROLOGICAL: No headaches, memory loss, loss of strength, numbness, or tremors  SKIN: No itching, burning, rashes, or lesions   MUSCULOSKELETAL: No joint pain or swelling; No muscle, back, or extremity pain  PSYCHIATRIC: No depression, anxiety, mood swings, or difficulty sleeping        MEDICATIONS  (STANDING):  aspirin enteric coated 81 milliGRAM(s) Oral daily  atorvastatin 40 milliGRAM(s) Oral at bedtime  carvedilol 12.5 milliGRAM(s) Oral every 12 hours  dextrose 40% Gel 15 Gram(s) Oral once  dextrose 5%. 1000 milliLiter(s) (50 mL/Hr) IV Continuous <Continuous>  dextrose 5%. 1000 milliLiter(s) (100 mL/Hr) IV Continuous <Continuous>  dextrose 50% Injectable 25 Gram(s) IV Push once  dextrose 50% Injectable 12.5 Gram(s) IV Push once  dextrose 50% Injectable 25 Gram(s) IV Push once  DULoxetine 60 milliGRAM(s) Oral daily  folic acid 1 milliGRAM(s) Oral daily  glucagon  Injectable 1 milliGRAM(s) IntraMuscular once  insulin glargine Injectable (LANTUS) 60 Unit(s) SubCutaneous at bedtime  insulin lispro (ADMELOG) corrective regimen sliding scale   SubCutaneous at bedtime  insulin lispro (ADMELOG) corrective regimen sliding scale   SubCutaneous three times a day before meals  insulin lispro Injectable (ADMELOG) 30 Unit(s) SubCutaneous three times a day before meals  lactobacillus acidophilus 1 Tablet(s) Oral two times a day with meals  levothyroxine 50 MICROGram(s) Oral daily  pantoprazole    Tablet 40 milliGRAM(s) Oral before breakfast  tamsulosin 0.4 milliGRAM(s) Oral at bedtime  warfarin 3 milliGRAM(s) Oral once    MEDICATIONS  (PRN):  albuterol/ipratropium for Nebulization 3 milliLiter(s) Nebulizer every 6 hours PRN Shortness of Breath and/or Wheezing      Vital Signs Last 24 Hrs  T(C): 36.3 (12 May 2021 07:55), Max: 37.2 (12 May 2021 04:00)  T(F): 97.4 (12 May 2021 07:55), Max: 98.9 (12 May 2021 04:00)  HR: 103 (12 May 2021 07:55) (67 - 104)  BP: 102/67 (12 May 2021 07:55) (98/63 - 122/72)  BP(mean): --  RR: 18 (12 May 2021 07:55) (18 - 20)  SpO2: 96% (12 May 2021 07:55) (92% - 98%)    Physical Exam:    Constitutional: NAD, morbidly obese  HEENT: EOMI, no exophalmos  Neck: trachea midline, no thyroid enlargement  Respiratory: CTAB, normal respirations  Cardiovascular: S1 and S2, RRR  Gastrointestinal: BS+, soft, ntnd  Extremities: No peripheral edema  Neurological: AOx3, no focal deficits  Psychiatric: Normal mood and normal affect  Skin: no rashes, no acanthosis    LABS  05-12    128<L>  |  89<L>  |  52.0<H>  ----------------------------<  304<H>  3.8   |  22.0  |  1.64<H>    Ca    9.1      12 May 2021 08:39    TPro  7.0  /  Alb  3.4  /  TBili  1.4  /  DBili  x   /  AST  24  /  ALT  11  /  AlkPhos  81  05-12                          17.4   7.72  )-----------( 176      ( 11 May 2021 20:14 )             52.3             Ketone - Urine: Negative (05-12 @ 00:50)    Albumin, Serum: 3.4 g/dL (05-12-21 @ 08:39)  Alkaline Phosphatase, Serum: 81 U/L (05-12-21 @ 08:39)  Alanine Aminotransferase (ALT/SGPT): 11 U/L (05-12-21 @ 08:39)  Aspartate Aminotransferase (AST/SGOT): 24 U/L (05-12-21 @ 08:39)  Alanine Aminotransferase (ALT/SGPT): 13 U/L (05-11-21 @ 20:14)  Alkaline Phosphatase, Serum: 92 U/L (05-11-21 @ 20:14)  Albumin, Serum: 3.7 g/dL (05-11-21 @ 20:14)  Aspartate Aminotransferase (AST/SGOT): 25 U/L (05-11-21 @ 20:14)      CAPILLARY BLOOD GLUCOSE      POCT Blood Glucose.: 295 mg/dL (12 May 2021 07:59)  POCT Blood Glucose.: 161 mg/dL (12 May 2021 00:32)  POCT Blood Glucose.: 215 mg/dL (11 May 2021 22:19)  POCT Blood Glucose.: >530 mg/dL (11 May 2021 20:50)  POCT Blood Glucose.: >530 mg/dL (11 May 2021 20:46)     Patient is a 70y old  Male who presents with a chief complaint of Uncontrolled DM (12 May 2021 10:19)    HPI:  70M morbidly obese, w/ T2DM cb CKD3/CHF EF 30% s/p AICD, sleep apnea, HLD, HTN, came to the ER from urgent care center where he went for cough and phlegm for past 3 days, no fever, no sick contact, no recent travel. pt. reports loose BM x 2 one yesterday and one on the day of ER visit. in the ER pt's BG was 601, gap 16, co2 26. pt's ct head and neck with no acute findings.   Admitted for JAYLENE on CKD,and hyperglycemia  Consult for diabetes mgmt    reports diabetes for >10 years  complications of neuropathy, CKD3, CHF  meds: levemir 60 units, novolog 25-40 units on a sliding scale, januvia, jardiance (does not remember doses of oral meds)  lives with daughter  no smoking/etoh  sees Dr. Dorsey  reports increased urination such that he is unable to sleep    PAST MEDICAL & SURGICAL HISTORY:  DM (diabetes mellitus)    HTN (hypertension)    High cholesterol    Renal insufficiency    Sleep apnea    Prostate CA    Pulmonary hypertension    CHF (congestive heart failure)  EF 30%    Atrial fibrillation    Chronic back pain    Lung nodules    S/P ankle ligament repair    AICD (automatic cardioverter/defibrillator) present        Social History:  no smoking, no etoh. (12 May 2021 01:47)      FAMILY HISTORY:  Family history of cancer (Sibling)    Family history of diabetes mellitus (Sibling)          Allergies    No Known Allergies    Intolerances    allow double protein at meals (Unknown)      REVIEW OF SYSTEMS:    CONSTITUTIONAL: No fever, weight loss, or fatigue  EYES: No eye pain, visual disturbances, or discharge  ENMT:  No difficulty hearing, tinnitus, vertigo; No sinus or throat pain  NECK: No pain or stiffness  RESPIRATORY: No cough, wheezing, chills or hemoptysis; No shortness of breath  CARDIOVASCULAR: No chest pain, palpitations, dizziness, or leg swelling  GASTROINTESTINAL: No abdominal or epigastric pain. No nausea, vomiting, or hematemesis; No diarrhea or constipation. No melena or hematochezia.  NEUROLOGICAL: No headaches, memory loss, loss of strength, numbness, or tremors  SKIN: No itching, burning, rashes, or lesions   MUSCULOSKELETAL: No joint pain or swelling; No muscle, back, or extremity pain  PSYCHIATRIC: No depression, anxiety, mood swings, or difficulty sleeping        MEDICATIONS  (STANDING):  aspirin enteric coated 81 milliGRAM(s) Oral daily  atorvastatin 40 milliGRAM(s) Oral at bedtime  carvedilol 12.5 milliGRAM(s) Oral every 12 hours  dextrose 40% Gel 15 Gram(s) Oral once  dextrose 5%. 1000 milliLiter(s) (50 mL/Hr) IV Continuous <Continuous>  dextrose 5%. 1000 milliLiter(s) (100 mL/Hr) IV Continuous <Continuous>  dextrose 50% Injectable 25 Gram(s) IV Push once  dextrose 50% Injectable 12.5 Gram(s) IV Push once  dextrose 50% Injectable 25 Gram(s) IV Push once  DULoxetine 60 milliGRAM(s) Oral daily  folic acid 1 milliGRAM(s) Oral daily  glucagon  Injectable 1 milliGRAM(s) IntraMuscular once  insulin glargine Injectable (LANTUS) 60 Unit(s) SubCutaneous at bedtime  insulin lispro (ADMELOG) corrective regimen sliding scale   SubCutaneous at bedtime  insulin lispro (ADMELOG) corrective regimen sliding scale   SubCutaneous three times a day before meals  insulin lispro Injectable (ADMELOG) 30 Unit(s) SubCutaneous three times a day before meals  lactobacillus acidophilus 1 Tablet(s) Oral two times a day with meals  levothyroxine 50 MICROGram(s) Oral daily  pantoprazole    Tablet 40 milliGRAM(s) Oral before breakfast  tamsulosin 0.4 milliGRAM(s) Oral at bedtime  warfarin 3 milliGRAM(s) Oral once    MEDICATIONS  (PRN):  albuterol/ipratropium for Nebulization 3 milliLiter(s) Nebulizer every 6 hours PRN Shortness of Breath and/or Wheezing      Vital Signs Last 24 Hrs  T(C): 36.3 (12 May 2021 07:55), Max: 37.2 (12 May 2021 04:00)  T(F): 97.4 (12 May 2021 07:55), Max: 98.9 (12 May 2021 04:00)  HR: 103 (12 May 2021 07:55) (67 - 104)  BP: 102/67 (12 May 2021 07:55) (98/63 - 122/72)  BP(mean): --  RR: 18 (12 May 2021 07:55) (18 - 20)  SpO2: 96% (12 May 2021 07:55) (92% - 98%)    Physical Exam:    Constitutional: NAD, morbidly obese  HEENT: EOMI, no exophalmos  Neck: trachea midline, no thyroid enlargement  Respiratory: CTAB, normal respirations  Cardiovascular: S1 and S2, RRR  Gastrointestinal: BS+, soft, ntnd  Extremities: +peripheral edema  Neurological: AOx3, no focal deficits  Psychiatric: Normal mood and normal affect  Skin: b/l LE chronic skin changes    LABS  05-12    128<L>  |  89<L>  |  52.0<H>  ----------------------------<  304<H>  3.8   |  22.0  |  1.64<H>    Ca    9.1      12 May 2021 08:39    TPro  7.0  /  Alb  3.4  /  TBili  1.4  /  DBili  x   /  AST  24  /  ALT  11  /  AlkPhos  81  05-12                          17.4   7.72  )-----------( 176      ( 11 May 2021 20:14 )             52.3             Ketone - Urine: Negative (05-12 @ 00:50)    Albumin, Serum: 3.4 g/dL (05-12-21 @ 08:39)  Alkaline Phosphatase, Serum: 81 U/L (05-12-21 @ 08:39)  Alanine Aminotransferase (ALT/SGPT): 11 U/L (05-12-21 @ 08:39)  Aspartate Aminotransferase (AST/SGOT): 24 U/L (05-12-21 @ 08:39)  Alanine Aminotransferase (ALT/SGPT): 13 U/L (05-11-21 @ 20:14)  Alkaline Phosphatase, Serum: 92 U/L (05-11-21 @ 20:14)  Albumin, Serum: 3.7 g/dL (05-11-21 @ 20:14)  Aspartate Aminotransferase (AST/SGOT): 25 U/L (05-11-21 @ 20:14)      CAPILLARY BLOOD GLUCOSE      POCT Blood Glucose.: 295 mg/dL (12 May 2021 07:59)  POCT Blood Glucose.: 161 mg/dL (12 May 2021 00:32)  POCT Blood Glucose.: 215 mg/dL (11 May 2021 22:19)  POCT Blood Glucose.: >530 mg/dL (11 May 2021 20:50)  POCT Blood Glucose.: >530 mg/dL (11 May 2021 20:46)

## 2021-05-12 NOTE — H&P ADULT - ASSESSMENT
69 y/o morbidly obese male came to the ER from urgent care center where he went for cough and phlegm for past 3 days, no fever, no sick contact, no recent travel. pt. reports loose BM x 2 one yesterday and one on the day of ER visit. one episode of vomiting one day before the R visit. no abd. pain. no cp. no difficulty in breathing, no recent antibiotic use. No vomiting and diarrhea in the ER. pt. reports not sleeping well for past 3 days and while he was sitting on the edge of the bed he went to sleep and fell back on his bed and hit the wall on the side of his bed, did not fell on the ground or hit the head on the ground. pt. remembers hitting the wall and falling into sleep. no HA or neck pain after the incident. upon arrival in the ER pt's BG was 601, gap 16, co2 26. pt's ct head and neck with no acute findings.   As per pt. he uses 3 mg of coumadin every night but ER did not check his INR so did not use any while in the ER. pt. also sated that he uses cpap of 10 some nights and does not want to use now and wants me to order it and if needs it on 5/12/21 night time then will use it.

## 2021-05-12 NOTE — CONSULT NOTE ADULT - ASSESSMENT
1) JAYLENE on CKD III  2) HTN  3) DM  4) CHF    Would hold diuretics today  Restart on torsemide 20mg daily tomorrow; hold off on lasix;  Renal US  Will need outpatient nephrology care  carmen Whitehead

## 2021-05-12 NOTE — CONSULT NOTE ADULT - SUBJECTIVE AND OBJECTIVE BOX
[facility]                                   [address] [place] [address]                                                      PHONE: [phone]                                                         FAX: [phone]  [email or web address]  ---------------------------------------------------------------------------------------------------------------------------------    Reason for Consult: cough dyspnea     HPI:  MARY ANN CORNELL is an 70y Male with past medical history significant for NICM EF ~ 35%, chronic afib on Coumadin, PVT s/p ICD, HFrEF on optimal medical therapy, CKD, HTN, morbid obesity, CHRISS, DM presented yesterday with couple of day history of dyspnea cough with phlegm associated with increase urinations. The patient was found to have blood glucose level 601 with AG of 16.  The patient is feeling much better now after IVF and IV Abx therapy.  Denies any PND, chest pain or orthopnea            PAST MEDICAL & SURGICAL HISTORY:  DM (diabetes mellitus)    HTN (hypertension)    High cholesterol    Renal insufficiency    Sleep apnea    Prostate CA    Pulmonary hypertension    CHF (congestive heart failure)  EF 30%    Atrial fibrillation    Chronic back pain    Lung nodules    S/P ankle ligament repair    AICD (automatic cardioverter/defibrillator) present        allow double protein at meals (Unknown)  No Known Allergies      MEDICATIONS  (STANDING):  aspirin enteric coated 81 milliGRAM(s) Oral daily  atorvastatin 40 milliGRAM(s) Oral at bedtime  carvedilol 12.5 milliGRAM(s) Oral every 12 hours  dextrose 40% Gel 15 Gram(s) Oral once  dextrose 5%. 1000 milliLiter(s) (50 mL/Hr) IV Continuous <Continuous>  dextrose 5%. 1000 milliLiter(s) (100 mL/Hr) IV Continuous <Continuous>  dextrose 50% Injectable 25 Gram(s) IV Push once  dextrose 50% Injectable 12.5 Gram(s) IV Push once  dextrose 50% Injectable 25 Gram(s) IV Push once  DULoxetine 60 milliGRAM(s) Oral daily  folic acid 1 milliGRAM(s) Oral daily  glucagon  Injectable 1 milliGRAM(s) IntraMuscular once  insulin glargine Injectable (LANTUS) 60 Unit(s) SubCutaneous at bedtime  insulin lispro (ADMELOG) corrective regimen sliding scale   SubCutaneous at bedtime  insulin lispro (ADMELOG) corrective regimen sliding scale   SubCutaneous three times a day before meals  insulin lispro Injectable (ADMELOG) 30 Unit(s) SubCutaneous three times a day before meals  lactobacillus acidophilus 1 Tablet(s) Oral two times a day with meals  levoFLOXacin  Tablet 500 milliGRAM(s) Oral every 24 hours  levothyroxine 50 MICROGram(s) Oral daily  pantoprazole    Tablet 40 milliGRAM(s) Oral before breakfast  tamsulosin 0.4 milliGRAM(s) Oral at bedtime  warfarin 3 milliGRAM(s) Oral once    MEDICATIONS  (PRN):  albuterol/ipratropium for Nebulization 3 milliLiter(s) Nebulizer every 6 hours PRN Shortness of Breath and/or Wheezing      Social History:  Cigarettes:    none                 Alchohol:         none         Illicit Drug Abuse:  none     ROS: Negative other than as mentioned in HPI.    Vital Signs Last 24 Hrs  T(C): 36.3 (12 May 2021 07:55), Max: 37.2 (12 May 2021 04:00)  T(F): 97.4 (12 May 2021 07:55), Max: 98.9 (12 May 2021 04:00)  HR: 103 (12 May 2021 07:55) (67 - 104)  BP: 102/67 (12 May 2021 07:55) (98/63 - 122/72)  BP(mean): --  RR: 18 (12 May 2021 07:55) (18 - 20)  SpO2: 96% (12 May 2021 07:55) (92% - 98%)  ICU Vital Signs Last 24 Hrs  MARY ANN CORNELL  I&O's Detail    I&O's Summary    Drug Dosing Weight  MARY ANN KRAIG      PHYSICAL EXAM:  General: Appears well developed, well nourished alert and cooperative.  HEENT: Head; normocephalic, atraumatic.  Eyes: Pupils reactive, cornea wnl.  Neck: Supple, no nodes adenopathy, no NVD or carotid bruit or thyromegaly.  CARDIOVASCULAR: Normal S1 and S2, 1/6 murmur, rub, gallop or lift.   LUNGS: Decrease BS B/L   ABDOMEN: Soft, nontender without mass or organomegaly. bowel sounds normoactive.  EXTREMITIES: No clubbing, cyanosis + edema. Distal pulses wnl.   SKIN: warm and dry with normal turgor.  NEURO: Alert/oriented x 3/normal motor exam. No pathologic reflexes.    PSYCH: normal affect.        LABS:                        17.4   7.72  )-----------( 176      ( 11 May 2021 20:14 )             52.3     05-12    128<L>  |  89<L>  |  52.0<H>  ----------------------------<  304<H>  3.8   |  22.0  |  1.64<H>    Ca    9.1      12 May 2021 08:39    TPro  7.0  /  Alb  3.4  /  TBili  1.4  /  DBili  x   /  AST  24  /  ALT  11  /  AlkPhos  81  05-12    MARY ANN CORNELL  CARDIAC MARKERS ( 11 May 2021 20:14 )  x     / 0.05 ng/mL / x     / x     / x          PT/INR - ( 12 May 2021 04:11 )   PT: 21.7 sec;   INR: 1.93 ratio         PTT - ( 12 May 2021 04:11 )  PTT:37.1 sec  Urinalysis Basic - ( 12 May 2021 00:50 )    Color: Yellow / Appearance: Clear / S.010 / pH: x  Gluc: x / Ketone: Negative  / Bili: Negative / Urobili: Negative mg/dL   Blood: x / Protein: Negative mg/dL / Nitrite: Negative   Leuk Esterase: Negative / RBC: x / WBC x   Sq Epi: x / Non Sq Epi: x / Bacteria: x        RADIOLOGY & ADDITIONAL STUDIES:    INTERPRETATION OF TELEMETRY (personally reviewed):    ECG: AF PVC     ECHO:      Summary:   1. Technically difficult study.   2. Endocardial visualization was enhanced with intravenous echo contrast.   3. Left ventricular ejection fraction, by visual estimation, is 30 to 35%.   4. Moderately decreased global left ventricular systolic function.   5. Multiple left ventricular regional wall motion abnormalities exist. See wall motion findings.   6. The mitral in-flow pattern reveals no discernable A-wave, therefore no comment on diastolic function can be made.   7. Mildly enlarged right ventricle with moderately reduced RV systolic function.   8. Mildly enlarged left atrium.   9. Trace mitral valve regurgitation.  10. Mild thickening of the anterior and posterior mitral valve leaflets.  11. Mild dilatation of the aortic root (3.7 cm).  12. Compared to a prior study from 9/10/20, there is no significant change.    MD ChacortaElectronically signed on 3/9/2021 at 12:38:51 PM      CARDIAC CATHETERIZATION:  VENTRICLES: LVEF 25% with MR  CORONARY VESSELS: The coronary circulation is right dominant.  LM:   --  LM: Angiography showed minor luminal irregularities with no flow  limiting lesions.  LAD:   --  LAD: Angiography showed minor luminal irregularities with no  flow limiting lesions.  CX:   --  Circumflex: Angiography showed minor luminal irregularities with  no flow limiting lesions.  RCA:   --  RCA: Angiography showed minor luminal irregularities with no  flow limiting lesions.  COMPLICATIONS: There were no complications. No complications occurred  during the cath lab visit.  DIAGNOSTIC IMPRESSIONS: Mild CAD. Non ischemic cardiomyopathy.  DIAGNOSTIC RECOMMENDATIONS: A/C. MARIELA CV The patient should continue with  the present medications.  INTERVENTIONAL IMPRESSIONS: Mild CAD. Non ischemic cardiomyopathy.  INTERVENTIONAL RECOMMENDATIONS: A/C. MARIELA CV  Prepared and signed by  Bright Muñiz MD      Assessment and Plan:  In summary, MARY ANN CORNELL is an 70y Male with past medical history significant for NICM EF ~ 35%, chronic afib on Coumadin, PVT s/p ICD, HFrEF on optimal medical therapy, CKD, HTN, morbid obesity, CHRISS, DM presented yesterday with couple of day history of dyspnea cough with phlegm associated with increase urinations. The patient was found to have blood glucose level 601 with AG of 16.  The patient is feeling much better now after IVF and IV Abx therapy.  Denies any PND, chest pain or orthopnea.   Hyperglycemia possible bronchitis/PNA; volume status stale; Prior TTE 3/9/2021 stable EF and unchanged from prior and prior [facility] 2016 normal coronary arteries     Plan  1.  Awaiting CT of the chest WO  2.  Agree with Holding Torsemide therapy today and restart in AM if stable   3.  Continue Coreg therapy   4.  Abx therapy as per primary team

## 2021-05-12 NOTE — PROGRESS NOTE ADULT - SUBJECTIVE AND OBJECTIVE BOX
MARY ANN CORNELL  70y  Male    Interval/overnight events/pt complaints: Pt examined at bedside, NAD. Denies CP, dizziness, headache, fever/chills, n/v/d. He still complains of intermittent productive cough greenish-yellow and MACHUCA. Tele reviewed Atrial Fib 's with frequent multifocal PVC's.   Additional ROS wnl.     Vital Signs Last 24 Hrs  T(C): 36.9 (12 May 2021 11:14), Max: 37.2 (12 May 2021 04:00)  T(F): 98.5 (12 May 2021 11:14), Max: 98.9 (12 May 2021 04:00)  HR: 100 (12 May 2021 11:14) (67 - 104)  BP: 117/78 (12 May 2021 11:14) (98/63 - 122/72)  RR: 18 (12 May 2021 11:14) (18 - 20)  SpO2: 97% (12 May 2021 11:14) (92% - 98%)        PHYSICAL EXAM:  GENERAL: NAD, well developed, obese male, non toxic appearing   HEENT - EOMI, PERRL;  MMM, anicteric   NECK: Supple, No JVD, no adenopathy, no bruit   CHEST/LUNG: Clear to auscultation bilaterally; No rales, rhonchi, wheezing  HEART: Irregularly Irregular; No murmurs, rubs, or gallops  ABDOMEN: Soft, Nontender, obese, nondistended; Bowel sounds present  EXTREMITIES:  1+ Peripheral Pulses, non pitting edema b/l/l/e with venous stasis discoloration   NEURO:  No Focal deficits, sensory and motor intact  SKIN: No rashes or lesions, warm, dry    CAPILLARY BLOOD GLUCOSE      POCT Blood Glucose.: 295 mg/dL (12 May 2021 07:59)  POCT Blood Glucose.: 161 mg/dL (12 May 2021 00:32)  POCT Blood Glucose.: 215 mg/dL (11 May 2021 22:19)  POCT Blood Glucose.: >530 mg/dL (11 May 2021 20:50)  POCT Blood Glucose.: >530 mg/dL (11 May 2021 20:46)      LABS    (- @ 20:14)                      17.4  7.72 )-----------( 176                 52.3    Neutrophils = 5.78 (74.8%)  Lymphocytes = 1.08 (14.0%)  Eosinophils = 0.09 (1.2%)  Basophils = 0.02 (0.3%)  Monocytes = 0.71 (9.2%)  Bands = --%        128<L>  |  89<L>  |  52.0<H>  ----------------------------<  304<H>  3.8   |  22.0  |  1.64<H>    Ca    9.1      12 May 2021 08:39    TPro  7.0  /  Alb  3.4  /  TBili  1.4  /  DBili  x   /  AST  24  /  ALT  11  /  AlkPhos  81      ( 12 May 2021 04:11 )   PT: 21.7 sec;   INR: 1.93 ratio;       PTT:37.1 sec  CARDIAC MARKERS ( 11 May 2021 20:14 )  Trop 0.05 ng/mL / CK x     / CKMB x           ( @ 20:12)  NotDetec    Urinalysis Basic - ( 12 May 2021 00:50 )    Color: Yellow / Appearance: Clear / S.010 / pH: x  Gluc: x / Ketone: Negative  / Bili: Negative / Urobili: Negative mg/dL   Blood: x / Protein: Negative mg/dL / Nitrite: Negative   Leuk Esterase: Negative / RBC: x / WBC x   Sq Epi: x / Non Sq Epi: x / Bacteria: x    IMAGING   EXAM:  CT CHEST                          PROCEDURE DATE:  2021          INTERPRETATION:  CLINICAL INFORMATION: Shortness of breath; evaluate for pneumonia.    COMPARISON: 2020    CONTRAST/COMPLICATIONS:  IV Contrast: NONE  OralContrast: NONE  Complications: None reported at time of study completion    PROCEDURE:  CT of the Chest was performed.  Sagittal and coronal reformats were performed.    FINDINGS:    LUNGS AND AIRWAYS: Patent central airways.  No lung consolidation or mass.  PLEURA: No pleural effusion.  MEDIASTINUM AND KARLENE: No lymphadenopathy.  VESSELS: Normal caliber aorta. Three-vessel coronary artery calcification.  HEART: Heart size is normal. No pericardial effusion.  CHEST WALL AND LOWER NECK: Left unipolar AICD.  VISUALIZED UPPER ABDOMEN: Within normal limits.  BONES: No acute abnormality.    IMPRESSION:  No evidence of pneumonia                Imaging Personally Reviewed:  [x ] YES  [ ] NO    MEDICATIONS  (STANDING):  aspirin enteric coated 81 milliGRAM(s) Oral daily  atorvastatin 40 milliGRAM(s) Oral at bedtime  azithromycin  IVPB 500 milliGRAM(s) IV Intermittent every 24 hours  carvedilol 12.5 milliGRAM(s) Oral every 12 hours  cefTRIAXone   IVPB 1000 milliGRAM(s) IV Intermittent every 24 hours  dextrose 40% Gel 15 Gram(s) Oral once  dextrose 5%. 1000 milliLiter(s) (50 mL/Hr) IV Continuous <Continuous>  dextrose 5%. 1000 milliLiter(s) (100 mL/Hr) IV Continuous <Continuous>  dextrose 50% Injectable 25 Gram(s) IV Push once  dextrose 50% Injectable 12.5 Gram(s) IV Push once  dextrose 50% Injectable 25 Gram(s) IV Push once  DULoxetine 60 milliGRAM(s) Oral daily  folic acid 1 milliGRAM(s) Oral daily  glucagon  Injectable 1 milliGRAM(s) IntraMuscular once  insulin glargine Injectable (LANTUS) 60 Unit(s) SubCutaneous at bedtime  insulin lispro (ADMELOG) corrective regimen sliding scale   SubCutaneous at bedtime  insulin lispro (ADMELOG) corrective regimen sliding scale   SubCutaneous three times a day before meals  insulin lispro Injectable (ADMELOG) 30 Unit(s) SubCutaneous three times a day before meals  lactobacillus acidophilus 1 Tablet(s) Oral two times a day with meals  levothyroxine 50 MICROGram(s) Oral daily  pantoprazole    Tablet 40 milliGRAM(s) Oral before breakfast  tamsulosin 0.4 milliGRAM(s) Oral at bedtime  warfarin 3 milliGRAM(s) Oral once    MEDICATIONS  (PRN):  albuterol/ipratropium for Nebulization 3 milliLiter(s) Nebulizer every 6 hours PRN Shortness of Breath and/or Wheezing    Allergiesallow double protein at meals (Unknown)  No Known Allergies      Consultant(s) Notes Reviewed:  [x ] YES  [ ] NO  Care Discussed with Consultants/Other Providers [ x] YES  [ ] NO

## 2021-05-12 NOTE — H&P ADULT - NSHPPHYSICALEXAM_GEN_ALL_CORE
General: morbidly obese male lying in bed not in distress.  HEENT: AT, NC. PERRL. intact EOM. no throat erythema or exudate.   Neck: supple. no JVD.   Chest: air entry fair bilaterally, no inter costal retractions.  Heart: S1,S2. RRR. no heart murmur. 1 to 2+ edema of B/L LE.   Abdomen: soft. non-tender. morbidly obese, + BS.   Ext: no calf tenderness on either side. moves all ext. independently. distal pulses intact.  Neuro: AAO x3. no focal weakness. no speech disorder, Cns intact. sensory intact.  Skin: chronic venous stasis skin changes noted over B/L LE. warm and dry. no pallor.   Psych : normal affect, no si/hi.

## 2021-05-12 NOTE — CONSULT NOTE ADULT - SUBJECTIVE AND OBJECTIVE BOX
[facility] DIVISION OF KIDNEY DISEASES AND HYPERTENSION -- INITIAL CONSULT NOTE  --------------------------------------------------------------------------------  HPI:  71 y/o morbidly obese male came to the ER from urgent care center where he went for cough and phlegm for past 3 days, no fever, no sick contact, no recent travel. pt. reports loose BM x 2 one yesterday and one on the day of ER visit. one episode of vomiting one day before the R visit. no abd. pain. no cp. no difficulty in breathing, no recent antibiotic use. No vomiting and diarrhea in the ER. pt. reports not sleeping well for past 3 days and while he was sitting on the edge of the bed he went to sleep and fell back on his bed and hit the wall on the side of his bed, did not fell on the ground or hit the head on the ground. pt. remembers hitting the wall and falling into sleep. no HA or neck pain after the incident. upon arrival in the ER pt's BG was 601, gap 16, co2 26. pt's ct head and neck with no acute findings. As per pt. he uses 3 mg of coumadin every night but ER did not check his INR so did not use any while in the ER. pt. also sated that he uses cpap of 10 some nights and does not want to use now and wants me to order it and if needs it on 5/12/21 night time then will use it.  Pt seen/examined; does not follow with nephrologist in outpatient setting; Pt states he is on furosemide 40mg one day; alternates with lasix 40mg + torsemide 20mg ; held this AM; improving SCr;      PAST HISTORY  --------------------------------------------------------------------------------  PAST MEDICAL & SURGICAL HISTORY:  DM (diabetes mellitus)    HTN (hypertension)    High cholesterol    Renal insufficiency    Sleep apnea    Prostate CA    Pulmonary hypertension    CHF (congestive heart failure)  EF 30%    Atrial fibrillation    Chronic back pain    Lung nodules    S/P ankle ligament repair    AICD (automatic cardioverter/defibrillator) present      FAMILY HISTORY:  Family history of cancer (Sibling)    Family history of diabetes mellitus (Sibling)      PAST SOCIAL HISTORY:    ALLERGIES & MEDICATIONS  --------------------------------------------------------------------------------  Allergies    No Known Allergies    Intolerances    allow double protein at meals (Unknown)    Standing Inpatient Medications  aspirin enteric coated 81 milliGRAM(s) Oral daily  atorvastatin 40 milliGRAM(s) Oral at bedtime  carvedilol 12.5 milliGRAM(s) Oral every 12 hours  dextrose 40% Gel 15 Gram(s) Oral once  dextrose 5%. 1000 milliLiter(s) IV Continuous <Continuous>  dextrose 5%. 1000 milliLiter(s) IV Continuous <Continuous>  dextrose 50% Injectable 25 Gram(s) IV Push once  dextrose 50% Injectable 12.5 Gram(s) IV Push once  dextrose 50% Injectable 25 Gram(s) IV Push once  DULoxetine 60 milliGRAM(s) Oral daily  folic acid 1 milliGRAM(s) Oral daily  glucagon  Injectable 1 milliGRAM(s) IntraMuscular once  insulin glargine Injectable (LANTUS) 60 Unit(s) SubCutaneous at bedtime  insulin lispro (ADMELOG) corrective regimen sliding scale   SubCutaneous at bedtime  insulin lispro (ADMELOG) corrective regimen sliding scale   SubCutaneous three times a day before meals  insulin lispro Injectable (ADMELOG) 30 Unit(s) SubCutaneous three times a day before meals  lactobacillus acidophilus 1 Tablet(s) Oral two times a day with meals  levothyroxine 50 MICROGram(s) Oral daily  pantoprazole    Tablet 40 milliGRAM(s) Oral before breakfast  tamsulosin 0.4 milliGRAM(s) Oral at bedtime  warfarin 3 milliGRAM(s) Oral once    PRN Inpatient Medications  albuterol/ipratropium for Nebulization 3 milliLiter(s) Nebulizer every 6 hours PRN      REVIEW OF SYSTEMS  --------------------------------------------------------------------------------  Gen: No weight changes, fatigue, fevers/chills, weakness  Skin: No rashes  Head/Eyes/Ears/Mouth: No headache; Normal hearing; Normal vision w/o blurriness; No sinus pain/discomfort, sore throat  Respiratory: No dyspnea, cough, wheezing, hemoptysis  CV: No chest pain, PND, orthopnea  GI: No abdominal pain, diarrhea, constipation, nausea, vomiting, melena, hematochezia  : No increased frequency, dysuria, hematuria, nocturia  MSK: No joint pain/swelling; no back pain; + edema  Neuro: No dizziness/lightheadedness, weakness, seizures, numbness, tingling  Heme: No easy bruising or bleeding  Endo: No heat/cold intolerance  Psych: No significant nervousness, anxiety, stress, depression    All other systems were reviewed and are negative, except as noted.    VITALS/PHYSICAL EXAM  --------------------------------------------------------------------------------  T(C): 36.3 (05-12-21 @ 07:55), Max: 37.2 (05-12-21 @ 04:00)  HR: 103 (05-12-21 @ 07:55) (67 - 104)  BP: 102/67 (05-12-21 @ 07:55) (98/63 - 122/72)  RR: 18 (05-12-21 @ 07:55) (18 - 20)  SpO2: 96% (05-12-21 @ 07:55) (92% - 98%)  Wt(kg): --  Height (cm): 175.3 (05-11-21 @ 18:44)  Weight (kg): 158.8 (05-11-21 @ 18:44)  BMI (kg/m2): 51.7 (05-11-21 @ 18:44)  BSA (m2): 2.62 (05-11-21 @ 18:44)      Physical Exam:  	Gen: NAD   	HEENT: PERRL, supple neck, clear oropharynx  	Pulm: CTA B/L  	CV: RRR, S1S2; no rub  	Back: No spinal or CVA tenderness; no sacral edema  	Abd: +BS, soft, nontender/nondistended  	: No suprapubic tenderness  	UE: Warm, FROM, no clubbing, intact strength; no edema; no asterixis  	LE: Warm, FROM, no clubbing, intact strength; no edema  	Neuro: No focal deficits, intact gait  	Psych: Normal affect and mood  	Skin: Warm, without rashes       LABS/STUDIES  --------------------------------------------------------------------------------              17.4   7.72  >-----------<  176      [05-11-21 @ 20:14]              52.3     128  |  89  |  52.0  ----------------------------<  304      [05-12-21 @ 08:39]  3.8   |  22.0  |  1.64        Ca     9.1     [05-12-21 @ 08:39]    TPro  7.0  /  Alb  3.4  /  TBili  1.4  /  DBili  x   /  AST  24  /  ALT  11  /  AlkPhos  81  [05-12-21 @ 08:39]    PT/INR: PT 21.7 , INR 1.93       [05-12-21 @ 04:11]  PTT: 37.1       [05-12-21 @ 04:11]    Troponin 0.05      [05-11-21 @ 20:14]    Creatinine Trend:  SCr 1.64 [05-12 @ 08:39]  SCr 1.91 [05-12 @ 04:11]  SCr 1.90 [05-11 @ 23:14]  SCr 1.40 [05-11 @ 20:14]    Urinalysis - [05-12-21 @ 00:50]      Color Yellow / Appearance Clear / SG 1.010 / pH 6.0      Gluc 1000 / Ketone Negative  / Bili Negative / Urobili Negative       Blood Negative / Protein Negative / Leuk Est Negative / Nitrite Negative      RBC  / WBC  / Hyaline  / Gran  / Sq Epi  / Non Sq Epi  / Bacteria       HbA1c 11.6      [09-05-19 @ 04:32]  TSH 3.62      [03-07-21 @ 07:55]  Lipid: chol 195, , HDL 24, LDL --      [03-08-21 @ 07:35]    HCV 0.12, Nonreact      [09-05-19 @ 16:11]

## 2021-05-12 NOTE — H&P ADULT - PROBLEM SELECTOR PLAN 6
will order cpap, o2 support. will order cpap, o2 support. cough as per pt. possible bronchitis, no elevated wbc, no fever. will keep on po levaquin 500 mg daily for 5 days.

## 2021-05-12 NOTE — H&P ADULT - PROBLEM SELECTOR PLAN 2
pt. will benefit from loosing wt. pt. has multiple medical problems and morbid obesity puts him in high risk category for medical complications.

## 2021-05-13 LAB
A1C WITH ESTIMATED AVERAGE GLUCOSE RESULT: 10.3 % — HIGH (ref 4–5.6)
ALBUMIN SERPL ELPH-MCNC: 3.5 G/DL — SIGNIFICANT CHANGE UP (ref 3.3–5.2)
ALP SERPL-CCNC: 80 U/L — SIGNIFICANT CHANGE UP (ref 40–120)
ALT FLD-CCNC: 11 U/L — SIGNIFICANT CHANGE UP
ANION GAP SERPL CALC-SCNC: 14 MMOL/L — SIGNIFICANT CHANGE UP (ref 5–17)
ANION GAP SERPL CALC-SCNC: 14 MMOL/L — SIGNIFICANT CHANGE UP (ref 5–17)
APTT BLD: 35 SEC — SIGNIFICANT CHANGE UP (ref 27.5–35.5)
AST SERPL-CCNC: 21 U/L — SIGNIFICANT CHANGE UP
BASOPHILS # BLD AUTO: 0.02 K/UL — SIGNIFICANT CHANGE UP (ref 0–0.2)
BASOPHILS NFR BLD AUTO: 0.3 % — SIGNIFICANT CHANGE UP (ref 0–2)
BILIRUB SERPL-MCNC: 0.8 MG/DL — SIGNIFICANT CHANGE UP (ref 0.4–2)
BUN SERPL-MCNC: 53 MG/DL — HIGH (ref 8–20)
BUN SERPL-MCNC: 53 MG/DL — HIGH (ref 8–20)
CALCIUM SERPL-MCNC: 9.5 MG/DL — SIGNIFICANT CHANGE UP (ref 8.6–10.2)
CALCIUM SERPL-MCNC: 9.6 MG/DL — SIGNIFICANT CHANGE UP (ref 8.6–10.2)
CHLORIDE SERPL-SCNC: 90 MMOL/L — LOW (ref 98–107)
CHLORIDE SERPL-SCNC: 91 MMOL/L — LOW (ref 98–107)
CHOLEST SERPL-MCNC: 115 MG/DL — SIGNIFICANT CHANGE UP
CO2 SERPL-SCNC: 28 MMOL/L — SIGNIFICANT CHANGE UP (ref 22–29)
CO2 SERPL-SCNC: 28 MMOL/L — SIGNIFICANT CHANGE UP (ref 22–29)
COVID-19 SPIKE DOMAIN AB INTERP: NEGATIVE — SIGNIFICANT CHANGE UP
COVID-19 SPIKE DOMAIN ANTIBODY RESULT: 0.4 U/ML — SIGNIFICANT CHANGE UP
CREAT SERPL-MCNC: 1.57 MG/DL — HIGH (ref 0.5–1.3)
CREAT SERPL-MCNC: 1.6 MG/DL — HIGH (ref 0.5–1.3)
CULTURE RESULTS: SIGNIFICANT CHANGE UP
EOSINOPHIL # BLD AUTO: 0.12 K/UL — SIGNIFICANT CHANGE UP (ref 0–0.5)
EOSINOPHIL NFR BLD AUTO: 1.7 % — SIGNIFICANT CHANGE UP (ref 0–6)
ESTIMATED AVERAGE GLUCOSE: 249 MG/DL — HIGH (ref 68–114)
GLUCOSE BLDC GLUCOMTR-MCNC: 104 MG/DL — HIGH (ref 70–99)
GLUCOSE BLDC GLUCOMTR-MCNC: 254 MG/DL — HIGH (ref 70–99)
GLUCOSE BLDC GLUCOMTR-MCNC: 313 MG/DL — HIGH (ref 70–99)
GLUCOSE BLDC GLUCOMTR-MCNC: 417 MG/DL — HIGH (ref 70–99)
GLUCOSE BLDC GLUCOMTR-MCNC: >530 MG/DL — CRITICAL HIGH (ref 70–99)
GLUCOSE SERPL-MCNC: 385 MG/DL — HIGH (ref 70–99)
GLUCOSE SERPL-MCNC: 386 MG/DL — HIGH (ref 70–99)
HCT VFR BLD CALC: 52 % — HIGH (ref 39–50)
HDLC SERPL-MCNC: 26 MG/DL — LOW
HGB BLD-MCNC: 16.9 G/DL — SIGNIFICANT CHANGE UP (ref 13–17)
IMM GRANULOCYTES NFR BLD AUTO: 0.6 % — SIGNIFICANT CHANGE UP (ref 0–1.5)
INR BLD: 1.77 RATIO — HIGH (ref 0.88–1.16)
LACTATE SERPL-SCNC: 1.8 MMOL/L — SIGNIFICANT CHANGE UP (ref 0.5–2)
LIPID PNL WITH DIRECT LDL SERPL: 44 MG/DL — SIGNIFICANT CHANGE UP
LYMPHOCYTES # BLD AUTO: 1 K/UL — SIGNIFICANT CHANGE UP (ref 1–3.3)
LYMPHOCYTES # BLD AUTO: 14.4 % — SIGNIFICANT CHANGE UP (ref 13–44)
MAGNESIUM SERPL-MCNC: 2.2 MG/DL — SIGNIFICANT CHANGE UP (ref 1.6–2.6)
MCHC RBC-ENTMCNC: 27.5 PG — SIGNIFICANT CHANGE UP (ref 27–34)
MCHC RBC-ENTMCNC: 32.5 GM/DL — SIGNIFICANT CHANGE UP (ref 32–36)
MCV RBC AUTO: 84.6 FL — SIGNIFICANT CHANGE UP (ref 80–100)
MONOCYTES # BLD AUTO: 0.62 K/UL — SIGNIFICANT CHANGE UP (ref 0–0.9)
MONOCYTES NFR BLD AUTO: 8.9 % — SIGNIFICANT CHANGE UP (ref 2–14)
NEUTROPHILS # BLD AUTO: 5.13 K/UL — SIGNIFICANT CHANGE UP (ref 1.8–7.4)
NEUTROPHILS NFR BLD AUTO: 74.1 % — SIGNIFICANT CHANGE UP (ref 43–77)
NON HDL CHOLESTEROL: 89 MG/DL — SIGNIFICANT CHANGE UP
PLATELET # BLD AUTO: 148 K/UL — LOW (ref 150–400)
POTASSIUM SERPL-MCNC: 3.4 MMOL/L — LOW (ref 3.5–5.3)
POTASSIUM SERPL-MCNC: 3.5 MMOL/L — SIGNIFICANT CHANGE UP (ref 3.5–5.3)
POTASSIUM SERPL-SCNC: 3.4 MMOL/L — LOW (ref 3.5–5.3)
POTASSIUM SERPL-SCNC: 3.5 MMOL/L — SIGNIFICANT CHANGE UP (ref 3.5–5.3)
PROT SERPL-MCNC: 7 G/DL — SIGNIFICANT CHANGE UP (ref 6.6–8.7)
PROTHROM AB SERPL-ACNC: 20 SEC — HIGH (ref 10.6–13.6)
RBC # BLD: 6.15 M/UL — HIGH (ref 4.2–5.8)
RBC # FLD: 16.8 % — HIGH (ref 10.3–14.5)
SARS-COV-2 IGG+IGM SERPL QL IA: 0.4 U/ML — SIGNIFICANT CHANGE UP
SARS-COV-2 IGG+IGM SERPL QL IA: NEGATIVE — SIGNIFICANT CHANGE UP
SODIUM SERPL-SCNC: 132 MMOL/L — LOW (ref 135–145)
SODIUM SERPL-SCNC: 133 MMOL/L — LOW (ref 135–145)
SPECIMEN SOURCE: SIGNIFICANT CHANGE UP
TRIGL SERPL-MCNC: 224 MG/DL — HIGH
TSH SERPL-MCNC: 3.25 UIU/ML — SIGNIFICANT CHANGE UP (ref 0.27–4.2)
WBC # BLD: 6.93 K/UL — SIGNIFICANT CHANGE UP (ref 3.8–10.5)
WBC # FLD AUTO: 6.93 K/UL — SIGNIFICANT CHANGE UP (ref 3.8–10.5)

## 2021-05-13 PROCEDURE — 99233 SBSQ HOSP IP/OBS HIGH 50: CPT

## 2021-05-13 PROCEDURE — 99232 SBSQ HOSP IP/OBS MODERATE 35: CPT

## 2021-05-13 RX ORDER — INSULIN GLARGINE 100 [IU]/ML
20 INJECTION, SOLUTION SUBCUTANEOUS EVERY MORNING
Refills: 0 | Status: DISCONTINUED | OUTPATIENT
Start: 2021-05-13 | End: 2021-05-14

## 2021-05-13 RX ORDER — INSULIN GLARGINE 100 [IU]/ML
30 INJECTION, SOLUTION SUBCUTANEOUS EVERY MORNING
Refills: 0 | Status: DISCONTINUED | OUTPATIENT
Start: 2021-05-13 | End: 2021-05-13

## 2021-05-13 RX ADMIN — INSULIN GLARGINE 60 UNIT(S): 100 INJECTION, SOLUTION SUBCUTANEOUS at 21:14

## 2021-05-13 RX ADMIN — Medication 250 MILLIGRAM(S): at 05:15

## 2021-05-13 RX ADMIN — Medication 12: at 09:46

## 2021-05-13 RX ADMIN — Medication 20 MILLIGRAM(S): at 18:29

## 2021-05-13 RX ADMIN — Medication 40 UNIT(S): at 09:47

## 2021-05-13 RX ADMIN — Medication 1 TABLET(S): at 17:42

## 2021-05-13 RX ADMIN — TAMSULOSIN HYDROCHLORIDE 0.4 MILLIGRAM(S): 0.4 CAPSULE ORAL at 21:14

## 2021-05-13 RX ADMIN — Medication 1 MILLIGRAM(S): at 11:49

## 2021-05-13 RX ADMIN — ATORVASTATIN CALCIUM 40 MILLIGRAM(S): 80 TABLET, FILM COATED ORAL at 21:14

## 2021-05-13 RX ADMIN — CARVEDILOL PHOSPHATE 12.5 MILLIGRAM(S): 80 CAPSULE, EXTENDED RELEASE ORAL at 17:43

## 2021-05-13 RX ADMIN — Medication 50 MICROGRAM(S): at 05:15

## 2021-05-13 RX ADMIN — Medication 40 UNIT(S): at 17:40

## 2021-05-13 RX ADMIN — PANTOPRAZOLE SODIUM 40 MILLIGRAM(S): 20 TABLET, DELAYED RELEASE ORAL at 09:47

## 2021-05-13 RX ADMIN — DULOXETINE HYDROCHLORIDE 60 MILLIGRAM(S): 30 CAPSULE, DELAYED RELEASE ORAL at 12:34

## 2021-05-13 RX ADMIN — Medication 40 UNIT(S): at 11:50

## 2021-05-13 RX ADMIN — Medication 8: at 11:50

## 2021-05-13 RX ADMIN — Medication 1 TABLET(S): at 11:46

## 2021-05-13 RX ADMIN — Medication 2: at 21:13

## 2021-05-13 RX ADMIN — Medication 81 MILLIGRAM(S): at 11:49

## 2021-05-13 NOTE — PROGRESS NOTE ADULT - SUBJECTIVE AND OBJECTIVE BOX
[facility]                                   [address] [place] [address]                                                      PHONE: [phone]                                                         FAX: [phone]  [email or web address]  ---------------------------------------------------------------------------------------------------------------------------------    Reason for Consult: cough dyspnea     HPI:  MARY ANN CORNELL is an 70y Male with past medical history significant for NICM EF ~ 35%, chronic afib on Coumadin, PVT s/p ICD, HFrEF on optimal medical therapy, CKD, HTN, morbid obesity, CHRISS, DM presented yesterday with couple of day history of dyspnea cough with phlegm associated with increase urinations. The patient was found to have blood glucose level 601 with AG of 16.  The patient is feeling much better now after IVF and IV Abx therapy.  Denies any PND, chest pain or orthopnea        SOB had improved  to be restarted on Demadex 20 mg today    PAST MEDICAL & SURGICAL HISTORY:  DM (diabetes mellitus)    HTN (hypertension)    High cholesterol    Renal insufficiency    Sleep apnea    Prostate CA    Pulmonary hypertension    CHF (congestive heart failure)  EF 30%    Atrial fibrillation    Chronic back pain    Lung nodules    S/P ankle ligament repair    AICD (automatic cardioverter/defibrillator) present        allow double protein at meals (Unknown)  No Known Allergies      MEDICATIONS  (STANDING):  aspirin enteric coated 81 milliGRAM(s) Oral daily  atorvastatin 40 milliGRAM(s) Oral at bedtime  carvedilol 12.5 milliGRAM(s) Oral every 12 hours  dextrose 40% Gel 15 Gram(s) Oral once  dextrose 5%. 1000 milliLiter(s) (50 mL/Hr) IV Continuous <Continuous>  dextrose 5%. 1000 milliLiter(s) (100 mL/Hr) IV Continuous <Continuous>  dextrose 50% Injectable 25 Gram(s) IV Push once  dextrose 50% Injectable 12.5 Gram(s) IV Push once  dextrose 50% Injectable 25 Gram(s) IV Push once  DULoxetine 60 milliGRAM(s) Oral daily  folic acid 1 milliGRAM(s) Oral daily  glucagon  Injectable 1 milliGRAM(s) IntraMuscular once  insulin glargine Injectable (LANTUS) 60 Unit(s) SubCutaneous at bedtime  insulin lispro (ADMELOG) corrective regimen sliding scale   SubCutaneous at bedtime  insulin lispro (ADMELOG) corrective regimen sliding scale   SubCutaneous three times a day before meals  insulin lispro Injectable (ADMELOG) 30 Unit(s) SubCutaneous three times a day before meals  lactobacillus acidophilus 1 Tablet(s) Oral two times a day with meals  levoFLOXacin  Tablet 500 milliGRAM(s) Oral every 24 hours  levothyroxine 50 MICROGram(s) Oral daily  pantoprazole    Tablet 40 milliGRAM(s) Oral before breakfast  tamsulosin 0.4 milliGRAM(s) Oral at bedtime  warfarin 3 milliGRAM(s) Oral once    MEDICATIONS  (PRN):  albuterol/ipratropium for Nebulization 3 milliLiter(s) Nebulizer every 6 hours PRN Shortness of Breath and/or Wheezing      Social History:  Cigarettes:    none                 Alchohol:         none         Illicit Drug Abuse:  none     ROS: Negative other than as mentioned in HPI.    Vital Signs Last 24 Hrs  T(C): 36.3 (12 May 2021 07:55), Max: 37.2 (12 May 2021 04:00)  T(F): 97.4 (12 May 2021 07:55), Max: 98.9 (12 May 2021 04:00)  HR: 103 (12 May 2021 07:55) (67 - 104)  BP: 102/67 (12 May 2021 07:55) (98/63 - 122/72)  BP(mean): --  RR: 18 (12 May 2021 07:55) (18 - 20)  SpO2: 96% (12 May 2021 07:55) (92% - 98%)  ICU Vital Signs Last 24 Hrs  MARY ANN KRAIG  I&O's Detail    I&O's Summary    Drug Dosing Weight  MARY ANN CORNELL      PHYSICAL EXAM:  General: Appears well developed, well nourished alert and cooperative.  HEENT: Head; normocephalic, atraumatic.  Eyes: Pupils reactive, cornea wnl.  Neck: Supple, no nodes adenopathy, no NVD or carotid bruit or thyromegaly.  CARDIOVASCULAR: Normal S1 and S2, 1/6 murmur, rub, gallop or lift.   LUNGS: Decrease BS B/L   ABDOMEN: Soft, nontender without mass or organomegaly. bowel sounds normoactive.  EXTREMITIES: No clubbing, cyanosis + edema. Distal pulses wnl.   SKIN: warm and dry with normal turgor.  NEURO: Alert/oriented x 3/normal motor exam. No pathologic reflexes.    PSYCH: normal affect.        LABS:                        17.4   7.72  )-----------( 176      ( 11 May 2021 20:14 )             52.3     05-12    128<L>  |  89<L>  |  52.0<H>  ----------------------------<  304<H>  3.8   |  22.0  |  1.64<H>    Ca    9.1      12 May 2021 08:39    TPro  7.0  /  Alb  3.4  /  TBili  1.4  /  DBili  x   /  AST  24  /  ALT  11  /  AlkPhos  81  05-12    MARY ANN CORNELL  CARDIAC MARKERS ( 11 May 2021 20:14 )  x     / 0.05 ng/mL / x     / x     / x          PT/INR - ( 12 May 2021 04:11 )   PT: 21.7 sec;   INR: 1.93 ratio         PTT - ( 12 May 2021 04:11 )  PTT:37.1 sec  Urinalysis Basic - ( 12 May 2021 00:50 )    Color: Yellow / Appearance: Clear / S.010 / pH: x  Gluc: x / Ketone: Negative  / Bili: Negative / Urobili: Negative mg/dL   Blood: x / Protein: Negative mg/dL / Nitrite: Negative   Leuk Esterase: Negative / RBC: x / WBC x   Sq Epi: x / Non Sq Epi: x / Bacteria: x        RADIOLOGY & ADDITIONAL STUDIES:    INTERPRETATION OF TELEMETRY (personally reviewed):    ECG: AF PVC     ECHO:      Summary:   1. Technically difficult study.   2. Endocardial visualization was enhanced with intravenous echo contrast.   3. Left ventricular ejection fraction, by visual estimation, is 30 to 35%.   4. Moderately decreased global left ventricular systolic function.   5. Multiple left ventricular regional wall motion abnormalities exist. See wall motion findings.   6. The mitral in-flow pattern reveals no discernable A-wave, therefore no comment on diastolic function can be made.   7. Mildly enlarged right ventricle with moderately reduced RV systolic function.   8. Mildly enlarged left atrium.   9. Trace mitral valve regurgitation.  10. Mild thickening of the anterior and posterior mitral valve leaflets.  11. Mild dilatation of the aortic root (3.7 cm).  12. Compared to a prior study from 9/10/20, there is no significant change.    MD ChacortaElectronically signed on 3/9/2021 at 12:38:51 PM      CARDIAC CATHETERIZATION:  VENTRICLES: LVEF 25% with MR  CORONARY VESSELS: The coronary circulation is right dominant.  LM:   --  LM: Angiography showed minor luminal irregularities with no flow  limiting lesions.  LAD:   --  LAD: Angiography showed minor luminal irregularities with no  flow limiting lesions.  CX:   --  Circumflex: Angiography showed minor luminal irregularities with  no flow limiting lesions.  RCA:   --  RCA: Angiography showed minor luminal irregularities with no  flow limiting lesions.  COMPLICATIONS: There were no complications. No complications occurred  during the cath lab visit.  DIAGNOSTIC IMPRESSIONS: Mild CAD. Non ischemic cardiomyopathy.  DIAGNOSTIC RECOMMENDATIONS: A/C. MARIELA CV The patient should continue with  the present medications.  INTERVENTIONAL IMPRESSIONS: Mild CAD. Non ischemic cardiomyopathy.  INTERVENTIONAL RECOMMENDATIONS: A/C. MARIELA CV  Prepared and signed by  Bright Muñiz MD      Assessment and Plan:  In summary, MARY ANN CORNELL is an 70y Male with past medical history significant for NICM EF ~ 35%, chronic afib on Coumadin, PVT s/p ICD, HFrEF on optimal medical therapy, CKD, HTN, morbid obesity, CHRISS, DM presented yesterday with couple of day history of dyspnea cough with phlegm associated with increase urinations. The patient was found to have blood glucose level 601 with AG of 16.  The patient is feeling much better now after IVF and IV Abx therapy.  Denies any PND, chest pain or orthopnea.   Hyperglycemia possible bronchitis/PNA; volume status stale; Prior TTE 3/9/2021 stable EF and unchanged from prior and prior [facility] 2016 normal coronary arteries     Plan    1.  Tele   2.  restart torsemide 20 mg po daily  3.  Continue Coreg therapy   4.  Abx therapy as per primary team

## 2021-05-13 NOTE — PROGRESS NOTE ADULT - SUBJECTIVE AND OBJECTIVE BOX
HPI:    69 y/o morbidly obese male came to the ER from urgent care center where he went for cough and phlegm for past 3 days, no fever, no sick contact, no recent travel. pt. reports loose BM x 2 one yesterday and one on the day of ER visit. one episode of vomiting one day before the R visit. no abd. pain. no cp. no difficulty in breathing, no recent antibiotic use. No vomiting and diarrhea in the ER. pt. reports not sleeping well for past 3 days and while he was sitting on the edge of the bed he went to sleep and fell back on his bed and hit the wall on the side of his bed, did not fell on the ground or hit the head on the ground. pt. remembers hitting the wall and falling into sleep. no HA or neck pain after the incident. upon arrival in the ER pt's BG was 601, gap 16, co2 26. pt's ct head and neck with no acute findings. As per pt. he uses 3 mg of coumadin every night but ER did not check his INR so did not use any while in the ER. pt. also sated that he uses cpap of 10 some nights and does not want to use now and wants me to order it and if needs it on 21 night time then will use it.    Pt seen/examined; does not follow with nephrologist in outpatient setting; Pt states he is on furosemide 40mg one day; alternates with lasix 40mg + torsemide 20mg ;     PAST HISTORY  --------------------------------------------------------------------------------  PAST MEDICAL & SURGICAL HISTORY:  DM (diabetes mellitus)    HTN (hypertension)    High cholesterol    Renal insufficiency    Sleep apnea    Prostate CA    Pulmonary hypertension    CHF (congestive heart failure)  EF 30%    Atrial fibrillation    Chronic back pain    Lung nodules    S/P ankle ligament repair    AICD (automatic cardioverter/defibrillator) present      FAMILY HISTORY:  Family history of cancer (Sibling)    Family history of diabetes mellitus (Sibling)      PAST SOCIAL HISTORY:    ALLERGIES & MEDICATIONS  --------------------------------------------------------------------------------  Allergies    No Known Allergies    Intolerances    allow double protein at meals (Unknown)    Standing Inpatient Medications  aspirin enteric coated 81 milliGRAM(s) Oral daily  atorvastatin 40 milliGRAM(s) Oral at bedtime  carvedilol 12.5 milliGRAM(s) Oral every 12 hours  dextrose 40% Gel 15 Gram(s) Oral once  dextrose 5%. 1000 milliLiter(s) IV Continuous <Continuous>  dextrose 5%. 1000 milliLiter(s) IV Continuous <Continuous>  dextrose 50% Injectable 25 Gram(s) IV Push once  dextrose 50% Injectable 12.5 Gram(s) IV Push once  dextrose 50% Injectable 25 Gram(s) IV Push once  DULoxetine 60 milliGRAM(s) Oral daily  folic acid 1 milliGRAM(s) Oral daily  glucagon  Injectable 1 milliGRAM(s) IntraMuscular once  insulin glargine Injectable (LANTUS) 60 Unit(s) SubCutaneous at bedtime  insulin lispro (ADMELOG) corrective regimen sliding scale   SubCutaneous at bedtime  insulin lispro (ADMELOG) corrective regimen sliding scale   SubCutaneous three times a day before meals  insulin lispro Injectable (ADMELOG) 30 Unit(s) SubCutaneous three times a day before meals  lactobacillus acidophilus 1 Tablet(s) Oral two times a day with meals  levothyroxine 50 MICROGram(s) Oral daily  pantoprazole    Tablet 40 milliGRAM(s) Oral before breakfast  tamsulosin 0.4 milliGRAM(s) Oral at bedtime  warfarin 3 milliGRAM(s) Oral once    PRN Inpatient Medications  albuterol/ipratropium for Nebulization 3 milliLiter(s) Nebulizer every 6 hours PRN    REVIEW OF SYSTEMS  --------------------------------------------------------------------------------  Gen: No weight changes, fatigue, fevers/chills, weakness  Skin: No rashes  Head/Eyes/Ears/Mouth: No headache; Normal hearing; Normal vision w/o blurriness; No sinus pain/discomfort, sore throat  Respiratory: No dyspnea, cough, wheezing, hemoptysis  CV: No chest pain, PND, orthopnea  GI: No abdominal pain, diarrhea, constipation, nausea, vomiting, melena, hematochezia  : No increased frequency, dysuria, hematuria, nocturia  MSK: No joint pain/swelling; no back pain; + edema  Neuro: No dizziness/lightheadedness, weakness, seizures, numbness, tingling  Heme: No easy bruising or bleeding  Endo: No heat/cold intolerance  Psych: No significant nervousness, anxiety, stress, depression    All other systems were reviewed and are negative, except as noted.    VITALS/PHYSICAL EXAM:    Vital Signs Last 24 Hrs,    T(C): 36.5 (13 May 2021 09:02), Max: 36.9 (12 May 2021 15:11)  T(F): 97.7 (13 May 2021 09:02), Max: 98.4 (12 May 2021 15:11)  HR: 72 (13 May 2021 09:02) (72 - 122)  BP: 134/72 (13 May 2021 09:02) (93/61 - 134/72)  RR: 20 (13 May 2021 09:02) (18 - 20)  SpO2: 98% (13 May 2021 09:02) (93% - 100%)  --------------------------------------------------------------------------------  T(C): 36.3 (21 @ 07:55), Max: 37.2 (21 @ 04:00)  HR: 103 (21 @ 07:55) (67 - 104)  BP: 102/67 (21 @ 07:55) (98/63 - 122/72)  RR: 18 (21 @ 07:55) (18 - 20)  SpO2: 96% (21 @ 07:55) (92% - 98%)    Height (cm): 175.3 (21 @ 18:44)  Weight (kg): 158.8 (21 @ 18:44)  BMI (kg/m2): 51.7 (21 @ 18:44)  BSA (m2): 2.62 (21 @ 18:44)    Physical Exam:  	Gen: NAD   	HEENT: PERRL, supple neck, clear oropharynx  	Pulm: CTA B/L  	CV: RRR, S1S2; no rub  	Back: No spinal or CVA tenderness; no sacral edema  	Abd: +BS, soft, nontender/nondistended  	: No suprapubic tenderness  	UE: Warm, FROM, no clubbing, intact strength; no edema; no asterixis  	LE: Warm, FROM, no clubbing, intact strength; no edema  	Neuro: No focal deficits, intact gait  	Psych: Normal affect and mood  	Skin: Warm, without rashes       LABS/STUDIES:    132<L>  |  90<L>  |  53.0<H>  ----------------------------<  386<H>  Ca:9.5   (13 May 2021 04:07)  3.4<L>   |  28.0   |  1.60<H>    eGFR if Non : 43 *L*  eGFR if : 50 *L*    TPro  7.0    /  Alb  3.5    /  TBili  0.8    /  DBili  x      /  AST  21     /  ALT  11     /  AlkPhos  80     13 May 2021 04:07                        16.9   6.93  )-----------( 148<L>    ( 13 May 2021 04:07 )             52.0<H>    Phos:-- M.2 mg/dL PTH:-- Uric acid:-- Serum Osm:--  Ferritin:-- Iron:-- TIBC:-- Tsat:--  B12:3.25 uIU/mL TSH:-- ( @ 04:07)    Urinalysis Basic - ( 12 May 2021 00:50 )  Color: Yellow / Appearance: Clear / S.010 / pH: x  Gluc: x / Ketone: Negative  / Bili: Negative / Urobili: Negative mg/dL   Blood: x / Protein: Negative mg/dL / Nitrite: Negative   Leuk Esterase: Negative / RBC: x / WBC x   Sq Epi: x / Non Sq Epi: x / Bacteria: x  --------------------------------------------------------------------------------              17.4   7.72  >-----------<  176      [21 20:14]              52.3     128  |  89  |  52.0  ----------------------------<  304      [21 08:39]  3.8   |  22.0  |  1.64        Ca     9.1     [21 08:39]    TPro  7.0  /  Alb  3.4  /  TBili  1.4  /  DBili  x   /  AST  24  /  ALT  11  /  AlkPhos  81  [21 08:39]    PT/INR: PT 21.7 , INR 1.93       [21 04:11]  PTT: 37.1       [21 04:11]    Troponin 0.05      [21 20:14]    Creatinine Trend:  SCr 1.64 [ 08:39]  SCr 1.91 [ 04:11]  SCr 1.90 [ 23:14]  SCr 1.40 [ 20:14]    Urinalysis - [21 @ 00:50]      Color Yellow / Appearance Clear / SG 1.010 / pH 6.0      Gluc 1000 / Ketone Negative  / Bili Negative / Urobili Negative       Blood Negative / Protein Negative / Leuk Est Negative / Nitrite Negative      RBC  / WBC  / Hyaline  / Gran  / Sq Epi  / Non Sq Epi  / Bacteria     HbA1c 11.6      [19 @ 04:32]  TSH 3.62      [21 @ 07:55]  Lipid: chol 195, , HDL 24, LDL --      [21 @ 07:35]    HCV 0.12, Nonreact      [19 @ 16:11]    1) JAYLENE on CKD III  2) HTN  3) DM  4) CHF

## 2021-05-13 NOTE — PROGRESS NOTE ADULT - SUBJECTIVE AND OBJECTIVE BOX
Interval Events:  no overnight events  follow up on diabetes    patient seen and examined at bedside.  FS better yesterday  much more hyperglycemic today  s/p lispro 14u x1 this AM      REVIEW OF SYSTEMS:    CONSTITUTIONAL: No fever, weight loss, or fatigue  EYES: No eye pain, visual disturbances, or discharge  ENMT:  No difficulty hearing, tinnitus, vertigo; No sinus or throat pain  NECK: No pain or stiffness  RESPIRATORY: No cough, wheezing, chills or hemoptysis; No shortness of breath  CARDIOVASCULAR: No chest pain, palpitations, dizziness, or leg swelling  GASTROINTESTINAL: No abdominal or epigastric pain. No nausea, vomiting, or hematemesis; No diarrhea or constipation. No melena or hematochezia.  NEUROLOGICAL: No headaches, memory loss, loss of strength, numbness, or tremors  SKIN: No itching, burning, rashes, or lesions   MUSCULOSKELETAL: No joint pain or swelling; No muscle, back, or extremity pain  PSYCHIATRIC: No depression, anxiety, mood swings, or difficulty sleeping        allow double protein at meals (Unknown)  No Known Allergies      MEDICATIONS  (STANDING):  aspirin enteric coated 81 milliGRAM(s) Oral daily  atorvastatin 40 milliGRAM(s) Oral at bedtime  azithromycin  IVPB 500 milliGRAM(s) IV Intermittent every 24 hours  carvedilol 12.5 milliGRAM(s) Oral every 12 hours  cefTRIAXone   IVPB 1000 milliGRAM(s) IV Intermittent every 24 hours  dextrose 40% Gel 15 Gram(s) Oral once  dextrose 5%. 1000 milliLiter(s) (50 mL/Hr) IV Continuous <Continuous>  dextrose 5%. 1000 milliLiter(s) (100 mL/Hr) IV Continuous <Continuous>  dextrose 50% Injectable 25 Gram(s) IV Push once  dextrose 50% Injectable 12.5 Gram(s) IV Push once  dextrose 50% Injectable 25 Gram(s) IV Push once  DULoxetine 60 milliGRAM(s) Oral daily  folic acid 1 milliGRAM(s) Oral daily  glucagon  Injectable 1 milliGRAM(s) IntraMuscular once  insulin glargine Injectable (LANTUS) 20 Unit(s) SubCutaneous every morning  insulin glargine Injectable (LANTUS) 60 Unit(s) SubCutaneous at bedtime  insulin lispro (ADMELOG) corrective regimen sliding scale   SubCutaneous at bedtime  insulin lispro (ADMELOG) corrective regimen sliding scale   SubCutaneous three times a day before meals  insulin lispro Injectable (ADMELOG) 40 Unit(s) SubCutaneous three times a day before meals  lactobacillus acidophilus 1 Tablet(s) Oral two times a day with meals  levothyroxine 50 MICROGram(s) Oral daily  pantoprazole    Tablet 40 milliGRAM(s) Oral before breakfast  saccharomyces boulardii 250 milliGRAM(s) Oral two times a day  tamsulosin 0.4 milliGRAM(s) Oral at bedtime  torsemide 20 milliGRAM(s) Oral daily    MEDICATIONS  (PRN):  albuterol/ipratropium for Nebulization 3 milliLiter(s) Nebulizer every 6 hours PRN Shortness of Breath and/or Wheezing      Vital Signs Last 24 Hrs  T(C): 36.5 (13 May 2021 09:02), Max: 36.9 (12 May 2021 15:11)  T(F): 97.7 (13 May 2021 09:02), Max: 98.4 (12 May 2021 15:11)  HR: 72 (13 May 2021 09:02) (72 - 122)  BP: 134/72 (13 May 2021 09:02) (93/61 - 134/72)  BP(mean): --  RR: 20 (13 May 2021 09:02) (18 - 20)  SpO2: 98% (13 May 2021 09:02) (93% - 100%)    Physical Exam:    Constitutional: NAD, morbidly obese  HEENT: EOMI, no exophalmos  Neck: trachea midline, no thyroid enlargement  Respiratory: CTAB, normal respirations  Cardiovascular: S1 and S2, RRR  Gastrointestinal: BS+, soft, ntnd  Extremities: +peripheral edema  Neurological: AOx3, no focal deficits  Psychiatric: Normal mood and normal affect  Skin: b/l LE chronic skin changes    LABS  05-13    132<L>  |  90<L>  |  53.0<H>  ----------------------------<  386<H>  3.4<L>   |  28.0  |  1.60<H>    Ca    9.5      13 May 2021 04:07  Mg     2.2     05-13    TPro  7.0  /  Alb  3.5  /  TBili  0.8  /  DBili  x   /  AST  21  /  ALT  11  /  AlkPhos  80  05-13                          16.9   6.93  )-----------( 148      ( 13 May 2021 04:07 )             52.0     Triglycerides, Serum: 224 mg/dL (05-13-21 @ 04:07)  HDL Cholesterol, Serum: 26 mg/dL (05-13-21 @ 04:07)  Cholesterol, Serum: 115 mg/dL (05-13-21 @ 04:07)    A1C with Estimated Average Glucose Result: 10.3 % (05-13-21 @ 04:07)  Thyroid Stimulating Hormone, Serum: 3.25 uIU/mL (05-13-21 @ 04:07)    Alanine Aminotransferase (ALT/SGPT): 11 U/L (05-13-21 @ 04:07)  Albumin, Serum: 3.5 g/dL (05-13-21 @ 04:07)  Aspartate Aminotransferase (AST/SGOT): 21 U/L (05-13-21 @ 04:07)  Alkaline Phosphatase, Serum: 80 U/L (05-13-21 @ 04:07)  Albumin, Serum: 3.4 g/dL (05-12-21 @ 08:39)  Alkaline Phosphatase, Serum: 81 U/L (05-12-21 @ 08:39)  Alanine Aminotransferase (ALT/SGPT): 11 U/L (05-12-21 @ 08:39)  Aspartate Aminotransferase (AST/SGOT): 24 U/L (05-12-21 @ 08:39)  Alanine Aminotransferase (ALT/SGPT): 13 U/L (05-11-21 @ 20:14)  Alkaline Phosphatase, Serum: 92 U/L (05-11-21 @ 20:14)  Albumin, Serum: 3.7 g/dL (05-11-21 @ 20:14)  Aspartate Aminotransferase (AST/SGOT): 25 U/L (05-11-21 @ 20:14)          CAPILLARY BLOOD GLUCOSE      POCT Blood Glucose.: 313 mg/dL (13 May 2021 11:29)  POCT Blood Glucose.: 417 mg/dL (13 May 2021 09:42)  POCT Blood Glucose.: >530 mg/dL (13 May 2021 09:36)  POCT Blood Glucose.: 245 mg/dL (12 May 2021 21:21)  POCT Blood Glucose.: 290 mg/dL (12 May 2021 17:43)  POCT Blood Glucose.: 274 mg/dL (12 May 2021 12:10)   Interval Events:  no overnight events  follow up on diabetes    patient seen and examined at bedside.  FS better yesterday  much more hyperglycemic today. had pasta and milk and crackers yesterday  eats sugar free cookies at home  s/p lispro 14u x1 this AM  still did not sleep with cpap but stopped with nocturia    REVIEW OF SYSTEMS:    CONSTITUTIONAL: No fever, weight loss, or fatigue  EYES: No eye pain, visual disturbances, or discharge  ENMT:  No difficulty hearing, tinnitus, vertigo; No sinus or throat pain  NECK: No pain or stiffness  RESPIRATORY: No cough, wheezing, chills or hemoptysis; No shortness of breath  CARDIOVASCULAR: No chest pain, palpitations, dizziness, or leg swelling  GASTROINTESTINAL: No abdominal or epigastric pain. No nausea, vomiting, or hematemesis; No diarrhea or constipation. No melena or hematochezia.  NEUROLOGICAL: No headaches, memory loss, loss of strength, numbness, or tremors  SKIN: No itching, burning, rashes, or lesions   MUSCULOSKELETAL: No joint pain or swelling; No muscle, back, or extremity pain  PSYCHIATRIC: No depression, anxiety, mood swings, or difficulty sleeping        allow double protein at meals (Unknown)  No Known Allergies      MEDICATIONS  (STANDING):  aspirin enteric coated 81 milliGRAM(s) Oral daily  atorvastatin 40 milliGRAM(s) Oral at bedtime  azithromycin  IVPB 500 milliGRAM(s) IV Intermittent every 24 hours  carvedilol 12.5 milliGRAM(s) Oral every 12 hours  cefTRIAXone   IVPB 1000 milliGRAM(s) IV Intermittent every 24 hours  dextrose 40% Gel 15 Gram(s) Oral once  dextrose 5%. 1000 milliLiter(s) (50 mL/Hr) IV Continuous <Continuous>  dextrose 5%. 1000 milliLiter(s) (100 mL/Hr) IV Continuous <Continuous>  dextrose 50% Injectable 25 Gram(s) IV Push once  dextrose 50% Injectable 12.5 Gram(s) IV Push once  dextrose 50% Injectable 25 Gram(s) IV Push once  DULoxetine 60 milliGRAM(s) Oral daily  folic acid 1 milliGRAM(s) Oral daily  glucagon  Injectable 1 milliGRAM(s) IntraMuscular once  insulin glargine Injectable (LANTUS) 20 Unit(s) SubCutaneous every morning  insulin glargine Injectable (LANTUS) 60 Unit(s) SubCutaneous at bedtime  insulin lispro (ADMELOG) corrective regimen sliding scale   SubCutaneous at bedtime  insulin lispro (ADMELOG) corrective regimen sliding scale   SubCutaneous three times a day before meals  insulin lispro Injectable (ADMELOG) 40 Unit(s) SubCutaneous three times a day before meals  lactobacillus acidophilus 1 Tablet(s) Oral two times a day with meals  levothyroxine 50 MICROGram(s) Oral daily  pantoprazole    Tablet 40 milliGRAM(s) Oral before breakfast  saccharomyces boulardii 250 milliGRAM(s) Oral two times a day  tamsulosin 0.4 milliGRAM(s) Oral at bedtime  torsemide 20 milliGRAM(s) Oral daily    MEDICATIONS  (PRN):  albuterol/ipratropium for Nebulization 3 milliLiter(s) Nebulizer every 6 hours PRN Shortness of Breath and/or Wheezing      Vital Signs Last 24 Hrs  T(C): 36.5 (13 May 2021 09:02), Max: 36.9 (12 May 2021 15:11)  T(F): 97.7 (13 May 2021 09:02), Max: 98.4 (12 May 2021 15:11)  HR: 72 (13 May 2021 09:02) (72 - 122)  BP: 134/72 (13 May 2021 09:02) (93/61 - 134/72)  BP(mean): --  RR: 20 (13 May 2021 09:02) (18 - 20)  SpO2: 98% (13 May 2021 09:02) (93% - 100%)    Physical Exam:    Constitutional: NAD, morbidly obese  HEENT: EOMI, no exophalmos  Neck: trachea midline, no thyroid enlargement  Respiratory: CTAB, normal respirations  Cardiovascular: S1 and S2, RRR  Gastrointestinal: BS+, soft, ntnd  Extremities: +peripheral edema  Neurological: AOx3, no focal deficits  Psychiatric: Normal mood and normal affect  Skin: b/l LE chronic skin changes    LABS  05-13    132<L>  |  90<L>  |  53.0<H>  ----------------------------<  386<H>  3.4<L>   |  28.0  |  1.60<H>    Ca    9.5      13 May 2021 04:07  Mg     2.2     05-13    TPro  7.0  /  Alb  3.5  /  TBili  0.8  /  DBili  x   /  AST  21  /  ALT  11  /  AlkPhos  80  05-13                          16.9   6.93  )-----------( 148      ( 13 May 2021 04:07 )             52.0     Triglycerides, Serum: 224 mg/dL (05-13-21 @ 04:07)  HDL Cholesterol, Serum: 26 mg/dL (05-13-21 @ 04:07)  Cholesterol, Serum: 115 mg/dL (05-13-21 @ 04:07)    A1C with Estimated Average Glucose Result: 10.3 % (05-13-21 @ 04:07)  Thyroid Stimulating Hormone, Serum: 3.25 uIU/mL (05-13-21 @ 04:07)    Alanine Aminotransferase (ALT/SGPT): 11 U/L (05-13-21 @ 04:07)  Albumin, Serum: 3.5 g/dL (05-13-21 @ 04:07)  Aspartate Aminotransferase (AST/SGOT): 21 U/L (05-13-21 @ 04:07)  Alkaline Phosphatase, Serum: 80 U/L (05-13-21 @ 04:07)  Albumin, Serum: 3.4 g/dL (05-12-21 @ 08:39)  Alkaline Phosphatase, Serum: 81 U/L (05-12-21 @ 08:39)  Alanine Aminotransferase (ALT/SGPT): 11 U/L (05-12-21 @ 08:39)  Aspartate Aminotransferase (AST/SGOT): 24 U/L (05-12-21 @ 08:39)  Alanine Aminotransferase (ALT/SGPT): 13 U/L (05-11-21 @ 20:14)  Alkaline Phosphatase, Serum: 92 U/L (05-11-21 @ 20:14)  Albumin, Serum: 3.7 g/dL (05-11-21 @ 20:14)  Aspartate Aminotransferase (AST/SGOT): 25 U/L (05-11-21 @ 20:14)          CAPILLARY BLOOD GLUCOSE      POCT Blood Glucose.: 313 mg/dL (13 May 2021 11:29)  POCT Blood Glucose.: 417 mg/dL (13 May 2021 09:42)  POCT Blood Glucose.: >530 mg/dL (13 May 2021 09:36)  POCT Blood Glucose.: 245 mg/dL (12 May 2021 21:21)  POCT Blood Glucose.: 290 mg/dL (12 May 2021 17:43)  POCT Blood Glucose.: 274 mg/dL (12 May 2021 12:10)

## 2021-05-13 NOTE — PROGRESS NOTE ADULT - ASSESSMENT
71 y/o morbidly obese male came to the ER from urgent care center where he went for cough and phlegm for past 3 days, no fever, no sick contact, no recent travel. pt. reports loose BM x 2 one yesterday and one on the day of ER visit. one episode of vomiting one day before the R visit. no abd. pain. no cp. no difficulty in breathing, no recent antibiotic use. No vomiting and diarrhea in the ER. pt. reports not sleeping well for past 3 days and while he was sitting on the edge of the bed he went to sleep and fell back on his bed and hit the wall on the side of his bed, did not fell on the ground or hit the head on the ground. pt. remembers hitting the wall and falling into sleep. no HA or neck pain after the incident. upon arrival in the ER pt's BG was 601, gap 16, co2 26. pt's ct head and neck with no acute findings.   As per pt. he uses 3 mg of coumadin every night but ER did not check his INR so did not use any while in the ER. pt. also sated that he uses cpap of 10 some nights. Will order CPAP QHS    1.Uncontrolled type 2  Diabetes with hyperglycemia, metabolic anion gap resolved     On Lantus  and Lispro ,    2. JAYLENE on CKD , improving    Torsemide 20 mg po daily resumed today     Creatinine, Serum: 1.57 mg/dL (05.13.21 @ 04:07)       Historical Values  Creatinine, Serum: 1.57 mg/dL (05.13.21 @ 04:07)   Creatinine, Serum: 1.60 mg/dL (05.13.21 @ 04:07)   Creatinine, Serum: 1.64 mg/dL (05.12.21 @ 08:39)   Creatinine, Serum: 1.91 mg/dL (05.12.21 @ 04:11)   Creatinine, Serum: 1.90 mg/dL (05.11.21 @ 23:14)   Creatinine, Serum: 1.40 mg/dL (05.11.21 @ 20:14)   Creatinine, Serum: 1.43 mg/dL (03.09.21 @ 04:16)   Creatinine, Serum: 1.31 mg/dL (03.08.21 @ 07:35)   Creatinine, Serum: 1.51 mg/dL (03.07.21 @ 14:11)   Creatinine, Serum: 1.30 mg/dL (03.07.21 @ 07:55)   Creatinine, Serum: 1.43 mg/dL (03.07.21 @ 01:30)   Creatinine, Serum: 1.58 mg/dL (03.06.21 @ 18:38)   Creatinine, Serum: 1.19 mg/dL (09.12.20 @ 11:16)   Creatinine, Serum: 1.34 mg/dL (09.11.20 @ 08:44)   Creatinine, Serum: 1.21 mg/dL (09.10.20 @ 07:48)   Creatinine, Serum: 1.46 mg/dL (09.09.20 @ 07:43)   Creatinine, Serum: 1.48 mg/dL (09.08.20 @ 07:04)   Creatinine, Serum: 1.53 mg/dL (09.07.20 @ 06:21)   Creatinine, Serum: 1.29 mg/dL (09.05.20 @ 21:24)   Creatinine, Serum: 1.43 mg/dL (09.05.20 @ 17:38)   Creatinine, Serum: 1.02 mg/dL (06.03.20 @ 17:43)   Creatinine, Serum: 1.03 mg/dL (05.07.20 @ 19:56)   Creatinine, Serum: 1.01 mg/dL (01.20.20 @ 12:43)   Creatinine, Serum: 1.24 mg/dL (11.03.19 @ 16:37)   Creatinine, Serum: 1.35 mg/dL (09.13.19 @ 07:41)   Creatinine, Serum: 1.21 mg/dL (09.11.19 @ 07:49)   Creatinine, Serum: 1.23 mg/dL (09.10.19 @ 05:48)   Creatinine, Serum: 1.33 mg/dL (09.09.19 @ 06:03)   Creatinine, Serum: 1.36 mg/dL (09.08.19 @ 06:18)   Creatinine, Serum: 1.65 mg/dL (09.07.19 @ 06:10)   Creatinine, Serum: 2.14 mg/dL (09.06.19 @ 05:39)   Creatinine, Serum: 2.68 mg/dL (09.05.19 @ 14:01)   Creatinine, Serum: 2.94 mg/dL (09.05.19 @ 04:32)   Creatinine, Serum: 3.13 mg/dL (09.05.19 @ 00:58)   Creatinine, Serum: 3.12 mg/dL (09.04.19 @ 18:49)   Creatinine, Serum: 2.85 mg/dL (09.04.19 @ 13:10)   Creatinine, Serum: 1.20 mg/dL (04.05.19 @ 02:46)   Creatinine, Serum: 1.20 mg/dL (09.14.18 @ 05:31)   Creatinine, Serum: 1.23 mg/dL (09.13.18 @ 13:13)   Creatinine, Serum: 1.39 mg/dL (09.12.18 @ 21:20)   Creatinine, Serum: 1.68 mg/dL (12.14.17 @ 09:19)   Creatinine, Serum: 1.68 mg/dL (12.13.17 @ 10:45)   Creatinine, Serum: 1.56 mg/dL (12.12.17 @ 07:39)   Creatinine, Serum: 1.63 mg/dL (12.11.17 @ 07:47)   Creatinine, Serum: 1.86 mg/dL (12.10.17 @ 07:23)   Creatinine, Serum: 1.79 mg/dL (12.09.17 @ 07:19)   Creatinine, Serum: 1.71 mg/dL (12.08.17 @ 13:44)   Creatinine, Serum: 1.46 mg/dL (11.01.17 @ 06:23)   Creatinine, Serum: 1.44 mg/dL (10.31.17 @ 06:47)   Creatinine, Serum: 1.49 mg/dL (10.30.17 @ 05:51)   Creatinine, Serum: 1.58 mg/dL (10.29.17 @ 07:32)   Creatinine, Serum: 1.59 mg/dL (10.28.17 @ 07:34)   Creatinine, Serum: 1.51 mg/dL (10.27.17 @ 06:23)   Creatinine, Serum: 1.66 mg/dL (10.26.17 @ 06:57)   Creatinine, Serum: 1.62 mg/dL (10.25.17 @ 12:31)   Creatinine, Serum: 2.42 mg/dL (04.06.17 @ 03:09)   Creatinine, Serum: 2.42 mg/dL (04.06.17 @ 03:09)   Creatinine, Serum: 2.50 mg/dL (04.05.17 @ 18:19)   Creatinine, Serum: 1.87 mg/dL (04.05.17 @ 10:59)   Creatinine, Serum: 1.67 mg/dL (04.01.16 @ 05:17)   Creatinine, Serum: 1.84 mg/dL (03.30.16 @ 05:03)   Creatinine, Serum: 1.65 mg/dL (03.29.16 @ 04:47)   Creatinine, Serum: 1.66 mg/dL (03.27.16 @ 20:52)   Creatinine, Serum: 1.70 mg/dL (03.27.16 @ 04:58)   Creatinine, Serum: 1.51 mg/dL (03.25.16 @ 06:50)   Creatinine, Serum: 1.58 mg/dL (03.24.16 @ 04:15)   Creatinine, Serum: 1.44 mg/dL (03.23.16 @ 18:20)     3. Chronic HFrEF, NICM no evidence of acute exacerbation,    -Prior TTE 3/9/2021 stable EF and unchanged from prior and prior [facility] 2016 normal coronary arteries   On torsemide ,  coreg    4. Chronic Atrial Fibrillation, Rate controlled on Carvedilol & AC,      Please Call As Needed,    TY
70M morbidly obese, w/ T2DM cb CKD3/CHF EF 30% s/p AICD, sleep apnea, HLD, HTN, came to the ER from urgent care center where he went for cough and phlegm for past 3 days, no fever, no sick contact, no recent travel. pt. reports loose BM x 2 one yesterday and one on the day of ER visit. in the ER pt's BG was 601, gap 16, co2 26. pt's ct head and neck with no acute findings.   Admitted for JAYLENE on CKD,and hyperglycemia  Consult for diabetes mgmt    Uncontrolled T2DM- hyperglycemic, related to diet  -A1c 10.3  -check sugars AC and bedtime  -ensure diabetic diet  -continue lantus 60 units QHS  -add lantus 20 units QAM  -continue lispro 40 units TID  -continue with insulin sliding scale  -patient with complaints of polyuria, probably should not continue jardiance as outpatient and rediscuss with his outpatient endo    JAYLENE on CKD- being followed by renal. sCr improving    HLD- continue statin  
71 y/o morbidly obese male came to the ER from urgent care center where he went for cough and phlegm for past 3 days, no fever, no sick contact, no recent travel. pt. reports loose BM x 2 one yesterday and one on the day of ER visit. one episode of vomiting one day before the R visit. no abd. pain. no cp. no difficulty in breathing, no recent antibiotic use. No vomiting and diarrhea in the ER. pt. reports not sleeping well for past 3 days and while he was sitting on the edge of the bed he went to sleep and fell back on his bed and hit the wall on the side of his bed, did not fell on the ground or hit the head on the ground. pt. remembers hitting the wall and falling into sleep. no HA or neck pain after the incident. upon arrival in the ER pt's BG was 601, gap 16, co2 26. pt's ct head and neck with no acute findings.   As per pt. he uses 3 mg of coumadin every night but ER did not check his INR so did not use any while in the ER. pt. also sated that he uses cpap of 10 some nights. Will order CPAP QHS    1. Diabetes type 2 uncontrolled hyperglycemia, metabolic anion gap resolved (prior to arrival 2 days diarrhea)  -s/p 4L NS and 8units regular insulin IVP in ER, gap closed  -A1c 11.5 3/2021, repeat A1c in am  -Endo consulted, recs appreciated  -continue ISS with moderate coverage  -Admelog 30u premeal  -Lantus 60u QHS  -Diabetic diet     2. Productive cough most likely Bronchitis vs Pneumonia no fever or leukocytosis   -CT scan negative for acute Pnu   -RVP/COVID negative  -Levaquin discontinued in the setting of hyperglycemia   -ceftriaxone/azithromycin daily for now, de-escalate promptly, add probiotic  -nebs prn   -procal pending in the setting of lactemia (4.4)  -trend WBC, fever, repeat Lactate in 6 hours  -urinalysis neg UTI  -check BC's if spike in temp or WBC's    3. JAYLENE on CKD basline 1.31, improving  -s/p IVF in ED  -creat 1.90->1.91->1.53  -diuretics on hold, resume torsemide 20mg in am   -no lasix for now   -nephro consulted, recs appreciated     4. Hypokalemia s/p diarrhea/vomitting and IV insulin, now resolved  -replinished 40meq po x 2  -monitor daily     5. Chronic HFrEF, NICM no evidence of acute exacerbation  -Prior TTE 3/9/2021 stable EF and unchanged from prior and prior [facility] 2016 normal coronary arteries   -torsemide hold, resume in am if stable  -continue coreg  -cardio following, recs appreciated   -DASH diet     6. Chronic Atrial Fibrillation, controlled on Coumadin  -continue tele monitoring  -daily INR with coumadin dosing(home dose 3mg)  -coumadin 3mg today/INR 1.93    7. Morbid obesity/CHRISS  -encourage lifestyle modifications including dietary changes and weight reduction  - CPAP 10cm ordered QHS     8. Hyperlipidemia   -continue statin  -check lipid panel in am     9. Hypothyroidism  -check TSH in am   -continue levothyroxine     VTE ppx: currently on coumadin  Plan discussed with patient, RN, and attending   
71 y/o morbidly obese male came to the ER from urgent care center where he went for cough and phlegm for past 3 days, no fever, no sick contact, no recent travel. pt. reports loose BM x 2 one yesterday and one on the day of ER visit. one episode of vomiting one day before the R visit. no abd. pain. no cp. no difficulty in breathing, no recent antibiotic use. No vomiting and diarrhea in the ER. pt. reports not sleeping well for past 3 days and while he was sitting on the edge of the bed he went to sleep and fell back on his bed and hit the wall on the side of his bed, did not fell on the ground or hit the head on the ground. pt. remembers hitting the wall and falling into sleep. no HA or neck pain after the incident. upon arrival in the ER pt's BG was 601, gap 16, co2 26. pt's ct head and neck with no acute findings.   As per pt. he uses 3 mg of coumadin every night but ER did not check his INR so did not use any while in the ER. pt. also sated that he uses cpap of 10 some nights. Will order CPAP QHS    1.Uncontrolled type 2  Diabetes with hyperglycemia, metabolic anion gap resolved   HBA1C 10  BG not controlled: 530-->417-->313  On Lantus 60 and Lispro 40 TID   Lantus 20 units added for AM       2. Productive cough most likely Bronchitis vs Pneumonia no fever or leukocytosis   -CT scan negative for acute Pnu   d/c Antibiotics   -RVP/COVID negative      3. JAYLENE on CKD basline 1.31, improving  -s/p IVF in ED  -creat 1.90->1.91->1.53  Torsemide 20 mg po daily resumed today     4. Hypokalemia s/p diarrhea/vomitting and IV insulin, now resolved  -replinished 40meq po x 2  -monitor daily     5. Chronic HFrEF, NICM no evidence of acute exacerbation  -Prior TTE 3/9/2021 stable EF and unchanged from prior and prior [facility] 2016 normal coronary arteries   torsemide resumed   -continue coreg    6. Chronic Atrial Fibrillation, Rate controlled on Carvedilol   ON Coumadin for anticoagulation       7. Morbid obesity/CHRISS  -encourage lifestyle modifications including dietary changes and weight reduction  - CPAP 10cm ordered QHS     8. Hyperlipidemia   -continue statin  -check lipid panel in am     9. Hypothyroidism  TSH normal   -continue levothyroxine     VTE ppx: currently on coumadin  Plan discussed with patient, RN, and attending

## 2021-05-13 NOTE — PROGRESS NOTE ADULT - SUBJECTIVE AND OBJECTIVE BOX
Patient is a 70y old  Male who presents with a chief complaint of Uncontrolled DM (13 May 2021 11:42)      INTERVAL HPI/OVERNIGHT EVENTS: No new complains.   CT chest done: no PNA  BG not controlled.     MEDICATIONS  (STANDING):  aspirin enteric coated 81 milliGRAM(s) Oral daily  atorvastatin 40 milliGRAM(s) Oral at bedtime  carvedilol 12.5 milliGRAM(s) Oral every 12 hours  dextrose 40% Gel 15 Gram(s) Oral once  dextrose 5%. 1000 milliLiter(s) (50 mL/Hr) IV Continuous <Continuous>  dextrose 5%. 1000 milliLiter(s) (100 mL/Hr) IV Continuous <Continuous>  dextrose 50% Injectable 25 Gram(s) IV Push once  dextrose 50% Injectable 12.5 Gram(s) IV Push once  dextrose 50% Injectable 25 Gram(s) IV Push once  DULoxetine 60 milliGRAM(s) Oral daily  folic acid 1 milliGRAM(s) Oral daily  glucagon  Injectable 1 milliGRAM(s) IntraMuscular once  insulin glargine Injectable (LANTUS) 60 Unit(s) SubCutaneous at bedtime  insulin glargine Injectable (LANTUS) 20 Unit(s) SubCutaneous every morning  insulin lispro (ADMELOG) corrective regimen sliding scale   SubCutaneous at bedtime  insulin lispro (ADMELOG) corrective regimen sliding scale   SubCutaneous three times a day before meals  insulin lispro Injectable (ADMELOG) 40 Unit(s) SubCutaneous three times a day before meals  lactobacillus acidophilus 1 Tablet(s) Oral two times a day with meals  levothyroxine 50 MICROGram(s) Oral daily  pantoprazole    Tablet 40 milliGRAM(s) Oral before breakfast  saccharomyces boulardii 250 milliGRAM(s) Oral two times a day  tamsulosin 0.4 milliGRAM(s) Oral at bedtime  torsemide 20 milliGRAM(s) Oral daily    MEDICATIONS  (PRN):  albuterol/ipratropium for Nebulization 3 milliLiter(s) Nebulizer every 6 hours PRN Shortness of Breath and/or Wheezing      Allergies    No Known Allergies    Intolerances    allow double protein at meals (Unknown)      REVIEW OF SYSTEMS:  CONSTITUTIONAL: No fever, weight loss, or fatigue  RESPIRATORY: No cough, wheezing, chills or hemoptysis; No shortness of breath  CARDIOVASCULAR: No chest pain, palpitations, dizziness, or leg swelling  GASTROINTESTINAL: No abdominal or epigastric pain. No nausea, vomiting, or hematemesis; No diarrhea or constipation. No melena or hematochezia.  NEUROLOGICAL: No headaches, memory loss, loss of strength, numbness, or tremors  MUSCULOSKELETAL: No joint pain or swelling; No muscle, back, or extremity pain      Vital Signs Last 24 Hrs  T(C): 36.5 (13 May 2021 09:02), Max: 36.9 (12 May 2021 15:11)  T(F): 97.7 (13 May 2021 09:02), Max: 98.4 (12 May 2021 15:11)  HR: 72 (13 May 2021 09:02) (72 - 122)  BP: 134/72 (13 May 2021 09:02) (93/61 - 134/72)  BP(mean): --  RR: 20 (13 May 2021 09:02) (18 - 20)  SpO2: 98% (13 May 2021 09:02) (93% - 100%)    PHYSICAL EXAM:  GENERAL: Morbidly obese male, NAD, well-groomed, well-developed  HEAD:  Atraumatic, Normocephalic  EYES: EOMI, PERRLA, conjunctiva and sclera clear  NECK: fat, short neck   NERVOUS SYSTEM:  Alert & Oriented X3, No gross focal deficits  CHEST/LUNG: Clear to percussion bilaterally; No rales, rhonchi, wheezing, or rubs  HEART: Regular rate and rhythm; No murmurs, rubs, or gallops  ABDOMEN: obese abdomen Soft, Nontender, Nondistended; Bowel sounds present  EXTREMITIES:  b/l lymphedema,   SKIN: No rashes or lesions    LABS:                        16.9   6.93  )-----------( 148      ( 13 May 2021 04:07 )             52.0     05-13    132<L>  |  90<L>  |  53.0<H>  ----------------------------<  386<H>  3.4<L>   |  28.0  |  1.60<H>    Ca    9.5      13 May 2021 04:07  Mg     2.2     05-13    TPro  7.0  /  Alb  3.5  /  TBili  0.8  /  DBili  x   /  AST  21  /  ALT  11  /  AlkPhos  80  05-13    PT/INR - ( 13 May 2021 04:07 )   PT: 20.0 sec;   INR: 1.77 ratio         PTT - ( 13 May 2021 04:07 )  PTT:35.0 sec  Urinalysis Basic - ( 12 May 2021 00:50 )    Color: Yellow / Appearance: Clear / S.010 / pH: x  Gluc: x / Ketone: Negative  / Bili: Negative / Urobili: Negative mg/dL   Blood: x / Protein: Negative mg/dL / Nitrite: Negative   Leuk Esterase: Negative / RBC: x / WBC x   Sq Epi: x / Non Sq Epi: x / Bacteria: x      CAPILLARY BLOOD GLUCOSE      POCT Blood Glucose.: 313 mg/dL (13 May 2021 11:29)  POCT Blood Glucose.: 417 mg/dL (13 May 2021 09:42)  POCT Blood Glucose.: >530 mg/dL (13 May 2021 09:36)  POCT Blood Glucose.: 245 mg/dL (12 May 2021 21:21)  POCT Blood Glucose.: 290 mg/dL (12 May 2021 17:43)  POCT Blood Glucose.: 274 mg/dL (12 May 2021 12:10)      RADIOLOGY & ADDITIONAL TESTS:    Imaging Personally Reviewed:  [ ] YES  [ ] NO    Consultant(s) Notes Reviewed:  [ ] YES  [ ] NO    Care Discussed with Consultants/Other Providers [ ] YES  [ ] NO    Plan of Care discussed with Housestaff [ ]YES [ ] NO

## 2021-05-14 ENCOUNTER — TRANSCRIPTION ENCOUNTER (OUTPATIENT)
Age: 71
End: 2021-05-14

## 2021-05-14 VITALS
OXYGEN SATURATION: 96 % | RESPIRATION RATE: 20 BRPM | DIASTOLIC BLOOD PRESSURE: 86 MMHG | SYSTOLIC BLOOD PRESSURE: 129 MMHG | TEMPERATURE: 97 F | HEART RATE: 92 BPM

## 2021-05-14 LAB
GLUCOSE BLDC GLUCOMTR-MCNC: 157 MG/DL — HIGH (ref 70–99)
GLUCOSE BLDC GLUCOMTR-MCNC: 321 MG/DL — HIGH (ref 70–99)
GLUCOSE BLDC GLUCOMTR-MCNC: 76 MG/DL — SIGNIFICANT CHANGE UP (ref 70–99)

## 2021-05-14 PROCEDURE — 83880 ASSAY OF NATRIURETIC PEPTIDE: CPT

## 2021-05-14 PROCEDURE — 71045 X-RAY EXAM CHEST 1 VIEW: CPT

## 2021-05-14 PROCEDURE — 94760 N-INVAS EAR/PLS OXIMETRY 1: CPT

## 2021-05-14 PROCEDURE — 82803 BLOOD GASES ANY COMBINATION: CPT

## 2021-05-14 PROCEDURE — 84295 ASSAY OF SERUM SODIUM: CPT

## 2021-05-14 PROCEDURE — 83605 ASSAY OF LACTIC ACID: CPT

## 2021-05-14 PROCEDURE — 87040 BLOOD CULTURE FOR BACTERIA: CPT

## 2021-05-14 PROCEDURE — 86769 SARS-COV-2 COVID-19 ANTIBODY: CPT

## 2021-05-14 PROCEDURE — 99285 EMERGENCY DEPT VISIT HI MDM: CPT

## 2021-05-14 PROCEDURE — 93005 ELECTROCARDIOGRAM TRACING: CPT

## 2021-05-14 PROCEDURE — 84484 ASSAY OF TROPONIN QUANT: CPT

## 2021-05-14 PROCEDURE — 72125 CT NECK SPINE W/O DYE: CPT

## 2021-05-14 PROCEDURE — 80048 BASIC METABOLIC PNL TOTAL CA: CPT

## 2021-05-14 PROCEDURE — 80053 COMPREHEN METABOLIC PANEL: CPT

## 2021-05-14 PROCEDURE — 83036 HEMOGLOBIN GLYCOSYLATED A1C: CPT

## 2021-05-14 PROCEDURE — 99239 HOSP IP/OBS DSCHRG MGMT >30: CPT

## 2021-05-14 PROCEDURE — 85014 HEMATOCRIT: CPT

## 2021-05-14 PROCEDURE — 85610 PROTHROMBIN TIME: CPT

## 2021-05-14 PROCEDURE — 80061 LIPID PANEL: CPT

## 2021-05-14 PROCEDURE — 0225U NFCT DS DNA&RNA 21 SARSCOV2: CPT

## 2021-05-14 PROCEDURE — 84443 ASSAY THYROID STIM HORMONE: CPT

## 2021-05-14 PROCEDURE — 82962 GLUCOSE BLOOD TEST: CPT

## 2021-05-14 PROCEDURE — 83735 ASSAY OF MAGNESIUM: CPT

## 2021-05-14 PROCEDURE — 82947 ASSAY GLUCOSE BLOOD QUANT: CPT

## 2021-05-14 PROCEDURE — 82435 ASSAY OF BLOOD CHLORIDE: CPT

## 2021-05-14 PROCEDURE — 71250 CT THORAX DX C-: CPT

## 2021-05-14 PROCEDURE — 36415 COLL VENOUS BLD VENIPUNCTURE: CPT

## 2021-05-14 PROCEDURE — 85025 COMPLETE CBC W/AUTO DIFF WBC: CPT

## 2021-05-14 PROCEDURE — 82330 ASSAY OF CALCIUM: CPT

## 2021-05-14 PROCEDURE — 85018 HEMOGLOBIN: CPT

## 2021-05-14 PROCEDURE — 84132 ASSAY OF SERUM POTASSIUM: CPT

## 2021-05-14 PROCEDURE — 87086 URINE CULTURE/COLONY COUNT: CPT

## 2021-05-14 PROCEDURE — 97163 PT EVAL HIGH COMPLEX 45 MIN: CPT

## 2021-05-14 PROCEDURE — 81003 URINALYSIS AUTO W/O SCOPE: CPT

## 2021-05-14 PROCEDURE — 70450 CT HEAD/BRAIN W/O DYE: CPT

## 2021-05-14 PROCEDURE — 85730 THROMBOPLASTIN TIME PARTIAL: CPT

## 2021-05-14 PROCEDURE — 94660 CPAP INITIATION&MGMT: CPT

## 2021-05-14 RX ORDER — WARFARIN SODIUM 2.5 MG/1
3 TABLET ORAL
Qty: 90 | Refills: 0
Start: 2021-05-14 | End: 2021-06-12

## 2021-05-14 RX ORDER — WARFARIN SODIUM 2.5 MG/1
3 TABLET ORAL
Qty: 0 | Refills: 0 | DISCHARGE

## 2021-05-14 RX ORDER — ACETAMINOPHEN 500 MG
2 TABLET ORAL
Qty: 0 | Refills: 0 | DISCHARGE

## 2021-05-14 RX ORDER — INSULIN LISPRO 100/ML
25 VIAL (ML) SUBCUTANEOUS
Qty: 0 | Refills: 0 | DISCHARGE
Start: 2021-05-14

## 2021-05-14 RX ORDER — INSULIN LISPRO 100/ML
20 VIAL (ML) SUBCUTANEOUS
Qty: 0 | Refills: 0 | DISCHARGE
Start: 2021-05-14

## 2021-05-14 RX ORDER — ATORVASTATIN CALCIUM 80 MG/1
1 TABLET, FILM COATED ORAL
Qty: 0 | Refills: 0 | DISCHARGE
Start: 2021-05-14

## 2021-05-14 RX ORDER — CARVEDILOL PHOSPHATE 80 MG/1
1 CAPSULE, EXTENDED RELEASE ORAL
Qty: 0 | Refills: 0 | DISCHARGE

## 2021-05-14 RX ORDER — CARVEDILOL PHOSPHATE 80 MG/1
1 CAPSULE, EXTENDED RELEASE ORAL
Qty: 0 | Refills: 0 | DISCHARGE
Start: 2021-05-14

## 2021-05-14 RX ORDER — INSULIN LISPRO 100/ML
40 VIAL (ML) SUBCUTANEOUS
Qty: 0 | Refills: 0 | DISCHARGE
Start: 2021-05-14

## 2021-05-14 RX ADMIN — CARVEDILOL PHOSPHATE 12.5 MILLIGRAM(S): 80 CAPSULE, EXTENDED RELEASE ORAL at 05:30

## 2021-05-14 RX ADMIN — Medication 2: at 12:22

## 2021-05-14 RX ADMIN — Medication 1 MILLIGRAM(S): at 12:19

## 2021-05-14 RX ADMIN — Medication 50 MICROGRAM(S): at 05:30

## 2021-05-14 RX ADMIN — DULOXETINE HYDROCHLORIDE 60 MILLIGRAM(S): 30 CAPSULE, DELAYED RELEASE ORAL at 12:19

## 2021-05-14 RX ADMIN — Medication 20 MILLIGRAM(S): at 05:30

## 2021-05-14 RX ADMIN — Medication 8: at 08:46

## 2021-05-14 RX ADMIN — Medication 40 UNIT(S): at 08:47

## 2021-05-14 RX ADMIN — Medication 40 UNIT(S): at 12:19

## 2021-05-14 RX ADMIN — Medication 81 MILLIGRAM(S): at 12:19

## 2021-05-14 RX ADMIN — INSULIN GLARGINE 20 UNIT(S): 100 INJECTION, SOLUTION SUBCUTANEOUS at 10:17

## 2021-05-14 RX ADMIN — PANTOPRAZOLE SODIUM 40 MILLIGRAM(S): 20 TABLET, DELAYED RELEASE ORAL at 05:31

## 2021-05-14 NOTE — DISCHARGE NOTE PROVIDER - NSDCMRMEDTOKEN_GEN_ALL_CORE_FT
albuterol 90 mcg/inh inhalation aerosol: 2 puff(s) inhaled every 6 hours, As needed, Shortness of Breath and/or Wheezing  aspirin 81 mg oral delayed release tablet: 1 tab(s) orally once a day  atorvastatin 40 mg oral tablet: 1 tab(s) orally once a day (at bedtime)  Coreg 25 mg oral tablet: 1 tab(s) orally 2 times a day  DULoxetine 60 mg oral delayed release capsule: 1 cap(s) orally once a day  folic acid 1 mg oral tablet: 1 tab(s) orally once a day  Januvia 50 mg oral tablet: 1 tab(s) orally once a day  Jardiance 10 mg oral tablet: 1 tab(s) orally once a day (in the morning)  Klor-Con M20 oral tablet, extended release: 1 tab(s) orally once a day. Starting today.   Levemir 100 units/mL subcutaneous solution: 65 unit(s) subcutaneous once a day (at bedtime)  levothyroxine 50 mcg (0.05 mg) oral tablet: 1 tab(s) orally once a day  NexIUM 20 mg oral delayed release capsule: 1 cap(s) orally once a day  tamsulosin 0.4 mg oral capsule: 1 cap(s) orally once a day (at bedtime)  torsemide 20 mg oral tablet: 1 tab(s) orally once a day   Tylenol 325 mg oral tablet: 2 tab(s) orally every 6 hours, As Needed pain  warfarin 1 mg oral tablet: 3 tab(s) orally once a day   albuterol 90 mcg/inh inhalation aerosol: 2 puff(s) inhaled every 6 hours, As needed, Shortness of Breath and/or Wheezing  aspirin 81 mg oral delayed release tablet: 1 tab(s) orally once a day  atorvastatin 40 mg oral tablet: 1 tab(s) orally once a day (at bedtime)  atorvastatin 40 mg oral tablet: 1 tab(s) orally once a day (at bedtime)  carvedilol 12.5 mg oral tablet: 1 tab(s) orally every 12 hours  DULoxetine 60 mg oral delayed release capsule: 1 cap(s) orally once a day  folic acid 1 mg oral tablet: 1 tab(s) orally once a day  insulin lispro 100 units/mL injectable solution: 40 unit(s) injectable 3 times a day (before meals)  Januvia 50 mg oral tablet: 1 tab(s) orally once a day  Jardiance 10 mg oral tablet: 1 tab(s) orally once a day (in the morning)  Klor-Con M20 oral tablet, extended release: 1 tab(s) orally once a day. Starting today.   Levemir 100 units/mL subcutaneous solution: 65 unit(s) subcutaneous once a day (at bedtime)  levothyroxine 50 mcg (0.05 mg) oral tablet: 1 tab(s) orally once a day  NexIUM 20 mg oral delayed release capsule: 1 cap(s) orally once a day  tamsulosin 0.4 mg oral capsule: 1 cap(s) orally once a day (at bedtime)  torsemide 20 mg oral tablet: 1 tab(s) orally once a day    albuterol 90 mcg/inh inhalation aerosol: 2 puff(s) inhaled every 6 hours, As needed, Shortness of Breath and/or Wheezing  aspirin 81 mg oral delayed release tablet: 1 tab(s) orally once a day  atorvastatin 40 mg oral tablet: 1 tab(s) orally once a day (at bedtime)  atorvastatin 40 mg oral tablet: 1 tab(s) orally once a day (at bedtime)  carvedilol 12.5 mg oral tablet: 1 tab(s) orally every 12 hours  DULoxetine 60 mg oral delayed release capsule: 1 cap(s) orally once a day  folic acid 1 mg oral tablet: 1 tab(s) orally once a day  insulin lispro 100 units/mL injectable solution: 40 unit(s) injectable 3 times a day (before meals)  Januvia 50 mg oral tablet: 1 tab(s) orally once a day  Jardiance 10 mg oral tablet: 1 tab(s) orally once a day (in the morning)  Klor-Con M20 oral tablet, extended release: 1 tab(s) orally once a day. Starting today.   Levemir 100 units/mL subcutaneous solution: 65 unit(s) subcutaneous once a day (at bedtime)  levothyroxine 50 mcg (0.05 mg) oral tablet: 1 tab(s) orally once a day  NexIUM 20 mg oral delayed release capsule: 1 cap(s) orally once a day  tamsulosin 0.4 mg oral capsule: 1 cap(s) orally once a day (at bedtime)  torsemide 20 mg oral tablet: 1 tab(s) orally once a day   warfarin 1 mg oral tablet: 3 tab(s) orally once a day   albuterol 90 mcg/inh inhalation aerosol: 2 puff(s) inhaled every 6 hours, As needed, Shortness of Breath and/or Wheezing  aspirin 81 mg oral delayed release tablet: 1 tab(s) orally once a day  atorvastatin 40 mg oral tablet: 1 tab(s) orally once a day (at bedtime)  atorvastatin 40 mg oral tablet: 1 tab(s) orally once a day (at bedtime)  carvedilol 12.5 mg oral tablet: 1 tab(s) orally every 12 hours  DULoxetine 60 mg oral delayed release capsule: 1 cap(s) orally once a day  folic acid 1 mg oral tablet: 1 tab(s) orally once a day  insulin lispro 100 units/mL injectable solution: 20 unit(s) injectable 3 times a day (before meals)  Januvia 50 mg oral tablet: 1 tab(s) orally once a day  Jardiance 10 mg oral tablet: 1 tab(s) orally once a day (in the morning)  Klor-Con M20 oral tablet, extended release: 1 tab(s) orally once a day. Starting today.   Levemir 100 units/mL subcutaneous solution: 65 unit(s) subcutaneous once a day (at bedtime)  levothyroxine 50 mcg (0.05 mg) oral tablet: 1 tab(s) orally once a day  NexIUM 20 mg oral delayed release capsule: 1 cap(s) orally once a day  tamsulosin 0.4 mg oral capsule: 1 cap(s) orally once a day (at bedtime)  torsemide 20 mg oral tablet: 1 tab(s) orally once a day   warfarin 1 mg oral tablet: 3 tab(s) orally once a day

## 2021-05-14 NOTE — DISCHARGE NOTE NURSING/CASE MANAGEMENT/SOCIAL WORK - NSDCVIVACCINE_GEN_ALL_CORE_FT
Severe acute respiratory syndrome coronavirus 2 (SARS-CoV-2) (Coronavirus disease [COVID-19]) vaccine , 2021/3/9 14:40 , Tom King (RN)  Td , 2020/9/5 17:47 , Jone Hassan (RN)

## 2021-05-14 NOTE — DISCHARGE NOTE PROVIDER - CARE PROVIDER_API CALL
Faby Hanna)  Internal Medicine  [address] [place] [address]  Phone: [phone]  Fax: [phone]  Follow Up Time: 2 weeks    Abhijeet Dawn ()  Internal Medicine  [address] [facility] [address]nd [place] [address]  Phone: [phone]  Fax: [phone]  Follow Up Time: 2 weeks    F/U your PCP OP,   Phone: (   )    -  Fax: (   )    -  Follow Up Time: 2 weeks    Panchito Jain)  Cardiology; Internal Medicine  [address] [place] [address]  Phone: [phone]  Fax: [phone]  Follow Up Time: 2 weeks   Abhijeet Dawn (DO)  Internal Medicine  [address] [facility], [address]nd [place] [address]  Phone: [phone]  Fax: [phone]  Follow Up Time: 2 weeks    Panchito Jain)  Cardiology; Internal Medicine  [address] [place] [address]  Phone: [phone]  Fax: [phone]  Follow Up Time: 2 weeks    F/U your PCP OP,   Phone: (   )    -  Fax: (   )    -  Follow Up Time: 2 weeks    Dr. Dorado,   Endocrinologist  [facility]  [address] [place] [address]  Phone: (   )    -  Fax: (   )    -  Scheduled Appointment: 06/09/2021

## 2021-05-14 NOTE — DISCHARGE NOTE PROVIDER - CARE PROVIDERS DIRECT ADDRESSES
,[email or web address],[email or web address],DirectAddress_Unknown

## 2021-05-14 NOTE — DISCHARGE NOTE NURSING/CASE MANAGEMENT/SOCIAL WORK - PATIENT PORTAL LINK FT
You can access the FollowMyHealth Patient Portal offered by [facility] by registering at the following website: [email or web address]. By joining [employer]’s FollowMyHealth portal, you will also be able to view your health information using other applications (apps) compatible with our system.

## 2021-05-14 NOTE — DISCHARGE NOTE PROVIDER - HOSPITAL COURSE
71yo morbidly obese male presented to ER from urgent care center w/cough and phlegm x3d, no fever, no sick contact, no recent travel also reported loose BM x 2 and one episode of vomiting. Pt also reported while sitting on his bed at home he fell back and hit head on wall. Pt has PMHx of Afib, CHF, s/p AICD, DM, HLD, Lung nodules, Prostate CA, Pulm HTN, JAYLENE on CKD, CHRISS nocturnal CPAP, hypothyroid admitted to [facility] for uncontrolled DM. CT head & Cspine performed, no evidence of hemorrhage or Fx's appreciated. CT chest performed, no evidence of PNA. Pt was administered IV Abx, however, they were D/C'd bc Pt was afebrile, no evidence of leukocytosis and Pt's BCx x2 & UCx were both negative. Pt also tested for COVID and had RVP performed both negative. Nephrology consulted, Cr improving, resumed Torsemide daily. Endocrinology consulted, Pt's A1c 10.3, insulins adjusted, recommends not continuing Jadriance as OP 2/2 polyuria will need to F/U w/Nephrology OP. Pt received DM education while inpatient. Cardiology consulted, Pt's prior TTE 3/9/2021 stable EF and is unchanged from prior, Pt is also s/p LHC 2016 w/normal coronary arteries, Pt to F/U Cardio OP.  Pt also found to be hypokalemic 2/2 vomiting, diarrhea & IV Insulin, Pt repleted and now resolved. Pt was examined this a.m. at bedside by PA. No overnight events to report. Pt currently has no complaints. Denies SOB, cough, CP, palpitations, HA, dizziness, NVD, changes in urinary or bowel habits, fevers, chills. All other remaining ROS negative. Pt is medically stable for discharge home w/ home care.    Vital Signs Last 24 Hrs  T(C): 36.7 (14 May 2021 04:26), Max: 36.7 (14 May 2021 04:26)  T(F): 98 (14 May 2021 04:26), Max: 98 (14 May 2021 04:26)  HR: 80 (14 May 2021 04:26) (80 - 93)  BP: 133/71 (14 May 2021 04:26) (121/61 - 161/88)  BP(mean): --  ABP: --  ABP(mean): --  RR: 19 (14 May 2021 04:26) (19 - 20)  SpO2: 97% (14 May 2021 04:26) (94% - 99%)    PHYSICAL EXAM:  GENERAL: NAD, lying in bed comfortably  HEAD:  Atraumatic, Normocephalic  EENT: conjunctiva and sclera clear, MMM, trachea midline, nares patent  CHEST/LUNG: CTABL; No rales, rhonchi, wheezing, or rubs. Unlabored respirations  HEART: RRR; No murmurs, rubs, or gallops  ABDOMEN: +BS present in all 4 quadrants; Soft, NTTP, ND  EXTREMITIES:  2+ Peripheral Pulses, brisk capillary refill. No clubbing, cyanosis, or edema  NERVOUS SYSTEM:  A&OX3, speech clear. No facial droop, tongue protrusion midline. Answers questions appropriately. No deficits   SKIN: No rashes or lesions    Time Spent on Discharge: 35 minutes

## 2021-05-14 NOTE — DISCHARGE NOTE PROVIDER - NSDCQMSTROKE_NEU_ALL_CORE
"  Occupational Therapy Daily Treatment Note/Discharge Summary     Date: 10/30/2019  Name: Alcira Selby  Clinic Number: [id number]    Therapy Diagnosis:   Encounter Diagnoses   Name Primary?    At risk for lymphedema     Stiffness due to immobility     Pain of right upper extremity      Physician: Darin Vaughan MD    Physician Orders: OT evaluate and treat  Medical Diagnosis: C50.911 (ICD-10-CM) - Malignant neoplasm of right female breast, unspecified estrogen receptor status, unspecified site of breast  Evaluation Date: 6/25/2019; re-eval 9/11/2019  Insurance Authorization period Expiration: 12/31/2019  Plan of Care Expiration Period: 11/8/2019     Visit # / Visits Authortized: 9/ 20 FOTO:34%  Time In:100  Time Out:150  Total Billable Time: 50 minutes  MT1 TE3     Precautions: Standard and cancer    Subjective     Pt reports: "I'm ready to finish up."  she was compliant with home exercise program given 9/11/2019   Response to previous treatment:pain free today  Functional change: able to progress with weight with exercises    Pain: 0/10  Location: right incision area on chest and triceps       Objective     Alcira received the following manual therapy techniques for 10 minutes:   -consisting of patient supine for Right shoulder lateral telescoping, upper trapezius soft tissue mobilization, pectoralis lift, subscapularis myofascial release and stretch, PROM with endrange stretching, glenohumeral joint inferior anterior posterior glides grade I-III, gentle shoulder oscillations    Alcira received therapeutic exercises for 40 minutes including:  Exercises    UBE forward/reverse 3min each direction @120rpms   PROM (Right) Shoulder Flexion/Abduction/Internal rotation/External Rotation 10x   Supine pectoralis stretch 3/30"   Supine dowel Flexion 4  2/15   Sidelying Abduction 2  2/15   Sidelying External Rotation 2  2/15   pulleys 3'   Wall slides ball  2/15       Corner pectoralis stretch 3/30"   Theraband " "Lats green  2/15   Theraband Rows green  2/15   Theraband Internal Rotation/External Rotation green  2/15   Internal Rotation pulley stretch 3/30"     Range of Motion:   Shoulder   Right     Left   Pain/Dysfunction with Movement     AROM PROM MMT AROM PROM MMT     Flexion 160(+30) WNL 5/5 WNL WNL 5/5     Extension 65(+15) WNL 5/5 WNL WNL 5/5     Abduction 160(+50) WNL 5/5 WNL WNL 5/5     HorizAdduction 35(+15) WNL 5/5 WNL WNL 5/5     Internal rotation L4(+S2) WNL 5/5 WNL WNL 5/5     ER at 90° abd 90(+15) WNL 5/5 WNL WNL 5/5     ER at 0° abd 90(+10)  WNL 5/5 WNL WNL 5/5        Home Exercises and Education Provided     Education provided:   - Progress towards goals     Written Home Exercises Provided: Patient instructed to cont prior HEP.  Exercises were reviewed and Alcira was able to demonstrate them prior to the end of the session.  Alcira demonstrated good  understanding of the HEP provided.     See EMR under Patient Instructions for exercises provided 9/11/2019     Assessment     Pt has been attending OT x 2 months for treatment weakness, stiffness, pain and scar tisse s/p mastectomy on Right side. Pt has been very motivated and pleasant throughout course of care. Pt demonstrating overall improved ROM and strength in Right shoulder and she now has reached her pre surgery level of function, ROM and strength. She is Independent and pain free with ADL's, IADL's and HEP. She no longer is restricted by scar tissue. Pt with a decreased FOTO score indicating more difficulty with self care tasks however her subjective report doesn't match FOTO score. Pt reports she is no longer limited by her shoulder with self care, functional tasks. Pt has met all goals except for her FOTO goals. Pt no longer requires skilled services at this time.     Goals:  Short Term goals: 4 weeks  1. Patient will demonstrate 100% understanding of lymphedema risk reduction practices to include self monitoring for lymphedema-met  2. Patient " will demonstrate independence with Home Exercise program established.-met  3. Pt will demonstrate increased AROM on right shoulder by 15 degrees grossly for improved performance with overhead ADL's -met  4. Pt will report 3/10 pain on right shoulder at worst -met  5. Patient will be able to achieve less than or equal to 25% on FOTO shoulder survey demonstrating overall improved functional ability with upper extremity. -met initially however regressed per FOTO     Long Term Goals: 8 weeks   1. Pt will demonstrate AROM WNL grossly on right shoulder for [place] with IADL's and ADL's -met  2. Pt will demonstrate MMT WNL grossly on right shoulder to improve tolerance to all functional activities pain free. -met  3. Pt will demonstrate full/maximized tissue mobility to increase ROM and promote healthy tissue to be pain free at discharge.  -met  4. Pt will report decrease in overall worst pain to 1-2/10 at discharge. -met  5. Patient will be able to achieve less than or equal to 10% on FOTO shoulder survey demonstrating overall improved functional ability with upper extremity. -Not met  6. Patient will report compliance with walking program 5x week for 10 min each day to improve overall cardiovascular function and decrease cancer related fatigue at discharge. -Nmet    Plan   Discharge from OT    NARGIS Hernandez    No

## 2021-05-14 NOTE — DISCHARGE NOTE PROVIDER - NSDCCPCAREPLAN_GEN_ALL_CORE_FT
PRINCIPAL DISCHARGE DIAGNOSIS  Diagnosis: Type 2 diabetes mellitus with hyperglycemia, with long-term current use of insulin  Assessment and Plan of Treatment: Resolved  Insulins adjusted, take as prescibed  DM education provided in patient  F/U Endocrinology OP      SECONDARY DISCHARGE DIAGNOSES  Diagnosis: Acute kidney injury  Assessment and Plan of Treatment: Cr improving  Avoid nephrotoxins  F/U OP Nephrology      Diagnosis: Chronic atrial fibrillation  Assessment and Plan of Treatment: C/w ASA  F/U Cardio OP      Diagnosis: Sleep apnea  Assessment and Plan of Treatment: C/w CPAP at home    Diagnosis: Morbid obesity  Assessment and Plan of Treatment: Life style modifications discussed, Pt educated  Dietary modifications discussed, Pt educated  Weight loss    Diagnosis: Cough with sputum  Assessment and Plan of Treatment: BCx x2 negative  CT chest no evidence of PNA  IV Abx d/c'd   RVP negative  COVID negative  No evidence of Leukocytosis

## 2021-05-14 NOTE — DISCHARGE NOTE PROVIDER - PROVIDER TOKENS
PROVIDER:[TOKEN:[[id number]],FOLLOWUP:[2 weeks]],PROVIDER:[TOKEN:[[id number]],FOLLOWUP:[2 weeks]],FREE:[LAST:[F/U your PCP OP],PHONE:[(   )    -],FAX:[(   )    -],FOLLOWUP:[2 weeks]],PROVIDER:[TOKEN:[[id number]],FOLLOWUP:[2 weeks]] PROVIDER:[TOKEN:[[id number]],FOLLOWUP:[2 weeks]],PROVIDER:[TOKEN:[[id number]],FOLLOWUP:[2 weeks]],FREE:[LAST:[F/U your PCP OP],PHONE:[(   )    -],FAX:[(   )    -],FOLLOWUP:[2 weeks]],FREE:[LAST:[Dr. Dorado],PHONE:[(   )    -],FAX:[(   )    -],ADDRESS:[Endocrinologist  [facility]  [address] [place] [address]],SCHEDULEDAPPT:[06/09/2021]]

## 2021-05-14 NOTE — PROGRESS NOTE ADULT - SUBJECTIVE AND OBJECTIVE BOX
[place] [facility] - [facility], THE [facility]                                   [address] [place] [address]                                                      PHONE: [phone]                                                         FAX: [phone]  [email or web address]  ---------------------------------------------------------------------------------------------------------------------------------    Overnight events/patient complaints:  NAD     allow double protein at meals (Unknown)  No Known Allergies    MEDICATIONS  (STANDING):  aspirin enteric coated 81 milliGRAM(s) Oral daily  atorvastatin 40 milliGRAM(s) Oral at bedtime  carvedilol 12.5 milliGRAM(s) Oral every 12 hours  dextrose 40% Gel 15 Gram(s) Oral once  dextrose 5%. 1000 milliLiter(s) (50 mL/Hr) IV Continuous <Continuous>  dextrose 5%. 1000 milliLiter(s) (100 mL/Hr) IV Continuous <Continuous>  dextrose 50% Injectable 25 Gram(s) IV Push once  dextrose 50% Injectable 12.5 Gram(s) IV Push once  dextrose 50% Injectable 25 Gram(s) IV Push once  DULoxetine 60 milliGRAM(s) Oral daily  folic acid 1 milliGRAM(s) Oral daily  glucagon  Injectable 1 milliGRAM(s) IntraMuscular once  insulin glargine Injectable (LANTUS) 60 Unit(s) SubCutaneous at bedtime  insulin glargine Injectable (LANTUS) 20 Unit(s) SubCutaneous every morning  insulin lispro (ADMELOG) corrective regimen sliding scale   SubCutaneous three times a day before meals  insulin lispro (ADMELOG) corrective regimen sliding scale   SubCutaneous at bedtime  insulin lispro Injectable (ADMELOG) 40 Unit(s) SubCutaneous three times a day before meals  levothyroxine 50 MICROGram(s) Oral daily  pantoprazole    Tablet 40 milliGRAM(s) Oral before breakfast  tamsulosin 0.4 milliGRAM(s) Oral at bedtime  torsemide 20 milliGRAM(s) Oral daily    MEDICATIONS  (PRN):  albuterol/ipratropium for Nebulization 3 milliLiter(s) Nebulizer every 6 hours PRN Shortness of Breath and/or Wheezing      Vital Signs Last 24 Hrs  T(C): 36.7 (14 May 2021 04:26), Max: 36.7 (14 May 2021 04:26)  T(F): 98 (14 May 2021 04:26), Max: 98 (14 May 2021 04:26)  HR: 80 (14 May 2021 04:26) (80 - 93)  BP: 133/71 (14 May 2021 04:26) (121/61 - 161/88)  BP(mean): --  RR: 19 (14 May 2021 04:26) (19 - 20)  SpO2: 97% (14 May 2021 04:26) (94% - 99%)  ICU Vital Signs Last 24 Hrs  MARY ANN CORNELL  I&O's Detail    13 May 2021 07:01  -  14 May 2021 07:00  --------------------------------------------------------  IN:  Total IN: 0 mL    OUT:    Voided (mL): 1900 mL  Total OUT: 1900 mL    Total NET: -1900 mL        I&O's Summary    13 May 2021 07:01  -  14 May 2021 07:00  --------------------------------------------------------  IN: 0 mL / OUT: 1900 mL / NET: -1900 mL      Drug Dosing Weight  MARY ANN CORNELL      PHYSICAL EXAM:  General: Appears well developed, well nourished alert and cooperative.  HEENT: Head; normocephalic, atraumatic.  Eyes: Pupils reactive, cornea wnl.  Neck: Supple, no nodes adenopathy, no NVD or carotid bruit or thyromegaly.  CARDIOVASCULAR: Normal S1 and S2, 2/6 murmur, rub, gallop or lift.   LUNGS: No rales, rhonchi or wheeze. Normal breath sounds bilaterally.  ABDOMEN: Soft, nontender without mass or organomegaly. bowel sounds normoactive.  EXTREMITIES: No clubbing, cyanosis or edema. Distal pulses wnl.   SKIN: warm and dry with normal turgor.  NEURO: Alert/oriented x 3/normal motor exam. No pathologic reflexes.    PSYCH: normal affect.        LABS:                        16.9   6.93  )-----------( 148      ( 13 May 2021 04:07 )             52.0     05-13    132<L>  |  90<L>  |  53.0<H>  ----------------------------<  386<H>  3.4<L>   |  28.0  |  1.60<H>    Ca    9.5      13 May 2021 04:07  Mg     2.2     05-13    TPro  7.0  /  Alb  3.5  /  TBili  0.8  /  DBili  x   /  AST  21  /  ALT  11  /  AlkPhos  80  05-13    MARY ANN CORNELL      PT/INR - ( 13 May 2021 04:07 )   PT: 20.0 sec;   INR: 1.77 ratio         PTT - ( 13 May 2021 04:07 )  PTT:35.0 sec      RADIOLOGY & ADDITIONAL STUDIES:    INTERPRETATION OF TELEMETRY (personally reviewed):      Summary:   1. Technically difficult study.   2. Endocardial visualization was enhanced with intravenous echo contrast.   3. Left ventricular ejection fraction, by visual estimation, is 30 to 35%.   4. Moderately decreased global left ventricular systolic function.   5. Multiple left ventricular regional wall motion abnormalities exist. See wall motion findings.   6. The mitral in-flow pattern reveals no discernable A-wave, therefore no comment on diastolic function can be made.   7. Mildly enlarged right ventricle with moderately reduced RV systolic function.   8. Mildly enlarged left atrium.   9. Trace mitral valve regurgitation.  10. Mild thickening of the anterior and posterior mitral valve leaflets.  11. Mild dilatation of the aortic root (3.7 cm).  12. Compared to a prior study from 9/10/20, there is no significant change.    MD ChacortaElectronically signed on 3/9/2021 at 12:38:51 PM      CARDIAC CATHETERIZATION:  VENTRICLES: LVEF 25% with MR  CORONARY VESSELS: The coronary circulation is right dominant.  LM:   --  LM: Angiography showed minor luminal irregularities with no flow  limiting lesions.  LAD:   --  LAD: Angiography showed minor luminal irregularities with no  flow limiting lesions.  CX:   --  Circumflex: Angiography showed minor luminal irregularities with  no flow limiting lesions.  RCA:   --  RCA: Angiography showed minor luminal irregularities with no  flow limiting lesions.  COMPLICATIONS: There were no complications. No complications occurred  during the cath lab visit.  DIAGNOSTIC IMPRESSIONS: Mild CAD. Non ischemic cardiomyopathy.  DIAGNOSTIC RECOMMENDATIONS: A/C. MARIELA CV The patient should continue with  the present medications.  INTERVENTIONAL IMPRESSIONS: Mild CAD. Non ischemic cardiomyopathy.  INTERVENTIONAL RECOMMENDATIONS: A/C. MARIELA CV  Prepared and signed by  Bright Muñiz MD      Assessment and Plan:  In summary, MARY ANN CORNELL is an 70y Male with past medical history significant for NICM EF ~ 35%, chronic afib on Coumadin, PVT s/p ICD, HFrEF on optimal medical therapy, CKD, HTN, morbid obesity, CHRISS, DM presented yesterday with couple of day history of dyspnea cough with phlegm associated with increase urinations. The patient was found to have blood glucose level 601 with AG of 16.  The patient is feeling much better now after IVF and IV Abx therapy.  Denies any PND, chest pain or orthopnea.   Hyperglycemia possible bronchitis/PNA; volume status stale; Prior TTE 3/9/2021 stable EF and unchanged from prior and prior [facility] 2016 normal coronary arteries; volume status stable; CT negative for PNA      Continue current CV medications     out patient follow up

## 2021-05-17 ENCOUNTER — INPATIENT (INPATIENT)
Facility: HOSPITAL | Age: 71
LOS: 2 days | Discharge: HOME | DRG: 638 | End: 2021-05-20
Attending: STUDENT IN AN ORGANIZED HEALTH CARE EDUCATION/TRAINING PROGRAM | Admitting: INTERNAL MEDICINE
Payer: COMMERCIAL

## 2021-05-17 VITALS
TEMPERATURE: 98 F | OXYGEN SATURATION: 99 % | HEIGHT: 69 IN | DIASTOLIC BLOOD PRESSURE: 68 MMHG | SYSTOLIC BLOOD PRESSURE: 115 MMHG | HEART RATE: 87 BPM | RESPIRATION RATE: 16 BRPM | WEIGHT: 315 LBS

## 2021-05-17 DIAGNOSIS — Z95.810 PRESENCE OF AUTOMATIC (IMPLANTABLE) CARDIAC DEFIBRILLATOR: Chronic | ICD-10-CM

## 2021-05-17 DIAGNOSIS — Z98.89 OTHER SPECIFIED POSTPROCEDURAL STATES: Chronic | ICD-10-CM

## 2021-05-17 DIAGNOSIS — E87.6 HYPOKALEMIA: ICD-10-CM

## 2021-05-17 LAB
ACETONE SERPL-MCNC: NEGATIVE — SIGNIFICANT CHANGE UP
ALBUMIN SERPL ELPH-MCNC: 3.5 G/DL — SIGNIFICANT CHANGE UP (ref 3.3–5.2)
ALP SERPL-CCNC: 81 U/L — SIGNIFICANT CHANGE UP (ref 40–120)
ALT FLD-CCNC: 17 U/L — SIGNIFICANT CHANGE UP
ANION GAP SERPL CALC-SCNC: 16 MMOL/L — SIGNIFICANT CHANGE UP (ref 5–17)
AST SERPL-CCNC: 29 U/L — SIGNIFICANT CHANGE UP
BASE EXCESS BLDV CALC-SCNC: 10.6 MMOL/L — HIGH (ref -2–2)
BASOPHILS # BLD AUTO: 0.04 K/UL — SIGNIFICANT CHANGE UP (ref 0–0.2)
BASOPHILS NFR BLD AUTO: 0.5 % — SIGNIFICANT CHANGE UP (ref 0–2)
BILIRUB SERPL-MCNC: 0.7 MG/DL — SIGNIFICANT CHANGE UP (ref 0.4–2)
BUN SERPL-MCNC: 66 MG/DL — HIGH (ref 8–20)
CA-I SERPL-SCNC: 1.04 MMOL/L — LOW (ref 1.15–1.33)
CALCIUM SERPL-MCNC: 9.2 MG/DL — SIGNIFICANT CHANGE UP (ref 8.6–10.2)
CHLORIDE BLDV-SCNC: 90 MMOL/L — LOW (ref 98–107)
CHLORIDE SERPL-SCNC: 89 MMOL/L — LOW (ref 98–107)
CO2 SERPL-SCNC: 29 MMOL/L — SIGNIFICANT CHANGE UP (ref 22–29)
CREAT SERPL-MCNC: 2.08 MG/DL — HIGH (ref 0.5–1.3)
EOSINOPHIL # BLD AUTO: 0.12 K/UL — SIGNIFICANT CHANGE UP (ref 0–0.5)
EOSINOPHIL NFR BLD AUTO: 1.5 % — SIGNIFICANT CHANGE UP (ref 0–6)
GAS PNL BLDV: 137 MMOL/L — SIGNIFICANT CHANGE UP (ref 135–145)
GAS PNL BLDV: SIGNIFICANT CHANGE UP
GAS PNL BLDV: SIGNIFICANT CHANGE UP
GLUCOSE BLDV-MCNC: 104 MG/DL — HIGH (ref 70–99)
GLUCOSE SERPL-MCNC: 202 MG/DL — HIGH (ref 70–99)
HCO3 BLDV-SCNC: 34 MMOL/L — HIGH (ref 21–29)
HCT VFR BLD CALC: 49.9 % — SIGNIFICANT CHANGE UP (ref 39–50)
HCT VFR BLDA CALC: 49 — SIGNIFICANT CHANGE UP (ref 39–50)
HGB BLD CALC-MCNC: 16.1 G/DL — SIGNIFICANT CHANGE UP (ref 13–17)
HGB BLD-MCNC: 16.4 G/DL — SIGNIFICANT CHANGE UP (ref 13–17)
IMM GRANULOCYTES NFR BLD AUTO: 0.4 % — SIGNIFICANT CHANGE UP (ref 0–1.5)
LACTATE BLDV-MCNC: 1.7 MMOL/L — SIGNIFICANT CHANGE UP (ref 0.5–2)
LYMPHOCYTES # BLD AUTO: 1.11 K/UL — SIGNIFICANT CHANGE UP (ref 1–3.3)
LYMPHOCYTES # BLD AUTO: 13.5 % — SIGNIFICANT CHANGE UP (ref 13–44)
MAGNESIUM SERPL-MCNC: 2 MG/DL — SIGNIFICANT CHANGE UP (ref 1.6–2.6)
MCHC RBC-ENTMCNC: 27.6 PG — SIGNIFICANT CHANGE UP (ref 27–34)
MCHC RBC-ENTMCNC: 32.9 GM/DL — SIGNIFICANT CHANGE UP (ref 32–36)
MCV RBC AUTO: 83.9 FL — SIGNIFICANT CHANGE UP (ref 80–100)
MONOCYTES # BLD AUTO: 0.58 K/UL — SIGNIFICANT CHANGE UP (ref 0–0.9)
MONOCYTES NFR BLD AUTO: 7.1 % — SIGNIFICANT CHANGE UP (ref 2–14)
NEUTROPHILS # BLD AUTO: 6.33 K/UL — SIGNIFICANT CHANGE UP (ref 1.8–7.4)
NEUTROPHILS NFR BLD AUTO: 77 % — SIGNIFICANT CHANGE UP (ref 43–77)
OTHER CELLS CSF MANUAL: 18 ML/DL — SIGNIFICANT CHANGE UP (ref 18–22)
PCO2 BLDV: 58 MMHG — HIGH (ref 35–50)
PH BLDV: 7.42 — SIGNIFICANT CHANGE UP (ref 7.32–7.43)
PHOSPHATE SERPL-MCNC: 3.9 MG/DL — SIGNIFICANT CHANGE UP (ref 2.4–4.7)
PLATELET # BLD AUTO: 146 K/UL — LOW (ref 150–400)
PO2 BLDV: 46 MMHG — HIGH (ref 25–45)
POTASSIUM BLDV-SCNC: 3.1 MMOL/L — LOW (ref 3.4–4.5)
POTASSIUM SERPL-MCNC: 2.7 MMOL/L — CRITICAL LOW (ref 3.5–5.3)
POTASSIUM SERPL-SCNC: 2.7 MMOL/L — CRITICAL LOW (ref 3.5–5.3)
PROT SERPL-MCNC: 6.8 G/DL — SIGNIFICANT CHANGE UP (ref 6.6–8.7)
RBC # BLD: 5.95 M/UL — HIGH (ref 4.2–5.8)
RBC # FLD: 16.6 % — HIGH (ref 10.3–14.5)
SAO2 % BLDV: 81 % — SIGNIFICANT CHANGE UP
SODIUM SERPL-SCNC: 134 MMOL/L — LOW (ref 135–145)
WBC # BLD: 8.21 K/UL — SIGNIFICANT CHANGE UP (ref 3.8–10.5)
WBC # FLD AUTO: 8.21 K/UL — SIGNIFICANT CHANGE UP (ref 3.8–10.5)

## 2021-05-17 PROCEDURE — 71045 X-RAY EXAM CHEST 1 VIEW: CPT | Mod: 26

## 2021-05-17 PROCEDURE — 99223 1ST HOSP IP/OBS HIGH 75: CPT

## 2021-05-17 PROCEDURE — 99285 EMERGENCY DEPT VISIT HI MDM: CPT

## 2021-05-17 PROCEDURE — 93010 ELECTROCARDIOGRAM REPORT: CPT

## 2021-05-17 RX ORDER — DEXTROSE 50 % IN WATER 50 %
25 SYRINGE (ML) INTRAVENOUS ONCE
Refills: 0 | Status: DISCONTINUED | OUTPATIENT
Start: 2021-05-17 | End: 2021-05-20

## 2021-05-17 RX ORDER — SODIUM CHLORIDE 9 MG/ML
1000 INJECTION, SOLUTION INTRAVENOUS
Refills: 0 | Status: DISCONTINUED | OUTPATIENT
Start: 2021-05-17 | End: 2021-05-20

## 2021-05-17 RX ORDER — INSULIN LISPRO 100/ML
VIAL (ML) SUBCUTANEOUS
Refills: 0 | Status: DISCONTINUED | OUTPATIENT
Start: 2021-05-17 | End: 2021-05-20

## 2021-05-17 RX ORDER — DEXTROSE 50 % IN WATER 50 %
12.5 SYRINGE (ML) INTRAVENOUS ONCE
Refills: 0 | Status: DISCONTINUED | OUTPATIENT
Start: 2021-05-17 | End: 2021-05-20

## 2021-05-17 RX ORDER — CARVEDILOL PHOSPHATE 80 MG/1
25 CAPSULE, EXTENDED RELEASE ORAL EVERY 12 HOURS
Refills: 0 | Status: DISCONTINUED | OUTPATIENT
Start: 2021-05-17 | End: 2021-05-20

## 2021-05-17 RX ORDER — INSULIN GLARGINE 100 [IU]/ML
60 INJECTION, SOLUTION SUBCUTANEOUS AT BEDTIME
Refills: 0 | Status: DISCONTINUED | OUTPATIENT
Start: 2021-05-18 | End: 2021-05-20

## 2021-05-17 RX ORDER — POTASSIUM CHLORIDE 20 MEQ
60 PACKET (EA) ORAL ONCE
Refills: 0 | Status: COMPLETED | OUTPATIENT
Start: 2021-05-17 | End: 2021-05-17

## 2021-05-17 RX ORDER — DEXTROSE 50 % IN WATER 50 %
15 SYRINGE (ML) INTRAVENOUS ONCE
Refills: 0 | Status: DISCONTINUED | OUTPATIENT
Start: 2021-05-17 | End: 2021-05-20

## 2021-05-17 RX ORDER — INSULIN GLARGINE 100 [IU]/ML
60 INJECTION, SOLUTION SUBCUTANEOUS ONCE
Refills: 0 | Status: COMPLETED | OUTPATIENT
Start: 2021-05-17 | End: 2021-05-17

## 2021-05-17 RX ORDER — SODIUM CHLORIDE 9 MG/ML
500 INJECTION INTRAMUSCULAR; INTRAVENOUS; SUBCUTANEOUS ONCE
Refills: 0 | Status: COMPLETED | OUTPATIENT
Start: 2021-05-17 | End: 2021-05-17

## 2021-05-17 RX ORDER — INSULIN LISPRO 100/ML
VIAL (ML) SUBCUTANEOUS AT BEDTIME
Refills: 0 | Status: DISCONTINUED | OUTPATIENT
Start: 2021-05-18 | End: 2021-05-20

## 2021-05-17 RX ORDER — GABAPENTIN 400 MG/1
600 CAPSULE ORAL ONCE
Refills: 0 | Status: COMPLETED | OUTPATIENT
Start: 2021-05-17 | End: 2021-05-17

## 2021-05-17 RX ORDER — SODIUM CHLORIDE 9 MG/ML
1000 INJECTION INTRAMUSCULAR; INTRAVENOUS; SUBCUTANEOUS ONCE
Refills: 0 | Status: DISCONTINUED | OUTPATIENT
Start: 2021-05-17 | End: 2021-05-17

## 2021-05-17 RX ORDER — POTASSIUM CHLORIDE 20 MEQ
10 PACKET (EA) ORAL
Refills: 0 | Status: COMPLETED | OUTPATIENT
Start: 2021-05-17 | End: 2021-05-17

## 2021-05-17 RX ORDER — GLUCAGON INJECTION, SOLUTION 0.5 MG/.1ML
1 INJECTION, SOLUTION SUBCUTANEOUS ONCE
Refills: 0 | Status: DISCONTINUED | OUTPATIENT
Start: 2021-05-17 | End: 2021-05-20

## 2021-05-17 RX ORDER — ACETAMINOPHEN 500 MG
975 TABLET ORAL ONCE
Refills: 0 | Status: COMPLETED | OUTPATIENT
Start: 2021-05-17 | End: 2021-05-17

## 2021-05-17 RX ADMIN — Medication 100 MILLIEQUIVALENT(S): at 19:09

## 2021-05-17 RX ADMIN — Medication 100 MILLIEQUIVALENT(S): at 17:37

## 2021-05-17 RX ADMIN — Medication 100 MILLIEQUIVALENT(S): at 21:05

## 2021-05-17 RX ADMIN — SODIUM CHLORIDE 500 MILLILITER(S): 9 INJECTION INTRAMUSCULAR; INTRAVENOUS; SUBCUTANEOUS at 19:13

## 2021-05-17 RX ADMIN — GABAPENTIN 600 MILLIGRAM(S): 400 CAPSULE ORAL at 19:13

## 2021-05-17 RX ADMIN — Medication 60 MILLIEQUIVALENT(S): at 17:37

## 2021-05-17 RX ADMIN — Medication 975 MILLIGRAM(S): at 19:13

## 2021-05-17 NOTE — ED PROVIDER NOTE - CARE PLAN
Principal Discharge DX:	Hypokalemia   Principal Discharge DX:	Hypokalemia  Secondary Diagnosis:	Orthostatic hypotension

## 2021-05-17 NOTE — ED PROVIDER NOTE - NS ED ROS FT
Const: + generalized weakness. Denies fever, chills  HEENT: Denies blurry vision, sore throat  Neck: Denies neck pain/stiffness  Resp: Denies coughing, SOB  Cardiovascular: + syncope. Denies CP, palpitations, LE edema  GI: Denies nausea, vomiting, abdominal pain, diarrhea, constipation, blood in stool  : Denies urinary frequency/urgency/dysuria, hematuria  MSK: Denies back pain  Neuro: + dizziness. Denies HA, numbness, weakness  Skin: Denies rashes.

## 2021-05-17 NOTE — ED PROVIDER NOTE - PHYSICAL EXAMINATION
Const: Awake, alert and oriented. In no acute distress. Well appearing. Morbidly obese.  HEENT: NC/AT. Moist mucous membranes.  Eyes: No scleral icterus. EOMI.  Neck:. Soft and supple. Full ROM without pain.  Cardiac: Regular rate and regular rhythm. +S1/S2. No murmurs. Peripheral pulses 2+ and symmetric. trace b/l LE edema.  Resp: Speaking in full sentences. No evidence of respiratory distress. No wheezes, rales or rhonchi.  Abd: Soft, non-tender, non-distended. Normal bowel sounds in all 4 quadrants. No guarding or rebound.  Back: Spine midline and non-tender. No CVAT.  Skin: Chronic venous stasis changes. No rashes, abrasions or lacerations. Right 2nd & 3rd toes with oozing blood from chronic wounds. No necrosis.  Neuro: Awake, alert & oriented x 3. Moves all extremities symmetrically.

## 2021-05-17 NOTE — H&P ADULT - ASSESSMENT
70 year old male w AFIB on AC, AICD, HFrEF 30%, DM II, hypothyroid, HLD came to the ED due to syncope and uncontrolled blood glucose    #Syncope  #orthostatic hypotension  #hypokalemia  1. admit totelemetry  2. s/p KCL 10 meq IV x 3 w 60 meq po  3. BMP in AM w Mg  4. s/p 1500 cc IV NS  5. orthostatic BP      #HFrEF  #NICM  1. daily weights  2. I/O  3. torsemide 20 mg w parameters  4. recent ECHO above  5. asa 81 mg  6. atorvastatin 40 mg        #elevated Cr 2.08 vs 1.6 on 05-13  1. trend Cr, P  2. I/O  3. s/p 1500 cc in ED  4. renal      #DM II w hyperglycemia  recent Hb A1c 10.9  1. diet  2. lantus 65 reduced to 60  3. BGM  4. ISS      #AFIB  on EKG  1. on coumadin but no INR  2. stat PT/INR  3. in AM too      #GI  1. nexium interchange to protonixx 40 mg q AM      #HLD  1. atorvastatin 40 mg        #VTE  IMPROVE VTE Individual Risk Assessment    RISK                                                                Points    [  ] Previous VTE                                                  3    [ x ] Thrombophilia                                               2    [  ] Lower limb paralysis                                      2        (unable to hold up >15 seconds)      [  ] Current Cancer                                              2         (within 6 months)    [  ] Immobilization > 24 hrs                                1    [  ] ICU/CCU stay > 24 hours                              1    [ X ] Age > 60                                                      1    IMPROVE VTE Score ___3______    IMPROVE Score 0-1: Low Risk, No VTE prophylaxis required for most patients, encourage ambulation.   IMPROVE Score 2-3: At risk, pharmacologic VTE prophylaxis is indicated for most patients (in the absence of a contraindication)  IMPROVE Score > or = 4: High Risk, pharmacologic VTE prophylaxis is indicated for most patients (in the absence of a contraindication)        VTE will need coumadin  med rec  echo results above  renal  card      FOLLOW UP  exam      signed out to Dr. Stephens 70 year old male w AFIB on AC, AICD, HFrEF 30%, DM II, hypothyroid, HLD came to the ED due to syncope and uncontrolled blood glucose    #Syncope  #orthostatic hypotension  #hypokalemia  1. admit totelemetry  2. s/p KCL 10 meq IV x 3 w 60 meq po  3. BMP in AM w Mg  4. s/p 1500 cc IV NS  5. orthostatic BP      #HFrEF  #NICM  1. daily weights  2. I/O  3. torsemide 20 mg w parameters  4. recent ECHO above  5. asa 81 mg  6. atorvastatin 40 mg        #elevated Cr 2.08 vs 1.6 on 05-13  1. trend Cr, P  2. I/O  3. s/p 1500 cc in ED  4. renal      #DM II w hyperglycemia  recent Hb A1c 10.9  1. diet  2. lantus 65 reduced to 60  3. BGM  4. ISS      #AFIB  on EKG  1. on coumadin but no INR  2. stat PT/INR  3. in AM too      #GI  1. nexium interchange to protonixx 40 mg q AM      #HLD  1. atorvastatin 40 mg        #VTE  IMPROVE VTE Individual Risk Assessment    RISK                                                                Points    [  ] Previous VTE                                                  3    [ x ] Thrombophilia                                               2    [  ] Lower limb paralysis                                      2        (unable to hold up >15 seconds)      [  ] Current Cancer                                              2         (within 6 months)    [  ] Immobilization > 24 hrs                                1    [  ] ICU/CCU stay > 24 hours                              1    [ X ] Age > 60                                                      1    IMPROVE VTE Score ___3______    IMPROVE Score 0-1: Low Risk, No VTE prophylaxis required for most patients, encourage ambulation.   IMPROVE Score 2-3: At risk, pharmacologic VTE prophylaxis is indicated for most patients (in the absence of a contraindication)  IMPROVE Score > or = 4: High Risk, pharmacologic VTE prophylaxis is indicated for most patients (in the absence of a contraindication)        VTE will need coumadin  med rec  echo results above  renal [phone] @ 00:38  card [phone] @ 00:31  PT evaluation        FOLLOW UP  exam  PT/INR to determine tonight/s coumadin    signed out to Dr. Stephens 70 year old male w AFIB on AC, AICD, HFrEF 30%, DM II, hypothyroid, HLD came to the ED due to syncope and uncontrolled blood glucose    #Syncope  #orthostatic hypotension  #hypokalemia  1. admit totelemetry  2. s/p KCL 10 meq IV x 3 w 60 meq po  3. BMP in AM w Mg  4. s/p 1500 cc IV NS  5. orthostatic BP  6. cardiology consult read and appreciated; device interrogation        #HFrEF  #NICM  1. daily weights  2. I/O  3. holding diuretic  4. recent ECHO above  5. asa 81 mg  6. atorvastatin 40 mg        #elevated Cr 2.08 vs 1.6 on 05-13  1. trend Cr, P  2. I/O  3. s/p 1500 cc in ED  4. renal      #DM II w hyperglycemia  recent Hb A1c 10.9  1. diet  2. lantus 65 reduced to 60  3. BGM  4. ISS      #AFIB  on EKG  1. on coumadin but no INR  2. stat PT/INR  3. in AM too      #BPH  1. tamsulosin 0.4 mg    #GI  1. nexium interchange to protonix 40 mg q AM      #HLD  1. atorvastatin 40 mg    #Neuropathy  1. duloxetine 60 mg  2. gabapentin in ED        #VTE  IMPROVE VTE Individual Risk Assessment    RISK                                                                Points    [  ] Previous VTE                                                  3    [ x ] Thrombophilia                                               2    [  ] Lower limb paralysis                                      2        (unable to hold up >15 seconds)      [  ] Current Cancer                                              2         (within 6 months)    [  ] Immobilization > 24 hrs                                1    [  ] ICU/CCU stay > 24 hours                              1    [ X ] Age > 60                                                      1    IMPROVE VTE Score ___3______    IMPROVE Score 0-1: Low Risk, No VTE prophylaxis required for most patients, encourage ambulation.   IMPROVE Score 2-3: At risk, pharmacologic VTE prophylaxis is indicated for most patients (in the absence of a contraindication)  IMPROVE Score > or = 4: High Risk, pharmacologic VTE prophylaxis is indicated for most patients (in the absence of a contraindication)        VTE will need coumadin  med rec  echo results above  renal [phone] @ 00:38  PT evaluation        FOLLOW UP  exam  PT/INR to determine tonight/s coumadin    signed out to Dr. Stephens

## 2021-05-17 NOTE — H&P ADULT - HISTORY OF PRESENT ILLNESS
70 year old male w AFIB on AC, AICD, HFrEF 30%, DM II, hypothyroid, HLD came to the ED due to syncope and uncontrolled blood glucose    Pt states has felt ok since hospital discharge 05-14.  Had appt w Dr. Gomez and states he passed out in her office but did not hit his head;  she wanted him to go to hospital. BGMs 400s.   He went home took insulin 40 units and then came to [facility] ED  When he was at home, he states he passed out again but did not hit his head  He has noted + dizziness and weakness  Has some warning that he is about to pass out but is too weak to do anything about it    pt legs bilateral swelling bruising feet noted to have open cuts on toes according to chart  In ED /68  HR 87   RR 16   T 97.8   99% RA  NS 1000 cc + 500 cc = 1500 cc  K 2.7  KCL 10 meq IV x 3 and 60 po      PAST MEDICAL HX  AFIB atrial fibrillation    CHF (congestive heart failure)  EF 30%  Chronic back pain    DM (diabetes mellitus)    HFrEF heart failure w reduced ejection fraction  HLD hyperlipidemia  HTN (hypertension)    Lung nodules    NICM nonischemic cardiomyoapthy  Prostate CA  dx    Pulmonary hypertension    Renal insufficiency    Sleep apnea    PAST SURGICAL HX  AICD (automatic cardioverter/defibrillator) present    S/P ankle ligament repair  Prostate surgery      FAMILY HX  Family history of cancer: sibling  Family history of diabetes mellitus: sibling        SOCIAL HX    lives w wife and daughter and care giver  nonsmoker  no alcohol  walks w walker

## 2021-05-17 NOTE — ED ADULT NURSE NOTE - NS ED NURSE LEVEL OF CONSCIOUSNESS MENTAL STATUS
Awake/Alert I have seen and evaluated this patient with the resident.   I agree with the findings  unless other wise stated.  I have made appropriate changes in documentations where needed, After my face to face bedside evaluation, I am further  notinM with RLQ pain, initially diffuse now localized.  COncerning for appendicitis vs. divertic vs. hernia.  Labs, ct, pain meds.  hd stable.

## 2021-05-17 NOTE — ED ADULT NURSE NOTE - OBJECTIVE STATEMENT
pt c/o episodes of syncope states took "40 unites of insulin at home" and had episodes of syncope in doctors office who suggested he go to hospital he went home and had another episode per pt.  pt legs bilateral swelling bruising feet noted to have open cuts on toes, pt has large body habbitus denies chest pain denies shortness of breath denies N/V/D denies fevers sweats chlls.

## 2021-05-17 NOTE — H&P ADULT - NSHPPHYSICALEXAM_GEN_ALL_CORE
Vital Signs Last 24 Hrs  T(C): 36.6 (17 May 2021 22:06), Max: 36.7 (17 May 2021 21:19)  T(F): 97.8 (17 May 2021 22:06), Max: 98 (17 May 2021 21:19)  HR: 87 (17 May 2021 22:06) (87 - 93)  BP: 152/66 (17 May 2021 22:06) (115/68 - 152/66)  BP(mean): --  RR: 18 (17 May 2021 22:06) (16 - 18)  SpO2: 95% (17 May 2021 22:06) (95% - 100%)      Telemedicine precludes detailed physical examination  pt sitting upright at 90 degrees w cloth face mask  able to speak to me in sentences Vital Signs Last 24 Hrs  T(C): 36.6 (17 May 2021 22:06), Max: 36.7 (17 May 2021 21:19)  T(F): 97.8 (17 May 2021 22:06), Max: 98 (17 May 2021 21:19)  HR: 87 (17 May 2021 22:06) (87 - 93)  BP: 152/66 (17 May 2021 22:06) (115/68 - 152/66)  BP(mean): --  RR: 18 (17 May 2021 22:06) (16 - 18)  SpO2: 95% (17 May 2021 22:06) (95% - 100%)    Constitutional: Well-appearing, NAD, VSS  Head: NC/AT  Eyes: PERRL, EOMI, anicteric sclera, conjunctiva WNL  ENT: Normal Pharynx, MMM, No tonsillar exudate/erythema  Neck: Supple, Non-tender  Chest: Non-tender, no rashes  Cardio: RRR, s1/s2, no appreciable murmurs/rubs/gallops  Resp: Decreased BS throughout difficult to auscultate 2/2 body habitus  Abd: Soft, Non-tender, obese, Non-distended, no rebound/guarding/rigidity  MSK: moving all extremities, no motor weakness, full ROM x4  Ext: palpable distal pulses, good capillary refill, +BL LE Edema  Psych: appropriate, cooperative  Neuro: CN II-XII grossly intact, no focal deficits  Skin: Warm/Dry. Chronic Venous Stasis Changes noted.

## 2021-05-17 NOTE — ED PROVIDER NOTE - PROGRESS NOTE DETAILS
Sanya: I discussed with Dr. Sequeira who will see the patient in the ER, agrees with admission as patient is overdiuresed.

## 2021-05-17 NOTE — ED ADULT NURSE REASSESSMENT NOTE - NS ED NURSE REASSESS COMMENT FT1
Report received from outgoing RN for continuity of care. Patient cam and cooperative, 2nd K infusing well. Complains of BL foot/hand pain r/t neuropathy. Wife at bedside. Pending bed assignment. Safety maintained, needs attended, will continue to monitor.

## 2021-05-17 NOTE — H&P ADULT - ATTENDING COMMENTS
Pt seen/examined. Discussed with Telehospitalist. Agree with plan above. Lantus/ISS ACHS 15 units/ISS PRN. INR and dose Coumadin Goal 2-3. Coreg per Cardio. Monitor Telemetry. Interrogate AICD. AM Labs. Repeat Orthostatics AM. Aggressive Electrolyte replacement. Monitor Closely.

## 2021-05-17 NOTE — H&P ADULT - NSHPLABSRESULTS_GEN_ALL_CORE
<copied text>    ECHO  Summary:   1. Technically difficult study.   2. Endocardial visualization was enhanced with intravenous echo contrast.   3. Left ventricular ejection fraction, by visual estimation, is 30 to 35%.   4. Moderately decreased global left ventricular systolic function.   5. Multiple left ventricular regional wall motion abnormalities exist. See  wall motion findings.   6. The mitral in-flow pattern reveals no discernable A-wave, therefore no  comment on diastolic function can be made.   7. Mildly enlarged right ventricle with moderately reduced RV systolic  function.   8. Mildly enlarged left atrium.   9. Trace mitral valve regurgitation.  10. Mild thickening of the anterior and posterior mitral valve leaflets.  11. Mild dilatation of the aortic root (3.7 cm).  12. Compared to a prior study from 9/10/20, there is no significant change.    MD ChacortaElectronically signed on 3/9/2021 at 12:38:51 PM      CARDIAC CATHETERIZATION:  VENTRICLES: LVEF 25% with MR  CORONARY VESSELS: The coronary circulation is right dominant.  LM: -- LM: Angiography showed minor luminal irregularities with no flow  limiting lesions.  LAD: -- LAD: Angiography showed minor luminal irregularities with no  flow limiting lesions.  CX: -- Circumflex: Angiography showed minor luminal irregularities with  no flow limiting lesions.  RCA: -- RCA: Angiography showed minor luminal irregularities with no  flow limiting lesions.  COMPLICATIONS: There were no complications. No complications occurred  during the cath lab visit.  DIAGNOSTIC IMPRESSIONS: Mild CAD. Non ischemic cardiomyopathy.  DIAGNOSTIC RECOMMENDATIONS: A/C. MARIELA CV The patient should continue with  the present medications.  INTERVENTIONAL IMPRESSIONS: Mild CAD. Non ischemic cardiomyopathy.  INTERVENTIONAL RECOMMENDATIONS: A/C. MARIELA CV  Prepared and signed by  Bright Muñiz MD    <end of copied text>

## 2021-05-17 NOTE — ED PROVIDER NOTE - OBJECTIVE STATEMENT
71 y/o M with A fib, CHF, DM, HTN, CHRISS, morbid obesity, presents from his hematologists office (unclear why he has one), for an episode of hypotension when he stood up from the wheelchair to get on the scale and he felt lightheaded and dizzy and "blacked out" for a few seconds. He states that he has been taking 25 mg of carvedilol BID as well as a diuretic and has had significant decrease in his LE edema, but feels weak and lightheaded when he stands. He has been ambulating with a walker. He has never syncopized like this before. He cannot recall the rest of his medications, states he took 40 U of insulin at home.    Contrary to the triage note, patient denies that his blood sugar was elevated today.    Cards: Cristobal

## 2021-05-17 NOTE — ED ADULT NURSE NOTE - NSIMPLEMENTINTERV_GEN_ALL_ED
Implemented All Fall Risk Interventions:  [place] to call system. Call bell, personal items and telephone within reach. Instruct patient to call for assistance. Room bathroom lighting operational. Non-slip footwear when patient is off stretcher. Physically safe environment: no spills, clutter or unnecessary equipment. Stretcher in lowest position, wheels locked, appropriate side rails in place. Provide visual cue, wrist band, yellow gown, etc. Monitor gait and stability. Monitor for mental status changes and reorient to person, place, and time. Review medications for side effects contributing to fall risk. Reinforce activity limits and safety measures with patient and family.

## 2021-05-17 NOTE — ED PROVIDER NOTE - CLINICAL SUMMARY MEDICAL DECISION MAKING FREE TEXT BOX
69 y/o M with extensive history who was just discharged from [facility] 2 days ago for uncontrolled DM presents for an episode of hypotension with syncope at dr's office today, seems to not know his dosages or how to correctly take his BP medications, concern for overdiuresis, blood sugar is WNL at this time. Will check orthostatics, trop/bnp, ekg, cardiology consult. Patient noted to be hypokalemic, likely due to overdiuresis.

## 2021-05-17 NOTE — ED ADULT TRIAGE NOTE - CHIEF COMPLAINT QUOTE
pt sent in from doctors office for uncontrolled diabetes, blood sugar in 400's.  pt c/o fatigue, weakness and dizziness.

## 2021-05-17 NOTE — ED PROVIDER NOTE - PMH
Atrial fibrillation    CHF (congestive heart failure)  EF 30%  Chronic back pain    DM (diabetes mellitus)    High cholesterol    HTN (hypertension)    Lung nodules    Prostate CA    Pulmonary hypertension    Renal insufficiency    Sleep apnea

## 2021-05-18 LAB
ANION GAP SERPL CALC-SCNC: 13 MMOL/L — SIGNIFICANT CHANGE UP (ref 5–17)
ANION GAP SERPL CALC-SCNC: 14 MMOL/L — SIGNIFICANT CHANGE UP (ref 5–17)
BASOPHILS # BLD AUTO: 0.02 K/UL — SIGNIFICANT CHANGE UP (ref 0–0.2)
BASOPHILS NFR BLD AUTO: 0.4 % — SIGNIFICANT CHANGE UP (ref 0–2)
BUN SERPL-MCNC: 49 MG/DL — HIGH (ref 8–20)
BUN SERPL-MCNC: 57 MG/DL — HIGH (ref 8–20)
CALCIUM SERPL-MCNC: 9.1 MG/DL — SIGNIFICANT CHANGE UP (ref 8.6–10.2)
CALCIUM SERPL-MCNC: 9.4 MG/DL — SIGNIFICANT CHANGE UP (ref 8.6–10.2)
CHLORIDE SERPL-SCNC: 91 MMOL/L — LOW (ref 98–107)
CHLORIDE SERPL-SCNC: 93 MMOL/L — LOW (ref 98–107)
CO2 SERPL-SCNC: 31 MMOL/L — HIGH (ref 22–29)
CO2 SERPL-SCNC: 33 MMOL/L — HIGH (ref 22–29)
COVID-19 SPIKE DOMAIN AB INTERP: NEGATIVE — SIGNIFICANT CHANGE UP
COVID-19 SPIKE DOMAIN ANTIBODY RESULT: 0.4 U/ML — SIGNIFICANT CHANGE UP
CREAT SERPL-MCNC: 1.37 MG/DL — HIGH (ref 0.5–1.3)
CREAT SERPL-MCNC: 1.43 MG/DL — HIGH (ref 0.5–1.3)
EOSINOPHIL # BLD AUTO: 0.11 K/UL — SIGNIFICANT CHANGE UP (ref 0–0.5)
EOSINOPHIL NFR BLD AUTO: 2.3 % — SIGNIFICANT CHANGE UP (ref 0–6)
GLUCOSE BLDC GLUCOMTR-MCNC: 135 MG/DL — HIGH (ref 70–99)
GLUCOSE BLDC GLUCOMTR-MCNC: 218 MG/DL — HIGH (ref 70–99)
GLUCOSE BLDC GLUCOMTR-MCNC: 247 MG/DL — HIGH (ref 70–99)
GLUCOSE BLDC GLUCOMTR-MCNC: 253 MG/DL — HIGH (ref 70–99)
GLUCOSE BLDC GLUCOMTR-MCNC: 332 MG/DL — HIGH (ref 70–99)
GLUCOSE SERPL-MCNC: 131 MG/DL — HIGH (ref 70–99)
GLUCOSE SERPL-MCNC: 260 MG/DL — HIGH (ref 70–99)
HCT VFR BLD CALC: 50.9 % — HIGH (ref 39–50)
HGB BLD-MCNC: 16.6 G/DL — SIGNIFICANT CHANGE UP (ref 13–17)
IMM GRANULOCYTES NFR BLD AUTO: 0.8 % — SIGNIFICANT CHANGE UP (ref 0–1.5)
INR BLD: 1.72 RATIO — HIGH (ref 0.88–1.16)
INR BLD: 1.86 RATIO — HIGH (ref 0.88–1.16)
LACTATE SERPL-SCNC: 1.9 MMOL/L — SIGNIFICANT CHANGE UP (ref 0.5–2)
LYMPHOCYTES # BLD AUTO: 1.04 K/UL — SIGNIFICANT CHANGE UP (ref 1–3.3)
LYMPHOCYTES # BLD AUTO: 21.5 % — SIGNIFICANT CHANGE UP (ref 13–44)
MAGNESIUM SERPL-MCNC: 2.2 MG/DL — SIGNIFICANT CHANGE UP (ref 1.6–2.6)
MCHC RBC-ENTMCNC: 27.5 PG — SIGNIFICANT CHANGE UP (ref 27–34)
MCHC RBC-ENTMCNC: 32.6 GM/DL — SIGNIFICANT CHANGE UP (ref 32–36)
MCV RBC AUTO: 84.3 FL — SIGNIFICANT CHANGE UP (ref 80–100)
MONOCYTES # BLD AUTO: 0.54 K/UL — SIGNIFICANT CHANGE UP (ref 0–0.9)
MONOCYTES NFR BLD AUTO: 11.2 % — SIGNIFICANT CHANGE UP (ref 2–14)
NEUTROPHILS # BLD AUTO: 3.08 K/UL — SIGNIFICANT CHANGE UP (ref 1.8–7.4)
NEUTROPHILS NFR BLD AUTO: 63.8 % — SIGNIFICANT CHANGE UP (ref 43–77)
PHOSPHATE SERPL-MCNC: 3.1 MG/DL — SIGNIFICANT CHANGE UP (ref 2.4–4.7)
PLATELET # BLD AUTO: 128 K/UL — LOW (ref 150–400)
POTASSIUM SERPL-MCNC: 3 MMOL/L — LOW (ref 3.5–5.3)
POTASSIUM SERPL-MCNC: 3.2 MMOL/L — LOW (ref 3.5–5.3)
POTASSIUM SERPL-SCNC: 3 MMOL/L — LOW (ref 3.5–5.3)
POTASSIUM SERPL-SCNC: 3.2 MMOL/L — LOW (ref 3.5–5.3)
PROTHROM AB SERPL-ACNC: 19.4 SEC — HIGH (ref 10.6–13.6)
PROTHROM AB SERPL-ACNC: 21 SEC — HIGH (ref 10.6–13.6)
RBC # BLD: 6.04 M/UL — HIGH (ref 4.2–5.8)
RBC # FLD: 17.3 % — HIGH (ref 10.3–14.5)
SARS-COV-2 IGG+IGM SERPL QL IA: 0.4 U/ML — SIGNIFICANT CHANGE UP
SARS-COV-2 IGG+IGM SERPL QL IA: NEGATIVE — SIGNIFICANT CHANGE UP
SARS-COV-2 RNA SPEC QL NAA+PROBE: SIGNIFICANT CHANGE UP
SODIUM SERPL-SCNC: 137 MMOL/L — SIGNIFICANT CHANGE UP (ref 135–145)
SODIUM SERPL-SCNC: 138 MMOL/L — SIGNIFICANT CHANGE UP (ref 135–145)
WBC # BLD: 4.83 K/UL — SIGNIFICANT CHANGE UP (ref 3.8–10.5)
WBC # FLD AUTO: 4.83 K/UL — SIGNIFICANT CHANGE UP (ref 3.8–10.5)

## 2021-05-18 PROCEDURE — 99233 SBSQ HOSP IP/OBS HIGH 50: CPT

## 2021-05-18 PROCEDURE — 76770 US EXAM ABDO BACK WALL COMP: CPT | Mod: 26

## 2021-05-18 PROCEDURE — 99223 1ST HOSP IP/OBS HIGH 75: CPT

## 2021-05-18 RX ORDER — POTASSIUM CHLORIDE 20 MEQ
40 PACKET (EA) ORAL ONCE
Refills: 0 | Status: COMPLETED | OUTPATIENT
Start: 2021-05-18 | End: 2021-05-18

## 2021-05-18 RX ORDER — POTASSIUM CHLORIDE 20 MEQ
10 PACKET (EA) ORAL
Refills: 0 | Status: COMPLETED | OUTPATIENT
Start: 2021-05-18 | End: 2021-05-18

## 2021-05-18 RX ORDER — INSULIN LISPRO 100/ML
15 VIAL (ML) SUBCUTANEOUS
Refills: 0 | Status: DISCONTINUED | OUTPATIENT
Start: 2021-05-18 | End: 2021-05-19

## 2021-05-18 RX ORDER — PANTOPRAZOLE SODIUM 20 MG/1
40 TABLET, DELAYED RELEASE ORAL
Refills: 0 | Status: DISCONTINUED | OUTPATIENT
Start: 2021-05-18 | End: 2021-05-20

## 2021-05-18 RX ORDER — TAMSULOSIN HYDROCHLORIDE 0.4 MG/1
0.4 CAPSULE ORAL AT BEDTIME
Refills: 0 | Status: DISCONTINUED | OUTPATIENT
Start: 2021-05-18 | End: 2021-05-20

## 2021-05-18 RX ORDER — FOLIC ACID 0.8 MG
1 TABLET ORAL DAILY
Refills: 0 | Status: DISCONTINUED | OUTPATIENT
Start: 2021-05-18 | End: 2021-05-20

## 2021-05-18 RX ORDER — DULOXETINE HYDROCHLORIDE 30 MG/1
60 CAPSULE, DELAYED RELEASE ORAL DAILY
Refills: 0 | Status: DISCONTINUED | OUTPATIENT
Start: 2021-05-18 | End: 2021-05-20

## 2021-05-18 RX ORDER — POTASSIUM CHLORIDE 20 MEQ
10 PACKET (EA) ORAL
Refills: 0 | Status: DISCONTINUED | OUTPATIENT
Start: 2021-05-18 | End: 2021-05-18

## 2021-05-18 RX ORDER — WARFARIN SODIUM 2.5 MG/1
3 TABLET ORAL ONCE
Refills: 0 | Status: COMPLETED | OUTPATIENT
Start: 2021-05-18 | End: 2021-05-18

## 2021-05-18 RX ORDER — ATORVASTATIN CALCIUM 80 MG/1
40 TABLET, FILM COATED ORAL AT BEDTIME
Refills: 0 | Status: DISCONTINUED | OUTPATIENT
Start: 2021-05-18 | End: 2021-05-20

## 2021-05-18 RX ORDER — ASPIRIN/CALCIUM CARB/MAGNESIUM 324 MG
81 TABLET ORAL DAILY
Refills: 0 | Status: DISCONTINUED | OUTPATIENT
Start: 2021-05-18 | End: 2021-05-20

## 2021-05-18 RX ORDER — LEVOTHYROXINE SODIUM 125 MCG
50 TABLET ORAL DAILY
Refills: 0 | Status: DISCONTINUED | OUTPATIENT
Start: 2021-05-18 | End: 2021-05-20

## 2021-05-18 RX ORDER — GABAPENTIN 400 MG/1
600 CAPSULE ORAL THREE TIMES A DAY
Refills: 0 | Status: DISCONTINUED | OUTPATIENT
Start: 2021-05-18 | End: 2021-05-20

## 2021-05-18 RX ADMIN — Medication 100 MILLIEQUIVALENT(S): at 10:34

## 2021-05-18 RX ADMIN — Medication 40 MILLIEQUIVALENT(S): at 17:51

## 2021-05-18 RX ADMIN — Medication 100 MILLIEQUIVALENT(S): at 11:35

## 2021-05-18 RX ADMIN — Medication 6: at 11:35

## 2021-05-18 RX ADMIN — INSULIN GLARGINE 60 UNIT(S): 100 INJECTION, SOLUTION SUBCUTANEOUS at 00:30

## 2021-05-18 RX ADMIN — WARFARIN SODIUM 3 MILLIGRAM(S): 2.5 TABLET ORAL at 21:52

## 2021-05-18 RX ADMIN — Medication 40 MILLIEQUIVALENT(S): at 08:25

## 2021-05-18 RX ADMIN — CARVEDILOL PHOSPHATE 25 MILLIGRAM(S): 80 CAPSULE, EXTENDED RELEASE ORAL at 17:19

## 2021-05-18 RX ADMIN — Medication 1 MILLIGRAM(S): at 11:36

## 2021-05-18 RX ADMIN — GABAPENTIN 600 MILLIGRAM(S): 400 CAPSULE ORAL at 21:52

## 2021-05-18 RX ADMIN — GABAPENTIN 600 MILLIGRAM(S): 400 CAPSULE ORAL at 17:19

## 2021-05-18 RX ADMIN — Medication 8: at 08:25

## 2021-05-18 RX ADMIN — Medication 15 UNIT(S): at 08:25

## 2021-05-18 RX ADMIN — DULOXETINE HYDROCHLORIDE 60 MILLIGRAM(S): 30 CAPSULE, DELAYED RELEASE ORAL at 11:36

## 2021-05-18 RX ADMIN — Medication 81 MILLIGRAM(S): at 11:36

## 2021-05-18 RX ADMIN — INSULIN GLARGINE 60 UNIT(S): 100 INJECTION, SOLUTION SUBCUTANEOUS at 21:54

## 2021-05-18 RX ADMIN — ATORVASTATIN CALCIUM 40 MILLIGRAM(S): 80 TABLET, FILM COATED ORAL at 21:52

## 2021-05-18 RX ADMIN — Medication 15 UNIT(S): at 17:04

## 2021-05-18 RX ADMIN — Medication 15 UNIT(S): at 11:35

## 2021-05-18 RX ADMIN — Medication 50 MICROGRAM(S): at 04:54

## 2021-05-18 RX ADMIN — TAMSULOSIN HYDROCHLORIDE 0.4 MILLIGRAM(S): 0.4 CAPSULE ORAL at 21:52

## 2021-05-18 RX ADMIN — PANTOPRAZOLE SODIUM 40 MILLIGRAM(S): 20 TABLET, DELAYED RELEASE ORAL at 08:24

## 2021-05-18 RX ADMIN — Medication 100 MILLIEQUIVALENT(S): at 08:25

## 2021-05-18 RX ADMIN — CARVEDILOL PHOSPHATE 25 MILLIGRAM(S): 80 CAPSULE, EXTENDED RELEASE ORAL at 05:50

## 2021-05-18 NOTE — PROVIDER CONTACT NOTE (CHANGE IN STATUS NOTIFICATION) - ASSESSMENT
VSS. patient with no complaints of chest pain. sob. dizziness. No acute signs of distress at the moment.

## 2021-05-19 LAB
24R-OH-CALCIDIOL SERPL-MCNC: 25.6 NG/ML — LOW (ref 30–80)
ALBUMIN SERPL ELPH-MCNC: 3.2 G/DL — LOW (ref 3.3–5.2)
ALP SERPL-CCNC: 78 U/L — SIGNIFICANT CHANGE UP (ref 40–120)
ALT FLD-CCNC: 16 U/L — SIGNIFICANT CHANGE UP
ANION GAP SERPL CALC-SCNC: 12 MMOL/L — SIGNIFICANT CHANGE UP (ref 5–17)
ANION GAP SERPL CALC-SCNC: 13 MMOL/L — SIGNIFICANT CHANGE UP (ref 5–17)
APPEARANCE UR: CLEAR — SIGNIFICANT CHANGE UP
AST SERPL-CCNC: 27 U/L — SIGNIFICANT CHANGE UP
BACTERIA # UR AUTO: ABNORMAL
BASOPHILS # BLD AUTO: 0.03 K/UL — SIGNIFICANT CHANGE UP (ref 0–0.2)
BASOPHILS NFR BLD AUTO: 0.6 % — SIGNIFICANT CHANGE UP (ref 0–2)
BILIRUB SERPL-MCNC: 0.7 MG/DL — SIGNIFICANT CHANGE UP (ref 0.4–2)
BILIRUB UR-MCNC: NEGATIVE — SIGNIFICANT CHANGE UP
BUN SERPL-MCNC: 36 MG/DL — HIGH (ref 8–20)
BUN SERPL-MCNC: 42 MG/DL — HIGH (ref 8–20)
CALCIUM SERPL-MCNC: 9 MG/DL — SIGNIFICANT CHANGE UP (ref 8.6–10.2)
CALCIUM SERPL-MCNC: 9.3 MG/DL — SIGNIFICANT CHANGE UP (ref 8.6–10.2)
CALCIUM SERPL-MCNC: 9.5 MG/DL — SIGNIFICANT CHANGE UP (ref 8.4–10.5)
CHLORIDE SERPL-SCNC: 92 MMOL/L — LOW (ref 98–107)
CHLORIDE SERPL-SCNC: 94 MMOL/L — LOW (ref 98–107)
CO2 SERPL-SCNC: 29 MMOL/L — SIGNIFICANT CHANGE UP (ref 22–29)
CO2 SERPL-SCNC: 29 MMOL/L — SIGNIFICANT CHANGE UP (ref 22–29)
COLOR SPEC: YELLOW — SIGNIFICANT CHANGE UP
CREAT ?TM UR-MCNC: 53 MG/DL — SIGNIFICANT CHANGE UP
CREAT SERPL-MCNC: 1.11 MG/DL — SIGNIFICANT CHANGE UP (ref 0.5–1.3)
CREAT SERPL-MCNC: 1.42 MG/DL — HIGH (ref 0.5–1.3)
DIFF PNL FLD: NEGATIVE — SIGNIFICANT CHANGE UP
EOSINOPHIL # BLD AUTO: 0.12 K/UL — SIGNIFICANT CHANGE UP (ref 0–0.5)
EOSINOPHIL NFR BLD AUTO: 2.2 % — SIGNIFICANT CHANGE UP (ref 0–6)
EPI CELLS # UR: SIGNIFICANT CHANGE UP
GLUCOSE BLDC GLUCOMTR-MCNC: 191 MG/DL — HIGH (ref 70–99)
GLUCOSE BLDC GLUCOMTR-MCNC: 221 MG/DL — HIGH (ref 70–99)
GLUCOSE BLDC GLUCOMTR-MCNC: 247 MG/DL — HIGH (ref 70–99)
GLUCOSE BLDC GLUCOMTR-MCNC: 270 MG/DL — HIGH (ref 70–99)
GLUCOSE SERPL-MCNC: 225 MG/DL — HIGH (ref 70–99)
GLUCOSE SERPL-MCNC: 278 MG/DL — HIGH (ref 70–99)
GLUCOSE UR QL: 1000 MG/DL
HCT VFR BLD CALC: 49.8 % — SIGNIFICANT CHANGE UP (ref 39–50)
HGB BLD-MCNC: 15.8 G/DL — SIGNIFICANT CHANGE UP (ref 13–17)
IMM GRANULOCYTES NFR BLD AUTO: 0.7 % — SIGNIFICANT CHANGE UP (ref 0–1.5)
INR BLD: 1.52 RATIO — HIGH (ref 0.88–1.16)
KETONES UR-MCNC: NEGATIVE — SIGNIFICANT CHANGE UP
LEUKOCYTE ESTERASE UR-ACNC: NEGATIVE — SIGNIFICANT CHANGE UP
LYMPHOCYTES # BLD AUTO: 1.22 K/UL — SIGNIFICANT CHANGE UP (ref 1–3.3)
LYMPHOCYTES # BLD AUTO: 22.6 % — SIGNIFICANT CHANGE UP (ref 13–44)
MCHC RBC-ENTMCNC: 27.3 PG — SIGNIFICANT CHANGE UP (ref 27–34)
MCHC RBC-ENTMCNC: 31.7 GM/DL — LOW (ref 32–36)
MCV RBC AUTO: 86 FL — SIGNIFICANT CHANGE UP (ref 80–100)
MONOCYTES # BLD AUTO: 0.57 K/UL — SIGNIFICANT CHANGE UP (ref 0–0.9)
MONOCYTES NFR BLD AUTO: 10.6 % — SIGNIFICANT CHANGE UP (ref 2–14)
NEUTROPHILS # BLD AUTO: 3.42 K/UL — SIGNIFICANT CHANGE UP (ref 1.8–7.4)
NEUTROPHILS NFR BLD AUTO: 63.3 % — SIGNIFICANT CHANGE UP (ref 43–77)
NITRITE UR-MCNC: NEGATIVE — SIGNIFICANT CHANGE UP
PH UR: 6 — SIGNIFICANT CHANGE UP (ref 5–8)
PLATELET # BLD AUTO: 116 K/UL — LOW (ref 150–400)
POTASSIUM SERPL-MCNC: 3.2 MMOL/L — LOW (ref 3.5–5.3)
POTASSIUM SERPL-MCNC: 3.6 MMOL/L — SIGNIFICANT CHANGE UP (ref 3.5–5.3)
POTASSIUM SERPL-SCNC: 3.2 MMOL/L — LOW (ref 3.5–5.3)
POTASSIUM SERPL-SCNC: 3.6 MMOL/L — SIGNIFICANT CHANGE UP (ref 3.5–5.3)
PROT ?TM UR-MCNC: 5 MG/DL — SIGNIFICANT CHANGE UP (ref 0–12)
PROT SERPL-MCNC: 6.4 G/DL — LOW (ref 6.6–8.7)
PROT UR-MCNC: NEGATIVE MG/DL — SIGNIFICANT CHANGE UP
PROT/CREAT UR-RTO: 0.1 RATIO — SIGNIFICANT CHANGE UP
PROTHROM AB SERPL-ACNC: 17.3 SEC — HIGH (ref 10.6–13.6)
PTH-INTACT FLD-MCNC: 59 PG/ML — SIGNIFICANT CHANGE UP (ref 15–65)
RBC # BLD: 5.79 M/UL — SIGNIFICANT CHANGE UP (ref 4.2–5.8)
RBC # FLD: 16.5 % — HIGH (ref 10.3–14.5)
RBC CASTS # UR COMP ASSIST: SIGNIFICANT CHANGE UP /HPF (ref 0–4)
SODIUM SERPL-SCNC: 133 MMOL/L — LOW (ref 135–145)
SODIUM SERPL-SCNC: 136 MMOL/L — SIGNIFICANT CHANGE UP (ref 135–145)
SP GR SPEC: 1.01 — SIGNIFICANT CHANGE UP (ref 1.01–1.02)
UROBILINOGEN FLD QL: NEGATIVE MG/DL — SIGNIFICANT CHANGE UP
WBC # BLD: 5.4 K/UL — SIGNIFICANT CHANGE UP (ref 3.8–10.5)
WBC # FLD AUTO: 5.4 K/UL — SIGNIFICANT CHANGE UP (ref 3.8–10.5)
WBC UR QL: SIGNIFICANT CHANGE UP

## 2021-05-19 PROCEDURE — 99232 SBSQ HOSP IP/OBS MODERATE 35: CPT

## 2021-05-19 PROCEDURE — 99233 SBSQ HOSP IP/OBS HIGH 50: CPT

## 2021-05-19 RX ORDER — INSULIN LISPRO 100/ML
25 VIAL (ML) SUBCUTANEOUS
Refills: 0 | Status: DISCONTINUED | OUTPATIENT
Start: 2021-05-19 | End: 2021-05-20

## 2021-05-19 RX ORDER — WARFARIN SODIUM 2.5 MG/1
3 TABLET ORAL ONCE
Refills: 0 | Status: COMPLETED | OUTPATIENT
Start: 2021-05-19 | End: 2021-05-19

## 2021-05-19 RX ORDER — POTASSIUM CHLORIDE 20 MEQ
40 PACKET (EA) ORAL ONCE
Refills: 0 | Status: COMPLETED | OUTPATIENT
Start: 2021-05-19 | End: 2021-05-19

## 2021-05-19 RX ADMIN — GABAPENTIN 600 MILLIGRAM(S): 400 CAPSULE ORAL at 12:47

## 2021-05-19 RX ADMIN — CARVEDILOL PHOSPHATE 25 MILLIGRAM(S): 80 CAPSULE, EXTENDED RELEASE ORAL at 05:57

## 2021-05-19 RX ADMIN — PANTOPRAZOLE SODIUM 40 MILLIGRAM(S): 20 TABLET, DELAYED RELEASE ORAL at 05:51

## 2021-05-19 RX ADMIN — Medication 1 MILLIGRAM(S): at 10:29

## 2021-05-19 RX ADMIN — Medication 50 MICROGRAM(S): at 05:51

## 2021-05-19 RX ADMIN — Medication 20 MILLIGRAM(S): at 17:49

## 2021-05-19 RX ADMIN — GABAPENTIN 600 MILLIGRAM(S): 400 CAPSULE ORAL at 05:51

## 2021-05-19 RX ADMIN — WARFARIN SODIUM 3 MILLIGRAM(S): 2.5 TABLET ORAL at 23:06

## 2021-05-19 RX ADMIN — INSULIN GLARGINE 60 UNIT(S): 100 INJECTION, SOLUTION SUBCUTANEOUS at 22:30

## 2021-05-19 RX ADMIN — Medication 2: at 22:31

## 2021-05-19 RX ADMIN — Medication 40 MILLIEQUIVALENT(S): at 10:29

## 2021-05-19 RX ADMIN — Medication 4: at 08:44

## 2021-05-19 RX ADMIN — GABAPENTIN 600 MILLIGRAM(S): 400 CAPSULE ORAL at 22:32

## 2021-05-19 RX ADMIN — Medication 2: at 17:50

## 2021-05-19 RX ADMIN — TAMSULOSIN HYDROCHLORIDE 0.4 MILLIGRAM(S): 0.4 CAPSULE ORAL at 22:31

## 2021-05-19 RX ADMIN — Medication 15 UNIT(S): at 12:47

## 2021-05-19 RX ADMIN — ATORVASTATIN CALCIUM 40 MILLIGRAM(S): 80 TABLET, FILM COATED ORAL at 22:31

## 2021-05-19 RX ADMIN — DULOXETINE HYDROCHLORIDE 60 MILLIGRAM(S): 30 CAPSULE, DELAYED RELEASE ORAL at 10:29

## 2021-05-19 RX ADMIN — Medication 4: at 12:47

## 2021-05-19 RX ADMIN — CARVEDILOL PHOSPHATE 25 MILLIGRAM(S): 80 CAPSULE, EXTENDED RELEASE ORAL at 17:50

## 2021-05-19 RX ADMIN — Medication 81 MILLIGRAM(S): at 10:29

## 2021-05-19 RX ADMIN — Medication 25 UNIT(S): at 17:50

## 2021-05-19 RX ADMIN — Medication 15 UNIT(S): at 08:44

## 2021-05-19 NOTE — PROGRESS NOTE ADULT - ASSESSMENT
70 year old male w AFIB on AC, AICD, HFrEF 30%, DM II, hypothyroid, HLD came to the ED due to syncope and uncontrolled blood glucose    Syncope  orthostatic hypotension  hypokalemia  - K 3.2  - repleted with Klor con 40mEq  - s/p KCL 10 meq IV x 3 w 60 meq po  - s/p 1500 cc IV NS  - orthostatic BP  - cardiology consult appreciated  - device interrogation by Cardiology    Hyponatremia  - corrected for hyperglycemia 137    HFrEF  NICM  - I/O  - holding diuretic  - recent ECHO above  - asa 81 mg  - atorvastatin 40 mg    JAYLENE on CKD  - resolved  - Cr today 1.11  - Trend creatinine  - Nephro consulted    DM II w hyperglycemia  - recent Hb A1c 10.9  - Lantus 60 units nightly  - Admelog 5mg TID with meals  - IS with ISS    AFIB  on EKG  - c/w Coreg 25mg BID  - c/w Coumadin 3mg nightly  - monitor INR  - INR today 1.52    BPH  - c/w Flomax 0.4mg daily    GERD  - nexium interchange to protonix 40 mg q AM    HLD  - c/w Lipitor 40mg nightly    Neuropathy  - duloxetine 60 mg  - gabapentin in ED    DVT ppx  - Coumadin 3mg tonight  
70 year old male w AFIB on AC, AICD, HFrEF 30%, DM II, hypothyroid, HLD came to the ED due to syncope and uncontrolled blood glucose    Syncope  orthostatic hypotension  hypokalemia  - s/p KCL 10 meq IV x 3 w 60 meq po  - s/p 1500 cc IV NS  - orthostatic BP  - cardiology consult appreciated  - device interrogation today    HFrEF  NICM  - I/O  - holding diuretic  - recent ECHO above  - asa 81 mg  - atorvastatin 40 mg    JAYLENE on CKD  - Cr today 1.43  - Trend creatinine  - Nephro consulted    DM II w hyperglycemia  - recent Hb A1c 10.9  - Lantus 60 units nightly  - Admelog 5mg TID with meals  - IS with ISS    AFIB  on EKG  - c/w Coreg 25mg BID  - c/w Coumadin  - monitor INR  - INR today 1.72    BPH  - c/w Flomax 0.4mg daily    GERD  - nexium interchange to protonix 40 mg q AM    HLD  - c/w Lipitor 40mg nightly    Neuropathy  - duloxetine 60 mg  - gabapentin in ED    DVT ppx  - Coumadin 3mg tonight
SCr normalizing  Signing off  Please recall if needed    
Rec :    Avoidance of nephrotoxins/nephrotoxin medication adjustment  Medication dosage adjustment for kidney function  Continuous IVF administration (guided by CVP and testing of fluid responsiveness for 6 hours)  Discontinuation of ACE/ARBs ,  Close monitoring of serum Creatinine and UO  Acid-base, electrolyte and albumin status management  Avoidance of hyperglycemia ,  Consider alternatives to radiocontrast administration  Optimizing hemodynamics:

## 2021-05-19 NOTE — CONSULT NOTE ADULT - REASON FOR ADMISSION
syncope  orthostatic hypotension  hypokalemia  DM uncontrolled
syncope  orthostatic hypotension  hypokalemia  DM uncontrolled

## 2021-05-19 NOTE — CONSULT NOTE ADULT - SUBJECTIVE AND OBJECTIVE BOX
[facility], [facility]                [address] [address]                                     PHONE: [phone]                                       FAX: [phone]  [email or web address]      Reason for Consult: syncope     HPI: Patient is a morbidly obese 71 y/o man with chronic HFrEF/NICM EF 30-35%, paroxsymal ventricular tachycardia s/p ICD ([employer]), chronic atrial fibrillation, CKD, HTN, CRHISS, and DM recently admitted 5/12/21 with hyperglycemia/polyuria who presents from his hemtologist's office with orthostatic hypotension and brief loss of consciousness found to have JAYLENE on CKD, and hypokalemia. He denies firing of his device.     PAST MEDICAL & SURGICAL HISTORY:  DM (diabetes mellitus)  HTN (hypertension)  High cholesterol  Renal insufficiency  Sleep apnea  Prostate CA  Pulmonary hypertension  CHF (congestive heart failure)  EF 30%  Atrial fibrillation  Chronic back pain  Lung nodules  S/P ankle ligament repair  AICD (automatic cardioverter/defibrillator) present    Telemetry: NSVT, AF    PREVIOUS DIAGNOSTIC TESTING:      EKG: AF with frequent PVCs     ECHO FINDINGS: < from: TTE Echo Complete w/o Contrast w/ Doppler (03.09.21 @ 08:14) >   1. Technically difficult study.   2. Endocardial visualization was enhanced with intravenous echo contrast.   3. Left ventricular ejection fraction, by visual estimation, is 30 to 35%.   4. Moderately decreased global left ventricular systolic function.   5. Multiple left ventricular regional wall motion abnormalities exist. See wall motion findings.   6. The mitral in-flow pattern reveals no discernable A-wave, therefore no comment on diastolic function can be made.   7. Mildly enlarged right ventricle with moderately reduced RV systolic function.   8. Mildly enlarged left atrium.   9. Trace mitral valve regurgitation.  10. Mild thickening of the anterior and posterior mitral valve leaflets.  11. Mild dilatation of the aortic root (3.7 cm).  12. Compared to a prior study from 9/10/20, there is no significant change.    < end of copied text >      STRESS FINDINGS:    CATHETERIZATION FINDINGS:    MEDICATIONS  (STANDING):  Home Medications:  albuterol 90 mcg/inh inhalation aerosol: 2 puff(s) inhaled every 6 hours, As needed, Shortness of Breath and/or Wheezing (09 Mar 2021 15:03)  atorvastatin 40 mg oral tablet: 1 tab(s) orally once a day (at bedtime) (14 May 2021 12:34)  carvedilol 12.5 mg oral tablet: 1 tab(s) orally every 12 hours (14 May 2021 12:34)  DULoxetine 60 mg oral delayed release capsule: 1 cap(s) orally once a day (09 Mar 2021 15:03)  folic acid 1 mg oral tablet: 1 tab(s) orally once a day (09 Mar 2021 15:03)  insulin lispro 100 units/mL injectable solution: 20 unit(s) injectable 3 times a day (before meals) (14 May 2021 14:58)  Januvia 50 mg oral tablet: 1 tab(s) orally once a day (09 Mar 2021 15:03)  Jardiance 10 mg oral tablet: 1 tab(s) orally once a day (in the morning) (09 Mar 2021 15:03)  Klor-Con M20 oral tablet, extended release: 1 tab(s) orally once a day. Starting today.  (09 Mar 2021 15:03)  levothyroxine 50 mcg (0.05 mg) oral tablet: 1 tab(s) orally once a day (11 Sep 2020 14:37)  NexIUM 20 mg oral delayed release capsule: 1 cap(s) orally once a day (09 Mar 2021 15:03)      MEDICATIONS  (PRN):      FAMILY HISTORY:  Family history of cancer (Sibling)    Family history of diabetes mellitus (Sibling)    SOCIAL HISTORY:  CIGARETTES: denies   ALCOHOL: denies    ROS: Negative other than as mentioned in HPI.    Vital Signs Last 24 Hrs  T(C): 36.6 (17 May 2021 22:06), Max: 36.7 (17 May 2021 21:19)  T(F): 97.8 (17 May 2021 22:06), Max: 98 (17 May 2021 21:19)  HR: 87 (17 May 2021 22:06) (87 - 93)  BP: 152/66 (17 May 2021 22:06) (115/68 - 152/66)  BP(mean): --  RR: 18 (17 May 2021 22:06) (16 - 18)  SpO2: 95% (17 May 2021 22:06) (95% - 100%)    PHYSICAL EXAM:  General: Appears well developed, well nourished alert and cooperative.  HEENT: Head; normocephalic, atraumatic. sclera anicteric, MMM, no JVD, neck is supple   CARDIOVASCULAR; 1/6 ANGEL, no rub, gallop or lift. Normal S1 and S2.  LUNGS; No rales, rhonchi or wheeze. Normal breath sounds bilaterally.  ABDOMEN ; Soft, nontender without mass or organomegaly. bowel sounds normoactive.  EXTREMITIES; No clubbing, cyanosis, hyperpigmentation, +LE edema (improved compared to prior)  SKIN; warm and dry with normal turgor.  NEURO; Alert/oriented x 3/normal motor exam.     PSYCH; normal affect.        I&O's Detail      LABS:                        16.4   8.21  )-----------( 146      ( 17 May 2021 16:13 )             49.9     05-17    134<L>  |  89<L>  |  66.0<H>  ----------------------------<  202<H>  2.7<LL>   |  29.0  |  2.08<H>    Ca    9.2      17 May 2021 16:13  Phos  3.9     05-17  Mg     2.0     05-17    TPro  6.8  /  Alb  3.5  /  TBili  0.7  /  DBili  x   /  AST  29  /  ALT  17  /  AlkPhos  81  05-17    CARDIAC MARKERS ( 17 May 2021 16:13 )  x     / 0.05 ng/mL / x     / x     / x              I&O's Summary      RADIOLOGY & ADDITIONAL STUDIES:
Reason for Consult: syncope , JAYLENE- CKD,    HPI: Patient is a morbidly obese 69 y/o man with chronic HFrEF/ NICM EF 30-35%, paroxsymal ventricular tachycardia s/p ICD ([employer]), chronic atrial fibrillation, CKD, HTN, CHRISS, and DM recently admitted 5/12/21 with hyperglycemia/polyuria who presents from his  hematologist's  office with orthostatic hypotension and brief loss of consciousness found to have JAYLENE on CKD, and hypokalemia. He denies firing of his device.     PAST MEDICAL & SURGICAL HISTORY:  DM (diabetes mellitus)  HTN (hypertension)  High cholesterol  Renal insufficiency  Sleep apnea  Prostate CA  Pulmonary hypertension  CHF (congestive heart failure)  EF 30%  Atrial fibrillation  Chronic back pain  Lung nodules  S/P ankle ligament repair  AICD (automatic cardioverter/defibrillator) present    Telemetry: NSVT, AF    PREVIOUS DIAGNOSTIC TESTING:      EKG: AF with frequent PVCs     ECHO FINDINGS: TTE Echo Complete w/o Contrast w/ Doppler (03.09.21 @ 08:14)      1. Technically difficult study.   2. Endocardial visualization was enhanced with intravenous echo contrast.   3. Left ventricular ejection fraction, by visual estimation, is 30 to 35%.   4. Moderately decreased global left ventricular systolic function.   5. Multiple left ventricular regional wall motion abnormalities exist. See wall motion findings.   6. The mitral in-flow pattern reveals no discernable A-wave, therefore no comment on diastolic function can be made.   7. Mildly enlarged right ventricle with moderately reduced RV systolic function.   8. Mildly enlarged left atrium.   9. Trace mitral valve regurgitation.  10. Mild thickening of the anterior and posterior mitral valve leaflets.  11. Mild dilatation of the aortic root (3.7 cm).  12. Compared to a prior study from 9/10/20, there is no significant change.    MEDICATIONS  (STANDING):    Home Medications:    albuterol 90 mcg/inh inhalation aerosol: 2 puff(s) inhaled every 6 hours, As needed, Shortness of Breath and/or Wheezing (09 Mar 2021 15:03)  atorvastatin 40 mg oral tablet: 1 tab(s) orally once a day (at bedtime) (14 May 2021 12:34)  carvedilol 12.5 mg oral tablet: 1 tab(s) orally every 12 hours (14 May 2021 12:34)  DULoxetine 60 mg oral delayed release capsule: 1 cap(s) orally once a day (09 Mar 2021 15:03)  folic acid 1 mg oral tablet: 1 tab(s) orally once a day (09 Mar 2021 15:03)  insulin lispro 100 units/mL injectable solution: 20 unit(s) injectable 3 times a day (before meals) (14 May 2021 14:58)  Januvia 50 mg oral tablet: 1 tab(s) orally once a day (09 Mar 2021 15:03)  Jardiance 10 mg oral tablet: 1 tab(s) orally once a day (in the morning) (09 Mar 2021 15:03)  Klor-Con M20 oral tablet, extended release: 1 tab(s) orally once a day. Starting today.  (09 Mar 2021 15:03)  levothyroxine 50 mcg (0.05 mg) oral tablet: 1 tab(s) orally once a day (11 Sep 2020 14:37)  NexIUM 20 mg oral delayed release capsule: 1 cap(s) orally once a day (09 Mar 2021 15:03)    FAMILY HISTORY:  Family history of cancer (Sibling)    Family history of diabetes mellitus (Sibling)    SOCIAL HISTORY:  CIGARETTES: denies   ALCOHOL: denies    ROS: Negative other than as mentioned in HPI.    Vital Signs Last 24 Hrs  T(C): 36.6 (17 May 2021 22:06), Max: 36.7 (17 May 2021 21:19)  T(F): 97.8 (17 May 2021 22:06), Max: 98 (17 May 2021 21:19)  HR: 87 (17 May 2021 22:06) (87 - 93)  BP: 152/66 (17 May 2021 22:06) (115/68 - 152/66)  RR: 18 (17 May 2021 22:06) (16 - 18)  SpO2: 95% (17 May 2021 22:06) (95% - 100%)    PHYSICAL EXAM:  General: Appears well developed, well nourished alert and cooperative.  HEENT: Head; normocephalic, atraumatic. sclera anicteric, MMM, no JVD, neck is supple   CARDIOVASCULAR; 1/6 ANGEL, no rub, gallop or lift. Normal S1 and S2.  LUNGS; No rales, rhonchi or wheeze. Normal breath sounds bilaterally.  ABDOMEN ; Soft, nontender without mass or organomegaly. bowel sounds normoactive.  EXTREMITIES; No clubbing, cyanosis, hyperpigmentation, +LE edema (improved compared to prior)  SKIN; warm and dry with normal turgor.  NEURO; Alert/oriented x 3/normal motor exam.     PSYCH; normal affect.        I&O's Detail      LABS:                        16.4   8.21  )-----------( 146      ( 17 May 2021 16:13 )             49.9     05-17    134<L>  |  89<L>  |  66.0<H>  ----------------------------<  202<H>  2.7<LL>   |  29.0  |  2.08<H>    Ca    9.2      17 May 2021 16:13  Phos  3.9     05-17  Mg     2.0     05-17    TPro  6.8  /  Alb  3.5  /  TBili  0.7  /  DBili  x   /  AST  29  /  ALT  17  /  AlkPhos  81  05-17    CARDIAC MARKERS ( 17 May 2021 16:13 )  x     / 0.05 ng/mL / x     / x     / x        Patient is a morbidly obese 69 y/o man with chronic HFrEF/NICM EF 30-35%, paroxsymal ventricular tachycardia s/p ICD ([employer] [facility]), chronic atrial fibrillation, CKD, HTN, CHRISS, and DM recently admitted 5/12/21 with hyperglycemia/polyuria who presents from his hematologist's office with orthostatic hypotension and brief loss of consciousness found to have JAYLENE on CKD, and hypokalemia. He denies firing of his device.     Syncope/orthostatic hypotension/ JAYLENE/ hypokalemia  - Multifactorial including polyuria and iatrogenic   - Maintainn K+ > 4 Meq.,   - hold ACEi/ARB/ARNI    NICM/NSVT/persistent AF  - On Coreg, Amiodarone  & AC,   - aggressive electrolyte replacement               
S : 70y year old Male seen at bedside for Right and Left foot big toe bruising with superficial abrasion .  Patient relates to having the ulceration for few days  Patient has had no previous treatment. Pt states he used to see hi spodiatrist for fungal nails but hasn't seen his podiatrist in a while.    Chief Complaint : Patient is a 70y old  Male who presents with a chief complaint of syncope  orthostatic hypotension  hypokalemia  DM uncontrolled (19 May 2021 12:24)    HPI : HPI:    70 year old male w AFIB on AC, AICD, HFrEF 30%, DM II, hypothyroid, HLD came to the ED due to syncope and uncontrolled blood glucose    Pt states has felt ok since hospital discharge 05-14.  Had appt w Dr. Gomez and states he passed out in her office but did not hit his head;  she wanted him to go to hospital. BGMs 400s.   He went home took insulin 40 units and then came [facility] ED  When he was at home, he states he passed out again but did not hit his head  He has noted + dizziness and weakness  Has some warning that he is about to pass out but is too weak to do anything about it    pt legs bilateral swelling bruising feet noted to have open cuts on toes according to chart  In ED /68  HR 87   RR 16   T 97.8   99% RA  NS 1000 cc + 500 cc = 1500 cc  K 2.7  KCL 10 meq IV x 3 and 60 po      PAST MEDICAL HX  AFIB atrial fibrillation    CHF (congestive heart failure)  EF 30%  Chronic back pain    DM (diabetes mellitus)    HFrEF heart failure w reduced ejection fraction  HLD hyperlipidemia  HTN (hypertension)    Lung nodules    NICM nonischemic cardiomyoapthy  Prostate CA  dx    Pulmonary hypertension    Renal insufficiency    Sleep apnea    PAST SURGICAL HX  AICD (automatic cardioverter/defibrillator) present    S/P ankle ligament repair  Prostate surgery      FAMILY HX  Family history of cancer: sibling  Family history of diabetes mellitus: sibling        SOCIAL HX    lives w wife and daughter and care giver  nonsmoker  no alcohol  walks w walker   (17 May 2021 23:57)      Patient admits to  (-) Fevers, (-) Chills, (-) Nausea, (-) Vomiting, (-) Shortness of Breath      PMH: DM (diabetes mellitus)    HTN (hypertension)    High cholesterol    Renal insufficiency    Sleep apnea    Prostate CA    Pulmonary hypertension    CHF (congestive heart failure)    Atrial fibrillation    Chronic back pain    Lung nodules      PSH:No significant past surgical history    S/P ankle ligament repair    AICD (automatic cardioverter/defibrillator) present        Allergies:allow double protein at meals (Unknown)  No Known Allergies      Labs:                          15.8   5.40  )-----------( 116      ( 19 May 2021 07:30 )             49.8     WBC Trend  5.40 Date (05-19 @ 07:30)  4.83 Date (05-18 @ 06:52)  8.21 Date (05-17 @ 16:13)  6.93 Date (05-13 @ 04:07)  7.72 Date (05-11 @ 20:14)      Chem  05-19    133<L>  |  92<L>  |  42.0<H>  ----------------------------<  278<H>  3.2<L>   |  29.0  |  1.11    Ca    9.0      19 May 2021 07:30  Phos  3.1     05-18  Mg     2.2     05-18    TPro  6.4<L>  /  Alb  3.2<L>  /  TBili  0.7  /  DBili  x   /  AST  27  /  ALT  16  /  AlkPhos  78  05-19          T(F): 98 (05-19-21 @ 10:01), Max: 98.8 (05-18-21 @ 17:20)  HR: 93 (05-19-21 @ 10:01) (77 - 101)  BP: 118/81 (05-19-21 @ 10:01) (108/72 - 145/80)  RR: 19 (05-19-21 @ 10:01) (18 - 19)  SpO2: 94% (05-19-21 @ 10:01) (93% - 96%)  Wt(kg): --    O:   General: Pleasant  male NAD & AOX3.    Integument:  Skin warm, dry and supple bilateral.   b/l hallux bruising at distal tip with mild abrasion, no ulcer, no drainage or bus generalized b/l lower extremity edema +++ with erythema.    Vascular: Dorsalis Pedis and Posterior Tibial pulses 0/4 possibly due to extensive edema.  Capillary re-fill time less then 3 seconds digits 1-5 bilateral.    Neuro: Protective sensation diminished to the level of the digits bilateral.      A: b/l LE Cellulitis      b/l hallux abrasion with bruising      P:   Chart reviewed and Patient evaluated  Discussed diagnosis and treatment with patient  stable foot at this point.  Weight bearing as tolerated   Reviewed ZOYA/PVR  Rec daily application of betadine to affected area, no need of dressing, Rec leg elevation  Pt stable for dc to see podiatrist as out pt.  Podiatry signing off, please reconsult if needed  Seen with Dr Nicole

## 2021-05-19 NOTE — ADVANCED PRACTICE NURSE CONSULT - RECOMMEDATIONS
after chart review pls consider increase lantus to 65 units qhs and   admelog increase to 25 units before meals+ss

## 2021-05-19 NOTE — CONSULT NOTE ADULT - ASSESSMENT
Patient is a morbidly obese 69 y/o man with chronic HFrEF/NICM EF 30-35%, paroxsymal ventricular tachycardia s/p ICD ([employer] [facility]), chronic atrial fibrillation, CKD, HTN, CHRISS, and DM recently admitted 5/12/21 with hyperglycemia/polyuria who presents from his hematologist's office with orthostatic hypotension and brief loss of consciousness found to have JAYLENE on CKD, and hypokalemia. He denies firing of his device.     Syncope/orthostatic hypotension/JAYLENE/hypokalemia  - suspect presentation multifactorial including polyuria and iatrogenic   - hold diuretic therapy and encourage oral hydration   - aggressive repletion of K, repeat labs  - hold ACEi/ARB/ARNI  - repeat orthostatic vitals in the am  - device interrogation in the am     NICM/NSVT/persistent AF  - continue coreg; however increase to 25mg Q12   - aggressive electrolyte replacement   - will add amiodarone 200mg daily if ventricular ectopy increases in burden   - on coumadin, check INR
JAYLENE( Hemodynamic ) Pre - Renal ,  - CK D - 3    EMR Reviewed,    C/W Current Management,     D/W Dr. Peña,
Patient was examined.  All documentation reviewed.  Discussed pathology and treatment plan with resident.  Reviewed written documentation and agreed with the above.  Patient will be follow while in house.

## 2021-05-20 ENCOUNTER — TRANSCRIPTION ENCOUNTER (OUTPATIENT)
Age: 71
End: 2021-05-20

## 2021-05-20 VITALS
TEMPERATURE: 98 F | DIASTOLIC BLOOD PRESSURE: 85 MMHG | RESPIRATION RATE: 18 BRPM | OXYGEN SATURATION: 97 % | SYSTOLIC BLOOD PRESSURE: 123 MMHG | HEART RATE: 90 BPM

## 2021-05-20 LAB
ALBUMIN SERPL ELPH-MCNC: 3.4 G/DL — SIGNIFICANT CHANGE UP (ref 3.3–5.2)
ALP SERPL-CCNC: 71 U/L — SIGNIFICANT CHANGE UP (ref 40–120)
ALT FLD-CCNC: 14 U/L — SIGNIFICANT CHANGE UP
ANION GAP SERPL CALC-SCNC: 16 MMOL/L — SIGNIFICANT CHANGE UP (ref 5–17)
AST SERPL-CCNC: 27 U/L — SIGNIFICANT CHANGE UP
BASOPHILS # BLD AUTO: 0.03 K/UL — SIGNIFICANT CHANGE UP (ref 0–0.2)
BASOPHILS NFR BLD AUTO: 0.6 % — SIGNIFICANT CHANGE UP (ref 0–2)
BILIRUB SERPL-MCNC: 0.6 MG/DL — SIGNIFICANT CHANGE UP (ref 0.4–2)
BUN SERPL-MCNC: 42 MG/DL — HIGH (ref 8–20)
CALCIUM SERPL-MCNC: 9 MG/DL — SIGNIFICANT CHANGE UP (ref 8.6–10.2)
CHLORIDE SERPL-SCNC: 92 MMOL/L — LOW (ref 98–107)
CO2 SERPL-SCNC: 28 MMOL/L — SIGNIFICANT CHANGE UP (ref 22–29)
CREAT SERPL-MCNC: 1.32 MG/DL — HIGH (ref 0.5–1.3)
EOSINOPHIL # BLD AUTO: 0.12 K/UL — SIGNIFICANT CHANGE UP (ref 0–0.5)
EOSINOPHIL NFR BLD AUTO: 2.3 % — SIGNIFICANT CHANGE UP (ref 0–6)
GLUCOSE BLDC GLUCOMTR-MCNC: 140 MG/DL — HIGH (ref 70–99)
GLUCOSE BLDC GLUCOMTR-MCNC: 226 MG/DL — HIGH (ref 70–99)
GLUCOSE BLDC GLUCOMTR-MCNC: 249 MG/DL — HIGH (ref 70–99)
GLUCOSE SERPL-MCNC: 277 MG/DL — HIGH (ref 70–99)
HCT VFR BLD CALC: 49.2 % — SIGNIFICANT CHANGE UP (ref 39–50)
HGB BLD-MCNC: 15.8 G/DL — SIGNIFICANT CHANGE UP (ref 13–17)
IMM GRANULOCYTES NFR BLD AUTO: 0.6 % — SIGNIFICANT CHANGE UP (ref 0–1.5)
INR BLD: 1.75 RATIO — HIGH (ref 0.88–1.16)
LYMPHOCYTES # BLD AUTO: 1.24 K/UL — SIGNIFICANT CHANGE UP (ref 1–3.3)
LYMPHOCYTES # BLD AUTO: 24.2 % — SIGNIFICANT CHANGE UP (ref 13–44)
MCHC RBC-ENTMCNC: 27.5 PG — SIGNIFICANT CHANGE UP (ref 27–34)
MCHC RBC-ENTMCNC: 32.1 GM/DL — SIGNIFICANT CHANGE UP (ref 32–36)
MCV RBC AUTO: 85.7 FL — SIGNIFICANT CHANGE UP (ref 80–100)
MONOCYTES # BLD AUTO: 0.57 K/UL — SIGNIFICANT CHANGE UP (ref 0–0.9)
MONOCYTES NFR BLD AUTO: 11.1 % — SIGNIFICANT CHANGE UP (ref 2–14)
NEUTROPHILS # BLD AUTO: 3.13 K/UL — SIGNIFICANT CHANGE UP (ref 1.8–7.4)
NEUTROPHILS NFR BLD AUTO: 61.2 % — SIGNIFICANT CHANGE UP (ref 43–77)
PLATELET # BLD AUTO: 126 K/UL — LOW (ref 150–400)
POTASSIUM SERPL-MCNC: 3.3 MMOL/L — LOW (ref 3.5–5.3)
POTASSIUM SERPL-SCNC: 3.3 MMOL/L — LOW (ref 3.5–5.3)
PROT SERPL-MCNC: 6.6 G/DL — SIGNIFICANT CHANGE UP (ref 6.6–8.7)
PROTHROM AB SERPL-ACNC: 19.8 SEC — HIGH (ref 10.6–13.6)
RBC # BLD: 5.74 M/UL — SIGNIFICANT CHANGE UP (ref 4.2–5.8)
RBC # FLD: 16.5 % — HIGH (ref 10.3–14.5)
SODIUM SERPL-SCNC: 136 MMOL/L — SIGNIFICANT CHANGE UP (ref 135–145)
WBC # BLD: 5.12 K/UL — SIGNIFICANT CHANGE UP (ref 3.8–10.5)
WBC # FLD AUTO: 5.12 K/UL — SIGNIFICANT CHANGE UP (ref 3.8–10.5)

## 2021-05-20 PROCEDURE — 76770 US EXAM ABDO BACK WALL COMP: CPT

## 2021-05-20 PROCEDURE — 85025 COMPLETE CBC W/AUTO DIFF WBC: CPT

## 2021-05-20 PROCEDURE — 82306 VITAMIN D 25 HYDROXY: CPT

## 2021-05-20 PROCEDURE — 82803 BLOOD GASES ANY COMBINATION: CPT

## 2021-05-20 PROCEDURE — 82962 GLUCOSE BLOOD TEST: CPT

## 2021-05-20 PROCEDURE — 82009 KETONE BODYS QUAL: CPT

## 2021-05-20 PROCEDURE — 97163 PT EVAL HIGH COMPLEX 45 MIN: CPT

## 2021-05-20 PROCEDURE — 83970 ASSAY OF PARATHORMONE: CPT

## 2021-05-20 PROCEDURE — 99239 HOSP IP/OBS DSCHRG MGMT >30: CPT

## 2021-05-20 PROCEDURE — 84100 ASSAY OF PHOSPHORUS: CPT

## 2021-05-20 PROCEDURE — 99285 EMERGENCY DEPT VISIT HI MDM: CPT | Mod: 25

## 2021-05-20 PROCEDURE — 85018 HEMOGLOBIN: CPT

## 2021-05-20 PROCEDURE — 83880 ASSAY OF NATRIURETIC PEPTIDE: CPT

## 2021-05-20 PROCEDURE — 84295 ASSAY OF SERUM SODIUM: CPT

## 2021-05-20 PROCEDURE — 70450 CT HEAD/BRAIN W/O DYE: CPT | Mod: 26

## 2021-05-20 PROCEDURE — 36415 COLL VENOUS BLD VENIPUNCTURE: CPT

## 2021-05-20 PROCEDURE — 82330 ASSAY OF CALCIUM: CPT

## 2021-05-20 PROCEDURE — 84132 ASSAY OF SERUM POTASSIUM: CPT

## 2021-05-20 PROCEDURE — 82435 ASSAY OF BLOOD CHLORIDE: CPT

## 2021-05-20 PROCEDURE — 71045 X-RAY EXAM CHEST 1 VIEW: CPT

## 2021-05-20 PROCEDURE — 83605 ASSAY OF LACTIC ACID: CPT

## 2021-05-20 PROCEDURE — 84484 ASSAY OF TROPONIN QUANT: CPT

## 2021-05-20 PROCEDURE — 80048 BASIC METABOLIC PNL TOTAL CA: CPT

## 2021-05-20 PROCEDURE — 85610 PROTHROMBIN TIME: CPT

## 2021-05-20 PROCEDURE — U0005: CPT

## 2021-05-20 PROCEDURE — 85014 HEMATOCRIT: CPT

## 2021-05-20 PROCEDURE — U0003: CPT

## 2021-05-20 PROCEDURE — 84156 ASSAY OF PROTEIN URINE: CPT

## 2021-05-20 PROCEDURE — 70450 CT HEAD/BRAIN W/O DYE: CPT

## 2021-05-20 PROCEDURE — 86769 SARS-COV-2 COVID-19 ANTIBODY: CPT

## 2021-05-20 PROCEDURE — 80053 COMPREHEN METABOLIC PANEL: CPT

## 2021-05-20 PROCEDURE — 82947 ASSAY GLUCOSE BLOOD QUANT: CPT

## 2021-05-20 PROCEDURE — 82310 ASSAY OF CALCIUM: CPT

## 2021-05-20 PROCEDURE — 83735 ASSAY OF MAGNESIUM: CPT

## 2021-05-20 PROCEDURE — 93005 ELECTROCARDIOGRAM TRACING: CPT

## 2021-05-20 PROCEDURE — 82570 ASSAY OF URINE CREATININE: CPT

## 2021-05-20 PROCEDURE — 81001 URINALYSIS AUTO W/SCOPE: CPT

## 2021-05-20 RX ORDER — CARVEDILOL PHOSPHATE 80 MG/1
1 CAPSULE, EXTENDED RELEASE ORAL
Qty: 60 | Refills: 0
Start: 2021-05-20 | End: 2021-06-18

## 2021-05-20 RX ORDER — GABAPENTIN 400 MG/1
300 CAPSULE ORAL
Qty: 18000 | Refills: 0
Start: 2021-05-20 | End: 2021-06-18

## 2021-05-20 RX ORDER — POTASSIUM CHLORIDE 20 MEQ
40 PACKET (EA) ORAL ONCE
Refills: 0 | Status: COMPLETED | OUTPATIENT
Start: 2021-05-20 | End: 2021-05-20

## 2021-05-20 RX ORDER — INSULIN GLARGINE 100 [IU]/ML
65 INJECTION, SOLUTION SUBCUTANEOUS AT BEDTIME
Refills: 0 | Status: DISCONTINUED | OUTPATIENT
Start: 2021-05-20 | End: 2021-05-20

## 2021-05-20 RX ORDER — WARFARIN SODIUM 2.5 MG/1
3 TABLET ORAL ONCE
Refills: 0 | Status: DISCONTINUED | OUTPATIENT
Start: 2021-05-20 | End: 2021-05-20

## 2021-05-20 RX ADMIN — Medication 50 MICROGRAM(S): at 05:52

## 2021-05-20 RX ADMIN — Medication 1 MILLIGRAM(S): at 11:58

## 2021-05-20 RX ADMIN — Medication 4: at 12:00

## 2021-05-20 RX ADMIN — PANTOPRAZOLE SODIUM 40 MILLIGRAM(S): 20 TABLET, DELAYED RELEASE ORAL at 08:16

## 2021-05-20 RX ADMIN — Medication 40 MILLIEQUIVALENT(S): at 11:57

## 2021-05-20 RX ADMIN — CARVEDILOL PHOSPHATE 25 MILLIGRAM(S): 80 CAPSULE, EXTENDED RELEASE ORAL at 05:52

## 2021-05-20 RX ADMIN — Medication 25 UNIT(S): at 08:15

## 2021-05-20 RX ADMIN — CARVEDILOL PHOSPHATE 25 MILLIGRAM(S): 80 CAPSULE, EXTENDED RELEASE ORAL at 17:18

## 2021-05-20 RX ADMIN — Medication 4: at 08:14

## 2021-05-20 RX ADMIN — Medication 81 MILLIGRAM(S): at 11:58

## 2021-05-20 RX ADMIN — Medication 25 UNIT(S): at 13:19

## 2021-05-20 RX ADMIN — GABAPENTIN 600 MILLIGRAM(S): 400 CAPSULE ORAL at 05:53

## 2021-05-20 RX ADMIN — DULOXETINE HYDROCHLORIDE 60 MILLIGRAM(S): 30 CAPSULE, DELAYED RELEASE ORAL at 11:58

## 2021-05-20 RX ADMIN — Medication 25 UNIT(S): at 18:05

## 2021-05-20 RX ADMIN — GABAPENTIN 600 MILLIGRAM(S): 400 CAPSULE ORAL at 11:59

## 2021-05-20 RX ADMIN — Medication 20 MILLIGRAM(S): at 08:16

## 2021-05-20 NOTE — DISCHARGE NOTE NURSING/CASE MANAGEMENT/SOCIAL WORK - PATIENT PORTAL LINK FT
You can access the FollowMyHealth Patient Portal offered by [facility] by registering at the following website: [email or web address]. By joining [employer]’s FollowMyHealth portal, you will also be able to view your health information using other applications (apps) compatible with our system.

## 2021-05-20 NOTE — DISCHARGE NOTE PROVIDER - PROVIDER TOKENS
PROVIDER:[TOKEN:[[id number]]],PROVIDER:[TOKEN:[[id number]]],FREE:[LAST:[PMD],PHONE:[(   )    -],FAX:[(   )    -]],PROVIDER:[TOKEN:[[id number]]] PROVIDER:[TOKEN:[[id number]]],PROVIDER:[TOKEN:[[id number]],FOLLOWUP:[1 week]],PROVIDER:[TOKEN:[[id number]]],FREE:[LAST:[PMD],PHONE:[(   )    -],FAX:[(   )    -]]

## 2021-05-20 NOTE — DISCHARGE NOTE PROVIDER - HOSPITAL COURSE
MARY ANN CORNELL is an 70y Male with past medical history significant for chronic HFrEF/NICM EF 30-35%, paroxsymal ventricular tachycardia s/p ICD ([employer]), chronic atrial fibrillation, CKD, HTN, CHRSIS, and DM recently admitted 21 with hyperglycemia/polyuria who presents from his hematologist's office with orthostatic hypotension and brief loss of consciousness found to have JAYLENE on CKD, and hypokalemia. He denies firing of his device and device check NSVT stable; TELE rare episodes of NSVT . The patient was seen in consultation by Cardiology, Endocrine, and Nephrology. Coreg increased to 25 mg BID.  Syncope due to orthostatic hypotension.  Received IV NS bolus.  Electrolytes monitored and replaced. Diuretic held. SCr normalizing. Torsemide resumed for discharge. Started on Gabapentin for diabetic neuropathy.  Blood glucose control optimized. The patient was seen in consultation by podiatry, recommend outpatient follow up for ongoing foot care. The patient is medically stable for discharge.   Vital Signs Last 24 Hrs  T(C): 36.8 (20 May 2021 11:34), Max: 36.8 (20 May 2021 11:34)  T(F): 98.3 (20 May 2021 11:34), Max: 98.3 (20 May 2021 11:34)  HR: 76 (20 May 2021 11:34) (61 - 81)  BP: 112/71 (20 May 2021 11:34) (112/71 - 129/76)  BP(mean): --  RR: 20 (20 May 2021 11:34) (19 - 20)  SpO2: 95% (20 May 2021 11:34) (95% - 96%)                            15.8   5.12  )-----------( 126      ( 20 May 2021 07:33 )             49.2     20 May 2021 07:33    136    |  92     |  42.0   ----------------------------<  277    3.3     |  28.0   |  1.32     Ca    9.0        20 May 2021 07:33    TPro  6.6    /  Alb  3.4    /  TBili  0.6    /  DBili  x      /  AST  27     /  ALT  14     /  AlkPhos  71     20 May 2021 07:33    PT/INR - ( 20 May 2021 07:33 )   PT: 19.8 sec;   INR: 1.75 ratio           CAPILLARY BLOOD GLUCOSE      POCT Blood Glucose.: 249 mg/dL (20 May 2021 07:58)  POCT Blood Glucose.: 270 mg/dL (19 May 2021 22:00)  POCT Blood Glucose.: 191 mg/dL (19 May 2021 17:48)  POCT Blood Glucose.: 221 mg/dL (19 May 2021 12:46)    LIVER FUNCTIONS - ( 20 May 2021 07:33 )  Alb: 3.4 g/dL / Pro: 6.6 g/dL / ALK PHOS: 71 U/L / ALT: 14 U/L / AST: 27 U/L / GGT: x           Urinalysis Basic - ( 19 May 2021 00:39 )    Color: Yellow / Appearance: Clear / S.010 / pH: x  Gluc: x / Ketone: Negative  / Bili: Negative / Urobili: Negative mg/dL   Blood: x / Protein: Negative mg/dL / Nitrite: Negative   Leuk Esterase: Negative / RBC: 0-2 /HPF / WBC 0-2   Sq Epi: x / Non Sq Epi: Occasional / Bacteria: Occasional    Physical Exam:  Constitutional: alert and oriented, in no acute distress   Neck: Soft and supple  Respiratory: Clear to auscultation bilaterally, no wheezes or crackles  Cardiovascular: S1 S2 +  Gastrointestinal: Soft, non-tender to palpation, +bs  Vascular: 2+ peripheral pulses  Neurological: A/O x 3, no focal neurological deficits  Integument:  Skin warm, dry and supple bilateral.   b/l hallux bruising at distal tip with mild abrasion, no ulcer, no drainage or pus generalized b/l lower extremity edema +++ with erythema.

## 2021-05-20 NOTE — DISCHARGE NOTE PROVIDER - NSDCMRMEDTOKEN_GEN_ALL_CORE_FT
albuterol 90 mcg/inh inhalation aerosol: 2 puff(s) inhaled every 6 hours, As needed, Shortness of Breath and/or Wheezing  aspirin 81 mg oral delayed release tablet: 1 tab(s) orally once a day  atorvastatin 40 mg oral tablet: 1 tab(s) orally once a day (at bedtime)  carvedilol 25 mg oral tablet: 1 tab(s) orally every 12 hours  DULoxetine 60 mg oral delayed release capsule: 1 cap(s) orally once a day  folic acid 1 mg oral tablet: 1 tab(s) orally once a day  gabapentin 300 mg oral capsule: 300 cap(s) orally 2 times a day   insulin lispro 100 units/mL injectable solution: 25 unit(s) injectable 3 times a day (before meals)  Januvia 50 mg oral tablet: 1 tab(s) orally once a day  Jardiance 10 mg oral tablet: 1 tab(s) orally once a day (in the morning)  Klor-Con M20 oral tablet, extended release: 1 tab(s) orally once a day.    Levemir 100 units/mL subcutaneous solution: 65 unit(s) subcutaneous once a day (at bedtime)  levothyroxine 50 mcg (0.05 mg) oral tablet: 1 tab(s) orally once a day  NexIUM 20 mg oral delayed release capsule: 1 cap(s) orally once a day  tamsulosin 0.4 mg oral capsule: 1 cap(s) orally once a day (at bedtime)  torsemide 20 mg oral tablet: 1 tab(s) orally once a day   warfarin 1 mg oral tablet: 3 tab(s) orally once a day

## 2021-05-20 NOTE — DISCHARGE NOTE PROVIDER - CARE PROVIDERS DIRECT ADDRESSES
,[email or web address],DirectAddress_Unknown,DirectAddress_Unknown

## 2021-05-20 NOTE — DISCHARGE NOTE PROVIDER - NSDCCPCAREPLAN_GEN_ALL_CORE_FT
PRINCIPAL DISCHARGE DIAGNOSIS  Diagnosis: Syncope  Assessment and Plan of Treatment: Due to orthostatic hypotension  Device interrogation performed  Follow up with Cardiology 1-2 weeks, sooner if needed      SECONDARY DISCHARGE DIAGNOSES  Diagnosis: JAYLENE (acute kidney injury)  Assessment and Plan of Treatment: Resolved with IV hydration  Resume home diuretic  Follow up with [facility] 1-2 weeks, sooner if needed    Diagnosis: Diabetes  Assessment and Plan of Treatment: Resume home medication RX  Follow up with Podiatry for foot care  You were started on Gabapentin for Neuropathy, follow up with your PMD for further management/titration of medication.  With a diagnosis of diabetes comes a strict diet regimen to help control blood sugars by watching carbohydrate consumption. Carbohydrates, such as starches, fruits, dairy and starchy vegetables, is the food group that affects glucose levels in the body. Carefully monitoring carbohydrate intake is a main component of the diabetic diet; eating three meals each day that are roughly equivalent in size can help control blood sugars. A registered dietitian can help you plan a diet customized to your individual needs.

## 2021-05-20 NOTE — PROGRESS NOTE ADULT - SUBJECTIVE AND OBJECTIVE BOX
Formerly [facility], [facility]                              [address] [address]                                                 PHONE: [phone]                                                 FAX: [phone]  -----------------------------------------------------------------------------------------------  Pt seen and examined. FU for syncope    Overnight events/Complaints: Pt without complains. Still reports intermittent dizziness.    Vital Signs Last 24 Hrs  T(C): 36.6 (18 May 2021 07:56), Max: 36.9 (18 May 2021 04:45)  T(F): 97.8 (18 May 2021 07:56), Max: 98.4 (18 May 2021 04:45)  HR: 80 (18 May 2021 07:56) (80 - 93)  BP: 115/79 (18 May 2021 07:56) (115/68 - 152/66)  BP(mean): --  RR: 18 (18 May 2021 07:56) (16 - 18)  SpO2: 97% (18 May 2021 07:56) (94% - 100%)  I&O's Summary      PHYSICAL EXAM:  General: Appears well developed, well nourished alert and cooperative.  HEENT: Head; normocephalic, atraumatic. sclera anicteric, MMM, no JVD, neck is supple   CARDIOVASCULAR; 1/6 ANGEL, no rub, gallop or lift. Normal S1 and S2.  LUNGS; No rales, rhonchi or wheeze. Normal breath sounds bilaterally.  ABDOMEN ; Soft, nontender without mass or organomegaly. bowel sounds normoactive.  EXTREMITIES; No clubbing, cyanosis, hyperpigmentation, +LE edema (improved compared to prior)  SKIN; warm and dry with normal turgor.  NEURO; Alert/oriented x 3/normal motor exam.     PSYCH; normal affect.        LABS:                        16.6   4.83  )-----------( 128      ( 18 May 2021 06:52 )             50.9     05-18    138  |  91<L>  |  57.0<H>  ----------------------------<  260<H>  3.0<L>   |  33.0<H>  |  1.43<H>    Ca    9.1      18 May 2021 06:52  Phos  3.1     05-18  Mg     2.2     05-18    TPro  6.8  /  Alb  3.5  /  TBili  0.7  /  DBili  x   /  AST  29  /  ALT  17  /  AlkPhos  81  05-17    CARDIAC MARKERS ( 17 May 2021 16:13 )  x     / 0.05 ng/mL / x     / x     / x          PT/INR - ( 18 May 2021 06:52 )   PT: 19.4 sec;   INR: 1.72 ratio      RADIOLOGY & ADDITIONAL STUDIES: (reviewed)  CXR was independently visualized/reviewed  and demonstrated: clear lungs    CARDIOLOGY TESTING:(reviewed)     12 lead EKG independently visualized/reviewed  and demonstrated AF with frequent PVCs     ECHO FINDINGS: < from: TTE Echo Complete w/o Contrast w/ Doppler (03.09.21 @ 08:14) >   1. Technically difficult study.   2. Endocardial visualization was enhanced with intravenous echo contrast.   3. Left ventricular ejection fraction, by visual estimation, is 30 to 35%.   4. Moderately decreased global left ventricular systolic function.   5. Multiple left ventricular regional wall motion abnormalities exist. See wall motion findings.   6. The mitral in-flow pattern reveals no discernable A-wave, therefore no comment on diastolic function can be made.   7. Mildly enlarged right ventricle with moderately reduced RV systolic function.   8. Mildly enlarged left atrium.   9. Trace mitral valve regurgitation.  10. Mild thickening of the anterior and posterior mitral valve leaflets.  11. Mild dilatation of the aortic root (3.7 cm).  12. Compared to a prior study from 9/10/20, there is no significant change.    < end of copied text >    TELEMETRY independently visualized/reviewed and demonstrated : AF, NSVT    MEDICATIONS:(reviewed)  MEDICATIONS  (STANDING):  aspirin enteric coated 81 milliGRAM(s) Oral daily  atorvastatin 40 milliGRAM(s) Oral at bedtime  carvedilol 25 milliGRAM(s) Oral every 12 hours  dextrose 40% Gel 15 Gram(s) Oral once  dextrose 5%. 1000 milliLiter(s) (50 mL/Hr) IV Continuous <Continuous>  dextrose 5%. 1000 milliLiter(s) (100 mL/Hr) IV Continuous <Continuous>  dextrose 50% Injectable 25 Gram(s) IV Push once  dextrose 50% Injectable 12.5 Gram(s) IV Push once  dextrose 50% Injectable 25 Gram(s) IV Push once  DULoxetine 60 milliGRAM(s) Oral daily  folic acid 1 milliGRAM(s) Oral daily  glucagon  Injectable 1 milliGRAM(s) IntraMuscular once  insulin glargine Injectable (LANTUS) 60 Unit(s) SubCutaneous at bedtime  insulin lispro (ADMELOG) corrective regimen sliding scale   SubCutaneous three times a day before meals  insulin lispro (ADMELOG) corrective regimen sliding scale   SubCutaneous at bedtime  insulin lispro Injectable (ADMELOG) 15 Unit(s) SubCutaneous three times a day before meals  levothyroxine 50 MICROGram(s) Oral daily  pantoprazole    Tablet 40 milliGRAM(s) Oral before breakfast  potassium chloride  10 mEq/100 mL IVPB 10 milliEquivalent(s) IV Intermittent every 1 hour  tamsulosin 0.4 milliGRAM(s) Oral at bedtime      ASSESSMENT AND PLAN:    71 y/o man with chronic HFrEF/NICM EF 30-35%, paroxsymal ventricular tachycardia s/p ICD ([facility]), chronic atrial fibrillation, CKD, HTN, CHRISS, and DM recently admitted 5/12/21 with hyperglycemia/polyuria who presents from his hematologist's office with orthostatic hypotension and brief loss of consciousness found to have JAYLENE on CKD, and hypokalemia. He denies firing of his device.     Syncope/orthostatic hypotension/JAYLENE/hypokalemia  - suspect presentation multifactorial including polyuria and iatrogenic   - hold diuretic therapy and encourage oral hydration   - aggressive repletion of K, repeat labs  - hold ACEi/ARB/ARNI  - device interrogation today    NICM/NSVT/persistent AF  - continue coreg; however increase to 25mg Q12   - aggressive electrolyte replacement   - on coumadin, check INR      
                [facility] - [facility], [facility]                                   [address] [place] [address]                                                      PHONE: [phone]                                                         FAX: [phone]  [email or web address]  ---------------------------------------------------------------------------------------------------------------------------------    Overnight events/patient complaints:  NAD feeling well today     allow double protein at meals (Unknown)  No Known Allergies    MEDICATIONS  (STANDING):  aspirin enteric coated 81 milliGRAM(s) Oral daily  atorvastatin 40 milliGRAM(s) Oral at bedtime  carvedilol 25 milliGRAM(s) Oral every 12 hours  dextrose 40% Gel 15 Gram(s) Oral once  dextrose 5%. 1000 milliLiter(s) (50 mL/Hr) IV Continuous <Continuous>  dextrose 5%. 1000 milliLiter(s) (100 mL/Hr) IV Continuous <Continuous>  dextrose 50% Injectable 25 Gram(s) IV Push once  dextrose 50% Injectable 12.5 Gram(s) IV Push once  dextrose 50% Injectable 25 Gram(s) IV Push once  DULoxetine 60 milliGRAM(s) Oral daily  folic acid 1 milliGRAM(s) Oral daily  gabapentin 600 milliGRAM(s) Oral three times a day  glucagon  Injectable 1 milliGRAM(s) IntraMuscular once  insulin glargine Injectable (LANTUS) 65 Unit(s) SubCutaneous at bedtime  insulin lispro (ADMELOG) corrective regimen sliding scale   SubCutaneous three times a day before meals  insulin lispro (ADMELOG) corrective regimen sliding scale   SubCutaneous at bedtime  insulin lispro Injectable (ADMELOG) 25 Unit(s) SubCutaneous three times a day before meals  levothyroxine 50 MICROGram(s) Oral daily  pantoprazole    Tablet 40 milliGRAM(s) Oral before breakfast  tamsulosin 0.4 milliGRAM(s) Oral at bedtime  torsemide 20 milliGRAM(s) Oral daily    MEDICATIONS  (PRN):  aluminum hydroxide/magnesium hydroxide/simethicone Suspension 30 milliLiter(s) Oral every 4 hours PRN Dyspepsia      Vital Signs Last 24 Hrs  T(C): 36.7 (20 May 2021 04:20), Max: 36.7 (19 May 2021 10:01)  T(F): 98 (20 May 2021 04:20), Max: 98 (19 May 2021 10:01)  HR: 75 (20 May 2021 04:20) (61 - 93)  BP: 117/67 (20 May 2021 04:20) (117/67 - 129/76)  BP(mean): --  RR: 20 (20 May 2021 04:20) (19 - 20)  SpO2: 95% (20 May 2021 04:20) (94% - 96%)  ICU Vital Signs Last 24 Hrs  MARY ANN CORNELL  I&O's Detail    I&O's Summary    Drug Dosing Weight  MARY ANN CORNELL      PHYSICAL EXAM:  General: Appears well developed, well nourished alert and cooperative.  HEENT: Head; normocephalic, atraumatic.  Eyes: Pupils reactive, cornea wnl.  Neck: Supple, no nodes adenopathy, no NVD or carotid bruit or thyromegaly.  CARDIOVASCULAR: Normal S1 and S2, 3/6 murmur, rub, gallop or lift.   LUNGS: No rales, rhonchi or wheeze. Normal breath sounds bilaterally.  ABDOMEN: Soft, nontender without mass or organomegaly. bowel sounds normoactive.  EXTREMITIES: No clubbing, cyanosis or edema. Distal pulses wnl.   SKIN: warm and dry with normal turgor.  NEURO: Alert/oriented x 3/normal motor exam. No pathologic reflexes.    PSYCH: normal affect.        LABS:                        15.8   5.12  )-----------( 126      ( 20 May 2021 07:33 )             49.2     05-    136  |  92<L>  |  42.0<H>  ----------------------------<  277<H>  3.3<L>   |  28.0  |  1.32<H>    Ca    9.0      20 May 2021 07:33    TPro  6.6  /  Alb  3.4  /  TBili  0.6  /  DBili  x   /  AST  27  /  ALT  14  /  AlkPhos  71  05-20    MARY ANN CORNELL      PT/INR - ( 20 May 2021 07:33 )   PT: 19.8 sec;   INR: 1.75 ratio           Urinalysis Basic - ( 19 May 2021 00:39 )    Color: Yellow / Appearance: Clear / S.010 / pH: x  Gluc: x / Ketone: Negative  / Bili: Negative / Urobili: Negative mg/dL   Blood: x / Protein: Negative mg/dL / Nitrite: Negative   Leuk Esterase: Negative / RBC: 0-2 /HPF / WBC 0-2   Sq Epi: x / Non Sq Epi: Occasional / Bacteria: Occasional        RADIOLOGY & ADDITIONAL STUDIES:    INTERPRETATION OF TELEMETRY (personally reviewed):    ECHO:      Summary:   1. Technically difficult study.   2. Endocardial visualization was enhanced with intravenous echo contrast.   3. Left ventricular ejection fraction, by visual estimation, is 30 to 35%.   4. Moderately decreased global left ventricular systolic function.   5. Multiple left ventricular regional wall motion abnormalities exist. See wall motion findings.   6. The mitral in-flow pattern reveals no discernable A-wave, therefore no comment on diastolic function can be made.   7. Mildly enlarged right ventricle with moderately reduced RV systolic function.   8. Mildly enlarged left atrium.   9. Trace mitral valve regurgitation.  10. Mild thickening of the anterior and posterior mitral valve leaflets.  11. Mild dilatation of the aortic root (3.7 cm).  12. Compared to a prior study from 9/10/20, there is no significant change.    MD ChacortaElectronically signed on 3/9/2021 at 12:38:51 PM      CARDIAC CATHETERIZATION:  VENTRICLES: LVEF 25% with MR  CORONARY VESSELS: The coronary circulation is right dominant.  LM:   --  LM: Angiography showed minor luminal irregularities with no flow  limiting lesions.  LAD:   --  LAD: Angiography showed minor luminal irregularities with no  flow limiting lesions.  CX:   --  Circumflex: Angiography showed minor luminal irregularities with  no flow limiting lesions.  RCA:   --  RCA: Angiography showed minor luminal irregularities with no  flow limiting lesions.  COMPLICATIONS: There were no complications. No complications occurred  during the cath lab visit.  DIAGNOSTIC IMPRESSIONS: Mild CAD. Non ischemic cardiomyopathy.  DIAGNOSTIC RECOMMENDATIONS: A/C. MARIELA CV The patient should continue with  the present medications.  INTERVENTIONAL IMPRESSIONS: Mild CAD. Non ischemic cardiomyopathy.  INTERVENTIONAL RECOMMENDATIONS: A/C. MARIELA CV  Prepared and signed by  Bright Muñiz MD  Signed 2016 1    ASSESSMENT AND PLAN:  In summary, MARY ANN CORNELL is an 70y Male with past medical history significant for chronic HFrEF/NICM EF 30-35%, paroxsymal ventricular tachycardia s/p ICD ([facility]), chronic atrial fibrillation, CKD, HTN, CHRISS, and DM recently admitted 21 with hyperglycemia/polyuria who presents from his hematologist's office with orthostatic hypotension and brief loss of consciousness found to have JAYLENE on CKD, and hypokalemia. He denies firing of his device and device check NSVT stable; TELE rare episodes of NSVT      Plan  1.  Continue current CV medications   2.  Continue coreg 25 mg po BID   3.  Agree with restarting Torsemide therapy   4.  keep K > 4.0  and Mag > 2.0     
                [facility] - [facility], [facility]                                   [address] [place] [address]                                                      PHONE: [phone]                                                         FAX: [phone]  [email or web address]  ---------------------------------------------------------------------------------------------------------------------------------    Overnight events/patient complaints:  NAD feeling well today     allow double protein at meals (Unknown)  No Known Allergies    MEDICATIONS  (STANDING):  aspirin enteric coated 81 milliGRAM(s) Oral daily  atorvastatin 40 milliGRAM(s) Oral at bedtime  carvedilol 25 milliGRAM(s) Oral every 12 hours  dextrose 40% Gel 15 Gram(s) Oral once  dextrose 5%. 1000 milliLiter(s) (50 mL/Hr) IV Continuous <Continuous>  dextrose 5%. 1000 milliLiter(s) (100 mL/Hr) IV Continuous <Continuous>  dextrose 50% Injectable 25 Gram(s) IV Push once  dextrose 50% Injectable 12.5 Gram(s) IV Push once  dextrose 50% Injectable 25 Gram(s) IV Push once  DULoxetine 60 milliGRAM(s) Oral daily  folic acid 1 milliGRAM(s) Oral daily  gabapentin 600 milliGRAM(s) Oral three times a day  glucagon  Injectable 1 milliGRAM(s) IntraMuscular once  insulin glargine Injectable (LANTUS) 60 Unit(s) SubCutaneous at bedtime  insulin lispro (ADMELOG) corrective regimen sliding scale   SubCutaneous three times a day before meals  insulin lispro (ADMELOG) corrective regimen sliding scale   SubCutaneous at bedtime  insulin lispro Injectable (ADMELOG) 15 Unit(s) SubCutaneous three times a day before meals  levothyroxine 50 MICROGram(s) Oral daily  pantoprazole    Tablet 40 milliGRAM(s) Oral before breakfast  potassium chloride   Powder 40 milliEquivalent(s) Oral once  tamsulosin 0.4 milliGRAM(s) Oral at bedtime    MEDICATIONS  (PRN):  aluminum hydroxide/magnesium hydroxide/simethicone Suspension 30 milliLiter(s) Oral every 4 hours PRN Dyspepsia      Vital Signs Last 24 Hrs  T(C): 36.9 (19 May 2021 04:20), Max: 37.1 (18 May 2021 17:20)  T(F): 98.4 (19 May 2021 04:20), Max: 98.8 (18 May 2021 17:20)  HR: 101 (19 May 2021 05:54) (77 - 101)  BP: 127/71 (19 May 2021 05:54) (108/72 - 145/80)  BP(mean): --  RR: 18 (19 May 2021 04:20) (18 - 18)  SpO2: 93% (19 May 2021 04:20) (93% - 96%)  ICU Vital Signs Last 24 Hrs  MARY ANN CORNELL  I&O's Detail    I&O's Summary    Drug Dosing Weight  MARY ANN CORNELL      PHYSICAL EXAM:  General: Appears well developed, well nourished alert and cooperative.  HEENT: Head; normocephalic, atraumatic.  Eyes: Pupils reactive, cornea wnl.  Neck: Supple, no nodes adenopathy, no NVD or carotid bruit or thyromegaly.  CARDIOVASCULAR: Normal S1 and S2, 1/6 murmur, rub, gallop or lift.   LUNGS: No rales, rhonchi or wheeze. Normal breath sounds bilaterally.  ABDOMEN: Soft, nontender without mass or organomegaly. bowel sounds normoactive.  EXTREMITIES: No clubbing, cyanosis + edema. Distal pulses wnl.   SKIN: warm and dry with normal turgor.  NEURO: Alert/oriented x 3/normal motor exam. No pathologic reflexes.    PSYCH: normal affect.        LABS:                        15.8   5.40  )-----------( 116      ( 19 May 2021 07:30 )             49.8     -    133<L>  |  92<L>  |  42.0<H>  ----------------------------<  278<H>  3.2<L>   |  29.0  |  1.11    Ca    9.0      19 May 2021 07:30  Phos  3.1     05-18  Mg     2.2     05-18    TPro  6.4<L>  /  Alb  3.2<L>  /  TBili  0.7  /  DBili  x   /  AST  27  /  ALT  16  /  AlkPhos  78  05-19    MARY ANN CORNELL  CARDIAC MARKERS ( 17 May 2021 16:13 )  x     / 0.05 ng/mL / x     / x     / x          PT/INR - ( 19 May 2021 07:30 )   PT: 17.3 sec;   INR: 1.52 ratio           Urinalysis Basic - ( 19 May 2021 00:39 )    Color: Yellow / Appearance: Clear / S.010 / pH: x  Gluc: x / Ketone: Negative  / Bili: Negative / Urobili: Negative mg/dL   Blood: x / Protein: Negative mg/dL / Nitrite: Negative   Leuk Esterase: Negative / RBC: 0-2 /HPF / WBC 0-2   Sq Epi: x / Non Sq Epi: Occasional / Bacteria: Occasional        RADIOLOGY & ADDITIONAL STUDIES:    INTERPRETATION OF TELEMETRY (personally reviewed):        ECHO:      Summary:   1. Technically difficult study.   2. Endocardial visualization was enhanced with intravenous echo contrast.   3. Left ventricular ejection fraction, by visual estimation, is 30 to 35%.   4. Moderately decreased global left ventricular systolic function.   5. Multiple left ventricular regional wall motion abnormalities exist. See wall motion findings.   6. The mitral in-flow pattern reveals no discernable A-wave, therefore no comment on diastolic function can be made.   7. Mildly enlarged right ventricle with moderately reduced RV systolic function.   8. Mildly enlarged left atrium.   9. Trace mitral valve regurgitation.  10. Mild thickening of the anterior and posterior mitral valve leaflets.  11. Mild dilatation of the aortic root (3.7 cm).  12. Compared to a prior study from 9/10/20, there is no significant change.    MD ChacortaElectronically signed on 3/9/2021 at 12:38:51 PM      CARDIAC CATHETERIZATION:  VENTRICLES: LVEF 25% with MR  CORONARY VESSELS: The coronary circulation is right dominant.  LM:   --  LM: Angiography showed minor luminal irregularities with no flow  limiting lesions.  LAD:   --  LAD: Angiography showed minor luminal irregularities with no  flow limiting lesions.  CX:   --  Circumflex: Angiography showed minor luminal irregularities with  no flow limiting lesions.  RCA:   --  RCA: Angiography showed minor luminal irregularities with no  flow limiting lesions.  COMPLICATIONS: There were no complications. No complications occurred  during the cath lab visit.  DIAGNOSTIC IMPRESSIONS: Mild CAD. Non ischemic cardiomyopathy.  DIAGNOSTIC RECOMMENDATIONS: A/C. MARIELA CV The patient should continue with  the present medications.  INTERVENTIONAL IMPRESSIONS: Mild CAD. Non ischemic cardiomyopathy.  INTERVENTIONAL RECOMMENDATIONS: A/C. MARIELA CV  Prepared and signed by  Bright Muñiz MD  Signed 2016 1    ASSESSMENT AND PLAN:  In summary, MARY ANN CORNELL is an 70y Male with past medical history significant for chronic HFrEF/NICM EF 30-35%, paroxsymal ventricular tachycardia s/p ICD ([facility]), chronic atrial fibrillation, CKD, HTN, CHRISS, and DM recently admitted 21 with hyperglycemia/polyuria who presents from his hematologist's office with orthostatic hypotension and brief loss of consciousness found to have JAYLENE on CKD, and hypokalemia. He denies firing of his device and device check NSVT stable; TELE rare episodes of NSVT      Syncope/orthostatic hypotension/JAYLENE/hypokalemia  - suspect presentation multifactorial including polyuria and iatrogenic   - hold diuretic therapy and encourage oral hydration   - hold ACEi/ARB/ARNI  - Restart Torsemide pre discharge     NICM/NSVT/persistent AF  - continue coreg; however increase to 25mg Q12   - aggressive electrolyte replacement   - on coumadin, check INR    
Chief complaint: Syncope    Patient seen and examined at bedside. No acute overnight events reported. Patient states he is doing well and has no complaints. Denies fever, chills, cough, chest pain, shortness of breath, nausea or vomiting.     Vital Signs Last 24 Hrs  T(F): 97.8 (18 May 2021 07:56), Max: 98.4 (18 May 2021 04:45)  HR: 80 (18 May 2021 07:56) (80 - 93)  BP: 115/79 (18 May 2021 07:56) (115/68 - 152/66)  RR: 18 (18 May 2021 07:56) (16 - 18)  SpO2: 97% (18 May 2021 07:56) (94% - 100%)    Physical Exam:  Constitutional: alert and oriented, in no acute distress   Neck: Soft and supple  Respiratory: Clear to auscultation bilaterally, no wheezes or crackles  Cardiovascular: Irregular rhythm  Gastrointestinal: Soft, non-tender to palpation, +bs  Vascular: 2+ peripheral pulses  Neurological: A/O x 3, no focal neurological deficits  Musculoskeletal: no lower extremity edema bilaterally    Labs:                        16.6   4.83  )-----------( 128      ( 18 May 2021 06:52 )             50.9   05-18    138  |  91<L>  |  57.0<H>  ----------------------------<  260<H>  3.0<L>   |  33.0<H>  |  1.43<H>    Ca    9.1      18 May 2021 06:52  Phos  3.1     05-18  Mg     2.2     05-18    TPro  6.8  /  Alb  3.5  /  TBili  0.7  /  DBili  x   /  AST  29  /  ALT  17  /  AlkPhos  81  05-17  
Chief complaint: Syncope    Patient seen and examined at bedside. No acute overnight events reported. Patient states he is doing well, has no complaints at this time. Denies fever, chills, cough, chest pain, nausea or vomiting.     Vital Signs Last 24 Hrs  T(F): 98 (19 May 2021 10:01), Max: 98.8 (18 May 2021 17:20)  HR: 93 (19 May 2021 10:01) (77 - 101)  BP: 118/81 (19 May 2021 10:01) (108/72 - 145/80)  RR: 19 (19 May 2021 10:01) (18 - 19)  SpO2: 94% (19 May 2021 10:01) (93% - 96%)    Physical Exam:  Constitutional: alert and oriented, in no acute distress   Neck: Soft and supple  Respiratory: Clear to auscultation bilaterally, no wheezes or crackles  Cardiovascular: Regular rate and rhyhtm, no murmurs, gallops, rubs  Gastrointestinal: Soft, non-tender to palpation, +bs  Vascular: 2+ peripheral pulses  Neurological: A/O x 3, no focal neurological deficits  Musculoskeletal:  no lower extremity edema bilaterally    Labs;                        15.8   5.40  )-----------( 116      ( 19 May 2021 07:30 )             49.8   05-19    133<L>  |  92<L>  |  42.0<H>  ----------------------------<  278<H>  3.2<L>   |  29.0  |  1.11    Ca    9.0      19 May 2021 07:30  Phos  3.1     05-18  Mg     2.2     05-18    TPro  6.4<L>  /  Alb  3.2<L>  /  TBili  0.7  /  DBili  x   /  AST  27  /  ALT  16  /  AlkPhos  78  05-19  
70 year old male w AFIB on AC, AICD, HFrEF 30%, DM II  came to the ED due to syncope and uncontrolled blood glucose    Syncope, Hypotension,    Hypokalemia,    - S/P Fluid Resuscitation & K + Repletion,    - Device interrogation today    HFrEF, NICM    On asa 81 mg & atorvastatin 40 mg Daily,     JAYLENE on CKD    Vital Signs Last 24 Hrs  T(C): 36.6 (18 May 2021 07:56), Max: 36.9 (18 May 2021 04:45)  T(F): 97.8 (18 May 2021 07:56), Max: 98.4 (18 May 2021 04:45)  HR: 80 (18 May 2021 07:56) (80 - 93)  BP: 115/79 (18 May 2021 07:56) (115/68 - 152/66)  RR: 18 (18 May 2021 07:56) (16 - 18)  SpO2: 97% (18 May 2021 07:56) (94% - 100%)    138    |  91<L>  |  57.0<H>  ----------------------------<  260<H>  Ca:9.1   (18 May 2021 06:52)  3.0<L>   |  33.0<H>  |  1.43<H>    eGFR if Non : 49 *L*  eGFR if : 57 *L*    TPro  6.8    /  Alb  3.5    /  TBili  0.7    /  DBili  x      /  AST  29     /  ALT  17     /  AlkPhos  81     17 May 2021 16:13                        16.6   4.83  )-----------( 128<L>    ( 18 May 2021 06:52 )             50.9<H>    Phos:3.1 mg/dL M.2 mg/dL  ( @ 06:52)  Creatinine, Serum: 1.43 mg/dL (21 @ 06:52)       Historical Values  Creatinine, Serum: 1.43 mg/dL (21 @ 06:52)   Creatinine, Serum: 2.08 mg/dL (21 @ 16:13)   Creatinine, Serum: 1.57 mg/dL (21 @ 04:07)   Creatinine, Serum: 1.60 mg/dL (21 @ 04:07)   Creatinine, Serum: 1.64 mg/dL (21 @ 08:39)   Creatinine, Serum: 1.91 mg/dL (21 @ 04:11)   Creatinine, Serum: 1.90 mg/dL (21 @ 23:14)   Creatinine, Serum: 1.40 mg/dL (21 @ 20:14)   Creatinine, Serum: 1.43 mg/dL (21 @ 04:16)   Creatinine, Serum: 1.31 mg/dL (21 @ 07:35)   Creatinine, Serum: 1.51 mg/dL (21 @ 14:11)   Creatinine, Serum: 1.30 mg/dL (21 @ 07:55)   Creatinine, Serum: 1.43 mg/dL (21 @ 01:30)   Creatinine, Serum: 1.58 mg/dL (21 @ 18:38)   Creatinine, Serum: 1.19 mg/dL (20 @ 11:16)     DELAYING PROGRESSION OF CKD( Stage - 3 )    The following section describes richmond recommendations and  guidance for people with CKD with respect to delaying  progression of CKD. General lifestyle recommendations are  provided as well as caveats given for those with diabetes.  Cardiovascular risk reduction including management of  hypertension, dyslipidemia, and hyperuricemia is further  addressed. Unless otherwise stated, the guidance is intended  to apply to adults with CKD.  For the practicing clinician, ideally working with a team of  health-care professionals, it is important to institute general  lifestyle modification practices in people with CKD so that  they may gain the benefit of these in addition to more  kidney-specific strategies. Often these general measures are  overlooked or disregarded in people with CKD, thus their  utility is underscored here.  3.1 PREVENTION OF CKD PROGRESSION  The management of progression of CKD is aimed at  addressing a multiplicity of factors known to be associated  with progression. There are general measures which have  been shown to address cardiovascular health and CKD  together, or each separately. Addressing CVD risk factors may  indirectly and directly impact CKD progression. Strategies  include general lifestyle measures which improve cardiovascular  health, BP control, and interruption of the RAAS.  In addition, control of other metabolic parameters such as  blood sugar, uric acid, acidosis, and dyslipidemia may also be  important. This section deals with management of BP, RAAS  interruption, glycemic control and dietary/lifestyle manipulations  which have been examined in the context of  delaying progression of CKD.  BP and RAAS interruption  The following statements are excerpted and where  necessary, condensed, from the KDIGO Clinical Practice  Guideline for the Management of Blood Pressure in CKD.  10  3.1.1: Individualize BP targets and agents according to age,  coexistent cardiovascular disease and other comorbidities,  risk of progression of CKD, presence or absence  of retinopathy (in CKD patients with diabetes), and  tolerance of treatment as described in the KDIGO  2012 Blood Pressure Guideline. (Not Graded)  3.1.2: Inquire about postural dizziness and check for  postural hypotension regularly when treating CKD  patients with BP-lowering drugs. (Not Graded)  3.1.3: Tailor BP treatment regimens in elderly patients with  CKD by carefully considering age, comorbidities and  other therapies, with gradual escalation of treatment  and close attention to adverse events related to BP  treatment, including electrolyte disorders, acute  deterioration in kidney function, orthostatic hypotension  and drug side effects. (Not Graded)  3.1.4: We recommend that in both diabetic and nondiabetic  adults with CKD and urine albumin  excretion o30 mg/24 hours (or equivalent*) whose  office BP is consistently 4140 mm Hg systolic or  490 mm Hg diastolic be treated with BP-lowering  drugs to maintain a BP that is consistently r140 mm  Hg systolic and r90 mm Hg diastolic. (1B)  3.1.5: We suggest that in both diabetic and non-diabetic  adults with CKD and with urine albumin excretion  of Z30 mg/24 hours (or equivalent*) whose  office BP is consistently 4130 mm Hg systolic or  480 mm Hg diastolic be treated with BP-lowering  drugs to maintain a BP that is consistently r130 mm  Hg systolic and r80 mm Hg diastolic. (2D)  3.1.6: We suggest that an ARB or ACE-I be used in diabetic  adults with CKD and urine albumin excretion   mg/24 hours (or equivalent*). (2D)  3.1.7: We recommend that an ARB or ACE-I be used in  both diabetic and non-diabetic adults with CKD  and urine albumin excretion 4300 mg/24 hours  (or equivalent*). (1B)  3.1.8: There is insufficient evidence to recommend combining  an ACE-I with ARBs to prevent progression  of CKD. (Not Graded)  3.1.9: We recommend that in children with CKD, BPlowering  treatment is started when BP is consistently  above the 90th percentile for age, sex, and height. (1C)  3.1.10: We suggest that in children with CKD (particularly  those with proteinuria), BP is lowered to consistently  achieve systolic and diastolic readings less than or  equal to the 50th percentile for age, sex, and height,  unless achieving these targets is limited by signs or  symptoms of hypotension. (2D)  http://www.kidney-international.org chapter 3  & 2013 KDIGO  *Approximate equivalents for albumin excretion rate per 24 hours—  expressed as protein excretion rate per 24 hours, albumin-to-creatinine  ratio, protein-to-creatinine ratio, and protein reagent strip results— are given  in Table 7, Chapter 1  Kidney International Supplements (2013) 3, 73–90 73  3.1.11: We suggest that an ARB or ACE-I be used in children  with CKD in whom treatment with BP-lowering  drugs is indicated, irrespective of the level of proteinuria.  (2D)y      DM II w hyperglycemia    AFIB    Full consult to Follow,     
[facility] DIVISION OF KIDNEY DISEASES AND HYPERTENSION -- FOLLOW UP NOTE  --------------------------------------------------------------------------------  Chief Complaint: JAYLENE    24 hour events/subjective:  Seen/examined  SCr normalizing;      PAST HISTORY  --------------------------------------------------------------------------------  No significant changes to PMH, PSH, FHx, SHx, unless otherwise noted    ALLERGIES & MEDICATIONS  --------------------------------------------------------------------------------  Allergies    No Known Allergies    Intolerances    allow double protein at meals (Unknown)    Standing Inpatient Medications  aspirin enteric coated 81 milliGRAM(s) Oral daily  atorvastatin 40 milliGRAM(s) Oral at bedtime  carvedilol 25 milliGRAM(s) Oral every 12 hours  dextrose 40% Gel 15 Gram(s) Oral once  dextrose 5%. 1000 milliLiter(s) IV Continuous <Continuous>  dextrose 5%. 1000 milliLiter(s) IV Continuous <Continuous>  dextrose 50% Injectable 25 Gram(s) IV Push once  dextrose 50% Injectable 12.5 Gram(s) IV Push once  dextrose 50% Injectable 25 Gram(s) IV Push once  DULoxetine 60 milliGRAM(s) Oral daily  folic acid 1 milliGRAM(s) Oral daily  gabapentin 600 milliGRAM(s) Oral three times a day  glucagon  Injectable 1 milliGRAM(s) IntraMuscular once  insulin glargine Injectable (LANTUS) 60 Unit(s) SubCutaneous at bedtime  insulin lispro (ADMELOG) corrective regimen sliding scale   SubCutaneous three times a day before meals  insulin lispro (ADMELOG) corrective regimen sliding scale   SubCutaneous at bedtime  insulin lispro Injectable (ADMELOG) 15 Unit(s) SubCutaneous three times a day before meals  levothyroxine 50 MICROGram(s) Oral daily  pantoprazole    Tablet 40 milliGRAM(s) Oral before breakfast  tamsulosin 0.4 milliGRAM(s) Oral at bedtime  warfarin 3 milliGRAM(s) Oral once    PRN Inpatient Medications  aluminum hydroxide/magnesium hydroxide/simethicone Suspension 30 milliLiter(s) Oral every 4 hours PRN      REVIEW OF SYSTEMS  --------------------------------------------------------------------------------  Gen: No weight changes, fatigue, fevers/chills, weakness  Skin: No rashes  Head/Eyes/Ears/Mouth: No headache; Normal hearing; Normal vision w/o blurriness; No sinus pain/discomfort, sore throat  Respiratory: No dyspnea, cough, wheezing, hemoptysis  CV: No chest pain, PND, orthopnea  GI: No abdominal pain, diarrhea, constipation, nausea, vomiting, melena, hematochezia  : No increased frequency, dysuria, hematuria, nocturia  MSK: No joint pain/swelling; no back pain; no edema  Neuro: No dizziness/lightheadedness, weakness, seizures, numbness, tingling  Heme: No easy bruising or bleeding  Endo: No heat/cold intolerance  Psych: No significant nervousness, anxiety, stress, depression    All other systems were reviewed and are negative, except as noted.    VITALS/PHYSICAL EXAM  --------------------------------------------------------------------------------  T(C): 36.7 (05-19-21 @ 10:01), Max: 37.1 (05-18-21 @ 17:20)  HR: 93 (05-19-21 @ 10:01) (77 - 101)  BP: 118/81 (05-19-21 @ 10:01) (108/72 - 145/80)  RR: 19 (05-19-21 @ 10:01) (18 - 19)  SpO2: 94% (05-19-21 @ 10:01) (93% - 96%)  Wt(kg): --  Height (cm): 175.3 (05-17-21 @ 15:05)  Weight (kg): 158.8 (05-17-21 @ 15:05)  BMI (kg/m2): 51.7 (05-17-21 @ 15:05)  BSA (m2): 2.62 (05-17-21 @ 15:05)      Physical Exam:  General: Appears well developed, well nourished alert and cooperative.  HEENT: Head; normocephalic, atraumatic. sclera anicteric, MMM, no JVD, neck is supple   CARDIOVASCULAR; 1/6 ANGEL, no rub, gallop or lift. Normal S1 and S2.  LUNGS; No rales, rhonchi or wheeze. Normal breath sounds bilaterally.  ABDOMEN ; Soft, nontender without mass or organomegaly. bowel sounds normoactive.  EXTREMITIES; No clubbing, cyanosis, hyperpigmentation, +LE edema (improved compared to prior)  SKIN; warm and dry with normal turgor.  NEURO; Alert/oriented x 3/normal motor exam.     PSYCH; normal affect.          LABS/STUDIES  --------------------------------------------------------------------------------              15.8   5.40  >-----------<  116      [05-19-21 @ 07:30]              49.8     133  |  92  |  42.0  ----------------------------<  278      [05-19-21 @ 07:30]  3.2   |  29.0  |  1.11        Ca     9.0     [05-19-21 @ 07:30]      Mg     2.2     [05-18-21 @ 06:52]      Phos  3.1     [05-18-21 @ 06:52]    TPro  6.4  /  Alb  3.2  /  TBili  0.7  /  DBili  x   /  AST  27  /  ALT  16  /  AlkPhos  78  [05-19-21 @ 07:30]    PT/INR: PT 17.3 , INR 1.52       [05-19-21 @ 07:30]    Troponin 0.05      [05-17-21 @ 16:13]    Creatinine Trend:  SCr 1.11 [05-19 @ 07:30]  SCr 1.37 [05-18 @ 14:45]  SCr 1.43 [05-18 @ 06:52]  SCr 2.08 [05-17 @ 16:13]  SCr 1.60 [05-13 @ 04:07]    Urinalysis - [05-19-21 @ 00:39]      Color Yellow / Appearance Clear / SG 1.010 / pH 6.0      Gluc 1000 / Ketone Negative  / Bili Negative / Urobili Negative       Blood Negative / Protein Negative / Leuk Est Negative / Nitrite Negative      RBC 0-2 / WBC 0-2 / Hyaline  / Gran  / Sq Epi  / Non Sq Epi Occasional / Bacteria Occasional    Urine Creatinine 53      [05-19-21 @ 00:40]  Urine Protein 5.0      [05-19-21 @ 00:40]    PTH -- (Ca 9.5)      [05-18-21 @ 23:13]   59  Vitamin D (25OH) 25.6      [05-18-21 @ 23:13]  HbA1c 11.6      [09-05-19 @ 04:32]  TSH 3.25      [05-13-21 @ 04:07]  Lipid: chol 115, , HDL 26, LDL --      [05-13-21 @ 04:07]

## 2021-05-20 NOTE — DISCHARGE NOTE PROVIDER - CARE PROVIDER_API CALL
Abhijeet Dawn (DO)  Internal Medicine  [address] [facility], [address] [place] [address]  Phone: [phone]  Fax: [phone]  Follow Up Time:     Panchito Jain)  Cardiology; Internal Medicine  [address] [place] [address]  Phone: [phone]  Fax: [phone]  Follow Up Time:     PMD,   Phone: (   )    -  Fax: (   )    -  Follow Up Time:     Mohsen Nicole (DPM)  Surgery  [address] [place] [address]  Phone: [phone]  Fax: [phone]  Follow Up Time:    Abhijeet Dawn (DO)  Internal Medicine  [address] [facility], [address]nd [place] [address]  Phone: [phone]  Fax: [phone]  Follow Up Time:     Panchito Jain)  Cardiology; Internal Medicine  [address] [place] [address]  Phone: [phone]  Fax: [phone]  Follow Up Time: 1 week    Mohsen Nicole (DPM)  Surgery  [address] [place] [address]  Phone: [phone]  Fax: [phone]  Follow Up Time:     PMD,   Phone: (   )    -  Fax: (   )    -  Follow Up Time:

## 2021-05-20 NOTE — PROGRESS NOTE ADULT - REASON FOR ADMISSION
syncope  orthostatic hypotension  hypokalemia  DM uncontrolled

## 2021-06-09 ENCOUNTER — INPATIENT (INPATIENT)
Facility: HOSPITAL | Age: 71
LOS: 2 days | Discharge: HOME | DRG: 312 | End: 2021-06-12
Attending: INTERNAL MEDICINE | Admitting: HOSPITALIST
Payer: COMMERCIAL

## 2021-06-09 VITALS
HEIGHT: 69 IN | DIASTOLIC BLOOD PRESSURE: 36 MMHG | HEART RATE: 79 BPM | WEIGHT: 289.91 LBS | TEMPERATURE: 99 F | SYSTOLIC BLOOD PRESSURE: 76 MMHG | RESPIRATION RATE: 20 BRPM | OXYGEN SATURATION: 100 %

## 2021-06-09 DIAGNOSIS — Z95.810 PRESENCE OF AUTOMATIC (IMPLANTABLE) CARDIAC DEFIBRILLATOR: Chronic | ICD-10-CM

## 2021-06-09 DIAGNOSIS — Z98.89 OTHER SPECIFIED POSTPROCEDURAL STATES: Chronic | ICD-10-CM

## 2021-06-09 DIAGNOSIS — Z92.3 PERSONAL HISTORY OF IRRADIATION: Chronic | ICD-10-CM

## 2021-06-09 DIAGNOSIS — R55 SYNCOPE AND COLLAPSE: ICD-10-CM

## 2021-06-09 LAB
ALBUMIN SERPL ELPH-MCNC: 3.5 G/DL — SIGNIFICANT CHANGE UP (ref 3.3–5.2)
ALP SERPL-CCNC: 89 U/L — SIGNIFICANT CHANGE UP (ref 40–120)
ALT FLD-CCNC: 15 U/L — SIGNIFICANT CHANGE UP
ANION GAP SERPL CALC-SCNC: 15 MMOL/L — SIGNIFICANT CHANGE UP (ref 5–17)
APTT BLD: 32.7 SEC — SIGNIFICANT CHANGE UP (ref 27.5–35.5)
AST SERPL-CCNC: 24 U/L — SIGNIFICANT CHANGE UP
BASOPHILS # BLD AUTO: 0.02 K/UL — SIGNIFICANT CHANGE UP (ref 0–0.2)
BASOPHILS NFR BLD AUTO: 0.3 % — SIGNIFICANT CHANGE UP (ref 0–2)
BILIRUB SERPL-MCNC: 1.1 MG/DL — SIGNIFICANT CHANGE UP (ref 0.4–2)
BUN SERPL-MCNC: 42.1 MG/DL — HIGH (ref 8–20)
CALCIUM SERPL-MCNC: 8.8 MG/DL — SIGNIFICANT CHANGE UP (ref 8.6–10.2)
CHLORIDE SERPL-SCNC: 88 MMOL/L — LOW (ref 98–107)
CK SERPL-CCNC: 82 U/L — SIGNIFICANT CHANGE UP (ref 30–200)
CO2 SERPL-SCNC: 31 MMOL/L — HIGH (ref 22–29)
CREAT SERPL-MCNC: 1.81 MG/DL — HIGH (ref 0.5–1.3)
EOSINOPHIL # BLD AUTO: 0.09 K/UL — SIGNIFICANT CHANGE UP (ref 0–0.5)
EOSINOPHIL NFR BLD AUTO: 1.3 % — SIGNIFICANT CHANGE UP (ref 0–6)
GLUCOSE BLDC GLUCOMTR-MCNC: 299 MG/DL — HIGH (ref 70–99)
GLUCOSE SERPL-MCNC: 345 MG/DL — HIGH (ref 70–99)
HCT VFR BLD CALC: 50.6 % — HIGH (ref 39–50)
HGB BLD-MCNC: 16.2 G/DL — SIGNIFICANT CHANGE UP (ref 13–17)
IMM GRANULOCYTES NFR BLD AUTO: 0.3 % — SIGNIFICANT CHANGE UP (ref 0–1.5)
INR BLD: 2.02 RATIO — HIGH (ref 0.88–1.16)
LYMPHOCYTES # BLD AUTO: 0.99 K/UL — LOW (ref 1–3.3)
LYMPHOCYTES # BLD AUTO: 14.8 % — SIGNIFICANT CHANGE UP (ref 13–44)
MCHC RBC-ENTMCNC: 27.8 PG — SIGNIFICANT CHANGE UP (ref 27–34)
MCHC RBC-ENTMCNC: 32 GM/DL — SIGNIFICANT CHANGE UP (ref 32–36)
MCV RBC AUTO: 86.9 FL — SIGNIFICANT CHANGE UP (ref 80–100)
MONOCYTES # BLD AUTO: 0.58 K/UL — SIGNIFICANT CHANGE UP (ref 0–0.9)
MONOCYTES NFR BLD AUTO: 8.7 % — SIGNIFICANT CHANGE UP (ref 2–14)
NEUTROPHILS # BLD AUTO: 4.97 K/UL — SIGNIFICANT CHANGE UP (ref 1.8–7.4)
NEUTROPHILS NFR BLD AUTO: 74.6 % — SIGNIFICANT CHANGE UP (ref 43–77)
PLATELET # BLD AUTO: 142 K/UL — LOW (ref 150–400)
POTASSIUM SERPL-MCNC: 3.4 MMOL/L — LOW (ref 3.5–5.3)
POTASSIUM SERPL-SCNC: 3.4 MMOL/L — LOW (ref 3.5–5.3)
PROT SERPL-MCNC: 6.8 G/DL — SIGNIFICANT CHANGE UP (ref 6.6–8.7)
PROTHROM AB SERPL-ACNC: 22.7 SEC — HIGH (ref 10.6–13.6)
RBC # BLD: 5.82 M/UL — HIGH (ref 4.2–5.8)
RBC # FLD: 17.5 % — HIGH (ref 10.3–14.5)
SARS-COV-2 RNA SPEC QL NAA+PROBE: SIGNIFICANT CHANGE UP
SODIUM SERPL-SCNC: 134 MMOL/L — LOW (ref 135–145)
TROPONIN T SERPL-MCNC: 0.1 NG/ML — HIGH (ref 0–0.06)
WBC # BLD: 6.67 K/UL — SIGNIFICANT CHANGE UP (ref 3.8–10.5)
WBC # FLD AUTO: 6.67 K/UL — SIGNIFICANT CHANGE UP (ref 3.8–10.5)

## 2021-06-09 PROCEDURE — 93010 ELECTROCARDIOGRAM REPORT: CPT

## 2021-06-09 PROCEDURE — 99223 1ST HOSP IP/OBS HIGH 75: CPT

## 2021-06-09 PROCEDURE — 99291 CRITICAL CARE FIRST HOUR: CPT

## 2021-06-09 PROCEDURE — 72125 CT NECK SPINE W/O DYE: CPT | Mod: 26

## 2021-06-09 PROCEDURE — 70450 CT HEAD/BRAIN W/O DYE: CPT | Mod: 26

## 2021-06-09 PROCEDURE — 71045 X-RAY EXAM CHEST 1 VIEW: CPT | Mod: 26

## 2021-06-09 RX ORDER — GLUCAGON INJECTION, SOLUTION 0.5 MG/.1ML
1 INJECTION, SOLUTION SUBCUTANEOUS ONCE
Refills: 0 | Status: DISCONTINUED | OUTPATIENT
Start: 2021-06-09 | End: 2021-06-12

## 2021-06-09 RX ORDER — INSULIN LISPRO 100/ML
VIAL (ML) SUBCUTANEOUS
Refills: 0 | Status: DISCONTINUED | OUTPATIENT
Start: 2021-06-09 | End: 2021-06-12

## 2021-06-09 RX ORDER — SODIUM CHLORIDE 9 MG/ML
1000 INJECTION INTRAMUSCULAR; INTRAVENOUS; SUBCUTANEOUS ONCE
Refills: 0 | Status: COMPLETED | OUTPATIENT
Start: 2021-06-09 | End: 2021-06-09

## 2021-06-09 RX ORDER — CARVEDILOL PHOSPHATE 80 MG/1
25 CAPSULE, EXTENDED RELEASE ORAL EVERY 12 HOURS
Refills: 0 | Status: DISCONTINUED | OUTPATIENT
Start: 2021-06-09 | End: 2021-06-10

## 2021-06-09 RX ORDER — SODIUM CHLORIDE 9 MG/ML
1000 INJECTION, SOLUTION INTRAVENOUS
Refills: 0 | Status: DISCONTINUED | OUTPATIENT
Start: 2021-06-09 | End: 2021-06-12

## 2021-06-09 RX ORDER — MIDODRINE HYDROCHLORIDE 2.5 MG/1
10 TABLET ORAL ONCE
Refills: 0 | Status: COMPLETED | OUTPATIENT
Start: 2021-06-09 | End: 2021-06-09

## 2021-06-09 RX ORDER — INSULIN DETEMIR 100/ML (3)
65 INSULIN PEN (ML) SUBCUTANEOUS AT BEDTIME
Refills: 0 | Status: DISCONTINUED | OUTPATIENT
Start: 2021-06-09 | End: 2021-06-12

## 2021-06-09 RX ORDER — ALBUTEROL 90 UG/1
2 AEROSOL, METERED ORAL EVERY 6 HOURS
Refills: 0 | Status: DISCONTINUED | OUTPATIENT
Start: 2021-06-09 | End: 2021-06-12

## 2021-06-09 RX ORDER — DEXTROSE 50 % IN WATER 50 %
15 SYRINGE (ML) INTRAVENOUS ONCE
Refills: 0 | Status: DISCONTINUED | OUTPATIENT
Start: 2021-06-09 | End: 2021-06-12

## 2021-06-09 RX ORDER — DEXTROSE 50 % IN WATER 50 %
25 SYRINGE (ML) INTRAVENOUS ONCE
Refills: 0 | Status: DISCONTINUED | OUTPATIENT
Start: 2021-06-09 | End: 2021-06-12

## 2021-06-09 RX ORDER — ATORVASTATIN CALCIUM 80 MG/1
40 TABLET, FILM COATED ORAL AT BEDTIME
Refills: 0 | Status: DISCONTINUED | OUTPATIENT
Start: 2021-06-09 | End: 2021-06-12

## 2021-06-09 RX ORDER — FOLIC ACID 0.8 MG
1 TABLET ORAL DAILY
Refills: 0 | Status: DISCONTINUED | OUTPATIENT
Start: 2021-06-09 | End: 2021-06-12

## 2021-06-09 RX ORDER — TAMSULOSIN HYDROCHLORIDE 0.4 MG/1
0.4 CAPSULE ORAL AT BEDTIME
Refills: 0 | Status: DISCONTINUED | OUTPATIENT
Start: 2021-06-09 | End: 2021-06-12

## 2021-06-09 RX ORDER — ASPIRIN/CALCIUM CARB/MAGNESIUM 324 MG
81 TABLET ORAL DAILY
Refills: 0 | Status: DISCONTINUED | OUTPATIENT
Start: 2021-06-09 | End: 2021-06-12

## 2021-06-09 RX ORDER — INSULIN HUMAN 100 [IU]/ML
6 INJECTION, SOLUTION SUBCUTANEOUS ONCE
Refills: 0 | Status: COMPLETED | OUTPATIENT
Start: 2021-06-09 | End: 2021-06-09

## 2021-06-09 RX ORDER — PANTOPRAZOLE SODIUM 20 MG/1
40 TABLET, DELAYED RELEASE ORAL
Refills: 0 | Status: DISCONTINUED | OUTPATIENT
Start: 2021-06-09 | End: 2021-06-12

## 2021-06-09 RX ORDER — SODIUM CHLORIDE 9 MG/ML
3 INJECTION INTRAMUSCULAR; INTRAVENOUS; SUBCUTANEOUS ONCE
Refills: 0 | Status: COMPLETED | OUTPATIENT
Start: 2021-06-09 | End: 2021-06-09

## 2021-06-09 RX ORDER — WARFARIN SODIUM 2.5 MG/1
3 TABLET ORAL ONCE
Refills: 0 | Status: DISCONTINUED | OUTPATIENT
Start: 2021-06-09 | End: 2021-06-09

## 2021-06-09 RX ORDER — DEXTROSE 50 % IN WATER 50 %
12.5 SYRINGE (ML) INTRAVENOUS ONCE
Refills: 0 | Status: DISCONTINUED | OUTPATIENT
Start: 2021-06-09 | End: 2021-06-12

## 2021-06-09 RX ORDER — DULOXETINE HYDROCHLORIDE 30 MG/1
60 CAPSULE, DELAYED RELEASE ORAL DAILY
Refills: 0 | Status: DISCONTINUED | OUTPATIENT
Start: 2021-06-09 | End: 2021-06-12

## 2021-06-09 RX ORDER — GABAPENTIN 400 MG/1
300 CAPSULE ORAL
Refills: 0 | Status: DISCONTINUED | OUTPATIENT
Start: 2021-06-09 | End: 2021-06-12

## 2021-06-09 RX ORDER — SODIUM CHLORIDE 9 MG/ML
250 INJECTION INTRAMUSCULAR; INTRAVENOUS; SUBCUTANEOUS ONCE
Refills: 0 | Status: COMPLETED | OUTPATIENT
Start: 2021-06-09 | End: 2021-06-09

## 2021-06-09 RX ORDER — INSULIN LISPRO 100/ML
VIAL (ML) SUBCUTANEOUS AT BEDTIME
Refills: 0 | Status: DISCONTINUED | OUTPATIENT
Start: 2021-06-09 | End: 2021-06-12

## 2021-06-09 RX ORDER — LEVOTHYROXINE SODIUM 125 MCG
50 TABLET ORAL DAILY
Refills: 0 | Status: DISCONTINUED | OUTPATIENT
Start: 2021-06-09 | End: 2021-06-12

## 2021-06-09 RX ADMIN — SODIUM CHLORIDE 600 MILLILITER(S): 9 INJECTION INTRAMUSCULAR; INTRAVENOUS; SUBCUTANEOUS at 23:42

## 2021-06-09 RX ADMIN — TAMSULOSIN HYDROCHLORIDE 0.4 MILLIGRAM(S): 0.4 CAPSULE ORAL at 22:19

## 2021-06-09 RX ADMIN — GABAPENTIN 300 MILLIGRAM(S): 400 CAPSULE ORAL at 17:07

## 2021-06-09 RX ADMIN — Medication 65 UNIT(S): at 22:18

## 2021-06-09 RX ADMIN — Medication 1: at 22:19

## 2021-06-09 RX ADMIN — INSULIN HUMAN 6 UNIT(S): 100 INJECTION, SOLUTION SUBCUTANEOUS at 13:06

## 2021-06-09 RX ADMIN — SODIUM CHLORIDE 3 MILLILITER(S): 9 INJECTION INTRAMUSCULAR; INTRAVENOUS; SUBCUTANEOUS at 11:12

## 2021-06-09 RX ADMIN — ATORVASTATIN CALCIUM 40 MILLIGRAM(S): 80 TABLET, FILM COATED ORAL at 22:18

## 2021-06-09 RX ADMIN — SODIUM CHLORIDE 1000 MILLILITER(S): 9 INJECTION INTRAMUSCULAR; INTRAVENOUS; SUBCUTANEOUS at 11:26

## 2021-06-09 NOTE — ED ADULT NURSE NOTE - ED STAT RN HANDOFF DETAILS
bedside report given to KARLENE Tracy in holding room. Pt stable, offers no complaints at this time. NAD noted, respirations even & unlabored. Safety maintained

## 2021-06-09 NOTE — ED PROVIDER NOTE - CARE PLAN
Principal Discharge DX:	Syncope and collapse  Secondary Diagnosis:	Hypotension  Secondary Diagnosis:	Elevated troponin

## 2021-06-09 NOTE — ED ADULT NURSE REASSESSMENT NOTE - NS ED NURSE REASSESS COMMENT FT1
received pt from critical care. Pt offers no complaints at this time. NAD noted, respirations even & unlabored. Pt in hospital bed for comfort. Safety maintained

## 2021-06-09 NOTE — ED PROVIDER NOTE - PROGRESS NOTE DETAILS
Labs noted.  Pt sen and eval by [facility] and case d/w Hospitalist/Dr. Plasencia and will admit to Tele

## 2021-06-09 NOTE — ED ADULT NURSE NOTE - OBJECTIVE STATEMENT
Patient A&Ox4 complaining of neck pain. Stated was in shower, legs gave out from under & he fell. Stated felt dizzy before he fell, could not hold head up. Denies any LOC. Stated room was spinning so fast. Cardiac monitor in place, a-fib. Respirations even & unlabored.  in progress, SpO2 97% room air. 20g to left bicep, US guided by KIRA Mojica.  Patient has abrasion to left knee, abrasion to right foot, great toe & 4th toe, diabetic ulcers to left great toe, + edema to left foot. Healing ecchymosis to right knee. Uses nocturnal bipap.

## 2021-06-09 NOTE — ED PROVIDER NOTE - OBJECTIVE STATEMENT
70y male with PMHx of CHF, DM, Afib, HTN, HLD, renal insufficiency, prostate cancer on Warfarin presents to the ED c/o dizziness. Pt states he became extremely dizzy in the shower, his knees gave out from under him, and fell in the shower. Reports he did not hit his head. Patient reports chronic neck pain, but has been unable to hold his head up lately. Pt states that his legs are usually swollen. Pt was admitted for a similar fall on 5/17.     Pt denies CP, SOB, abdominal pain.  PMD: Mr. Wagner; Cardiologist: Dr. Jain

## 2021-06-09 NOTE — H&P ADULT - ASSESSMENT
70 year old male with PMH HTN, T2DM with neuropathy, Dyslipidemia, Hypothyroidism, CHFrEF/VT/AF s/p ICD, CA prostate s/p Brachytherapy, COPD and recurrent syncope presents after falling in shower without any palpitations, chest pain or LOC.   He relates having 6-7 episodes in the past 6 months.  BP 76/36 on presentation. BUN/SCr 42/1.9,  and Tn 0.1.    Recurrent Falls - Patient appears to be relatively dry currently. Overdiuresis likely as evidence by pre-renal JAYLENE and having both Torsemide and Furosemide at home. CHFrEF appears to be well compensated currently  - Admit to telemetry  - Interrogate ICD  - Daily weights, I/O  - Hold Diuretic, ACEI.RB due to JAYLENE. Continue BB  - Cardiology recommendations for CardioMEMS given recurrence  - PT    Elevated Troponin, possibly secondary to fall (cross sensitivity with Skeletal Muscle Troponin), decreased renal clearance - Doubt ACS  - Monitor  - Continue ASA, Statin, BB (with parameter  - Repeat Troponin  - TTE  - Cardiology follow up    JAYLENE, appears pre-renal. Likely due to overdiuresis and recent diarrhea  - monitor renal function  - Hold diuretics - resume once fluid status improved    T2DM with hyperglycemia; Neuropathy  - Diabetic diet, hold home OHA  - BGM with SSI  - Continue levemir  60U in am  - Continue Gabapentin and Duoxetine  - A1c in am    Chronic AF - rate controlled. INR therapeutic  - Continue BB (with parameters for BP)  - Hold coumadin anticipating CardioMEMS    Morbid Obesity  - Weight loss, exercise    PT evaluation  VTEp - Therapeutic on Coumadin  Palliative consult  Case management given recurrent presentations - Home care  Advance Directives to be determined on subsequent visit

## 2021-06-09 NOTE — H&P ADULT - HISTORY OF PRESENT ILLNESS
70 year old male with PMH HTN, T2DM with neuropathy, Dyslipidemia, Hypothyroidism, CHFrEF/VT/AF s/p ICD, CA prostate s/p Brachytherapy, COPD and recurrence syncope presents after falling in shower. Patient felt that the room started spinning, his legs gave way and he went straight down. Denies any palpitations, chest pain or LOC. Does admit to diarrhea 2 episodes daily x 2 days - dark to tan in colour. Denies any dyspnea (has SOBOE), fever or chills, Cough or dysuria. Urinate a lots, clear as he is having Torsemide AND Furosemide.   He relates having 6-7 episodes in the past 6 months.   70 year old male with PMH HTN, T2DM with neuropathy, Dyslipidemia, Hypothyroidism, CHFrEF/VT/AF s/p ICD, CA prostate s/p Brachytherapy, COPD and recurrent syncope presents after falling in shower. Patient felt that the room started spinning, his legs gave way and he went straight down. Denies any palpitations, chest pain or LOC. Does admit to diarrhea 2 episodes daily x 2 days - dark to tan in colour. Denies any dyspnea (has SOBOE), fever or chills, Cough or dysuria. Urinate a lots, clear as he is having Torsemide AND Furosemide.   He relates having 6-7 episodes in the past 6 months.

## 2021-06-09 NOTE — CONSULT NOTE ADULT - SUBJECTIVE AND OBJECTIVE BOX
[facility]                [address] [place] [address]                                     PHONE: [phone]                                       FAX: [phone]  [email or web address]      Reason for Consult: syncope, hypotension      HPI: Patient is a morbidly obese 71 y/o man with chronic HFrEF/NICM EF 30-35%, paroxsymal ventricular tachycardia s/p ICD ([facility]), chronic atrial fibrillation, CKD, HTN, CHRISS, and DM recently admitted 5/12/21 with hyperglycemia/polyuria and again 5/17/21 for orthostatic hypotension and brief loss of consciousness in the setting of JAYLENE on CKD, and hypokalemia who presents from home with loss of consciousness from a standing position which was preceded by dizziness. He denies firing of his device, palpitations and shortness of breath. He ambulates with a walker.  In the ER noted to be hypotensive to SBP 70's.       PAST MEDICAL & SURGICAL HISTORY:  DM (diabetes mellitus)  HTN (hypertension)  High cholesterol  Renal insufficiency  Sleep apnea  Prostate CA  Pulmonary hypertension  CHF (congestive heart failure)  EF 30%  Atrial fibrillation  Chronic back pain  Lung nodules  S/P ankle ligament repair  AICD (automatic cardioverter/defibrillator) present    PREVIOUS DIAGNOSTIC TESTING:      EKG: atrial fibrillation     ECHO FINDINGS: < from: TTE Echo Complete w/o Contrast w/ Doppler (03.09.21 @ 08:14) >   1. Technically difficult study.   2. Endocardial visualization was enhanced with intravenous echo contrast.   3. Left ventricular ejection fraction, by visual estimation, is 30 to 35%.   4. Moderately decreased global left ventricular systolic function.   5. Multiple left ventricular regional wall motion abnormalities exist. See wall motion findings.   6. The mitral in-flow pattern reveals no discernable A-wave, therefore no comment on diastolic function can be made.   7. Mildly enlarged right ventricle with moderately reduced RV systolic function.   8. Mildly enlarged left atrium.   9. Trace mitral valve regurgitation.  10. Mild thickening of the anterior and posterior mitral valve leaflets.  11. Mild dilatation of the aortic root (3.7 cm).  12. Compared to a prior study from 9/10/20, there is no significant change.    MEDICATIONS  (STANDING):  Home Medications:  albuterol 90 mcg/inh inhalation aerosol: 2 puff(s) inhaled every 6 hours, As needed, Shortness of Breath and/or Wheezing (09 Mar 2021 15:03)  DULoxetine 60 mg oral delayed release capsule: 1 cap(s) orally once a day (09 Mar 2021 15:03)  folic acid 1 mg oral tablet: 1 tab(s) orally once a day (09 Mar 2021 15:03)  insulin lispro 100 units/mL injectable solution: 25 unit(s) injectable 3 times a day (before meals) (20 May 2021 12:04)  Januvia 50 mg oral tablet: 1 tab(s) orally once a day (09 Mar 2021 15:03)  Jardiance 10 mg oral tablet: 1 tab(s) orally once a day (in the morning) (09 Mar 2021 15:03)  Klor-Con M20 oral tablet, extended release: 1 tab(s) orally once a day.   (20 May 2021 12:04)  levothyroxine 50 mcg (0.05 mg) oral tablet: 1 tab(s) orally once a day (11 Sep 2020 14:37)  NexIUM 20 mg oral delayed release capsule: 1 cap(s) orally once a day (09 Mar 2021 15:03)      MEDICATIONS  (PRN):    FAMILY HISTORY:  Family history of cancer (Sibling)  Family history of diabetes mellitus (Sibling)      SOCIAL HISTORY:  CIGARETTES: denies   ALCOHOL: denies     ROS: Negative other than as mentioned in HPI.    Vital Signs Last 24 Hrs  T(C): 37.1 (09 Jun 2021 10:26), Max: 37.1 (09 Jun 2021 10:26)  T(F): 98.7 (09 Jun 2021 10:26), Max: 98.7 (09 Jun 2021 10:26)  HR: 84 (09 Jun 2021 11:14) (79 - 84)  BP: 101/60 (09 Jun 2021 11:14) (75/41 - 101/60)  BP(mean): 75 (09 Jun 2021 11:14) (75 - 75)  RR: 16 (09 Jun 2021 11:14) (12 - 20)  SpO2: 94% (09 Jun 2021 11:14) (94% - 100%)    PHYSICAL EXAM:  General: obese, well nourished alert and cooperative.  HEENT: Head; normocephalic, atraumatic. sclera anicteric, MM dry, neck is plethoric   CARDIOVASCULAR; irregularly irregular, 2/6 ANGEL, no rub, gallop or lift. Normal S1 and variable S2.  LUNGS; No rales, rhonchi or wheeze. Normal breath sounds bilaterally.  ABDOMEN ; Soft, nontender without mass or organomegaly. bowel sounds normoactive.  EXTREMITIES; hyperpigmentation, +LE edema  SKIN; warm and dry with normal turgor.  NEURO; Alert/oriented x 3/normal motor exam.     PSYCH; normal affect.        I&O's Detail      LABS:                        16.2   6.67  )-----------( 142      ( 09 Jun 2021 11:00 )             50.6     06-09    134<L>  |  88<L>  |  42.1<H>  ----------------------------<  345<H>  3.4<L>   |  31.0<H>  |  1.81<H>    Ca    8.8      09 Jun 2021 11:00    TPro  6.8  /  Alb  3.5  /  TBili  1.1  /  DBili  x   /  AST  24  /  ALT  15  /  AlkPhos  89  06-09    CARDIAC MARKERS ( 09 Jun 2021 11:00 )  x     / 0.10 ng/mL / 82 U/L / x     / x          PT/INR - ( 09 Jun 2021 11:00 )   PT: 22.7 sec;   INR: 2.02 ratio         PTT - ( 09 Jun 2021 11:00 )  PTT:32.7 sec    I&O's Summary      RADIOLOGY & ADDITIONAL STUDIES:

## 2021-06-09 NOTE — ED PROVIDER NOTE - ADDITIONAL RISK FACTOR FREE TEXT BOX
I have personally provided 37 minutes of critical care time exclusive of time spent on separately billable procedures. Time includes review of laboratory data, radiology results, discussion with consultants, and monitoring for potential decompensation. Interventions were performed as documented above

## 2021-06-09 NOTE — ED ADULT NURSE NOTE - INTERVENTIONS DEFINITIONS
[place] to call system/Physically safe environment: no spills, clutter or unnecessary equipment/Stretcher in lowest position, wheels locked, appropriate side rails in place

## 2021-06-09 NOTE — H&P ADULT - NSHPPHYSICALEXAM_GEN_ALL_CORE
OBJECTIVE:  Vital Signs Last 24 Hrs  T(C): 37.1 (09 Jun 2021 10:26), Max: 37.1 (09 Jun 2021 10:26)  T(F): 98.7 (09 Jun 2021 10:26), Max: 98.7 (09 Jun 2021 10:26)  HR: 84 (09 Jun 2021 11:14) (79 - 84)  BP: 101/60 (09 Jun 2021 11:14) (75/41 - 101/60)  BP(mean): 75 (09 Jun 2021 11:14) (75 - 75)  RR: 16 (09 Jun 2021 11:14) (12 - 20)  SpO2: 94% (09 Jun 2021 11:14) (94% - 100%)    PHYSICAL EXAMINATION  General: Morbidly obese, lying in bed, NAD  HEENT:    NECK:    CVS:   RESP:    GI:    :   MSK:    CNS:    INTEG:    PSYCH: OBJECTIVE:  Vital Signs Last 24 Hrs  T(C): 37.1 (09 Jun 2021 10:26), Max: 37.1 (09 Jun 2021 10:26)  T(F): 98.7 (09 Jun 2021 10:26), Max: 98.7 (09 Jun 2021 10:26)  HR: 84 (09 Jun 2021 11:14) (79 - 84)  BP: 101/60 (09 Jun 2021 11:14) (75/41 - 101/60)  BP(mean): 75 (09 Jun 2021 11:14) (75 - 75)  RR: 16 (09 Jun 2021 11:14) (12 - 20)  SpO2: 94% (09 Jun 2021 11:14) (94% - 100%)    PHYSICAL EXAMINATION  General: Morbidly obese, lying in bed, NAD  HEENT:  extraocular movements intact. wearing mask  NECK:  Supple    CVS:  regular rate and rhythm S1 S2 left chest PPM(+)  RESP:  CTAB  GI:  Soft nondistended nontender BS+  : No suprapubic tenderness  MSK:  FROM, chronic stasis changes bilateral LE with slighy wrinkly skin right calf. left knee abrasion. Right hallux laceration. Blood blisters left foot.   CNS:  AAOx3. Decreased sensation Left foot.  INTEG:  See MSK  PSYCH: fair mood

## 2021-06-09 NOTE — ED PROVIDER NOTE - SKIN, MLM
(+)abrasion to the left knee and right toe, Skin normal color for race, warm, dry and intact. No evidence of rash.

## 2021-06-09 NOTE — ED PROCEDURE NOTE - PROCEDURE ADDITIONAL DETAILS
US guided IV placement performed after multiple attempts by Nursing staff that failed to obtain access due to difficulty.   Number of Attempts made:  Patel placed: 20   Clean technique used.   EBL: minimal.   NO complications.

## 2021-06-09 NOTE — H&P ADULT - NSHPREVIEWOFSYSTEMS_GEN_ALL_CORE
Feels neck unable to hold head up. Also had chronic neuropathy  in hands. Cant feel left foot, right foot has a mind of its own.  Weighs ~ 350lbs

## 2021-06-09 NOTE — CONSULT NOTE ADULT - ASSESSMENT
Patient is a morbidly obese 69 y/o man with chronic HFrEF/NICM EF 30-35%, paroxsymal ventricular tachycardia s/p ICD ([facility]), chronic atrial fibrillation, CKD, HTN, CHRISS, and DM recently admitted 5/12/21 with hyperglycemia/polyuria and again 5/17/21 for orthostatic hypotension and brief loss of consciousness in the setting of JAYLENE on CKD, and hypokalemia who presents from home with loss of consciousness from a standing position which was preceded by dizziness.   In the ER noted to be hypotensive to SBP 70's.      Loss of consciousness/hypotension/troponin elevation   - 2/2 symptomatic hypotension  - hold GMT at this time including diuretic therapy   - device interrogation  - agree with IVF  - consider cardioMEMs for volume management given his recurrent presentation with orthostatic hypotension/JAYLENE  - trend troponin, elevation likely 2/2 hypotension    NICM/NSVT/persistent AF/hypokalemia  - hold coreg for now   - aggressive electrolyte replacement, hx of non-sustained VT  - will add amiodarone 200mg daily if ventricular ectopy increases in burden   - given his recurrent presentation with fall, poor candidate for AC, will need discussion re: Watchman   - tele    Further recommendations pending clinical course

## 2021-06-09 NOTE — H&P ADULT - NSHPSOCIALHISTORY_GEN_ALL_CORE
, lives with wife with advancing dementia - has room in daughters house.  Has kendall Hoator. Ex-smoker, denies any toxic habits currently

## 2021-06-10 LAB
ALBUMIN SERPL ELPH-MCNC: 3.4 G/DL — SIGNIFICANT CHANGE UP (ref 3.3–5.2)
ALP SERPL-CCNC: 93 U/L — SIGNIFICANT CHANGE UP (ref 40–120)
ALT FLD-CCNC: 17 U/L — SIGNIFICANT CHANGE UP
ANION GAP SERPL CALC-SCNC: 19 MMOL/L — HIGH (ref 5–17)
AST SERPL-CCNC: 34 U/L — SIGNIFICANT CHANGE UP
BILIRUB SERPL-MCNC: 1.1 MG/DL — SIGNIFICANT CHANGE UP (ref 0.4–2)
BUN SERPL-MCNC: 44.9 MG/DL — HIGH (ref 8–20)
CALCIUM SERPL-MCNC: 8.4 MG/DL — LOW (ref 8.6–10.2)
CHLORIDE SERPL-SCNC: 90 MMOL/L — LOW (ref 98–107)
CO2 SERPL-SCNC: 26 MMOL/L — SIGNIFICANT CHANGE UP (ref 22–29)
COVID-19 SPIKE DOMAIN AB INTERP: NEGATIVE — SIGNIFICANT CHANGE UP
COVID-19 SPIKE DOMAIN ANTIBODY RESULT: 0.4 U/ML — SIGNIFICANT CHANGE UP
CREAT SERPL-MCNC: 1.66 MG/DL — HIGH (ref 0.5–1.3)
GLUCOSE BLDC GLUCOMTR-MCNC: 230 MG/DL — HIGH (ref 70–99)
GLUCOSE BLDC GLUCOMTR-MCNC: 231 MG/DL — HIGH (ref 70–99)
GLUCOSE BLDC GLUCOMTR-MCNC: 269 MG/DL — HIGH (ref 70–99)
GLUCOSE BLDC GLUCOMTR-MCNC: 276 MG/DL — HIGH (ref 70–99)
GLUCOSE BLDC GLUCOMTR-MCNC: 326 MG/DL — HIGH (ref 70–99)
GLUCOSE SERPL-MCNC: 249 MG/DL — HIGH (ref 70–99)
HCT VFR BLD CALC: 52 % — HIGH (ref 39–50)
HGB BLD-MCNC: 16.6 G/DL — SIGNIFICANT CHANGE UP (ref 13–17)
INR BLD: 1.9 RATIO — HIGH (ref 0.88–1.16)
MAGNESIUM SERPL-MCNC: 2.2 MG/DL — SIGNIFICANT CHANGE UP (ref 1.6–2.6)
MCHC RBC-ENTMCNC: 27.5 PG — SIGNIFICANT CHANGE UP (ref 27–34)
MCHC RBC-ENTMCNC: 31.9 GM/DL — LOW (ref 32–36)
MCV RBC AUTO: 86.1 FL — SIGNIFICANT CHANGE UP (ref 80–100)
PHOSPHATE SERPL-MCNC: 3.9 MG/DL — SIGNIFICANT CHANGE UP (ref 2.4–4.7)
PLATELET # BLD AUTO: 157 K/UL — SIGNIFICANT CHANGE UP (ref 150–400)
POTASSIUM SERPL-MCNC: 3.5 MMOL/L — SIGNIFICANT CHANGE UP (ref 3.5–5.3)
POTASSIUM SERPL-SCNC: 3.5 MMOL/L — SIGNIFICANT CHANGE UP (ref 3.5–5.3)
PROT SERPL-MCNC: 7.1 G/DL — SIGNIFICANT CHANGE UP (ref 6.6–8.7)
PROTHROM AB SERPL-ACNC: 21.4 SEC — HIGH (ref 10.6–13.6)
RBC # BLD: 6.04 M/UL — HIGH (ref 4.2–5.8)
RBC # FLD: 18 % — HIGH (ref 10.3–14.5)
SARS-COV-2 IGG+IGM SERPL QL IA: 0.4 U/ML — SIGNIFICANT CHANGE UP
SARS-COV-2 IGG+IGM SERPL QL IA: NEGATIVE — SIGNIFICANT CHANGE UP
SODIUM SERPL-SCNC: 135 MMOL/L — SIGNIFICANT CHANGE UP (ref 135–145)
TROPONIN T SERPL-MCNC: 0.05 NG/ML — SIGNIFICANT CHANGE UP (ref 0–0.06)
TROPONIN T SERPL-MCNC: 0.05 NG/ML — SIGNIFICANT CHANGE UP (ref 0–0.06)
WBC # BLD: 7.75 K/UL — SIGNIFICANT CHANGE UP (ref 3.8–10.5)
WBC # FLD AUTO: 7.75 K/UL — SIGNIFICANT CHANGE UP (ref 3.8–10.5)

## 2021-06-10 PROCEDURE — 93010 ELECTROCARDIOGRAM REPORT: CPT

## 2021-06-10 PROCEDURE — 99233 SBSQ HOSP IP/OBS HIGH 50: CPT

## 2021-06-10 PROCEDURE — 12345: CPT | Mod: NC

## 2021-06-10 RX ORDER — SODIUM CHLORIDE 9 MG/ML
500 INJECTION INTRAMUSCULAR; INTRAVENOUS; SUBCUTANEOUS ONCE
Refills: 0 | Status: COMPLETED | OUTPATIENT
Start: 2021-06-10 | End: 2021-06-10

## 2021-06-10 RX ORDER — POTASSIUM CHLORIDE 20 MEQ
40 PACKET (EA) ORAL ONCE
Refills: 0 | Status: COMPLETED | OUTPATIENT
Start: 2021-06-10 | End: 2021-06-10

## 2021-06-10 RX ADMIN — Medication 50 MICROGRAM(S): at 05:16

## 2021-06-10 RX ADMIN — ATORVASTATIN CALCIUM 40 MILLIGRAM(S): 80 TABLET, FILM COATED ORAL at 23:09

## 2021-06-10 RX ADMIN — Medication 65 UNIT(S): at 23:10

## 2021-06-10 RX ADMIN — GABAPENTIN 300 MILLIGRAM(S): 400 CAPSULE ORAL at 05:16

## 2021-06-10 RX ADMIN — Medication 40 MILLIEQUIVALENT(S): at 06:53

## 2021-06-10 RX ADMIN — Medication 1 MILLIGRAM(S): at 11:50

## 2021-06-10 RX ADMIN — Medication 1: at 23:10

## 2021-06-10 RX ADMIN — Medication 6: at 08:25

## 2021-06-10 RX ADMIN — SODIUM CHLORIDE 1000 MILLILITER(S): 9 INJECTION INTRAMUSCULAR; INTRAVENOUS; SUBCUTANEOUS at 03:00

## 2021-06-10 RX ADMIN — Medication 8: at 18:12

## 2021-06-10 RX ADMIN — GABAPENTIN 300 MILLIGRAM(S): 400 CAPSULE ORAL at 18:12

## 2021-06-10 RX ADMIN — PANTOPRAZOLE SODIUM 40 MILLIGRAM(S): 20 TABLET, DELAYED RELEASE ORAL at 05:16

## 2021-06-10 RX ADMIN — Medication 81 MILLIGRAM(S): at 11:50

## 2021-06-10 RX ADMIN — Medication 4: at 11:51

## 2021-06-10 RX ADMIN — TAMSULOSIN HYDROCHLORIDE 0.4 MILLIGRAM(S): 0.4 CAPSULE ORAL at 23:09

## 2021-06-10 RX ADMIN — DULOXETINE HYDROCHLORIDE 60 MILLIGRAM(S): 30 CAPSULE, DELAYED RELEASE ORAL at 11:51

## 2021-06-10 RX ADMIN — MIDODRINE HYDROCHLORIDE 10 MILLIGRAM(S): 2.5 TABLET ORAL at 00:13

## 2021-06-10 NOTE — PROGRESS NOTE ADULT - SUBJECTIVE AND OBJECTIVE BOX
[facility] Division of Hospital Medicine    Chief Complaint:  Patient is a 70y old  Male who presents with a chief complaint of fell in shower (10 Maynor 2021 08:37)      SUBJECTIVE / OVERNIGHT EVENTS:  Patient was seen and examined at bedside. States that he currently feels better. No active complaints. Rapid response from earlier noted.   Patient denies chest pain, SOB, abd pain, N/V, fever, chills, dysuria or any other complaints. All remainder ROS negative.     MEDICATIONS  (STANDING):  aspirin enteric coated 81 milliGRAM(s) Oral daily  atorvastatin 40 milliGRAM(s) Oral at bedtime  dextrose 40% Gel 15 Gram(s) Oral once  dextrose 5%. 1000 milliLiter(s) (50 mL/Hr) IV Continuous <Continuous>  dextrose 5%. 1000 milliLiter(s) (100 mL/Hr) IV Continuous <Continuous>  dextrose 50% Injectable 25 Gram(s) IV Push once  dextrose 50% Injectable 12.5 Gram(s) IV Push once  dextrose 50% Injectable 25 Gram(s) IV Push once  DULoxetine 60 milliGRAM(s) Oral daily  folic acid 1 milliGRAM(s) Oral daily  gabapentin 300 milliGRAM(s) Oral two times a day  glucagon  Injectable 1 milliGRAM(s) IntraMuscular once  insulin detemir injectable (LEVEMIR) 65 Unit(s) SubCutaneous at bedtime  insulin lispro (ADMELOG) corrective regimen sliding scale   SubCutaneous three times a day before meals  insulin lispro (ADMELOG) corrective regimen sliding scale   SubCutaneous at bedtime  levothyroxine 50 MICROGram(s) Oral daily  pantoprazole    Tablet 40 milliGRAM(s) Oral before breakfast  tamsulosin 0.4 milliGRAM(s) Oral at bedtime    MEDICATIONS  (PRN):  ALBUTerol    90 MICROgram(s) HFA Inhaler 2 Puff(s) Inhalation every 6 hours PRN Shortness of Breath and/or Wheezing        I&O's Summary      PHYSICAL EXAM:  Vital Signs Last 24 Hrs  T(C): 36.3 (10 Maynor 2021 05:00), Max: 36.9 (09 Jun 2021 21:58)  T(F): 97.4 (10 Maynor 2021 05:00), Max: 98.4 (09 Jun 2021 21:58)  HR: 67 (10 Maynor 2021 07:00) (67 - 98)  BP: 111/72 (10 Maynor 2021 07:00) (75/55 - 117/65)  BP(mean): --  RR: 18 (10 Maynor 2021 07:00) (16 - 18)  SpO2: 96% (10 Maynor 2021 07:00) (94% - 98%)      Constitutional: NAD, Morbidly obese male Resting  ENT: Supple, No JVD  Lungs: CTA B/L, Non-labored breathing  Cardio: RRR, S1/S2, No murmur  Abdomen: Soft, Nontender, Nondistended; Bowel sounds present  Extremities: No calf tenderness, No pitting edema  Musculoskeletal:   No clubbing or cyanosis of digits; no joint swelling or tenderness to palpation  Psych: Calm, cooperative affect appropriate  Neuro: Awake and alert  Skin: No rashes; no palpable lesions    LABS:                        16.6   7.75  )-----------( 157      ( 10 Maynor 2021 03:34 )             52.0     06-10    135  |  90<L>  |  44.9<H>  ----------------------------<  249<H>  3.5   |  26.0  |  1.66<H>    Ca    8.4<L>      10 Maynor 2021 03:34  Phos  3.9     06-10  Mg     2.2     06-10    TPro  7.1  /  Alb  3.4  /  TBili  1.1  /  DBili  x   /  AST  34  /  ALT  17  /  AlkPhos  93  06-10    PT/INR - ( 10 Maynor 2021 03:34 )   PT: 21.4 sec;   INR: 1.90 ratio         PTT - ( 09 Jun 2021 11:00 )  PTT:32.7 sec  CARDIAC MARKERS ( 10 Maynor 2021 03:34 )  x     / 0.05 ng/mL / x     / x     / x      CARDIAC MARKERS ( 10 Maynor 2021 01:20 )  x     / 0.05 ng/mL / x     / x     / x      CARDIAC MARKERS ( 09 Jun 2021 11:00 )  x     / 0.10 ng/mL / 82 U/L / x     / x              CAPILLARY BLOOD GLUCOSE      POCT Blood Glucose.: 231 mg/dL (10 Maynor 2021 11:49)  POCT Blood Glucose.: 269 mg/dL (10 Maynor 2021 08:18)  POCT Blood Glucose.: 230 mg/dL (10 Maynor 2021 02:57)  POCT Blood Glucose.: 299 mg/dL (09 Jun 2021 22:17)        RADIOLOGY REVIEWED

## 2021-06-10 NOTE — CHART NOTE - NSCHARTNOTEFT_GEN_A_CORE
PA NOTE-MEDICINE (LATE ENTRY)    Called by RN due to Pt Feeling Dizzy s/p Getting up from bed to wheelchair to go to Restroom    Pt is 70 year old male with PMH HTN, T2DM with neuropathy, Dyslipidemia, Hypothyroidism, CHFrEF/VT/AF s/p ICD, CA prostate s/p Brachytherapy, COPD and recurrent syncope presents after falling in shower. Patient felt that the room started spinning, his legs gave way and he went straight down. Denies any palpitations, chest pain or LOC. Does admit to diarrhea 2 episodes daily x 2 days - dark to tan in colour. Denies:  fever or chills, Cough or dysuria. Urinate a lots, clear as he is having Torsemide AND Furosemide.   He relates having 6-7 episodes in the past 6 months.  Pt's BP has been soft, Hypotensive with 1st Trop-0.10 which cards feels is due to Hypotension    Pt Denies: CP, SOB  States + sl Dizziness    Vitals: BP-87/59  (Pt received  cc Bolus along with Midodrine 10 mg po x 1 earlier in Shift)             P-77             T-98.4 po              O2 Sat-95% RA             RR-16      General: WD Morbidly Obese Male sitting in Wheelchair A & O x 4 Able to answer questions    Cardiac: S1S2 Irreg/irreg ( + H/o A-fib)   Lungs: CTA no wheezing/rales/Rhonchi B/L A-B  Abd: Obese  Ext: + LE Edema B/L     A/P Eval Pt Becoming Dizzy after standing Up to get into wheelchair   No syncope  ( Pt admitted for same symptoms)  EKG-Stat  A-Fib  with occasional PVC's  NS Bolus 500 cc x 1 now   Repeat Vitals Q1 x2 then q2 x 2 then Q 4 hrs   Strict Bedrest  Fall risk Protocol  Cmp  Mag  Phos     LABS:                        16.6   7.75  )-----------( 157      ( 10 Maynor 2021 03:34 )             52.0     06-10    135  |  90<L>  |  44.9<H>  ----------------------------<  249<H>  3.5   |  26.0  |  1.66<H>    Ca    8.4<L>      10 Maynor 2021 03:34  Corrected Ca 8.9  Phos  3.9     06-10  Mg     2.2     06-10    TPro  7.1  /  Alb  3.4  /  TBili  1.1  /  DBili  x   /  AST  34  /  ALT  17  /  AlkPhos  93  06-10    PT/INR - ( 10 Maynor 2021 03:34 )   PT: 21.4 sec;   INR: 1.90 ratio         PTT - ( 09 Jun 2021 11:00 )  PTT:32.7 sec    LIVER FUNCTIONS - ( 10 Maynor 2021 03:34 )  Alb: 3.4 g/dL / Pro: 7.1 g/dL / ALK PHOS: 93 U/L / ALT: 17 U/L / AST: 34 U/L / GGT: x               CARDIAC MARKERS ( 10 Maynor 2021 03:34 )  x     / 0.05 ng/mL / x     / x     / x      CARDIAC MARKERS ( 10 Maynor 2021 01:20 )  x     / 0.05 ng/mL / x     / x     / x      CARDIAC MARKERS ( 09 Jun 2021 11:00 )  x     / 0.10 ng/mL / 82 U/L / x     / x          Will Rx Potassium 40 Meq Po x 1 Now   Repeat BP s/p  cc bolus-117/65    Continue to Monitor  Call PA if any changes in Pt status

## 2021-06-10 NOTE — ED PROVIDER NOTE - CROS ED [facility] ALL NEG
EXAM note    Chief Complaint   Patient presents with   • Condition        History    Ivelisse Olvera is a 64 year old male who presents for 6 month follow up:      Hypertension - is taking Enalapril 5 mg daily and HCTZ 25 daily, tolerating these without problems, does not check his pressures at home, denies any new headaches, vision changes, dizzy, or lightheadedness.     Dyslipidemia - is taking rosuvastatin 40 mg nightly, denies any new muscle aches or joint pain, recent lipid panel remains normal      Hypothyroidism -continues to take levothyroxine 75 mcg daily, denies fatigue, dry skin, etc.  TSH remains normal     Asthma - was started on Advair at last office visit, states he only takes it once a day, did find it helpful until the hot humid weather started, still uses albuterol before activity, has not had any wheezing, is not smoking as much due to not being able to afford the cigarettes      CAD s/p stent: continues to follow with cardiology, is taking baby ASA, wears a nitroglycerin patch, denies any chest pain     Current Outpatient Medications   Medication Sig   • rosuvastatin (CRESTOR) 40 MG tablet TAKE ONE TABLET BY MOUTH EVERY DAY    • enalapril (VASOTEC) 5 MG tablet Take 1 tablet by mouth daily.   • fluticasone-salmeterol (Advair Diskus) 100-50 MCG/DOSE inhaler Inhale 1 puff into the lungs 2 times daily.   • levothyroxine 75 MCG tablet TAKE ONE TABLET BY MOUTH EVERY DAY    • famotidine (PEPCID) 20 MG tablet TAKE 1 TABLET BY MOUTH 2 TIMES DAILY.    • nitroGLYcerin (Nitro-Dur) 0.4 MG/HR patch Place 1 patch onto the skin daily. Remove patch 12 hours after applying   • aspirin (Aspirin Low Dose) 81 MG EC tablet Take 1 tablet by mouth daily.   • hydrochlorothiazide (HYDRODIURIL) 25 MG tablet Take 1 tablet by mouth daily.   • albuterol 108 (90 Base) MCG/ACT inhaler Inhale 2 puffs into the lungs four times daily.   • ARTIFICIAL TEARS 0.1-0.3 % Solution Dry eye sometimes   • nitroGLYcerin (NITROSTAT) 0.4 MG  sublingual tablet Place 1 tablet under the tongue every 5 minutes as needed for Chest pain.     No current facility-administered medications for this visit.     Facility-Administered Medications Ordered in Other Visits   Medication   • lidocaine HCl (XYLOCAINE) 1 % 9 mL, sodium bicarbonate 1 mL injection       ALLERGIES:  No Known Allergies    Past Medical History:   Diagnosis Date   • Abnormal CT scan 2018   • Angina     classic, after exertion   • CAD (coronary artery disease)    • Dysphagia 2018   • Essential (primary) hypertension    • High cholesterol    • Myocardial infarct (CMS/HCC)    • Myocardial infarction (CMS/HCC)    • Poor historian    • Presence of stent in LAD coronary artery 07/05/2018   • RAD (reactive airway disease)    • Thyroid disease    • Tobacco use 06/26/2018       [facility] reviewed with the patient.      Review of systems    See history of present illness, otherwise 10 point review of systems is negative and reviewed with the patient.            Physical ExamINATION    Vital Signs:    Vitals:    06/10/21 1302   BP: 132/68   BP Location: LUE - Left upper extremity   Patient Position: Sitting   Cuff Size: Regular   Pulse: 80   Resp: 16   Temp: 98.7 °F (37.1 °C)   TempSrc: Temporal   SpO2: 96%   Weight: 67.9 kg   Height: 6' (1.829 m)     Constitutional:  No acute distress. Acting and behaving appropriately.   Integument:  Warm. Dry. No erythema. No rash.    Neck: Supple, nontender. No lymphadenopathy. Normal range of motion.    Respiratory: Clear to auscultation bilaterally without crackles or wheezes. There is good aeration. There is normal effort.  Cardiovascular:  Regular rate and rhythm without murmurs, rubs, or gallops.  Gastrointestinal:  Bowel sounds normal. Soft. No tenderness. No masses. No hepatomegaly or splenomegaly.  No CVA (costovertebral angle) tenderness.    Extremity: No cyanosis, clubbing, or edema.        No visits with results within 1 Day(s) from this visit.   Latest known  visit with results is:   Lab Services on 06/07/2021   Component Date Value Ref Range Status   • Fasting Status 06/07/2021 12  Hours Final   • Sodium 06/07/2021 141  135 - 145 mmol/L Final   • Potassium 06/07/2021 4.4  3.4 - 5.1 mmol/L Final   • Chloride 06/07/2021 103  98 - 107 mmol/L Final   • Carbon Dioxide 06/07/2021 31  21 - 32 mmol/L Final   • Anion Gap 06/07/2021 11  10 - 20 mmol/L Final   • Glucose 06/07/2021 88  65 - 99 mg/dL Final   • BUN 06/07/2021 11  6 - 20 mg/dL Final   • Creatinine 06/07/2021 0.95  0.67 - 1.17 mg/dL Final   • Glomerular Filtration Rate 06/07/2021 >90  >90 mL/min/1.73m2 Final   • BUN/ Creatinine Ratio 06/07/2021 12  7 - 25 Final   • Calcium 06/07/2021 9.6  8.4 - 10.2 mg/dL Final   • Bilirubin, Total 06/07/2021 1.0  0.2 - 1.0 mg/dL Final   • GOT/AST 06/07/2021 29  <=37 Units/L Final   • GPT/ALT 06/07/2021 18  <64 Units/L Final   • Alkaline Phosphatase 06/07/2021 80  45 - 117 Units/L Final   • Albumin 06/07/2021 3.9  3.6 - 5.1 g/dL Final   • Protein, Total 06/07/2021 8.2  6.4 - 8.2 g/dL Final   • Globulin 06/07/2021 4.3* 2.0 - 4.0 g/dL Final   • A/G Ratio 06/07/2021 0.9* 1.0 - 2.4 Final   • TSH 06/07/2021 1.894  0.350 - 5.000 mcUnits/mL Final   • Fasting Status 06/07/2021 12  Hours Final   • Cholesterol 06/07/2021 140  <=199 mg/dL Final   • Triglycerides 06/07/2021 41  <=149 mg/dL Final   • HDL 06/07/2021 63  >=40 mg/dL Final   • LDL 06/07/2021 69  <=129 mg/dL Final   • Non-HDL Cholesterol 06/07/2021 77  mg/dL Final   • Cholesterol/ HDL Ratio 06/07/2021 2.2  <=4.4 Final        Assessment AND Plan    1. Benign essential hypertension - stable, continue with current doses of hydrochlorothiazide and enalapril   2. Dyslipidemia - stable, continue with current dose of rosuvastatin    3. Acquired hypothyroidism - stable, continue with current dose of levothyroxine   4. Mild persistent asthma without complication - improved, encouraged to start taking Advair BID, albuterol prn, call if he notices  any worsening symptoms   5. Coronary artery disease of native artery of native heart with stable angina pectoris (CMS/HCC) - stable, continue to follow with cardiology         Orders Placed This Encounter   • Comprehensive Metabolic Panel   • Lipid Panel With Reflex   • Thyroid Stimulating Hormone     See Patient Instruction section.  Return in about 6 months (around 12/10/2021) for CPE, fasting lab appt one week prior.   - - -

## 2021-06-10 NOTE — PROGRESS NOTE ADULT - SUBJECTIVE AND OBJECTIVE BOX
[facility] - [facility], THE HEART CENTER                                   [address] [place] [address]                                                      PHONE: [phone]                                                         FAX: [phone]  [email or web address]  ---------------------------------------------------------------------------------------------------------------------------------    Overnight events/patient complaints:    INTERPRETATION OF TELEMETRY (personally reviewed):    ECG:    ECHO:    STRESS TEST:    CARDIAC CATHETERIZATION:    I&O's Summary      PAST MEDICAL & SURGICAL HISTORY:  DM (diabetes mellitus)    HTN (hypertension)    High cholesterol    Renal insufficiency    Sleep apnea    Prostate CA    Pulmonary hypertension    CHF (congestive heart failure)  EF 30%    Atrial fibrillation    Chronic back pain    Lung nodules    S/P ankle ligament repair    AICD (automatic cardioverter/defibrillator) present    History of brachytherapy        allow double protein at meals (Unknown)  No Known Allergies    MEDICATIONS  (STANDING):  aspirin enteric coated 81 milliGRAM(s) Oral daily  atorvastatin 40 milliGRAM(s) Oral at bedtime  dextrose 40% Gel 15 Gram(s) Oral once  dextrose 5%. 1000 milliLiter(s) (50 mL/Hr) IV Continuous <Continuous>  dextrose 5%. 1000 milliLiter(s) (100 mL/Hr) IV Continuous <Continuous>  dextrose 50% Injectable 25 Gram(s) IV Push once  dextrose 50% Injectable 12.5 Gram(s) IV Push once  dextrose 50% Injectable 25 Gram(s) IV Push once  DULoxetine 60 milliGRAM(s) Oral daily  folic acid 1 milliGRAM(s) Oral daily  gabapentin 300 milliGRAM(s) Oral two times a day  glucagon  Injectable 1 milliGRAM(s) IntraMuscular once  insulin detemir injectable (LEVEMIR) 65 Unit(s) SubCutaneous at bedtime  insulin lispro (ADMELOG) corrective regimen sliding scale   SubCutaneous three times a day before meals  insulin lispro (ADMELOG) corrective regimen sliding scale   SubCutaneous at bedtime  levothyroxine 50 MICROGram(s) Oral daily  pantoprazole    Tablet 40 milliGRAM(s) Oral before breakfast  tamsulosin 0.4 milliGRAM(s) Oral at bedtime    MEDICATIONS  (PRN):  ALBUTerol    90 MICROgram(s) HFA Inhaler 2 Puff(s) Inhalation every 6 hours PRN Shortness of Breath and/or Wheezing      Vital Signs Last 24 Hrs  T(C): 36.3 (10 Maynor 2021 05:00), Max: 37.1 (09 Jun 2021 10:26)  T(F): 97.4 (10 Maynor 2021 05:00), Max: 98.7 (09 Jun 2021 10:26)  HR: 67 (10 Maynor 2021 07:00) (67 - 98)  BP: 111/72 (10 Maynor 2021 07:00) (75/41 - 117/65)  BP(mean): 75 (09 Jun 2021 11:14) (75 - 75)  RR: 18 (10 Maynor 2021 07:00) (12 - 20)  SpO2: 96% (10 Maynor 2021 07:00) (94% - 100%)  ICU Vital Signs Last 24 Hrs    PHYSICAL EXAM:  General: Appears well developed, well nourished alert and cooperative.  HEENT: Head; normocephalic, atraumatic.Pupils reactive, cornea wnl. Neck supple, no nodes adenopathy, no JVD  CARDIOVASCULAR: Normal S1 and S2, 1/6 ANGEL, no rub, gallop or lift.   LUNGS: No rales, rhonchi or wheeze. Normal breath sounds bilaterally.  ABDOMEN: Soft, nontender without mass or organomegaly. bowel sounds normoactive.  EXTREMITIES: No clubbing, cyanosis or edema.   SKIN: warm and dry with normal turgor.  NEURO: Alert/oriented x 3/normal motor exam.   PSYCH: normal affect.        LABS:                        16.6   7.75  )-----------( 157      ( 10 Maynor 2021 03:34 )             52.0     06-10    135  |  90<L>  |  44.9<H>  ----------------------------<  249<H>  3.5   |  26.0  |  1.66<H>    Ca    8.4<L>      10 Maynor 2021 03:34  Phos  3.9     06-10  Mg     2.2     06-10    TPro  7.1  /  Alb  3.4  /  TBili  1.1  /  DBili  x   /  AST  34  /  ALT  17  /  AlkPhos  93  06-10    MARY ANN KRAIG  CARDIAC MARKERS ( 10 Maynor 2021 03:34 )  x     / 0.05 ng/mL / x     / x     / x      CARDIAC MARKERS ( 10 Mayonr 2021 01:20 )  x     / 0.05 ng/mL / x     / x     / x      CARDIAC MARKERS ( 09 Jun 2021 11:00 )  x     / 0.10 ng/mL / 82 U/L / x     / x          PT/INR - ( 10 Maynor 2021 03:34 )   PT: 21.4 sec;   INR: 1.90 ratio         PTT - ( 09 Jun 2021 11:00 )  PTT:32.7 sec      RADIOLOGY & ADDITIONAL STUDIES:    ASSESSMENT AND PLAN:  Patient is a morbidly obese 69 y/o man with chronic HFrEF/NICM EF 30-35%, paroxsymal ventricular tachycardia s/p ICD ([employer]), chronic atrial fibrillation, CKD, HTN, CHRISS, and DM recently admitted 5/12/21 with hyperglycemia/polyuria and again 5/17/21 for orthostatic hypotension and brief loss of consciousness in the setting of JAYLENE on CKD, and hypokalemia who presents from home with loss of consciousness from a standing position which was preceded by dizziness.   In the ER noted to be hypotensive to SBP 70's.      Loss of consciousness/hypotension/troponin elevation   - 2/2 symptomatic hypotension  - hold GMT at this time including diuretic therapy   - device interrogation reviewed and no malignant arrhythmia   - consider cardioMEMs for volume management given his recurrent presentation with orthostatic hypotension/JAYLENE  - troponin trend reviewed, elevation likely 2/2 hypotension    NICM/NSVT/persistent AF/hypokalemia  - continue to hold coreg for now   - aggressive electrolyte replacement, hx of non-sustained VT  - will add amiodarone 200mg daily if ventricular ectopy increases in burden   - given his recurrent presentation with fall, poor candidate for AC, will need discussion re: Watchsp     Thank you for allowing [facility] to participate in the care of this patient.  Please feel free to call with any questions or concerns.                  [facility], THE HEART CENTER                                   [address] [place] [address]                                                      PHONE: [phone]                                                         FAX: [phone]  [email or web address]  ---------------------------------------------------------------------------------------------------------------------------------    Overnight events/patient complaints: denies further dizziness, however has predominantly been in his chair or bed    INTERPRETATION OF TELEMETRY (personally reviewed)    EKG: atrial fibrillation     ECHO FINDINGS: < from: TTE Echo Complete w/o Contrast w/ Doppler (03.09.21 @ 08:14) >   1. Technically difficult study.   2. Endocardial visualization was enhanced with intravenous echo contrast.   3. Left ventricular ejection fraction, by visual estimation, is 30 to 35%.   4. Moderately decreased global left ventricular systolic function.   5. Multiple left ventricular regional wall motion abnormalities exist. See wall motion findings.   6. The mitral in-flow pattern reveals no discernable A-wave, therefore no comment on diastolic function can be made.   7. Mildly enlarged right ventricle with moderately reduced RV systolic function.   8. Mildly enlarged left atrium.   9. Trace mitral valve regurgitation.  10. Mild thickening of the anterior and posterior mitral valve leaflets.  11. Mild dilatation of the aortic root (3.7 cm).  12. Compared to a prior study from 9/10/20, there is no significant change.    I&O's Summary    PAST MEDICAL & SURGICAL HISTORY:  DM (diabetes mellitus)  HTN (hpertension)  High cholesterol  Renal insufficiency  Sleep apnea  Prostate CA  Pulmonary hypertension  CHF (congestive heart failure)  Atrial fibrillation  Chronic back pain  Lung nodules  S/P ankle ligament repair  AICD (automatic cardioverter/defibrillator) present  History of brachytherapy    No Known Allergies    MEDICATIONS  (STANDING):  aspirin enteric coated 81 milliGRAM(s) Oral daily  atorvastatin 40 milliGRAM(s) Oral at bedtime  dextrose 40% Gel 15 Gram(s) Oral once  dextrose 5%. 1000 milliLiter(s) (50 mL/Hr) IV Continuous <Continuous>  dextrose 5%. 1000 milliLiter(s) (100 mL/Hr) IV Continuous <Continuous>  dextrose 50% Injectable 25 Gram(s) IV Push once  dextrose 50% Injectable 12.5 Gram(s) IV Push once  dextrose 50% Injectable 25 Gram(s) IV Push once  DULoxetine 60 milliGRAM(s) Oral daily  folic acid 1 milliGRAM(s) Oral daily  gabapentin 300 milliGRAM(s) Oral two times a day  glucagon  Injectable 1 milliGRAM(s) IntraMuscular once  insulin detemir injectable (LEVEMIR) 65 Unit(s) SubCutaneous at bedtime  insulin lispro (ADMELOG) corrective regimen sliding scale   SubCutaneous three times a day before meals  insulin lispro (ADMELOG) corrective regimen sliding scale   SubCutaneous at bedtime  levothyroxine 50 MICROGram(s) Oral daily  pantoprazole    Tablet 40 milliGRAM(s) Oral before breakfast  tamsulosin 0.4 milliGRAM(s) Oral at bedtime    MEDICATIONS  (PRN):  ALBUTerol    90 MICROgram(s) HFA Inhaler 2 Puff(s) Inhalation every 6 hours PRN Shortness of Breath and/or Wheezing      Vital Signs Last 24 Hrs  T(C): 36.3 (10 Maynor 2021 05:00), Max: 37.1 (09 Jun 2021 10:26)  T(F): 97.4 (10 Maynor 2021 05:00), Max: 98.7 (09 Jun 2021 10:26)  HR: 67 (10 Maynor 2021 07:00) (67 - 98)  BP: 111/72 (10 Maynor 2021 07:00) (75/41 - 117/65)  BP(mean): 75 (09 Jun 2021 11:14) (75 - 75)  RR: 18 (10 Maynor 2021 07:00) (12 - 20)  SpO2: 96% (10 Maynor 2021 07:00) (94% - 100%)  ICU Vital Signs Last 24 Hrs    PHYSICAL EXAM:  General: Appears well developed, well nourished alert and cooperative.  HEENT: Head; normocephalic, atraumatic.Pupils reactive, cornea wnl. Neck supple, no nodes adenopathy, no JVD  CARDIOVASCULAR: Normal S1 and S2, 1/6 ANGEL, no rub, gallop or lift.   LUNGS: No rales, rhonchi or wheeze. Normal breath sounds bilaterally.  ABDOMEN: Soft, nontender without mass or organomegaly. bowel sounds normoactive.  EXTREMITIES: No clubbing, cyanosis, (+) LE edema bilaterally   SKIN: warm and dry with normal turgor.  NEURO: Alert/oriented x 3/normal motor exam.   PSYCH: normal affect.        LABS:                        16.6   7.75  )-----------( 157      ( 10 Maynor 2021 03:34 )             52.0     06-10    135  |  90<L>  |  44.9<H>  ----------------------------<  249<H>  3.5   |  26.0  |  1.66<H>    Ca    8.4<L>      10 Maynor 2021 03:34  Phos  3.9     06-10  Mg     2.2     06-10    TPro  7.1  /  Alb  3.4  /  TBili  1.1  /  DBili  x   /  AST  34  /  ALT  17  /  AlkPhos  93  06-10    MARY ANN KRAIG  CARDIAC MARKERS ( 10 Maynor 2021 03:34 )  x     / 0.05 ng/mL / x     / x     / x      CARDIAC MARKERS ( 10 Maynor 2021 01:20 )  x     / 0.05 ng/mL / x     / x     / x      CARDIAC MARKERS ( 09 Jun 2021 11:00 )  x     / 0.10 ng/mL / 82 U/L / x     / x          PT/INR - ( 10 Maynor 2021 03:34 )   PT: 21.4 sec;   INR: 1.90 ratio         PTT - ( 09 Jun 2021 11:00 )  PTT:32.7 sec      RADIOLOGY & ADDITIONAL STUDIES:    ASSESSMENT AND PLAN:  Patient is a morbidly obese 71 y/o man with chronic HFrEF/NICM EF 30-35%, paroxsymal ventricular tachycardia s/p ICD ([facility]), chronic atrial fibrillation, CKD, HTN, CHRISS, and DM recently admitted 5/12/21 with hyperglycemia/polyuria and again 5/17/21 for orthostatic hypotension and brief loss of consciousness in the setting of JAYLENE on CKD, and hypokalemia who presents from home with loss of consciousness from a standing position which was preceded by dizziness.   In the ER noted to be hypotensive to SBP 70's.      Loss of consciousness/hypotension/troponin elevation   - 2/2 symptomatic hypotension  - hold GMT at this time including diuretic therapy   - device interrogation reviewed and no malignant arrhythmia   - consider cardioMEMs for volume management given his recurrent presentation with orthostatic hypotension/JAYLENE  - troponin trend reviewed, elevation likely 2/2 hypotension    NICM/NSVT/persistent AF/hypokalemia  - continue to hold coreg for now, will resume however at 3.125mg Q12  - aggressive electrolyte replacement, hx of non-sustained VT  - given his recurrent presentation with fall, poor candidate for AC, will need discussion re: Hannah     Thank you for allowing [facility] to participate in the care of this patient.  Please feel free to call with any questions or concerns.

## 2021-06-10 NOTE — PROGRESS NOTE ADULT - ASSESSMENT
70 year old male with PMH HTN, T2DM with neuropathy, Dyslipidemia, Hypothyroidism, CHFrEF/VT/AF s/p ICD, CA prostate s/p Brachytherapy, COPD and recurrent syncope presents after falling in shower without any palpitations, chest pain or LOC.   He relates having 6-7 episodes in the past 6 months.    #Recurrent Falls/Orthostatic hypotension and syncope  -Overdiuresis likely as evidence by pre-renal JAYLENE and having both Torsemide and Furosemide at home. CHFrEF appears to be well compensated currently  - Interrogate ICD  - Daily weights, I/O  - Hold Diuretic, ACEI.RB due to JAYLENE. Continue BB  - Cardiology recommendations for CardioMEMS given recurrence  - PT    #Elevated Troponin, possibly secondary to fall (cross sensitivity with Skeletal Muscle Troponin), decreased renal clearance - Doubt ACS  - Monitor  - Continue ASA, Statin, BB (with parameter  - Repeat Troponin  - TTE  - Cardiology follow up    #JAYLENE, appears pre-renal. Likely due to overdiuresis and recent diarrhea  - monitor renal function  - Hold diuretics - resume once fluid status improved    #T2DM with hyperglycemia; Neuropathy  - Diabetic diet, hold home OHA  - BGM with SSI  - Continue levemir  65U in am  - Continue Gabapentin and Duoxetine    #Chronic AF - rate controlled. INR therapeutic  - Continue BB (with parameters for BP)  - Hold coumadin anticipating CardioMEMS    #Morbid Obesity  - Weight loss, exercise    PT evaluation  VTEp - Coumadin but currently held. Restart soon    Palliative consult  Case management given recurrent presentations - Home care

## 2021-06-11 ENCOUNTER — TRANSCRIPTION ENCOUNTER (OUTPATIENT)
Age: 71
End: 2021-06-11

## 2021-06-11 LAB
ALBUMIN SERPL ELPH-MCNC: 3.1 G/DL — LOW (ref 3.3–5.2)
ALBUMIN SERPL ELPH-MCNC: 3.2 G/DL — LOW (ref 3.3–5.2)
ALP SERPL-CCNC: 86 U/L — SIGNIFICANT CHANGE UP (ref 40–120)
ALP SERPL-CCNC: 87 U/L — SIGNIFICANT CHANGE UP (ref 40–120)
ALT FLD-CCNC: 12 U/L — SIGNIFICANT CHANGE UP
ALT FLD-CCNC: 13 U/L — SIGNIFICANT CHANGE UP
ANION GAP SERPL CALC-SCNC: 13 MMOL/L — SIGNIFICANT CHANGE UP (ref 5–17)
ANION GAP SERPL CALC-SCNC: 13 MMOL/L — SIGNIFICANT CHANGE UP (ref 5–17)
AST SERPL-CCNC: 20 U/L — SIGNIFICANT CHANGE UP
AST SERPL-CCNC: 33 U/L — SIGNIFICANT CHANGE UP
BASOPHILS # BLD AUTO: 0.02 K/UL — SIGNIFICANT CHANGE UP (ref 0–0.2)
BASOPHILS NFR BLD AUTO: 0.4 % — SIGNIFICANT CHANGE UP (ref 0–2)
BILIRUB SERPL-MCNC: 1.2 MG/DL — SIGNIFICANT CHANGE UP (ref 0.4–2)
BILIRUB SERPL-MCNC: 1.2 MG/DL — SIGNIFICANT CHANGE UP (ref 0.4–2)
BUN SERPL-MCNC: 26.1 MG/DL — HIGH (ref 8–20)
BUN SERPL-MCNC: 28.5 MG/DL — HIGH (ref 8–20)
CALCIUM SERPL-MCNC: 9 MG/DL — SIGNIFICANT CHANGE UP (ref 8.6–10.2)
CALCIUM SERPL-MCNC: 9.1 MG/DL — SIGNIFICANT CHANGE UP (ref 8.6–10.2)
CHLORIDE SERPL-SCNC: 93 MMOL/L — LOW (ref 98–107)
CHLORIDE SERPL-SCNC: 94 MMOL/L — LOW (ref 98–107)
CO2 SERPL-SCNC: 26 MMOL/L — SIGNIFICANT CHANGE UP (ref 22–29)
CO2 SERPL-SCNC: 31 MMOL/L — HIGH (ref 22–29)
CREAT SERPL-MCNC: 0.98 MG/DL — SIGNIFICANT CHANGE UP (ref 0.5–1.3)
CREAT SERPL-MCNC: 1.14 MG/DL — SIGNIFICANT CHANGE UP (ref 0.5–1.3)
EOSINOPHIL # BLD AUTO: 0.09 K/UL — SIGNIFICANT CHANGE UP (ref 0–0.5)
EOSINOPHIL NFR BLD AUTO: 1.9 % — SIGNIFICANT CHANGE UP (ref 0–6)
GLUCOSE BLDC GLUCOMTR-MCNC: 214 MG/DL — HIGH (ref 70–99)
GLUCOSE BLDC GLUCOMTR-MCNC: 248 MG/DL — HIGH (ref 70–99)
GLUCOSE BLDC GLUCOMTR-MCNC: 256 MG/DL — HIGH (ref 70–99)
GLUCOSE BLDC GLUCOMTR-MCNC: 305 MG/DL — HIGH (ref 70–99)
GLUCOSE SERPL-MCNC: 177 MG/DL — HIGH (ref 70–99)
GLUCOSE SERPL-MCNC: 241 MG/DL — HIGH (ref 70–99)
HCT VFR BLD CALC: 49.4 % — SIGNIFICANT CHANGE UP (ref 39–50)
HGB BLD-MCNC: 16 G/DL — SIGNIFICANT CHANGE UP (ref 13–17)
IMM GRANULOCYTES NFR BLD AUTO: 0.6 % — SIGNIFICANT CHANGE UP (ref 0–1.5)
INR BLD: 1.7 RATIO — HIGH (ref 0.88–1.16)
LYMPHOCYTES # BLD AUTO: 1.09 K/UL — SIGNIFICANT CHANGE UP (ref 1–3.3)
LYMPHOCYTES # BLD AUTO: 22.8 % — SIGNIFICANT CHANGE UP (ref 13–44)
MAGNESIUM SERPL-MCNC: 2.3 MG/DL — SIGNIFICANT CHANGE UP (ref 1.8–2.6)
MCHC RBC-ENTMCNC: 27.4 PG — SIGNIFICANT CHANGE UP (ref 27–34)
MCHC RBC-ENTMCNC: 32.4 GM/DL — SIGNIFICANT CHANGE UP (ref 32–36)
MCV RBC AUTO: 84.4 FL — SIGNIFICANT CHANGE UP (ref 80–100)
MONOCYTES # BLD AUTO: 0.47 K/UL — SIGNIFICANT CHANGE UP (ref 0–0.9)
MONOCYTES NFR BLD AUTO: 9.8 % — SIGNIFICANT CHANGE UP (ref 2–14)
NEUTROPHILS # BLD AUTO: 3.09 K/UL — SIGNIFICANT CHANGE UP (ref 1.8–7.4)
NEUTROPHILS NFR BLD AUTO: 64.5 % — SIGNIFICANT CHANGE UP (ref 43–77)
PHOSPHATE SERPL-MCNC: 2.5 MG/DL — SIGNIFICANT CHANGE UP (ref 2.4–4.7)
PLATELET # BLD AUTO: 129 K/UL — LOW (ref 150–400)
POTASSIUM SERPL-MCNC: 2.8 MMOL/L — CRITICAL LOW (ref 3.5–5.3)
POTASSIUM SERPL-MCNC: 4.4 MMOL/L — SIGNIFICANT CHANGE UP (ref 3.5–5.3)
POTASSIUM SERPL-SCNC: 2.8 MMOL/L — CRITICAL LOW (ref 3.5–5.3)
POTASSIUM SERPL-SCNC: 4.4 MMOL/L — SIGNIFICANT CHANGE UP (ref 3.5–5.3)
PROT SERPL-MCNC: 6.7 G/DL — SIGNIFICANT CHANGE UP (ref 6.6–8.7)
PROT SERPL-MCNC: 6.9 G/DL — SIGNIFICANT CHANGE UP (ref 6.6–8.7)
PROTHROM AB SERPL-ACNC: 19.2 SEC — HIGH (ref 10.6–13.6)
RBC # BLD: 5.85 M/UL — HIGH (ref 4.2–5.8)
RBC # FLD: 17.2 % — HIGH (ref 10.3–14.5)
SODIUM SERPL-SCNC: 133 MMOL/L — LOW (ref 135–145)
SODIUM SERPL-SCNC: 137 MMOL/L — SIGNIFICANT CHANGE UP (ref 135–145)
WBC # BLD: 4.79 K/UL — SIGNIFICANT CHANGE UP (ref 3.8–10.5)
WBC # FLD AUTO: 4.79 K/UL — SIGNIFICANT CHANGE UP (ref 3.8–10.5)

## 2021-06-11 PROCEDURE — 93880 EXTRACRANIAL BILAT STUDY: CPT | Mod: 26

## 2021-06-11 PROCEDURE — 99233 SBSQ HOSP IP/OBS HIGH 50: CPT

## 2021-06-11 RX ORDER — WARFARIN SODIUM 2.5 MG/1
3 TABLET ORAL ONCE
Refills: 0 | Status: COMPLETED | OUTPATIENT
Start: 2021-06-11 | End: 2021-06-11

## 2021-06-11 RX ORDER — POTASSIUM CHLORIDE 20 MEQ
40 PACKET (EA) ORAL EVERY 4 HOURS
Refills: 0 | Status: COMPLETED | OUTPATIENT
Start: 2021-06-11 | End: 2021-06-11

## 2021-06-11 RX ORDER — POTASSIUM CHLORIDE 20 MEQ
10 PACKET (EA) ORAL
Refills: 0 | Status: COMPLETED | OUTPATIENT
Start: 2021-06-11 | End: 2021-06-11

## 2021-06-11 RX ORDER — CARVEDILOL PHOSPHATE 80 MG/1
3.12 CAPSULE, EXTENDED RELEASE ORAL EVERY 12 HOURS
Refills: 0 | Status: DISCONTINUED | OUTPATIENT
Start: 2021-06-11 | End: 2021-06-12

## 2021-06-11 RX ADMIN — Medication 100 MILLIEQUIVALENT(S): at 10:48

## 2021-06-11 RX ADMIN — TAMSULOSIN HYDROCHLORIDE 0.4 MILLIGRAM(S): 0.4 CAPSULE ORAL at 21:51

## 2021-06-11 RX ADMIN — Medication 100 MILLIEQUIVALENT(S): at 11:50

## 2021-06-11 RX ADMIN — WARFARIN SODIUM 3 MILLIGRAM(S): 2.5 TABLET ORAL at 21:52

## 2021-06-11 RX ADMIN — GABAPENTIN 300 MILLIGRAM(S): 400 CAPSULE ORAL at 05:04

## 2021-06-11 RX ADMIN — CARVEDILOL PHOSPHATE 3.12 MILLIGRAM(S): 80 CAPSULE, EXTENDED RELEASE ORAL at 17:34

## 2021-06-11 RX ADMIN — Medication 8: at 15:37

## 2021-06-11 RX ADMIN — ATORVASTATIN CALCIUM 40 MILLIGRAM(S): 80 TABLET, FILM COATED ORAL at 21:52

## 2021-06-11 RX ADMIN — Medication 65 UNIT(S): at 21:51

## 2021-06-11 RX ADMIN — Medication 50 MICROGRAM(S): at 05:04

## 2021-06-11 RX ADMIN — Medication 4: at 11:47

## 2021-06-11 RX ADMIN — PANTOPRAZOLE SODIUM 40 MILLIGRAM(S): 20 TABLET, DELAYED RELEASE ORAL at 05:04

## 2021-06-11 RX ADMIN — DULOXETINE HYDROCHLORIDE 60 MILLIGRAM(S): 30 CAPSULE, DELAYED RELEASE ORAL at 17:34

## 2021-06-11 RX ADMIN — Medication 81 MILLIGRAM(S): at 15:36

## 2021-06-11 RX ADMIN — Medication 40 MILLIEQUIVALENT(S): at 10:06

## 2021-06-11 RX ADMIN — GABAPENTIN 300 MILLIGRAM(S): 400 CAPSULE ORAL at 17:34

## 2021-06-11 RX ADMIN — Medication 40 MILLIEQUIVALENT(S): at 15:36

## 2021-06-11 RX ADMIN — Medication 1 MILLIGRAM(S): at 15:37

## 2021-06-11 RX ADMIN — Medication 1: at 21:50

## 2021-06-11 RX ADMIN — Medication 100 MILLIEQUIVALENT(S): at 10:05

## 2021-06-11 RX ADMIN — Medication 4: at 09:51

## 2021-06-11 NOTE — PROGRESS NOTE ADULT - ASSESSMENT
70 year old male with PMH HTN, T2DM with neuropathy, Dyslipidemia, Hypothyroidism, CHFrEF/VT/AF s/p ICD, CA prostate s/p Brachytherapy, COPD and recurrent syncope presents after falling in shower without any palpitations, chest pain or LOC.   He relates having 6-7 episodes in the past 6 months.    #Recurrent Falls/Orthostatic hypotension and syncope  -Overdiuresis likely as evidence by pre-renal JAYLENE and having both Torsemide and Furosemide at home. CHFrEF appears to be well compensated currently  - Interrogate ICD  - Daily weights, I/O  - Hold Diuretic, ACEI.RB due to JAYLENE. Restarted on BB  - Cardiology recommendations   - PT eval pending  - US carotids ordered    #Elevated Troponin, possibly secondary to fall (cross sensitivity with Skeletal Muscle Troponin), decreased renal clearance - Doubt ACS  - Monitor  - Continue ASA, Statin, BB   - Cardiology follow up    #JAYLENE, appears pre-renal. Likely due to overdiuresis and recent diarrhea  - monitor renal function  - Hold diuretics - resume once fluid status improved    #T2DM with hyperglycemia; Neuropathy  - Diabetic diet, hold home OHA  - BGM with SSI  - Continue levemir  65U in am  - Continue Gabapentin and Duoxetine    #Chronic AF - rate controlled. INR therapeutic  - Continue BB (with parameters for BP)  - Restarted coumadin at 3mg     #Morbid Obesity  - Weight loss, exercise    PT evaluation  VTEp - Coumadin     Case management given recurrent presentations - Home care    Dispo: Pending optimization of meds, PT eval and US carotids

## 2021-06-11 NOTE — DISCHARGE NOTE PROVIDER - CARE PROVIDER_API CALL
Jodi De La Cruz)  Internal Medicine  [address] [place] [id number]  Phone: [phone]  Fax: [phone]  Follow Up Time: 1 week   Jodi De La Cruz)  Internal Medicine  [address] [place] [id number]  Phone: [phone]  Fax: [phone]  Follow Up Time: 1 week    Dariel Walden)  Internal Medicine  [address] [place] [address]  Phone: [phone]  Fax: [phone]  Follow Up Time:

## 2021-06-11 NOTE — DISCHARGE NOTE PROVIDER - NSDCCPCAREPLAN_GEN_ALL_CORE_FT
PRINCIPAL DISCHARGE DIAGNOSIS  Diagnosis: Syncope and collapse  Assessment and Plan of Treatment: Please take meds as prescribed. Please bring all meds that you are taking to your next PCP/Cardiology appointment.      SECONDARY DISCHARGE DIAGNOSES  Diagnosis: Hypotension  Assessment and Plan of Treatment: Please take meds as prescribed. Please bring all meds that you are taking to your next PCP/Cardiology appointment.    Diagnosis: Elevated troponin  Assessment and Plan of Treatment:

## 2021-06-11 NOTE — DISCHARGE NOTE PROVIDER - CARE PROVIDERS DIRECT ADDRESSES
,DirectAddress_Unknown ,DirectAddress_Unknown,[email or web address]

## 2021-06-11 NOTE — DISCHARGE NOTE PROVIDER - PROVIDER TOKENS
PROVIDER:[TOKEN:[[id number]],FOLLOWUP:[1 week]] PROVIDER:[TOKEN:[[id number]],FOLLOWUP:[1 week]],PROVIDER:[TOKEN:[[id number]]]

## 2021-06-11 NOTE — DISCHARGE NOTE PROVIDER - NSDCPNSUBOBJ_GEN_ALL_CORE
pt seen and examined    comfortable  morbid obese  walked to bathroom by himself with walker  owns a walker  ready to go home    Vital Signs Last 24 Hrs  T(C): 36.7 (06-12-21 @ 08:00), Max: 36.7 (06-12-21 @ 08:00)  T(F): 98 (06-12-21 @ 08:00), Max: 98 (06-12-21 @ 08:00)  HR: 77 (06-12-21 @ 08:00) (77 - 100)  BP: 129/65 (06-12-21 @ 08:00) (127/73 - 150/90)  BP(mean): --  RR: 18 (06-12-21 @ 08:00) (18 - 20)  SpO2: 96% (06-12-21 @ 08:00) (94% - 98%)    Assessment and Plan:   70 year old male with PMH HTN, T2DM with neuropathy, Dyslipidemia, Hypothyroidism, CHFrEF/VT/AF s/p ICD, CA prostate s/p Brachytherapy, COPD and recurrent syncope presents after falling in shower without any palpitations, chest pain or LOC.   He relates having 6-7 episodes in the past 6 months.    #Recurrent Falls/Orthostatic hypotension and syncope  -Overdiuresis likely as evidence by pre-renal JAYLENE and having both Torsemide and Furosemide at home. CHFrEF appears to be well compensated currently  - Interrogate ICD  - Daily weights, I/O  - Hold Diuretic, ACEI.RB due to JAYLENE. Restarted on BB  - Cardiology recommendations noted  - US carotids non acute  - pt ambulating well by himself without assist using walker  - consider cardioMEMs for volume management given his recurrent presentation with orthostatic hypotension/JAYLENE; to be facilitated as an outpatient     #Elevated Troponin, possibly secondary to fall (cross sensitivity with Skeletal Muscle Troponin), decreased renal clearance - Doubt ACS  - Monitor  - Continue ASA, Statin, BB   - Cardiology follow up o/p    #JAYLENE, appears pre-renal. Likely due to overdiuresis and recent diarrhea  - monitor renal function  - Hold diuretics - resume once fluid status improved    #T2DM with hyperglycemia; Neuropathy  - Diabetic diet, hold home OHA  - BGM with SSI  - Continue levemir  65U in am  - Continue Gabapentin and Duoxetine    #Chronic AF - rate controlled. INR therapeutic  - Continue BB (with parameters for BP)  - Restarted coumadin at 3mg   - hx of non-sustained VT  - given his recurrent presentation with fall, poor candidate for AC, will need discussion re: Watchman as outpatient      #Morbid Obesity  - Weight loss, exercise    discharge home with home care    VTEp - Coumadin no bridging

## 2021-06-11 NOTE — DISCHARGE NOTE NURSING/CASE MANAGEMENT/SOCIAL WORK - PATIENT PORTAL LINK FT
You can access the FollowMyHealth Patient Portal offered by [facility] by registering at the following website: [email or web address]. By joining [facility]’s FollowMyHealth portal, you will also be able to view your health information using other applications (apps) compatible with our system.

## 2021-06-11 NOTE — DISCHARGE NOTE PROVIDER - NSDCFUADDAPPT_GEN_ALL_CORE_FT
Follow with your doctor and get INR checked on Monday  Follow with your Cardiology for further optimization, cardiomems and watchman

## 2021-06-11 NOTE — PROGRESS NOTE ADULT - SUBJECTIVE AND OBJECTIVE BOX
[place] [facility] - [facility], THE HEART CENTER                                   [address] [place] [address]                                                      PHONE: [phone]                                                         FAX: [phone]  [email or web address]  ---------------------------------------------------------------------------------------------------------------------------------    Overnight events/patient complaints:    INTERPRETATION OF TELEMETRY (personally reviewed):    ECG:    ECHO:    STRESS TEST:    CARDIAC CATHETERIZATION:    I&O's Summary      PAST MEDICAL & SURGICAL HISTORY:  DM (diabetes mellitus)    HTN (hypertension)    High cholesterol    Renal insufficiency    Sleep apnea    Prostate CA    Pulmonary hypertension    CHF (congestive heart failure)  EF 30%    Atrial fibrillation    Chronic back pain    Lung nodules    S/P ankle ligament repair    AICD (automatic cardioverter/defibrillator) present    History of brachytherapy        allow double protein at meals (Unknown)  No Known Allergies    MEDICATIONS  (STANDING):  aspirin enteric coated 81 milliGRAM(s) Oral daily  atorvastatin 40 milliGRAM(s) Oral at bedtime  dextrose 40% Gel 15 Gram(s) Oral once  dextrose 5%. 1000 milliLiter(s) (50 mL/Hr) IV Continuous <Continuous>  dextrose 5%. 1000 milliLiter(s) (100 mL/Hr) IV Continuous <Continuous>  dextrose 50% Injectable 25 Gram(s) IV Push once  dextrose 50% Injectable 12.5 Gram(s) IV Push once  dextrose 50% Injectable 25 Gram(s) IV Push once  DULoxetine 60 milliGRAM(s) Oral daily  folic acid 1 milliGRAM(s) Oral daily  gabapentin 300 milliGRAM(s) Oral two times a day  glucagon  Injectable 1 milliGRAM(s) IntraMuscular once  insulin detemir injectable (LEVEMIR) 65 Unit(s) SubCutaneous at bedtime  insulin lispro (ADMELOG) corrective regimen sliding scale   SubCutaneous three times a day before meals  insulin lispro (ADMELOG) corrective regimen sliding scale   SubCutaneous at bedtime  levothyroxine 50 MICROGram(s) Oral daily  pantoprazole    Tablet 40 milliGRAM(s) Oral before breakfast  potassium chloride   Powder 40 milliEquivalent(s) Oral every 4 hours  potassium chloride  10 mEq/100 mL IVPB 10 milliEquivalent(s) IV Intermittent every 1 hour  tamsulosin 0.4 milliGRAM(s) Oral at bedtime    MEDICATIONS  (PRN):  ALBUTerol    90 MICROgram(s) HFA Inhaler 2 Puff(s) Inhalation every 6 hours PRN Shortness of Breath and/or Wheezing      Vital Signs Last 24 Hrs  T(C): 36.6 (11 Jun 2021 08:20), Max: 36.8 (10 Maynor 2021 16:25)  T(F): 97.8 (11 Jun 2021 08:20), Max: 98.3 (10 Maynor 2021 16:25)  HR: 93 (11 Jun 2021 08:20) (93 - 104)  BP: 130/70 (11 Jun 2021 08:20) (110/63 - 133/85)  BP(mean): --  RR: 18 (11 Jun 2021 08:20) (18 - 18)  SpO2: 96% (11 Jun 2021 08:20) (94% - 96%)  ICU Vital Signs Last 24 Hrs    PHYSICAL EXAM:  General: Appears well developed, well nourished alert and cooperative.  HEENT: Head; normocephalic, atraumatic.Pupils reactive, cornea wnl. Neck supple, no nodes adenopathy, no JVD  CARDIOVASCULAR: Normal S1 and S2, 1/6 ANGEL, no rub, gallop or lift.   LUNGS: No rales, rhonchi or wheeze. Normal breath sounds bilaterally.  ABDOMEN: Soft, nontender without mass or organomegaly. bowel sounds normoactive.  EXTREMITIES: No clubbing, cyanosis or edema.   SKIN: warm and dry with normal turgor.  NEURO: Alert/oriented x 3/normal motor exam.   PSYCH: normal affect.        LABS:                        16.0   4.79  )-----------( 129      ( 11 Jun 2021 07:57 )             49.4     06-11    137  |  93<L>  |  28.5<H>  ----------------------------<  177<H>  2.8<LL>   |  31.0<H>  |  0.98    Ca    9.0      11 Jun 2021 07:57  Phos  2.5     06-11  Mg     2.3     06-11    TPro  6.7  /  Alb  3.1<L>  /  TBili  1.2  /  DBili  x   /  AST  20  /  ALT  13  /  AlkPhos  86  06-11    MARY ANN CYRAKAN  CARDIAC MARKERS ( 10 Maynor 2021 03:34 )  x     / 0.05 ng/mL / x     / x     / x      CARDIAC MARKERS ( 10 Maynor 2021 01:20 )  x     / 0.05 ng/mL / x     / x     / x      CARDIAC MARKERS ( 09 Jun 2021 11:00 )  x     / 0.10 ng/mL / 82 U/L / x     / x          PT/INR - ( 11 Jun 2021 07:57 )   PT: 19.2 sec;   INR: 1.70 ratio         PTT - ( 09 Jun 2021 11:00 )  PTT:32.7 sec      RADIOLOGY & ADDITIONAL STUDIES:    ASSESSMENT AND PLAN:  Patient is a morbidly obese 71 y/o man with chronic HFrEF/NICM EF 30-35%, paroxsymal ventricular tachycardia s/p ICD ([facility]), chronic atrial fibrillation, CKD, HTN, CHRISS, and DM recently admitted 5/12/21 with hyperglycemia/polyuria and again 5/17/21 for orthostatic hypotension and brief loss of consciousness in the setting of JAYLENE on CKD, and hypokalemia who presents from home with loss of consciousness from a standing position which was preceded by dizziness.   In the ER noted to be hypotensive to SBP 70's.      Loss of consciousness/hypotension/troponin elevation   - 2/2 symptomatic hypotension, improved  - start coreg 3.125mg Q12 today   - consider cardioMEMs for volume management given his recurrent presentation with orthostatic hypotension/JAYLENE; to be facilitated as an outpatient   - troponin trend reviewed, elevation likely 2/2 hypotension    NICM/NSVT/persistent AF/hypokalemia  - aggressive electrolyte replacement, hx of non-sustained VT  - given his recurrent presentation with fall, poor candidate for AC, will need discussion re: Watchman as outpatient  - for now resume coumadin, no need for bridging      Disposition planning per primary team      Thank you for allowing [facility] to participate in the care of this patient.  Please feel free to call with any questions or concerns.                  [place] [facility] - [facility], THE HEART CENTER                                   [address] [place] [address]                                                      PHONE: [phone]                                                         FAX: [phone]  [email or web address]  ---------------------------------------------------------------------------------------------------------------------------------    Overnight events/patient complaints: blood pressure trend is improved     INTERPRETATION OF TELEMETRY (personally reviewed)      PAST MEDICAL & SURGICAL HISTORY:  DM (diabetes mellitus)  HTN (hypertension)  High cholesterol  Renal insufficiency  Sleep apnea  Prostate CA  Pulmonary hypertension  CHF (congestive heart failure)  EF 30%  Atrial fibrillation  Chronic back pain  Lung nodules  S/P ankle ligament repair  AICD (automatic cardioverter/defibrillator) present  History of brachytherap    allow double protein at meals (Unknown)  No Known Allergies    MEDICATIONS  (STANDING):  aspirin enteric coated 81 milliGRAM(s) Oral daily  atorvastatin 40 milliGRAM(s) Oral at bedtime  dextrose 40% Gel 15 Gram(s) Oral once  dextrose 5%. 1000 milliLiter(s) (50 mL/Hr) IV Continuous <Continuous>  dextrose 5%. 1000 milliLiter(s) (100 mL/Hr) IV Continuous <Continuous>  dextrose 50% Injectable 25 Gram(s) IV Push once  dextrose 50% Injectable 12.5 Gram(s) IV Push once  dextrose 50% Injectable 25 Gram(s) IV Push once  DULoxetine 60 milliGRAM(s) Oral daily  folic acid 1 milliGRAM(s) Oral daily  gabapentin 300 milliGRAM(s) Oral two times a day  glucagon  Injectable 1 milliGRAM(s) IntraMuscular once  insulin detemir injectable (LEVEMIR) 65 Unit(s) SubCutaneous at bedtime  insulin lispro (ADMELOG) corrective regimen sliding scale   SubCutaneous three times a day before meals  insulin lispro (ADMELOG) corrective regimen sliding scale   SubCutaneous at bedtime  levothyroxine 50 MICROGram(s) Oral daily  pantoprazole    Tablet 40 milliGRAM(s) Oral before breakfast  potassium chloride   Powder 40 milliEquivalent(s) Oral every 4 hours  potassium chloride  10 mEq/100 mL IVPB 10 milliEquivalent(s) IV Intermittent every 1 hour  tamsulosin 0.4 milliGRAM(s) Oral at bedtime    MEDICATIONS  (PRN):  ALBUTerol    90 MICROgram(s) HFA Inhaler 2 Puff(s) Inhalation every 6 hours PRN Shortness of Breath and/or Wheezing      Vital Signs Last 24 Hrs  T(C): 36.6 (11 Jun 2021 08:20), Max: 36.8 (10 Maynor 2021 16:25)  T(F): 97.8 (11 Jun 2021 08:20), Max: 98.3 (10 Maynor 2021 16:25)  HR: 93 (11 Jun 2021 08:20) (93 - 104)  BP: 130/70 (11 Jun 2021 08:20) (110/63 - 133/85)  BP(mean): --  RR: 18 (11 Jun 2021 08:20) (18 - 18)  SpO2: 96% (11 Jun 2021 08:20) (94% - 96%)  ICU Vital Signs Last 24 Hrs    PHYSICAL EXAM:  General: Appears well developed, well nourished alert and cooperative.  HEENT: Head; normocephalic, atraumatic.Pupils reactive, cornea wnl. Neck supple, no nodes adenopathy, no JVD  CARDIOVASCULAR: Normal S1 and S2, 1/6 ANGEL, no rub, gallop or lift.   LUNGS: No rales, rhonchi or wheeze. Normal breath sounds bilaterally.  ABDOMEN: Soft, nontender without mass or organomegaly. bowel sounds normoactive.  EXTREMITIES: No clubbing, cyanosis, (+) LE   SKIN: warm and dry with normal turgor.  NEURO: Alert/oriented x 3/normal motor exam.   PSYCH: normal affect.        LABS:                        16.0   4.79  )-----------( 129      ( 11 Jun 2021 07:57 )             49.4     06-11    137  |  93<L>  |  28.5<H>  ----------------------------<  177<H>  2.8<LL>   |  31.0<H>  |  0.98    Ca    9.0      11 Jun 2021 07:57  Phos  2.5     06-11  Mg     2.3     06-11    TPro  6.7  /  Alb  3.1<L>  /  TBili  1.2  /  DBili  x   /  AST  20  /  ALT  13  /  AlkPhos  86  06-11    MARY ANN CYRAKAN  CARDIAC MARKERS ( 10 Maynor 2021 03:34 )  x     / 0.05 ng/mL / x     / x     / x      CARDIAC MARKERS ( 10 Maynor 2021 01:20 )  x     / 0.05 ng/mL / x     / x     / x      CARDIAC MARKERS ( 09 Jun 2021 11:00 )  x     / 0.10 ng/mL / 82 U/L / x     / x          PT/INR - ( 11 Jun 2021 07:57 )   PT: 19.2 sec;   INR: 1.70 ratio         PTT - ( 09 Jun 2021 11:00 )  PTT:32.7 sec      RADIOLOGY & ADDITIONAL STUDIES:    ASSESSMENT AND PLAN:  Patient is a morbidly obese 69 y/o man with chronic HFrEF/NICM EF 30-35%, paroxsymal ventricular tachycardia s/p ICD ([facility]), chronic atrial fibrillation, CKD, HTN, CHRISS, and DM recently admitted 5/12/21 with hyperglycemia/polyuria and again 5/17/21 for orthostatic hypotension and brief loss of consciousness in the setting of JAYLENE on CKD, and hypokalemia who presents from home with loss of consciousness from a standing position which was preceded by dizziness.   In the ER noted to be hypotensive to SBP 70's.      Loss of consciousness/hypotension/troponin elevation   - 2/2 symptomatic hypotension, improved  - start coreg 3.125mg Q12 today   - consider cardioMEMs for volume management given his recurrent presentation with orthostatic hypotension/JAYLENE; to be facilitated as an outpatient   - troponin trend reviewed, elevation likely 2/2 hypotension    NICM/NSVT/persistent AF/hypokalemia  - aggressive electrolyte replacement, hx of non-sustained VT  - given his recurrent presentation with fall, poor candidate for AC, will need discussion re: Watchman as outpatient  - for now resume coumadin, no need for bridging      Disposition planning per primary team      Thank you for allowing [facility] to participate in the care of this patient.  Please feel free to call with any questions or concerns.

## 2021-06-11 NOTE — DISCHARGE NOTE PROVIDER - NSDCMRMEDTOKEN_GEN_ALL_CORE_FT
albuterol 90 mcg/inh inhalation aerosol: 2 puff(s) inhaled every 6 hours, As needed, Shortness of Breath and/or Wheezing  aspirin 81 mg oral delayed release tablet: 1 tab(s) orally once a day  atorvastatin 40 mg oral tablet: 1 tab(s) orally once a day (at bedtime)  carvedilol 25 mg oral tablet: 1 tab(s) orally every 12 hours  DULoxetine 60 mg oral delayed release capsule: 1 cap(s) orally once a day  folic acid 1 mg oral tablet: 1 tab(s) orally once a day  gabapentin 300 mg oral capsule: 300 cap(s) orally 2 times a day   insulin lispro 100 units/mL injectable solution: 25 unit(s) injectable 3 times a day (before meals)  Januvia 50 mg oral tablet: 1 tab(s) orally once a day  Jardiance 10 mg oral tablet: 1 tab(s) orally once a day (in the morning)  Klor-Con M20 oral tablet, extended release: 1 tab(s) orally once a day.    Levemir 100 units/mL subcutaneous solution: 65 unit(s) subcutaneous once a day (at bedtime)  levothyroxine 50 mcg (0.05 mg) oral tablet: 1 tab(s) orally once a day  NexIUM 20 mg oral delayed release capsule: 1 cap(s) orally once a day  tamsulosin 0.4 mg oral capsule: 1 cap(s) orally once a day (at bedtime)  torsemide 20 mg oral tablet: 1 tab(s) orally once a day   warfarin 1 mg oral tablet: 3 tab(s) orally once a day   albuterol 90 mcg/inh inhalation aerosol: 2 puff(s) inhaled every 6 hours, As needed, Shortness of Breath and/or Wheezing  aspirin 81 mg oral delayed release tablet: 1 tab(s) orally once a day  atorvastatin 40 mg oral tablet: 1 tab(s) orally once a day (at bedtime)  carvedilol 3.125 mg oral tablet: 1 tab(s) orally every 12 hours  DULoxetine 60 mg oral delayed release capsule: 1 cap(s) orally once a day  folic acid 1 mg oral tablet: 1 tab(s) orally once a day  gabapentin 300 mg oral capsule: 1 cap(s) orally 2 times a day  insulin detemir 100 units/mL subcutaneous solution: 65 unit(s) subcutaneous once a day (at bedtime)  insulin lispro 100 units/mL injectable solution: 25 unit(s) injectable 3 times a day (before meals)  Januvia 50 mg oral tablet: 1 tab(s) orally once a day  Jardiance 10 mg oral tablet: 1 tab(s) orally once a day (in the morning)  Klor-Con M20 oral tablet, extended release: 1 tab(s) orally once a day.    levothyroxine 50 mcg (0.05 mg) oral tablet: 1 tab(s) orally once a day  NexIUM 20 mg oral delayed release capsule: 1 cap(s) orally once a day  tamsulosin 0.4 mg oral capsule: 1 cap(s) orally once a day (at bedtime)  torsemide 10 mg oral tablet: 1 tab(s) orally once a day  START on MONDAY 6/14/21  warfarin 3 mg oral tablet: 1 tab(s) orally once a day

## 2021-06-11 NOTE — PROGRESS NOTE ADULT - SUBJECTIVE AND OBJECTIVE BOX
[facility] Division of Hospital Medicine    Chief Complaint:  Patient is a 70y old  Male who presents with a chief complaint of fell in shower (11 Jun 2021 10:35)      SUBJECTIVE / OVERNIGHT EVENTS:  Patient was seen and examined at bedside. States to feel well.   Patient denies chest pain, SOB, abd pain, N/V, fever, chills, dysuria or any other complaints. All remainder ROS negative.     MEDICATIONS  (STANDING):  aspirin enteric coated 81 milliGRAM(s) Oral daily  atorvastatin 40 milliGRAM(s) Oral at bedtime  carvedilol 3.125 milliGRAM(s) Oral every 12 hours  dextrose 40% Gel 15 Gram(s) Oral once  dextrose 5%. 1000 milliLiter(s) (50 mL/Hr) IV Continuous <Continuous>  dextrose 5%. 1000 milliLiter(s) (100 mL/Hr) IV Continuous <Continuous>  dextrose 50% Injectable 25 Gram(s) IV Push once  dextrose 50% Injectable 12.5 Gram(s) IV Push once  dextrose 50% Injectable 25 Gram(s) IV Push once  DULoxetine 60 milliGRAM(s) Oral daily  folic acid 1 milliGRAM(s) Oral daily  gabapentin 300 milliGRAM(s) Oral two times a day  glucagon  Injectable 1 milliGRAM(s) IntraMuscular once  insulin detemir injectable (LEVEMIR) 65 Unit(s) SubCutaneous at bedtime  insulin lispro (ADMELOG) corrective regimen sliding scale   SubCutaneous three times a day before meals  insulin lispro (ADMELOG) corrective regimen sliding scale   SubCutaneous at bedtime  levothyroxine 50 MICROGram(s) Oral daily  pantoprazole    Tablet 40 milliGRAM(s) Oral before breakfast  potassium chloride   Powder 40 milliEquivalent(s) Oral every 4 hours  tamsulosin 0.4 milliGRAM(s) Oral at bedtime  warfarin 3 milliGRAM(s) Oral once    MEDICATIONS  (PRN):  ALBUTerol    90 MICROgram(s) HFA Inhaler 2 Puff(s) Inhalation every 6 hours PRN Shortness of Breath and/or Wheezing        I&O's Summary      PHYSICAL EXAM:  Vital Signs Last 24 Hrs  T(C): 36.6 (11 Jun 2021 08:20), Max: 36.8 (10 Maynor 2021 16:25)  T(F): 97.8 (11 Jun 2021 08:20), Max: 98.3 (10 Maynor 2021 16:25)  HR: 93 (11 Jun 2021 08:20) (93 - 104)  BP: 130/70 (11 Jun 2021 08:20) (110/63 - 133/85)  BP(mean): --  RR: 18 (11 Jun 2021 08:20) (18 - 18)  SpO2: 96% (11 Jun 2021 08:20) (94% - 96%)      Constitutional: Morbidly obese, male resting  ENT: Supple, No JVD  Lungs: CTA B/L, Non-labored breathing  Cardio: RRR, S1/S2, No murmur  Abdomen: Soft, Nontender, Nondistended; Bowel sounds present  Extremities: No calf tenderness, No pitting edema  Musculoskeletal:   No clubbing or cyanosis of digits; no joint swelling or tenderness to palpation  Psych: Calm, cooperative affect appropriate  Neuro: Awake and alert  Skin: No rashes; no palpable lesions    LABS:                        16.0   4.79  )-----------( 129      ( 11 Jun 2021 07:57 )             49.4     06-11    137  |  93<L>  |  28.5<H>  ----------------------------<  177<H>  2.8<LL>   |  31.0<H>  |  0.98    Ca    9.0      11 Jun 2021 07:57  Phos  2.5     06-11  Mg     2.3     06-11    TPro  6.7  /  Alb  3.1<L>  /  TBili  1.2  /  DBili  x   /  AST  20  /  ALT  13  /  AlkPhos  86  06-11    PT/INR - ( 11 Jun 2021 07:57 )   PT: 19.2 sec;   INR: 1.70 ratio           CARDIAC MARKERS ( 10 Maynor 2021 03:34 )  x     / 0.05 ng/mL / x     / x     / x      CARDIAC MARKERS ( 10 Maynor 2021 01:20 )  x     / 0.05 ng/mL / x     / x     / x              CAPILLARY BLOOD GLUCOSE      POCT Blood Glucose.: 214 mg/dL (11 Jun 2021 11:46)  POCT Blood Glucose.: 248 mg/dL (11 Jun 2021 09:13)  POCT Blood Glucose.: 276 mg/dL (10 Maynor 2021 22:59)  POCT Blood Glucose.: 326 mg/dL (10 Maynor 2021 18:10)        RADIOLOGY REVIEWED

## 2021-06-11 NOTE — DISCHARGE NOTE PROVIDER - HOSPITAL COURSE
70 year old male with PMH HTN, T2DM with neuropathy, Dyslipidemia, Hypothyroidism, CHFrEF/VT/AF s/p ICD, CA prostate s/p Brachytherapy, COPD and recurrent syncope presents after falling in shower. Patient felt that the room started spinning, his legs gave way and he went straight down. Urinate a lots, clear as he is having Torsemide AND Furosemide. He relates having 6-7 episodes in the past 6 months. All of patients blood pressure medications were held. Patient was started on IVF. Patient had a rapid response for hypotension. CT head and CT spine were negative for any acute findings. Patient was restarted on BP med sand is now stable for DC home.     Time spent on patients discharge 32 minutes

## 2021-06-12 VITALS
TEMPERATURE: 98 F | DIASTOLIC BLOOD PRESSURE: 81 MMHG | HEART RATE: 99 BPM | OXYGEN SATURATION: 95 % | RESPIRATION RATE: 18 BRPM | SYSTOLIC BLOOD PRESSURE: 165 MMHG

## 2021-06-12 LAB
GLUCOSE BLDC GLUCOMTR-MCNC: 195 MG/DL — HIGH (ref 70–99)
GLUCOSE BLDC GLUCOMTR-MCNC: 303 MG/DL — HIGH (ref 70–99)
INR BLD: 1.48 RATIO — HIGH (ref 0.88–1.16)
PROTHROM AB SERPL-ACNC: 16.9 SEC — HIGH (ref 10.6–13.6)

## 2021-06-12 PROCEDURE — 70450 CT HEAD/BRAIN W/O DYE: CPT

## 2021-06-12 PROCEDURE — 72125 CT NECK SPINE W/O DYE: CPT

## 2021-06-12 PROCEDURE — 85730 THROMBOPLASTIN TIME PARTIAL: CPT

## 2021-06-12 PROCEDURE — 85610 PROTHROMBIN TIME: CPT

## 2021-06-12 PROCEDURE — 93005 ELECTROCARDIOGRAM TRACING: CPT

## 2021-06-12 PROCEDURE — 36415 COLL VENOUS BLD VENIPUNCTURE: CPT

## 2021-06-12 PROCEDURE — 97163 PT EVAL HIGH COMPLEX 45 MIN: CPT

## 2021-06-12 PROCEDURE — U0005: CPT

## 2021-06-12 PROCEDURE — 93880 EXTRACRANIAL BILAT STUDY: CPT

## 2021-06-12 PROCEDURE — 84484 ASSAY OF TROPONIN QUANT: CPT

## 2021-06-12 PROCEDURE — 86769 SARS-COV-2 COVID-19 ANTIBODY: CPT

## 2021-06-12 PROCEDURE — 82962 GLUCOSE BLOOD TEST: CPT

## 2021-06-12 PROCEDURE — 82550 ASSAY OF CK (CPK): CPT

## 2021-06-12 PROCEDURE — 99291 CRITICAL CARE FIRST HOUR: CPT | Mod: 25

## 2021-06-12 PROCEDURE — 85027 COMPLETE CBC AUTOMATED: CPT

## 2021-06-12 PROCEDURE — 71045 X-RAY EXAM CHEST 1 VIEW: CPT

## 2021-06-12 PROCEDURE — U0003: CPT

## 2021-06-12 PROCEDURE — 83735 ASSAY OF MAGNESIUM: CPT

## 2021-06-12 PROCEDURE — 99239 HOSP IP/OBS DSCHRG MGMT >30: CPT

## 2021-06-12 PROCEDURE — 80053 COMPREHEN METABOLIC PANEL: CPT

## 2021-06-12 PROCEDURE — 85025 COMPLETE CBC W/AUTO DIFF WBC: CPT

## 2021-06-12 PROCEDURE — 84100 ASSAY OF PHOSPHORUS: CPT

## 2021-06-12 RX ORDER — GABAPENTIN 400 MG/1
3 CAPSULE ORAL
Qty: 0 | Refills: 0 | DISCHARGE
Start: 2021-06-12

## 2021-06-12 RX ORDER — TAMSULOSIN HYDROCHLORIDE 0.4 MG/1
1 CAPSULE ORAL
Qty: 0 | Refills: 0 | DISCHARGE
Start: 2021-06-12

## 2021-06-12 RX ORDER — CARVEDILOL PHOSPHATE 80 MG/1
1 CAPSULE, EXTENDED RELEASE ORAL
Qty: 40 | Refills: 0
Start: 2021-06-12 | End: 2021-07-01

## 2021-06-12 RX ORDER — WARFARIN SODIUM 2.5 MG/1
1 TABLET ORAL
Qty: 0 | Refills: 0 | DISCHARGE
Start: 2021-06-12

## 2021-06-12 RX ORDER — GABAPENTIN 400 MG/1
1 CAPSULE ORAL
Qty: 0 | Refills: 0 | DISCHARGE
Start: 2021-06-12

## 2021-06-12 RX ORDER — WARFARIN SODIUM 2.5 MG/1
6 TABLET ORAL ONCE
Refills: 0 | Status: DISCONTINUED | OUTPATIENT
Start: 2021-06-12 | End: 2021-06-12

## 2021-06-12 RX ORDER — INSULIN DETEMIR 100/ML (3)
65 INSULIN PEN (ML) SUBCUTANEOUS
Qty: 0 | Refills: 0 | DISCHARGE
Start: 2021-06-12

## 2021-06-12 RX ORDER — CARVEDILOL PHOSPHATE 80 MG/1
1 CAPSULE, EXTENDED RELEASE ORAL
Qty: 0 | Refills: 0 | DISCHARGE
Start: 2021-06-12

## 2021-06-12 RX ORDER — ATORVASTATIN CALCIUM 80 MG/1
1 TABLET, FILM COATED ORAL
Qty: 0 | Refills: 0 | DISCHARGE
Start: 2021-06-12

## 2021-06-12 RX ORDER — ASPIRIN/CALCIUM CARB/MAGNESIUM 324 MG
1 TABLET ORAL
Qty: 0 | Refills: 0 | DISCHARGE
Start: 2021-06-12

## 2021-06-12 RX ORDER — INSULIN DETEMIR 100/ML (3)
50 INSULIN PEN (ML) SUBCUTANEOUS
Qty: 0 | Refills: 0 | DISCHARGE
Start: 2021-06-12

## 2021-06-12 RX ADMIN — Medication 8: at 12:25

## 2021-06-12 RX ADMIN — PANTOPRAZOLE SODIUM 40 MILLIGRAM(S): 20 TABLET, DELAYED RELEASE ORAL at 05:25

## 2021-06-12 RX ADMIN — DULOXETINE HYDROCHLORIDE 60 MILLIGRAM(S): 30 CAPSULE, DELAYED RELEASE ORAL at 12:26

## 2021-06-12 RX ADMIN — Medication 2: at 08:14

## 2021-06-12 RX ADMIN — Medication 81 MILLIGRAM(S): at 12:26

## 2021-06-12 RX ADMIN — Medication 1 MILLIGRAM(S): at 12:26

## 2021-06-12 RX ADMIN — Medication 50 MICROGRAM(S): at 05:25

## 2021-06-12 RX ADMIN — CARVEDILOL PHOSPHATE 3.12 MILLIGRAM(S): 80 CAPSULE, EXTENDED RELEASE ORAL at 05:25

## 2021-06-12 RX ADMIN — GABAPENTIN 300 MILLIGRAM(S): 400 CAPSULE ORAL at 05:25

## 2021-06-12 NOTE — PHYSICAL THERAPY INITIAL EVALUATION ADULT - GENERAL OBSERVATIONS, REHAB EVAL
Pt received lying in Pt received lying in bed on 5 tower, (+) cardiac monitor, NAD. Agreeable to PT evaluation.

## 2021-06-12 NOTE — PROGRESS NOTE ADULT - SUBJECTIVE AND OBJECTIVE BOX
[place] [facility] - [facility], THE HEART CENTER                                   [address] [place] [address]                                                      PHONE: [phone]                                                         FAX: [phone]  [email or web address]  ---------------------------------------------------------------------------------------------------------------------------------    Overnight events/patient complaints: patient is awaiting discharge. no further events     MEDICATIONS  (STANDING):  aspirin enteric coated 81 milliGRAM(s) Oral daily  atorvastatin 40 milliGRAM(s) Oral at bedtime  carvedilol 3.125 milliGRAM(s) Oral every 12 hours  dextrose 40% Gel 15 Gram(s) Oral once  dextrose 5%. 1000 milliLiter(s) (50 mL/Hr) IV Continuous <Continuous>  dextrose 5%. 1000 milliLiter(s) (100 mL/Hr) IV Continuous <Continuous>  dextrose 50% Injectable 25 Gram(s) IV Push once  dextrose 50% Injectable 12.5 Gram(s) IV Push once  dextrose 50% Injectable 25 Gram(s) IV Push once  DULoxetine 60 milliGRAM(s) Oral daily  folic acid 1 milliGRAM(s) Oral daily  gabapentin 300 milliGRAM(s) Oral two times a day  glucagon  Injectable 1 milliGRAM(s) IntraMuscular once  insulin detemir injectable (LEVEMIR) 65 Unit(s) SubCutaneous at bedtime  insulin lispro (ADMELOG) corrective regimen sliding scale   SubCutaneous three times a day before meals  insulin lispro (ADMELOG) corrective regimen sliding scale   SubCutaneous at bedtime  levothyroxine 50 MICROGram(s) Oral daily  pantoprazole    Tablet 40 milliGRAM(s) Oral before breakfast  tamsulosin 0.4 milliGRAM(s) Oral at bedtime  warfarin 6 milliGRAM(s) Oral once    MEDICATIONS  (PRN):  ALBUTerol    90 MICROgram(s) HFA Inhaler 2 Puff(s) Inhalation every 6 hours PRN Shortness of Breath and/or Wheezing      Vital Signs Last 24 Hrs  T(C): 36.7 (12 Jun 2021 08:00), Max: 36.7 (12 Jun 2021 08:00)  T(F): 98 (12 Jun 2021 08:00), Max: 98 (12 Jun 2021 08:00)  HR: 77 (12 Jun 2021 08:00) (77 - 100)  BP: 129/65 (12 Jun 2021 08:00) (127/73 - 150/90)  BP(mean): --  RR: 18 (12 Jun 2021 08:00) (18 - 20)  SpO2: 96% (12 Jun 2021 08:00) (94% - 98%)  ICU Vital Signs Last 24 Hrs    PHYSICAL EXAM:  General: Appears well developed, well nourished alert and cooperative.  HEENT: Head; normocephalic, atraumatic.Pupils reactive, cornea wnl. Neck supple, no nodes adenopathy, no JVD  CARDIOVASCULAR: Normal S1 and S2, 2/6 ANGEL, no rub, gallop or lift.   LUNGS: No rales, rhonchi or wheeze. Normal breath sounds bilaterally.  ABDOMEN: Soft, nontender without mass or organomegaly. bowel sounds normoactive.  EXTREMITIES: No clubbing, cyanosis, (+) edema.   SKIN: warm and dry with normal turgor.  NEURO: Alert/oriented x 3/normal motor exam.   PSYCH: normal affect.        LABS:                        16.0   4.79  )-----------( 129      ( 11 Jun 2021 07:57 )             49.4     06-11    133<L>  |  94<L>  |  26.1<H>  ----------------------------<  241<H>  4.4   |  26.0  |  1.14    Ca    9.1      11 Jun 2021 18:22  Phos  2.5     06-11  Mg     2.3     06-11    TPro  6.9  /  Alb  3.2<L>  /  TBili  1.2  /  DBili  x   /  AST  33  /  ALT  12  /  AlkPhos  87  06-11    PT/INR - ( 12 Jun 2021 08:23 )   PT: 16.9 sec;   INR: 1.48 ratio        ASSESSMENT AND PLAN:  Patient is a morbidly obese 69 y/o man with chronic HFrEF/NICM EF 30-35%, paroxsymal ventricular tachycardia s/p ICD ([facility]), chronic atrial fibrillation, CKD, HTN, CHRISS, and DM recently admitted 5/12/21 with hyperglycemia/polyuria and again 5/17/21 for orthostatic hypotension and brief loss of consciousness in the setting of JAYLENE on CKD, and hypokalemia who presents from home with loss of consciousness from a standing position which was preceded by dizziness.   In the ER noted to be hypotensive to SBP 70's.      Loss of consciousness/hypotension/troponin elevation   - 2/2 symptomatic hypotension, improved  - continue coreg 3.125mg Q12, titration if feasible as outpatient   - consider cardioMEMs for volume management given his recurrent presentation with orthostatic hypotension/JAYLENE; to be facilitated as an outpatient   - troponin trend reviewed, elevation likely 2/2 hypotension    NICM/NSVT/persistent AF/hypokalemia  - aggressive electrolyte replacement, hx of non-sustained VT  - given his recurrent presentation with fall, poor candidate for AC, will need discussion re: Watchman as outpatient  - for now resume coumadin, no need for bridging      Disposition planning per primary team    Thank you for allowing [facility] to participate in the care of this patient.  Please feel free to call with any questions or concerns.

## 2021-06-12 NOTE — PROGRESS NOTE ADULT - REASON FOR ADMISSION
Labs from patient's endocrinologist entered.
fell in shower

## 2021-06-12 NOTE — PHYSICAL THERAPY INITIAL EVALUATION ADULT - ADDITIONAL COMMENTS
Pt reports living in a private house with spouse, 3 sons, and a 24 hour aide. 5 steps to enter with bilateral (wide set) rails. Ambulates short distances with RW. Aide assists with dressing and bathing. Has shower chair. Electronic bed raises head and knees.  Also owns a commode, rollator, and canes.

## 2021-06-26 NOTE — PATIENT PROFILE ADULT. - MEDICATION ADMINISTRATION INFO, PROFILE
Please take an antihistamine such as Zyrtec or Claritin once daily.  You may also take a nasal decongestant such as Sudafed to help with the congestion and postnasal drip.  Continue using the Flonase nasal spray daily.  Tylenol and ibuprofen as needed for pain and fevers.  You may fill the antibiotics in 2-3 days if your symptoms are not improving or worsen.  Follow-up with your primary care doctor if symptoms do not improve.  Return to urgent care or report to the ER for any worsening symptoms including fevers, severe pain, difficulty swallowing, difficulty breathing, and any other concerns.      
no concerns

## 2021-08-03 ENCOUNTER — EMERGENCY (EMERGENCY)
Facility: HOSPITAL | Age: 71
LOS: 1 days | Discharge: HOME | End: 2021-08-03
Attending: EMERGENCY MEDICINE
Payer: COMMERCIAL

## 2021-08-03 VITALS
HEIGHT: 69 IN | HEART RATE: 71 BPM | DIASTOLIC BLOOD PRESSURE: 91 MMHG | OXYGEN SATURATION: 94 % | WEIGHT: 315 LBS | RESPIRATION RATE: 20 BRPM | SYSTOLIC BLOOD PRESSURE: 151 MMHG | TEMPERATURE: 98 F

## 2021-08-03 VITALS — HEART RATE: 64 BPM | TEMPERATURE: 98 F

## 2021-08-03 DIAGNOSIS — Z98.89 OTHER SPECIFIED POSTPROCEDURAL STATES: Chronic | ICD-10-CM

## 2021-08-03 DIAGNOSIS — Z92.3 PERSONAL HISTORY OF IRRADIATION: Chronic | ICD-10-CM

## 2021-08-03 DIAGNOSIS — Z95.810 PRESENCE OF AUTOMATIC (IMPLANTABLE) CARDIAC DEFIBRILLATOR: Chronic | ICD-10-CM

## 2021-08-03 LAB
ALBUMIN SERPL ELPH-MCNC: 3.5 G/DL — SIGNIFICANT CHANGE UP (ref 3.3–5.2)
ALP SERPL-CCNC: 87 U/L — SIGNIFICANT CHANGE UP (ref 40–120)
ALT FLD-CCNC: 17 U/L — SIGNIFICANT CHANGE UP
ANION GAP SERPL CALC-SCNC: 16 MMOL/L — SIGNIFICANT CHANGE UP (ref 5–17)
AST SERPL-CCNC: 31 U/L — SIGNIFICANT CHANGE UP
BASOPHILS # BLD AUTO: 0.03 K/UL — SIGNIFICANT CHANGE UP (ref 0–0.2)
BASOPHILS NFR BLD AUTO: 0.4 % — SIGNIFICANT CHANGE UP (ref 0–2)
BILIRUB SERPL-MCNC: 0.6 MG/DL — SIGNIFICANT CHANGE UP (ref 0.4–2)
BUN SERPL-MCNC: 41.8 MG/DL — HIGH (ref 8–20)
CALCIUM SERPL-MCNC: 9.8 MG/DL — SIGNIFICANT CHANGE UP (ref 8.6–10.2)
CHLORIDE SERPL-SCNC: 92 MMOL/L — LOW (ref 98–107)
CO2 SERPL-SCNC: 26 MMOL/L — SIGNIFICANT CHANGE UP (ref 22–29)
CREAT SERPL-MCNC: 1.45 MG/DL — HIGH (ref 0.5–1.3)
EOSINOPHIL # BLD AUTO: 0.09 K/UL — SIGNIFICANT CHANGE UP (ref 0–0.5)
EOSINOPHIL NFR BLD AUTO: 1.3 % — SIGNIFICANT CHANGE UP (ref 0–6)
GLUCOSE SERPL-MCNC: 304 MG/DL — HIGH (ref 70–99)
HCT VFR BLD CALC: 54.7 % — HIGH (ref 39–50)
HGB BLD-MCNC: 17.7 G/DL — HIGH (ref 13–17)
IMM GRANULOCYTES NFR BLD AUTO: 0.7 % — SIGNIFICANT CHANGE UP (ref 0–1.5)
LYMPHOCYTES # BLD AUTO: 1.26 K/UL — SIGNIFICANT CHANGE UP (ref 1–3.3)
LYMPHOCYTES # BLD AUTO: 18.8 % — SIGNIFICANT CHANGE UP (ref 13–44)
MCHC RBC-ENTMCNC: 28.1 PG — SIGNIFICANT CHANGE UP (ref 27–34)
MCHC RBC-ENTMCNC: 32.4 GM/DL — SIGNIFICANT CHANGE UP (ref 32–36)
MCV RBC AUTO: 86.8 FL — SIGNIFICANT CHANGE UP (ref 80–100)
MONOCYTES # BLD AUTO: 0.72 K/UL — SIGNIFICANT CHANGE UP (ref 0–0.9)
MONOCYTES NFR BLD AUTO: 10.7 % — SIGNIFICANT CHANGE UP (ref 2–14)
NEUTROPHILS # BLD AUTO: 4.55 K/UL — SIGNIFICANT CHANGE UP (ref 1.8–7.4)
NEUTROPHILS NFR BLD AUTO: 68.1 % — SIGNIFICANT CHANGE UP (ref 43–77)
NT-PROBNP SERPL-SCNC: 1449 PG/ML — HIGH (ref 0–300)
PLATELET # BLD AUTO: 135 K/UL — LOW (ref 150–400)
POTASSIUM SERPL-MCNC: 3.9 MMOL/L — SIGNIFICANT CHANGE UP (ref 3.5–5.3)
POTASSIUM SERPL-SCNC: 3.9 MMOL/L — SIGNIFICANT CHANGE UP (ref 3.5–5.3)
PROT SERPL-MCNC: 7.3 G/DL — SIGNIFICANT CHANGE UP (ref 6.6–8.7)
RBC # BLD: 6.3 M/UL — HIGH (ref 4.2–5.8)
RBC # FLD: 18.1 % — HIGH (ref 10.3–14.5)
SODIUM SERPL-SCNC: 134 MMOL/L — LOW (ref 135–145)
TROPONIN T SERPL-MCNC: 0.05 NG/ML — SIGNIFICANT CHANGE UP (ref 0–0.06)
WBC # BLD: 6.7 K/UL — SIGNIFICANT CHANGE UP (ref 3.8–10.5)
WBC # FLD AUTO: 6.7 K/UL — SIGNIFICANT CHANGE UP (ref 3.8–10.5)

## 2021-08-03 PROCEDURE — 36415 COLL VENOUS BLD VENIPUNCTURE: CPT

## 2021-08-03 PROCEDURE — 71046 X-RAY EXAM CHEST 2 VIEWS: CPT | Mod: 26

## 2021-08-03 PROCEDURE — 99285 EMERGENCY DEPT VISIT HI MDM: CPT

## 2021-08-03 PROCEDURE — 99284 EMERGENCY DEPT VISIT MOD MDM: CPT | Mod: 25

## 2021-08-03 PROCEDURE — 82962 GLUCOSE BLOOD TEST: CPT

## 2021-08-03 PROCEDURE — 93005 ELECTROCARDIOGRAM TRACING: CPT

## 2021-08-03 PROCEDURE — 84484 ASSAY OF TROPONIN QUANT: CPT

## 2021-08-03 PROCEDURE — 85025 COMPLETE CBC W/AUTO DIFF WBC: CPT

## 2021-08-03 PROCEDURE — 71046 X-RAY EXAM CHEST 2 VIEWS: CPT

## 2021-08-03 PROCEDURE — 83880 ASSAY OF NATRIURETIC PEPTIDE: CPT

## 2021-08-03 PROCEDURE — 93010 ELECTROCARDIOGRAM REPORT: CPT | Mod: 76

## 2021-08-03 PROCEDURE — 80053 COMPREHEN METABOLIC PANEL: CPT

## 2021-08-03 RX ORDER — GABAPENTIN 400 MG/1
600 CAPSULE ORAL ONCE
Refills: 0 | Status: COMPLETED | OUTPATIENT
Start: 2021-08-03 | End: 2021-08-03

## 2021-08-03 RX ORDER — SODIUM CHLORIDE 9 MG/ML
500 INJECTION INTRAMUSCULAR; INTRAVENOUS; SUBCUTANEOUS ONCE
Refills: 0 | Status: COMPLETED | OUTPATIENT
Start: 2021-08-03 | End: 2021-08-03

## 2021-08-03 RX ADMIN — GABAPENTIN 600 MILLIGRAM(S): 400 CAPSULE ORAL at 18:31

## 2021-08-03 RX ADMIN — SODIUM CHLORIDE 500 MILLILITER(S): 9 INJECTION INTRAMUSCULAR; INTRAVENOUS; SUBCUTANEOUS at 18:32

## 2021-08-03 NOTE — ED ADULT NURSE NOTE - NSIMPLEMENTINTERV_GEN_ALL_ED
Implemented All Fall with Harm Risk Interventions:  [place] to call system. Call bell, personal items and telephone within reach. Instruct patient to call for assistance. Room bathroom lighting operational. Non-slip footwear when patient is off stretcher. Physically safe environment: no spills, clutter or unnecessary equipment. Stretcher in lowest position, wheels locked, appropriate side rails in place. Provide visual cue, wrist band, yellow gown, etc. Monitor gait and stability. Monitor for mental status changes and reorient to person, place, and time. Review medications for side effects contributing to fall risk. Reinforce activity limits and safety measures with patient and family. Provide visual clues: red socks.

## 2021-08-03 NOTE — ED PROVIDER NOTE - NSFOLLOWUPINSTRUCTIONS_ED_ALL_ED_FT
Syncope in Older Adults    WHAT YOU NEED TO KNOW:    Syncope is also called fainting or passing out. Syncope is a sudden, temporary loss of consciousness, followed by a fall from a standing or sitting position. A syncope episode is usually short.    DISCHARGE INSTRUCTIONS:    Seek care immediately if:   •You are bleeding because you accidently hit your head after fainting.       •You suddenly have double vision, difficulty speaking, numbness, and cannot move your arms or legs.      •You have chest pain and trouble breathing.      •You vomit blood or material that looks like coffee grounds.      •You see blood in your bowel movement.      Contact your healthcare provider if:   •You have another fainting spell.      •You have a headache, fast heartbeat, or feel too dizzy to stand up.      •You have questions or concerns about your condition or care.      Medicines:   •Medicines may be needed to help your heart pump strongly and regularly. Your healthcare provider may also make changes to any medicines that are causing syncope.       •Take your medicine as directed. Contact your healthcare provider if you think your medicine is not helping or if you have side effects. Tell him or her if you are allergic to any medicine. Keep a list of the medicines, vitamins, and herbs you take. Include the amounts, and when and why you take them. Bring the list or the pill bottles to follow-up visits. Carry your medicine list with you in case of an emergency.      Follow up with your doctor as directed: Write down your questions so you remember to ask them during your visits.     Manage syncope:   •Keep a record of your syncope episodes. Include your symptoms and your activity before and after the episode. The record can help your healthcare provider find the cause of your syncope and help you manage episodes.      •Sit or lie down when needed. This includes when you feel dizzy, your throat is getting tight, and your vision changes. Raise your legs above the level of your heart. Your healthcare provider may also recommend that you keep the head of your bed elevated. This can help keep your blood pressure from dropping too low.      •Check your blood pressure often. This is important if you take medicine to lower your blood pressure. Check your blood pressure when you are lying down and when you are standing. Ask how often to check during the day. Keep a record of your blood pressure numbers. Your healthcare provider may use the record to help plan your treatment.  How to take a Blood Pressure           •Use assistive devices as directed. Your healthcare provider may suggest that you use a cane or walker to help you keep your balance. You may need to have grab bars put in your bathroom near the toilet or in the shower.      Prevent a syncope episode:   •Move slowly and let yourself get used to one position before you move to another position. This is very important when you change from a lying or sitting position to a standing position. Take some deep breaths before you stand up from a lying position. Stand up slowly. Sudden movements may cause a fainting spell. Sit on the side of the bed or couch for a few minutes before you stand up. If you are on bedrest, try to be upright for about 2 hours each day, or as directed. Do not lock your legs if you are standing for a long period of time. Move your legs and bend your knees to keep blood flowing.      •Follow your healthcare provider's recommendations. Your provider may recommend that you drink more liquids to prevent dehydration. You may also need to have more salt to keep your blood pressure from dropping too low and causing syncope. Your provider will tell you how much liquid and sodium to have each day. He or she will also tell you how much physical activity is safe for you. This will depend on what is causing your syncope.      •Watch for signs of low blood sugar. These include hunger, nervousness, sweating, and fast or fluttery heartbeats. Talk with your healthcare provider about ways to keep your blood sugar level steady.      •Do not strain if you are constipated. You may faint if you strain to have a bowel movement. Walking is the best way to get your bowels moving. Eat foods high in fiber to make it easier to have a bowel movement. Good examples are high-fiber cereals, beans, vegetables, and whole-grain breads. Prune juice may help make bowel movements softer.      •Be careful in hot weather. Heat can cause a syncope episode. Limit activity done outside on hot days. Physical activity in hot weather can lead to dehydration. This can cause an episode.

## 2021-08-03 NOTE — ED PROVIDER NOTE - CARE PROVIDER_API CALL
Panchito Jain)  Cardiology; Internal Medicine  [address] [place] [address]  Phone: [phone]  Fax: [phone]  Follow Up Time: Urgent

## 2021-08-03 NOTE — ED ADULT NURSE NOTE - CHIEF COMPLAINT QUOTE
Sent from [facility] for increased SOB since Friday.  Pt arrives alert and oriented X 4 c/o nerve pain shooting down his legs into his feet and both hands.  Stopped Gabapentin a month ago.  Per [facility] placed pt on NRB and gave 250ml NS through 20g IV in right AC.  Oxygen saturation 94% on room air upon arrival.  He denies chest pain.

## 2021-08-03 NOTE — ED PROVIDER NOTE - PROGRESS NOTE DETAILS
[place]: Patient feels well, orthostatics negative after fluid bolus, wants to leave, can follow up in the office. I shared results, recommended drinking more water and discussed indications to return. Patient verbalizes understanding.

## 2021-08-03 NOTE — ED PROVIDER NOTE - NS ED ROS FT
Const: Denies fever, chills  HEENT: Denies blurry vision, sore throat  Neck: Denies neck pain/stiffness  Resp: + SOB. Denies coughing  Cardiovascular: + syncope. Denies CP, palpitations, LE edema  GI: Denies nausea, vomiting, abdominal pain, diarrhea, constipation, blood in stool  : Denies urinary frequency/urgency/dysuria, hematuria  MSK: Denies back pain  Neuro: Denies HA, dizziness, numbness, weakness  Skin: Denies rashes.

## 2021-08-03 NOTE — ED ADULT NURSE NOTE - PSH
AICD (automatic cardioverter/defibrillator) present    History of brachytherapy    S/P ankle ligament repair

## 2021-08-03 NOTE — ED ADULT NURSE NOTE - OBJECTIVE STATEMENT
Sent from [facility] for increased SOB since Friday.  Pt arrives alert and oriented X 4 c/o nerve pain shooting down his legs into his feet and both hands.  Stopped Gabapentin a month ago.  Per [facility] placed pt on NRB and gave 250ml NS through 20g IV in right AC.  Oxygen saturation 94% on room air upon arrival.  He denies chest pain.

## 2021-08-03 NOTE — ED PROVIDER NOTE - PHYSICAL EXAMINATION
Const: Awake, alert and oriented. In no acute distress. Well appearing.  HEENT: NC/AT. Moist mucous membranes.  Eyes: No scleral icterus. EOMI.  Neck:. Soft and supple. Full ROM without pain.  Cardiac: Irregularly irregular rate and rhythm. +S1/S2. No murmurs. Peripheral pulses 2+ and symmetric. No LE edema.  Resp: Speaking in full sentences. No evidence of respiratory distress. No wheezes, rales or rhonchi.  Abd: Soft, non-tender, non-distended. Normal bowel sounds in all 4 quadrants. No guarding or rebound.  Back: Spine midline and non-tender. No CVAT.  Skin: Chronic venous stasis changes. No rashes, abrasions or lacerations.  Neuro: Awake, alert & oriented x 3. Moves all extremities symmetrically.

## 2021-08-03 NOTE — PROGRESS NOTE ADULT - SUBJECTIVE AND OBJECTIVE BOX
[facility]                                   [address] [address]                                                      PHONE: [phone]                                                         FAX: [phone]  -----------------------------------------------------------------------------------------------------------    Pt sent in from the office today.    70-year-old male with a past medical history of morbid obesity likely underlying CHRISS, nonischemic cardiomyopathy, HFrEF s/p AICD, CKD, stasis dermatitis, chronic atrial fibrillation on long-term anticoagulation    Prior cardiac workup includes a cardiac cath (2016) which showed mild CAD, nonischemic cardiomyopathy. Carotid US (Dec 2019) showed no evidence of hemodynamically significant stenosis. Echocardiogram (March 2021) EF 30-35%, trace MR, mild dilation of the aortic root (3.7 cm).     He was recently admitted to [facility] in May 2021 with hyperglycemia/polyuria after an episode of orthostatic hypotension and a brief loss of consciousness .      Patient today was noted to be markedly hyper glycemic fingerstick of 469 his blood pressure was soft and brief loss of consciousness in which 250 cc bolus was given.  CardioMEMS readings have been stable and his EKG does show chronic atrial fibrillation nonspecific changes.    AICD was checked today which showed episodes of SVT likely A. fib with RVR.     Rec:    Head CT to rule out intracranial hemorrhage  Lab work pending  Check chest x-ray  Further cardiac recommendation pending above findings    d/w Dr. Daniels

## 2021-08-03 NOTE — ED PROVIDER NOTE - PATIENT PORTAL LINK FT
You can access the FollowMyHealth Patient Portal offered by [facility] by registering at the following website: [email or web address]. By joining [facility]’s FollowMyHealth portal, you will also be able to view your health information using other applications (apps) compatible with our system.

## 2021-08-03 NOTE — ED PROVIDER NOTE - OBJECTIVE STATEMENT
69 y/o M with PMh A fib, CHF, DM, HTN, HLD, CHRISS, pHTN, AICD presents from [facility] for shortness of breath. Patient is a poor historian, some history was obtained from Dr. Medina from [facility]. Patient states he went to the office today for a routine follow up, then started having severe cramping of his hands and legs, which he gets some times and usually takes gabapentin for, but the pain got so severe that he syncopized in the chair. He said since then he feels short of breath with exertion, states he did not feel that way prior to going to the doctor's office. He states he takes Giardiance, a diuretic and other medications that he cannot recall religiously, without increase in urination. He denies LE edema, chest pain, HA, nausea, vomiting.

## 2021-08-03 NOTE — ED ADULT TRIAGE NOTE - CHIEF COMPLAINT QUOTE
Sent from [facility] for increased SOB since Friday.  Pt arrives alert and oriented X 4 c/o nerve pain shooting down his legs into his feet and both hands.  Stopped Gabapentin a month ago.  Per [facility] placed pt on NRB and given 250ml NS through 20g IV in right hand.  Oxygen saturation 94% on room air upon arrival.  He denies chest pain. Sent from [facility] for increased SOB since Friday.  Pt arrives alert and oriented X 4 c/o nerve pain shooting down his legs into his feet and both hands.  Stopped Gabapentin a month ago.  Per [facility] placed pt on NRB and gave 250ml NS through 20g IV in right AC.  Oxygen saturation 94% on room air upon arrival.  He denies chest pain.

## 2021-08-05 NOTE — PHYSICAL THERAPY INITIAL EVALUATION ADULT - DID THE PATIENT HAVE SURGERY?
COVID-19 Week 2 Survey      Responses   [facility] patient discharged from?  [facility]   Does the patient have one of the following disease processes/diagnoses(primary or secondary)?  COVID-19   COVID-19 underlying condition?  None   Call Number  Call 1   COVID-19 Week 2: Call 1 attempt successful?  Yes   Call start time  1226   Call end time  1230   Is patient permission given to speak with other caregiver?  Yes   Person spoke with today (if not patient) and relationship  spouse-Stella   Meds reviewed with patient/caregiver?  Yes   Is the patient having any side effects they believe may be caused by any medication additions or changes?  No   Does the patient have all medications ordered at discharge?  Yes   Is the patient taking all medications as directed (includes completed medication regime)?  Yes   Does the patient have a primary care provider?   Yes   Does the patient have an appointment with their PCP or specialist within 7 days of discharge?  No   What is preventing the patient from scheduling follow up appointments within 7 days of discharge?  Haven't had time   Nursing Interventions  Advised patient to make appointment, Educated patient on importance of making appointment   Has the patient kept scheduled appointments due by today?  N/A   Has home health visited the patient within 72 hours of discharge?  N/A   DME comments  Wife states continues to use home O2 at night only at 2L.   Psychosocial issues?  No   What is the patient's perception of their health status since discharge?  Improving   Does the patient have any of the following symptoms?  Shortness of breath [States SOA on exertion improving.]   Nursing Interventions  Nurse provided patient education   Pulse Ox monitoring  None   Is the patient/caregiver able to teach back the hierarchy of who to call/visit for symptoms/problems? PCP, Specialist, Home health nurse, Urgent Care, ED, 911  Yes   COVID-19 call completed?  Yes   Wrap up additional  comments  Wife states is improving. Denies any needs today.          Izzy Lepe RN   n/a

## 2021-08-12 ENCOUNTER — EMERGENCY (EMERGENCY)
Facility: HOSPITAL | Age: 71
LOS: 1 days | Discharge: HOME | End: 2021-08-12
Attending: STUDENT IN AN ORGANIZED HEALTH CARE EDUCATION/TRAINING PROGRAM
Payer: COMMERCIAL

## 2021-08-12 VITALS
HEIGHT: 69 IN | DIASTOLIC BLOOD PRESSURE: 103 MMHG | TEMPERATURE: 98 F | WEIGHT: 315 LBS | OXYGEN SATURATION: 99 % | SYSTOLIC BLOOD PRESSURE: 133 MMHG | HEART RATE: 80 BPM | RESPIRATION RATE: 20 BRPM

## 2021-08-12 DIAGNOSIS — Z92.3 PERSONAL HISTORY OF IRRADIATION: Chronic | ICD-10-CM

## 2021-08-12 DIAGNOSIS — Z98.89 OTHER SPECIFIED POSTPROCEDURAL STATES: Chronic | ICD-10-CM

## 2021-08-12 DIAGNOSIS — Z95.810 PRESENCE OF AUTOMATIC (IMPLANTABLE) CARDIAC DEFIBRILLATOR: Chronic | ICD-10-CM

## 2021-08-12 LAB
ALBUMIN SERPL ELPH-MCNC: 3.3 G/DL — SIGNIFICANT CHANGE UP (ref 3.3–5.2)
ALP SERPL-CCNC: 85 U/L — SIGNIFICANT CHANGE UP (ref 40–120)
ALT FLD-CCNC: 16 U/L — SIGNIFICANT CHANGE UP
ANION GAP SERPL CALC-SCNC: 16 MMOL/L — SIGNIFICANT CHANGE UP (ref 5–17)
AST SERPL-CCNC: 29 U/L — SIGNIFICANT CHANGE UP
BASOPHILS # BLD AUTO: 0.03 K/UL — SIGNIFICANT CHANGE UP (ref 0–0.2)
BASOPHILS NFR BLD AUTO: 0.4 % — SIGNIFICANT CHANGE UP (ref 0–2)
BILIRUB SERPL-MCNC: 0.8 MG/DL — SIGNIFICANT CHANGE UP (ref 0.4–2)
BUN SERPL-MCNC: 41 MG/DL — HIGH (ref 8–20)
CALCIUM SERPL-MCNC: 9.1 MG/DL — SIGNIFICANT CHANGE UP (ref 8.6–10.2)
CHLORIDE SERPL-SCNC: 91 MMOL/L — LOW (ref 98–107)
CO2 SERPL-SCNC: 25 MMOL/L — SIGNIFICANT CHANGE UP (ref 22–29)
CREAT SERPL-MCNC: 1.79 MG/DL — HIGH (ref 0.5–1.3)
EOSINOPHIL # BLD AUTO: 0.12 K/UL — SIGNIFICANT CHANGE UP (ref 0–0.5)
EOSINOPHIL NFR BLD AUTO: 1.7 % — SIGNIFICANT CHANGE UP (ref 0–6)
ETHANOL SERPL-MCNC: <10 MG/DL — SIGNIFICANT CHANGE UP (ref 0–9)
GLUCOSE SERPL-MCNC: 85 MG/DL — SIGNIFICANT CHANGE UP (ref 70–99)
HCT VFR BLD CALC: 50.5 % — HIGH (ref 39–50)
HGB BLD-MCNC: 16.7 G/DL — SIGNIFICANT CHANGE UP (ref 13–17)
IMM GRANULOCYTES NFR BLD AUTO: 0.6 % — SIGNIFICANT CHANGE UP (ref 0–1.5)
LYMPHOCYTES # BLD AUTO: 1.25 K/UL — SIGNIFICANT CHANGE UP (ref 1–3.3)
LYMPHOCYTES # BLD AUTO: 17.7 % — SIGNIFICANT CHANGE UP (ref 13–44)
MAGNESIUM SERPL-MCNC: 2.2 MG/DL — SIGNIFICANT CHANGE UP (ref 1.6–2.6)
MCHC RBC-ENTMCNC: 28.4 PG — SIGNIFICANT CHANGE UP (ref 27–34)
MCHC RBC-ENTMCNC: 33.1 GM/DL — SIGNIFICANT CHANGE UP (ref 32–36)
MCV RBC AUTO: 85.7 FL — SIGNIFICANT CHANGE UP (ref 80–100)
MONOCYTES # BLD AUTO: 0.61 K/UL — SIGNIFICANT CHANGE UP (ref 0–0.9)
MONOCYTES NFR BLD AUTO: 8.7 % — SIGNIFICANT CHANGE UP (ref 2–14)
NEUTROPHILS # BLD AUTO: 5 K/UL — SIGNIFICANT CHANGE UP (ref 1.8–7.4)
NEUTROPHILS NFR BLD AUTO: 70.9 % — SIGNIFICANT CHANGE UP (ref 43–77)
PLATELET # BLD AUTO: 146 K/UL — LOW (ref 150–400)
POTASSIUM SERPL-MCNC: 4.1 MMOL/L — SIGNIFICANT CHANGE UP (ref 3.5–5.3)
POTASSIUM SERPL-SCNC: 4.1 MMOL/L — SIGNIFICANT CHANGE UP (ref 3.5–5.3)
PROT SERPL-MCNC: 6.8 G/DL — SIGNIFICANT CHANGE UP (ref 6.6–8.7)
RBC # BLD: 5.89 M/UL — HIGH (ref 4.2–5.8)
RBC # FLD: 17.9 % — HIGH (ref 10.3–14.5)
SODIUM SERPL-SCNC: 132 MMOL/L — LOW (ref 135–145)
WBC # BLD: 7.05 K/UL — SIGNIFICANT CHANGE UP (ref 3.8–10.5)
WBC # FLD AUTO: 7.05 K/UL — SIGNIFICANT CHANGE UP (ref 3.8–10.5)

## 2021-08-12 PROCEDURE — 36415 COLL VENOUS BLD VENIPUNCTURE: CPT

## 2021-08-12 PROCEDURE — 93010 ELECTROCARDIOGRAM REPORT: CPT

## 2021-08-12 PROCEDURE — 83735 ASSAY OF MAGNESIUM: CPT

## 2021-08-12 PROCEDURE — 82962 GLUCOSE BLOOD TEST: CPT

## 2021-08-12 PROCEDURE — G1004: CPT

## 2021-08-12 PROCEDURE — 85025 COMPLETE CBC W/AUTO DIFF WBC: CPT

## 2021-08-12 PROCEDURE — 99285 EMERGENCY DEPT VISIT HI MDM: CPT

## 2021-08-12 PROCEDURE — 70450 CT HEAD/BRAIN W/O DYE: CPT | Mod: ME

## 2021-08-12 PROCEDURE — 99284 EMERGENCY DEPT VISIT MOD MDM: CPT | Mod: 25

## 2021-08-12 PROCEDURE — 80307 DRUG TEST PRSMV CHEM ANLYZR: CPT

## 2021-08-12 PROCEDURE — 80053 COMPREHEN METABOLIC PANEL: CPT

## 2021-08-12 PROCEDURE — 93005 ELECTROCARDIOGRAM TRACING: CPT

## 2021-08-12 RX ORDER — SODIUM CHLORIDE 9 MG/ML
500 INJECTION INTRAMUSCULAR; INTRAVENOUS; SUBCUTANEOUS ONCE
Refills: 0 | Status: COMPLETED | OUTPATIENT
Start: 2021-08-12 | End: 2021-08-12

## 2021-08-12 RX ADMIN — SODIUM CHLORIDE 500 MILLILITER(S): 9 INJECTION INTRAMUSCULAR; INTRAVENOUS; SUBCUTANEOUS at 23:52

## 2021-08-12 NOTE — ED PROVIDER NOTE - PROGRESS NOTE DETAILS
PT seen and reassessed.  Patient symptomatically improved.   AAOX3, NAD, VSS.  Discussed test results w/ patient, given copy of results. Patient verbalized understanding of hospital course and outpatient plans, has decisional making capacity.  Will follow-up with Primary care doctor in the next 2 days; patient will call for an appointment. Will return to the ED if there is any worsening of symptoms.  Patient able to ambulate w/o difficulty, is tolerating PO intake  A&Ox3, moving all extremities symmetrically, follows commands, motor clarice upper and lower ext 5/5, sensory symm and intact CN 2-12 grossly intact, no ataxia, no nystagmus, no dysmetria, no ddk, symm clarice, no pronator drift

## 2021-08-12 NOTE — ED PROVIDER NOTE - NSFOLLOWUPINSTRUCTIONS_ED_ALL_ED_FT
Medical Clearance    A medical screening exam has been done. This exam is meant to determine whether you need emergency treatment. Your exam has not demonstrated the need for emergency treatment at this point. It is safe for you to go to your caregiver's office or clinic for further evaluation and/or treatment. You should make an appointment to see your caregiver as soon as he or she is available.    -Please follow-up with your primary care doctor in the next 2 days.  Please call tomorrow for an appointment.  If you cannot follow-up with your primary care doctor please return to the ED for any urgent issues.  - You were given a copy of the tests performed today.  Please bring the results with you and review them with your primary care doctor.  - If you have any worsening of symptoms or any other concerns please return to the ED immediately.  - Please continue taking your home medications as directed.

## 2021-08-12 NOTE — ED PROVIDER NOTE - CLINICAL SUMMARY MEDICAL DECISION MAKING FREE TEXT BOX
patient with episode of altered sensorium, now resolved, no focal neuro deficits. will check labs and ct head. outpt Follow up

## 2021-08-12 NOTE — ED PROVIDER NOTE - OBJECTIVE STATEMENT
71yo M had episode where he 'felt like I was flaoting' 'everything I tried to touch I couldn't reach' 'I was floating around the room and couldn't touch anything' he wasn't walking he was sitting, family states he seemed dizzy. patient states he feels fine now everything has resolved. no CP/SOB. denies change in medications. states 'I may be dehydrated' no vision loss. no focal weakness. no room spinnin gsensation

## 2021-08-12 NOTE — ED ADULT TRIAGE NOTE - CHIEF COMPLAINT QUOTE
pt arrives by ambulance with c/o "I feel like im floating, I feel like I cant touch things im reaching for, and dizziness. states last known well 4pm. MD [facility] @ 1920

## 2021-08-12 NOTE — ED PROVIDER NOTE - PATIENT PORTAL LINK FT
You can access the FollowMyHealth Patient Portal offered by [facility] by registering at the following website: [email or web address]. By joining [facility]’s FollowMyHealth portal, you will also be able to view your health information using other applications (apps) compatible with our system.

## 2021-08-12 NOTE — ED PROVIDER NOTE - NSICDXPASTSURGICALHX_GEN_ALL_CORE_FT
PAST SURGICAL HISTORY:  AICD (automatic cardioverter/defibrillator) present     History of brachytherapy     S/P ankle ligament repair

## 2021-08-12 NOTE — ED PROVIDER NOTE - PHYSICAL EXAMINATION
Gen: NAD, AOx3, morbidly obese   Head: NCAT  HEENT: EOMI, oral mucosa moist, normal conjunctiva, neck supple  Lung: CTAB, no respiratory distress  CV: rrr, no murmur, Normal perfusion  Abd: soft, NTND  MSK: No edema, no visible deformities  Neuro: No focal neurologic deficits, CN II-XII intact, 5/5 UE strength, 4/5 LE strength b/l  baseline, sensation intact UE, baseline neuropathy LE, no dysmetria/ataxia, gait intact   Skin: No rash   Psych: normal affect

## 2021-08-13 VITALS
RESPIRATION RATE: 20 BRPM | OXYGEN SATURATION: 94 % | SYSTOLIC BLOOD PRESSURE: 111 MMHG | HEART RATE: 90 BPM | TEMPERATURE: 98 F

## 2021-08-13 PROCEDURE — G1004: CPT

## 2021-08-13 PROCEDURE — 70450 CT HEAD/BRAIN W/O DYE: CPT | Mod: 26,ME

## 2021-08-13 NOTE — ED ADULT NURSE NOTE - OBJECTIVE STATEMENT
Pt AAOX3, pt c/o 'I was floating around the room and couldn't touch anything' he wasn't walking he was sitting, family states he seemed dizzy. patient states he feels fine now everything has resolved. no CP/SOB. denies change in medications. states 'I may be dehydrated' no vision loss. no focal weakness. no room spinnin gsensation

## 2021-10-22 NOTE — ED PROVIDER NOTE - WR ORDER DATE AND TIME 1
-06-Mar-2021 16:01

## 2022-01-24 ENCOUNTER — EMERGENCY (EMERGENCY)
Facility: HOSPITAL | Age: 72
LOS: 1 days | Discharge: HOME | End: 2022-01-24
Attending: EMERGENCY MEDICINE
Payer: MEDICARE

## 2022-01-24 VITALS
SYSTOLIC BLOOD PRESSURE: 100 MMHG | DIASTOLIC BLOOD PRESSURE: 65 MMHG | WEIGHT: 315 LBS | TEMPERATURE: 98 F | OXYGEN SATURATION: 96 % | RESPIRATION RATE: 20 BRPM | HEIGHT: 69 IN | HEART RATE: 78 BPM

## 2022-01-24 DIAGNOSIS — Z98.89 OTHER SPECIFIED POSTPROCEDURAL STATES: Chronic | ICD-10-CM

## 2022-01-24 DIAGNOSIS — Z95.810 PRESENCE OF AUTOMATIC (IMPLANTABLE) CARDIAC DEFIBRILLATOR: Chronic | ICD-10-CM

## 2022-01-24 DIAGNOSIS — Z92.3 PERSONAL HISTORY OF IRRADIATION: Chronic | ICD-10-CM

## 2022-01-24 LAB
ALBUMIN SERPL ELPH-MCNC: 3.4 G/DL — SIGNIFICANT CHANGE UP (ref 3.3–5.2)
ALP SERPL-CCNC: 78 U/L — SIGNIFICANT CHANGE UP (ref 40–120)
ALT FLD-CCNC: 14 U/L — SIGNIFICANT CHANGE UP
ANION GAP SERPL CALC-SCNC: 13 MMOL/L — SIGNIFICANT CHANGE UP (ref 5–17)
AST SERPL-CCNC: 33 U/L — SIGNIFICANT CHANGE UP
BASE EXCESS BLDV CALC-SCNC: 6 MMOL/L — HIGH (ref -2–3)
BASE EXCESS BLDV CALC-SCNC: 6.4 MMOL/L — HIGH (ref -2–3)
BASE EXCESS BLDV CALC-SCNC: 8.5 MMOL/L — HIGH (ref -2–3)
BASOPHILS # BLD AUTO: 0 K/UL — SIGNIFICANT CHANGE UP (ref 0–0.2)
BASOPHILS NFR BLD AUTO: 0 % — SIGNIFICANT CHANGE UP (ref 0–2)
BILIRUB SERPL-MCNC: 1.1 MG/DL — SIGNIFICANT CHANGE UP (ref 0.4–2)
BUN SERPL-MCNC: 38.2 MG/DL — HIGH (ref 8–20)
CA-I SERPL-SCNC: 0.98 MMOL/L — LOW (ref 1.15–1.33)
CA-I SERPL-SCNC: 1.13 MMOL/L — LOW (ref 1.15–1.33)
CA-I SERPL-SCNC: 1.16 MMOL/L — SIGNIFICANT CHANGE UP (ref 1.15–1.33)
CALCIUM SERPL-MCNC: 9.3 MG/DL — SIGNIFICANT CHANGE UP (ref 8.6–10.2)
CHLORIDE BLDV-SCNC: 95 MMOL/L — LOW (ref 98–107)
CHLORIDE BLDV-SCNC: 97 MMOL/L — LOW (ref 98–107)
CHLORIDE BLDV-SCNC: 99 MMOL/L — SIGNIFICANT CHANGE UP (ref 98–107)
CHLORIDE SERPL-SCNC: 97 MMOL/L — LOW (ref 98–107)
CO2 SERPL-SCNC: 26 MMOL/L — SIGNIFICANT CHANGE UP (ref 22–29)
CREAT SERPL-MCNC: 1.3 MG/DL — SIGNIFICANT CHANGE UP (ref 0.5–1.3)
EOSINOPHIL # BLD AUTO: 0.16 K/UL — SIGNIFICANT CHANGE UP (ref 0–0.5)
EOSINOPHIL NFR BLD AUTO: 2.5 % — SIGNIFICANT CHANGE UP (ref 0–6)
GAS PNL BLDV: 127 MMOL/L — LOW (ref 136–145)
GAS PNL BLDV: 133 MMOL/L — LOW (ref 136–145)
GAS PNL BLDV: 133 MMOL/L — LOW (ref 136–145)
GAS PNL BLDV: SIGNIFICANT CHANGE UP
GIANT PLATELETS BLD QL SMEAR: PRESENT — SIGNIFICANT CHANGE UP
GLUCOSE BLDV-MCNC: 136 MG/DL — HIGH (ref 70–99)
GLUCOSE BLDV-MCNC: 225 MG/DL — HIGH (ref 70–99)
GLUCOSE BLDV-MCNC: 235 MG/DL — HIGH (ref 70–99)
GLUCOSE SERPL-MCNC: 223 MG/DL — HIGH (ref 70–99)
HCO3 BLDV-SCNC: 30 MMOL/L — HIGH (ref 22–29)
HCO3 BLDV-SCNC: 31 MMOL/L — HIGH (ref 22–29)
HCO3 BLDV-SCNC: 34 MMOL/L — HIGH (ref 22–29)
HCT VFR BLD CALC: 56.9 % — HIGH (ref 39–50)
HCT VFR BLDA CALC: 53 % — HIGH (ref 39–51)
HCT VFR BLDA CALC: 53 % — HIGH (ref 39–51)
HCT VFR BLDA CALC: 56 % — HIGH (ref 39–51)
HGB BLD CALC-MCNC: 17.5 G/DL — HIGH (ref 12.6–17.4)
HGB BLD CALC-MCNC: 17.5 G/DL — HIGH (ref 12.6–17.4)
HGB BLD CALC-MCNC: 18.6 G/DL — HIGH (ref 12.6–17.4)
HGB BLD-MCNC: 18.2 G/DL — HIGH (ref 13–17)
HIV 1 & 2 AB SERPL IA.RAPID: SIGNIFICANT CHANGE UP
LACTATE BLDV-MCNC: 2.4 MMOL/L — HIGH (ref 0.5–2)
LACTATE BLDV-MCNC: 2.6 MMOL/L — HIGH (ref 0.5–2)
LACTATE BLDV-MCNC: 4.1 MMOL/L — CRITICAL HIGH (ref 0.5–2)
LYMPHOCYTES # BLD AUTO: 1.05 K/UL — SIGNIFICANT CHANGE UP (ref 1–3.3)
LYMPHOCYTES # BLD AUTO: 16 % — SIGNIFICANT CHANGE UP (ref 13–44)
MCHC RBC-ENTMCNC: 27.2 PG — SIGNIFICANT CHANGE UP (ref 27–34)
MCHC RBC-ENTMCNC: 32 GM/DL — SIGNIFICANT CHANGE UP (ref 32–36)
MCV RBC AUTO: 85.1 FL — SIGNIFICANT CHANGE UP (ref 80–100)
MONOCYTES # BLD AUTO: 0.4 K/UL — SIGNIFICANT CHANGE UP (ref 0–0.9)
MONOCYTES NFR BLD AUTO: 6 % — SIGNIFICANT CHANGE UP (ref 2–14)
MYELOCYTES NFR BLD: 0.5 % — HIGH (ref 0–0)
NEUTROPHILS # BLD AUTO: 4.42 K/UL — SIGNIFICANT CHANGE UP (ref 1.8–7.4)
NEUTROPHILS NFR BLD AUTO: 67 % — SIGNIFICANT CHANGE UP (ref 43–77)
NRBC # BLD: 0 /100 — SIGNIFICANT CHANGE UP (ref 0–0)
PCO2 BLDV: 45 MMHG — SIGNIFICANT CHANGE UP (ref 42–55)
PCO2 BLDV: 47 MMHG — SIGNIFICANT CHANGE UP (ref 42–55)
PCO2 BLDV: 60 MMHG — HIGH (ref 42–55)
PH BLDV: 7.36 — SIGNIFICANT CHANGE UP (ref 7.32–7.43)
PH BLDV: 7.42 — SIGNIFICANT CHANGE UP (ref 7.32–7.43)
PH BLDV: 7.44 — HIGH (ref 7.32–7.43)
PLAT MORPH BLD: ABNORMAL
PLATELET # BLD AUTO: 168 K/UL — SIGNIFICANT CHANGE UP (ref 150–400)
PLATELET CLUMP BLD QL SMEAR: SLIGHT
PO2 BLDV: 42 MMHG — SIGNIFICANT CHANGE UP (ref 25–45)
PO2 BLDV: 47 MMHG — HIGH (ref 25–45)
PO2 BLDV: 55 MMHG — HIGH (ref 25–45)
POTASSIUM BLDV-SCNC: 4 MMOL/L — SIGNIFICANT CHANGE UP (ref 3.5–5.1)
POTASSIUM BLDV-SCNC: 4.9 MMOL/L — SIGNIFICANT CHANGE UP (ref 3.5–5.1)
POTASSIUM BLDV-SCNC: 9.6 MMOL/L — CRITICAL HIGH (ref 3.5–5.1)
POTASSIUM SERPL-MCNC: 3.9 MMOL/L — SIGNIFICANT CHANGE UP (ref 3.5–5.3)
POTASSIUM SERPL-SCNC: 3.9 MMOL/L — SIGNIFICANT CHANGE UP (ref 3.5–5.3)
PROT SERPL-MCNC: 6.3 G/DL — LOW (ref 6.6–8.7)
RAPID RVP RESULT: SIGNIFICANT CHANGE UP
RBC # BLD: 6.69 M/UL — HIGH (ref 4.2–5.8)
RBC # FLD: 18 % — HIGH (ref 10.3–14.5)
RBC BLD AUTO: NORMAL — SIGNIFICANT CHANGE UP
SAO2 % BLDV: 72.3 % — SIGNIFICANT CHANGE UP
SAO2 % BLDV: 80.8 % — SIGNIFICANT CHANGE UP
SAO2 % BLDV: 87.7 % — SIGNIFICANT CHANGE UP
SARS-COV-2 RNA SPEC QL NAA+PROBE: SIGNIFICANT CHANGE UP
SMUDGE CELLS # BLD: PRESENT — SIGNIFICANT CHANGE UP
SODIUM SERPL-SCNC: 136 MMOL/L — SIGNIFICANT CHANGE UP (ref 135–145)
TROPONIN T SERPL-MCNC: 0.02 NG/ML — SIGNIFICANT CHANGE UP (ref 0–0.06)
TROPONIN T SERPL-MCNC: 0.02 NG/ML — SIGNIFICANT CHANGE UP (ref 0–0.06)
VARIANT LYMPHS # BLD: 8 % — HIGH (ref 0–6)
WBC # BLD: 6.59 K/UL — SIGNIFICANT CHANGE UP (ref 3.8–10.5)
WBC # FLD AUTO: 6.59 K/UL — SIGNIFICANT CHANGE UP (ref 3.8–10.5)

## 2022-01-24 PROCEDURE — 99220: CPT

## 2022-01-24 PROCEDURE — 93010 ELECTROCARDIOGRAM REPORT: CPT

## 2022-01-24 PROCEDURE — 71045 X-RAY EXAM CHEST 1 VIEW: CPT | Mod: 26

## 2022-01-24 RX ORDER — GABAPENTIN 400 MG/1
300 CAPSULE ORAL ONCE
Refills: 0 | Status: COMPLETED | OUTPATIENT
Start: 2022-01-24 | End: 2022-01-24

## 2022-01-24 RX ORDER — INSULIN LISPRO 100/ML
VIAL (ML) SUBCUTANEOUS
Refills: 0 | Status: DISCONTINUED | OUTPATIENT
Start: 2022-01-24 | End: 2022-01-29

## 2022-01-24 RX ORDER — ATORVASTATIN CALCIUM 80 MG/1
40 TABLET, FILM COATED ORAL AT BEDTIME
Refills: 0 | Status: DISCONTINUED | OUTPATIENT
Start: 2022-01-24 | End: 2022-01-29

## 2022-01-24 RX ORDER — ALBUTEROL 90 UG/1
2 AEROSOL, METERED ORAL EVERY 6 HOURS
Refills: 0 | Status: DISCONTINUED | OUTPATIENT
Start: 2022-01-24 | End: 2022-01-29

## 2022-01-24 RX ORDER — POTASSIUM CHLORIDE 20 MEQ
20 PACKET (EA) ORAL DAILY
Refills: 0 | Status: DISCONTINUED | OUTPATIENT
Start: 2022-01-24 | End: 2022-01-29

## 2022-01-24 RX ORDER — TAMSULOSIN HYDROCHLORIDE 0.4 MG/1
0.4 CAPSULE ORAL AT BEDTIME
Refills: 0 | Status: DISCONTINUED | OUTPATIENT
Start: 2022-01-24 | End: 2022-01-29

## 2022-01-24 RX ORDER — DULOXETINE HYDROCHLORIDE 30 MG/1
60 CAPSULE, DELAYED RELEASE ORAL DAILY
Refills: 0 | Status: DISCONTINUED | OUTPATIENT
Start: 2022-01-24 | End: 2022-01-29

## 2022-01-24 RX ORDER — INSULIN GLARGINE 100 [IU]/ML
50 INJECTION, SOLUTION SUBCUTANEOUS EVERY MORNING
Refills: 0 | Status: DISCONTINUED | OUTPATIENT
Start: 2022-01-24 | End: 2022-01-29

## 2022-01-24 RX ORDER — ASPIRIN/CALCIUM CARB/MAGNESIUM 324 MG
81 TABLET ORAL DAILY
Refills: 0 | Status: DISCONTINUED | OUTPATIENT
Start: 2022-01-24 | End: 2022-01-29

## 2022-01-24 RX ORDER — WARFARIN SODIUM 2.5 MG/1
3 TABLET ORAL DAILY
Refills: 0 | Status: DISCONTINUED | OUTPATIENT
Start: 2022-01-24 | End: 2022-01-25

## 2022-01-24 RX ORDER — SODIUM CHLORIDE 9 MG/ML
1000 INJECTION, SOLUTION INTRAVENOUS
Refills: 0 | Status: DISCONTINUED | OUTPATIENT
Start: 2022-01-24 | End: 2022-01-29

## 2022-01-24 RX ORDER — CARVEDILOL PHOSPHATE 80 MG/1
3.12 CAPSULE, EXTENDED RELEASE ORAL EVERY 12 HOURS
Refills: 0 | Status: DISCONTINUED | OUTPATIENT
Start: 2022-01-24 | End: 2022-01-29

## 2022-01-24 RX ORDER — LEVOTHYROXINE SODIUM 125 MCG
50 TABLET ORAL DAILY
Refills: 0 | Status: DISCONTINUED | OUTPATIENT
Start: 2022-01-24 | End: 2022-01-29

## 2022-01-24 RX ORDER — GLUCAGON INJECTION, SOLUTION 0.5 MG/.1ML
1 INJECTION, SOLUTION SUBCUTANEOUS ONCE
Refills: 0 | Status: DISCONTINUED | OUTPATIENT
Start: 2022-01-24 | End: 2022-01-29

## 2022-01-24 RX ORDER — DEXTROSE 50 % IN WATER 50 %
25 SYRINGE (ML) INTRAVENOUS ONCE
Refills: 0 | Status: DISCONTINUED | OUTPATIENT
Start: 2022-01-24 | End: 2022-01-29

## 2022-01-24 RX ORDER — DEXTROSE 50 % IN WATER 50 %
15 SYRINGE (ML) INTRAVENOUS ONCE
Refills: 0 | Status: DISCONTINUED | OUTPATIENT
Start: 2022-01-24 | End: 2022-01-29

## 2022-01-24 RX ORDER — DEXTROSE 50 % IN WATER 50 %
12.5 SYRINGE (ML) INTRAVENOUS ONCE
Refills: 0 | Status: DISCONTINUED | OUTPATIENT
Start: 2022-01-24 | End: 2022-01-29

## 2022-01-24 RX ADMIN — ATORVASTATIN CALCIUM 40 MILLIGRAM(S): 80 TABLET, FILM COATED ORAL at 22:59

## 2022-01-24 RX ADMIN — GABAPENTIN 300 MILLIGRAM(S): 400 CAPSULE ORAL at 22:59

## 2022-01-24 RX ADMIN — TAMSULOSIN HYDROCHLORIDE 0.4 MILLIGRAM(S): 0.4 CAPSULE ORAL at 22:59

## 2022-01-24 RX ADMIN — WARFARIN SODIUM 3 MILLIGRAM(S): 2.5 TABLET ORAL at 22:59

## 2022-01-24 NOTE — ED PROVIDER NOTE - NSFOLLOWUPINSTRUCTIONS_ED_ALL_ED_FT
-Your COVID19 test was negative however it is possible that you do have COVID19, please return if you have any new/worse chest pain or shortness of breath  -Please take tylenol as needed for fevers  -Your triglyceride level was high   -Please follow up with your primary care physician and share your blood test results       Cough    Coughing is a reflex that clears your throat and your airways. Coughing helps to heal and protect your lungs. It is normal to cough occasionally, but a cough that happens with other symptoms or lasts a long time may be a sign of a condition that needs treatment. Coughing may be caused by infections, asthma or COPD, smoking, postnasal drip, gastroesophageal reflux, as well as other medical conditions. Take medicines only as instructed by your health care provider. Avoid environments or triggers that causes you to cough at work or at home.    SEEK IMMEDIATE MEDICAL CARE IF YOU HAVE ANY OF THE FOLLOWING SYMPTOMS: coughing up blood, shortness of breath, rapid heart rate, chest pain, unexplained weight loss or night sweats. .

## 2022-01-24 NOTE — ED PROVIDER NOTE - OBJECTIVE STATEMENT
71 year old male with hx of HTN, HLD, DM, a fib on AC, CHF, s/p AICD placement, and prostate cancer who presents with abnormal labs. Patient lives at home with his wife and daughter. He has a nurse that comes once a week to draw labs as it is difficult for him to ambulate. His labs were drawn yesterday and today he was told his wbc, triglyceride level, and glucose were all elevated. Presents to ED for further eval. States he has had a productive cough (brown sputum), fevers, chills, and shortness of breath for 2 weeks. Also has had intermittent diarrhea for about 1 week and has taken Imodium without relief and has had a 15-20 lb weight loss in last 2 weeks. Had a positive COVID test last week.  No chest pain and states his leg swelling is improved from baseline. Quit smoking about 20 years ago. Had J+J in March but no booster yet.

## 2022-01-24 NOTE — ED PROVIDER NOTE - ATTENDING CONTRIBUTION TO CARE
HPI: 70yo male with PMH HTN, hyperlipidemia, DM, congestive heart failure, s/p AICD presenting with abnormal labs. patient states that he was recently diagnosed with COVID and since then he has been having cough, diarrhea, weakness, chills, and fevers. had labs drawn by visiting nurse- patient states that multiple labs of his are abnormal. Per patient FS elevated today as well.     PE:  Gen: NAD  Head: NCAT  HEENT: Oral mucosa moist, normal conjunctiva  CV: RRR no murmurs, normal perfusion  Resp: CTA b/l, no wheezing, rales, rhonchi, no respiratory distress  GI: Abd Soft NTND  Neuro: No focal neuro deficits  MSK: FROM all 4 extremities, no deformity  Skin: No rash, no bruising  Psych: Normal affect    MDM: Pt with congestive heart failure, DM, HTN, atrial fibrillation, presenting with multiple symptoms- COVID+, obtain rpt labs, reassess. Eden Braun DO         I performed a history and physical exam of the patient and discussed their management with the resident. I reviewed the resident's note and agree with the documented findings and plan of care. My medical decision making and observations are found above.

## 2022-01-24 NOTE — ED ADULT NURSE NOTE - CCCP TRG CHIEF CMPLNT
[No Acute Distress] : no acute distress [Well Nourished] : well nourished [Well Developed] : well developed [Well-Appearing] : well-appearing [Normal Sclera/Conjunctiva] : normal sclera/conjunctiva [PERRL] : pupils equal round and reactive to light [EOMI] : extraocular movements intact [Normal Outer Ear/Nose] : the outer ears and nose were normal in appearance [Normal Oropharynx] : the oropharynx was normal [No JVD] : no jugular venous distention [No Lymphadenopathy] : no lymphadenopathy [Supple] : supple [Thyroid Normal, No Nodules] : the thyroid was normal and there were no nodules present [No Respiratory Distress] : no respiratory distress  [No Accessory Muscle Use] : no accessory muscle use [Clear to Auscultation] : lungs were clear to auscultation bilaterally [Normal Rate] : normal rate  [Regular Rhythm] : with a regular rhythm [Normal S1, S2] : normal S1 and S2 [No Murmur] : no murmur heard [No Carotid Bruits] : no carotid bruits [No Abdominal Bruit] : a ~M bruit was not heard ~T in the abdomen [Pedal Pulses Present] : the pedal pulses are present [No Varicosities] : no varicosities [No Edema] : there was no peripheral edema [No Palpable Aorta] : no palpable aorta [No Extremity Clubbing/Cyanosis] : no extremity clubbing/cyanosis [Soft] : abdomen soft [Non Tender] : non-tender [Non-distended] : non-distended [No Masses] : no abdominal mass palpated [No HSM] : no HSM [Normal Bowel Sounds] : normal bowel sounds [Normal Posterior Cervical Nodes] : no posterior cervical lymphadenopathy abnormal lab result [Normal Anterior Cervical Nodes] : no anterior cervical lymphadenopathy [No CVA Tenderness] : no CVA  tenderness [No Spinal Tenderness] : no spinal tenderness [No Joint Swelling] : no joint swelling [Grossly Normal Strength/Tone] : grossly normal strength/tone [No Rash] : no rash [Coordination Grossly Intact] : coordination grossly intact [No Focal Deficits] : no focal deficits [Normal Gait] : normal gait [Deep Tendon Reflexes (DTR)] : deep tendon reflexes were 2+ and symmetric [Normal Affect] : the affect was normal [Normal Insight/Judgement] : insight and judgment were intact

## 2022-01-24 NOTE — ED ADULT TRIAGE NOTE - CHIEF COMPLAINT QUOTE
Pt states sent in from home after getting call from Dr Walden for elevated WBC, and triglycerides. Pt states he feels fine.

## 2022-01-24 NOTE — CONSULT NOTE ADULT - ASSESSMENT
Patient is a morbidly obese 70 y/o man with chronic HFrEF/NICM EF 30-35%, paroxsymal ventricular tachycardia s/p ICD ([facility]), chronic atrial fibrillation, CKD, HTN, CHRISS with likely obesity hypoventilation, and DM, recent COVID 19 who has had recurrent presentations for orthostatic hypotension and brief loss of consciousness in the setting of JAYLENE on CKD who most recently has had productive cough, subjective fever, diarrhea an worsening shortness of breath one week duration who ultimately presented from home for the assessment of abnormal outpatient labs and upon discharge planning was reported to have recurrent syncope by his daughter.    Recurrent syncope/chronic HFrEF without acute decompensation/JAYLENE/persistent AF  - hypovolemia 2/2 borderline BP in the setting of diarrhea and poor po intake  - hold diuretic   - known recurrent postural dizziness, likely component of autonomic instability, no additional testing at this time   - continue systemic AC, however INR check  - labs suggestive of hemoconcentration, repeat   - outpatient cardioMEMs given his recurrent presentation with orthostatic hypotension/JAYLENE; to be facilitated as an outpatient    - device interrogation   - likely for d/c planning in the am    Discussed with ER

## 2022-01-24 NOTE — ED CDU PROVIDER INITIAL DAY NOTE - PHYSICAL EXAMINATION
General: flushed obese male, appears comfortable in stretcher   Head: NC, AT  EENT: EOMI, no scleral icterus  Cardiac: RR no apparent murmurs, no lower extremity edema  Respiratory: expiratory wheeze no respiratory distress.   Abdomen: obese, soft, ND, NT, nonperitonitic  MSK/Vascular: full ROM, soft compartments, warm extremities  Neuro: AAOx3, sensation to light touch intact  Psych: calm, cooperative

## 2022-01-24 NOTE — ED CDU PROVIDER INITIAL DAY NOTE - ATTENDING CONTRIBUTION TO CARE
I, Gabrielle Segundo, participated in the care of this patient with the PA. I discussed the history and physical exam findings as well as lab results and plan of care with the PA. I agree with PA's history, physical and assessment.     70 y/o M with HTN, HLD, DM, A fib on Coumadin, CHF, AICD presents for multiple falls at home, possible syncope. He has been evaluated multiple times both inpatient and outpatient for syncope in this past year. He adamantly denies that he syncopized today.    AP - obese, NAD, RRR, on NC O2, lungs clear, abd soft, NT/ND, no LE edema, 2+ symmetrical distal pulses.    Cardiology consult - Dr. Sequeira recommends no further syncope work up at this time. Will keep for PT consult in the morning.

## 2022-01-24 NOTE — ED CDU PROVIDER INITIAL DAY NOTE - OBJECTIVE STATEMENT
71 year old male with hx of HTN, HLD, DM, a fib on warfarin, CHF, s/p AICD placement ( [facility]) , and prostate cancer who presented to the ER for elevated outpatient cholesterol panel. mildly elevated lipid panel in ED. daughter vocalizing concern to due multiple falls at home vs sycope, patient states that he is NOT passing out, states that one time his right leg gave out of him, and the most recent fall was due to his hand sliding on sink counter top, denies experiencing lightheadedness or dizziness or chest discomfort, reports baseline shortness of breath, no worse with falling episodes. PT reports family members at home with covid, ER test negative today. . Patient lives at home with his wife and daughter. He has a nurse that comes once a week to draw labs as it is difficult for him to ambulate. His labs were drawn yesterday and today he was told his wbc, triglyceride level, and glucose were all elevated. Presents to ED for further eval. States he has had a productive cough (brown sputum), fevers, chills, and shortness of breath for 2 weeks. Also has had intermittent diarrhea for about 1 week and has taken Imodium without relief and has had a 15-20 lb weight loss in last 2 weeks. Had a positive COVID test last week.  No chest pain and states his leg swelling is improved from baseline. Quit smoking about 20 years ago. Had J+J in March but no booster yet.  follows with [facility] cardiology   former smoker 20 years ago

## 2022-01-24 NOTE — ED PROVIDER NOTE - PHYSICAL EXAMINATION
General: flushed obese male, appears comfortable in stretcher   Head: NC, AT  EENT: EOMI, no scleral icterus  Cardiac: RRR, no apparent murmurs, no lower extremity edema  Respiratory: end expiratory wheeze occasionally, no respiratory distress   Abdomen: obese, soft, ND, NT, nonperitonitic  MSK/Vascular: full ROM, soft compartments, warm extremities  Neuro: AAOx3, sensation to light touch intact  Psych: calm, cooperative

## 2022-01-24 NOTE — ED PROVIDER NOTE - NS ED ROS FT
Constitutional: +fever, + chills, +decreased appetite, +weight loss   Head: NC, AT   Eyes: no redness   ENMT: no nasal congestion/drainage, no sore throat   CV: no chest pain, no change in chronic edema  Resp: + cough, + dyspnea  GI: no abdominal pain, no nausea, no vomiting, no diarrhea  : no dysuria, no hematuria   Skin: no change in LE cellulitis   Neuro: no LOC, no headache, no sensory deficits, no weakness

## 2022-01-24 NOTE — ED ADULT NURSE NOTE - ASSOCIATED SYMPTOMS
Pt stated "I don't feel anything, I feel fine. My doctor called me and told me to come into the ER to get admitted"

## 2022-01-24 NOTE — ED CDU PROVIDER INITIAL DAY NOTE - MEDICAL DECISION MAKING DETAILS
70 yo male presented to ER for abnormal labs on outpatient testing with elevated WBCs glucose and lipid panel. reports sick contacts at home and 2 weeks of "illness". covid contacts at home with recent _ testing but negative PCR testing today,  mildly hypotensive with chief complaints of shortness of breath, + wheezing on exam.   family concerned due to multiple falls at home, denies lightheadenss dizziness, or chest discomfort, follows with [facility]ology   serial trops, cards consultation and PT eval.

## 2022-01-24 NOTE — ED PROVIDER NOTE - NSICDXPASTSURGICALHX_GEN_ALL_CORE_FT
Yara Nguyen was seen and treated in our emergency department on 1/16/2022. She may return to school on 01/20/2022. If you have any questions or concerns, please don't hesitate to call.       Christianne Solano MD PAST SURGICAL HISTORY:  AICD (automatic cardioverter/defibrillator) present     History of brachytherapy     S/P ankle ligament repair

## 2022-01-24 NOTE — ED PROVIDER NOTE - CLINICAL SUMMARY MEDICAL DECISION MAKING FREE TEXT BOX
71 year old male with hx of HTN, HLD, DM, a fib on AC, CHF, s/p AICD placement who presents with fevers, chills, cough, and shortness of breath. 71 year old male with hx of HTN, HLD, DM, a fib on AC, CHF, s/p AICD placement who presents with fevers, chills, cough, and shortness of breath. Labs notable for hgb 18.2 and elevated TG at 239;  (no baseline available but known CHF). Lactate was elevated but downtrended throughout ED stay. CXR with ?COVID19 appearance however RVP negative. Pt also reported a positive COVID19 test last week. Pt safe to discharge home with return precautions and primary care follow up 71 year old male with hx of HTN, HLD, DM, a fib on AC, CHF, s/p AICD placement ([facility]) who presents with fevers, chills, cough, and shortness of breath. Labs notable for hgb 18.2 and elevated TG at 239;  (no baseline available but known CHF). Lactate was elevated but downtrended throughout ED stay. CXR with ?COVID19 appearance however RVP negative. Pt also reported a positive COVID19 test last week. Pt's daughter contacted prior to discharge and added that patient has had multiple near syncopal events in last week. Trop x 1 added on to prior lab work and negative. [facility] device interrogated and pt admitted to obs for further cardiac eval.

## 2022-01-24 NOTE — ED PROVIDER NOTE - PATIENT PORTAL LINK FT
You can access the FollowMyHealth Patient Portal offered by [facility] by registering at the following website: [email or web address]. By joining [employer]’s FollowMyHealth portal, you will also be able to view your health information using other applications (apps) compatible with our system.

## 2022-01-25 VITALS
HEART RATE: 80 BPM | DIASTOLIC BLOOD PRESSURE: 75 MMHG | TEMPERATURE: 98 F | OXYGEN SATURATION: 97 % | SYSTOLIC BLOOD PRESSURE: 142 MMHG | RESPIRATION RATE: 18 BRPM

## 2022-01-25 LAB
ALBUMIN SERPL ELPH-MCNC: 3.4 G/DL — SIGNIFICANT CHANGE UP (ref 3.3–5.2)
ALP SERPL-CCNC: 78 U/L — SIGNIFICANT CHANGE UP (ref 40–120)
ALT FLD-CCNC: 14 U/L — SIGNIFICANT CHANGE UP
ANION GAP SERPL CALC-SCNC: 12 MMOL/L — SIGNIFICANT CHANGE UP (ref 5–17)
APTT BLD: 46.7 SEC — HIGH (ref 27.5–35.5)
AST SERPL-CCNC: 25 U/L — SIGNIFICANT CHANGE UP
BILIRUB SERPL-MCNC: 1.6 MG/DL — SIGNIFICANT CHANGE UP (ref 0.4–2)
BUN SERPL-MCNC: 32.5 MG/DL — HIGH (ref 8–20)
CALCIUM SERPL-MCNC: 8.8 MG/DL — SIGNIFICANT CHANGE UP (ref 8.6–10.2)
CHLORIDE SERPL-SCNC: 95 MMOL/L — LOW (ref 98–107)
CO2 SERPL-SCNC: 26 MMOL/L — SIGNIFICANT CHANGE UP (ref 22–29)
CREAT SERPL-MCNC: 1.15 MG/DL — SIGNIFICANT CHANGE UP (ref 0.5–1.3)
GLUCOSE BLDC GLUCOMTR-MCNC: 215 MG/DL — HIGH (ref 70–99)
GLUCOSE BLDC GLUCOMTR-MCNC: 239 MG/DL — HIGH (ref 70–99)
GLUCOSE BLDC GLUCOMTR-MCNC: 340 MG/DL — HIGH (ref 70–99)
GLUCOSE SERPL-MCNC: 354 MG/DL — HIGH (ref 70–99)
HCT VFR BLD CALC: 53.8 % — HIGH (ref 39–50)
HGB BLD-MCNC: 17.4 G/DL — HIGH (ref 13–17)
INR BLD: 3.66 RATIO — HIGH (ref 0.88–1.16)
MCHC RBC-ENTMCNC: 27.8 PG — SIGNIFICANT CHANGE UP (ref 27–34)
MCHC RBC-ENTMCNC: 32.3 GM/DL — SIGNIFICANT CHANGE UP (ref 32–36)
MCV RBC AUTO: 85.8 FL — SIGNIFICANT CHANGE UP (ref 80–100)
PLATELET # BLD AUTO: 149 K/UL — LOW (ref 150–400)
POTASSIUM SERPL-MCNC: 4.5 MMOL/L — SIGNIFICANT CHANGE UP (ref 3.5–5.3)
POTASSIUM SERPL-SCNC: 4.5 MMOL/L — SIGNIFICANT CHANGE UP (ref 3.5–5.3)
PROT SERPL-MCNC: 6.3 G/DL — LOW (ref 6.6–8.7)
PROTHROM AB SERPL-ACNC: 39.9 SEC — HIGH (ref 10.6–13.6)
RBC # BLD: 6.27 M/UL — HIGH (ref 4.2–5.8)
RBC # FLD: 18.1 % — HIGH (ref 10.3–14.5)
SODIUM SERPL-SCNC: 133 MMOL/L — LOW (ref 135–145)
TROPONIN T SERPL-MCNC: 0.02 NG/ML — SIGNIFICANT CHANGE UP (ref 0–0.06)
WBC # BLD: 5.11 K/UL — SIGNIFICANT CHANGE UP (ref 3.8–10.5)
WBC # FLD AUTO: 5.11 K/UL — SIGNIFICANT CHANGE UP (ref 3.8–10.5)

## 2022-01-25 PROCEDURE — 85018 HEMOGLOBIN: CPT

## 2022-01-25 PROCEDURE — 82435 ASSAY OF BLOOD CHLORIDE: CPT

## 2022-01-25 PROCEDURE — 83880 ASSAY OF NATRIURETIC PEPTIDE: CPT

## 2022-01-25 PROCEDURE — G0378: CPT

## 2022-01-25 PROCEDURE — 36415 COLL VENOUS BLD VENIPUNCTURE: CPT

## 2022-01-25 PROCEDURE — 84295 ASSAY OF SERUM SODIUM: CPT

## 2022-01-25 PROCEDURE — 82330 ASSAY OF CALCIUM: CPT

## 2022-01-25 PROCEDURE — 80053 COMPREHEN METABOLIC PANEL: CPT

## 2022-01-25 PROCEDURE — 82947 ASSAY GLUCOSE BLOOD QUANT: CPT

## 2022-01-25 PROCEDURE — 99285 EMERGENCY DEPT VISIT HI MDM: CPT | Mod: 25

## 2022-01-25 PROCEDURE — 84132 ASSAY OF SERUM POTASSIUM: CPT

## 2022-01-25 PROCEDURE — 93005 ELECTROCARDIOGRAM TRACING: CPT

## 2022-01-25 PROCEDURE — 85027 COMPLETE CBC AUTOMATED: CPT

## 2022-01-25 PROCEDURE — 0225U NFCT DS DNA&RNA 21 SARSCOV2: CPT

## 2022-01-25 PROCEDURE — 85610 PROTHROMBIN TIME: CPT

## 2022-01-25 PROCEDURE — 85730 THROMBOPLASTIN TIME PARTIAL: CPT

## 2022-01-25 PROCEDURE — 83605 ASSAY OF LACTIC ACID: CPT

## 2022-01-25 PROCEDURE — 84484 ASSAY OF TROPONIN QUANT: CPT

## 2022-01-25 PROCEDURE — 82962 GLUCOSE BLOOD TEST: CPT

## 2022-01-25 PROCEDURE — 82803 BLOOD GASES ANY COMBINATION: CPT

## 2022-01-25 PROCEDURE — 87040 BLOOD CULTURE FOR BACTERIA: CPT

## 2022-01-25 PROCEDURE — 99217: CPT

## 2022-01-25 PROCEDURE — 86703 HIV-1/HIV-2 1 RESULT ANTBDY: CPT

## 2022-01-25 PROCEDURE — 80061 LIPID PANEL: CPT

## 2022-01-25 PROCEDURE — 71045 X-RAY EXAM CHEST 1 VIEW: CPT

## 2022-01-25 PROCEDURE — 85025 COMPLETE CBC W/AUTO DIFF WBC: CPT

## 2022-01-25 PROCEDURE — 85014 HEMATOCRIT: CPT

## 2022-01-25 RX ADMIN — Medication 20 MILLIEQUIVALENT(S): at 12:04

## 2022-01-25 RX ADMIN — INSULIN GLARGINE 50 UNIT(S): 100 INJECTION, SOLUTION SUBCUTANEOUS at 09:17

## 2022-01-25 RX ADMIN — Medication 4: at 16:44

## 2022-01-25 RX ADMIN — Medication 50 MICROGRAM(S): at 06:45

## 2022-01-25 RX ADMIN — Medication 8: at 08:00

## 2022-01-25 RX ADMIN — Medication 81 MILLIGRAM(S): at 12:04

## 2022-01-25 RX ADMIN — DULOXETINE HYDROCHLORIDE 60 MILLIGRAM(S): 30 CAPSULE, DELAYED RELEASE ORAL at 16:45

## 2022-01-25 RX ADMIN — CARVEDILOL PHOSPHATE 3.12 MILLIGRAM(S): 80 CAPSULE, EXTENDED RELEASE ORAL at 06:55

## 2022-01-25 RX ADMIN — Medication 4: at 12:03

## 2022-01-25 RX ADMIN — CARVEDILOL PHOSPHATE 3.12 MILLIGRAM(S): 80 CAPSULE, EXTENDED RELEASE ORAL at 19:56

## 2022-01-25 NOTE — PROVIDER CONTACT NOTE (OTHER) - ACTION/TREATMENT ORDERED:
PT Goals( to achieve in 2 weeks);Pt c.g/ min assist with bed mobility. Pt mod. independent with transfers.  Pt mod I with amb with RW X 300 feet

## 2022-01-25 NOTE — ED CDU PROVIDER DISPOSITION NOTE - CLINICAL COURSE
sent in for abnormal labs, family reporting falls at home, does not want placement at [facility] requesting PT at home has various equipment at home. advised on outpatient fu with pcp. case also reviewed by cardiology and cleared for outpatient fu

## 2022-01-25 NOTE — ED ADULT NURSE REASSESSMENT NOTE - NS ED NURSE REASSESS COMMENT FT1
Assumed care of the patient @1100. Pt A&Ox4, VSS afebrile 95% on room air.  Patient in understanding of plan of care. Patient with no further questions for the RN. Resting in comfort. Call bell within reach and encouraged to use when assistance needed. Will continue to monitor.
Pt is alert and oriented. Pt denies sob, chest pain, nausea, vomiting, dizziness and pain. Pt resp are even and unlabored, skin color vahe for race. Pt updated on plan of care. pt educated on plan of care, pt able to successfully teach back plan of care to RN, RN will continue to reeducate pt during hospital stay.
Pt sleeping comfortably in bed no complaints for RN at this time VSS at this time pt educated on plan of care, pt able to successfully teach back plan of care to RN, RN will continue to reeducate pt during hospital stay.
assumed care of pt. found resting quietly.  food tray at bedside, safety in place, vital signs recorded on flow sheet.
Pt awake and alert x4, VSS afebrile. Pt resting comfortably at this time. Safety maintained. Will continue to monitor.

## 2022-01-25 NOTE — PROGRESS NOTE ADULT - SUBJECTIVE AND OBJECTIVE BOX
[facility], THE [facility]                                   [address] [place] [address]                                                      PHONE: [phone]                                                         FAX: [phone]  [email or web address]  ---------------------------------------------------------------------------------------------------------------------------------    Overnight events/patient complaints:     Feeling better this AM  No CP or SOB overnight,   AICD interrogated no events  Denies any syncope      PAST MEDICAL & SURGICAL HISTORY:  DM (diabetes mellitus)    HTN (hypertension)    High cholesterol    Renal insufficiency    Sleep apnea    Prostate CA    Pulmonary hypertension    CHF (congestive heart failure)  EF 30%    Atrial fibrillation    Chronic back pain    Lung nodules    S/P ankle ligament repair    AICD (automatic cardioverter/defibrillator) present    History of brachytherapy      allow double protein at meals (Unknown)  clindamycin (Unknown)    MEDICATIONS  (STANDING):  aspirin enteric coated 81 milliGRAM(s) Oral daily  atorvastatin 40 milliGRAM(s) Oral at bedtime  carvedilol 3.125 milliGRAM(s) Oral every 12 hours  dextrose 40% Gel 15 Gram(s) Oral once  dextrose 5%. 1000 milliLiter(s) (50 mL/Hr) IV Continuous <Continuous>  dextrose 5%. 1000 milliLiter(s) (100 mL/Hr) IV Continuous <Continuous>  dextrose 50% Injectable 25 Gram(s) IV Push once  dextrose 50% Injectable 12.5 Gram(s) IV Push once  dextrose 50% Injectable 25 Gram(s) IV Push once  DULoxetine 60 milliGRAM(s) Oral daily  glucagon  Injectable 1 milliGRAM(s) IntraMuscular once  insulin glargine Injectable (LANTUS) 50 Unit(s) SubCutaneous every morning  insulin lispro (ADMELOG) corrective regimen sliding scale   SubCutaneous three times a day before meals  levothyroxine 50 MICROGram(s) Oral daily  potassium chloride    Tablet ER 20 milliEquivalent(s) Oral daily  tamsulosin 0.4 milliGRAM(s) Oral at bedtime    MEDICATIONS  (PRN):  ALBUTerol    90 MICROgram(s) HFA Inhaler 2 Puff(s) Inhalation every 6 hours PRN Shortness of Breath and/or Wheezing      Vital Signs Last 24 Hrs  T(C): 36.5 (25 Jan 2022 08:26), Max: 36.9 (24 Jan 2022 13:16)  T(F): 97.7 (25 Jan 2022 08:26), Max: 98.4 (24 Jan 2022 13:16)  HR: 78 (25 Jan 2022 08:26) (60 - 93)  BP: 112/69 (25 Jan 2022 08:26) (100/65 - 113/76)  BP(mean): --  RR: 20 (25 Jan 2022 03:31) (20 - 22)  SpO2: 95% (25 Jan 2022 03:31) (94% - 96%)  ICU Vital Signs Last 24 Hrs  MARY ANN CORNELL  I&O's Detail    I&O's Summary    Drug Dosing Weight  MARY ANN CORNELL    REVIEW OF SYSTEMS:    Constitutional: No fever, weight loss or fatigue  Eyes: No eye pain, visual disturbances, or discharge  ENMT:  No difficulty hearing, tinnitus, vertigo; No sinus or throat pain  Neck: No pain or stiffness  Respiratory: No cough, wheezing, chills or hemoptysis  Cardiovascular: No chest pain, palpitations, shortness of breath, dizziness or leg swelling  Gastrointestinal: No abdominal or epigastric pain. No nausea, vomiting or hematemesis; No diarrhea or constipation. No melena or hematochezia.  Genitourinary: No dysuria, frequency, hematuria or incontinence  Rectal: No pain, hemorrhoids or incontinence  Neurological: No headaches, memory loss, loss of strength, numbness or tremors  Skin: No itching, burning, rashes or lesions   Lymph Nodes: No enlarged glands  Endocrine: No heat or cold intolerance; No hair loss  Musculoskeletal: No joint pain or swelling; No muscle, back or extremity pain  Psychiatric: No depression, anxiety, mood swings or difficulty sleeping  Heme/Lymph: No easy bruising or bleeding gums  Allergy and Immunologic: No hives or eczema    PHYSICAL EXAM:  General: Appears well developed, well nourished alert and cooperative.  HEENT: Head; normocephalic, atraumatic.  Eyes: Pupils reactive, cornea wnl.  Neck: Supple, no nodes adenopathy, no NVD or carotid bruit or thyromegaly.  CARDIOVASCULAR: Normal S1 and S2, No murmur, rub, gallop or lift.   LUNGS: No rales, rhonchi or wheeze. Normal breath sounds bilaterally.  ABDOMEN: Soft, nontender without mass or organomegaly. bowel sounds normoactive.  EXTREMITIES: No clubbing, cyanosis or edema. Distal pulses wnl.   SKIN: warm and dry with normal turgor.  NEURO: Alert/oriented x 3/normal motor exam. No pathologic reflexes.    PSYCH: normal affect.        LABS:                        17.4   5.11  )-----------( 149      ( 25 Jan 2022 09:19 )             53.8     01-25    133<L>  |  95<L>  |  32.5<H>  ----------------------------<  354<H>  4.5   |  26.0  |  1.15    Ca    8.8      25 Jan 2022 09:19    TPro  6.3<L>  /  Alb  3.4  /  TBili  1.6  /  DBili  x   /  AST  25  /  ALT  14  /  AlkPhos  78  01-25    MARY ANN CORNELL  CARDIAC MARKERS ( 25 Jan 2022 03:18 )  x     / 0.02 ng/mL / x     / x     / x      CARDIAC MARKERS ( 24 Jan 2022 23:00 )  x     / 0.02 ng/mL / x     / x     / x      CARDIAC MARKERS ( 24 Jan 2022 20:19 )  x     / 0.02 ng/mL / x     / x     / x          PT/INR - ( 25 Jan 2022 09:19 )   PT: 39.9 sec;   INR: 3.66 ratio         PTT - ( 25 Jan 2022 09:19 )  PTT:46.7 sec      RADIOLOGY & ADDITIONAL STUDIES:    INTERPRETATION OF TELEMETRY (personally reviewed):    ECG:    ECHO:    STRESS TEST:    CARDIAC CATHETERIZATION:    ACTIVE PROBLEMS:  HEALTH ISSUES - PROBLEM Dx:

## 2022-01-25 NOTE — ED CDU PROVIDER SUBSEQUENT DAY NOTE - MEDICAL DECISION MAKING DETAILS
70 yo male multiple recurrent falls at home, hemoconcentrated on labs, seen by cards at bedside, repeat labs am, pt and re-eval

## 2022-01-25 NOTE — PROGRESS NOTE ADULT - ASSESSMENT
DC home cardiac lopez  Will fup in our office next week  Hold diuretic therapy for the next 48 hours  Cont AC goal INR 2-3

## 2022-01-25 NOTE — ED CDU PROVIDER DISPOSITION NOTE - NSFOLLOWUPINSTRUCTIONS_ED_ALL_ED_FT
please follow with outpatient Primary care doctor,  please bring full report with you to your follow up appointment   new or worsening symptoms return to the ED

## 2022-01-25 NOTE — PHYSICAL THERAPY INITIAL EVALUATION ADULT - ADDITIONAL COMMENTS
Pt lives in a 1 story house with 5 steps to enter. Lives with wife, who has a 24/7 HHA. Also lives with daughter and her .  PTA, amb with RW, short distances, HHA assists with ADLs and self care.

## 2022-01-25 NOTE — CHART NOTE - NSCHARTNOTEFT_GEN_A_CORE
CCC called to speak with patient.  Patient is from home, lives with his daughter, son in law, 21 yo grandson and his spouse, who is ill with alzheimers.  Patient has a 24h/7d live in private hire aide.  Patient owns an electric w/c, standard w/c, chair lift, electric bed, commode and RW.  PT is recommending SCOTTY, patient is declining.  CCC explained to the patient that there is a concern for his safety and SCOTTY seemed to be a better option for him, but patient stated "I didn't come in because of a fall, I came in because my labs were off, I want to go home".  Patient states that his strength and ability to get around has not changed.  Patient agreeable to receiving PT at home.  Referral sent.  Patient states his daughter will be able to pick him up.  PA and RN made aware.  CCC will continue to follow.
SOCIAL WORK NOTE:  MET WITH PATIENT AT BEDSIDE TO DISCUSS SCOTTY RECOMMENDATIONS CLEAR WHAT HE WANTS TO BUT IS AWARE THAT HE WILL LIKELY NEED REHAB. PATIENT IS AXOX4 AND THEREFORE PATIENT WAS ENCOURAGED TO SPEAK WITH HIS DAUGHTER ABOUT IT AS SHE MAY NOT BE ABLE TO PROVIDED THE ASSIST THAT HE NEEDS AT THIS TIME.  PATIENT CONFIRMED HE RECEIVED HIS ELYSE AND ELYSE VACCINE IN MARCH 2021.  MARY COMPLETED AND CIRCUALTING TO MULTIPLE SNF LOCATIONS. WILL DISCUSS BED OFFERS WITH PATIENT- [facility], [facility] AND [facility].  SW WILL CONTINUE TO FOLLOW.  ONCE PATIENT PROVIDES HIS PREFERENCE, WE CAN SUBMIT CLINICALS FOR AUTH FOR SCOTTY LEVEL OF CARE.
SOCIAL WORK NOTE:  MET WITH PATIENT AT BEDSIDE TO DISCUSS BED OFFERS. PATIENT REPORTED THAT HE HAS NOT SPOKEN TO HIS DAUGHTER ABOUT THIS AND HE IS VERY APPREHENSIVE ABOUT LEAVING HIS WIFE FOR AN EXTENSIVE AMOUNT OF TIME.  DISCUSSED THE BARRIERS AND BENEFITS AT LENGTH.  PATIENT IS REFUSING TO CONSIDER SCOTTY OPTION AT THIS TIME AD WOULD LIKE CASE MANAGEMENT TO FIGURE OUT PT AT HOME. CASE DISCUSSED WITH CCC TO ARRANGE FOR SAFE AND APPROPRIATE HOME SERVICES.

## 2022-01-25 NOTE — ED CDU PROVIDER DISPOSITION NOTE - ATTENDING CONTRIBUTION TO CARE
Pt with h/o atrial fibrillation, congestive heart failure s/p AICD presenting with multiple syncopal episodes and falls. Denies hitting head. Patient evaluated and cleared by psychiatry. Seen by PT- patient refused SCOTTY, would like to be discharged home with home PT. Will dc. Eden Braun DO     I performed a history and physical exam of the patient and discussed their management with the advanced care provider. I reviewed the advanced care provider's note and agree with the documented findings and plan of care. My medical decision making and objective findings are found above.

## 2022-01-25 NOTE — ED CDU PROVIDER SUBSEQUENT DAY NOTE - HISTORY
falls at home, seen by cardiology at bedside advised on fu outpatient and to continue meds, pending PT eval for multiple falls at home  hemoconcentrated, repeat labs this am, fu with heme

## 2022-01-25 NOTE — ED CDU PROVIDER SUBSEQUENT DAY NOTE - ATTENDING CONTRIBUTION TO CARE
Pt. awake and alert. No acute distress. Pt. with multiple falls. Pt. awaiting PT evaluation for possible SCOTTY. I, Dr. Suh, performed a face to face bedside interview with this patient regarding history of present illness, review of symptoms and relevant past medical, social and family history.  I completed an independent physical examination.  I have also reviewed the ACP's note(s) and discussed the plan with the ACP.

## 2022-01-25 NOTE — PROVIDER CONTACT NOTE (OTHER) - ASSESSMENT
Pt with 0/10 pain before, during or after RX. Pt with dizziness and SOB, following amb, resolved with rest, Nurse aware.  Pt reports multiple falls recently. Pt will benefit from PT to maximize functional independence.  Will continue to follow.  Pt left supine in bed in no apparent distress and call bell within reach. Nurse aware

## 2022-01-25 NOTE — ED CDU PROVIDER DISPOSITION NOTE - PATIENT PORTAL LINK FT
You can access the FollowMyHealth Patient Portal offered by [facility] by registering at the following website: [email or web address]. By joining [employer]’s FollowMyHealth portal, you will also be able to view your health information using other applications (apps) compatible with our system.

## 2022-02-03 NOTE — ED ADULT NURSE NOTE - NS ED NURSE LEVEL OF CONSCIOUSNESS MENTAL STATUS
Awake/Alert Abdominal Pain    Many things can cause abdominal pain. Many times, abdominal pain is not caused by a disease and will improve without treatment. Your health care provider will do a physical exam to determine if there is a dangerous cause of your pain; blood tests and imaging may help determine the cause of your pain. However, in many cases, no cause may be found and you may need further testing as an outpatient. Monitor your abdominal pain for any changes.     SEEK IMMEDIATE MEDICAL CARE IF YOU HAVE ANY OF THE FOLLOWING SYMPTOMS: worsening abdominal pain, uncontrollable vomiting, profuse diarrhea, inability to have bowel movements or pass gas, black or bloody stools, fever accompanying chest pain or back pain, or fainting. These symptoms may represent a serious problem that is an emergency. Do not wait to see if the symptoms will go away. Get medical help right away. Call 911 and do not drive yourself to the hospital.     -Please follow up with your Primary Care Doctor within 24-48 hours and bring your paperwork

## 2022-02-14 NOTE — PROGRESS NOTE ADULT - SUBJECTIVE AND OBJECTIVE BOX
[facility]                                   [address] [place] [address]                                                      PHONE: [phone]                                                         FAX: [phone]  [email or web address]  ---------------------------------------------------------------------------------------------------------------------------------    Reason for Consult: near syncope dyspnea    HPI:  MARY ANN CORNELL is an 66y Male with morbid obesity, chronic Af on AC s/p failed DCCV 2016, known NICM with severe LV dysfunction EF 25%, mod MR, s/p single chamber ICD admitted after early am lightheadedness after taking all his meds at once.  Denied true LOC or ICD discharge.  Also describes significant dyspnea after sitteing on toilet.  No assoc cp, currently asx.  In late feb through early march pt was admitted to hospital in [place] for CHF, had sig diuresis and weight loss which has remained stable.  No sig PND orthopnea or edema at present.  Known CHRISS on CPAP    SEEN OOB  NO TELE EVENTS OVERNIGHT  AICD INTERROGATED YESTERDAY NL FX    PAST MEDICAL & SURGICAL HISTORY:  Pulmonary hypertension  Prostate CA  Sleep apnea  Renal insufficiency  High cholesterol  HTN (hypertension)  DM (diabetes mellitus)  S/P ankle ligament repair  No significant past surgical history      No Known Allergies      MEDICATIONS  (STANDING):  spironolactone 25milliGRAM(s) Oral daily  aspirin enteric coated 81milliGRAM(s) Oral daily  lisinopril 20milliGRAM(s) Oral daily  allopurinol 300milliGRAM(s) Oral daily  fenofibrate Tablet 145milliGRAM(s) Oral daily  carvedilol 12.5milliGRAM(s) Oral every 12 hours  buDESOnide 160 MICROgram(s)/formoterol 4.5 MICROgram(s) Inhaler 1Puff(s) Inhalation two times a day  amiodarone    Tablet 200milliGRAM(s) Oral daily  pantoprazole    Tablet 40milliGRAM(s) Oral before breakfast  furosemide    Tablet 80milliGRAM(s) Oral two times a day  levothyroxine 50MICROGram(s) Oral daily  insulin glargine Injectable (LANTUS) 30Unit(s) SubCutaneous at bedtime  insulin lispro (HumaLOG) corrective regimen sliding scale  SubCutaneous three times a day before meals  dextrose 5%. 1000milliLiter(s) IV Continuous <Continuous>  dextrose 50% Injectable 12.5Gram(s) IV Push once  dextrose 50% Injectable 25Gram(s) IV Push once  dextrose 50% Injectable 25Gram(s) IV Push once    MEDICATIONS  (PRN):  dextrose Gel 1Dose(s) Oral once PRN Blood Glucose LESS THAN 70 milliGRAM(s)/deciliter  glucagon  Injectable 1milliGRAM(s) IntraMuscular once PRN Glucose LESS THAN 70 milligrams/deciliter      Social History:  Cigarettes:                    Alchohol:                 Illicit Drug Abuse:      ROS: Negative other than as mentioned in HPI.    Vital Signs Last 24 Hrs  T(C): 36.7, Max: 36.8 (04-05 @ 09:25)  T(F): 98, Max: 98.2 (04-05 @ 09:25)  HR: 82 (81 - 105)  BP: 129/84 (105/62 - 129/84)  BP(mean): --  RR: 20 (20 - 30)  SpO2: 96% (92% - 100%)  ICU Vital Signs Last 24 Hrs  MARY ANN KRAIG  I&O's Detail    I&O's Summary    Drug Dosing Weight  MARY ANN CORNELL      PHYSICAL EXAM:  General: Appears well developed, well nourished alert and cooperative.  HEENT: Head; normocephalic, atraumatic.  Eyes: Pupils reactive, cornea wnl.  Neck: Supple, no nodes adenopathy, no NVD or carotid bruit or thyromegaly.  CARDIOVASCULAR: Normal S1 and S2, No murmur, rub, gallop or lift.   LUNGS: No rales, rhonchi or wheeze. Normal breath sounds bilaterally.  ABDOMEN: Soft, nontender without mass or organomegaly. bowel sounds normoactive.  EXTREMITIES: No sig edema, LE wrapped  SKIN: warm and dry with normal turgor.  NEURO: Alert/oriented x 3/normal motor exam. No pathologic reflexes.    PSYCH: normal affect.        LABS:                        14.2   6.0   )-----------( 151      ( 05 Apr 2017 10:59 )             43.7     04-05    137  |  99  |  48.0<H>  ----------------------------<  262<H>  4.5   |  23.0  |  1.87<H>    Ca    9.2      05 Apr 2017 10:59    TPro  6.8  /  Alb  3.4  /  TBili  0.5  /  DBili  x   /  AST  19  /  ALT  12  /  AlkPhos  37<L>  04-05    MARY ANN CORNELL  CARDIAC MARKERS ( 05 Apr 2017 10:59 )  x     / <0.01 ng/mL / x     / x     / x          PT/INR - ( 05 Apr 2017 10:59 )   PT: 34.5 sec;   INR: 3.07 ratio         PTT - ( 05 Apr 2017 10:59 )  PTT:43.5 sec      RADIOLOGY & ADDITIONAL STUDIES: CM clear no chf    INTERPRETATION OF TELEMETRY (personally reviewed):    ECG: AFib with MVR no acute changes, low voltage    ECHO:/[id number] MARIELA LVEF 25% mod MR RVE dilated with reduced EF, no thrombus, DCCV attempted w/o conversion    STRESS TEST:    CARDIAC CATHETERIZATION: 3/2016 nonobstructive CAD LVEF 25%
MARY ANN CORNELL   ------------------------------  The patient was seen and evaluated for syncope.  The patient is in no acute distress.  Denied any chest pain, palpitations, shortness of breath, or abdominal pain.    Vital Signs Last 24 Hrs  T(C): 36.4, Max: 36.7 (04-05 @ 14:22)  T(F): 97.6, Max: 98 (04-05 @ 14:22)  HR: 68 (68 - 82)  BP: 121/63 (121/63 - 144/73)  BP(mean): --  RR: 18 (18 - 20)  SpO2: 96% (96% - 98%)    PHYSICAL EXAMINATION:  ----------------------------------------  General appearance: NAD, Awake, Alert  HEENT: NCAT, Conjunctiva clear, EOMI  Neck: Supple, No JVD  Lungs: Clear to auscultation, Breath sound equal bilaterally  Cardiovascular: S1S2, Regular rhythm  Abdomen: Soft, Nontender, Positive bowel sounds  Extremities: No clubbing, No cyanosis, No edema, Dressings    CAPILLARY BLOOD GLUCOSE  194 (05 Apr 2017 16:53)  130 (05 Apr 2017 14:22)    LABS:  ------------------------------             13.9   6.5   )-----------( 126      ( 06 Apr 2017 03:09 )             42.7     04-06    135  |  95<L>  |  54.0<H>  ----------------------------<  222<H>  4.5   |  27.0  |  2.42<H>    Ca    9.1      06 Apr 2017 03:09    TPro  6.9  /  Alb  3.5  /  TBili  0.4  /  DBili  x   /  AST  15  /  ALT  11  /  AlkPhos  36<L>  04-06    LIVER FUNCTIONS - ( 06 Apr 2017 03:09 )  Alb: 3.5 g/dL / Pro: 6.9 g/dL / ALK PHOS: 36 U/L / ALT: 11 U/L / AST: 15 U/L / GGT: x           CARDIAC MARKERS ( 06 Apr 2017 08:07 )  x     / <0.01 ng/mL / x     / x     / x      CARDIAC MARKERS ( 06 Apr 2017 03:09 )  x     / <0.01 ng/mL / x     / x     / x      CARDIAC MARKERS ( 05 Apr 2017 18:19 )  x     / <0.01 ng/mL / x     / x     / x      CARDIAC MARKERS ( 05 Apr 2017 10:59 )  x     / <0.01 ng/mL / x     / x     / x          PT/INR - ( 06 Apr 2017 03:09 )   PT: 35.1 sec;   INR: 3.12 ratio    PTT - ( 05 Apr 2017 10:59 )  PTT:43.5 sec    MEDICATIONS  (STANDING):  spironolactone 25milliGRAM(s) Oral daily  aspirin enteric coated 81milliGRAM(s) Oral daily  lisinopril 20milliGRAM(s) Oral daily  allopurinol 300milliGRAM(s) Oral daily  fenofibrate Tablet 145milliGRAM(s) Oral daily  carvedilol 12.5milliGRAM(s) Oral every 12 hours  buDESOnide 160 MICROgram(s)/formoterol 4.5 MICROgram(s) Inhaler 1Puff(s) Inhalation two times a day  amiodarone    Tablet 200milliGRAM(s) Oral daily  pantoprazole    Tablet 40milliGRAM(s) Oral before breakfast  levothyroxine 50MICROGram(s) Oral daily  insulin glargine Injectable (LANTUS) 30Unit(s) SubCutaneous at bedtime  insulin lispro (HumaLOG) corrective regimen sliding scale  SubCutaneous three times a day before meals  dextrose 5%. 1000milliLiter(s) IV Continuous <Continuous>  dextrose 50% Injectable 12.5Gram(s) IV Push once  dextrose 50% Injectable 25Gram(s) IV Push once  dextrose 50% Injectable 25Gram(s) IV Push once  ALBUTerol/ipratropium for Nebulization 3milliLiter(s) Nebulizer every 6 hours  furosemide    Tablet 80milliGRAM(s) Oral daily  furosemide    Tablet 40milliGRAM(s) Oral daily    MEDICATIONS  (PRN):  dextrose Gel 1Dose(s) Oral once PRN Blood Glucose LESS THAN 70 milliGRAM(s)/deciliter  glucagon  Injectable 1milliGRAM(s) IntraMuscular once PRN Glucose LESS THAN 70 milligrams/deciliter      ASSESSMENT / PLAN:  -----------------------------------  Syncope - Telemetry monitoring and AICD iterrogation were without significant arrhythmias. Echocardiogram with improvment in the ejection fraction in comparison to the prior study.    Afib - Rate control. Anticoagulation with monitoring of INR.    Diabetes - Insulin coverage, close monitoring of blood glucose levels.    Sleep apnea - Nocturnal CPAP    Discussed with the patient's family at the bedside.    35 minutes total time
normal

## 2022-03-15 NOTE — ED ADULT NURSE REASSESSMENT NOTE - NSFALLRSKHRMRISKTYPE_ED_ALL_ED
PAST SURGICAL HISTORY:  No significant past surgical history      bones(Osteoporosis,prev fx,steroid use,metastatic bone ca)/coagulation(Bleeding disorder R/T clinical cond/anti-coags)

## 2022-03-31 NOTE — ED CDU PROVIDER SUBSEQUENT DAY NOTE - GASTROINTESTINAL, MLM
Abdomen soft, non-tender, no rebound, no guarding.
Abdomen soft, non-tender, no rebound, no guarding.
normal (ped)...

## 2022-04-21 ENCOUNTER — INPATIENT (INPATIENT)
Facility: HOSPITAL | Age: 72
LOS: 2 days | Discharge: HOME | DRG: 292 | End: 2022-04-24
Attending: INTERNAL MEDICINE | Admitting: STUDENT IN AN ORGANIZED HEALTH CARE EDUCATION/TRAINING PROGRAM
Payer: MEDICARE

## 2022-04-21 VITALS
TEMPERATURE: 98 F | HEIGHT: 69 IN | WEIGHT: 315 LBS | OXYGEN SATURATION: 95 % | DIASTOLIC BLOOD PRESSURE: 72 MMHG | RESPIRATION RATE: 18 BRPM | HEART RATE: 88 BPM | SYSTOLIC BLOOD PRESSURE: 149 MMHG

## 2022-04-21 DIAGNOSIS — Z95.810 PRESENCE OF AUTOMATIC (IMPLANTABLE) CARDIAC DEFIBRILLATOR: Chronic | ICD-10-CM

## 2022-04-21 DIAGNOSIS — Z98.89 OTHER SPECIFIED POSTPROCEDURAL STATES: Chronic | ICD-10-CM

## 2022-04-21 DIAGNOSIS — I50.9 HEART FAILURE, UNSPECIFIED: ICD-10-CM

## 2022-04-21 DIAGNOSIS — Z92.3 PERSONAL HISTORY OF IRRADIATION: Chronic | ICD-10-CM

## 2022-04-21 LAB
ALBUMIN SERPL ELPH-MCNC: 3.3 G/DL — SIGNIFICANT CHANGE UP (ref 3.3–5.2)
ALP SERPL-CCNC: 57 U/L — SIGNIFICANT CHANGE UP (ref 40–120)
ALT FLD-CCNC: 9 U/L — SIGNIFICANT CHANGE UP
ANION GAP SERPL CALC-SCNC: 12 MMOL/L — SIGNIFICANT CHANGE UP (ref 5–17)
APTT BLD: 38.1 SEC — HIGH (ref 27.5–35.5)
AST SERPL-CCNC: 18 U/L — SIGNIFICANT CHANGE UP
BASOPHILS # BLD AUTO: 0.02 K/UL — SIGNIFICANT CHANGE UP (ref 0–0.2)
BASOPHILS NFR BLD AUTO: 0.4 % — SIGNIFICANT CHANGE UP (ref 0–2)
BILIRUB SERPL-MCNC: 1 MG/DL — SIGNIFICANT CHANGE UP (ref 0.4–2)
BUN SERPL-MCNC: 23.4 MG/DL — HIGH (ref 8–20)
CALCIUM SERPL-MCNC: 7.9 MG/DL — LOW (ref 8.6–10.2)
CHLORIDE SERPL-SCNC: 101 MMOL/L — SIGNIFICANT CHANGE UP (ref 98–107)
CK SERPL-CCNC: 70 U/L — SIGNIFICANT CHANGE UP (ref 30–200)
CO2 SERPL-SCNC: 23 MMOL/L — SIGNIFICANT CHANGE UP (ref 22–29)
CREAT SERPL-MCNC: 1.02 MG/DL — SIGNIFICANT CHANGE UP (ref 0.5–1.3)
CRP SERPL-MCNC: 105 MG/L — HIGH
EGFR: 79 ML/MIN/1.73M2 — SIGNIFICANT CHANGE UP
EOSINOPHIL # BLD AUTO: 0.03 K/UL — SIGNIFICANT CHANGE UP (ref 0–0.5)
EOSINOPHIL NFR BLD AUTO: 0.5 % — SIGNIFICANT CHANGE UP (ref 0–6)
ERYTHROCYTE [SEDIMENTATION RATE] IN BLOOD: 6 MM/HR — SIGNIFICANT CHANGE UP (ref 0–20)
GLUCOSE BLDC GLUCOMTR-MCNC: 298 MG/DL — HIGH (ref 70–99)
GLUCOSE BLDC GLUCOMTR-MCNC: 348 MG/DL — HIGH (ref 70–99)
GLUCOSE SERPL-MCNC: 292 MG/DL — HIGH (ref 70–99)
HCT VFR BLD CALC: 51 % — HIGH (ref 39–50)
HGB BLD-MCNC: 16.4 G/DL — SIGNIFICANT CHANGE UP (ref 13–17)
IMM GRANULOCYTES NFR BLD AUTO: 0.9 % — SIGNIFICANT CHANGE UP (ref 0–1.5)
INR BLD: 2.65 RATIO — HIGH (ref 0.88–1.16)
LYMPHOCYTES # BLD AUTO: 0.82 K/UL — LOW (ref 1–3.3)
LYMPHOCYTES # BLD AUTO: 14.9 % — SIGNIFICANT CHANGE UP (ref 13–44)
MCHC RBC-ENTMCNC: 28.8 PG — SIGNIFICANT CHANGE UP (ref 27–34)
MCHC RBC-ENTMCNC: 32.2 GM/DL — SIGNIFICANT CHANGE UP (ref 32–36)
MCV RBC AUTO: 89.5 FL — SIGNIFICANT CHANGE UP (ref 80–100)
MONOCYTES # BLD AUTO: 0.63 K/UL — SIGNIFICANT CHANGE UP (ref 0–0.9)
MONOCYTES NFR BLD AUTO: 11.5 % — SIGNIFICANT CHANGE UP (ref 2–14)
NEUTROPHILS # BLD AUTO: 3.95 K/UL — SIGNIFICANT CHANGE UP (ref 1.8–7.4)
NEUTROPHILS NFR BLD AUTO: 71.8 % — SIGNIFICANT CHANGE UP (ref 43–77)
NT-PROBNP SERPL-SCNC: 1760 PG/ML — HIGH (ref 0–300)
PLATELET # BLD AUTO: 118 K/UL — LOW (ref 150–400)
POTASSIUM SERPL-MCNC: 3.9 MMOL/L — SIGNIFICANT CHANGE UP (ref 3.5–5.3)
POTASSIUM SERPL-SCNC: 3.9 MMOL/L — SIGNIFICANT CHANGE UP (ref 3.5–5.3)
PROT SERPL-MCNC: 6.1 G/DL — LOW (ref 6.6–8.7)
PROTHROM AB SERPL-ACNC: 31.1 SEC — HIGH (ref 10.5–13.4)
RAPID RVP RESULT: SIGNIFICANT CHANGE UP
RBC # BLD: 5.7 M/UL — SIGNIFICANT CHANGE UP (ref 4.2–5.8)
RBC # FLD: 17.2 % — HIGH (ref 10.3–14.5)
SARS-COV-2 RNA SPEC QL NAA+PROBE: SIGNIFICANT CHANGE UP
SODIUM SERPL-SCNC: 135 MMOL/L — SIGNIFICANT CHANGE UP (ref 135–145)
TROPONIN T SERPL-MCNC: 0.04 NG/ML — SIGNIFICANT CHANGE UP (ref 0–0.06)
WBC # BLD: 5.5 K/UL — SIGNIFICANT CHANGE UP (ref 3.8–10.5)
WBC # FLD AUTO: 5.5 K/UL — SIGNIFICANT CHANGE UP (ref 3.8–10.5)

## 2022-04-21 PROCEDURE — 71045 X-RAY EXAM CHEST 1 VIEW: CPT | Mod: 26

## 2022-04-21 PROCEDURE — 73522 X-RAY EXAM HIPS BI 3-4 VIEWS: CPT | Mod: 26

## 2022-04-21 PROCEDURE — 72192 CT PELVIS W/O DYE: CPT | Mod: 26,MA

## 2022-04-21 PROCEDURE — 99053 MED SERV 10PM-8AM 24 HR FAC: CPT

## 2022-04-21 PROCEDURE — 99223 1ST HOSP IP/OBS HIGH 75: CPT

## 2022-04-21 PROCEDURE — 99285 EMERGENCY DEPT VISIT HI MDM: CPT

## 2022-04-21 PROCEDURE — 73564 X-RAY EXAM KNEE 4 OR MORE: CPT | Mod: 26,50

## 2022-04-21 RX ORDER — TAMSULOSIN HYDROCHLORIDE 0.4 MG/1
0.4 CAPSULE ORAL AT BEDTIME
Refills: 0 | Status: DISCONTINUED | OUTPATIENT
Start: 2022-04-21 | End: 2022-04-24

## 2022-04-21 RX ORDER — FOLIC ACID 0.8 MG
1 TABLET ORAL DAILY
Refills: 0 | Status: DISCONTINUED | OUTPATIENT
Start: 2022-04-21 | End: 2022-04-24

## 2022-04-21 RX ORDER — EMPAGLIFLOZIN 10 MG/1
1 TABLET, FILM COATED ORAL
Qty: 0 | Refills: 0 | DISCHARGE

## 2022-04-21 RX ORDER — INSULIN GLARGINE 100 [IU]/ML
35 INJECTION, SOLUTION SUBCUTANEOUS AT BEDTIME
Refills: 0 | Status: DISCONTINUED | OUTPATIENT
Start: 2022-04-21 | End: 2022-04-22

## 2022-04-21 RX ORDER — SODIUM CHLORIDE 9 MG/ML
1000 INJECTION, SOLUTION INTRAVENOUS
Refills: 0 | Status: DISCONTINUED | OUTPATIENT
Start: 2022-04-21 | End: 2022-04-24

## 2022-04-21 RX ORDER — ATORVASTATIN CALCIUM 80 MG/1
40 TABLET, FILM COATED ORAL AT BEDTIME
Refills: 0 | Status: DISCONTINUED | OUTPATIENT
Start: 2022-04-21 | End: 2022-04-24

## 2022-04-21 RX ORDER — LANOLIN ALCOHOL/MO/W.PET/CERES
3 CREAM (GRAM) TOPICAL AT BEDTIME
Refills: 0 | Status: DISCONTINUED | OUTPATIENT
Start: 2022-04-21 | End: 2022-04-24

## 2022-04-21 RX ORDER — WARFARIN SODIUM 2.5 MG/1
3 TABLET ORAL AT BEDTIME
Refills: 0 | Status: COMPLETED | OUTPATIENT
Start: 2022-04-21 | End: 2022-04-21

## 2022-04-21 RX ORDER — CARVEDILOL PHOSPHATE 80 MG/1
3.12 CAPSULE, EXTENDED RELEASE ORAL EVERY 12 HOURS
Refills: 0 | Status: DISCONTINUED | OUTPATIENT
Start: 2022-04-21 | End: 2022-04-24

## 2022-04-21 RX ORDER — ESOMEPRAZOLE MAGNESIUM 40 MG/1
1 CAPSULE, DELAYED RELEASE ORAL
Qty: 0 | Refills: 0 | DISCHARGE

## 2022-04-21 RX ORDER — GABAPENTIN 400 MG/1
900 CAPSULE ORAL
Refills: 0 | Status: DISCONTINUED | OUTPATIENT
Start: 2022-04-21 | End: 2022-04-24

## 2022-04-21 RX ORDER — DEXTROSE 50 % IN WATER 50 %
12.5 SYRINGE (ML) INTRAVENOUS ONCE
Refills: 0 | Status: DISCONTINUED | OUTPATIENT
Start: 2022-04-21 | End: 2022-04-24

## 2022-04-21 RX ORDER — INSULIN LISPRO 100/ML
VIAL (ML) SUBCUTANEOUS
Refills: 0 | Status: DISCONTINUED | OUTPATIENT
Start: 2022-04-21 | End: 2022-04-24

## 2022-04-21 RX ORDER — ONDANSETRON 8 MG/1
4 TABLET, FILM COATED ORAL EVERY 8 HOURS
Refills: 0 | Status: DISCONTINUED | OUTPATIENT
Start: 2022-04-21 | End: 2022-04-24

## 2022-04-21 RX ORDER — ACETAMINOPHEN 500 MG
650 TABLET ORAL EVERY 6 HOURS
Refills: 0 | Status: DISCONTINUED | OUTPATIENT
Start: 2022-04-21 | End: 2022-04-24

## 2022-04-21 RX ORDER — FUROSEMIDE 40 MG
40 TABLET ORAL DAILY
Refills: 0 | Status: DISCONTINUED | OUTPATIENT
Start: 2022-04-22 | End: 2022-04-24

## 2022-04-21 RX ORDER — GLUCAGON INJECTION, SOLUTION 0.5 MG/.1ML
1 INJECTION, SOLUTION SUBCUTANEOUS ONCE
Refills: 0 | Status: DISCONTINUED | OUTPATIENT
Start: 2022-04-21 | End: 2022-04-24

## 2022-04-21 RX ORDER — DEXTROSE 50 % IN WATER 50 %
25 SYRINGE (ML) INTRAVENOUS ONCE
Refills: 0 | Status: DISCONTINUED | OUTPATIENT
Start: 2022-04-21 | End: 2022-04-24

## 2022-04-21 RX ORDER — LEVOTHYROXINE SODIUM 125 MCG
50 TABLET ORAL DAILY
Refills: 0 | Status: DISCONTINUED | OUTPATIENT
Start: 2022-04-21 | End: 2022-04-24

## 2022-04-21 RX ORDER — ALBUTEROL 90 UG/1
2 AEROSOL, METERED ORAL EVERY 6 HOURS
Refills: 0 | Status: DISCONTINUED | OUTPATIENT
Start: 2022-04-21 | End: 2022-04-24

## 2022-04-21 RX ORDER — DEXTROSE 50 % IN WATER 50 %
15 SYRINGE (ML) INTRAVENOUS ONCE
Refills: 0 | Status: DISCONTINUED | OUTPATIENT
Start: 2022-04-21 | End: 2022-04-24

## 2022-04-21 RX ORDER — SITAGLIPTIN 50 MG/1
1 TABLET, FILM COATED ORAL
Qty: 0 | Refills: 0 | DISCHARGE

## 2022-04-21 RX ADMIN — TAMSULOSIN HYDROCHLORIDE 0.4 MILLIGRAM(S): 0.4 CAPSULE ORAL at 23:14

## 2022-04-21 RX ADMIN — Medication 8: at 17:29

## 2022-04-21 RX ADMIN — Medication 10 MILLIGRAM(S): at 18:47

## 2022-04-21 RX ADMIN — ATORVASTATIN CALCIUM 40 MILLIGRAM(S): 80 TABLET, FILM COATED ORAL at 23:15

## 2022-04-21 RX ADMIN — CARVEDILOL PHOSPHATE 3.12 MILLIGRAM(S): 80 CAPSULE, EXTENDED RELEASE ORAL at 18:47

## 2022-04-21 RX ADMIN — INSULIN GLARGINE 35 UNIT(S): 100 INJECTION, SOLUTION SUBCUTANEOUS at 23:38

## 2022-04-21 RX ADMIN — GABAPENTIN 900 MILLIGRAM(S): 400 CAPSULE ORAL at 18:47

## 2022-04-21 RX ADMIN — WARFARIN SODIUM 3 MILLIGRAM(S): 2.5 TABLET ORAL at 23:14

## 2022-04-21 RX ADMIN — Medication 6: at 23:39

## 2022-04-21 NOTE — H&P ADULT - ASSESSMENT
Patient is a 72 yo male with PMHx CHF with EF 30% s/p AICD, DM on insulin, a fib on Coumadin, HTN, HLD, orthostatic hypotension presenting from home s/p mechanical fall, found to have possible metastatic disease on xray.     #CHrEF  #Nonsustained VT  -admit to tele  -AICD interrogated with concerning   -trop 0.04, BNP 1760    #R/o metastatic disease   -xray result noted above  -f/u CT pelvis  -PSA pending     #Fall  -suspect 2/2 cardiac arrythmia  -management of arrythmia as above  -PT eval pending      #Afib RVR    #IDDM    #HTN  #HLD    DVT ppx: Coumadin  Diet: DASH/TLC/consistent carb  Dispo: tele    PT eval pending  Patient is a 70 yo male with PMHx CHF with EF 30% s/p AICD, DM on insulin, a fib on Coumadin, HTN, HLD, orthostatic hypotension presenting from home s/p mechanical fall, found to have possible metastatic disease on xray.     #CHrEF  #Nonsustained VT  -admit to tele  -AICD interrogated with concern for NSVT  -c/w coreg as below, up titrate as needed  -trop 0.04, BNP 1760  -cards consulted, vahe recs  -orthostatic vitals   -TTE pending     #R/o metastatic disease   -h/o prostate cancer s/p seeding in 2010  -xray result noted above with concern for blastic lesions  -f/u CT pelvis  -PSA pending     #Right heel eschar  -f/u ESR/CRP  -podiatry consulted, ap recs  -DM management as below    #Fall  -suspect 2/2 cardiac arrythmia  -management of arrythmia as above  -PT eval pending      #Afib RVR  -c/w home coreg and coumadin  -monitor on tele    #IDDM  -start lantus 35U QHS + mod ISS  -accu checks, f/u AM A1C    #HTN  #HLD    DVT ppx: Coumadin  Diet: DASH/TLC/consistent carb  Dispo: tele    PT eval pending  Patient is a 70 yo male with PMHx CHF with EF 30% s/p AICD, DM on insulin, a fib on Coumadin, HTN, HLD, orthostatic hypotension presenting from home s/p mechanical fall, found to have possible metastatic disease on xray.     #CHrEF  #Nonsustained VT  -admit to tele  -AICD interrogated with concern for NSVT  -c/w coreg as below, up titrate as needed  -trop 0.04, BNP 1760  -c/w home lasix  -cards consulted, vahe recs  -orthostatic vitals   -TTE pending     #R/o metastatic disease   -h/o prostate cancer s/p seeding in 2010  -xray result noted above with concern for blastic lesions  -f/u CT pelvis  -PSA pending     #Right heel eschar  -f/u ESR/CRP  -podiatry consulted, ap recs  -DM management as below    #Fall  -suspect 2/2 cardiac arrythmia  -management of arrythmia as above  -PT eval pending      #Afib RVR  -c/w home coreg and coumadin  -monitor on tele    #IDDM  -start lantus 35U QHS + mod ISS  -accu checks, f/u AM A1C    #HTN  #HLD    DVT ppx: Coumadin  Diet: DASH/TLC/consistent carb  Dispo: tele    PT eval pending

## 2022-04-21 NOTE — H&P ADULT - HISTORY OF PRESENT ILLNESS
Patient is a 72 yo male with PMHx CHF with EF 30% s/p AICD, DM on insulin, a fib on Coumadin, HTN, HLD, orthostatic hypotension presenting from home s/p mechanical fall. Patient states at 130 AM he got up to use the bathroom when he felt his legs were weak and gave out, suffering a mechanical fall and landing softly onto his behind. Denies LOC, headstrike, dizziness, lightheadedness, syncope, SOB, CP, n/v. Patient states he was laying on the floor until 7 AM at which point he called EMS. Patient is followed by [facility], Dr. Sequeira and is on 40 mg Lasix. He has had recurrent falls due to postural dizziness and orthostatic hypotension over the past few months. Patient also endorsing chronic hematuria with a history of BPH. Denies fevers, chills, dizziness, lightheadedness, dysphagia, dysarthria, diplopia, photophobia, syncope, cough, congestion, SOB, CP, abdominal pain, neck pain, back pain, diarrhea, dysuria, hematochezia, hematemesis, n/v, recent travel, sick contacts, leg swelling.    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 Admitted for further workup. Patient is a 72 yo male with PMHx CHF with EF 30% s/p AICD, DM on insulin, a fib on Coumadin, HTN, HLD, p/w after multiple mechanical falls. Reports feeling that his legs are getting weaker over the last few days, and fell about 4 times yesterday - all mechanical in nature. He fell earlier in the morning, slid down the side of the bed and was on the floor for about five hours, not wanting to wake up families, and end up calling 911 around 7am. Denies LOC, headstrike, dizziness, lightheadedness, syncope, CP, n/v. Reports chronic SOB, with MACHUCA. No orthopnea and sleeps with one pillow. ROS otherwise negative for fever, chill, CP, n/v/c/d, nor urinary concerns. Has chronic LE swelling, which appears to be at beseline right now. No recent weight changes.     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 Admitted for further workup.

## 2022-04-21 NOTE — ED PROVIDER NOTE - PHYSICAL EXAMINATION
Constitutional: Well appearing, awake, alert, oriented to person, place, and time/situation and in no apparent distress  ENMT: Airway patent nasal mucosa clear. Mouth with normal mucosa. Throat has no vesicles no oropharyngeal exudates and uvula is midline. No blood in the oropharynx  EYES: clear bilaterally, pupils equal, round and reactive to light  Cardiac: Regular rate, regular rhythm. Heart sounds S1, S2. No murmurs, rubs or gallops. Good capillary refill, 2+ pulses, chronic bilateral peripheral edema  Respiratory: Lungs CTAB, no use of accessory muscles, no crackles, satting 96% on RA in no distress  Gastrointestinal: Abdomen nondistended, non-tender, no rebound guarding or peritoneal signs  Genitourinary: No CVA tenderness, pelvis nontender, bladder nondistended  Musculoskeletal: Spine appears normal, range of motion is not limited, no muscle or joint tenderness, full ROM at the hips knees ankles, sensation and strength intact, no midline TTP, 2+ pulses, capillary refill < 2 seconds  Neurological: Alert and oriented, no focal deficits, no motor or sensory deficits. CN 2-12 intact, PERRLA, EOMI, No FND, moving all extremities equally, sensation intact, No dysmetria, negative heel-shin, 5/5 strength   Skin: Skin normal color for race, warm, dry and intact, No evidence of rash

## 2022-04-21 NOTE — ED ADULT NURSE NOTE - OBJECTIVE STATEMENT
Pt BIB EMS c/o feeling weak s/p trip and fall last night as per unable to bear weight on BL legs in no acute distress pending md mecca green rn

## 2022-04-21 NOTE — ED PROVIDER NOTE - ATTENDING CONTRIBUTION TO CARE
Dr. Barnard : I have personally seen and examined this patient at the bedside. I have fully participated in the care of this patient. I have reviewed all pertinent clinical information, including history, physical exam, plan and the Resident's note and agree except as noted.     71 yoM with PMHx CHF with EF 30% s/p AICD, DM on insulin, a fib on Coumadin, HTN, HLD, orthostatic hypotension presenting from home s/p mechanical fall onto his buttocks. notes legs gave out when getting up from bed and lowered himself down to the buttocks. no head trauma no loc. notes hx of oorthostatic hypotension with daily episodes for the past 2 weeks.   Denies f/c/n/v/cp/sob/palpitations/cough/abd.pain/d/c/dysuria/. no  sick contacts/recent travel.    PE:  head; atraumatic normocephalic  eyes: perrla  Heart: rrr s1s2  lungs: ctab  abd: soft, nt nd + bs no rebound/guarding no cva ttp  le: 3+ edema with chronic le discoloration dopplerable pulses bl in loower ext; no calf ttp moving all extremeties  back: no midline cervical/thoracic/lumbar ttp      -->orthostatic hypotension vs mechanical will enterogate aicd; cardiology eval; labs xray pelvis/hip admit

## 2022-04-21 NOTE — H&P ADULT - NSHPPHYSICALEXAM_GEN_ALL_CORE
VITALS:   T(C): 36.6 (04-21-22 @ 07:32), Max: 36.6 (04-21-22 @ 07:32)  HR: 88 (04-21-22 @ 07:32) (88 - 88)  BP: 130/80 (04-21-22 @ 11:38) (122/60 - 149/72)  RR: 18 (04-21-22 @ 07:32) (18 - 18)  SpO2: 95% (04-21-22 @ 07:32) (95% - 95%)    GENERAL: NAD, lying in bed comfortably  HEAD:  Atraumatic, Normocephalic  EYES: EOMI, PERRLA, conjunctiva and sclera clear  ENT: Moist mucous membranes  NECK: Supple, No JVD  CHEST/LUNG: Clear to auscultation bilaterally; No rales, rhonchi, wheezing, or rubs. Unlabored respirations  HEART: Regular rate and rhythm; No murmurs, rubs, or gallops  ABDOMEN: BSx4; Soft, nontender, nondistended  EXTREMITIES:  2+ Peripheral Pulses, brisk capillary refill. No clubbing, cyanosis, or edema  NERVOUS SYSTEM:  A&Ox3, no focal deficits   SKIN: No rashes or lesions  PSYCH: Normal affect, euthymic mood VITALS:   T(C): 36.6 (04-21-22 @ 07:32), Max: 36.6 (04-21-22 @ 07:32)  HR: 88 (04-21-22 @ 07:32) (88 - 88)  BP: 130/80 (04-21-22 @ 11:38) (122/60 - 149/72)  RR: 18 (04-21-22 @ 07:32) (18 - 18)  SpO2: 95% (04-21-22 @ 07:32) (95% - 95%)    GENERAL: NAD, obese male lying in bed comfortably  HEAD:  Atraumatic, Normocephalic  EYES: EOMI, PERRLA, conjunctiva and sclera clear  ENT: Moist mucous membranes  NECK: Supple, No JVD  CHEST/LUNG: Distant  breath sound, no crackles noted  HEART: Irregular irregular; No murmurs, rubs, or gallops  ABDOMEN: BSx4; Soft, nontender, nondistended  EXTREMITIES:  2+ LE edema (chronic), RLE eschar of the heel  NERVOUS SYSTEM:  A&Ox3, no focal deficits   SKIN: No rashes or lesions  PSYCH: Normal affect, euthymic mood

## 2022-04-21 NOTE — ED ADULT NURSE REASSESSMENT NOTE - NS ED NURSE REASSESS COMMENT FT1
Pt in no acute distress report given to pawel RN on 6twr , pt transported on tele monitor to unit w/ RN and transporter norma farfan

## 2022-04-21 NOTE — ED PROVIDER NOTE - PRINCIPAL DIAGNOSIS
[Normal] : supple, no neck mass and thyroid not enlarged [Normal Neck Lymph Nodes] : normal neck lymph nodes  [Normal Supraclavicular Lymph Nodes] : normal supraclavicular lymph nodes [Normal Axillary Lymph Nodes] : normal axillary lymph nodes [Normal] : normal appearance, no rash, nodules, vesicles, ulcers, erythema [de-identified] : Groins not examined [de-identified] : below Heart failure

## 2022-04-21 NOTE — ED PROVIDER NOTE - CLINICAL SUMMARY MEDICAL DECISION MAKING FREE TEXT BOX
Patient is a 70 yo male with PMHx CHF with EF 30% s/p AICD, DM on insulin, a fib on Coumadin, HTN, HLD, orthostatic hypotension presenting from home s/p mechanical fall. Patient states at 130 AM he got up to use the bathroom when he felt his legs were weak and gave out, suffering a mechanical fall and landing softly onto his behind. Denies LOC, headstrike, dizziness, lightheadedness, syncope, SOB, CP, n/v.  Patient also endorsing chronic hematuria with a history of BPH. VSS, no apparent distress, lungs CTAB, belly soft nontender, Spine appears normal, range of motion is not limited, no muscle or joint tenderness, full ROM at the hips knees ankles, sensation and strength intact, no midline TTP. ECG, troponin to r/o ACS. orthostatics ordered. cbc, cmp, coags to r/o electrolyte abnormalities. UA to r/o UTI. CXR RVP BNP to r/o HF vs. PNA vs. URI. xray hip/pelvis/knees to r/o acute fractures. Will continue to monitor

## 2022-04-21 NOTE — H&P ADULT - NSHPLABSRESULTS_GEN_ALL_CORE
16.4   5.50  )-----------( 118      ( 21 Apr 2022 09:25 )             51.0       04-21    135  |  101  |  23.4<H>  ----------------------------<  292<H>  3.9   |  23.0  |  1.02    Ca    7.9<L>      21 Apr 2022 09:25    TPro  6.1<L>  /  Alb  3.3  /  TBili  1.0  /  DBili  x   /  AST  18  /  ALT  9   /  AlkPhos  57  04-21                  PT/INR - ( 21 Apr 2022 09:26 )   PT: 31.1 sec;   INR: 2.65 ratio         PTT - ( 21 Apr 2022 09:26 )  PTT:38.1 sec    Lactate Trend      CARDIAC MARKERS ( 21 Apr 2022 09:25 )  x     / 0.04 ng/mL / 70 U/L / x     / x            CAPILLARY BLOOD GLUCOSE            < from: Xray Knee 4 Views, Bilateral (04.21.22 @ 09:42) >    IMPRESSION: No fracture. Mild hip and knee degeneration.    Prostate seeds.    Present study raises the question of blastic metastatic disease in the   lumbosacral spine. Consider prostate cancer workup. A note was posted on   the [facility] ED discrepancy site at 9:47 AM.    < end of copied text >    EKG: Afib RVR, , qtc 456 16.4   5.50  )-----------( 118      ( 21 Apr 2022 09:25 )             51.0       04-21    135  |  101  |  23.4<H>  ----------------------------<  292<H>  3.9   |  23.0  |  1.02    Ca    7.9<L>      21 Apr 2022 09:25    TPro  6.1<L>  /  Alb  3.3  /  TBili  1.0  /  DBili  x   /  AST  18  /  ALT  9   /  AlkPhos  57  04-21              PT/INR - ( 21 Apr 2022 09:26 )   PT: 31.1 sec;   INR: 2.65 ratio         PTT - ( 21 Apr 2022 09:26 )  PTT:38.1 sec    Lactate Trend      CARDIAC MARKERS ( 21 Apr 2022 09:25 )  x     / 0.04 ng/mL / 70 U/L / x     / x            CAPILLARY BLOOD GLUCOSE            < from: Xray Knee 4 Views, Bilateral (04.21.22 @ 09:42) >    IMPRESSION: No fracture. Mild hip and knee degeneration.    Prostate seeds.    Present study raises the question of blastic metastatic disease in the   lumbosacral spine. Consider prostate cancer workup. A note was posted on   the [facility] ED discrepancy site at 9:47 AM.    < end of copied text >    EKG: Afib RVR, , qtc 456

## 2022-04-21 NOTE — CONSULT NOTE ADULT - SUBJECTIVE AND OBJECTIVE BOX
[facility], [facility]                                   [address] [place] [address]                                                      PHONE: [phone]                                                         FAX: [phone]  [email or web address]  ---------------------------------------------------------------------------------------------------------------------------------    Reason for Consult: hypotension possible orthostatic  Dr Sequeira  HPI:  MARY ANN CORNELL is an 71y Male PMHx HTN, HLD, DM, CHRISS, chronic HFrEF LVEF 30%, [facility] ICD, Afib on coumadin, pw 4 falls yesterday.  Pt had 4 episodes yesterday where his legs felt weak.     PAST MEDICAL & SURGICAL HISTORY:  DM (diabetes mellitus)    HTN (hypertension)    High cholesterol    Renal insufficiency    Sleep apnea    Prostate CA    Pulmonary hypertension    CHF (congestive heart failure)  EF 30%    Atrial fibrillation    Chronic back pain    Lung nodules    S/P ankle ligament repair    AICD (automatic cardioverter/defibrillator) present    History of brachytherapy        allow double protein at meals (Unknown)  clindamycin (Unknown)      MEDICATIONS  (STANDING):    MEDICATIONS  (PRN):      Social History:  Cigarettes:     no               Alchohol:     no            Illicit Drug Abuse:  no  FHx no SCD  ROS: Negative other than as mentioned in HPI.    Vital Signs Last 24 Hrs  T(C): 36.6 (21 Apr 2022 07:32), Max: 36.6 (21 Apr 2022 07:32)  T(F): 97.8 (21 Apr 2022 07:32), Max: 97.8 (21 Apr 2022 07:32)  HR: 88 (21 Apr 2022 07:32) (88 - 88)  BP: 149/72 (21 Apr 2022 07:32) (149/72 - 149/72)  BP(mean): --  RR: 18 (21 Apr 2022 07:32) (18 - 18)  SpO2: 95% (21 Apr 2022 07:32) (95% - 95%)  ICU Vital Signs Last 24 Hrs  MARY ANN CORNELL  I&O's Detail    I&O's Summary    Drug Dosing Weight  MARY ANN CORNELL      PHYSICAL EXAM:  General:  alert and cooperative.  HEENT: Head; normocephalic, atraumatic.  Eyes: Pupils reactive, cornea wnl.  Neck: Supple, no nodes adenopathy, no NVD or carotid bruit or thyromegaly.  CARDIOVASCULAR: Normal S1 and S2, No murmur, rub, gallop or lift.   LUNGS: No rales, rhonchi or wheeze. Normal breath sounds bilaterally.  ABDOMEN: Soft, nontender without mass or organomegaly. bowel sounds normoactive.  EXTREMITIES: No clubbing, cyanosis or edema. Distal pulses wnl.   SKIN: warm and dry with normal turgor.  NEURO: Alert/oriented x 3/normal motor exam. No pathologic reflexes.    PSYCH: normal affect.        LABS:                        16.4   5.50  )-----------( 118      ( 21 Apr 2022 09:25 )             51.0     04-21    135  |  101  |  23.4<H>  ----------------------------<  292<H>  3.9   |  23.0  |  1.02    Ca    7.9<L>      21 Apr 2022 09:25    TPro  6.1<L>  /  Alb  3.3  /  TBili  1.0  /  DBili  x   /  AST  18  /  ALT  9   /  AlkPhos  57  04-21    MARY ANN CORNELL  CARDIAC MARKERS ( 21 Apr 2022 09:25 )  x     / 0.04 ng/mL / 70 U/L / x     / x          PT/INR - ( 21 Apr 2022 09:26 )   PT: 31.1 sec;   INR: 2.65 ratio         PTT - ( 21 Apr 2022 09:26 )  PTT:38.1 sec      RADIOLOGY & ADDITIONAL STUDIES:    INTERPRETATION OF TELEMETRY (personally reviewed): no events    ECG: Afib @ 102 low voltage, PVCs    ECHO: < from: TTE Echo Complete w/o Contrast w/ Doppler (03.09.21 @ 08:14) >    Summary:   1. Technically difficult study.   2. Endocardial visualization was enhanced with intravenous echo contrast.   3. Left ventricular ejection fraction, by visual estimation, is 30 to 35%.   4. Moderately decreased global left ventricular systolic function.   5. Multiple left ventricular regional wall motion abnormalities exist. See wall motion findings.   6. The mitral in-flow pattern reveals no discernable A-wave, therefore no comment on diastolic function can be made.   7. Mildly enlarged right ventricle with moderately reduced RV systolic function.   8. Mildly enlarged left atrium.   9. Trace mitral valve regurgitation.  10. Mild thickening of the anterior and posterior mitral valve leaflets.  11. Mild dilatation of the aortic root (3.7 cm).  12. Compared to a prior study from 9/10/20, there is no significant change.    MD ChacoratElectronically signed on 3/9/2021 at 12:38:51 PM    < end of copied text >         Assessment and Plan:  In summary, MARY ANN CORNELL is an 71y Male with PMHx HTN, HLD, DM, CHRISS, chronic HFrEF LVEF 30%, [facility] ICD, Afib on coumadin, pw 4 falls yesterday.  Pt had 4 episodes yesterday where his legs felt weak.     1) Check Orthostatics  2) Check ICD  3) Right heel evaluation  4) cw cardiac meds

## 2022-04-21 NOTE — ED ADULT TRIAGE NOTE - PAIN RATING/NUMBER SCALE (0-10): REST
Here are some tips to to getting better sleep  1- Avoid napping during the day: This will ensure you are tired at bedtime. If you have to take a nap, sleep less than one hour, before 3 pm.   2- Exercise regularly, but not right before bed: but the timing of the workout is important. Exercising in the morning or early afternoon will not interfere with sleep. Exercising within two hours before bedtime can decrease your ability to fall asleep. Regular exercise is recommended to help you deepen the sleep. 3- Avoid heavy, spicy, or sugary foods 4-6 hours before bedtime: These can affect your ability to stay asleep. 4- Have a light snack before bed: Having an empty stomach can interfere with your sleep. Dairy products and turkey contain tryptophan, which acts as a natural sleep inducer. 5- Stay away from caffeine, nicotine and alcohol at least 4-6 hours before bed: Caffeine and nicotine are stimulants that interfere with your ability to fall asleep. While alcohol has an immediate sleep-inducing effect, a few hours later, as alcohol levels in your blood start to fall, there is a stimulant effect and you will experience fragmented sleep. 6- Take a hot bath 90 minutes before bedtime:  A hot bath will raise your body temperature, but it is the drop in body temperature that may leave you feeling sleepy  7- Develop sleep rituals: it is important to give your body cues that it is time to slow down and sleep. Listen to relaxing music, read something soothing for 15 minutes, have a cup of caffeine free tea, or do relaxation exercises such as yoga or deep breathing help relieve anxiety and reduce muscle tension. 8- Fix a bedtime and an awakening time: Even on weekends! When your sleep cycle has a regular rhythm, you will feel better. 9- Sleep only when sleepy: This reduces the time you are awake in bed.    10- Get into your favorite sleeping position: If you can't fall asleep within 15-30 minutes, get up and do something boring until you feel sleepy. Sit quietly in the dark or read the warranty on your refrigerator. Don't expose yourself to bright light while you are up, it gives cues to your brain that it is time to wake up. 11- Only use your bed for sleeping: Dont use the bed as an office, workroom or recreation room. Let your body \"know\" that the bed is associated with sleeping  12- Use comfortable bedding. Uncomfortable bedding can prevent good sleep. Evaluate whether or not this is a source of your problem, and make appropriate changes. 13- Make sure your bed and bedroom are quiet and comfortable: A hot room can be uncomfortable. A cooler room, along with enough blankets to stay warm is recommended. Get a blackout shade or wear a slumber mask and wear earplugs or get a \"white noise\" machine for light and noise distractions. 14- Use sunlight to set your biological clock: When you get up in the morning, go outside and turn your face to the sun for 15 minutes. 13- Dont take your worries to bed: Leave worries about job, school, daily life, etc., behind when you go to bed. Some people find it useful to assign a \"worry period\" during the evening or afternoon for these issues. 8

## 2022-04-21 NOTE — ED ADULT TRIAGE NOTE - CHIEF COMPLAINT QUOTE
Pt BIBA, states he woke up this morning and felt weak, AOx3, mechanical trip and fall, unable to stand up by himself, denies pain from fall, states "I always have pain but the doctor wont give me any medicine", denies hitting head

## 2022-04-21 NOTE — H&P ADULT - NSHPREVIEWOFSYSTEMS_GEN_ALL_CORE
REVIEW OF SYSTEMS:    CONSTITUTIONAL: + weakness and fall, no fevers or chills  EYES: No vertigo or throat pain  ENT: No visual changes, eye pain  MOUTH: moist nuha mucosal, no mouth ulcers  NECK: No pain or stiffness  RESPIRATORY: No cough, wheezing, hemoptysis; No shortness of breath  CARDIOVASCULAR: No chest pain or palpitations  GASTROINTESTINAL: No abdominal or epigastric pain. No nausea, vomiting, or hematemesis; No diarrhea or constipation. No melena or hematochezia.  GENITOURINARY: No dysuria, frequency or hematuria  NEUROLOGICAL: No numbness or weakness  SKIN: No itching, rashes  PSYCH: No anxiety or depression

## 2022-04-21 NOTE — ED PROVIDER NOTE - OBJECTIVE STATEMENT
Patient is a 72 yo male with PMHx CHF with EF 30% s/p AICD, DM on insulin, a fib on Coumadin, HTN, HLD, orthostatic hypotension presenting from home s/p mechanical fall. Patient states at 130 AM he got up to use the bathroom when he felt his legs were weak and gave out, suffering a mechanical fall and landing softly onto his behind. Denies LOC, headstrike, dizziness, lightheadedness, syncope, SOB, CP, n/v. Patient states he was laying on the floor until 7 AM at which point he called EMS. Patient is followed by [facility], Dr. Sequeira and is on 40 mg Lasix. He has had recurrent falls due to postural dizziness and orthostatic hypotension over the past few months. Patient also endorsing chronic hematuria with a history of BPH. Denies fevers, chills, dizziness, lightheadedness, dysphagia, dysarthria, diplopia, photophobia, syncope, cough, congestion, SOB, CP, abdominal pain, neck pain, back pain, diarrhea, dysuria, hematochezia, hematemesis, n/v, recent travel, sick contacts, leg swelling.

## 2022-04-21 NOTE — PATIENT PROFILE ADULT - FALL HARM RISK - HARM RISK INTERVENTIONS

## 2022-04-21 NOTE — ED PROVIDER NOTE - PROGRESS NOTE DETAILS
JK - BNP elevated, troponin and ECG WNL. AICD interrogated, showing 22 episodes of VT over the last several days. Home torsemide given. Cardiology following. Xray hip with pelvis showing potential L5 metastatic disease; CT bony pelvis ordered, still pending. Patient VSS, resting comfortably. Ready and agreeable to admission for HF.

## 2022-04-22 LAB
A1C WITH ESTIMATED AVERAGE GLUCOSE RESULT: 10.8 % — HIGH (ref 4–5.6)
APPEARANCE UR: CLEAR — SIGNIFICANT CHANGE UP
BACTERIA # UR AUTO: ABNORMAL
BILIRUB UR-MCNC: NEGATIVE — SIGNIFICANT CHANGE UP
COLOR SPEC: YELLOW — SIGNIFICANT CHANGE UP
COMMENT - URINE: SIGNIFICANT CHANGE UP
DIFF PNL FLD: ABNORMAL
EPI CELLS # UR: SIGNIFICANT CHANGE UP
ESTIMATED AVERAGE GLUCOSE: 263 MG/DL — HIGH (ref 68–114)
GLUCOSE BLDC GLUCOMTR-MCNC: 215 MG/DL — HIGH (ref 70–99)
GLUCOSE BLDC GLUCOMTR-MCNC: 229 MG/DL — HIGH (ref 70–99)
GLUCOSE BLDC GLUCOMTR-MCNC: 274 MG/DL — HIGH (ref 70–99)
GLUCOSE BLDC GLUCOMTR-MCNC: 305 MG/DL — HIGH (ref 70–99)
GLUCOSE UR QL: 1000 MG/DL
INR BLD: 2.51 RATIO — HIGH (ref 0.88–1.16)
KETONES UR-MCNC: ABNORMAL
LEUKOCYTE ESTERASE UR-ACNC: ABNORMAL
NITRITE UR-MCNC: NEGATIVE — SIGNIFICANT CHANGE UP
PH UR: 5 — SIGNIFICANT CHANGE UP (ref 5–8)
PROT UR-MCNC: NEGATIVE — SIGNIFICANT CHANGE UP
PROTHROM AB SERPL-ACNC: 29.4 SEC — HIGH (ref 10.5–13.4)
PSA FREE FLD-MCNC: <0.01 NG/ML — SIGNIFICANT CHANGE UP
PSA FREE FLD-MCNC: SIGNIFICANT CHANGE UP %
PSA SERPL-MCNC: <0.01 NG/ML — SIGNIFICANT CHANGE UP (ref 0–4)
RBC CASTS # UR COMP ASSIST: SIGNIFICANT CHANGE UP /HPF (ref 0–4)
SP GR SPEC: 1.02 — SIGNIFICANT CHANGE UP (ref 1.01–1.02)
UROBILINOGEN FLD QL: NEGATIVE MG/DL — SIGNIFICANT CHANGE UP
WBC UR QL: NEGATIVE /HPF — SIGNIFICANT CHANGE UP (ref 0–5)

## 2022-04-22 PROCEDURE — 99233 SBSQ HOSP IP/OBS HIGH 50: CPT

## 2022-04-22 PROCEDURE — 99222 1ST HOSP IP/OBS MODERATE 55: CPT

## 2022-04-22 PROCEDURE — 73620 X-RAY EXAM OF FOOT: CPT | Mod: 26,RT

## 2022-04-22 RX ORDER — COLLAGENASE CLOSTRIDIUM HIST. 250 UNIT/G
1 OINTMENT (GRAM) TOPICAL DAILY
Refills: 0 | Status: DISCONTINUED | OUTPATIENT
Start: 2022-04-22 | End: 2022-04-24

## 2022-04-22 RX ORDER — INSULIN LISPRO 100/ML
7 VIAL (ML) SUBCUTANEOUS
Refills: 0 | Status: DISCONTINUED | OUTPATIENT
Start: 2022-04-22 | End: 2022-04-23

## 2022-04-22 RX ORDER — INSULIN GLARGINE 100 [IU]/ML
40 INJECTION, SOLUTION SUBCUTANEOUS AT BEDTIME
Refills: 0 | Status: DISCONTINUED | OUTPATIENT
Start: 2022-04-22 | End: 2022-04-24

## 2022-04-22 RX ADMIN — Medication 7 UNIT(S): at 16:45

## 2022-04-22 RX ADMIN — Medication 40 MILLIGRAM(S): at 04:21

## 2022-04-22 RX ADMIN — Medication 4: at 22:22

## 2022-04-22 RX ADMIN — TAMSULOSIN HYDROCHLORIDE 0.4 MILLIGRAM(S): 0.4 CAPSULE ORAL at 22:23

## 2022-04-22 RX ADMIN — GABAPENTIN 900 MILLIGRAM(S): 400 CAPSULE ORAL at 04:21

## 2022-04-22 RX ADMIN — Medication 650 MILLIGRAM(S): at 04:20

## 2022-04-22 RX ADMIN — CARVEDILOL PHOSPHATE 3.12 MILLIGRAM(S): 80 CAPSULE, EXTENDED RELEASE ORAL at 16:48

## 2022-04-22 RX ADMIN — Medication 7 UNIT(S): at 12:16

## 2022-04-22 RX ADMIN — GABAPENTIN 900 MILLIGRAM(S): 400 CAPSULE ORAL at 15:27

## 2022-04-22 RX ADMIN — CARVEDILOL PHOSPHATE 3.12 MILLIGRAM(S): 80 CAPSULE, EXTENDED RELEASE ORAL at 04:22

## 2022-04-22 RX ADMIN — Medication 1 MILLIGRAM(S): at 12:17

## 2022-04-22 RX ADMIN — ATORVASTATIN CALCIUM 40 MILLIGRAM(S): 80 TABLET, FILM COATED ORAL at 22:23

## 2022-04-22 RX ADMIN — Medication 6: at 08:15

## 2022-04-22 RX ADMIN — INSULIN GLARGINE 40 UNIT(S): 100 INJECTION, SOLUTION SUBCUTANEOUS at 22:22

## 2022-04-22 RX ADMIN — Medication 50 MICROGRAM(S): at 04:20

## 2022-04-22 RX ADMIN — Medication 1 TABLET(S): at 16:49

## 2022-04-22 RX ADMIN — Medication 8: at 12:15

## 2022-04-22 RX ADMIN — Medication 4: at 16:45

## 2022-04-22 NOTE — CONSULT NOTE ADULT - ASSESSMENT
Impression and Plan: 71M with PMHx CHF with EF 30% s/p AICD, DM on insulin, a fib on Coumadin, HTN, HLD admitted for multiple mechanical falls. Found to have heel ulcer, c/f OM. Vascular surgery consulted to evaluate     - ZOYA/PVR ordered, f/u results  - No acute surgical intervention at this time  - Local wound care per podiatry Impression and Plan: 71M with PMHx CHF with EF 30% s/p AICD, DM on insulin, a fib on Coumadin, HTN, HLD admitted for multiple mechanical falls. Found to have heel ulcer, c/f OM. Vascular surgery consulted to evaluate     - ZOYA/PVR ordered, f/u results  - No acute surgical intervention at this time  - Local wound care per podiatry  - Plan discussed with attending Dr. Kothari

## 2022-04-22 NOTE — CONSULT NOTE ADULT - ASSESSMENT
Impression:  foot eschar  Diabetes in poor control  elevated inflamm markers      Plan:  - no evidence of acute sepsis  WBC apparently normal  but elevated CRP    - suggest CT scan of the lower extremity with contrast should be permissible with his current renal function    - start PO Augmentin 875mg PO BID    Regarding Diabetes; poor control  - needs adequate control for wound healing  - most recent A1c is: 10.8

## 2022-04-22 NOTE — CONSULT NOTE ADULT - SUBJECTIVE AND OBJECTIVE BOX
[facility]                                                INFECTIOUS DISEASES  =======================================================                               Maximiliano Nagel MD#  Vincent Chong MD*                                     Sharon Jules MD*    Twyla Seo MD*            Diplomates American Board of Internal Medicine & Infectious Diseases                  # [place] Office - Appt - Tel  [phone] Fax [phone]                * [place] Office - Appt - Tel [phone] Fax [phone]                                  Hospital Consult line:  [phone]  =======================================================      MRN-[id number]  MARY ANN CORNELL   HPI: This  72 yo male with PMHx CHF with EF 30% s/p AICD, DM on insulin, a fib on Coumadin, HTN, HLD, p/w after multiple mechanical falls. Reports feeling that his legs are getting weaker over the last few days, and fell about 4 times yesterday - all mechanical in nature. He fell earlier in the morning, slid down the side of the bed and was on the floor for about five hours, not wanting to wake up families, and end up calling 911 around 7am. Denies LOC, head injury, dizziness, lightheadedness, syncope, CP, n/v. Reports chronic SOB, with MACHUCA. No orthopnea and sleeps with one pillow. ROS otherwise negative for fever, chill, CP, n/v/c/d, nor urinary concerns. Has chronic LE swelling, which appears to be at beseline right now. No recent weight changes.     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 Admitted for further workup. (21 Apr 2022 14:50)    patient found with right heel eschar on admission. Podiatry consulted. has AICD not compatible with MRI.   ID consulted for management.   no fevers at home.  no fevers here either.   patient reports that heel lesion has been present for some time.       I have personally reviewed the labs and data; pertinent labs and data are listed in this note; please see below.   =======================================================  Past Medical & Surgical Hx:  =====================  PAST MEDICAL & SURGICAL HISTORY:  DM (diabetes mellitus)    HTN (hypertension)    High cholesterol    Renal insufficiency    Sleep apnea    Prostate CA    Pulmonary hypertension    CHF (congestive heart failure)  EF 30%    Atrial fibrillation    Chronic back pain    Lung nodules    S/P ankle ligament repair    AICD (automatic cardioverter/defibrillator) present    History of brachytherapy      Problem List:  ==========  HEALTH ISSUES - PROBLEM Dx:        Social Hx:  =======  no toxic habits currently    FAMILY HISTORY:  Family history of cancer (Sibling)    Family history of diabetes mellitus (Sibling)    no significant family history of immunosuppressive disorders in mother or father   =======================================================    REVIEW OF SYSTEMS:  CONSTITUTIONAL:  No Fever or chills  HEENT:  No diplopia or blurred vision.  No earache, sore throat or runny nose.  CARDIOVASCULAR:  No pressure, squeezing, strangling, tightness, heaviness or aching about the chest, neck, axilla or epigastrium.  RESPIRATORY:  No cough, shortness of breath  GASTROINTESTINAL:  No nausea, vomiting or diarrhea.  GENITOURINARY:  No dysuria, frequency or urgency. No Blood in urine  MUSCULOSKELETAL:  + WEAKNESS  SKIN:  as per HPI  NEUROLOGIC:  No Headaches, seizures or weakness.  PSYCHIATRIC:  No disorder of thought or mood.  ENDOCRINE:  No heat or cold intolerance  HEMATOLOGICAL:  No easy bruising or bleeding.    =======================================================  Allergies  clindamycin (Unknown)        allow double protein at meals (Unknown)  Antibiotics:    Other medications:  atorvastatin 40 milliGRAM(s) Oral at bedtime  carvedilol 3.125 milliGRAM(s) Oral every 12 hours  collagenase Ointment 1 Application(s) Topical daily  dextrose 5%. 1000 milliLiter(s) IV Continuous <Continuous>  dextrose 5%. 1000 milliLiter(s) IV Continuous <Continuous>  dextrose 50% Injectable 25 Gram(s) IV Push once  dextrose 50% Injectable 12.5 Gram(s) IV Push once  dextrose 50% Injectable 25 Gram(s) IV Push once  folic acid 1 milliGRAM(s) Oral daily  furosemide    Tablet 40 milliGRAM(s) Oral daily  gabapentin Oral Tab/Cap - Peds 900 milliGRAM(s) Oral two times a day  glucagon  Injectable 1 milliGRAM(s) IntraMuscular once  insulin glargine Injectable (LANTUS) 40 Unit(s) SubCutaneous at bedtime  insulin lispro (ADMELOG) corrective regimen sliding scale   SubCutaneous Before meals and at bedtime  insulin lispro Injectable (ADMELOG) 7 Unit(s) SubCutaneous three times a day before meals  levothyroxine 50 MICROGram(s) Oral daily  tamsulosin 0.4 milliGRAM(s) Oral at bedtime       ======================================================  Physical Exam:  ============  T(F): 98.3 (22 Apr 2022 10:55), Max: 98.4 (21 Apr 2022 21:46)  HR: 82 (22 Apr 2022 10:55)  BP: 117/56 (22 Apr 2022 10:55)  RR: 19 (22 Apr 2022 10:55)  SpO2: 91% (22 Apr 2022 10:55) (91% - 96%)  temp max in last 48H T(F): , Max: 98.4 (04-21-22 @ 21:46)    General:  No acute distress.  OBESE  Eye: Pupils are equal, round and reactive to light, Extraocular movements are intact, Normal conjunctiva.  HENT: Normocephalic, Oral mucosa is moist, No pharyngeal erythema, No sinus tenderness.  Neck: Supple, No lymphadenopathy.  Respiratory: Lungs are clear to auscultation, Respirations are non-labored.  Cardiovascular: s1 s2 heart sounds  Gastrointestinal: Soft, Non-tender, Non-distended, Normal bowel sounds.  Genitourinary: No costovertebral angle tenderness.  Lymphatics: No lymphadenopathy neck,   Musculoskeletal: Normal range of motion, Normal strength.  Integumentary: No rash.  RIGHT heel with dark eschar at calcaneous. NO DRAINAGE noted.    RIGHT LEG has chronic LE stasis changes  Neurologic: Alert, Oriented, No focal deficits, Cranial Nerves II-XII are grossly intact.  Psychiatric: Appropriate mood & affect.    =======================================================  Labs:                        16.4   5.50  )-----------( 118      ( 21 Apr 2022 09:25 )             51.0     04-21    135  |  101  |  23.4<H>  ----------------------------<  292<H>  3.9   |  23.0  |  1.02    Ca    7.9<L>      21 Apr 2022 09:25    TPro  6.1<L>  /  Alb  3.3  /  TBili  1.0  /  DBili  x   /  AST  18  /  ALT  9   /  AlkPhos  57  04-21      Creatinine, Serum: 1.02 mg/dL (04-21-22 @ 09:25)    C-Reactive Protein, Serum: 105 mg/L (04-21-22 @ 18:14)    Sedimentation Rate, Erythrocyte: 6 mm/hr (04-21-22 @ 18:14)      SARS-CoV-2: NotDetec (04-21-22 @ 08:50)       A1C with Estimated Average Glucose Result: 10.8 % (04-22-22 @ 07:37)

## 2022-04-22 NOTE — CONSULT NOTE ADULT - SUBJECTIVE AND OBJECTIVE BOX
Vascular Attending:  Dr. Kothari      HPI:  Patient is a 72 yo male with PMHx CHF with EF 30% s/p AICD, DM on insulin, a fib on Coumadin, HTN, HLD, p/w after multiple mechanical falls. Reports feeling that his legs are getting weaker over the last few days, and fell about 4 times yesterday - all mechanical in nature. He fell earlier in the morning, slid down the side of the bed and was on the floor for about five hours, not wanting to wake up families, and end up calling 911 around 7am. Denies LOC, headstrike, dizziness, lightheadedness, syncope, CP, n/v. Reports chronic SOB, with MACHUCA. No orthopnea and sleeps with one pillow. ROS otherwise negative for fever, chill, CP, n/v/c/d, nor urinary concerns. Has chronic LE swelling, which appears to be at beseline right now. No recent weight changes.     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 Admitted for further workup. (2022 14:50)    Vascular HPI: Patient states he first noticed the heel wound ~1 month ago. 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 He has neuropathy otherwise throughout both legs. He ambulates at home with a walker and uses a motorized wheelchair for trips out of the house. He states he was seeing a Vascular Surgeon years ago for his chronic venous stasis. Denies previous vascular interventions on his legs, denies smoking.       PAST MEDICAL & SURGICAL HISTORY:  DM (diabetes mellitus)  HTN (hypertension)  High cholesterol  Renal insufficiency  Sleep apnea  Prostate CA  Pulmonary hypertension  CHF (congestive heart failure)  EF 30%  Atrial fibrillation  Chronic back pain  Lung nodules    S/P ankle ligament repair  AICD (automatic cardioverter/defibrillator) present  History of brachytherapy        MEDICATIONS  (STANDING):  atorvastatin 40 milliGRAM(s) Oral at bedtime  carvedilol 3.125 milliGRAM(s) Oral every 12 hours  collagenase Ointment 1 Application(s) Topical daily  dextrose 5%. 1000 milliLiter(s) (50 mL/Hr) IV Continuous <Continuous>  dextrose 5%. 1000 milliLiter(s) (100 mL/Hr) IV Continuous <Continuous>  dextrose 50% Injectable 25 Gram(s) IV Push once  dextrose 50% Injectable 12.5 Gram(s) IV Push once  dextrose 50% Injectable 25 Gram(s) IV Push once  folic acid 1 milliGRAM(s) Oral daily  furosemide    Tablet 40 milliGRAM(s) Oral daily  gabapentin Oral Tab/Cap - Peds 900 milliGRAM(s) Oral two times a day  glucagon  Injectable 1 milliGRAM(s) IntraMuscular once  insulin glargine Injectable (LANTUS) 40 Unit(s) SubCutaneous at bedtime  insulin lispro (ADMELOG) corrective regimen sliding scale   SubCutaneous Before meals and at bedtime  insulin lispro Injectable (ADMELOG) 7 Unit(s) SubCutaneous three times a day before meals  levothyroxine 50 MICROGram(s) Oral daily  tamsulosin 0.4 milliGRAM(s) Oral at bedtime    MEDICATIONS  (PRN):  acetaminophen     Tablet .. 650 milliGRAM(s) Oral every 6 hours PRN Temp greater or equal to 38C (100.4F), Mild Pain (1 - 3)  ALBUTerol    90 MICROgram(s) HFA Inhaler 2 Puff(s) Inhalation every 6 hours PRN Shortness of Breath and/or Wheezing  aluminum hydroxide/magnesium hydroxide/simethicone Suspension 30 milliLiter(s) Oral every 4 hours PRN Dyspepsia  dextrose Oral Gel 15 Gram(s) Oral once PRN Blood Glucose LESS THAN 70 milliGRAM(s)/deciliter  melatonin 3 milliGRAM(s) Oral at bedtime PRN Insomnia  ondansetron Injectable 4 milliGRAM(s) IV Push every 8 hours PRN Nausea and/or Vomiting      Allergies  clindamycin (Unknown)    Intolerances  allow double protein at meals (Unknown)      SOCIAL HISTORY:  Nonsmoker  Occasional EtOH, marijuana vape pen use      Vital Signs Last 24 Hrs  T(C): 36.8 (2022 10:55), Max: 36.9 (2022 21:46)  T(F): 98.3 (2022 10:55), Max: 98.4 (2022 21:46)  HR: 82 (2022 10:55) (76 - 98)  BP: 117/56 (2022 10:55) (109/77 - 118/82)  BP(mean): --  RR: 19 (2022 10:55) (18 - 19)  SpO2: 91% (2022 10:55) (91% - 96%)    PHYSICAL EXAM:  Constitutional: NAD, non-toxic appearing  Respiratory: No conversational dyspnea  Extremities: Chronic venous stasis dermatitis, pitting edema involving BL feet, B/L WWP  Vascular: Biphasic AT/DP in LLE    LABS:                        16.4   5.50  )-----------( 118      ( 2022 09:25 )             51.0     04-    135  |  101  |  23.4<H>  ----------------------------<  292<H>  3.9   |  23.0  |  1.02    Ca    7.9<L>      2022 09:25    TPro  6.1<L>  /  Alb  3.3  /  TBili  1.0  /  DBili  x   /  AST  18  /  ALT  9   /  AlkPhos  57      PT/INR - ( 2022 12:51 )   PT: 29.4 sec;   INR: 2.51 ratio         PTT - ( 2022 09:26 )  PTT:38.1 sec  Urinalysis Basic - ( 2022 06:30 )    Color: Yellow / Appearance: Clear / S.020 / pH: x  Gluc: x / Ketone: Trace  / Bili: Negative / Urobili: Negative mg/dL   Blood: x / Protein: Negative / Nitrite: Negative   Leuk Esterase: Small / RBC: 0-2 /HPF / WBC Negative /HPF   Sq Epi: x / Non Sq Epi: Occasional / Bacteria: Occasional        RADIOLOGY & ADDITIONAL STUDIES     Vascular Attending:  Dr. Kothari      HPI:  Patient is a 70 yo male with PMHx CHF with EF 30% s/p AICD, DM on insulin, a fib on Coumadin, HTN, HLD, p/w after multiple mechanical falls. Reports feeling that his legs are getting weaker over the last few days, and fell about 4 times yesterday - all mechanical in nature. He fell earlier in the morning, slid down the side of the bed and was on the floor for about five hours, not wanting to wake up families, and end up calling 911 around 7am. Denies LOC, headstrike, dizziness, lightheadedness, syncope, CP, n/v. Reports chronic SOB, with MACHUCA. No orthopnea and sleeps with one pillow. ROS otherwise negative for fever, chill, CP, n/v/c/d, nor urinary concerns. Has chronic LE swelling, which appears to be at beseline right now. No recent weight changes.     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 Admitted for further workup. (2022 14:50)    Vascular HPI: Patient states he first noticed the heel wound ~1 month ago. 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 He has neuropathy otherwise throughout both legs. He ambulates at home with a walker and uses a motorized wheelchair for trips out of the house. He states he was seeing a Vascular Surgeon years ago for his chronic venous stasis. Denies previous vascular interventions on his legs, denies smoking.       PAST MEDICAL & SURGICAL HISTORY:  DM (diabetes mellitus)  HTN (hypertension)  High cholesterol  Renal insufficiency  Sleep apnea  Prostate CA  Pulmonary hypertension  CHF (congestive heart failure)  EF 30%  Atrial fibrillation  Chronic back pain  Lung nodules    S/P ankle ligament repair  AICD (automatic cardioverter/defibrillator) present  History of brachytherapy        MEDICATIONS  (STANDING):  atorvastatin 40 milliGRAM(s) Oral at bedtime  carvedilol 3.125 milliGRAM(s) Oral every 12 hours  collagenase Ointment 1 Application(s) Topical daily  dextrose 5%. 1000 milliLiter(s) (50 mL/Hr) IV Continuous <Continuous>  dextrose 5%. 1000 milliLiter(s) (100 mL/Hr) IV Continuous <Continuous>  dextrose 50% Injectable 25 Gram(s) IV Push once  dextrose 50% Injectable 12.5 Gram(s) IV Push once  dextrose 50% Injectable 25 Gram(s) IV Push once  folic acid 1 milliGRAM(s) Oral daily  furosemide    Tablet 40 milliGRAM(s) Oral daily  gabapentin Oral Tab/Cap - Peds 900 milliGRAM(s) Oral two times a day  glucagon  Injectable 1 milliGRAM(s) IntraMuscular once  insulin glargine Injectable (LANTUS) 40 Unit(s) SubCutaneous at bedtime  insulin lispro (ADMELOG) corrective regimen sliding scale   SubCutaneous Before meals and at bedtime  insulin lispro Injectable (ADMELOG) 7 Unit(s) SubCutaneous three times a day before meals  levothyroxine 50 MICROGram(s) Oral daily  tamsulosin 0.4 milliGRAM(s) Oral at bedtime    MEDICATIONS  (PRN):  acetaminophen     Tablet .. 650 milliGRAM(s) Oral every 6 hours PRN Temp greater or equal to 38C (100.4F), Mild Pain (1 - 3)  ALBUTerol    90 MICROgram(s) HFA Inhaler 2 Puff(s) Inhalation every 6 hours PRN Shortness of Breath and/or Wheezing  aluminum hydroxide/magnesium hydroxide/simethicone Suspension 30 milliLiter(s) Oral every 4 hours PRN Dyspepsia  dextrose Oral Gel 15 Gram(s) Oral once PRN Blood Glucose LESS THAN 70 milliGRAM(s)/deciliter  melatonin 3 milliGRAM(s) Oral at bedtime PRN Insomnia  ondansetron Injectable 4 milliGRAM(s) IV Push every 8 hours PRN Nausea and/or Vomiting      Allergies  clindamycin (Unknown)    Intolerances  allow double protein at meals (Unknown)      SOCIAL HISTORY:  Nonsmoker  Occasional EtOH, marijuana vape pen use      Vital Signs Last 24 Hrs  T(C): 36.8 (2022 10:55), Max: 36.9 (2022 21:46)  T(F): 98.3 (2022 10:55), Max: 98.4 (2022 21:46)  HR: 82 (2022 10:55) (76 - 98)  BP: 117/56 (2022 10:55) (109/77 - 118/82)  BP(mean): --  RR: 19 (2022 10:55) (18 - 19)  SpO2: 91% (2022 10:55) (91% - 96%)    PHYSICAL EXAM:  Constitutional: NAD, non-toxic appearing  Respiratory: No conversational dyspnea  Extremities: Chronic venous stasis dermatitis, pitting edema involving BL feet, B/L WWP  Vascular: Biphasic AT/DP in LLE    LABS:                        16.4   5.50  )-----------( 118      ( 2022 09:25 )             51.0     04-    135  |  101  |  23.4<H>  ----------------------------<  292<H>  3.9   |  23.0  |  1.02    Ca    7.9<L>      2022 09:25    TPro  6.1<L>  /  Alb  3.3  /  TBili  1.0  /  DBili  x   /  AST  18  /  ALT  9   /  AlkPhos  57      PT/INR - ( 2022 12:51 )   PT: 29.4 sec;   INR: 2.51 ratio         PTT - ( 2022 09:26 )  PTT:38.1 sec  Urinalysis Basic - ( 2022 06:30 )    Color: Yellow / Appearance: Clear / S.020 / pH: x  Gluc: x / Ketone: Trace  / Bili: Negative / Urobili: Negative mg/dL   Blood: x / Protein: Negative / Nitrite: Negative   Leuk Esterase: Small / RBC: 0-2 /HPF / WBC Negative /HPF   Sq Epi: x / Non Sq Epi: Occasional / Bacteria: Occasional

## 2022-04-22 NOTE — CONSULT NOTE ADULT - SUBJECTIVE AND OBJECTIVE BOX
Patient is a 71y old  Male who presents with a chief complaint of Fall (22 Apr 2022 10:47)      HPI:  Patient is a 70 yo male with PMHx CHF with EF 30% s/p AICD, DM on insulin, a fib on Coumadin, HTN, HLD, p/w after multiple mechanical falls. Reports feeling that his legs are getting weaker over the last few days, and fell about 4 times yesterday - all mechanical in nature. He fell earlier in the morning, slid down the side of the bed and was on the floor for about five hours, not wanting to wake up families, and end up calling 911 around 7am. Denies LOC, headstrike, dizziness, lightheadedness, syncope, CP, n/v. Reports chronic SOB, with MACHUCA. No orthopnea and sleeps with one pillow. ROS otherwise negative for fever, chill, CP, n/v/c/d, nor urinary concerns. Has chronic LE swelling, which appears to be at beseline right now. No recent weight changes.     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 Admitted for further workup. (21 Apr 2022 14:50)      Podiatry HPI: 71 y.o male evaluated resting in bed. Consulted for right heel eschar. Pt states he is aware he has a wound on his right heel, states it started as a cut. Pt states he is neuropathic which led to the progression of the wound. Pt states recently he has been non ambulatory has had multiple mechanical falls. Denied N/V/F/C/SOB. Admits too numbness, tingling, and burning pain. Pt states he does not follow with a Podiatrist outpatient.     PMH:DM (diabetes mellitus)    HTN (hypertension)    High cholesterol    Renal insufficiency    Sleep apnea    Prostate CA    Pulmonary hypertension    CHF (congestive heart failure)    Atrial fibrillation    Chronic back pain    Lung nodules      Allergies: allow double protein at meals (Unknown)  clindamycin (Unknown)    Medications: acetaminophen     Tablet .. 650 milliGRAM(s) Oral every 6 hours PRN  ALBUTerol    90 MICROgram(s) HFA Inhaler 2 Puff(s) Inhalation every 6 hours PRN  aluminum hydroxide/magnesium hydroxide/simethicone Suspension 30 milliLiter(s) Oral every 4 hours PRN  atorvastatin 40 milliGRAM(s) Oral at bedtime  carvedilol 3.125 milliGRAM(s) Oral every 12 hours  dextrose 5%. 1000 milliLiter(s) IV Continuous <Continuous>  dextrose 5%. 1000 milliLiter(s) IV Continuous <Continuous>  dextrose 50% Injectable 25 Gram(s) IV Push once  dextrose 50% Injectable 12.5 Gram(s) IV Push once  dextrose 50% Injectable 25 Gram(s) IV Push once  dextrose Oral Gel 15 Gram(s) Oral once PRN  folic acid 1 milliGRAM(s) Oral daily  furosemide    Tablet 40 milliGRAM(s) Oral daily  gabapentin Oral Tab/Cap - Peds 900 milliGRAM(s) Oral two times a day  glucagon  Injectable 1 milliGRAM(s) IntraMuscular once  insulin glargine Injectable (LANTUS) 40 Unit(s) SubCutaneous at bedtime  insulin lispro (ADMELOG) corrective regimen sliding scale   SubCutaneous Before meals and at bedtime  insulin lispro Injectable (ADMELOG) 7 Unit(s) SubCutaneous three times a day before meals  levothyroxine 50 MICROGram(s) Oral daily  melatonin 3 milliGRAM(s) Oral at bedtime PRN  ondansetron Injectable 4 milliGRAM(s) IV Push every 8 hours PRN  tamsulosin 0.4 milliGRAM(s) Oral at bedtime    FH:Family history of cancer (Sibling)    Family history of diabetes mellitus (Sibling)      PSX: No significant past surgical history    S/P ankle ligament repair    AICD (automatic cardioverter/defibrillator) present    History of brachytherapy      SH: Social History:  Has Walker, rollator. Ex-smoker, denies any toxic habits currently (21 Apr 2022 14:50)      Vital Signs Last 24 Hrs  T(C): 36.8 (22 Apr 2022 10:55), Max: 36.9 (21 Apr 2022 21:46)  T(F): 98.3 (22 Apr 2022 10:55), Max: 98.4 (21 Apr 2022 21:46)  HR: 82 (22 Apr 2022 10:55) (76 - 98)  BP: 117/56 (22 Apr 2022 10:55) (109/77 - 118/82)  BP(mean): --  RR: 19 (22 Apr 2022 10:55) (18 - 19)  SpO2: 91% (22 Apr 2022 10:55) (91% - 96%)    LABS                        16.4   5.50  )-----------( 118      ( 21 Apr 2022 09:25 )             51.0               04-21    135  |  101  |  23.4<H>  ----------------------------<  292<H>  3.9   |  23.0  |  1.02    Ca    7.9<L>      21 Apr 2022 09:25    TPro  6.1<L>  /  Alb  3.3  /  TBili  1.0  /  DBili  x   /  AST  18  /  ALT  9   /  AlkPhos  57  04-21     Sedimentation Rate, Erythrocyte: 6 mm/hr (04-21-22 @ 18:14)  C-Reactive Protein, Serum: 105 mg/L (04-21-22 @ 18:14)  WBC Count: 5.50 K/uL (04-21-22 @ 09:25)      ROS  REVIEW OF SYSTEMS: Negative unless stated otherwise in the HPI      PHYSICAL EXAM  GEN: MARY ANN CORNELL is a 71y Male in no acute distress, alert awake, and oriented to person, place and time.   LE Focused:    Vasc: DP/PT non palpable, CFT intact to digits, non pitting edema noted to b/l legs and dorsal feet   Derm: 5cmx 4cm eschar noted to right plantar heel, +malodor, periwound edema and erythema noted, no tunneling, no undermining, no PTB observed  Neuro: Protective sensation diminished   MSK: 3/5 muscle strength noted in all compartments pt states he is currently non ambulatory     Imaging: Pending official read     Cultures: Pending

## 2022-04-22 NOTE — CONSULT NOTE ADULT - ASSESSMENT
A:   Right heel necrotic eschar, no PTB    P:  Patient evaluated, chart reviewed  Xrays pending final read   Cultures pending  Pending ZOYA/PVR    Recommend ID consult   Appreciate vascular consult   Area was explored with stat. Cleaned with Dakins, applied santly DSD/ACE. Pt tolerated the procedure well.    Recommend elevation  Podiatry will follow inhouse   Pt can be WBAT to R forefoot   Pod plan:  Continue LWC, no surgical intervention from podiatry standpoint   WCO: apply santly to right heel wound, abd pad, secure with shira, apply ACE  Discussed with and evaluated bedside with attending Dr. Dow

## 2022-04-22 NOTE — PHYSICAL THERAPY INITIAL EVALUATION ADULT - ADDITIONAL COMMENTS
Pt states he lives in a house on the first floor with 5 GOLDY with a HR. His spouse is on home hospice and has a home health aide. DAughter and son-in-law also live there. Pt uses a RW within the home and a motorized w/c outside the home.

## 2022-04-23 LAB
ANION GAP SERPL CALC-SCNC: 14 MMOL/L — SIGNIFICANT CHANGE UP (ref 5–17)
BUN SERPL-MCNC: 21.6 MG/DL — HIGH (ref 8–20)
CALCIUM SERPL-MCNC: 8.2 MG/DL — LOW (ref 8.6–10.2)
CHLORIDE SERPL-SCNC: 100 MMOL/L — SIGNIFICANT CHANGE UP (ref 98–107)
CO2 SERPL-SCNC: 21 MMOL/L — LOW (ref 22–29)
CREAT SERPL-MCNC: 1.03 MG/DL — SIGNIFICANT CHANGE UP (ref 0.5–1.3)
CULTURE RESULTS: SIGNIFICANT CHANGE UP
EGFR: 78 ML/MIN/1.73M2 — SIGNIFICANT CHANGE UP
GLUCOSE BLDC GLUCOMTR-MCNC: 208 MG/DL — HIGH (ref 70–99)
GLUCOSE BLDC GLUCOMTR-MCNC: 220 MG/DL — HIGH (ref 70–99)
GLUCOSE BLDC GLUCOMTR-MCNC: 243 MG/DL — HIGH (ref 70–99)
GLUCOSE BLDC GLUCOMTR-MCNC: 309 MG/DL — HIGH (ref 70–99)
GLUCOSE SERPL-MCNC: 235 MG/DL — HIGH (ref 70–99)
HCT VFR BLD CALC: 50.2 % — HIGH (ref 39–50)
HGB BLD-MCNC: 16.2 G/DL — SIGNIFICANT CHANGE UP (ref 13–17)
INR BLD: 2.24 RATIO — HIGH (ref 0.88–1.16)
MCHC RBC-ENTMCNC: 28.6 PG — SIGNIFICANT CHANGE UP (ref 27–34)
MCHC RBC-ENTMCNC: 32.3 GM/DL — SIGNIFICANT CHANGE UP (ref 32–36)
MCV RBC AUTO: 88.7 FL — SIGNIFICANT CHANGE UP (ref 80–100)
PLATELET # BLD AUTO: 118 K/UL — LOW (ref 150–400)
POTASSIUM SERPL-MCNC: 3.9 MMOL/L — SIGNIFICANT CHANGE UP (ref 3.5–5.3)
POTASSIUM SERPL-SCNC: 3.9 MMOL/L — SIGNIFICANT CHANGE UP (ref 3.5–5.3)
PROTHROM AB SERPL-ACNC: 26.2 SEC — HIGH (ref 10.5–13.4)
RBC # BLD: 5.66 M/UL — SIGNIFICANT CHANGE UP (ref 4.2–5.8)
RBC # FLD: 16.6 % — HIGH (ref 10.3–14.5)
SODIUM SERPL-SCNC: 135 MMOL/L — SIGNIFICANT CHANGE UP (ref 135–145)
SPECIMEN SOURCE: SIGNIFICANT CHANGE UP
WBC # BLD: 4.43 K/UL — SIGNIFICANT CHANGE UP (ref 3.8–10.5)
WBC # FLD AUTO: 4.43 K/UL — SIGNIFICANT CHANGE UP (ref 3.8–10.5)

## 2022-04-23 PROCEDURE — 73701 CT LOWER EXTREMITY W/DYE: CPT | Mod: 26,RT

## 2022-04-23 PROCEDURE — 93923 UPR/LXTR ART STDY 3+ LVLS: CPT | Mod: 26

## 2022-04-23 PROCEDURE — 99233 SBSQ HOSP IP/OBS HIGH 50: CPT

## 2022-04-23 RX ORDER — WARFARIN SODIUM 2.5 MG/1
3 TABLET ORAL ONCE
Refills: 0 | Status: COMPLETED | OUTPATIENT
Start: 2022-04-23 | End: 2022-04-23

## 2022-04-23 RX ORDER — KETOROLAC TROMETHAMINE 30 MG/ML
15 SYRINGE (ML) INJECTION ONCE
Refills: 0 | Status: DISCONTINUED | OUTPATIENT
Start: 2022-04-23 | End: 2022-04-23

## 2022-04-23 RX ORDER — INSULIN LISPRO 100/ML
10 VIAL (ML) SUBCUTANEOUS
Refills: 0 | Status: DISCONTINUED | OUTPATIENT
Start: 2022-04-23 | End: 2022-04-24

## 2022-04-23 RX ADMIN — TAMSULOSIN HYDROCHLORIDE 0.4 MILLIGRAM(S): 0.4 CAPSULE ORAL at 21:14

## 2022-04-23 RX ADMIN — Medication 650 MILLIGRAM(S): at 22:47

## 2022-04-23 RX ADMIN — INSULIN GLARGINE 40 UNIT(S): 100 INJECTION, SOLUTION SUBCUTANEOUS at 21:13

## 2022-04-23 RX ADMIN — GABAPENTIN 900 MILLIGRAM(S): 400 CAPSULE ORAL at 05:49

## 2022-04-23 RX ADMIN — Medication 7 UNIT(S): at 08:00

## 2022-04-23 RX ADMIN — Medication 1 TABLET(S): at 17:22

## 2022-04-23 RX ADMIN — CARVEDILOL PHOSPHATE 3.12 MILLIGRAM(S): 80 CAPSULE, EXTENDED RELEASE ORAL at 05:49

## 2022-04-23 RX ADMIN — WARFARIN SODIUM 3 MILLIGRAM(S): 2.5 TABLET ORAL at 21:13

## 2022-04-23 RX ADMIN — Medication 1 TABLET(S): at 05:49

## 2022-04-23 RX ADMIN — Medication 7 UNIT(S): at 12:02

## 2022-04-23 RX ADMIN — CARVEDILOL PHOSPHATE 3.12 MILLIGRAM(S): 80 CAPSULE, EXTENDED RELEASE ORAL at 17:22

## 2022-04-23 RX ADMIN — Medication 7 UNIT(S): at 17:23

## 2022-04-23 RX ADMIN — Medication 1 APPLICATION(S): at 17:22

## 2022-04-23 RX ADMIN — Medication 4: at 17:23

## 2022-04-23 RX ADMIN — Medication 4: at 12:01

## 2022-04-23 RX ADMIN — Medication 650 MILLIGRAM(S): at 23:47

## 2022-04-23 RX ADMIN — Medication 8: at 21:14

## 2022-04-23 RX ADMIN — GABAPENTIN 900 MILLIGRAM(S): 400 CAPSULE ORAL at 17:23

## 2022-04-23 RX ADMIN — Medication 4: at 07:59

## 2022-04-23 RX ADMIN — Medication 50 MICROGRAM(S): at 05:49

## 2022-04-23 RX ADMIN — Medication 15 MILLIGRAM(S): at 23:59

## 2022-04-23 RX ADMIN — Medication 40 MILLIGRAM(S): at 05:56

## 2022-04-23 RX ADMIN — ATORVASTATIN CALCIUM 40 MILLIGRAM(S): 80 TABLET, FILM COATED ORAL at 21:14

## 2022-04-23 RX ADMIN — Medication 1 MILLIGRAM(S): at 12:01

## 2022-04-23 NOTE — DIETITIAN INITIAL EVALUATION ADULT - NSFNSADHERENCEPTAFT_GEN_A_CORE
Aware pt with hgba1c 10.8%; states taking Levemir in the morning and Novalog SS 1-2 times daily.  Discussed importance of cons cho meal plan using the plate method and emphasized importance of consistent meal timing with 3 meals daily.  Provided examples of CHO/protein portions and also appropriate snack choices to reduce unhealthy "binges" at night.  Also reviewed appropriate timing of bolus insulin injections with breakfast, lunch and dinner to improve glycemic control.  Pt very receptive and verbalized understanding.  Nutrition literature provided.  RD CDCES to remain available.

## 2022-04-23 NOTE — DIETITIAN INITIAL EVALUATION ADULT - PERTINENT LABORATORY DATA
04-23    135  |  100  |  21.6<H>  ----------------------------<  235<H>  3.9   |  21.0<L>  |  1.03    Ca    8.2<L>      23 Apr 2022 07:27    POCT Blood Glucose.: 220 mg/dL (04-23-22 @ 07:51)  A1C with Estimated Average Glucose Result: 10.8 % (04-22-22 @ 07:37)  A1C with Estimated Average Glucose Result: 10.3 % (05-13-21 @ 04:07)

## 2022-04-23 NOTE — DIETITIAN INITIAL EVALUATION ADULT - NS FNS DIET ORDER
Diet, DASH/TLC:   Sodium & Cholesterol Restricted  Consistent Carbohydrate {Evening Snack} (CSTCHOSN) (04-21-22 @ 16:05)

## 2022-04-23 NOTE — DIETITIAN NUTRITION RISK NOTIFICATION - TREATMENT: THE FOLLOWING DIET HAS BEEN RECOMMENDED
Diet, DASH/TLC:   Sodium & Cholesterol Restricted  Consistent Carbohydrate {Evening Snack} (CSTCHOSN) (04-21-22 @ 16:05) [Active]

## 2022-04-23 NOTE — DIETITIAN INITIAL EVALUATION ADULT - NUTRITIONGOAL OUTCOME1
Pt will state 3 key components about therapeutic meal plan/diabetes self management and adhere to nutrition recommendations

## 2022-04-23 NOTE — DIETITIAN INITIAL EVALUATION ADULT - PERTINENT MEDS FT
MEDICATIONS  (STANDING):  amoxicillin  875 milliGRAM(s)/clavulanate 1 Tablet(s) Oral two times a day  atorvastatin 40 milliGRAM(s) Oral at bedtime  carvedilol 3.125 milliGRAM(s) Oral every 12 hours  collagenase Ointment 1 Application(s) Topical daily  dextrose 5%. 1000 milliLiter(s) (50 mL/Hr) IV Continuous <Continuous>  dextrose 5%. 1000 milliLiter(s) (100 mL/Hr) IV Continuous <Continuous>  dextrose 50% Injectable 25 Gram(s) IV Push once  dextrose 50% Injectable 12.5 Gram(s) IV Push once  dextrose 50% Injectable 25 Gram(s) IV Push once  folic acid 1 milliGRAM(s) Oral daily  furosemide    Tablet 40 milliGRAM(s) Oral daily  gabapentin Oral Tab/Cap - Peds 900 milliGRAM(s) Oral two times a day  glucagon  Injectable 1 milliGRAM(s) IntraMuscular once  insulin glargine Injectable (LANTUS) 40 Unit(s) SubCutaneous at bedtime  insulin lispro (ADMELOG) corrective regimen sliding scale   SubCutaneous Before meals and at bedtime  insulin lispro Injectable (ADMELOG) 7 Unit(s) SubCutaneous three times a day before meals  levothyroxine 50 MICROGram(s) Oral daily  tamsulosin 0.4 milliGRAM(s) Oral at bedtime    MEDICATIONS  (PRN):  acetaminophen     Tablet .. 650 milliGRAM(s) Oral every 6 hours PRN Temp greater or equal to 38C (100.4F), Mild Pain (1 - 3)  ALBUTerol    90 MICROgram(s) HFA Inhaler 2 Puff(s) Inhalation every 6 hours PRN Shortness of Breath and/or Wheezing  aluminum hydroxide/magnesium hydroxide/simethicone Suspension 30 milliLiter(s) Oral every 4 hours PRN Dyspepsia  dextrose Oral Gel 15 Gram(s) Oral once PRN Blood Glucose LESS THAN 70 milliGRAM(s)/deciliter  melatonin 3 milliGRAM(s) Oral at bedtime PRN Insomnia  ondansetron Injectable 4 milliGRAM(s) IV Push every 8 hours PRN Nausea and/or Vomiting

## 2022-04-23 NOTE — DIETITIAN INITIAL EVALUATION ADULT - OTHER INFO
Pt with h/o CHF with EF 30% s/p AICD, DM on insulin, a fib on Coumadin, HTN, HLD, orthostatic hypotension presenting from home s/p mechanical falls.

## 2022-04-23 NOTE — DIETITIAN INITIAL EVALUATION ADULT - ORAL INTAKE PTA/DIET HISTORY
Pt reports good po intake at meals.  Pt states good appetite PTA; consumes 1 meal daily and snacks during late hours of evening.  Pt denies any recent wt changes, although he wanted to start "losing weight."

## 2022-04-24 ENCOUNTER — TRANSCRIPTION ENCOUNTER (OUTPATIENT)
Age: 72
End: 2022-04-24

## 2022-04-24 VITALS
HEART RATE: 92 BPM | RESPIRATION RATE: 17 BRPM | OXYGEN SATURATION: 95 % | SYSTOLIC BLOOD PRESSURE: 125 MMHG | DIASTOLIC BLOOD PRESSURE: 70 MMHG | TEMPERATURE: 98 F

## 2022-04-24 LAB
ANION GAP SERPL CALC-SCNC: 13 MMOL/L — SIGNIFICANT CHANGE UP (ref 5–17)
APTT BLD: 37.2 SEC — HIGH (ref 27.5–35.5)
BUN SERPL-MCNC: 27.3 MG/DL — HIGH (ref 8–20)
CALCIUM SERPL-MCNC: 8.3 MG/DL — LOW (ref 8.6–10.2)
CHLORIDE SERPL-SCNC: 101 MMOL/L — SIGNIFICANT CHANGE UP (ref 98–107)
CO2 SERPL-SCNC: 22 MMOL/L — SIGNIFICANT CHANGE UP (ref 22–29)
CREAT SERPL-MCNC: 1.09 MG/DL — SIGNIFICANT CHANGE UP (ref 0.5–1.3)
EGFR: 73 ML/MIN/1.73M2 — SIGNIFICANT CHANGE UP
GLUCOSE BLDC GLUCOMTR-MCNC: 166 MG/DL — HIGH (ref 70–99)
GLUCOSE BLDC GLUCOMTR-MCNC: 233 MG/DL — HIGH (ref 70–99)
GLUCOSE BLDC GLUCOMTR-MCNC: 270 MG/DL — HIGH (ref 70–99)
GLUCOSE SERPL-MCNC: 285 MG/DL — HIGH (ref 70–99)
HCT VFR BLD CALC: 49.8 % — SIGNIFICANT CHANGE UP (ref 39–50)
HGB BLD-MCNC: 16.1 G/DL — SIGNIFICANT CHANGE UP (ref 13–17)
INR BLD: 1.8 RATIO — HIGH (ref 0.88–1.16)
MCHC RBC-ENTMCNC: 28.6 PG — SIGNIFICANT CHANGE UP (ref 27–34)
MCHC RBC-ENTMCNC: 32.3 GM/DL — SIGNIFICANT CHANGE UP (ref 32–36)
MCV RBC AUTO: 88.5 FL — SIGNIFICANT CHANGE UP (ref 80–100)
PLATELET # BLD AUTO: 113 K/UL — LOW (ref 150–400)
POTASSIUM SERPL-MCNC: 4.2 MMOL/L — SIGNIFICANT CHANGE UP (ref 3.5–5.3)
POTASSIUM SERPL-SCNC: 4.2 MMOL/L — SIGNIFICANT CHANGE UP (ref 3.5–5.3)
PROTHROM AB SERPL-ACNC: 21 SEC — HIGH (ref 10.5–13.4)
RBC # BLD: 5.63 M/UL — SIGNIFICANT CHANGE UP (ref 4.2–5.8)
RBC # FLD: 16.3 % — HIGH (ref 10.3–14.5)
SODIUM SERPL-SCNC: 136 MMOL/L — SIGNIFICANT CHANGE UP (ref 135–145)
WBC # BLD: 4.42 K/UL — SIGNIFICANT CHANGE UP (ref 3.8–10.5)
WBC # FLD AUTO: 4.42 K/UL — SIGNIFICANT CHANGE UP (ref 3.8–10.5)

## 2022-04-24 PROCEDURE — 99232 SBSQ HOSP IP/OBS MODERATE 35: CPT

## 2022-04-24 PROCEDURE — 73564 X-RAY EXAM KNEE 4 OR MORE: CPT

## 2022-04-24 PROCEDURE — 73620 X-RAY EXAM OF FOOT: CPT

## 2022-04-24 PROCEDURE — 85027 COMPLETE CBC AUTOMATED: CPT

## 2022-04-24 PROCEDURE — 85652 RBC SED RATE AUTOMATED: CPT

## 2022-04-24 PROCEDURE — 99285 EMERGENCY DEPT VISIT HI MDM: CPT | Mod: 25

## 2022-04-24 PROCEDURE — 93923 UPR/LXTR ART STDY 3+ LVLS: CPT

## 2022-04-24 PROCEDURE — 87077 CULTURE AEROBIC IDENTIFY: CPT

## 2022-04-24 PROCEDURE — 0225U NFCT DS DNA&RNA 21 SARSCOV2: CPT

## 2022-04-24 PROCEDURE — 84484 ASSAY OF TROPONIN QUANT: CPT

## 2022-04-24 PROCEDURE — 80048 BASIC METABOLIC PNL TOTAL CA: CPT

## 2022-04-24 PROCEDURE — 87086 URINE CULTURE/COLONY COUNT: CPT

## 2022-04-24 PROCEDURE — 73701 CT LOWER EXTREMITY W/DYE: CPT

## 2022-04-24 PROCEDURE — 87070 CULTURE OTHR SPECIMN AEROBIC: CPT

## 2022-04-24 PROCEDURE — 82962 GLUCOSE BLOOD TEST: CPT

## 2022-04-24 PROCEDURE — 82550 ASSAY OF CK (CPK): CPT

## 2022-04-24 PROCEDURE — 93005 ELECTROCARDIOGRAM TRACING: CPT

## 2022-04-24 PROCEDURE — 71045 X-RAY EXAM CHEST 1 VIEW: CPT

## 2022-04-24 PROCEDURE — 84153 ASSAY OF PSA TOTAL: CPT

## 2022-04-24 PROCEDURE — 87186 SC STD MICRODIL/AGAR DIL: CPT

## 2022-04-24 PROCEDURE — 72192 CT PELVIS W/O DYE: CPT | Mod: MA

## 2022-04-24 PROCEDURE — 80053 COMPREHEN METABOLIC PANEL: CPT

## 2022-04-24 PROCEDURE — 83036 HEMOGLOBIN GLYCOSYLATED A1C: CPT

## 2022-04-24 PROCEDURE — C8929: CPT

## 2022-04-24 PROCEDURE — 83880 ASSAY OF NATRIURETIC PEPTIDE: CPT

## 2022-04-24 PROCEDURE — 85025 COMPLETE CBC W/AUTO DIFF WBC: CPT

## 2022-04-24 PROCEDURE — 36415 COLL VENOUS BLD VENIPUNCTURE: CPT

## 2022-04-24 PROCEDURE — 99239 HOSP IP/OBS DSCHRG MGMT >30: CPT

## 2022-04-24 PROCEDURE — 87040 BLOOD CULTURE FOR BACTERIA: CPT

## 2022-04-24 PROCEDURE — 73522 X-RAY EXAM HIPS BI 3-4 VIEWS: CPT

## 2022-04-24 PROCEDURE — 86140 C-REACTIVE PROTEIN: CPT

## 2022-04-24 PROCEDURE — 84154 ASSAY OF PSA FREE: CPT

## 2022-04-24 PROCEDURE — 85610 PROTHROMBIN TIME: CPT

## 2022-04-24 PROCEDURE — 81001 URINALYSIS AUTO W/SCOPE: CPT

## 2022-04-24 PROCEDURE — 85730 THROMBOPLASTIN TIME PARTIAL: CPT

## 2022-04-24 RX ORDER — MORPHINE SULFATE 50 MG/1
1 CAPSULE, EXTENDED RELEASE ORAL ONCE
Refills: 0 | Status: DISCONTINUED | OUTPATIENT
Start: 2022-04-24 | End: 2022-04-24

## 2022-04-24 RX ORDER — COLLAGENASE CLOSTRIDIUM HIST. 250 UNIT/G
1 OINTMENT (GRAM) TOPICAL
Qty: 10 | Refills: 0
Start: 2022-04-24 | End: 2022-05-03

## 2022-04-24 RX ADMIN — Medication 6: at 08:06

## 2022-04-24 RX ADMIN — Medication 10 UNIT(S): at 17:19

## 2022-04-24 RX ADMIN — Medication 1 MILLIGRAM(S): at 12:10

## 2022-04-24 RX ADMIN — Medication 10 UNIT(S): at 08:07

## 2022-04-24 RX ADMIN — Medication 2: at 17:19

## 2022-04-24 RX ADMIN — Medication 1 TABLET(S): at 05:49

## 2022-04-24 RX ADMIN — Medication 50 MICROGRAM(S): at 05:49

## 2022-04-24 RX ADMIN — Medication 1 APPLICATION(S): at 12:11

## 2022-04-24 RX ADMIN — Medication 4: at 12:10

## 2022-04-24 RX ADMIN — MORPHINE SULFATE 1 MILLIGRAM(S): 50 CAPSULE, EXTENDED RELEASE ORAL at 02:42

## 2022-04-24 RX ADMIN — MORPHINE SULFATE 1 MILLIGRAM(S): 50 CAPSULE, EXTENDED RELEASE ORAL at 03:00

## 2022-04-24 RX ADMIN — Medication 40 MILLIGRAM(S): at 05:49

## 2022-04-24 RX ADMIN — CARVEDILOL PHOSPHATE 3.12 MILLIGRAM(S): 80 CAPSULE, EXTENDED RELEASE ORAL at 05:49

## 2022-04-24 RX ADMIN — Medication 15 MILLIGRAM(S): at 00:15

## 2022-04-24 RX ADMIN — Medication 10 UNIT(S): at 12:11

## 2022-04-24 RX ADMIN — GABAPENTIN 900 MILLIGRAM(S): 400 CAPSULE ORAL at 05:49

## 2022-04-24 NOTE — DISCHARGE NOTE NURSING/CASE MANAGEMENT/SOCIAL WORK - PATIENT PORTAL LINK FT
You can access the FollowMyHealth Patient Portal offered by [facility] by registering at the following website: [email or web address]. By joining [employer]’s FollowMyHealth portal, you will also be able to view your health information using other applications (apps) compatible with our system.

## 2022-04-24 NOTE — DISCHARGE NOTE PROVIDER - HOSPITAL COURSE
72 yo male with PMHx CHF with EF 30% s/p AICD, DM on insulin, a fib on Coumadin, HTN, HLD, orthostatic hypotension presenting from home s/p mechanical falls.   Patient also was found to have diabetic foot infection. He was seen in consultation with podiatry team, ID team and   vascular team. Now status post local wound debridement by podiatry team, and improved symptoms, on discharge plan is to completed course of oral Abx and close follows up with podiatry team. Notably CT foot with iv contrast is not suggestive fo OM, ZOYA studies are normal.     In regards of chronic medical issues:  - chronic HFrEF, NSVT - repeat ECHO  this time with EF 20-25%, with ICD in place, pt seen in consultation with cardiology team and   was recommended to undergo stress test in OP in case surgical intervention is pland. on d/c continue coreg and lasix  - hx prostate cancer , s/p brachytherapy in 2010,  CT pelvis w/o bone mets  - recurrent falls, mechanical in nature on discription, on discharge ambulating well.   - chronic a.fib, with RVR - resolved upon resuming home medication, INR theraputic on presentation, advise to resume  prior to admission warfarin dose  - dm2 poorly controlled,  a1c 10.8 - while in house controlled on Lantus 40 and pre-meal 10 units and sliding scale, on discharge will resume home regimen, encourage diet compce.     diabetes education

## 2022-04-24 NOTE — PROGRESS NOTE ADULT - ASSESSMENT
71M with PMHx CHF with EF 20% s/p AICD, DM on insulin, a fib on Coumadin, HTN, HLD admitted for multiple mechanical falls. Found to have heel ulcer, c/f OM. Vascular surgery consulted to evaluate     - ZOYA/PVR of LE extremities WNL   -F/U CT results of R foot read   - Local wound care with podiatry  - optimize heart
 70 yo male with PMHx CHF with EF 30% s/p AICD, DM on insulin, a fib on Coumadin, HTN, HLD, orthostatic hypotension presenting from home s/p mechanical falls.    right heel diabetic foot ulcer    xray noted, pending formal ZOYA read as well as CT of R foot    podiatry/ ID eval noted    blood cultures w/o growth     chronic HFrEF, NSVT    echo noted     c/w coreg/lasix    orthostatics negative     cardiology recs noted     hx prostate CA:     s/p seeding 2010    CT pelvis w/o bone mets    recurrent falls: likely multifactorial    PT evaluation     Afib RVR   c/w home coreg    daily INR checks    may need to hold coumadin if podiatry planning surgical intervention     dm2: uncontrolled, a1c 10.8    lantus 40, increased premeal to 10 units and sliding scale    diabetes education    DVT PPX - on warfarin        
 72 yo male with PMHx CHF with EF 30% s/p AICD, DM on insulin, a fib on Coumadin, HTN, HLD, orthostatic hypotension presenting from home s/p mechanical falls.    right heel diabetic foot ulcer    xray, ZOYA's    podiatry/ ID eval    blood cultures    AICD NOT MRI compatible per cardiology discussion.    chronic HFrEF, NSVT    echo, c/w coreg/lasix    orthostatics negative     cardiology following     hx prostate CA:     s/p seeding 2010    CT pelvis w/o bone mets    recurrent falls: likely multifactorial    PT evaluation     Afib RVR   c/w home coreg    daily INR checks    may need to hold coumadin if podiatry planning surgical intervention     dm2: uncontrolled, a1c 10.8    lantus 40, premeal and sliding scale    diabetes education     
Impression:  foot eschar  Diabetes in poor control  elevated inflamm markers      Plan:  - no evidence of acute sepsis  WBC apparently normal  but elevated CRP    foot eschar is stable    suggest to continue a 14-day course of  Augmentin 875mg PO BID    Regarding Diabetes; poor control  - needs adequate control for wound healing  - most recent A1c is: 10.8        Consider discharge to community from ID point of view once antibiotics have been arranged.   
71M with PMHx CHF with EF 20% s/p AICD, DM on insulin, a fib on Coumadin, HTN, HLD admitted for multiple mechanical falls. Found to have heel ulcer, c/f OM. Vascular surgery consulted to evaluate     - ZOYA/PVR ordered, f/u results  - Local wound care with podiatry  - optimize heart  
A:   Right heel wound    P:  Patient evaluated, chart reviewed  Xrays reviewed   Cultures pending  Recommend ID consult   Appreciate vascular consult   applied santyl DSD/ACE.   Recommend elevation  Podiatry will follow inhouse   Pt can be WBAT to R forefoot   Pod plan:  Continue LWC, no surgical intervention from podiatry standpoint, patient stable for discharge   upon dc patient to follow up outpatient with Dr. Dow   WCO: apply santyl to right heel wound, abd pad, secure with shira, apply ACE  Discussed with attending Dr. Welch and Dr. Dow

## 2022-04-24 NOTE — DISCHARGE NOTE NURSING/CASE MANAGEMENT/SOCIAL WORK - NSDCVIVACCINE_GEN_ALL_CORE_FT
COVID-19 vaccine, vector-nr, rS-Ad26, PF, 0.5 mL (Jed); 09-Mar-2021 14:40; Tom King (RN); [employer]; [id number] (Exp. Date: 03-Sep-2021); IntraMuscular; Deltoid Left.; 0.5 milliLiter(s);   Td (adult) preservative free; 05-Sep-2020 17:47; Jone Hassan (RN); [employer]; [id number] (Exp. Date: 09-Jul-2022); IntraMuscular; Deltoid Left.; 0.5 milliLiter(s); VIS (VIS Published: 05-Sep-2020, VIS Presented: 05-Sep-2020);

## 2022-04-24 NOTE — PROGRESS NOTE ADULT - PROVIDER SPECIALTY LIST ADULT
Cardiology
Cardiology
Hospitalist
Infectious Disease
Podiatry
Vascular Surgery
Cardiology
Vascular Surgery
Hospitalist

## 2022-04-24 NOTE — DISCHARGE NOTE PROVIDER - NSDCQMPCI_CARD_ALL_CORE
No Topical Sulfur Applications Pregnancy And Lactation Text: This medication is Pregnancy Category C and has an unknown safety profile during pregnancy. It is unknown if this topical medication is excreted in breast milk. Minocycline Pregnancy And Lactation Text: This medication is Pregnancy Category D and not consider safe during pregnancy. It is also excreted in breast milk. Dapsone Pregnancy And Lactation Text: This medication is Pregnancy Category C and is not considered safe during pregnancy or breast feeding. Tazorac Counseling:  Patient advised that medication is irritating and drying.  Patient may need to apply sparingly and wash off after an hour before eventually leaving it on overnight.  The patient verbalized understanding of the proper use and possible adverse effects of tazorac.  All of the patient's questions and concerns were addressed. Isotretinoin Counseling: Patient should get monthly blood tests, not donate blood, not drive at night if vision affected, not share medication, and not undergo elective surgery for 6 months after tx completed. Side effects reviewed, pt to contact office should one occur. Benzoyl Peroxide Counseling: Patient counseled that medicine may cause skin irritation and bleach clothing.  In the event of skin irritation, the patient was advised to reduce the amount of the drug applied or use it less frequently.   The patient verbalized understanding of the proper use and possible adverse effects of benzoyl peroxide.  All of the patient's questions and concerns were addressed. Doxycycline Counseling:  Patient counseled regarding possible photosensitivity and increased risk for sunburn.  Patient instructed to avoid sunlight, if possible.  When exposed to sunlight, patients should wear protective clothing, sunglasses, and sunscreen.  The patient was instructed to call the office immediately if the following severe adverse effects occur:  hearing changes, easy bruising/bleeding, severe headache, or vision changes.  The patient verbalized understanding of the proper use and possible adverse effects of doxycycline.  All of the patient's questions and concerns were addressed. Tazorac Pregnancy And Lactation Text: This medication is not safe during pregnancy. It is unknown if this medication is excreted in breast milk. Include Pregnancy/Lactation Warning?: No Bactrim Pregnancy And Lactation Text: This medication is Pregnancy Category D and is known to cause fetal risk.  It is also excreted in breast milk. Isotretinoin Pregnancy And Lactation Text: This medication is Pregnancy Category X and is considered extremely dangerous during pregnancy. It is unknown if it is excreted in breast milk. High Dose Vitamin A Counseling: Side effects reviewed, pt to contact office should one occur. Topical Clindamycin Counseling: Patient counseled that this medication may cause skin irritation or allergic reactions.  In the event of skin irritation, the patient was advised to reduce the amount of the drug applied or use it less frequently.   The patient verbalized understanding of the proper use and possible adverse effects of clindamycin.  All of the patient's questions and concerns were addressed. Birth Control Pills Counseling: Birth Control Pill Counseling: I discussed with the patient the potential side effects of OCPs including but not limited to increased risk of stroke, heart attack, thrombophlebitis, deep venous thrombosis, hepatic adenomas, breast changes, GI upset, headaches, and depression.  The patient verbalized understanding of the proper use and possible adverse effects of OCPs. All of the patient's questions and concerns were addressed. Spironolactone Counseling: Patient advised regarding risks of diarrhea, abdominal pain, hyperkalemia, birth defects (for female patients), liver toxicity and renal toxicity. The patient may need blood work to monitor liver and kidney function and potassium levels while on therapy. The patient verbalized understanding of the proper use and possible adverse effects of spironolactone.  All of the patient's questions and concerns were addressed. Benzoyl Peroxide Pregnancy And Lactation Text: This medication is Pregnancy Category C. It is unknown if benzoyl peroxide is excreted in breast milk. Doxycycline Pregnancy And Lactation Text: This medication is Pregnancy Category D and not consider safe during pregnancy. It is also excreted in breast milk but is considered safe for shorter treatment courses. Detail Level: Detailed Spironolactone Pregnancy And Lactation Text: This medication can cause feminization of the male fetus and should be avoided during pregnancy. The active metabolite is also found in breast milk. Birth Control Pills Pregnancy And Lactation Text: This medication should be avoided if pregnant and for the first 30 days post-partum. Azithromycin Counseling:  I discussed with the patient the risks of azithromycin including but not limited to GI upset, allergic reaction, drug rash, diarrhea, and yeast infections. Erythromycin Counseling:  I discussed with the patient the risks of erythromycin including but not limited to GI upset, allergic reaction, drug rash, diarrhea, increase in liver enzymes, and yeast infections. Topical Retinoid counseling:  Patient advised to apply a pea-sized amount only at bedtime and wait 30 minutes after washing their face before applying.  If too drying, patient may add a non-comedogenic moisturizer. The patient verbalized understanding of the proper use and possible adverse effects of retinoids.  All of the patient's questions and concerns were addressed. High Dose Vitamin A Pregnancy And Lactation Text: High dose vitamin A therapy is contraindicated during pregnancy and breast feeding. Topical Clindamycin Pregnancy And Lactation Text: This medication is Pregnancy Category B and is considered safe during pregnancy. It is unknown if it is excreted in breast milk. Erythromycin Pregnancy And Lactation Text: This medication is Pregnancy Category B and is considered safe during pregnancy. It is also excreted in breast milk. Topical Retinoid Pregnancy And Lactation Text: This medication is Pregnancy Category C. It is unknown if this medication is excreted in breast milk. Topical Sulfur Applications Counseling: Topical Sulfur Counseling: Patient counseled that this medication may cause skin irritation or allergic reactions.  In the event of skin irritation, the patient was advised to reduce the amount of the drug applied or use it less frequently.   The patient verbalized understanding of the proper use and possible adverse effects of topical sulfur application.  All of the patient's questions and concerns were addressed. Minocycline Counseling: Patient advised regarding possible photosensitivity and discoloration of the teeth, skin, lips, tongue and gums.  Patient instructed to avoid sunlight, if possible.  When exposed to sunlight, patients should wear protective clothing, sunglasses, and sunscreen.  The patient was instructed to call the office immediately if the following severe adverse effects occur:  hearing changes, easy bruising/bleeding, severe headache, or vision changes.  The patient verbalized understanding of the proper use and possible adverse effects of minocycline.  All of the patient's questions and concerns were addressed. Azithromycin Pregnancy And Lactation Text: This medication is considered safe during pregnancy and is also secreted in breast milk. Dapsone Counseling: I discussed with the patient the risks of dapsone including but not limited to hemolytic anemia, agranulocytosis, rashes, methemoglobinemia, kidney failure, peripheral neuropathy, headaches, GI upset, and liver toxicity.  Patients who start dapsone require monitoring including baseline LFTs and weekly CBCs for the first month, then every month thereafter.  The patient verbalized understanding of the proper use and possible adverse effects of dapsone.  All of the patient's questions and concerns were addressed. Tetracycline Counseling: Patient counseled regarding possible photosensitivity and increased risk for sunburn.  Patient instructed to avoid sunlight, if possible.  When exposed to sunlight, patients should wear protective clothing, sunglasses, and sunscreen.  The patient was instructed to call the office immediately if the following severe adverse effects occur:  hearing changes, easy bruising/bleeding, severe headache, or vision changes.  The patient verbalized understanding of the proper use and possible adverse effects of tetracycline.  All of the patient's questions and concerns were addressed. Patient understands to avoid pregnancy while on therapy due to potential birth defects. Bactrim Counseling:  I discussed with the patient the risks of sulfa antibiotics including but not limited to GI upset, allergic reaction, drug rash, diarrhea, dizziness, photosensitivity, and yeast infections.  Rarely, more serious reactions can occur including but not limited to aplastic anemia, agranulocytosis, methemoglobinemia, blood dyscrasias, liver or kidney failure, lung infiltrates or desquamative/blistering drug rashes.

## 2022-04-24 NOTE — DISCHARGE NOTE PROVIDER - DETAILS OF MALNUTRITION DIAGNOSIS/DIAGNOSES
This patient has been assessed with a concern for Malnutrition and was treated during this hospitalization for the following Nutrition diagnosis/diagnoses:     -  04/23/2022: Morbid obesity (BMI > 40)

## 2022-04-24 NOTE — CHART NOTE - NSCHARTNOTEFT_GEN_A_CORE
ZOYA/PVR and CT reviewed  Patient has good inflow with normal ZOYA.    No vascular intervention required  Please call if any questions

## 2022-04-24 NOTE — DISCHARGE NOTE PROVIDER - CARE PROVIDER_API CALL
Dr. Dow - Podiatrist,   Phone: [phone]  Fax: (   )    -  Established Patient  Follow Up Time:     Primary medical doctor,   Phone: (   )    -  Fax: (   )    -  Established Patient  Follow Up Time:

## 2022-04-24 NOTE — DISCHARGE NOTE PROVIDER - PROVIDER TOKENS
FREE:[LAST:[Dr. Dow - Podiatrist],PHONE:[phone]],FAX:[(   )    -],ESTABLISHEDPATIENT:[T]],FREE:[LAST:[Primary medical doctor],PHONE:[(   )    -],FAX:[(   )    -],ESTABLISHEDPATIENT:[T]]

## 2022-04-24 NOTE — DISCHARGE NOTE PROVIDER - ATTENDING DISCHARGE PHYSICAL EXAMINATION:
Vital Signs Last 24 Hrs  T(C): 36.8 (24 Apr 2022 05:44), Max: 36.8 (24 Apr 2022 05:44)  T(F): 98.2 (24 Apr 2022 05:44), Max: 98.2 (24 Apr 2022 05:44)  HR: 66 (24 Apr 2022 05:44) (66 - 95)  BP: 147/67 (24 Apr 2022 05:44) (138/76 - 147/67)  BP(mean): --  RR: 18 (24 Apr 2022 05:44) (18 - 19)  SpO2: 93% (24 Apr 2022 05:44) (93% - 94%)    CONSTITUTIONAL: NAD, morbidly obese  ENMT: Moist oral mucosa, no pharyngeal injection or exudates; normal dentition; No JVD  RESPIRATORY: Normal respiratory effort; lungs are diminished on bases, no wehezin/crackles   CARDIOVASCULAR: Regular rate and rhythm, distant S1 and S2, no murmur/rub/gallop; trace not pitting low extremity edema; Peripheral pulses are 2+ bilaterally  ABDOMEN: Nontender to palpation, normoactive bowel sounds, no rebound/guarding; No hepatosplenomegaly  MUSCLOSKELETAL:  no clubbing or cyanosis of digits; no joint swelling or tenderness to palpation  PSYCH: A+O to person, place, and time; affect appropriate  NEUROLOGY: CN 2-12 are intact and symmetric; no gross sensory deficits; was observed moving all 4 ext against gravity cooperating with exam.   SKIN: 5 x 5 ulcer over R heel, s/p eschar debridement w/o apparent pus or drainage

## 2022-04-24 NOTE — DISCHARGE NOTE PROVIDER - NSDCCPCAREPLAN_GEN_ALL_CORE_FT
PRINCIPAL DISCHARGE DIAGNOSIS  Diagnosis: Diabetic foot infection  Assessment and Plan of Treatment: - please complete course of Anbitotics as directed;  augmentin 1 pill twice a day stop on 4/30/22  - follow up with podiatrist w/in next week  - continue daily wound dressing untill instructed otherwise by your podiatrist:  Wound care: clean wound with clean water, apply santly cream to right heel wound, adn then abd pad  secured with shira, adn then apply ACE wrapping, also use surgical shose      SECONDARY DISCHARGE DIAGNOSES  Diagnosis: Chronic systolic congestive heart failure  Assessment and Plan of Treatment: - resume your home medication    Diagnosis: Chronic atrial fibrillation  Assessment and Plan of Treatment: - resume your home medication  - follow up with your primary medical doctor w/in next 1-2 weeks to check your INR and lab work    Diagnosis: DM type 2, not at goal  Assessment and Plan of Treatment: - please stick to diabetic diet  - resume your home insulin regiment.

## 2022-04-24 NOTE — PROGRESS NOTE ADULT - SUBJECTIVE AND OBJECTIVE BOX
[facility]                                                INFECTIOUS DISEASES  =======================================================                               Maximiliano Nagel MD#  Vincent Chong MD*                                     Sharon Jules MD*    Twyla Seo MD*            Diplomates American Board of Internal Medicine & Infectious Diseases                  # [place] Office - Appt - Tel  [phone] Fax [phone]                * [place] Office - Appt - Tel [phone] Fax [phone]                                  Hospital Consult line:  [phone]  =======================================================      [facility]-[id number]  MARY ANN CORNELL  follow up: Right heel ulcer.     no fevers  no new issues        I have personally reviewed the labs and data; pertinent labs and data are listed in this note; please see below.   =======================================================  Past Medical & Surgical Hx:  =====================  PAST MEDICAL & SURGICAL HISTORY:  DM (diabetes mellitus)    HTN (hypertension)    High cholesterol    Renal insufficiency    Sleep apnea    Prostate CA    Pulmonary hypertension    CHF (congestive heart failure)  EF 30%    Atrial fibrillation    Chronic back pain    Lung nodules    S/P ankle ligament repair    AICD (automatic cardioverter/defibrillator) present    History of brachytherapy      Problem List:  ==========  HEALTH ISSUES - PROBLEM Dx:        Social Hx:  =======  no toxic habits currently    FAMILY HISTORY:  Family history of cancer (Sibling)    Family history of diabetes mellitus (Sibling)    no significant family history of immunosuppressive disorders in mother or father   =======================================================    REVIEW OF SYSTEMS:  CONSTITUTIONAL:  No Fever or chills  HEENT:  No diplopia or blurred vision.  No earache, sore throat or runny nose.  CARDIOVASCULAR:  No pressure, squeezing, strangling, tightness, heaviness or aching about the chest, neck, axilla or epigastrium.  RESPIRATORY:  No cough, shortness of breath  GASTROINTESTINAL:  No nausea, vomiting or diarrhea.  GENITOURINARY:  No dysuria, frequency or urgency. No Blood in urine  MUSCULOSKELETAL:  + WEAKNESS  SKIN:  as per HPI  NEUROLOGIC:  No Headaches, seizures or weakness.  PSYCHIATRIC:  No disorder of thought or mood.  ENDOCRINE:  No heat or cold intolerance  HEMATOLOGICAL:  No easy bruising or bleeding.    =======================================================  Allergies  clindamycin (Unknown)     ======================================================  Physical Exam:  ============     General:  No acute distress.  OBESE  Eye: Pupils are equal, round and reactive to light, Extraocular movements are intact, Normal conjunctiva.  HENT: Normocephalic, Oral mucosa is moist, No pharyngeal erythema, No sinus tenderness.  Neck: Supple, No lymphadenopathy.  Respiratory: Lungs are clear to auscultation, Respirations are non-labored.  Cardiovascular: s1 s2 heart sounds  Gastrointestinal: Soft, Non-tender, Non-distended, Normal bowel sounds.  Genitourinary: No costovertebral angle tenderness.  Lymphatics: No lymphadenopathy neck,   Musculoskeletal: Normal range of motion, Normal strength.  Integumentary: No rash.  RIGHT heel with dark eschar at calcaneous; surrounding area of granulation tissue and new skin development.    NO DRAINAGE noted.    RIGHT LEG has chronic LE stasis changes  Neurologic: Alert, Oriented, No focal deficits, Cranial Nerves II-XII are grossly intact.  Psychiatric: Appropriate mood & affect.    =======================================================  Vitals:  ============  T(F): 97.8 (24 Apr 2022 13:25), Max: 98.2 (24 Apr 2022 05:44)  HR: 92 (24 Apr 2022 13:25)  BP: 125/70 (24 Apr 2022 13:25)  RR: 17 (24 Apr 2022 13:25)  SpO2: 95% (24 Apr 2022 13:25) (93% - 95%)  temp max in last 48H T(F): , Max: 98.2 (04-24-22 @ 05:44)    =======================================================  Current Antibiotics:  amoxicillin  875 milliGRAM(s)/clavulanate 1 Tablet(s) Oral two times a day    Other medications:  atorvastatin 40 milliGRAM(s) Oral at bedtime  carvedilol 3.125 milliGRAM(s) Oral every 12 hours  collagenase Ointment 1 Application(s) Topical daily  dextrose 5%. 1000 milliLiter(s) IV Continuous <Continuous>  dextrose 5%. 1000 milliLiter(s) IV Continuous <Continuous>  dextrose 50% Injectable 25 Gram(s) IV Push once  dextrose 50% Injectable 12.5 Gram(s) IV Push once  dextrose 50% Injectable 25 Gram(s) IV Push once  folic acid 1 milliGRAM(s) Oral daily  furosemide    Tablet 40 milliGRAM(s) Oral daily  gabapentin Oral Tab/Cap - Peds 900 milliGRAM(s) Oral two times a day  glucagon  Injectable 1 milliGRAM(s) IntraMuscular once  insulin glargine Injectable (LANTUS) 40 Unit(s) SubCutaneous at bedtime  insulin lispro (ADMELOG) corrective regimen sliding scale   SubCutaneous Before meals and at bedtime  insulin lispro Injectable (ADMELOG) 10 Unit(s) SubCutaneous three times a day before meals  levothyroxine 50 MICROGram(s) Oral daily  tamsulosin 0.4 milliGRAM(s) Oral at bedtime      =======================================================  Labs:                        16.1   4.42  )-----------( 113      ( 24 Apr 2022 06:29 )             49.8     04-24    136  |  101  |  27.3<H>  ----------------------------<  285<H>  4.2   |  22.0  |  1.09    Ca    8.3<L>      24 Apr 2022 06:29        Culture - Blood (collected 04-22-22 @ 12:51)  Source: .Blood Blood-Peripheral    Culture - Blood (collected 04-22-22 @ 12:51)  Source: .Blood Blood-Peripheral    Culture - Urine (collected 04-22-22 @ 12:14)  Source: Clean Catch Clean Catch (Midstream)  Final Report (04-23-22 @ 14:42):    >=3 organisms. Probable collection contamination.        C-Reactive Protein, Serum: 105 mg/L (04-21-22 @ 18:14)    Sedimentation Rate, Erythrocyte: 6 mm/hr (04-21-22 @ 18:14)      SARS-CoV-2: NotDetec (04-21-22 @ 08:50)      =======================================================      A1C with Estimated Average Glucose Result: 10.8 % (04-22-22 @ 07:37)      
                [facility]                                   [address] [place] [address]                                                      PHONE: [phone]                                                         FAX: [phone]  [email or web address]  ---------------------------------------------------------------------------------------------------------------------------------    Overnight events/patient complaints:  PT feels well no complaints    allow double protein at meals (Unknown)  clindamycin (Unknown)    MEDICATIONS  (STANDING):  amoxicillin  875 milliGRAM(s)/clavulanate 1 Tablet(s) Oral two times a day  atorvastatin 40 milliGRAM(s) Oral at bedtime  carvedilol 3.125 milliGRAM(s) Oral every 12 hours  collagenase Ointment 1 Application(s) Topical daily  dextrose 5%. 1000 milliLiter(s) (50 mL/Hr) IV Continuous <Continuous>  dextrose 5%. 1000 milliLiter(s) (100 mL/Hr) IV Continuous <Continuous>  dextrose 50% Injectable 25 Gram(s) IV Push once  dextrose 50% Injectable 12.5 Gram(s) IV Push once  dextrose 50% Injectable 25 Gram(s) IV Push once  folic acid 1 milliGRAM(s) Oral daily  furosemide    Tablet 40 milliGRAM(s) Oral daily  gabapentin Oral Tab/Cap - Peds 900 milliGRAM(s) Oral two times a day  glucagon  Injectable 1 milliGRAM(s) IntraMuscular once  insulin glargine Injectable (LANTUS) 40 Unit(s) SubCutaneous at bedtime  insulin lispro (ADMELOG) corrective regimen sliding scale   SubCutaneous Before meals and at bedtime  insulin lispro Injectable (ADMELOG) 7 Unit(s) SubCutaneous three times a day before meals  levothyroxine 50 MICROGram(s) Oral daily  tamsulosin 0.4 milliGRAM(s) Oral at bedtime    MEDICATIONS  (PRN):  acetaminophen     Tablet .. 650 milliGRAM(s) Oral every 6 hours PRN Temp greater or equal to 38C (100.4F), Mild Pain (1 - 3)  ALBUTerol    90 MICROgram(s) HFA Inhaler 2 Puff(s) Inhalation every 6 hours PRN Shortness of Breath and/or Wheezing  aluminum hydroxide/magnesium hydroxide/simethicone Suspension 30 milliLiter(s) Oral every 4 hours PRN Dyspepsia  dextrose Oral Gel 15 Gram(s) Oral once PRN Blood Glucose LESS THAN 70 milliGRAM(s)/deciliter  melatonin 3 milliGRAM(s) Oral at bedtime PRN Insomnia  ondansetron Injectable 4 milliGRAM(s) IV Push every 8 hours PRN Nausea and/or Vomiting      Vital Signs Last 24 Hrs  T(C): 36.3 (2022 05:29), Max: 36.8 (2022 10:55)  T(F): 97.4 (2022 05:29), Max: 98.3 (2022 10:55)  HR: 73 (2022 05:29) (73 - 82)  BP: 106/70 (2022 05:29) (106/70 - 135/84)  BP(mean): --  RR: 18 (2022 05:29) (18 - 19)  SpO2: 96% (2022 05:29) (91% - 96%)  ICU Vital Signs Last 24 Hrs  MARY ANN CORNELL  I&O's Detail    2022 07:01  -  2022 07:00  --------------------------------------------------------  IN:  Total IN: 0 mL    OUT:    Voided (mL): 50 mL  Total OUT: 50 mL    Total NET: -50 mL        Drug Dosing Weight  MARY ANN CORNELL      PHYSICAL EXAM:  General:  alert and cooperative.  HEENT: Head; normocephalic, atraumatic.  Eyes: Pupils reactive, cornea wnl.  Neck: Supple, no nodes adenopathy, no NVD or carotid bruit or thyromegaly.  CARDIOVASCULAR: Normal S1 and S2, No murmur, rub, gallop or lift.   LUNGS: No rales, rhonchi or wheeze. Normal breath sounds bilaterally.  ABDOMEN: Soft, nontender without mass or organomegaly. bowel sounds normoactive.  EXTREMITIES: No clubbing, cyanosis or edema. Distal pulses wnl. heel wound  SKIN: warm and dry with normal turgor.  NEURO: Alert/oriented x 3/normal motor exam. No pathologic reflexes.    PSYCH: normal affect.        LABS:                        16.2   4.43  )-----------( 118      ( 2022 07:27 )             50.2     04    135  |  100  |  21.6<H>  ----------------------------<  235<H>  3.9   |  21.0<L>  |  1.03    Ca    8.2<L>      2022 07:27    TPro  6.1<L>  /  Alb  3.3  /  TBili  1.0  /  DBili  x   /  AST  18  /  ALT  9   /  AlkPhos  57  04-    MARY ANN KRAIG  CARDIAC MARKERS ( 2022 09:25 )  x     / 0.04 ng/mL / 70 U/L / x     / x          PT/INR - ( 2022 07:27 )   PT: 26.2 sec;   INR: 2.24 ratio         PTT - ( 2022 09:26 )  PTT:38.1 sec  Urinalysis Basic - ( 2022 06:30 )    Color: Yellow / Appearance: Clear / S.020 / pH: x  Gluc: x / Ketone: Trace  / Bili: Negative / Urobili: Negative mg/dL   Blood: x / Protein: Negative / Nitrite: Negative   Leuk Esterase: Small / RBC: 0-2 /HPF / WBC Negative /HPF   Sq Epi: x / Non Sq Epi: Occasional / Bacteria: Occasional        RADIOLOGY & ADDITIONAL STUDIES:    INTERPRETATION OF TELEMETRY (personally reviewed): no events       ECHO: < from: TTE Echo Complete w/ Contrast w/ Doppler (22 @ 17:57) >  Summary:   1. Left ventricular ejection fraction, by visual estimation, is 20 to   25%.   2. Severely decreased global left ventricular systolic function.   3. The mitral in-flow pattern reveals no discernable A-wave, therefore   no comment on diastolic function can be made.   4. Mild to moderately enlarged left atrium.   5. Normal right atrial size.   6. Mild mitral annular calcification.   7. Thickening of the anterior and posterior mitral valve leaflets.   8. Trace mitral valve regurgitation.   9. Mild tricuspid regurgitation.  10. Sclerotic aortic valve with decreased opening.  11. Estimated pulmonary artery systolic pressure is 36.9 mmHg assuming a   right atrial pressure of 3 mmHg, which is consistent with borderline   pulmonary hypertension.  12. Endocardial visualization was enhanced with intravenous echo contrast.    MD Doc Electronically signed on 2022 at 7:30:15 PM    < end of copied text >         CARDIAC CATHETERIZATION: < from: Cardiac Cath Lab - Adult (16 @ 18:03) >  VENTRICLES: LVEF 25% with MR  CORONARY VESSELS: The coronary circulation is right dominant.  LM:   --  LM: Angiography showed minor luminal irregularities with no flow  limiting lesions.  LAD:   --  LAD: Angiography showed minor luminal irregularities with no  flow limiting lesions.  CX:   --  Circumflex: Angiography showed minor luminal irregularities with  no flow limiting lesions.  RCA:   --  RCA: Angiography showed minor luminal irregularities with no  flow limiting lesions.  COMPLICATIONS: There were no complications. No complications occurred  during the cath lab visit.  DIAGNOSTIC IMPRESSIONS: Mild CAD. Non ischemic cardiomyopathy.  DIAGNOSTIC RECOMMENDATIONS: A/C. MARIELA CV The patient should continue with  the present medications.  INTERVENTIONAL IMPRESSIONS: Mild CAD. Non ischemic cardiomyopathy.  INTERVENTIONAL RECOMMENDATIONS: A/C. MARIELA CV  Prepared and signed by  Bright Muñiz MD  Signed 2016 19:26:24    < end of copied text >      ASSESSMENT AND PLAN:  In summary, MARY ANN CORNELL is an 71y Male with PMHx HTN, HLD, DM, CHRISS, chronic HFrEF LVEF 30%, [facility] ICD, Afib on coumadin, pw 4 falls yesterday.  Pt had 4 episodes yesterday where his legs felt weak.     1) ECHO LVEF 20-25%  2) ICD without significant events  3) Right heel evaluation  If surgical intervention needed would get SPECT on monday as pt with poor functional status and prior ischemic workup was   4)  cardiac meds      
                [facility]                                   [address] [place] [address]                                                      PHONE: [phone]                                                         FAX: [phone]  [email or web address]  ---------------------------------------------------------------------------------------------------------------------------------    Overnight events/patient complaints:  Pt feels well     allow double protein at meals (Unknown)  clindamycin (Unknown)    MEDICATIONS  (STANDING):  amoxicillin  875 milliGRAM(s)/clavulanate 1 Tablet(s) Oral two times a day  atorvastatin 40 milliGRAM(s) Oral at bedtime  carvedilol 3.125 milliGRAM(s) Oral every 12 hours  collagenase Ointment 1 Application(s) Topical daily  dextrose 5%. 1000 milliLiter(s) (50 mL/Hr) IV Continuous <Continuous>  dextrose 5%. 1000 milliLiter(s) (100 mL/Hr) IV Continuous <Continuous>  dextrose 50% Injectable 25 Gram(s) IV Push once  dextrose 50% Injectable 12.5 Gram(s) IV Push once  dextrose 50% Injectable 25 Gram(s) IV Push once  folic acid 1 milliGRAM(s) Oral daily  furosemide    Tablet 40 milliGRAM(s) Oral daily  gabapentin Oral Tab/Cap - Peds 900 milliGRAM(s) Oral two times a day  glucagon  Injectable 1 milliGRAM(s) IntraMuscular once  insulin glargine Injectable (LANTUS) 40 Unit(s) SubCutaneous at bedtime  insulin lispro (ADMELOG) corrective regimen sliding scale   SubCutaneous Before meals and at bedtime  insulin lispro Injectable (ADMELOG) 10 Unit(s) SubCutaneous three times a day before meals  levothyroxine 50 MICROGram(s) Oral daily  tamsulosin 0.4 milliGRAM(s) Oral at bedtime    MEDICATIONS  (PRN):  acetaminophen     Tablet .. 650 milliGRAM(s) Oral every 6 hours PRN Temp greater or equal to 38C (100.4F), Mild Pain (1 - 3)  ALBUTerol    90 MICROgram(s) HFA Inhaler 2 Puff(s) Inhalation every 6 hours PRN Shortness of Breath and/or Wheezing  aluminum hydroxide/magnesium hydroxide/simethicone Suspension 30 milliLiter(s) Oral every 4 hours PRN Dyspepsia  dextrose Oral Gel 15 Gram(s) Oral once PRN Blood Glucose LESS THAN 70 milliGRAM(s)/deciliter  melatonin 3 milliGRAM(s) Oral at bedtime PRN Insomnia  ondansetron Injectable 4 milliGRAM(s) IV Push every 8 hours PRN Nausea and/or Vomiting      Vital Signs Last 24 Hrs  T(C): 36.8 (24 Apr 2022 05:44), Max: 36.8 (24 Apr 2022 05:44)  T(F): 98.2 (24 Apr 2022 05:44), Max: 98.2 (24 Apr 2022 05:44)  HR: 66 (24 Apr 2022 05:44) (66 - 99)  BP: 147/67 (24 Apr 2022 05:44) (112/73 - 147/67)  BP(mean): --  RR: 18 (24 Apr 2022 05:44) (18 - 19)  SpO2: 93% (24 Apr 2022 05:44) (93% - 97%)  ICU Vital Signs Last 24 Hrs  MARY ANN RIBEIRORAKAN  I&O's Detail    Drug Dosing Weight  MARY ANN CORNELL      PHYSICAL EXAM:  General:  alert and cooperative.  HEENT: Head; normocephalic, atraumatic.  Eyes: Pupils reactive, cornea wnl.  Neck: Supple, no nodes adenopathy, no NVD or carotid bruit or thyromegaly.  CARDIOVASCULAR: Normal S1 and S2, No murmur, rub, gallop or lift.   LUNGS: No rales, rhonchi or wheeze. Normal breath sounds bilaterally.  ABDOMEN: Soft, nontender without mass or organomegaly. bowel sounds normoactive.  EXTREMITIES: No clubbing, cyanosis or edema. Distal pulses wnl.   SKIN: warm and dry with normal turgor.  NEURO: Alert/oriented x 3/normal motor exam. No pathologic reflexes.    PSYCH: normal affect.        LABS:                        16.1   4.42  )-----------( 113      ( 24 Apr 2022 06:29 )             49.8     04-24    136  |  101  |  27.3<H>  ----------------------------<  285<H>  4.2   |  22.0  |  1.09    Ca    8.3<L>      24 Apr 2022 06:29      MARY ANN CORNELL      PT/INR - ( 24 Apr 2022 06:29 )   PT: 21.0 sec;   INR: 1.80 ratio         PTT - ( 24 Apr 2022 06:29 )  PTT:37.2 sec      RADIOLOGY & ADDITIONAL STUDIES:       ECHO: < from: TTE Echo Complete w/ Contrast w/ Doppler (04.22.22 @ 17:57) >  Summary:   1. Left ventricular ejection fraction, by visual estimation, is 20 to   25%.   2. Severely decreased global left ventricular systolic function.   3. The mitral in-flow pattern reveals no discernable A-wave, therefore   no comment on diastolic function can be made.   4. Mild to moderately enlarged left atrium.   5. Normal right atrial size.   6. Mild mitral annular calcification.   7. Thickening of the anterior and posterior mitral valve leaflets.   8. Trace mitral valve regurgitation.   9. Mild tricuspid regurgitation.  10. Sclerotic aortic valve with decreased opening.  11. Estimated pulmonary artery systolic pressure is 36.9 mmHg assuming a   right atrial pressure of 3 mmHg, which is consistent with borderline   pulmonary hypertension.  12. Endocardial visualization was enhanced with intravenous echo contrast.    MD Doc Electronically signed on 4/22/2022 at 7:30:15 PM    < end of copied text >         CARDIAC CATHETERIZATION: < from: Cardiac Cath Lab - Adult (03.25.16 @ 18:03) >  VENTRICLES: LVEF 25% with MR  CORONARY VESSELS: The coronary circulation is right dominant.  LM:   --  LM: Angiography showed minor luminal irregularities with no flow  limiting lesions.  LAD:   --  LAD: Angiography showed minor luminal irregularities with no  flow limiting lesions.  CX:   --  Circumflex: Angiography showed minor luminal irregularities with  no flow limiting lesions.  RCA:   --  RCA: Angiography showed minor luminal irregularities with no  flow limiting lesions.  COMPLICATIONS: There were no complications. No complications occurred  during the cath lab visit.  DIAGNOSTIC IMPRESSIONS: Mild CAD. Non ischemic cardiomyopathy.  DIAGNOSTIC RECOMMENDATIONS: A/C. MARIELA CV The patient should continue with  the present medications.  INTERVENTIONAL IMPRESSIONS: Mild CAD. Non ischemic cardiomyopathy.  INTERVENTIONAL RECOMMENDATIONS: A/C. MARIELA CV  Prepared and signed by  Bright Muñiz MD  Signed 03/25/2016 19:26:24    < end of copied text >      ASSESSMENT AND PLAN:  In summary, MARY ANN CORNELL is an 71y Male with PMHx HTN, HLD, DM, CHRISS, chronic HFrEF LVEF 30%, [facility] ICD, Afib on coumadin, pw 4 falls yesterday.  Pt had 4 episodes yesterday where his legs felt weak.     1) ECHO LVEF 20-25%  2) ICD without significant events  3) Right heel evaluation still pending  If surgical intervention needed would get SPECT to determine if pt is optmized from a cardiac perspective as pt with poor functional status and prior ischemic workup was 2016  4)  cardiac meds  
  seen for weakness, falls    no acute complaints  ros negative       MEDICATIONS  (STANDING):  atorvastatin 40 milliGRAM(s) Oral at bedtime  carvedilol 3.125 milliGRAM(s) Oral every 12 hours  dextrose 5%. 1000 milliLiter(s) (50 mL/Hr) IV Continuous <Continuous>  dextrose 5%. 1000 milliLiter(s) (100 mL/Hr) IV Continuous <Continuous>  dextrose 50% Injectable 25 Gram(s) IV Push once  dextrose 50% Injectable 12.5 Gram(s) IV Push once  dextrose 50% Injectable 25 Gram(s) IV Push once  folic acid 1 milliGRAM(s) Oral daily  furosemide    Tablet 40 milliGRAM(s) Oral daily  gabapentin Oral Tab/Cap - Peds 900 milliGRAM(s) Oral two times a day  glucagon  Injectable 1 milliGRAM(s) IntraMuscular once  insulin glargine Injectable (LANTUS) 40 Unit(s) SubCutaneous at bedtime  insulin lispro (ADMELOG) corrective regimen sliding scale   SubCutaneous Before meals and at bedtime  insulin lispro Injectable (ADMELOG) 7 Unit(s) SubCutaneous three times a day before meals  levothyroxine 50 MICROGram(s) Oral daily  tamsulosin 0.4 milliGRAM(s) Oral at bedtime    MEDICATIONS  (PRN):  acetaminophen     Tablet .. 650 milliGRAM(s) Oral every 6 hours PRN Temp greater or equal to 38C (100.4F), Mild Pain (1 - 3)  ALBUTerol    90 MICROgram(s) HFA Inhaler 2 Puff(s) Inhalation every 6 hours PRN Shortness of Breath and/or Wheezing  aluminum hydroxide/magnesium hydroxide/simethicone Suspension 30 milliLiter(s) Oral every 4 hours PRN Dyspepsia  dextrose Oral Gel 15 Gram(s) Oral once PRN Blood Glucose LESS THAN 70 milliGRAM(s)/deciliter  melatonin 3 milliGRAM(s) Oral at bedtime PRN Insomnia  ondansetron Injectable 4 milliGRAM(s) IV Push every 8 hours PRN Nausea and/or Vomiting      Allergies    clindamycin (Unknown)    Intolerances    allow double protein at meals (Unknown)        Vital Signs Last 24 Hrs  T(C): 36.8 (2022 04:23), Max: 36.9 (2022 21:46)  T(F): 98.2 (2022 04:23), Max: 98.4 (2022 21:46)  HR: 76 (2022 04:23) (76 - 98)  BP: 109/77 (2022 04:23) (109/77 - 130/80)  BP(mean): --  RR: 19 (2022 04:23) (18 - 19)  SpO2: 95% (2022 04:23) (92% - 96%)    PHYSICAL EXAM:    GENERAL: NAD obese   CHEST/LUNG: Clear to ausculation bilaterally  HEART: irreg irreg rate and rhythm; S1 S2  ABDOMEN: Soft, Nondistended; Bowel sounds present  EXTREMITIES:  chronic  venous skin changes,     right heel with dark eschar and surrounding cellulitis,  no sensation b/l below the knees  NERVOUS SYSTEM:  Alert & Oriented X3, gen weakness    LABS:                        16.4   5.50  )-----------( 118      ( 2022 09:25 )             51.0     04-21    135  |  101  |  23.4<H>  ----------------------------<  292<H>  3.9   |  23.0  |  1.02    Ca    7.9<L>      2022 09:25    TPro  6.1<L>  /  Alb  3.3  /  TBili  1.0  /  DBili  x   /  AST  18  /  ALT  9   /  AlkPhos  57  04-21    PT/INR - ( 2022 09:26 )   PT: 31.1 sec;   INR: 2.65 ratio         PTT - ( 2022 09:26 )  PTT:38.1 sec  Urinalysis Basic - ( 2022 06:30 )    Color: Yellow / Appearance: Clear / S.020 / pH: x  Gluc: x / Ketone: Trace  / Bili: Negative / Urobili: Negative mg/dL   Blood: x / Protein: Negative / Nitrite: Negative   Leuk Esterase: Small / RBC: 0-2 /HPF / WBC Negative /HPF   Sq Epi: x / Non Sq Epi: Occasional / Bacteria: Occasional        CAPILLARY BLOOD GLUCOSE      POCT Blood Glucose.: 274 mg/dL (2022 08:10)  POCT Blood Glucose.: 298 mg/dL (2022 23:34)  POCT Blood Glucose.: 348 mg/dL (2022 16:54)        RADIOLOGY & ADDITIONAL TESTS:  
HPI/OVERNIGHT EVENTS: Patient seen and examined at bedside this AM. No overnight events. No complaints. ABIs pending    Vital Signs Last 24 Hrs  T(C): 36.6 (2022 17:15), Max: 36.8 (2022 04:23)  T(F): 97.9 (2022 17:15), Max: 98.3 (2022 10:55)  HR: 80 (2022 17:15) (76 - 82)  BP: 135/84 (2022 17:15) (109/77 - 135/84)  BP(mean): --  RR: 18 (2022 17:15) (18 - 19)  SpO2: 93% (2022 17:15) (91% - 95%)    I&O's Detail    2022 07:01  -  2022 03:00  --------------------------------------------------------  IN:  Total IN: 0 mL    OUT:    Voided (mL): 50 mL  Total OUT: 50 mL    Total NET: -50 mL        PHYSICAL EXAM:  Constitutional: NAD, non-toxic appearing  Respiratory: No conversational dyspnea  Extremities: Chronic venous stasis dermatitis, pitting edema involving BL feet, B/L WWP  Vascular: Biphasic AT/DP in LLE       LABS:                        16.4   5.50  )-----------( 118      ( 2022 09:25 )             51.0         135  |  101  |  23.4<H>  ----------------------------<  292<H>  3.9   |  23.0  |  1.02    Ca    7.9<L>      2022 09:25    TPro  6.1<L>  /  Alb  3.3  /  TBili  1.0  /  DBili  x   /  AST  18  /  ALT  9   /  AlkPhos  57      PT/INR - ( 2022 12:51 )   PT: 29.4 sec;   INR: 2.51 ratio         PTT - ( 2022 09:26 )  PTT:38.1 sec  Urinalysis Basic - ( 2022 06:30 )    Color: Yellow / Appearance: Clear / S.020 / pH: x  Gluc: x / Ketone: Trace  / Bili: Negative / Urobili: Negative mg/dL   Blood: x / Protein: Negative / Nitrite: Negative   Leuk Esterase: Small / RBC: 0-2 /HPF / WBC Negative /HPF   Sq Epi: x / Non Sq Epi: Occasional / Bacteria: Occasional        MEDICATIONS  (STANDING):  amoxicillin  875 milliGRAM(s)/clavulanate 1 Tablet(s) Oral two times a day  atorvastatin 40 milliGRAM(s) Oral at bedtime  carvedilol 3.125 milliGRAM(s) Oral every 12 hours  collagenase Ointment 1 Application(s) Topical daily  dextrose 5%. 1000 milliLiter(s) (50 mL/Hr) IV Continuous <Continuous>  dextrose 5%. 1000 milliLiter(s) (100 mL/Hr) IV Continuous <Continuous>  dextrose 50% Injectable 25 Gram(s) IV Push once  dextrose 50% Injectable 12.5 Gram(s) IV Push once  dextrose 50% Injectable 25 Gram(s) IV Push once  folic acid 1 milliGRAM(s) Oral daily  furosemide    Tablet 40 milliGRAM(s) Oral daily  gabapentin Oral Tab/Cap - Peds 900 milliGRAM(s) Oral two times a day  glucagon  Injectable 1 milliGRAM(s) IntraMuscular once  insulin glargine Injectable (LANTUS) 40 Unit(s) SubCutaneous at bedtime  insulin lispro (ADMELOG) corrective regimen sliding scale   SubCutaneous Before meals and at bedtime  insulin lispro Injectable (ADMELOG) 7 Unit(s) SubCutaneous three times a day before meals  levothyroxine 50 MICROGram(s) Oral daily  tamsulosin 0.4 milliGRAM(s) Oral at bedtime    MEDICATIONS  (PRN):  acetaminophen     Tablet .. 650 milliGRAM(s) Oral every 6 hours PRN Temp greater or equal to 38C (100.4F), Mild Pain (1 - 3)  ALBUTerol    90 MICROgram(s) HFA Inhaler 2 Puff(s) Inhalation every 6 hours PRN Shortness of Breath and/or Wheezing  aluminum hydroxide/magnesium hydroxide/simethicone Suspension 30 milliLiter(s) Oral every 4 hours PRN Dyspepsia  dextrose Oral Gel 15 Gram(s) Oral once PRN Blood Glucose LESS THAN 70 milliGRAM(s)/deciliter  melatonin 3 milliGRAM(s) Oral at bedtime PRN Insomnia  ondansetron Injectable 4 milliGRAM(s) IV Push every 8 hours PRN Nausea and/or Vomiting      MICRO:   Cultures     STUDIES:   EKG, CXR, U/S, CT, MRI   
HPI/OVERNIGHT EVENTS: Patient seen and examined at bedside this AM. No overnight events. No complaints. Denies fever, chills, nausea, vomiting, chest pain, SOB, dizziness, abd pain or any other concerning symptoms.    Vital Signs Last 24 Hrs  T(C): 36.4 (2022 16:38), Max: 36.4 (2022 16:38)  T(F): 97.6 (2022 16:38), Max: 97.6 (2022 16:38)  HR: 95 (2022 16:38) (73 - 99)  BP: 138/76 (2022 16:38) (106/70 - 138/76)  BP(mean): --  RR: 19 (2022 16:38) (18 - 19)  SpO2: 94% (2022 16:38) (94% - 97%)    I&O's Detail    2022 07:01  -  2022 07:00  --------------------------------------------------------  IN:  Total IN: 0 mL    OUT:    Voided (mL): 50 mL  Total OUT: 50 mL    Total NET: -50 mL          Constitutional: patient resting comfortably in bed, in no acute distress  HEENT: EOMI, PERRLA, MMM.  Respiratory: Non labored breathing on RA  Cardiovascular: RRR  Gastrointestinal: Abdomen soft, non-tender, non-distended, no rebound tenderness / guarding  Musculoskeletal: Chronic venous stasis dermatitis, pitting edema involving BL feet, B/L WWP, R heel unstaggable ulcer   Vascular:  Biphasic AT/DP in LLE  LABS:                        16.2   4.43  )-----------( 118      ( 2022 07:27 )             50.2     04-23    135  |  100  |  21.6<H>  ----------------------------<  235<H>  3.9   |  21.0<L>  |  1.03    Ca    8.2<L>      2022 07:27      PT/INR - ( 2022 07:27 )   PT: 26.2 sec;   INR: 2.24 ratio           Urinalysis Basic - ( 2022 06:30 )    Color: Yellow / Appearance: Clear / S.020 / pH: x  Gluc: x / Ketone: Trace  / Bili: Negative / Urobili: Negative mg/dL   Blood: x / Protein: Negative / Nitrite: Negative   Leuk Esterase: Small / RBC: 0-2 /HPF / WBC Negative /HPF   Sq Epi: x / Non Sq Epi: Occasional / Bacteria: Occasional        MEDICATIONS  (STANDING):  amoxicillin  875 milliGRAM(s)/clavulanate 1 Tablet(s) Oral two times a day  atorvastatin 40 milliGRAM(s) Oral at bedtime  carvedilol 3.125 milliGRAM(s) Oral every 12 hours  collagenase Ointment 1 Application(s) Topical daily  dextrose 5%. 1000 milliLiter(s) (50 mL/Hr) IV Continuous <Continuous>  dextrose 5%. 1000 milliLiter(s) (100 mL/Hr) IV Continuous <Continuous>  dextrose 50% Injectable 25 Gram(s) IV Push once  dextrose 50% Injectable 25 Gram(s) IV Push once  dextrose 50% Injectable 12.5 Gram(s) IV Push once  folic acid 1 milliGRAM(s) Oral daily  furosemide    Tablet 40 milliGRAM(s) Oral daily  gabapentin Oral Tab/Cap - Peds 900 milliGRAM(s) Oral two times a day  glucagon  Injectable 1 milliGRAM(s) IntraMuscular once  insulin glargine Injectable (LANTUS) 40 Unit(s) SubCutaneous at bedtime  insulin lispro (ADMELOG) corrective regimen sliding scale   SubCutaneous Before meals and at bedtime  insulin lispro Injectable (ADMELOG) 10 Unit(s) SubCutaneous three times a day before meals  levothyroxine 50 MICROGram(s) Oral daily  tamsulosin 0.4 milliGRAM(s) Oral at bedtime    MEDICATIONS  (PRN):  acetaminophen     Tablet .. 650 milliGRAM(s) Oral every 6 hours PRN Temp greater or equal to 38C (100.4F), Mild Pain (1 - 3)  ALBUTerol    90 MICROgram(s) HFA Inhaler 2 Puff(s) Inhalation every 6 hours PRN Shortness of Breath and/or Wheezing  aluminum hydroxide/magnesium hydroxide/simethicone Suspension 30 milliLiter(s) Oral every 4 hours PRN Dyspepsia  dextrose Oral Gel 15 Gram(s) Oral once PRN Blood Glucose LESS THAN 70 milliGRAM(s)/deciliter  melatonin 3 milliGRAM(s) Oral at bedtime PRN Insomnia  ondansetron Injectable 4 milliGRAM(s) IV Push every 8 hours PRN Nausea and/or Vomiting      MICRO:   Cultures     STUDIES:   EKG, CXR, U/S, CT, MRI   
[facility] Division of Hospital Medicine    Chief Complaint:  falls    SUBJECTIVE: reports feeling better, no new medical complains    OVERNIGHT EVENTS: none reported     Patient denies chest pain, SOB, abd pain, N/V, fever, chills, dysuria or any other complaints. All remainder ROS negative.     MEDICATIONS  (STANDING):  amoxicillin  875 milliGRAM(s)/clavulanate 1 Tablet(s) Oral two times a day  atorvastatin 40 milliGRAM(s) Oral at bedtime  carvedilol 3.125 milliGRAM(s) Oral every 12 hours  collagenase Ointment 1 Application(s) Topical daily  dextrose 5%. 1000 milliLiter(s) (50 mL/Hr) IV Continuous <Continuous>  dextrose 5%. 1000 milliLiter(s) (100 mL/Hr) IV Continuous <Continuous>  dextrose 50% Injectable 25 Gram(s) IV Push once  dextrose 50% Injectable 12.5 Gram(s) IV Push once  dextrose 50% Injectable 25 Gram(s) IV Push once  folic acid 1 milliGRAM(s) Oral daily  furosemide    Tablet 40 milliGRAM(s) Oral daily  gabapentin Oral Tab/Cap - Peds 900 milliGRAM(s) Oral two times a day  glucagon  Injectable 1 milliGRAM(s) IntraMuscular once  insulin glargine Injectable (LANTUS) 40 Unit(s) SubCutaneous at bedtime  insulin lispro (ADMELOG) corrective regimen sliding scale   SubCutaneous Before meals and at bedtime  insulin lispro Injectable (ADMELOG) 7 Unit(s) SubCutaneous three times a day before meals  levothyroxine 50 MICROGram(s) Oral daily  tamsulosin 0.4 milliGRAM(s) Oral at bedtime    MEDICATIONS  (PRN):  acetaminophen     Tablet .. 650 milliGRAM(s) Oral every 6 hours PRN Temp greater or equal to 38C (100.4F), Mild Pain (1 - 3)  ALBUTerol    90 MICROgram(s) HFA Inhaler 2 Puff(s) Inhalation every 6 hours PRN Shortness of Breath and/or Wheezing  aluminum hydroxide/magnesium hydroxide/simethicone Suspension 30 milliLiter(s) Oral every 4 hours PRN Dyspepsia  dextrose Oral Gel 15 Gram(s) Oral once PRN Blood Glucose LESS THAN 70 milliGRAM(s)/deciliter  melatonin 3 milliGRAM(s) Oral at bedtime PRN Insomnia  ondansetron Injectable 4 milliGRAM(s) IV Push every 8 hours PRN Nausea and/or Vomiting        I&O's Summary    2022 07:01  -  2022 07:00  --------------------------------------------------------  IN: 0 mL / OUT: 50 mL / NET: -50 mL        PHYSICAL EXAM:  Vital Signs Last 24 Hrs  T(C): 36.4 (2022 16:38), Max: 36.4 (2022 16:38)  T(F): 97.6 (2022 16:38), Max: 97.6 (2022 16:38)  HR: 95 (2022 16:38) (73 - 99)  BP: 138/76 (2022 16:38) (106/70 - 138/76)  BP(mean): --  RR: 19 (2022 16:38) (18 - 19)  SpO2: 94% (2022 16:38) (94% - 97%)        CONSTITUTIONAL: NAD, morbidly obese  ENMT: Moist oral mucosa, no pharyngeal injection or exudates; normal dentition; No JVD  RESPIRATORY: Normal respiratory effort; lungs are diminished on bases, no wehezin/crackles   CARDIOVASCULAR: Regular rate and rhythm, distant S1 and S2, no murmur/rub/gallop; trace not pitting low extremity edema; Peripheral pulses are 2+ bilaterally  ABDOMEN: Nontender to palpation, normoactive bowel sounds, no rebound/guarding; No hepatosplenomegaly  MUSCLOSKELETAL:  no clubbing or cyanosis of digits; no joint swelling or tenderness to palpation  PSYCH: A+O to person, place, and time; affect appropriate  NEUROLOGY: CN 2-12 are intact and symmetric; no gross sensory deficits; was observed moving all 4 ext against gravity cooperating with exam.   SKIN: 5 x 5 ulcer over R heel, s/p eschar debridement w/o apparent pus or drainage      LABS:                        16.2   4.43  )-----------( 118      ( 2022 07:27 )             50.2     04-23    135  |  100  |  21.6<H>  ----------------------------<  235<H>  3.9   |  21.0<L>  |  1.03    Ca    8.2<L>      2022 07:27      PT/INR - ( 2022 07:27 )   PT: 26.2 sec;   INR: 2.24 ratio               Urinalysis Basic - ( 2022 06:30 )    Color: Yellow / Appearance: Clear / S.020 / pH: x  Gluc: x / Ketone: Trace  / Bili: Negative / Urobili: Negative mg/dL   Blood: x / Protein: Negative / Nitrite: Negative   Leuk Esterase: Small / RBC: 0-2 /HPF / WBC Negative /HPF   Sq Epi: x / Non Sq Epi: Occasional / Bacteria: Occasional        Culture - Urine (collected 2022 12:14)  Source: Clean Catch Clean Catch (Midstream)  Final Report (2022 14:42):    >=3 organisms. Probable collection contamination.      CAPILLARY BLOOD GLUCOSE      POCT Blood Glucose.: 243 mg/dL (2022 16:41)  POCT Blood Glucose.: 208 mg/dL (2022 12:00)  POCT Blood Glucose.: 220 mg/dL (2022 07:51)  POCT Blood Glucose.: 215 mg/dL (2022 22:21)        RADIOLOGY & ADDITIONAL TESTS:  Results Reviewed:   Imaging Personally Reviewed:  Electrocardiogram Personally Reviewed:
                [facility]                                   [address] [place] [address]                                                      PHONE: [phone]                                                         FAX: [phone]  [email or web address]  ---------------------------------------------------------------------------------------------------------------------------------    Overnight events/patient complaints:  Pt feels better    allow double protein at meals (Unknown)  clindamycin (Unknown)    MEDICATIONS  (STANDING):  atorvastatin 40 milliGRAM(s) Oral at bedtime  carvedilol 3.125 milliGRAM(s) Oral every 12 hours  dextrose 5%. 1000 milliLiter(s) (50 mL/Hr) IV Continuous <Continuous>  dextrose 5%. 1000 milliLiter(s) (100 mL/Hr) IV Continuous <Continuous>  dextrose 50% Injectable 25 Gram(s) IV Push once  dextrose 50% Injectable 12.5 Gram(s) IV Push once  dextrose 50% Injectable 25 Gram(s) IV Push once  folic acid 1 milliGRAM(s) Oral daily  furosemide    Tablet 40 milliGRAM(s) Oral daily  gabapentin Oral Tab/Cap - Peds 900 milliGRAM(s) Oral two times a day  glucagon  Injectable 1 milliGRAM(s) IntraMuscular once  insulin glargine Injectable (LANTUS) 35 Unit(s) SubCutaneous at bedtime  insulin lispro (ADMELOG) corrective regimen sliding scale   SubCutaneous Before meals and at bedtime  levothyroxine 50 MICROGram(s) Oral daily  tamsulosin 0.4 milliGRAM(s) Oral at bedtime    MEDICATIONS  (PRN):  acetaminophen     Tablet .. 650 milliGRAM(s) Oral every 6 hours PRN Temp greater or equal to 38C (100.4F), Mild Pain (1 - 3)  ALBUTerol    90 MICROgram(s) HFA Inhaler 2 Puff(s) Inhalation every 6 hours PRN Shortness of Breath and/or Wheezing  aluminum hydroxide/magnesium hydroxide/simethicone Suspension 30 milliLiter(s) Oral every 4 hours PRN Dyspepsia  dextrose Oral Gel 15 Gram(s) Oral once PRN Blood Glucose LESS THAN 70 milliGRAM(s)/deciliter  melatonin 3 milliGRAM(s) Oral at bedtime PRN Insomnia  ondansetron Injectable 4 milliGRAM(s) IV Push every 8 hours PRN Nausea and/or Vomiting      Vital Signs Last 24 Hrs  T(C): 36.8 (2022 04:23), Max: 36.9 (2022 21:46)  T(F): 98.2 (2022 04:23), Max: 98.4 (2022 21:46)  HR: 76 (2022 04:23) (76 - 98)  BP: 109/77 (2022 04:23) (109/77 - 130/80)  BP(mean): --  RR: 19 (2022 04:23) (18 - 19)  SpO2: 95% (2022 04:23) (92% - 96%)  ICU Vital Signs Last 24 Hrs  MARY ANN CORNELL  I&O's Detail    Drug Dosing Weight  MARY ANN CORNELL      PHYSICAL EXAM:  General:  alert and cooperative.  HEENT: Head; normocephalic, atraumatic.  Eyes: Pupils reactive, cornea wnl.  Neck: Supple, no nodes adenopathy, no NVD or carotid bruit or thyromegaly.  CARDIOVASCULAR: Normal S1 and S2, No murmur, rub, gallop or lift.   LUNGS: No rales, rhonchi or wheeze. Normal breath sounds bilaterally.  ABDOMEN: Soft, nontender without mass or organomegaly. bowel sounds normoactive.  EXTREMITIES: trace edema, chronic stasis changes. Distal pulses wnl.   SKIN: warm and dry with normal turgor. right heel wound  NEURO: Alert/oriented x 3/normal motor exam. No pathologic reflexes.    PSYCH: normal affect.        LABS:                        16.4   5.50  )-----------( 118      ( 2022 09:25 )             51.0     04-    135  |  101  |  23.4<H>  ----------------------------<  292<H>  3.9   |  23.0  |  1.02    Ca    7.9<L>      2022 09:25    TPro  6.1<L>  /  Alb  3.3  /  TBili  1.0  /  DBili  x   /  AST  18  /  ALT  9   /  AlkPhos  57      MARY ANN CORNELL  CARDIAC MARKERS ( 2022 09:25 )  x     / 0.04 ng/mL / 70 U/L / x     / x          PT/INR - ( 2022 09:26 )   PT: 31.1 sec;   INR: 2.65 ratio         PTT - ( 2022 09:26 )  PTT:38.1 sec  Urinalysis Basic - ( 2022 06:30 )    Color: Yellow / Appearance: Clear / S.020 / pH: x  Gluc: x / Ketone: Trace  / Bili: Negative / Urobili: Negative mg/dL   Blood: x / Protein: Negative / Nitrite: Negative   Leuk Esterase: Small / RBC: 0-2 /HPF / WBC Negative /HPF   Sq Epi: x / Non Sq Epi: Occasional / Bacteria: Occasional        RADIOLOGY & ADDITIONAL STUDIES:    INTERPRETATION OF TELEMETRY (personally reviewed): no events    ECG: Afib @ 102 low voltage, PVCs    ECHO: < from: TTE Echo Complete w/o Contrast w/ Doppler (21 @ 08:14) >    Summary:   1. Technically difficult study.   2. Endocardial visualization was enhanced with intravenous echo contrast.   3. Left ventricular ejection fraction, by visual estimation, is 30 to 35%.   4. Moderately decreased global left ventricular systolic function.   5. Multiple left ventricular regional wall motion abnormalities exist. See wall motion findings.   6. The mitral in-flow pattern reveals no discernable A-wave, therefore no comment on diastolic function can be made.   7. Mildly enlarged right ventricle with moderately reduced RV systolic function.   8. Mildly enlarged left atrium.   9. Trace mitral valve regurgitation.  10. Mild thickening of the anterior and posterior mitral valve leaflets.  11. Mild dilatation of the aortic root (3.7 cm).  12. Compared to a prior study from 9/10/20, there is no significant change.    MD ChacortaElectronically signed on 3/9/2021 at 12:38:51 PM    < end of copied text >         Assessment and Plan:  In summary, MARY ANN CORNELL is an 71y Male with PMHx HTN, HLD, DM, CHRISS, chronic HFrEF LVEF 30%, [facility] ICD, Afib on coumadin, pw 4 falls yesterday.  Pt had 4 episodes yesterday where his legs felt weak.     1) ECHO pending  2) ICD without significant events  3) Right heel evaluation  4) cw cardiac meds  
    Patient is a 71y old  Male who presents with a chief complaint of Fall (22 Apr 2022 10:47)      HPI:  Patient is a 72 yo male with PMHx CHF with EF 30% s/p AICD, DM on insulin, a fib on Coumadin, HTN, HLD, p/w after multiple mechanical falls. Reports feeling that his legs are getting weaker over the last few days, and fell about 4 times yesterday - all mechanical in nature. He fell earlier in the morning, slid down the side of the bed and was on the floor for about five hours, not wanting to wake up families, and end up calling 911 around 7am. Denies LOC, headstrike, dizziness, lightheadedness, syncope, CP, n/v. Reports chronic SOB, with MACHUCA. No orthopnea and sleeps with one pillow. ROS otherwise negative for fever, chill, CP, n/v/c/d, nor urinary concerns. Has chronic LE swelling, which appears to be at beseline right now. No recent weight changes.     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 Admitted for further workup. (21 Apr 2022 14:50)      Podiatry HPI: 71 y.o male evaluated resting in bed. Consulted for right heel eschar. Pt states he is aware he has a wound on his right heel, states it started as a cut. Pt states he is neuropathic which led to the progression of the wound. Pt states recently he has been non ambulatory has had multiple mechanical falls. Denied N/V/F/C/SOB. Admits too numbness, tingling, and burning pain. Pt states he does not follow with a Podiatrist outpatient.     Podiatry interval: patient seen in bed resting, no acute overnight events, patient has no pain to right foot.     Patient admits to  (-) Fevers, (-) Chills, (-) Nausea, (-) Vomiting, (-) Shortness of Breath (-) calf pain (-) chest pain     Medications acetaminophen     Tablet .. 650 milliGRAM(s) Oral every 6 hours PRN  ALBUTerol    90 MICROgram(s) HFA Inhaler 2 Puff(s) Inhalation every 6 hours PRN  aluminum hydroxide/magnesium hydroxide/simethicone Suspension 30 milliLiter(s) Oral every 4 hours PRN  amoxicillin  875 milliGRAM(s)/clavulanate 1 Tablet(s) Oral two times a day  atorvastatin 40 milliGRAM(s) Oral at bedtime  carvedilol 3.125 milliGRAM(s) Oral every 12 hours  collagenase Ointment 1 Application(s) Topical daily  dextrose 5%. 1000 milliLiter(s) IV Continuous <Continuous>  dextrose 5%. 1000 milliLiter(s) IV Continuous <Continuous>  dextrose 50% Injectable 25 Gram(s) IV Push once  dextrose 50% Injectable 12.5 Gram(s) IV Push once  dextrose 50% Injectable 25 Gram(s) IV Push once  dextrose Oral Gel 15 Gram(s) Oral once PRN  folic acid 1 milliGRAM(s) Oral daily  furosemide    Tablet 40 milliGRAM(s) Oral daily  gabapentin Oral Tab/Cap - Peds 900 milliGRAM(s) Oral two times a day  glucagon  Injectable 1 milliGRAM(s) IntraMuscular once  insulin glargine Injectable (LANTUS) 40 Unit(s) SubCutaneous at bedtime  insulin lispro (ADMELOG) corrective regimen sliding scale   SubCutaneous Before meals and at bedtime  insulin lispro Injectable (ADMELOG) 10 Unit(s) SubCutaneous three times a day before meals  levothyroxine 50 MICROGram(s) Oral daily  melatonin 3 milliGRAM(s) Oral at bedtime PRN  ondansetron Injectable 4 milliGRAM(s) IV Push every 8 hours PRN  tamsulosin 0.4 milliGRAM(s) Oral at bedtime    FHFamily history of cancer (Sibling)    Family history of diabetes mellitus (Sibling)    ,   PMHDM (diabetes mellitus)    HTN (hypertension)    High cholesterol    Renal insufficiency    Sleep apnea    Prostate CA    Pulmonary hypertension    CHF (congestive heart failure)    Atrial fibrillation    Chronic back pain    Lung nodules       PSHNo significant past surgical history    S/P ankle ligament repair    AICD (automatic cardioverter/defibrillator) present    History of brachytherapy        Labs                          16.1   4.42  )-----------( 113      ( 24 Apr 2022 06:29 )             49.8      04-24    136  |  101  |  27.3<H>  ----------------------------<  285<H>  4.2   |  22.0  |  1.09    Ca    8.3<L>      24 Apr 2022 06:29       Vital Signs Last 24 Hrs  T(C): 36.8 (24 Apr 2022 05:44), Max: 36.8 (24 Apr 2022 05:44)  T(F): 98.2 (24 Apr 2022 05:44), Max: 98.2 (24 Apr 2022 05:44)  HR: 66 (24 Apr 2022 05:44) (66 - 95)  BP: 147/67 (24 Apr 2022 05:44) (138/76 - 147/67)  BP(mean): --  RR: 18 (24 Apr 2022 05:44) (18 - 19)  SpO2: 93% (24 Apr 2022 05:44) (93% - 94%)  Sedimentation Rate, Erythrocyte: 6 mm/hr (04-21-22 @ 18:14)         C-Reactive Protein, Serum: 105 mg/L (04-21-22 @ 18:14)   WBC Count: 4.42 K/uL (04-24-22 @ 06:29)            ROS  REVIEW OF SYSTEMS: Negative unless stated otherwise in the HPI      PHYSICAL EXAM  GEN: MARY ANN CORNELL is a 71y Male in no acute distress, alert awake, and oriented to person, place and time.   LE Focused:    Vasc: DP/PT non palpable, CFT intact to digits, non pitting edema noted to b/l legs and dorsal feet   Derm: 5cmx 4cm wound with granular base and central eschar noted to right plantar heel, -malodor, improved periwound edema and erythema noted, no tunneling, no undermining, no PTB observed  Neuro: Protective sensation diminished   MSK: 3/5 muscle strength noted in all compartments pt states he is currently non ambulatory     < from: Xray Foot AP + Lateral, Right (04.22.22 @ 12:56) >  ACC: [id number] EXAM:  XR FOOT 2 VIEWS RT                          PROCEDURE DATE:  04/22/2022          INTERPRETATION:  History: Right heel wound.    3 views of the right foot were performed.    Findings:    There is soft tissue irregularity along the posterior plantar aspect of   the calcaneus. There is no osseous erosion, destruction, or periosteal   reaction to suggest osteomyelitis. There is no acute fracture or   dislocation. There is diffuse soft tissue swelling about the foot. There   is mild spurring at the midfoot articulations. There is a spur along the   medial malleolus. There is an Achilles enthesophyte.    IMPRESSION:    No radiographic evidence of osteomyelitis.    --- End of Report ---            MAGNUS SÁNCHEZ MD; Attending Radiologist  This document has been electronically signed. Apr 23 2022  8:39AM    < end of copied text >

## 2022-04-24 NOTE — DISCHARGE NOTE NURSING/CASE MANAGEMENT/SOCIAL WORK - [id number]_GEN_ALL_CORE
For information on Fall & Injury Prevention, visit: [email or web address] OR  [email or web address] OR  https://www.cdc.gov/steadi/patient.html

## 2022-04-24 NOTE — DISCHARGE NOTE PROVIDER - NSDCMRMEDTOKEN_GEN_ALL_CORE_FT
albuterol 90 mcg/inh inhalation aerosol: 2 puff(s) inhaled every 6 hours, As needed, Shortness of Breath and/or Wheezing  atorvastatin 40 mg oral tablet: 1 tab(s) orally once a day (at bedtime)  carvedilol 3.125 mg oral tablet: 1 tab(s) orally every 12 hours  folic acid 1 mg oral tablet: 1 tab(s) orally once a day  gabapentin 300 mg oral capsule: 3 cap(s) orally 2 times a day  insulin detemir 100 units/mL subcutaneous solution: 50 unit(s) subcutaneous once a day (at bedtime)  insulin lispro 100 units/mL injectable solution: Sliding scale  Lasix 40 mg oral tablet: 1 tab(s) orally once a day  levothyroxine 50 mcg (0.05 mg) oral tablet: 1 tab(s) orally once a day  tamsulosin 0.4 mg oral capsule: 1 cap(s) orally once a day (at bedtime)  warfarin 3 mg oral tablet: 1 tab(s) orally once a day   amoxicillin-clavulanate 875 mg-125 mg oral tablet: 1 tab(s) orally 2 times a day for 6 more days, stop on 04/30/22  atorvastatin 40 mg oral tablet: 1 tab(s) orally once a day (at bedtime)  carvedilol 3.125 mg oral tablet: 1 tab(s) orally every 12 hours  folic acid 1 mg oral tablet: 1 tab(s) orally once a day  gabapentin 300 mg oral capsule: 3 cap(s) orally 2 times a day  insulin detemir 100 units/mL subcutaneous solution: 50 unit(s) subcutaneous once a day (at bedtime)  insulin lispro 100 units/mL injectable solution: Sliding scale  Lasix 40 mg oral tablet: 1 tab(s) orally once a day  levothyroxine 50 mcg (0.05 mg) oral tablet: 1 tab(s) orally once a day  Santyl 250 units/g topical ointment: Apply topically to affected area once a day, till instructed otherwise by your podiatrist   tamsulosin 0.4 mg oral capsule: 1 cap(s) orally once a day (at bedtime)  warfarin 3 mg oral tablet: 1 tab(s) orally once a day

## 2022-04-25 LAB
-  AMIKACIN: SIGNIFICANT CHANGE UP
-  AMOXICILLIN/CLAVULANIC ACID: SIGNIFICANT CHANGE UP
-  AMPICILLIN/SULBACTAM: SIGNIFICANT CHANGE UP
-  AMPICILLIN: SIGNIFICANT CHANGE UP
-  AZTREONAM: SIGNIFICANT CHANGE UP
-  CEFAZOLIN: SIGNIFICANT CHANGE UP
-  CEFEPIME: SIGNIFICANT CHANGE UP
-  CEFOXITIN: SIGNIFICANT CHANGE UP
-  CEFTRIAXONE: SIGNIFICANT CHANGE UP
-  CIPROFLOXACIN: SIGNIFICANT CHANGE UP
-  ERTAPENEM: SIGNIFICANT CHANGE UP
-  GENTAMICIN: SIGNIFICANT CHANGE UP
-  LEVOFLOXACIN: SIGNIFICANT CHANGE UP
-  MEROPENEM: SIGNIFICANT CHANGE UP
-  PIPERACILLIN/TAZOBACTAM: SIGNIFICANT CHANGE UP
-  TOBRAMYCIN: SIGNIFICANT CHANGE UP
-  TRIMETHOPRIM/SULFAMETHOXAZOLE: SIGNIFICANT CHANGE UP
METHOD TYPE: SIGNIFICANT CHANGE UP

## 2022-04-26 LAB
-  AMPICILLIN/SULBACTAM: SIGNIFICANT CHANGE UP
-  CEFAZOLIN: SIGNIFICANT CHANGE UP
-  CLINDAMYCIN: SIGNIFICANT CHANGE UP
-  ERYTHROMYCIN: SIGNIFICANT CHANGE UP
-  GENTAMICIN: SIGNIFICANT CHANGE UP
-  OXACILLIN: SIGNIFICANT CHANGE UP
-  PENICILLIN: SIGNIFICANT CHANGE UP
-  RIFAMPIN: SIGNIFICANT CHANGE UP
-  TETRACYCLINE: SIGNIFICANT CHANGE UP
-  TRIMETHOPRIM/SULFAMETHOXAZOLE: SIGNIFICANT CHANGE UP
-  VANCOMYCIN: SIGNIFICANT CHANGE UP
CULTURE RESULTS: SIGNIFICANT CHANGE UP
METHOD TYPE: SIGNIFICANT CHANGE UP
ORGANISM # SPEC MICROSCOPIC CNT: SIGNIFICANT CHANGE UP
SPECIMEN SOURCE: SIGNIFICANT CHANGE UP

## 2022-06-09 NOTE — DIETITIAN INITIAL EVALUATION ADULT. - PERTINENT LABORATORY DATA
(10/29) glucose 196, 227,196, Na 134, BUN 28, Cr 1.58 (10/26) HgbA1c 10.5% pt AAOx3 c/o epigastric pain, Pt reports that he run out of percocet and he needs more.

## 2022-06-09 NOTE — ED ADULT NURSE REASSESSMENT NOTE - BREATHING
Lab Results   Component Value Date    EGFR 43 06/09/2022    EGFR 39 06/08/2022    EGFR 43 06/07/2022    CREATININE 1 72 (H) 06/09/2022    CREATININE 1 86 (H) 06/08/2022    CREATININE 1 74 (H) 06/07/2022     Baseline creatinine seems between 1 4 -1 7  Currently close to baseline  Trend BMP  Slightly above now   Will continue to monitor spontaneous/unlabored

## 2022-06-20 NOTE — ED PROVIDER NOTE - CROS ED NEURO ALL NEG
42 year old female with history of  presents to ED complaining of dysuria, frequency, and urgency for 1 day. She endorses normal periods. Denies vaginal discharge, vaginal bleeding, fever, nausea, or vomiting. She reports taking ibuprofen with a bit of relief. NKDA.
negative...

## 2022-07-21 NOTE — PATIENT PROFILE ADULT. - MEDICATION HERBAL REMEDIES, PROFILE
no

## 2022-08-11 NOTE — PATIENT PROFILE ADULT - NSPROEDAREADYLEARN_GEN_A_NUR
[FreeTextEntry1] : ---Assessment plan----------The patient has been referred here for further opinion regarding pulmonary problem,\par - 65-year-old male history of interstitial lung disease ------------ no history of any collagen vascular disorder--- CT suggestive of UIP pattern  ---[ BROTEHR  HAS  IPF]----\par \par 1 ILD ----S/P r bronchoscopy and EVISIA STUDY------ ??NSIP \par 2   PFT  before next office visit\par 3 QUANTGOLD  positive---will consider Rifapentine and INH weekly regimen for 3 months pending lung CT results\par 4- labs drawn in our office today \par 5- COVID  8/7/2022-----PREDNISONE, PAXLOVID-----FEELS  BETTER  ON PAXLOVID----CONTD LOW DOSE PREDNISONE----  \par 6- WILL F//U IN OFFICE IN 2-3 WEEKS TIME\par \par Thanks for allowing  me to participate  in the care of this patient.  Patient at this time  will follow  the above mentioned recommendations and return back for follow up visit. If you have any questions  I can be reached  at #[phone] (beeper)  or  [phone] (office).\par \par Daniel Garzon MD, FCCP \par Director, Pulmonary Hypertension Program \par [facility] \par Division of Pulmonary, Critical Care and Sleep Medicine \par  Professor of Medicine \par [employer]\par \par MADDIE Olsen\par \par  acuteness of illness

## 2022-08-15 NOTE — ED ADULT NURSE NOTE - NS ED NURSE IV DC DT
We will call you with your lab results.     Please call your insurance company regarding what they cover for colon cancer screening   - colonoscopy  - cologuard   - fecal occult     13-Aug-2021 03:11

## 2022-10-11 NOTE — PROGRESS NOTE ADULT - ASSESSMENT
68 yo male with morbid obesity, DM, CHFrEF, COPD, CHRISS on cpap, AICD, CKD, diabetic foot ulcers, depressions, hypothyroidism, admitted for syncope (passed out on toilet) and inability to ambulate, found to have JAYLENE on CKD, rhabdomyolysis, septic shock to LE cellulitis.    Plan:  Septic shock due to cellulitis/diabetic foot infection  - abx per ID  - still requiring levophed  - fluid resuscitated    Rhabdomyolysis  - already fluid resuscitated but becoming volume overloaded, will give lasix to keep urine output > 50 ml/hr    CHFrEF  - restart coreg and aldactone once shock resolves  - low dose ACE once JAYLENE resolves     Afib  - heparin drip for now, will eventually place back on warfarin  - rate controlled Acuity of illness

## 2022-11-07 NOTE — H&P ADULT - ASSESSMENT
Pt is a 66 y/o male with PMHx DM (w/ neuropathy), HN, CHF (Last Echo ~2 years ago with EF 35%), COPD, CHRISS (on home CPAP machine), PPM/Defibrillator ("heart function was 3%"), prior diabetic foot ulcers/infections, depression here with septic shock likely secondary to RLE cellulitis and multiple chronic venous vs diabetic foot ulcers, on pressors. Also with metabolic acidosis likely DKA in setting of hyperglycemia (pending serum acetone, anuric so no UA drawn) and rhabdomyolysis. Will admit to ICU.     NEURO: A&Ox3 at present, PRN for pain  CV: Hypotensive s/p 4L IVF secondary to septic shock possible concomitant sepsis induced cardiomyopathy. Prior cardiac disease with AICD/PPM for "weak heart". Last Echo in 2017 with EF 35%, will repeat stat TTE to evaluate current cardiac function. Trop negative, will send BNP. Will start on Levophed, actively titrating to maintain MAP 65-70. pending echo results will determine further fluids given.   RESP: SPO2 98% on room air, likely OHS/possible aspiration given "passing out".   RENAL: JAYLENE secondary to hypoperfusion/rhabdo in setting of shock. Gentle IVF with LR @100cc/hr. Cr 2.84 now, Cr in April this year  1.20. Trend Cr. Replete lytes K>4 Phos >3 Mag >2. Avoid nephrotoxic agents. Renal dose adjustments. Anuric since arrival s/p 4L IVF, will insert wagner. Will get stat ABG now. BiPAP at night.   GI: NPO for now.   ENDO: Hyperglycemic, HAGMA, lactemia, anuric at present and serum acetone pending, likely DKA as well. On insulin infusion s/p 4L IVF. Repeating BMP/Lactate now. q1h F/S. NPO for now. Rhabdo, will give gentle IVF hydration to prevent dye-induced JAYLENE. Continue Synthroid.   ID: Septic shock likely secondary to RLE cellulitis, surgery consulted by ED states this is cellulitis, not nec fasc. Will repeat lactate now s/p IVF resuscitation. Tylenol PRN for fevers. Trend fever curve/white count. S/p vanco/zosyn in ED. Will cont. Will start levophed as above. Consult ID.   HEME: Heparin for DVT ppx.   DISPO: Full code. Admit to ICU Pt is a 68 y/o male with PMHx DM (w/ neuropathy), HN, CHF (Last Echo ~2 years ago with EF 35%), COPD, CHRISS (on home CPAP machine), PPM/Defibrillator ("heart function was 3%"), prior diabetic foot ulcers/infections, depression here with septic shock likely secondary to RLE cellulitis and multiple chronic venous vs diabetic foot ulcers requiring  vasopressor support with metabolic acidosis, hyperglycemia possible DKA (pending serum acetone, anuric so no UA drawn) and rhabdomyolysis. Admit to MICU.     NEURO: Neuropathy on gabapentin.   CV: Hypotensive s/p 4L IVF secondary to septic shock. Actively titrating levophed for MAP 60-70.  Prior cardiac disease with AICD/PPM for "weak heart". Last Echo in 2017 with EF 35%, will repeat stat TTE to evaluate current cardiac function. Trop negative, will send BNP.  Pending echo results will determine further fluids given.   RESP: SPO2 98% on room air, Possible aspiration given in setting of being found unresponsive.  Likely OHS, with CHRISS and COPD.  CPAP ordered Qhs and Prn.   RENAL: JAYLENE secondary to hypoperfusion/rhabdo in setting of shock. Gentle IVF with LR @100cc/hr. Cr 2.84 now, Cr in April this year  1.20. Trend Cr. Replete lytes K>4 Phos >3 Mag >2. Avoid nephrotoxic agents. Renal dose adjustments. Anuric since arrival s/p 4L IVF, will insert wagner. Will get stat ABG now. BiPAP at night.   GI: NPO for now.   ENDO: Hyperglycemic, HAGMA, lactemia, anuric at present and serum acetone pending, possible DKA as well. On insulin infusion s/p 4L IVF. Repeating BMP/Lactate now. q1h F/S. NPO for now. Rhabdo, will give gentle IVF hydration to prevent dye-induced JAYLENE. Continue Synthroid.   ID: Septic shock likely secondary to RLE cellulitis, surgery consulted by ED states this is cellulitis, not nec fasc. Will repeat lactate now s/p IVF resuscitation. Tylenol PRN for fevers. Trend fever curve/white count. S/p vanco/zosyn in ED. Will cont. Will start levophed as above. Consult ID.   HEME: Heparin for DVT ppx.   DISPO: Full code. Admit to ICU    Critical Care time: 58 mins assessing presenting problems of acute illness that poses high probability of life threatening deterioration or end organ damage/dysfunction.  Medical decision making inculding Initiating plan of care, reviewing data, reviewing radiology, discussing with multidisciplinary team, non inclusive of procedures, discussing goals of care with patient/family Yes

## 2023-02-02 NOTE — ED PROVIDER NOTE - NSCAREINITIATED _GEN_ER
Ibeth Bowers(Attending) Topical Clindamycin Pregnancy And Lactation Text: This medication is Pregnancy Category B and is considered safe during pregnancy. It is unknown if it is excreted in breast milk. Oxybutynin Counseling:  I discussed with the patient the risks of oxybutynin including but not limited to skin rash, drowsiness, dry mouth, difficulty urinating, and blurred vision. Isotretinoin Pregnancy And Lactation Text: This medication is Pregnancy Category X and is considered extremely dangerous during pregnancy. It is unknown if it is excreted in breast milk. Ilumya Counseling: I discussed with the patient the risks of tildrakizumab including but not limited to immunosuppression, malignancy, posterior leukoencephalopathy syndrome, and serious infections. The patient understands that monitoring is required including a PPD at baseline and must alert us or the primary physician if symptoms of infection or other concerning signs are noted. Imiquimod Pregnancy And Lactation Text: This medication is Pregnancy Category C. It is unknown if this medication is excreted in breast milk. Sotyktu Pregnancy And Lactation Text: There is insufficient data to evaluate whether or not Sotyktu is safe to use during pregnancy. ? ? It is not known if Sotyktu passes into breast milk and whether or not it is safe to use when breastfeeding. ?? VTAMA Counseling: I discussed with the patient that Sabino Edmonds is not for use in the eyes, mouth or mouth. They should call the office if they develop any signs of allergic reactions to Sabino Edmonds. The patient verbalized understanding of the proper use and possible adverse effects of VTAMA. All of the patient's questions and concerns were addressed. Tranexamic Acid Pregnancy And Lactation Text: It is unknown if this medication is safe during pregnancy or breast feeding. Fluconazole Pregnancy And Lactation Text: This medication is Pregnancy Category C and it isn't know if it is safe during pregnancy. It is also excreted in breast milk. Simponi Pregnancy And Lactation Text: The risk during pregnancy and breastfeeding is uncertain with this medication. Xolair Counseling:  Patient informed of potential adverse effects including but not limited to fever, muscle aches, rash and allergic reactions. The patient verbalized understanding of the proper use and possible adverse effects of Xolair. All of the patient's questions and concerns were addressed. Clindamycin Counseling: I counseled the patient regarding use of clindamycin as an antibiotic for prophylactic and/or therapeutic purposes. Clindamycin is active against numerous classes of bacteria, including skin bacteria. Side effects may include nausea, diarrhea, gastrointestinal upset, rash, hives, yeast infections, and in rare cases, colitis. Azathioprine Counseling:  I discussed with the patient the risks of azathioprine including but not limited to myelosuppression, immunosuppression, hepatotoxicity, lymphoma, and infections. The patient understands that monitoring is required including baseline LFTs, Creatinine, possible TPMP genotyping and weekly CBCs for the first month and then every 2 weeks thereafter. The patient verbalized understanding of the proper use and possible adverse effects of azathioprine. All of the patient's questions and concerns were addressed. Picato Counseling:  I discussed with the patient the risks of Picato including but not limited to erythema, scaling, itching, weeping, crusting, and pain. Opzelura Pregnancy And Lactation Text: There is insufficient data to evaluate drug-associated risk for major birth defects, miscarriage, or other adverse maternal or fetal outcomes. There is a pregnancy registry that monitors pregnancy outcomes in pregnant persons exposed to the medication during pregnancy. It is unknown if this medication is excreted in breast milk. Do not breastfeed during treatment and for about 4 weeks after the last dose. Cephalexin Pregnancy And Lactation Text: This medication is Pregnancy Category B and considered safe during pregnancy. It is also excreted in breast milk but can be used safely for shorter doses. Tranexamic Acid Counseling:  Patient advised of the small risk of bleeding problems with tranexamic acid. They were also instructed to call if they developed any nausea, vomiting or diarrhea. All of the patient's questions and concerns were addressed. Solaraze Counseling:  I discussed with the patient the risks of Solaraze including but not limited to erythema, scaling, itching, weeping, crusting, and pain. Terbinafine Pregnancy And Lactation Text: This medication is Pregnancy Category B and is considered safe during pregnancy. It is also excreted in breast milk and breast feeding isn't recommended. Benzoyl Peroxide Pregnancy And Lactation Text: This medication is Pregnancy Category C. It is unknown if benzoyl peroxide is excreted in breast milk. Tetracycline Pregnancy And Lactation Text: This medication is Pregnancy Category D and not consider safe during pregnancy. It is also excreted in breast milk. Cimzia Pregnancy And Lactation Text: This medication crosses the placenta but can be considered safe in certain situations. Cimzia may be excreted in breast milk. Gabapentin Counseling: I discussed with the patient the risks of gabapentin including but not limited to dizziness, somnolence, fatigue and ataxia. Minocycline Counseling: Patient advised regarding possible photosensitivity and discoloration of the teeth, skin, lips, tongue and gums. Patient instructed to avoid sunlight, if possible. When exposed to sunlight, patients should wear protective clothing, sunglasses, and sunscreen. The patient was instructed to call the office immediately if the following severe adverse effects occur:  hearing changes, easy bruising/bleeding, severe headache, or vision changes. The patient verbalized understanding of the proper use and possible adverse effects of minocycline. All of the patient's questions and concerns were addressed. Methotrexate Counseling:  Patient counseled regarding adverse effects of methotrexate including but not limited to nausea, vomiting, abnormalities in liver function tests. Patients may develop mouth sores, rash, diarrhea, and abnormalities in blood counts. The patient understands that monitoring is required including LFT's and blood counts. There is a rare possibility of scarring of the liver and lung problems that can occur when taking methotrexate. Persistent nausea, loss of appetite, pale stools, dark urine, cough, and shortness of breath should be reported immediately. Patient advised to discontinue methotrexate treatment at least three months before attempting to become pregnant. I discussed the need for folate supplements while taking methotrexate. These supplements can decrease side effects during methotrexate treatment. The patient verbalized understanding of the proper use and possible adverse effects of methotrexate. All of the patient's questions and concerns were addressed. Drysol Counseling:  I discussed with the patient the risks of drysol/aluminum chloride including but not limited to skin rash, itching, irritation, burning. Niacinamide Pregnancy And Lactation Text: These medications are considered safe during pregnancy. Topical Ketoconazole Counseling: Patient counseled that this medication may cause skin irritation or allergic reactions. In the event of skin irritation, the patient was advised to reduce the amount of the drug applied or use it less frequently. The patient verbalized understanding of the proper use and possible adverse effects of ketoconazole. All of the patient's questions and concerns were addressed. Skyrizi Counseling: I discussed with the patient the risks of risankizumab-rzaa including but not limited to immunosuppression, and serious infections. The patient understands that monitoring is required including a PPD at baseline and must alert us or the primary physician if symptoms of infection or other concerning signs are noted. Xeljanz Counseling: Fredi Acre Counseling: I discussed with the patient the risks of Fredi Acre therapy including increased risk of infection, liver issues, headache, diarrhea, or cold symptoms. Live vaccines should be avoided. They were instructed to call if they have any problems. Nsaids Counseling: NSAID Counseling: I discussed with the patient that NSAIDs should be taken with food. Prolonged use of NSAIDs can result in the development of stomach ulcers. Patient advised to stop taking NSAIDs if abdominal pain occurs. The patient verbalized understanding of the proper use and possible adverse effects of NSAIDs. All of the patient's questions and concerns were addressed. Klisyri Counseling:  I discussed with the patient the risks of Ivette Shirts including but not limited to erythema, scaling, itching, weeping, crusting, and pain. Griseofulvin Counseling:  I discussed with the patient the risks of griseofulvin including but not limited to photosensitivity, cytopenia, liver damage, nausea/vomiting and severe allergy. The patient understands that this medication is best absorbed when taken with a fatty meal (e.g., ice cream or french fries). Vtama Pregnancy And Lactation Text: It is unknown if this medication can cause problems during pregnancy and breastfeeding. Azathioprine Pregnancy And Lactation Text: This medication is Pregnancy Category D and isn't considered safe during pregnancy. It is unknown if this medication is excreted in breast milk. Arava Pregnancy And Lactation Text: This medication is Pregnancy Category X and is absolutely contraindicated during pregnancy. It is unknown if it is excreted in breast milk. Xolair Pregnancy And Lactation Text: This medication is Pregnancy Category B and is considered safe during pregnancy. This medication is excreted in breast milk. Fluconazole Counseling:  Patient counseled regarding adverse effects of fluconazole including but not limited to headache, diarrhea, nausea, upset stomach, liver function test abnormalities, taste disturbance, and stomach pain. There is a rare possibility of liver failure that can occur when taking fluconazole. The patient understands that monitoring of LFTs and kidney function test may be required, especially at baseline. The patient verbalized understanding of the proper use and possible adverse effects of fluconazole. All of the patient's questions and concerns were addressed. Quinolones Counseling:  I discussed with the patient the risks of fluoroquinolones including but not limited to GI upset, allergic reaction, drug rash, diarrhea, dizziness, photosensitivity, yeast infections, liver function test abnormalities, tendonitis/tendon rupture. Valtrex Counseling: I discussed with the patient the risks of valacyclovir including but not limited to kidney damage, nausea, vomiting and severe allergy. The patient understands that if the infection seems to be worsening or is not improving, they are to call. Topical Retinoid counseling:  Patient advised to apply a pea-sized amount only at bedtime and wait 30 minutes after washing their face before applying. If too drying, patient may add a non-comedogenic moisturizer. The patient verbalized understanding of the proper use and possible adverse effects of retinoids. All of the patient's questions and concerns were addressed. Clindamycin Pregnancy And Lactation Text: This medication can be used in pregnancy if certain situations. Clindamycin is also present in breast milk. Cosentyx Counseling:  I discussed with the patient the risks of Cosentyx including but not limited to worsening of Crohn's disease, immunosuppression, allergic reactions and infections. The patient understands that monitoring is required including a PPD at baseline and must alert us or the primary physician if symptoms of infection or other concerning signs are noted. Arava Counseling:  Patient counseled regarding adverse effects of Arava including but not limited to nausea, vomiting, abnormalities in liver function tests. Patients may develop mouth sores, rash, diarrhea, and abnormalities in blood counts. The patient understands that monitoring is required including LFTs and blood counts. There is a rare possibility of scarring of the liver and lung problems that can occur when taking methotrexate. Persistent nausea, loss of appetite, pale stools, dark urine, cough, and shortness of breath should be reported immediately. Patient advised to discontinue Arava treatment and consult with a physician prior to attempting conception. The patient will have to undergo a treatment to eliminate Arava from the body prior to conception. Dutasteride Counseling: Dustasteride Counseling:  I discussed with the patient the risks of use of dutasteride including but not limited to decreased libido, decreased ejaculate volume, and gynecomastia. Women who can become pregnant should not handle medication. All of the patient's questions and concerns were addressed. Carac Counseling:  I discussed with the patient the risks of Carac including but not limited to erythema, scaling, itching, weeping, crusting, and pain. Solaraze Pregnancy And Lactation Text: This medication is Pregnancy Category B and is considered safe. There is some data to suggest avoiding during the third trimester. It is unknown if this medication is excreted in breast milk. Methotrexate Pregnancy And Lactation Text: This medication is Pregnancy Category X and is known to cause fetal harm. This medication is excreted in breast milk. Gabapentin Pregnancy And Lactation Text: This medication is Pregnancy Category C and isn't considered safe during pregnancy. It is excreted in breast milk. Cimetidine Counseling:  I discussed with the patient the risks of Cimetidine including but not limited to gynecomastia, headache, diarrhea, nausea, drowsiness, arrhythmias, pancreatitis, skin rashes, psychosis, bone marrow suppression and kidney toxicity. High Dose Vitamin A Counseling: Side effects reviewed, pt to contact office should one occur. Drysol Pregnancy And Lactation Text: This medication is considered safe during pregnancy and breast feeding. Albendazole Counseling:  I discussed with the patient the risks of albendazole including but not limited to cytopenia, kidney damage, nausea/vomiting and severe allergy. The patient understands that this medication is being used in an off-label manner. Xeltylerz Pregnancy And Lactation Text: This medication is Pregnancy Category D and is not considered safe during pregnancy. The risk during breast feeding is also uncertain. Cellcept Counseling:  I discussed with the patient the risks of mycophenolate mofetil including but not limited to infection/immunosuppression, GI upset, hypokalemia, hypercholesterolemia, bone marrow suppression, lymphoproliferative disorders, malignancy, GI ulceration/bleed/perforation, colitis, interstitial lung disease, kidney failure, progressive multifocal leukoencephalopathy, and birth defects. The patient understands that monitoring is required including a baseline creatinine and regular CBC testing. In addition, patient must alert us immediately if symptoms of infection or other concerning signs are noted. Klisyri Pregnancy And Lactation Text: It is unknown if this medication can harm a developing fetus or if it is excreted in breast milk. Detail Level: Generalized Zoryve Counseling:  I discussed with the patient that Kaela Lipa is not for use in the eyes, mouth or vagina. The most commonly reported side effects include diarrhea, headache, insomnia, application site pain, upper respiratory tract infections, and urinary tract infections. All of the patient's questions and concerns were addressed. Propranolol Counseling:  I discussed with the patient the risks of propranolol including but not limited to low heart rate, low blood pressure, low blood sugar, restlessness and increased cold sensitivity. They should call the office if they experience any of these side effects. Finasteride Counseling:  I discussed with the patient the risks of use of finasteride including but not limited to decreased libido, decreased ejaculate volume, gynecomastia, and depression. Women should not handle medication. All of the patient's questions and concerns were addressed. Doxycycline Counseling:  Patient counseled regarding possible photosensitivity and increased risk for sunburn. Patient instructed to avoid sunlight, if possible. When exposed to sunlight, patients should wear protective clothing, sunglasses, and sunscreen. The patient was instructed to call the office immediately if the following severe adverse effects occur:  hearing changes, easy bruising/bleeding, severe headache, or vision changes. The patient verbalized understanding of the proper use and possible adverse effects of doxycycline. All of the patient's questions and concerns were addressed. Prednisone Pregnancy And Lactation Text: This medication is Pregnancy Category C and it isn't know if it is safe during pregnancy. This medication is excreted in breast milk. Protopic Counseling: Patient may experience a mild burning sensation during topical application. Protopic is not approved in children less than 3years of age. There have been case reports of hematologic and skin malignancies in patients using topical calcineurin inhibitors although causality is questionable. Dupixent Counseling: I discussed with the patient the risks of dupilumab including but not limited to eye infection and irritation, cold sores, injection site reactions, worsening of asthma, allergic reactions and increased risk of parasitic infection. Live vaccines should be avoided while taking dupilumab. Dupilumab will also interact with certain medications such as warfarin and cyclosporine. The patient understands that monitoring is required and they must alert us or the primary physician if symptoms of infection or other concerning signs are noted. Valtrex Pregnancy And Lactation Text: this medication is Pregnancy Category B and is considered safe during pregnancy. This medication is not directly found in breast milk but it's metabolite acyclovir is present. Clofazimine Counseling:  I discussed with the patient the risks of clofazimine including but not limited to skin and eye pigmentation, liver damage, nausea/vomiting, gastrointestinal bleeding and allergy. Glycopyrrolate Counseling:  I discussed with the patient the risks of glycopyrrolate including but not limited to skin rash, drowsiness, dry mouth, difficulty urinating, and blurred vision. Dutasteride Pregnancy And Lactation Text: This medication is absolutely contraindicated in women, especially during pregnancy and breast feeding. Feminization of male fetuses is possible if taking while pregnant. Carac Pregnancy And Lactation Text: This medication is Pregnancy Category X and contraindicated in pregnancy and in women who may become pregnant. It is unknown if this medication is excreted in breast milk. High Dose Vitamin A Pregnancy And Lactation Text: High dose vitamin A therapy is contraindicated during pregnancy and breast feeding. Cosentyx Pregnancy And Lactation Text: This medication is Pregnancy Category B and is considered safe during pregnancy. It is unknown if this medication is excreted in breast milk. Prednisone Counseling:  I discussed with the patient the risks of prolonged use of prednisone including but not limited to weight gain, insomnia, osteoporosis, mood changes, diabetes, susceptibility to infection, glaucoma and high blood pressure. In cases where prednisone use is prolonged, patients should be monitored with blood pressure checks, serum glucose levels and an eye exam.  Additionally, the patient may need to be placed on GI prophylaxis, PCP prophylaxis, and calcium and vitamin D supplementation and/or a bisphosphonate. The patient verbalized understanding of the proper use and the possible adverse effects of prednisone. All of the patient's questions and concerns were addressed. Albendazole Pregnancy And Lactation Text: This medication is Pregnancy Category C and it isn't known if it is safe during pregnancy. It is also excreted in breast milk. Nsaids Pregnancy And Lactation Text: These medications are considered safe up to 30 weeks gestation. It is excreted in breast milk. Elidel Counseling: Patient may experience a mild burning sensation during topical application. Elidel is not approved in children less than 3years of age. There have been case reports of hematologic and skin malignancies in patients using topical calcineurin inhibitors although causality is questionable. Griseofulvin Pregnancy And Lactation Text: This medication is Pregnancy Category X and is known to cause serious birth defects. It is unknown if this medication is excreted in breast milk but breast feeding should be avoided. Cibinqo Counseling: I discussed with the patient the risks of Cibinqo therapy including but not limited to common cold, nausea, headache, cold sores, increased blood CPK levels, dizziness, UTIs, fatigue, acne, and vomitting. Live vaccines should be avoided. This medication has been linked to serious infections; higher rate of mortality; malignancy and lymphoproliferative disorders; major adverse cardiovascular events; thrombosis; thrombocytopenia and lymphopenia; lipid elevations; and retinal detachment. Infliximab Counseling:  I discussed with the patient the risks of infliximab including but not limited to myelosuppression, immunosuppression, autoimmune hepatitis, demyelinating diseases, lymphoma, and serious infections. The patient understands that monitoring is required including a PPD at baseline and must alert us or the primary physician if symptoms of infection or other concerning signs are noted. Use Enhanced Medication Counseling?: No Protopic Pregnancy And Lactation Text: This medication is Pregnancy Category C. It is unknown if this medication is excreted in breast milk when applied topically. Doxycycline Pregnancy And Lactation Text: This medication is Pregnancy Category D and not consider safe during pregnancy. It is also excreted in breast milk but is considered safe for shorter treatment courses. Aklief counseling:  Patient advised to apply a pea-sized amount only at bedtime and wait 30 minutes after washing their face before applying. If too drying, patient may add a non-comedogenic moisturizer. The most commonly reported side effects including irritation, redness, scaling, dryness, stinging, burning, itching, and increased risk of sunburn. The patient verbalized understanding of the proper use and possible adverse effects of retinoids. All of the patient's questions and concerns were addressed. Rifampin Counseling: I discussed with the patient the risks of rifampin including but not limited to liver damage, kidney damage, red-orange body fluids, nausea/vomiting and severe allergy. Tazorac Counseling:  Patient advised that medication is irritating and drying. Patient may need to apply sparingly and wash off after an hour before eventually leaving it on overnight. The patient verbalized understanding of the proper use and possible adverse effects of tazorac. All of the patient's questions and concerns were addressed. Finasteride Pregnancy And Lactation Text: This medication is absolutely contraindicated during pregnancy. It is unknown if it is excreted in breast milk. Acitretin Counseling:  I discussed with the patient the risks of acitretin including but not limited to hair loss, dry lips/skin/eyes, liver damage, hyperlipidemia, depression/suicidal ideation, photosensitivity. Serious rare side effects can include but are not limited to pancreatitis, pseudotumor cerebri, bony changes, clot formation/stroke/heart attack. Patient understands that alcohol is contraindicated since it can result in liver toxicity and significantly prolong the elimination of the drug by many years. Dupixent Pregnancy And Lactation Text: This medication likely crosses the placenta but the risk for the fetus is uncertain. This medication is excreted in breast milk. Hydroxychloroquine Counseling:  I discussed with the patient that a baseline ophthalmologic exam is needed at the start of therapy and every year thereafter while on therapy. A CBC may also be warranted for monitoring. The side effects of this medication were discussed with the patient, including but not limited to agranulocytosis, aplastic anemia, seizures, rashes, retinopathy, and liver toxicity. Patient instructed to call the office should any adverse effect occur. The patient verbalized understanding of the proper use and possible adverse effects of Plaquenil. All the patient's questions and concerns were addressed. Calcipotriene Pregnancy And Lactation Text: This medication has not been proven safe during pregnancy. It is unknown if this medication is excreted in breast milk. Calcipotriene Counseling: Cantharidin Counseling:  I discussed with the patient the risks of Cantharidin including but not limited to pain, redness, burning, itching, and blistering. Ivermectin Counseling:  Patient instructed to take medication on an empty stomach with a full glass of water. Patient informed of potential adverse effects including but not limited to nausea, diarrhea, dizziness, itching, and swelling of the extremities or lymph nodes. The patient verbalized understanding of the proper use and possible adverse effects of ivermectin. All of the patient's questions and concerns were addressed. Glycopyrrolate Pregnancy And Lactation Text: This medication is Pregnancy Category B and is considered safe during pregnancy. It is unknown if it is excreted breast milk. Cibinqo Pregnancy And Lactation Text: It is unknown if this medication will adversely affect pregnancy or breast feeding. You should not take this medication if you are currently pregnant or planning a pregnancy or while breastfeeding. Olanzapine Counseling- I discussed with the patient the common side effects of olanzapine including but are not limited to: lack of energy, dry mouth, increased appetite, sleepiness, tremor, constipation, dizziness, changes in behavior, or restlessness. Explained that teenagers are more likely to experience headaches, abdominal pain, pain in the arms or legs, tiredness, and sleepiness. Serious side effects include but are not limited: increased risk of death in elderly patients who are confused, have memory loss, or dementia-related psychosis; hyperglycemia; increased cholesterol and triglycerides; and weight gain. Propranolol Pregnancy And Lactation Text: This medication is Pregnancy Category C and it isn't known if it is safe during pregnancy. It is excreted in breast milk. Minoxidil Counseling: Minoxidil is a topical medication which can increase blood flow where it is applied. It is uncertain how this medication increases hair growth. Side effects are uncommon and include stinging and allergic reactions. Azithromycin Counseling:  I discussed with the patient the risks of azithromycin including but not limited to GI upset, allergic reaction, drug rash, diarrhea, and yeast infections. Doxepin Counseling:  Patient advised that the medication is sedating and not to drive a car after taking this medication. Patient informed of potential adverse effects including but not limited to dry mouth, urinary retention, and blurry vision. The patient verbalized understanding of the proper use and possible adverse effects of doxepin. All of the patient's questions and concerns were addressed. Erivedge Counseling- I discussed with the patient the risks of Erivedge including but not limited to nausea, vomiting, diarrhea, constipation, weight loss, changes in the sense of taste, decreased appetite, muscle spasms, and hair loss. The patient verbalized understanding of the proper use and possible adverse effects of Erivedge. All of the patient's questions and concerns were addressed. Itraconazole Counseling:  I discussed with the patient the risks of itraconazole including but not limited to liver damage, nausea/vomiting, neuropathy, and severe allergy. The patient understands that this medication is best absorbed when taken with acidic beverages such as non-diet cola or ginger ale. The patient understands that monitoring is required including baseline LFTs and repeat LFTs at intervals. The patient understands that they are to contact us or the primary physician if concerning signs are noted. Topical Sulfur Applications Counseling: Topical Sulfur Counseling: Patient counseled that this medication may cause skin irritation or allergic reactions. In the event of skin irritation, the patient was advised to reduce the amount of the drug applied or use it less frequently. The patient verbalized understanding of the proper use and possible adverse effects of topical sulfur application. All of the patient's questions and concerns were addressed. Stelara Counseling:  I discussed with the patient the risks of ustekinumab including but not limited to immunosuppression, malignancy, posterior leukoencephalopathy syndrome, and serious infections. The patient understands that monitoring is required including a PPD at baseline and must alert us or the primary physician if symptoms of infection or other concerning signs are noted. Qbrexza Counseling:  I discussed with the patient the risks of [place] including but not limited to headache, mydriasis, blurred vision, dry eyes, nasal dryness, dry mouth, dry throat, dry skin, urinary hesitation, and constipation. Local skin reactions including erythema, burning, stinging, and itching can also occur. Erythromycin Counseling:  I discussed with the patient the risks of erythromycin including but not limited to GI upset, allergic reaction, drug rash, diarrhea, increase in liver enzymes, and yeast infections. Zyclara Counseling:  I discussed with the patient the risks of imiquimod including but not limited to erythema, scaling, itching, weeping, crusting, and pain. Patient understands that the inflammatory response to imiquimod is variable from person to person and was educated regarded proper titration schedule. If flu-like symptoms develop, patient knows to discontinue the medication and contact us. Birth Control Pills Counseling: Birth Control Pill Counseling: I discussed with the patient the potential side effects of OCPs including but not limited to increased risk of stroke, heart attack, thrombophlebitis, deep venous thrombosis, hepatic adenomas, breast changes, GI upset, headaches, and depression. The patient verbalized understanding of the proper use and possible adverse effects of OCPs. All of the patient's questions and concerns were addressed. Colchicine Counseling:  Patient counseled regarding adverse effects including but not limited to stomach upset (nausea, vomiting, stomach pain, or diarrhea). Patient instructed to limit alcohol consumption while taking this medication. Colchicine may reduce blood counts especially with prolonged use. The patient understands that monitoring of kidney function and blood counts may be required, especially at baseline. The patient verbalized understanding of the proper use and possible adverse effects of colchicine. All of the patient's questions and concerns were addressed. Cyclophosphamide Counseling:  I discussed with the patient the risks of cyclophosphamide including but not limited to hair loss, hormonal abnormalities, decreased fertility, abdominal pain, diarrhea, nausea and vomiting, bone marrow suppression and infection. The patient understands that monitoring is required while taking this medication. Aklief Pregnancy And Lactation Text: It is unknown if this medication is safe to use during pregnancy. It is unknown if this medication is excreted in breast milk. Breastfeeding women should use the topical cream on the smallest area of the skin for the shortest time needed while breastfeeding. Do not apply to nipple and areola. Rifampin Pregnancy And Lactation Text: This medication is Pregnancy Category C and it isn't know if it is safe during pregnancy. It is also excreted in breast milk and should not be used if you are breast feeding. Enbrel Counseling:  I discussed with the patient the risks of etanercept including but not limited to myelosuppression, immunosuppression, autoimmune hepatitis, demyelinating diseases, lymphoma, and infections. The patient understands that monitoring is required including a PPD at baseline and must alert us or the primary physician if symptoms of infection or other concerning signs are noted. Hydroxyzine Counseling: Patient advised that the medication is sedating and not to drive a car after taking this medication. Patient informed of potential adverse effects including but not limited to dry mouth, urinary retention, and blurry vision. The patient verbalized understanding of the proper use and possible adverse effects of hydroxyzine. All of the patient's questions and concerns were addressed. Hydroxychloroquine Pregnancy And Lactation Text: This medication has been shown to cause fetal harm but it isn't assigned a Pregnancy Risk Category. There are small amounts excreted in breast milk. Eucrisa Pregnancy And Lactation Text: This medication has not been assigned a Pregnancy Risk Category but animal studies failed to show danger with the topical medication. It is unknown if the medication is excreted in breast milk. Acitretin Pregnancy And Lactation Text: This medication is Pregnancy Category X and should not be given to women who are pregnant or may become pregnant in the future. This medication is excreted in breast milk. Libtayo Counseling- I discussed with the patient the risks of Libtayo including but not limited to nausea, vomiting, diarrhea, and bone or muscle pain. The patient verbalized understanding of the proper use and possible adverse effects of Libtayo. All of the patient's questions and concerns were addressed. Cantharidin Counseling: Calcipotriene Counseling:  I discussed with the patient the risks of calcipotriene including but not limited to erythema, scaling, itching, and irritation. Rituxan Counseling:  I discussed with the patient the risks of Rituxan infusions. Side effects can include infusion reactions, severe drug rashes including mucocutaneous reactions, reactivation of latent hepatitis and other infections and rarely progressive multifocal leukoencephalopathy. All of the patient's questions and concerns were addressed. Olanzapine Pregnancy And Lactation Text: This medication is pregnancy category C. There are no adequate and well controlled trials with olanzapine in pregnant females. Olanzapine should be used during pregnancy only if the potential benefit justifies the potential risk to the fetus. In a study in lactating healthy women, olanzapine was excreted in breast milk. It is recommended that women taking olanzapine should not breast feed. Olumiant Counseling: I discussed with the patient the risks of Olumiant therapy including but not limited to upper respiratory tract infections, shingles, cold sores, and nausea. Live vaccines should be avoided. This medication has been linked to serious infections; higher rate of mortality; malignancy and lymphoproliferative disorders; major adverse cardiovascular events; thrombosis; gastrointestinal perforations; neutropenia; lymphopenia; anemia; liver enzyme elevations; and lipid elevations. Doxepin Pregnancy And Lactation Text: This medication is Pregnancy Category C and it isn't known if it is safe during pregnancy. It is also excreted in breast milk and breast feeding isn't recommended. Eucrisa Counseling: Patient may experience a mild burning sensation during topical application. Nyra Stank is not approved in children less than 3years of age. SSKI Counseling:  I discussed with the patient the risks of SSKI including but not limited to thyroid abnormalities, metallic taste, GI upset, fever, headache, acne, arthralgias, paraesthesias, lymphadenopathy, easy bleeding, arrhythmias, and allergic reaction. Azithromycin Pregnancy And Lactation Text: This medication is considered safe during pregnancy and is also secreted in breast milk. Topical Sulfur Applications Pregnancy And Lactation Text: This medication is Pregnancy Category C and has an unknown safety profile during pregnancy. It is unknown if this topical medication is excreted in breast milk. Adbry Counseling: I discussed with the patient the risks of tralokinumab including but not limited to eye infection and irritation, cold sores, injection site reactions, worsening of asthma, allergic reactions and increased risk of parasitic infection. Live vaccines should be avoided while taking tralokinumab. The patient understands that monitoring is required and they must alert us or the primary physician if symptoms of infection or other concerning signs are noted. Azelaic Acid Counseling: Patient counseled that medicine may cause skin irritation and to avoid applying near the eyes. In the event of skin irritation, the patient was advised to reduce the amount of the drug applied or use it less frequently. The patient verbalized understanding of the proper use and possible adverse effects of azelaic acid. All of the patient's questions and concerns were addressed. Sarecycline Counseling: Patient advised regarding possible photosensitivity and discoloration of the teeth, skin, lips, tongue and gums. Patient instructed to avoid sunlight, if possible. When exposed to sunlight, patients should wear protective clothing, sunglasses, and sunscreen. The patient was instructed to call the office immediately if the following severe adverse effects occur:  hearing changes, easy bruising/bleeding, severe headache, or vision changes. The patient verbalized understanding of the proper use and possible adverse effects of sarecycline. All of the patient's questions and concerns were addressed. Bexarotene Counseling:  I discussed with the patient the risks of bexarotene including but not limited to hair loss, dry lips/skin/eyes, liver abnormalities, hyperlipidemia, pancreatitis, depression/suicidal ideation, photosensitivity, drug rash/allergic reactions, hypothyroidism, anemia, leukopenia, infection, cataracts, and teratogenicity. Patient understands that they will need regular blood tests to check lipid profile, liver function tests, white blood cell count, thyroid function tests and pregnancy test if applicable. Low Dose Naltrexone Counseling- I discussed with the patient the potential risks and side effects of low dose naltrexone including but not limited to: more vivid dreams, headaches, nausea, vomiting, abdominal pain, fatigue, dizziness, and anxiety. Cantharidin Pregnancy And Lactation Text: The use of this medication during pregnancy or lactation is not recommended as there is insufficient data. Birth Control Pills Pregnancy And Lactation Text: This medication should be avoided if pregnant and for the first 30 days post-partum. Hydroxyzine Pregnancy And Lactation Text: This medication is not safe during pregnancy and should not be taken. It is also excreted in breast milk and breast feeding isn't recommended. Hydroquinone Counseling:  Patient advised that medication may result in skin irritation, lightening (hypopigmentation), dryness, and burning. In the event of skin irritation, the patient was advised to reduce the amount of the drug applied or use it less frequently. Rarely, spots that are treated with hydroquinone can become darker (pseudoochronosis). Should this occur, patient instructed to stop medication and call the office. The patient verbalized understanding of the proper use and possible adverse effects of hydroquinone. All of the patient's questions and concerns were addressed. Libtayo Pregnancy And Lactation Text: This medication is contraindicated in pregnancy and when breast feeding. Rinvoq Counseling: I discussed with the patient the risks of Rinvoq therapy including but not limited to upper respiratory tract infections, shingles, cold sores, bronchitis, nausea, cough, fever, acne, and headache. Live vaccines should be avoided. This medication has been linked to serious infections; higher rate of mortality; malignancy and lymphoproliferative disorders; major adverse cardiovascular events; thrombosis; thrombocytopenia, anemia, and neutropenia; lipid elevations; liver enzyme elevations; and gastrointestinal perforations. Siliq Counseling:  I discussed with the patient the risks of Siliq including but not limited to new or worsening depression, suicidal thoughts and behavior, immunosuppression, malignancy, posterior leukoencephalopathy syndrome, and serious infections. The patient understands that monitoring is required including a PPD at baseline and must alert us or the primary physician if symptoms of infection or other concerning signs are noted. There is also a special program designed to monitor depression which is required with Siliq. Wartpeel Counseling:  I discussed with the patient the risks of Wartpeel including but not limited to erythema, scaling, itching, weeping, crusting, and pain. Olumiant Pregnancy And Lactation Text: Based on animal studies, Abhinav Pilar may cause embryo-fetal harm when administered to pregnant women. The medication should not be used in pregnancy. Breastfeeding is not recommended during treatment. Rituxan Pregnancy And Lactation Text: This medication is Pregnancy Category C and it isn't know if it is safe during pregnancy. It is unknown if this medication is excreted in breast milk but similar antibodies are known to be excreted. Oral Minoxidil Counseling- I discussed with the patient the risks of oral minoxidil including but not limited to shortness of breath, swelling of the feet or ankles, dizziness, lightheadedness, unwanted hair growth and allergic reaction. The patient verbalized understanding of the proper use and possible adverse effects of oral minoxidil. All of the patient's questions and concerns were addressed. Mirvaso Counseling: Terrilee Moellers is a topical medication which can decrease superficial blood flow where applied. Side effects are uncommon and include stinging, redness and allergic reactions. Bactrim Counseling:  I discussed with the patient the risks of sulfa antibiotics including but not limited to GI upset, allergic reaction, drug rash, diarrhea, dizziness, photosensitivity, and yeast infections. Rarely, more serious reactions can occur including but not limited to aplastic anemia, agranulocytosis, methemoglobinemia, blood dyscrasias, liver or kidney failure, lung infiltrates or desquamative/blistering drug rashes. Cyclophosphamide Pregnancy And Lactation Text: This medication is Pregnancy Category D and it isn't considered safe during pregnancy. This medication is excreted in breast milk. Ketoconazole Counseling:   Patient counseled regarding improving absorption with orange juice. Adverse effects include but are not limited to breast enlargement, headache, diarrhea, nausea, upset stomach, liver function test abnormalities, taste disturbance, and stomach pain. There is a rare possibility of liver failure that can occur when taking ketoconazole. The patient understands that monitoring of LFTs may be required, especially at baseline. The patient verbalized understanding of the proper use and possible adverse effects of ketoconazole. All of the patient's questions and concerns were addressed. Taltz Counseling: I discussed with the patient the risks of ixekizumab including but not limited to immunosuppression, serious infections, worsening of inflammatory bowel disease and drug reactions. The patient understands that monitoring is required including a PPD at baseline and must alert us or the primary physician if symptoms of infection or other concerning signs are noted. Sski Pregnancy And Lactation Text: This medication is Pregnancy Category D and isn't considered safe during pregnancy. It is excreted in breast milk. Qbrexza Pregnancy And Lactation Text: There is no available data on Qbrexza use in pregnant women. There is no available data on Qbrexza use in lactation. Erythromycin Pregnancy And Lactation Text: This medication is Pregnancy Category B and is considered safe during pregnancy. It is also excreted in breast milk. Dapsone Counseling: I discussed with the patient the risks of dapsone including but not limited to hemolytic anemia, agranulocytosis, rashes, methemoglobinemia, kidney failure, peripheral neuropathy, headaches, GI upset, and liver toxicity. Patients who start dapsone require monitoring including baseline LFTs and weekly CBCs for the first month, then every month thereafter. The patient verbalized understanding of the proper use and possible adverse effects of dapsone. All of the patient's questions and concerns were addressed. 5-Fu Counseling: 5-Fluorouracil Counseling:  I discussed with the patient the risks of 5-fluorouracil including but not limited to erythema, scaling, itching, weeping, crusting, and pain. Tazorac Pregnancy And Lactation Text: This medication is not safe during pregnancy. It is unknown if this medication is excreted in breast milk. Humira Counseling:  I discussed with the patient the risks of adalimumab including but not limited to myelosuppression, immunosuppression, autoimmune hepatitis, demyelinating diseases, lymphoma, and serious infections. The patient understands that monitoring is required including a PPD at baseline and must alert us or the primary physician if symptoms of infection or other concerning signs are noted. Spironolactone Counseling: Patient advised regarding risks of diarrhea, abdominal pain, hyperkalemia, birth defects (for female patients), liver toxicity and renal toxicity. The patient may need blood work to monitor liver and kidney function and potassium levels while on therapy. The patient verbalized understanding of the proper use and possible adverse effects of spironolactone. All of the patient's questions and concerns were addressed. Bexarotene Pregnancy And Lactation Text: This medication is Pregnancy Category X and should not be given to women who are pregnant or may become pregnant. This medication should not be used if you are breast feeding. Low Dose Naltrexone Pregnancy And Lactation Text: Naltrexone is pregnancy category C. There have been no adequate and well-controlled studies in pregnant women. It should be used in pregnancy only if the potential benefit justifies the potential risk to the fetus. Limited data indicates that naltrexone is minimally excreted into breastmilk. Odomzo Counseling- I discussed with the patient the risks of Odomzo including but not limited to nausea, vomiting, diarrhea, constipation, weight loss, changes in the sense of taste, decreased appetite, muscle spasms, and hair loss. The patient verbalized understanding of the proper use and possible adverse effects of Odomzo. All of the patient's questions and concerns were addressed. Opioid Counseling: I discussed with the patient the potential side effects of opioids including but not limited to addiction, altered mental status, and depression. I stressed avoiding alcohol, benzodiazepines, muscle relaxants and sleep aids unless specifically okayed by a physician. The patient verbalized understanding of the proper use and possible adverse effects of opioids. All of the patient's questions and concerns were addressed. They were instructed to flush the remaining pills down the toilet if they did not need them for pain. Rinvoq Pregnancy And Lactation Text: Based on animal studies, Rinvoq may cause embryo-fetal harm when administered to pregnant women. The medication should not be used in pregnancy. Breastfeeding is not recommended during treatment and for 6 days after the last dose. Winlevi Counseling:  I discussed with the patient the risks of topical clascoterone including but not limited to erythema, scaling, itching, and stinging. Patient voiced their understanding. Otezla Counseling: Lunette Brie Counseling: The side effects of Lunette Brie were discussed with the patient, including but not limited to worsening or new depression, weight loss, diarrhea, nausea, upper respiratory tract infection, and headache. Patient instructed to call the office should any adverse effect occur. The patient verbalized understanding of the proper use and possible adverse effects of Otezla. All the patient's questions and concerns were addressed. Oral Minoxidil Pregnancy And Lactation Text: This medication should only be used when clearly needed if you are pregnant, attempting to become pregnant or breast feeding. Mirvaso Pregnancy And Lactation Text: This medication has not been assigned a Pregnancy Risk Category. It is unknown if the medication is excreted in breast milk. Bactrim Pregnancy And Lactation Text: This medication is Pregnancy Category D and is known to cause fetal risk. It is also excreted in breast milk. Ketoconazole Pregnancy And Lactation Text: This medication is Pregnancy Category C and it isn't know if it is safe during pregnancy. It is also excreted in breast milk and breast feeding isn't recommended. Cyclosporine Counseling:  I discussed with the patient the risks of cyclosporine including but not limited to hypertension, gingival hyperplasia,myelosuppression, immunosuppression, liver damage, kidney damage, neurotoxicity, lymphoma, and serious infections. The patient understands that monitoring is required including baseline blood pressure, CBC, CMP, lipid panel and uric acid, and then 1-2 times monthly CMP and blood pressure. Rhofade Counseling: Rhofade is a topical medication which can decrease superficial blood flow where applied. Side effects are uncommon and include stinging, redness and allergic reactions. Metronidazole Counseling:  I discussed with the patient the risks of metronidazole including but not limited to seizures, nausea/vomiting, a metallic taste in the mouth, nausea/vomiting and severe allergy. Thalidomide Counseling: I discussed with the patient the risks of thalidomide including but not limited to birth defects, anxiety, weakness, chest pain, dizziness, cough and severe allergy. Adbry Pregnancy And Lactation Text: It is unknown if this medication will adversely affect pregnancy or breast feeding. Isotretinoin Counseling: Patient should get monthly blood tests, not donate blood, not drive at night if vision affected, not share medication, and not undergo elective surgery for 6 months after tx completed. Side effects reviewed, pt to contact office should one occur. Opioid Pregnancy And Lactation Text: These medications can lead to premature delivery and should be avoided during pregnancy. These medications are also present in breast milk in small amounts. Tetracycline Counseling: Patient counseled regarding possible photosensitivity and increased risk for sunburn. Patient instructed to avoid sunlight, if possible. When exposed to sunlight, patients should wear protective clothing, sunglasses, and sunscreen. The patient was instructed to call the office immediately if the following severe adverse effects occur:  hearing changes, easy bruising/bleeding, severe headache, or vision changes. The patient verbalized understanding of the proper use and possible adverse effects of tetracycline. All of the patient's questions and concerns were addressed. Patient understands to avoid pregnancy while on therapy due to potential birth defects. Simponi Counseling:  I discussed with the patient the risks of golimumab including but not limited to myelosuppression, immunosuppression, autoimmune hepatitis, demyelinating diseases, lymphoma, and serious infections. The patient understands that monitoring is required including a PPD at baseline and must alert us or the primary physician if symptoms of infection or other concerning signs are noted. Topical Clindamycin Counseling: Patient counseled that this medication may cause skin irritation or allergic reactions. In the event of skin irritation, the patient was advised to reduce the amount of the drug applied or use it less frequently. The patient verbalized understanding of the proper use and possible adverse effects of clindamycin. All of the patient's questions and concerns were addressed. Sotyktu Counseling:  I discussed the most common side effects of Sotyktu including: common cold, sore throat, sinus infections, cold sores, canker sores, folliculitis, and acne. ? I also discussed more serious side effects of Sotyktu including but not limited to: serious allergic reactions; increased risk for infections such as TB; cancers such as lymphomas; rhabdomyolysis and elevated CPK; and elevated triglycerides and liver enzymes. ? Niacinamide Counseling: I recommended taking niacin or niacinamide, also know as vitamin B3, twice daily. Recent evidence suggests that taking vitamin B3 (500 mg twice daily) can reduce the risk of actinic keratoses and non-melanoma skin cancers. Side effects of vitamin B3 include flushing and headache. Otezla Pregnancy And Lactation Text: This medication is Pregnancy Category C and it isn't known if it is safe during pregnancy. It is unknown if it is excreted in breast milk. Imiquimod Counseling:  I discussed with the patient the risks of imiquimod including but not limited to erythema, scaling, itching, weeping, crusting, and pain. Patient understands that the inflammatory response to imiquimod is variable from person to person and was educated regarded proper titration schedule. If flu-like symptoms develop, patient knows to discontinue the medication and contact us. Winlevi Pregnancy And Lactation Text: This medication is considered safe during pregnancy and breastfeeding. Cephalexin Counseling: I counseled the patient regarding use of cephalexin as an antibiotic for prophylactic and/or therapeutic purposes. Cephalexin (commonly prescribed under brand name Keflex) is a cephalosporin antibiotic which is active against numerous classes of bacteria, including most skin bacteria. Side effects may include nausea, diarrhea, gastrointestinal upset, rash, hives, yeast infections, and in rare cases, hepatitis, kidney disease, seizures, fever, confusion, neurologic symptoms, and others. Patients with severe allergies to penicillin medications are cautioned that there is about a 10% incidence of cross-reactivity with cephalosporins. When possible, patients with penicillin allergies should use alternatives to cephalosporins for antibiotic therapy. Opzelura Counseling:  I discussed with the patient the risks of Milana Panda including but not limited to nasopharngitis, bronchitis, ear infection, eosinophila, hives, diarrhea, folliculitis, tonsillitis, and rhinorrhea. Taken orally, this medication has been linked to serious infections; higher rate of mortality; malignancy and lymphoproliferative disorders; major adverse cardiovascular events; thrombosis; thrombocytopenia, anemia, and neutropenia; and lipid elevations. Terbinafine Counseling: Patient counseling regarding adverse effects of terbinafine including but not limited to headache, diarrhea, rash, upset stomach, liver function test abnormalities, itching, taste/smell disturbance, nausea, abdominal pain, and flatulence. There is a rare possibility of liver failure that can occur when taking terbinafine. The patient understands that a baseline LFT and kidney function test may be required. The patient verbalized understanding of the proper use and possible adverse effects of terbinafine. All of the patient's questions and concerns were addressed. Tremfya Counseling: I discussed with the patient the risks of guselkumab including but not limited to immunosuppression, serious infections, worsening of inflammatory bowel disease and drug reactions. The patient understands that monitoring is required including a PPD at baseline and must alert us or the primary physician if symptoms of infection or other concerning signs are noted. Spironolactone Pregnancy And Lactation Text: This medication can cause feminization of the male fetus and should be avoided during pregnancy. The active metabolite is also found in breast milk. Metronidazole Pregnancy And Lactation Text: This medication is Pregnancy Category B and considered safe during pregnancy. It is also excreted in breast milk. Cimzia Counseling:  I discussed with the patient the risks of Cimzia including but not limited to immunosuppression, allergic reactions and infections. The patient understands that monitoring is required including a PPD at baseline and must alert us or the primary physician if symptoms of infection or other concerning signs are noted. Benzoyl Peroxide Counseling: Patient counseled that medicine may cause skin irritation and bleach clothing. In the event of skin irritation, the patient was advised to reduce the amount of the drug applied or use it less frequently. The patient verbalized understanding of the proper use and possible adverse effects of benzoyl peroxide. All of the patient's questions and concerns were addressed. Dapsone Pregnancy And Lactation Text: This medication is Pregnancy Category C and is not considered safe during pregnancy or breast feeding.

## 2023-03-07 NOTE — ED BEHAVIORAL HEALTH ASSESSMENT NOTE - BODY HABITUS
KR:   Was at L&D this morning for decreased FM x 3 days.    BPP was 8/8.   Fetal movement has improved today.   No bleeding, no lof, no contractions.   At least another 4 GB attacks since last OBR.    IOL scheduled for 3/26/23.   MFM on 3/20/23.   3t labs normal - still needs 1 hour but patient refuses d/t n/v.   GBBS collected today.   Cervix closed.  Reviewed  labor precautions and fetal kick counts.   RTC in 1 week for OBR with Dr. Bowman.    Obese

## 2023-03-20 NOTE — ED CDU PROVIDER SUBSEQUENT DAY NOTE - RESPIRATORY NEGATIVE STATEMENT, MLM
no chest pain, no cough, and no shortness of breath.
no chest pain, no cough, and no shortness of breath.
Erivedge Pregnancy And Lactation Text: This medication is Pregnancy Category X and is absolutely contraindicated during pregnancy. It is unknown if it is excreted in breast milk.

## 2023-03-28 NOTE — ED PROVIDER NOTE - ENMT, MLM
Airway patent, Nasal mucosa clear. mucous membranes moist. Throat has no vesicles, no oropharyngeal exudates and uvula is midline, neck supple, no JVD spouse

## 2023-04-03 ENCOUNTER — INPATIENT (INPATIENT)
Facility: HOSPITAL | Age: 73
LOS: 14 days | Discharge: HOME | DRG: 291 | End: 2023-04-18
Attending: INTERNAL MEDICINE | Admitting: STUDENT IN AN ORGANIZED HEALTH CARE EDUCATION/TRAINING PROGRAM
Payer: MEDICARE

## 2023-04-03 VITALS
OXYGEN SATURATION: 98 % | HEART RATE: 101 BPM | DIASTOLIC BLOOD PRESSURE: 51 MMHG | TEMPERATURE: 99 F | SYSTOLIC BLOOD PRESSURE: 91 MMHG

## 2023-04-03 DIAGNOSIS — Z92.3 PERSONAL HISTORY OF IRRADIATION: Chronic | ICD-10-CM

## 2023-04-03 DIAGNOSIS — I50.9 HEART FAILURE, UNSPECIFIED: ICD-10-CM

## 2023-04-03 DIAGNOSIS — Z95.810 PRESENCE OF AUTOMATIC (IMPLANTABLE) CARDIAC DEFIBRILLATOR: Chronic | ICD-10-CM

## 2023-04-03 DIAGNOSIS — Z98.89 OTHER SPECIFIED POSTPROCEDURAL STATES: Chronic | ICD-10-CM

## 2023-04-03 LAB
ABO RH CONFIRMATION: SIGNIFICANT CHANGE UP
ALBUMIN SERPL ELPH-MCNC: 4 G/DL — SIGNIFICANT CHANGE UP (ref 3.3–5.2)
ALP SERPL-CCNC: 129 U/L — HIGH (ref 40–120)
ALT FLD-CCNC: 11 U/L — SIGNIFICANT CHANGE UP
ANION GAP SERPL CALC-SCNC: 11 MMOL/L — SIGNIFICANT CHANGE UP (ref 5–17)
ANISOCYTOSIS BLD QL: SIGNIFICANT CHANGE UP
APPEARANCE UR: CLEAR — SIGNIFICANT CHANGE UP
APTT BLD: 32.8 SEC — SIGNIFICANT CHANGE UP (ref 27.5–35.5)
AST SERPL-CCNC: 20 U/L — SIGNIFICANT CHANGE UP
BACTERIA # UR AUTO: ABNORMAL
BASE EXCESS BLDA CALC-SCNC: -1.6 MMOL/L — SIGNIFICANT CHANGE UP (ref -2–3)
BASE EXCESS BLDV CALC-SCNC: -3.9 MMOL/L — LOW (ref -2–3)
BASOPHILS # BLD AUTO: 0 K/UL — SIGNIFICANT CHANGE UP (ref 0–0.2)
BASOPHILS NFR BLD AUTO: 0 % — SIGNIFICANT CHANGE UP (ref 0–2)
BILIRUB SERPL-MCNC: 2 MG/DL — SIGNIFICANT CHANGE UP (ref 0.4–2)
BILIRUB UR-MCNC: NEGATIVE — SIGNIFICANT CHANGE UP
BLD GP AB SCN SERPL QL: SIGNIFICANT CHANGE UP
BLOOD GAS COMMENTS ARTERIAL: SIGNIFICANT CHANGE UP
BUN SERPL-MCNC: 34.4 MG/DL — HIGH (ref 8–20)
CA-I SERPL-SCNC: 1.03 MMOL/L — LOW (ref 1.15–1.33)
CALCIUM SERPL-MCNC: 8.5 MG/DL — SIGNIFICANT CHANGE UP (ref 8.4–10.5)
CHLORIDE BLDV-SCNC: 98 MMOL/L — SIGNIFICANT CHANGE UP (ref 96–108)
CHLORIDE SERPL-SCNC: 97 MMOL/L — SIGNIFICANT CHANGE UP (ref 96–108)
CO2 SERPL-SCNC: 25 MMOL/L — SIGNIFICANT CHANGE UP (ref 22–29)
COLOR SPEC: YELLOW — SIGNIFICANT CHANGE UP
CREAT ?TM UR-MCNC: 61 MG/DL — SIGNIFICANT CHANGE UP
CREAT SERPL-MCNC: 1.45 MG/DL — HIGH (ref 0.5–1.3)
DACRYOCYTES BLD QL SMEAR: SLIGHT — SIGNIFICANT CHANGE UP
DIFF PNL FLD: NEGATIVE — SIGNIFICANT CHANGE UP
EGFR: 51 ML/MIN/1.73M2 — LOW
EOSINOPHIL # BLD AUTO: 0 K/UL — SIGNIFICANT CHANGE UP (ref 0–0.5)
EOSINOPHIL NFR BLD AUTO: 0 % — SIGNIFICANT CHANGE UP (ref 0–6)
EPI CELLS # UR: SIGNIFICANT CHANGE UP
GAS PNL BLDA: SIGNIFICANT CHANGE UP
GAS PNL BLDV: 129 MMOL/L — LOW (ref 136–145)
GAS PNL BLDV: SIGNIFICANT CHANGE UP
GIANT PLATELETS BLD QL SMEAR: PRESENT — SIGNIFICANT CHANGE UP
GLUCOSE BLDC GLUCOMTR-MCNC: 199 MG/DL — HIGH (ref 70–99)
GLUCOSE BLDC GLUCOMTR-MCNC: 213 MG/DL — HIGH (ref 70–99)
GLUCOSE BLDV-MCNC: 225 MG/DL — HIGH (ref 70–99)
GLUCOSE SERPL-MCNC: 207 MG/DL — HIGH (ref 70–99)
GLUCOSE UR QL: NEGATIVE MG/DL — SIGNIFICANT CHANGE UP
HCO3 BLDA-SCNC: 24 MMOL/L — SIGNIFICANT CHANGE UP (ref 21–28)
HCO3 BLDV-SCNC: 23 MMOL/L — SIGNIFICANT CHANGE UP (ref 22–29)
HCT VFR BLD CALC: 43.6 % — SIGNIFICANT CHANGE UP (ref 39–50)
HCT VFR BLDA CALC: 42 % — SIGNIFICANT CHANGE UP
HGB BLD CALC-MCNC: 14 G/DL — SIGNIFICANT CHANGE UP (ref 12.6–17.4)
HGB BLD-MCNC: 12.9 G/DL — LOW (ref 13–17)
HOROWITZ INDEX BLDA+IHG-RTO: 0.5 — SIGNIFICANT CHANGE UP
INR BLD: 2.14 RATIO — HIGH (ref 0.88–1.16)
KETONES UR-MCNC: NEGATIVE — SIGNIFICANT CHANGE UP
LACTATE BLDV-MCNC: 4.1 MMOL/L — CRITICAL HIGH (ref 0.5–2)
LEUKOCYTE ESTERASE UR-ACNC: ABNORMAL
LYMPHOCYTES # BLD AUTO: 0.37 K/UL — LOW (ref 1–3.3)
LYMPHOCYTES # BLD AUTO: 3.5 % — LOW (ref 13–44)
MACROCYTES BLD QL: SLIGHT — SIGNIFICANT CHANGE UP
MANUAL SMEAR VERIFICATION: SIGNIFICANT CHANGE UP
MCHC RBC-ENTMCNC: 24.4 PG — LOW (ref 27–34)
MCHC RBC-ENTMCNC: 29.6 GM/DL — LOW (ref 32–36)
MCV RBC AUTO: 82.4 FL — SIGNIFICANT CHANGE UP (ref 80–100)
MICROCYTES BLD QL: SLIGHT — SIGNIFICANT CHANGE UP
MONOCYTES # BLD AUTO: 0.64 K/UL — SIGNIFICANT CHANGE UP (ref 0–0.9)
MONOCYTES NFR BLD AUTO: 6.1 % — SIGNIFICANT CHANGE UP (ref 2–14)
NEUTROPHILS # BLD AUTO: 9.48 K/UL — HIGH (ref 1.8–7.4)
NEUTROPHILS NFR BLD AUTO: 90.4 % — HIGH (ref 43–77)
NITRITE UR-MCNC: NEGATIVE — SIGNIFICANT CHANGE UP
NT-PROBNP SERPL-SCNC: 3160 PG/ML — HIGH (ref 0–300)
OVALOCYTES BLD QL SMEAR: SLIGHT — SIGNIFICANT CHANGE UP
PCO2 BLDA: 41 MMHG — SIGNIFICANT CHANGE UP (ref 35–48)
PCO2 BLDV: 50 MMHG — SIGNIFICANT CHANGE UP (ref 42–55)
PH BLDA: 7.37 — SIGNIFICANT CHANGE UP (ref 7.35–7.45)
PH BLDV: 7.27 — LOW (ref 7.32–7.43)
PH UR: 6 — SIGNIFICANT CHANGE UP (ref 5–8)
PLAT MORPH BLD: NORMAL — SIGNIFICANT CHANGE UP
PLATELET # BLD AUTO: 141 K/UL — LOW (ref 150–400)
PO2 BLDA: 209 MMHG — HIGH (ref 83–108)
PO2 BLDV: 80 MMHG — HIGH (ref 25–45)
POIKILOCYTOSIS BLD QL AUTO: SLIGHT — SIGNIFICANT CHANGE UP
POLYCHROMASIA BLD QL SMEAR: SLIGHT — SIGNIFICANT CHANGE UP
POTASSIUM BLDV-SCNC: 5.2 MMOL/L — HIGH (ref 3.5–5.1)
POTASSIUM SERPL-MCNC: 4.7 MMOL/L — SIGNIFICANT CHANGE UP (ref 3.5–5.3)
POTASSIUM SERPL-SCNC: 4.7 MMOL/L — SIGNIFICANT CHANGE UP (ref 3.5–5.3)
PROT SERPL-MCNC: 7.2 G/DL — SIGNIFICANT CHANGE UP (ref 6.6–8.7)
PROT UR-MCNC: 15
PROTHROM AB SERPL-ACNC: 25 SEC — HIGH (ref 10.5–13.4)
RAPID RVP RESULT: SIGNIFICANT CHANGE UP
RBC # BLD: 5.29 M/UL — SIGNIFICANT CHANGE UP (ref 4.2–5.8)
RBC # FLD: 20 % — HIGH (ref 10.3–14.5)
RBC BLD AUTO: ABNORMAL
RBC CASTS # UR COMP ASSIST: SIGNIFICANT CHANGE UP /HPF (ref 0–4)
SAO2 % BLDA: 99.5 % — HIGH (ref 94–98)
SAO2 % BLDV: 96.5 % — SIGNIFICANT CHANGE UP
SARS-COV-2 RNA SPEC QL NAA+PROBE: SIGNIFICANT CHANGE UP
SODIUM SERPL-SCNC: 133 MMOL/L — LOW (ref 135–145)
SP GR SPEC: 1.01 — SIGNIFICANT CHANGE UP (ref 1.01–1.02)
TROPONIN T SERPL-MCNC: 0.02 NG/ML — SIGNIFICANT CHANGE UP (ref 0–0.06)
UROBILINOGEN FLD QL: NEGATIVE MG/DL — SIGNIFICANT CHANGE UP
WBC # BLD: 10.49 K/UL — SIGNIFICANT CHANGE UP (ref 3.8–10.5)
WBC # FLD AUTO: 10.49 K/UL — SIGNIFICANT CHANGE UP (ref 3.8–10.5)
WBC UR QL: SIGNIFICANT CHANGE UP /HPF (ref 0–5)

## 2023-04-03 PROCEDURE — 99292 CRITICAL CARE ADDL 30 MIN: CPT

## 2023-04-03 PROCEDURE — 93306 TTE W/DOPPLER COMPLETE: CPT | Mod: 26

## 2023-04-03 PROCEDURE — 71045 X-RAY EXAM CHEST 1 VIEW: CPT | Mod: 26

## 2023-04-03 PROCEDURE — 99223 1ST HOSP IP/OBS HIGH 75: CPT

## 2023-04-03 PROCEDURE — 99291 CRITICAL CARE FIRST HOUR: CPT

## 2023-04-03 RX ORDER — LANOLIN ALCOHOL/MO/W.PET/CERES
3 CREAM (GRAM) TOPICAL AT BEDTIME
Refills: 0 | Status: DISCONTINUED | OUTPATIENT
Start: 2023-04-03 | End: 2023-04-18

## 2023-04-03 RX ORDER — ACETAMINOPHEN 500 MG
650 TABLET ORAL EVERY 6 HOURS
Refills: 0 | Status: DISCONTINUED | OUTPATIENT
Start: 2023-04-03 | End: 2023-04-18

## 2023-04-03 RX ORDER — LEVOTHYROXINE SODIUM 125 MCG
50 TABLET ORAL DAILY
Refills: 0 | Status: DISCONTINUED | OUTPATIENT
Start: 2023-04-03 | End: 2023-04-18

## 2023-04-03 RX ORDER — TRAMADOL HYDROCHLORIDE 50 MG/1
50 TABLET ORAL EVERY 12 HOURS
Refills: 0 | Status: DISCONTINUED | OUTPATIENT
Start: 2023-04-03 | End: 2023-04-03

## 2023-04-03 RX ORDER — SODIUM CHLORIDE 9 MG/ML
1000 INJECTION, SOLUTION INTRAVENOUS
Refills: 0 | Status: DISCONTINUED | OUTPATIENT
Start: 2023-04-03 | End: 2023-04-18

## 2023-04-03 RX ORDER — ACETAZOLAMIDE 250 MG/1
500 TABLET ORAL DAILY
Refills: 0 | Status: DISCONTINUED | OUTPATIENT
Start: 2023-04-03 | End: 2023-04-06

## 2023-04-03 RX ORDER — GLUCAGON INJECTION, SOLUTION 0.5 MG/.1ML
1 INJECTION, SOLUTION SUBCUTANEOUS ONCE
Refills: 0 | Status: DISCONTINUED | OUTPATIENT
Start: 2023-04-03 | End: 2023-04-18

## 2023-04-03 RX ORDER — INSULIN LISPRO 100/ML
VIAL (ML) SUBCUTANEOUS
Refills: 0 | Status: DISCONTINUED | OUTPATIENT
Start: 2023-04-03 | End: 2023-04-18

## 2023-04-03 RX ORDER — FUROSEMIDE 40 MG
1 TABLET ORAL
Qty: 0 | Refills: 0 | DISCHARGE

## 2023-04-03 RX ORDER — ONDANSETRON 8 MG/1
4 TABLET, FILM COATED ORAL EVERY 8 HOURS
Refills: 0 | Status: DISCONTINUED | OUTPATIENT
Start: 2023-04-03 | End: 2023-04-18

## 2023-04-03 RX ORDER — METHOCARBAMOL 500 MG/1
500 TABLET, FILM COATED ORAL
Refills: 0 | Status: DISCONTINUED | OUTPATIENT
Start: 2023-04-03 | End: 2023-04-18

## 2023-04-03 RX ORDER — DEXTROSE 50 % IN WATER 50 %
12.5 SYRINGE (ML) INTRAVENOUS ONCE
Refills: 0 | Status: DISCONTINUED | OUTPATIENT
Start: 2023-04-03 | End: 2023-04-18

## 2023-04-03 RX ORDER — ATORVASTATIN CALCIUM 80 MG/1
40 TABLET, FILM COATED ORAL AT BEDTIME
Refills: 0 | Status: DISCONTINUED | OUTPATIENT
Start: 2023-04-03 | End: 2023-04-18

## 2023-04-03 RX ORDER — PIPERACILLIN AND TAZOBACTAM 4; .5 G/20ML; G/20ML
3.38 INJECTION, POWDER, LYOPHILIZED, FOR SOLUTION INTRAVENOUS ONCE
Refills: 0 | Status: COMPLETED | OUTPATIENT
Start: 2023-04-03 | End: 2023-04-03

## 2023-04-03 RX ORDER — FOLIC ACID 0.8 MG
1 TABLET ORAL DAILY
Refills: 0 | Status: DISCONTINUED | OUTPATIENT
Start: 2023-04-03 | End: 2023-04-18

## 2023-04-03 RX ORDER — MAGNESIUM SULFATE 500 MG/ML
2 VIAL (ML) INJECTION ONCE
Refills: 0 | Status: COMPLETED | OUTPATIENT
Start: 2023-04-03 | End: 2023-04-03

## 2023-04-03 RX ORDER — INSULIN LISPRO 100/ML
VIAL (ML) SUBCUTANEOUS AT BEDTIME
Refills: 0 | Status: DISCONTINUED | OUTPATIENT
Start: 2023-04-03 | End: 2023-04-18

## 2023-04-03 RX ORDER — DEXTROSE 50 % IN WATER 50 %
25 SYRINGE (ML) INTRAVENOUS ONCE
Refills: 0 | Status: DISCONTINUED | OUTPATIENT
Start: 2023-04-03 | End: 2023-04-18

## 2023-04-03 RX ORDER — CARVEDILOL PHOSPHATE 80 MG/1
25 CAPSULE, EXTENDED RELEASE ORAL EVERY 12 HOURS
Refills: 0 | Status: DISCONTINUED | OUTPATIENT
Start: 2023-04-03 | End: 2023-04-18

## 2023-04-03 RX ORDER — INSULIN LISPRO 100/ML
20 VIAL (ML) SUBCUTANEOUS
Refills: 0 | Status: DISCONTINUED | OUTPATIENT
Start: 2023-04-03 | End: 2023-04-06

## 2023-04-03 RX ORDER — IPRATROPIUM/ALBUTEROL SULFATE 18-103MCG
3 AEROSOL WITH ADAPTER (GRAM) INHALATION
Refills: 0 | Status: DISCONTINUED | OUTPATIENT
Start: 2023-04-03 | End: 2023-04-15

## 2023-04-03 RX ORDER — DEXTROSE 50 % IN WATER 50 %
15 SYRINGE (ML) INTRAVENOUS ONCE
Refills: 0 | Status: DISCONTINUED | OUTPATIENT
Start: 2023-04-03 | End: 2023-04-18

## 2023-04-03 RX ORDER — APIXABAN 2.5 MG/1
5 TABLET, FILM COATED ORAL EVERY 12 HOURS
Refills: 0 | Status: DISCONTINUED | OUTPATIENT
Start: 2023-04-03 | End: 2023-04-18

## 2023-04-03 RX ORDER — APIXABAN 2.5 MG/1
1 TABLET, FILM COATED ORAL
Refills: 0 | DISCHARGE

## 2023-04-03 RX ORDER — FUROSEMIDE 40 MG
40 TABLET ORAL ONCE
Refills: 0 | Status: COMPLETED | OUTPATIENT
Start: 2023-04-03 | End: 2023-04-03

## 2023-04-03 RX ORDER — DULOXETINE HYDROCHLORIDE 30 MG/1
60 CAPSULE, DELAYED RELEASE ORAL DAILY
Refills: 0 | Status: DISCONTINUED | OUTPATIENT
Start: 2023-04-03 | End: 2023-04-18

## 2023-04-03 RX ORDER — FUROSEMIDE 40 MG
40 TABLET ORAL EVERY 12 HOURS
Refills: 0 | Status: DISCONTINUED | OUTPATIENT
Start: 2023-04-03 | End: 2023-04-05

## 2023-04-03 RX ORDER — INSULIN GLARGINE 100 [IU]/ML
40 INJECTION, SOLUTION SUBCUTANEOUS EVERY MORNING
Refills: 0 | Status: DISCONTINUED | OUTPATIENT
Start: 2023-04-04 | End: 2023-04-18

## 2023-04-03 RX ORDER — LEVOTHYROXINE SODIUM 125 MCG
1 TABLET ORAL
Refills: 0 | DISCHARGE

## 2023-04-03 RX ORDER — DULOXETINE HYDROCHLORIDE 30 MG/1
1 CAPSULE, DELAYED RELEASE ORAL
Refills: 0 | DISCHARGE

## 2023-04-03 RX ORDER — VANCOMYCIN HCL 1 G
1000 VIAL (EA) INTRAVENOUS ONCE
Refills: 0 | Status: COMPLETED | OUTPATIENT
Start: 2023-04-03 | End: 2023-04-03

## 2023-04-03 RX ORDER — CARVEDILOL PHOSPHATE 80 MG/1
1 CAPSULE, EXTENDED RELEASE ORAL
Refills: 0 | DISCHARGE

## 2023-04-03 RX ADMIN — Medication 150 MILLIGRAM(S): at 21:18

## 2023-04-03 RX ADMIN — Medication 125 MILLIGRAM(S): at 13:50

## 2023-04-03 RX ADMIN — CARVEDILOL PHOSPHATE 25 MILLIGRAM(S): 80 CAPSULE, EXTENDED RELEASE ORAL at 22:21

## 2023-04-03 RX ADMIN — Medication 40 MILLIGRAM(S): at 16:17

## 2023-04-03 RX ADMIN — Medication 40 MILLIGRAM(S): at 21:19

## 2023-04-03 RX ADMIN — ATORVASTATIN CALCIUM 40 MILLIGRAM(S): 80 TABLET, FILM COATED ORAL at 21:18

## 2023-04-03 RX ADMIN — Medication 3 MILLILITER(S): at 13:36

## 2023-04-03 RX ADMIN — Medication 150 GRAM(S): at 14:26

## 2023-04-03 RX ADMIN — Medication 250 MILLIGRAM(S): at 15:17

## 2023-04-03 RX ADMIN — PIPERACILLIN AND TAZOBACTAM 200 GRAM(S): 4; .5 INJECTION, POWDER, LYOPHILIZED, FOR SOLUTION INTRAVENOUS at 13:50

## 2023-04-03 RX ADMIN — APIXABAN 5 MILLIGRAM(S): 2.5 TABLET, FILM COATED ORAL at 21:18

## 2023-04-03 RX ADMIN — Medication 40 MILLIGRAM(S): at 15:17

## 2023-04-03 NOTE — H&P ADULT - HISTORY OF PRESENT ILLNESS
73 y/o M w/ a hx of HFrEF Last EF 30% s/p AICD, DM2 on Insulin, Persistent Atrial Fibrillation on Eliquis, HTN, HLD, RLE BKA, Chronic wounds on LLE presenting for SOB. Patient states that the shortness of breath started 2 weeks ago, drove up from [place] for wife's , had increase in cough, mildly productive, tried albuterol w/ little improvement, started on high dose lasix and continuous bipap in ED. Patient seen at bedside, tolerating BiPAP, endorsing SOB, cough, mild dizziness, denies CP, Abd pain, HA, blurry vision, lethargy, rashes, dysuria.

## 2023-04-03 NOTE — ED ADULT NURSE REASSESSMENT NOTE - NS ED NURSE REASSESS COMMENT FT1
report given to adolph rene at 2015. pt moved to cdu 16. rn made aware of transfer of care. rr even and unlabored. continued cardiac monitoring in place.

## 2023-04-03 NOTE — H&P ADULT - NSHPPHYSICALEXAM_GEN_ALL_CORE
General: ill appearing, NAD  Head: NC, AT  EENT: EOMI, no scleral icterus  Cardiac: tachycardic, no apparent murmurs, +lower extremity edema  Respiratory: decreased breath sounds in all lobes, +respiratory distress   Abdomen: soft, ND, NT, nonperitonitic  MSK/Vascular: full ROM, soft compartments, warm extremities, R BKA, weeping ulcer on LLE, multiple ulcers noted, faint pulse in LLE, +2 pulses in B/L UE  Neuro: AAOx3, sensation to light touch intact  Psych: calm, cooperative

## 2023-04-03 NOTE — ED ADULT NURSE NOTE - OBJECTIVE STATEMENT
assumed care of pt at 1330 in CC. md tatum at bedside. daughter at bedside repots pt has had worsening sob over last 2 weeks since death of pts wife. denies chest pain. audible wheezing heard. ekg obtained. respiratory at bedside to place pt on bipap with  great improvement. placed on cardiac monitor and . anox4. pmh of  uncontrolled afib, dm and htn. pt somulant at this time difficulty staying awake. . edema present in all 4 extremities, diabetic ulcers present on bilateral toes. denies fevers or chills. pt educated on plan of care, pt able to successfully teach back plan of care to RN, RN will continue to reeducate pt during hospital stay. assumed care of pt at 1330 in CC. md tatum at bedside. daughter at bedside repots pt has had worsening sob over last 2 weeks since death of pts wife. denies chest pain. audible wheezing heard. ekg obtained. respiratory at bedside to place pt on bipap with  great improvement. placed on cardiac monitor and . anox4. pmh of  uncontrolled afib, dm and htn. pt somulant at this time difficulty staying awake. below knee amputation present on right lower extremity . edema present in all 3 extremities, diabetic ulcers present on bilateral toes. denies fevers or chills. pt educated on plan of care, pt able to successfully teach back plan of care to RN, RN will continue to reeducate pt during hospital stay.

## 2023-04-03 NOTE — ED PROVIDER NOTE - OBJECTIVE STATEMENT
72 y m with pmh of CHF with EF 30% s/p AICD, DM on insulin, a fib on eliquis, HTN, HLD, presenting for SOB that has gotten progressively worse over the last 2 weeks. Pt recent traveled from [place] for wife's . Has had increasing cough, mildly productive. Has tried albuterol with little improvement. Has been taking water pills for CHF. Right lower leg swollen but at baseline. LLE amputated.

## 2023-04-03 NOTE — H&P ADULT - ASSESSMENT
71 y/o M w/ a hx of HFrEF Last EF 30% s/p AICD, DM2 on Insulin, Persistent Atrial Fibrillation on Eliquis, HTN, HLD, RLE BKA, Chronic wounds on LLE here for mgmt of Acute Hypoxic Respiratory Failure 2/2 Acute on Chronic HFrEF Exacerbation    #Acute hypoxic respiratory failure likely 2/2 Acute on Chronic HFrEF Exacerbation  Last Echo EF 30%, unknown when last obtained  AICD in place  tolerating BiPAP   S/P Lasix 80mg once in ED  Lactate 4.4  - Lasix 40mg IV Q12  - Acetazolamide 500mg IVPB Q24  - Repeat Echo  - Continuous BiPAP  - Vitals Q2 for hypoxia and soft BP  - Trend CBC/Chemistry/Lactate  - Maintain Mg >2, Phos >4, K >4  - NC @4LPM while eating if tolerating off bipap  - admit to step down  - [place] cardio consulted, recs appreciated    #Persistent Afib on AC  - c/w eliquis 5mg Q12  - c/w Coreg 25mg Q12  - f/u cardio recs  - Tele monitoring    #DM2 w/ ulcers s/p RLE BKA  On insulin at home  - c/w Lantus 40 units QAM  - c/w Admelog 20 units before meals  - ISS and FSG   - MR ordered, need AICD information to see if MR compatible  - Podiatry Consulted  - Obtain ZOYA/PVR    #JAYLENE  Cr baseline 1.03  - trend chemistry  - obtain Stat urine urea and creatinine to calculate FEUrea  - consult Nephro if cr worsens    #HTN  soft likely 2/2 starting BiPAP  - c/w coreg  - maintain MAP >65    #HLD  stable  - c/w Atorvastatin 40mg QHS    #Healthcare maintenance  DVT PPx - Eliquis  Dispo - Acute 73 y/o M w/ a hx of HFrEF Last EF 30% s/p AICD, DM2 on Insulin, Persistent Atrial Fibrillation on Eliquis, HTN, HLD, RLE BKA, Chronic wounds on LLE here for mgmt of Acute Hypoxic Respiratory Failure 2/2 Acute on Chronic HFrEF Exacerbation    #Acute hypoxic respiratory failure likely 2/2 Acute on Chronic HFrEF Exacerbation  Last Echo EF 30%, unknown when last obtained  AICD in place  tolerating BiPAP   S/P Lasix 80mg once in ED  Lactate 4.4  - Lasix 40mg IV Q12  - Acetazolamide 500mg IVPB Q24  - Repeat Echo  - Continuous BiPAP  - inset wagner, strict I/Os, ?Hx of Urinary retention  - Daily Weights  - Nutrition consult ordered  - Vitals Q2 for hypoxia and soft BP  - Trend CBC/Chemistry/Lactate  - Maintain Mg >2, Phos >4, K >4  - NC @4LPM while eating if tolerating off bipap  - admit to step down  - [facility] cardio consulted, recs appreciated    #Persistent Afib on AC  - c/w eliquis 5mg Q12  - c/w Coreg 25mg Q12  - f/u cardio recs  - Tele monitoring    #DM2 w/ ulcers s/p RLE BKA  On insulin at home  - c/w Lantus 40 units QAM  - c/w Admelog 20 units before meals  - ISS and FSG   - MR ordered, need AICD information to see if MR compatible  - Podiatry Consulted  - Obtain ZOYA/PVR    #JAYLENE  Cr baseline 1.03  - trend chemistry  - obtain Stat urine urea and creatinine to calculate FEUrea  - consult Nephro if cr worsens    #Hyponatremia likely hypervolemic  likely 2/2 Fluid overload plus falsely low 2/2 hyperglycemia  - trend chemistry daily  - c/w diuresis    #HTN  soft likely 2/2 starting BiPAP  - c/w coreg  - maintain MAP >65    #HLD  stable  - c/w Atorvastatin 40mg QHS    #Healthcare maintenance  DVT PPx - Eliquis  Dispo - Acute

## 2023-04-03 NOTE — ED PROVIDER NOTE - PHYSICAL EXAMINATION
General: ill appearing, NAD  Head: NC, AT  EENT: EOMI, no scleral icterus  Cardiac: tachycardic, no apparent murmurs, +lower extremity edema  Respiratory: decreased breath sounds in all lobes, +respiratory distress   Abdomen: soft, ND, NT, nonperitonitic  MSK/Vascular: full ROM, soft compartments, warm extremities  Neuro: AAOx3, sensation to light touch intact  Psych: calm, cooperative

## 2023-04-03 NOTE — ED PROVIDER NOTE - ATTENDING CONTRIBUTION TO CARE
The patient seen with resident immediately due to critical conditions    Acute CHF/COPD exacerbation    I, Mohsen Muñiz, performed the initial face to face bedside interview with this patient regarding history of present illness, review of symptoms and relevant past medical, social and family history.  I completed an independent physical examination.  I was the initial provider who evaluated this patient. I have signed out the follow up of any pending tests (i.e. labs, radiological studies) to the resident.  I have communicated the patient’s plan of care and disposition with the resident

## 2023-04-03 NOTE — CONSULT NOTE ADULT - ATTENDING COMMENTS
72-year-old  male with past medical history of heart failure with reduced EF s/p ICD morbid obesity sleep apnea diabetes mellitus A-fib on anticoagulation essential hypertension dyslipidemia right lower extremity Below-knee amputation presented with worsening orthopnea dyspnea over 3 weeks being admitted for acute hypoxic respiratory failure secondary to acute pulmonary edema with anasarca and  acute exacerbation of heart failure with reduced EF most likely secondary to noncompliance with diet/stressor     recommend trending troponin, EKG, transthoracic echocardiogram, continuous telemetry monitoring, cardiology consulted, recommend aggressive diuresis with IV Lasix 40 mg 3 times daily to be uptitrated as needed with close electrolyte and I's and O's monitoring.    avoid Nephrotoxic agents, Adjust medications for renal  function, Close urinary output monitoring, UA, Renal imaging if worsening renal function, Close Monitoring of electrolytes as well as acid-base status.  As needed DuoNeb with scheduled Pulmicort.  Would recommend trending LFTs and lactate.  Transition BiPAP from -16/8-12/6 with 30% would recommend continuing for the rest of the evening through the night while aggressively diuresing with few breaks in between for dry mouth.  Uptitrating GDMT as per cardiology.  Would recommend podiatry evaluation for left foot multiple wounds management.  No obvious indication for IV antibiotics.  Blood pressure normal throughout our interview with normal mentation.  Significantly improved respiratory distress as per patient while he was started on BiPAP.    Thank you for your consult.   Please call us back with any worsening clinical status or with concerns & questions.   We will Sign Off for now.     Jens Plasencia MD   Division of Critical Care Medicine  Department of Medicine   [facility]   Cell [phone]

## 2023-04-03 NOTE — ED ADULT NURSE REASSESSMENT NOTE - NS ED NURSE REASSESS COMMENT FT1
Took report from KARLENE Hector for break report, introduced self to patient and educated patient on plan of care, all questions and concerns addressed. IV noted to RUE, clean, dry, intact. Cardiac monitoring and  remain in place, NSR on monitor, rate 87, VSS, SpO2 97% on BiPap. Bed locked and in lowest position, all safety measures in place. Took report from KARLENE Hector for break report, introduced self to patient and educated patient on plan of care, all questions and concerns addressed. IV noted to RUE, clean, dry, intact. Cardiac monitoring and  remain in place, Afib on monitor, rate 87, VSS, SpO2 97% on BiPap. Bed locked and in lowest position, all safety measures in place.

## 2023-04-03 NOTE — ED ADULT NURSE NOTE - NSIMPLEMENTINTERV_GEN_ALL_ED
Implemented All Fall with Harm Risk Interventions:  [place] to call system. Call bell, personal items and telephone within reach. Instruct patient to call for assistance. Room bathroom lighting operational. Non-slip footwear when patient is off stretcher. Physically safe environment: no spills, clutter or unnecessary equipment. Stretcher in lowest position, wheels locked, appropriate side rails in place. Provide visual cue, wrist band, yellow gown, etc. Monitor gait and stability. Monitor for mental status changes and reorient to person, place, and time. Review medications for side effects contributing to fall risk. Reinforce activity limits and safety measures with patient and family. Provide visual clues: red socks.

## 2023-04-03 NOTE — ED ADULT NURSE REASSESSMENT NOTE - NS ED NURSE REASSESS COMMENT FT1
hospitalist at bedside aware of blood pressure. awaiting bed. pt resting with eyes closed. continued cardiac monitoring in place.

## 2023-04-03 NOTE — ED PROCEDURE NOTE - ATTENDING CONTRIBUTION TO CARE
I, Mohsen Muñiz, performed the initial face to face bedside interview with this patient regarding history of present illness, review of symptoms and relevant past medical, social and family history.  I completed an independent physical examination.  I was the initial provider who evaluated this patient. I have signed out the follow up of any pending tests (i.e. labs, radiological studies) to the resident.  I have communicated the patient’s plan of care and disposition with the resident

## 2023-04-03 NOTE — ED PROVIDER NOTE - NS ED ROS FT
Constitutional: no fever, +chills  Head: NC, AT   Eyes: no redness   ENMT: no nasal congestion/drainage, no sore throat   CV: no chest pain, +edema  Resp: +cough, +dyspnea  GI: no abdominal pain, no nausea, no vomiting, no diarrhea  : no dysuria, no hematuria   Skin: no lesions, no rashes   Neuro: no LOC, no headache, no sensory deficits, no weakness

## 2023-04-03 NOTE — H&P ADULT - NSHPLABSRESULTS_GEN_ALL_CORE
LABS:                         12.9   10.49 )-----------( 141      ( 03 Apr 2023 13:50 )             43.6     04-03    133<L>  |  97  |  34.4<H>  ----------------------------<  207<H>  4.7   |  25.0  |  1.45<H>    Ca    8.5      03 Apr 2023 13:50    TPro  7.2  /  Alb  4.0  /  TBili  2.0  /  DBili  x   /  AST  20  /  ALT  11  /  AlkPhos  129<H>  04-03    PT/INR - ( 03 Apr 2023 13:50 )   PT: 25.0 sec;   INR: 2.14 ratio         PTT - ( 03 Apr 2023 13:50 )  PTT:32.8 sec    CARDIAC MARKERS ( 03 Apr 2023 13:50 )  x     / 0.02 ng/mL / x     / x     / x                RADIOLOGY, EKG & ADDITIONAL TESTS:  CXR: Mild CHF and small bilateral pleural effusions. Cardiomegaly.

## 2023-04-03 NOTE — ED PROVIDER NOTE - CLINICAL SUMMARY MEDICAL DECISION MAKING FREE TEXT BOX
Pt presented sob. Started on BIpap. ABGs reassuring. CXR showing concern for effusion w interstitial edema. Pt has hypotension so holding lasix at this time. COPD and PNA treated. Pt presented sob. Started on BIpap. ABGs reassuring. CXR showing concern for effusion w interstitial edema. Pt has hypotension so holding lasix at this time. COPD and PNA treated.    Pt's BP improved. Will start lasix.

## 2023-04-04 DIAGNOSIS — T83.9XXA UNSPECIFIED COMPLICATION OF GENITOURINARY PROSTHETIC DEVICE, IMPLANT AND GRAFT, INITIAL ENCOUNTER: ICD-10-CM

## 2023-04-04 DIAGNOSIS — Q55.64 HIDDEN PENIS: ICD-10-CM

## 2023-04-04 LAB
-  COAGULASE NEGATIVE STAPHYLOCOCCUS: SIGNIFICANT CHANGE UP
A1C WITH ESTIMATED AVERAGE GLUCOSE RESULT: 9 % — HIGH (ref 4–5.6)
ALBUMIN SERPL ELPH-MCNC: 3.4 G/DL — SIGNIFICANT CHANGE UP (ref 3.3–5.2)
ALP SERPL-CCNC: 110 U/L — SIGNIFICANT CHANGE UP (ref 40–120)
ALT FLD-CCNC: 9 U/L — SIGNIFICANT CHANGE UP
ANION GAP SERPL CALC-SCNC: 16 MMOL/L — SIGNIFICANT CHANGE UP (ref 5–17)
ANISOCYTOSIS BLD QL: SLIGHT — SIGNIFICANT CHANGE UP
APTT BLD: 30.5 SEC — SIGNIFICANT CHANGE UP (ref 27.5–35.5)
AST SERPL-CCNC: 17 U/L — SIGNIFICANT CHANGE UP
BASOPHILS # BLD AUTO: 0.08 K/UL — SIGNIFICANT CHANGE UP (ref 0–0.2)
BASOPHILS NFR BLD AUTO: 0.9 % — SIGNIFICANT CHANGE UP (ref 0–2)
BILIRUB SERPL-MCNC: 1.5 MG/DL — SIGNIFICANT CHANGE UP (ref 0.4–2)
BUN SERPL-MCNC: 37 MG/DL — HIGH (ref 8–20)
CALCIUM SERPL-MCNC: 8.5 MG/DL — SIGNIFICANT CHANGE UP (ref 8.4–10.5)
CHLORIDE SERPL-SCNC: 96 MMOL/L — SIGNIFICANT CHANGE UP (ref 96–108)
CHOLEST SERPL-MCNC: 111 MG/DL — SIGNIFICANT CHANGE UP
CO2 SERPL-SCNC: 21 MMOL/L — LOW (ref 22–29)
CREAT SERPL-MCNC: 1.49 MG/DL — HIGH (ref 0.5–1.3)
E COLI DNA BLD POS QL NAA+NON-PROBE: SIGNIFICANT CHANGE UP
EGFR: 50 ML/MIN/1.73M2 — LOW
EOSINOPHIL # BLD AUTO: 0 K/UL — SIGNIFICANT CHANGE UP (ref 0–0.5)
EOSINOPHIL NFR BLD AUTO: 0 % — SIGNIFICANT CHANGE UP (ref 0–6)
ESTIMATED AVERAGE GLUCOSE: 212 MG/DL — HIGH (ref 68–114)
GAS PNL BLDA: SIGNIFICANT CHANGE UP
GLUCOSE BLDC GLUCOMTR-MCNC: 138 MG/DL — HIGH (ref 70–99)
GLUCOSE BLDC GLUCOMTR-MCNC: 240 MG/DL — HIGH (ref 70–99)
GLUCOSE BLDC GLUCOMTR-MCNC: 260 MG/DL — HIGH (ref 70–99)
GLUCOSE BLDC GLUCOMTR-MCNC: 265 MG/DL — HIGH (ref 70–99)
GLUCOSE SERPL-MCNC: 256 MG/DL — HIGH (ref 70–99)
GRAM STN FLD: SIGNIFICANT CHANGE UP
GRAM STN FLD: SIGNIFICANT CHANGE UP
HCT VFR BLD CALC: 41.5 % — SIGNIFICANT CHANGE UP (ref 39–50)
HDLC SERPL-MCNC: 35 MG/DL — LOW
HGB BLD-MCNC: 12.3 G/DL — LOW (ref 13–17)
INR BLD: 2.21 RATIO — HIGH (ref 0.88–1.16)
LACTATE SERPL-SCNC: 2.5 MMOL/L — HIGH (ref 0.5–2)
LACTATE SERPL-SCNC: 2.7 MMOL/L — HIGH (ref 0.5–2)
LIPID PNL WITH DIRECT LDL SERPL: 59 MG/DL — SIGNIFICANT CHANGE UP
LYMPHOCYTES # BLD AUTO: 0.08 K/UL — LOW (ref 1–3.3)
LYMPHOCYTES # BLD AUTO: 0.9 % — LOW (ref 13–44)
MAGNESIUM SERPL-MCNC: 2.2 MG/DL — SIGNIFICANT CHANGE UP (ref 1.6–2.6)
MANUAL SMEAR VERIFICATION: SIGNIFICANT CHANGE UP
MCHC RBC-ENTMCNC: 24.4 PG — LOW (ref 27–34)
MCHC RBC-ENTMCNC: 29.6 GM/DL — LOW (ref 32–36)
MCV RBC AUTO: 82.2 FL — SIGNIFICANT CHANGE UP (ref 80–100)
METHOD TYPE: SIGNIFICANT CHANGE UP
MONOCYTES # BLD AUTO: 0.08 K/UL — SIGNIFICANT CHANGE UP (ref 0–0.9)
MONOCYTES NFR BLD AUTO: 0.9 % — LOW (ref 2–14)
NEUTROPHILS # BLD AUTO: 8.47 K/UL — HIGH (ref 1.8–7.4)
NEUTROPHILS NFR BLD AUTO: 97.3 % — HIGH (ref 43–77)
NON HDL CHOLESTEROL: 76 MG/DL — SIGNIFICANT CHANGE UP
PLAT MORPH BLD: NORMAL — SIGNIFICANT CHANGE UP
PLATELET # BLD AUTO: 119 K/UL — LOW (ref 150–400)
POIKILOCYTOSIS BLD QL AUTO: SLIGHT — SIGNIFICANT CHANGE UP
POLYCHROMASIA BLD QL SMEAR: SLIGHT — SIGNIFICANT CHANGE UP
POTASSIUM SERPL-MCNC: 4.8 MMOL/L — SIGNIFICANT CHANGE UP (ref 3.5–5.3)
POTASSIUM SERPL-SCNC: 4.8 MMOL/L — SIGNIFICANT CHANGE UP (ref 3.5–5.3)
PROT SERPL-MCNC: 6.5 G/DL — LOW (ref 6.6–8.7)
PROTHROM AB SERPL-ACNC: 25.9 SEC — HIGH (ref 10.5–13.4)
RBC # BLD: 5.05 M/UL — SIGNIFICANT CHANGE UP (ref 4.2–5.8)
RBC # FLD: 20.3 % — HIGH (ref 10.3–14.5)
RBC BLD AUTO: ABNORMAL
SODIUM SERPL-SCNC: 133 MMOL/L — LOW (ref 135–145)
SPECIMEN SOURCE: SIGNIFICANT CHANGE UP
TRIGL SERPL-MCNC: 83 MG/DL — SIGNIFICANT CHANGE UP
UUN UR-MCNC: 474 MG/DL — SIGNIFICANT CHANGE UP
WBC # BLD: 8.71 K/UL — SIGNIFICANT CHANGE UP (ref 3.8–10.5)
WBC # FLD AUTO: 8.71 K/UL — SIGNIFICANT CHANGE UP (ref 3.8–10.5)

## 2023-04-04 PROCEDURE — 99233 SBSQ HOSP IP/OBS HIGH 50: CPT

## 2023-04-04 PROCEDURE — 99223 1ST HOSP IP/OBS HIGH 75: CPT

## 2023-04-04 PROCEDURE — 93923 UPR/LXTR ART STDY 3+ LVLS: CPT | Mod: 26

## 2023-04-04 PROCEDURE — 73630 X-RAY EXAM OF FOOT: CPT | Mod: 26,LT

## 2023-04-04 PROCEDURE — 71045 X-RAY EXAM CHEST 1 VIEW: CPT | Mod: 26

## 2023-04-04 PROCEDURE — 51703 INSERT BLADDER CATH COMPLEX: CPT

## 2023-04-04 RX ORDER — VANCOMYCIN HCL 1 G
2000 VIAL (EA) INTRAVENOUS EVERY 12 HOURS
Refills: 0 | Status: DISCONTINUED | OUTPATIENT
Start: 2023-04-04 | End: 2023-04-04

## 2023-04-04 RX ORDER — TAMSULOSIN HYDROCHLORIDE 0.4 MG/1
0.4 CAPSULE ORAL AT BEDTIME
Refills: 0 | Status: DISCONTINUED | OUTPATIENT
Start: 2023-04-04 | End: 2023-04-18

## 2023-04-04 RX ORDER — PIPERACILLIN AND TAZOBACTAM 4; .5 G/20ML; G/20ML
3.38 INJECTION, POWDER, LYOPHILIZED, FOR SOLUTION INTRAVENOUS ONCE
Refills: 0 | Status: COMPLETED | OUTPATIENT
Start: 2023-04-04 | End: 2023-04-04

## 2023-04-04 RX ORDER — VANCOMYCIN HCL 1 G
VIAL (EA) INTRAVENOUS
Refills: 0 | Status: DISCONTINUED | OUTPATIENT
Start: 2023-04-04 | End: 2023-04-04

## 2023-04-04 RX ORDER — VANCOMYCIN HCL 1 G
750 VIAL (EA) INTRAVENOUS ONCE
Refills: 0 | Status: COMPLETED | OUTPATIENT
Start: 2023-04-04 | End: 2023-04-04

## 2023-04-04 RX ORDER — PIPERACILLIN AND TAZOBACTAM 4; .5 G/20ML; G/20ML
3.38 INJECTION, POWDER, LYOPHILIZED, FOR SOLUTION INTRAVENOUS EVERY 8 HOURS
Refills: 0 | Status: DISCONTINUED | OUTPATIENT
Start: 2023-04-04 | End: 2023-04-07

## 2023-04-04 RX ADMIN — INSULIN GLARGINE 40 UNIT(S): 100 INJECTION, SOLUTION SUBCUTANEOUS at 09:13

## 2023-04-04 RX ADMIN — Medication 6: at 12:01

## 2023-04-04 RX ADMIN — Medication 4: at 09:12

## 2023-04-04 RX ADMIN — Medication 150 MILLIGRAM(S): at 06:28

## 2023-04-04 RX ADMIN — Medication 150 MILLIGRAM(S): at 17:04

## 2023-04-04 RX ADMIN — ACETAZOLAMIDE 110 MILLIGRAM(S): 250 TABLET ORAL at 14:12

## 2023-04-04 RX ADMIN — Medication 40 MILLIGRAM(S): at 11:29

## 2023-04-04 RX ADMIN — PIPERACILLIN AND TAZOBACTAM 25 GRAM(S): 4; .5 INJECTION, POWDER, LYOPHILIZED, FOR SOLUTION INTRAVENOUS at 17:05

## 2023-04-04 RX ADMIN — Medication 1 MILLIGRAM(S): at 11:30

## 2023-04-04 RX ADMIN — Medication 20 UNIT(S): at 12:01

## 2023-04-04 RX ADMIN — PIPERACILLIN AND TAZOBACTAM 200 GRAM(S): 4; .5 INJECTION, POWDER, LYOPHILIZED, FOR SOLUTION INTRAVENOUS at 14:12

## 2023-04-04 RX ADMIN — Medication 50 MICROGRAM(S): at 06:28

## 2023-04-04 RX ADMIN — CARVEDILOL PHOSPHATE 25 MILLIGRAM(S): 80 CAPSULE, EXTENDED RELEASE ORAL at 11:29

## 2023-04-04 RX ADMIN — APIXABAN 5 MILLIGRAM(S): 2.5 TABLET, FILM COATED ORAL at 06:29

## 2023-04-04 RX ADMIN — TAMSULOSIN HYDROCHLORIDE 0.4 MILLIGRAM(S): 0.4 CAPSULE ORAL at 22:02

## 2023-04-04 RX ADMIN — PIPERACILLIN AND TAZOBACTAM 25 GRAM(S): 4; .5 INJECTION, POWDER, LYOPHILIZED, FOR SOLUTION INTRAVENOUS at 22:02

## 2023-04-04 RX ADMIN — APIXABAN 5 MILLIGRAM(S): 2.5 TABLET, FILM COATED ORAL at 17:04

## 2023-04-04 RX ADMIN — Medication 40 MILLIGRAM(S): at 22:02

## 2023-04-04 RX ADMIN — Medication 250 MILLIGRAM(S): at 16:59

## 2023-04-04 RX ADMIN — Medication 2: at 22:03

## 2023-04-04 RX ADMIN — DULOXETINE HYDROCHLORIDE 60 MILLIGRAM(S): 30 CAPSULE, DELAYED RELEASE ORAL at 11:29

## 2023-04-04 RX ADMIN — ATORVASTATIN CALCIUM 40 MILLIGRAM(S): 80 TABLET, FILM COATED ORAL at 22:02

## 2023-04-04 RX ADMIN — Medication 20 UNIT(S): at 09:12

## 2023-04-04 NOTE — PROGRESS NOTE ADULT - ASSESSMENT
73 y/o M w/ a hx of HFrEF Last EF 30% s/p AICD, DM2 on Insulin, Persistent Atrial Fibrillation on Eliquis, HTN, HLD, RLE BKA, Chronic wounds on LLE here for mgmt of Acute Hypoxic Respiratory Failure 2/2 Acute on Chronic HFrEF Exacerbation    #Acute hypoxic respiratory failure likely 2/2 Acute on Chronic HFrEF Exacerbation  Last Echo EF 30%, unknown when last obtained  AICD in place  tolerating BiPAP   S/P Lasix 80mg once in ED  Lactate 4.4  - Lasix 40mg IV Q12  - Acetazolamide 500mg IVPB Q24  - Echo reviewed  - No longer needs continuous bipap  - inset wagner w/ Urology assistance due to difficult anatomy, strict I/Os, ?Hx of Urinary retention  - Daily Weights  - Nutrition consult ordered  - Vitals Q2 for hypoxia and soft BP  - Trend CBC/Chemistry/Lactate  - Maintain Mg >2, Phos >4, K >4  - NC @4LPM while eating if tolerating off bipap  - [place] cardio on board, recs appreciated    #Persistent Afib on AC  - c/w eliquis 5mg Q12  - c/w Coreg 25mg Q12  - f/u cardio recs  - Tele monitoring    #DM2 w/ ulcers s/p RLE BKA  On insulin at home  - increase lantus to 50 units QAM  - Increase admelog to 25 units before meals  - ISS and FSG   - unable to obtain MRI due to AICD  - Podiatry recs apprecated - ZOYA/PVR reviewed, daily dressing, WCO, surgical shoe    #JAYLENE on CKD3 w/obstructive uropathy component  #BPH  patient endorses CKD3  unable to urinate, previously treated for BPH  Cr baseline 1.03  - trend chemistry  - Urology to assist w/ wagner placement  - Start Flomax QHS  - obtain Stat urine urea and creatinine to calculate FEUrea  - consult Nephro if cr worsens    #Hyponatremia likely hypervolemic  Resolved    #HTN  stable  - c/w coreg  - maintain MAP >65    #HLD  stable  - c/w Atorvastatin 40mg QHS    #CHRISS  - Nocturnal CPAP    #Healthcare maintenance  DVT PPx - Eliquis  Dispo - Acute

## 2023-04-04 NOTE — PROCEDURE NOTE - NSICDXPROBLEMMLMBOX_GEN
IPSTART~^~1~^~[id number]~^~~^~IPEND  PKSTART~^~[id number]~^~PKEND  IPSTART~^~2~^~[id number]~^~~^~IPEND  PKSTART~^~[id number]~^~PKEND

## 2023-04-04 NOTE — PROCEDURE NOTE - ADDITIONAL PROCEDURE DETAILS
pt with hidden penis, morbid obesity  prior unsuccessful wagner attempts  16 Fr wagner inserted with difficulty due to inability to visualize and hold penis to insert wagner  sterile tongue depressors used for retraction to visualize meatus  wagner inserted successfully

## 2023-04-04 NOTE — ADVANCED PRACTICE NURSE CONSULT - ASSESSMENT
Patient currently followed by PODIATRY with wound care orders (see Podiaty consult note 4/4 @ 12:08)     ·	Per POD Consult - apply xeroform to Left lower leg wounds and betadine soaked gauze to left hallux with kerlix and light ace to LLE. Please change every 2 days.

## 2023-04-04 NOTE — PROGRESS NOTE ADULT - SUBJECTIVE AND OBJECTIVE BOX
[facility] Division of Hospital Medicine    Chief Complaint:  Acute on chronic respiratory failure 2/2 CHF Exacerbation    SUBJECTIVE / OVERNIGHT EVENTS:  Admitted yesterday. Patient seen at bedside in [facility], improvement noted in respiratory status, complaining of urinary retention. Patient denies chest pain, abd pain, N/V, fever, chills, dysuria or any other complaints. All remainder ROS negative.     MEDICATIONS  (STANDING):  acetaZOLAMIDE  IVPB 500 milliGRAM(s) IV Intermittent daily  albuterol/ipratropium for Nebulization 3 milliLiter(s) Nebulizer every 20 minutes  apixaban 5 milliGRAM(s) Oral every 12 hours  atorvastatin 40 milliGRAM(s) Oral at bedtime  carvedilol 25 milliGRAM(s) Oral every 12 hours  dextrose 5%. 1000 milliLiter(s) (50 mL/Hr) IV Continuous <Continuous>  dextrose 5%. 1000 milliLiter(s) (100 mL/Hr) IV Continuous <Continuous>  dextrose 50% Injectable 25 Gram(s) IV Push once  dextrose 50% Injectable 12.5 Gram(s) IV Push once  dextrose 50% Injectable 25 Gram(s) IV Push once  DULoxetine 60 milliGRAM(s) Oral daily  folic acid 1 milliGRAM(s) Oral daily  furosemide   Injectable 40 milliGRAM(s) IV Push every 12 hours  glucagon  Injectable 1 milliGRAM(s) IntraMuscular once  insulin glargine Injectable (LANTUS) 40 Unit(s) SubCutaneous every morning  insulin lispro (ADMELOG) corrective regimen sliding scale   SubCutaneous three times a day before meals  insulin lispro (ADMELOG) corrective regimen sliding scale   SubCutaneous at bedtime  insulin lispro Injectable (ADMELOG) 20 Unit(s) SubCutaneous three times a day before meals  levothyroxine 50 MICROGram(s) Oral daily  piperacillin/tazobactam IVPB. 3.375 Gram(s) IV Intermittent once  piperacillin/tazobactam IVPB.- 3.375 Gram(s) IV Intermittent once  piperacillin/tazobactam IVPB.. 3.375 Gram(s) IV Intermittent every 8 hours  pregabalin 150 milliGRAM(s) Oral two times a day  tamsulosin 0.4 milliGRAM(s) Oral at bedtime  vancomycin  IVPB        MEDICATIONS  (PRN):  acetaminophen     Tablet .. 650 milliGRAM(s) Oral every 6 hours PRN Temp greater or equal to 38C (100.4F), Mild Pain (1 - 3)  aluminum hydroxide/magnesium hydroxide/simethicone Suspension 30 milliLiter(s) Oral every 4 hours PRN Dyspepsia  dextrose Oral Gel 15 Gram(s) Oral once PRN Blood Glucose LESS THAN 70 milliGRAM(s)/deciliter  melatonin 3 milliGRAM(s) Oral at bedtime PRN Insomnia  methocarbamol 500 milliGRAM(s) Oral two times a day PRN for severe pain  ondansetron Injectable 4 milliGRAM(s) IV Push every 8 hours PRN Nausea and/or Vomiting  traMADol 50 milliGRAM(s) Oral every 12 hours PRN Severe Pain (7 - 10)        I&O's Summary    2023 07:01  -  2023 07:00  --------------------------------------------------------  IN: 500 mL / OUT: 0 mL / NET: 500 mL        PHYSICAL EXAM:  Vital Signs Last 24 Hrs  T(C): 36.1 (2023 07:33), Max: 37.2 (2023 13:36)  T(F): 97 (2023 07:33), Max: 98.9 (2023 13:36)  HR: 84 (2023 12:00) (79 - 121)  BP: 106/72 (2023 12:00) (91/51 - 122/72)  BP(mean): --  RR: 18 (2023 12:00) (18 - 26)  SpO2: 98% (2023 12:00) (95% - 100%)    Parameters below as of 2023 12:00  Patient On (Oxygen Delivery Method): nasal cannula  O2 Flow (L/min): 2        General: obese, NAD  Head: NC, AT  EENT: EOMI, no scleral icterus  Cardiac: tachycardic, no apparent murmurs, +lower extremity edema  Respiratory: BLAE, mild expiratory ronchi in upper lung zones  Abdomen: soft, ND, NT, nonperitonitic  MSK/Vascular: full ROM, soft compartments, warm extremities, R BKA, weeping ulcer on LLE, multiple ulcers noted, faint pulse in LLE, +2 pulses in B/L UE  Neuro: AAOx3, sensation to light touch intact  Psych: calm, cooperative    LABS:                        12.3   8.71  )-----------( 119      ( 2023 03:10 )             41.5     04-04    133<L>  |  96  |  37.0<H>  ----------------------------<  256<H>  4.8   |  21.0<L>  |  1.49<H>    Ca    8.5      2023 03:10  Mg     2.2     04-04    TPro  6.5<L>  /  Alb  3.4  /  TBili  1.5  /  DBili  x   /  AST  17  /  ALT  9   /  AlkPhos  110  04-04    PT/INR - ( 2023 03:10 )   PT: 25.9 sec;   INR: 2.21 ratio         PTT - ( 2023 03:10 )  PTT:30.5 sec  CARDIAC MARKERS ( 2023 13:50 )  x     / 0.02 ng/mL / x     / x     / x          Urinalysis Basic - ( 2023 22:00 )    Color: Yellow / Appearance: Clear / S.015 / pH: x  Gluc: x / Ketone: Negative  / Bili: Negative / Urobili: Negative mg/dL   Blood: x / Protein: 15 / Nitrite: Negative   Leuk Esterase: Trace / RBC: 0-2 /HPF / WBC 0-2 /HPF   Sq Epi: x / Non Sq Epi: Occasional / Bacteria: Occasional        Culture - Blood (collected 2023 13:30)  Source: .Blood Blood-Peripheral  Gram Stain (2023 11:47):    Growth in anaerobic bottle: Gram Negative Rods    Growth in aerobic bottle: Gram Negative Rods and Gram Positive Cocci in    Clusters  Preliminary Report (2023 11:48):    Growth in anaerobic bottle: Gram Negative Rods    Growth in aerobic bottle: Gram Negative Rods and Gram Positive Cocci in    Clusters    ***Blood Panel PCR results on this specimen are available    approximately 3 hours after the Gram stain result.***    Gram stain, PCR, and/or culture results may not always    correspond due to difference in methodologies.    ************************************************************    This PCR assay was performed by multiplex PCR. This    Assay tests for 66 bacterial and resistance gene targets.    Please refer to the [facility] test directory    at [email or web address] for details.  Organism: Blood Culture PCR (2023 11:56)  Organism: Blood Culture PCR (2023 11:56)      CAPILLARY BLOOD GLUCOSE      POCT Blood Glucose.: 265 mg/dL (2023 11:37)  POCT Blood Glucose.: 240 mg/dL (2023 08:18)  POCT Blood Glucose.: 199 mg/dL (2023 22:23)  POCT Blood Glucose.: 213 mg/dL (2023 18:58)        RADIOLOGY & ADDITIONAL TESTS:  Results Reviewed: Y  Imaging Personally Reviewed: N  Electrocardiogram Personally Reviewed: N      Echo 4/3/23  Summary:   1. Technically difficult study.   2. Endocardial visualization was enhanced with intravenous echo contrast.   3. Multiple left ventricular regional wall motion abnormalities exist.   See wall motion findings.   4. Severely decreased global left ventricular systolic function.   5. Left ventricular ejection fraction, by visual estimation, is <20%.   6. Dilated cardiomyopathy.   7. Moderately reduced RV systolic function.   8. Severely enlarged left atrium.   9. Mild mitral annular calcification.  10. Mild to moderate mitral valve regurgitation.  11. The mitral in-flow pattern reveals no discernable A-wave, therefore   no comment on diastolic function can be made.  12. Sclerotic aortic valve with decreased opening.  13. Moderate tricuspid regurgitation.  14. There is no evidence of pericardial effusion.

## 2023-04-04 NOTE — CHART NOTE - NSCHARTNOTEFT_GEN_A_CORE
Called for difficult wagner insertion  pt with morbid obesity, hidden penis, difficult to visualize the glans or the meatus  wagner inserted successfully, but with difficulty  800ml yellow urine drained  do not remove wagner until pt no longer needs I/O's

## 2023-04-04 NOTE — PROGRESS NOTE ADULT - SUBJECTIVE AND OBJECTIVE BOX
[facility] - [facility], THE HEART CENTER                                   [address] [place] [address]                                                      PHONE: [phone]                                                         FAX: [phone]  [email or web address]  ---------------------------------------------------------------------------------------------------------------------------------    Overnight events/patient complaints: Patient now off BIPAP and on nasal cannula. States he feels better than when he came in.       allow double protein at meals (Unknown)  clindamycin (Unknown)    MEDICATIONS  (STANDING):  acetaZOLAMIDE  IVPB 500 milliGRAM(s) IV Intermittent daily  albuterol/ipratropium for Nebulization 3 milliLiter(s) Nebulizer every 20 minutes  apixaban 5 milliGRAM(s) Oral every 12 hours  atorvastatin 40 milliGRAM(s) Oral at bedtime  carvedilol 25 milliGRAM(s) Oral every 12 hours  dextrose 5%. 1000 milliLiter(s) (100 mL/Hr) IV Continuous <Continuous>  dextrose 5%. 1000 milliLiter(s) (50 mL/Hr) IV Continuous <Continuous>  dextrose 50% Injectable 25 Gram(s) IV Push once  dextrose 50% Injectable 12.5 Gram(s) IV Push once  dextrose 50% Injectable 25 Gram(s) IV Push once  DULoxetine 60 milliGRAM(s) Oral daily  folic acid 1 milliGRAM(s) Oral daily  furosemide   Injectable 40 milliGRAM(s) IV Push every 12 hours  glucagon  Injectable 1 milliGRAM(s) IntraMuscular once  insulin glargine Injectable (LANTUS) 40 Unit(s) SubCutaneous every morning  insulin lispro (ADMELOG) corrective regimen sliding scale   SubCutaneous three times a day before meals  insulin lispro (ADMELOG) corrective regimen sliding scale   SubCutaneous at bedtime  insulin lispro Injectable (ADMELOG) 20 Unit(s) SubCutaneous three times a day before meals  levothyroxine 50 MICROGram(s) Oral daily  pregabalin 150 milliGRAM(s) Oral two times a day    MEDICATIONS  (PRN):  acetaminophen     Tablet .. 650 milliGRAM(s) Oral every 6 hours PRN Temp greater or equal to 38C (100.4F), Mild Pain (1 - 3)  aluminum hydroxide/magnesium hydroxide/simethicone Suspension 30 milliLiter(s) Oral every 4 hours PRN Dyspepsia  dextrose Oral Gel 15 Gram(s) Oral once PRN Blood Glucose LESS THAN 70 milliGRAM(s)/deciliter  melatonin 3 milliGRAM(s) Oral at bedtime PRN Insomnia  methocarbamol 500 milliGRAM(s) Oral two times a day PRN for severe pain  ondansetron Injectable 4 milliGRAM(s) IV Push every 8 hours PRN Nausea and/or Vomiting  traMADol 50 milliGRAM(s) Oral every 12 hours PRN Severe Pain (7 - 10)      Vital Signs Last 24 Hrs  T(C): 36.1 (2023 07:33), Max: 37.2 (2023 13:36)  T(F): 97 (2023 07:33), Max: 98.9 (2023 13:36)  HR: 83 (2023 07:33) (79 - 121)  BP: 103/66 (2023 07:33) (91/51 - 122/72)  BP(mean): --  RR: 19 (2023 07:33) (18 - 26)  SpO2: 95% (2023 07:33) (95% - 100%)    Parameters below as of 2023 07:33  Patient On (Oxygen Delivery Method): nasal cannula  O2 Flow (L/min): 2    ICU Vital Signs Last 24 Hrs  MARY ANN CORNELL  I&O's Detail    2023 07:01  -  2023 07:00  --------------------------------------------------------  IN:    Oral Fluid: 500 mL  Total IN: 500 mL    OUT:  Total OUT: 0 mL    Total NET: 500 mL        Drug Dosing Weight  MARY ANN CORNELL      PHYSICAL EXAM:  General: On nasal cannula, morbidly obese  HEENT: Head; normocephalic, atraumatic.  Eyes: EOMI  Neck: Unable to assess JVD due to body habitus  CARDIOVASCULAR: Irregularly irregular rhythm, regular rate, no murmurs or gallops  LUNGS: Poor inspiratory effort  ABDOMEN: Soft, nontender, +bowel sounds  EXTREMITIES: RLE with prosthesis, LLE with 2+ edema  NEURO: Alert          LABS:                        12.3   8.71  )-----------( 119      ( 2023 03:10 )             41.5     04-04    133<L>  |  96  |  37.0<H>  ----------------------------<  256<H>  4.8   |  21.0<L>  |  1.49<H>    Ca    8.5      2023 03:10  Mg     2.2     04-04    TPro  6.5<L>  /  Alb  3.4  /  TBili  1.5  /  DBili  x   /  AST  17  /  ALT  9   /  AlkPhos  110  04-04    MARY ANN CORNELL  CARDIAC MARKERS ( 2023 13:50 )  x     / 0.02 ng/mL / x     / x     / x          PT/INR - ( 2023 03:10 )   PT: 25.9 sec;   INR: 2.21 ratio         PTT - ( 2023 03:10 )  PTT:30.5 sec  Urinalysis Basic - ( 2023 22:00 )    Color: Yellow / Appearance: Clear / S.015 / pH: x  Gluc: x / Ketone: Negative  / Bili: Negative / Urobili: Negative mg/dL   Blood: x / Protein: 15 / Nitrite: Negative   Leuk Esterase: Trace / RBC: 0-2 /HPF / WBC 0-2 /HPF   Sq Epi: x / Non Sq Epi: Occasional / Bacteria: Occasional        RADIOLOGY & ADDITIONAL STUDIES:    INTERPRETATION OF TELEMETRY (personally reviewed): No significant cardiac events.    ASSESSMENT AND PLAN:  72 year old male with a PMHx of HFrEF (30-35%) (NICM) s/p cardiomems and ICD, afib on coumadin, HTN, HLD, DM, RLE diabetic ulcer s/p leg amputation, CHRISS who presents for worsening SOB found to be significantly volume overloaded found to have acute on chronic heart failure exacerbation. Patient recently moved to [place] ~1 year ago and has not established care with a new cardiologist while in [place].    Acute on Chronic Heart Failure Exacerbation  - patient remains to be significantly volume overloaded on exam  - patient now off bipap and on nasal cannula  - c/w lasix 40mg IV BID for now.  - echo as above with reduced EF to <20%  - will have device interrogated to see pacing percentage  - daily chemistry to monitor creatinine and electrolytes  - daily weights  - strict I/O  - fluid and sodium restriction    Afib  - c/w coumadin for goal INR 2-3, INR therapeutic currently  - rate controlled      Thank you for letting [facility] to assist in the management of this patient. Please call with any questions.

## 2023-04-05 LAB
ANION GAP SERPL CALC-SCNC: 12 MMOL/L — SIGNIFICANT CHANGE UP (ref 5–17)
ANION GAP SERPL CALC-SCNC: 13 MMOL/L — SIGNIFICANT CHANGE UP (ref 5–17)
BUN SERPL-MCNC: 60.2 MG/DL — HIGH (ref 8–20)
BUN SERPL-MCNC: 64.9 MG/DL — HIGH (ref 8–20)
CALCIUM SERPL-MCNC: 8.2 MG/DL — LOW (ref 8.4–10.5)
CALCIUM SERPL-MCNC: 8.3 MG/DL — LOW (ref 8.4–10.5)
CHLORIDE SERPL-SCNC: 95 MMOL/L — LOW (ref 96–108)
CHLORIDE SERPL-SCNC: 95 MMOL/L — LOW (ref 96–108)
CO2 SERPL-SCNC: 22 MMOL/L — SIGNIFICANT CHANGE UP (ref 22–29)
CO2 SERPL-SCNC: 23 MMOL/L — SIGNIFICANT CHANGE UP (ref 22–29)
CREAT SERPL-MCNC: 2.1 MG/DL — HIGH (ref 0.5–1.3)
CREAT SERPL-MCNC: 2.15 MG/DL — HIGH (ref 0.5–1.3)
CRP SERPL-MCNC: 26 MG/L — HIGH
CULTURE RESULTS: SIGNIFICANT CHANGE UP
EGFR: 32 ML/MIN/1.73M2 — LOW
EGFR: 33 ML/MIN/1.73M2 — LOW
ERYTHROCYTE [SEDIMENTATION RATE] IN BLOOD: 8 MM/HR — SIGNIFICANT CHANGE UP (ref 0–20)
GLUCOSE BLDC GLUCOMTR-MCNC: 102 MG/DL — HIGH (ref 70–99)
GLUCOSE BLDC GLUCOMTR-MCNC: 172 MG/DL — HIGH (ref 70–99)
GLUCOSE BLDC GLUCOMTR-MCNC: 204 MG/DL — HIGH (ref 70–99)
GLUCOSE BLDC GLUCOMTR-MCNC: 59 MG/DL — LOW (ref 70–99)
GLUCOSE BLDC GLUCOMTR-MCNC: 64 MG/DL — LOW (ref 70–99)
GLUCOSE BLDC GLUCOMTR-MCNC: 73 MG/DL — SIGNIFICANT CHANGE UP (ref 70–99)
GLUCOSE SERPL-MCNC: 172 MG/DL — HIGH (ref 70–99)
GLUCOSE SERPL-MCNC: 175 MG/DL — HIGH (ref 70–99)
HCT VFR BLD CALC: 42.8 % — SIGNIFICANT CHANGE UP (ref 39–50)
HGB BLD-MCNC: 12.8 G/DL — LOW (ref 13–17)
MAGNESIUM SERPL-MCNC: 2.4 MG/DL — SIGNIFICANT CHANGE UP (ref 1.6–2.6)
MCHC RBC-ENTMCNC: 24.5 PG — LOW (ref 27–34)
MCHC RBC-ENTMCNC: 29.9 GM/DL — LOW (ref 32–36)
MCV RBC AUTO: 81.8 FL — SIGNIFICANT CHANGE UP (ref 80–100)
PLATELET # BLD AUTO: 147 K/UL — LOW (ref 150–400)
POTASSIUM SERPL-MCNC: 4.8 MMOL/L — SIGNIFICANT CHANGE UP (ref 3.5–5.3)
POTASSIUM SERPL-MCNC: 4.8 MMOL/L — SIGNIFICANT CHANGE UP (ref 3.5–5.3)
POTASSIUM SERPL-SCNC: 4.8 MMOL/L — SIGNIFICANT CHANGE UP (ref 3.5–5.3)
POTASSIUM SERPL-SCNC: 4.8 MMOL/L — SIGNIFICANT CHANGE UP (ref 3.5–5.3)
RBC # BLD: 5.23 M/UL — SIGNIFICANT CHANGE UP (ref 4.2–5.8)
RBC # FLD: 20.4 % — HIGH (ref 10.3–14.5)
SODIUM SERPL-SCNC: 129 MMOL/L — LOW (ref 135–145)
SODIUM SERPL-SCNC: 131 MMOL/L — LOW (ref 135–145)
SPECIMEN SOURCE: SIGNIFICANT CHANGE UP
WBC # BLD: 11.83 K/UL — HIGH (ref 3.8–10.5)
WBC # FLD AUTO: 11.83 K/UL — HIGH (ref 3.8–10.5)

## 2023-04-05 PROCEDURE — 76775 US EXAM ABDO BACK WALL LIM: CPT | Mod: 26

## 2023-04-05 PROCEDURE — 99232 SBSQ HOSP IP/OBS MODERATE 35: CPT

## 2023-04-05 PROCEDURE — 74176 CT ABD & PELVIS W/O CONTRAST: CPT | Mod: 26

## 2023-04-05 PROCEDURE — 99233 SBSQ HOSP IP/OBS HIGH 50: CPT

## 2023-04-05 RX ORDER — FUROSEMIDE 40 MG
60 TABLET ORAL
Refills: 0 | Status: DISCONTINUED | OUTPATIENT
Start: 2023-04-05 | End: 2023-04-05

## 2023-04-05 RX ORDER — FUROSEMIDE 40 MG
40 TABLET ORAL
Refills: 0 | Status: DISCONTINUED | OUTPATIENT
Start: 2023-04-05 | End: 2023-04-06

## 2023-04-05 RX ADMIN — Medication 1 MILLIGRAM(S): at 11:31

## 2023-04-05 RX ADMIN — Medication 40 MILLIGRAM(S): at 11:32

## 2023-04-05 RX ADMIN — DULOXETINE HYDROCHLORIDE 60 MILLIGRAM(S): 30 CAPSULE, DELAYED RELEASE ORAL at 11:31

## 2023-04-05 RX ADMIN — APIXABAN 5 MILLIGRAM(S): 2.5 TABLET, FILM COATED ORAL at 05:35

## 2023-04-05 RX ADMIN — Medication 50 MICROGRAM(S): at 05:35

## 2023-04-05 RX ADMIN — APIXABAN 5 MILLIGRAM(S): 2.5 TABLET, FILM COATED ORAL at 17:39

## 2023-04-05 RX ADMIN — INSULIN GLARGINE 40 UNIT(S): 100 INJECTION, SOLUTION SUBCUTANEOUS at 09:26

## 2023-04-05 RX ADMIN — PIPERACILLIN AND TAZOBACTAM 25 GRAM(S): 4; .5 INJECTION, POWDER, LYOPHILIZED, FOR SOLUTION INTRAVENOUS at 21:40

## 2023-04-05 RX ADMIN — ATORVASTATIN CALCIUM 40 MILLIGRAM(S): 80 TABLET, FILM COATED ORAL at 21:40

## 2023-04-05 RX ADMIN — Medication 20 UNIT(S): at 13:32

## 2023-04-05 RX ADMIN — Medication 4: at 13:31

## 2023-04-05 RX ADMIN — TAMSULOSIN HYDROCHLORIDE 0.4 MILLIGRAM(S): 0.4 CAPSULE ORAL at 21:40

## 2023-04-05 RX ADMIN — Medication 20 UNIT(S): at 09:26

## 2023-04-05 RX ADMIN — Medication 2: at 09:25

## 2023-04-05 RX ADMIN — CARVEDILOL PHOSPHATE 25 MILLIGRAM(S): 80 CAPSULE, EXTENDED RELEASE ORAL at 17:39

## 2023-04-05 RX ADMIN — ACETAZOLAMIDE 110 MILLIGRAM(S): 250 TABLET ORAL at 12:52

## 2023-04-05 RX ADMIN — Medication 150 MILLIGRAM(S): at 05:35

## 2023-04-05 RX ADMIN — PIPERACILLIN AND TAZOBACTAM 25 GRAM(S): 4; .5 INJECTION, POWDER, LYOPHILIZED, FOR SOLUTION INTRAVENOUS at 13:31

## 2023-04-05 RX ADMIN — Medication 150 MILLIGRAM(S): at 17:40

## 2023-04-05 RX ADMIN — CARVEDILOL PHOSPHATE 25 MILLIGRAM(S): 80 CAPSULE, EXTENDED RELEASE ORAL at 05:35

## 2023-04-05 RX ADMIN — PIPERACILLIN AND TAZOBACTAM 25 GRAM(S): 4; .5 INJECTION, POWDER, LYOPHILIZED, FOR SOLUTION INTRAVENOUS at 05:36

## 2023-04-05 NOTE — PROGRESS NOTE ADULT - SUBJECTIVE AND OBJECTIVE BOX
[facility] - [facility], THE HEART CENTER                                   [address] [place] [address]                                                      PHONE: [phone]                                                         FAX: [phone]  [email or web address]  ---------------------------------------------------------------------------------------------------------------------------------    Overnight events/patient complaints: patient seen at bedside. he still feels SOB.       allow double protein at meals (Unknown)  clindamycin (Unknown)    MEDICATIONS  (STANDING):  acetaZOLAMIDE  IVPB 500 milliGRAM(s) IV Intermittent daily  albuterol/ipratropium for Nebulization 3 milliLiter(s) Nebulizer every 20 minutes  apixaban 5 milliGRAM(s) Oral every 12 hours  atorvastatin 40 milliGRAM(s) Oral at bedtime  carvedilol 25 milliGRAM(s) Oral every 12 hours  dextrose 5%. 1000 milliLiter(s) (50 mL/Hr) IV Continuous <Continuous>  dextrose 5%. 1000 milliLiter(s) (100 mL/Hr) IV Continuous <Continuous>  dextrose 50% Injectable 25 Gram(s) IV Push once  dextrose 50% Injectable 12.5 Gram(s) IV Push once  dextrose 50% Injectable 25 Gram(s) IV Push once  DULoxetine 60 milliGRAM(s) Oral daily  folic acid 1 milliGRAM(s) Oral daily  furosemide   Injectable 40 milliGRAM(s) IV Push every 12 hours  glucagon  Injectable 1 milliGRAM(s) IntraMuscular once  insulin glargine Injectable (LANTUS) 40 Unit(s) SubCutaneous every morning  insulin lispro (ADMELOG) corrective regimen sliding scale   SubCutaneous three times a day before meals  insulin lispro (ADMELOG) corrective regimen sliding scale   SubCutaneous at bedtime  insulin lispro Injectable (ADMELOG) 20 Unit(s) SubCutaneous three times a day before meals  levothyroxine 50 MICROGram(s) Oral daily  piperacillin/tazobactam IVPB.. 3.375 Gram(s) IV Intermittent every 8 hours  pregabalin 150 milliGRAM(s) Oral two times a day  tamsulosin 0.4 milliGRAM(s) Oral at bedtime    MEDICATIONS  (PRN):  acetaminophen     Tablet .. 650 milliGRAM(s) Oral every 6 hours PRN Temp greater or equal to 38C (100.4F), Mild Pain (1 - 3)  aluminum hydroxide/magnesium hydroxide/simethicone Suspension 30 milliLiter(s) Oral every 4 hours PRN Dyspepsia  dextrose Oral Gel 15 Gram(s) Oral once PRN Blood Glucose LESS THAN 70 milliGRAM(s)/deciliter  melatonin 3 milliGRAM(s) Oral at bedtime PRN Insomnia  methocarbamol 500 milliGRAM(s) Oral two times a day PRN for severe pain  ondansetron Injectable 4 milliGRAM(s) IV Push every 8 hours PRN Nausea and/or Vomiting  traMADol 50 milliGRAM(s) Oral every 12 hours PRN Severe Pain (7 - 10)      Vital Signs Last 24 Hrs  T(C): 37.1 (2023 12:02), Max: 37.1 (2023 12:02)  T(F): 98.8 (2023 12:02), Max: 98.8 (2023 12:02)  HR: 74 (2023 12:02) (73 - 82)  BP: 127/77 (2023 12:02) (96/62 - 127/77)  BP(mean): --  RR: 18 (2023 12:02) (18 - 18)  SpO2: 98% (2023 12:02) (95% - 100%)    Parameters below as of 2023 12:02  Patient On (Oxygen Delivery Method): nasal cannula  O2 Flow (L/min): 2    ICU Vital Signs Last 24 Hrs  MARY ANN CORNELL  I&O's Detail    2023 07:01  -  2023 07:00  --------------------------------------------------------  IN:    IV PiggyBack: 200 mL    Oral Fluid: 880 mL  Total IN: 1080 mL    OUT:    Indwelling Catheter - Urethral (mL): 750 mL  Total OUT: 750 mL    Total NET: 330 mL        Drug Dosing Weight  MARY ANN CORNELL      PHYSICAL EXAM:  General: NAD  HEENT: Head; normocephalic, atraumatic.  Eyes: Pupils reactive, cornea wnl.  Neck: Supple, no nodes adenopathy, no NVD or carotid bruit or thyromegaly.  CARDIOVASCULAR: Normal S1 and S2, No murmur, rub, gallop or lift.   LUNGS:  reduced breath sounds b/l base  ABDOMEN: Soft, nontender without mass or organomegaly. bowel sounds normoactive.  EXTREMITIES: edema b/l legs   SKIN: warm and dry with normal turgor.  NEURO: Alert/oriented x 3  PSYCH: normal affect.        LABS:                        12.8   11.83 )-----------( 147      ( 2023 02:48 )             42.8     04-05    131<L>  |  95<L>  |  60.2<H>  ----------------------------<  172<H>  4.8   |  23.0  |  2.15<H>    Ca    8.3<L>      2023 02:48  Mg     2.4     04-05    TPro  6.5<L>  /  Alb  3.4  /  TBili  1.5  /  DBili  x   /  AST  17  /  ALT  9   /  AlkPhos  110  04-04    MARY ANN CORNELL  CARDIAC MARKERS ( 2023 13:50 )  x     / 0.02 ng/mL / x     / x     / x          PT/INR - ( 2023 03:10 )   PT: 25.9 sec;   INR: 2.21 ratio         PTT - ( 2023 03:10 )  PTT:30.5 sec  Urinalysis Basic - ( 2023 22:00 )    Color: Yellow / Appearance: Clear / S.015 / pH: x  Gluc: x / Ketone: Negative  / Bili: Negative / Urobili: Negative mg/dL   Blood: x / Protein: 15 / Nitrite: Negative   Leuk Esterase: Trace / RBC: 0-2 /HPF / WBC 0-2 /HPF   Sq Epi: x / Non Sq Epi: Occasional / Bacteria: Occasional          ASSESSMENT AND PLAN:  72 year old male with a PMHx of HFrEF (30-35%) (NIC) s/p cardiomems and ICD, afib on coumadin, HTN, HLD, DM, RLE diabetic ulcer s/p right BKA, CHRISS who presents for worsening SOB found to be significantly volume overloaded found to have acute on chronic heart failure exacerbation. Patient recently moved to [place] ~1 year ago and has not established care with a new cardiologist while in [place].    - increase Lasix to 60 mg IVP BID   - daily BMP while on high dose IV diuretic to monitor for renal toxicity  - oxygen supplementation for severe hypoxia  - Eliquis 5 mg bid  - Coreg 25 mg bid   - no ACEi/ARB/Entresto for now given JAYLENE on CKD    Will follow closely with you.

## 2023-04-05 NOTE — CONSULT NOTE ADULT - SUBJECTIVE AND OBJECTIVE BOX
HPI: 71 y/o M w/ a hx of HFrEF Last EF 30% s/p AICD, DM2 on Insulin, Persistent Atrial Fibrillation on Eliquis, HTN, HLD, RLE BKA, Chronic wounds on LLE presenting for SOB. Patient states that the shortness of breath started 2 weeks ago, drove up from [place] for wife's , had increase in cough, mildly productive, tried albuterol w/ little improvement, started on high dose lasix and continuous bipap in ED. Patient seen at bedside, tolerating BiPAP, endorsing SOB, cough, mild dizziness, denies CP, Abd pain, HA, blurry vision, lethargy, rashes, dysuria.     Podiatry HPI: Pt seen bedside accompanied by nephew. Pt states he lives in [place] and was up here in [place] for his wifes . PT states he noticed shortness of breath for the past week. Pt states he has had left leg wounds for the past two months. States he had wounds to right foot which led him to a BKA in 2022. States he visits with wound care doctor who goes to house weekly to change dressings and applies UNNA boots to LLE. Denies michael nto LLE. Denies constitutional symptoms No other pedal complaints.     Medications acetaminophen     Tablet .. 650 milliGRAM(s) Oral every 6 hours PRN  acetaZOLAMIDE  IVPB 500 milliGRAM(s) IV Intermittent daily  albuterol/ipratropium for Nebulization 3 milliLiter(s) Nebulizer every 20 minutes  aluminum hydroxide/magnesium hydroxide/simethicone Suspension 30 milliLiter(s) Oral every 4 hours PRN  apixaban 5 milliGRAM(s) Oral every 12 hours  atorvastatin 40 milliGRAM(s) Oral at bedtime  carvedilol 25 milliGRAM(s) Oral every 12 hours  dextrose 5%. 1000 milliLiter(s) IV Continuous <Continuous>  dextrose 5%. 1000 milliLiter(s) IV Continuous <Continuous>  dextrose 50% Injectable 25 Gram(s) IV Push once  dextrose 50% Injectable 12.5 Gram(s) IV Push once  dextrose 50% Injectable 25 Gram(s) IV Push once  dextrose Oral Gel 15 Gram(s) Oral once PRN  DULoxetine 60 milliGRAM(s) Oral daily  folic acid 1 milliGRAM(s) Oral daily  furosemide   Injectable 40 milliGRAM(s) IV Push every 12 hours  glucagon  Injectable 1 milliGRAM(s) IntraMuscular once  insulin glargine Injectable (LANTUS) 40 Unit(s) SubCutaneous every morning  insulin lispro (ADMELOG) corrective regimen sliding scale   SubCutaneous three times a day before meals  insulin lispro (ADMELOG) corrective regimen sliding scale   SubCutaneous at bedtime  insulin lispro Injectable (ADMELOG) 20 Unit(s) SubCutaneous three times a day before meals  levothyroxine 50 MICROGram(s) Oral daily  melatonin 3 milliGRAM(s) Oral at bedtime PRN  methocarbamol 500 milliGRAM(s) Oral two times a day PRN  ondansetron Injectable 4 milliGRAM(s) IV Push every 8 hours PRN  piperacillin/tazobactam IVPB. 3.375 Gram(s) IV Intermittent once  piperacillin/tazobactam IVPB.- 3.375 Gram(s) IV Intermittent once  piperacillin/tazobactam IVPB.. 3.375 Gram(s) IV Intermittent every 8 hours  pregabalin 150 milliGRAM(s) Oral two times a day  traMADol 50 milliGRAM(s) Oral every 12 hours PRN  vancomycin  IVPB        FHFamily history of cancer (Sibling)    Family history of diabetes mellitus (Sibling)    ,   PMHDM (diabetes mellitus)    HTN (hypertension)    High cholesterol    Renal insufficiency    Sleep apnea    Prostate CA    Pulmonary hypertension    CHF (congestive heart failure)    Atrial fibrillation    Chronic back pain    Lung nodules       PSHNo significant past surgical history    S/P ankle ligament repair    AICD (automatic cardioverter/defibrillator) present    History of brachytherapy        Labs                          12.3   8.71  )-----------( 119      ( 2023 03:10 )             41.5      04-04    133<L>  |  96  |  37.0<H>  ----------------------------<  256<H>  4.8   |  21.0<L>  |  1.49<H>    Ca    8.5      2023 03:10  Mg     2.2     04-04    TPro  6.5<L>  /  Alb  3.4  /  TBili  1.5  /  DBili  x   /  AST  17  /  ALT  9   /  AlkPhos  110  04-04     Vital Signs Last 24 Hrs  T(C): 36.1 (2023 07:33), Max: 37.2 (2023 13:36)  T(F): 97 (2023 07:33), Max: 98.9 (2023 13:36)  HR: 83 (:33) (79 - 121)  BP: 103/66 (2023 07:33) (91/51 - 122/72)  BP(mean): --  RR: 19 (:33) (18 - 26)  SpO2: 95% (:33) (95% - 100%)    Parameters below as of :33  Patient On (Oxygen Delivery Method): nasal cannula  O2 Flow (L/min): 2            WBC Count: 8.71 K/uL (23 @ 03:10)  WBC Count: 10.49 K/uL (23 @ 13:50)    PE:  Vasc: Pulses nonpalpable DP/PT LLE, Skin temp warm to cool prox to distal LLE, pedal hair growth absent LLE  Derm: Left hallux distal tuft wound measures 1cmx 0.4cm x 0.1cm with ischemic changes and necrotic cap, no drainage, -PTB. Lateral malleoli wound measures 0.8cm x 0.5cm x 0.1cm with granular fibrotic wound base. No clinical signs of infection noted LLE  Neuro: Protective sensation grossly diminished LLE  MSK s/p R BKA     A:  Left foot wound  Left leg wound  DM Type IL with neuropathy     P:  Pt evaluated and chart reviewed  Pending xrays  No leukocytosis noted   ZOYA/PVR Reviewed - L DP 0.90 with waveforms present  Applied betadine soaked gauze to LLE with DSD light ACE LLE  PT to keep dressing clean, dry and intact LLE   PT to be wb as tolerated left foot in surgical shoe  WCO please change dressing to LLE - apply xeroform to Left lower leg wounds and betadine soaked gauze to left hallux with kerlix and light ace to LLE. Please change every 2 days.   Will cont to follow  Discussed with Dr. Chase    
                [facility]                                   [address] [place] [address]                                                      PHONE: [phone]                                                         FAX: [phone]  [email or web address]  ---------------------------------------------------------------------------------------------------------------------------------    Reason for Consult: Heart Failure Exacerbation    HPI:  72 year old male with a PMHx of HFrEF (30-35%) s/p cardiomems and ICD, afib on coumadin, HTN, HLD, DM, RLE diabetic ulcer s/p leg amputation, CHRISS who presents for worsening SOB. Patient states that he is under a lot of stress recently due to his wife being in hospice and passing away. Patient moved to [place] almost a year ago and wanted to come to [place] to attend his wife's . Over the last few weeks he has noticed worsening SOB. Patient's family and caretaker is present at bedside and states that he has been having worsening orthopnea. Even though he was feeling short of breath he got on a plane and came to [place] on oxygen. His SOB continued to worsen and he came to the hospital. Patient states that while in [place] he has not followed with cardiologists but he has been hospitalized about four times in the last year (but not always due to heart failure) however was seen by cardiology at that time. He denies any chest pain, palpitations, diaphoresis, near syncope, or syncope. Of note, patient is a poor historian and his family/caretaker do not know his home medications.    PAST MEDICAL & SURGICAL HISTORY:  DM (diabetes mellitus)      HTN (hypertension)      High cholesterol      Renal insufficiency      Sleep apnea      Prostate CA      Pulmonary hypertension      CHF (congestive heart failure)  EF 30%      Atrial fibrillation      Chronic back pain      Lung nodules      S/P ankle ligament repair      AICD (automatic cardioverter/defibrillator) present      History of brachytherapy          allow double protein at meals (Unknown)  clindamycin (Unknown)      MEDICATIONS  (STANDING):  albuterol/ipratropium for Nebulization 3 milliLiter(s) Nebulizer every 20 minutes  furosemide   Injectable 40 milliGRAM(s) IV Push Once  vancomycin  IVPB. 1000 milliGRAM(s) IV Intermittent once    MEDICATIONS  (PRN):      Social History:  Cigarettes:  Cigar smoker                 Alcohol:  Denies daily etoh            Illicit Drug Abuse:  Vapes marijuana    Family History:  denies sudden cardiac death.    ROS: Negative other than as mentioned in HPI.    Vital Signs Last 24 Hrs  T(C): --  T(F): --  HR: 98 (2023 14:28) (92 - 121)  BP: 122/72 (2023 14:28) (91/51 - 122/72)  BP(mean): --  RR: 24 (2023 14:28) (24 - 26)  SpO2: 100% (2023 14:28) (97% - 100%)    Parameters below as of 2023 14:28  Patient On (Oxygen Delivery Method): BiPAP/CPAP      ICU Vital Signs Last 24 Hrs  MARY ANN CORNELL  I&O's Detail    I&O's Summary    Drug Dosing Weight  MARY ANN KRAIG      PHYSICAL EXAM:  General: On bipap, morbidly obese  HEENT: Head; normocephalic, atraumatic.  Eyes: EOMI  Neck: Unable to assess JVD due to body habitus  CARDIOVASCULAR: Irregularly irregular rhythm, regular rate, no murmurs or gallops  LUNGS: Poor inspiratory effort  ABDOMEN: Soft, nontender, +bowel sounds  EXTREMITIES: RLE with prosthesis, LLE with 2-3+ edema with multiple open wounds  NEURO: Alert        LABS:                        12.9   10.49 )-----------( 141      ( 2023 13:50 )             43.6     04-03    133<L>  |  97  |  34.4<H>  ----------------------------<  207<H>  4.7   |  25.0  |  1.45<H>    Ca    8.5      2023 13:50    TPro  7.2  /  Alb  4.0  /  TBili  2.0  /  DBili  x   /  AST  20  /  ALT  11  /  AlkPhos  129<H>  04-03    MARY ANN CORNELL  CARDIAC MARKERS ( 2023 13:50 )  x     / 0.02 ng/mL / x     / x     / x          PT/INR - ( 2023 13:50 )   PT: 25.0 sec;   INR: 2.14 ratio         PTT - ( 2023 13:50 )  PTT:32.8 sec      RADIOLOGY & ADDITIONAL STUDIES:    INTERPRETATION OF TELEMETRY (personally reviewed): Afib, no significant cardiac events    ECG: Afib, RAD, Low voltage, Non specific T wave change    ECHO: 2022 with EF 30-35%    Assessment and Plan:  72 year old male with a PMHx of HFrEF (30-35%) s/p cardiomems and ICD, afib on coumadin, HTN, HLD, DM, RLE diabetic ulcer s/p leg amputation, CHRISS who presents for worsening SOB found to be significantly volume overloaded.    Acute on Chronic Heart Failure Exacerbation  - patient significantly volume overloaded on exam  - c/w BIPAP and wean as tolerated  - would give lasix 40mg IV BID for now. Dose might need to be uptitrated to achieve adequate urine output (patient does not know home dose and does not follow regularly with cardiologists now that he lives in [place])  - obtain an echo  - BNP 3160  - Initial troponin negative, patient without chest pain  - daily chemistry to monitor creatinine and electrolytes  - daily weights  - strict I/O  - fluid and sodium restriction    Afib  - c/w coumadin for goal INR 2-3, INR therapeutic currently  - rate controlled    LLE Leg Wounds  - recommend consulting wound care and consider vascular surgery consultation  - s/p abx in the ER    Will continue to follow.     Thank you for letting [facility] to assist in the management of this patient. Please call with any questions.
Patient is a 72y old  Male who presents with a chief complaint of     BRIEF HOSPITAL COURSE:   73yo M w/pmh CHF with EF 30% s/p AICD, DM on insulin, a fib on eliquis, HTN, HLD, RLE BKA presented to the ED for worsening SOB x3 weeks. Assoc/w chronic LLE edema. Denies f/ch, congestion, sick contacts. Reports he tried his inhaler at home without relief. Uses CPAP at home every night and when he takes naps during the day. Has been hospitalized on bipap before. Denies any h/o intubation. Reports he smoked half a pack per day for 35 years, quit in 1999. Placed on bipap in ER with significant improvement in his symptoms.         PAST MEDICAL & SURGICAL HISTORY:  DM (diabetes mellitus)      HTN (hypertension)      High cholesterol      Renal insufficiency      Sleep apnea      Prostate CA      Pulmonary hypertension      CHF (congestive heart failure)  EF 30%      Atrial fibrillation      Chronic back pain      Lung nodules      S/P ankle ligament repair      AICD (automatic cardioverter/defibrillator) present      History of brachytherapy        Allergies    clindamycin (Unknown)    Intolerances    allow double protein at meals (Unknown)    FAMILY HISTORY:  Family history of cancer (Sibling)    Family history of diabetes mellitus (Sibling)        Review of Systems:  CONSTITUTIONAL: No fever, chills, or fatigue  EYES: No eye pain, visual disturbances, or discharge  ENMT:  No difficulty hearing, tinnitus, vertigo; No sinus or throat pain  NECK: No pain or stiffness  RESPIRATORY: No cough, wheezing, chills or hemoptysis; No shortness of breath  CARDIOVASCULAR: No chest pain, palpitations, dizziness, or leg swelling  GASTROINTESTINAL: No abdominal or epigastric pain. No nausea, vomiting, or hematemesis; No diarrhea or constipation. No melena or hematochezia.  GENITOURINARY: No dysuria, frequency, hematuria, or incontinence  NEUROLOGICAL: No headaches, memory loss, loss of strength, numbness, or tremors  SKIN: No itching, burning, rashes, or lesions   MUSCULOSKELETAL: No joint pain or swelling; No muscle, back, or extremity pain  PSYCHIATRIC: No depression, anxiety, mood swings, or difficulty sleeping        Medications:      albuterol/ipratropium for Nebulization 3 milliLiter(s) Nebulizer every 20 minutes                              ICU Vital Signs Last 24 Hrs  T(C): 37.2 (03 Apr 2023 13:36), Max: 37.2 (03 Apr 2023 13:36)  T(F): 98.9 (03 Apr 2023 13:36), Max: 98.9 (03 Apr 2023 13:36)  HR: 98 (03 Apr 2023 15:10) (92 - 121)  BP: 118/78 (03 Apr 2023 15:10) (91/51 - 122/72)  BP(mean): --  ABP: --  ABP(mean): --  RR: 24 (03 Apr 2023 15:10) (24 - 26)  SpO2: 98% (03 Apr 2023 15:10) (97% - 100%)    O2 Parameters below as of 03 Apr 2023 15:10  Patient On (Oxygen Delivery Method): room air          Vital Signs Last 24 Hrs  T(C): 37.2 (03 Apr 2023 13:36), Max: 37.2 (03 Apr 2023 13:36)  T(F): 98.9 (03 Apr 2023 13:36), Max: 98.9 (03 Apr 2023 13:36)  HR: 98 (03 Apr 2023 15:10) (92 - 121)  BP: 118/78 (03 Apr 2023 15:10) (91/51 - 122/72)  BP(mean): --  RR: 24 (03 Apr 2023 15:10) (24 - 26)  SpO2: 98% (03 Apr 2023 15:10) (97% - 100%)    Parameters below as of 03 Apr 2023 15:10  Patient On (Oxygen Delivery Method): room air        ABG - ( 03 Apr 2023 14:32 )  pH, Arterial: 7.420 pH, Blood: x     /  pCO2: 37    /  pO2: 95    / HCO3: 24    / Base Excess: -0.5  /  SaO2: 99.3                I&O's Detail        LABS:                        12.9   10.49 )-----------( 141      ( 03 Apr 2023 13:50 )             43.6     04-03    133<L>  |  97  |  34.4<H>  ----------------------------<  207<H>  4.7   |  25.0  |  1.45<H>    Ca    8.5      03 Apr 2023 13:50    TPro  7.2  /  Alb  4.0  /  TBili  2.0  /  DBili  x   /  AST  20  /  ALT  11  /  AlkPhos  129<H>  04-03      CARDIAC MARKERS ( 03 Apr 2023 13:50 )  x     / 0.02 ng/mL / x     / x     / x          CAPILLARY BLOOD GLUCOSE        PT/INR - ( 03 Apr 2023 13:50 )   PT: 25.0 sec;   INR: 2.14 ratio         PTT - ( 03 Apr 2023 13:50 )  PTT:32.8 sec    CULTURES:        Physical Examination:    General: Morbidly obese. Bipap in place. No acute distress.  Alert, oriented, interactive, nonfocal    HEENT: Pupils equal, reactive to light.  Symmetric.    PULM: Rhonchi b/l bases, no wheezes, no significant sputum production    CVS: Regular rate and rhythm, no murmurs, rubs, or gallops    ABD: Obese, soft, nondistended, nontender, no masses    EXT: No edema, nontender    SKIN/MSK: R BKA. LLE purple discoloration of lower leg and foot consistent with chronic venous stasis, 2+ pitting edema, 2+ peripheral pulses. Small skin tears of first toe and on shin.     NEURO: A&Ox3, strength 5/5 all extremities, cranial nerves grossly intact, no focal deficits      RADIOLOGY:   4/3 CXR:   IMPRESSION: Mild CHF and small bilateral pleural effusions. Cardiomegaly.        CRITICAL CARE TIME SPENT: ***  Time spent evaluating/treating patient with medical issues that pose a high risk for life threatening deterioration and/or end-organ damage, reviewing data/labs/imaging, discussing case with multidisciplinary team, discussing plan/goals of care with patient/family. Non-inclusive of procedure time.  
[facility]  INFECTIOUS DISEASES at [place] and [place]  =====================================================                               Maximiliano Nagel MD*    Twyla Seo MD*                             Sharon Jules MD*     Sandra Correa MD*            Diplomates American Board of Internal Medicine & Infectious Diseases                * [place] Office - Appt - Tel  [phone] Fax [phone]                * [place] Office - Appt - Tel [phone] Fax [phone]                                  Hospital Consult line:  [phone]  =====================================================      N-[id number]  MARY ANN CORNELL    CC: Patient is a 72y old  Male who presents with a chief complaint of Acute on chronic respiratory failure 2/2 CHF exacerbation (2023 13:10)      HPI:  73 y/o M w/ history of morbid obesity, HFrEF Last EF 30% s/p AICD, DM2 on Insulin, AFib on Eliquis, HTN, HLD, RLE BKA, Chronic wounds on LLE presenting for SOB. Patient states that the shortness of breath started 2 weeks ago, drove up from [place] for wife's , had increase in cough, mildly productive, tried albuterol w/ little improvement, started on high dose lasix and continuous bipap in ED. Patient seen at bedside, tolerating BiPAP, endorsing SOB, cough, mild dizziness, denies CP, Abd pain, HA, blurry vision, lethargy, rashes, dysuria.      ID input requested for +BCX for GNR and GPC.     On my assessment - patient feeling much better; dyspnea has improved. Denies abdominal pain, nausea, vomiting, diarrhea, fever, chills. Denied dysuria, urinary frequency or hesitancy prior to admission. However Red placed in the ED by urology for urinary retention.   ______________________________________________________  PAST MEDICAL & SURGICAL HISTORY:  DM (diabetes mellitus)    HTN (hypertension)    High cholesterol    Renal insufficiency    Sleep apnea    Prostate CA    Pulmonary hypertension    CHF (congestive heart failure)  EF 30%    Atrial fibrillation    Chronic back pain    Lung nodules    S/P ankle ligament repair    AICD (automatic cardioverter/defibrillator) present    History of brachytherapy    Morbid obesity         Social History:  No alcohol or drug abuse  Smokes cigars and marijuana     FAMILY HISTORY:  Family history of cancer (Sibling)    Family history of diabetes mellitus (Sibling)        ______________________________________________________  Allergies    clindamycin (Unknown)    Intolerances    allow double protein at meals (Unknown)      ______________________________________________________  REVIEW OF SYSTEMS:  CONSTITUTIONAL: as per HPI   HEENT:  No diplopia or blurred vision.  No earache, sore throat or runny nose.  CARDIOVASCULAR:  No chest pain  RESPIRATORY:  as per HPI   GASTROINTESTINAL:  No nausea, vomiting, abdominal pain or diarrhea.  GENITOURINARY:  No dysuria, frequency or urgency. No blood in urine  MUSCULOSKELETAL:  no joint aches, no muscle pain  SKIN:  as per HPI   NEUROLOGIC:  No headaches, seizures  PSYCHIATRIC:  No disorder of thought or mood.  ENDOCRINE:  No heat or cold intolerance  HEMATOLOGICAL:  No easy bruising or bleeding.     _____________________________________________________  PHYSICAL EXAM:  GEN: AAOx4, morbidly obese, in NAD   HEENT: normocephalic and atraumatic.   Anicteric sclerae. Moist mucous membranes. No mucosal lesions. No nasal discharge.   NECK: Supple.   LUNGS: eupneic, CTA B/L anteriorly, on NC   HEART: RRR, no m/r/g  ABDOMEN: Soft, NT, ND, limited by obesity   : + Red catheter  EXTREMITIES: L extrem in clean bandages (wrapped by podiatry - per exam 2 wounds but no clinical signs of infection)  MSK: R BKA   NEUROLOGIC: Grossly no focal deficits   PSYCHIATRIC: Appropriate affect and mood.  SKIN: as above  LINES: PIV     ______________________________________________________  Height (cm): 177.8 ( @ 21:00)  Weight (kg): 159.7 ( @ 21:00)  BMI (kg/m2): 50.5 ( @ 21:00)  BSA (m2): 2.65 ( @ 21:00)    Vitals:  T(F): 97 (2023 07:33), Max: 98.3 (2023 02:15)  HR: 84 (2023 12:00)  BP: 106/72 (2023 12:00)  RR: 18 (2023 12:00)  SpO2: 98% (2023 12:00) (95% - 100%)  temp max in last 48H T(F): , Max: 98.9 (23 @ 13:36)    Current Antibiotics:  piperacillin/tazobactam IVPB.- 3.375 Gram(s) IV Intermittent once  piperacillin/tazobactam IVPB.. 3.375 Gram(s) IV Intermittent every 8 hours  vancomycin  IVPB 750 milliGRAM(s) IV Intermittent once    Other medications:  acetaZOLAMIDE  IVPB 500 milliGRAM(s) IV Intermittent daily  albuterol/ipratropium for Nebulization 3 milliLiter(s) Nebulizer every 20 minutes  apixaban 5 milliGRAM(s) Oral every 12 hours  atorvastatin 40 milliGRAM(s) Oral at bedtime  carvedilol 25 milliGRAM(s) Oral every 12 hours  dextrose 5%. 1000 milliLiter(s) IV Continuous <Continuous>  dextrose 5%. 1000 milliLiter(s) IV Continuous <Continuous>  dextrose 50% Injectable 25 Gram(s) IV Push once  dextrose 50% Injectable 12.5 Gram(s) IV Push once  dextrose 50% Injectable 25 Gram(s) IV Push once  DULoxetine 60 milliGRAM(s) Oral daily  folic acid 1 milliGRAM(s) Oral daily  furosemide   Injectable 40 milliGRAM(s) IV Push every 12 hours  glucagon  Injectable 1 milliGRAM(s) IntraMuscular once  insulin glargine Injectable (LANTUS) 40 Unit(s) SubCutaneous every morning  insulin lispro (ADMELOG) corrective regimen sliding scale   SubCutaneous three times a day before meals  insulin lispro (ADMELOG) corrective regimen sliding scale   SubCutaneous at bedtime  insulin lispro Injectable (ADMELOG) 20 Unit(s) SubCutaneous three times a day before meals  levothyroxine 50 MICROGram(s) Oral daily  pregabalin 150 milliGRAM(s) Oral two times a day  tamsulosin 0.4 milliGRAM(s) Oral at bedtime                            12.3   8.71  )-----------( 119      ( 2023 03:10 )             41.5     -    133<L>  |  96  |  37.0<H>  ----------------------------<  256<H>  4.8   |  21.0<L>  |  1.49<H>    Ca    8.5      2023 03:10  Mg     2.2     -    TPro  6.5<L>  /  Alb  3.4  /  TBili  1.5  /  DBili  x   /  AST  17  /  ALT  9   /  AlkPhos  110  -    RECENT CULTURES:   @ 13:45   RVP with SARS-CoV-2   [facility]     @ 13:30 .Blood Blood-Peripheral Blood Culture PCR    Growth in anaerobic bottle: Gram Negative Rods  Growth in aerobic bottle: Gram Negative Rods and Gram Positive Cocci in  Clusters    E. coli and CONS by PCR     Growth in anaerobic bottle: Gram Negative Rods  Growth in aerobic bottle: Gram Negative Rods and Gram Positive Cocci in  Clusters      WBC Count: 8.71 K/uL (23 @ 03:10)  WBC Count: 10.49 K/uL (23 @ 13:50)    Creatinine, Serum: 1.49 mg/dL (23 @ 03:10)  Creatinine, Serum: 1.45 mg/dL (23 @ 13:50)       SARS-CoV-2: NotDetec (23 @ 13:45)    ______________________________________________________  RADIOLOGY  < from: Xray Chest 1 View- PORTABLE-Urgent (23 @ 14:27) >  INTERPRETATION:  TECHNIQUE: Single portable view of the chest.    COMPARISON:  2022    CLINICAL HISTORY: Shortness of Breath    FINDINGS:    Single frontal view of the chest demonstrates mild CHF and small   bilateral pleural effusions. The cardiomediastinal silhouette is   enlarged. No acute osseous abnormalities. Overlying EKG leads and wires   are noted. Left-sided AICD.    IMPRESSION: Mild CHF and small bilateral pleural effusions. Cardiomegaly.    < end of copied text >  
Patient is a 72y old  Male who presents with a chief complaint of Acute on chronic respiratory failure 2/2 CHF exacerbation (2023 13:04)   Acute  renal failure.    HPI:  71 y/o M w/ a hx of HFrEF Last EF 30% s/p AICD, DM2 on Insulin, Persistent Atrial Fibrillation on Eliquis, HTN, HLD, RLE BKA, Chronic wounds on LLE presenting for SOB. Patient states that the shortness of breath started 2 weeks ago, drove up from [place] for wife's , had increase in cough, mildly productive, tried albuterol w/ little improvement, started on high dose lasix and continuous bipap in ED. Patient seen at bedside, tolerating BiPAP, endorsing SOB, cough, mild dizziness, denies CP, Abd pain, HA, blurry vision, lethargy, rashes, dysuria.  (2023 17:39)   Hx renal stones x1 not aware of type, no other renal  problems; elevated creatinine on admission.    PAST MEDICAL & SURGICAL HISTORY:  DM (diabetes mellitus)      HTN (hypertension)      High cholesterol      Renal insufficiency      Sleep apnea      Prostate CA      Pulmonary hypertension      CHF (congestive heart failure)  EF 30%      Atrial fibrillation      Chronic back pain      Lung nodules      S/P ankle ligament repair      AICD (automatic cardioverter/defibrillator) present      History of brachytherapy           FAMILY HISTORY:  Family history of cancer (Sibling)    Family history of diabetes mellitus (Sibling)    NC    Social History: Prior  smoker    MEDICATIONS  (STANDING):  acetaZOLAMIDE  IVPB 500 milliGRAM(s) IV Intermittent daily  albuterol/ipratropium for Nebulization 3 milliLiter(s) Nebulizer every 20 minutes  apixaban 5 milliGRAM(s) Oral every 12 hours  atorvastatin 40 milliGRAM(s) Oral at bedtime  carvedilol 25 milliGRAM(s) Oral every 12 hours  dextrose 5%. 1000 milliLiter(s) (50 mL/Hr) IV Continuous <Continuous>  dextrose 5%. 1000 milliLiter(s) (100 mL/Hr) IV Continuous <Continuous>  dextrose 50% Injectable 25 Gram(s) IV Push once  dextrose 50% Injectable 12.5 Gram(s) IV Push once  dextrose 50% Injectable 25 Gram(s) IV Push once  DULoxetine 60 milliGRAM(s) Oral daily  folic acid 1 milliGRAM(s) Oral daily  furosemide   Injectable 60 milliGRAM(s) IV Push two times a day  glucagon  Injectable 1 milliGRAM(s) IntraMuscular once  insulin glargine Injectable (LANTUS) 40 Unit(s) SubCutaneous every morning  insulin lispro (ADMELOG) corrective regimen sliding scale   SubCutaneous three times a day before meals  insulin lispro (ADMELOG) corrective regimen sliding scale   SubCutaneous at bedtime  insulin lispro Injectable (ADMELOG) 20 Unit(s) SubCutaneous three times a day before meals  levothyroxine 50 MICROGram(s) Oral daily  piperacillin/tazobactam IVPB.. 3.375 Gram(s) IV Intermittent every 8 hours  pregabalin 150 milliGRAM(s) Oral two times a day  tamsulosin 0.4 milliGRAM(s) Oral at bedtime    MEDICATIONS  (PRN):  acetaminophen     Tablet .. 650 milliGRAM(s) Oral every 6 hours PRN Temp greater or equal to 38C (100.4F), Mild Pain (1 - 3)  aluminum hydroxide/magnesium hydroxide/simethicone Suspension 30 milliLiter(s) Oral every 4 hours PRN Dyspepsia  dextrose Oral Gel 15 Gram(s) Oral once PRN Blood Glucose LESS THAN 70 milliGRAM(s)/deciliter  melatonin 3 milliGRAM(s) Oral at bedtime PRN Insomnia  methocarbamol 500 milliGRAM(s) Oral two times a day PRN for severe pain  ondansetron Injectable 4 milliGRAM(s) IV Push every 8 hours PRN Nausea and/or Vomiting  traMADol 50 milliGRAM(s) Oral every 12 hours PRN Severe Pain (7 - 10)   Meds reviewed    Allergies    clindamycin (Unknown)    Intolerances    allow double protein at meals (Unknown)     REVIEW OF SYSTEMS:    CONSTITUTIONAL:  sob, weight gain, feels weak  EYES: No eye pain, visual disturbances, or discharge  ENMT:  No difficulty hearing, tinnitus, vertigo; No sinus or throat pain  NECK: No pain or stiffness  BREASTS: No pain, masses, or nipple discharge  RESPIRATORY: sob  CARDIOVASCULAR: No chest pain, palpitations, dizziness,   GASTROINTESTINAL: No abdominal or epigastric pain. No nausea, vomiting, or hematemesis  GENITOURINARY: Urine retention  NEUROLOGICAL: Decreased  hearing  SKIN: Rashes  LYMPH NODES: No enlarged glands  ENDOCRINE: No heat or cold intolerance; No hair loss  MUSCULOSKELETAL: Chronic lymphedema legs with scars  PSYCHIATRIC: No depression, anxiety, mood swings, or difficulty sleeping  HEME/LYMPH: No easy bruising, or bleeding gums  ALLERY AND IMMUNOLOGIC: No hives or eczema      Vital Signs Last 24 Hrs  T(C): 37.1 (2023 12:02), Max: 37.1 (2023 12:02)  T(F): 98.8 (2023 12:02), Max: 98.8 (2023 12:02)  HR: 74 (2023 12:02) (73 - 82)  BP: 127/77 (2023 12:02) (96/62 - 127/77)  BP(mean): --  RR: 18 (2023 12:02) (18 - 18)  SpO2: 98% (2023 12:02) (95% - 100%)    Parameters below as of 2023 12:02  Patient On (Oxygen Delivery Method): nasal cannula  O2 Flow (L/min): 2        PHYSICAL EXAM:    GENERAL: appears acutely  and  chronically ill, oriented. MO in bed in ER  HEAD:  Atraumatic, Normocephalic  EYES: EOMI, PERRLA, conjunctiva and sclera clear  ENMT: No tonsillar erythema, exudates, or enlargement; Moist mucous membranes,   NECK: Supple, neck  veins full  NERVOUS SYSTEM:  Alert & Oriented , Good concentration; Moves  all extremeties  CHEST/LUNG: Nasal 02, attempt eating, mask at  bed side, decreased bs bases  HEART: AICD, IRR, No  rub noted  ABDOMEN: Obese, distended,  Bowel sounds present, body wall and flank edema  EXTREMITIES:  RBKA, Left leg  edema  with wrap lower leg  foot  LYMPH: No lymphadenopathy noted  SKIN: No rashes or lesions, pale  : Red  with large volume    LABS:                        12.8   11.83 )-----------( 147      ( 2023 02:48 )             42.8     04-05    131<L>  |  95<L>  |  60.2<H>  ----------------------------<  172<H>  4.8   |  23.0  |  2.15<H>    Ca    8.3<L>      2023 02:48  Mg     2.4     04-05    TPro  6.5<L>  /  Alb  3.4  /  TBili  1.5  /  DBili  x   /  AST  17  /  ALT  9   /  AlkPhos  110  04-04    PT/INR - ( 2023 03:10 )   PT: 25.9 sec;   INR: 2.21 ratio         PTT - ( 2023 03:10 )  PTT:30.5 sec  Urinalysis Basic - ( 2023 22:00 )    Color: Yellow / Appearance: Clear / S.015 / pH: x  Gluc: x / Ketone: Negative  / Bili: Negative / Urobili: Negative mg/dL   Blood: x / Protein: 15 / Nitrite: Negative   Leuk Esterase: Trace / RBC: 0-2 /HPF / WBC 0-2 /HPF   Sq Epi: x / Non Sq Epi: Occasional / Bacteria: Occasional      Magnesium, Serum: 2.4 mg/dL ( @ 02:48)    ABG - ( 2023 09:42 )  pH, Arterial: 7.440 pH, Blood: x     /  pCO2: 37    /  pO2: 109   / HCO3: 25    / Base Excess: 0.9   /  SaO2: 99.3                  RADIOLOGY & ADDITIONAL TESTS:

## 2023-04-05 NOTE — PROGRESS NOTE ADULT - ASSESSMENT
71 y/o M w/ history of morbid obesity, HFrEF Last EF 30% s/p AICD, DM2 on Insulin, AFib on Eliquis, HTN, HLD, RLE BKA, Chronic wounds on LLE presenting for worsening dyspnea since 2 weeks likely due to CHF.     ID input requested for +BCX for GNR and GPC. Started empirically on piperacillin-tazobactam and vancomycin While patient denied any urinary or GI symptoms prior to admission, Red catheter placed for urinary retention with 800cc urine output.     - Worsening renal failure   - BCx positive for E. coli and CONS  - Sensitivities pending  - CONS likely contaminant; only detected on aerobic bottle.   - Continue piperacillin-tazobactam adjusted to GFR - dose may need adjustment if renal function worsens even further   - Follow repeat BCX   - Source may be urinary tract given retention   - Recommend imaging to evaluate urinary tract  - Minimal leukocytosis      Will follow

## 2023-04-05 NOTE — DIETITIAN INITIAL EVALUATION ADULT - OTHER INFO
73 y/o M w/ a hx of HFrEF Last EF 30% s/p AICD, DM2 on Insulin, Persistent Atrial Fibrillation on Eliquis, HTN, HLD, RLE BKA, Chronic wounds on LLE here for mgmt of Acute Hypoxic Respiratory Failure 2/2 Acute on Chronic HFrEF Exacerbation, left foot ulcer, bacteremia.

## 2023-04-05 NOTE — DIETITIAN INITIAL EVALUATION ADULT - ORAL INTAKE PTA/DIET HISTORY
Pt sleeping at time of consult, provided simple responses, A&Ox3. No complaints of N/V, difficulty chewing or swallowing, ate breakfast this morning with no issues. Pt states his weight prior to admission was ~410lbs, current weight 352lbs. Pt has been trying to lose weight by monitoring portion sizes and physical activity. Written education not appropriate at this time as pt has blurred vision. RD to remain available.

## 2023-04-05 NOTE — DIETITIAN INITIAL EVALUATION ADULT - NS FNS DIET ORDER
Diet, DASH/TLC:   Sodium & Cholesterol Restricted  Consistent Carbohydrate {Evening Snack} (CSTCHOSN) (04-03-23 @ 17:37)

## 2023-04-05 NOTE — PROGRESS NOTE ADULT - ASSESSMENT
73 y/o M w/ a hx of HFrEF Last EF 30% s/p AICD, DM2 on Insulin, Persistent Atrial Fibrillation on Eliquis, HTN, HLD, RLE BKA, Chronic wounds on LLE here for mgmt of Acute Hypoxic Respiratory Failure 2/2 Acute on Chronic HFrEF Exacerbation, left foot ulcer, bacteremia.     #Acute hypoxic respiratory failure likely  #Acute on Chronic HFrEF Exacerbation  AICD in place  BIPAP prn   IV lasix per cardiology  echo with ef 20%  - insert wagner w/ Urology assistance due to difficult anatomy, strict I/Os, ?Hx of Urinary retention  - NC @4LPM while eating if tolerating off bipap  cardiology following    #bacteremia:    ID following    repeat cultures in progress   CT a/p    #Persistent Afib on AC  - c/w eliquis 5mg Q12  - c/w Coreg 25mg Q12  - Tele monitoring    #DM2 w/ LE ulcers s/p RLE BKA  On insulin at home  - increase lantus to 50 units QAM  - Increase admelog to 25 units before meals  - unable to obtain MRI due to AICD  - Podiatry recs apprecated - ZOYA/PVR reviewed, daily dressing/wound care, surgical shoe    #JAYLENE on CKD3 w/obstructive uropathy component  #BPH  patient endorses CKD3  unable to urinate, previously treated for BPH  Cr baseline 1.03  urology placed wagner, maintain  c/w flomax  nephrology consulted     #Hyponatremia likely hypervolemic      #HLD  - c/w Atorvastatin 40mg QHS    #CHRISS  - Nocturnal CPAP

## 2023-04-05 NOTE — CONSULT NOTE ADULT - REASON FOR ADMISSION
Acute on chronic respiratory failure 2/2 CHF exacerbation

## 2023-04-05 NOTE — PROGRESS NOTE ADULT - SUBJECTIVE AND OBJECTIVE BOX
[facility]  INFECTIOUS DISEASES at [place] [facility] [place]  ===============================================================                               Maximiliano Nagel MD*     Twyla Seo MD*                         Sharon Jules MD*       Sandra Correa MD*            Diplomates American Board of Internal Medicine & Infectious Diseases                * [place] Office - Appt - Tel  [phone] Fax [phone]                * [place] Office - Appt - Tel [phone] Fax [phone]                                  Hospital Consult line:  [phone]  ==============================================================    MARY ANN CORNELL [id number]    Follow up: bacteremia    Remains in the CDU   Afebrile  hemodynamically stable     I have personally reviewed the labs and data; pertinent labs and data are listed in this note; please see below.     _______________________________________________________________  REVIEW OF SYSTEMS  Limited - somnolent, difficult to wake up.   ________________________________________________________________  Allergies:  allow double protein at meals (Unknown)  clindamycin (Unknown)    ________________________________________________________________  PHYSICAL EXAM  GEN: morbidly obese, somnolent.   HEENT: Anicteric sclerae.   LUNGS: eupneic. CTA B/L.  HEART: RRR, no m/r/g  ABDOMEN: Soft, NT, ND but limited by obesity    : Red catheter  EXTREMITIES: left leg wrapped in clean dressings  MSK: R BKA   NEUROLOGIC: somnolent, moving all extrem   SKIN: No jaundice  LINES: PIV  ________________________________________________________________  Vitals:  T(F): 98.8 (05 Apr 2023 12:02), Max: 98.8 (05 Apr 2023 12:02)  HR: 74 (05 Apr 2023 12:02)  BP: 127/77 (05 Apr 2023 12:02)  RR: 18 (05 Apr 2023 12:02)  SpO2: 98% (05 Apr 2023 12:02) (95% - 100%)  temp max in last 48H T(F): , Max: 98.9 (04-03-23 @ 13:36)    Current Antibiotics:  piperacillin/tazobactam IVPB.. 3.375 Gram(s) IV Intermittent every 8 hours    Other medications:  acetaZOLAMIDE  IVPB 500 milliGRAM(s) IV Intermittent daily  albuterol/ipratropium for Nebulization 3 milliLiter(s) Nebulizer every 20 minutes  apixaban 5 milliGRAM(s) Oral every 12 hours  atorvastatin 40 milliGRAM(s) Oral at bedtime  carvedilol 25 milliGRAM(s) Oral every 12 hours  dextrose 5%. 1000 milliLiter(s) IV Continuous <Continuous>  dextrose 5%. 1000 milliLiter(s) IV Continuous <Continuous>  dextrose 50% Injectable 25 Gram(s) IV Push once  dextrose 50% Injectable 12.5 Gram(s) IV Push once  dextrose 50% Injectable 25 Gram(s) IV Push once  DULoxetine 60 milliGRAM(s) Oral daily  folic acid 1 milliGRAM(s) Oral daily  furosemide   Injectable 60 milliGRAM(s) IV Push two times a day  glucagon  Injectable 1 milliGRAM(s) IntraMuscular once  insulin glargine Injectable (LANTUS) 40 Unit(s) SubCutaneous every morning  insulin lispro (ADMELOG) corrective regimen sliding scale   SubCutaneous three times a day before meals  insulin lispro (ADMELOG) corrective regimen sliding scale   SubCutaneous at bedtime  insulin lispro Injectable (ADMELOG) 20 Unit(s) SubCutaneous three times a day before meals  levothyroxine 50 MICROGram(s) Oral daily  pregabalin 150 milliGRAM(s) Oral two times a day  tamsulosin 0.4 milliGRAM(s) Oral at bedtime                            12.8   11.83 )-----------( 147      ( 05 Apr 2023 02:48 )             42.8     04-05    131<L>  |  95<L>  |  60.2<H>  ----------------------------<  172<H>  4.8   |  23.0  |  2.15<H>    Ca    8.3<L>      05 Apr 2023 02:48  Mg     2.4     04-05    TPro  6.5<L>  /  Alb  3.4  /  TBili  1.5  /  DBili  x   /  AST  17  /  ALT  9   /  AlkPhos  110  04-04    RECENT CULTURES:  04-03 @ 13:50 .Blood Blood-Peripheral     No growth to date.      04-03 @ 13:45    RVP with SARS-CoV-2   NotDetec      04-03 @ 13:30 .Blood Blood-Peripheral Blood Culture PCR    Growth in aerobic and anaerobic bottles: Escherichia coli  Growth in aerobic bottle: Gram Positive Cocci in Clusters      Growth in anaerobic bottle: Gram Negative Rods  Growth in aerobic bottle: Gram Negative Rods and Gram Positive Cocci in  Clusters      WBC Count: 11.83 K/uL (04-05-23 @ 02:48)  WBC Count: 8.71 K/uL (04-04-23 @ 03:10)  WBC Count: 10.49 K/uL (04-03-23 @ 13:50)    Creatinine, Serum: 2.15 mg/dL (04-05-23 @ 02:48)  Creatinine, Serum: 1.49 mg/dL (04-04-23 @ 03:10)  Creatinine, Serum: 1.45 mg/dL (04-03-23 @ 13:50)     SARS-CoV-2: NotDetec (04-03-23 @ 13:45)    ________________________________________________________________  RADIOLOGY  < from: Xray Foot AP + Lateral + Oblique, Left (04.04.23 @ 12:41) >  INTERPRETATION:  3 portable views of the left foot were obtained for   history of wounds.    There are no prior studies for comparison.    There is no acute fracture, dislocation or evidence of osteomyelitis.   There are mild degenerative changes including small spurs along the   dorsum of the midfoot and an enthesophyte at the distal Achilles   insertion. No destructive lesions are seen. The soft tissues appear   edematous in the region of the mid and forefoot, however there is no sign   of soft tissue gas or radiopaque foreign body.    IMPRESSION:  1. Edematous soft tissues in the mid and forefoot with no evidence of   soft tissue gas, osteomyelitis or opaque foreign body.  2. Degenerative changes, mild, as outlined above.    < end of copied text >

## 2023-04-05 NOTE — CONSULT NOTE ADULT - ASSESSMENT
71 y/o M w/ history of morbid obesity, HFrEF Last EF 30% s/p AICD, DM2 on Insulin, AFib on Eliquis, HTN, HLD, RLE BKA, Chronic wounds on LLE presenting for worsening dyspnea since 2 weeks likely due to CHF.     ID input requested for +BCX for GNR and GPC. Started empirically on piperacillin-tazobactam and vancomycin While patient denied any urinary or GI symptoms prior to admission, Red catheter placed for urinary retention with 800cc urine output.     - BCx positive for E. coli and CONS  - CONS likely contaminant; only detected on aerobic bottle.   - Continue piperacillin-tazobactam adjusted to GFR  - Would hold on further vancomycin doses for now.   - Repeat BCX   - Source may be urinary tract given retention   - Recommend imaging to evaluate urinary tract  - No leukocytosis, albeit neutrophilia.     Will follow   
71yo M w/pmh CHF with EF 30% s/p AICD, DM on insulin, a fib on eliquis, HTN, HLD, RLE BKA presented to the ED for worsening SOB x3 weeks. Assoc/w chronic LLE edema. Denies f/ch, congestion, sick contacts. Reports he tried his inhaler at home without relief. Uses CPAP at home every night and when he takes naps during the day. Has been hospitalized on bipap before. Former smoker. Placed on bipap in ER with significant improvement in his symptoms.     #Hypoxic respiratory failure  - normal WOB, clinically well-appearing on bipap  - treat underlying causes as below  - TV very large on existing settings of 16/8/16/30%. Reduced pressures to 12/6/16/30%    #CHF exacerbation  - Recommend aggressive diuresis with lasix 40mg TID  - Continue bipap for this afternoon and tonight    #COPD exacerbation  - received duonebs, steroids, magnesium, and bipap in the ER  - continue duonebs PRN    #JAYLENE  - Cre 1.45, increased from baseline of 1.0  - UA/Urine culture  - Trend BUN/Cre    Not a candidate for MICU at this time. Recommend admission to Stepdown.    Plan discussed with attending Dr. Plasencia.  
A) DM 2, MO, CHRISS, Dilated  Cardiomyopathy; Bacteremia on iv antibiotic, ARF  with outlet issue on  wagner possible underlying CRF in face of MO, , has low EF with high venous  pressure all affecting EGFR. Metabolic  acidosis and alkalosis - pt on diamox at present. Hyponatremia on  diuretic.    P) Renal  sono, ? decrease lasix, add low dose entresto and if CrCl improves or remains > 30 add  SGLT 2 inhibitor ?

## 2023-04-05 NOTE — DIETITIAN INITIAL EVALUATION ADULT - PERTINENT LABORATORY DATA
04-05    131<L>  |  95<L>  |  60.2<H>  ----------------------------<  172<H>  4.8   |  23.0  |  2.15<H>    Ca    8.3<L>      05 Apr 2023 02:48  Mg     2.4     04-05    TPro  6.5<L>  /  Alb  3.4  /  TBili  1.5  /  DBili  x   /  AST  17  /  ALT  9   /  AlkPhos  110  04-04  POCT Blood Glucose.: 204 mg/dL (04-05-23 @ 13:30)  A1C with Estimated Average Glucose Result: 9.0 % (04-04-23 @ 03:10)  A1C with Estimated Average Glucose Result: 10.8 % (04-22-22 @ 07:37)  04-05 Na131 mmol/L<L> Glu 172 mg/dL<H> K+ 4.8 mmol/L Cr  2.15 mg/dL<H> BUN 60.2 mg/dL<H> Phos n/a   Alb n/a   PAB n/a

## 2023-04-05 NOTE — DIETITIAN INITIAL EVALUATION ADULT - PERTINENT MEDS FT
MEDICATIONS  (STANDING):  acetaZOLAMIDE  IVPB 500 milliGRAM(s) IV Intermittent daily  albuterol/ipratropium for Nebulization 3 milliLiter(s) Nebulizer every 20 minutes  apixaban 5 milliGRAM(s) Oral every 12 hours  atorvastatin 40 milliGRAM(s) Oral at bedtime  carvedilol 25 milliGRAM(s) Oral every 12 hours  dextrose 5%. 1000 milliLiter(s) (50 mL/Hr) IV Continuous <Continuous>  dextrose 5%. 1000 milliLiter(s) (100 mL/Hr) IV Continuous <Continuous>  dextrose 50% Injectable 25 Gram(s) IV Push once  dextrose 50% Injectable 12.5 Gram(s) IV Push once  dextrose 50% Injectable 25 Gram(s) IV Push once  DULoxetine 60 milliGRAM(s) Oral daily  folic acid 1 milliGRAM(s) Oral daily  furosemide   Injectable 60 milliGRAM(s) IV Push two times a day  glucagon  Injectable 1 milliGRAM(s) IntraMuscular once  insulin glargine Injectable (LANTUS) 40 Unit(s) SubCutaneous every morning  insulin lispro (ADMELOG) corrective regimen sliding scale   SubCutaneous three times a day before meals  insulin lispro (ADMELOG) corrective regimen sliding scale   SubCutaneous at bedtime  insulin lispro Injectable (ADMELOG) 20 Unit(s) SubCutaneous three times a day before meals  levothyroxine 50 MICROGram(s) Oral daily  piperacillin/tazobactam IVPB.. 3.375 Gram(s) IV Intermittent every 8 hours  pregabalin 150 milliGRAM(s) Oral two times a day  tamsulosin 0.4 milliGRAM(s) Oral at bedtime    MEDICATIONS  (PRN):  acetaminophen     Tablet .. 650 milliGRAM(s) Oral every 6 hours PRN Temp greater or equal to 38C (100.4F), Mild Pain (1 - 3)  aluminum hydroxide/magnesium hydroxide/simethicone Suspension 30 milliLiter(s) Oral every 4 hours PRN Dyspepsia  dextrose Oral Gel 15 Gram(s) Oral once PRN Blood Glucose LESS THAN 70 milliGRAM(s)/deciliter  melatonin 3 milliGRAM(s) Oral at bedtime PRN Insomnia  methocarbamol 500 milliGRAM(s) Oral two times a day PRN for severe pain  ondansetron Injectable 4 milliGRAM(s) IV Push every 8 hours PRN Nausea and/or Vomiting  traMADol 50 milliGRAM(s) Oral every 12 hours PRN Severe Pain (7 - 10)

## 2023-04-05 NOTE — PROGRESS NOTE ADULT - SUBJECTIVE AND OBJECTIVE BOX
seen for CHF, bacteremia, urinary retention    no acute complaints  feels better, improved sob  ros negative     MEDICATIONS  (STANDING):  acetaZOLAMIDE  IVPB 500 milliGRAM(s) IV Intermittent daily  albuterol/ipratropium for Nebulization 3 milliLiter(s) Nebulizer every 20 minutes  apixaban 5 milliGRAM(s) Oral every 12 hours  atorvastatin 40 milliGRAM(s) Oral at bedtime  carvedilol 25 milliGRAM(s) Oral every 12 hours  dextrose 5%. 1000 milliLiter(s) (50 mL/Hr) IV Continuous <Continuous>  dextrose 5%. 1000 milliLiter(s) (100 mL/Hr) IV Continuous <Continuous>  dextrose 50% Injectable 25 Gram(s) IV Push once  dextrose 50% Injectable 12.5 Gram(s) IV Push once  dextrose 50% Injectable 25 Gram(s) IV Push once  DULoxetine 60 milliGRAM(s) Oral daily  folic acid 1 milliGRAM(s) Oral daily  furosemide   Injectable 60 milliGRAM(s) IV Push two times a day  glucagon  Injectable 1 milliGRAM(s) IntraMuscular once  insulin glargine Injectable (LANTUS) 40 Unit(s) SubCutaneous every morning  insulin lispro (ADMELOG) corrective regimen sliding scale   SubCutaneous three times a day before meals  insulin lispro (ADMELOG) corrective regimen sliding scale   SubCutaneous at bedtime  insulin lispro Injectable (ADMELOG) 20 Unit(s) SubCutaneous three times a day before meals  levothyroxine 50 MICROGram(s) Oral daily  piperacillin/tazobactam IVPB.. 3.375 Gram(s) IV Intermittent every 8 hours  pregabalin 150 milliGRAM(s) Oral two times a day  tamsulosin 0.4 milliGRAM(s) Oral at bedtime    MEDICATIONS  (PRN):  acetaminophen     Tablet .. 650 milliGRAM(s) Oral every 6 hours PRN Temp greater or equal to 38C (100.4F), Mild Pain (1 - 3)  aluminum hydroxide/magnesium hydroxide/simethicone Suspension 30 milliLiter(s) Oral every 4 hours PRN Dyspepsia  dextrose Oral Gel 15 Gram(s) Oral once PRN Blood Glucose LESS THAN 70 milliGRAM(s)/deciliter  melatonin 3 milliGRAM(s) Oral at bedtime PRN Insomnia  methocarbamol 500 milliGRAM(s) Oral two times a day PRN for severe pain  ondansetron Injectable 4 milliGRAM(s) IV Push every 8 hours PRN Nausea and/or Vomiting  traMADol 50 milliGRAM(s) Oral every 12 hours PRN Severe Pain (7 - 10)      Allergies    clindamycin (Unknown)    Intolerances    allow double protein at meals (Unknown)      Vital Signs Last 24 Hrs  T(C): 37.1 (2023 12:02), Max: 37.1 (2023 12:02)  T(F): 98.8 (2023 12:02), Max: 98.8 (2023 12:02)  HR: 74 (2023 12:02) (73 - 82)  BP: 127/77 (2023 12:02) (96/62 - 127/77)  BP(mean): --  RR: 18 (2023 12:02) (18 - 18)  SpO2: 98% (2023 12:02) (95% - 100%)    Parameters below as of 2023 12:02  Patient On (Oxygen Delivery Method): nasal cannula  O2 Flow (L/min): 2      PHYSICAL EXAM:    GENERAL: NAD obese   CHEST/LUNG: Clear to ausculation bilaterally. distant bs   HEART: Regular rate and rhythm; S1 S2  ABDOMEN: Soft, ; Bowel sounds present  EXTREMITIES:  edematous   left leg bandaged  gu wagner clear urine   NERVOUS SYSTEM:  Alert & Oriented X3, gen weakness     LABS:                        12.8   11.83 )-----------( 147      ( 2023 02:48 )             42.8     04-05    131<L>  |  95<L>  |  60.2<H>  ----------------------------<  172<H>  4.8   |  23.0  |  2.15<H>    Ca    8.3<L>      2023 02:48  Mg     2.4     04-05    TPro  6.5<L>  /  Alb  3.4  /  TBili  1.5  /  DBili  x   /  AST  17  /  ALT  9   /  AlkPhos  110  04-04    PT/INR - ( 2023 03:10 )   PT: 25.9 sec;   INR: 2.21 ratio         PTT - ( 2023 03:10 )  PTT:30.5 sec  Urinalysis Basic - ( 2023 22:00 )    Color: Yellow / Appearance: Clear / S.015 / pH: x  Gluc: x / Ketone: Negative  / Bili: Negative / Urobili: Negative mg/dL   Blood: x / Protein: 15 / Nitrite: Negative   Leuk Esterase: Trace / RBC: 0-2 /HPF / WBC 0-2 /HPF   Sq Epi: x / Non Sq Epi: Occasional / Bacteria: Occasional        CAPILLARY BLOOD GLUCOSE      POCT Blood Glucose.: 172 mg/dL (2023 09:20)  POCT Blood Glucose.: 260 mg/dL (2023 22:01)  POCT Blood Glucose.: 138 mg/dL (2023 16:59)        RADIOLOGY & ADDITIONAL TESTS:

## 2023-04-05 NOTE — DIETITIAN INITIAL EVALUATION ADULT - ADD RECOMMEND
1) Rx MVI and Vit C 500mg daily to aid healing   2) Encourage po intake, monitor diet tolerance, and provide assistance at meals as needed  3) Obtain daily weights to monitor trends  4) Monitor nutrition related labs

## 2023-04-06 LAB
-  AMIKACIN: SIGNIFICANT CHANGE UP
-  AMPICILLIN/SULBACTAM: SIGNIFICANT CHANGE UP
-  AMPICILLIN: SIGNIFICANT CHANGE UP
-  AZTREONAM: SIGNIFICANT CHANGE UP
-  CEFAZOLIN: SIGNIFICANT CHANGE UP
-  CEFEPIME: SIGNIFICANT CHANGE UP
-  CEFOXITIN: SIGNIFICANT CHANGE UP
-  CEFTRIAXONE: SIGNIFICANT CHANGE UP
-  CIPROFLOXACIN: SIGNIFICANT CHANGE UP
-  ERTAPENEM: SIGNIFICANT CHANGE UP
-  GENTAMICIN: SIGNIFICANT CHANGE UP
-  IMIPENEM: SIGNIFICANT CHANGE UP
-  LEVOFLOXACIN: SIGNIFICANT CHANGE UP
-  MEROPENEM: SIGNIFICANT CHANGE UP
-  PIPERACILLIN/TAZOBACTAM: SIGNIFICANT CHANGE UP
-  TOBRAMYCIN: SIGNIFICANT CHANGE UP
-  TRIMETHOPRIM/SULFAMETHOXAZOLE: SIGNIFICANT CHANGE UP
ALBUMIN SERPL ELPH-MCNC: 3.4 G/DL — SIGNIFICANT CHANGE UP (ref 3.3–5.2)
ALP SERPL-CCNC: 106 U/L — SIGNIFICANT CHANGE UP (ref 40–120)
ALT FLD-CCNC: 10 U/L — SIGNIFICANT CHANGE UP
ANION GAP SERPL CALC-SCNC: 15 MMOL/L — SIGNIFICANT CHANGE UP (ref 5–17)
AST SERPL-CCNC: 23 U/L — SIGNIFICANT CHANGE UP
BILIRUB SERPL-MCNC: 0.8 MG/DL — SIGNIFICANT CHANGE UP (ref 0.4–2)
BUN SERPL-MCNC: 72.9 MG/DL — HIGH (ref 8–20)
CALCIUM SERPL-MCNC: 8.2 MG/DL — LOW (ref 8.4–10.5)
CHLORIDE SERPL-SCNC: 97 MMOL/L — SIGNIFICANT CHANGE UP (ref 96–108)
CO2 SERPL-SCNC: 20 MMOL/L — LOW (ref 22–29)
CREAT SERPL-MCNC: 2.41 MG/DL — HIGH (ref 0.5–1.3)
CULTURE RESULTS: SIGNIFICANT CHANGE UP
EGFR: 28 ML/MIN/1.73M2 — LOW
GLUCOSE BLDC GLUCOMTR-MCNC: 107 MG/DL — HIGH (ref 70–99)
GLUCOSE BLDC GLUCOMTR-MCNC: 109 MG/DL — HIGH (ref 70–99)
GLUCOSE BLDC GLUCOMTR-MCNC: 109 MG/DL — HIGH (ref 70–99)
GLUCOSE BLDC GLUCOMTR-MCNC: 110 MG/DL — HIGH (ref 70–99)
GLUCOSE BLDC GLUCOMTR-MCNC: 113 MG/DL — HIGH (ref 70–99)
GLUCOSE BLDC GLUCOMTR-MCNC: 121 MG/DL — HIGH (ref 70–99)
GLUCOSE BLDC GLUCOMTR-MCNC: 83 MG/DL — SIGNIFICANT CHANGE UP (ref 70–99)
GLUCOSE SERPL-MCNC: 106 MG/DL — HIGH (ref 70–99)
HCT VFR BLD CALC: 46.6 % — SIGNIFICANT CHANGE UP (ref 39–50)
HGB BLD-MCNC: 13.4 G/DL — SIGNIFICANT CHANGE UP (ref 13–17)
MAGNESIUM SERPL-MCNC: 2.6 MG/DL — SIGNIFICANT CHANGE UP (ref 1.6–2.6)
MCHC RBC-ENTMCNC: 24.4 PG — LOW (ref 27–34)
MCHC RBC-ENTMCNC: 28.8 GM/DL — LOW (ref 32–36)
MCV RBC AUTO: 84.9 FL — SIGNIFICANT CHANGE UP (ref 80–100)
METHOD TYPE: SIGNIFICANT CHANGE UP
MRSA PCR RESULT.: DETECTED
ORGANISM # SPEC MICROSCOPIC CNT: SIGNIFICANT CHANGE UP
PHOSPHATE SERPL-MCNC: 5.8 MG/DL — HIGH (ref 2.4–4.7)
PLATELET # BLD AUTO: 121 K/UL — LOW (ref 150–400)
POTASSIUM SERPL-MCNC: 5.1 MMOL/L — SIGNIFICANT CHANGE UP (ref 3.5–5.3)
POTASSIUM SERPL-SCNC: 5.1 MMOL/L — SIGNIFICANT CHANGE UP (ref 3.5–5.3)
PROT SERPL-MCNC: 6.7 G/DL — SIGNIFICANT CHANGE UP (ref 6.6–8.7)
RBC # BLD: 5.49 M/UL — SIGNIFICANT CHANGE UP (ref 4.2–5.8)
RBC # FLD: 20.9 % — HIGH (ref 10.3–14.5)
S AUREUS DNA NOSE QL NAA+PROBE: DETECTED
SODIUM SERPL-SCNC: 132 MMOL/L — LOW (ref 135–145)
SPECIMEN SOURCE: SIGNIFICANT CHANGE UP
WBC # BLD: 8.08 K/UL — SIGNIFICANT CHANGE UP (ref 3.8–10.5)
WBC # FLD AUTO: 8.08 K/UL — SIGNIFICANT CHANGE UP (ref 3.8–10.5)

## 2023-04-06 PROCEDURE — 99233 SBSQ HOSP IP/OBS HIGH 50: CPT

## 2023-04-06 RX ORDER — CHLORHEXIDINE GLUCONATE 213 G/1000ML
1 SOLUTION TOPICAL DAILY
Refills: 0 | Status: DISCONTINUED | OUTPATIENT
Start: 2023-04-06 | End: 2023-04-18

## 2023-04-06 RX ORDER — MUPIROCIN 20 MG/G
1 OINTMENT TOPICAL
Refills: 0 | Status: COMPLETED | OUTPATIENT
Start: 2023-04-06 | End: 2023-04-11

## 2023-04-06 RX ORDER — SEVELAMER CARBONATE 2400 MG/1
1600 POWDER, FOR SUSPENSION ORAL
Refills: 0 | Status: DISCONTINUED | OUTPATIENT
Start: 2023-04-06 | End: 2023-04-16

## 2023-04-06 RX ADMIN — PIPERACILLIN AND TAZOBACTAM 25 GRAM(S): 4; .5 INJECTION, POWDER, LYOPHILIZED, FOR SOLUTION INTRAVENOUS at 22:23

## 2023-04-06 RX ADMIN — Medication 40 MILLIGRAM(S): at 06:09

## 2023-04-06 RX ADMIN — PIPERACILLIN AND TAZOBACTAM 25 GRAM(S): 4; .5 INJECTION, POWDER, LYOPHILIZED, FOR SOLUTION INTRAVENOUS at 06:09

## 2023-04-06 RX ADMIN — SEVELAMER CARBONATE 1600 MILLIGRAM(S): 2400 POWDER, FOR SUSPENSION ORAL at 15:21

## 2023-04-06 RX ADMIN — TAMSULOSIN HYDROCHLORIDE 0.4 MILLIGRAM(S): 0.4 CAPSULE ORAL at 22:23

## 2023-04-06 RX ADMIN — CARVEDILOL PHOSPHATE 25 MILLIGRAM(S): 80 CAPSULE, EXTENDED RELEASE ORAL at 17:55

## 2023-04-06 RX ADMIN — APIXABAN 5 MILLIGRAM(S): 2.5 TABLET, FILM COATED ORAL at 17:54

## 2023-04-06 RX ADMIN — CARVEDILOL PHOSPHATE 25 MILLIGRAM(S): 80 CAPSULE, EXTENDED RELEASE ORAL at 06:09

## 2023-04-06 RX ADMIN — INSULIN GLARGINE 40 UNIT(S): 100 INJECTION, SOLUTION SUBCUTANEOUS at 09:07

## 2023-04-06 RX ADMIN — DULOXETINE HYDROCHLORIDE 60 MILLIGRAM(S): 30 CAPSULE, DELAYED RELEASE ORAL at 15:22

## 2023-04-06 RX ADMIN — APIXABAN 5 MILLIGRAM(S): 2.5 TABLET, FILM COATED ORAL at 06:09

## 2023-04-06 RX ADMIN — PIPERACILLIN AND TAZOBACTAM 25 GRAM(S): 4; .5 INJECTION, POWDER, LYOPHILIZED, FOR SOLUTION INTRAVENOUS at 15:20

## 2023-04-06 RX ADMIN — Medication 50 MICROGRAM(S): at 06:09

## 2023-04-06 RX ADMIN — SEVELAMER CARBONATE 1600 MILLIGRAM(S): 2400 POWDER, FOR SUSPENSION ORAL at 17:52

## 2023-04-06 RX ADMIN — ATORVASTATIN CALCIUM 40 MILLIGRAM(S): 80 TABLET, FILM COATED ORAL at 22:23

## 2023-04-06 RX ADMIN — MUPIROCIN 1 APPLICATION(S): 20 OINTMENT TOPICAL at 17:54

## 2023-04-06 RX ADMIN — Medication 150 MILLIGRAM(S): at 06:09

## 2023-04-06 RX ADMIN — CHLORHEXIDINE GLUCONATE 1 APPLICATION(S): 213 SOLUTION TOPICAL at 15:22

## 2023-04-06 RX ADMIN — Medication 1 MILLIGRAM(S): at 15:21

## 2023-04-06 RX ADMIN — Medication 150 MILLIGRAM(S): at 17:54

## 2023-04-06 NOTE — PROGRESS NOTE ADULT - SUBJECTIVE AND OBJECTIVE BOX
Podiatry Interval: Pt seen bedside in no acute distress. PT denies any overnight acute events. Denies pain to Left leg. Denies constitutional symptoms No other pedal complaints.     HPI: 73 y/o M w/ a hx of HFrEF Last EF 30% s/p AICD, DM2 on Insulin, Persistent Atrial Fibrillation on Eliquis, HTN, HLD, RLE BKA, Chronic wounds on LLE presenting for SOB. Patient states that the shortness of breath started 2 weeks ago, drove up from [place] for wife's , had increase in cough, mildly productive, tried albuterol w/ little improvement, started on high dose lasix and continuous bipap in ED. Patient seen at bedside, tolerating BiPAP, endorsing SOB, cough, mild dizziness, denies CP, Abd pain, HA, blurry vision, lethargy, rashes, dysuria.     Podiatry HPI: Pt seen bedside accompanied by nephew. Pt states he lives in [place] and was up here in [place] for his wifes . PT states he noticed shortness of breath for the past week. Pt states he has had left leg wounds for the past two months. States he had wounds to right foot which led him to a BKA in 2022. States he visits with wound care doctor who goes to house weekly to change dressings and applies UNNA boots to LLE. Denies michael nto LLE. Denies constitutional symptoms No other pedal complaints.       Medications acetaminophen     Tablet .. 650 milliGRAM(s) Oral every 6 hours PRN  acetaZOLAMIDE  IVPB 500 milliGRAM(s) IV Intermittent daily  albuterol/ipratropium for Nebulization 3 milliLiter(s) Nebulizer every 20 minutes  aluminum hydroxide/magnesium hydroxide/simethicone Suspension 30 milliLiter(s) Oral every 4 hours PRN  apixaban 5 milliGRAM(s) Oral every 12 hours  atorvastatin 40 milliGRAM(s) Oral at bedtime  carvedilol 25 milliGRAM(s) Oral every 12 hours  dextrose 5%. 1000 milliLiter(s) IV Continuous <Continuous>  dextrose 5%. 1000 milliLiter(s) IV Continuous <Continuous>  dextrose 50% Injectable 25 Gram(s) IV Push once  dextrose 50% Injectable 12.5 Gram(s) IV Push once  dextrose 50% Injectable 25 Gram(s) IV Push once  dextrose Oral Gel 15 Gram(s) Oral once PRN  DULoxetine 60 milliGRAM(s) Oral daily  folic acid 1 milliGRAM(s) Oral daily  furosemide   Injectable 40 milliGRAM(s) IV Push two times a day  glucagon  Injectable 1 milliGRAM(s) IntraMuscular once  insulin glargine Injectable (LANTUS) 40 Unit(s) SubCutaneous every morning  insulin lispro (ADMELOG) corrective regimen sliding scale   SubCutaneous three times a day before meals  insulin lispro (ADMELOG) corrective regimen sliding scale   SubCutaneous at bedtime  insulin lispro Injectable (ADMELOG) 20 Unit(s) SubCutaneous three times a day before meals  levothyroxine 50 MICROGram(s) Oral daily  melatonin 3 milliGRAM(s) Oral at bedtime PRN  methocarbamol 500 milliGRAM(s) Oral two times a day PRN  ondansetron Injectable 4 milliGRAM(s) IV Push every 8 hours PRN  piperacillin/tazobactam IVPB.. 3.375 Gram(s) IV Intermittent every 8 hours  pregabalin 150 milliGRAM(s) Oral two times a day  tamsulosin 0.4 milliGRAM(s) Oral at bedtime  traMADol 50 milliGRAM(s) Oral every 12 hours PRN    FHFamily history of cancer (Sibling)    Family history of diabetes mellitus (Sibling)    ,   PMHDM (diabetes mellitus)    HTN (hypertension)    High cholesterol    Renal insufficiency    Sleep apnea    Prostate CA    Pulmonary hypertension    CHF (congestive heart failure)    Atrial fibrillation    Chronic back pain    Lung nodules       PSHNo significant past surgical history    S/P ankle ligament repair    AICD (automatic cardioverter/defibrillator) present    History of brachytherapy        Labs                          13.4   8.08  )-----------( 121      ( 2023 04:52 )             46.6      04-06    132<L>  |  97  |  72.9<H>  ----------------------------<  106<H>  5.1   |  20.0<L>  |  2.41<H>    Ca    8.2<L>      2023 04:52  Phos  5.8     04-06  Mg     2.6     04-06    TPro  6.7  /  Alb  3.4  /  TBili  0.8  /  DBili  x   /  AST  23  /  ALT  10  /  AlkPhos  106  04-06     Vital Signs Last 24 Hrs  T(C): 36.7 (2023 04:39), Max: 37.1 (2023 12:02)  T(F): 98.1 (2023 04:39), Max: 98.8 (2023 12:02)  HR: 71 (2023 05:56) (71 - 82)  BP: 120/75 (2023 04:39) (101/68 - 127/77)  BP(mean): --  RR: 18 (2023 04:39) (18 - 18)  SpO2: 99% (2023 05:56) (96% - 100%)    Parameters below as of 2023 01:53  Patient On (Oxygen Delivery Method): nasal cannula  O2 Flow (L/min): 2    Sedimentation Rate, Erythrocyte: 8 mm/hr (23 @ 13:25)         C-Reactive Protein, Serum: 26 mg/L (23 @ 13:25)   WBC Count: 8.08 K/uL (23 @ 04:52)        PE:  Vasc: Pulses nonpalpable DP/PT LLE, Skin temp warm to cool prox to distal LLE, pedal hair growth absent LLE  Derm: Left hallux distal tuft wound measures 1cmx 0.4cm x 0.1cm no drainage, -PTB. Lateral malleoli wound measures 0.8cm x 0.5cm x 0.1cm with granular fibrotic wound base. No clinical signs of infection noted LLE  Neuro: Protective sensation grossly diminished LLE  MSK s/p R BKA     A:  Left foot wound  Left leg wound  DM Type IL with neuropathy     P:  Pt evaluated and chart reviewed  Xray L foot unremarkable   No leukocytosis noted   ZOYA/PVR Reviewed - L DP 0.90 with waveforms present  Applied betadine soaked gauze to LLE with DSD light ACE LLE  PT to keep dressing clean, dry and intact LLE   PT to be wb as tolerated left foot in surgical shoe  WCO please change dressing to LLE - apply xeroform to Left lower leg wounds and betadine soaked gauze to left hallux with kerlix and light ace to LLE. Please change every 2 days.   Will cont to follow  Discussed with Dr. Chase and seen with Dr. Acosta     Podiatry Interval: Pt seen bedside in no acute distress. PT denies any overnight acute events. Denies pain to Left leg. Denies constitutional symptoms No other pedal complaints.     HPI: 73 y/o M w/ a hx of HFrEF Last EF 30% s/p AICD, DM2 on Insulin, Persistent Atrial Fibrillation on Eliquis, HTN, HLD, RLE BKA, Chronic wounds on LLE presenting for SOB. Patient states that the shortness of breath started 2 weeks ago, drove up from [place] for wife's , had increase in cough, mildly productive, tried albuterol w/ little improvement, started on high dose lasix and continuous bipap in ED. Patient seen at bedside, tolerating BiPAP, endorsing SOB, cough, mild dizziness, denies CP, Abd pain, HA, blurry vision, lethargy, rashes, dysuria.     Podiatry HPI: Pt seen bedside accompanied by nephew. Pt states he lives in [place] and was up here in [place] for his wifes . PT states he noticed shortness of breath for the past week. Pt states he has had left leg wounds for the past two months. States he had wounds to right foot which led him to a BKA in 2022. States he visits with wound care doctor who goes to house weekly to change dressings and applies UNNA boots to LLE. Denies michael nto LLE. Denies constitutional symptoms No other pedal complaints.       Medications acetaminophen     Tablet .. 650 milliGRAM(s) Oral every 6 hours PRN  acetaZOLAMIDE  IVPB 500 milliGRAM(s) IV Intermittent daily  albuterol/ipratropium for Nebulization 3 milliLiter(s) Nebulizer every 20 minutes  aluminum hydroxide/magnesium hydroxide/simethicone Suspension 30 milliLiter(s) Oral every 4 hours PRN  apixaban 5 milliGRAM(s) Oral every 12 hours  atorvastatin 40 milliGRAM(s) Oral at bedtime  carvedilol 25 milliGRAM(s) Oral every 12 hours  dextrose 5%. 1000 milliLiter(s) IV Continuous <Continuous>  dextrose 5%. 1000 milliLiter(s) IV Continuous <Continuous>  dextrose 50% Injectable 25 Gram(s) IV Push once  dextrose 50% Injectable 12.5 Gram(s) IV Push once  dextrose 50% Injectable 25 Gram(s) IV Push once  dextrose Oral Gel 15 Gram(s) Oral once PRN  DULoxetine 60 milliGRAM(s) Oral daily  folic acid 1 milliGRAM(s) Oral daily  furosemide   Injectable 40 milliGRAM(s) IV Push two times a day  glucagon  Injectable 1 milliGRAM(s) IntraMuscular once  insulin glargine Injectable (LANTUS) 40 Unit(s) SubCutaneous every morning  insulin lispro (ADMELOG) corrective regimen sliding scale   SubCutaneous three times a day before meals  insulin lispro (ADMELOG) corrective regimen sliding scale   SubCutaneous at bedtime  insulin lispro Injectable (ADMELOG) 20 Unit(s) SubCutaneous three times a day before meals  levothyroxine 50 MICROGram(s) Oral daily  melatonin 3 milliGRAM(s) Oral at bedtime PRN  methocarbamol 500 milliGRAM(s) Oral two times a day PRN  ondansetron Injectable 4 milliGRAM(s) IV Push every 8 hours PRN  piperacillin/tazobactam IVPB.. 3.375 Gram(s) IV Intermittent every 8 hours  pregabalin 150 milliGRAM(s) Oral two times a day  tamsulosin 0.4 milliGRAM(s) Oral at bedtime  traMADol 50 milliGRAM(s) Oral every 12 hours PRN    FHFamily history of cancer (Sibling)    Family history of diabetes mellitus (Sibling)    ,   PMHDM (diabetes mellitus)    HTN (hypertension)    High cholesterol    Renal insufficiency    Sleep apnea    Prostate CA    Pulmonary hypertension    CHF (congestive heart failure)    Atrial fibrillation    Chronic back pain    Lung nodules       PSHNo significant past surgical history    S/P ankle ligament repair    AICD (automatic cardioverter/defibrillator) present    History of brachytherapy        Labs                          13.4   8.08  )-----------( 121      ( 2023 04:52 )             46.6      04-06    132<L>  |  97  |  72.9<H>  ----------------------------<  106<H>  5.1   |  20.0<L>  |  2.41<H>    Ca    8.2<L>      2023 04:52  Phos  5.8     04-06  Mg     2.6     04-06    TPro  6.7  /  Alb  3.4  /  TBili  0.8  /  DBili  x   /  AST  23  /  ALT  10  /  AlkPhos  106  04-06     Vital Signs Last 24 Hrs  T(C): 36.7 (2023 04:39), Max: 37.1 (2023 12:02)  T(F): 98.1 (2023 04:39), Max: 98.8 (2023 12:02)  HR: 71 (2023 05:56) (71 - 82)  BP: 120/75 (2023 04:39) (101/68 - 127/77)  BP(mean): --  RR: 18 (2023 04:39) (18 - 18)  SpO2: 99% (2023 05:56) (96% - 100%)    Parameters below as of 2023 01:53  Patient On (Oxygen Delivery Method): nasal cannula  O2 Flow (L/min): 2    Sedimentation Rate, Erythrocyte: 8 mm/hr (23 @ 13:25)         C-Reactive Protein, Serum: 26 mg/L (23 @ 13:25)   WBC Count: 8.08 K/uL (23 @ 04:52)        PE:  Vasc: Pulses nonpalpable DP/PT LLE, Skin temp warm to cool prox to distal LLE, pedal hair growth absent LLE  Derm: Left hallux distal tuft wound measures 1cmx 0.4cm x 0.1cm no drainage, -PTB. Lateral malleoli wound measures 0.8cm x 0.5cm x 0.1cm with granular fibrotic wound base. No clinical signs of infection noted LLE  Neuro: Protective sensation grossly diminished LLE  MSK s/p R BKA     A:  Left foot wound  Left leg wound  DM Type IL with neuropathy     P:  Pt evaluated and chart reviewed  Xray L foot unremarkable   No leukocytosis noted   ZOYA/PVR Reviewed - L DP 0.90 with waveforms present  Applied betadine soaked gauze to LLE with DSD light ACE LLE  PT to keep dressing clean, dry and intact LLE   PT to be wb as tolerated left foot in surgical shoe  WCO please change dressing to LLE - apply xeroform to Left lower leg wounds and betadine soaked gauze to left hallux with kerlix and light ace to LLE. Please change every 2 days.   Will sign off at this time. Please page podiatry for any further questions or concerns   Discussed and seen with Dr. Acosta     Podiatry Interval: Pt seen bedside in no acute distress. PT denies any overnight acute events. Denies pain to Left leg. Denies constitutional symptoms No other pedal complaints.     HPI: 73 y/o M w/ a hx of HFrEF Last EF 30% s/p AICD, DM2 on Insulin, Persistent Atrial Fibrillation on Eliquis, HTN, HLD, RLE BKA, Chronic wounds on LLE presenting for SOB. Patient states that the shortness of breath started 2 weeks ago, drove up from [place] for wife's , had increase in cough, mildly productive, tried albuterol w/ little improvement, started on high dose lasix and continuous bipap in ED. Patient seen at bedside, tolerating BiPAP, endorsing SOB, cough, mild dizziness, denies CP, Abd pain, HA, blurry vision, lethargy, rashes, dysuria.     Podiatry HPI: Pt seen bedside accompanied by nephew. Pt states he lives in [place] and was up here in [place] for his wifes . PT states he noticed shortness of breath for the past week. Pt states he has had left leg wounds for the past two months. States he had wounds to right foot which led him to a BKA in 2022. States he visits with wound care doctor who goes to house weekly to change dressings and applies UNNA boots to LLE. Denies michael nto LLE. Denies constitutional symptoms No other pedal complaints.       Medications acetaminophen     Tablet .. 650 milliGRAM(s) Oral every 6 hours PRN  acetaZOLAMIDE  IVPB 500 milliGRAM(s) IV Intermittent daily  albuterol/ipratropium for Nebulization 3 milliLiter(s) Nebulizer every 20 minutes  aluminum hydroxide/magnesium hydroxide/simethicone Suspension 30 milliLiter(s) Oral every 4 hours PRN  apixaban 5 milliGRAM(s) Oral every 12 hours  atorvastatin 40 milliGRAM(s) Oral at bedtime  carvedilol 25 milliGRAM(s) Oral every 12 hours  dextrose 5%. 1000 milliLiter(s) IV Continuous <Continuous>  dextrose 5%. 1000 milliLiter(s) IV Continuous <Continuous>  dextrose 50% Injectable 25 Gram(s) IV Push once  dextrose 50% Injectable 12.5 Gram(s) IV Push once  dextrose 50% Injectable 25 Gram(s) IV Push once  dextrose Oral Gel 15 Gram(s) Oral once PRN  DULoxetine 60 milliGRAM(s) Oral daily  folic acid 1 milliGRAM(s) Oral daily  furosemide   Injectable 40 milliGRAM(s) IV Push two times a day  glucagon  Injectable 1 milliGRAM(s) IntraMuscular once  insulin glargine Injectable (LANTUS) 40 Unit(s) SubCutaneous every morning  insulin lispro (ADMELOG) corrective regimen sliding scale   SubCutaneous three times a day before meals  insulin lispro (ADMELOG) corrective regimen sliding scale   SubCutaneous at bedtime  insulin lispro Injectable (ADMELOG) 20 Unit(s) SubCutaneous three times a day before meals  levothyroxine 50 MICROGram(s) Oral daily  melatonin 3 milliGRAM(s) Oral at bedtime PRN  methocarbamol 500 milliGRAM(s) Oral two times a day PRN  ondansetron Injectable 4 milliGRAM(s) IV Push every 8 hours PRN  piperacillin/tazobactam IVPB.. 3.375 Gram(s) IV Intermittent every 8 hours  pregabalin 150 milliGRAM(s) Oral two times a day  tamsulosin 0.4 milliGRAM(s) Oral at bedtime  traMADol 50 milliGRAM(s) Oral every 12 hours PRN    FHFamily history of cancer (Sibling)    Family history of diabetes mellitus (Sibling)    ,   PMHDM (diabetes mellitus)    HTN (hypertension)    High cholesterol    Renal insufficiency    Sleep apnea    Prostate CA    Pulmonary hypertension    CHF (congestive heart failure)    Atrial fibrillation    Chronic back pain    Lung nodules       PSHNo significant past surgical history    S/P ankle ligament repair    AICD (automatic cardioverter/defibrillator) present    History of brachytherapy        Labs                          13.4   8.08  )-----------( 121      ( 2023 04:52 )             46.6      04-06    132<L>  |  97  |  72.9<H>  ----------------------------<  106<H>  5.1   |  20.0<L>  |  2.41<H>    Ca    8.2<L>      2023 04:52  Phos  5.8     04-06  Mg     2.6     04-06    TPro  6.7  /  Alb  3.4  /  TBili  0.8  /  DBili  x   /  AST  23  /  ALT  10  /  AlkPhos  106  04-06     Vital Signs Last 24 Hrs  T(C): 36.7 (2023 04:39), Max: 37.1 (2023 12:02)  T(F): 98.1 (2023 04:39), Max: 98.8 (2023 12:02)  HR: 71 (2023 05:56) (71 - 82)  BP: 120/75 (2023 04:39) (101/68 - 127/77)  BP(mean): --  RR: 18 (2023 04:39) (18 - 18)  SpO2: 99% (2023 05:56) (96% - 100%)    Parameters below as of 2023 01:53  Patient On (Oxygen Delivery Method): nasal cannula  O2 Flow (L/min): 2    Sedimentation Rate, Erythrocyte: 8 mm/hr (23 @ 13:25)         C-Reactive Protein, Serum: 26 mg/L (23 @ 13:25)   WBC Count: 8.08 K/uL (23 @ 04:52)        PE:  Vasc: Pulses nonpalpable DP/PT LLE, Skin temp warm to cool prox to distal LLE, pedal hair growth absent LLE  Derm: Left hallux distal tuft wound measures 1cmx 0.4cm x 0.1cm no drainage, -PTB. Lateral malleoli wound measures 0.8cm x 0.5cm x 0.1cm with granular fibrotic wound base. No clinical signs of infection noted LLE  Neuro: Protective sensation grossly diminished LLE  MSK s/p R BKA     A:  Left foot wound  Left leg wound  DM Type IL with neuropathy     P:  Pt evaluated and chart reviewed  Xray L foot unremarkable   No leukocytosis noted   ZOYA/PVR Reviewed - L DP 0.90 with waveforms present  Applied betadine soaked gauze to LLE with DSD light ACE LLE  PT to keep dressing clean, dry and intact LLE   PT to be wb as tolerated left foot in surgical shoe  WCO please change dressing to LLE - apply xeroform to Left lower leg wounds and betadine soaked gauze to left hallux with kerlix and light ace to LLE. Please change every 2 days.   WCO upon discharge includes apply xeroform to Left lower leg wounds and betadine soaked gauze to left hallux with kerlix and light ace to LLE. Please change every 2 days. Pt to be wb as tolerated to L foot in surgical shoe. Pt will need VNS upon discharge to home. Pt to follow up with in [facility] after discharge   Will sign off at this time. Please page podiatry for any further questions or concerns   Discussed and seen with Dr. Acosta

## 2023-04-06 NOTE — PROVIDER CONTACT NOTE (CRITICAL VALUE NOTIFICATION) - TEST AND RESULT REPORTED:
+ Blood Culture- Anaerobic gram negative rods  Aerobic- gram negative rods and gram positive cocci in clusters
MRSA Nares +

## 2023-04-06 NOTE — PROGRESS NOTE ADULT - ASSESSMENT
73 y/o M w/ a hx of HFrEF Last EF 30% s/p AICD, DM2 on Insulin, Persistent Atrial Fibrillation on Eliquis, HTN, HLD, RLE BKA, Chronic wounds on LLE here for mgmt of Acute Hypoxic Respiratory Failure 2/2 Acute on Chronic HFrEF Exacerbation, left foot ulcer, bacteremia.     #Acute hypoxic respiratory failure  - 2/2 acute on chronic combined systolic and diastolic chf w/ reduced EF  - echo lvef <20%  - cardio consult appreciated  - may need cath prior to d/c depending on kidney function   - wean 02 as teolrated   - monitor i and o   - daily weight   - fluid restrict    #bacteremia  - culture positive for ecoli and cons (cons probable contaminent per ID)  - ID consult appreciated-> abx per ID  - monitor cultures    #Persistent Afib   - eliquis and coreg    #DM2  - w/ LE ulcers s/p RLE BKA  - lantus and admelog  - podiatry consult appreciated   - unable to obtain MRI due to AICD    #JAYLENE on CKD3   - monitor cr  - avoid nephrotoxic meds  - nephro consult appreciated    #BPH  - wagner placed by urology  - flomax    #HLD  - statin    #CHRISS  - Nocturnal CPAP 71 y/o M w/ a hx of HFrEF Last EF 30% s/p AICD, DM2 on Insulin, Persistent Atrial Fibrillation on Eliquis, HTN, HLD, RLE BKA, Chronic wounds on LLE here for mgmt of Acute Hypoxic Respiratory Failure 2/2 Acute on Chronic HFrEF Exacerbation, left foot ulcer, bacteremia.     #Acute hypoxic respiratory failure  - 2/2 acute on chronic combined systolic and diastolic chf w/ reduced EF  - echo lvef <20%  - cardio consult appreciated  - may need cath prior to d/c depending on kidney function   - wean 02 as tolerated   - monitor i and o   - daily weight   - fluid restrict    #bacteremia  - culture positive for ecoli and cons (cons probable contaminent per ID)  - ID consult appreciated-> abx per ID  - monitor cultures    #Persistent Afib   - eliquis and coreg    #DM2  - w/ LE ulcers s/p RLE BKA  - lantus and admelog  - podiatry consult appreciated   - unable to obtain MRI due to AICD    #JAYLENE on CKD3   - monitor cr  - avoid nephrotoxic meds  - nephro consult appreciated    #BPH  - wagner placed by urology  - flomax    #HLD  - statin    #CHRISS  - Nocturnal CPAP    called and spoke to patient Nina [phone]

## 2023-04-06 NOTE — PROGRESS NOTE ADULT - SUBJECTIVE AND OBJECTIVE BOX
[place] - [facility]                                   [address] [place] [address]                                                      PHONE: [phone]                                                         FAX: [phone]  [email or web address]  ---------------------------------------------------------------------------------------------------------------------------------    Overnight events/patient complaints: Patient with worsening JAYLENE. Patient denies any cardiac symptoms this AM.      allow double protein at meals (Unknown)  clindamycin (Unknown)    MEDICATIONS  (STANDING):  acetaZOLAMIDE  IVPB 500 milliGRAM(s) IV Intermittent daily  albuterol/ipratropium for Nebulization 3 milliLiter(s) Nebulizer every 20 minutes  apixaban 5 milliGRAM(s) Oral every 12 hours  atorvastatin 40 milliGRAM(s) Oral at bedtime  carvedilol 25 milliGRAM(s) Oral every 12 hours  dextrose 5%. 1000 milliLiter(s) (100 mL/Hr) IV Continuous <Continuous>  dextrose 5%. 1000 milliLiter(s) (50 mL/Hr) IV Continuous <Continuous>  dextrose 50% Injectable 25 Gram(s) IV Push once  dextrose 50% Injectable 12.5 Gram(s) IV Push once  dextrose 50% Injectable 25 Gram(s) IV Push once  DULoxetine 60 milliGRAM(s) Oral daily  folic acid 1 milliGRAM(s) Oral daily  furosemide   Injectable 40 milliGRAM(s) IV Push two times a day  glucagon  Injectable 1 milliGRAM(s) IntraMuscular once  insulin glargine Injectable (LANTUS) 40 Unit(s) SubCutaneous every morning  insulin lispro (ADMELOG) corrective regimen sliding scale   SubCutaneous three times a day before meals  insulin lispro (ADMELOG) corrective regimen sliding scale   SubCutaneous at bedtime  insulin lispro Injectable (ADMELOG) 20 Unit(s) SubCutaneous three times a day before meals  levothyroxine 50 MICROGram(s) Oral daily  piperacillin/tazobactam IVPB.. 3.375 Gram(s) IV Intermittent every 8 hours  pregabalin 150 milliGRAM(s) Oral two times a day  tamsulosin 0.4 milliGRAM(s) Oral at bedtime    MEDICATIONS  (PRN):  acetaminophen     Tablet .. 650 milliGRAM(s) Oral every 6 hours PRN Temp greater or equal to 38C (100.4F), Mild Pain (1 - 3)  aluminum hydroxide/magnesium hydroxide/simethicone Suspension 30 milliLiter(s) Oral every 4 hours PRN Dyspepsia  dextrose Oral Gel 15 Gram(s) Oral once PRN Blood Glucose LESS THAN 70 milliGRAM(s)/deciliter  melatonin 3 milliGRAM(s) Oral at bedtime PRN Insomnia  methocarbamol 500 milliGRAM(s) Oral two times a day PRN for severe pain  ondansetron Injectable 4 milliGRAM(s) IV Push every 8 hours PRN Nausea and/or Vomiting  traMADol 50 milliGRAM(s) Oral every 12 hours PRN Severe Pain (7 - 10)      Vital Signs Last 24 Hrs  T(C): 36.7 (06 Apr 2023 04:39), Max: 37.1 (05 Apr 2023 12:02)  T(F): 98.1 (06 Apr 2023 04:39), Max: 98.8 (05 Apr 2023 12:02)  HR: 71 (06 Apr 2023 05:56) (71 - 82)  BP: 120/75 (06 Apr 2023 04:39) (101/68 - 127/77)  BP(mean): --  RR: 18 (06 Apr 2023 04:39) (18 - 18)  SpO2: 99% (06 Apr 2023 05:56) (96% - 100%)    Parameters below as of 06 Apr 2023 01:53  Patient On (Oxygen Delivery Method): nasal cannula  O2 Flow (L/min): 2    ICU Vital Signs Last 24 Hrs  MARY ANN CORNELL  I&O's Detail    05 Apr 2023 07:01  -  06 Apr 2023 07:00  --------------------------------------------------------  IN:  Total IN: 0 mL    OUT:    Indwelling Catheter - Urethral (mL): 1500 mL  Total OUT: 1500 mL    Total NET: -1500 mL        Drug Dosing Weight  MARY ANN RIBEIRORAKAN      PHYSICAL EXAM:  General: NAD  HEENT: Head; normocephalic, atraumatic.  Eyes: EOMI, conjunctiva normal  Neck: Supple  CARDIOVASCULAR: Irregularly irregular rhythm, regular rate, S1 S2, No murmurs or gallops  LUNGS: Crackles b/l  ABDOMEN: Soft, nontender, +bowel sounds  EXTREMITIES: Left leg wrapped with edema  SKIN: warm and dry  NEURO: Alert/oriented x 3  PSYCH: normal affect.        LABS:                        13.4   8.08  )-----------( 121      ( 06 Apr 2023 04:52 )             46.6     04-06    132<L>  |  97  |  72.9<H>  ----------------------------<  106<H>  5.1   |  20.0<L>  |  2.41<H>    Ca    8.2<L>      06 Apr 2023 04:52  Phos  5.8     04-06  Mg     2.6     04-06    TPro  6.7  /  Alb  3.4  /  TBili  0.8  /  DBili  x   /  AST  23  /  ALT  10  /  AlkPhos  106  04-06    MARY ANN CORNELL            RADIOLOGY & ADDITIONAL STUDIES:    INTERPRETATION OF TELEMETRY (personally reviewed): Afib, rate controlled. No significant cardiac events.    ASSESSMENT AND PLAN:  72 year old male with a PMHx of HFrEF (30-35%) (NIC) s/p cardiomems and ICD, afib on coumadin, HTN, HLD, DM, RLE diabetic ulcer s/p leg amputation, CHRISS who presents for worsening SOB found to be significantly volume overloaded found to have acute on chronic heart failure exacerbation. Patient recently moved to [place] ~1 year ago and has not established care with a new cardiologist while in [place].    Acute on Chronic Heart Failure Exacerbation  - patient remains volume overloaded on exam however patient with worsening JAYLENE  - would recommend stopping lasix and acetazolamide until renal function improves  - patient on nasal cannula  - echo with reduced EF to <20%  - daily chemistry to monitor creatinine and electrolytes  - daily weights  - strict I/O  - fluid and sodium restriction    Afib  - c/w coumadin for goal INR 2-3  - rate controlled      Thank you for letting [facility] to assist in the management of this patient. Please call with any questions.

## 2023-04-06 NOTE — PROGRESS NOTE ADULT - SUBJECTIVE AND OBJECTIVE BOX
Patient is a 72y old  Male who presents with a chief complaint of Acute on chronic respiratory failure 2/2 CHF exacerbation (2023 13:46)    Patient seen and examined at bedside.      ALLERGIES:  allow double protein at meals (Unknown)  clindamycin (Unknown)    MEDICATIONS  (STANDING):  acetaZOLAMIDE  IVPB 500 milliGRAM(s) IV Intermittent daily  albuterol/ipratropium for Nebulization 3 milliLiter(s) Nebulizer every 20 minutes  apixaban 5 milliGRAM(s) Oral every 12 hours  atorvastatin 40 milliGRAM(s) Oral at bedtime  carvedilol 25 milliGRAM(s) Oral every 12 hours  dextrose 5%. 1000 milliLiter(s) (100 mL/Hr) IV Continuous <Continuous>  dextrose 5%. 1000 milliLiter(s) (50 mL/Hr) IV Continuous <Continuous>  dextrose 50% Injectable 25 Gram(s) IV Push once  dextrose 50% Injectable 12.5 Gram(s) IV Push once  dextrose 50% Injectable 25 Gram(s) IV Push once  DULoxetine 60 milliGRAM(s) Oral daily  folic acid 1 milliGRAM(s) Oral daily  furosemide   Injectable 40 milliGRAM(s) IV Push two times a day  glucagon  Injectable 1 milliGRAM(s) IntraMuscular once  insulin glargine Injectable (LANTUS) 40 Unit(s) SubCutaneous every morning  insulin lispro (ADMELOG) corrective regimen sliding scale   SubCutaneous three times a day before meals  insulin lispro (ADMELOG) corrective regimen sliding scale   SubCutaneous at bedtime  insulin lispro Injectable (ADMELOG) 20 Unit(s) SubCutaneous three times a day before meals  levothyroxine 50 MICROGram(s) Oral daily  piperacillin/tazobactam IVPB.. 3.375 Gram(s) IV Intermittent every 8 hours  pregabalin 150 milliGRAM(s) Oral two times a day  tamsulosin 0.4 milliGRAM(s) Oral at bedtime    MEDICATIONS  (PRN):  acetaminophen     Tablet .. 650 milliGRAM(s) Oral every 6 hours PRN Temp greater or equal to 38C (100.4F), Mild Pain (1 - 3)  aluminum hydroxide/magnesium hydroxide/simethicone Suspension 30 milliLiter(s) Oral every 4 hours PRN Dyspepsia  dextrose Oral Gel 15 Gram(s) Oral once PRN Blood Glucose LESS THAN 70 milliGRAM(s)/deciliter  melatonin 3 milliGRAM(s) Oral at bedtime PRN Insomnia  methocarbamol 500 milliGRAM(s) Oral two times a day PRN for severe pain  ondansetron Injectable 4 milliGRAM(s) IV Push every 8 hours PRN Nausea and/or Vomiting  traMADol 50 milliGRAM(s) Oral every 12 hours PRN Severe Pain (7 - 10)    Vital Signs Last 24 Hrs  T(F): 98.1 (2023 04:39), Max: 98.8 (2023 12:02)  HR: 71 (2023 05:56) (71 - 82)  BP: 120/75 (2023 04:39) (101/68 - 127/77)  RR: 18 (2023 04:39) (18 - 18)  SpO2: 99% (2023 05:56) (96% - 100%)  I&O's Summary    2023 07:01  -  2023 07:00  --------------------------------------------------------  IN: 0 mL / OUT: 1500 mL / NET: -1500 mL      PHYSICAL EXAM:  General: NAD, A/O x 3  ENT: MMM, no thrush  Neck: Supple, No JVD  Lungs: decreased breath sounds b/l bases  Cardio: +s1/s2  Abdomen: Soft, Nontender, Nondistended; Bowel sounds present  Extremities: +le wound w/ rle bka    LABS:                        13.4   8.08  )-----------( 121      ( 2023 04:52 )             46.6     04-06    132  |  97  |  72.9  ----------------------------<  106  5.1   |  20.0  |  2.41    Ca    8.2      2023 04:52  Phos  5.8     04-06  Mg     2.6     04-06    TPro  6.7  /  Alb  3.4  /  TBili  0.8  /  DBili  x   /  AST  23  /  ALT  10  /  AlkPhos  106  04-06    PT/INR - ( 2023 03:10 )   PT: 25.9 sec;   INR: 2.21 ratio    PTT - ( 2023 03:10 )  PTT:30.5 sec    Lactate, Blood: 2.5 mmol/L ( @ 09:15)  Lactate, Blood: 2.7 mmol/L ( @ 03:10)    - Chol 111 mg/dL LDL -- HDL 35 mg/dL Trig 83 mg/dL    13:45 - VBG - pH: 7.270 | pCO2: 50    | pO2: 80    | Lactate: 4.10     ABG - ( 2023 09:42 )  pH, Arterial: 7.440 pH, Blood: x     /  pCO2: 37    /  pO2: 109   / HCO3: 25    / Base Excess: 0.9   /  SaO2: 99.3      Glucose  POCT Blood Glucose.: 107 mg/dL (2023 01:06)  POCT Blood Glucose.: 109 mg/dL (2023 00:48)  POCT Blood Glucose.: 109 mg/dL (2023 00:43)  POCT Blood Glucose.: 64 mg/dL (2023 23:45)  POCT Blood Glucose.: 59 mg/dL (2023 23:44)  POCT Blood Glucose.: 73 mg/dL (2023 21:34)  POCT Blood Glucose.: 102 mg/dL (2023 17:36)  POCT Blood Glucose.: 204 mg/dL (2023 13:30)  POCT Blood Glucose.: 172 mg/dL (2023 09:20)    Urinalysis Basic - ( 2023 22:00 )  Color: Yellow / Appearance: Clear / S.015 / pH: x  Gluc: x / Ketone: Negative  / Bili: Negative / Urobili: Negative mg/dL   Blood: x / Protein: 15 / Nitrite: Negative   Leuk Esterase: Trace / RBC: 0-2 /HPF / WBC 0-2 /HPF   Sq Epi: x / Non Sq Epi: Occasional / Bacteria: Occasional    Cultures  Culture - Blood (collected 2023 17:03)  Source: .Blood Blood-Peripheral  Preliminary Report (2023 01:02):    No growth to date.    Culture - Blood (collected 2023 16:13)  Source: .Blood Blood-Peripheral  Preliminary Report (2023 22:02):    No growth to date.    Culture - Urine (collected 2023 22:00)  Source: Catheterized Catheterized  Final Report (2023 14:58):    >=3 organisms. Probable collection contamination.    Culture - Blood (collected 2023 13:50)  Source: .Blood Blood-Peripheral  Preliminary Report (2023 23:01):    No growth to date.    Culture - Blood (collected 2023 13:30)  Source: .Blood Blood-Peripheral  Gram Stain (2023 11:47):    Growth in anaerobic bottle: Gram Negative Rods    Growth in aerobic bottle: Gram Negative Rods and Gram Positive Cocci in    Clusters  Organism: Blood Culture PCR (2023 11:56)  Organism: Blood Culture PCR (2023 11:56)      Method Type: PCR      -  Escherichia coli: Detec      -  Coagulase negative Staphylococcus: Detec    RADIOLOGY & ADDITIONAL TESTS:  - no new tests    Care Discussed with Consultants/Other Providers:   Cardiology

## 2023-04-06 NOTE — PROGRESS NOTE ADULT - ASSESSMENT
A) MO, CHRISS, DM 2, Hypothyroid, left renal  stone - not  obstructing; PVD; Diffuse  edema. Prerenal azotemia with CRF as well.    P) IV meds  same, follow up labs. A) MO, CHRISS, DM 2, Hypothyroid, left renal  stone - not  obstructing; PVD; Diffuse  edema. Prerenal azotemia with CRF as well.    P) IV meds  same, follow up labs. May need ultrafiltration or HD soon.

## 2023-04-06 NOTE — PROGRESS NOTE ADULT - SUBJECTIVE AND OBJECTIVE BOX
NEPHROLOGY INTERVAL HPI/OVERNIGHT EVENTS:    No new events.    MEDICATIONS  (STANDING):  acetaZOLAMIDE  IVPB 500 milliGRAM(s) IV Intermittent daily  albuterol/ipratropium for Nebulization 3 milliLiter(s) Nebulizer every 20 minutes  apixaban 5 milliGRAM(s) Oral every 12 hours  atorvastatin 40 milliGRAM(s) Oral at bedtime  carvedilol 25 milliGRAM(s) Oral every 12 hours  chlorhexidine 2% Cloths 1 Application(s) Topical daily  dextrose 5%. 1000 milliLiter(s) (100 mL/Hr) IV Continuous <Continuous>  dextrose 5%. 1000 milliLiter(s) (50 mL/Hr) IV Continuous <Continuous>  dextrose 50% Injectable 25 Gram(s) IV Push once  dextrose 50% Injectable 12.5 Gram(s) IV Push once  dextrose 50% Injectable 25 Gram(s) IV Push once  DULoxetine 60 milliGRAM(s) Oral daily  folic acid 1 milliGRAM(s) Oral daily  furosemide   Injectable 40 milliGRAM(s) IV Push two times a day  glucagon  Injectable 1 milliGRAM(s) IntraMuscular once  insulin glargine Injectable (LANTUS) 40 Unit(s) SubCutaneous every morning  insulin lispro (ADMELOG) corrective regimen sliding scale   SubCutaneous three times a day before meals  insulin lispro (ADMELOG) corrective regimen sliding scale   SubCutaneous at bedtime  insulin lispro Injectable (ADMELOG) 20 Unit(s) SubCutaneous three times a day before meals  levothyroxine 50 MICROGram(s) Oral daily  mupirocin 2% Ointment 1 Application(s) Topical two times a day  piperacillin/tazobactam IVPB.. 3.375 Gram(s) IV Intermittent every 8 hours  pregabalin 150 milliGRAM(s) Oral two times a day  sevelamer carbonate 1600 milliGRAM(s) Oral three times a day with meals  tamsulosin 0.4 milliGRAM(s) Oral at bedtime    MEDICATIONS  (PRN):  acetaminophen     Tablet .. 650 milliGRAM(s) Oral every 6 hours PRN Temp greater or equal to 38C (100.4F), Mild Pain (1 - 3)  aluminum hydroxide/magnesium hydroxide/simethicone Suspension 30 milliLiter(s) Oral every 4 hours PRN Dyspepsia  dextrose Oral Gel 15 Gram(s) Oral once PRN Blood Glucose LESS THAN 70 milliGRAM(s)/deciliter  melatonin 3 milliGRAM(s) Oral at bedtime PRN Insomnia  methocarbamol 500 milliGRAM(s) Oral two times a day PRN for severe pain  ondansetron Injectable 4 milliGRAM(s) IV Push every 8 hours PRN Nausea and/or Vomiting  traMADol 50 milliGRAM(s) Oral every 12 hours PRN Severe Pain (7 - 10)      Allergies    clindamycin (Unknown)    Intolerances    allow double protein at meals (Unknown)      Vital Signs Last 24 Hrs  T(C): 36.7 (06 Apr 2023 04:39), Max: 36.7 (06 Apr 2023 04:39)  T(F): 98.1 (06 Apr 2023 04:39), Max: 98.1 (06 Apr 2023 04:39)  HR: 71 (06 Apr 2023 05:56) (71 - 82)  BP: 120/75 (06 Apr 2023 04:39) (101/68 - 120/75)  BP(mean): --  RR: 18 (06 Apr 2023 04:39) (18 - 18)  SpO2: 99% (06 Apr 2023 05:56) (96% - 100%)    Parameters below as of 06 Apr 2023 01:53  Patient On (Oxygen Delivery Method): nasal cannula  O2 Flow (L/min): 2    Daily Height in cm: 177.8 (06 Apr 2023 01:53)        PHYSICAL EXAM:    GENERAL: MO in bed  sleeping  HEAD:  wnl  EYES:   ENMT: no sign of  bleeding  NECK: Obese  NERVOUS SYSTEM:  Slow  mentation  CHEST/LUNG: No 02 at present, poor air motion  HEART: No  rub  ABDOMEN: obese  with  body wall edema  EXTREMITIES: Left lower leg, foot  with wrap, R leg amp  site  same   LYMPH:   SKIN: No rash  : Neda, I&O noted    LABS:                        13.4   8.08  )-----------( 121      ( 06 Apr 2023 04:52 )             46.6     04-06    132<L>  |  97  |  72.9<H>  ----------------------------<  106<H>  5.1   |  20.0<L>  |  2.41<H>    Ca    8.2<L>      06 Apr 2023 04:52  Phos  5.8     04-06  Mg     2.6     04-06    TPro  6.7  /  Alb  3.4  /  TBili  0.8  /  DBili  x   /  AST  23  /  ALT  10  /  AlkPhos  106  04-06        Magnesium, Serum: 2.6 mg/dL (04-06 @ 04:52)  Phosphorus Level, Serum: 5.8 mg/dL (04-06 @ 04:52)          RADIOLOGY & ADDITIONAL TESTS:

## 2023-04-07 LAB
24R-OH-CALCIDIOL SERPL-MCNC: 22.3 NG/ML — LOW (ref 30–80)
ANION GAP SERPL CALC-SCNC: 13 MMOL/L — SIGNIFICANT CHANGE UP (ref 5–17)
BUN SERPL-MCNC: 79.7 MG/DL — HIGH (ref 8–20)
CALCIUM SERPL-MCNC: 8.5 MG/DL — SIGNIFICANT CHANGE UP (ref 8.4–10.5)
CALCIUM SERPL-MCNC: 8.6 MG/DL — SIGNIFICANT CHANGE UP (ref 8.4–10.5)
CHLORIDE SERPL-SCNC: 98 MMOL/L — SIGNIFICANT CHANGE UP (ref 96–108)
CO2 SERPL-SCNC: 22 MMOL/L — SIGNIFICANT CHANGE UP (ref 22–29)
CREAT SERPL-MCNC: 2.18 MG/DL — HIGH (ref 0.5–1.3)
EGFR: 31 ML/MIN/1.73M2 — LOW
GLUCOSE BLDC GLUCOMTR-MCNC: 115 MG/DL — HIGH (ref 70–99)
GLUCOSE BLDC GLUCOMTR-MCNC: 155 MG/DL — HIGH (ref 70–99)
GLUCOSE BLDC GLUCOMTR-MCNC: 91 MG/DL — SIGNIFICANT CHANGE UP (ref 70–99)
GLUCOSE BLDC GLUCOMTR-MCNC: 96 MG/DL — SIGNIFICANT CHANGE UP (ref 70–99)
GLUCOSE SERPL-MCNC: 92 MG/DL — SIGNIFICANT CHANGE UP (ref 70–99)
HCT VFR BLD CALC: 45.4 % — SIGNIFICANT CHANGE UP (ref 39–50)
HGB BLD-MCNC: 13.5 G/DL — SIGNIFICANT CHANGE UP (ref 13–17)
MCHC RBC-ENTMCNC: 24.5 PG — LOW (ref 27–34)
MCHC RBC-ENTMCNC: 29.7 GM/DL — LOW (ref 32–36)
MCV RBC AUTO: 82.5 FL — SIGNIFICANT CHANGE UP (ref 80–100)
PLATELET # BLD AUTO: 130 K/UL — LOW (ref 150–400)
POTASSIUM SERPL-MCNC: 4.6 MMOL/L — SIGNIFICANT CHANGE UP (ref 3.5–5.3)
POTASSIUM SERPL-SCNC: 4.6 MMOL/L — SIGNIFICANT CHANGE UP (ref 3.5–5.3)
PTH-INTACT FLD-MCNC: 104 PG/ML — HIGH (ref 15–65)
RBC # BLD: 5.5 M/UL — SIGNIFICANT CHANGE UP (ref 4.2–5.8)
RBC # FLD: 20.6 % — HIGH (ref 10.3–14.5)
SODIUM SERPL-SCNC: 133 MMOL/L — LOW (ref 135–145)
WBC # BLD: 7.42 K/UL — SIGNIFICANT CHANGE UP (ref 3.8–10.5)
WBC # FLD AUTO: 7.42 K/UL — SIGNIFICANT CHANGE UP (ref 3.8–10.5)

## 2023-04-07 PROCEDURE — 99233 SBSQ HOSP IP/OBS HIGH 50: CPT

## 2023-04-07 RX ORDER — CEFTRIAXONE 500 MG/1
2000 INJECTION, POWDER, FOR SOLUTION INTRAMUSCULAR; INTRAVENOUS EVERY 24 HOURS
Refills: 0 | Status: COMPLETED | OUTPATIENT
Start: 2023-04-07 | End: 2023-04-10

## 2023-04-07 RX ORDER — BUMETANIDE 0.25 MG/ML
1 INJECTION INTRAMUSCULAR; INTRAVENOUS DAILY
Refills: 0 | Status: DISCONTINUED | OUTPATIENT
Start: 2023-04-07 | End: 2023-04-09

## 2023-04-07 RX ADMIN — SEVELAMER CARBONATE 1600 MILLIGRAM(S): 2400 POWDER, FOR SUSPENSION ORAL at 17:41

## 2023-04-07 RX ADMIN — Medication 50 MICROGRAM(S): at 05:46

## 2023-04-07 RX ADMIN — PIPERACILLIN AND TAZOBACTAM 25 GRAM(S): 4; .5 INJECTION, POWDER, LYOPHILIZED, FOR SOLUTION INTRAVENOUS at 05:47

## 2023-04-07 RX ADMIN — SEVELAMER CARBONATE 1600 MILLIGRAM(S): 2400 POWDER, FOR SUSPENSION ORAL at 08:30

## 2023-04-07 RX ADMIN — Medication 1 MILLIGRAM(S): at 11:03

## 2023-04-07 RX ADMIN — APIXABAN 5 MILLIGRAM(S): 2.5 TABLET, FILM COATED ORAL at 17:42

## 2023-04-07 RX ADMIN — SEVELAMER CARBONATE 1600 MILLIGRAM(S): 2400 POWDER, FOR SUSPENSION ORAL at 12:25

## 2023-04-07 RX ADMIN — MUPIROCIN 1 APPLICATION(S): 20 OINTMENT TOPICAL at 05:45

## 2023-04-07 RX ADMIN — Medication 150 MILLIGRAM(S): at 05:45

## 2023-04-07 RX ADMIN — INSULIN GLARGINE 40 UNIT(S): 100 INJECTION, SOLUTION SUBCUTANEOUS at 08:02

## 2023-04-07 RX ADMIN — CARVEDILOL PHOSPHATE 25 MILLIGRAM(S): 80 CAPSULE, EXTENDED RELEASE ORAL at 05:51

## 2023-04-07 RX ADMIN — MUPIROCIN 1 APPLICATION(S): 20 OINTMENT TOPICAL at 17:41

## 2023-04-07 RX ADMIN — TAMSULOSIN HYDROCHLORIDE 0.4 MILLIGRAM(S): 0.4 CAPSULE ORAL at 21:59

## 2023-04-07 RX ADMIN — DULOXETINE HYDROCHLORIDE 60 MILLIGRAM(S): 30 CAPSULE, DELAYED RELEASE ORAL at 11:03

## 2023-04-07 RX ADMIN — APIXABAN 5 MILLIGRAM(S): 2.5 TABLET, FILM COATED ORAL at 05:45

## 2023-04-07 RX ADMIN — Medication 150 MILLIGRAM(S): at 17:41

## 2023-04-07 RX ADMIN — CEFTRIAXONE 2000 MILLIGRAM(S): 500 INJECTION, POWDER, FOR SOLUTION INTRAMUSCULAR; INTRAVENOUS at 13:54

## 2023-04-07 RX ADMIN — CHLORHEXIDINE GLUCONATE 1 APPLICATION(S): 213 SOLUTION TOPICAL at 11:02

## 2023-04-07 RX ADMIN — ATORVASTATIN CALCIUM 40 MILLIGRAM(S): 80 TABLET, FILM COATED ORAL at 21:59

## 2023-04-07 RX ADMIN — CARVEDILOL PHOSPHATE 25 MILLIGRAM(S): 80 CAPSULE, EXTENDED RELEASE ORAL at 17:42

## 2023-04-07 NOTE — PHARMACOTHERAPY INTERVENTION NOTE - COMMENTS
Respiratory Distress  Empiric antibiotic recommendations accepted 
Discussed with ID, de-escalate from Zosyn to ceftriaxone for E Coli bacteremia.

## 2023-04-07 NOTE — PROGRESS NOTE ADULT - ASSESSMENT
A) JAYLENE on CKD suspect stage III/ IV has h/o CHRISS, DM 2, Hypothyroid, left renal  stone - not  obstructing; PVD; Diffuse  edema.     P) Serum Cr improved may w anasarca will add bumex 1 mg IV Q day may need to escalate will need to accept degree of azotemia. May need ultrafiltration or HD soon.    AM labs will follow

## 2023-04-07 NOTE — PROGRESS NOTE ADULT - SUBJECTIVE AND OBJECTIVE BOX
NEPHROLOGY INTERVAL HPI/OVERNIGHT EVENTS:    Examined  Laying flat no dyspnea  Nephew visiting  Denies HA CP     MEDICATIONS  (STANDING):  albuterol/ipratropium for Nebulization 3 milliLiter(s) Nebulizer every 20 minutes  apixaban 5 milliGRAM(s) Oral every 12 hours  atorvastatin 40 milliGRAM(s) Oral at bedtime  carvedilol 25 milliGRAM(s) Oral every 12 hours  cefTRIAXone Injectable. 2000 milliGRAM(s) IV Push every 24 hours  chlorhexidine 2% Cloths 1 Application(s) Topical daily  dextrose 5%. 1000 milliLiter(s) (50 mL/Hr) IV Continuous <Continuous>  dextrose 5%. 1000 milliLiter(s) (100 mL/Hr) IV Continuous <Continuous>  dextrose 50% Injectable 25 Gram(s) IV Push once  dextrose 50% Injectable 12.5 Gram(s) IV Push once  dextrose 50% Injectable 25 Gram(s) IV Push once  DULoxetine 60 milliGRAM(s) Oral daily  folic acid 1 milliGRAM(s) Oral daily  glucagon  Injectable 1 milliGRAM(s) IntraMuscular once  insulin glargine Injectable (LANTUS) 40 Unit(s) SubCutaneous every morning  insulin lispro (ADMELOG) corrective regimen sliding scale   SubCutaneous three times a day before meals  insulin lispro (ADMELOG) corrective regimen sliding scale   SubCutaneous at bedtime  levothyroxine 50 MICROGram(s) Oral daily  mupirocin 2% Ointment 1 Application(s) Topical two times a day  pregabalin 150 milliGRAM(s) Oral two times a day  sevelamer carbonate 1600 milliGRAM(s) Oral three times a day with meals  tamsulosin 0.4 milliGRAM(s) Oral at bedtime    MEDICATIONS  (PRN):  acetaminophen     Tablet .. 650 milliGRAM(s) Oral every 6 hours PRN Temp greater or equal to 38C (100.4F), Mild Pain (1 - 3)  aluminum hydroxide/magnesium hydroxide/simethicone Suspension 30 milliLiter(s) Oral every 4 hours PRN Dyspepsia  dextrose Oral Gel 15 Gram(s) Oral once PRN Blood Glucose LESS THAN 70 milliGRAM(s)/deciliter  melatonin 3 milliGRAM(s) Oral at bedtime PRN Insomnia  methocarbamol 500 milliGRAM(s) Oral two times a day PRN for severe pain  ondansetron Injectable 4 milliGRAM(s) IV Push every 8 hours PRN Nausea and/or Vomiting  traMADol 50 milliGRAM(s) Oral every 12 hours PRN Severe Pain (7 - 10)      Allergies    clindamycin (Unknown)    Intolerances    allow double protein at meals (Unknown)      Vital Signs Last 24 Hrs  T(C): 37 (07 Apr 2023 11:19), Max: 37.2 (06 Apr 2023 20:40)  T(F): 98.6 (07 Apr 2023 11:19), Max: 98.9 (06 Apr 2023 20:40)  HR: 74 (07 Apr 2023 11:19) (72 - 83)  BP: 98/65 (07 Apr 2023 11:19) (90/63 - 120/49)  BP(mean): --  RR: 18 (07 Apr 2023 11:19) (18 - 20)  SpO2: 96% (07 Apr 2023 11:19) (92% - 99%)    Parameters below as of 07 Apr 2023 11:19  Patient On (Oxygen Delivery Method): room air      PHYSICAL EXAM:  GENERAL: MO in bed  sleeping  HEAD:  NCAT  NECK: Obese  NERVOUS SYSTEM:  Slow to respond  CHEST/LUNG: dec bs anteriorly  HEART: +S1 S2 No  rub  ABDOMEN: obese  with  body wall edema  EXTREMITIES: Left lower leg, foot  with wrap, R leg amp  site  same     LABS:                        13.5   7.42  )-----------( 130      ( 07 Apr 2023 06:06 )             45.4     04-07    133<L>  |  98  |  79.7<H>  ----------------------------<  92  4.6   |  22.0  |  2.18<H>    Ca    8.6      07 Apr 2023 06:06  Phos  5.8     04-06  Mg     2.6     04-06    TPro  6.7  /  Alb  3.4  /  TBili  0.8  /  DBili  x   /  AST  23  /  ALT  10  /  AlkPhos  106  04-06        Intact PTH: 104 pg/mL (04-07 @ 06:06)  Vitamin D, 25-Hydroxy: 22.3 ng/mL (04-07 @ 06:06)          RADIOLOGY & ADDITIONAL TESTS:

## 2023-04-07 NOTE — PROGRESS NOTE ADULT - SUBJECTIVE AND OBJECTIVE BOX
[facility]  INFECTIOUS DISEASES at [place] and [place]  =======================================================                               Maximiliano Nagel MD#   Sharon Jules MD*                             Twyla Seo MD*   Sandra Correa MD*            Diplomates American Board of Internal Medicine & Infectious Diseases                # [place] Office - Appt - Tel  [phone] Fax [phone]                * [place] Office - Appt - Tel [phone] Fax [phone]                                  Hospital Consult line:  [phone]  =======================================================    MARY ANN CORNELL [id number]    Follow up: bacteremia    No fever      Allergies:  allow double protein at meals (Unknown)  clindamycin (Unknown)       REVIEW OF SYSTEMS:  CONSTITUTIONAL:  No Fever or chills  HEENT:   No diplopia or blurred vision.  No earache, sore throat or runny nose.  CARDIOVASCULAR:  No Chest Pain  RESPIRATORY:  No cough, shortness of breath  GASTROINTESTINAL:  No nausea, vomiting or diarrhea.  GENITOURINARY:  No dysuria, frequency or urgency. No Blood in urine  MUSCULOSKELETAL:  no joint aches, no muscle pain  SKIN:  No rash  NEUROLOGIC:  No Headaches  PSYCHIATRIC:  No disorder of thought or mood.  ENDOCRINE:  No heat or cold intolerance  HEMATOLOGICAL:  No easy bruising or bleeding.       Physical Exam:  GEN: NAD  HEENT: normocephalic and atraumatic. EOMI. PERRL.    NECK: Supple.   LUNGS: CTA B/L.  HEART: RRR  ABDOMEN: Soft, NT, ND.  +BS.    : No CVA tenderness  EXTREMITIES: Rt BKA   MSK: No joint swelling  NEUROLOGIC: No Focal Deficits   SKIN: Left leg hallux ulcer, chronic wounds     Vitals:  T(F): 97.3 (07 Apr 2023 05:50), Max: 98.9 (06 Apr 2023 20:40)  HR: 83 (07 Apr 2023 05:50)  BP: 101/69 (07 Apr 2023 05:50)  RR: 19 (07 Apr 2023 04:40)  SpO2: 99% (07 Apr 2023 04:40) (92% - 99%)  temp max in last 48H T(F): , Max: 98.9 (04-06-23 @ 20:40)      Current Antibiotics:  piperacillin/tazobactam IVPB.. 3.375 Gram(s) IV Intermittent every 8 hours    Other medications:  albuterol/ipratropium for Nebulization 3 milliLiter(s) Nebulizer every 20 minutes  apixaban 5 milliGRAM(s) Oral every 12 hours  atorvastatin 40 milliGRAM(s) Oral at bedtime  carvedilol 25 milliGRAM(s) Oral every 12 hours  chlorhexidine 2% Cloths 1 Application(s) Topical daily  dextrose 5%. 1000 milliLiter(s) IV Continuous <Continuous>  dextrose 5%. 1000 milliLiter(s) IV Continuous <Continuous>  dextrose 50% Injectable 25 Gram(s) IV Push once  dextrose 50% Injectable 12.5 Gram(s) IV Push once  dextrose 50% Injectable 25 Gram(s) IV Push once  DULoxetine 60 milliGRAM(s) Oral daily  folic acid 1 milliGRAM(s) Oral daily  glucagon  Injectable 1 milliGRAM(s) IntraMuscular once  insulin glargine Injectable (LANTUS) 40 Unit(s) SubCutaneous every morning  insulin lispro (ADMELOG) corrective regimen sliding scale   SubCutaneous three times a day before meals  insulin lispro (ADMELOG) corrective regimen sliding scale   SubCutaneous at bedtime  levothyroxine 50 MICROGram(s) Oral daily  mupirocin 2% Ointment 1 Application(s) Topical two times a day  pregabalin 150 milliGRAM(s) Oral two times a day  sevelamer carbonate 1600 milliGRAM(s) Oral three times a day with meals  tamsulosin 0.4 milliGRAM(s) Oral at bedtime                 13.5   7.42  )-----------( 130      ( 07 Apr 2023 06:06 )             45.4     04-07    133<L>  |  98  |  79.7<H>  ----------------------------<  92  4.6   |  22.0  |  2.18<H>    Ca    8.6      07 Apr 2023 06:06  Phos  5.8     04-06  Mg     2.6     04-06    TPro  6.7  /  Alb  3.4  /  TBili  0.8  /  DBili  x   /  AST  23  /  ALT  10  /  AlkPhos  106  04-06    RECENT CULTURES:  04-04 @ 17:03 .Blood Blood-Peripheral     No growth to date.    04-04 @ 16:13 .Blood Blood-Peripheral     No growth to date.    04-03 @ 22:00 Catheterized Catheterized     >=3 organisms. Probable collection contamination.    04-03 @ 13:50 .Blood Blood-Peripheral     No growth to date.    04-03 @ 13:45    RVP  NotDetec    04-03 @ 13:30 .Blood Blood-Peripheral Blood Culture PCR  Escherichia coli    Growth in aerobic and anaerobic bottles: Escherichia coli  Growth in aerobic bottle: Staphylococcus simulans  "Susceptibilities not performed"  Growth in aerobic bottle: Aerococcus viridans  "Susceptibilities not performed"  ***Blood Panel PCR results on this specimen are available  approximately 3 hours after the Gram stain result.***  Gram stain, PCR, and/or culture results may not always  correspond due to difference in methodologies.  ************************************************************  This PCR assay was performed by multiplex PCR. This  Assay tests for 66 bacterial and resistance gene targets.  Please refer to the [facility] test directory  at [email or web address] for details.  Growth in anaerobic bottle: Gram Negative Rods  Growth in aerobic bottle: Gram Negative Rods and Gram Positive Cocci in  Clusters      WBC Count: 7.42 K/uL (04-07-23 @ 06:06)  WBC Count: 8.08 K/uL (04-06-23 @ 04:52)  WBC Count: 11.83 K/uL (04-05-23 @ 02:48)  WBC Count: 8.71 K/uL (04-04-23 @ 03:10)  WBC Count: 10.49 K/uL (04-03-23 @ 13:50)    Creatinine, Serum: 2.18 mg/dL (04-07-23 @ 06:06)  Creatinine, Serum: 2.41 mg/dL (04-06-23 @ 04:52)  Creatinine, Serum: 2.10 mg/dL (04-05-23 @ 13:25)  Creatinine, Serum: 2.15 mg/dL (04-05-23 @ 02:48)  Creatinine, Serum: 1.49 mg/dL (04-04-23 @ 03:10)  Creatinine, Serum: 1.45 mg/dL (04-03-23 @ 13:50)    C-Reactive Protein, Serum: 26 mg/L (04-05-23 @ 13:25)    Sedimentation Rate, Erythrocyte: 8 mm/hr (04-05-23 @ 13:25)     SARS-CoV-2: NotDetec (04-03-23 @ 13:45)      < from: US Renal (04.05.23 @ 23:01) >  ACC: [id number] EXAM:  US KIDNEY(S)   ORDERED BY: CONNER JACQUES     PROCEDURE DATE:  04/05/2023          INTERPRETATION:  CLINICAL INFORMATION: Acute renal failure    COMPARISON: CT abdomen pelvis performed on the same day.    TECHNIQUE: Sonographyof the kidneys and bladder.    FINDINGS:  Right kidney: 10.5 cm. No hydronephrosis or shadowing stone.    Left kidney: 10.7 cm. No hydronephrosis. There is a 0.4 cm echogenic   focus in the lower pole. Upper pole is poorly visualized.    Urinary bladder: Not visualized.    IMPRESSION:  Suboptimal visualization of the left kidney. There is a 0.4 cm echogenic   focus in the lower pole, likely a nonobstructing stone.    No hydronephrosis.        --- End of Report ---    < end of copied text >        < from: CT Renal Stone Hunt (04.05.23 @ 18:19) >  ACC: [id number] EXAM:  CT RENAL STONE HUNT   ORDERED BY: MARSHALL WARD     PROCEDURE DATE:  04/05/2023          INTERPRETATION:  CLINICAL INFORMATION: Acute renal failure. Bacteremia.    COMPARISON: 3/7/2021    CONTRAST/COMPLICATIONS:  IV Contrast:NONE  Oral Contrast: NONE  Complications: None reported at time of study completion    PROCEDURE:  CT of the Abdomen and Pelvis was performed.  Sagittal and coronal reformats were performed.    FINDINGS:  LOWER CHEST: Small bilateral pleural effusions with adjacent passive   atelectasis.    LIVER: Within normal limits.  BILE DUCTS: Normal caliber.  GALLBLADDER: Cholelithiasis.  SPLEEN: Within normal limits.  PANCREAS: Within normal limits.  ADRENALS: Within normal limits.  KIDNEYS/URETERS: Punctate nonobstructing left lower pole renal stone. No   hydronephrosis.    BLADDER: Thickened bladder wall, likely chronic changes of bladder outlet   obstruction. Partially decompressed with a Red catheter.  REPRODUCTIVE ORGANS: Brachytherapy seeds in the prostate.    BOWEL: No bowel obstruction. Appendix is normal.  PERITONEUM: Small amount of abdominal and pelvic ascites.  VESSELS: Atherosclerotic calcifications.  RETROPERITONEUM/LYMPH NODES: No lymphadenopathy.  ABDOMINAL WALL: Subcutaneous edema. Bilateral fat-containing inguinal   hernias.  BONES: Degenerative changes.    IMPRESSION:  *  No evidence of acute infectious process in the abdomen and pelvis.  *  Pleural effusions, ascites, and anasarca.    < end of copied text >

## 2023-04-07 NOTE — CHART NOTE - NSCHARTNOTEFT_GEN_A_CORE
Nutrition Note:   Consult noted.  Please see full assessment from 4/5.  Spoke with pt today. Provided meal planning with plate model and Dash diet education materials at bedside.   Pt reports good appetite.     Compliance questionable.   Encouragement provided.   Provide double protein portions.     Continue diet as ordered, 1 L FR noted.

## 2023-04-07 NOTE — PROGRESS NOTE ADULT - SUBJECTIVE AND OBJECTIVE BOX
MARY ANN CORNELL  ----------------------------------------  The patient was seen at bedside. Patient with heart failure and acute kidney injury. Denied any chest pain or palpitations. Noted mild dyspnea.    Vital Signs Last 24 Hrs  T(C): 37 (07 Apr 2023 11:19), Max: 37.2 (06 Apr 2023 20:40)  T(F): 98.6 (07 Apr 2023 11:19), Max: 98.9 (06 Apr 2023 20:40)  HR: 74 (07 Apr 2023 11:19) (72 - 83)  BP: 98/65 (07 Apr 2023 11:19) (90/63 - 120/49)  BP(mean): --  RR: 18 (07 Apr 2023 11:19) (18 - 20)  SpO2: 96% (07 Apr 2023 11:19) (92% - 99%)    Parameters below as of 07 Apr 2023 11:19  Patient On (Oxygen Delivery Method): room air    CAPILLARY BLOOD GLUCOSE  POCT Blood Glucose.: 91 mg/dL (07 Apr 2023 08:24)  POCT Blood Glucose.: 121 mg/dL (06 Apr 2023 22:20)  POCT Blood Glucose.: 113 mg/dL (06 Apr 2023 17:14)  POCT Blood Glucose.: 110 mg/dL (06 Apr 2023 11:52)    PHYSICAL EXAMINATION:  ----------------------------------------  General appearance: No acute distress, Awake, Alert  HEENT: Normocephalic, Atraumatic, Conjunctiva clear, EOMI  Neck: Supple, No JVD, No tenderness  Lungs: No wheezes, No rales, Diminished breath sounds at the bases  Cardiovascular: S1S2, Regular rhythm  Abdomen: Soft, Nontender, Nondistended, No guarding/rebound, Positive bowel sounds  Extremities: No clubbing, No cyanosis, No calf tenderness, Lower extremity edema, Right below knee amputations, Left leg dressing clean and intact  Neuro: Strength equal bilaterally, No tremors  Psychiatric: Appropriate mood, Normal affect    LABORATORY STUDIES:  ----------------------------------------             13.5   7.42  )-----------( 130      ( 07 Apr 2023 06:06 )             45.4     04-07    133<L>  |  98  |  79.7<H>  ----------------------------<  92  4.6   |  22.0  |  2.18<H>    Ca    8.6      07 Apr 2023 06:06  Phos  5.8     04-06  Mg     2.6     04-06    TPro  6.7  /  Alb  3.4  /  TBili  0.8  /  DBili  x   /  AST  23  /  ALT  10  /  AlkPhos  106  04-06    LIVER FUNCTIONS - ( 06 Apr 2023 04:52 )  Alb: 3.4 g/dL / Pro: 6.7 g/dL / ALK PHOS: 106 U/L / ALT: 10 U/L / AST: 23 U/L / GGT: x           Culture - Blood (collected 04 Apr 2023 17:03)  Source: .Blood Blood-Peripheral  Preliminary Report (06 Apr 2023 01:02):    No growth to date.    Culture - Blood (collected 04 Apr 2023 16:13)  Source: .Blood Blood-Peripheral  Preliminary Report (05 Apr 2023 22:02):    No growth to date.    MEDICATIONS  (STANDING):  albuterol/ipratropium for Nebulization 3 milliLiter(s) Nebulizer every 20 minutes  apixaban 5 milliGRAM(s) Oral every 12 hours  atorvastatin 40 milliGRAM(s) Oral at bedtime  carvedilol 25 milliGRAM(s) Oral every 12 hours  cefTRIAXone Injectable. 2000 milliGRAM(s) IV Push every 24 hours  chlorhexidine 2% Cloths 1 Application(s) Topical daily  dextrose 5%. 1000 milliLiter(s) (50 mL/Hr) IV Continuous <Continuous>  dextrose 5%. 1000 milliLiter(s) (100 mL/Hr) IV Continuous <Continuous>  dextrose 50% Injectable 25 Gram(s) IV Push once  dextrose 50% Injectable 12.5 Gram(s) IV Push once  dextrose 50% Injectable 25 Gram(s) IV Push once  DULoxetine 60 milliGRAM(s) Oral daily  folic acid 1 milliGRAM(s) Oral daily  glucagon  Injectable 1 milliGRAM(s) IntraMuscular once  insulin glargine Injectable (LANTUS) 40 Unit(s) SubCutaneous every morning  insulin lispro (ADMELOG) corrective regimen sliding scale   SubCutaneous three times a day before meals  insulin lispro (ADMELOG) corrective regimen sliding scale   SubCutaneous at bedtime  levothyroxine 50 MICROGram(s) Oral daily  mupirocin 2% Ointment 1 Application(s) Topical two times a day  pregabalin 150 milliGRAM(s) Oral two times a day  sevelamer carbonate 1600 milliGRAM(s) Oral three times a day with meals  tamsulosin 0.4 milliGRAM(s) Oral at bedtime    MEDICATIONS  (PRN):  acetaminophen     Tablet .. 650 milliGRAM(s) Oral every 6 hours PRN Temp greater or equal to 38C (100.4F), Mild Pain (1 - 3)  aluminum hydroxide/magnesium hydroxide/simethicone Suspension 30 milliLiter(s) Oral every 4 hours PRN Dyspepsia  dextrose Oral Gel 15 Gram(s) Oral once PRN Blood Glucose LESS THAN 70 milliGRAM(s)/deciliter  melatonin 3 milliGRAM(s) Oral at bedtime PRN Insomnia  methocarbamol 500 milliGRAM(s) Oral two times a day PRN for severe pain  ondansetron Injectable 4 milliGRAM(s) IV Push every 8 hours PRN Nausea and/or Vomiting  traMADol 50 milliGRAM(s) Oral every 12 hours PRN Severe Pain (7 - 10)      ASSESSMENT / PLAN:  ----------------------------------------  72M with a history of cardiomyopathy with AICD, atrial fibrillation, diabetes, hypertension, chronic kidney disease, prostate cancer, and obstructive sleep apnea who presented with a two week history of dyspnea found to have heart failure.   Blood cultures were also noted to grow Ecoli for which intravenous antibiotics were initiated.    Acute hypoxic respiratory failure / Acute on chronic systolic heart failure - Echocardiogram noted severely decreased left ventricular systolic function. Previously on intravenous furosemide which was held due to acute kidney injury. Monitoring urine output.    Ecoli bacteremia - Discussed with Infectious Disease. On ceftriaxone. Currently afebrile and without leukocytosis. Repeat blood cultures were without growth.    Chronic kidney disease III with acute kidney injury / History of benign prostatic hypertrophy - Nephrology consultation noted. On tamsulosin. Red catheter in place. Monitoring renal function and urine output. Off furosemide for now. May benefit from ultrafiltration.    Atrial fibrillation - On carvedilol. Apixaban for anticoagulation.    Diabetes - Insulin coverage, close monitoring of blood glucose levels. On pregabalin for neuropathy.    Hypothyroidism - On levothyroxine.    Obstructive sleep apnea - On nocturnal Bipap.

## 2023-04-07 NOTE — PROGRESS NOTE ADULT - SUBJECTIVE AND OBJECTIVE BOX
[facility]                                   [address] [place] [address]                                                      PHONE: [phone]                                                         FAX: [phone]  [email or web address]  ---------------------------------------------------------------------------------------------------------------------------------    Overnight events/patient complaints: patient seen at bedside.       allow double protein at meals (Unknown)  clindamycin (Unknown)    MEDICATIONS  (STANDING):  albuterol/ipratropium for Nebulization 3 milliLiter(s) Nebulizer every 20 minutes  apixaban 5 milliGRAM(s) Oral every 12 hours  atorvastatin 40 milliGRAM(s) Oral at bedtime  carvedilol 25 milliGRAM(s) Oral every 12 hours  cefTRIAXone Injectable. 2000 milliGRAM(s) IV Push every 24 hours  chlorhexidine 2% Cloths 1 Application(s) Topical daily  dextrose 5%. 1000 milliLiter(s) (50 mL/Hr) IV Continuous <Continuous>  dextrose 5%. 1000 milliLiter(s) (100 mL/Hr) IV Continuous <Continuous>  dextrose 50% Injectable 25 Gram(s) IV Push once  dextrose 50% Injectable 12.5 Gram(s) IV Push once  dextrose 50% Injectable 25 Gram(s) IV Push once  DULoxetine 60 milliGRAM(s) Oral daily  folic acid 1 milliGRAM(s) Oral daily  glucagon  Injectable 1 milliGRAM(s) IntraMuscular once  insulin glargine Injectable (LANTUS) 40 Unit(s) SubCutaneous every morning  insulin lispro (ADMELOG) corrective regimen sliding scale   SubCutaneous three times a day before meals  insulin lispro (ADMELOG) corrective regimen sliding scale   SubCutaneous at bedtime  levothyroxine 50 MICROGram(s) Oral daily  mupirocin 2% Ointment 1 Application(s) Topical two times a day  pregabalin 150 milliGRAM(s) Oral two times a day  sevelamer carbonate 1600 milliGRAM(s) Oral three times a day with meals  tamsulosin 0.4 milliGRAM(s) Oral at bedtime    MEDICATIONS  (PRN):  acetaminophen     Tablet .. 650 milliGRAM(s) Oral every 6 hours PRN Temp greater or equal to 38C (100.4F), Mild Pain (1 - 3)  aluminum hydroxide/magnesium hydroxide/simethicone Suspension 30 milliLiter(s) Oral every 4 hours PRN Dyspepsia  dextrose Oral Gel 15 Gram(s) Oral once PRN Blood Glucose LESS THAN 70 milliGRAM(s)/deciliter  melatonin 3 milliGRAM(s) Oral at bedtime PRN Insomnia  methocarbamol 500 milliGRAM(s) Oral two times a day PRN for severe pain  ondansetron Injectable 4 milliGRAM(s) IV Push every 8 hours PRN Nausea and/or Vomiting  traMADol 50 milliGRAM(s) Oral every 12 hours PRN Severe Pain (7 - 10)      Vital Signs Last 24 Hrs  T(C): 36.3 (07 Apr 2023 05:50), Max: 37.2 (06 Apr 2023 20:40)  T(F): 97.3 (07 Apr 2023 05:50), Max: 98.9 (06 Apr 2023 20:40)  HR: 83 (07 Apr 2023 05:50) (72 - 83)  BP: 101/69 (07 Apr 2023 05:50) (90/63 - 120/49)  BP(mean): --  RR: 19 (07 Apr 2023 04:40) (18 - 20)  SpO2: 99% (07 Apr 2023 04:40) (92% - 99%)    Parameters below as of 06 Apr 2023 20:40  Patient On (Oxygen Delivery Method): room air      ICU Vital Signs Last 24 Hrs  MARY ANN CORNELL  I&O's Detail    06 Apr 2023 07:01  -  07 Apr 2023 07:00  --------------------------------------------------------  IN:  Total IN: 0 mL    OUT:    Indwelling Catheter - Urethral (mL): 450 mL  Total OUT: 450 mL    Total NET: -450 mL        Drug Dosing Weight  MARY ANN CORNELL      PHYSICAL EXAM:  General: NAD  HEENT: Head; normocephalic, atraumatic.  Eyes: Pupils reactive, cornea wnl.  Neck: Supple, no nodes adenopathy, no NVD or carotid bruit or thyromegaly.  CARDIOVASCULAR: Normal S1 and S2, No murmur, rub, gallop or lift.   LUNGS: reduced breath sounds b/l   ABDOMEN: Soft, nontender without mass or organomegaly. bowel sounds normoactive.  EXTREMITIES: No clubbing, cyanosis or edema. Distal pulses wnl.   SKIN: warm and dry with normal turgor.  NEURO: Alert/oriented x 3  PSYCH: normal affect.        LABS:                        13.5   7.42  )-----------( 130      ( 07 Apr 2023 06:06 )             45.4     04-07    133<L>  |  98  |  79.7<H>  ----------------------------<  92  4.6   |  22.0  |  2.18<H>    Ca    8.6      07 Apr 2023 06:06  Phos  5.8     04-06  Mg     2.6     04-06    TPro  6.7  /  Alb  3.4  /  TBili  0.8  /  DBili  x   /  AST  23  /  ALT  10  /  AlkPhos  106  04-06      INTERPRETATION OF TELEMETRY (personally reviewed): atrial fibrillation at controlled ventricular rate       ASSESSMENT AND PLAN:  72 year old male with a PMHx of HFrEF (30-35%) (NIC) s/p cardiomems and ICD, afib on coumadin, HTN, HLD, DM, RLE diabetic ulcer s/p leg amputation, CHRISS who presents for worsening SOB found to be significantly volume overloaded found to have acute on chronic heart failure exacerbation. Patient recently moved to [place] ~1 year ago and has not established care with a new cardiologist while in [place].    Acute on Chronic Heart Failure Exacerbation  - Lasix stopped yesterday due to rising BUN/Cr -- Cr improving today down to 2.18  - patient would benefit from further diuresis but will defer to nephrology regarding when it is safe to resume   - daily chemistry to monitor creatinine and electrolytes  - Coreg 25 mg bid  - Eliquis for CVA prevention    Will follow closely with you.

## 2023-04-07 NOTE — PROGRESS NOTE ADULT - ASSESSMENT
73 y/o M w/ history of morbid obesity, HFrEF Last EF 30% s/p AICD, DM2 on Insulin, AFib on Eliquis, HTN, HLD, RLE BKA, Chronic wounds on LLE presenting for worsening dyspnea since 2 weeks likely due to CHF.     ID input requested for +BCX for GNR and GPC. Started empirically on piperacillin-tazobactam and vancomycin While patient denied any urinary or GI symptoms prior to admission, Red catheter placed for urinary retention with 800cc urine output.     E coli bacteremia  CoNS and Aerococcus in 1 of 4 bottles likely contamination       - Worsening renal failure, nephrology following   - Blood culture 4/3 with 2 of 4 bottles with Escherichia coli and 1 of 4 bottles (in the same 1 bottle) CoNS and Aerococcus  - CoNS and Aerococcus likely contamination   - Escherichia coli Sensitive to Ceftriaxone   - Start Ceftriaxone  - D/C piperacillin-tazobactam   - repeat BCX  4/4 no growth   - Source may be urinary tract given retention   - US renal with non obstructing stone  - CT A/P reporting no acute findings   - Check bladder scan for post void residual  - Minimal leukocytosis      Will follow     d/w Medical team

## 2023-04-08 LAB
ANION GAP SERPL CALC-SCNC: 12 MMOL/L — SIGNIFICANT CHANGE UP (ref 5–17)
BUN SERPL-MCNC: 73 MG/DL — HIGH (ref 8–20)
CALCIUM SERPL-MCNC: 8.5 MG/DL — SIGNIFICANT CHANGE UP (ref 8.4–10.5)
CHLORIDE SERPL-SCNC: 100 MMOL/L — SIGNIFICANT CHANGE UP (ref 96–108)
CO2 SERPL-SCNC: 23 MMOL/L — SIGNIFICANT CHANGE UP (ref 22–29)
CREAT SERPL-MCNC: 1.93 MG/DL — HIGH (ref 0.5–1.3)
CULTURE RESULTS: SIGNIFICANT CHANGE UP
EGFR: 36 ML/MIN/1.73M2 — LOW
GLUCOSE BLDC GLUCOMTR-MCNC: 108 MG/DL — HIGH (ref 70–99)
GLUCOSE BLDC GLUCOMTR-MCNC: 124 MG/DL — HIGH (ref 70–99)
GLUCOSE BLDC GLUCOMTR-MCNC: 138 MG/DL — HIGH (ref 70–99)
GLUCOSE BLDC GLUCOMTR-MCNC: 151 MG/DL — HIGH (ref 70–99)
GLUCOSE SERPL-MCNC: 121 MG/DL — HIGH (ref 70–99)
HCT VFR BLD CALC: 46.3 % — SIGNIFICANT CHANGE UP (ref 39–50)
HGB BLD-MCNC: 13.8 G/DL — SIGNIFICANT CHANGE UP (ref 13–17)
MCHC RBC-ENTMCNC: 24.4 PG — LOW (ref 27–34)
MCHC RBC-ENTMCNC: 29.8 GM/DL — LOW (ref 32–36)
MCV RBC AUTO: 81.8 FL — SIGNIFICANT CHANGE UP (ref 80–100)
PLATELET # BLD AUTO: 159 K/UL — SIGNIFICANT CHANGE UP (ref 150–400)
POTASSIUM SERPL-MCNC: 4.1 MMOL/L — SIGNIFICANT CHANGE UP (ref 3.5–5.3)
POTASSIUM SERPL-SCNC: 4.1 MMOL/L — SIGNIFICANT CHANGE UP (ref 3.5–5.3)
RBC # BLD: 5.66 M/UL — SIGNIFICANT CHANGE UP (ref 4.2–5.8)
RBC # FLD: 20.4 % — HIGH (ref 10.3–14.5)
SODIUM SERPL-SCNC: 135 MMOL/L — SIGNIFICANT CHANGE UP (ref 135–145)
SPECIMEN SOURCE: SIGNIFICANT CHANGE UP
WBC # BLD: 6.79 K/UL — SIGNIFICANT CHANGE UP (ref 3.8–10.5)
WBC # FLD AUTO: 6.79 K/UL — SIGNIFICANT CHANGE UP (ref 3.8–10.5)

## 2023-04-08 PROCEDURE — 99232 SBSQ HOSP IP/OBS MODERATE 35: CPT

## 2023-04-08 PROCEDURE — 99233 SBSQ HOSP IP/OBS HIGH 50: CPT

## 2023-04-08 RX ADMIN — CARVEDILOL PHOSPHATE 25 MILLIGRAM(S): 80 CAPSULE, EXTENDED RELEASE ORAL at 18:23

## 2023-04-08 RX ADMIN — DULOXETINE HYDROCHLORIDE 60 MILLIGRAM(S): 30 CAPSULE, DELAYED RELEASE ORAL at 12:37

## 2023-04-08 RX ADMIN — MUPIROCIN 1 APPLICATION(S): 20 OINTMENT TOPICAL at 18:24

## 2023-04-08 RX ADMIN — BUMETANIDE 1 MILLIGRAM(S): 0.25 INJECTION INTRAMUSCULAR; INTRAVENOUS at 05:31

## 2023-04-08 RX ADMIN — SEVELAMER CARBONATE 1600 MILLIGRAM(S): 2400 POWDER, FOR SUSPENSION ORAL at 18:23

## 2023-04-08 RX ADMIN — ATORVASTATIN CALCIUM 40 MILLIGRAM(S): 80 TABLET, FILM COATED ORAL at 21:18

## 2023-04-08 RX ADMIN — TAMSULOSIN HYDROCHLORIDE 0.4 MILLIGRAM(S): 0.4 CAPSULE ORAL at 21:18

## 2023-04-08 RX ADMIN — SEVELAMER CARBONATE 1600 MILLIGRAM(S): 2400 POWDER, FOR SUSPENSION ORAL at 12:38

## 2023-04-08 RX ADMIN — Medication 1 MILLIGRAM(S): at 12:37

## 2023-04-08 RX ADMIN — Medication 50 MICROGRAM(S): at 05:30

## 2023-04-08 RX ADMIN — APIXABAN 5 MILLIGRAM(S): 2.5 TABLET, FILM COATED ORAL at 05:30

## 2023-04-08 RX ADMIN — CEFTRIAXONE 2000 MILLIGRAM(S): 500 INJECTION, POWDER, FOR SOLUTION INTRAMUSCULAR; INTRAVENOUS at 14:58

## 2023-04-08 RX ADMIN — MUPIROCIN 1 APPLICATION(S): 20 OINTMENT TOPICAL at 05:30

## 2023-04-08 RX ADMIN — APIXABAN 5 MILLIGRAM(S): 2.5 TABLET, FILM COATED ORAL at 18:23

## 2023-04-08 RX ADMIN — CARVEDILOL PHOSPHATE 25 MILLIGRAM(S): 80 CAPSULE, EXTENDED RELEASE ORAL at 05:30

## 2023-04-08 RX ADMIN — SEVELAMER CARBONATE 1600 MILLIGRAM(S): 2400 POWDER, FOR SUSPENSION ORAL at 09:09

## 2023-04-08 RX ADMIN — INSULIN GLARGINE 40 UNIT(S): 100 INJECTION, SOLUTION SUBCUTANEOUS at 09:01

## 2023-04-08 RX ADMIN — CHLORHEXIDINE GLUCONATE 1 APPLICATION(S): 213 SOLUTION TOPICAL at 12:38

## 2023-04-08 RX ADMIN — Medication 150 MILLIGRAM(S): at 05:30

## 2023-04-08 RX ADMIN — Medication 150 MILLIGRAM(S): at 18:24

## 2023-04-08 NOTE — PROGRESS NOTE ADULT - SUBJECTIVE AND OBJECTIVE BOX
[facility]  INFECTIOUS DISEASES at [place] and [place]  =======================================================                               Maximiliano Nagel MD#   Sharon Jules MD*                             Twyla Seo MD*   Sandra Correa MD*            Diplomates American Board of Internal Medicine & Infectious Diseases                # [place] Office - Appt - Tel  [phone] Fax [phone]                * [place] Office - Appt - Tel [phone] Fax [phone]                                  Hospital Consult line:  [phone]  =======================================================    MARY ANN CORNELL [id number]    Follow up: bacteremia    No fever      Allergies:  allow double protein at meals (Unknown)  clindamycin (Unknown)       REVIEW OF SYSTEMS:  CONSTITUTIONAL:  No Fever or chills  HEENT:   No diplopia or blurred vision.  No earache, sore throat or runny nose.  CARDIOVASCULAR:  No Chest Pain  RESPIRATORY:  No cough, shortness of breath  GASTROINTESTINAL:  No nausea, vomiting or diarrhea.  GENITOURINARY:  No dysuria, frequency or urgency. No Blood in urine  MUSCULOSKELETAL:  no joint aches, no muscle pain  SKIN:  No rash  NEUROLOGIC:  No Headaches  PSYCHIATRIC:  No disorder of thought or mood.  ENDOCRINE:  No heat or cold intolerance  HEMATOLOGICAL:  No easy bruising or bleeding.       Physical Exam:  GEN: NAD  HEENT: normocephalic and atraumatic. EOMI. PERRL.    NECK: Supple.   LUNGS: CTA B/L.  HEART: RRR  ABDOMEN: Soft, NT, ND.  +BS.    : No CVA tenderness. + Red   EXTREMITIES: Rt BKA   MSK: No joint swelling  NEUROLOGIC: No Focal Deficits   SKIN: Left leg hallux ulcer, chronic wounds       Vitals:  T(F): 99.5 (08 Apr 2023 05:17), Max: 99.5 (08 Apr 2023 05:17)  HR: 85 (08 Apr 2023 05:17)  BP: 165/94 (08 Apr 2023 05:17)  RR: 16 (08 Apr 2023 05:17)  SpO2: 94% (08 Apr 2023 05:17) (92% - 97%)  temp max in last 48H T(F): , Max: 99.5 (04-08-23 @ 05:17)    Current Antibiotics:  cefTRIAXone Injectable. 2000 milliGRAM(s) IV Push every 24 hours    Other medications:  albuterol/ipratropium for Nebulization 3 milliLiter(s) Nebulizer every 20 minutes  apixaban 5 milliGRAM(s) Oral every 12 hours  atorvastatin 40 milliGRAM(s) Oral at bedtime  buMETAnide Injectable 1 milliGRAM(s) IV Push daily  carvedilol 25 milliGRAM(s) Oral every 12 hours  chlorhexidine 2% Cloths 1 Application(s) Topical daily  dextrose 5%. 1000 milliLiter(s) IV Continuous <Continuous>  dextrose 5%. 1000 milliLiter(s) IV Continuous <Continuous>  dextrose 50% Injectable 25 Gram(s) IV Push once  dextrose 50% Injectable 12.5 Gram(s) IV Push once  dextrose 50% Injectable 25 Gram(s) IV Push once  DULoxetine 60 milliGRAM(s) Oral daily  folic acid 1 milliGRAM(s) Oral daily  glucagon  Injectable 1 milliGRAM(s) IntraMuscular once  insulin glargine Injectable (LANTUS) 40 Unit(s) SubCutaneous every morning  insulin lispro (ADMELOG) corrective regimen sliding scale   SubCutaneous three times a day before meals  insulin lispro (ADMELOG) corrective regimen sliding scale   SubCutaneous at bedtime  levothyroxine 50 MICROGram(s) Oral daily  mupirocin 2% Ointment 1 Application(s) Topical two times a day  pregabalin 150 milliGRAM(s) Oral two times a day  sevelamer carbonate 1600 milliGRAM(s) Oral three times a day with meals  tamsulosin 0.4 milliGRAM(s) Oral at bedtime                            13.8   6.79  )-----------( 159      ( 08 Apr 2023 06:43 )             46.3     04-08    135  |  100  |  73.0<H>  ----------------------------<  121<H>  4.1   |  23.0  |  1.93<H>    Ca    8.5      08 Apr 2023 06:43      RECENT CULTURES:  04-04 @ 17:03 .Blood Blood-Peripheral     No growth to date.    04-04 @ 16:13 .Blood Blood-Peripheral     No growth to date.    04-03 @ 22:00 Catheterized Catheterized     >=3 organisms. Probable collection contamination.    04-03 @ 13:50 .Blood Blood-Peripheral     No growth to date.    04-03 @ 13:45    RVP  NotDetec    04-03 @ 13:30 .Blood Blood-Peripheral Blood Culture PCR  Escherichia coli    Growth in aerobic and anaerobic bottles: Escherichia coli  Growth in aerobic bottle: Staphylococcus simulans  "Susceptibilities not performed"  Growth in aerobic bottle: Aerococcus viridans  "Susceptibilities not performed"  ***Blood Panel PCR results on this specimen are available  approximately 3 hours after the Gram stain result.***  Gram stain, PCR, and/or culture results may not always  correspond due to difference in methodologies.  ************************************************************  This PCR assay was performed by multiplex PCR. This  Assay tests for 66 bacterial and resistance gene targets.  Please refer to the [facility] test directory  at [email or web address] for details.  Growth in anaerobic bottle: Gram Negative Rods  Growth in aerobic bottle: Gram Negative Rods and Gram Positive Cocci in  Clusters      WBC Count: 6.79 K/uL (04-08-23 @ 06:43)  WBC Count: 7.42 K/uL (04-07-23 @ 06:06)  WBC Count: 8.08 K/uL (04-06-23 @ 04:52)  WBC Count: 11.83 K/uL (04-05-23 @ 02:48)  WBC Count: 8.71 K/uL (04-04-23 @ 03:10)  WBC Count: 10.49 K/uL (04-03-23 @ 13:50)    Creatinine, Serum: 1.93 mg/dL (04-08-23 @ 06:43)  Creatinine, Serum: 2.18 mg/dL (04-07-23 @ 06:06)  Creatinine, Serum: 2.41 mg/dL (04-06-23 @ 04:52)  Creatinine, Serum: 2.10 mg/dL (04-05-23 @ 13:25)  Creatinine, Serum: 2.15 mg/dL (04-05-23 @ 02:48)  Creatinine, Serum: 1.49 mg/dL (04-04-23 @ 03:10)  Creatinine, Serum: 1.45 mg/dL (04-03-23 @ 13:50)    C-Reactive Protein, Serum: 26 mg/L (04-05-23 @ 13:25)    Sedimentation Rate, Erythrocyte: 8 mm/hr (04-05-23 @ 13:25)    SARS-CoV-2: NotDetec (04-03-23 @ 13:45)          < from: US Renal (04.05.23 @ 23:01) >  ACC: [id number] EXAM:  US KIDNEY(S)   ORDERED BY: CONNER JACQUES     PROCEDURE DATE:  04/05/2023          INTERPRETATION:  CLINICAL INFORMATION: Acute renal failure    COMPARISON: CT abdomen pelvis performed on the same day.    TECHNIQUE: Sonographyof the kidneys and bladder.    FINDINGS:  Right kidney: 10.5 cm. No hydronephrosis or shadowing stone.    Left kidney: 10.7 cm. No hydronephrosis. There is a 0.4 cm echogenic   focus in the lower pole. Upper pole is poorly visualized.    Urinary bladder: Not visualized.    IMPRESSION:  Suboptimal visualization of the left kidney. There is a 0.4 cm echogenic   focus in the lower pole, likely a nonobstructing stone.    No hydronephrosis.        --- End of Report ---    < end of copied text >        < from: CT Renal Stone Hunt (04.05.23 @ 18:19) >  ACC: [id number] EXAM:  CT RENAL STONE HUNT   ORDERED BY: MARSHALL WARD     PROCEDURE DATE:  04/05/2023          INTERPRETATION:  CLINICAL INFORMATION: Acute renal failure. Bacteremia.    COMPARISON: 3/7/2021    CONTRAST/COMPLICATIONS:  IV Contrast:NONE  Oral Contrast: NONE  Complications: None reported at time of study completion    PROCEDURE:  CT of the Abdomen and Pelvis was performed.  Sagittal and coronal reformats were performed.    FINDINGS:  LOWER CHEST: Small bilateral pleural effusions with adjacent passive   atelectasis.    LIVER: Within normal limits.  BILE DUCTS: Normal caliber.  GALLBLADDER: Cholelithiasis.  SPLEEN: Within normal limits.  PANCREAS: Within normal limits.  ADRENALS: Within normal limits.  KIDNEYS/URETERS: Punctate nonobstructing left lower pole renal stone. No   hydronephrosis.    BLADDER: Thickened bladder wall, likely chronic changes of bladder outlet   obstruction. Partially decompressed with a Red catheter.  REPRODUCTIVE ORGANS: Brachytherapy seeds in the prostate.    BOWEL: No bowel obstruction. Appendix is normal.  PERITONEUM: Small amount of abdominal and pelvic ascites.  VESSELS: Atherosclerotic calcifications.  RETROPERITONEUM/LYMPH NODES: No lymphadenopathy.  ABDOMINAL WALL: Subcutaneous edema. Bilateral fat-containing inguinal   hernias.  BONES: Degenerative changes.    IMPRESSION:  *  No evidence of acute infectious process in the abdomen and pelvis.  *  Pleural effusions, ascites, and anasarca.    < end of copied text >

## 2023-04-08 NOTE — PROGRESS NOTE ADULT - ASSESSMENT
71 y/o M w/ history of morbid obesity, HFrEF Last EF 30% s/p AICD, DM2 on Insulin, AFib on Eliquis, HTN, HLD, RLE BKA, Chronic wounds on LLE presenting for worsening dyspnea since 2 weeks likely due to CHF.     ID input requested for +BCX for GNR and GPC. Started empirically on piperacillin-tazobactam and vancomycin While patient denied any urinary or GI symptoms prior to admission, Red catheter placed for urinary retention with 800cc urine output.       E coli bacteremia  CoNS and Aerococcus in 1 of 4 bottles likely contamination       - Worsening renal failure, nephrology following   - Blood culture 4/3 with 2 of 4 bottles with Escherichia coli and 1 of 4 bottles (in the same 1 bottle) CoNS and Aerococcus  - CoNS and Aerococcus likely contamination   - Escherichia coli Sensitive to Ceftriaxone   - Continue Ceftriaxone  - repeat BCX  4/4 no growth   - Source may be urinary tract given retention   - US renal with non obstructing stone  - CT A/P reporting no acute findings   - Minimal leukocytosis      Will follow     d/w Dr Tan and Dr Larson

## 2023-04-08 NOTE — PROGRESS NOTE ADULT - SUBJECTIVE AND OBJECTIVE BOX
MARY ANN KRAIG  ----------------------------------------  The patient was seen at bedside. Patient with heart failure. Noted some dyspnea. Denied chest pain.    Vital Signs Last 24 Hrs  T(C): 36.5 (08 Apr 2023 11:19), Max: 37.5 (08 Apr 2023 05:17)  T(F): 97.7 (08 Apr 2023 11:19), Max: 99.5 (08 Apr 2023 05:17)  HR: 74 (08 Apr 2023 11:19) (72 - 88)  BP: 160/80 (08 Apr 2023 11:19) (100/66 - 165/94)  BP(mean): --  RR: 16 (08 Apr 2023 11:19) (16 - 18)  SpO2: 98% (08 Apr 2023 11:19) (92% - 98%)    Parameters below as of 08 Apr 2023 11:19  Patient On (Oxygen Delivery Method): room air    CAPILLARY BLOOD GLUCOSE  POCT Blood Glucose.: 124 mg/dL (08 Apr 2023 12:35)  POCT Blood Glucose.: 108 mg/dL (08 Apr 2023 08:22)  POCT Blood Glucose.: 155 mg/dL (07 Apr 2023 21:57)  POCT Blood Glucose.: 115 mg/dL (07 Apr 2023 17:39)    PHYSICAL EXAMINATION:  ----------------------------------------  General appearance: No acute distress, Awake, Alert  HEENT: Normocephalic, Atraumatic, Conjunctiva clear, EOMI  Neck: Supple, No JVD, No tenderness  Lungs: No wheezes, No rales, Diminished breath sounds at the bases  Cardiovascular: S1S2, Regular rhythm  Abdomen: Soft, Nontender, Nondistended, No guarding/rebound, Positive bowel sounds  Extremities: No clubbing, No cyanosis, No calf tenderness, Lower extremity edema, Right below knee amputations, Left leg dressing clean and intact  Neuro: Strength equal bilaterally, No tremors  Psychiatric: Appropriate mood, Normal affect    LABORATORY STUDIES:  ----------------------------------------             13.8   6.79  )-----------( 159      ( 08 Apr 2023 06:43 )             46.3     04-08    135  |  100  |  73.0<H>  ----------------------------<  121<H>  4.1   |  23.0  |  1.93<H>    Ca    8.5      08 Apr 2023 06:43    MEDICATIONS  (STANDING):  albuterol/ipratropium for Nebulization 3 milliLiter(s) Nebulizer every 20 minutes  apixaban 5 milliGRAM(s) Oral every 12 hours  atorvastatin 40 milliGRAM(s) Oral at bedtime  buMETAnide Injectable 1 milliGRAM(s) IV Push daily  carvedilol 25 milliGRAM(s) Oral every 12 hours  cefTRIAXone Injectable. 2000 milliGRAM(s) IV Push every 24 hours  chlorhexidine 2% Cloths 1 Application(s) Topical daily  dextrose 5%. 1000 milliLiter(s) (50 mL/Hr) IV Continuous <Continuous>  dextrose 5%. 1000 milliLiter(s) (100 mL/Hr) IV Continuous <Continuous>  dextrose 50% Injectable 25 Gram(s) IV Push once  dextrose 50% Injectable 12.5 Gram(s) IV Push once  dextrose 50% Injectable 25 Gram(s) IV Push once  DULoxetine 60 milliGRAM(s) Oral daily  folic acid 1 milliGRAM(s) Oral daily  glucagon  Injectable 1 milliGRAM(s) IntraMuscular once  insulin glargine Injectable (LANTUS) 40 Unit(s) SubCutaneous every morning  insulin lispro (ADMELOG) corrective regimen sliding scale   SubCutaneous three times a day before meals  insulin lispro (ADMELOG) corrective regimen sliding scale   SubCutaneous at bedtime  levothyroxine 50 MICROGram(s) Oral daily  mupirocin 2% Ointment 1 Application(s) Topical two times a day  pregabalin 150 milliGRAM(s) Oral two times a day  sevelamer carbonate 1600 milliGRAM(s) Oral three times a day with meals  tamsulosin 0.4 milliGRAM(s) Oral at bedtime    MEDICATIONS  (PRN):  acetaminophen     Tablet .. 650 milliGRAM(s) Oral every 6 hours PRN Temp greater or equal to 38C (100.4F), Mild Pain (1 - 3)  aluminum hydroxide/magnesium hydroxide/simethicone Suspension 30 milliLiter(s) Oral every 4 hours PRN Dyspepsia  dextrose Oral Gel 15 Gram(s) Oral once PRN Blood Glucose LESS THAN 70 milliGRAM(s)/deciliter  melatonin 3 milliGRAM(s) Oral at bedtime PRN Insomnia  methocarbamol 500 milliGRAM(s) Oral two times a day PRN for severe pain  ondansetron Injectable 4 milliGRAM(s) IV Push every 8 hours PRN Nausea and/or Vomiting  traMADol 50 milliGRAM(s) Oral every 12 hours PRN Severe Pain (7 - 10)      ASSESSMENT / PLAN:  ----------------------------------------  72M with a history of cardiomyopathy with AICD, atrial fibrillation, diabetes, hypertension, chronic kidney disease, prostate cancer, and obstructive sleep apnea who presented with a two week history of dyspnea found to have heart failure.   Blood cultures were also noted to grow Ecoli for which intravenous antibiotics were initiated.    Acute hypoxic respiratory failure / Acute on chronic systolic heart failure - Echocardiogram noted severely decreased left ventricular systolic function. For resumption of diuretics with bumetanide. Monitor urine output.    Ecoli bacteremia - On ceftriaxone. Currently afebrile and without leukocytosis. Repeat blood cultures were without growth.    Chronic kidney disease III with acute kidney injury / History of benign prostatic hypertrophy - On tamsulosin. Red catheter in place. Monitoring renal function and urine output on bumetanide. May require ultrafiltration or hemodialysis pending response.    Atrial fibrillation - On carvedilol. Apixaban for anticoagulation.    Diabetes - Insulin coverage, close monitoring of blood glucose levels. On pregabalin for neuropathy.    Hypothyroidism - On levothyroxine.    Obstructive sleep apnea - On nocturnal Bipap.

## 2023-04-08 NOTE — PROGRESS NOTE ADULT - SUBJECTIVE AND OBJECTIVE BOX
[facility] - [facility], THE [facility]                                   [address] [place] [address]                                                      PHONE: [phone]                                                         FAX: [phone]  [email or web address]  ---------------------------------------------------------------------------------------------------------------------------------    Overnight events/patient complaints: Patient's creatinine continue to improve. Patient placed on bumex 1mg IV daily.      allow double protein at meals (Unknown)  clindamycin (Unknown)    MEDICATIONS  (STANDING):  albuterol/ipratropium for Nebulization 3 milliLiter(s) Nebulizer every 20 minutes  apixaban 5 milliGRAM(s) Oral every 12 hours  atorvastatin 40 milliGRAM(s) Oral at bedtime  buMETAnide Injectable 1 milliGRAM(s) IV Push daily  carvedilol 25 milliGRAM(s) Oral every 12 hours  cefTRIAXone Injectable. 2000 milliGRAM(s) IV Push every 24 hours  chlorhexidine 2% Cloths 1 Application(s) Topical daily  dextrose 5%. 1000 milliLiter(s) (100 mL/Hr) IV Continuous <Continuous>  dextrose 5%. 1000 milliLiter(s) (50 mL/Hr) IV Continuous <Continuous>  dextrose 50% Injectable 25 Gram(s) IV Push once  dextrose 50% Injectable 12.5 Gram(s) IV Push once  dextrose 50% Injectable 25 Gram(s) IV Push once  DULoxetine 60 milliGRAM(s) Oral daily  folic acid 1 milliGRAM(s) Oral daily  glucagon  Injectable 1 milliGRAM(s) IntraMuscular once  insulin glargine Injectable (LANTUS) 40 Unit(s) SubCutaneous every morning  insulin lispro (ADMELOG) corrective regimen sliding scale   SubCutaneous three times a day before meals  insulin lispro (ADMELOG) corrective regimen sliding scale   SubCutaneous at bedtime  levothyroxine 50 MICROGram(s) Oral daily  mupirocin 2% Ointment 1 Application(s) Topical two times a day  pregabalin 150 milliGRAM(s) Oral two times a day  sevelamer carbonate 1600 milliGRAM(s) Oral three times a day with meals  tamsulosin 0.4 milliGRAM(s) Oral at bedtime    MEDICATIONS  (PRN):  acetaminophen     Tablet .. 650 milliGRAM(s) Oral every 6 hours PRN Temp greater or equal to 38C (100.4F), Mild Pain (1 - 3)  aluminum hydroxide/magnesium hydroxide/simethicone Suspension 30 milliLiter(s) Oral every 4 hours PRN Dyspepsia  dextrose Oral Gel 15 Gram(s) Oral once PRN Blood Glucose LESS THAN 70 milliGRAM(s)/deciliter  melatonin 3 milliGRAM(s) Oral at bedtime PRN Insomnia  methocarbamol 500 milliGRAM(s) Oral two times a day PRN for severe pain  ondansetron Injectable 4 milliGRAM(s) IV Push every 8 hours PRN Nausea and/or Vomiting  traMADol 50 milliGRAM(s) Oral every 12 hours PRN Severe Pain (7 - 10)      Vital Signs Last 24 Hrs  T(C): 37.5 (08 Apr 2023 05:17), Max: 37.5 (08 Apr 2023 05:17)  T(F): 99.5 (08 Apr 2023 05:17), Max: 99.5 (08 Apr 2023 05:17)  HR: 85 (08 Apr 2023 05:17) (72 - 88)  BP: 165/94 (08 Apr 2023 05:17) (98/65 - 165/94)  BP(mean): --  RR: 16 (08 Apr 2023 05:17) (16 - 18)  SpO2: 94% (08 Apr 2023 05:17) (92% - 97%)    Parameters below as of 08 Apr 2023 05:17  Patient On (Oxygen Delivery Method): room air      ICU Vital Signs Last 24 Hrs  MARY ANN CORNELL  I&O's Detail    07 Apr 2023 07:01  -  08 Apr 2023 07:00  --------------------------------------------------------  IN:  Total IN: 0 mL    OUT:    Indwelling Catheter - Urethral (mL): 800 mL  Total OUT: 800 mL    Total NET: -800 mL        Drug Dosing Weight  MARY ANN CORNELL      PHYSICAL EXAM:  General: NAD, morbidly obese  HEENT: Head; normocephalic, atraumatic.  Eyes: EOMI, conjunctiva normal  Neck: Supple, unable to assess JVD due to body habitus  CARDIOVASCULAR: Irregularly irregular rhythm, regular rate, S1 S2, No murmurs or gallops  LUNGS: Poor inspiratory effort  ABDOMEN: Soft, nontender, non-distended, +bowel sounds  EXTREMITIES: + edema in LLE  SKIN: warm  NEURO: Alert        LABS:                        13.8   6.79  )-----------( 159      ( 08 Apr 2023 06:43 )             46.3     04-08    135  |  100  |  73.0<H>  ----------------------------<  121<H>  4.1   |  23.0  |  1.93<H>    Ca    8.5      08 Apr 2023 06:43      MARY ANN CORNELL            RADIOLOGY & ADDITIONAL STUDIES:    INTERPRETATION OF TELEMETRY (personally reviewed): Afib, rate controlled. No significant cardiac events.    ASSESSMENT AND PLAN:  72 year old male with a PMHx of HFrEF (30-35%) (NICM) s/p cardiomems and ICD, afib on coumadin, HTN, HLD, DM, RLE diabetic ulcer s/p right leg amputation, CHRISS who presents for worsening SOB found to be significantly volume overloaded found to have acute on chronic heart failure exacerbation. Patient recently moved to [place] ~1 year ago and has not established care with a new cardiologist while in [place]. Patient's hospital course complicated by JAYLENE.    Acute on Chronic Heart Failure Exacerbation/JAYLENE  - patient remains volume overloaded on exam   - patient's creatinine improving  - patient placed back on bumex 1mg IV daily  - echo with reduced EF to <20%  - daily chemistry to monitor creatinine and electrolytes  - daily weights  - strict I/O  - fluid and sodium restriction  - nephrology following    Afib  - c/w eliquis  - rate controlled    HTN/HLD  - c/w coreg 25mg BID  - c/w atorvastatin 40mg daily    Thank you for letting [facility] to assist in the management of this patient. Please call with any questions.

## 2023-04-08 NOTE — PROGRESS NOTE ADULT - ASSESSMENT
71 y/o M w/ a hx of HFrEF Last EF 30% s/p AICD, DM2 on Insulin, Persistent Atrial Fibrillation on Eliquis, HTN, HLD, RLE BKA,   Chronic wounds on LLE presenting for SOB    A) JAYLENE on CKD suspect stage III/ IV - serum Cr improving daily, has h/o CHRISS, DM 2, Hypothyroid, left renal  stone - not  obstructing; PVD; Diffuse  edema.     P) Serum Cr improved- anasarca- cont bumex 1 mg IV Q day may need to escalate will need to accept degree of azotemia. May need ultrafiltration or HD soon.    AM labs will follow

## 2023-04-08 NOTE — PROGRESS NOTE ADULT - SUBJECTIVE AND OBJECTIVE BOX
NEPHROLOGY INTERVAL HPI/OVERNIGHT EVENTS:    Examined  Laying flat no dyspnea  Denies HA JOSE M mc good UOP    MEDICATIONS  (STANDING):  albuterol/ipratropium for Nebulization 3 milliLiter(s) Nebulizer every 20 minutes  apixaban 5 milliGRAM(s) Oral every 12 hours  atorvastatin 40 milliGRAM(s) Oral at bedtime  buMETAnide Injectable 1 milliGRAM(s) IV Push daily  carvedilol 25 milliGRAM(s) Oral every 12 hours  cefTRIAXone Injectable. 2000 milliGRAM(s) IV Push every 24 hours  chlorhexidine 2% Cloths 1 Application(s) Topical daily  dextrose 5%. 1000 milliLiter(s) (100 mL/Hr) IV Continuous <Continuous>  dextrose 5%. 1000 milliLiter(s) (50 mL/Hr) IV Continuous <Continuous>  dextrose 50% Injectable 25 Gram(s) IV Push once  dextrose 50% Injectable 12.5 Gram(s) IV Push once  dextrose 50% Injectable 25 Gram(s) IV Push once  DULoxetine 60 milliGRAM(s) Oral daily  folic acid 1 milliGRAM(s) Oral daily  glucagon  Injectable 1 milliGRAM(s) IntraMuscular once  insulin glargine Injectable (LANTUS) 40 Unit(s) SubCutaneous every morning  insulin lispro (ADMELOG) corrective regimen sliding scale   SubCutaneous three times a day before meals  insulin lispro (ADMELOG) corrective regimen sliding scale   SubCutaneous at bedtime  levothyroxine 50 MICROGram(s) Oral daily  mupirocin 2% Ointment 1 Application(s) Topical two times a day  pregabalin 150 milliGRAM(s) Oral two times a day  sevelamer carbonate 1600 milliGRAM(s) Oral three times a day with meals  tamsulosin 0.4 milliGRAM(s) Oral at bedtime    MEDICATIONS  (PRN):  acetaminophen     Tablet .. 650 milliGRAM(s) Oral every 6 hours PRN Temp greater or equal to 38C (100.4F), Mild Pain (1 - 3)  aluminum hydroxide/magnesium hydroxide/simethicone Suspension 30 milliLiter(s) Oral every 4 hours PRN Dyspepsia  dextrose Oral Gel 15 Gram(s) Oral once PRN Blood Glucose LESS THAN 70 milliGRAM(s)/deciliter  melatonin 3 milliGRAM(s) Oral at bedtime PRN Insomnia  methocarbamol 500 milliGRAM(s) Oral two times a day PRN for severe pain  ondansetron Injectable 4 milliGRAM(s) IV Push every 8 hours PRN Nausea and/or Vomiting  traMADol 50 milliGRAM(s) Oral every 12 hours PRN Severe Pain (7 - 10)      Allergies    clindamycin (Unknown)    Intolerances    allow double protein at meals (Unknown)      Vital Signs Last 24 Hrs  T(C): 37.5 (08 Apr 2023 05:17), Max: 37.5 (08 Apr 2023 05:17)  T(F): 99.5 (08 Apr 2023 05:17), Max: 99.5 (08 Apr 2023 05:17)  HR: 85 (08 Apr 2023 05:17) (72 - 88)  BP: 165/94 (08 Apr 2023 05:17) (98/65 - 165/94)  BP(mean): --  RR: 16 (08 Apr 2023 05:17) (16 - 18)  SpO2: 94% (08 Apr 2023 05:17) (92% - 97%)    Parameters below as of 08 Apr 2023 05:17  Patient On (Oxygen Delivery Method): room air        PHYSICAL EXAM:  GENERAL: Looks chronically ill  HEAD:  NCAT  NECK: Obese  NERVOUS SYSTEM:  Slow to respond but alert  CHEST/LUNG: dec bs anteriorly  HEART: +S1 S2 No  rub  ABDOMEN: obese  with  body wall edema  EXTREMITIES: Left lower leg, foot  with wrap, R leg amp  site  same     LABS:                        13.8   6.79  )-----------( 159      ( 08 Apr 2023 06:43 )             46.3     04-08    135  |  100  |  73.0<H>  ----------------------------<  121<H>  4.1   |  23.0  |  1.93<H>    Ca    8.5      08 Apr 2023 06:43                  RADIOLOGY & ADDITIONAL TESTS:

## 2023-04-09 LAB
ANION GAP SERPL CALC-SCNC: 9 MMOL/L — SIGNIFICANT CHANGE UP (ref 5–17)
BUN SERPL-MCNC: 62.3 MG/DL — HIGH (ref 8–20)
CALCIUM SERPL-MCNC: 8.6 MG/DL — SIGNIFICANT CHANGE UP (ref 8.4–10.5)
CHLORIDE SERPL-SCNC: 103 MMOL/L — SIGNIFICANT CHANGE UP (ref 96–108)
CO2 SERPL-SCNC: 26 MMOL/L — SIGNIFICANT CHANGE UP (ref 22–29)
CREAT SERPL-MCNC: 1.52 MG/DL — HIGH (ref 0.5–1.3)
CULTURE RESULTS: SIGNIFICANT CHANGE UP
EGFR: 48 ML/MIN/1.73M2 — LOW
GLUCOSE BLDC GLUCOMTR-MCNC: 122 MG/DL — HIGH (ref 70–99)
GLUCOSE BLDC GLUCOMTR-MCNC: 153 MG/DL — HIGH (ref 70–99)
GLUCOSE BLDC GLUCOMTR-MCNC: 87 MG/DL — SIGNIFICANT CHANGE UP (ref 70–99)
GLUCOSE BLDC GLUCOMTR-MCNC: 90 MG/DL — SIGNIFICANT CHANGE UP (ref 70–99)
GLUCOSE SERPL-MCNC: 88 MG/DL — SIGNIFICANT CHANGE UP (ref 70–99)
POTASSIUM SERPL-MCNC: 4.1 MMOL/L — SIGNIFICANT CHANGE UP (ref 3.5–5.3)
POTASSIUM SERPL-SCNC: 4.1 MMOL/L — SIGNIFICANT CHANGE UP (ref 3.5–5.3)
SODIUM SERPL-SCNC: 138 MMOL/L — SIGNIFICANT CHANGE UP (ref 135–145)
SPECIMEN SOURCE: SIGNIFICANT CHANGE UP

## 2023-04-09 PROCEDURE — 99232 SBSQ HOSP IP/OBS MODERATE 35: CPT

## 2023-04-09 RX ORDER — BUMETANIDE 0.25 MG/ML
1 INJECTION INTRAMUSCULAR; INTRAVENOUS
Refills: 0 | Status: DISCONTINUED | OUTPATIENT
Start: 2023-04-09 | End: 2023-04-17

## 2023-04-09 RX ADMIN — INSULIN GLARGINE 40 UNIT(S): 100 INJECTION, SOLUTION SUBCUTANEOUS at 09:11

## 2023-04-09 RX ADMIN — TAMSULOSIN HYDROCHLORIDE 0.4 MILLIGRAM(S): 0.4 CAPSULE ORAL at 22:18

## 2023-04-09 RX ADMIN — MUPIROCIN 1 APPLICATION(S): 20 OINTMENT TOPICAL at 18:09

## 2023-04-09 RX ADMIN — CHLORHEXIDINE GLUCONATE 1 APPLICATION(S): 213 SOLUTION TOPICAL at 13:22

## 2023-04-09 RX ADMIN — MUPIROCIN 1 APPLICATION(S): 20 OINTMENT TOPICAL at 06:01

## 2023-04-09 RX ADMIN — CARVEDILOL PHOSPHATE 25 MILLIGRAM(S): 80 CAPSULE, EXTENDED RELEASE ORAL at 06:00

## 2023-04-09 RX ADMIN — ATORVASTATIN CALCIUM 40 MILLIGRAM(S): 80 TABLET, FILM COATED ORAL at 22:18

## 2023-04-09 RX ADMIN — CEFTRIAXONE 2000 MILLIGRAM(S): 500 INJECTION, POWDER, FOR SOLUTION INTRAMUSCULAR; INTRAVENOUS at 13:23

## 2023-04-09 RX ADMIN — Medication 150 MILLIGRAM(S): at 06:00

## 2023-04-09 RX ADMIN — SEVELAMER CARBONATE 1600 MILLIGRAM(S): 2400 POWDER, FOR SUSPENSION ORAL at 18:08

## 2023-04-09 RX ADMIN — DULOXETINE HYDROCHLORIDE 60 MILLIGRAM(S): 30 CAPSULE, DELAYED RELEASE ORAL at 13:23

## 2023-04-09 RX ADMIN — BUMETANIDE 1 MILLIGRAM(S): 0.25 INJECTION INTRAMUSCULAR; INTRAVENOUS at 13:24

## 2023-04-09 RX ADMIN — Medication 150 MILLIGRAM(S): at 18:07

## 2023-04-09 RX ADMIN — SEVELAMER CARBONATE 1600 MILLIGRAM(S): 2400 POWDER, FOR SUSPENSION ORAL at 09:09

## 2023-04-09 RX ADMIN — SEVELAMER CARBONATE 1600 MILLIGRAM(S): 2400 POWDER, FOR SUSPENSION ORAL at 13:21

## 2023-04-09 RX ADMIN — BUMETANIDE 1 MILLIGRAM(S): 0.25 INJECTION INTRAMUSCULAR; INTRAVENOUS at 06:00

## 2023-04-09 RX ADMIN — APIXABAN 5 MILLIGRAM(S): 2.5 TABLET, FILM COATED ORAL at 06:00

## 2023-04-09 RX ADMIN — Medication 50 MICROGRAM(S): at 06:00

## 2023-04-09 RX ADMIN — CARVEDILOL PHOSPHATE 25 MILLIGRAM(S): 80 CAPSULE, EXTENDED RELEASE ORAL at 18:07

## 2023-04-09 RX ADMIN — Medication 1 MILLIGRAM(S): at 13:22

## 2023-04-09 RX ADMIN — APIXABAN 5 MILLIGRAM(S): 2.5 TABLET, FILM COATED ORAL at 18:06

## 2023-04-09 NOTE — PROGRESS NOTE ADULT - SUBJECTIVE AND OBJECTIVE BOX
[facility] - [facility]                                   [address] [place] [address]                                                      PHONE: [phone]                                                         FAX: [phone]  [email or web address]  ---------------------------------------------------------------------------------------------------------------------------------    Overnight events/patient complaints: Patient without cardiac complaints this morning. Patient on room air and states his breathing his comfortable. Patient's creatinine continue to improve.      allow double protein at meals (Unknown)  clindamycin (Unknown)    MEDICATIONS  (STANDING):  albuterol/ipratropium for Nebulization 3 milliLiter(s) Nebulizer every 20 minutes  apixaban 5 milliGRAM(s) Oral every 12 hours  atorvastatin 40 milliGRAM(s) Oral at bedtime  buMETAnide Injectable 1 milliGRAM(s) IV Push daily  carvedilol 25 milliGRAM(s) Oral every 12 hours  cefTRIAXone Injectable. 2000 milliGRAM(s) IV Push every 24 hours  chlorhexidine 2% Cloths 1 Application(s) Topical daily  dextrose 5%. 1000 milliLiter(s) (100 mL/Hr) IV Continuous <Continuous>  dextrose 5%. 1000 milliLiter(s) (50 mL/Hr) IV Continuous <Continuous>  dextrose 50% Injectable 25 Gram(s) IV Push once  dextrose 50% Injectable 12.5 Gram(s) IV Push once  dextrose 50% Injectable 25 Gram(s) IV Push once  DULoxetine 60 milliGRAM(s) Oral daily  folic acid 1 milliGRAM(s) Oral daily  glucagon  Injectable 1 milliGRAM(s) IntraMuscular once  insulin glargine Injectable (LANTUS) 40 Unit(s) SubCutaneous every morning  insulin lispro (ADMELOG) corrective regimen sliding scale   SubCutaneous three times a day before meals  insulin lispro (ADMELOG) corrective regimen sliding scale   SubCutaneous at bedtime  levothyroxine 50 MICROGram(s) Oral daily  mupirocin 2% Ointment 1 Application(s) Topical two times a day  pregabalin 150 milliGRAM(s) Oral two times a day  sevelamer carbonate 1600 milliGRAM(s) Oral three times a day with meals  tamsulosin 0.4 milliGRAM(s) Oral at bedtime    MEDICATIONS  (PRN):  acetaminophen     Tablet .. 650 milliGRAM(s) Oral every 6 hours PRN Temp greater or equal to 38C (100.4F), Mild Pain (1 - 3)  aluminum hydroxide/magnesium hydroxide/simethicone Suspension 30 milliLiter(s) Oral every 4 hours PRN Dyspepsia  dextrose Oral Gel 15 Gram(s) Oral once PRN Blood Glucose LESS THAN 70 milliGRAM(s)/deciliter  melatonin 3 milliGRAM(s) Oral at bedtime PRN Insomnia  methocarbamol 500 milliGRAM(s) Oral two times a day PRN for severe pain  ondansetron Injectable 4 milliGRAM(s) IV Push every 8 hours PRN Nausea and/or Vomiting  traMADol 50 milliGRAM(s) Oral every 12 hours PRN Severe Pain (7 - 10)      Vital Signs Last 24 Hrs  T(C): 36.5 (09 Apr 2023 05:01), Max: 36.7 (08 Apr 2023 19:50)  T(F): 97.7 (09 Apr 2023 05:01), Max: 98 (08 Apr 2023 19:50)  HR: 73 (09 Apr 2023 05:01) (71 - 78)  BP: 117/72 (09 Apr 2023 05:01) (113/68 - 160/80)  BP(mean): --  RR: 17 (09 Apr 2023 05:01) (16 - 17)  SpO2: 92% (09 Apr 2023 05:47) (92% - 100%)    Parameters below as of 09 Apr 2023 05:01  Patient On (Oxygen Delivery Method): room air      ICU Vital Signs Last 24 Hrs  MARY ANN CORNELL  I&O's Detail    08 Apr 2023 07:01  -  09 Apr 2023 07:00  --------------------------------------------------------  IN:    Oral Fluid: 817 mL  Total IN: 817 mL    OUT:    Indwelling Catheter - Urethral (mL): 2450 mL  Total OUT: 2450 mL    Total NET: -1633 mL        Drug Dosing Weight  MARY ANN CORNELL      PHYSICAL EXAM:  General: NAD, morbidly obese  HEENT: Head; normocephalic, atraumatic.  Eyes: EOMI, conjunctiva normal  Neck: Supple, unable to assess JVD due to body habitus  CARDIOVASCULAR: Irregularly irregular rhythm, regular rate, S1 S2, No murmurs or gallops  LUNGS: Poor inspiratory effort  ABDOMEN: Soft, nontender, non-distended, +bowel sounds  EXTREMITIES: + edema in LLE  SKIN: warm  NEURO: Alert        LABS:                        13.8   6.79  )-----------( 159      ( 08 Apr 2023 06:43 )             46.3     04-09    138  |  103  |  62.3<H>  ----------------------------<  88  4.1   |  26.0  |  1.52<H>    Ca    8.6      09 Apr 2023 06:24      MARY ANN CORNELL            RADIOLOGY & ADDITIONAL STUDIES:    INTERPRETATION OF TELEMETRY (personally reviewed): Afib, rate controlled. Episodes of bigeminy.     ASSESSMENT AND PLAN:  72 year old male with a PMHx of HFrEF (30-35%) (NICM) s/p cardiomems and ICD, afib on coumadin, HTN, HLD, DM, RLE diabetic ulcer s/p right leg amputation, CHRISS who presents for worsening SOB found to be significantly volume overloaded found to have acute on chronic heart failure exacerbation. Patient recently moved to [place] ~1 year ago and has not established care with a new cardiologist while in [place]. Patient's hospital course complicated by JAYLENE.    Acute on Chronic Heart Failure Exacerbation/JAYLENE  - patient remains volume overloaded on exam   - patient's creatinine continues to improve  - continue bumex 1mg IV daily. Consider increasing if ok with nephrology  - echo with reduced EF to <20%  - daily chemistry to monitor creatinine and electrolytes  - daily weights  - strict I/O  - fluid and sodium restriction  - nephrology following    Bigeminy  - continue to monitor on tele  - ensure K/P/Mg to 4/3/2    Afib  - c/w eliquis 5mg BID  - rate controlled    HTN/HLD  - c/w coreg 25mg BID  - c/w atorvastatin 40mg daily    Thank you for letting [facility] to assist in the management of this patient. Please call with any questions.

## 2023-04-09 NOTE — PROGRESS NOTE ADULT - ASSESSMENT
71 y/o M w/ a hx of HFrEF Last EF 30% s/p AICD, DM2 on Insulin, Persistent Atrial Fibrillation on Eliquis, HTN, HLD, RLE BKA,   Chronic wounds on LLE presenting for SOB    TTE on 4/3- noted LVEF < 20%    A) JAYLENE on CKD suspect stage III - serum Cr improving daily, has h/o CHRISS, DM 2, Hypothyroid, left renal  stone - not  obstructing; PVD; Diffuse  edema.     P) Serum Cr improved- Anasarca- On Bumex 1 mg IV Q day will inc to BID - will need to accept degree of azotemia. Watch diuresis w diuretics no need for HD- ultrafiltration at thid time    AM labs will follow

## 2023-04-09 NOTE — PHYSICAL THERAPY INITIAL EVALUATION ADULT - ASR WT BEARING STATUS EVAL
c/o l hand injury 4th & 5th knuckles s/p punched truck door abrasions noted r 3rd 4th and 5th knuckles also from same incident
no weight-bearing restrictions

## 2023-04-09 NOTE — PHYSICAL THERAPY INITIAL EVALUATION ADULT - ADDITIONAL COMMENTS
pt a questionable historian: states 5 steps 1 rail to enter home, no stairs within home, owns and uses a RW but only for very short distances, has a w/c, HHA for 24/7, has a right LE prosthetic

## 2023-04-09 NOTE — PROGRESS NOTE ADULT - ASSESSMENT
71 y/o M w/ history of morbid obesity, HFrEF Last EF 30% s/p AICD, DM2 on Insulin, AFib on Eliquis, HTN, HLD, RLE BKA, Chronic wounds on LLE presenting for worsening dyspnea since 2 weeks likely due to CHF.     ID input requested for +BCX for GNR and GPC. Started empirically on piperacillin-tazobactam and vancomycin While patient denied any urinary or GI symptoms prior to admission, Red catheter placed for urinary retention with 800cc urine output.       E coli bacteremia  CoNS and Aerococcus in 1 of 4 bottles likely contamination       - Worsening renal failure, nephrology following   - Blood culture 4/3 with 2 of 4 bottles with Escherichia coli and 1 of 4 bottles (in the same 1 bottle) CoNS and Aerococcus  - CoNS and Aerococcus likely contamination   - Escherichia coli Sensitive to Ceftriaxone   - Continue Ceftriaxone  - repeat BCX  4/4 no growth   - Source may be urinary tract given retention   - US renal with non obstructing stone  - CT A/P reporting no acute findings   - Minimal leukocytosis      Will follow     d/w  Dr Larson

## 2023-04-09 NOTE — PROGRESS NOTE ADULT - SUBJECTIVE AND OBJECTIVE BOX
MARY ANN KRAIG  ----------------------------------------  The patient was seen at bedside. Patient with heart failure. Offered no complaints. Denied chest pain.    Vital Signs Last 24 Hrs  T(C): 36.4 (09 Apr 2023 05:47), Max: 36.7 (08 Apr 2023 19:50)  T(F): 97.5 (09 Apr 2023 05:47), Max: 98 (08 Apr 2023 19:50)  HR: 77 (09 Apr 2023 05:47) (71 - 78)  BP: 108/79 (09 Apr 2023 05:47) (108/79 - 137/58)  BP(mean): --  RR: 16 (09 Apr 2023 05:47) (16 - 17)  SpO2: 92% (09 Apr 2023 05:47) (92% - 100%)    Parameters below as of 09 Apr 2023 05:47  Patient On (Oxygen Delivery Method): room air    CAPILLARY BLOOD GLUCOSE  POCT Blood Glucose.: 90 mg/dL (09 Apr 2023 13:07)  POCT Blood Glucose.: 87 mg/dL (09 Apr 2023 08:36)  POCT Blood Glucose.: 151 mg/dL (08 Apr 2023 21:16)  POCT Blood Glucose.: 138 mg/dL (08 Apr 2023 18:06)    PHYSICAL EXAMINATION:  ----------------------------------------  General appearance: No acute distress, Awake, Alert  HEENT: Normocephalic, Atraumatic, Conjunctiva clear, EOMI  Neck: Supple, No JVD, No tenderness  Lungs: No wheezes, No rales, Diminished breath sounds at the bases  Cardiovascular: S1S2, Regular rhythm  Abdomen: Soft, Nontender, Nondistended, No guarding/rebound, Positive bowel sounds  Extremities: No clubbing, No cyanosis, No calf tenderness, Lower extremity edema, Right below knee amputations, Left leg dressing clean and intact  Neuro: Strength equal bilaterally, No tremors  Psychiatric: Appropriate mood, Normal affect    LABORATORY STUDIES:  ----------------------------------------             13.8   6.79  )-----------( 159      ( 08 Apr 2023 06:43 )             46.3     04-09    138  |  103  |  62.3<H>  ----------------------------<  88  4.1   |  26.0  |  1.52<H>    Ca    8.6      09 Apr 2023 06:24    MEDICATIONS  (STANDING):  albuterol/ipratropium for Nebulization 3 milliLiter(s) Nebulizer every 20 minutes  apixaban 5 milliGRAM(s) Oral every 12 hours  atorvastatin 40 milliGRAM(s) Oral at bedtime  buMETAnide Injectable 1 milliGRAM(s) IV Push two times a day  carvedilol 25 milliGRAM(s) Oral every 12 hours  cefTRIAXone Injectable. 2000 milliGRAM(s) IV Push every 24 hours  chlorhexidine 2% Cloths 1 Application(s) Topical daily  dextrose 5%. 1000 milliLiter(s) (50 mL/Hr) IV Continuous <Continuous>  dextrose 5%. 1000 milliLiter(s) (100 mL/Hr) IV Continuous <Continuous>  dextrose 50% Injectable 25 Gram(s) IV Push once  dextrose 50% Injectable 12.5 Gram(s) IV Push once  dextrose 50% Injectable 25 Gram(s) IV Push once  DULoxetine 60 milliGRAM(s) Oral daily  folic acid 1 milliGRAM(s) Oral daily  glucagon  Injectable 1 milliGRAM(s) IntraMuscular once  insulin glargine Injectable (LANTUS) 40 Unit(s) SubCutaneous every morning  insulin lispro (ADMELOG) corrective regimen sliding scale   SubCutaneous three times a day before meals  insulin lispro (ADMELOG) corrective regimen sliding scale   SubCutaneous at bedtime  levothyroxine 50 MICROGram(s) Oral daily  mupirocin 2% Ointment 1 Application(s) Topical two times a day  pregabalin 150 milliGRAM(s) Oral two times a day  sevelamer carbonate 1600 milliGRAM(s) Oral three times a day with meals  tamsulosin 0.4 milliGRAM(s) Oral at bedtime    MEDICATIONS  (PRN):  acetaminophen     Tablet .. 650 milliGRAM(s) Oral every 6 hours PRN Temp greater or equal to 38C (100.4F), Mild Pain (1 - 3)  aluminum hydroxide/magnesium hydroxide/simethicone Suspension 30 milliLiter(s) Oral every 4 hours PRN Dyspepsia  dextrose Oral Gel 15 Gram(s) Oral once PRN Blood Glucose LESS THAN 70 milliGRAM(s)/deciliter  melatonin 3 milliGRAM(s) Oral at bedtime PRN Insomnia  methocarbamol 500 milliGRAM(s) Oral two times a day PRN for severe pain  ondansetron Injectable 4 milliGRAM(s) IV Push every 8 hours PRN Nausea and/or Vomiting  traMADol 50 milliGRAM(s) Oral every 12 hours PRN Severe Pain (7 - 10)      ASSESSMENT / PLAN:  ----------------------------------------  72M with a history of cardiomyopathy with AICD, atrial fibrillation, diabetes, hypertension, chronic kidney disease, prostate cancer, and obstructive sleep apnea who presented with a two week history of dyspnea found to have heart failure.   Blood cultures were also noted to grow Ecoli for which intravenous antibiotics were initiated.    Acute hypoxic respiratory failure / Acute on chronic systolic heart failure - Echocardiogram noted severely decreased left ventricular systolic function. On bumetanide for diuresis. Monitor urine output(2450) and renal function.    Ecoli bacteremia - On ceftriaxone. Currently afebrile and without leukocytosis. Repeat blood cultures were without growth. Infectious Disease follow up noted.    Chronic kidney disease III with acute kidney injury / History of benign prostatic hypertrophy - On tamsulosin. Red catheter in place. Monitoring renal function and urine output on bumetanide. May require ultrafiltration or hemodialysis pending response to diuresis.    Atrial fibrillation - On carvedilol. Apixaban for anticoagulation.    Diabetes - Insulin coverage, close monitoring of blood glucose levels. On pregabalin for neuropathy.    Hypothyroidism - On levothyroxine.    Obstructive sleep apnea - On nocturnal Bipap.

## 2023-04-09 NOTE — PROGRESS NOTE ADULT - SUBJECTIVE AND OBJECTIVE BOX
NEPHROLOGY INTERVAL HPI/OVERNIGHT EVENTS:    Examined  Laying flat no dyspnea  Denies HA JOSE M mc good UOP    MEDICATIONS  (STANDING):  albuterol/ipratropium for Nebulization 3 milliLiter(s) Nebulizer every 20 minutes  apixaban 5 milliGRAM(s) Oral every 12 hours  atorvastatin 40 milliGRAM(s) Oral at bedtime  buMETAnide Injectable 1 milliGRAM(s) IV Push daily  carvedilol 25 milliGRAM(s) Oral every 12 hours  cefTRIAXone Injectable. 2000 milliGRAM(s) IV Push every 24 hours  chlorhexidine 2% Cloths 1 Application(s) Topical daily  dextrose 5%. 1000 milliLiter(s) (50 mL/Hr) IV Continuous <Continuous>  dextrose 5%. 1000 milliLiter(s) (100 mL/Hr) IV Continuous <Continuous>  dextrose 50% Injectable 25 Gram(s) IV Push once  dextrose 50% Injectable 12.5 Gram(s) IV Push once  dextrose 50% Injectable 25 Gram(s) IV Push once  DULoxetine 60 milliGRAM(s) Oral daily  folic acid 1 milliGRAM(s) Oral daily  glucagon  Injectable 1 milliGRAM(s) IntraMuscular once  insulin glargine Injectable (LANTUS) 40 Unit(s) SubCutaneous every morning  insulin lispro (ADMELOG) corrective regimen sliding scale   SubCutaneous three times a day before meals  insulin lispro (ADMELOG) corrective regimen sliding scale   SubCutaneous at bedtime  levothyroxine 50 MICROGram(s) Oral daily  mupirocin 2% Ointment 1 Application(s) Topical two times a day  pregabalin 150 milliGRAM(s) Oral two times a day  sevelamer carbonate 1600 milliGRAM(s) Oral three times a day with meals  tamsulosin 0.4 milliGRAM(s) Oral at bedtime    MEDICATIONS  (PRN):  acetaminophen     Tablet .. 650 milliGRAM(s) Oral every 6 hours PRN Temp greater or equal to 38C (100.4F), Mild Pain (1 - 3)  aluminum hydroxide/magnesium hydroxide/simethicone Suspension 30 milliLiter(s) Oral every 4 hours PRN Dyspepsia  dextrose Oral Gel 15 Gram(s) Oral once PRN Blood Glucose LESS THAN 70 milliGRAM(s)/deciliter  melatonin 3 milliGRAM(s) Oral at bedtime PRN Insomnia  methocarbamol 500 milliGRAM(s) Oral two times a day PRN for severe pain  ondansetron Injectable 4 milliGRAM(s) IV Push every 8 hours PRN Nausea and/or Vomiting  traMADol 50 milliGRAM(s) Oral every 12 hours PRN Severe Pain (7 - 10)      Allergies    clindamycin (Unknown)    Intolerances    allow double protein at meals (Unknown)      Vital Signs Last 24 Hrs  T(C): 36.5 (09 Apr 2023 05:01), Max: 36.7 (08 Apr 2023 19:50)  T(F): 97.7 (09 Apr 2023 05:01), Max: 98 (08 Apr 2023 19:50)  HR: 73 (09 Apr 2023 05:01) (71 - 78)  BP: 117/72 (09 Apr 2023 05:01) (113/68 - 160/80)  BP(mean): --  RR: 17 (09 Apr 2023 05:01) (16 - 17)  SpO2: 92% (09 Apr 2023 05:47) (92% - 100%)    Parameters below as of 09 Apr 2023 05:01  Patient On (Oxygen Delivery Method): room air    PHYSICAL EXAM:  GENERAL: Looks chronically ill  HEAD:  NCAT  NECK: Morbid Obese  NERVOUS SYSTEM:  Slow to respond but alert  CHEST/LUNG: dec bs anteriorly  HEART: +S1 S2 No  rub  ABDOMEN: obese  with  body wall edema  EXTREMITIES: Left lower leg, foot  with wrap, R leg amp  site  same     LABS:                        13.8   6.79  )-----------( 159      ( 08 Apr 2023 06:43 )             46.3     04-09    138  |  103  |  62.3<H>  ----------------------------<  88  4.1   |  26.0  |  1.52<H>    Ca    8.6      09 Apr 2023 06:24                  RADIOLOGY & ADDITIONAL TESTS:

## 2023-04-10 LAB
ANION GAP SERPL CALC-SCNC: 10 MMOL/L — SIGNIFICANT CHANGE UP (ref 5–17)
ANION GAP SERPL CALC-SCNC: 10 MMOL/L — SIGNIFICANT CHANGE UP (ref 5–17)
BUN SERPL-MCNC: 54.5 MG/DL — HIGH (ref 8–20)
BUN SERPL-MCNC: 55.3 MG/DL — HIGH (ref 8–20)
CALCIUM SERPL-MCNC: 8.4 MG/DL — SIGNIFICANT CHANGE UP (ref 8.4–10.5)
CALCIUM SERPL-MCNC: 8.6 MG/DL — SIGNIFICANT CHANGE UP (ref 8.4–10.5)
CHLORIDE SERPL-SCNC: 103 MMOL/L — SIGNIFICANT CHANGE UP (ref 96–108)
CHLORIDE SERPL-SCNC: 103 MMOL/L — SIGNIFICANT CHANGE UP (ref 96–108)
CO2 SERPL-SCNC: 25 MMOL/L — SIGNIFICANT CHANGE UP (ref 22–29)
CO2 SERPL-SCNC: 27 MMOL/L — SIGNIFICANT CHANGE UP (ref 22–29)
CREAT SERPL-MCNC: 1.37 MG/DL — HIGH (ref 0.5–1.3)
CREAT SERPL-MCNC: 1.68 MG/DL — HIGH (ref 0.5–1.3)
CULTURE RESULTS: SIGNIFICANT CHANGE UP
EGFR: 43 ML/MIN/1.73M2 — LOW
EGFR: 55 ML/MIN/1.73M2 — LOW
GLUCOSE BLDC GLUCOMTR-MCNC: 120 MG/DL — HIGH (ref 70–99)
GLUCOSE BLDC GLUCOMTR-MCNC: 126 MG/DL — HIGH (ref 70–99)
GLUCOSE BLDC GLUCOMTR-MCNC: 126 MG/DL — HIGH (ref 70–99)
GLUCOSE BLDC GLUCOMTR-MCNC: 146 MG/DL — HIGH (ref 70–99)
GLUCOSE BLDC GLUCOMTR-MCNC: 160 MG/DL — HIGH (ref 70–99)
GLUCOSE SERPL-MCNC: 126 MG/DL — HIGH (ref 70–99)
GLUCOSE SERPL-MCNC: 147 MG/DL — HIGH (ref 70–99)
HCT VFR BLD CALC: 49.6 % — SIGNIFICANT CHANGE UP (ref 39–50)
HGB BLD-MCNC: 14.4 G/DL — SIGNIFICANT CHANGE UP (ref 13–17)
MAGNESIUM SERPL-MCNC: 2.3 MG/DL — SIGNIFICANT CHANGE UP (ref 1.6–2.6)
MCHC RBC-ENTMCNC: 24.4 PG — LOW (ref 27–34)
MCHC RBC-ENTMCNC: 29 GM/DL — LOW (ref 32–36)
MCV RBC AUTO: 84.2 FL — SIGNIFICANT CHANGE UP (ref 80–100)
PLATELET # BLD AUTO: 122 K/UL — LOW (ref 150–400)
POTASSIUM SERPL-MCNC: 4.2 MMOL/L — SIGNIFICANT CHANGE UP (ref 3.5–5.3)
POTASSIUM SERPL-MCNC: 4.2 MMOL/L — SIGNIFICANT CHANGE UP (ref 3.5–5.3)
POTASSIUM SERPL-SCNC: 4.2 MMOL/L — SIGNIFICANT CHANGE UP (ref 3.5–5.3)
POTASSIUM SERPL-SCNC: 4.2 MMOL/L — SIGNIFICANT CHANGE UP (ref 3.5–5.3)
RBC # BLD: 5.89 M/UL — HIGH (ref 4.2–5.8)
RBC # FLD: 20.3 % — HIGH (ref 10.3–14.5)
SODIUM SERPL-SCNC: 138 MMOL/L — SIGNIFICANT CHANGE UP (ref 135–145)
SODIUM SERPL-SCNC: 140 MMOL/L — SIGNIFICANT CHANGE UP (ref 135–145)
SPECIMEN SOURCE: SIGNIFICANT CHANGE UP
WBC # BLD: 7.01 K/UL — SIGNIFICANT CHANGE UP (ref 3.8–10.5)
WBC # FLD AUTO: 7.01 K/UL — SIGNIFICANT CHANGE UP (ref 3.8–10.5)

## 2023-04-10 PROCEDURE — 99233 SBSQ HOSP IP/OBS HIGH 50: CPT

## 2023-04-10 PROCEDURE — 99232 SBSQ HOSP IP/OBS MODERATE 35: CPT

## 2023-04-10 RX ORDER — CEFTRIAXONE 500 MG/1
2000 INJECTION, POWDER, FOR SOLUTION INTRAMUSCULAR; INTRAVENOUS EVERY 24 HOURS
Refills: 0 | Status: DISCONTINUED | OUTPATIENT
Start: 2023-04-10 | End: 2023-04-15

## 2023-04-10 RX ADMIN — CEFTRIAXONE 2000 MILLIGRAM(S): 500 INJECTION, POWDER, FOR SOLUTION INTRAMUSCULAR; INTRAVENOUS at 13:29

## 2023-04-10 RX ADMIN — Medication 1 MILLIGRAM(S): at 13:29

## 2023-04-10 RX ADMIN — CARVEDILOL PHOSPHATE 25 MILLIGRAM(S): 80 CAPSULE, EXTENDED RELEASE ORAL at 18:47

## 2023-04-10 RX ADMIN — Medication 50 MICROGRAM(S): at 05:57

## 2023-04-10 RX ADMIN — BUMETANIDE 1 MILLIGRAM(S): 0.25 INJECTION INTRAMUSCULAR; INTRAVENOUS at 13:29

## 2023-04-10 RX ADMIN — INSULIN GLARGINE 40 UNIT(S): 100 INJECTION, SOLUTION SUBCUTANEOUS at 09:10

## 2023-04-10 RX ADMIN — Medication 2: at 09:10

## 2023-04-10 RX ADMIN — CARVEDILOL PHOSPHATE 25 MILLIGRAM(S): 80 CAPSULE, EXTENDED RELEASE ORAL at 05:57

## 2023-04-10 RX ADMIN — SEVELAMER CARBONATE 1600 MILLIGRAM(S): 2400 POWDER, FOR SUSPENSION ORAL at 09:11

## 2023-04-10 RX ADMIN — BUMETANIDE 1 MILLIGRAM(S): 0.25 INJECTION INTRAMUSCULAR; INTRAVENOUS at 05:58

## 2023-04-10 RX ADMIN — ATORVASTATIN CALCIUM 40 MILLIGRAM(S): 80 TABLET, FILM COATED ORAL at 21:57

## 2023-04-10 RX ADMIN — CEFTRIAXONE 2000 MILLIGRAM(S): 500 INJECTION, POWDER, FOR SOLUTION INTRAMUSCULAR; INTRAVENOUS at 16:01

## 2023-04-10 RX ADMIN — SEVELAMER CARBONATE 1600 MILLIGRAM(S): 2400 POWDER, FOR SUSPENSION ORAL at 18:46

## 2023-04-10 RX ADMIN — DULOXETINE HYDROCHLORIDE 60 MILLIGRAM(S): 30 CAPSULE, DELAYED RELEASE ORAL at 13:28

## 2023-04-10 RX ADMIN — MUPIROCIN 1 APPLICATION(S): 20 OINTMENT TOPICAL at 05:58

## 2023-04-10 RX ADMIN — TAMSULOSIN HYDROCHLORIDE 0.4 MILLIGRAM(S): 0.4 CAPSULE ORAL at 21:57

## 2023-04-10 RX ADMIN — CHLORHEXIDINE GLUCONATE 1 APPLICATION(S): 213 SOLUTION TOPICAL at 13:29

## 2023-04-10 RX ADMIN — MUPIROCIN 1 APPLICATION(S): 20 OINTMENT TOPICAL at 18:48

## 2023-04-10 RX ADMIN — APIXABAN 5 MILLIGRAM(S): 2.5 TABLET, FILM COATED ORAL at 05:57

## 2023-04-10 RX ADMIN — APIXABAN 5 MILLIGRAM(S): 2.5 TABLET, FILM COATED ORAL at 18:46

## 2023-04-10 RX ADMIN — Medication 150 MILLIGRAM(S): at 05:56

## 2023-04-10 RX ADMIN — SEVELAMER CARBONATE 1600 MILLIGRAM(S): 2400 POWDER, FOR SUSPENSION ORAL at 13:28

## 2023-04-10 RX ADMIN — Medication 150 MILLIGRAM(S): at 18:46

## 2023-04-10 NOTE — PROGRESS NOTE ADULT - ASSESSMENT
73 y/o M w/ a hx of HFrEF Last EF 30% s/p AICD, DM2 on Insulin, Persistent Atrial Fibrillation on Eliquis, HTN, HLD, RLE BKA,   Chronic wounds on LLE presenting for SOB    TTE on 4/3- noted LVEF < 20%    A) JAYLENE on CKD suspect stage III - serum Cr improving daily, 1.37 now    h/o CHRISS, DM 2, Hypothyroid, left renal  stone - not  obstructing; PVD; Diffuse  edema.     P) Serum Cr improved- Anasarca- On Bumex 1 mg IV  BID - will need to accept degree of azotemia. Watch diuresis w diuretics no need for HD- ultrafiltration at thid time    AM labs will follow

## 2023-04-10 NOTE — CHART NOTE - NSCHARTNOTEFT_GEN_A_CORE
6 beats VTACH on monitor  Pt without complaints, asymptomatic  VSS B/P 132/85 P 82 R 18 PO 99% T 97.4  BMP  MG  Continue to monitor 6 beats VTACH on monitor  Pt without complaints, asymptomatic  VSS B/P 132/85 P 82 R 18 PO 99% T 97.4  BMP K 4.2  MG  2.3  Continue to monitor

## 2023-04-10 NOTE — PROGRESS NOTE ADULT - ASSESSMENT
71 y/o M w/ history of morbid obesity, HFrEF Last EF 30% s/p AICD, DM2 on Insulin, AFib on Eliquis, HTN, HLD, RLE BKA, Chronic wounds on LLE presenting for worsening dyspnea since 2 weeks likely due to CHF.     ID input requested for +BCX for GNR and GPC. Started empirically on piperacillin-tazobactam and vancomycin While patient denied any urinary or GI symptoms prior to admission, Red catheter placed for urinary retention with 800cc urine output.       E coli bacteremia  CoNS and Aerococcus in 1 of 4 bottles likely contamination       - Worsening renal failure, nephrology following   - Blood culture 4/3 with 2 of 4 bottles with Escherichia coli and 1 of 4 bottles (in the same 1 bottle) CoNS and Aerococcus  - CoNS and Aerococcus likely contamination   - Escherichia coli Sensitive to Ceftriaxone   - Continue Ceftriaxone   - repeat BCX  4/4 no growth   - Will need antibiotics till 4/17  - Source may be urinary tract given retention   - US renal with non obstructing stone  - CT A/P reporting no acute findings   - Minimal leukocytosis      Will follow     d/w  Dr Larson

## 2023-04-10 NOTE — PROGRESS NOTE ADULT - SUBJECTIVE AND OBJECTIVE BOX
MARY ANN CORNELL  ----------------------------------------  The patient was seen at bedside. Patient with heart failure. Offered no complaints.    Vital Signs Last 24 Hrs  T(C): 37.1 (10 Apr 2023 10:35), Max: 37.1 (10 Apr 2023 10:35)  T(F): 98.8 (10 Apr 2023 10:35), Max: 98.8 (10 Apr 2023 10:35)  HR: 81 (10 Apr 2023 13:30) (65 - 88)  BP: 121/70 (10 Apr 2023 13:30) (104/61 - 138/72)  BP(mean): --  RR: 19 (10 Apr 2023 05:12) (17 - 19)  SpO2: 100% (10 Apr 2023 05:12) (95% - 100%)    Parameters below as of 10 Apr 2023 05:12  Patient On (Oxygen Delivery Method): BiPAP/CPAP    CAPILLARY BLOOD GLUCOSE  POCT Blood Glucose.: 120 mg/dL (10 Apr 2023 12:49)  POCT Blood Glucose.: 126 mg/dL (10 Apr 2023 11:37)  POCT Blood Glucose.: 160 mg/dL (10 Apr 2023 08:22)  POCT Blood Glucose.: 153 mg/dL (09 Apr 2023 22:17)  POCT Blood Glucose.: 122 mg/dL (09 Apr 2023 17:59)    PHYSICAL EXAMINATION:  ----------------------------------------  General appearance: No acute distress, Awake, Alert  HEENT: Normocephalic, Atraumatic, Conjunctiva clear, EOMI  Neck: Supple, No JVD, No tenderness  Lungs: No wheezes, No rales, Diminished breath sounds at the bases  Cardiovascular: S1S2, Regular rhythm  Abdomen: Soft, Nontender, Nondistended, No guarding/rebound, Positive bowel sounds  Extremities: No clubbing, No cyanosis, No calf tenderness, Lower extremity edema, Right below knee amputations, Left leg dressing clean and intact  Neuro: Strength equal bilaterally, No tremors  Psychiatric: Appropriate mood, Normal affect    LABORATORY STUDIES:  ----------------------------------------             14.4   7.01  )-----------( 122      ( 10 Apr 2023 06:52 )             49.6     04-10    138  |  103  |  54.5<H>  ----------------------------<  126<H>  4.2   |  25.0  |  1.37<H>    Ca    8.6      10 Apr 2023 06:52    MEDICATIONS  (STANDING):  albuterol/ipratropium for Nebulization 3 milliLiter(s) Nebulizer every 20 minutes  apixaban 5 milliGRAM(s) Oral every 12 hours  atorvastatin 40 milliGRAM(s) Oral at bedtime  buMETAnide Injectable 1 milliGRAM(s) IV Push two times a day  carvedilol 25 milliGRAM(s) Oral every 12 hours  chlorhexidine 2% Cloths 1 Application(s) Topical daily  dextrose 5%. 1000 milliLiter(s) (100 mL/Hr) IV Continuous <Continuous>  dextrose 5%. 1000 milliLiter(s) (50 mL/Hr) IV Continuous <Continuous>  dextrose 50% Injectable 25 Gram(s) IV Push once  dextrose 50% Injectable 12.5 Gram(s) IV Push once  dextrose 50% Injectable 25 Gram(s) IV Push once  DULoxetine 60 milliGRAM(s) Oral daily  folic acid 1 milliGRAM(s) Oral daily  glucagon  Injectable 1 milliGRAM(s) IntraMuscular once  insulin glargine Injectable (LANTUS) 40 Unit(s) SubCutaneous every morning  insulin lispro (ADMELOG) corrective regimen sliding scale   SubCutaneous three times a day before meals  insulin lispro (ADMELOG) corrective regimen sliding scale   SubCutaneous at bedtime  levothyroxine 50 MICROGram(s) Oral daily  mupirocin 2% Ointment 1 Application(s) Topical two times a day  pregabalin 150 milliGRAM(s) Oral two times a day  sevelamer carbonate 1600 milliGRAM(s) Oral three times a day with meals  tamsulosin 0.4 milliGRAM(s) Oral at bedtime    MEDICATIONS  (PRN):  acetaminophen     Tablet .. 650 milliGRAM(s) Oral every 6 hours PRN Temp greater or equal to 38C (100.4F), Mild Pain (1 - 3)  aluminum hydroxide/magnesium hydroxide/simethicone Suspension 30 milliLiter(s) Oral every 4 hours PRN Dyspepsia  dextrose Oral Gel 15 Gram(s) Oral once PRN Blood Glucose LESS THAN 70 milliGRAM(s)/deciliter  melatonin 3 milliGRAM(s) Oral at bedtime PRN Insomnia  methocarbamol 500 milliGRAM(s) Oral two times a day PRN for severe pain  ondansetron Injectable 4 milliGRAM(s) IV Push every 8 hours PRN Nausea and/or Vomiting  traMADol 50 milliGRAM(s) Oral every 12 hours PRN Severe Pain (7 - 10)      ASSESSMENT / PLAN:  ----------------------------------------  72M with a history of cardiomyopathy with AICD, atrial fibrillation, diabetes, hypertension, chronic kidney disease, prostate cancer, and obstructive sleep apnea who presented with a two week history of dyspnea found to have heart failure.   Diuretics were initiated and a Red catheter was placed to monitor response. Blood cultures were also noted to grow Ecoli for which intravenous antibiotics were initiated.    Acute hypoxic respiratory failure / Acute on chronic systolic heart failure - Echocardiogram noted severely decreased left ventricular systolic function. On bumetanide for diuresis. Monitor urine output(4050). For possible cardiac catheterization pending renal function.    Ecoli bacteremia - On ceftriaxone. Currently afebrile and without leukocytosis. Repeat blood cultures were without growth.    Chronic kidney disease III with acute kidney injury / History of benign prostatic hypertrophy - On tamsulosin. Red catheter in place. Monitoring renal function and urine output on bumetanide. May require ultrafiltration or hemodialysis depending response to diuresis and renal function.    Atrial fibrillation - On carvedilol. Apixaban for anticoagulation.    Diabetes - Insulin coverage, close monitoring of blood glucose levels. On pregabalin for neuropathy.    Hypothyroidism - On levothyroxine.    Obstructive sleep apnea - On nocturnal Bipap.

## 2023-04-10 NOTE — PROGRESS NOTE ADULT - SUBJECTIVE AND OBJECTIVE BOX
[facility]  INFECTIOUS DISEASES at [place] and [place]  =======================================================                               Maximiliano Nagel MD#   Sharon Jules MD*                             Twyla Seo MD*   Sandra Correa MD*            Diplomates American Board of Internal Medicine & Infectious Diseases                # [place] Office - Appt - Tel  [phone] Fax [phone]                * [place] Office - Appt - Tel [phone] Fax [phone]                                  Hospital Consult line:  [phone]  =======================================================    MARY ANN CORNELL [id number]    Follow up: bacteremia    No fever      Allergies:  allow double protein at meals (Unknown)  clindamycin (Unknown)       REVIEW OF SYSTEMS:  CONSTITUTIONAL:  No Fever or chills  HEENT:   No diplopia or blurred vision.  No earache, sore throat or runny nose.  CARDIOVASCULAR:  No Chest Pain  RESPIRATORY:  No cough, shortness of breath  GASTROINTESTINAL:  No nausea, vomiting or diarrhea.  GENITOURINARY:  No dysuria, frequency or urgency. No Blood in urine  MUSCULOSKELETAL:  no joint aches, no muscle pain  SKIN:  No rash  NEUROLOGIC:  No Headaches  PSYCHIATRIC:  No disorder of thought or mood.  ENDOCRINE:  No heat or cold intolerance  HEMATOLOGICAL:  No easy bruising or bleeding.       Physical Exam:  GEN: NAD  HEENT: normocephalic and atraumatic. EOMI. PERRL.    NECK: Supple.   LUNGS: CTA B/L.  HEART: RRR  ABDOMEN: Soft, NT, ND.  +BS.    : No CVA tenderness. + Red   EXTREMITIES: Rt BKA   MSK: No joint swelling  NEUROLOGIC: No Focal Deficits   SKIN: Left leg hallux ulcer, chronic wounds       Vitals:  T(F): 98.8 (10 Apr 2023 10:35), Max: 98.8 (10 Apr 2023 10:35)  HR: 81 (10 Apr 2023 13:30)  BP: 121/70 (10 Apr 2023 13:30)  RR: 19 (10 Apr 2023 05:12)  SpO2: 100% (10 Apr 2023 05:12) (95% - 100%)  temp max in last 48H T(F): , Max: 98.8 (04-10-23 @ 10:35)    Current Antibiotics:    Other medications:  albuterol/ipratropium for Nebulization 3 milliLiter(s) Nebulizer every 20 minutes  apixaban 5 milliGRAM(s) Oral every 12 hours  atorvastatin 40 milliGRAM(s) Oral at bedtime  buMETAnide Injectable 1 milliGRAM(s) IV Push two times a day  carvedilol 25 milliGRAM(s) Oral every 12 hours  chlorhexidine 2% Cloths 1 Application(s) Topical daily  dextrose 5%. 1000 milliLiter(s) IV Continuous <Continuous>  dextrose 5%. 1000 milliLiter(s) IV Continuous <Continuous>  dextrose 50% Injectable 25 Gram(s) IV Push once  dextrose 50% Injectable 12.5 Gram(s) IV Push once  dextrose 50% Injectable 25 Gram(s) IV Push once  DULoxetine 60 milliGRAM(s) Oral daily  folic acid 1 milliGRAM(s) Oral daily  glucagon  Injectable 1 milliGRAM(s) IntraMuscular once  insulin glargine Injectable (LANTUS) 40 Unit(s) SubCutaneous every morning  insulin lispro (ADMELOG) corrective regimen sliding scale   SubCutaneous three times a day before meals  insulin lispro (ADMELOG) corrective regimen sliding scale   SubCutaneous at bedtime  levothyroxine 50 MICROGram(s) Oral daily  mupirocin 2% Ointment 1 Application(s) Topical two times a day  pregabalin 150 milliGRAM(s) Oral two times a day  sevelamer carbonate 1600 milliGRAM(s) Oral three times a day with meals  tamsulosin 0.4 milliGRAM(s) Oral at bedtime                 14.4   7.01  )-----------( 122      ( 10 Apr 2023 06:52 )             49.6     04-10    138  |  103  |  54.5<H>  ----------------------------<  126<H>  4.2   |  25.0  |  1.37<H>    Ca    8.6      10 Apr 2023 06:52      RECENT CULTURES:  04-04 @ 17:03 .Blood Blood-Peripheral     No Growth Final    04-04 @ 16:13 .Blood Blood-Peripheral     No Growth Final    04-03 @ 22:00 Catheterized Catheterized     >=3 organisms. Probable collection contamination.    04-03 @ 13:50 .Blood Blood-Peripheral     No Growth Final    04-03 @ 13:45    RVP  NotDetec    04-03 @ 13:30 .Blood Blood-Peripheral Blood Culture PCR  Escherichia coli    Growth in aerobic and anaerobic bottles: Escherichia coli  Growth in aerobic bottle: Staphylococcus simulans  "Susceptibilities not performed"  Growth in aerobic bottle: Aerococcus viridans  "Susceptibilities not performed"  ***Blood Panel PCR results on this specimen are available  approximately 3 hours after the Gram stain result.***  Gram stain, PCR, and/or culture results may not always  correspond due to difference in methodologies.  ************************************************************  This PCR assay was performed by multiplex PCR. This  Assay tests for 66 bacterial and resistance gene targets.  Please refer to the [facility] test directory  at [email or web address] for details.  Growth in anaerobic bottle: Gram Negative Rods  Growth in aerobic bottle: Gram Negative Rods and Gram Positive Cocci in  Clusters      WBC Count: 7.01 K/uL (04-10-23 @ 06:52)  WBC Count: 6.79 K/uL (04-08-23 @ 06:43)  WBC Count: 7.42 K/uL (04-07-23 @ 06:06)  WBC Count: 8.08 K/uL (04-06-23 @ 04:52)    Creatinine, Serum: 1.37 mg/dL (04-10-23 @ 06:52)  Creatinine, Serum: 1.52 mg/dL (04-09-23 @ 06:24)  Creatinine, Serum: 1.93 mg/dL (04-08-23 @ 06:43)  Creatinine, Serum: 2.18 mg/dL (04-07-23 @ 06:06)  Creatinine, Serum: 2.41 mg/dL (04-06-23 @ 04:52)    C-Reactive Protein, Serum: 26 mg/L (04-05-23 @ 13:25)    Sedimentation Rate, Erythrocyte: 8 mm/hr (04-05-23 @ 13:25)     SARS-CoV-2: NotDetec (04-03-23 @ 13:45)            < from: US Renal (04.05.23 @ 23:01) >  ACC: [id number] EXAM:  US KIDNEY(S)   ORDERED BY: CONNER JACQUES     PROCEDURE DATE:  04/05/2023          INTERPRETATION:  CLINICAL INFORMATION: Acute renal failure    COMPARISON: CT abdomen pelvis performed on the same day.    TECHNIQUE: Sonographyof the kidneys and bladder.    FINDINGS:  Right kidney: 10.5 cm. No hydronephrosis or shadowing stone.    Left kidney: 10.7 cm. No hydronephrosis. There is a 0.4 cm echogenic   focus in the lower pole. Upper pole is poorly visualized.    Urinary bladder: Not visualized.    IMPRESSION:  Suboptimal visualization of the left kidney. There is a 0.4 cm echogenic   focus in the lower pole, likely a nonobstructing stone.    No hydronephrosis.        --- End of Report ---    < end of copied text >        < from: CT Renal Stone Hunt (04.05.23 @ 18:19) >  ACC: [id number] EXAM:  CT RENAL STONE HUNT   ORDERED BY: MARSHALL WARD     PROCEDURE DATE:  04/05/2023          INTERPRETATION:  CLINICAL INFORMATION: Acute renal failure. Bacteremia.    COMPARISON: 3/7/2021    CONTRAST/COMPLICATIONS:  IV Contrast:NONE  Oral Contrast: NONE  Complications: None reported at time of study completion    PROCEDURE:  CT of the Abdomen and Pelvis was performed.  Sagittal and coronal reformats were performed.    FINDINGS:  LOWER CHEST: Small bilateral pleural effusions with adjacent passive   atelectasis.    LIVER: Within normal limits.  BILE DUCTS: Normal caliber.  GALLBLADDER: Cholelithiasis.  SPLEEN: Within normal limits.  PANCREAS: Within normal limits.  ADRENALS: Within normal limits.  KIDNEYS/URETERS: Punctate nonobstructing left lower pole renal stone. No   hydronephrosis.    BLADDER: Thickened bladder wall, likely chronic changes of bladder outlet   obstruction. Partially decompressed with a Red catheter.  REPRODUCTIVE ORGANS: Brachytherapy seeds in the prostate.    BOWEL: No bowel obstruction. Appendix is normal.  PERITONEUM: Small amount of abdominal and pelvic ascites.  VESSELS: Atherosclerotic calcifications.  RETROPERITONEUM/LYMPH NODES: No lymphadenopathy.  ABDOMINAL WALL: Subcutaneous edema. Bilateral fat-containing inguinal   hernias.  BONES: Degenerative changes.    IMPRESSION:  *  No evidence of acute infectious process in the abdomen and pelvis.  *  Pleural effusions, ascites, and anasarca.    < end of copied text >

## 2023-04-10 NOTE — PROGRESS NOTE ADULT - SUBJECTIVE AND OBJECTIVE BOX
Patient seen and examined    I&O's Summary    09 Apr 2023 07:01  -  10 Apr 2023 07:00  --------------------------------------------------------  IN: 677 mL / OUT: 4050 mL / NET: -3373 mL    10 Apr 2023 07:01  -  10 Apr 2023 19:06  --------------------------------------------------------  IN: 0 mL / OUT: 1200 mL / NET: -1200 mL    Comfortable    REVIEW OF SYSTEMS:    CONSTITUTIONAL: No F/C  RESPIRATORY: No cough,  Less SOB  CARDIOVASCULAR: No CP/palpitations,    GASTROINTESTINAL: No abdominal pain  or NVD   GENITOURINARY: No UTI sx  NEUROLOGICAL: No headaches, NO wk/numbness  MUSCULOSKELETAL:   EXTREMITIES : mod/severe swelling, Improving, can move LEs more easily    Vital Signs Last 24 Hrs  T(C): 36.3 (10 Apr 2023 18:38), Max: 37.1 (10 Apr 2023 10:35)  T(F): 97.4 (10 Apr 2023 18:38), Max: 98.8 (10 Apr 2023 10:35)  HR: 82 (10 Apr 2023 18:38) (72 - 88)  BP: 132/85 (10 Apr 2023 18:38) (104/61 - 138/72)  BP(mean): --  RR: 18 (10 Apr 2023 18:38) (18 - 19)  SpO2: 99% (10 Apr 2023 18:38) (95% - 100%)    Parameters below as of 10 Apr 2023 18:38  Patient On (Oxygen Delivery Method): room air        PHYSICAL EXAM:    GENERAL: NAD, Obese  EYES:  conjunctiva and sclera clear  NECK: Supple, No JVD/Bruit  NERVOUS SYSTEM:  A/O x3,   CHEST:  No rales or rhonchi  HEART:  gr 2 murmur  ABDOMEN: Soft, NT/ND BS+  EXTREMITIES:  2+ Edema;  SKIN: No rashes    LABS:                          14.4   7.01  )-----------( 122      ( 10 Apr 2023 06:52 )             49.6     04-10    138  |  103  |  54.5<H>  ----------------------------<  126<H>  4.2   |  25.0  |  1.37<H>    Ca    8.6      10 Apr 2023 06:52        MEDICATIONS  (STANDING):  acetaminophen     Tablet .. PRN  albuterol/ipratropium for Nebulization  aluminum hydroxide/magnesium hydroxide/simethicone Suspension PRN  apixaban  atorvastatin  buMETAnide Injectable  carvedilol  cefTRIAXone Injectable.  chlorhexidine 2% Cloths  dextrose 5%.  dextrose 5%.  dextrose 50% Injectable  dextrose 50% Injectable  dextrose 50% Injectable  dextrose Oral Gel PRN  DULoxetine  folic acid  glucagon  Injectable  insulin glargine Injectable (LANTUS)  insulin lispro (ADMELOG) corrective regimen sliding scale  insulin lispro (ADMELOG) corrective regimen sliding scale  levothyroxine  melatonin PRN  methocarbamol PRN  mupirocin 2% Ointment  ondansetron Injectable PRN  sevelamer carbonate  tamsulosin  traMADol PRN

## 2023-04-10 NOTE — PROGRESS NOTE ADULT - SUBJECTIVE AND OBJECTIVE BOX
[facility]                                   [address] [place] [address]                                                      PHONE: [phone]                                                         FAX: [phone]  [email or web address]  ---------------------------------------------------------------------------------------------------------------------------------    Overnight events/patient complaints:  Pt feels well still with LE edema    allow double protein at meals (Unknown)  clindamycin (Unknown)    MEDICATIONS  (STANDING):  albuterol/ipratropium for Nebulization 3 milliLiter(s) Nebulizer every 20 minutes  apixaban 5 milliGRAM(s) Oral every 12 hours  atorvastatin 40 milliGRAM(s) Oral at bedtime  buMETAnide Injectable 1 milliGRAM(s) IV Push two times a day  carvedilol 25 milliGRAM(s) Oral every 12 hours  cefTRIAXone Injectable. 2000 milliGRAM(s) IV Push every 24 hours  chlorhexidine 2% Cloths 1 Application(s) Topical daily  dextrose 5%. 1000 milliLiter(s) (100 mL/Hr) IV Continuous <Continuous>  dextrose 5%. 1000 milliLiter(s) (50 mL/Hr) IV Continuous <Continuous>  dextrose 50% Injectable 25 Gram(s) IV Push once  dextrose 50% Injectable 12.5 Gram(s) IV Push once  dextrose 50% Injectable 25 Gram(s) IV Push once  DULoxetine 60 milliGRAM(s) Oral daily  folic acid 1 milliGRAM(s) Oral daily  glucagon  Injectable 1 milliGRAM(s) IntraMuscular once  insulin glargine Injectable (LANTUS) 40 Unit(s) SubCutaneous every morning  insulin lispro (ADMELOG) corrective regimen sliding scale   SubCutaneous three times a day before meals  insulin lispro (ADMELOG) corrective regimen sliding scale   SubCutaneous at bedtime  levothyroxine 50 MICROGram(s) Oral daily  mupirocin 2% Ointment 1 Application(s) Topical two times a day  pregabalin 150 milliGRAM(s) Oral two times a day  sevelamer carbonate 1600 milliGRAM(s) Oral three times a day with meals  tamsulosin 0.4 milliGRAM(s) Oral at bedtime    MEDICATIONS  (PRN):  acetaminophen     Tablet .. 650 milliGRAM(s) Oral every 6 hours PRN Temp greater or equal to 38C (100.4F), Mild Pain (1 - 3)  aluminum hydroxide/magnesium hydroxide/simethicone Suspension 30 milliLiter(s) Oral every 4 hours PRN Dyspepsia  dextrose Oral Gel 15 Gram(s) Oral once PRN Blood Glucose LESS THAN 70 milliGRAM(s)/deciliter  melatonin 3 milliGRAM(s) Oral at bedtime PRN Insomnia  methocarbamol 500 milliGRAM(s) Oral two times a day PRN for severe pain  ondansetron Injectable 4 milliGRAM(s) IV Push every 8 hours PRN Nausea and/or Vomiting  traMADol 50 milliGRAM(s) Oral every 12 hours PRN Severe Pain (7 - 10)      Vital Signs Last 24 Hrs  T(C): 36.5 (10 Apr 2023 05:12), Max: 36.9 (2023 16:57)  T(F): 97.7 (10 Apr 2023 05:12), Max: 98.5 (2023 16:57)  HR: 82 (10 Apr 2023 05:12) (65 - 82)  BP: 104/61 (10 Apr 2023 05:12) (104/61 - 135/88)  BP(mean): --  RR: 19 (10 Apr 2023 05:12) (17 - 19)  SpO2: 100% (10 Apr 2023 05:12) (95% - 100%)    Parameters below as of 10 Apr 2023 05:12  Patient On (Oxygen Delivery Method): BiPAP/CPAP      ICU Vital Signs Last 24 Hrs  MARY ANN CORNELL  I&O's Detail    2023 07:01  -  10 Apr 2023 07:00  --------------------------------------------------------  IN:    Oral Fluid: 677 mL  Total IN: 677 mL    OUT:    Indwelling Catheter - Urethral (mL): 4050 mL  Total OUT: 4050 mL    Total NET: -3373 mL        Drug Dosing Weight  MARY ANN CORNELL      PHYSICAL EXAM:  General:  alert and cooperative. morbidly obese  HEENT: Head; normocephalic, atraumatic.  Eyes: Pupils reactive, cornea wnl.  Neck: Supple, no nodes adenopathy, no NVD or carotid bruit or thyromegaly.  CARDIOVASCULAR: Normal S1 and S2, No murmur, rub, gallop or lift.   LUNGS: No rales, rhonchi or wheeze. Normal breath sounds bilaterally.  ABDOMEN: Soft, nontender without mass or organomegaly. bowel sounds normoactive.  EXTREMITIES: 2+edema to thigh on left leg. RLE amputation  SKIN: warm and dry with normal turgor.  NEURO: Alert/oriented x 3/normal motor exam. No pathologic reflexes.    PSYCH: normal affect.        LABS:                        14.4   7.01  )-----------( 122      ( 10 Apr 2023 06:52 )             49.6     04-10    138  |  103  |  54.5<H>  ----------------------------<  126<H>  4.2   |  25.0  |  1.37<H>    Ca    8.6      10 Apr 2023 06:52      MARY ANN IRBEIRORAKAN            RADIOLOGY & ADDITIONAL STUDIES:    INTERPRETATION OF TELEMETRY (personally reviewed): no events       ECHO: < from: TTE Echo Complete w/o Contrast w/ Doppler (23 @ 18:38) >   1. Technically difficult study.   2. Endocardial visualization was enhanced with intravenous echo contrast.   3. Multiple left ventricular regional wall motion abnormalities exist.   See wall motion findings.   4. Severely decreased global left ventricular systolic function.   5. Left ventricular ejection fraction, by visual estimation, is <20%.   6. Dilated cardiomyopathy.   7. Moderately reduced RV systolic function.   8. Severely enlarged left atrium.   9. Mild mitral annular calcification.  10. Mild to moderate mitral valve regurgitation.  11. The mitral in-flow pattern reveals no discernable A-wave, therefore   no comment on diastolic function can be made.  12. Sclerotic aortic valve with decreased opening.  13. Moderate tricuspid regurgitation.  14. There is no evidence of pericardial effusion.    MD Kumar Electronically signed on 4/3/2023 at 11:15:45 PM    < end of copied text >         CARDIAC CATHETERIZATION: < from: Cardiac Cath Lab - Adult (16 @ 18:03) >  VENTRICLES: LVEF 25% with MR  CORONARY VESSELS: The coronary circulation is right dominant.  LM:   --  LM: Angiography showed minor luminal irregularities with no flow  limiting lesions.  LAD:   --  LAD: Angiography showed minor luminal irregularities with no  flow limiting lesions.  CX:   --  Circumflex: Angiography showed minor luminal irregularities with  no flow limiting lesions.  RCA:   --  RCA: Angiography showed minor luminal irregularities with no  flow limiting lesions.  COMPLICATIONS: There were no complications. No complications occurred  during the cath lab visit.  DIAGNOSTIC IMPRESSIONS: Mild CAD. Non ischemic cardiomyopathy.  DIAGNOSTIC RECOMMENDATIONS: A/C. MARIELA CV The patient should continue with  the present medications.  INTERVENTIONAL IMPRESSIONS: Mild CAD. Non ischemic cardiomyopathy.  INTERVENTIONAL RECOMMENDATIONS: A/C. MARIELA CV  Prepared and signed by  Bright Muñiz MD  Signed 2016 19:26:24    < end of copied text >      ASSESSMENT AND PLAN:  In summary, MARY ANN CORNELL is an 72 year old male with a PMHx of HFrEF (30-35%) (NICM) s/p cardiomems and ICD, afib on coumadin, HTN, HLD, DM, RLE diabetic ulcer s/p right leg amputation, CHRISS who presents for worsening SOB found to be significantly volume overloaded found to have acute on chronic heart failure exacerbation. Patient recently moved to [place] ~1 year ago and has not established care with a new cardiologist while in [place]. Pt is back in [place] temporarily as his wife  recently. Patient's hospital course complicated by JAYLENE.    Acute on Chronic Heart Failure Exacerbation/JAYLENE  - patient remains volume overloaded on exam   - patient's creatinine continues to improve  - continue bumex 1mg IV daily.  Will eventually need to consider Entresto but will wait until creatinine stabilizes. Cardiac cath also a consideration upon renal optimization.  - echo with reduced EF to <20%  - daily chemistry to monitor creatinine and electrolytes  - daily weights  - strict I/O  - fluid and sodium restriction  - nephrology following    Afib  - c/w eliquis 5mg BID  - rate controlled    HTN/HLD  - c/w coreg 25mg BID  - c/w atorvastatin 40mg daily

## 2023-04-11 LAB
ANION GAP SERPL CALC-SCNC: 11 MMOL/L — SIGNIFICANT CHANGE UP (ref 5–17)
BUN SERPL-MCNC: 49.8 MG/DL — HIGH (ref 8–20)
CALCIUM SERPL-MCNC: 8.6 MG/DL — SIGNIFICANT CHANGE UP (ref 8.4–10.5)
CHLORIDE SERPL-SCNC: 104 MMOL/L — SIGNIFICANT CHANGE UP (ref 96–108)
CO2 SERPL-SCNC: 25 MMOL/L — SIGNIFICANT CHANGE UP (ref 22–29)
CREAT SERPL-MCNC: 1.43 MG/DL — HIGH (ref 0.5–1.3)
EGFR: 52 ML/MIN/1.73M2 — LOW
GLUCOSE BLDC GLUCOMTR-MCNC: 109 MG/DL — HIGH (ref 70–99)
GLUCOSE BLDC GLUCOMTR-MCNC: 122 MG/DL — HIGH (ref 70–99)
GLUCOSE BLDC GLUCOMTR-MCNC: 148 MG/DL — HIGH (ref 70–99)
GLUCOSE BLDC GLUCOMTR-MCNC: 94 MG/DL — SIGNIFICANT CHANGE UP (ref 70–99)
GLUCOSE SERPL-MCNC: 97 MG/DL — SIGNIFICANT CHANGE UP (ref 70–99)
POTASSIUM SERPL-MCNC: 4.2 MMOL/L — SIGNIFICANT CHANGE UP (ref 3.5–5.3)
POTASSIUM SERPL-SCNC: 4.2 MMOL/L — SIGNIFICANT CHANGE UP (ref 3.5–5.3)
SODIUM SERPL-SCNC: 140 MMOL/L — SIGNIFICANT CHANGE UP (ref 135–145)

## 2023-04-11 PROCEDURE — 99233 SBSQ HOSP IP/OBS HIGH 50: CPT

## 2023-04-11 RX ADMIN — SEVELAMER CARBONATE 1600 MILLIGRAM(S): 2400 POWDER, FOR SUSPENSION ORAL at 08:19

## 2023-04-11 RX ADMIN — APIXABAN 5 MILLIGRAM(S): 2.5 TABLET, FILM COATED ORAL at 17:33

## 2023-04-11 RX ADMIN — CEFTRIAXONE 2000 MILLIGRAM(S): 500 INJECTION, POWDER, FOR SOLUTION INTRAMUSCULAR; INTRAVENOUS at 14:18

## 2023-04-11 RX ADMIN — Medication 1 MILLIGRAM(S): at 13:26

## 2023-04-11 RX ADMIN — MUPIROCIN 1 APPLICATION(S): 20 OINTMENT TOPICAL at 05:26

## 2023-04-11 RX ADMIN — Medication 150 MILLIGRAM(S): at 17:33

## 2023-04-11 RX ADMIN — TAMSULOSIN HYDROCHLORIDE 0.4 MILLIGRAM(S): 0.4 CAPSULE ORAL at 21:15

## 2023-04-11 RX ADMIN — CARVEDILOL PHOSPHATE 25 MILLIGRAM(S): 80 CAPSULE, EXTENDED RELEASE ORAL at 17:33

## 2023-04-11 RX ADMIN — APIXABAN 5 MILLIGRAM(S): 2.5 TABLET, FILM COATED ORAL at 05:25

## 2023-04-11 RX ADMIN — INSULIN GLARGINE 40 UNIT(S): 100 INJECTION, SOLUTION SUBCUTANEOUS at 08:20

## 2023-04-11 RX ADMIN — SEVELAMER CARBONATE 1600 MILLIGRAM(S): 2400 POWDER, FOR SUSPENSION ORAL at 17:33

## 2023-04-11 RX ADMIN — SEVELAMER CARBONATE 1600 MILLIGRAM(S): 2400 POWDER, FOR SUSPENSION ORAL at 14:17

## 2023-04-11 RX ADMIN — CARVEDILOL PHOSPHATE 25 MILLIGRAM(S): 80 CAPSULE, EXTENDED RELEASE ORAL at 05:25

## 2023-04-11 RX ADMIN — ATORVASTATIN CALCIUM 40 MILLIGRAM(S): 80 TABLET, FILM COATED ORAL at 21:15

## 2023-04-11 RX ADMIN — Medication 50 MICROGRAM(S): at 05:25

## 2023-04-11 RX ADMIN — Medication 150 MILLIGRAM(S): at 05:23

## 2023-04-11 RX ADMIN — DULOXETINE HYDROCHLORIDE 60 MILLIGRAM(S): 30 CAPSULE, DELAYED RELEASE ORAL at 13:27

## 2023-04-11 RX ADMIN — BUMETANIDE 1 MILLIGRAM(S): 0.25 INJECTION INTRAMUSCULAR; INTRAVENOUS at 06:06

## 2023-04-11 RX ADMIN — BUMETANIDE 1 MILLIGRAM(S): 0.25 INJECTION INTRAMUSCULAR; INTRAVENOUS at 14:18

## 2023-04-11 RX ADMIN — CHLORHEXIDINE GLUCONATE 1 APPLICATION(S): 213 SOLUTION TOPICAL at 13:31

## 2023-04-11 NOTE — PROGRESS NOTE ADULT - SUBJECTIVE AND OBJECTIVE BOX
Patient seen and examined    I&O's Summary    10 Apr 2023 07:01  -  11 Apr 2023 07:00  --------------------------------------------------------  IN: 0 mL / OUT: 3700 mL / NET: -3700 mL    11 Apr 2023 07:01  -  11 Apr 2023 19:02  --------------------------------------------------------  IN: 240 mL / OUT: 2300 mL / NET: -2060 mL      Comfortable but tired, didn't eat lunch    REVIEW OF SYSTEMS:    CONSTITUTIONAL: No F/C  RESPIRATORY: No cough,  Less SOB  CARDIOVASCULAR: No CP/palpitations,    GASTROINTESTINAL: No abdominal pain  or NVD   GENITOURINARY: No UTI sx  NEUROLOGICAL: No headaches, NO wk/numbness  MUSCULOSKELETAL:   EXTREMITIES : mod/severe swelling, Improving, can move LEs more easily    Vital Signs Last 24 Hrs  T(C): 36.6 (11 Apr 2023 17:21), Max: 36.8 (11 Apr 2023 05:06)  T(F): 97.8 (11 Apr 2023 17:21), Max: 98.2 (11 Apr 2023 05:06)  HR: 60 (11 Apr 2023 17:21) (60 - 96)  BP: 132/77 (11 Apr 2023 17:21) (101/66 - 139/93)  BP(mean): --  RR: 18 (11 Apr 2023 17:21) (18 - 20)  SpO2: 98% (11 Apr 2023 17:21) (93% - 99%)    Parameters below as of 11 Apr 2023 17:21  Patient On (Oxygen Delivery Method): room air        PHYSICAL EXAM:    GENERAL: NAD, Obese  EYES:  conjunctiva and sclera clear  NECK: Supple, No JVD/Bruit  NERVOUS SYSTEM:  A/O x3,   CHEST:  No rales or rhonchi  HEART:  gr 2 murmur  ABDOMEN: Soft, NT/ND BS+  EXTREMITIES:  2+ Edema; NS  change despite good UO; RLE BKA; L side in Wraps  SKIN: No rashes    LABS:                          14.4   7.01  )-----------( 122      ( 10 Apr 2023 06:52 )             49.6     04-10    138  |  103  |  54.5<H>  ----------------------------<  126<H>  4.2   |  25.0  |  1.37<H>    Ca    8.6      10 Apr 2023 06:52        MEDICATIONS  (STANDING):  albuterol/ipratropium for Nebulization 3 milliLiter(s) Nebulizer every 20 minutes  apixaban 5 milliGRAM(s) Oral every 12 hours  atorvastatin 40 milliGRAM(s) Oral at bedtime  buMETAnide Injectable 1 milliGRAM(s) IV Push two times a day  carvedilol 25 milliGRAM(s) Oral every 12 hours  cefTRIAXone Injectable. 2000 milliGRAM(s) IV Push every 24 hours  chlorhexidine 2% Cloths 1 Application(s) Topical daily  dextrose 5%. 1000 milliLiter(s) (50 mL/Hr) IV Continuous <Continuous>  dextrose 5%. 1000 milliLiter(s) (100 mL/Hr) IV Continuous <Continuous>  dextrose 50% Injectable 25 Gram(s) IV Push once  dextrose 50% Injectable 12.5 Gram(s) IV Push once  dextrose 50% Injectable 25 Gram(s) IV Push once  DULoxetine 60 milliGRAM(s) Oral daily  folic acid 1 milliGRAM(s) Oral daily  glucagon  Injectable 1 milliGRAM(s) IntraMuscular once  insulin glargine Injectable (LANTUS) 40 Unit(s) SubCutaneous every morning  insulin lispro (ADMELOG) corrective regimen sliding scale   SubCutaneous three times a day before meals  insulin lispro (ADMELOG) corrective regimen sliding scale   SubCutaneous at bedtime  levothyroxine 50 MICROGram(s) Oral daily  pregabalin 150 milliGRAM(s) Oral two times a day  sevelamer carbonate 1600 milliGRAM(s) Oral three times a day with meals  tamsulosin 0.4 milliGRAM(s) Oral at bedtime

## 2023-04-11 NOTE — PROGRESS NOTE ADULT - SUBJECTIVE AND OBJECTIVE BOX
[place] [facility] - [facility], [facility]                                   [address] [place] [address]                                                      PHONE: [phone]                                                         FAX: [phone]  [email or web address]  ---------------------------------------------------------------------------------------------------------------------------------    Overnight events/patient complaints:      NAD feeling about the same orthopnea chronic without PND     allow double protein at meals (Unknown)  clindamycin (Unknown)    MEDICATIONS  (STANDING):  albuterol/ipratropium for Nebulization 3 milliLiter(s) Nebulizer every 20 minutes  apixaban 5 milliGRAM(s) Oral every 12 hours  atorvastatin 40 milliGRAM(s) Oral at bedtime  buMETAnide Injectable 1 milliGRAM(s) IV Push two times a day  carvedilol 25 milliGRAM(s) Oral every 12 hours  cefTRIAXone Injectable. 2000 milliGRAM(s) IV Push every 24 hours  chlorhexidine 2% Cloths 1 Application(s) Topical daily  dextrose 5%. 1000 milliLiter(s) (50 mL/Hr) IV Continuous <Continuous>  dextrose 5%. 1000 milliLiter(s) (100 mL/Hr) IV Continuous <Continuous>  dextrose 50% Injectable 25 Gram(s) IV Push once  dextrose 50% Injectable 12.5 Gram(s) IV Push once  dextrose 50% Injectable 25 Gram(s) IV Push once  DULoxetine 60 milliGRAM(s) Oral daily  folic acid 1 milliGRAM(s) Oral daily  glucagon  Injectable 1 milliGRAM(s) IntraMuscular once  insulin glargine Injectable (LANTUS) 40 Unit(s) SubCutaneous every morning  insulin lispro (ADMELOG) corrective regimen sliding scale   SubCutaneous three times a day before meals  insulin lispro (ADMELOG) corrective regimen sliding scale   SubCutaneous at bedtime  levothyroxine 50 MICROGram(s) Oral daily  pregabalin 150 milliGRAM(s) Oral two times a day  sevelamer carbonate 1600 milliGRAM(s) Oral three times a day with meals  tamsulosin 0.4 milliGRAM(s) Oral at bedtime    MEDICATIONS  (PRN):  acetaminophen     Tablet .. 650 milliGRAM(s) Oral every 6 hours PRN Temp greater or equal to 38C (100.4F), Mild Pain (1 - 3)  aluminum hydroxide/magnesium hydroxide/simethicone Suspension 30 milliLiter(s) Oral every 4 hours PRN Dyspepsia  dextrose Oral Gel 15 Gram(s) Oral once PRN Blood Glucose LESS THAN 70 milliGRAM(s)/deciliter  melatonin 3 milliGRAM(s) Oral at bedtime PRN Insomnia  methocarbamol 500 milliGRAM(s) Oral two times a day PRN for severe pain  ondansetron Injectable 4 milliGRAM(s) IV Push every 8 hours PRN Nausea and/or Vomiting      Vital Signs Last 24 Hrs  T(C): 36.5 (2023 09:06), Max: 37.1 (10 Apr 2023 10:35)  T(F): 97.7 (2023 09:06), Max: 98.8 (10 Apr 2023 10:35)  HR: 62 (2023 09:06) (62 - 88)  BP: 102/63 (2023 09:06) (101/66 - 138/72)  BP(mean): --  RR: 18 (2023 09:06) (18 - 20)  SpO2: 93% (2023 09:06) (93% - 99%)    Parameters below as of 2023 09:06  Patient On (Oxygen Delivery Method): room air      ICU Vital Signs Last 24 Hrs  MARY ANN CORNELL  I&O's Detail    10 Apr 2023 07:01  -  2023 07:00  --------------------------------------------------------  IN:  Total IN: 0 mL    OUT:    Indwelling Catheter - Urethral (mL): 3700 mL  Total OUT: 3700 mL    Total NET: -3700 mL        I&O's Summary    10 Apr 2023 07:01  -  2023 07:00  --------------------------------------------------------  IN: 0 mL / OUT: 3700 mL / NET: -3700 mL      Drug Dosing Weight  MARY ANN CORNELL      PHYSICAL EXAM:  General: Appears well developed, alert and cooperative.  HEENT: Head; normocephalic, atraumatic.  Eyes: Pupils reactive, cornea wnl.  Neck: Supple, no nodes adenopathy, no NVD or carotid bruit or thyromegaly.  CARDIOVASCULAR: Normal S1 and S2, 2/6 murmur, rub, gallop or lift.   LUNGS: Decrease BS at the lower bases   ABDOMEN: Soft, nontender without mass or organomegaly. bowel sounds normoactive.  EXTREMITIES: No clubbing, cyanosis ++ edema. Distal pulses wnl.   SKIN: warm and dry with normal turgor.  NEURO: Alert/oriented x 3/normal motor exam. No pathologic reflexes.    PSYCH: normal affect.        LABS:                        14.4   7.01  )-----------( 122      ( 10 Apr 2023 06:52 )             49.6     04-11    140  |  104  |  49.8<H>  ----------------------------<  97  4.2   |  25.0  |  1.43<H>    Ca    8.6      2023 05:19  Mg     2.3     04-10      MARY ANN CORNELL            RADIOLOGY & ADDITIONAL STUDIES:    INTERPRETATION OF TELEMETRY (personally reviewed):      ECHO: < from: TTE Echo Complete w/o Contrast w/ Doppler (23 @ 18:38) >   1. Technically difficult study.   2. Endocardial visualization was enhanced with intravenous echo contrast.   3. Multiple left ventricular regional wall motion abnormalities exist.   See wall motion findings.   4. Severely decreased global left ventricular systolic function.   5. Left ventricular ejection fraction, by visual estimation, is <20%.   6. Dilated cardiomyopathy.   7. Moderately reduced RV systolic function.   8. Severely enlarged left atrium.   9. Mild mitral annular calcification.  10. Mild to moderate mitral valve regurgitation.  11. The mitral in-flow pattern reveals no discernable A-wave, therefore   no comment on diastolic function can be made.  12. Sclerotic aortic valve with decreased opening.  13. Moderate tricuspid regurgitation.  14. There is no evidence of pericardial effusion.    MD Kumar Electronically signed on 4/3/2023 at 11:15:45 PM    < end of copied text >         CARDIAC CATHETERIZATION: < from: Cardiac Cath Lab - Adult (16 @ 18:03) >  VENTRICLES: LVEF 25% with MR  CORONARY VESSELS: The coronary circulation is right dominant.  LM:   --  LM: Angiography showed minor luminal irregularities with no flow  limiting lesions.  LAD:   --  LAD: Angiography showed minor luminal irregularities with no  flow limiting lesions.  CX:   --  Circumflex: Angiography showed minor luminal irregularities with  no flow limiting lesions.  RCA:   --  RCA: Angiography showed minor luminal irregularities with no  flow limiting lesions.  COMPLICATIONS: There were no complications. No complications occurred  during the cath lab visit.  DIAGNOSTIC IMPRESSIONS: Mild CAD. Non ischemic cardiomyopathy.  DIAGNOSTIC RECOMMENDATIONS: A/C. MARIELA CV The patient should continue with  the present medications.  INTERVENTIONAL IMPRESSIONS: Mild CAD. Non ischemic cardiomyopathy.  INTERVENTIONAL RECOMMENDATIONS: A/C. MARIELA CV  Prepared and signed by  Bright Muñiz MD  Signed 2016 19:26:24    < end of copied text >      ASSESSMENT AND PLAN:  In summary, MARY ANN CORNELL is an 72 year old male with a PMHx of HFrEF (30-35%) (NICM) prior [facility]  mild CAD see above s/p cardiomems and ICD, afib on coumadin, HTN, HLD, DM, RLE diabetic ulcer s/p right leg amputation, CHRISS who presents for worsening SOB found to be significantly volume overloaded found to have acute on chronic heart failure exacerbation. Patient recently moved to [place] ~1 year ago and has not established care with a new cardiologist while in [place]. Pt is back in [place] temporarily as his wife  recently. Patient's hospital course complicated by JAYLENE.    Acute on Chronic Heart Failure Exacerbation/JAYLENE hx of none compliance   - Continue bumex 1mg IV BID and monitor renal panel    - echo with reduced EF to <20% acute on chronic NICM EF ~ 25 to 30%   - daily chemistry to monitor creatinine and electrolytes  - ARB likely on 23 if BP and Cr stable   - nephrology following    Afib  - c/w eliquis 5mg BID  - rate controlled    HTN/HLD  - c/w coreg 25mg BID  - c/w atorvastatin 40mg daily

## 2023-04-11 NOTE — PROGRESS NOTE ADULT - SUBJECTIVE AND OBJECTIVE BOX
MARY ANN KRAIG  ----------------------------------------  The patient was seen at bedside. Patient with heart failure. Offered no complaints. Denied chest pain or palpitations.    Vital Signs Last 24 Hrs  T(C): 36.5 (11 Apr 2023 09:06), Max: 36.8 (11 Apr 2023 05:06)  T(F): 97.7 (11 Apr 2023 09:06), Max: 98.2 (11 Apr 2023 05:06)  HR: 62 (11 Apr 2023 09:06) (62 - 82)  BP: 102/63 (11 Apr 2023 09:06) (101/66 - 132/85)  BP(mean): --  RR: 18 (11 Apr 2023 09:06) (18 - 20)  SpO2: 93% (11 Apr 2023 09:06) (93% - 99%)    Parameters below as of 11 Apr 2023 09:06  Patient On (Oxygen Delivery Method): room air    CAPILLARY BLOOD GLUCOSE  POCT Blood Glucose.: 109 mg/dL (11 Apr 2023 08:19)  POCT Blood Glucose.: 146 mg/dL (10 Apr 2023 21:53)  POCT Blood Glucose.: 126 mg/dL (10 Apr 2023 18:37)  POCT Blood Glucose.: 120 mg/dL (10 Apr 2023 12:49)    PHYSICAL EXAMINATION:  ----------------------------------------  General appearance: No acute distress, Awake, Alert  HEENT: Normocephalic, Atraumatic, Conjunctiva clear, EOMI  Neck: Supple, No JVD, No tenderness  Lungs: No wheezes, No rales, Diminished breath sounds at the bases  Cardiovascular: S1S2, Regular rhythm  Abdomen: Soft, Nontender, Nondistended, No guarding/rebound, Positive bowel sounds  Extremities: No clubbing, No cyanosis, No calf tenderness, Lower extremity edema, Right below knee amputations, Left leg dressing clean and intact  Neuro: Strength equal bilaterally, No tremors  Psychiatric: Appropriate mood, Normal affect    LABORATORY STUDIES:  ----------------------------------------             14.4   7.01  )-----------( 122      ( 10 Apr 2023 06:52 )             49.6     04-11    140  |  104  |  49.8<H>  ----------------------------<  97  4.2   |  25.0  |  1.43<H>    Ca    8.6      11 Apr 2023 05:19  Mg     2.3     04-10    MEDICATIONS  (STANDING):  albuterol/ipratropium for Nebulization 3 milliLiter(s) Nebulizer every 20 minutes  apixaban 5 milliGRAM(s) Oral every 12 hours  atorvastatin 40 milliGRAM(s) Oral at bedtime  buMETAnide Injectable 1 milliGRAM(s) IV Push two times a day  carvedilol 25 milliGRAM(s) Oral every 12 hours  cefTRIAXone Injectable. 2000 milliGRAM(s) IV Push every 24 hours  chlorhexidine 2% Cloths 1 Application(s) Topical daily  dextrose 5%. 1000 milliLiter(s) (50 mL/Hr) IV Continuous <Continuous>  dextrose 5%. 1000 milliLiter(s) (100 mL/Hr) IV Continuous <Continuous>  dextrose 50% Injectable 25 Gram(s) IV Push once  dextrose 50% Injectable 12.5 Gram(s) IV Push once  dextrose 50% Injectable 25 Gram(s) IV Push once  DULoxetine 60 milliGRAM(s) Oral daily  folic acid 1 milliGRAM(s) Oral daily  glucagon  Injectable 1 milliGRAM(s) IntraMuscular once  insulin glargine Injectable (LANTUS) 40 Unit(s) SubCutaneous every morning  insulin lispro (ADMELOG) corrective regimen sliding scale   SubCutaneous three times a day before meals  insulin lispro (ADMELOG) corrective regimen sliding scale   SubCutaneous at bedtime  levothyroxine 50 MICROGram(s) Oral daily  pregabalin 150 milliGRAM(s) Oral two times a day  sevelamer carbonate 1600 milliGRAM(s) Oral three times a day with meals  tamsulosin 0.4 milliGRAM(s) Oral at bedtime    MEDICATIONS  (PRN):  acetaminophen     Tablet .. 650 milliGRAM(s) Oral every 6 hours PRN Temp greater or equal to 38C (100.4F), Mild Pain (1 - 3)  aluminum hydroxide/magnesium hydroxide/simethicone Suspension 30 milliLiter(s) Oral every 4 hours PRN Dyspepsia  dextrose Oral Gel 15 Gram(s) Oral once PRN Blood Glucose LESS THAN 70 milliGRAM(s)/deciliter  melatonin 3 milliGRAM(s) Oral at bedtime PRN Insomnia  methocarbamol 500 milliGRAM(s) Oral two times a day PRN for severe pain  ondansetron Injectable 4 milliGRAM(s) IV Push every 8 hours PRN Nausea and/or Vomiting      ASSESSMENT / PLAN:  ----------------------------------------  72M with a history of cardiomyopathy with AICD, atrial fibrillation, diabetes, hypertension, chronic kidney disease, prostate cancer, and obstructive sleep apnea who presented with a two week history of dyspnea found to have heart failure.   Diuretics were initiated and a Red catheter was placed to monitor response. Blood cultures were also noted to grow Ecoli for which intravenous antibiotics were initiated.    Acute hypoxic respiratory failure / Acute on chronic systolic heart failure - Echocardiogram noted severely decreased left ventricular systolic function. On bumetanide for diuresis. Monitor urine output(3700). Communicated with Cardiology, the prior cardiac catheterization was with minimal disease.    Ecoli bacteremia - Currently afebrile and without leukocytosis. Repeat blood cultures (4/4) were without growth. Planned for ceftriaxone through 4/17/23.    Chronic kidney disease III with acute kidney injury / History of benign prostatic hypertrophy - On tamsulosin. Red catheter in place. Monitoring renal function and urine output while on bumetanide. Nephrology following.    Atrial fibrillation - On carvedilol. Apixaban for anticoagulation.    Diabetes - Insulin coverage, close monitoring of blood glucose levels. On pregabalin for neuropathy.    Hypothyroidism - On levothyroxine.    Obstructive sleep apnea - On nocturnal Bipap.

## 2023-04-11 NOTE — PROGRESS NOTE ADULT - ASSESSMENT
71 y/o M w/ a hx of HFrEF Last EF 30% s/p AICD, DM2 on Insulin, Persistent Atrial Fibrillation on Eliquis, HTN, HLD, RLE BKA,   Chronic wounds on LLE presenting for SOB    TTE on 4/3- noted LVEF < 20%    A) JAYLENE on CKD suspect stage III - serum Cr improving daily, 1.37 now - no new labs today - check am    h/o CHRISS, DM 2, Hypothyroid, left renal  stone - not  obstructing; PVD; Diffuse  edema.     P) Serum Cr improved- Anasarca- On Bumex 1 mg IV  BID - will need to accept degree of azotemia. Watch diuresis w diuretics no need for HD- ultrafiltration at thid time    AM labs will follow

## 2023-04-12 LAB
ANION GAP SERPL CALC-SCNC: 12 MMOL/L — SIGNIFICANT CHANGE UP (ref 5–17)
BUN SERPL-MCNC: 46.9 MG/DL — HIGH (ref 8–20)
CALCIUM SERPL-MCNC: 8.7 MG/DL — SIGNIFICANT CHANGE UP (ref 8.4–10.5)
CHLORIDE SERPL-SCNC: 104 MMOL/L — SIGNIFICANT CHANGE UP (ref 96–108)
CO2 SERPL-SCNC: 25 MMOL/L — SIGNIFICANT CHANGE UP (ref 22–29)
CREAT SERPL-MCNC: 1.31 MG/DL — HIGH (ref 0.5–1.3)
EGFR: 58 ML/MIN/1.73M2 — LOW
GLUCOSE BLDC GLUCOMTR-MCNC: 147 MG/DL — HIGH (ref 70–99)
GLUCOSE BLDC GLUCOMTR-MCNC: 154 MG/DL — HIGH (ref 70–99)
GLUCOSE BLDC GLUCOMTR-MCNC: 204 MG/DL — HIGH (ref 70–99)
GLUCOSE BLDC GLUCOMTR-MCNC: 88 MG/DL — SIGNIFICANT CHANGE UP (ref 70–99)
GLUCOSE SERPL-MCNC: 131 MG/DL — HIGH (ref 70–99)
POTASSIUM SERPL-MCNC: 4.4 MMOL/L — SIGNIFICANT CHANGE UP (ref 3.5–5.3)
POTASSIUM SERPL-SCNC: 4.4 MMOL/L — SIGNIFICANT CHANGE UP (ref 3.5–5.3)
SODIUM SERPL-SCNC: 141 MMOL/L — SIGNIFICANT CHANGE UP (ref 135–145)

## 2023-04-12 PROCEDURE — 99233 SBSQ HOSP IP/OBS HIGH 50: CPT

## 2023-04-12 PROCEDURE — 99232 SBSQ HOSP IP/OBS MODERATE 35: CPT

## 2023-04-12 RX ORDER — LOSARTAN POTASSIUM 100 MG/1
25 TABLET, FILM COATED ORAL DAILY
Refills: 0 | Status: DISCONTINUED | OUTPATIENT
Start: 2023-04-12 | End: 2023-04-18

## 2023-04-12 RX ADMIN — CARVEDILOL PHOSPHATE 25 MILLIGRAM(S): 80 CAPSULE, EXTENDED RELEASE ORAL at 05:01

## 2023-04-12 RX ADMIN — TAMSULOSIN HYDROCHLORIDE 0.4 MILLIGRAM(S): 0.4 CAPSULE ORAL at 21:32

## 2023-04-12 RX ADMIN — Medication 150 MILLIGRAM(S): at 17:35

## 2023-04-12 RX ADMIN — DULOXETINE HYDROCHLORIDE 60 MILLIGRAM(S): 30 CAPSULE, DELAYED RELEASE ORAL at 12:30

## 2023-04-12 RX ADMIN — CHLORHEXIDINE GLUCONATE 1 APPLICATION(S): 213 SOLUTION TOPICAL at 12:31

## 2023-04-12 RX ADMIN — Medication 4: at 12:28

## 2023-04-12 RX ADMIN — BUMETANIDE 1 MILLIGRAM(S): 0.25 INJECTION INTRAMUSCULAR; INTRAVENOUS at 13:16

## 2023-04-12 RX ADMIN — INSULIN GLARGINE 40 UNIT(S): 100 INJECTION, SOLUTION SUBCUTANEOUS at 09:14

## 2023-04-12 RX ADMIN — SEVELAMER CARBONATE 1600 MILLIGRAM(S): 2400 POWDER, FOR SUSPENSION ORAL at 17:32

## 2023-04-12 RX ADMIN — SEVELAMER CARBONATE 1600 MILLIGRAM(S): 2400 POWDER, FOR SUSPENSION ORAL at 12:30

## 2023-04-12 RX ADMIN — BUMETANIDE 1 MILLIGRAM(S): 0.25 INJECTION INTRAMUSCULAR; INTRAVENOUS at 05:01

## 2023-04-12 RX ADMIN — Medication 150 MILLIGRAM(S): at 05:01

## 2023-04-12 RX ADMIN — CEFTRIAXONE 2000 MILLIGRAM(S): 500 INJECTION, POWDER, FOR SOLUTION INTRAMUSCULAR; INTRAVENOUS at 17:31

## 2023-04-12 RX ADMIN — CARVEDILOL PHOSPHATE 25 MILLIGRAM(S): 80 CAPSULE, EXTENDED RELEASE ORAL at 17:36

## 2023-04-12 RX ADMIN — Medication 1 MILLIGRAM(S): at 12:30

## 2023-04-12 RX ADMIN — APIXABAN 5 MILLIGRAM(S): 2.5 TABLET, FILM COATED ORAL at 17:36

## 2023-04-12 RX ADMIN — ATORVASTATIN CALCIUM 40 MILLIGRAM(S): 80 TABLET, FILM COATED ORAL at 21:32

## 2023-04-12 RX ADMIN — APIXABAN 5 MILLIGRAM(S): 2.5 TABLET, FILM COATED ORAL at 05:01

## 2023-04-12 RX ADMIN — SEVELAMER CARBONATE 1600 MILLIGRAM(S): 2400 POWDER, FOR SUSPENSION ORAL at 09:14

## 2023-04-12 RX ADMIN — Medication 50 MICROGRAM(S): at 05:01

## 2023-04-12 NOTE — PROGRESS NOTE ADULT - SUBJECTIVE AND OBJECTIVE BOX
MARY ANN KRAIG  ----------------------------------------  The patient was seen at bedside. Patient with heart failure. Offered no complaints.    Vital Signs Last 24 Hrs  T(C): 36.7 (12 Apr 2023 09:45), Max: 36.7 (12 Apr 2023 04:59)  T(F): 98 (12 Apr 2023 09:45), Max: 98 (12 Apr 2023 04:59)  HR: 87 (12 Apr 2023 09:45) (60 - 96)  BP: 130/80 (12 Apr 2023 09:45) (107/71 - 147/80)  BP(mean): --  RR: 18 (12 Apr 2023 09:45) (16 - 18)  SpO2: 96% (12 Apr 2023 09:45) (95% - 98%)    Parameters below as of 12 Apr 2023 09:45  Patient On (Oxygen Delivery Method): room air    CAPILLARY BLOOD GLUCOSE  POCT Blood Glucose.: 147 mg/dL (12 Apr 2023 08:17)  POCT Blood Glucose.: 148 mg/dL (11 Apr 2023 21:14)  POCT Blood Glucose.: 122 mg/dL (11 Apr 2023 17:20)  POCT Blood Glucose.: 94 mg/dL (11 Apr 2023 12:56)    PHYSICAL EXAMINATION:  ----------------------------------------  General appearance: No acute distress, Awake, Alert  HEENT: Normocephalic, Atraumatic, Conjunctiva clear, EOMI  Neck: Supple, No JVD, No tenderness  Lungs: No wheezes, No rales, Diminished breath sounds at the bases  Cardiovascular: S1S2, Regular rhythm  Abdomen: Soft, Nontender, Nondistended, No guarding/rebound, Positive bowel sounds  Extremities: No clubbing, No cyanosis, No calf tenderness, Lower extremity edema, Right below knee amputations, Left leg dressing clean and intact  Neuro: Strength equal bilaterally, No tremors  Psychiatric: Appropriate mood, Normal affect    LABORATORY STUDIES:  ----------------------------------------  04-12    141  |  104  |  46.9<H>  ----------------------------<  131<H>  4.4   |  25.0  |  1.31<H>    Ca    8.7      12 Apr 2023 05:15  Mg     2.3     04-10    MEDICATIONS  (STANDING):  albuterol/ipratropium for Nebulization 3 milliLiter(s) Nebulizer every 20 minutes  apixaban 5 milliGRAM(s) Oral every 12 hours  atorvastatin 40 milliGRAM(s) Oral at bedtime  buMETAnide Injectable 1 milliGRAM(s) IV Push two times a day  carvedilol 25 milliGRAM(s) Oral every 12 hours  cefTRIAXone Injectable. 2000 milliGRAM(s) IV Push every 24 hours  chlorhexidine 2% Cloths 1 Application(s) Topical daily  dextrose 5%. 1000 milliLiter(s) (100 mL/Hr) IV Continuous <Continuous>  dextrose 5%. 1000 milliLiter(s) (50 mL/Hr) IV Continuous <Continuous>  dextrose 50% Injectable 25 Gram(s) IV Push once  dextrose 50% Injectable 12.5 Gram(s) IV Push once  dextrose 50% Injectable 25 Gram(s) IV Push once  DULoxetine 60 milliGRAM(s) Oral daily  folic acid 1 milliGRAM(s) Oral daily  glucagon  Injectable 1 milliGRAM(s) IntraMuscular once  insulin glargine Injectable (LANTUS) 40 Unit(s) SubCutaneous every morning  insulin lispro (ADMELOG) corrective regimen sliding scale   SubCutaneous three times a day before meals  insulin lispro (ADMELOG) corrective regimen sliding scale   SubCutaneous at bedtime  levothyroxine 50 MICROGram(s) Oral daily  losartan 25 milliGRAM(s) Oral daily  pregabalin 150 milliGRAM(s) Oral two times a day  sevelamer carbonate 1600 milliGRAM(s) Oral three times a day with meals  tamsulosin 0.4 milliGRAM(s) Oral at bedtime    MEDICATIONS  (PRN):  acetaminophen     Tablet .. 650 milliGRAM(s) Oral every 6 hours PRN Temp greater or equal to 38C (100.4F), Mild Pain (1 - 3)  aluminum hydroxide/magnesium hydroxide/simethicone Suspension 30 milliLiter(s) Oral every 4 hours PRN Dyspepsia  dextrose Oral Gel 15 Gram(s) Oral once PRN Blood Glucose LESS THAN 70 milliGRAM(s)/deciliter  melatonin 3 milliGRAM(s) Oral at bedtime PRN Insomnia  methocarbamol 500 milliGRAM(s) Oral two times a day PRN for severe pain  ondansetron Injectable 4 milliGRAM(s) IV Push every 8 hours PRN Nausea and/or Vomiting      ASSESSMENT / PLAN:  ----------------------------------------  72M with a history of cardiomyopathy with AICD, atrial fibrillation, diabetes, hypertension, chronic kidney disease, prostate cancer, and obstructive sleep apnea who presented with a two week history of dyspnea found to have heart failure.   Diuretics were initiated and a Red catheter was placed to monitor response. Blood cultures were also noted to grow Ecoli for which intravenous antibiotics were initiated.    Acute hypoxic respiratory failure / Acute on chronic systolic heart failure - Echocardiogram noted severely decreased left ventricular systolic function. On bumetanide for diuresis. Monitor urine output(2800). On carvedilol, for initiation of losartan.    Ecoli bacteremia - Currently afebrile and without leukocytosis. Repeat blood cultures (4/4/23) were without growth. Planned for ceftriaxone through 4/17/23.    Chronic kidney disease III with acute kidney injury / History of benign prostatic hypertrophy - Red catheter in place. Continue on tamsulosin. Monitoring renal function and urine output while on bumetanide. Nephrology following.    Atrial fibrillation - On carvedilol. Apixaban for anticoagulation.    Diabetes - Insulin coverage, close monitoring of blood glucose levels. On pregabalin for neuropathy.    Hypothyroidism - On levothyroxine.    Obstructive sleep apnea - On nocturnal Bipap.

## 2023-04-12 NOTE — PROGRESS NOTE ADULT - SUBJECTIVE AND OBJECTIVE BOX
[facility] - [facility], THE HEART [facility]ENTER                                   [address] [place] [address]                                                      PHONE: [phone]                                                         FAX: [phone]  [email or web address]  ---------------------------------------------------------------------------------------------------------------------------------    Overnight events/patient complaints:      NAD feeling well today     allow double protein at meals (Unknown)  clindamycin (Unknown)    MEDICATIONS  (STANDING):  albuterol/ipratropium for Nebulization 3 milliLiter(s) Nebulizer every 20 minutes  apixaban 5 milliGRAM(s) Oral every 12 hours  atorvastatin 40 milliGRAM(s) Oral at bedtime  buMETAnide Injectable 1 milliGRAM(s) IV Push two times a day  carvedilol 25 milliGRAM(s) Oral every 12 hours  cefTRIAXone Injectable. 2000 milliGRAM(s) IV Push every 24 hours  chlorhexidine 2% Cloths 1 Application(s) Topical daily  dextrose 5%. 1000 milliLiter(s) (100 mL/Hr) IV Continuous <Continuous>  dextrose 5%. 1000 milliLiter(s) (50 mL/Hr) IV Continuous <Continuous>  dextrose 50% Injectable 25 Gram(s) IV Push once  dextrose 50% Injectable 12.5 Gram(s) IV Push once  dextrose 50% Injectable 25 Gram(s) IV Push once  DULoxetine 60 milliGRAM(s) Oral daily  folic acid 1 milliGRAM(s) Oral daily  glucagon  Injectable 1 milliGRAM(s) IntraMuscular once  insulin glargine Injectable (LANTUS) 40 Unit(s) SubCutaneous every morning  insulin lispro (ADMELOG) corrective regimen sliding scale   SubCutaneous three times a day before meals  insulin lispro (ADMELOG) corrective regimen sliding scale   SubCutaneous at bedtime  levothyroxine 50 MICROGram(s) Oral daily  losartan 25 milliGRAM(s) Oral daily  pregabalin 150 milliGRAM(s) Oral two times a day  sevelamer carbonate 1600 milliGRAM(s) Oral three times a day with meals  tamsulosin 0.4 milliGRAM(s) Oral at bedtime    MEDICATIONS  (PRN):  acetaminophen     Tablet .. 650 milliGRAM(s) Oral every 6 hours PRN Temp greater or equal to 38C (100.4F), Mild Pain (1 - 3)  aluminum hydroxide/magnesium hydroxide/simethicone Suspension 30 milliLiter(s) Oral every 4 hours PRN Dyspepsia  dextrose Oral Gel 15 Gram(s) Oral once PRN Blood Glucose LESS THAN 70 milliGRAM(s)/deciliter  melatonin 3 milliGRAM(s) Oral at bedtime PRN Insomnia  methocarbamol 500 milliGRAM(s) Oral two times a day PRN for severe pain  ondansetron Injectable 4 milliGRAM(s) IV Push every 8 hours PRN Nausea and/or Vomiting      Vital Signs Last 24 Hrs  T(C): 36.5 (2023 08:45), Max: 36.7 (2023 04:59)  T(F): 97.7 (2023 08:45), Max: 98 (2023 04:59)  HR: 78 (2023 08:45) (60 - 96)  BP: 136/82 (2023 08:45) (102/63 - 147/80)  BP(mean): --  RR: 18 (2023 08:45) (16 - 18)  SpO2: 96% (2023 08:45) (93% - 98%)    Parameters below as of 2023 08:45  Patient On (Oxygen Delivery Method): room air      ICU Vital Signs Last 24 Hrs  MARY ANN CORNELL  I&O's Detail    2023 07:01  -  2023 07:00  --------------------------------------------------------  IN:    Oral Fluid: 717 mL  Total IN: 717 mL    OUT:    Indwelling Catheter - Urethral (mL): 2800 mL  Total OUT: 2800 mL    Total NET: - mL        I&O's Summary    2023 07:01  -  2023 07:00  --------------------------------------------------------  IN: 717 mL / OUT: 2800 mL / NET: - mL      Drug Dosing Weight  MARY ANN CORNELL      PHYSICAL EXAM:  General: Appears well developed, alert and cooperative.  HEENT: Head; normocephalic, atraumatic.  Eyes: Pupils reactive, cornea wnl.  Neck: Supple, no nodes adenopathy, no NVD or carotid bruit or thyromegaly.  CARDIOVASCULAR: Normal S1 and S2, 2/6 murmur, rub, gallop or lift.   LUNGS: Decrease BS at the lower bases   ABDOMEN: Soft, nontender without mass or organomegaly. bowel sounds normoactive.  EXTREMITIES: No clubbing, cyanosis ++ edema right BKA    SKIN: warm and dry with normal turgor.  NEURO: Alert/oriented x 3/normal motor exam. No pathologic reflexes.    PSYCH: normal affect.        LABS:        141  |  104  |  46.9<H>  ----------------------------<  131<H>  4.4   |  25.0  |  1.31<H>    Ca    8.7      2023 05:15  Mg     2.3     04-10      MARY ANN CORNELL            RADIOLOGY & ADDITIONAL STUDIES:    INTERPRETATION OF TELEMETRY (personally reviewed):        ECHO: < from: TTE Echo Complete w/o Contrast w/ Doppler (23 @ 18:38) >   1. Technically difficult study.   2. Endocardial visualization was enhanced with intravenous echo contrast.   3. Multiple left ventricular regional wall motion abnormalities exist.   See wall motion findings.   4. Severely decreased global left ventricular systolic function.   5. Left ventricular ejection fraction, by visual estimation, is <20%.   6. Dilated cardiomyopathy.   7. Moderately reduced RV systolic function.   8. Severely enlarged left atrium.   9. Mild mitral annular calcification.  10. Mild to moderate mitral valve regurgitation.  11. The mitral in-flow pattern reveals no discernable A-wave, therefore   no comment on diastolic function can be made.  12. Sclerotic aortic valve with decreased opening.  13. Moderate tricuspid regurgitation.  14. There is no evidence of pericardial effusion.    MD Kumar Electronically signed on 4/3/2023 at 11:15:45 PM    < end of copied text >         CARDIAC CATHETERIZATION: < from: Cardiac Cath Lab - Adult (16 @ 18:03) >  VENTRICLES: LVEF 25% with MR  CORONARY VESSELS: The coronary circulation is right dominant.  LM:   --  LM: Angiography showed minor luminal irregularities with no flow  limiting lesions.  LAD:   --  LAD: Angiography showed minor luminal irregularities with no  flow limiting lesions.  CX:   --  Circumflex: Angiography showed minor luminal irregularities with  no flow limiting lesions.  RCA:   --  RCA: Angiography showed minor luminal irregularities with no  flow limiting lesions.  COMPLICATIONS: There were no complications. No complications occurred  during the cath lab visit.  DIAGNOSTIC IMPRESSIONS: Mild CAD. Non ischemic cardiomyopathy.  DIAGNOSTIC RECOMMENDATIONS: A/C. MARIELA CV The patient should continue with  the present medications.  INTERVENTIONAL IMPRESSIONS: Mild CAD. Non ischemic cardiomyopathy.  INTERVENTIONAL RECOMMENDATIONS: A/C. MARIELA CV  Prepared and signed by  Bright Muñiz MD  Signed 2016 19:26:24    < end of copied text >      ASSESSMENT AND PLAN:  In summary, MARY ANN CORNELL is an 72 year old male with a PMHx of HFrEF (30-35%) (NICM) prior [facility] 2016 mild CAD see above s/p cardiomems and ICD, afib on coumadin, HTN, HLD, DM, RLE diabetic ulcer s/p right leg amputation, CHRISS who presents for worsening SOB found to be significantly volume overloaded found to have acute on chronic heart failure exacerbation. Patient recently moved to [place] ~1 year ago and has not established care with a new cardiologist while in [place]. Pt is back in [place] temporarily as his wife  recently. Patient's hospital course complicated by JAYLENE.    Acute on Chronic Heart Failure Exacerbation/JAYLENE hx of none compliance   - Continue bumex 1mg IV BID and monitor renal panel    - echo with reduced EF to <20% acute on chronic NICM EF ~ 25 to 30%   - daily chemistry to monitor creatinine and electrolytes  - Losartan 25 mg po daily due to HFrEF   - Nephrology following    Afib  - c/w eliquis 5mg BID  - rate controlled    HTN/HLD  - c/w coreg 25mg BID  - c/w atorvastatin 40mg daily    No need for repeat cath at this time   Out patient ischemic evaluations

## 2023-04-12 NOTE — PROGRESS NOTE ADULT - ASSESSMENT
72 year old male with a PMHx of HFrEF (30-35%) (NICM) prior [facility] 2016 mild CAD see above s/p cardiomems and ICD, afib on coumadin, HTN, HLD, DM, RLE diabetic ulcer s/p right leg amputation, CHRISS who presents for worsening SOB found to be significantly volume overloaded found to have acute on chronic heart failure exacerbation. Patient recently moved to [place] ~1 year ago and has not established care with a new cardiologist while in [place]. Pt is back in [place] temporarily as his wife  recently. Patient's hospital course complicated by JAYLENE.      TTE on 4/3- noted LVEF < 20%    CKD III - Cardiomyopathy   h/o CHRISS, DM 2, Hypothyroid, left renal  stone - not  obstructing; PVD; Diffuse  edema.   Renal function stable with IV Bumex diursesis   Watch on Losartan   cardio follow up noted       72 year old male with a PMHx of HFrEF (30-35%) (NICM) prior [facility] 2016 mild CAD see above s/p cardiomems and ICD, afib on coumadin, HTN, HLD, DM, RLE diabetic ulcer s/p right leg amputation, CHRISS who presents for worsening SOB found to be significantly volume overloaded found to have acute on chronic heart failure exacerbation. Patient recently moved to [place] ~1 year ago and has not established care with a new cardiologist while in [place]. Pt is back in [place] temporarily as his wife  recently. Patient's hospital course complicated by JAYLENE.      TTE on 4/3- noted LVEF < 20%    CKD III - Cardiomyopathy   h/o CHRISS, DM 2, Hypothyroid, left renal  stone - not  obstructing; PVD; Diffuse  edema.   Renal function stable with IV Bumex diuresis   Watch on Losartan   cardio follow up noted     Labs in am

## 2023-04-12 NOTE — CHART NOTE - NSCHARTNOTEFT_GEN_A_CORE
Source: Patient [ x]  Family [ ]   other [x ]EMR    Current Diet: Dash, consistent CHO, 1 L FR    Patient reports [ ] nausea  [ ] vomiting [ ] diarrhea [ ] constipation  [ ]chewing problems [ ] swallowing issues  [ ] other:     PO intake:  < 50% [ ]   50-75%  [x ]   %  [ ]  other :    Source for PO intake [ ] Patient [ ] family [x ] chart [ ] staff [ ] other    Enteral /Parenteral Nutrition:     Current Weight: 353.1# 4/6  generalized 2+    % Weight Change     Pertinent Medications: MEDICATIONS  (STANDING):  albuterol/ipratropium for Nebulization 3 milliLiter(s) Nebulizer every 20 minutes  apixaban 5 milliGRAM(s) Oral every 12 hours  atorvastatin 40 milliGRAM(s) Oral at bedtime  buMETAnide Injectable 1 milliGRAM(s) IV Push two times a day  carvedilol 25 milliGRAM(s) Oral every 12 hours  cefTRIAXone Injectable. 2000 milliGRAM(s) IV Push every 24 hours  chlorhexidine 2% Cloths 1 Application(s) Topical daily  dextrose 5%. 1000 milliLiter(s) (100 mL/Hr) IV Continuous <Continuous>  dextrose 5%. 1000 milliLiter(s) (50 mL/Hr) IV Continuous <Continuous>  dextrose 50% Injectable 25 Gram(s) IV Push once  dextrose 50% Injectable 12.5 Gram(s) IV Push once  dextrose 50% Injectable 25 Gram(s) IV Push once  DULoxetine 60 milliGRAM(s) Oral daily  folic acid 1 milliGRAM(s) Oral daily  glucagon  Injectable 1 milliGRAM(s) IntraMuscular once  insulin glargine Injectable (LANTUS) 40 Unit(s) SubCutaneous every morning  insulin lispro (ADMELOG) corrective regimen sliding scale   SubCutaneous three times a day before meals  insulin lispro (ADMELOG) corrective regimen sliding scale   SubCutaneous at bedtime  levothyroxine 50 MICROGram(s) Oral daily  losartan 25 milliGRAM(s) Oral daily  pregabalin 150 milliGRAM(s) Oral two times a day  sevelamer carbonate 1600 milliGRAM(s) Oral three times a day with meals  tamsulosin 0.4 milliGRAM(s) Oral at bedtime    MEDICATIONS  (PRN):  acetaminophen     Tablet .. 650 milliGRAM(s) Oral every 6 hours PRN Temp greater or equal to 38C (100.4F), Mild Pain (1 - 3)  aluminum hydroxide/magnesium hydroxide/simethicone Suspension 30 milliLiter(s) Oral every 4 hours PRN Dyspepsia  dextrose Oral Gel 15 Gram(s) Oral once PRN Blood Glucose LESS THAN 70 milliGRAM(s)/deciliter  melatonin 3 milliGRAM(s) Oral at bedtime PRN Insomnia  methocarbamol 500 milliGRAM(s) Oral two times a day PRN for severe pain  ondansetron Injectable 4 milliGRAM(s) IV Push every 8 hours PRN Nausea and/or Vomiting    Pertinent Labs:         Skin: stage 1 right butt and coccyx  Right AKA    Nutrition focused physical exam conducted - found signs of malnutrition [ ]absent [ ]present     Subcutaneous fat loss: [ ] Orbital fat pads region, [ ]Buccal fat region, [ ]Triceps region,  [ ]Ribs region    Muscle wasting: [ ]Temples region, [ ]Clavicle region, [ ]Shoulder region, [ ]Scapula region, [ ]Interosseous region,  [ ]thigh region, [ ]Calf region    Estimated Needs:   [x ] no change since previous assessment  [ ] recalculated:     Current Nutrition Diagnosis:  Increased Nutrient Needs...  pro/bárbara  related to increased physiological demand for nutrients  as evidenced by chronic wounds LLE      Recommendations: Continue fluid restriction  folic acid  Rx: MVI daily, vitamin C (500mg daily), and zinc sulfate (220mg daily x 10 days) to aid in wound healing.     Monitoring and Evaluation:   [x ] PO intake [x ] Tolerance to diet prescription [X] Weights  [X] Follow up per protocol [X] Labs:

## 2023-04-12 NOTE — PROGRESS NOTE ADULT - SUBJECTIVE AND OBJECTIVE BOX
[facility]  INFECTIOUS DISEASES at [place] and [place]  =======================================================                               Maximiliano Nagel MD#   Sharon Jules MD*                             Twyla Seo MD*   Sandra Correa MD*            Diplomates American Board of Internal Medicine & Infectious Diseases                # [place] Office - Appt - Tel  [phone] Fax [phone]                * [place] Office - Appt - Tel [phone] Fax [phone]                                  Hospital Consult line:  [phone]  =======================================================    MARY ANN CORNELL [id number]    Follow up: bacteremia    No fever      Allergies:  allow double protein at meals (Unknown)  clindamycin (Unknown)       REVIEW OF SYSTEMS:  CONSTITUTIONAL:  No Fever or chills  HEENT:   No diplopia or blurred vision.  No earache, sore throat or runny nose.  CARDIOVASCULAR:  No Chest Pain  RESPIRATORY:  No cough, shortness of breath  GASTROINTESTINAL:  No nausea, vomiting or diarrhea.  GENITOURINARY:  No dysuria, frequency or urgency. No Blood in urine  MUSCULOSKELETAL:  no joint aches, no muscle pain  SKIN:  No rash  NEUROLOGIC:  No Headaches  PSYCHIATRIC:  No disorder of thought or mood.  ENDOCRINE:  No heat or cold intolerance  HEMATOLOGICAL:  No easy bruising or bleeding.       Physical Exam:  GEN: NAD  HEENT: normocephalic and atraumatic. EOMI. PERRL.    NECK: Supple.   LUNGS: CTA B/L.  HEART: RRR  ABDOMEN: Soft, NT, ND.  +BS.    : No CVA tenderness. + Erd   EXTREMITIES: Rt BKA   MSK: No joint swelling  NEUROLOGIC: No Focal Deficits   SKIN: Left leg hallux ulcer, chronic wounds         Vitals:  T(F): 98 (12 Apr 2023 09:45), Max: 98 (12 Apr 2023 04:59)  HR: 87 (12 Apr 2023 09:45)  BP: 130/80 (12 Apr 2023 09:45)  RR: 18 (12 Apr 2023 09:45)  SpO2: 96% (12 Apr 2023 09:45) (95% - 98%)  temp max in last 48H T(F): , Max: 98.2 (04-11-23 @ 05:06)    Current Antibiotics:  cefTRIAXone Injectable. 2000 milliGRAM(s) IV Push every 24 hours    Other medications:  albuterol/ipratropium for Nebulization 3 milliLiter(s) Nebulizer every 20 minutes  apixaban 5 milliGRAM(s) Oral every 12 hours  atorvastatin 40 milliGRAM(s) Oral at bedtime  buMETAnide Injectable 1 milliGRAM(s) IV Push two times a day  carvedilol 25 milliGRAM(s) Oral every 12 hours  chlorhexidine 2% Cloths 1 Application(s) Topical daily  dextrose 5%. 1000 milliLiter(s) IV Continuous <Continuous>  dextrose 5%. 1000 milliLiter(s) IV Continuous <Continuous>  dextrose 50% Injectable 25 Gram(s) IV Push once  dextrose 50% Injectable 12.5 Gram(s) IV Push once  dextrose 50% Injectable 25 Gram(s) IV Push once  DULoxetine 60 milliGRAM(s) Oral daily  folic acid 1 milliGRAM(s) Oral daily  glucagon  Injectable 1 milliGRAM(s) IntraMuscular once  insulin glargine Injectable (LANTUS) 40 Unit(s) SubCutaneous every morning  insulin lispro (ADMELOG) corrective regimen sliding scale   SubCutaneous three times a day before meals  insulin lispro (ADMELOG) corrective regimen sliding scale   SubCutaneous at bedtime  levothyroxine 50 MICROGram(s) Oral daily  losartan 25 milliGRAM(s) Oral daily  pregabalin 150 milliGRAM(s) Oral two times a day  sevelamer carbonate 1600 milliGRAM(s) Oral three times a day with meals  tamsulosin 0.4 milliGRAM(s) Oral at bedtime        04-12    141  |  104  |  46.9<H>  ----------------------------<  131<H>  4.4   |  25.0  |  1.31<H>    Ca    8.7      12 Apr 2023 05:15  Mg     2.3     04-10      RECENT CULTURES:  04-04 @ 17:03 .Blood Blood-Peripheral     No Growth Final    04-04 @ 16:13 .Blood Blood-Peripheral     No Growth Final    04-03 @ 22:00 Catheterized Catheterized     >=3 organisms. Probable collection contamination.    04-03 @ 13:50 .Blood Blood-Peripheral     No Growth Final    04-03 @ 13:45    RVP  NotDetec    04-03 @ 13:30 .Blood Blood-Peripheral Blood Culture PCR  Escherichia coli    Growth in aerobic and anaerobic bottles: Escherichia coli  Growth in aerobic bottle: Staphylococcus simulans  "Susceptibilities not performed"  Growth in aerobic bottle: Aerococcus viridans  "Susceptibilities not performed"  ***Blood Panel PCR results on this specimen are available  approximately 3 hours after the Gram stain result.***  Gram stain, PCR, and/or culture results may not always  correspond due to difference in methodologies.  ************************************************************  This PCR assay was performed by multiplex PCR. This  Assay tests for 66 bacterial and resistance gene targets.  Please refer to the [facility] test directory  at [email or web address] for details.  Growth in anaerobic bottle: Gram Negative Rods  Growth in aerobic bottle: Gram Negative Rods and Gram Positive Cocci in  Clusters      WBC Count: 7.01 K/uL (04-10-23 @ 06:52)  WBC Count: 6.79 K/uL (04-08-23 @ 06:43)    Creatinine, Serum: 1.31 mg/dL (04-12-23 @ 05:15)  Creatinine, Serum: 1.43 mg/dL (04-11-23 @ 05:19)  Creatinine, Serum: 1.68 mg/dL (04-10-23 @ 21:12)  Creatinine, Serum: 1.37 mg/dL (04-10-23 @ 06:52)  Creatinine, Serum: 1.52 mg/dL (04-09-23 @ 06:24)  Creatinine, Serum: 1.93 mg/dL (04-08-23 @ 06:43)    C-Reactive Protein, Serum: 26 mg/L (04-05-23 @ 13:25)    Sedimentation Rate, Erythrocyte: 8 mm/hr (04-05-23 @ 13:25)    SARS-CoV-2: NotDetec (04-03-23 @ 13:45)                < from: US Renal (04.05.23 @ 23:01) >  ACC: [id number] EXAM:  US KIDNEY(S)   ORDERED BY: CONNER JACQUES     PROCEDURE DATE:  04/05/2023      INTERPRETATION:  CLINICAL INFORMATION: Acute renal failure    COMPARISON: CT abdomen pelvis performed on the same day.    TECHNIQUE: Sonographyof the kidneys and bladder.    FINDINGS:  Right kidney: 10.5 cm. No hydronephrosis or shadowing stone.    Left kidney: 10.7 cm. No hydronephrosis. There is a 0.4 cm echogenic   focus in the lower pole. Upper pole is poorly visualized.    Urinary bladder: Not visualized.    IMPRESSION:  Suboptimal visualization of the left kidney. There is a 0.4 cm echogenic   focus in the lower pole, likely a nonobstructing stone.    No hydronephrosis.        --- End of Report ---    < end of copied text >        < from: CT Renal Stone Hunt (04.05.23 @ 18:19) >  ACC: [id number] EXAM:  CT RENAL STONE HUNT   ORDERED BY: MARSHALL WARD     PROCEDURE DATE:  04/05/2023          INTERPRETATION:  CLINICAL INFORMATION: Acute renal failure. Bacteremia.    COMPARISON: 3/7/2021    CONTRAST/COMPLICATIONS:  IV Contrast:NONE  Oral Contrast: NONE  Complications: None reported at time of study completion    PROCEDURE:  CT of the Abdomen and Pelvis was performed.  Sagittal and coronal reformats were performed.    FINDINGS:  LOWER CHEST: Small bilateral pleural effusions with adjacent passive   atelectasis.    LIVER: Within normal limits.  BILE DUCTS: Normal caliber.  GALLBLADDER: Cholelithiasis.  SPLEEN: Within normal limits.  PANCREAS: Within normal limits.  ADRENALS: Within normal limits.  KIDNEYS/URETERS: Punctate nonobstructing left lower pole renal stone. No   hydronephrosis.    BLADDER: Thickened bladder wall, likely chronic changes of bladder outlet   obstruction. Partially decompressed with a Red catheter.  REPRODUCTIVE ORGANS: Brachytherapy seeds in the prostate.    BOWEL: No bowel obstruction. Appendix is normal.  PERITONEUM: Small amount of abdominal and pelvic ascites.  VESSELS: Atherosclerotic calcifications.  RETROPERITONEUM/LYMPH NODES: No lymphadenopathy.  ABDOMINAL WALL: Subcutaneous edema. Bilateral fat-containing inguinal   hernias.  BONES: Degenerative changes.    IMPRESSION:  *  No evidence of acute infectious process in the abdomen and pelvis.  *  Pleural effusions, ascites, and anasarca.    < end of copied text >

## 2023-04-12 NOTE — PROGRESS NOTE ADULT - SUBJECTIVE AND OBJECTIVE BOX
NEPHROLOGY INTERVAL HPI/OVERNIGHT EVENTS:    Feels better with diuresis   Cardio follow up noted   No plans for cath   UO 2.8 L with wagner     MEDICATIONS  (STANDING):  albuterol/ipratropium for Nebulization 3 milliLiter(s) Nebulizer every 20 minutes  apixaban 5 milliGRAM(s) Oral every 12 hours  atorvastatin 40 milliGRAM(s) Oral at bedtime  buMETAnide Injectable 1 milliGRAM(s) IV Push two times a day  carvedilol 25 milliGRAM(s) Oral every 12 hours  cefTRIAXone Injectable. 2000 milliGRAM(s) IV Push every 24 hours  chlorhexidine 2% Cloths 1 Application(s) Topical daily  dextrose 5%. 1000 milliLiter(s) (100 mL/Hr) IV Continuous <Continuous>  dextrose 5%. 1000 milliLiter(s) (50 mL/Hr) IV Continuous <Continuous>  dextrose 50% Injectable 25 Gram(s) IV Push once  dextrose 50% Injectable 12.5 Gram(s) IV Push once  dextrose 50% Injectable 25 Gram(s) IV Push once  DULoxetine 60 milliGRAM(s) Oral daily  folic acid 1 milliGRAM(s) Oral daily  glucagon  Injectable 1 milliGRAM(s) IntraMuscular once  insulin glargine Injectable (LANTUS) 40 Unit(s) SubCutaneous every morning  insulin lispro (ADMELOG) corrective regimen sliding scale   SubCutaneous three times a day before meals  insulin lispro (ADMELOG) corrective regimen sliding scale   SubCutaneous at bedtime  levothyroxine 50 MICROGram(s) Oral daily  losartan 25 milliGRAM(s) Oral daily  pregabalin 150 milliGRAM(s) Oral two times a day  sevelamer carbonate 1600 milliGRAM(s) Oral three times a day with meals  tamsulosin 0.4 milliGRAM(s) Oral at bedtime    MEDICATIONS  (PRN):  acetaminophen     Tablet .. 650 milliGRAM(s) Oral every 6 hours PRN Temp greater or equal to 38C (100.4F), Mild Pain (1 - 3)  aluminum hydroxide/magnesium hydroxide/simethicone Suspension 30 milliLiter(s) Oral every 4 hours PRN Dyspepsia  dextrose Oral Gel 15 Gram(s) Oral once PRN Blood Glucose LESS THAN 70 milliGRAM(s)/deciliter  melatonin 3 milliGRAM(s) Oral at bedtime PRN Insomnia  methocarbamol 500 milliGRAM(s) Oral two times a day PRN for severe pain  ondansetron Injectable 4 milliGRAM(s) IV Push every 8 hours PRN Nausea and/or Vomiting      Allergies    clindamycin (Unknown)    Intolerances    allow double protein at meals (Unknown)          Vital Signs Last 24 Hrs  T(C): 36.7 (12 Apr 2023 09:45), Max: 36.7 (12 Apr 2023 04:59)  T(F): 98 (12 Apr 2023 09:45), Max: 98 (12 Apr 2023 04:59)  HR: 87 (12 Apr 2023 09:45) (60 - 96)  BP: 130/80 (12 Apr 2023 09:45) (107/71 - 147/80)  BP(mean): --  RR: 18 (12 Apr 2023 09:45) (16 - 18)  SpO2: 96% (12 Apr 2023 09:45) (95% - 98%)    Parameters below as of 12 Apr 2023 09:45  Patient On (Oxygen Delivery Method): room air      Daily     Daily   I&O's Detail    11 Apr 2023 07:01  -  12 Apr 2023 07:00  --------------------------------------------------------  IN:    Oral Fluid: 717 mL  Total IN: 717 mL    OUT:    Indwelling Catheter - Urethral (mL): 2800 mL  Total OUT: 2800 mL    Total NET: -2083 mL      12 Apr 2023 07:01  -  12 Apr 2023 11:58  --------------------------------------------------------  IN:  Total IN: 0 mL    OUT:    Indwelling Catheter - Urethral (mL): 400 mL  Total OUT: 400 mL    Total NET: -400 mL        I&O's Summary    11 Apr 2023 07:01  -  12 Apr 2023 07:00  --------------------------------------------------------  IN: 717 mL / OUT: 2800 mL / NET: -2083 mL    12 Apr 2023 07:01  -  12 Apr 2023 11:58  --------------------------------------------------------  IN: 0 mL / OUT: 400 mL / NET: -400 mL        PHYSICAL EXAM:    GENERAL: NAD, Obese  EYES:  conjunctiva and sclera clear  NECK: Supple, No JVD/Bruit  NERVOUS SYSTEM:  A/O x3,   CHEST:  No rales or rhonchi  HEART:  gr 2 murmur  ABDOMEN: Soft, NT/ND BS+  EXTREMITIES:  2+ Edema; ; RLE BKA; L side in Wraps  + wagner   LABS:    04-12    141  |  104  |  46.9<H>  ----------------------------<  131<H>  4.4   |  25.0  |  1.31<H>    Ca    8.7      12 Apr 2023 05:15  Mg     2.3     04-10                PROCEDURE DATE:  04/05/2023          INTERPRETATION:  CLINICAL INFORMATION: Acute renal failure    COMPARISON: CT abdomen pelvis performed on the same day.    TECHNIQUE: Sonographyof the kidneys and bladder.    FINDINGS:  Right kidney: 10.5 cm. No hydronephrosis or shadowing stone.    Left kidney: 10.7 cm. No hydronephrosis. There is a 0.4 cm echogenic   focus in the lower pole. Upper pole is poorly visualized.    Urinary bladder: Not visualized.    IMPRESSION:  Suboptimal visualization of the left kidney. There is a 0.4 cm echogenic   focus in the lower pole, likely a nonobstructing stone.    No hydronephrosis.        --- End of Report ---            GOGO MUNROE MD; Attending Radiologist  This document has been electronically signed. Apr 6 2023  7:44AM  RADIOLOGY & ADDITIONAL TESTS:    < from: TTE Echo Complete w/o Contrast w/ Doppler (04.03.23 @ 18:38) >    Summary:   1. Technically difficult study.   2. Endocardial visualization was enhanced with intravenous echo contrast.   3. Multiple left ventricular regional wall motion abnormalities exist.   See wall motion findings.   4. Severely decreased global left ventricular systolic function.   5. Left ventricular ejection fraction, by visual estimation, is <20%.   6. Dilated cardiomyopathy.   7. Moderately reduced RV systolic function.   8. Severely enlarged left atrium.   9. Mild mitral annular calcification.  10. Mild to moderate mitral valve regurgitation.  11. The mitral in-flow pattern reveals no discernable A-wave, therefore   no comment on diastolic function can be made.  12. Sclerotic aortic valve with decreased opening.  13. Moderate tricuspid regurgitation.  14. There is no evidence of pericardial effusion.    MD Kumar Electronically signed on 4/3/2023 at 11:15:45 PM    < end of copied text >

## 2023-04-13 LAB
ANION GAP SERPL CALC-SCNC: 11 MMOL/L — SIGNIFICANT CHANGE UP (ref 5–17)
BUN SERPL-MCNC: 43.2 MG/DL — HIGH (ref 8–20)
CALCIUM SERPL-MCNC: 8.3 MG/DL — LOW (ref 8.4–10.5)
CHLORIDE SERPL-SCNC: 102 MMOL/L — SIGNIFICANT CHANGE UP (ref 96–108)
CO2 SERPL-SCNC: 27 MMOL/L — SIGNIFICANT CHANGE UP (ref 22–29)
CREAT SERPL-MCNC: 1.2 MG/DL — SIGNIFICANT CHANGE UP (ref 0.5–1.3)
EGFR: 64 ML/MIN/1.73M2 — SIGNIFICANT CHANGE UP
GLUCOSE BLDC GLUCOMTR-MCNC: 122 MG/DL — HIGH (ref 70–99)
GLUCOSE BLDC GLUCOMTR-MCNC: 148 MG/DL — HIGH (ref 70–99)
GLUCOSE BLDC GLUCOMTR-MCNC: 158 MG/DL — HIGH (ref 70–99)
GLUCOSE BLDC GLUCOMTR-MCNC: 160 MG/DL — HIGH (ref 70–99)
GLUCOSE SERPL-MCNC: 140 MG/DL — HIGH (ref 70–99)
HCT VFR BLD CALC: 44.5 % — SIGNIFICANT CHANGE UP (ref 39–50)
HGB BLD-MCNC: 12.9 G/DL — LOW (ref 13–17)
MAGNESIUM SERPL-MCNC: 2.2 MG/DL — SIGNIFICANT CHANGE UP (ref 1.6–2.6)
MCHC RBC-ENTMCNC: 24 PG — LOW (ref 27–34)
MCHC RBC-ENTMCNC: 29 GM/DL — LOW (ref 32–36)
MCV RBC AUTO: 82.7 FL — SIGNIFICANT CHANGE UP (ref 80–100)
PLATELET # BLD AUTO: 112 K/UL — LOW (ref 150–400)
POTASSIUM SERPL-MCNC: 4.3 MMOL/L — SIGNIFICANT CHANGE UP (ref 3.5–5.3)
POTASSIUM SERPL-SCNC: 4.3 MMOL/L — SIGNIFICANT CHANGE UP (ref 3.5–5.3)
RBC # BLD: 5.38 M/UL — SIGNIFICANT CHANGE UP (ref 4.2–5.8)
RBC # FLD: 19.9 % — HIGH (ref 10.3–14.5)
SODIUM SERPL-SCNC: 140 MMOL/L — SIGNIFICANT CHANGE UP (ref 135–145)
WBC # BLD: 5.75 K/UL — SIGNIFICANT CHANGE UP (ref 3.8–10.5)
WBC # FLD AUTO: 5.75 K/UL — SIGNIFICANT CHANGE UP (ref 3.8–10.5)

## 2023-04-13 PROCEDURE — 99232 SBSQ HOSP IP/OBS MODERATE 35: CPT

## 2023-04-13 PROCEDURE — 99233 SBSQ HOSP IP/OBS HIGH 50: CPT

## 2023-04-13 PROCEDURE — 76870 US EXAM SCROTUM: CPT | Mod: 26

## 2023-04-13 RX ADMIN — BUMETANIDE 1 MILLIGRAM(S): 0.25 INJECTION INTRAMUSCULAR; INTRAVENOUS at 12:50

## 2023-04-13 RX ADMIN — BUMETANIDE 1 MILLIGRAM(S): 0.25 INJECTION INTRAMUSCULAR; INTRAVENOUS at 05:28

## 2023-04-13 RX ADMIN — TAMSULOSIN HYDROCHLORIDE 0.4 MILLIGRAM(S): 0.4 CAPSULE ORAL at 21:05

## 2023-04-13 RX ADMIN — APIXABAN 5 MILLIGRAM(S): 2.5 TABLET, FILM COATED ORAL at 17:08

## 2023-04-13 RX ADMIN — Medication 1 MILLIGRAM(S): at 12:49

## 2023-04-13 RX ADMIN — CEFTRIAXONE 2000 MILLIGRAM(S): 500 INJECTION, POWDER, FOR SOLUTION INTRAMUSCULAR; INTRAVENOUS at 14:12

## 2023-04-13 RX ADMIN — APIXABAN 5 MILLIGRAM(S): 2.5 TABLET, FILM COATED ORAL at 05:28

## 2023-04-13 RX ADMIN — Medication 150 MILLIGRAM(S): at 17:07

## 2023-04-13 RX ADMIN — INSULIN GLARGINE 40 UNIT(S): 100 INJECTION, SOLUTION SUBCUTANEOUS at 08:50

## 2023-04-13 RX ADMIN — SEVELAMER CARBONATE 1600 MILLIGRAM(S): 2400 POWDER, FOR SUSPENSION ORAL at 12:49

## 2023-04-13 RX ADMIN — Medication 150 MILLIGRAM(S): at 05:28

## 2023-04-13 RX ADMIN — CARVEDILOL PHOSPHATE 25 MILLIGRAM(S): 80 CAPSULE, EXTENDED RELEASE ORAL at 17:07

## 2023-04-13 RX ADMIN — ATORVASTATIN CALCIUM 40 MILLIGRAM(S): 80 TABLET, FILM COATED ORAL at 21:05

## 2023-04-13 RX ADMIN — CARVEDILOL PHOSPHATE 25 MILLIGRAM(S): 80 CAPSULE, EXTENDED RELEASE ORAL at 05:28

## 2023-04-13 RX ADMIN — CHLORHEXIDINE GLUCONATE 1 APPLICATION(S): 213 SOLUTION TOPICAL at 17:11

## 2023-04-13 RX ADMIN — SEVELAMER CARBONATE 1600 MILLIGRAM(S): 2400 POWDER, FOR SUSPENSION ORAL at 12:10

## 2023-04-13 RX ADMIN — Medication 2: at 17:08

## 2023-04-13 RX ADMIN — LOSARTAN POTASSIUM 25 MILLIGRAM(S): 100 TABLET, FILM COATED ORAL at 05:28

## 2023-04-13 RX ADMIN — Medication 50 MICROGRAM(S): at 05:28

## 2023-04-13 RX ADMIN — SEVELAMER CARBONATE 1600 MILLIGRAM(S): 2400 POWDER, FOR SUSPENSION ORAL at 17:08

## 2023-04-13 NOTE — PROGRESS NOTE ADULT - SUBJECTIVE AND OBJECTIVE BOX
[facility]  INFECTIOUS DISEASES at [place] [facility] [place]  ===============================================================                               Maximiliano Nagel MD*     Twyla Seo MD*                         Sharon Jules MD*       Sandra Correa MD*            Diplomates American Board of Internal Medicine & Infectious Diseases                * [place] Office - Appt - Tel  [phone] Fax [phone]                * [place] Office - Appt - Tel [phone] Fax [phone]                                  Hospital Consult line:  [phone]  ==============================================================    MARY ANN CORNELL [id number]    Follow up: Bacteremia     No acute events   Afebrile   hemodynamically stable     I have personally reviewed the labs and data; pertinent labs and data are listed in this note; please see below.     _______________________________________________________________  REVIEW OF SYSTEMS  Feeling great. No nausea, vomiting or diarrhea. Red catheter remains. Remainder ROS neg   ________________________________________________________________  Allergies:  allow double protein at meals (Unknown)  clindamycin (Unknown)    ________________________________________________________________  PHYSICAL EXAM  GEN: in NAD, lying in bed. Obese  HEENT: Anicteric sclerae. Moist mucous membranes. No mucosal lesions.   LUNGS: eupneic. CTA B/L.  HEART: RRR, no m/r/g  ABDOMEN: Soft, , ND,  +BS.    :  Red catheter  PSYCHIATRIC: Appropriate affect and mood  LINES: PIV  ________________________________________________________________  Vitals:  T(F): 98.2 (13 Apr 2023 10:21), Max: 98.2 (13 Apr 2023 10:21)  HR: 70 (13 Apr 2023 10:21)  BP: 120/76 (13 Apr 2023 10:21)  RR: 18 (13 Apr 2023 10:21)  SpO2: 95% (13 Apr 2023 10:21) (92% - 100%)  temp max in last 48H T(F): , Max: 98.2 (04-13-23 @ 10:21)    Current Antibiotics:  cefTRIAXone Injectable. 2000 milliGRAM(s) IV Push every 24 hours    Other medications:  albuterol/ipratropium for Nebulization 3 milliLiter(s) Nebulizer every 20 minutes  apixaban 5 milliGRAM(s) Oral every 12 hours  atorvastatin 40 milliGRAM(s) Oral at bedtime  buMETAnide Injectable 1 milliGRAM(s) IV Push two times a day  carvedilol 25 milliGRAM(s) Oral every 12 hours  chlorhexidine 2% Cloths 1 Application(s) Topical daily  dextrose 5%. 1000 milliLiter(s) IV Continuous <Continuous>  dextrose 5%. 1000 milliLiter(s) IV Continuous <Continuous>  dextrose 50% Injectable 25 Gram(s) IV Push once  dextrose 50% Injectable 12.5 Gram(s) IV Push once  dextrose 50% Injectable 25 Gram(s) IV Push once  DULoxetine 60 milliGRAM(s) Oral daily  folic acid 1 milliGRAM(s) Oral daily  glucagon  Injectable 1 milliGRAM(s) IntraMuscular once  insulin glargine Injectable (LANTUS) 40 Unit(s) SubCutaneous every morning  insulin lispro (ADMELOG) corrective regimen sliding scale   SubCutaneous three times a day before meals  insulin lispro (ADMELOG) corrective regimen sliding scale   SubCutaneous at bedtime  levothyroxine 50 MICROGram(s) Oral daily  losartan 25 milliGRAM(s) Oral daily  pregabalin 150 milliGRAM(s) Oral two times a day  sevelamer carbonate 1600 milliGRAM(s) Oral three times a day with meals  tamsulosin 0.4 milliGRAM(s) Oral at bedtime                            12.9   5.75  )-----------( 112      ( 13 Apr 2023 05:26 )             44.5     04-13    140  |  102  |  43.2<H>  ----------------------------<  140<H>  4.3   |  27.0  |  1.20    Ca    8.3<L>      13 Apr 2023 05:26  Mg     2.2     04-13      RECENT CULTURES:  04-04 @ 17:03 .Blood Blood-Peripheral     No Growth Final        04-04 @ 16:13 .Blood Blood-Peripheral     No Growth Final        04-03 @ 22:00 Catheterized Catheterized     >=3 organisms. Probable collection contamination.        04-03 @ 13:50 .Blood Blood-Peripheral     No Growth Final        04-03 @ 13:45    RVP with SARS-CoV-2   NotDetec      04-03 @ 13:30 .Blood Blood-Peripheral Blood Culture PCR  Escherichia coli    Growth in aerobic and anaerobic bottles: Escherichia coli  Growth in aerobic bottle: Staphylococcus simulans  "Susceptibilities not performed"  Growth in aerobic bottle: Aerococcus viridans  "Susceptibilities not performed"  ***Blood Panel PCR results on this specimen are available  approximately 3 hours after the Gram stain result.***  Gram stain, PCR, and/or culture results may not always  correspond due to difference in methodologies.  ************************************************************  This PCR assay was performed by multiplex PCR. This  Assay tests for 66 bacterial and resistance gene targets.  Please refer to the [facility] test directory  at [email or web address] for details.    Growth in anaerobic bottle: Gram Negative Rods  Growth in aerobic bottle: Gram Negative Rods and Gram Positive Cocci in  Clusters      WBC Count: 5.75 K/uL (04-13-23 @ 05:26)  WBC Count: 7.01 K/uL (04-10-23 @ 06:52)    Creatinine, Serum: 1.20 mg/dL (04-13-23 @ 05:26)  Creatinine, Serum: 1.31 mg/dL (04-12-23 @ 05:15)  Creatinine, Serum: 1.43 mg/dL (04-11-23 @ 05:19)  Creatinine, Serum: 1.68 mg/dL (04-10-23 @ 21:12)  Creatinine, Serum: 1.37 mg/dL (04-10-23 @ 06:52)  Creatinine, Serum: 1.52 mg/dL (04-09-23 @ 06:24)    C-Reactive Protein, Serum: 26 mg/L (04-05-23 @ 13:25)    Sedimentation Rate, Erythrocyte: 8 mm/hr (04-05-23 @ 13:25)     SARS-CoV-2: NotDetec (04-03-23 @ 13:45)    ________________________________________________________________  RADIOLOGY

## 2023-04-13 NOTE — PROGRESS NOTE ADULT - SUBJECTIVE AND OBJECTIVE BOX
[facility] - [facility]                                   [address] [place] [address]                                                      PHONE: [phone]                                                         FAX: [phone]  [email or web address]  ---------------------------------------------------------------------------------------------------------------------------------    Overnight events/patient complaints:      NAD feeling ok this time     allow double protein at meals (Unknown)  clindamycin (Unknown)    MEDICATIONS  (STANDING):  albuterol/ipratropium for Nebulization 3 milliLiter(s) Nebulizer every 20 minutes  apixaban 5 milliGRAM(s) Oral every 12 hours  atorvastatin 40 milliGRAM(s) Oral at bedtime  buMETAnide Injectable 1 milliGRAM(s) IV Push two times a day  carvedilol 25 milliGRAM(s) Oral every 12 hours  cefTRIAXone Injectable. 2000 milliGRAM(s) IV Push every 24 hours  chlorhexidine 2% Cloths 1 Application(s) Topical daily  dextrose 5%. 1000 milliLiter(s) (100 mL/Hr) IV Continuous <Continuous>  dextrose 5%. 1000 milliLiter(s) (50 mL/Hr) IV Continuous <Continuous>  dextrose 50% Injectable 25 Gram(s) IV Push once  dextrose 50% Injectable 12.5 Gram(s) IV Push once  dextrose 50% Injectable 25 Gram(s) IV Push once  DULoxetine 60 milliGRAM(s) Oral daily  folic acid 1 milliGRAM(s) Oral daily  glucagon  Injectable 1 milliGRAM(s) IntraMuscular once  insulin glargine Injectable (LANTUS) 40 Unit(s) SubCutaneous every morning  insulin lispro (ADMELOG) corrective regimen sliding scale   SubCutaneous three times a day before meals  insulin lispro (ADMELOG) corrective regimen sliding scale   SubCutaneous at bedtime  levothyroxine 50 MICROGram(s) Oral daily  losartan 25 milliGRAM(s) Oral daily  pregabalin 150 milliGRAM(s) Oral two times a day  sevelamer carbonate 1600 milliGRAM(s) Oral three times a day with meals  tamsulosin 0.4 milliGRAM(s) Oral at bedtime    MEDICATIONS  (PRN):  acetaminophen     Tablet .. 650 milliGRAM(s) Oral every 6 hours PRN Temp greater or equal to 38C (100.4F), Mild Pain (1 - 3)  aluminum hydroxide/magnesium hydroxide/simethicone Suspension 30 milliLiter(s) Oral every 4 hours PRN Dyspepsia  dextrose Oral Gel 15 Gram(s) Oral once PRN Blood Glucose LESS THAN 70 milliGRAM(s)/deciliter  melatonin 3 milliGRAM(s) Oral at bedtime PRN Insomnia  methocarbamol 500 milliGRAM(s) Oral two times a day PRN for severe pain  ondansetron Injectable 4 milliGRAM(s) IV Push every 8 hours PRN Nausea and/or Vomiting      Vital Signs Last 24 Hrs  T(C): 36.5 (2023 04:15), Max: 36.7 (2023 09:45)  T(F): 97.7 (2023 04:15), Max: 98 (2023 09:45)  HR: 70 (2023 04:15) (70 - 87)  BP: 124/55 (2023 04:15) (115/62 - 136/82)  BP(mean): --  RR: 18 (2023 04:15) (17 - 18)  SpO2: 92% (2023 04:15) (92% - 100%)    Parameters below as of 2023 20:40  Patient On (Oxygen Delivery Method): BiPAP/CPAP      ICU Vital Signs Last 24 Hrs  MARY ANN CORNELL  I&O's Detail    2023 07:01  -  2023 07:00  --------------------------------------------------------  IN:    Oral Fluid: 1227 mL  Total IN: 1227 mL    OUT:    Indwelling Catheter - Urethral (mL): 1800 mL    Voided (mL): 750 mL  Total OUT: 2550 mL    Total NET: -1323 mL        I&O's Summary    2023 07:01  -  2023 07:00  --------------------------------------------------------  IN: 1227 mL / OUT: 2550 mL / NET: -1323 mL      Drug Dosing Weight  MARY ANN CORNELL      PHYSICAL EXAM:  General: Appears well developed, alert and cooperative.  HEENT: Head; normocephalic, atraumatic.  Eyes: Pupils reactive, cornea wnl.  Neck: Supple, no nodes adenopathy, no NVD or carotid bruit or thyromegaly.  CARDIOVASCULAR: Normal S1 and S2, 2/6 murmur, rub, gallop or lift.   LUNGS: Decrease BS at the lower bases   ABDOMEN: Soft, nontender without mass or organomegaly. bowel sounds normoactive.  EXTREMITIES: No clubbing, cyanosis ++ edema.   SKIN: warm and dry with normal turgor.  NEURO: Alert/oriented x 3/normal motor exam. No pathologic reflexes.    PSYCH: normal affect.        LABS:                        12.9   5.75  )-----------( 112      ( 2023 05:26 )             44.5         140  |  102  |  43.2<H>  ----------------------------<  140<H>  4.3   |  27.0  |  1.20    Ca    8.3<L>      2023 05:26  Mg     2.2           MARY ANN KRAIG            RADIOLOGY & ADDITIONAL STUDIES:    INTERPRETATION OF TELEMETRY (personally reviewed):    INTERPRETATION OF TELEMETRY (personally reviewed):        ECHO: < from: TTE Echo Complete w/o Contrast w/ Doppler (23 @ 18:38) >   1. Technically difficult study.   2. Endocardial visualization was enhanced with intravenous echo contrast.   3. Multiple left ventricular regional wall motion abnormalities exist.   See wall motion findings.   4. Severely decreased global left ventricular systolic function.   5. Left ventricular ejection fraction, by visual estimation, is <20%.   6. Dilated cardiomyopathy.   7. Moderately reduced RV systolic function.   8. Severely enlarged left atrium.   9. Mild mitral annular calcification.  10. Mild to moderate mitral valve regurgitation.  11. The mitral in-flow pattern reveals no discernable A-wave, therefore   no comment on diastolic function can be made.  12. Sclerotic aortic valve with decreased opening.  13. Moderate tricuspid regurgitation.  14. There is no evidence of pericardial effusion.    MD Kumar Electronically signed on 4/3/2023 at 11:15:45 PM    < end of copied text >         CARDIAC CATHETERIZATION: < from: Cardiac Cath Lab - Adult (16 @ 18:03) >  VENTRICLES: LVEF 25% with MR  CORONARY VESSELS: The coronary circulation is right dominant.  LM:   --  LM: Angiography showed minor luminal irregularities with no flow  limiting lesions.  LAD:   --  LAD: Angiography showed minor luminal irregularities with no  flow limiting lesions.  CX:   --  Circumflex: Angiography showed minor luminal irregularities with  no flow limiting lesions.  RCA:   --  RCA: Angiography showed minor luminal irregularities with no  flow limiting lesions.  COMPLICATIONS: There were no complications. No complications occurred  during the cath lab visit.  DIAGNOSTIC IMPRESSIONS: Mild CAD. Non ischemic cardiomyopathy.  DIAGNOSTIC RECOMMENDATIONS: A/C. MARIELA CV The patient should continue with  the present medications.  INTERVENTIONAL IMPRESSIONS: Mild CAD. Non ischemic cardiomyopathy.  INTERVENTIONAL RECOMMENDATIONS: A/C. MARIELA CV  Prepared and signed by  Bright Muñiz MD  Signed 2016 19:26:24    < end of copied text >      ASSESSMENT AND PLAN:  In summary, MARY ANN CORNELL is an 72 year old male with a PMHx of HFrEF (30-35%) (NICM) prior [facility] 2016 mild CAD see above s/p cardiomems and ICD, afib on coumadin, HTN, HLD, DM, RLE diabetic ulcer s/p right leg amputation, CHRISS who presents for worsening SOB found to be significantly volume overloaded found to have acute on chronic heart failure exacerbation. Patient recently moved to [place] ~1 year ago and has not established care with a new cardiologist while in [place]. Pt is back in [place] temporarily as his wife  recently. Patient's hospital course complicated by JAYLENE.    Acute on Chronic Heart Failure Exacerbation/JAYLENE hx of none compliance   - Continue bumex 1mg IV BID and monitor renal panel    - echo with reduced EF to <20% acute on chronic NICM EF ~ 25 to 30%   - daily chemistry to monitor creatinine and electrolytes  - Losartan 25 mg po daily due to HFrEF   - Nephrology following    Afib  - c/w eliquis 5mg BID  - rate controlled    HTN/HLD  - c/w coreg 25mg BID  - c/w atorvastatin 40mg daily    No need for repeat cath at this time   Out patient ischemic evaluations

## 2023-04-13 NOTE — PROGRESS NOTE ADULT - SUBJECTIVE AND OBJECTIVE BOX
NEPHROLOGY INTERVAL HPI/OVERNIGHT EVENTS:    Feels comfortable   ID follow up noted   UO 1.8 L with wagner  Remains on IV Bumex     MEDICATIONS  (STANDING):  albuterol/ipratropium for Nebulization 3 milliLiter(s) Nebulizer every 20 minutes  apixaban 5 milliGRAM(s) Oral every 12 hours  atorvastatin 40 milliGRAM(s) Oral at bedtime  buMETAnide Injectable 1 milliGRAM(s) IV Push two times a day  carvedilol 25 milliGRAM(s) Oral every 12 hours  cefTRIAXone Injectable. 2000 milliGRAM(s) IV Push every 24 hours  chlorhexidine 2% Cloths 1 Application(s) Topical daily  dextrose 5%. 1000 milliLiter(s) (100 mL/Hr) IV Continuous <Continuous>  dextrose 5%. 1000 milliLiter(s) (50 mL/Hr) IV Continuous <Continuous>  dextrose 50% Injectable 25 Gram(s) IV Push once  dextrose 50% Injectable 12.5 Gram(s) IV Push once  dextrose 50% Injectable 25 Gram(s) IV Push once  DULoxetine 60 milliGRAM(s) Oral daily  folic acid 1 milliGRAM(s) Oral daily  glucagon  Injectable 1 milliGRAM(s) IntraMuscular once  insulin glargine Injectable (LANTUS) 40 Unit(s) SubCutaneous every morning  insulin lispro (ADMELOG) corrective regimen sliding scale   SubCutaneous three times a day before meals  insulin lispro (ADMELOG) corrective regimen sliding scale   SubCutaneous at bedtime  levothyroxine 50 MICROGram(s) Oral daily  losartan 25 milliGRAM(s) Oral daily  pregabalin 150 milliGRAM(s) Oral two times a day  sevelamer carbonate 1600 milliGRAM(s) Oral three times a day with meals  tamsulosin 0.4 milliGRAM(s) Oral at bedtime    MEDICATIONS  (PRN):  acetaminophen     Tablet .. 650 milliGRAM(s) Oral every 6 hours PRN Temp greater or equal to 38C (100.4F), Mild Pain (1 - 3)  aluminum hydroxide/magnesium hydroxide/simethicone Suspension 30 milliLiter(s) Oral every 4 hours PRN Dyspepsia  dextrose Oral Gel 15 Gram(s) Oral once PRN Blood Glucose LESS THAN 70 milliGRAM(s)/deciliter  melatonin 3 milliGRAM(s) Oral at bedtime PRN Insomnia  methocarbamol 500 milliGRAM(s) Oral two times a day PRN for severe pain  ondansetron Injectable 4 milliGRAM(s) IV Push every 8 hours PRN Nausea and/or Vomiting      Allergies    clindamycin (Unknown)    Intolerances    allow double protein at meals (Unknown)          Vital Signs Last 24 Hrs  T(C): 36.8 (13 Apr 2023 10:21), Max: 36.8 (13 Apr 2023 10:21)  T(F): 98.2 (13 Apr 2023 10:21), Max: 98.2 (13 Apr 2023 10:21)  HR: 70 (13 Apr 2023 10:21) (70 - 79)  BP: 120/76 (13 Apr 2023 10:21) (115/62 - 129/81)  BP(mean): --  RR: 18 (13 Apr 2023 10:21) (17 - 18)  SpO2: 95% (13 Apr 2023 10:21) (92% - 100%)    Parameters below as of 13 Apr 2023 10:21  Patient On (Oxygen Delivery Method): room air      Daily     Daily   I&O's Detail    12 Apr 2023 07:01  -  13 Apr 2023 07:00  --------------------------------------------------------  IN:    Oral Fluid: 1227 mL  Total IN: 1227 mL    OUT:    Indwelling Catheter - Urethral (mL): 1800 mL    Voided (mL): 750 mL  Total OUT: 2550 mL    Total NET: -1323 mL      13 Apr 2023 07:01  -  13 Apr 2023 14:32  --------------------------------------------------------  IN:    Oral Fluid: 600 mL  Total IN: 600 mL    OUT:    Indwelling Catheter - Urethral (mL): 1100 mL  Total OUT: 1100 mL    Total NET: -500 mL        I&O's Summary    12 Apr 2023 07:01  -  13 Apr 2023 07:00  --------------------------------------------------------  IN: 1227 mL / OUT: 2550 mL / NET: -1323 mL    13 Apr 2023 07:01  -  13 Apr 2023 14:32  --------------------------------------------------------  IN: 600 mL / OUT: 1100 mL / NET: -500 mL        PHYSICAL EXAM:    GENERAL: NAD, Obese  EYES:  conjunctiva and sclera clear  NECK: Supple, No JVD/Bruit  NERVOUS SYSTEM:  A/O x3,   CHEST:  No rales or rhonchi  HEART:  gr 2 murmur  ABDOMEN: Soft, NT/ND BS+  EXTREMITIES:  2+ Edema; ; RLE BKA; L side in Wraps  + wagner     LABS:                        12.9   5.75  )-----------( 112      ( 13 Apr 2023 05:26 )             44.5     04-13    140  |  102  |  43.2<H>  ----------------------------<  140<H>  4.3   |  27.0  |  1.20    Ca    8.3<L>      13 Apr 2023 05:26  Mg     2.2     04-13          Magnesium, Serum: 2.2 mg/dL (04-13 @ 05:26)          RADIOLOGY & ADDITIONAL TESTS:

## 2023-04-13 NOTE — PROGRESS NOTE ADULT - SUBJECTIVE AND OBJECTIVE BOX
MARY ANN RIBEIRORAKAN  ----------------------------------------  The patient was seen at bedside. Patient with heart failure. Offered no complaints. Denied chest pain of dyspnea.    Vital Signs Last 24 Hrs  T(C): 36.8 (13 Apr 2023 10:21), Max: 36.8 (13 Apr 2023 10:21)  T(F): 98.2 (13 Apr 2023 10:21), Max: 98.2 (13 Apr 2023 10:21)  HR: 70 (13 Apr 2023 10:21) (70 - 79)  BP: 120/76 (13 Apr 2023 10:21) (115/62 - 129/81)  BP(mean): --  RR: 18 (13 Apr 2023 10:21) (17 - 18)  SpO2: 95% (13 Apr 2023 10:21) (92% - 100%)    Parameters below as of 13 Apr 2023 10:21  Patient On (Oxygen Delivery Method): room air    CAPILLARY BLOOD GLUCOSE  POCT Blood Glucose.: 122 mg/dL (13 Apr 2023 12:45)  POCT Blood Glucose.: 148 mg/dL (13 Apr 2023 08:35)  POCT Blood Glucose.: 154 mg/dL (12 Apr 2023 21:30)  POCT Blood Glucose.: 88 mg/dL (12 Apr 2023 17:27)    PHYSICAL EXAMINATION:  ----------------------------------------  General appearance: No acute distress, Awake, Alert  HEENT: Normocephalic, Atraumatic, Conjunctiva clear, EOMI  Neck: Supple, No JVD, No tenderness  Lungs: No wheezes, No rales, Diminished breath sounds at the bases  Cardiovascular: S1S2, Regular rhythm  Abdomen: Soft, Nontender, Nondistended, No guarding/rebound, Positive bowel sounds  Extremities: No clubbing, No cyanosis, No calf tenderness, Lower extremity edema, Right below knee amputations, Left leg dressing clean and intact  Neuro: Strength equal bilaterally, No tremors  Psychiatric: Appropriate mood, Normal affect    LABORATORY STUDIES:  ----------------------------------------             12.9   5.75  )-----------( 112      ( 13 Apr 2023 05:26 )             44.5     04-13    140  |  102  |  43.2<H>  ----------------------------<  140<H>  4.3   |  27.0  |  1.20    Ca    8.3<L>      13 Apr 2023 05:26  Mg     2.2     04-13    MEDICATIONS  (STANDING):  albuterol/ipratropium for Nebulization 3 milliLiter(s) Nebulizer every 20 minutes  apixaban 5 milliGRAM(s) Oral every 12 hours  atorvastatin 40 milliGRAM(s) Oral at bedtime  buMETAnide Injectable 1 milliGRAM(s) IV Push two times a day  carvedilol 25 milliGRAM(s) Oral every 12 hours  cefTRIAXone Injectable. 2000 milliGRAM(s) IV Push every 24 hours  chlorhexidine 2% Cloths 1 Application(s) Topical daily  dextrose 5%. 1000 milliLiter(s) (50 mL/Hr) IV Continuous <Continuous>  dextrose 5%. 1000 milliLiter(s) (100 mL/Hr) IV Continuous <Continuous>  dextrose 50% Injectable 25 Gram(s) IV Push once  dextrose 50% Injectable 12.5 Gram(s) IV Push once  dextrose 50% Injectable 25 Gram(s) IV Push once  DULoxetine 60 milliGRAM(s) Oral daily  folic acid 1 milliGRAM(s) Oral daily  glucagon  Injectable 1 milliGRAM(s) IntraMuscular once  insulin glargine Injectable (LANTUS) 40 Unit(s) SubCutaneous every morning  insulin lispro (ADMELOG) corrective regimen sliding scale   SubCutaneous three times a day before meals  insulin lispro (ADMELOG) corrective regimen sliding scale   SubCutaneous at bedtime  levothyroxine 50 MICROGram(s) Oral daily  losartan 25 milliGRAM(s) Oral daily  pregabalin 150 milliGRAM(s) Oral two times a day  sevelamer carbonate 1600 milliGRAM(s) Oral three times a day with meals  tamsulosin 0.4 milliGRAM(s) Oral at bedtime    MEDICATIONS  (PRN):  acetaminophen     Tablet .. 650 milliGRAM(s) Oral every 6 hours PRN Temp greater or equal to 38C (100.4F), Mild Pain (1 - 3)  aluminum hydroxide/magnesium hydroxide/simethicone Suspension 30 milliLiter(s) Oral every 4 hours PRN Dyspepsia  dextrose Oral Gel 15 Gram(s) Oral once PRN Blood Glucose LESS THAN 70 milliGRAM(s)/deciliter  melatonin 3 milliGRAM(s) Oral at bedtime PRN Insomnia  methocarbamol 500 milliGRAM(s) Oral two times a day PRN for severe pain  ondansetron Injectable 4 milliGRAM(s) IV Push every 8 hours PRN Nausea and/or Vomiting      ASSESSMENT / PLAN:  ----------------------------------------  72M with a history of cardiomyopathy with AICD, atrial fibrillation, diabetes, hypertension, chronic kidney disease, prostate cancer, and obstructive sleep apnea who presented with a two week history of dyspnea found to have heart failure.   Diuretics were initiated and a Red catheter was placed to monitor response. Blood cultures were also noted to grow Ecoli for which intravenous antibiotics were initiated.    Acute hypoxic respiratory failure / Acute on chronic systolic heart failure - Echocardiogram noted severely decreased left ventricular systolic function. On bumetanide for diuresis. On carvedilol and losartan. Discussed with Cardiology.    Ecoli bacteremia - Currently afebrile and without leukocytosis. Repeat blood cultures (4/4/23) were without growth. Communicated with Infectious Disease, for completion of antibiotics on 4/14/23.    Chronic kidney disease III with acute kidney injury / History of benign prostatic hypertrophy - Renal function improved. Red catheter in place. Continue on tamsulosin. Monitoring renal function and urine output while on bumetanide. For potential trial of void once more mobile.    Atrial fibrillation - On carvedilol. Apixaban for anticoagulation.    Diabetes - Insulin coverage, close monitoring of blood glucose levels. On pregabalin for neuropathy.    Hypothyroidism - On levothyroxine.    Obstructive sleep apnea - On nocturnal Bipap.    Discussed with Cardiology, the patient is thought to be at increased risk for air travel at this time. Will required further management after discharge from the hospital for further optimization. Would benefit from treatment at a rehabilitation facility.

## 2023-04-13 NOTE — PROGRESS NOTE ADULT - ASSESSMENT
72 year old male with a PMHx of HFrEF (30-35%) (NICM) prior [facility] 2016 mild CAD see above s/p cardiomems and ICD, afib on coumadin, HTN, HLD, DM, RLE diabetic ulcer s/p right leg amputation, CHRISS who presents for worsening SOB found to be significantly volume overloaded found to have acute on chronic heart failure exacerbation. Patient recently moved to [place] ~1 year ago and has not established care with a new cardiologist while in [place]. Pt is back in [place] temporarily as his wife  recently. Patient's hospital course complicated by JAYLENE.      TTE on 4/3- noted LVEF < 20%    CKD III - Cardiomyopathy   h/o CHRISS, DM 2, Hypothyroid, left renal  stone - not  obstructing; PVD; Diffuse  edema.   Renal function stable with IV Bumex diuresis   LOS total balance (-) 14 L   Watch on Losartan   cardio follow up noted     Labs in am

## 2023-04-13 NOTE — PROGRESS NOTE ADULT - ASSESSMENT
71 y/o M w/ history of morbid obesity, HFrEF Last EF 30% s/p AICD, DM2 on Insulin, AFib on Eliquis, HTN, HLD, RLE BKA, Chronic wounds on LLE presenting for worsening dyspnea since 2 weeks likely due to CHF.     ID input requested for +BCX for GNR and GPC. Started empirically on piperacillin-tazobactam and vancomycin While patient denied any urinary or GI symptoms prior to admission, Red catheter placed for urinary retention with 800cc urine output.     E coli bacteremia  CoNS and Aerococcus in 1 of 4 bottles likely contamination     - Blood culture 4/3 with 2 of 4 bottles with Escherichia coli and 1 of 4 bottles (in the same 1 bottle) CoNS and Aerococcus  - CoNS and Aerococcus likely contamination   - Escherichia coli Sensitive to Ceftriaxone   - Continue Ceftriaxone 2 g daily through 4/14  - repeat BCX  4/4 no growth   - Source may be urinary tract given retention   - US renal with non obstructing stone  - CT A/P reporting no acute findings   - Leukocytosis resolved   - JAYLENE resolved    d/w  Dr Larson    ID will sign off. Please call with questions.

## 2023-04-14 LAB
ANION GAP SERPL CALC-SCNC: 10 MMOL/L — SIGNIFICANT CHANGE UP (ref 5–17)
BUN SERPL-MCNC: 38.1 MG/DL — HIGH (ref 8–20)
CALCIUM SERPL-MCNC: 8.4 MG/DL — SIGNIFICANT CHANGE UP (ref 8.4–10.5)
CHLORIDE SERPL-SCNC: 103 MMOL/L — SIGNIFICANT CHANGE UP (ref 96–108)
CO2 SERPL-SCNC: 27 MMOL/L — SIGNIFICANT CHANGE UP (ref 22–29)
CREAT SERPL-MCNC: 1.06 MG/DL — SIGNIFICANT CHANGE UP (ref 0.5–1.3)
EGFR: 75 ML/MIN/1.73M2 — SIGNIFICANT CHANGE UP
GLUCOSE BLDC GLUCOMTR-MCNC: 112 MG/DL — HIGH (ref 70–99)
GLUCOSE BLDC GLUCOMTR-MCNC: 121 MG/DL — HIGH (ref 70–99)
GLUCOSE BLDC GLUCOMTR-MCNC: 145 MG/DL — HIGH (ref 70–99)
GLUCOSE BLDC GLUCOMTR-MCNC: 154 MG/DL — HIGH (ref 70–99)
GLUCOSE SERPL-MCNC: 116 MG/DL — HIGH (ref 70–99)
HCT VFR BLD CALC: 45.1 % — SIGNIFICANT CHANGE UP (ref 39–50)
HGB BLD-MCNC: 13.2 G/DL — SIGNIFICANT CHANGE UP (ref 13–17)
MAGNESIUM SERPL-MCNC: 2.2 MG/DL — SIGNIFICANT CHANGE UP (ref 1.6–2.6)
MCHC RBC-ENTMCNC: 24 PG — LOW (ref 27–34)
MCHC RBC-ENTMCNC: 29.3 GM/DL — LOW (ref 32–36)
MCV RBC AUTO: 82 FL — SIGNIFICANT CHANGE UP (ref 80–100)
PLATELET # BLD AUTO: 95 K/UL — LOW (ref 150–400)
POTASSIUM SERPL-MCNC: 4 MMOL/L — SIGNIFICANT CHANGE UP (ref 3.5–5.3)
POTASSIUM SERPL-SCNC: 4 MMOL/L — SIGNIFICANT CHANGE UP (ref 3.5–5.3)
RBC # BLD: 5.5 M/UL — SIGNIFICANT CHANGE UP (ref 4.2–5.8)
RBC # FLD: 20.1 % — HIGH (ref 10.3–14.5)
SODIUM SERPL-SCNC: 140 MMOL/L — SIGNIFICANT CHANGE UP (ref 135–145)
WBC # BLD: 5.18 K/UL — SIGNIFICANT CHANGE UP (ref 3.8–10.5)
WBC # FLD AUTO: 5.18 K/UL — SIGNIFICANT CHANGE UP (ref 3.8–10.5)

## 2023-04-14 PROCEDURE — 99233 SBSQ HOSP IP/OBS HIGH 50: CPT

## 2023-04-14 RX ORDER — IPRATROPIUM/ALBUTEROL SULFATE 18-103MCG
3 AEROSOL WITH ADAPTER (GRAM) INHALATION EVERY 6 HOURS
Refills: 0 | Status: DISCONTINUED | OUTPATIENT
Start: 2023-04-14 | End: 2023-04-18

## 2023-04-14 RX ADMIN — LOSARTAN POTASSIUM 25 MILLIGRAM(S): 100 TABLET, FILM COATED ORAL at 05:07

## 2023-04-14 RX ADMIN — Medication 2: at 18:03

## 2023-04-14 RX ADMIN — Medication 50 MICROGRAM(S): at 05:05

## 2023-04-14 RX ADMIN — TAMSULOSIN HYDROCHLORIDE 0.4 MILLIGRAM(S): 0.4 CAPSULE ORAL at 21:26

## 2023-04-14 RX ADMIN — SEVELAMER CARBONATE 1600 MILLIGRAM(S): 2400 POWDER, FOR SUSPENSION ORAL at 09:08

## 2023-04-14 RX ADMIN — SEVELAMER CARBONATE 1600 MILLIGRAM(S): 2400 POWDER, FOR SUSPENSION ORAL at 12:56

## 2023-04-14 RX ADMIN — BUMETANIDE 1 MILLIGRAM(S): 0.25 INJECTION INTRAMUSCULAR; INTRAVENOUS at 13:00

## 2023-04-14 RX ADMIN — INSULIN GLARGINE 40 UNIT(S): 100 INJECTION, SOLUTION SUBCUTANEOUS at 09:09

## 2023-04-14 RX ADMIN — APIXABAN 5 MILLIGRAM(S): 2.5 TABLET, FILM COATED ORAL at 05:06

## 2023-04-14 RX ADMIN — APIXABAN 5 MILLIGRAM(S): 2.5 TABLET, FILM COATED ORAL at 18:05

## 2023-04-14 RX ADMIN — ATORVASTATIN CALCIUM 40 MILLIGRAM(S): 80 TABLET, FILM COATED ORAL at 21:26

## 2023-04-14 RX ADMIN — CARVEDILOL PHOSPHATE 25 MILLIGRAM(S): 80 CAPSULE, EXTENDED RELEASE ORAL at 05:06

## 2023-04-14 RX ADMIN — CEFTRIAXONE 2000 MILLIGRAM(S): 500 INJECTION, POWDER, FOR SOLUTION INTRAMUSCULAR; INTRAVENOUS at 14:25

## 2023-04-14 RX ADMIN — BUMETANIDE 1 MILLIGRAM(S): 0.25 INJECTION INTRAMUSCULAR; INTRAVENOUS at 05:06

## 2023-04-14 RX ADMIN — SEVELAMER CARBONATE 1600 MILLIGRAM(S): 2400 POWDER, FOR SUSPENSION ORAL at 18:04

## 2023-04-14 RX ADMIN — CARVEDILOL PHOSPHATE 25 MILLIGRAM(S): 80 CAPSULE, EXTENDED RELEASE ORAL at 18:04

## 2023-04-14 RX ADMIN — Medication 150 MILLIGRAM(S): at 18:05

## 2023-04-14 RX ADMIN — CHLORHEXIDINE GLUCONATE 1 APPLICATION(S): 213 SOLUTION TOPICAL at 12:58

## 2023-04-14 RX ADMIN — DULOXETINE HYDROCHLORIDE 60 MILLIGRAM(S): 30 CAPSULE, DELAYED RELEASE ORAL at 12:57

## 2023-04-14 RX ADMIN — Medication 150 MILLIGRAM(S): at 05:06

## 2023-04-14 RX ADMIN — Medication 3 MILLILITER(S): at 18:53

## 2023-04-14 RX ADMIN — Medication 1 MILLIGRAM(S): at 12:57

## 2023-04-14 NOTE — PROGRESS NOTE ADULT - SUBJECTIVE AND OBJECTIVE BOX
[facility] - [facility]                                   [address] [place] [address]                                                      PHONE: [phone]                                                         FAX: [phone]  [email or web address]  ---------------------------------------------------------------------------------------------------------------------------------    Overnight events/patient complaints:  Pt feels well no complaints, improving LE edema    allow double protein at meals (Unknown)  clindamycin (Unknown)    MEDICATIONS  (STANDING):  albuterol/ipratropium for Nebulization 3 milliLiter(s) Nebulizer every 20 minutes  apixaban 5 milliGRAM(s) Oral every 12 hours  atorvastatin 40 milliGRAM(s) Oral at bedtime  buMETAnide Injectable 1 milliGRAM(s) IV Push two times a day  carvedilol 25 milliGRAM(s) Oral every 12 hours  cefTRIAXone Injectable. 2000 milliGRAM(s) IV Push every 24 hours  chlorhexidine 2% Cloths 1 Application(s) Topical daily  dextrose 5%. 1000 milliLiter(s) (50 mL/Hr) IV Continuous <Continuous>  dextrose 5%. 1000 milliLiter(s) (100 mL/Hr) IV Continuous <Continuous>  dextrose 50% Injectable 25 Gram(s) IV Push once  dextrose 50% Injectable 12.5 Gram(s) IV Push once  dextrose 50% Injectable 25 Gram(s) IV Push once  DULoxetine 60 milliGRAM(s) Oral daily  folic acid 1 milliGRAM(s) Oral daily  glucagon  Injectable 1 milliGRAM(s) IntraMuscular once  insulin glargine Injectable (LANTUS) 40 Unit(s) SubCutaneous every morning  insulin lispro (ADMELOG) corrective regimen sliding scale   SubCutaneous three times a day before meals  insulin lispro (ADMELOG) corrective regimen sliding scale   SubCutaneous at bedtime  levothyroxine 50 MICROGram(s) Oral daily  losartan 25 milliGRAM(s) Oral daily  pregabalin 150 milliGRAM(s) Oral two times a day  sevelamer carbonate 1600 milliGRAM(s) Oral three times a day with meals  tamsulosin 0.4 milliGRAM(s) Oral at bedtime    MEDICATIONS  (PRN):  acetaminophen     Tablet .. 650 milliGRAM(s) Oral every 6 hours PRN Temp greater or equal to 38C (100.4F), Mild Pain (1 - 3)  aluminum hydroxide/magnesium hydroxide/simethicone Suspension 30 milliLiter(s) Oral every 4 hours PRN Dyspepsia  dextrose Oral Gel 15 Gram(s) Oral once PRN Blood Glucose LESS THAN 70 milliGRAM(s)/deciliter  melatonin 3 milliGRAM(s) Oral at bedtime PRN Insomnia  methocarbamol 500 milliGRAM(s) Oral two times a day PRN for severe pain  ondansetron Injectable 4 milliGRAM(s) IV Push every 8 hours PRN Nausea and/or Vomiting      Vital Signs Last 24 Hrs  T(C): 36.1 (2023 04:53), Max: 36.8 (2023 10:21)  T(F): 97 (2023 04:53), Max: 98.2 (2023 10:21)  HR: 74 (2023 04:53) (69 - 80)  BP: 123/72 (2023 04:53) (111/67 - 127/75)  BP(mean): --  RR: 18 (2023 04:53) (18 - 19)  SpO2: 100% (2023 04:53) (95% - 100%)    Parameters below as of 2023 04:53  Patient On (Oxygen Delivery Method): room air      ICU Vital Signs Last 24 Hrs  MARYA NN CORNELL  I&O's Detail    2023 07:01  -  2023 07:00  --------------------------------------------------------  IN:    Oral Fluid: 1420 mL  Total IN: 1420 mL    OUT:    Indwelling Catheter - Urethral (mL): 2850 mL  Total OUT: 2850 mL    Total NET: -1430 mL        Drug Dosing Weight  MARY ANN CORNELL      PHYSICAL EXAM:  General:  alert and cooperative.  HEENT: Head; normocephalic, atraumatic.  Eyes: Pupils reactive, cornea wnl.  Neck: Supple, no nodes adenopathy, no NVD or carotid bruit or thyromegaly.  CARDIOVASCULAR: Normal S1 and S2, No murmur, rub, gallop or lift.   LUNGS: No rales, rhonchi or wheeze. Normal breath sounds bilaterally.  ABDOMEN: Soft, nontender without mass or organomegaly. bowel sounds normoactive.  EXTREMITIES: 2+ edema. Distal pulses wnl.   SKIN: warm and dry with normal turgor.  NEURO: Alert/oriented x 3/normal motor exam. No pathologic reflexes.    PSYCH: normal affect.        LABS:                        13.2   5.18  )-----------( 95       ( 2023 06:52 )             45.1         140  |  103  |  38.1<H>  ----------------------------<  116<H>  4.0   |  27.0  |  1.06    Ca    8.4      2023 06:52  Mg     2.2           MARY ANN KRAIG            RADIOLOGY & ADDITIONAL STUDIES:        ECHO: < from: TTE Echo Complete w/o Contrast w/ Doppler (23 @ 18:38) >   1. Technically difficult study.   2. Endocardial visualization was enhanced with intravenous echo contrast.   3. Multiple left ventricular regional wall motion abnormalities exist.   See wall motion findings.   4. Severely decreased global left ventricular systolic function.   5. Left ventricular ejection fraction, by visual estimation, is <20%.   6. Dilated cardiomyopathy.   7. Moderately reduced RV systolic function.   8. Severely enlarged left atrium.   9. Mild mitral annular calcification.  10. Mild to moderate mitral valve regurgitation.  11. The mitral in-flow pattern reveals no discernable A-wave, therefore   no comment on diastolic function can be made.  12. Sclerotic aortic valve with decreased opening.  13. Moderate tricuspid regurgitation.  14. There is no evidence of pericardial effusion.    MD Kumar Electronically signed on 4/3/2023 at 11:15:45 PM    < end of copied text >         CARDIAC CATHETERIZATION: < from: Cardiac Cath Lab - Adult (16 @ 18:03) >  VENTRICLES: LVEF 25% with MR  CORONARY VESSELS: The coronary circulation is right dominant.  LM:   --  LM: Angiography showed minor luminal irregularities with no flow  limiting lesions.  LAD:   --  LAD: Angiography showed minor luminal irregularities with no  flow limiting lesions.  CX:   --  Circumflex: Angiography showed minor luminal irregularities with  no flow limiting lesions.  RCA:   --  RCA: Angiography showed minor luminal irregularities with no  flow limiting lesions.  COMPLICATIONS: There were no complications. No complications occurred  during the cath lab visit.  DIAGNOSTIC IMPRESSIONS: Mild CAD. Non ischemic cardiomyopathy.  DIAGNOSTIC RECOMMENDATIONS: A/C. MARIELA CV The patient should continue with  the present medications.  INTERVENTIONAL IMPRESSIONS: Mild CAD. Non ischemic cardiomyopathy.  INTERVENTIONAL RECOMMENDATIONS: A/C. MARIELA CV  Prepared and signed by  Bright Muñiz MD  Signed 2016 19:26:24    < end of copied text >      ASSESSMENT AND PLAN:  In summary, MARY ANN CORNELL is an 72 year old male with a PMHx of HFrEF (30-35%) (NICM) prior [facility]  mild CAD see above s/p cardiomems and ICD, afib on coumadin, HTN, HLD, DM, RLE diabetic ulcer s/p right leg amputation, CHRISS who presents for worsening SOB found to be significantly volume overloaded found to have acute on chronic heart failure exacerbation. Patient recently moved to [place] ~1 year ago and has not established care with a new cardiologist while in [place]. Pt is back in [place] temporarily as his wife  recently. Patient's hospital course complicated by JAYLENE.    Acute on Chronic Heart Failure Exacerbation/JAYLENE hx of none compliance   - Continue bumex 1mg IV BID and monitor renal panel. Renal function continues to improve  - echo with reduced EF to <20% acute on chronic NICM EF ~ 25 to 30%   - daily chemistry to monitor creatinine and electrolytes  - Losartan 25 mg po daily due to HFrEF   - Nephrology following    Afib  - c/w eliquis 5mg BID  - rate controlled    HTN/HLD  - c/w coreg 25mg BID  - c/w atorvastatin 40mg daily    No need for repeat cath at this time   Out patient ischemic evaluations

## 2023-04-14 NOTE — PROGRESS NOTE ADULT - ASSESSMENT
JAYLENE: CM(EF<20%)/decompensated CHF with prerenal component  CKD(III), proteinuria +DM  Renal sono: no hydro; suboptimal visualization of L kidney  - avoid potential nephrotoxins  - daily weights, adjust diuretics as needed--> expect and accept azotemia  - check 24 hr urine protein  - cont Losartan  - follow labs  - (?) SGLT-2 candidate    Hyperphosphatemia  - re check serum phos  - now that renal function improving may no longer need Renvela

## 2023-04-14 NOTE — PROGRESS NOTE ADULT - SUBJECTIVE AND OBJECTIVE BOX
NEPHROLOGY INTERVAL HPI/OVERNIGHT EVENTS:  pt comfortable  no new complaints  uneventful overnight    MEDICATIONS  (STANDING):  albuterol/ipratropium for Nebulization 3 milliLiter(s) Nebulizer every 20 minutes  apixaban 5 milliGRAM(s) Oral every 12 hours  atorvastatin 40 milliGRAM(s) Oral at bedtime  buMETAnide Injectable 1 milliGRAM(s) IV Push two times a day  carvedilol 25 milliGRAM(s) Oral every 12 hours  cefTRIAXone Injectable. 2000 milliGRAM(s) IV Push every 24 hours  chlorhexidine 2% Cloths 1 Application(s) Topical daily  dextrose 5%. 1000 milliLiter(s) (50 mL/Hr) IV Continuous <Continuous>  dextrose 5%. 1000 milliLiter(s) (100 mL/Hr) IV Continuous <Continuous>  dextrose 50% Injectable 25 Gram(s) IV Push once  dextrose 50% Injectable 12.5 Gram(s) IV Push once  dextrose 50% Injectable 25 Gram(s) IV Push once  DULoxetine 60 milliGRAM(s) Oral daily  folic acid 1 milliGRAM(s) Oral daily  glucagon  Injectable 1 milliGRAM(s) IntraMuscular once  insulin glargine Injectable (LANTUS) 40 Unit(s) SubCutaneous every morning  insulin lispro (ADMELOG) corrective regimen sliding scale   SubCutaneous three times a day before meals  insulin lispro (ADMELOG) corrective regimen sliding scale   SubCutaneous at bedtime  levothyroxine 50 MICROGram(s) Oral daily  losartan 25 milliGRAM(s) Oral daily  pregabalin 150 milliGRAM(s) Oral two times a day  sevelamer carbonate 1600 milliGRAM(s) Oral three times a day with meals  tamsulosin 0.4 milliGRAM(s) Oral at bedtime    MEDICATIONS  (PRN):  acetaminophen     Tablet .. 650 milliGRAM(s) Oral every 6 hours PRN Temp greater or equal to 38C (100.4F), Mild Pain (1 - 3)  aluminum hydroxide/magnesium hydroxide/simethicone Suspension 30 milliLiter(s) Oral every 4 hours PRN Dyspepsia  dextrose Oral Gel 15 Gram(s) Oral once PRN Blood Glucose LESS THAN 70 milliGRAM(s)/deciliter  melatonin 3 milliGRAM(s) Oral at bedtime PRN Insomnia  methocarbamol 500 milliGRAM(s) Oral two times a day PRN for severe pain  ondansetron Injectable 4 milliGRAM(s) IV Push every 8 hours PRN Nausea and/or Vomiting      Allergies    clindamycin (Unknown)          Vital Signs Last 24 Hrs  T(C): 36.1 (14 Apr 2023 04:53), Max: 36.8 (13 Apr 2023 10:21)  T(F): 97 (14 Apr 2023 04:53), Max: 98.2 (13 Apr 2023 10:21)  HR: 74 (14 Apr 2023 04:53) (69 - 80)  BP: 123/72 (14 Apr 2023 04:53) (111/67 - 127/75)  BP(mean): --  RR: 18 (14 Apr 2023 04:53) (18 - 19)  SpO2: 100% (14 Apr 2023 04:53) (95% - 100%)    Parameters below as of 14 Apr 2023 04:53  Patient On (Oxygen Delivery Method): room air        PHYSICAL EXAM:  GENERAL: Obese, deconditioned  HEENT: Moist mucous membranes  NECK: Supple, No JVD  NERVOUS SYSTEM:  A/O x3, non focal  CHEST: Clear, diminished BS  HEART: No rub appreciated  ABDOMEN: Soft, NT/ND BS+  EXTREMITIES:  + 1-2 dependent edema  : + wagner   LABS:                        13.2   5.18  )-----------( 95       ( 14 Apr 2023 06:52 )             45.1     04-14    140  |  103  |  38.1<H>  ----------------------------<  116<H>  4.0   |  27.0  |  1.06      Phosphorus Level, Serum: 5.8 mg/dL (04.06.23)    Ca    8.4      14 Apr 2023 06:52  Magnesium, Serum: 2.2 mg/dL (04-14 @ 06:52)    Urinalysis (04.03.23 @ 22:00)   pH Urine: 6.0  Blood, Urine: Negative  Glucose Qualitative, Urine: Negative mg/dL  Color: Yellow  Urine Appearance: Clear  Bilirubin: Negative  Ketone - Urine: Negative  Specific Gravity: 1.015  Protein, Urine: 15  Urobilinogen: Negative mg/dL  Nitrite: Negative  Leukocyte Esterase Concentration: Trace  RADIOLOGY & ADDITIONAL TESTS:  < from: US Renal (04.05.23 @ 23:01) >  ACC: [id number] EXAM:  US KIDNEY(S)   ORDERED BY: CONNER JACQUES     PROCEDURE DATE:  04/05/2023          INTERPRETATION:  CLINICAL INFORMATION: Acute renal failure    COMPARISON: CT abdomen pelvis performed on the same day.    TECHNIQUE: Sonographyof the kidneys and bladder.    FINDINGS:  Right kidney: 10.5 cm. No hydronephrosis or shadowing stone.    Left kidney: 10.7 cm. No hydronephrosis. There is a 0.4 cm echogenic   focus in the lower pole. Upper pole is poorly visualized.    Urinary bladder: Not visualized.    IMPRESSION:  Suboptimal visualization of the left kidney. There is a 0.4 cm echogenic   focus in the lower pole, likely a nonobstructing stone.    No hydronephrosis.    < end of copied text >

## 2023-04-14 NOTE — PROGRESS NOTE ADULT - SUBJECTIVE AND OBJECTIVE BOX
MARY ANN RIBEIRORAKAN  ----------------------------------------  The patient was seen at bedside. Patient with heart failure. Offered no complaints. Denied chest pain or dyspnea.    Vital Signs Last 24 Hrs  T(C): 37 (14 Apr 2023 10:55), Max: 37 (14 Apr 2023 10:55)  T(F): 98.6 (14 Apr 2023 10:55), Max: 98.6 (14 Apr 2023 10:55)  HR: 66 (14 Apr 2023 10:55) (66 - 80)  BP: 106/66 (14 Apr 2023 10:55) (106/66 - 127/75)  BP(mean): --  RR: 18 (14 Apr 2023 10:55) (18 - 19)  SpO2: 97% (14 Apr 2023 10:55) (95% - 100%)    Parameters below as of 14 Apr 2023 10:55  Patient On (Oxygen Delivery Method): room air    CAPILLARY BLOOD GLUCOSE  POCT Blood Glucose.: 112 mg/dL (14 Apr 2023 08:59)  POCT Blood Glucose.: 158 mg/dL (13 Apr 2023 21:03)  POCT Blood Glucose.: 160 mg/dL (13 Apr 2023 17:06)  POCT Blood Glucose.: 122 mg/dL (13 Apr 2023 12:45)    PHYSICAL EXAMINATION:  ----------------------------------------  General appearance: No acute distress, Awake, Alert  HEENT: Normocephalic, Atraumatic, Conjunctiva clear, EOMI  Neck: Supple, No JVD, No tenderness  Lungs: No wheezes, No rales, Diminished breath sounds at the bases  Cardiovascular: S1S2, Regular rhythm  Abdomen: Soft, Nontender, Nondistended, No guarding/rebound, Positive bowel sounds  Extremities: No clubbing, No cyanosis, No calf tenderness, Lower extremity edema, Right below knee amputations, Left leg dressing clean and intact  Neuro: Strength equal bilaterally, No tremors  Psychiatric: Appropriate mood, Normal affect    LABORATORY STUDIES:  ----------------------------------------             13.2   5.18  )-----------( 95       ( 14 Apr 2023 06:52 )             45.1     04-14    140  |  103  |  38.1<H>  ----------------------------<  116<H>  4.0   |  27.0  |  1.06    Ca    8.4      14 Apr 2023 06:52  Mg     2.2     04-14    MEDICATIONS  (STANDING):  albuterol/ipratropium for Nebulization 3 milliLiter(s) Nebulizer every 20 minutes  apixaban 5 milliGRAM(s) Oral every 12 hours  atorvastatin 40 milliGRAM(s) Oral at bedtime  buMETAnide Injectable 1 milliGRAM(s) IV Push two times a day  carvedilol 25 milliGRAM(s) Oral every 12 hours  cefTRIAXone Injectable. 2000 milliGRAM(s) IV Push every 24 hours  chlorhexidine 2% Cloths 1 Application(s) Topical daily  dextrose 5%. 1000 milliLiter(s) (50 mL/Hr) IV Continuous <Continuous>  dextrose 5%. 1000 milliLiter(s) (100 mL/Hr) IV Continuous <Continuous>  dextrose 50% Injectable 12.5 Gram(s) IV Push once  dextrose 50% Injectable 25 Gram(s) IV Push once  dextrose 50% Injectable 25 Gram(s) IV Push once  DULoxetine 60 milliGRAM(s) Oral daily  folic acid 1 milliGRAM(s) Oral daily  glucagon  Injectable 1 milliGRAM(s) IntraMuscular once  insulin glargine Injectable (LANTUS) 40 Unit(s) SubCutaneous every morning  insulin lispro (ADMELOG) corrective regimen sliding scale   SubCutaneous three times a day before meals  insulin lispro (ADMELOG) corrective regimen sliding scale   SubCutaneous at bedtime  levothyroxine 50 MICROGram(s) Oral daily  losartan 25 milliGRAM(s) Oral daily  pregabalin 150 milliGRAM(s) Oral two times a day  sevelamer carbonate 1600 milliGRAM(s) Oral three times a day with meals  tamsulosin 0.4 milliGRAM(s) Oral at bedtime    MEDICATIONS  (PRN):  acetaminophen     Tablet .. 650 milliGRAM(s) Oral every 6 hours PRN Temp greater or equal to 38C (100.4F), Mild Pain (1 - 3)  aluminum hydroxide/magnesium hydroxide/simethicone Suspension 30 milliLiter(s) Oral every 4 hours PRN Dyspepsia  dextrose Oral Gel 15 Gram(s) Oral once PRN Blood Glucose LESS THAN 70 milliGRAM(s)/deciliter  melatonin 3 milliGRAM(s) Oral at bedtime PRN Insomnia  methocarbamol 500 milliGRAM(s) Oral two times a day PRN for severe pain  ondansetron Injectable 4 milliGRAM(s) IV Push every 8 hours PRN Nausea and/or Vomiting      ASSESSMENT / PLAN:  ----------------------------------------  72M with a history of cardiomyopathy with AICD, atrial fibrillation, diabetes, hypertension, chronic kidney disease, prostate cancer, and obstructive sleep apnea who presented with a two week history of dyspnea found to have heart failure.   Diuretics were initiated and a Red catheter was placed to monitor response. Blood cultures were also noted to grow Ecoli for which intravenous antibiotics were initiated. Echocardiogram noted severely decreased left ventricular systolic function. Intravenous diuretic therapy was continued while monitoring renal functions.    Acute hypoxic respiratory failure / Acute on chronic systolic heart failure - Echocardiogram noted severely decreased left ventricular systolic function. On bumetanide for diuresis. On carvedilol and losartan.    Ecoli bacteremia - Currently afebrile and without leukocytosis. Repeat blood cultures (4/4/23) were without growth. For completion of the antibiotics course today.    Chronic kidney disease III with acute kidney injury / History of benign prostatic hypertrophy - Renal function improved. Red catheter in place. Continue on tamsulosin. Monitoring renal function and urine output while on bumetanide. For potential trial of void once more mobile.    Atrial fibrillation - On carvedilol. Apixaban for anticoagulation.    Diabetes - Insulin coverage, close monitoring of blood glucose levels. On pregabalin for neuropathy.    Hypothyroidism - On levothyroxine.    Obstructive sleep apnea - On nocturnal Bipap.    Previously discussed with Cardiology, the patient is thought to be at increased risk for air travel at this time. Will required further management after discharge from the hospital for further optimization. Would benefit from treatment at a rehabilitation facility. MARY ANN RIEBIRORAKAN  ----------------------------------------  The patient was seen at bedside. Patient with heart failure. Offered no complaints. Denied chest pain or dyspnea.    Vital Signs Last 24 Hrs  T(C): 37 (14 Apr 2023 10:55), Max: 37 (14 Apr 2023 10:55)  T(F): 98.6 (14 Apr 2023 10:55), Max: 98.6 (14 Apr 2023 10:55)  HR: 66 (14 Apr 2023 10:55) (66 - 80)  BP: 106/66 (14 Apr 2023 10:55) (106/66 - 127/75)  BP(mean): --  RR: 18 (14 Apr 2023 10:55) (18 - 19)  SpO2: 97% (14 Apr 2023 10:55) (95% - 100%)    Parameters below as of 14 Apr 2023 10:55  Patient On (Oxygen Delivery Method): room air    CAPILLARY BLOOD GLUCOSE  POCT Blood Glucose.: 112 mg/dL (14 Apr 2023 08:59)  POCT Blood Glucose.: 158 mg/dL (13 Apr 2023 21:03)  POCT Blood Glucose.: 160 mg/dL (13 Apr 2023 17:06)  POCT Blood Glucose.: 122 mg/dL (13 Apr 2023 12:45)    PHYSICAL EXAMINATION:  ----------------------------------------  General appearance: No acute distress, Awake, Alert  HEENT: Normocephalic, Atraumatic, Conjunctiva clear, EOMI  Neck: Supple, No JVD, No tenderness  Lungs: No wheezes, No rales, Diminished breath sounds at the bases  Cardiovascular: S1S2, Regular rhythm  Abdomen: Soft, Nontender, Nondistended, No guarding/rebound, Positive bowel sounds  Extremities: No clubbing, No cyanosis, No calf tenderness, Lower extremity edema, Right below knee amputations, Left leg dressing clean and intact  Neuro: Strength equal bilaterally, No tremors  Psychiatric: Appropriate mood, Normal affect    LABORATORY STUDIES:  ----------------------------------------             13.2   5.18  )-----------( 95       ( 14 Apr 2023 06:52 )             45.1     04-14    140  |  103  |  38.1<H>  ----------------------------<  116<H>  4.0   |  27.0  |  1.06    Ca    8.4      14 Apr 2023 06:52  Mg     2.2     04-14    MEDICATIONS  (STANDING):  albuterol/ipratropium for Nebulization 3 milliLiter(s) Nebulizer every 20 minutes  apixaban 5 milliGRAM(s) Oral every 12 hours  atorvastatin 40 milliGRAM(s) Oral at bedtime  buMETAnide Injectable 1 milliGRAM(s) IV Push two times a day  carvedilol 25 milliGRAM(s) Oral every 12 hours  cefTRIAXone Injectable. 2000 milliGRAM(s) IV Push every 24 hours  chlorhexidine 2% Cloths 1 Application(s) Topical daily  dextrose 5%. 1000 milliLiter(s) (50 mL/Hr) IV Continuous <Continuous>  dextrose 5%. 1000 milliLiter(s) (100 mL/Hr) IV Continuous <Continuous>  dextrose 50% Injectable 12.5 Gram(s) IV Push once  dextrose 50% Injectable 25 Gram(s) IV Push once  dextrose 50% Injectable 25 Gram(s) IV Push once  DULoxetine 60 milliGRAM(s) Oral daily  folic acid 1 milliGRAM(s) Oral daily  glucagon  Injectable 1 milliGRAM(s) IntraMuscular once  insulin glargine Injectable (LANTUS) 40 Unit(s) SubCutaneous every morning  insulin lispro (ADMELOG) corrective regimen sliding scale   SubCutaneous three times a day before meals  insulin lispro (ADMELOG) corrective regimen sliding scale   SubCutaneous at bedtime  levothyroxine 50 MICROGram(s) Oral daily  losartan 25 milliGRAM(s) Oral daily  pregabalin 150 milliGRAM(s) Oral two times a day  sevelamer carbonate 1600 milliGRAM(s) Oral three times a day with meals  tamsulosin 0.4 milliGRAM(s) Oral at bedtime    MEDICATIONS  (PRN):  acetaminophen     Tablet .. 650 milliGRAM(s) Oral every 6 hours PRN Temp greater or equal to 38C (100.4F), Mild Pain (1 - 3)  aluminum hydroxide/magnesium hydroxide/simethicone Suspension 30 milliLiter(s) Oral every 4 hours PRN Dyspepsia  dextrose Oral Gel 15 Gram(s) Oral once PRN Blood Glucose LESS THAN 70 milliGRAM(s)/deciliter  melatonin 3 milliGRAM(s) Oral at bedtime PRN Insomnia  methocarbamol 500 milliGRAM(s) Oral two times a day PRN for severe pain  ondansetron Injectable 4 milliGRAM(s) IV Push every 8 hours PRN Nausea and/or Vomiting      ASSESSMENT / PLAN:  ----------------------------------------  72M with a history of cardiomyopathy with AICD, atrial fibrillation, diabetes, hypertension, chronic kidney disease, prostate cancer, and obstructive sleep apnea who presented with a two week history of dyspnea found to have heart failure.   Diuretics were initiated and a Red catheter was placed to monitor response. Blood cultures were also noted to grow Ecoli for which intravenous antibiotics were initiated. Echocardiogram noted severely decreased left ventricular systolic function. Intravenous diuretic therapy was continued while monitoring renal functions.    Acute hypoxic respiratory failure / Acute on chronic systolic heart failure - Echocardiogram noted severely decreased left ventricular systolic function. On bumetanide for diuresis. On carvedilol and losartan.    Ecoli bacteremia - Currently afebrile and without leukocytosis. Repeat blood cultures (4/4/23) were without growth. For completion of the antibiotics course today.    Chronic kidney disease III with acute kidney injury / History of benign prostatic hypertrophy - Renal function improved. Red catheter in place. Continue on tamsulosin. Monitoring renal function and urine output while on bumetanide. For potential trial of void once more mobile.    Atrial fibrillation - On carvedilol. Apixaban for anticoagulation.    Thrombocytopenia - Continue to monitor platelet count. No bleeding noted on examination.    Diabetes - Insulin coverage, close monitoring of blood glucose levels. On pregabalin for neuropathy.    Hypothyroidism - On levothyroxine.    Obstructive sleep apnea - On nocturnal Bipap.    Previously discussed with Cardiology, the patient is thought to be at increased risk for air travel at this time. Will required further management after discharge from the hospital for further optimization. Would benefit from treatment at a rehabilitation facility.

## 2023-04-15 LAB
A1C WITH ESTIMATED AVERAGE GLUCOSE RESULT: 8.3 % — HIGH (ref 4–5.6)
ANION GAP SERPL CALC-SCNC: 12 MMOL/L — SIGNIFICANT CHANGE UP (ref 5–17)
BUN SERPL-MCNC: 36.6 MG/DL — HIGH (ref 8–20)
CALCIUM SERPL-MCNC: 8.5 MG/DL — SIGNIFICANT CHANGE UP (ref 8.4–10.5)
CHLORIDE SERPL-SCNC: 102 MMOL/L — SIGNIFICANT CHANGE UP (ref 96–108)
CO2 SERPL-SCNC: 26 MMOL/L — SIGNIFICANT CHANGE UP (ref 22–29)
CREAT SERPL-MCNC: 1.23 MG/DL — SIGNIFICANT CHANGE UP (ref 0.5–1.3)
EGFR: 62 ML/MIN/1.73M2 — SIGNIFICANT CHANGE UP
ESTIMATED AVERAGE GLUCOSE: 192 MG/DL — HIGH (ref 68–114)
GLUCOSE BLDC GLUCOMTR-MCNC: 110 MG/DL — HIGH (ref 70–99)
GLUCOSE BLDC GLUCOMTR-MCNC: 141 MG/DL — HIGH (ref 70–99)
GLUCOSE BLDC GLUCOMTR-MCNC: 158 MG/DL — HIGH (ref 70–99)
GLUCOSE BLDC GLUCOMTR-MCNC: 162 MG/DL — HIGH (ref 70–99)
GLUCOSE SERPL-MCNC: 169 MG/DL — HIGH (ref 70–99)
HCT VFR BLD CALC: 44.6 % — SIGNIFICANT CHANGE UP (ref 39–50)
HGB BLD-MCNC: 13.3 G/DL — SIGNIFICANT CHANGE UP (ref 13–17)
MAGNESIUM SERPL-MCNC: 2.2 MG/DL — SIGNIFICANT CHANGE UP (ref 1.6–2.6)
MCHC RBC-ENTMCNC: 24.4 PG — LOW (ref 27–34)
MCHC RBC-ENTMCNC: 29.8 GM/DL — LOW (ref 32–36)
MCV RBC AUTO: 81.7 FL — SIGNIFICANT CHANGE UP (ref 80–100)
PHOSPHATE SERPL-MCNC: 2.9 MG/DL — SIGNIFICANT CHANGE UP (ref 2.4–4.7)
PLATELET # BLD AUTO: 123 K/UL — LOW (ref 150–400)
POTASSIUM SERPL-MCNC: 4.2 MMOL/L — SIGNIFICANT CHANGE UP (ref 3.5–5.3)
POTASSIUM SERPL-SCNC: 4.2 MMOL/L — SIGNIFICANT CHANGE UP (ref 3.5–5.3)
RBC # BLD: 5.46 M/UL — SIGNIFICANT CHANGE UP (ref 4.2–5.8)
RBC # FLD: 20 % — HIGH (ref 10.3–14.5)
SODIUM SERPL-SCNC: 140 MMOL/L — SIGNIFICANT CHANGE UP (ref 135–145)
WBC # BLD: 6.11 K/UL — SIGNIFICANT CHANGE UP (ref 3.8–10.5)
WBC # FLD AUTO: 6.11 K/UL — SIGNIFICANT CHANGE UP (ref 3.8–10.5)

## 2023-04-15 PROCEDURE — 71045 X-RAY EXAM CHEST 1 VIEW: CPT | Mod: 26

## 2023-04-15 PROCEDURE — 99233 SBSQ HOSP IP/OBS HIGH 50: CPT

## 2023-04-15 RX ADMIN — LOSARTAN POTASSIUM 25 MILLIGRAM(S): 100 TABLET, FILM COATED ORAL at 05:14

## 2023-04-15 RX ADMIN — Medication 2: at 16:57

## 2023-04-15 RX ADMIN — CARVEDILOL PHOSPHATE 25 MILLIGRAM(S): 80 CAPSULE, EXTENDED RELEASE ORAL at 18:36

## 2023-04-15 RX ADMIN — Medication 2: at 11:57

## 2023-04-15 RX ADMIN — SEVELAMER CARBONATE 1600 MILLIGRAM(S): 2400 POWDER, FOR SUSPENSION ORAL at 16:57

## 2023-04-15 RX ADMIN — Medication 50 MICROGRAM(S): at 05:14

## 2023-04-15 RX ADMIN — DULOXETINE HYDROCHLORIDE 60 MILLIGRAM(S): 30 CAPSULE, DELAYED RELEASE ORAL at 11:58

## 2023-04-15 RX ADMIN — Medication 3 MILLILITER(S): at 10:44

## 2023-04-15 RX ADMIN — TAMSULOSIN HYDROCHLORIDE 0.4 MILLIGRAM(S): 0.4 CAPSULE ORAL at 21:09

## 2023-04-15 RX ADMIN — ATORVASTATIN CALCIUM 40 MILLIGRAM(S): 80 TABLET, FILM COATED ORAL at 21:09

## 2023-04-15 RX ADMIN — APIXABAN 5 MILLIGRAM(S): 2.5 TABLET, FILM COATED ORAL at 17:00

## 2023-04-15 RX ADMIN — SEVELAMER CARBONATE 1600 MILLIGRAM(S): 2400 POWDER, FOR SUSPENSION ORAL at 11:57

## 2023-04-15 RX ADMIN — Medication 150 MILLIGRAM(S): at 05:14

## 2023-04-15 RX ADMIN — CARVEDILOL PHOSPHATE 25 MILLIGRAM(S): 80 CAPSULE, EXTENDED RELEASE ORAL at 05:14

## 2023-04-15 RX ADMIN — Medication 1 MILLIGRAM(S): at 11:57

## 2023-04-15 RX ADMIN — BUMETANIDE 1 MILLIGRAM(S): 0.25 INJECTION INTRAMUSCULAR; INTRAVENOUS at 15:35

## 2023-04-15 RX ADMIN — BUMETANIDE 1 MILLIGRAM(S): 0.25 INJECTION INTRAMUSCULAR; INTRAVENOUS at 06:06

## 2023-04-15 RX ADMIN — INSULIN GLARGINE 40 UNIT(S): 100 INJECTION, SOLUTION SUBCUTANEOUS at 08:07

## 2023-04-15 RX ADMIN — Medication 150 MILLIGRAM(S): at 17:00

## 2023-04-15 RX ADMIN — CHLORHEXIDINE GLUCONATE 1 APPLICATION(S): 213 SOLUTION TOPICAL at 11:58

## 2023-04-15 RX ADMIN — APIXABAN 5 MILLIGRAM(S): 2.5 TABLET, FILM COATED ORAL at 05:13

## 2023-04-15 RX ADMIN — SEVELAMER CARBONATE 1600 MILLIGRAM(S): 2400 POWDER, FOR SUSPENSION ORAL at 09:00

## 2023-04-15 NOTE — PROGRESS NOTE ADULT - SUBJECTIVE AND OBJECTIVE BOX
NEPHROLOGY INTERVAL HPI/OVERNIGHT EVENTS:  pt had uneventful overnight  no acute distress noted    MEDICATIONS  (STANDING):  albuterol/ipratropium for Nebulization 3 milliLiter(s) Nebulizer every 20 minutes  apixaban 5 milliGRAM(s) Oral every 12 hours  atorvastatin 40 milliGRAM(s) Oral at bedtime  buMETAnide Injectable 1 milliGRAM(s) IV Push two times a day  carvedilol 25 milliGRAM(s) Oral every 12 hours  cefTRIAXone Injectable. 2000 milliGRAM(s) IV Push every 24 hours  chlorhexidine 2% Cloths 1 Application(s) Topical daily  dextrose 5%. 1000 milliLiter(s) (50 mL/Hr) IV Continuous <Continuous>  dextrose 5%. 1000 milliLiter(s) (100 mL/Hr) IV Continuous <Continuous>  dextrose 50% Injectable 25 Gram(s) IV Push once  dextrose 50% Injectable 12.5 Gram(s) IV Push once  dextrose 50% Injectable 25 Gram(s) IV Push once  DULoxetine 60 milliGRAM(s) Oral daily  folic acid 1 milliGRAM(s) Oral daily  glucagon  Injectable 1 milliGRAM(s) IntraMuscular once  insulin glargine Injectable (LANTUS) 40 Unit(s) SubCutaneous every morning  insulin lispro (ADMELOG) corrective regimen sliding scale   SubCutaneous three times a day before meals  insulin lispro (ADMELOG) corrective regimen sliding scale   SubCutaneous at bedtime  levothyroxine 50 MICROGram(s) Oral daily  losartan 25 milliGRAM(s) Oral daily  pregabalin 150 milliGRAM(s) Oral two times a day  sevelamer carbonate 1600 milliGRAM(s) Oral three times a day with meals  tamsulosin 0.4 milliGRAM(s) Oral at bedtime    MEDICATIONS  (PRN):  acetaminophen     Tablet .. 650 milliGRAM(s) Oral every 6 hours PRN Temp greater or equal to 38C (100.4F), Mild Pain (1 - 3)  albuterol/ipratropium for Nebulization 3 milliLiter(s) Nebulizer every 6 hours PRN Bronchospasm  aluminum hydroxide/magnesium hydroxide/simethicone Suspension 30 milliLiter(s) Oral every 4 hours PRN Dyspepsia  dextrose Oral Gel 15 Gram(s) Oral once PRN Blood Glucose LESS THAN 70 milliGRAM(s)/deciliter  melatonin 3 milliGRAM(s) Oral at bedtime PRN Insomnia  methocarbamol 500 milliGRAM(s) Oral two times a day PRN for severe pain  ondansetron Injectable 4 milliGRAM(s) IV Push every 8 hours PRN Nausea and/or Vomiting      Allergies    clindamycin (Unknown)          Vital Signs Last 24 Hrs  T(C): 37 (15 Apr 2023 04:35), Max: 37 (14 Apr 2023 10:55)  T(F): 98.6 (15 Apr 2023 04:35), Max: 98.6 (14 Apr 2023 10:55)  HR: 85 (15 Apr 2023 04:35) (66 - 85)  BP: 104/70 (15 Apr 2023 04:35) (104/57 - 109/71)  BP(mean): --  RR: 18 (15 Apr 2023 04:35) (18 - 18)  SpO2: 91% (15 Apr 2023 04:35) (91% - 99%)    Parameters below as of 14 Apr 2023 21:25  Patient On (Oxygen Delivery Method): room air        PHYSICAL EXAM:  GENERAL: Weak, fatigued  HEENT: No periorbital edema  NECK: Supple, No JVD  NERVOUS SYSTEM:  A/O x3, non focal  CHEST: Clear, diminished BS  HEART: No rub   ABDOMEN: Soft, NT/ND BS+  EXTREMITIES:  + dependent edema improved  : + wagner     LABS:                        13.2   5.18  )-----------( 95       ( 14 Apr 2023 06:52 )             45.1     04-14    140  |  103  |  38.1<H>  ----------------------------<  116<H>  4.0   |  27.0  |  1.06    Ca    8.4      14 Apr 2023 06:52  Mg     2.2     04-14          Magnesium, Serum: 2.2 mg/dL (04-14 @ 06:52)      RADIOLOGY & ADDITIONAL TESTS:

## 2023-04-15 NOTE — PROGRESS NOTE ADULT - ASSESSMENT
72M with a history of cardiomyopathy with AICD, atrial fibrillation, diabetes, hypertension, chronic kidney disease, prostate cancer, and obstructive sleep apnea who presented with a two week history of dyspnea found to have heart failure.   Diuretics were initiated and a Red catheter was placed to monitor response. Blood cultures were also noted to grow Ecoli for which intravenous antibiotics were initiated. Echocardiogram noted severely decreased left ventricular systolic function. Intravenous diuretic therapy was continued while monitoring renal functions.    Acute hypoxic respiratory failure / Acute on chronic systolic heart failure - Echocardiogram noted severely decreased left ventricular systolic function. On bumetanide for diuresis. On carvedilol and losartan.    Ecoli bacteremia - Currently afebrile and without leukocytosis. Repeat blood cultures (4/4/23) were without growth. For completion of the antibiotics course till 4/14.    Chronic kidney disease III with acute kidney injury / History of benign prostatic hypertrophy - Renal function improved. Red catheter in place. Continue on tamsulosin. Monitoring renal function and urine output while on bumetanide. For potential trial of void once more mobile.    Atrial fibrillation - On carvedilol. Apixaban for anticoagulation.    Thrombocytopenia - Continue to monitor platelet count. No bleeding noted on examination.    Diabetes - Insulin coverage, close monitoring of blood glucose levels. On pregabalin for neuropathy.    Hypothyroidism - On levothyroxine.    Obstructive sleep apnea - On nocturnal Bipap.    Previously discussed with Cardiology, the patient is thought to be at increased risk for air travel at this time. Will required further management after discharge from the hospital for further optimization. Would benefit from treatment at a rehabilitation facility.    pending [facility]  CXR today

## 2023-04-15 NOTE — PROGRESS NOTE ADULT - SUBJECTIVE AND OBJECTIVE BOX
[facility] - [facility], THE HEART CENTER                                   [address] [place] [address]                                                      PHONE: [phone]                                                         FAX: [phone]  [email or web address]  ---------------------------------------------------------------------------------------------------------------------------------    Overnight events/patient complaints:  Pt feels still with edema    allow double protein at meals (Unknown)  clindamycin (Unknown)    MEDICATIONS  (STANDING):  albuterol/ipratropium for Nebulization 3 milliLiter(s) Nebulizer every 20 minutes  apixaban 5 milliGRAM(s) Oral every 12 hours  atorvastatin 40 milliGRAM(s) Oral at bedtime  buMETAnide Injectable 1 milliGRAM(s) IV Push two times a day  carvedilol 25 milliGRAM(s) Oral every 12 hours  cefTRIAXone Injectable. 2000 milliGRAM(s) IV Push every 24 hours  chlorhexidine 2% Cloths 1 Application(s) Topical daily  dextrose 5%. 1000 milliLiter(s) (50 mL/Hr) IV Continuous <Continuous>  dextrose 5%. 1000 milliLiter(s) (100 mL/Hr) IV Continuous <Continuous>  dextrose 50% Injectable 25 Gram(s) IV Push once  dextrose 50% Injectable 12.5 Gram(s) IV Push once  dextrose 50% Injectable 25 Gram(s) IV Push once  DULoxetine 60 milliGRAM(s) Oral daily  folic acid 1 milliGRAM(s) Oral daily  glucagon  Injectable 1 milliGRAM(s) IntraMuscular once  insulin glargine Injectable (LANTUS) 40 Unit(s) SubCutaneous every morning  insulin lispro (ADMELOG) corrective regimen sliding scale   SubCutaneous three times a day before meals  insulin lispro (ADMELOG) corrective regimen sliding scale   SubCutaneous at bedtime  levothyroxine 50 MICROGram(s) Oral daily  losartan 25 milliGRAM(s) Oral daily  pregabalin 150 milliGRAM(s) Oral two times a day  sevelamer carbonate 1600 milliGRAM(s) Oral three times a day with meals  tamsulosin 0.4 milliGRAM(s) Oral at bedtime    MEDICATIONS  (PRN):  acetaminophen     Tablet .. 650 milliGRAM(s) Oral every 6 hours PRN Temp greater or equal to 38C (100.4F), Mild Pain (1 - 3)  albuterol/ipratropium for Nebulization 3 milliLiter(s) Nebulizer every 6 hours PRN Bronchospasm  aluminum hydroxide/magnesium hydroxide/simethicone Suspension 30 milliLiter(s) Oral every 4 hours PRN Dyspepsia  dextrose Oral Gel 15 Gram(s) Oral once PRN Blood Glucose LESS THAN 70 milliGRAM(s)/deciliter  melatonin 3 milliGRAM(s) Oral at bedtime PRN Insomnia  methocarbamol 500 milliGRAM(s) Oral two times a day PRN for severe pain  ondansetron Injectable 4 milliGRAM(s) IV Push every 8 hours PRN Nausea and/or Vomiting      Vital Signs Last 24 Hrs  T(C): 37 (15 Apr 2023 04:35), Max: 37 (2023 10:55)  T(F): 98.6 (15 Apr 2023 04:35), Max: 98.6 (2023 10:55)  HR: 85 (15 Apr 2023 04:35) (66 - 85)  BP: 104/70 (15 Apr 2023 04:35) (104/57 - 109/71)  BP(mean): --  RR: 18 (15 Apr 2023 04:35) (18 - 18)  SpO2: 91% (15 Apr 2023 04:35) (91% - 99%)    Parameters below as of 2023 21:25  Patient On (Oxygen Delivery Method): room air      ICU Vital Signs Last 24 Hrs  MARY ANN CORNELL  I&O's Detail    2023 07:01  -  15 Apr 2023 07:00  --------------------------------------------------------  IN:    Oral Fluid: 240 mL  Total IN: 240 mL    OUT:    Indwelling Catheter - Urethral (mL): 2200 mL  Total OUT: 2200 mL    Total NET: -1960 mL      15 Apr 2023 07:01  -  15 Apr 2023 09:23  --------------------------------------------------------  IN:  Total IN: 0 mL    OUT:    Indwelling Catheter - Urethral (mL): 500 mL  Total OUT: 500 mL    Total NET: -500 mL        Drug Dosing Weight  MARY ANN CORNELL      PHYSICAL EXAM:  General:  alert and cooperative.  HEENT: Head; normocephalic, atraumatic.  Eyes: Pupils reactive, cornea wnl.  Neck: Supple, no nodes adenopathy, no NVD or carotid bruit or thyromegaly.  CARDIOVASCULAR: Normal S1 and S2, No murmur, rub, gallop or lift.   LUNGS: No rales, rhonchi or wheeze. Normal breath sounds bilaterally.  ABDOMEN: Soft, nontender without mass or organomegaly. bowel sounds normoactive.  EXTREMITIES: 2+ edema. Distal pulses wnl.   SKIN: warm and dry with normal turgor.  NEURO: Alert/oriented x 3/normal motor exam. No pathologic reflexes.    PSYCH: normal affect.        LABS:                        13.3   6.11  )-----------( 123      ( 15 Apr 2023 06:00 )             44.6     15    140  |  102  |  36.6<H>  ----------------------------<  169<H>  4.2   |  26.0  |  1.23    Ca    8.5      15 Apr 2023 06:00  Phos  2.9     04-15  Mg     2.2     04-15      MARY ANN CORNELL            RADIOLOGY & ADDITIONAL STUDIES:    INTERPRETATION OF TELEMETRY (personally reviewed): no events       ECHO: < from: TTE Echo Complete w/o Contrast w/ Doppler (23 @ 18:38) >   1. Technically difficult study.   2. Endocardial visualization was enhanced with intravenous echo contrast.   3. Multiple left ventricular regional wall motion abnormalities exist.   See wall motion findings.   4. Severely decreased global left ventricular systolic function.   5. Left ventricular ejection fraction, by visual estimation, is <20%.   6. Dilated cardiomyopathy.   7. Moderately reduced RV systolic function.   8. Severely enlarged left atrium.   9. Mild mitral annular calcification.  10. Mild to moderate mitral valve regurgitation.  11. The mitral in-flow pattern reveals no discernable A-wave, therefore   no comment on diastolic function can be made.  12. Sclerotic aortic valve with decreased opening.  13. Moderate tricuspid regurgitation.  14. There is no evidence of pericardial effusion.    MD Kumar Electronically signed on 4/3/2023 at 11:15:45 PM    < end of copied text >         CARDIAC CATHETERIZATION: < from: Cardiac Cath Lab - Adult (16 @ 18:03) >  VENTRICLES: LVEF 25% with MR  CORONARY VESSELS: The coronary circulation is right dominant.  LM:   --  LM: Angiography showed minor luminal irregularities with no flow  limiting lesions.  LAD:   --  LAD: Angiography showed minor luminal irregularities with no  flow limiting lesions.  CX:   --  Circumflex: Angiography showed minor luminal irregularities with  no flow limiting lesions.  RCA:   --  RCA: Angiography showed minor luminal irregularities with no  flow limiting lesions.  COMPLICATIONS: There were no complications. No complications occurred  during the cath lab visit.  DIAGNOSTIC IMPRESSIONS: Mild CAD. Non ischemic cardiomyopathy.  DIAGNOSTIC RECOMMENDATIONS: A/C. MARIELA CV The patient should continue with  the present medications.  INTERVENTIONAL IMPRESSIONS: Mild CAD. Non ischemic cardiomyopathy.  INTERVENTIONAL RECOMMENDATIONS: A/C. MARIELA CV  Prepared and signed by  Bright Muñiz MD  Signed 2016 19:26:24    < end of copied text >      ASSESSMENT AND PLAN:  In summary, MARY ANN CORNELL is an 72 year old male with a PMHx of HFrEF (30-35%) (NICM) prior [facility] 2016 mild CAD see above s/p cardiomems and ICD, afib on coumadin, HTN, HLD, DM, RLE diabetic ulcer s/p right leg amputation, CHRISS who presents for worsening SOB found to be significantly volume overloaded found to have acute on chronic heart failure exacerbation. Patient recently moved to [place] ~1 year ago and has not established care with a new cardiologist while in [place]. Pt is back in [place] temporarily as his wife  recently. Patient's hospital course complicated by JAYLENE.    Acute on Chronic Heart Failure Exacerbation/JAYLENE hx of none compliance   - Continue bumex 1mg IV BID and monitor renal panel. Renal function continues to improve. neg about 2L negative in 24hrs  - echo with reduced EF to <20% acute on chronic NICM EF ~ 25 to 30%   - daily chemistry to monitor creatinine and electrolytes  - Losartan 25 mg po daily due to HFrEF   - Nephrology following    Afib  - c/w eliquis 5mg BID  - rate controlled    HTN/HLD  - c/w coreg 25mg BID  - c/w atorvastatin 40mg daily    No need for repeat cath at this time   Out patient ischemic evaluations

## 2023-04-15 NOTE — PROGRESS NOTE ADULT - ASSESSMENT
JAYLENE: CM(EF<20%)/decompensated CHF with prerenal component  CKD(III), proteinuria +DM  Renal sono: no hydro; suboptimal visualization of L kidney  - avoid potential nephrotoxins  - daily weights, adjust diuretics as needed--> expect and accept azotemia  - awaiting 24 hr urine protein quantification  - cont Losartan; (?) SGLT-2 candidate  - follow labs    Hyperphosphatemia:  - await serum phos; may no longer need Renvela

## 2023-04-15 NOTE — PROGRESS NOTE ADULT - SUBJECTIVE AND OBJECTIVE BOX
seen for CHF, bacteremia    feels ok  some SOB but improved since admission  tolerating bipap  ros negative    MEDICATIONS  (STANDING):  apixaban 5 milliGRAM(s) Oral every 12 hours  atorvastatin 40 milliGRAM(s) Oral at bedtime  buMETAnide Injectable 1 milliGRAM(s) IV Push two times a day  carvedilol 25 milliGRAM(s) Oral every 12 hours  chlorhexidine 2% Cloths 1 Application(s) Topical daily  dextrose 5%. 1000 milliLiter(s) (50 mL/Hr) IV Continuous <Continuous>  dextrose 5%. 1000 milliLiter(s) (100 mL/Hr) IV Continuous <Continuous>  dextrose 50% Injectable 25 Gram(s) IV Push once  dextrose 50% Injectable 12.5 Gram(s) IV Push once  dextrose 50% Injectable 25 Gram(s) IV Push once  DULoxetine 60 milliGRAM(s) Oral daily  folic acid 1 milliGRAM(s) Oral daily  glucagon  Injectable 1 milliGRAM(s) IntraMuscular once  insulin glargine Injectable (LANTUS) 40 Unit(s) SubCutaneous every morning  insulin lispro (ADMELOG) corrective regimen sliding scale   SubCutaneous three times a day before meals  insulin lispro (ADMELOG) corrective regimen sliding scale   SubCutaneous at bedtime  levothyroxine 50 MICROGram(s) Oral daily  losartan 25 milliGRAM(s) Oral daily  pregabalin 150 milliGRAM(s) Oral two times a day  sevelamer carbonate 1600 milliGRAM(s) Oral three times a day with meals  tamsulosin 0.4 milliGRAM(s) Oral at bedtime    MEDICATIONS  (PRN):  acetaminophen     Tablet .. 650 milliGRAM(s) Oral every 6 hours PRN Temp greater or equal to 38C (100.4F), Mild Pain (1 - 3)  albuterol/ipratropium for Nebulization 3 milliLiter(s) Nebulizer every 6 hours PRN Bronchospasm  aluminum hydroxide/magnesium hydroxide/simethicone Suspension 30 milliLiter(s) Oral every 4 hours PRN Dyspepsia  dextrose Oral Gel 15 Gram(s) Oral once PRN Blood Glucose LESS THAN 70 milliGRAM(s)/deciliter  melatonin 3 milliGRAM(s) Oral at bedtime PRN Insomnia  methocarbamol 500 milliGRAM(s) Oral two times a day PRN for severe pain  ondansetron Injectable 4 milliGRAM(s) IV Push every 8 hours PRN Nausea and/or Vomiting      Allergies    clindamycin (Unknown)    Intolerances    allow double protein at meals (Unknown)    Vital Signs Last 24 Hrs  T(C): 36.6 (15 Apr 2023 09:38), Max: 37 (15 Apr 2023 04:35)  T(F): 97.9 (15 Apr 2023 09:38), Max: 98.6 (15 Apr 2023 04:35)  HR: 76 (15 Apr 2023 10:46) (70 - 85)  BP: 100/63 (15 Apr 2023 09:38) (100/63 - 109/71)  BP(mean): --  RR: 18 (15 Apr 2023 09:38) (18 - 18)  SpO2: 96% (15 Apr 2023 10:46) (91% - 99%)    Parameters below as of 15 Apr 2023 10:46  Patient On (Oxygen Delivery Method): room air        PHYSICAL EXAM:    GENERAL: NAD  CHEST/LUNG: Clear to ausculation bilaterally  HEART: Regular rate and rhythm; S1 S2  ABDOMEN: Soft, Nontender, Bowel sounds present  EXTREMITIES:  Lower extremity edema, Right below knee amputations, Left leg dressing clean and intact  gu wagner  NERVOUS SYSTEM:  Alert & Oriented X3 nonfocal    LABS:                        13.3   6.11  )-----------( 123      ( 15 Apr 2023 06:00 )             44.6     04-15    140  |  102  |  36.6<H>  ----------------------------<  169<H>  4.2   |  26.0  |  1.23    Ca    8.5      15 Apr 2023 06:00  Phos  2.9     04-15  Mg     2.2     04-15            CAPILLARY BLOOD GLUCOSE      POCT Blood Glucose.: 162 mg/dL (15 Apr 2023 11:56)  POCT Blood Glucose.: 141 mg/dL (15 Apr 2023 07:49)  POCT Blood Glucose.: 145 mg/dL (14 Apr 2023 21:28)  POCT Blood Glucose.: 154 mg/dL (14 Apr 2023 17:16)  POCT Blood Glucose.: 121 mg/dL (14 Apr 2023 12:53)        RADIOLOGY & ADDITIONAL TESTS:

## 2023-04-16 LAB
ALBUMIN SERPL ELPH-MCNC: 3.3 G/DL — SIGNIFICANT CHANGE UP (ref 3.3–5.2)
ALP SERPL-CCNC: 116 U/L — SIGNIFICANT CHANGE UP (ref 40–120)
ALT FLD-CCNC: 11 U/L — SIGNIFICANT CHANGE UP
ANION GAP SERPL CALC-SCNC: 11 MMOL/L — SIGNIFICANT CHANGE UP (ref 5–17)
AST SERPL-CCNC: 19 U/L — SIGNIFICANT CHANGE UP
BILIRUB SERPL-MCNC: 0.7 MG/DL — SIGNIFICANT CHANGE UP (ref 0.4–2)
BUN SERPL-MCNC: 38.2 MG/DL — HIGH (ref 8–20)
CALCIUM SERPL-MCNC: 8.5 MG/DL — SIGNIFICANT CHANGE UP (ref 8.4–10.5)
CHLORIDE SERPL-SCNC: 102 MMOL/L — SIGNIFICANT CHANGE UP (ref 96–108)
CO2 SERPL-SCNC: 27 MMOL/L — SIGNIFICANT CHANGE UP (ref 22–29)
CREAT SERPL-MCNC: 1.17 MG/DL — SIGNIFICANT CHANGE UP (ref 0.5–1.3)
EGFR: 66 ML/MIN/1.73M2 — SIGNIFICANT CHANGE UP
GLUCOSE BLDC GLUCOMTR-MCNC: 119 MG/DL — HIGH (ref 70–99)
GLUCOSE BLDC GLUCOMTR-MCNC: 135 MG/DL — HIGH (ref 70–99)
GLUCOSE BLDC GLUCOMTR-MCNC: 163 MG/DL — HIGH (ref 70–99)
GLUCOSE BLDC GLUCOMTR-MCNC: 202 MG/DL — HIGH (ref 70–99)
GLUCOSE SERPL-MCNC: 127 MG/DL — HIGH (ref 70–99)
MAGNESIUM SERPL-MCNC: 2.1 MG/DL — SIGNIFICANT CHANGE UP (ref 1.6–2.6)
PHOSPHATE SERPL-MCNC: 3 MG/DL — SIGNIFICANT CHANGE UP (ref 2.4–4.7)
POTASSIUM SERPL-MCNC: 4 MMOL/L — SIGNIFICANT CHANGE UP (ref 3.5–5.3)
POTASSIUM SERPL-SCNC: 4 MMOL/L — SIGNIFICANT CHANGE UP (ref 3.5–5.3)
PROT SERPL-MCNC: 6.1 G/DL — LOW (ref 6.6–8.7)
SODIUM SERPL-SCNC: 140 MMOL/L — SIGNIFICANT CHANGE UP (ref 135–145)

## 2023-04-16 PROCEDURE — 99232 SBSQ HOSP IP/OBS MODERATE 35: CPT

## 2023-04-16 RX ADMIN — Medication 150 MILLIGRAM(S): at 06:10

## 2023-04-16 RX ADMIN — TAMSULOSIN HYDROCHLORIDE 0.4 MILLIGRAM(S): 0.4 CAPSULE ORAL at 21:24

## 2023-04-16 RX ADMIN — Medication 4: at 11:36

## 2023-04-16 RX ADMIN — Medication 150 MILLIGRAM(S): at 17:28

## 2023-04-16 RX ADMIN — BUMETANIDE 1 MILLIGRAM(S): 0.25 INJECTION INTRAMUSCULAR; INTRAVENOUS at 06:10

## 2023-04-16 RX ADMIN — APIXABAN 5 MILLIGRAM(S): 2.5 TABLET, FILM COATED ORAL at 06:11

## 2023-04-16 RX ADMIN — BUMETANIDE 1 MILLIGRAM(S): 0.25 INJECTION INTRAMUSCULAR; INTRAVENOUS at 15:55

## 2023-04-16 RX ADMIN — Medication 50 MICROGRAM(S): at 06:11

## 2023-04-16 RX ADMIN — LOSARTAN POTASSIUM 25 MILLIGRAM(S): 100 TABLET, FILM COATED ORAL at 06:11

## 2023-04-16 RX ADMIN — DULOXETINE HYDROCHLORIDE 60 MILLIGRAM(S): 30 CAPSULE, DELAYED RELEASE ORAL at 11:35

## 2023-04-16 RX ADMIN — ATORVASTATIN CALCIUM 40 MILLIGRAM(S): 80 TABLET, FILM COATED ORAL at 21:25

## 2023-04-16 RX ADMIN — CARVEDILOL PHOSPHATE 25 MILLIGRAM(S): 80 CAPSULE, EXTENDED RELEASE ORAL at 06:11

## 2023-04-16 RX ADMIN — APIXABAN 5 MILLIGRAM(S): 2.5 TABLET, FILM COATED ORAL at 17:29

## 2023-04-16 RX ADMIN — Medication 1 MILLIGRAM(S): at 11:35

## 2023-04-16 RX ADMIN — INSULIN GLARGINE 40 UNIT(S): 100 INJECTION, SOLUTION SUBCUTANEOUS at 08:20

## 2023-04-16 RX ADMIN — CARVEDILOL PHOSPHATE 25 MILLIGRAM(S): 80 CAPSULE, EXTENDED RELEASE ORAL at 17:29

## 2023-04-16 RX ADMIN — Medication 2: at 17:28

## 2023-04-16 NOTE — PROGRESS NOTE ADULT - SUBJECTIVE AND OBJECTIVE BOX
[facility] - [facility], THE HEART CENTER                                   [address] [place] [address]                                                      PHONE: [phone]                                                         FAX: [phone]  [email or web address]  ---------------------------------------------------------------------------------------------------------------------------------    Overnight events/patient complaints:  PT feels well no complaints    allow double protein at meals (Unknown)  clindamycin (Unknown)    MEDICATIONS  (STANDING):  apixaban 5 milliGRAM(s) Oral every 12 hours  atorvastatin 40 milliGRAM(s) Oral at bedtime  buMETAnide Injectable 1 milliGRAM(s) IV Push two times a day  carvedilol 25 milliGRAM(s) Oral every 12 hours  chlorhexidine 2% Cloths 1 Application(s) Topical daily  dextrose 5%. 1000 milliLiter(s) (50 mL/Hr) IV Continuous <Continuous>  dextrose 5%. 1000 milliLiter(s) (100 mL/Hr) IV Continuous <Continuous>  dextrose 50% Injectable 25 Gram(s) IV Push once  dextrose 50% Injectable 12.5 Gram(s) IV Push once  dextrose 50% Injectable 25 Gram(s) IV Push once  DULoxetine 60 milliGRAM(s) Oral daily  folic acid 1 milliGRAM(s) Oral daily  glucagon  Injectable 1 milliGRAM(s) IntraMuscular once  insulin glargine Injectable (LANTUS) 40 Unit(s) SubCutaneous every morning  insulin lispro (ADMELOG) corrective regimen sliding scale   SubCutaneous three times a day before meals  insulin lispro (ADMELOG) corrective regimen sliding scale   SubCutaneous at bedtime  levothyroxine 50 MICROGram(s) Oral daily  losartan 25 milliGRAM(s) Oral daily  pregabalin 150 milliGRAM(s) Oral two times a day  tamsulosin 0.4 milliGRAM(s) Oral at bedtime    MEDICATIONS  (PRN):  acetaminophen     Tablet .. 650 milliGRAM(s) Oral every 6 hours PRN Temp greater or equal to 38C (100.4F), Mild Pain (1 - 3)  albuterol/ipratropium for Nebulization 3 milliLiter(s) Nebulizer every 6 hours PRN Bronchospasm  aluminum hydroxide/magnesium hydroxide/simethicone Suspension 30 milliLiter(s) Oral every 4 hours PRN Dyspepsia  dextrose Oral Gel 15 Gram(s) Oral once PRN Blood Glucose LESS THAN 70 milliGRAM(s)/deciliter  melatonin 3 milliGRAM(s) Oral at bedtime PRN Insomnia  methocarbamol 500 milliGRAM(s) Oral two times a day PRN for severe pain  ondansetron Injectable 4 milliGRAM(s) IV Push every 8 hours PRN Nausea and/or Vomiting      Vital Signs Last 24 Hrs  T(C): 36.5 (2023 09:53), Max: 36.6 (15 Apr 2023 21:11)  T(F): 97.7 (2023 09:53), Max: 97.8 (15 Apr 2023 21:11)  HR: 86 (2023 09:53) (60 - 86)  BP: 100/63 (2023 09:53) (89/68 - 120/73)  BP(mean): --  RR: 18 (2023 09:53) (18 - 18)  SpO2: 95% (2023 09:53) (94% - 100%)    Parameters below as of 2023 09:53  Patient On (Oxygen Delivery Method): room air      ICU Vital Signs Last 24 Hrs  MARY ANN CORNELL  I&O's Detail    15 Apr 2023 07:01  -  2023 07:00  --------------------------------------------------------  IN:    Oral Fluid: 360 mL  Total IN: 360 mL    OUT:    Indwelling Catheter - Urethral (mL): 2825 mL  Total OUT: 2825 mL    Total NET: -2465 mL        Drug Dosing Weight  MARY ANN CORNELL      PHYSICAL EXAM:  General:  alert and cooperative.  HEENT: Head; normocephalic, atraumatic.  Eyes: Pupils reactive, cornea wnl.  Neck: Supple, no nodes adenopathy, no NVD or carotid bruit or thyromegaly.  CARDIOVASCULAR: Normal S1 and S2, No murmur, rub, gallop or lift.   LUNGS: No rales, rhonchi or wheeze. Normal breath sounds bilaterally.  ABDOMEN: Soft, nontender without mass or organomegaly. bowel sounds normoactive.  EXTREMITIES: 1+ edema. Distal pulses wnl.   SKIN: warm and dry with normal turgor.  NEURO: Alert/oriented x 3/normal motor exam. No pathologic reflexes.    PSYCH: normal affect.        LABS:                        13.3   6.11  )-----------( 123      ( 15 Apr 2023 06:00 )             44.6     04-16    140  |  102  |  38.2<H>  ----------------------------<  127<H>  4.0   |  27.0  |  1.17    Ca    8.5      2023 06:53  Phos  3.0     04-16  Mg     2.1     04-16    TPro  6.1<L>  /  Alb  3.3  /  TBili  0.7  /  DBili  x   /  AST  19  /  ALT  11  /  AlkPhos  116  04-16    MARY ANN CORNELL            RADIOLOGY & ADDITIONAL STUDIES:    INTERPRETATION OF TELEMETRY (personally reviewed): no events         ECHO: < from: TTE Echo Complete w/o Contrast w/ Doppler (23 @ 18:38) >   1. Technically difficult study.   2. Endocardial visualization was enhanced with intravenous echo contrast.   3. Multiple left ventricular regional wall motion abnormalities exist.   See wall motion findings.   4. Severely decreased global left ventricular systolic function.   5. Left ventricular ejection fraction, by visual estimation, is <20%.   6. Dilated cardiomyopathy.   7. Moderately reduced RV systolic function.   8. Severely enlarged left atrium.   9. Mild mitral annular calcification.  10. Mild to moderate mitral valve regurgitation.  11. The mitral in-flow pattern reveals no discernable A-wave, therefore   no comment on diastolic function can be made.  12. Sclerotic aortic valve with decreased opening.  13. Moderate tricuspid regurgitation.  14. There is no evidence of pericardial effusion.    MD Kumar Electronically signed on 4/3/2023 at 11:15:45 PM    < end of copied text >         CARDIAC CATHETERIZATION: < from: Cardiac Cath Lab - Adult (16 @ 18:03) >  VENTRICLES: LVEF 25% with MR  CORONARY VESSELS: The coronary circulation is right dominant.  LM:   --  LM: Angiography showed minor luminal irregularities with no flow  limiting lesions.  LAD:   --  LAD: Angiography showed minor luminal irregularities with no  flow limiting lesions.  CX:   --  Circumflex: Angiography showed minor luminal irregularities with  no flow limiting lesions.  RCA:   --  RCA: Angiography showed minor luminal irregularities with no  flow limiting lesions.  COMPLICATIONS: There were no complications. No complications occurred  during the cath lab visit.  DIAGNOSTIC IMPRESSIONS: Mild CAD. Non ischemic cardiomyopathy.  DIAGNOSTIC RECOMMENDATIONS: A/C. MARIELA CV The patient should continue with  the present medications.  INTERVENTIONAL IMPRESSIONS: Mild CAD. Non ischemic cardiomyopathy.  INTERVENTIONAL RECOMMENDATIONS: A/C. MARIELA CV  Prepared and signed by  Bright Muñiz MD  Signed 2016 19:26:24    < end of copied text >      ASSESSMENT AND PLAN:  In summary, MARY ANN CORNELL is an 72 year old male with a PMHx of HFrEF (30-35%) (NICM) prior [facility]  mild CAD see above s/p cardiomems and ICD, afib on coumadin, HTN, HLD, DM, RLE diabetic ulcer s/p right leg amputation, CHRISS who presents for worsening SOB found to be significantly volume overloaded found to have acute on chronic heart failure exacerbation. Patient recently moved to [place] ~1 year ago and has not established care with a new cardiologist while in [place]. Pt is back in [place] temporarily as his wife  recently. Patient's hospital course complicated by JAYLENE.    Acute on Chronic Heart Failure Exacerbation/JAYLENE hx of none compliance   - Continue bumex 1mg IV BID and monitor renal panel. Renal function continues to improve. neg about 2L negative again in 24hrs  - echo with reduced EF to <20% acute on chronic NICM EF ~ 25 to 30%   - daily chemistry to monitor creatinine and electrolytes  - Losartan 25 mg po daily due to HFrEF   - Nephrology following    Afib  - c/w eliquis 5mg BID  - rate controlled    HTN/HLD  - c/w coreg 25mg BID  - c/w atorvastatin 40mg daily    No need for repeat cath at this time   Out patient ischemic evaluations

## 2023-04-16 NOTE — PROGRESS NOTE ADULT - SUBJECTIVE AND OBJECTIVE BOX
seen for HF, bacteremia    feels well  sob improved  ros negative     MEDICATIONS  (STANDING):  apixaban 5 milliGRAM(s) Oral every 12 hours  atorvastatin 40 milliGRAM(s) Oral at bedtime  buMETAnide Injectable 1 milliGRAM(s) IV Push two times a day  carvedilol 25 milliGRAM(s) Oral every 12 hours  chlorhexidine 2% Cloths 1 Application(s) Topical daily  dextrose 5%. 1000 milliLiter(s) (100 mL/Hr) IV Continuous <Continuous>  dextrose 5%. 1000 milliLiter(s) (50 mL/Hr) IV Continuous <Continuous>  dextrose 50% Injectable 25 Gram(s) IV Push once  dextrose 50% Injectable 12.5 Gram(s) IV Push once  dextrose 50% Injectable 25 Gram(s) IV Push once  DULoxetine 60 milliGRAM(s) Oral daily  folic acid 1 milliGRAM(s) Oral daily  glucagon  Injectable 1 milliGRAM(s) IntraMuscular once  insulin glargine Injectable (LANTUS) 40 Unit(s) SubCutaneous every morning  insulin lispro (ADMELOG) corrective regimen sliding scale   SubCutaneous three times a day before meals  insulin lispro (ADMELOG) corrective regimen sliding scale   SubCutaneous at bedtime  levothyroxine 50 MICROGram(s) Oral daily  losartan 25 milliGRAM(s) Oral daily  pregabalin 150 milliGRAM(s) Oral two times a day  tamsulosin 0.4 milliGRAM(s) Oral at bedtime    MEDICATIONS  (PRN):  acetaminophen     Tablet .. 650 milliGRAM(s) Oral every 6 hours PRN Temp greater or equal to 38C (100.4F), Mild Pain (1 - 3)  albuterol/ipratropium for Nebulization 3 milliLiter(s) Nebulizer every 6 hours PRN Bronchospasm  aluminum hydroxide/magnesium hydroxide/simethicone Suspension 30 milliLiter(s) Oral every 4 hours PRN Dyspepsia  dextrose Oral Gel 15 Gram(s) Oral once PRN Blood Glucose LESS THAN 70 milliGRAM(s)/deciliter  melatonin 3 milliGRAM(s) Oral at bedtime PRN Insomnia  methocarbamol 500 milliGRAM(s) Oral two times a day PRN for severe pain  ondansetron Injectable 4 milliGRAM(s) IV Push every 8 hours PRN Nausea and/or Vomiting      Allergies    clindamycin (Unknown)    Intolerances    allow double protein at meals (Unknown)      Vital Signs Last 24 Hrs  T(C): 36.5 (16 Apr 2023 09:53), Max: 36.6 (15 Apr 2023 21:11)  T(F): 97.7 (16 Apr 2023 09:53), Max: 97.8 (15 Apr 2023 21:11)  HR: 86 (16 Apr 2023 09:53) (60 - 86)  BP: 100/63 (16 Apr 2023 09:53) (89/68 - 120/73)  BP(mean): --  RR: 18 (16 Apr 2023 09:53) (18 - 18)  SpO2: 95% (16 Apr 2023 09:53) (94% - 100%)    Parameters below as of 16 Apr 2023 09:53  Patient On (Oxygen Delivery Method): room air        PHYSICAL EXAM:    GENERAL: NAD  CHEST/LUNG: Clear to ausculation bilaterally  HEART: Regular rate and rhythm; S1 S2  ABDOMEN: Soft, Nondistended; Bowel sounds present  EXTREMITIES: left leg bandaged     LABS:                        13.3   6.11  )-----------( 123      ( 15 Apr 2023 06:00 )             44.6     04-16    140  |  102  |  38.2<H>  ----------------------------<  127<H>  4.0   |  27.0  |  1.17    Ca    8.5      16 Apr 2023 06:53  Phos  3.0     04-16  Mg     2.1     04-16    TPro  6.1<L>  /  Alb  3.3  /  TBili  0.7  /  DBili  x   /  AST  19  /  ALT  11  /  AlkPhos  116  04-16          CAPILLARY BLOOD GLUCOSE      POCT Blood Glucose.: 202 mg/dL (16 Apr 2023 11:33)  POCT Blood Glucose.: 135 mg/dL (16 Apr 2023 08:17)  POCT Blood Glucose.: 110 mg/dL (15 Apr 2023 21:10)  POCT Blood Glucose.: 158 mg/dL (15 Apr 2023 16:56)        RADIOLOGY & ADDITIONAL TESTS:

## 2023-04-16 NOTE — PROGRESS NOTE ADULT - SUBJECTIVE AND OBJECTIVE BOX
NEPHROLOGY INTERVAL HPI/OVERNIGHT EVENTS:  pt had uneventful overnight  no acute distress apprecaited    MEDICATIONS  (STANDING):  apixaban 5 milliGRAM(s) Oral every 12 hours  atorvastatin 40 milliGRAM(s) Oral at bedtime  buMETAnide Injectable 1 milliGRAM(s) IV Push two times a day  carvedilol 25 milliGRAM(s) Oral every 12 hours  chlorhexidine 2% Cloths 1 Application(s) Topical daily  dextrose 5%. 1000 milliLiter(s) (50 mL/Hr) IV Continuous <Continuous>  dextrose 5%. 1000 milliLiter(s) (100 mL/Hr) IV Continuous <Continuous>  dextrose 50% Injectable 25 Gram(s) IV Push once  dextrose 50% Injectable 12.5 Gram(s) IV Push once  dextrose 50% Injectable 25 Gram(s) IV Push once  DULoxetine 60 milliGRAM(s) Oral daily  folic acid 1 milliGRAM(s) Oral daily  glucagon  Injectable 1 milliGRAM(s) IntraMuscular once  insulin glargine Injectable (LANTUS) 40 Unit(s) SubCutaneous every morning  insulin lispro (ADMELOG) corrective regimen sliding scale   SubCutaneous three times a day before meals  insulin lispro (ADMELOG) corrective regimen sliding scale   SubCutaneous at bedtime  levothyroxine 50 MICROGram(s) Oral daily  losartan 25 milliGRAM(s) Oral daily  pregabalin 150 milliGRAM(s) Oral two times a day  sevelamer carbonate 1600 milliGRAM(s) Oral three times a day with meals  tamsulosin 0.4 milliGRAM(s) Oral at bedtime    MEDICATIONS  (PRN):  acetaminophen     Tablet .. 650 milliGRAM(s) Oral every 6 hours PRN Temp greater or equal to 38C (100.4F), Mild Pain (1 - 3)  albuterol/ipratropium for Nebulization 3 milliLiter(s) Nebulizer every 6 hours PRN Bronchospasm  aluminum hydroxide/magnesium hydroxide/simethicone Suspension 30 milliLiter(s) Oral every 4 hours PRN Dyspepsia  dextrose Oral Gel 15 Gram(s) Oral once PRN Blood Glucose LESS THAN 70 milliGRAM(s)/deciliter  melatonin 3 milliGRAM(s) Oral at bedtime PRN Insomnia  methocarbamol 500 milliGRAM(s) Oral two times a day PRN for severe pain  ondansetron Injectable 4 milliGRAM(s) IV Push every 8 hours PRN Nausea and/or Vomiting      Allergies    clindamycin (Unknown)          Vital Signs Last 24 Hrs  T(C): 36.5 (16 Apr 2023 04:44), Max: 36.6 (15 Apr 2023 09:38)  T(F): 97.7 (16 Apr 2023 04:44), Max: 97.9 (15 Apr 2023 09:38)  HR: 66 (16 Apr 2023 04:44) (60 - 82)  BP: 109/74 (16 Apr 2023 04:44) (89/68 - 120/73)  BP(mean): --  RR: 18 (16 Apr 2023 04:44) (18 - 18)  SpO2: 100% (16 Apr 2023 04:44) (94% - 100%)    Parameters below as of 16 Apr 2023 04:44  Patient On (Oxygen Delivery Method): room air        PHYSICAL EXAM:  GENERAL: Comfortable  NECK: Supple, No JVD  NERVOUS SYSTEM:  A/O x3, non focal  CHEST: Clear, diminished BS  HEART: No rub   ABDOMEN: Soft, NT/ND BS+  EXTREMITIES:  + dependent edema improved  : + wagner     LABS:                        13.3   6.11  )-----------( 123      ( 15 Apr 2023 06:00 )             44.6     04-15    140  |  102  |  36.6<H>  ----------------------------<  169<H>  4.2   |  26.0  |  1.23    Ca    8.5      15 Apr 2023 06:00  Phos  2.9     04-15  Mg     2.2     04-15              RADIOLOGY & ADDITIONAL TESTS:  < from: US Renal (04.05.23 @ 23:01) >    ACC: [id number] EXAM:  US KIDNEY(S)   ORDERED BY: CONNER JACQUES     PROCEDURE DATE:  04/05/2023          INTERPRETATION:  CLINICAL INFORMATION: Acute renal failure    COMPARISON: CT abdomen pelvis performed on the same day.    TECHNIQUE: Sonographyof the kidneys and bladder.    FINDINGS:  Right kidney: 10.5 cm. No hydronephrosis or shadowing stone.    Left kidney: 10.7 cm. No hydronephrosis. There is a 0.4 cm echogenic   focus in the lower pole. Upper pole is poorly visualized.    Urinary bladder: Not visualized.    IMPRESSION:  Suboptimal visualization of the left kidney. There is a 0.4 cm echogenic   focus in the lower pole, likely a nonobstructing stone.    No hydronephrosis.    < end of copied text >   NEPHROLOGY INTERVAL HPI/OVERNIGHT EVENTS:  pt had uneventful overnight  no acute distress appreciated, resting comfortably    MEDICATIONS  (STANDING):  apixaban 5 milliGRAM(s) Oral every 12 hours  atorvastatin 40 milliGRAM(s) Oral at bedtime  buMETAnide Injectable 1 milliGRAM(s) IV Push two times a day  carvedilol 25 milliGRAM(s) Oral every 12 hours  chlorhexidine 2% Cloths 1 Application(s) Topical daily  dextrose 5%. 1000 milliLiter(s) (50 mL/Hr) IV Continuous <Continuous>  dextrose 5%. 1000 milliLiter(s) (100 mL/Hr) IV Continuous <Continuous>  dextrose 50% Injectable 25 Gram(s) IV Push once  dextrose 50% Injectable 12.5 Gram(s) IV Push once  dextrose 50% Injectable 25 Gram(s) IV Push once  DULoxetine 60 milliGRAM(s) Oral daily  folic acid 1 milliGRAM(s) Oral daily  glucagon  Injectable 1 milliGRAM(s) IntraMuscular once  insulin glargine Injectable (LANTUS) 40 Unit(s) SubCutaneous every morning  insulin lispro (ADMELOG) corrective regimen sliding scale   SubCutaneous three times a day before meals  insulin lispro (ADMELOG) corrective regimen sliding scale   SubCutaneous at bedtime  levothyroxine 50 MICROGram(s) Oral daily  losartan 25 milliGRAM(s) Oral daily  pregabalin 150 milliGRAM(s) Oral two times a day  sevelamer carbonate 1600 milliGRAM(s) Oral three times a day with meals  tamsulosin 0.4 milliGRAM(s) Oral at bedtime    MEDICATIONS  (PRN):  acetaminophen     Tablet .. 650 milliGRAM(s) Oral every 6 hours PRN Temp greater or equal to 38C (100.4F), Mild Pain (1 - 3)  albuterol/ipratropium for Nebulization 3 milliLiter(s) Nebulizer every 6 hours PRN Bronchospasm  aluminum hydroxide/magnesium hydroxide/simethicone Suspension 30 milliLiter(s) Oral every 4 hours PRN Dyspepsia  dextrose Oral Gel 15 Gram(s) Oral once PRN Blood Glucose LESS THAN 70 milliGRAM(s)/deciliter  melatonin 3 milliGRAM(s) Oral at bedtime PRN Insomnia  methocarbamol 500 milliGRAM(s) Oral two times a day PRN for severe pain  ondansetron Injectable 4 milliGRAM(s) IV Push every 8 hours PRN Nausea and/or Vomiting      Allergies    clindamycin (Unknown)          Vital Signs Last 24 Hrs  T(C): 36.5 (16 Apr 2023 04:44), Max: 36.6 (15 Apr 2023 09:38)  T(F): 97.7 (16 Apr 2023 04:44), Max: 97.9 (15 Apr 2023 09:38)  HR: 66 (16 Apr 2023 04:44) (60 - 82)  BP: 109/74 (16 Apr 2023 04:44) (89/68 - 120/73)  BP(mean): --  RR: 18 (16 Apr 2023 04:44) (18 - 18)  SpO2: 100% (16 Apr 2023 04:44) (94% - 100%)    Parameters below as of 16 Apr 2023 04:44  Patient On (Oxygen Delivery Method): room air        PHYSICAL EXAM:  GENERAL: Comfortable  NECK: Supple, No JVD  NERVOUS SYSTEM:  A/O x3, non focal  CHEST: Clear, diminished BS  HEART: No rub   ABDOMEN: Soft, NT/ND BS+  EXTREMITIES:  + dependent edema improved  : + wagner     LABS:                        13.3   6.11  )-----------( 123      ( 15 Apr 2023 06:00 )             44.6     04-15    140  |  102  |  36.6<H>  ----------------------------<  169<H>  4.2   |  26.0  |  1.23    Ca    8.5      15 Apr 2023 06:00  Phos  2.9     04-15  Mg     2.2     04-15              RADIOLOGY & ADDITIONAL TESTS:  < from: US Renal (04.05.23 @ 23:01) >    ACC: [id number] EXAM:  US KIDNEY(S)   ORDERED BY: CONNER JACQUES     PROCEDURE DATE:  04/05/2023          INTERPRETATION:  CLINICAL INFORMATION: Acute renal failure    COMPARISON: CT abdomen pelvis performed on the same day.    TECHNIQUE: Sonographyof the kidneys and bladder.    FINDINGS:  Right kidney: 10.5 cm. No hydronephrosis or shadowing stone.    Left kidney: 10.7 cm. No hydronephrosis. There is a 0.4 cm echogenic   focus in the lower pole. Upper pole is poorly visualized.    Urinary bladder: Not visualized.    IMPRESSION:  Suboptimal visualization of the left kidney. There is a 0.4 cm echogenic   focus in the lower pole, likely a nonobstructing stone.    No hydronephrosis.    < end of copied text >

## 2023-04-16 NOTE — PROGRESS NOTE ADULT - ASSESSMENT
CKD(III), proteinuria +DM  JAYLENE: CM (EF<20%)/decompensated CHF with prerenal component  Renal sono: no hydro; suboptimal visualization of L kidney  - avoid potential nephrotoxins  - cont diuretics--> consider transition to PO dosing   - cont Losartan;  - (?) SGLT-2 candidate  - - awaiting 24 hr urine protein  - follow labs    Hyperphosphatemia: phos low normal  - will d/c Renvela and observe

## 2023-04-16 NOTE — PROGRESS NOTE ADULT - ASSESSMENT
72M with a history of cardiomyopathy with AICD, atrial fibrillation, diabetes, hypertension, chronic kidney disease, prostate cancer, and obstructive sleep apnea who presented with a two week history of dyspnea found to have heart failure.   Diuretics were initiated and a Red catheter was placed to monitor response. Blood cultures were also noted to grow Ecoli for which intravenous antibiotics were initiated. Echocardiogram noted severely decreased left ventricular systolic function. Intravenous diuretic therapy was continued while monitoring renal functions.    Acute hypoxic respiratory failure / Acute on chronic systolic heart failure - Echocardiogram noted severely decreased left ventricular systolic function. On bumetanide for diuresis. On carvedilol and losartan.    Ecoli bacteremia - Currently afebrile and without leukocytosis. Repeat blood cultures (4/4/23) were without growth. For completion of the antibiotics course till 4/14.    Chronic kidney disease III with acute kidney injury / History of benign prostatic hypertrophy - Renal function improved. Red catheter in place. Continue on tamsulosin. Monitoring renal function and urine output while on bumetanide. For potential trial of void once more mobile.    Atrial fibrillation - On carvedilol. Apixaban for anticoagulation.    Thrombocytopenia - Continue to monitor platelet count. No bleeding noted on examination.    Diabetes - Insulin coverage, close monitoring of blood glucose levels. On pregabalin for neuropathy.    Hypothyroidism - On levothyroxine.    Obstructive sleep apnea - On nocturnal Bipap.    Previously discussed with Cardiology, the patient is thought to be at increased risk for air travel at this time. Will required further management after discharge from the hospital for further optimization. Would benefit from treatment at a rehabilitation facility.    pending [facility]

## 2023-04-17 LAB
ANION GAP SERPL CALC-SCNC: 9 MMOL/L — SIGNIFICANT CHANGE UP (ref 5–17)
BUN SERPL-MCNC: 37.9 MG/DL — HIGH (ref 8–20)
CALCIUM SERPL-MCNC: 8.4 MG/DL — SIGNIFICANT CHANGE UP (ref 8.4–10.5)
CHLORIDE SERPL-SCNC: 107 MMOL/L — SIGNIFICANT CHANGE UP (ref 96–108)
CO2 SERPL-SCNC: 26 MMOL/L — SIGNIFICANT CHANGE UP (ref 22–29)
CREAT SERPL-MCNC: 1.09 MG/DL — SIGNIFICANT CHANGE UP (ref 0.5–1.3)
EGFR: 72 ML/MIN/1.73M2 — SIGNIFICANT CHANGE UP
GLUCOSE BLDC GLUCOMTR-MCNC: 117 MG/DL — HIGH (ref 70–99)
GLUCOSE BLDC GLUCOMTR-MCNC: 131 MG/DL — HIGH (ref 70–99)
GLUCOSE BLDC GLUCOMTR-MCNC: 183 MG/DL — HIGH (ref 70–99)
GLUCOSE BLDC GLUCOMTR-MCNC: 97 MG/DL — SIGNIFICANT CHANGE UP (ref 70–99)
GLUCOSE SERPL-MCNC: 106 MG/DL — HIGH (ref 70–99)
HCT VFR BLD CALC: 46.1 % — SIGNIFICANT CHANGE UP (ref 39–50)
HGB BLD-MCNC: 13.4 G/DL — SIGNIFICANT CHANGE UP (ref 13–17)
MAGNESIUM SERPL-MCNC: 2.1 MG/DL — SIGNIFICANT CHANGE UP (ref 1.6–2.6)
MCHC RBC-ENTMCNC: 23.9 PG — LOW (ref 27–34)
MCHC RBC-ENTMCNC: 29.1 GM/DL — LOW (ref 32–36)
MCV RBC AUTO: 82.3 FL — SIGNIFICANT CHANGE UP (ref 80–100)
PLATELET # BLD AUTO: 93 K/UL — LOW (ref 150–400)
POTASSIUM SERPL-MCNC: 4.6 MMOL/L — SIGNIFICANT CHANGE UP (ref 3.5–5.3)
POTASSIUM SERPL-SCNC: 4.6 MMOL/L — SIGNIFICANT CHANGE UP (ref 3.5–5.3)
RBC # BLD: 5.6 M/UL — SIGNIFICANT CHANGE UP (ref 4.2–5.8)
RBC # FLD: 20.2 % — HIGH (ref 10.3–14.5)
SARS-COV-2 RNA SPEC QL NAA+PROBE: SIGNIFICANT CHANGE UP
SODIUM SERPL-SCNC: 142 MMOL/L — SIGNIFICANT CHANGE UP (ref 135–145)
WBC # BLD: 5.58 K/UL — SIGNIFICANT CHANGE UP (ref 3.8–10.5)
WBC # FLD AUTO: 5.58 K/UL — SIGNIFICANT CHANGE UP (ref 3.8–10.5)

## 2023-04-17 PROCEDURE — 99232 SBSQ HOSP IP/OBS MODERATE 35: CPT

## 2023-04-17 RX ORDER — DAPAGLIFLOZIN 10 MG/1
10 TABLET, FILM COATED ORAL EVERY 24 HOURS
Refills: 0 | Status: DISCONTINUED | OUTPATIENT
Start: 2023-04-17 | End: 2023-04-18

## 2023-04-17 RX ADMIN — Medication 30 MILLIGRAM(S): at 17:24

## 2023-04-17 RX ADMIN — BUMETANIDE 1 MILLIGRAM(S): 0.25 INJECTION INTRAMUSCULAR; INTRAVENOUS at 05:39

## 2023-04-17 RX ADMIN — DAPAGLIFLOZIN 10 MILLIGRAM(S): 10 TABLET, FILM COATED ORAL at 17:24

## 2023-04-17 RX ADMIN — Medication 50 MICROGRAM(S): at 05:40

## 2023-04-17 RX ADMIN — ATORVASTATIN CALCIUM 40 MILLIGRAM(S): 80 TABLET, FILM COATED ORAL at 21:57

## 2023-04-17 RX ADMIN — APIXABAN 5 MILLIGRAM(S): 2.5 TABLET, FILM COATED ORAL at 17:24

## 2023-04-17 RX ADMIN — Medication 2: at 17:27

## 2023-04-17 RX ADMIN — LOSARTAN POTASSIUM 25 MILLIGRAM(S): 100 TABLET, FILM COATED ORAL at 05:40

## 2023-04-17 RX ADMIN — CARVEDILOL PHOSPHATE 25 MILLIGRAM(S): 80 CAPSULE, EXTENDED RELEASE ORAL at 17:24

## 2023-04-17 RX ADMIN — Medication 1 MILLIGRAM(S): at 12:09

## 2023-04-17 RX ADMIN — CARVEDILOL PHOSPHATE 25 MILLIGRAM(S): 80 CAPSULE, EXTENDED RELEASE ORAL at 05:40

## 2023-04-17 RX ADMIN — Medication 650 MILLIGRAM(S): at 16:05

## 2023-04-17 RX ADMIN — TAMSULOSIN HYDROCHLORIDE 0.4 MILLIGRAM(S): 0.4 CAPSULE ORAL at 21:57

## 2023-04-17 RX ADMIN — Medication 150 MILLIGRAM(S): at 17:25

## 2023-04-17 RX ADMIN — DULOXETINE HYDROCHLORIDE 60 MILLIGRAM(S): 30 CAPSULE, DELAYED RELEASE ORAL at 12:09

## 2023-04-17 RX ADMIN — Medication 150 MILLIGRAM(S): at 05:40

## 2023-04-17 RX ADMIN — APIXABAN 5 MILLIGRAM(S): 2.5 TABLET, FILM COATED ORAL at 05:40

## 2023-04-17 RX ADMIN — Medication 650 MILLIGRAM(S): at 15:05

## 2023-04-17 RX ADMIN — INSULIN GLARGINE 40 UNIT(S): 100 INJECTION, SOLUTION SUBCUTANEOUS at 08:21

## 2023-04-17 NOTE — PROGRESS NOTE ADULT - ASSESSMENT
72M with a history of cardiomyopathy with AICD, atrial fibrillation, diabetes, hypertension, chronic kidney disease, prostate cancer, and obstructive sleep apnea who presented with a two week history of dyspnea found to have heart failure.   Diuretics were initiated and a Red catheter was placed to monitor response. Blood cultures were also noted to grow Ecoli for which intravenous antibiotics were initiated. Echocardiogram noted severely decreased left ventricular systolic function. Intravenous diuretic therapy was continued while monitoring renal functions.    Acute hypoxic respiratory failure / Acute on chronic systolic heart failure - Echocardiogram noted severely decreased left ventricular systolic function. s/pbumetanide for diuresis, now on torsemide. On carvedilol and losartan. farxiga.  consider entresto instead of losartan    Ecoli bacteremia - Currently afebrile and without leukocytosis. Repeat blood cultures (4/4/23) were without growth. For completion of the antibiotics course till 4/14.    Chronic kidney disease III with acute kidney injury / History of benign prostatic hypertrophy - Renal function improved. Red catheter in place. Continue on tamsulosin. Monitoring renal function and urine output while on bumetanide. For potential trial of void once more mobile.    Atrial fibrillation - On carvedilol. Apixaban for anticoagulation.    Thrombocytopenia - Continue to monitor platelet count. No bleeding noted on examination.    Diabetes - Insulin coverage, close monitoring of blood glucose levels. On pregabalin for neuropathy.    Hypothyroidism - On levothyroxine.    Obstructive sleep apnea - On nocturnal Bipap.    Previously discussed with Cardiology, the patient is thought to be at increased risk for air travel at this time. Will required further management after discharge from the hospital for further optimization. Would benefit from treatment at a rehabilitation facility.    pending [facility]

## 2023-04-17 NOTE — PROGRESS NOTE ADULT - SUBJECTIVE AND OBJECTIVE BOX
seen for HF    feels well  no acute complaints  ros negative    MEDICATIONS  (STANDING):  apixaban 5 milliGRAM(s) Oral every 12 hours  atorvastatin 40 milliGRAM(s) Oral at bedtime  carvedilol 25 milliGRAM(s) Oral every 12 hours  chlorhexidine 2% Cloths 1 Application(s) Topical daily  dapagliflozin 10 milliGRAM(s) Oral every 24 hours  dextrose 5%. 1000 milliLiter(s) (50 mL/Hr) IV Continuous <Continuous>  dextrose 5%. 1000 milliLiter(s) (100 mL/Hr) IV Continuous <Continuous>  dextrose 50% Injectable 25 Gram(s) IV Push once  dextrose 50% Injectable 12.5 Gram(s) IV Push once  dextrose 50% Injectable 25 Gram(s) IV Push once  DULoxetine 60 milliGRAM(s) Oral daily  folic acid 1 milliGRAM(s) Oral daily  glucagon  Injectable 1 milliGRAM(s) IntraMuscular once  insulin glargine Injectable (LANTUS) 40 Unit(s) SubCutaneous every morning  insulin lispro (ADMELOG) corrective regimen sliding scale   SubCutaneous three times a day before meals  insulin lispro (ADMELOG) corrective regimen sliding scale   SubCutaneous at bedtime  levothyroxine 50 MICROGram(s) Oral daily  losartan 25 milliGRAM(s) Oral daily  pregabalin 150 milliGRAM(s) Oral two times a day  tamsulosin 0.4 milliGRAM(s) Oral at bedtime  torsemide 30 milliGRAM(s) Oral two times a day    MEDICATIONS  (PRN):  acetaminophen     Tablet .. 650 milliGRAM(s) Oral every 6 hours PRN Temp greater or equal to 38C (100.4F), Mild Pain (1 - 3)  albuterol/ipratropium for Nebulization 3 milliLiter(s) Nebulizer every 6 hours PRN Bronchospasm  aluminum hydroxide/magnesium hydroxide/simethicone Suspension 30 milliLiter(s) Oral every 4 hours PRN Dyspepsia  dextrose Oral Gel 15 Gram(s) Oral once PRN Blood Glucose LESS THAN 70 milliGRAM(s)/deciliter  melatonin 3 milliGRAM(s) Oral at bedtime PRN Insomnia  methocarbamol 500 milliGRAM(s) Oral two times a day PRN for severe pain  ondansetron Injectable 4 milliGRAM(s) IV Push every 8 hours PRN Nausea and/or Vomiting      Allergies    clindamycin (Unknown)    Intolerances    allow double protein at meals (Unknown)      Vital Signs Last 24 Hrs  T(C): 36.4 (17 Apr 2023 10:08), Max: 36.5 (17 Apr 2023 05:19)  T(F): 97.6 (17 Apr 2023 10:08), Max: 97.7 (17 Apr 2023 05:19)  HR: 63 (17 Apr 2023 10:08) (63 - 80)  BP: 115/77 (17 Apr 2023 10:08) (101/66 - 115/77)  BP(mean): --  RR: 18 (17 Apr 2023 10:08) (18 - 18)  SpO2: 95% (17 Apr 2023 10:08) (95% - 99%)    Parameters below as of 17 Apr 2023 10:08  Patient On (Oxygen Delivery Method): nasal cannula  O2 Flow (L/min): 2      PHYSICAL EXAM:    GENERAL: NAD  CHEST/LUNG: Clear to ausculation bilaterally  HEART: Regular rate and rhythm; S1 S2  ABDOMEN: Soft, Nontender, Bowel sounds present  EXTREMITIES:  left leg bandaged  rt bka  NERVOUS SYSTEM:  Alert & Oriented X3,     LABS:                        13.4   5.58  )-----------( 93       ( 17 Apr 2023 05:47 )             46.1     04-17    142  |  107  |  37.9<H>  ----------------------------<  106<H>  4.6   |  26.0  |  1.09    Ca    8.4      17 Apr 2023 05:47  Phos  3.0     04-16  Mg     2.1     04-17    TPro  6.1<L>  /  Alb  3.3  /  TBili  0.7  /  DBili  x   /  AST  19  /  ALT  11  /  AlkPhos  116  04-16          CAPILLARY BLOOD GLUCOSE      POCT Blood Glucose.: 117 mg/dL (17 Apr 2023 12:07)  POCT Blood Glucose.: 97 mg/dL (17 Apr 2023 08:05)  POCT Blood Glucose.: 119 mg/dL (16 Apr 2023 21:26)  POCT Blood Glucose.: 163 mg/dL (16 Apr 2023 17:26)        RADIOLOGY & ADDITIONAL TESTS:

## 2023-04-17 NOTE — PROGRESS NOTE ADULT - ASSESSMENT
CKD(III), proteinuria +DM  JAYLENE: CM (EF<20%)/decompensated CHF with prerenal component==> clinically improved  Renal sono: no hydro; suboptimal visualization of L kidney  - avoid potential nephrotoxins  - cont diuretics and Losartan  - (?) SGLT-2 candidate  -still awaiting 24 hr urine protein  - follow labs    Hyperphosphatemia:   - cont to observe off binders

## 2023-04-17 NOTE — PROGRESS NOTE ADULT - SUBJECTIVE AND OBJECTIVE BOX
NEPHROLOGY INTERVAL HPI/OVERNIGHT EVENTS:  pt remains unchanged  no acute distress noted    MEDICATIONS  (STANDING):  apixaban 5 milliGRAM(s) Oral every 12 hours  atorvastatin 40 milliGRAM(s) Oral at bedtime  buMETAnide Injectable 1 milliGRAM(s) IV Push two times a day  carvedilol 25 milliGRAM(s) Oral every 12 hours  chlorhexidine 2% Cloths 1 Application(s) Topical daily  dextrose 5%. 1000 milliLiter(s) (50 mL/Hr) IV Continuous <Continuous>  dextrose 5%. 1000 milliLiter(s) (100 mL/Hr) IV Continuous <Continuous>  dextrose 50% Injectable 25 Gram(s) IV Push once  dextrose 50% Injectable 12.5 Gram(s) IV Push once  dextrose 50% Injectable 25 Gram(s) IV Push once  DULoxetine 60 milliGRAM(s) Oral daily  folic acid 1 milliGRAM(s) Oral daily  glucagon  Injectable 1 milliGRAM(s) IntraMuscular once  insulin glargine Injectable (LANTUS) 40 Unit(s) SubCutaneous every morning  insulin lispro (ADMELOG) corrective regimen sliding scale   SubCutaneous three times a day before meals  insulin lispro (ADMELOG) corrective regimen sliding scale   SubCutaneous at bedtime  levothyroxine 50 MICROGram(s) Oral daily  losartan 25 milliGRAM(s) Oral daily  pregabalin 150 milliGRAM(s) Oral two times a day  tamsulosin 0.4 milliGRAM(s) Oral at bedtime    MEDICATIONS  (PRN):  acetaminophen     Tablet .. 650 milliGRAM(s) Oral every 6 hours PRN Temp greater or equal to 38C (100.4F), Mild Pain (1 - 3)  albuterol/ipratropium for Nebulization 3 milliLiter(s) Nebulizer every 6 hours PRN Bronchospasm  aluminum hydroxide/magnesium hydroxide/simethicone Suspension 30 milliLiter(s) Oral every 4 hours PRN Dyspepsia  dextrose Oral Gel 15 Gram(s) Oral once PRN Blood Glucose LESS THAN 70 milliGRAM(s)/deciliter  melatonin 3 milliGRAM(s) Oral at bedtime PRN Insomnia  methocarbamol 500 milliGRAM(s) Oral two times a day PRN for severe pain  ondansetron Injectable 4 milliGRAM(s) IV Push every 8 hours PRN Nausea and/or Vomiting      Allergies    clindamycin (Unknown)        Vital Signs Last 24 Hrs  T(C): 36.5 (17 Apr 2023 05:19), Max: 36.5 (16 Apr 2023 09:53)  T(F): 97.7 (17 Apr 2023 05:19), Max: 97.7 (16 Apr 2023 09:53)  HR: 65 (17 Apr 2023 05:19) (65 - 86)  BP: 101/66 (17 Apr 2023 05:19) (100/63 - 109/70)  BP(mean): --  RR: 18 (17 Apr 2023 05:19) (18 - 18)  SpO2: 99% (17 Apr 2023 05:19) (95% - 99%)    Parameters below as of 17 Apr 2023 05:19  Patient On (Oxygen Delivery Method): nasal cannula  O2 Flow (L/min): 2      PHYSICAL EXAM:  GENERAL: Comfortable lying flat  NECK: Supple, No JVD  NERVOUS SYSTEM:  A/O x3, non focal  CHEST: Clear, diminished BS  HEART: No rub   ABDOMEN: Soft, NT/ND BS+  EXTREMITIES:  + dependent edema improved  : + wagner       LABS:                        13.4   5.58  )-----------( 93       ( 17 Apr 2023 05:47 )             46.1     04-17    142  |  107  |  37.9<H>  ----------------------------<  106<H>  4.6   |  26.0  |  1.09    Ca    8.4      17 Apr 2023 05:47  Phos  3.0     04-16  Mg     2.1     04-17    TPro  6.1<L>  /  Alb  3.3  /  TBili  0.7  /  DBili  x   /  AST  19  /  ALT  11  /  AlkPhos  116  04-16        Magnesium, Serum: 2.1 mg/dL (04-17 @ 05:47)      RADIOLOGY & ADDITIONAL TESTS:  < from: Xray Chest 1 View- PORTABLE-Urgent (Xray Chest 1 View- PORTABLE-Urgent .) (04.15.23 @ 13:36) >  ACC: [id number] EXAM:  XR CHEST PORTABLE URGENT 1V   ORDERED BY: MARSHALL WARD     PROCEDURE DATE:  04/15/2023          INTERPRETATION:  HISTORY: Shortness of breath, heart failure    TECHNIQUE: A single AP view of the chest was obtained.    COMPARISON: 4/4/2023    FINDINGS: The heart size cannot be adequately assessed on this single   view. There is a left-sided single lead AICD. There are no focal   consolidations or pleural effusions. The hilar and mediastinal structures   appear unremarkable. The osseous structures are intact.    IMPRESSION: Clear lungs.    < end of copied text >

## 2023-04-17 NOTE — PROGRESS NOTE ADULT - SUBJECTIVE AND OBJECTIVE BOX
[place] [facility] - [facility], THE HEART CENTER                                   [address] [place] [address]                                                      PHONE: [phone]                                                         FAX: [phone]  [email or web address]  ---------------------------------------------------------------------------------------------------------------------------------    Overnight events/patient complaints:      NAD feeling well today     allow double protein at meals (Unknown)  clindamycin (Unknown)    MEDICATIONS  (STANDING):  apixaban 5 milliGRAM(s) Oral every 12 hours  atorvastatin 40 milliGRAM(s) Oral at bedtime  buMETAnide Injectable 1 milliGRAM(s) IV Push two times a day  carvedilol 25 milliGRAM(s) Oral every 12 hours  chlorhexidine 2% Cloths 1 Application(s) Topical daily  dextrose 5%. 1000 milliLiter(s) (100 mL/Hr) IV Continuous <Continuous>  dextrose 5%. 1000 milliLiter(s) (50 mL/Hr) IV Continuous <Continuous>  dextrose 50% Injectable 25 Gram(s) IV Push once  dextrose 50% Injectable 12.5 Gram(s) IV Push once  dextrose 50% Injectable 25 Gram(s) IV Push once  DULoxetine 60 milliGRAM(s) Oral daily  folic acid 1 milliGRAM(s) Oral daily  glucagon  Injectable 1 milliGRAM(s) IntraMuscular once  insulin glargine Injectable (LANTUS) 40 Unit(s) SubCutaneous every morning  insulin lispro (ADMELOG) corrective regimen sliding scale   SubCutaneous three times a day before meals  insulin lispro (ADMELOG) corrective regimen sliding scale   SubCutaneous at bedtime  levothyroxine 50 MICROGram(s) Oral daily  losartan 25 milliGRAM(s) Oral daily  pregabalin 150 milliGRAM(s) Oral two times a day  tamsulosin 0.4 milliGRAM(s) Oral at bedtime    MEDICATIONS  (PRN):  acetaminophen     Tablet .. 650 milliGRAM(s) Oral every 6 hours PRN Temp greater or equal to 38C (100.4F), Mild Pain (1 - 3)  albuterol/ipratropium for Nebulization 3 milliLiter(s) Nebulizer every 6 hours PRN Bronchospasm  aluminum hydroxide/magnesium hydroxide/simethicone Suspension 30 milliLiter(s) Oral every 4 hours PRN Dyspepsia  dextrose Oral Gel 15 Gram(s) Oral once PRN Blood Glucose LESS THAN 70 milliGRAM(s)/deciliter  melatonin 3 milliGRAM(s) Oral at bedtime PRN Insomnia  methocarbamol 500 milliGRAM(s) Oral two times a day PRN for severe pain  ondansetron Injectable 4 milliGRAM(s) IV Push every 8 hours PRN Nausea and/or Vomiting      Vital Signs Last 24 Hrs  T(C): 36.5 (2023 05:19), Max: 36.5 (2023 09:53)  T(F): 97.7 (2023 05:19), Max: 97.7 (2023 09:53)  HR: 65 (2023 05:19) (65 - 86)  BP: 101/66 (2023 05:19) (100/63 - 109/70)  BP(mean): --  RR: 18 (2023 05:19) (18 - 18)  SpO2: 99% (2023 05:19) (95% - 99%)    Parameters below as of 2023 05:19  Patient On (Oxygen Delivery Method): nasal cannula  O2 Flow (L/min): 2    ICU Vital Signs Last 24 Hrs  MARY ANN CORNELL  I&O's Detail    2023 07:01  -  2023 07:00  --------------------------------------------------------  IN:    Oral Fluid: 600 mL  Total IN: 600 mL    OUT:    Indwelling Catheter - Urethral (mL): 1850 mL  Total OUT: 1850 mL    Total NET: -1250 mL        I&O's Summary    2023 07:01  -  2023 07:00  --------------------------------------------------------  IN: 600 mL / OUT: 1850 mL / NET: -1250 mL      Drug Dosing Weight  MARY ANN CORNELL      PHYSICAL EXAM:  General: Appears well developed, alert and cooperative.  HEENT: Head; normocephalic, atraumatic.  Eyes: Pupils reactive, cornea wnl.  Neck: Supple, no nodes adenopathy, no NVD or carotid bruit or thyromegaly.  CARDIOVASCULAR: Normal S1 and S2, 2/6 murmur, rub, gallop or lift.   LUNGS: No rales, rhonchi or wheeze. Normal breath sounds bilaterally.  ABDOMEN: Soft, nontender without mass or organomegaly. bowel sounds normoactive.  EXTREMITIES: No clubbing, cyanosis + edema. Distal pulses wnl.   SKIN: warm and dry with normal turgor.  NEURO: Alert/oriented x 3/normal motor exam. No pathologic reflexes.    PSYCH: normal affect.        LABS:                        13.4   5.58  )-----------( 93       ( 2023 05:47 )             46.1     -    142  |  107  |  37.9<H>  ----------------------------<  106<H>  4.6   |  26.0  |  1.09    Ca    8.4      2023 05:47  Phos  3.0     -16  Mg     2.1     -    TPro  6.1<L>  /  Alb  3.3  /  TBili  0.7  /  DBili  x   /  AST  19  /  ALT  11  /  AlkPhos  116  -    MARY ANN KRAIG            RADIOLOGY & ADDITIONAL STUDIES:    INTERPRETATION OF TELEMETRY (personally reviewed):        ECHO: < from: TTE Echo Complete w/o Contrast w/ Doppler (23 @ 18:38) >   1. Technically difficult study.   2. Endocardial visualization was enhanced with intravenous echo contrast.   3. Multiple left ventricular regional wall motion abnormalities exist.   See wall motion findings.   4. Severely decreased global left ventricular systolic function.   5. Left ventricular ejection fraction, by visual estimation, is <20%.   6. Dilated cardiomyopathy.   7. Moderately reduced RV systolic function.   8. Severely enlarged left atrium.   9. Mild mitral annular calcification.  10. Mild to moderate mitral valve regurgitation.  11. The mitral in-flow pattern reveals no discernable A-wave, therefore   no comment on diastolic function can be made.  12. Sclerotic aortic valve with decreased opening.  13. Moderate tricuspid regurgitation.  14. There is no evidence of pericardial effusion.    MD Kumar Electronically signed on 4/3/2023 at 11:15:45 PM    < end of copied text >         CARDIAC CATHETERIZATION: < from: Cardiac Cath Lab - Adult (16 @ 18:03) >  VENTRICLES: LVEF 25% with MR  CORONARY VESSELS: The coronary circulation is right dominant.  LM:   --  LM: Angiography showed minor luminal irregularities with no flow  limiting lesions.  LAD:   --  LAD: Angiography showed minor luminal irregularities with no  flow limiting lesions.  CX:   --  Circumflex: Angiography showed minor luminal irregularities with  no flow limiting lesions.  RCA:   --  RCA: Angiography showed minor luminal irregularities with no  flow limiting lesions.  COMPLICATIONS: There were no complications. No complications occurred  during the cath lab visit.  DIAGNOSTIC IMPRESSIONS: Mild CAD. Non ischemic cardiomyopathy.  DIAGNOSTIC RECOMMENDATIONS: A/C. MARIELA CV The patient should continue with  the present medications.  INTERVENTIONAL IMPRESSIONS: Mild CAD. Non ischemic cardiomyopathy.  INTERVENTIONAL RECOMMENDATIONS: A/C. 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 Patient recently moved to [place] ~1 year ago and has not established care with a new cardiologist while in [place]. Pt is back in [place] temporarily as his wife  recently. Patient's hospital course complicated by JAYLENE.    Acute on Chronic Heart Failure Exacerbation/JAYLENE hx of none compliance   - Change Bumex 1mg IV BID to Torsemide 30 mg po BID   - echo with reduced EF to <20% acute on chronic NICM EF ~ 25 to 30%   - Losartan 25 mg po daily due to HFrEF       Afib  - c/w eliquis 5mg BID  - rate controlled    HTN/HLD  - c/w coreg 25mg BID  - c/w atorvastatin 40mg daily    No need for repeat cath at this time   Out patient ischemic evaluations     Please recall if needed                    [place] [facility] - [facility], THE HEART CENTER                                   [address] [place] [address]                                                      PHONE: [phone]                                                         FAX: [phone]  [email or web address]  ---------------------------------------------------------------------------------------------------------------------------------    Overnight events/patient complaints:      NAD feeling well today     allow double protein at meals (Unknown)  clindamycin (Unknown)    MEDICATIONS  (STANDING):  apixaban 5 milliGRAM(s) Oral every 12 hours  atorvastatin 40 milliGRAM(s) Oral at bedtime  buMETAnide Injectable 1 milliGRAM(s) IV Push two times a day  carvedilol 25 milliGRAM(s) Oral every 12 hours  chlorhexidine 2% Cloths 1 Application(s) Topical daily  dextrose 5%. 1000 milliLiter(s) (100 mL/Hr) IV Continuous <Continuous>  dextrose 5%. 1000 milliLiter(s) (50 mL/Hr) IV Continuous <Continuous>  dextrose 50% Injectable 25 Gram(s) IV Push once  dextrose 50% Injectable 12.5 Gram(s) IV Push once  dextrose 50% Injectable 25 Gram(s) IV Push once  DULoxetine 60 milliGRAM(s) Oral daily  folic acid 1 milliGRAM(s) Oral daily  glucagon  Injectable 1 milliGRAM(s) IntraMuscular once  insulin glargine Injectable (LANTUS) 40 Unit(s) SubCutaneous every morning  insulin lispro (ADMELOG) corrective regimen sliding scale   SubCutaneous three times a day before meals  insulin lispro (ADMELOG) corrective regimen sliding scale   SubCutaneous at bedtime  levothyroxine 50 MICROGram(s) Oral daily  losartan 25 milliGRAM(s) Oral daily  pregabalin 150 milliGRAM(s) Oral two times a day  tamsulosin 0.4 milliGRAM(s) Oral at bedtime    MEDICATIONS  (PRN):  acetaminophen     Tablet .. 650 milliGRAM(s) Oral every 6 hours PRN Temp greater or equal to 38C (100.4F), Mild Pain (1 - 3)  albuterol/ipratropium for Nebulization 3 milliLiter(s) Nebulizer every 6 hours PRN Bronchospasm  aluminum hydroxide/magnesium hydroxide/simethicone Suspension 30 milliLiter(s) Oral every 4 hours PRN Dyspepsia  dextrose Oral Gel 15 Gram(s) Oral once PRN Blood Glucose LESS THAN 70 milliGRAM(s)/deciliter  melatonin 3 milliGRAM(s) Oral at bedtime PRN Insomnia  methocarbamol 500 milliGRAM(s) Oral two times a day PRN for severe pain  ondansetron Injectable 4 milliGRAM(s) IV Push every 8 hours PRN Nausea and/or Vomiting      Vital Signs Last 24 Hrs  T(C): 36.5 (2023 05:19), Max: 36.5 (2023 09:53)  T(F): 97.7 (2023 05:19), Max: 97.7 (2023 09:53)  HR: 65 (2023 05:19) (65 - 86)  BP: 101/66 (2023 05:19) (100/63 - 109/70)  BP(mean): --  RR: 18 (2023 05:19) (18 - 18)  SpO2: 99% (2023 05:19) (95% - 99%)    Parameters below as of 2023 05:19  Patient On (Oxygen Delivery Method): nasal cannula  O2 Flow (L/min): 2    ICU Vital Signs Last 24 Hrs  MARY ANN CORNELL  I&O's Detail    2023 07:01  -  2023 07:00  --------------------------------------------------------  IN:    Oral Fluid: 600 mL  Total IN: 600 mL    OUT:    Indwelling Catheter - Urethral (mL): 1850 mL  Total OUT: 1850 mL    Total NET: -1250 mL        I&O's Summary    2023 07:01  -  2023 07:00  --------------------------------------------------------  IN: 600 mL / OUT: 1850 mL / NET: -1250 mL      Drug Dosing Weight  MARY ANN CORNELL      PHYSICAL EXAM:  General: Appears well developed, alert and cooperative.  HEENT: Head; normocephalic, atraumatic.  Eyes: Pupils reactive, cornea wnl.  Neck: Supple, no nodes adenopathy, no NVD or carotid bruit or thyromegaly.  CARDIOVASCULAR: Normal S1 and S2, 2/6 murmur, rub, gallop or lift.   LUNGS: No rales, rhonchi or wheeze. Normal breath sounds bilaterally.  ABDOMEN: Soft, nontender without mass or organomegaly. bowel sounds normoactive.  EXTREMITIES: No clubbing, cyanosis + edema. Distal pulses wnl.   SKIN: warm and dry with normal turgor.  NEURO: Alert/oriented x 3/normal motor exam. No pathologic reflexes.    PSYCH: normal affect.        LABS:                        13.4   5.58  )-----------( 93       ( 2023 05:47 )             46.1     -    142  |  107  |  37.9<H>  ----------------------------<  106<H>  4.6   |  26.0  |  1.09    Ca    8.4      2023 05:47  Phos  3.0     -16  Mg     2.1     -    TPro  6.1<L>  /  Alb  3.3  /  TBili  0.7  /  DBili  x   /  AST  19  /  ALT  11  /  AlkPhos  116  -    MARY ANN KRAIG            RADIOLOGY & ADDITIONAL STUDIES:    INTERPRETATION OF TELEMETRY (personally reviewed):        ECHO: < from: TTE Echo Complete w/o Contrast w/ Doppler (23 @ 18:38) >   1. Technically difficult study.   2. Endocardial visualization was enhanced with intravenous echo contrast.   3. Multiple left ventricular regional wall motion abnormalities exist.   See wall motion findings.   4. Severely decreased global left ventricular systolic function.   5. Left ventricular ejection fraction, by visual estimation, is <20%.   6. Dilated cardiomyopathy.   7. Moderately reduced RV systolic function.   8. Severely enlarged left atrium.   9. Mild mitral annular calcification.  10. Mild to moderate mitral valve regurgitation.  11. The mitral in-flow pattern reveals no discernable A-wave, therefore   no comment on diastolic function can be made.  12. Sclerotic aortic valve with decreased opening.  13. Moderate tricuspid regurgitation.  14. There is no evidence of pericardial effusion.    MD Kumar Electronically signed on 4/3/2023 at 11:15:45 PM    < end of copied text >         CARDIAC CATHETERIZATION: < from: Cardiac Cath Lab - Adult (16 @ 18:03) >  VENTRICLES: LVEF 25% with MR  CORONARY VESSELS: The coronary circulation is right dominant.  LM:   --  LM: Angiography showed minor luminal irregularities with no flow  limiting lesions.  LAD:   --  LAD: Angiography showed minor luminal irregularities with no  flow limiting lesions.  CX:   --  Circumflex: Angiography showed minor luminal irregularities with  no flow limiting lesions.  RCA:   --  RCA: Angiography showed minor luminal irregularities with no  flow limiting lesions.  COMPLICATIONS: There were no complications. No complications occurred  during the cath lab visit.  DIAGNOSTIC IMPRESSIONS: Mild CAD. Non ischemic cardiomyopathy.  DIAGNOSTIC RECOMMENDATIONS: A/C. MARIELA CV The patient should continue with  the present medications.  INTERVENTIONAL IMPRESSIONS: Mild CAD. Non ischemic cardiomyopathy.  INTERVENTIONAL RECOMMENDATIONS: A/C. MARIELA CV  Prepared and signed by  Bright Muñiz MD  Signed 2016 19:26:24    < end of copied text >      ASSESSMENT AND PLAN:  In summary, MARY ANN CORNELL is an 72 year old male with a PMHx of HFrEF (30-35%) (NICM) prior [facility]  mild CAD see above s/p cardiomems and ICD, afib on coumadin, HTN, HLD, DM, RLE diabetic ulcer s/p right leg amputation, CHRISS who presents for worsening SOB found to be significantly volume overloaded found to have acute on chronic heart failure exacerbation. Patient recently moved to [place] ~1 year ago and has not established care with a new cardiologist while in [place]. Pt is back in [place] temporarily as his wife  recently. Patient's hospital course complicated by JAYLENE.    Acute on Chronic Heart Failure Exacerbation/JAYLENE hx of none compliance   - Change Bumex 1mg IV BID to Torsemide 30 mg po BID   - echo with reduced EF to <20% acute on chronic NICM EF ~ 25 to 30%   - Losartan 25 mg po daily due to HFrEF   - FARXIGA 10 mg po daily       Afib  - c/w eliquis 5mg BID  - rate controlled    HTN/HLD  - c/w coreg 25mg BID  - c/w atorvastatin 40mg daily    No need for repeat cath at this time   Out patient ischemic evaluations     Please recall if needed

## 2023-04-18 ENCOUNTER — TRANSCRIPTION ENCOUNTER (OUTPATIENT)
Age: 73
End: 2023-04-18

## 2023-04-18 VITALS — OXYGEN SATURATION: 96 %

## 2023-04-18 LAB
GLUCOSE BLDC GLUCOMTR-MCNC: 110 MG/DL — HIGH (ref 70–99)
GLUCOSE BLDC GLUCOMTR-MCNC: 111 MG/DL — HIGH (ref 70–99)
GLUCOSE BLDC GLUCOMTR-MCNC: 84 MG/DL — SIGNIFICANT CHANGE UP (ref 70–99)

## 2023-04-18 PROCEDURE — 74176 CT ABD & PELVIS W/O CONTRAST: CPT

## 2023-04-18 PROCEDURE — 86900 BLOOD TYPING SEROLOGIC ABO: CPT

## 2023-04-18 PROCEDURE — 93306 TTE W/DOPPLER COMPLETE: CPT

## 2023-04-18 PROCEDURE — 0225U NFCT DS DNA&RNA 21 SARSCOV2: CPT

## 2023-04-18 PROCEDURE — 97163 PT EVAL HIGH COMPLEX 45 MIN: CPT

## 2023-04-18 PROCEDURE — 80048 BASIC METABOLIC PNL TOTAL CA: CPT

## 2023-04-18 PROCEDURE — 71045 X-RAY EXAM CHEST 1 VIEW: CPT

## 2023-04-18 PROCEDURE — 84100 ASSAY OF PHOSPHORUS: CPT

## 2023-04-18 PROCEDURE — 87077 CULTURE AEROBIC IDENTIFY: CPT

## 2023-04-18 PROCEDURE — 83036 HEMOGLOBIN GLYCOSYLATED A1C: CPT

## 2023-04-18 PROCEDURE — 84484 ASSAY OF TROPONIN QUANT: CPT

## 2023-04-18 PROCEDURE — 83970 ASSAY OF PARATHORMONE: CPT

## 2023-04-18 PROCEDURE — 76775 US EXAM ABDO BACK WALL LIM: CPT

## 2023-04-18 PROCEDURE — 87186 SC STD MICRODIL/AGAR DIL: CPT

## 2023-04-18 PROCEDURE — 87086 URINE CULTURE/COLONY COUNT: CPT

## 2023-04-18 PROCEDURE — 82962 GLUCOSE BLOOD TEST: CPT

## 2023-04-18 PROCEDURE — 87641 MR-STAPH DNA AMP PROBE: CPT

## 2023-04-18 PROCEDURE — 94760 N-INVAS EAR/PLS OXIMETRY 1: CPT

## 2023-04-18 PROCEDURE — 85610 PROTHROMBIN TIME: CPT

## 2023-04-18 PROCEDURE — 84132 ASSAY OF SERUM POTASSIUM: CPT

## 2023-04-18 PROCEDURE — U0003: CPT

## 2023-04-18 PROCEDURE — 83880 ASSAY OF NATRIURETIC PEPTIDE: CPT

## 2023-04-18 PROCEDURE — 82306 VITAMIN D 25 HYDROXY: CPT

## 2023-04-18 PROCEDURE — 85730 THROMBOPLASTIN TIME PARTIAL: CPT

## 2023-04-18 PROCEDURE — 93923 UPR/LXTR ART STDY 3+ LVLS: CPT

## 2023-04-18 PROCEDURE — 82310 ASSAY OF CALCIUM: CPT

## 2023-04-18 PROCEDURE — 86901 BLOOD TYPING SEROLOGIC RH(D): CPT

## 2023-04-18 PROCEDURE — 93005 ELECTROCARDIOGRAM TRACING: CPT

## 2023-04-18 PROCEDURE — 82330 ASSAY OF CALCIUM: CPT

## 2023-04-18 PROCEDURE — 94660 CPAP INITIATION&MGMT: CPT

## 2023-04-18 PROCEDURE — 36415 COLL VENOUS BLD VENIPUNCTURE: CPT

## 2023-04-18 PROCEDURE — 85018 HEMOGLOBIN: CPT

## 2023-04-18 PROCEDURE — 36600 WITHDRAWAL OF ARTERIAL BLOOD: CPT

## 2023-04-18 PROCEDURE — 85652 RBC SED RATE AUTOMATED: CPT

## 2023-04-18 PROCEDURE — 86850 RBC ANTIBODY SCREEN: CPT

## 2023-04-18 PROCEDURE — 81001 URINALYSIS AUTO W/SCOPE: CPT

## 2023-04-18 PROCEDURE — 82803 BLOOD GASES ANY COMBINATION: CPT

## 2023-04-18 PROCEDURE — 80053 COMPREHEN METABOLIC PANEL: CPT

## 2023-04-18 PROCEDURE — 94640 AIRWAY INHALATION TREATMENT: CPT

## 2023-04-18 PROCEDURE — 80061 LIPID PANEL: CPT

## 2023-04-18 PROCEDURE — 99285 EMERGENCY DEPT VISIT HI MDM: CPT | Mod: 25

## 2023-04-18 PROCEDURE — 99239 HOSP IP/OBS DSCHRG MGMT >30: CPT

## 2023-04-18 PROCEDURE — 85027 COMPLETE CBC AUTOMATED: CPT

## 2023-04-18 PROCEDURE — 73630 X-RAY EXAM OF FOOT: CPT

## 2023-04-18 PROCEDURE — 76870 US EXAM SCROTUM: CPT

## 2023-04-18 PROCEDURE — 87040 BLOOD CULTURE FOR BACTERIA: CPT

## 2023-04-18 PROCEDURE — 85025 COMPLETE CBC W/AUTO DIFF WBC: CPT

## 2023-04-18 PROCEDURE — 83735 ASSAY OF MAGNESIUM: CPT

## 2023-04-18 PROCEDURE — 82947 ASSAY GLUCOSE BLOOD QUANT: CPT

## 2023-04-18 PROCEDURE — 87150 DNA/RNA AMPLIFIED PROBE: CPT

## 2023-04-18 PROCEDURE — 82435 ASSAY OF BLOOD CHLORIDE: CPT

## 2023-04-18 PROCEDURE — 87640 STAPH A DNA AMP PROBE: CPT

## 2023-04-18 PROCEDURE — 85014 HEMATOCRIT: CPT

## 2023-04-18 PROCEDURE — 84540 ASSAY OF URINE/UREA-N: CPT

## 2023-04-18 PROCEDURE — 83605 ASSAY OF LACTIC ACID: CPT

## 2023-04-18 PROCEDURE — U0005: CPT

## 2023-04-18 PROCEDURE — 82570 ASSAY OF URINE CREATININE: CPT

## 2023-04-18 PROCEDURE — 86140 C-REACTIVE PROTEIN: CPT

## 2023-04-18 PROCEDURE — 84295 ASSAY OF SERUM SODIUM: CPT

## 2023-04-18 RX ORDER — METHOCARBAMOL 500 MG/1
1 TABLET, FILM COATED ORAL
Qty: 0 | Refills: 0 | DISCHARGE
Start: 2023-04-18

## 2023-04-18 RX ORDER — LANOLIN ALCOHOL/MO/W.PET/CERES
1 CREAM (GRAM) TOPICAL
Qty: 0 | Refills: 0 | DISCHARGE
Start: 2023-04-18

## 2023-04-18 RX ORDER — INSULIN ASPART 100 [IU]/ML
25 INJECTION, SOLUTION SUBCUTANEOUS
Refills: 0 | DISCHARGE

## 2023-04-18 RX ORDER — INSULIN GLARGINE 100 [IU]/ML
35 INJECTION, SOLUTION SUBCUTANEOUS
Qty: 0 | Refills: 0 | DISCHARGE
Start: 2023-04-18

## 2023-04-18 RX ORDER — DAPAGLIFLOZIN 10 MG/1
1 TABLET, FILM COATED ORAL
Qty: 0 | Refills: 0 | DISCHARGE
Start: 2023-04-18

## 2023-04-18 RX ORDER — METHOCARBAMOL 500 MG/1
1 TABLET, FILM COATED ORAL
Refills: 0 | DISCHARGE

## 2023-04-18 RX ORDER — TRAMADOL HYDROCHLORIDE 50 MG/1
1 TABLET ORAL
Refills: 0 | DISCHARGE

## 2023-04-18 RX ORDER — INSULIN DETEMIR 100/ML (3)
50 INSULIN PEN (ML) SUBCUTANEOUS
Refills: 0 | DISCHARGE

## 2023-04-18 RX ORDER — INSULIN GLARGINE 100 [IU]/ML
35 INJECTION, SOLUTION SUBCUTANEOUS EVERY MORNING
Refills: 0 | Status: DISCONTINUED | OUTPATIENT
Start: 2023-04-19 | End: 2023-04-18

## 2023-04-18 RX ORDER — ACETAMINOPHEN 500 MG
2 TABLET ORAL
Qty: 0 | Refills: 0 | DISCHARGE
Start: 2023-04-18

## 2023-04-18 RX ORDER — LOSARTAN POTASSIUM 100 MG/1
1 TABLET, FILM COATED ORAL
Qty: 0 | Refills: 0 | DISCHARGE
Start: 2023-04-18

## 2023-04-18 RX ORDER — TAMSULOSIN HYDROCHLORIDE 0.4 MG/1
1 CAPSULE ORAL
Qty: 0 | Refills: 0 | DISCHARGE
Start: 2023-04-18

## 2023-04-18 RX ADMIN — Medication 1 MILLIGRAM(S): at 13:22

## 2023-04-18 RX ADMIN — Medication 30 MILLIGRAM(S): at 13:22

## 2023-04-18 RX ADMIN — CARVEDILOL PHOSPHATE 25 MILLIGRAM(S): 80 CAPSULE, EXTENDED RELEASE ORAL at 18:07

## 2023-04-18 RX ADMIN — INSULIN GLARGINE 40 UNIT(S): 100 INJECTION, SOLUTION SUBCUTANEOUS at 08:19

## 2023-04-18 RX ADMIN — APIXABAN 5 MILLIGRAM(S): 2.5 TABLET, FILM COATED ORAL at 18:07

## 2023-04-18 RX ADMIN — CHLORHEXIDINE GLUCONATE 1 APPLICATION(S): 213 SOLUTION TOPICAL at 13:30

## 2023-04-18 RX ADMIN — Medication 150 MILLIGRAM(S): at 18:06

## 2023-04-18 RX ADMIN — DAPAGLIFLOZIN 10 MILLIGRAM(S): 10 TABLET, FILM COATED ORAL at 18:06

## 2023-04-18 RX ADMIN — DULOXETINE HYDROCHLORIDE 60 MILLIGRAM(S): 30 CAPSULE, DELAYED RELEASE ORAL at 13:22

## 2023-04-18 RX ADMIN — Medication 3 MILLILITER(S): at 18:10

## 2023-04-18 NOTE — DISCHARGE NOTE NURSING/CASE MANAGEMENT/SOCIAL WORK - PATIENT PORTAL LINK FT
You can access the FollowMyHealth Patient Portal offered by [facility] by registering at the following website: [email or web address]. By joining [employer]’s FollowMyHealth portal, you will also be able to view your health information using other applications (apps) compatible with our system.

## 2023-04-18 NOTE — PROGRESS NOTE ADULT - SUBJECTIVE AND OBJECTIVE BOX
NEPHROLOGY INTERVAL HPI/OVERNIGHT EVENTS:  pt clinically stable  no acute distress noted overnight    MEDICATIONS  (STANDING):  apixaban 5 milliGRAM(s) Oral every 12 hours  atorvastatin 40 milliGRAM(s) Oral at bedtime  carvedilol 25 milliGRAM(s) Oral every 12 hours  chlorhexidine 2% Cloths 1 Application(s) Topical daily  dapagliflozin 10 milliGRAM(s) Oral every 24 hours  dextrose 5%. 1000 milliLiter(s) (100 mL/Hr) IV Continuous <Continuous>  dextrose 5%. 1000 milliLiter(s) (50 mL/Hr) IV Continuous <Continuous>  dextrose 50% Injectable 25 Gram(s) IV Push once  dextrose 50% Injectable 12.5 Gram(s) IV Push once  dextrose 50% Injectable 25 Gram(s) IV Push once  DULoxetine 60 milliGRAM(s) Oral daily  folic acid 1 milliGRAM(s) Oral daily  glucagon  Injectable 1 milliGRAM(s) IntraMuscular once  insulin glargine Injectable (LANTUS) 40 Unit(s) SubCutaneous every morning  insulin lispro (ADMELOG) corrective regimen sliding scale   SubCutaneous three times a day before meals  insulin lispro (ADMELOG) corrective regimen sliding scale   SubCutaneous at bedtime  levothyroxine 50 MICROGram(s) Oral daily  losartan 25 milliGRAM(s) Oral daily  pregabalin 150 milliGRAM(s) Oral two times a day  tamsulosin 0.4 milliGRAM(s) Oral at bedtime  torsemide 30 milliGRAM(s) Oral two times a day    MEDICATIONS  (PRN):  acetaminophen     Tablet .. 650 milliGRAM(s) Oral every 6 hours PRN Temp greater or equal to 38C (100.4F), Mild Pain (1 - 3)  albuterol/ipratropium for Nebulization 3 milliLiter(s) Nebulizer every 6 hours PRN Bronchospasm  aluminum hydroxide/magnesium hydroxide/simethicone Suspension 30 milliLiter(s) Oral every 4 hours PRN Dyspepsia  dextrose Oral Gel 15 Gram(s) Oral once PRN Blood Glucose LESS THAN 70 milliGRAM(s)/deciliter  melatonin 3 milliGRAM(s) Oral at bedtime PRN Insomnia  methocarbamol 500 milliGRAM(s) Oral two times a day PRN for severe pain  ondansetron Injectable 4 milliGRAM(s) IV Push every 8 hours PRN Nausea and/or Vomiting      Allergies    clindamycin (Unknown)      Vital Signs Last 24 Hrs  T(C): 36.3 (18 Apr 2023 05:13), Max: 36.7 (17 Apr 2023 21:56)  T(F): 97.4 (18 Apr 2023 05:13), Max: 98 (17 Apr 2023 21:56)  HR: 68 (18 Apr 2023 05:13) (63 - 79)  BP: 102/65 (18 Apr 2023 05:13) (102/65 - 115/77)  BP(mean): --  RR: 18 (18 Apr 2023 05:13) (18 - 18)  SpO2: 100% (18 Apr 2023 05:13) (94% - 100%)    Parameters below as of 18 Apr 2023 05:13  Patient On (Oxygen Delivery Method): BiPAP/CPAP        PHYSICAL EXAM:  GENERAL: Obese, fatigued  NECK: Supple, No JVD  NERVOUS SYSTEM:  A/O x3, non focal  CHEST: Clear, diminished BS  HEART: No rub   ABDOMEN: Soft, NT/ND BS+  EXTREMITIES:  + dependent edema   : + wagner    LABS:                        13.4   5.58  )-----------( 93       ( 17 Apr 2023 05:47 )             46.1     04-17    142  |  107  |  37.9<H>  ----------------------------<  106<H>  4.6   |  26.0  |  1.09    Ca    8.4      17 Apr 2023 05:47  Phos  3.0     04-16  Mg     2.1     04-17    TPro  6.1<L>  /  Alb  3.3  /  TBili  0.7  /  DBili  x   /  AST  19  /  ALT  11  /  AlkPhos  116  04-16            RADIOLOGY & ADDITIONAL TESTS:  < from: US Renal (04.05.23 @ 23:01) >  ACC: [id number] EXAM:  US KIDNEY(S)   ORDERED BY: CONNER JACQUES     PROCEDURE DATE:  04/05/2023          INTERPRETATION:  CLINICAL INFORMATION: Acute renal failure    COMPARISON: CT abdomen pelvis performed on the same day.    TECHNIQUE: Sonographyof the kidneys and bladder.    FINDINGS:  Right kidney: 10.5 cm. No hydronephrosis or shadowing stone.    Left kidney: 10.7 cm. No hydronephrosis. There is a 0.4 cm echogenic   focus in the lower pole. Upper pole is poorly visualized.    Urinary bladder: Not visualized.    IMPRESSION:  Suboptimal visualization of the left kidney. There is a 0.4 cm echogenic   focus in the lower pole, likely a nonobstructing stone.    No hydronephrosis.    < end of copied text >

## 2023-04-18 NOTE — PROGRESS NOTE ADULT - ASSESSMENT
72M with a history of cardiomyopathy with AICD, atrial fibrillation, diabetes, hypertension, chronic kidney disease, prostate cancer, and obstructive sleep apnea who presented with a two week history of dyspnea found to have heart failure.   Diuretics were initiated and a Red catheter was placed to monitor response. Blood cultures were also noted to grow Ecoli for which intravenous antibiotics were initiated. Echocardiogram noted severely decreased left ventricular systolic function. Intravenous diuretic therapy was continued while monitoring renal functions.    Acute hypoxic respiratory failure / Acute on chronic systolic heart failure - Echocardiogram noted severely decreased left ventricular systolic function. s/pbumetanide for diuresis, now on torsemide. On carvedilol and losartan. farxiga.  consider entresto instead of losartan    Ecoli bacteremia - Currently afebrile and without leukocytosis. Repeat blood cultures (4/4/23) were without growth. For completion of the antibiotics course till 4/14.    Chronic kidney disease III with acute kidney injury / History of benign prostatic hypertrophy - Renal function improved. Red catheter in place. Continue on tamsulosin. Monitoring renal function and urine output while on bumetanide. For potential trial of void once more mobile.    Atrial fibrillation - On carvedilol. Apixaban for anticoagulation.    Thrombocytopenia - Continue to monitor platelet count. No bleeding noted on examination.    Diabetes - Insulin coverage, close monitoring of blood glucose levels. On pregabalin for neuropathy.    Hypothyroidism - On levothyroxine.    Obstructive sleep apnea - On nocturnal Bipap.    Previously discussed with Cardiology, the patient is thought to be at increased risk for air travel at this time. Will required further management after discharge from the hospital for further optimization. Would benefit from treatment at a rehabilitation facility.    pending [facility]

## 2023-04-18 NOTE — DISCHARGE NOTE PROVIDER - PROVIDER TOKENS
PROVIDER:[TOKEN:[[id number]],FOLLOWUP:[1-3 days]],PROVIDER:[TOKEN:[[id number]],FOLLOWUP:[1-3 days]],PROVIDER:[TOKEN:[[id number]],FOLLOWUP:[1 week]]

## 2023-04-18 NOTE — DISCHARGE NOTE PROVIDER - HOSPITAL COURSE
72 year old male with PMHx of CHF with EF 30% s/p AICD, DM on insulin, a fib on eliquis, HTN, HLD, RLE BKA, who presented to [facility] ED with c/o progressively worsening SOB a/w chronic LLE edema. In the ED, patient found to be significantly fluid overloaded, requiring BiPAP. CXR with concern for effusion/interstitial edema. Labs revealed elevated BNP, negative troponin. Cardiology consulted, and patient admitted for acute CHF exacerbation. TTE revealed severely reduced LVEF. Patient was started on IV diuresis, with improvement in fluid status. Subsequently weaned off BiPAP and transitioned to PO diuretics. Medications were adjusted per cardiology recommendations. To f/u outpatient for ischemic workup. BCx returned positive for E. Coli, and patient was started on IV ABX. Repeat BCx were negative for bacterial growth. Remained afebrile without leukocytosis. Kidney function was monitored closely given diuresis, and renal function improved during hospitalization. Evaluated by PT who recommend SCOTTY. 72 year old male with PMHx of CHF with EF 30% s/p AICD, DM on insulin, a fib on eliquis, HTN, HLD, RLE BKA, who presented to [facility] ED with c/o progressively worsening SOB a/w chronic LLE edema. In the ED, patient found to be significantly fluid overloaded, requiring BiPAP. CXR with concern for effusion/interstitial edema. Labs revealed elevated BNP, negative troponin. Cardiology consulted, and patient admitted for acute CHF exacerbation. TTE revealed severely reduced LVEF. Patient was started on IV diuresis, with improvement in fluid status. Subsequently weaned off BiPAP and transitioned to PO diuretics. Medications were adjusted per cardiology recommendations. To f/u outpatient for ischemic workup. BCx returned positive for E. Coli, and patient was started on IV ABX. Repeat BCx were negative for bacterial growth. Remained afebrile without leukocytosis. Kidney function was monitored closely given diuresis, and renal function improved during hospitalization. Evaluated by PT who recommend SCOTTY. completed IV antibiotics   nocturnal BIPAP 10/5    wound care orders:  upon discharge includes apply xeroform to Left lower leg wounds and betadine soaked gauze to left hallux with kerlix and light ace to LLE. Please change every 2 days. Pt to be wb as tolerated to L foot in surgical shoe. 72 year old male with PMHx of CHF with EF 30% s/p AICD, DM on insulin, a fib on eliquis, HTN, HLD, RLE BKA, who presented to [facility] ED with c/o progressively worsening SOB a/w chronic LLE edema. In the ED, patient found to be significantly fluid overloaded, requiring BiPAP. CXR with concern for effusion/interstitial edema. Labs revealed elevated BNP, negative troponin. Cardiology consulted, and patient admitted for acute CHF exacerbation. TTE revealed severely reduced LVEF. Patient was started on IV diuresis, with improvement in fluid status. Subsequently weaned off BiPAP and transitioned to PO diuretics. Medications were adjusted per cardiology recommendations. To f/u outpatient for ischemic workup. BCx returned positive for E. Coli, and patient was started on IV ABX. Repeat BCx were negative for bacterial growth. Remained afebrile without leukocytosis. Kidney function was monitored closely given diuresis, and renal function improved during hospitalization. Evaluated by PT who recommend SCOTTY. completed IV antibiotics .  trial of void in rehab.  nocturnal BIPAP 10/5    wound care orders:  upon discharge includes apply xeroform to Left lower leg wounds and betadine soaked gauze to left hallux with kerlix and light ace to LLE. Please change every 2 days. Pt to be wb as tolerated to L foot in surgical shoe.

## 2023-04-18 NOTE — PROGRESS NOTE ADULT - SUBJECTIVE AND OBJECTIVE BOX
seen for CHF    no acute complaints  ros negative     MEDICATIONS  (STANDING):  apixaban 5 milliGRAM(s) Oral every 12 hours  atorvastatin 40 milliGRAM(s) Oral at bedtime  carvedilol 25 milliGRAM(s) Oral every 12 hours  chlorhexidine 2% Cloths 1 Application(s) Topical daily  dapagliflozin 10 milliGRAM(s) Oral every 24 hours  dextrose 5%. 1000 milliLiter(s) (100 mL/Hr) IV Continuous <Continuous>  dextrose 5%. 1000 milliLiter(s) (50 mL/Hr) IV Continuous <Continuous>  dextrose 50% Injectable 25 Gram(s) IV Push once  dextrose 50% Injectable 12.5 Gram(s) IV Push once  dextrose 50% Injectable 25 Gram(s) IV Push once  DULoxetine 60 milliGRAM(s) Oral daily  folic acid 1 milliGRAM(s) Oral daily  glucagon  Injectable 1 milliGRAM(s) IntraMuscular once  insulin lispro (ADMELOG) corrective regimen sliding scale   SubCutaneous three times a day before meals  insulin lispro (ADMELOG) corrective regimen sliding scale   SubCutaneous at bedtime  levothyroxine 50 MICROGram(s) Oral daily  losartan 25 milliGRAM(s) Oral daily  pregabalin 150 milliGRAM(s) Oral two times a day  tamsulosin 0.4 milliGRAM(s) Oral at bedtime  torsemide 30 milliGRAM(s) Oral two times a day    MEDICATIONS  (PRN):  acetaminophen     Tablet .. 650 milliGRAM(s) Oral every 6 hours PRN Temp greater or equal to 38C (100.4F), Mild Pain (1 - 3)  albuterol/ipratropium for Nebulization 3 milliLiter(s) Nebulizer every 6 hours PRN Bronchospasm  aluminum hydroxide/magnesium hydroxide/simethicone Suspension 30 milliLiter(s) Oral every 4 hours PRN Dyspepsia  dextrose Oral Gel 15 Gram(s) Oral once PRN Blood Glucose LESS THAN 70 milliGRAM(s)/deciliter  melatonin 3 milliGRAM(s) Oral at bedtime PRN Insomnia  methocarbamol 500 milliGRAM(s) Oral two times a day PRN for severe pain  ondansetron Injectable 4 milliGRAM(s) IV Push every 8 hours PRN Nausea and/or Vomiting      Allergies    clindamycin (Unknown)    Intolerances    allow double protein at meals (Unknown)      Vital Signs Last 24 Hrs  T(C): 36.2 (18 Apr 2023 10:00), Max: 36.7 (17 Apr 2023 21:56)  T(F): 97.1 (18 Apr 2023 10:00), Max: 98 (17 Apr 2023 21:56)  HR: 63 (18 Apr 2023 10:00) (63 - 79)  BP: 111/44 (18 Apr 2023 10:00) (102/65 - 111/44)  BP(mean): --  RR: 18 (18 Apr 2023 10:00) (18 - 18)  SpO2: 97% (18 Apr 2023 10:00) (94% - 100%)    Parameters below as of 18 Apr 2023 05:13  Patient On (Oxygen Delivery Method): BiPAP/CPAP        PHYSICAL EXAM:    GENERAL: NAD  CHEST/LUNG: Clear to ausculation bilaterall  HEART: Regular rate and rhythm; S1 S2  ABDOMEN: Soft, Bowel sounds present  EXTREMITIES:  left leg bandaged   NERVOUS SYSTEM:  Alert & Oriented X3    LABS:                        13.4   5.58  )-----------( 93       ( 17 Apr 2023 05:47 )             46.1     04-17    142  |  107  |  37.9<H>  ----------------------------<  106<H>  4.6   |  26.0  |  1.09    Ca    8.4      17 Apr 2023 05:47  Mg     2.1     04-17            CAPILLARY BLOOD GLUCOSE      POCT Blood Glucose.: 110 mg/dL (18 Apr 2023 12:14)  POCT Blood Glucose.: 84 mg/dL (18 Apr 2023 08:16)  POCT Blood Glucose.: 131 mg/dL (17 Apr 2023 21:58)  POCT Blood Glucose.: 183 mg/dL (17 Apr 2023 17:21)        RADIOLOGY & ADDITIONAL TESTS:

## 2023-04-18 NOTE — PROGRESS NOTE ADULT - REASON FOR ADMISSION
Acute on chronic respiratory failure 2/2 CHF exacerbation

## 2023-04-18 NOTE — DISCHARGE NOTE NURSING/CASE MANAGEMENT/SOCIAL WORK - NSDCVIVACCINE_GEN_ALL_CORE_FT
COVID-19 vaccine, vector-nr, rS-Ad26, PF, 0.5 mL (Jed); 09-Mar-2021 14:40; Tom King (RN); [employer]; [id number] (Exp. Date: 03-Sep-2021); IntraMuscular; Deltoid Left.; 0.5 milliLiter(s);   Td (adult) preservative free; 05-Sep-2020 17:47; Jone Hassan (RN); [employer]; [id number] (Exp. Date: 09-Jul-2022); IntraMuscular; Deltoid Left.; 0.5 milliLiter(s); VIS (VIS Published: 05-Sep-2020, VIS Presented: 05-Sep-2020);

## 2023-04-18 NOTE — DISCHARGE NOTE PROVIDER - CARE PROVIDER_API CALL
Dariel Walden)  Internal Medicine  [address] [place] [address]  Phone: [phone]  Fax: [phone]  Follow Up Time: 1-3 days    Panchito Jain)  Cardiology  [address] [place] [address]  Phone: [phone]  Fax: [phone]  Follow Up Time: 1-3 days    Willem Parish)  Nephrology  [address] [place] [address]  Phone: [phone]  Fax: [phone]  Follow Up Time: 1 week

## 2023-04-18 NOTE — DISCHARGE NOTE PROVIDER - NSDCCPCAREPLAN_GEN_ALL_CORE_FT
PRINCIPAL DISCHARGE DIAGNOSIS  Diagnosis: CHF exacerbation  Assessment and Plan of Treatment: - Treated and improving with diuresis. TTE revealed reduced LVEF.   - Followed by cardiology and medications adjusted. Please take all medications as prescribed and follow up outpatient with your PCP and cardiologist.   - Follow a DASH diet.      SECONDARY DISCHARGE DIAGNOSES  Diagnosis: E coli bacteremia  Assessment and Plan of Treatment: - Completed a course of IV antibiotics.   - Repeat blood cultures negative.   - Follow up outpatient with your PCP for monitoring.    Diagnosis: Acute kidney injury superimposed on CKD  Assessment and Plan of Treatment: - Kidney function monitored closely while on diuresis.   - Follow up outpatient with your PCP for monitoring of your renal function.     PRINCIPAL DISCHARGE DIAGNOSIS  Diagnosis: CHF exacerbation  Assessment and Plan of Treatment: - Treated and improving with diuresis. TTE revealed reduced LVEF.   - Followed by cardiology and medications adjusted. Please take all medications as prescribed and follow up outpatient with your PCP and cardiologist.         SECONDARY DISCHARGE DIAGNOSES  Diagnosis: E coli bacteremia  Assessment and Plan of Treatment: - Completed a course of IV antibiotics.   - Repeat blood cultures negative.   - Follow up outpatient with your PCP for monitoring.    Diagnosis: Acute kidney injury superimposed on CKD  Assessment and Plan of Treatment: - Kidney function monitored closely while on diuresis.   - Follow up outpatient with your PCP for monitoring of your renal function.     PRINCIPAL DISCHARGE DIAGNOSIS  Diagnosis: CHF exacerbation  Assessment and Plan of Treatment: - Treated and improving with diuresis. TTE revealed reduced LVEF.   - Followed by cardiology and medications adjusted. Please take all medications as prescribed and follow up outpatient with your PCP and cardiologist.         SECONDARY DISCHARGE DIAGNOSES  Diagnosis: E coli bacteremia  Assessment and Plan of Treatment: - Completed a course of IV antibiotics.   - Repeat blood cultures negative.   - Follow up outpatient with your PCP for monitoring.    Diagnosis: Acute kidney injury superimposed on CKD  Assessment and Plan of Treatment: - Kidney function monitored closely while on diuresis.   - Follow up outpatient with your PCP for monitoring of your renal function.    Diagnosis: Urinary retention  Assessment and Plan of Treatment: trial of void as outpatient  wagner catheter/flomax

## 2023-04-18 NOTE — DISCHARGE NOTE PROVIDER - NSDCACTIVITY_GEN_ALL_CORE
As tolerated As tolerated/Do not drive or operate machinery/Do not make important decisions/No heavy lifting/straining

## 2023-04-18 NOTE — DISCHARGE NOTE PROVIDER - NSDCMRMEDTOKEN_GEN_ALL_CORE_FT
atorvastatin 40 mg oral tablet: 1 tab(s) orally once a day (at bedtime)  carvedilol 25 mg oral tablet: 1 orally 2 times a day  DULoxetine 60 mg oral delayed release capsule: 1 orally once a day  Eliquis 5 mg oral tablet: 1 orally 2 times a day  folic acid 1 mg oral tablet: 1 tab(s) orally once a day  insulin detemir 100 units/mL subcutaneous solution: 50 unit(s) subcutaneous once a day (at bedtime)  Levemir FlexTouch 100 units/mL subcutaneous solution: 50 international unit(s) subcutaneous once a day (in the morning)  levothyroxine 50 mcg (0.05 mg) oral tablet: 1 tab(s) orally once a day  methocarbamol 500 mg oral tablet: 1 tab(s) orally 2 times a day as needed for  severe pain  NovoLOG FlexPen 100 units/mL injectable solution: 25 unit(s) injectable 3 times a day (before meals)  pregabalin 150 mg oral capsule: 1 orally 2 times a day  traMADol 50 mg oral tablet: 1 orally 2 times a day as needed for  moderate pain   acetaminophen 325 mg oral tablet: 2 tab(s) orally every 6 hours As needed Temp greater or equal to 38C (100.4F), Mild Pain (1 - 3)  aluminum hydroxide-magnesium hydroxide 200 mg-200 mg/5 mL oral suspension: 30 milliliter(s) orally every 4 hours As needed Dyspepsia  atorvastatin 40 mg oral tablet: 1 tab(s) orally once a day (at bedtime)  carvedilol 25 mg oral tablet: 1 orally 2 times a day  dapagliflozin 10 mg oral tablet: 1 tab(s) orally every 24 hours  DULoxetine 60 mg oral delayed release capsule: 1 orally once a day  Eliquis 5 mg oral tablet: 1 orally 2 times a day  folic acid 1 mg oral tablet: 1 tab(s) orally once a day  insulin glargine 100 units/mL subcutaneous solution: 35 unit(s) subcutaneous once a day  levothyroxine 50 mcg (0.05 mg) oral tablet: 1 tab(s) orally once a day  losartan 25 mg oral tablet: 1 tab(s) orally once a day  melatonin 3 mg oral tablet: 1 tab(s) orally once a day (at bedtime) As needed Insomnia  methocarbamol 500 mg oral tablet: 1 tab(s) orally 2 times a day As needed for severe pain  pregabalin 150 mg oral capsule: 1 orally 2 times a day  tamsulosin 0.4 mg oral capsule: 1 cap(s) orally once a day (at bedtime)  torsemide 10 mg oral tablet: 3 tab(s) orally 2 times a day

## 2023-04-18 NOTE — PROGRESS NOTE ADULT - PROVIDER SPECIALTY LIST ADULT
Cardiology
Hospitalist
Infectious Disease
Infectious Disease
Internal Medicine
Nephrology
Cardiology
Hospitalist
Infectious Disease
Internal Medicine
Nephrology
Cardiology
Hospitalist
Infectious Disease
Internal Medicine
Nephrology
Podiatry
Nephrology

## 2023-04-18 NOTE — DISCHARGE NOTE NURSING/CASE MANAGEMENT/SOCIAL WORK - NSDCPEFALRISK_GEN_ALL_CORE
For information on Fall & Injury Prevention, visit: [email or web address] OR  [email or web address] OR  https://www.cdc.gov/steadi/patient.html

## 2023-04-18 NOTE — PROGRESS NOTE ADULT - ASSESSMENT
CKD(III), proteinuria +DM  JAYLENE: CM (EF<20%)/decompensated CHF with prerenal component  Renal sono: no hydro; suboptimal visualization of L kidney  Clinically improved and stable  - avoid potential nephrotoxins  - cont diuretics and Losartan  - SGLT-2 added  - follow labs    Hyperphosphatemia: serum phos 3.0  - cont to observe off binders

## 2023-05-15 NOTE — PATIENT PROFILE ADULT. - PRO MENTAL HEALTH SX RECENT
"ED Positive Culture Follow-up/Notification Note:    Date: 5/14/2023     Patient seen in the ED on 5/10/2023 for abdominal pain and fever. Patient is febrile in the ED and has leukocytosis. No diarrhea, constipation, nausea, vomiting, or urinary symptoms. ERP ruled out UTI. Patient was discharged with 7 days of ciprofloxacin and metronidazole for diverticulitis.  1. Acute diverticulitis Acute   2. Left lower quadrant abdominal pain Acute      Discharge Medication List as of 5/10/2023 11:54 PM        START taking these medications    Details   ciprofloxacin (CIPRO) 500 MG Tab Take 1 Tablet by mouth 2 times a day for 7 days., Disp-14 Tablet, R-0, Normal      metroNIDAZOLE (FLAGYL) 500 MG Tab Take 1 Tablet by mouth 3 times a day for 7 days., Disp-21 Tablet, R-0, Normal             Allergies: Patient has no known allergies.     Vitals:    05/10/23 2055 05/10/23 2106 05/10/23 2200 05/10/23 2300   BP:   120/67 110/65   Pulse:   79 73   Resp:   19 18   Temp:  (!) 38.1 °C (100.6 °F)  37.4 °C (99.3 °F)   TempSrc:  Oral     SpO2:   95% 96%   Weight: 49.6 kg (109 lb 5.6 oz)      Height: 1.549 m (5' 1\")          Final cultures:   Results       Procedure Component Value Units Date/Time    Urine Culture (New) [[id number]]  (Abnormal)  (Susceptibility) Collected: 05/10/23 6318    Order Status: Completed Specimen: Urine Updated: 05/13/23 1128     Significant Indicator POS     Source UR     Site -     Culture Result Usual urogenital mayelin 10-50,000 cfu/mL      Escherichia coli  ,000 cfu/mL      Narrative:      Indication for culture:->Evaluation for sepsis without a  clear source of infection  Indication for culture:->Evaluation for sepsis without a    Susceptibility       Escherichia coli (1)       Antibiotic Interpretation Microscan   Method Status    Amikacin  [*]  Sensitive <=16 mcg/mL RAMESH Final    Ampicillin Sensitive <=8 mcg/mL RAMESH Final    Amoxicillin/CA  [*]  Sensitive <=8/4 mcg/mL RAMESH Final    Aztreonam  [*]  Sensitive " <=4 mcg/mL RAMESH Final    Ceftolozane+Tazobactam  [*]  Sensitive <=2 mcg/mL RAMESH Final    Ceftriaxone Sensitive <=1 mcg/mL RAMESH Final    Ceftazidime  [*]  Sensitive <=1 mcg/mL RAMESH Final    Cefazolin Sensitive <=2 mcg/mL RAMESH Final     Breakpoints when Cefazolin is used for therapy of infections  other than uncomplicated UTIs due to Enterobacterales are as  follows:  RAMESH and Interpretation:  <=2 S  4 I  >=8 R         Ciprofloxacin Sensitive <=0.25 mcg/mL RAMESH Final    Cefepime Sensitive <=2 mcg/mL RAMESH Final    Cefuroxime Sensitive <=4 mcg/mL RAMESH Final    Ceftazidime+Avibactam  [*]  Sensitive <=4 mcg/mL RAMESH Final    Ampicillin/sulbactam Sensitive <=4/2 mcg/mL RAMESH Final    Ertapenem  [*]  Sensitive <=0.5 mcg/mL RAMESH Final    Tobramycin Sensitive <=2 mcg/mL RAMESH Final    Nitrofurantoin Sensitive <=32 mcg/mL RAMESH Final    Gentamicin Sensitive <=2 mcg/mL RAMESH Final    Imipenem  [*]  Sensitive <=1 mcg/mL RAMESH Final    Levofloxacin Sensitive <=0.5 mcg/mL RAMESH Final    Meropenem  [*]  Sensitive <=1 mcg/mL RAMESH Final    Meropenem/Vaborbactam  [*]  Sensitive <=2 mcg/mL RAMESH Final    Minocycline Sensitive <=4 mcg/mL RAMESH Final    Pip/Tazobactam Sensitive <=8 mcg/mL RAMESH Final    Trimeth/Sulfa Sensitive <=0.5/9.5 mcg/mL RAMESH Final    Tetracycline  [*]  Resistant >8 mcg/mL RAMESH Final    Tigecycline Sensitive <=2 mcg/mL RAMESH Final               [*]  Suppressed Antibiotic                   Urinalysis [[id number]]  (Abnormal) Collected: 05/10/23 4616    Order Status: Completed Specimen: Urine Updated: 05/12/23 0237     Color Yellow     Character Clear     Specific Gravity 1.008     Ph 6.5     Glucose Negative mg/dL      Ketones Negative mg/dL      Protein Negative mg/dL      Bilirubin Negative     Urobilinogen, Urine 1.0     Nitrite Negative     Leukocyte Esterase Negative     Occult Blood Trace     Micro Urine Req Microscopic    Narrative:      Indication for culture:->Evaluation for sepsis without a  clear source of infection    Blood Culture  "[[id number]] Collected: 05/10/23 2139    Order Status: Completed Specimen: Blood from Peripheral Updated: 05/11/23 0736     Significant Indicator NEG     Source BLD     Site PERIPHERAL     Culture Result No Growth  Note: Blood cultures are incubated for 5 days and  are monitored continuously.Positive blood cultures  are called to the RN and reported as soon as  they are identified.      Narrative:      Per Hospital Policy: Only change Specimen Src: to \"Line\" if  specified by physician order.  No site indicated    Blood Culture [[id number]] Collected: 05/10/23 2202    Order Status: Completed Specimen: Blood from Peripheral Updated: 05/11/23 0736     Significant Indicator NEG     Source BLD     Site PERIPHERAL     Culture Result No Growth  Note: Blood cultures are incubated for 5 days and  are monitored continuously.Positive blood cultures  are called to the RN and reported as soon as  they are identified.      Narrative:      Per Hospital Policy: Only change Specimen Src: to \"Line\" if  specified by physician order.  No site indicated            Plan:   Blood culture is negative. Urine culture grew pan-sensitive E coli, though the patient had no urinary symptoms so this result represents asymptomatic bacteriuria and no treatment is indicated. Antibiotics are prescribed for diverticulitis. No changes required based on culture results. Appropriate antibiotic therapy prescribed.     Chuck Tyler, PharmD    " none

## 2023-05-25 NOTE — DISCHARGE NOTE NURSING/CASE MANAGEMENT/SOCIAL WORK - NSDCPEPTCOWADIET_GEN_ALL_CORE
Keep your intake of vitamin K regular. The highest amount of vitamin K is found in green and leafy vegetables like broccoli, lettuces, cabbage, and spinach. You can eat these foods but keep the portion size the same. Changes in the amount you eat can affect your PT/INR blood test. Contact your doctor before making any major changes in your diet. Limit your alcohol intake. Dr. Wasserman

## 2023-06-06 NOTE — ED PROVIDER NOTE - MDM ORDERS SUBMITTED SELECTION
From: Katya Coleman  To: Stephanie Sierra  Sent: 6/6/2023 8:36 AM CDT  Subject: follow up visit to Dr. Patel    Good morning Dr. Sierra,  I need a referral to see Dr. Patel at [facility]. I have a follow up 6 months appoitment for a test and an office visit. Please ask Rocco to write a referral for me.  Thank you very much!    Katya Coleman   Labs/EKG/Imaging Studies

## 2023-07-05 NOTE — ED ADULT TRIAGE NOTE - MEANS OF ARRIVAL
CC:  Cerebral Aneurysm     HPI:  Nelly Meyer is a 81 y.o.  female, with significant past medical history of HTN, HLD, and osteoporosis, who presented to [place] ED for evaluation s/p fall. Patient states she was standing on a stool in her bathroom changing a sconce light bulb when she lost her footing and fell backwards onto the tile, striking the back of her head. She denies loss of consciousness and recalls the entire event. Head CT was completed upon arrival to the ED, followed by CTA, showing a 7mm basilar tip aneurysm, prompting neurointervention consult.     Patient states she does have midback discomfort from T12 fracture. She denies headaches, visual changes, nausea, vomiting, weakness paresthesias. She was unaware of having an aneurysm. She denies family history of aneurysms. She takes Aspirin 81mg daily but no other anticoagulation or antiplatelet medication.    She underwent a DSA on 6/15/2023 which showed a 4.5x5.0x4.7 mm basilar tip aneurysm. She had an uneventful hospital stay and was discharged home.    She returns today to the clinic for further evaluation and recommendations. Since discharge home she has been feeling well. She denies WHOL, dizziness, weakness or difficulty with speech. She was a former smoker. She denies family history of cerebral aneurysms.       PMH:   has a past medical history of Anemia, Basal cell carcinoma, Constipation, Gallstones, GERD (gastroesophageal reflux disease), Hiatal hernia, Hypertension, Kidney stone, Lipid disorder, Osteoporosis, and Postmenopausal.      PSH:  has a past surgical history that includes ORIF ankle fracture bimalleolar (Left); Breast surgery; Mohs surgery; Tear duct surgery (Right); Colonoscopy; and Esophagogastroduodenoscopy.      FH:  family history is not on file.      SH:    Social History     Tobacco Use   • Smoking status: Former   • Smokeless tobacco: Never   Vaping Use   • Vaping Use: Never used   Substance Use Topics   • Alcohol  use: Yes     Alcohol/week: 7.0 standard drinks of alcohol     Types: 7 Glasses of wine per week     Comment: occasional   • Drug use: No     ALL:  No Known Allergies      Medications:    Current Outpatient Medications   Medication Sig Dispense Refill   • acetaminophen (TYLENOL) 500 mg tablet Take 1 tablet (500 mg total) by mouth every 6 (six) hours as needed for pain.     • amLODIPine (NORVASC) 5 mg tablet Take 10 mg by mouth nightly.     • aspirin 81 mg enteric coated tablet Take 81 mg by mouth nightly.     • atorvastatin (LIPITOR) 40 mg tablet Take 40 mg by mouth nightly.     • cholecalciferol, vitamin D3, (VITAMIN D3) 25 mcg (1,000 unit) capsule Take 1,000 Units by mouth nightly.     • COLLAGEN-BIOTIN-ASCORBIC ACID ORAL Take by mouth daily.     • latanoprost 0.005 % drops, emulsion Administer 1 drop into both eyes nightly.     • lidocaine (ASPERCREME) 4 % adhesive patch,medicated topical patch Apply 1 patch topically daily as needed (back pain).     • lisinopriL (PRINIVIL) 20 mg tablet Take 1 tablet (20 mg total) by mouth nightly. 30 tablet 1   • vit A/vit C/biotin/zinc/copper (HAIR-SKIN-NAIL,VIT A,C-BIOTIN, ORAL) Take 1 tablet by mouth daily.       No current facility-administered medications for this visit.     Review of Systems   All other systems reviewed and are negative.      EXAM:    There were no vitals filed for this visit.    Well appearing female in NAD.    Neurologic Exam     Mental Status   Oriented to person, place, and time.   Attention: normal.   Speech: speech is normal   Level of consciousness: alert  Knowledge: good.     Cranial Nerves   Cranial nerves II through XII intact.     Motor Exam   Muscle bulk: normal  Right arm pronator drift: absent  Left arm pronator drift: absent    Strength   Strength 5/5 throughout.     Sensory Exam   Light touch normal.     Gait, Coordination, and Reflexes     Gait  Gait: normal    2+DP pulses bilaterally, no ankle edema  Eyes: PERRL, no conjunctival pallor        DATA REVIEW:  CTA HEAD/NECK W/WO CONTRAST 05/21/2023  Findings:  CTA NECK:  Aortic Arch: Normal Configuration  Origin of Great Vessels: No significant stenosis  Right Carotid Artery:  Level of bifurcation: C4  Internal Carotid Artery Stenosis: No significant stenosis by NASCET criteria.  Plaque characterization: Mild calcified atherosclerotic plaque.  Left Carotid Artery:  Level of bifurcation: C4  Internal Carotid Artery Stenosis: No significant stenosis by NASCET criteria.  Plaque characterization: Mild calcified atherosclerotic plaque.  Vertebral Arteries:  Characteristics: The vertebral arteries are codominant.  No significant  atherosclerotic plaque or stenosis.  CTA HEAD:  Anterior Circulation:  Right ICA:  Petrous & Cavernous segments: Mild calcified atherosclerotic plaque of the  cavernous segment. No evidence of significant stenosis or aneurysm.  Ophthalmic: Mild calcified atherosclerotic plaque. No evidence of significant  stenosis or aneurysm.  Terminal ICA: No evidence of significant stenosis or aneurysm.  A1 segment: No evidence of significant stenosis.  M1 segment: No evidence of significant stenosis.  Middle cerebral artery trunk: No evidence of significant stenosis or aneurysm.  Anterior communicating artery: No evidence of aneurysm.  Posterior communicating artery: Aplastic.  Left ICA:  Petrous & Cavernous segments: Mild calcified atherosclerotic plaque of the  cavernous segment. No evidence of significant stenosis or aneurysm.  Ophthalmic: Mild calcified atherosclerotic plaque. No evidence of significant  stenosis or aneurysm.  Terminal ICA: No evidence of significant stenosis or aneurysm.  A1 segment: Hypoplastic.  No evidence of significant stenosis.  M1 segment: No evidence of significant stenosis.  Middle cerebral artery trunk: No evidence of significant stenosis or aneurysm.  Anterior communicating artery: No evidence of aneurysm.  Posterior communicating artery: No evidence of  aneurysm.  Posterior circulation:  Right distal vertebral artery: No evidence of significant stenosis.  Left distal vertebral artery: No evidence of significant stenosis.  Basilar trunk: No evidence of significant stenosis.  Basilar terminus: Aneurysm measuring 5 x 6 x 7 mm.  Right posterior cerebral artery (PCA): No evidence of significant stenosis.  Left posterior cerebral artery (PCA): Fetal origin. No evidence of significant  stenosis  Incidental Findings: There is a 2 cm nodule in the left thyroid lobe.  Patchy  groundglass and interstitial opacities at the lung apices.  Degenerative changes of the imaged spine.  IMPRESSION:  1.  No high-grade stenosis or occlusion of the major intracranial arteries.  2.  Basilar tip aneurysm measuring up to 7 mm.  3.  No high-grade stenosis of the cervical carotid or vertebral arteries.  4.  Left thyroid nodule measuring 2 cm.  5.  Patchy groundglass and interstitial opacities at the lung apices.            I personally reviewed and interpreted the images as well.   I personally reviewed the patient's Epic record and referring physician's notes.     A/P:  In summary, Nelly Meyer is a 81 y.o. female with an incidentally discovered 6 mm basilar tip aneurysm which was revealed on head CT and subsequent CTA after she sustained a mechanical fall in May 2023.   She is s/p diagnostic cerebral angiogram on 6/15/2023, which confirmed a basilar tip aneurysm measuring nearly 6 mm in its greatest dimension with a celestin's excresence along the dome.     I explained to the patient given the size, shape and morphology of the aneurysm, I am concerned about the cumulative risk of rupture. I do feel that benefits of treatment, while not insignificant, outweigh the risks. I noted that the posterior circulation, particularly the basilar apex is a high risk location and we do worry about aneurysm rupture, which in general is higher in this location can also lead to significant morbidity  and in many cases mortality.     The patient is otherwise healthy and leads an active lifestyle.     I explained the treatment options.      Open craniotomy and clip ligation of an aneurysm in this location is high risk and I would not recommend it.      Endovascular treatment options include possible intrasaccular occlusion with the WEB device, coiling stent vs. Balloon-assisted. Risks of the procedure include but are not limited to intraoperative aneurysm rupture and resulting subarachnoid hemorrhage, catastrophic stroke, numbness, weakness, paralysis, coma and death; the patient will be initiated on antiplatelet therapy prior to the procedure once we have a date selected. Also, I will need her to obtain cardiac and medical clearances.      I discussed alternatives to treatment which include serial MRAs or CTAs and yearly angiograms for monitoring, but again if there was any change in the aneurysm or symptoms such as a sentinel headache, this would necessitate treatment.     The patient is concerned about aneurysm rupture and would like to proceed with scheduling treatment. I will also call her primary care provider and her sister, who she stated is her family contact and POA. I stated a family member/POA will need to be present and involved around the time of her procedure.     All questions answered in detail. Opportunity offered for second opinion. She was thankful for the care she has received thus far.     I told the patient that if she develops any headache or worst headache of life, nausea, vomiting, numbness, weakness, dizziness, slurred speech, blurry or double vision or any other neurologic symptoms to call 911 and/or seek immediate medical attention in the emergency department as this could be a sign of aneurysm rupture. She understands.     Ramiro Rain MD         stretcher

## 2023-08-07 NOTE — DIETITIAN INITIAL EVALUATION ADULT. - FACTORS AFF FOOD INTAKE
none Bilateral Helical Rim Advancement Flap Text: The defect edges were debeveled with a #15 blade scalpel.  Given the location of the defect and the proximity to free margins (helical rim) a bilateral helical rim advancement flap was deemed most appropriate. Using a sterile surgical marker, the appropriate advancement flaps were drawn incorporating the defect and placing the expected incisions between the helical rim and antihelix where possible.  The area thus outlined was incised through and through with a #15 scalpel blade.  With a skin hook and iris scissors, the flaps were gently and sharply undermined and freed up. Following this, the designed flaps were placed into the primary defect and sutured into place.

## 2023-09-01 NOTE — ED PROVIDER NOTE - NEUROLOGICAL, MLM
Bobm for pt to call back    Adriana(Chayo)   Alert and oriented, no focal deficits, no motor or sensory deficits.

## 2023-11-08 NOTE — CONSULT NOTE ADULT - REASON FOR ADMISSION
Uncontrolled DM
Detail Level: Simple
Render Risk Assessment In Note?: yes
Additional Notes: Patient  still flaring after permethrin treatment and 4 pills of ivermectin. Bx performed today for further evaluation and treatment. Patient states she is still itchy, she is itchy throughout the day but is worse at night. She feels like she might have swollen lymph nodes. she denies fever, sore throat, muscle pain. She is currently already on antibiotics for a UTI. Rash is not present on her hands or feet.  She will follow up with her PCP for continued evaluation and management. They prescribed her prednisone which she did not take yet. Possible blood work should be done by PCP to evaluate for any infections or possible viral causes of rash. If the rash worsens or her symptoms worsens, she develops signs of a fever she should follow up with PCP or ER. Follow up in 2 weeks.

## 2023-11-13 NOTE — ED PROVIDER NOTE - NORMAL, MLM
Quality 226: Preventive Care And Screening: Tobacco Use: Screening And Cessation Intervention: Patient screened for tobacco use and is an ex/non-smoker Quality 111:Pneumonia Vaccination Status For Older Adults: Patient did not receive any pneumococcal conjugate or polysaccharide vaccine on or after their 60th birthday and before the end of the measurement period Detail Level: Detailed Quality 431: Preventive Care And Screening: Unhealthy Alcohol Use - Screening: Patient not identified as an unhealthy alcohol user when screened for unhealthy alcohol use using a systematic screening method Quality 110: Preventive Care And Screening: Influenza Immunization: Influenza immunization was not ordered or administered, reason not given ronna all pertinent systems normal

## 2023-12-15 NOTE — PHYSICAL THERAPY INITIAL EVALUATION ADULT - NAME OF CLINICIAN

## 2023-12-28 NOTE — PATIENT PROFILE ADULT. - CENTRAL VENOUS CATHETER
This W completed chart review and spoke with team.     Pt was admitted on 12/24 for a suicide attempt by overdose. Lives in [place] with family. MOP is Ramona and FOP is Brant, both have been present at the bedside. Cici's insurance is through [employer] and her PCP is Dr. Person.     CAP recommending acute psychiatric hospitalization. Referral sent to [facility].     [facility] requesting signed consent packet. Spoke with [facility] at the bedside and provided her with consent packet. Upon completion faxed signed packet to [facility]. [facility] denied any other needs at this time.     [facility] accepted pt. Transportation arranged for in between [id number]. Transfer packet completed with [facility] and placed in paper chart. RN, Yanci updated.    no

## 2024-02-01 NOTE — ED CDU PROVIDER SUBSEQUENT DAY NOTE - GASTROINTESTINAL NEGATIVE STATEMENT, MLM
Maintain/Continue a heathy lifestyle.  Choose a diet rich in fruits, vegetables, and low-fat dairy products, but low in meats, sweets, and refined grains   Be more active. If you are able, walk for 35 mins 5 times a week.         Yearly Physical for Adults   About this topic   Most people do not want to be sick. Having a checkup each year with your doctor is one way to help you stay healthy. You may need to see your doctor more or less often. How often you need to go to the doctor depends on your age. Your family and medical history also play a role in how often you need to go to the doctor. Going to see your doctor on a routine basis can help you find problems early or even before they start. This may make it easier to treat or cure your problem.  General   Your doctor will talk about many things during your checkup. Your doctor may ask about:  Your medical and family history.  All the drugs you are taking. Be sure to include all prescription, over the counter, and herbal supplements. Tell the doctor if you have any drug allergy. Bring a list of drugs you take with you.  How you are feeling and if you are having any problems.  Risky behaviors like smoking, drinking alcohol, using illegal drugs, not wearing seatbelts, having unprotected sex, etc.  Your doctor will do a physical exam and may check your:  Height and weight  Blood pressure  Reflexes  Memory  Vision  Hearing  Your doctor may order:  Lab tests  ECG to check your heart rhythm  X-rays  Tests or treatments based on your exam  What lifestyle changes are needed?   Your doctor may suggest you make changes to your lifestyle at this visit. The doctor may talk with you about being more active or lowering stress levels. Ask your doctor what you need to do.  What drugs may be needed?   Your doctor may order drugs or vaccines to protect you from illnesses.  What changes to diet are needed?   Talk to your doctor to see if any changes are needed to your diet.  When do  I need to call the doctor?   Call your doctor if you need to learn about any test results. Together you can make a plan for more care.  Helpful tips   Make a list of questions for your doctor before you go. This will help you remember to ask about any concerns. Write down any answers from your doctor so you can look over them after your visit.   Tell your doctor about any changes in your body or health since your last visit.  Ask your doctor about any screening tests you need.  Where can I learn more?   American Academy [employer]  http://familydoctor.org/familydoctor/en/prevention-wellness/staying-healthy/healthy-living/preventive-services-for-healthy-living.printerview.html   Centers for Disease Control  http://www.cdc.gov/family/checkup/   Last Reviewed Date   2019-04-22  Consumer Information Use and Disclaimer   This information is not specific medical advice and does not replace information you receive from your health care provider. This is only a brief summary of general information. It does NOT include all information about conditions, illnesses, injuries, tests, procedures, treatments, therapies, discharge instructions or life-style choices that may apply to you. You must talk with your health care provider for complete information about your health and treatment options. This information should not be used to decide whether or not to accept your health care providers advice, instructions or recommendations. Only your health care provider has the knowledge and training to provide advice that is right for you.  Copyright   Copyright © 2021 [employer] and its affiliates and/or licensors. All rights reserved.       no abdominal pain, no bloating, no constipation, no diarrhea, no nausea and no vomiting.

## 2024-02-04 NOTE — ED ADULT TRIAGE NOTE - ESI TRIAGE ACUITY LEVEL, MLM
Pt discharged to home, alert and oriented. Denies any questions about discharge instructions. Will follow up as directed. encouraged to return for any worsening symptoms.        Susie Johnson RN  02/04/24 0143     2

## 2024-02-18 NOTE — PATIENT PROFILE ADULT - DEAF OR HARD OF HEARING?
Extensive metastases noted on imaging, including bony mets.     Plan  Palliative care following, further recs appreciated  Tylenol 975 q8  Continue gabapentin 100 q.a.m., 200 BID  Continue dilaudid 8 mg PO q8  Dilaudid 6 mg PO q6 prn for moderate and severe pain  Dilaudid 0.5 mg IV q4 prn for breakthrough pain   yes

## 2024-06-20 NOTE — H&P ADULT - NSHPPHYSICALEXAM_GEN_ALL_CORE
GENERAL: morbidly obese male lying in stretcher in NAD, alert, oriented, reactive  HEENT: Obese, PERRL, symmetric, 2mm pupils, sluggishly reactive, no scleral icterus  CV: Sinus tachy, -110, no murmurs, +s1/s2, PPM/AICD in place  RESP: No significant sputum production, no wheezes, rales or rhonchi, poor airflow b/l lung fields   ABD: Distended, no anasarca, normoactive bowel sounds, non tender  : No d/c  MSK: Obese, no obvious joint sweelling or erythema  EXT: +peripheral edema, chronic LE venous skin changes/ulcerations, open wounds with pus/serious drainage. RLE erythematous/increased warmth  SKIN: As above, warm/dry  NEURO: Alert, oriented, reactive, non focal 127

## 2024-07-30 NOTE — H&P ADULT - NS ATTEST RISKLEV GEN_ALL_CORE
COMPLETE FEMALE EXAMINATION    HISTORY:    Nuzhat Du is a 50 year old female who presents for a complete physical exam.    NEW: left shoulder pain for 3-4 weeks. Sleeps with arm up. Seems to have improved now. There was no known overuse or injury. No previous injury or surgery to that arm. She works at a desk as manager. Has been getting benefit with Icy hot and rest.     She has a history of Bipolar diagnosed in [place],  but this was changed to Major Depressive disorder when she saw Marina Donnelly PhD. Remains off Lamictal but continues on Effexor 75 mg daily. Overall feels things are good. Often wonders if she needs its. No side effects. No thoughts of homicide or suicide.      She has a history of GERD. Currently, on Omeprazole 20 mg one pills daily. Failed weaning. EGD 8/2015 which was normal. No anemia.      She has exercises induced asthma. Uses Albuterol before exercising only and hasn't needed much. She currently vapes 4 years, smoked previous to that for 10 years. Wants to quit. Declines Chantix. Previously tried nicotine but was too strong. On Wellbutrin unsure why she was taken off.      She has overweight with hypertension. She is currently on Chlorthalidone 25 mg daily. Blood pressure today is at goal. No dizziness or light-headeness. Normal renal function and potassium.      She has vitamin D deficiency resolved with D 62.2. On Ergocalciferol 72160 IU weekly. Consistently taking her supplement.      She is overweight with hypercholesterolemia. Weight is unchanged in the past year. The 10-year ASCVD risk score ([place] DK, et al., 2019) is: 2.7%. CAC score 0-9/2023.  Strong paternal family history of heart disease father at 49. -up from 167.  She does not currently treat her cholesterol.  Denies palpitations and chest pain.       Menstrual History: Patient's last menstrual period was 06/05/2024 (exact date).  Tubal Sterilization.  She had an endometrial ablation 3/2018, with only scant bleeding  monthly now. Anemia resolved off iron. Pap HPV neg  2023. Sexually active. Declines need for std testing.       SOCIAL HISTORY:    Social History     Tobacco Use   • Smoking status: Former     Current packs/day: 0.00     Types: Cigarettes     Quit date: 2020     Years since quitting: 3.8   • Smokeless tobacco: Never   Vaping Use   • Vaping status: Every Day   • Substances: Nicotine, Flavoring   • Devices: Disposable   Substance Use Topics   • Alcohol use: Yes     Comment: occassional   • Drug use: No       MEDICAL HISTORY:  Past Medical History:   Diagnosis Date   • Acid reflux    • Anxiety    • Astigmatism    • Bipolar disorder  (CMD)    • Depression    • Exercise-induced asthma (CMD)    • Hypercholesteremia    • Myopia        SURGICAL HISTORY:    Past Surgical History:   Procedure Laterality Date   • ----------mammogram----------      2020, 3/2022   • Axillary surgery      benign lesion   •  delivery+postpartum care       x 5   • Esophagogastroduodenoscopy transoral flex w/bx single or mult  2015    GERD, fundic gland polyposis of stomach   • Hysteroscopy endometrial ablation  2018   • Tubal ligation  2012       FAMILY HISTORY:  Family History   Problem Relation Age of Onset   • Thyroid Mother    • Migraine Mother    • Cancer Mother         glioblastoma above R eye   • Diabetes Father    • Hyperlipidemia Father    • Heart disease Father         MI at age 50   • Depression Father         manic   • Substance Abuse Father    • Depression Sister    • Bipolar disorder Sister    • Other Sister         PCOS   • Thyroid Sister    • Thyroid Sister    • Diabetes Sister         Type 2   • Stroke Maternal Grandmother    • Musculoskeletal Maternal Grandmother    • Hyperlipidemia Maternal Grandmother    • Hypertension Maternal Grandmother    • Musculoskeletal Maternal Grandfather    • Heart Paternal Grandfather    • Asthma Daughter    • Genetic Disorder Son         Aspergers   •  Cancer, Breast Neg Hx    • Cancer, Colon Neg Hx    • Cancer, Ovarian Neg Hx        MEDICATIONS:   Current Outpatient Medications   Medication Sig   • albuterol (ProAir HFA) 108 (90 Base) MCG/ACT inhaler Inhale 2 puffs into the lungs four times daily. For wheezing   • chlorthalidone (THALITONE) 25 MG tablet Take 1 tablet by mouth daily.   • omeprazole (PrilOSEC) 20 MG capsule Take 1 capsule by mouth daily.   • venlafaxine XR (EFFEXOR XR) 75 MG 24 hr capsule Take 1 capsule by mouth daily.   • Vitamin D, Ergocalciferol, 1.25 mg (50,000 units) capsule Take 1 capsule by mouth 1 day a week.   • omeprazole (PrilOSEC) 20 MG capsule Take 1 capsule by mouth daily.   • DISPENSE Using Plexus products     No current facility-administered medications for this visit.        ALLERGIES:    ALLERGIES:  No Known Allergies    I have reviewed the patient's medications and allergies, past medical, surgical, social and family history, updating these as appropriate.  See Histories section of the EMR for a display of this information.    REVIEW OF SYSTEMS:    See attached ROS      SCREENING :  Review Flowsheet  More data exists       7/30/2024   PHQ 2/9 Score   Adult PHQ 2 Score 1   Adult PHQ 2 Interpretation No further screening needed   Little interest or pleasure in activity? Several days   Feeling down, depressed or hopeless? Not at all   Adult PHQ 9 Score 3   Adult PHQ 9 Interpretation Minimal Depression   Trouble falling or staying asleep or sleeping all the time? Several days   Feeling tired or having little energy? Not at all   Poor appetite or overeating? Not at all   Feeling bad about yourself or that you are a failure or have let yourself or family down? Not at all   Trouble concentrating on things such as reading the newspaper or watching TV? Several days   Moving or speaking slowly that other people have noticed or the opposite - being so fidgety or restless that you have been moving around a lot more than usual? Not at all    Thoughts that you would be better off dead or of hurting yourself in some way? Not at all   If you reported any problems, how difficult have these problems made it to do your work, take care of things at home, or get along with other people? Not difficult at all    Details                   DEPRESSION ASSESSMENT/PLAN:  Mild symptoms, will monitor and reevaluate.      Body mass index is 39.43 kg/m².    BMI ASSESSMENT/PLAN:  BMI is in obese range.    See patient education in AVS       PHYSICAL EXAMINATION:    Visit Vitals  /74 (BP Location: RUE - Right upper extremity, Patient Position: Sitting, Cuff Size: Large Adult)   Pulse 80   Temp 96.9 °F (36.1 °C) (Temporal)   Resp 16   Ht 5' 5.5\" (1.664 m)   Wt 109.1 kg (240 lb 9.6 oz)   LMP 06/05/2024 (Exact Date) Comment: Spotting or light period/ 30 to 60 days in between.   SpO2 98%   BMI 39.43 kg/m²     Wt Readings from Last 3 Encounters:   07/30/24 109.1 kg (240 lb 9.6 oz)   09/17/23 107.3 kg (236 lb 8.9 oz)   07/20/23 104.7 kg (230 lb 12.8 oz)        General:  Well-appearing 50 year old female. In no apparent distress.  Well-groomed. Hygiene okay.   Eyes:  Pupils equal, round, reactive to light and accommodation.Conjunctivae pink.   HENT:   Bilateral external ears and canals are normal without cerumen impaction. TM are pearly gray without bulging or perforation. Oropharynx is clear. Hearing grossly intact.   Neck:  Supple. No thyromegaly.  Trachea midline.  Respiratory:  Normal rate and depth of breathing. Respiratory effort normal. Lungs clear to auscultation bilaterally. No crackles, rhonchi or wheezes.    Cardiovascular:  Regular rate and rhythm. No murmurs.  2+ dorsalis pedis pulses bilaterally. No ankle or pedal edema.  Gastrointestinal:  Soft. Nontender. Non-distended. Normal bowel sounds. No pulsatile or other abdominal masses. No hepatosplenomegaly or splenomegaly.    Breasts:  Symmetric bilaterally. No masses or nodules. No dimpling.  No nipple  discharge. No tenderness noted.  No lymphadenopathy in the supraclavicular, infraclavicular or regional nodes.   Genitalia: deferred  Musculoskeletal:  left shoulder without  pain to palpation with generally intact range of motion.   Neurologic:  Alert and oriented times 3.  Gait is normal.  Mental status normal with no gross cognitive impairment.  Integumentary:  Warm. Dry. Pink. No rashes or lesions.   Lymphatic:  No lymphadenopathy in submental, submandibular or cervical chain. No supraclavicular or infraclavicular lymphadenopathy. No axillary or inguinal/groin lymphadenopathy.    Psychiatric: Cooperative. Appropriate mood and affect. Normal judgment.  Patient denies homicidal and suicidal ideations.     RESULTS:    Pertinent labs and imaging studies reviewed.   Discussed risks, possible side effects, benefits of vaccines..    Lab Results   Component Value Date    HGBA1C 5.6 03/09/2022    CHOLESTEROL 236 (H) 07/23/2024    HDL 50 07/23/2024    CALCLDL 171 (H) 07/23/2024    TRIGLYCERIDE 74 07/23/2024    CREATININE 0.72 07/23/2024    AST 19 07/23/2024    BILIRUBIN 0.5 07/23/2024    TSH 1.519 09/17/2023    WBC 7.4 09/17/2023    HGB 14.1 07/23/2024     09/17/2023    BNP 8 05/30/2017    VITD25 62.2 07/23/2024          ASSESSMENT AND PLAN:        Diagnoses and all orders for this visit:  Annual physical exam  Benign essential HTN  -    Well controlled. No change.  -  chlorthalidone (THALITONE) 25 MG tablet; Take 1 tablet by mouth daily.  Gastroesophageal reflux disease without esophagitis  -     at goal. No change.   - omeprazole (PrilOSEC) 20 MG capsule; Take 1 capsule by mouth daily.  -     Magnesium; Future  -     Vitamin B12; Future  -     Hemoglobin and Hematocrit; Future  Major depressive disorder, recurrent episode, mild (CMD)  -     Well controlled. No change.  - venlafaxine XR (EFFEXOR XR) 75 MG 24 hr capsule; Take 1 capsule by mouth daily.  Family history of ischemic heart disease  Hyperlipidemia,  unspecified hyperlipidemia type  -     not at goal. Low advasc and CAC 0. Advised to stop smoking and keep BP controlled. Due to family history  there is a low threshold to start statin. Work on diet and exercise for now.   - Comprehensive Metabolic Panel; Future  -     Lipid Panel With Reflex; Future  Obesity (BMI 30-39.9)   -not at goal. See AVS.   Vitamin D deficiency  -     Well controlled. No change.  - Vitamin D, Ergocalciferol, 1.25 mg (50,000 units) capsule; Take 1 capsule by mouth 1 day a week.  Exercise-induced asthma (CMD)  -     Well controlled. No change.stop smoking.   - albuterol (ProAir HFA) 108 (90 Base) MCG/ACT inhaler; Inhale 2 puffs into the lungs four times daily. For wheezing  Acute pain of left shoulder   -suspect positional/tendonitis. Advised on avoiding triggers and ice/ icy hot.   Tobacco dependence  -     TOBACCO CESSATION COUNSELING > 3 < 10 MIN- > 8 minutes of smoking cessation counseling provided.   Encounter for screening mammogram for malignant neoplasm of breast  -     MAMMO SCREENING BILATERAL W KVNG; Future  Colon cancer screening  -     OPEN ACCESS COLONOSCOPY        Patient Instructions   LOW CARB DIET:  Reduce carbs to 100-200 grams daily.   More fiber the better/cancels out carbs.  Avoid sugar and High Fructose corn syrup.   Avoid drinking your calories. Avoid soda. Avoid alcohol.   Avoid artifical sweeteners. Ok to use Stevia only.   Try to avoid low sugar or low fat foods as these usually have more carbs.   Get enough protein 1 g per ideal body weight daily  Weight resistance exercises-build lean muscle will keep metabolism high for at least 48 hours even at rest.   Lots of water 64 oz or more.   Eat breakfast, lots of protein: eggs, avocado, protein shakes.  Limit breads, limit pasta, limit rice: everything should be whole wheat.   Get sufficient protein intake.   Eat nutrient dense foods.   Avoid processed foods high in sodium.  Don't over eat.   Stay active for 10 minutes  after largest meal of the day.   High quality sleep is important. Goal is to get 7-8  hours of sleep for women, and more than 6 for men.   Try to avoid eating within two hours of bedtime.   If you suspect or have obstructive sleep apnea, it is imperative to treat this.   CALORIE DEFICIT-Deduct 500 calories per day to lose 1 pound per week.     EXERCISE:  Get 150 minutes of moderate intensity exercise weekly which is 30 minutes 5 days per week (can do 10 minutes three times daily) or get 75 minutes of high intensity exercise weekly.   Incorporate 2 days of weight lifting into your routine exercise.   Avoid prolong sitting. If you sit for your job I recommend a sit to stand desk, this can cut down your sedentary time by 100 minutes over the week.            All of the above was discussed with the patient in details, questions were answered to the patient's satisfaction.  Patient left the office in stable condition with verbal and written instructions.     High

## 2024-08-20 NOTE — PATIENT PROFILE ADULT. - ..
N/A Patient is under age 18 and does not have a history of high risk behavior or is not high risk for Hep C 24-Mar-2016 16:06:14

## 2024-09-18 NOTE — ED PROVIDER NOTE - NS ED ATTENDING STATEMENT MOD
[Appropriately responsive] : appropriately responsive [Alert] : alert [No Acute Distress] : no acute distress [Oriented x3] : oriented x3 Attending with

## 2024-11-28 NOTE — ED ADULT NURSE REASSESSMENT NOTE - C[facility]IO [facility]
ICU Progress Note        Subjective:      - lying comfortably on the bed. Off BiPAP. Currently on 1 L/min supplemental oxygen saturating 98%. Normal work of breathing.       Vital Signs:    BP (!) 142/57   Pulse 78   Temp 98.5 °F (36.9 °C) (Oral)   Resp 24   Ht 1.626 m (5' 4\")   Wt 58.2 kg (128 lb 4.9 oz)   SpO2 97%   BMI 22.02 kg/m²             Temp (24hrs), Av.6 °F (37 °C), Min:98.4 °F (36.9 °C), Max:99 °F (37.2 °C)       Intake/Output:   Last shift:      No intake/output data recorded.  Last 3 shifts:  190 -  0700  In: 1506.8 [P.O.:200; I.V.:15.7]  Out: 460 [Urine:460]    Intake/Output Summary (Last 24 hours) at 2024 0836  Last data filed at 2024 0300  Gross per 24 hour   Intake 1091.15 ml   Output 460 ml   Net 631.15 ml         Physical Exam:    General: Alert, awake and oriented. Not in acute distress  HEENT:  Anicteric sclerae; pink palpebral conjunctivae; mucosa moist  Resp:  Bilateral air entry +, CRACKLES + LEFT side anteriorly.  CV:  S1, S2 present  GI:  Abdomen soft, non-tender; (+) active bowel sounds  Extremities:  +2 pulses on all extremities; no edema/ cyanosis/ clubbing noted  Skin:  Warm; no rashes/ lesions noted  Neurologic:  Non-focal    DATA:     Current Facility-Administered Medications   Medication Dose Route Frequency    potassium phosphate 30 mmol in sodium chloride 0.9 % 500 mL IVPB  30 mmol IntraVENous Once    magnesium sulfate 1000 mg in dextrose 5% 100 mL IVPB  1,000 mg IntraVENous Once    sodium chloride flush 0.9 % injection 5-40 mL  5-40 mL IntraVENous 2 times per day    sodium chloride flush 0.9 % injection 5-40 mL  5-40 mL IntraVENous PRN    0.9 % sodium chloride infusion   IntraVENous PRN    potassium chloride 20 mEq/50 mL IVPB (Central Line)  20 mEq IntraVENous PRN    Or    potassium chloride 10 mEq/100 mL IVPB (Peripheral Line)  10 mEq IntraVENous PRN    magnesium sulfate 2000 mg in 50 mL IVPB premix  2,000 mg IntraVENous PRN    ondansetron  (ZOFRAN-ODT) disintegrating tablet 4 mg  4 mg Oral Q8H PRN    Or    ondansetron (ZOFRAN) injection 4 mg  4 mg IntraVENous Q6H PRN    polyethylene glycol (GLYCOLAX) packet 17 g  17 g Oral Daily PRN    acetaminophen (TYLENOL) tablet 650 mg  650 mg Oral Q6H PRN    Or    acetaminophen (TYLENOL) suppository 650 mg  650 mg Rectal Q6H PRN    enoxaparin (LOVENOX) injection 60 mg  1 mg/kg SubCUTAneous BID    glucose chewable tablet 16 g  4 tablet Oral PRN    dextrose bolus 10% 125 mL  125 mL IntraVENous PRN    Or    dextrose bolus 10% 250 mL  250 mL IntraVENous PRN    glucagon injection 1 mg  1 mg SubCUTAneous PRN    dextrose 10 % infusion   IntraVENous Continuous PRN    insulin lispro (HUMALOG,ADMELOG) injection vial 0-8 Units  0-8 Units SubCUTAneous Q6H    insulin NPH (HumuLIN N;NovoLIN N) injection vial 5 Units  5 Units SubCUTAneous BID    doxycycline (VIBRAMYCIN) 100 mg in sodium chloride 0.9 % 100 mL IVPB (mini-bag)  100 mg IntraVENous Q12H    cefTRIAXone (ROCEPHIN) 1,000 mg in sterile water 10 mL IV syringe  1,000 mg IntraVENous Q24H    ipratropium 0.5 mg-albuterol 2.5 mg (DUONEB) nebulizer solution 1 Dose  1 Dose Inhalation Q8H RT    digoxin (LANOXIN) tablet 0.125 mg  0.125 mg Oral Daily         Recent Results (from the past 24 hour(s))   Blood Gas, Arterial    Collection Time: 11/27/24  9:16 AM   Result Value Ref Range    pH, Arterial 7.32 (L) 7.35 - 7.45      pCO2, Arterial 74 (H) 35 - 45 mmHg    pO2, Arterial 89 80 - 100 mmHg    POC O2 SAT 96 92 - 97 %    HCO3, Arterial 37 (H) 22 - 26 mmol/L    Base Excess, Arterial 8.1 mmol/L    O2 Method BIPAP      FIO2 Arterial 30 %    POC TIDAL VOLUME 360.0      Set Rate, POC 16      POC PEEP/CPA 6.0      Source ARTERIAL      Site RIGHT RADIAL      Jose Test YES     Procalcitonin    Collection Time: 11/27/24 10:09 AM   Result Value Ref Range    Procalcitonin 0.19 ng/mL   POCT Glucose    Collection Time: 11/27/24 10:12 AM   Result Value Ref Range    POC Glucose 173 (H) 65 - 117  Normal rate, regular rhythm, normal S1, S2 heart sounds heard.

## 2024-12-06 NOTE — PATIENT PROFILE ADULT - NSTOBACCONEVERSMOKERY/N_GEN_A
pacer noted overlying the right  ventricle.  Osteophytes are noted in the thoracic spine.  There is no  evidence for any CHF, in infiltrates or pneumothorax.    Assessment:      Diagnosis Orders   1. Localized edema   ~persistent ; wt down ~ 6# by office scales  ~breathing improved evidenced by not needing a rolator       2. SOB (shortness of breath)   ~prednisone for arthritic pain  ~lasix 40 mg in am with 20 mg midday  ~trends: Hgb 11.4 > 8.3 > 9.0  ~NT BNP elevated > 1200                            3. Essential hypertension   ~suboptimal   ~does not take antihypertensive medications       4. 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   Plan:     Begin Jardiance 10 mg daily for HFpEF   BMP / BNP after one week  Continue current dose of furosemide :  40 mg in am with 20 mg midday  Resume Xarelto 20 mg daily (CHADsVASc 3 [4 with dx HF]) ; remain off ASA  F/U as scheduled with Dr. Chacon 1/28  Consider starting toprol at this time  F/U with general cardiology in three months    Overall the patient is stable from CV standpoint    I have addresed the patient's cardiac risk factors and adjusted pharmacologic treatment as needed. In addition, I have reinforced the need for patient directed risk factor modification.    Further evaluation will be based upon the patient's clinical course and testing results.    All questions and concerns were addressed to the patient/wife & daughter (nurse). Alternatives to my treatment were discussed.      The patient is not currently smoking.     Patient is not on a beta-blocker : bradycardic s/p PPM '20  Patient is not on an ace-i/ARB/ARNI/SGLT2/GLP1/MRA  Patient is not on a statin     anti-coagulation has been recommended / prescribed for this 
No

## 2025-03-14 NOTE — DISCHARGE NOTE NURSING/CASE MANAGEMENT/SOCIAL WORK - NSTOBACCONEVERSMOKERY/N_GEN_A
Long-acting insulin:   Adjust dose according to FASTING BLOOD GLUCOSE target 100-130 mg/dL  (1) Increase the insulin dose every 3-4 days as needed.   (2) Increase by 2 units if FBG average concentration is 131-170 mg/dL.   (3) Increase by 4 units if FBG average concentration is 171-210 mg/dL.   (4) Increase by 6 units if FBG average concentration is 221-260 mg/dL.   (5) Increase by 8 units if FBG average concentration is greater than 261mg/dL and call us.   Consider cutting back by 1-2 units to previous dose if glucose concentration is below 100 mg/dL or symptoms of hypoglycemia.    
No

## 2025-06-24 NOTE — ED ADULT NURSE NOTE - AS [facility] BRADEN SENSORY
(4) no impairment
No solid food/dairy/candy/gum after 05:00am tomorrow; water allowed before 10:00am tomorrow; patient reminded to come with photo ID/insurance/credit card; dress in comfortable clothes; no jewelries/contact lens/valuable; no smoking/alcohol drinking/recreational drug use today; escort to have photo ID; address and callback number was given/yes